# Patient Record
Sex: MALE | Race: OTHER | HISPANIC OR LATINO
[De-identification: names, ages, dates, MRNs, and addresses within clinical notes are randomized per-mention and may not be internally consistent; named-entity substitution may affect disease eponyms.]

---

## 2017-01-27 ENCOUNTER — CHART COPY (OUTPATIENT)
Age: 52
End: 2017-01-27

## 2017-01-30 ENCOUNTER — OUTPATIENT (OUTPATIENT)
Dept: OUTPATIENT SERVICES | Facility: HOSPITAL | Age: 52
LOS: 1 days | Discharge: HOME | End: 2017-01-30

## 2017-01-30 DIAGNOSIS — Z96.659 PRESENCE OF UNSPECIFIED ARTIFICIAL KNEE JOINT: Chronic | ICD-10-CM

## 2017-01-30 DIAGNOSIS — C83.10 MANTLE CELL LYMPHOMA, UNSPECIFIED SITE: ICD-10-CM

## 2017-02-01 ENCOUNTER — RESULT REVIEW (OUTPATIENT)
Age: 52
End: 2017-02-01

## 2017-02-02 ENCOUNTER — APPOINTMENT (OUTPATIENT)
Dept: HEMATOLOGY ONCOLOGY | Facility: CLINIC | Age: 52
End: 2017-02-02

## 2017-02-02 VITALS
TEMPERATURE: 98.6 F | WEIGHT: 218.24 LBS | SYSTOLIC BLOOD PRESSURE: 142 MMHG | BODY MASS INDEX: 30.7 KG/M2 | RESPIRATION RATE: 16 BRPM | HEART RATE: 73 BPM | DIASTOLIC BLOOD PRESSURE: 87 MMHG | OXYGEN SATURATION: 97 %

## 2017-02-02 LAB
ALBUMIN SERPL ELPH-MCNC: 4.4 G/DL
ALP BLD-CCNC: 77 U/L
ALT SERPL-CCNC: 12 U/L
ANION GAP SERPL CALC-SCNC: 15 MMOL/L
AST SERPL-CCNC: 14 U/L
BASOPHILS # BLD AUTO: 0 K/UL — SIGNIFICANT CHANGE UP (ref 0–0.2)
BASOPHILS NFR BLD AUTO: 0.4 % — SIGNIFICANT CHANGE UP (ref 0–2)
BILIRUB SERPL-MCNC: 0.2 MG/DL
BUN SERPL-MCNC: 21 MG/DL
CALCIUM SERPL-MCNC: 9.5 MG/DL
CHLORIDE SERPL-SCNC: 101 MMOL/L
CO2 SERPL-SCNC: 27 MMOL/L
CREAT SERPL-MCNC: 0.93 MG/DL
EOSINOPHIL # BLD AUTO: 0.1 K/UL — SIGNIFICANT CHANGE UP (ref 0–0.5)
EOSINOPHIL NFR BLD AUTO: 1.8 % — SIGNIFICANT CHANGE UP (ref 0–6)
GLUCOSE SERPL-MCNC: 85 MG/DL
HCT VFR BLD CALC: 38.3 % — LOW (ref 39–50)
HGB BLD-MCNC: 12.7 G/DL — LOW (ref 13–17)
LDH SERPL-CCNC: 174 U/L
LYMPHOCYTES # BLD AUTO: 1.2 K/UL — SIGNIFICANT CHANGE UP (ref 1–3.3)
LYMPHOCYTES # BLD AUTO: 21 % — SIGNIFICANT CHANGE UP (ref 13–44)
MAGNESIUM SERPL-MCNC: 1.9 MG/DL
MCHC RBC-ENTMCNC: 31.4 PG — SIGNIFICANT CHANGE UP (ref 27–34)
MCHC RBC-ENTMCNC: 33.2 GM/DL — SIGNIFICANT CHANGE UP (ref 32–36)
MCV RBC AUTO: 94.4 FL — SIGNIFICANT CHANGE UP (ref 80–100)
MONOCYTES # BLD AUTO: 0.8 K/UL — SIGNIFICANT CHANGE UP (ref 0–0.9)
MONOCYTES NFR BLD AUTO: 13.3 % — SIGNIFICANT CHANGE UP (ref 2–14)
NEUTROPHILS # BLD AUTO: 3.8 K/UL — SIGNIFICANT CHANGE UP (ref 1.8–7.4)
NEUTROPHILS NFR BLD AUTO: 63.5 % — SIGNIFICANT CHANGE UP (ref 43–77)
PLATELET # BLD AUTO: 295 K/UL — SIGNIFICANT CHANGE UP (ref 150–400)
POTASSIUM SERPL-SCNC: 5 MMOL/L
PROT SERPL-MCNC: 6.8 G/DL
RBC # BLD: 4.06 M/UL — LOW (ref 4.2–5.8)
RBC # FLD: 14.2 % — SIGNIFICANT CHANGE UP (ref 10.3–14.5)
SODIUM SERPL-SCNC: 143 MMOL/L
URATE SERPL-MCNC: 6.3 MG/DL
WBC # BLD: 6 K/UL — SIGNIFICANT CHANGE UP (ref 3.8–10.5)
WBC # FLD AUTO: 6 K/UL — SIGNIFICANT CHANGE UP (ref 3.8–10.5)

## 2017-02-07 ENCOUNTER — OUTPATIENT (OUTPATIENT)
Dept: OUTPATIENT SERVICES | Facility: HOSPITAL | Age: 52
LOS: 1 days | End: 2017-02-07
Payer: COMMERCIAL

## 2017-02-07 DIAGNOSIS — Z96.659 PRESENCE OF UNSPECIFIED ARTIFICIAL KNEE JOINT: Chronic | ICD-10-CM

## 2017-02-07 DIAGNOSIS — C83.10 MANTLE CELL LYMPHOMA, UNSPECIFIED SITE: ICD-10-CM

## 2017-02-08 ENCOUNTER — FORM ENCOUNTER (OUTPATIENT)
Age: 52
End: 2017-02-08

## 2017-02-09 ENCOUNTER — RESULT REVIEW (OUTPATIENT)
Age: 52
End: 2017-02-09

## 2017-02-09 ENCOUNTER — OUTPATIENT (OUTPATIENT)
Dept: OUTPATIENT SERVICES | Facility: HOSPITAL | Age: 52
LOS: 1 days | End: 2017-02-09
Payer: COMMERCIAL

## 2017-02-09 ENCOUNTER — APPOINTMENT (OUTPATIENT)
Dept: PULMONOLOGY | Facility: CLINIC | Age: 52
End: 2017-02-09

## 2017-02-09 ENCOUNTER — APPOINTMENT (OUTPATIENT)
Dept: CV DIAGNOSITCS | Facility: HOSPITAL | Age: 52
End: 2017-02-09

## 2017-02-09 ENCOUNTER — APPOINTMENT (OUTPATIENT)
Dept: NUCLEAR MEDICINE | Facility: HOSPITAL | Age: 52
End: 2017-02-09

## 2017-02-09 DIAGNOSIS — Z96.659 PRESENCE OF UNSPECIFIED ARTIFICIAL KNEE JOINT: Chronic | ICD-10-CM

## 2017-02-09 DIAGNOSIS — C85.80 OTHER SPECIFIED TYPES OF NON-HODGKIN LYMPHOMA, UNSPECIFIED SITE: ICD-10-CM

## 2017-02-09 PROCEDURE — 93306 TTE W/DOPPLER COMPLETE: CPT

## 2017-02-09 PROCEDURE — 78472 GATED HEART PLANAR SINGLE: CPT | Mod: 26

## 2017-02-09 PROCEDURE — 93306 TTE W/DOPPLER COMPLETE: CPT | Mod: 26

## 2017-02-09 PROCEDURE — 70220 X-RAY EXAM OF SINUSES: CPT | Mod: 26

## 2017-02-09 PROCEDURE — A9560: CPT

## 2017-02-09 PROCEDURE — 0399T: CPT

## 2017-02-09 PROCEDURE — 70220 X-RAY EXAM OF SINUSES: CPT

## 2017-02-09 PROCEDURE — 78472 GATED HEART PLANAR SINGLE: CPT

## 2017-02-10 ENCOUNTER — MESSAGE (OUTPATIENT)
Age: 52
End: 2017-02-10

## 2017-02-10 ENCOUNTER — LABORATORY RESULT (OUTPATIENT)
Age: 52
End: 2017-02-10

## 2017-02-10 ENCOUNTER — APPOINTMENT (OUTPATIENT)
Dept: HEMATOLOGY ONCOLOGY | Facility: CLINIC | Age: 52
End: 2017-02-10

## 2017-02-10 ENCOUNTER — FORM ENCOUNTER (OUTPATIENT)
Age: 52
End: 2017-02-10

## 2017-02-10 VITALS
OXYGEN SATURATION: 99 % | SYSTOLIC BLOOD PRESSURE: 138 MMHG | DIASTOLIC BLOOD PRESSURE: 89 MMHG | RESPIRATION RATE: 16 BRPM | TEMPERATURE: 98 F | WEIGHT: 219.36 LBS | BODY MASS INDEX: 30.85 KG/M2 | HEART RATE: 62 BPM

## 2017-02-10 LAB
BASOPHILS # BLD AUTO: 0.1 K/UL — SIGNIFICANT CHANGE UP (ref 0–0.2)
BASOPHILS NFR BLD AUTO: 1.1 % — SIGNIFICANT CHANGE UP (ref 0–2)
EOSINOPHIL # BLD AUTO: 0.4 K/UL — SIGNIFICANT CHANGE UP (ref 0–0.5)
EOSINOPHIL NFR BLD AUTO: 7.2 % — HIGH (ref 0–6)
HCT VFR BLD CALC: 42.1 % — SIGNIFICANT CHANGE UP (ref 39–50)
HGB BLD-MCNC: 13.7 G/DL — SIGNIFICANT CHANGE UP (ref 13–17)
LYMPHOCYTES # BLD AUTO: 1.3 K/UL — SIGNIFICANT CHANGE UP (ref 1–3.3)
LYMPHOCYTES # BLD AUTO: 24.3 % — SIGNIFICANT CHANGE UP (ref 13–44)
MCHC RBC-ENTMCNC: 30.6 PG — SIGNIFICANT CHANGE UP (ref 27–34)
MCHC RBC-ENTMCNC: 32.6 GM/DL — SIGNIFICANT CHANGE UP (ref 32–36)
MCV RBC AUTO: 93.8 FL — SIGNIFICANT CHANGE UP (ref 80–100)
MONOCYTES # BLD AUTO: 0.8 K/UL — SIGNIFICANT CHANGE UP (ref 0–0.9)
MONOCYTES NFR BLD AUTO: 14.1 % — HIGH (ref 2–14)
NEUTROPHILS # BLD AUTO: 2.8 K/UL — SIGNIFICANT CHANGE UP (ref 1.8–7.4)
NEUTROPHILS NFR BLD AUTO: 53.3 % — SIGNIFICANT CHANGE UP (ref 43–77)
PLATELET # BLD AUTO: 250 K/UL — SIGNIFICANT CHANGE UP (ref 150–400)
RBC # BLD: 4.49 M/UL — SIGNIFICANT CHANGE UP (ref 4.2–5.8)
RBC # FLD: 13.9 % — SIGNIFICANT CHANGE UP (ref 10.3–14.5)
WBC # BLD: 5.4 K/UL — SIGNIFICANT CHANGE UP (ref 3.8–10.5)
WBC # FLD AUTO: 5.4 K/UL — SIGNIFICANT CHANGE UP (ref 3.8–10.5)

## 2017-02-10 PROCEDURE — 85097 BONE MARROW INTERPRETATION: CPT

## 2017-02-10 PROCEDURE — 88342 IMHCHEM/IMCYTCHM 1ST ANTB: CPT | Mod: 26

## 2017-02-10 PROCEDURE — 88291 CYTO/MOLECULAR REPORT: CPT

## 2017-02-10 PROCEDURE — 88313 SPECIAL STAINS GROUP 2: CPT | Mod: 26

## 2017-02-10 PROCEDURE — 88341 IMHCHEM/IMCYTCHM EA ADD ANTB: CPT | Mod: 26

## 2017-02-10 PROCEDURE — 88305 TISSUE EXAM BY PATHOLOGIST: CPT | Mod: 26

## 2017-02-11 ENCOUNTER — APPOINTMENT (OUTPATIENT)
Dept: NUCLEAR MEDICINE | Facility: CLINIC | Age: 52
End: 2017-02-11

## 2017-02-11 ENCOUNTER — OUTPATIENT (OUTPATIENT)
Dept: OUTPATIENT SERVICES | Facility: HOSPITAL | Age: 52
LOS: 1 days | End: 2017-02-11
Payer: COMMERCIAL

## 2017-02-11 DIAGNOSIS — Z96.659 PRESENCE OF UNSPECIFIED ARTIFICIAL KNEE JOINT: Chronic | ICD-10-CM

## 2017-02-11 DIAGNOSIS — C83.10 MANTLE CELL LYMPHOMA, UNSPECIFIED SITE: ICD-10-CM

## 2017-02-11 PROCEDURE — A9552: CPT

## 2017-02-11 PROCEDURE — 78815 PET IMAGE W/CT SKULL-THIGH: CPT

## 2017-02-14 ENCOUNTER — APPOINTMENT (OUTPATIENT)
Dept: HEMATOLOGY ONCOLOGY | Facility: CLINIC | Age: 52
End: 2017-02-14

## 2017-02-14 ENCOUNTER — RESULT REVIEW (OUTPATIENT)
Age: 52
End: 2017-02-14

## 2017-02-14 VITALS
HEART RATE: 79 BPM | WEIGHT: 218.26 LBS | SYSTOLIC BLOOD PRESSURE: 136 MMHG | BODY MASS INDEX: 30.7 KG/M2 | DIASTOLIC BLOOD PRESSURE: 80 MMHG | OXYGEN SATURATION: 98 % | TEMPERATURE: 98.2 F | RESPIRATION RATE: 16 BRPM

## 2017-02-14 LAB — TM INTERPRETATION: SIGNIFICANT CHANGE UP

## 2017-02-15 ENCOUNTER — LABORATORY RESULT (OUTPATIENT)
Age: 52
End: 2017-02-15

## 2017-02-15 ENCOUNTER — APPOINTMENT (OUTPATIENT)
Dept: INFUSION THERAPY | Facility: HOSPITAL | Age: 52
End: 2017-02-15

## 2017-02-15 LAB
BASOPHILS # BLD AUTO: 0 K/UL — SIGNIFICANT CHANGE UP (ref 0–0.2)
BASOPHILS NFR BLD AUTO: 0.5 % — SIGNIFICANT CHANGE UP (ref 0–2)
EOSINOPHIL # BLD AUTO: 0.8 K/UL — HIGH (ref 0–0.5)
EOSINOPHIL NFR BLD AUTO: 12 % — HIGH (ref 0–6)
HCT VFR BLD CALC: 42 % — SIGNIFICANT CHANGE UP (ref 39–50)
HEMATOPATHOLOGY REPORT: SIGNIFICANT CHANGE UP
HGB BLD-MCNC: 14 G/DL — SIGNIFICANT CHANGE UP (ref 13–17)
LYMPHOCYTES # BLD AUTO: 2 K/UL — SIGNIFICANT CHANGE UP (ref 1–3.3)
LYMPHOCYTES # BLD AUTO: 30.7 % — SIGNIFICANT CHANGE UP (ref 13–44)
MCHC RBC-ENTMCNC: 31.4 PG — SIGNIFICANT CHANGE UP (ref 27–34)
MCHC RBC-ENTMCNC: 33.4 G/DL — SIGNIFICANT CHANGE UP (ref 32–36)
MCV RBC AUTO: 94 FL — SIGNIFICANT CHANGE UP (ref 80–100)
MONOCYTES # BLD AUTO: 0.9 K/UL — SIGNIFICANT CHANGE UP (ref 0–0.9)
MONOCYTES NFR BLD AUTO: 13.1 % — SIGNIFICANT CHANGE UP (ref 2–14)
NEUTROPHILS # BLD AUTO: 2.9 K/UL — SIGNIFICANT CHANGE UP (ref 1.8–7.4)
NEUTROPHILS NFR BLD AUTO: 43.7 % — SIGNIFICANT CHANGE UP (ref 43–77)
PLATELET # BLD AUTO: 225 K/UL — SIGNIFICANT CHANGE UP (ref 150–400)
RBC # BLD: 4.47 M/UL — SIGNIFICANT CHANGE UP (ref 4.2–5.8)
RBC # FLD: 13.8 % — SIGNIFICANT CHANGE UP (ref 10.3–14.5)
WBC # BLD: 6.6 K/UL — SIGNIFICANT CHANGE UP (ref 3.8–10.5)
WBC # FLD AUTO: 6.6 K/UL — SIGNIFICANT CHANGE UP (ref 3.8–10.5)

## 2017-02-15 PROCEDURE — 88305 TISSUE EXAM BY PATHOLOGIST: CPT

## 2017-02-15 PROCEDURE — 88342 IMHCHEM/IMCYTCHM 1ST ANTB: CPT

## 2017-02-15 PROCEDURE — 88237 TISSUE CULTURE BONE MARROW: CPT

## 2017-02-15 PROCEDURE — 88185 FLOWCYTOMETRY/TC ADD-ON: CPT

## 2017-02-15 PROCEDURE — 88280 CHROMOSOME KARYOTYPE STUDY: CPT

## 2017-02-15 PROCEDURE — 86900 BLOOD TYPING SEROLOGIC ABO: CPT

## 2017-02-15 PROCEDURE — 88341 IMHCHEM/IMCYTCHM EA ADD ANTB: CPT

## 2017-02-15 PROCEDURE — 88184 FLOWCYTOMETRY/ TC 1 MARKER: CPT

## 2017-02-15 PROCEDURE — 88313 SPECIAL STAINS GROUP 2: CPT

## 2017-02-15 PROCEDURE — 86901 BLOOD TYPING SEROLOGIC RH(D): CPT

## 2017-02-15 PROCEDURE — 88264 CHROMOSOME ANALYSIS 20-25: CPT

## 2017-02-15 PROCEDURE — 85097 BONE MARROW INTERPRETATION: CPT

## 2017-02-15 PROCEDURE — 88285 CHROMOSOME COUNT ADDITIONAL: CPT

## 2017-02-15 PROCEDURE — 86850 RBC ANTIBODY SCREEN: CPT

## 2017-02-16 ENCOUNTER — TRANSCRIPTION ENCOUNTER (OUTPATIENT)
Age: 52
End: 2017-02-16

## 2017-02-16 ENCOUNTER — INPATIENT (INPATIENT)
Facility: HOSPITAL | Age: 52
LOS: 0 days | Discharge: HOME | DRG: 847 | End: 2017-02-17
Attending: INTERNAL MEDICINE | Admitting: INTERNAL MEDICINE
Payer: COMMERCIAL

## 2017-02-16 VITALS
WEIGHT: 218.48 LBS | RESPIRATION RATE: 18 BRPM | HEART RATE: 76 BPM | DIASTOLIC BLOOD PRESSURE: 73 MMHG | TEMPERATURE: 98 F | OXYGEN SATURATION: 98 % | SYSTOLIC BLOOD PRESSURE: 130 MMHG

## 2017-02-16 DIAGNOSIS — Z51.11 ENCOUNTER FOR ANTINEOPLASTIC CHEMOTHERAPY: ICD-10-CM

## 2017-02-16 DIAGNOSIS — K21.9 GASTRO-ESOPHAGEAL REFLUX DISEASE WITHOUT ESOPHAGITIS: ICD-10-CM

## 2017-02-16 DIAGNOSIS — Z98.890 OTHER SPECIFIED POSTPROCEDURAL STATES: Chronic | ICD-10-CM

## 2017-02-16 DIAGNOSIS — C83.10 MANTLE CELL LYMPHOMA, UNSPECIFIED SITE: ICD-10-CM

## 2017-02-16 DIAGNOSIS — R11.2 NAUSEA WITH VOMITING, UNSPECIFIED: ICD-10-CM

## 2017-02-16 DIAGNOSIS — C85.90 NON-HODGKIN LYMPHOMA, UNSPECIFIED, UNSPECIFIED SITE: ICD-10-CM

## 2017-02-16 DIAGNOSIS — Z96.659 PRESENCE OF UNSPECIFIED ARTIFICIAL KNEE JOINT: Chronic | ICD-10-CM

## 2017-02-16 DIAGNOSIS — Z29.9 ENCOUNTER FOR PROPHYLACTIC MEASURES, UNSPECIFIED: ICD-10-CM

## 2017-02-16 LAB
ALBUMIN SERPL ELPH-MCNC: 4.4 G/DL — SIGNIFICANT CHANGE UP (ref 3.3–5)
ALP SERPL-CCNC: 70 U/L — SIGNIFICANT CHANGE UP (ref 40–120)
ALT FLD-CCNC: 36 U/L RC — SIGNIFICANT CHANGE UP (ref 10–45)
ANION GAP SERPL CALC-SCNC: 12 MMOL/L — SIGNIFICANT CHANGE UP (ref 5–17)
APTT BLD: 28.5 SEC — SIGNIFICANT CHANGE UP (ref 27.5–37.4)
AST SERPL-CCNC: 27 U/L — SIGNIFICANT CHANGE UP (ref 10–40)
BILIRUB SERPL-MCNC: 0.5 MG/DL — SIGNIFICANT CHANGE UP (ref 0.2–1.2)
BLD GP AB SCN SERPL QL: NEGATIVE — SIGNIFICANT CHANGE UP
BUN SERPL-MCNC: 20 MG/DL — SIGNIFICANT CHANGE UP (ref 7–23)
CALCIUM SERPL-MCNC: 9.3 MG/DL — SIGNIFICANT CHANGE UP (ref 8.4–10.5)
CHLORIDE SERPL-SCNC: 102 MMOL/L — SIGNIFICANT CHANGE UP (ref 96–108)
CO2 SERPL-SCNC: 28 MMOL/L — SIGNIFICANT CHANGE UP (ref 22–31)
CREAT SERPL-MCNC: 0.87 MG/DL — SIGNIFICANT CHANGE UP (ref 0.5–1.3)
GLUCOSE SERPL-MCNC: 109 MG/DL — HIGH (ref 70–99)
INR BLD: 1.07 RATIO — SIGNIFICANT CHANGE UP (ref 0.88–1.16)
LDH SERPL L TO P-CCNC: 207 U/L — SIGNIFICANT CHANGE UP (ref 50–242)
MAGNESIUM SERPL-MCNC: 2 MG/DL — SIGNIFICANT CHANGE UP (ref 1.6–2.6)
PHOSPHATE SERPL-MCNC: 3.1 MG/DL — SIGNIFICANT CHANGE UP (ref 2.5–4.5)
POTASSIUM SERPL-MCNC: 4.7 MMOL/L — SIGNIFICANT CHANGE UP (ref 3.5–5.3)
POTASSIUM SERPL-SCNC: 4.7 MMOL/L — SIGNIFICANT CHANGE UP (ref 3.5–5.3)
PROT SERPL-MCNC: 7.1 G/DL — SIGNIFICANT CHANGE UP (ref 6–8.3)
PROTHROM AB SERPL-ACNC: 11.7 SEC — SIGNIFICANT CHANGE UP (ref 10–13.1)
RH IG SCN BLD-IMP: POSITIVE — SIGNIFICANT CHANGE UP
SODIUM SERPL-SCNC: 142 MMOL/L — SIGNIFICANT CHANGE UP (ref 135–145)
URATE SERPL-MCNC: 7.3 MG/DL — SIGNIFICANT CHANGE UP (ref 3.4–8.8)

## 2017-02-16 PROCEDURE — 71010: CPT | Mod: 26

## 2017-02-16 RX ORDER — SALIVA SUBSTITUTE COMB NO.11 351 MG
30 POWDER IN PACKET (EA) MUCOUS MEMBRANE THREE TIMES A DAY
Qty: 0 | Refills: 0 | Status: DISCONTINUED | OUTPATIENT
Start: 2017-02-16 | End: 2017-02-17

## 2017-02-16 RX ORDER — CARBOPLATIN 50 MG
750 VIAL (EA) INTRAVENOUS ONCE
Qty: 0 | Refills: 0 | Status: DISCONTINUED | OUTPATIENT
Start: 2017-02-16 | End: 2017-02-17

## 2017-02-16 RX ORDER — FOSAPREPITANT DIMEGLUMINE 150 MG/5ML
150 INJECTION, POWDER, LYOPHILIZED, FOR SOLUTION INTRAVENOUS ONCE
Qty: 0 | Refills: 0 | Status: COMPLETED | OUTPATIENT
Start: 2017-02-16 | End: 2017-02-16

## 2017-02-16 RX ORDER — ENOXAPARIN SODIUM 100 MG/ML
40 INJECTION SUBCUTANEOUS DAILY
Qty: 0 | Refills: 0 | Status: DISCONTINUED | OUTPATIENT
Start: 2017-02-16 | End: 2017-02-16

## 2017-02-16 RX ORDER — PROCHLORPERAZINE MALEATE 5 MG
10 TABLET ORAL ONCE
Qty: 0 | Refills: 0 | Status: COMPLETED | OUTPATIENT
Start: 2017-02-16 | End: 2017-02-16

## 2017-02-16 RX ORDER — PANTOPRAZOLE SODIUM 20 MG/1
40 TABLET, DELAYED RELEASE ORAL
Qty: 0 | Refills: 0 | Status: DISCONTINUED | OUTPATIENT
Start: 2017-02-16 | End: 2017-02-17

## 2017-02-16 RX ORDER — SODIUM CHLORIDE 9 MG/ML
1000 INJECTION INTRAMUSCULAR; INTRAVENOUS; SUBCUTANEOUS
Qty: 0 | Refills: 0 | Status: DISCONTINUED | OUTPATIENT
Start: 2017-02-16 | End: 2017-02-17

## 2017-02-16 RX ORDER — IFOSFAMIDE 1 G/1
10950 INJECTION, POWDER, LYOPHILIZED, FOR SOLUTION INTRAVENOUS ONCE
Qty: 0 | Refills: 0 | Status: DISCONTINUED | OUTPATIENT
Start: 2017-02-16 | End: 2017-02-17

## 2017-02-16 RX ORDER — ENOXAPARIN SODIUM 100 MG/ML
40 INJECTION SUBCUTANEOUS DAILY
Qty: 0 | Refills: 0 | Status: DISCONTINUED | OUTPATIENT
Start: 2017-02-16 | End: 2017-02-17

## 2017-02-16 RX ORDER — ACETAMINOPHEN 500 MG
650 TABLET ORAL EVERY 6 HOURS
Qty: 0 | Refills: 0 | Status: DISCONTINUED | OUTPATIENT
Start: 2017-02-16 | End: 2017-02-17

## 2017-02-16 RX ORDER — ETOPOSIDE 20 MG/ML
219 VIAL (ML) INTRAVENOUS DAILY
Qty: 0 | Refills: 0 | Status: DISCONTINUED | OUTPATIENT
Start: 2017-02-16 | End: 2017-02-17

## 2017-02-16 RX ADMIN — SODIUM CHLORIDE 50 MILLILITER(S): 9 INJECTION INTRAMUSCULAR; INTRAVENOUS; SUBCUTANEOUS at 16:00

## 2017-02-16 RX ADMIN — Medication 650 MILLIGRAM(S): at 15:00

## 2017-02-16 RX ADMIN — Medication 650 MILLIGRAM(S): at 15:30

## 2017-02-16 RX ADMIN — Medication 30 MILLILITER(S): at 14:25

## 2017-02-16 RX ADMIN — Medication 10 MILLIGRAM(S): at 22:30

## 2017-02-16 RX ADMIN — FOSAPREPITANT DIMEGLUMINE 450 MILLIGRAM(S): 150 INJECTION, POWDER, LYOPHILIZED, FOR SOLUTION INTRAVENOUS at 15:40

## 2017-02-16 RX ADMIN — Medication 30 MILLILITER(S): at 21:47
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
## 2017-02-16 NOTE — ADVANCED PRACTICE NURSE CONSULT - ASSESSMENT
Patient in Cycle 1 day 2/? of RICE. Received rituxan and etoposide at [facility] outpatient on 2/15/17. Labs verified by NP Humera Hankins. VSS as per [facility]. Pt has mediport in R anterior chest wall with positive blood return and no abnormalities of site. Carboplatin 750 mg IV hung at 1620, over 1 hour. Will hang Etoposide and Ifosfamide when Carboplatin is complete d/t compatibility. Patient pre-medicated with emend 150 mg IV and will receive zofran and reglan ATC. Patient in Cycle 1 day 2 of RICE. Received rituxan and etoposide at [facility] outpatient on 2/15/17. Labs verified by NP Humera Hankins. VSS as per [facility]. Pt has mediport in R anterior chest wall with positive blood return and no abnormalities of site. AUC = 5. Carboplatin 750 mg IV hung at 1620, over 1 hour. Will hang Etoposide and Ifosfamide when Carboplatin is complete d/t compatibility. Patient pre-medicated with Emend 150 mg IV, today only, and will receive Zofran 8 mg IV q8h ATC and Reglan 10 mg IV q6h PRN for n/v. Patient in Cycle 1 day 2/3 of RICE. Received rituxan and etoposide at [facility] outpatient on 2/15/17. Labs verified by NP Humera Hankins. VSS as per [facility]. Pt has mediport in R anterior chest wall with positive blood return and no abnormalities of site. AUC = 5. Carboplatin 750 mg IV hung at 1620, over 1 hour. Will hang Etoposide and Ifosfamide when Carboplatin is complete d/t compatibility. Patient pre-medicated with Emend 150 mg IV, today only, and will receive Zofran 8 mg IV q8h ATC and Reglan 10 mg IV q6h PRN for n/v. Patient in Cycle 1 day 2/3 of RICE. Received rituxan and etoposide at [facility] outpatient on 2/15/17. Labs verified by NP Humera Hankins. VSS as per [facility]. Pt has mediport in R anterior chest wall with positive blood return and no abnormalities of site. AUC = 5. Carboplatin 750 mg IV hung at 1620, over 1 hour. Etoposide 100 mg/m2 = 219 mg and Ifosfamide 5000 mg/m2 = 10,950 mg hung after Carboplatin at 1745, for 24 hours. Patient pre-medicated with Emend 150 mg IV, today only, and will receive Zofran 8 mg IV q8h ATC and Reglan 10 mg IV q6h PRN for n/v.

## 2017-02-16 NOTE — H&P ADULT. - PROBLEM SELECTOR PLAN 1
Admit to [facility] [place].  Start IV hydration  Access mediport, check CXR before accessing mediport.  Monitor blood counts, transfuse as needed.  Monitor electrolytes, supplement as needed.  Monitor LFTS.  Mouth care to prevent mucositis.  Antiemetics PRN.  Start Ifosfamide_____  Monitor for Ifos/Mesna toxicity. Admit to [facility] [place].  Start IV hydration  Access mediport, after confirmed by CXR.  Monitor blood counts, transfuse as needed.  Monitor electrolytes, supplement as needed.  Monitor LFTS.  Mouth care to prevent mucositis.  Antiemetics PRN.  Start Ifosfamide_____  Monitor for Ifos/Mesna toxicity. Admit to [facility] [place].  Start IV hydration  Access mediport, after confirmed by CXR.  Monitor blood counts, transfuse as needed.  Monitor electrolytes, supplement as needed.  Monitor LFTS.  Mouth care to prevent mucositis.  Antiemetics PRN.  Start Etoposide 100mg/b7=789zj IVSS daily on days 2/16, 2/17.  Carboplatin (AUC)= 716mg IVSS on days 2/16.  Ifosfamide 5000mg/m2= [id number] IV continuous infusion over 24hrs with MESNA 5000mg/m2= 44287wz IV on day 2/16.  Emend 150mg IV x1 dose on 2/16/2017.  Monitor for Ifos/Mesna toxicity. Admit to [facility] [place].  Start IV hydration  Access mediport, after confirmed by CXR.  Monitor blood counts, transfuse as needed.  Monitor electrolytes, supplement as needed.  Monitor LFTS.  Mouth care to prevent mucositis.  Antiemetics PRN.  Start Etoposide 100mg/t8=243ol IVSS daily on days 2/16, 2/17.  Carboplatin (AUC)= 750mg IVSS on days 2/16.  Ifosfamide 5000mg/m2= 91370tm IV continuous infusion over 24hrs with MESNA 5000mg/m2= 38540bu IV on day 2/16.  Emend 150mg IV x1 dose on 2/16/2017.  Monitor for Ifos/Mesna toxicity.

## 2017-02-16 NOTE — DISCHARGE NOTE ADULT - MEDICATION SUMMARY - MEDICATIONS TO TAKE
I will START or STAY ON the medications listed below when I get home from the hospital:    omeprazole  -- 20 milligram(s) by mouth once a day  -- Indication: For GERD (gastroesophageal reflux disease) I will START or STAY ON the medications listed below when I get home from the hospital:    Compazine  -- 10 milligram(s) by mouth every 6 hours, As Needed for nausea  -- Indication: For For nausea    omeprazole  -- 20 milligram(s) by mouth once a day  -- Indication: For GERD (gastroesophageal reflux disease) I will START or STAY ON the medications listed below when I get home from the hospital:    Compazine  -- 10 milligram(s) by mouth every 6 hours, As Needed for nausea  -- Indication: For For nausea    omeprazole 20 mg oral delayed release capsule  -- 1 cap(s) by mouth once a day MDD:1  -- It is very important that you take or use this exactly as directed.  Do not skip doses or discontinue unless directed by your doctor.  Obtain medical advice before taking any non-prescription drugs as some may affect the action of this medication.  Swallow whole.  Do not crush.    -- Indication: For GERD (gastroesophageal reflux disease)

## 2017-02-16 NOTE — H&P ADULT. - HISTORY OF PRESENT ILLNESS
52 y/o male with h/o hypertension, diagnosed with mantle cell lymphoma on 10/2016, s/p 4 cycles of R-CHOP now in preparation for stem cell transplant admitted for cycle # 1 RICE.  Patients history dates back to October, 2016 when he presented to Dr. Swift with a left inguinal mass . CAT scan of abdomen pelvis was performed which revealed 0.7 cm lesion in the right Iliac bone and left retroperitoneal lymph adenopathy . An inguinal node and a bone marrow biopsy was done which was consistent with mantle cell lymphoma. A PET scan was done which revealed retroperitoneal lymphadenopathy and diffusely hypermetabolic spleen. He received 4 cycles of RCHOP and a PET scan was repeated which showed continued response. Bone marrow biopsy was done on 2/10 which revealed normocellular bone marrow with trilineage hematopoiesis and maturation. Patient is planned to receive 2 cycles of RICE therapy for consolidation and mobilization. He received his first dose of Rituxan and Etoposide on 2/15 and admitted for his first cycle of RICE.   Denies any chest pain, palpitation, SOB or dizziness.

## 2017-02-16 NOTE — DISCHARGE NOTE ADULT - HOSPITAL COURSE
52 y/o male with h/o hypertension, diagnosed with mantle cell lymphoma on 10/2016, s/p 4 cycles of R-CHOP now in preparation for stem cell transplant admitted for cycle # 1 RICE. His right chest wall mediport was accessed after radiographic confirmation. He received IV hydration monitored vital signs every 4 hours, maintained strict I/O, monitored labs daily, repleted as needed. He received his ICE treatment , monitored for Ifosamide toxicity, no side effects noted, tolerated well. Patient was instructed to follow up at [facility] for Neulasta injection and further MD appointments. 50 y/o male with h/o hypertension, diagnosed with mantle cell lymphoma on 10/2016, s/p 4 cycles of R-CHOP now in preparation for stem cell transplant admitted for cycle # 1 RICE. Patient received day 1 rituxan and etoposide as outpatient. His right chest wall mediport was accessed after radiographic confirmation. He received IV hydration monitored vital signs every 4 hours, maintained strict I/O, monitored labs daily, repleted as needed. He completed his RICE treatment , monitored for Ifosamide toxicity, no side effects noted, tolerated well. Patient was instructed to follow up at [facility] for Neulasta injection and further MD appointments. Patient is a 50 y/o M w/ a PMHx of hypertension, diagnosed with mantle cell lymphoma on 10/2016, s/p 4 cycles of R-CHOP now in preparation for stem cell transplant admitted for cycle # 1 RICE. Patient received day 1 Rituxan and Etoposide as an outpatient. His right chest wall mediport was accessed after radiographic confirmation. He received IV hydration. Vital signs were monitored every 4 hours, strict I/Os were maintained, labs were monitored daily, and electrolytes were repleted as needed. He completed his RICE treatment. He was monitored for Ifosamide toxicity w/ no side effects noted. He tolerated the regimen well. Patient remained afebrile and hemodynamically stable throughout hospitalization. Patient was instructed to follow up at [facility] for Neulasta injection and further MD appointments. Patient is a 52 y/o M w/ a PMHx of hypertension, diagnosed with mantle cell lymphoma on 10/2016, s/p 4 cycles of R-CHOP now in preparation for stem cell transplant admitted for cycle # 1 RICE. Patient received day 1 Rituxan and Etoposide as an outpatient. His right chest wall mediport was accessed after radiographic confirmation. He received IV hydration. Vital signs were monitored every 4 hours, strict I/Os were maintained, labs were monitored daily, and electrolytes were repleted as needed. He completed his RICE treatment. He was monitored for Ifosamide toxicity with no side effects noted. He tolerated the regimen well. Patient remained afebrile and hemodynamically stable throughout hospitalization. Patient was instructed to follow up at [facility] for Neulasta injection and further MD appointments.

## 2017-02-16 NOTE — DISCHARGE NOTE ADULT - CARE PLAN
Principal Discharge DX:	Non Hodgkin's lymphoma  Goal:	To remain safe  Instructions for follow-up, activity and diet:	Notify MD or report to ER for fever greater than or equal to 100.4F, persistent nausea, vomiting, diarrhea, abdominal pain, bleeding.  To [facility] for neulasta injection and  designated appointment.  Secondary Diagnosis:	GERD (gastroesophageal reflux disease)  Goal:	Continue on Prilosec Principal Discharge DX:	Non Hodgkin's lymphoma  Goal:	To remain safe  Instructions for follow-up, activity and diet:	Notify MD or report to ER for fever greater than or equal to 100.4F, persistent nausea, vomiting, diarrhea, abdominal pain, bleeding.  To [facility] for neulasta injection and  designated appointment.  Secondary Diagnosis:	GERD (gastroesophageal reflux disease)  Goal:	Continue on Prilosec Principal Discharge DX:	Non Hodgkin's lymphoma  Goal:	To remain safe  Instructions for follow-up, activity and diet:	Notify MD or report to ER for fever greater than or equal to 100.4F, persistent nausea, vomiting, diarrhea, abdominal pain, bleeding.  To [facility] for neulasta injection and  designated appointment.  Secondary Diagnosis:	GERD (gastroesophageal reflux disease)  Goal:	Continue on Prilosec Principal Discharge DX:	Non Hodgkin's lymphoma  Goal:	To remain safe  Instructions for follow-up, activity and diet:	Notify MD or report to ER for fever greater than or equal to 100.4F, persistent nausea, vomiting, diarrhea, abdominal pain, bleeding.  To [facility] for neulasta injection and  designated appointment.  Secondary Diagnosis:	GERD (gastroesophageal reflux disease)  Goal:	Continue on Prilosec Principal Discharge DX:	Non Hodgkin's lymphoma  Goal:	Maintain counts and remain free from infection  Instructions for follow-up, activity and diet:	Notify MD or report to ER for fever greater than or equal to 100.4F, persistent nausea, vomiting, diarrhea, abdominal pain, bleeding.  To [facility] for neulasta injection and  designated appointment.  Secondary Diagnosis:	GERD (gastroesophageal reflux disease)  Goal:	Stable  Instructions for follow-up, activity and diet:	Continue on Prilosec Principal Discharge DX:	Non Hodgkin's lymphoma  Goal:	Maintain counts and remain free from infection  Instructions for follow-up, activity and diet:	Notify MD or report to ER for fever greater than or equal to 100.4F, persistent nausea, vomiting, diarrhea, abdominal pain, bleeding.  To [facility] for neulasta injection and  designated appointment.  Secondary Diagnosis:	GERD (gastroesophageal reflux disease)  Goal:	Stable  Instructions for follow-up, activity and diet:	Continue on Prilosec Principal Discharge DX:	Non Hodgkin's lymphoma  Goal:	Maintain counts and remain free from infection  Instructions for follow-up, activity and diet:	Notify MD or report to ER for fever greater than or equal to 100.4F, persistent nausea, vomiting, diarrhea, abdominal pain, bleeding.  To [facility] for neulasta injection and  designated appointment.  Secondary Diagnosis:	GERD (gastroesophageal reflux disease)  Goal:	Stable  Instructions for follow-up, activity and diet:	Continue on Prilosec

## 2017-02-16 NOTE — PROVIDER CONTACT NOTE (OTHER) - SITUATION
pt states "I feel weird, it feels like inside of my veins are vibrating" pt states "I feel weird, it feels like the inside of my veins are vibrating"

## 2017-02-16 NOTE — DISCHARGE NOTE ADULT - PATIENT PORTAL LINK FT
“You can access the FollowHealth Patient Portal, offered by [facility], by registering with the following website: [email or web address]”

## 2017-02-16 NOTE — DISCHARGE NOTE ADULT - ADDITIONAL INSTRUCTIONS
You have an appointment for Neulasta on Saturday 2/18/2017 at 3:20PM.  Appointment with Zuly Hobbs NP On Tuesday 2/21/2017 at 2PM.  Appointment on Friday 2/24/2017 at 3PM  to see Dr. Banks and possible platelet transfusion.  Appointment on Tuesday 2/28/2017 at 3PM for Dr. Banks and possible platelet transfusion. You have an appointment for Neulasta on Saturday 2/18/2017 at 3:20PM.  Appointment with Zuly Hobbs NP On Tuesday 2/21/2017 at 2PM.  Appointment on Friday 2/24/2017 at 3PM for  possible platelet transfusion.  Appointment on Tuesday 2/28/2017 at 3PM for Dr. Banks and possible platelet transfusion.

## 2017-02-16 NOTE — H&P ADULT. - FAMILY HISTORY
Mother  Still living? Unknown  Family history of hypertension, Age at diagnosis: Age Unknown     Father  Still living? No  Family history of diabetes mellitus, Age at diagnosis: Age Unknown

## 2017-02-16 NOTE — DISCHARGE NOTE ADULT - PLAN OF CARE
Notify MD or report to ER for fever greater than or equal to 100.4F, persistent nausea, vomiting, diarrhea, abdominal pain, bleeding.  To [facility] for neulasta injection and  designated appointment. To remain safe Continue on Prilosec Maintain counts and remain free from infection Stable

## 2017-02-16 NOTE — DISCHARGE NOTE ADULT - CARE PROVIDERS DIRECT ADDRESSES
[email or web address]

## 2017-02-16 NOTE — H&P ADULT. - RS GEN PE MLT RESP DETAILS PC
good air movement/breath sounds equal/airway patent/normal/respirations non-labored/clear to auscultation bilaterally

## 2017-02-16 NOTE — H&P ADULT. - ASSESSMENT
52 y/o male with mantle cell lymphoma  admitted for cycle # 1 ICE. 50 y/o male with mantle cell lymphoma  admitted for cycle # 1 RICE.

## 2017-02-16 NOTE — DISCHARGE NOTE ADULT - CARE PROVIDER_API CALL
Gerardo Banks), Internal Medicine; Medical Oncology  [address] [place] [address]  Phone: [phone]  Fax: [phone]

## 2017-02-16 NOTE — DISCHARGE NOTE ADULT - NS AS ACTIVITY OBS
No Heavy lifting/straining Walking-Outdoors allowed/Showering allowed/Walking-Indoors allowed/No Heavy lifting/straining/Bathing allowed

## 2017-02-17 ENCOUNTER — CHART COPY (OUTPATIENT)
Age: 52
End: 2017-02-17

## 2017-02-17 VITALS
SYSTOLIC BLOOD PRESSURE: 130 MMHG | OXYGEN SATURATION: 98 % | RESPIRATION RATE: 18 BRPM | TEMPERATURE: 98 F | DIASTOLIC BLOOD PRESSURE: 72 MMHG | HEART RATE: 102 BPM

## 2017-02-17 LAB
ALBUMIN SERPL ELPH-MCNC: 3.8 G/DL — SIGNIFICANT CHANGE UP (ref 3.3–5)
ALP SERPL-CCNC: 61 U/L — SIGNIFICANT CHANGE UP (ref 40–120)
ALT FLD-CCNC: 27 U/L RC — SIGNIFICANT CHANGE UP (ref 10–45)
ANION GAP SERPL CALC-SCNC: 11 MMOL/L — SIGNIFICANT CHANGE UP (ref 5–17)
AST SERPL-CCNC: 19 U/L — SIGNIFICANT CHANGE UP (ref 10–40)
BASOPHILS # BLD AUTO: 0 K/UL — SIGNIFICANT CHANGE UP (ref 0–0.2)
BASOPHILS NFR BLD AUTO: 0.1 % — SIGNIFICANT CHANGE UP (ref 0–2)
BILIRUB SERPL-MCNC: 0.6 MG/DL — SIGNIFICANT CHANGE UP (ref 0.2–1.2)
BUN SERPL-MCNC: 16 MG/DL — SIGNIFICANT CHANGE UP (ref 7–23)
CALCIUM SERPL-MCNC: 8.9 MG/DL — SIGNIFICANT CHANGE UP (ref 8.4–10.5)
CHLORIDE SERPL-SCNC: 104 MMOL/L — SIGNIFICANT CHANGE UP (ref 96–108)
CO2 SERPL-SCNC: 28 MMOL/L — SIGNIFICANT CHANGE UP (ref 22–31)
CREAT SERPL-MCNC: 0.71 MG/DL — SIGNIFICANT CHANGE UP (ref 0.5–1.3)
EOSINOPHIL # BLD AUTO: 0.5 K/UL — SIGNIFICANT CHANGE UP (ref 0–0.5)
EOSINOPHIL NFR BLD AUTO: 13.1 % — HIGH (ref 0–6)
GLUCOSE SERPL-MCNC: 112 MG/DL — HIGH (ref 70–99)
HCT VFR BLD CALC: 38.7 % — LOW (ref 39–50)
HGB BLD-MCNC: 12.5 G/DL — LOW (ref 13–17)
LDH SERPL L TO P-CCNC: 150 U/L — SIGNIFICANT CHANGE UP (ref 50–242)
LYMPHOCYTES # BLD AUTO: 0.9 K/UL — LOW (ref 1–3.3)
LYMPHOCYTES # BLD AUTO: 21.1 % — SIGNIFICANT CHANGE UP (ref 13–44)
MAGNESIUM SERPL-MCNC: 1.9 MG/DL — SIGNIFICANT CHANGE UP (ref 1.6–2.6)
MCHC RBC-ENTMCNC: 30.5 PG — SIGNIFICANT CHANGE UP (ref 27–34)
MCHC RBC-ENTMCNC: 32.4 GM/DL — SIGNIFICANT CHANGE UP (ref 32–36)
MCV RBC AUTO: 94.2 FL — SIGNIFICANT CHANGE UP (ref 80–100)
MONOCYTES # BLD AUTO: 0.5 K/UL — SIGNIFICANT CHANGE UP (ref 0–0.9)
MONOCYTES NFR BLD AUTO: 11.7 % — SIGNIFICANT CHANGE UP (ref 2–14)
NEUTROPHILS # BLD AUTO: 2.2 K/UL — SIGNIFICANT CHANGE UP (ref 1.8–7.4)
NEUTROPHILS NFR BLD AUTO: 54 % — SIGNIFICANT CHANGE UP (ref 43–77)
PHOSPHATE SERPL-MCNC: 3.2 MG/DL — SIGNIFICANT CHANGE UP (ref 2.5–4.5)
PLATELET # BLD AUTO: 221 K/UL — SIGNIFICANT CHANGE UP (ref 150–400)
POTASSIUM SERPL-MCNC: 3.6 MMOL/L — SIGNIFICANT CHANGE UP (ref 3.5–5.3)
POTASSIUM SERPL-SCNC: 3.6 MMOL/L — SIGNIFICANT CHANGE UP (ref 3.5–5.3)
PROT SERPL-MCNC: 6.2 G/DL — SIGNIFICANT CHANGE UP (ref 6–8.3)
RBC # BLD: 4.1 M/UL — LOW (ref 4.2–5.8)
RBC # FLD: 13.6 % — SIGNIFICANT CHANGE UP (ref 10.3–14.5)
SODIUM SERPL-SCNC: 143 MMOL/L — SIGNIFICANT CHANGE UP (ref 135–145)
URATE SERPL-MCNC: 6.4 MG/DL — SIGNIFICANT CHANGE UP (ref 3.4–8.8)
WBC # BLD: 4.2 K/UL — SIGNIFICANT CHANGE UP (ref 3.8–10.5)
WBC # FLD AUTO: 4.2 K/UL — SIGNIFICANT CHANGE UP (ref 3.8–10.5)

## 2017-02-17 PROCEDURE — 84100 ASSAY OF PHOSPHORUS: CPT

## 2017-02-17 PROCEDURE — 86850 RBC ANTIBODY SCREEN: CPT

## 2017-02-17 PROCEDURE — 80053 COMPREHEN METABOLIC PANEL: CPT

## 2017-02-17 PROCEDURE — 83615 LACTATE (LD) (LDH) ENZYME: CPT

## 2017-02-17 PROCEDURE — 85610 PROTHROMBIN TIME: CPT

## 2017-02-17 PROCEDURE — 99232 SBSQ HOSP IP/OBS MODERATE 35: CPT

## 2017-02-17 PROCEDURE — 83735 ASSAY OF MAGNESIUM: CPT

## 2017-02-17 PROCEDURE — 86900 BLOOD TYPING SEROLOGIC ABO: CPT

## 2017-02-17 PROCEDURE — 86901 BLOOD TYPING SEROLOGIC RH(D): CPT

## 2017-02-17 PROCEDURE — 71045 X-RAY EXAM CHEST 1 VIEW: CPT

## 2017-02-17 PROCEDURE — 85027 COMPLETE CBC AUTOMATED: CPT

## 2017-02-17 PROCEDURE — 84550 ASSAY OF BLOOD/URIC ACID: CPT

## 2017-02-17 PROCEDURE — 85730 THROMBOPLASTIN TIME PARTIAL: CPT

## 2017-02-17 RX ORDER — OMEPRAZOLE 10 MG/1
1 CAPSULE, DELAYED RELEASE ORAL
Qty: 30 | Refills: 0 | OUTPATIENT
Start: 2017-02-17 | End: 2017-03-19

## 2017-02-17 RX ORDER — ONDANSETRON 8 MG/1
4 TABLET, FILM COATED ORAL ONCE
Qty: 0 | Refills: 0 | Status: COMPLETED | OUTPATIENT
Start: 2017-02-17 | End: 2017-02-17

## 2017-02-17 RX ORDER — ONDANSETRON 8 MG/1
8 TABLET, FILM COATED ORAL ONCE
Qty: 0 | Refills: 0 | Status: DISCONTINUED | OUTPATIENT
Start: 2017-02-17 | End: 2017-02-17

## 2017-02-17 RX ADMIN — Medication 30 MILLILITER(S): at 14:39

## 2017-02-17 RX ADMIN — ONDANSETRON 4 MILLIGRAM(S): 8 TABLET, FILM COATED ORAL at 00:24

## 2017-02-17 RX ADMIN — PANTOPRAZOLE SODIUM 40 MILLIGRAM(S): 20 TABLET, DELAYED RELEASE ORAL at 05:09

## 2017-02-17 RX ADMIN — SODIUM CHLORIDE 50 MILLILITER(S): 9 INJECTION INTRAMUSCULAR; INTRAVENOUS; SUBCUTANEOUS at 05:02

## 2017-02-17 RX ADMIN — Medication 30 MILLILITER(S): at 05:09

## 2017-02-17 NOTE — ADVANCED PRACTICE NURSE CONSULT - ASSESSMENT
lab results as follows: wbc 4.2 Hgb 12.5 hct 38.7 plt ct 221. Creatinine 0.71 Total bili 0.6. Up and about by self,will be discharge to home post chemo. Right chestwall single lumen mediport patent in used with ifosfamide in progress as civ via y-line tubing with NSS as hydration on th other line of y-line tubing. Reinforced teachings about his chemo regimen well understood. On zofran 8mg IV ATC as antiemetic. Denies nausea. Etoposide 219mg in 1000mlNSS started at 1515 x 2 hours infusion at 500ml/hr via NSS line through y-line tubing into right chestwall single lumen patent and intact. BP within normal level 15minutes into infusion. Safety maintained.

## 2017-02-18 ENCOUNTER — APPOINTMENT (OUTPATIENT)
Dept: INFUSION THERAPY | Facility: HOSPITAL | Age: 52
End: 2017-02-18

## 2017-02-20 ENCOUNTER — RESULT REVIEW (OUTPATIENT)
Age: 52
End: 2017-02-20

## 2017-02-21 ENCOUNTER — APPOINTMENT (OUTPATIENT)
Dept: HEMATOLOGY ONCOLOGY | Facility: CLINIC | Age: 52
End: 2017-02-21

## 2017-02-21 VITALS
HEART RATE: 100 BPM | OXYGEN SATURATION: 99 % | RESPIRATION RATE: 16 BRPM | DIASTOLIC BLOOD PRESSURE: 81 MMHG | TEMPERATURE: 99.2 F | BODY MASS INDEX: 30.51 KG/M2 | WEIGHT: 216.93 LBS | SYSTOLIC BLOOD PRESSURE: 120 MMHG

## 2017-02-21 LAB
BASOPHILS # BLD AUTO: 0 K/UL — SIGNIFICANT CHANGE UP (ref 0–0.2)
EOSINOPHIL # BLD AUTO: 0.3 K/UL — SIGNIFICANT CHANGE UP (ref 0–0.5)
EOSINOPHIL NFR BLD AUTO: 2 % — SIGNIFICANT CHANGE UP (ref 0–6)
HCT VFR BLD CALC: 35.4 % — LOW (ref 39–50)
HGB BLD-MCNC: 12.2 G/DL — LOW (ref 13–17)
LYMPHOCYTES # BLD AUTO: 1.2 K/UL — SIGNIFICANT CHANGE UP (ref 1–3.3)
LYMPHOCYTES # BLD AUTO: 9 % — LOW (ref 13–44)
MCHC RBC-ENTMCNC: 32.7 PG — SIGNIFICANT CHANGE UP (ref 27–34)
MCHC RBC-ENTMCNC: 34.4 G/DL — SIGNIFICANT CHANGE UP (ref 32–36)
MCV RBC AUTO: 95.1 FL — SIGNIFICANT CHANGE UP (ref 80–100)
METAMYELOCYTES # FLD: 3 % — HIGH (ref 0–0)
MONOCYTES # BLD AUTO: 0.5 K/UL — SIGNIFICANT CHANGE UP (ref 0–0.9)
MONOCYTES NFR BLD AUTO: 5 % — SIGNIFICANT CHANGE UP (ref 2–14)
NEUTROPHILS # BLD AUTO: 17 K/UL — HIGH (ref 1.8–7.4)
NEUTROPHILS NFR BLD AUTO: 76 % — SIGNIFICANT CHANGE UP (ref 43–77)
NEUTS BAND # BLD: 5 % — SIGNIFICANT CHANGE UP (ref 0–8)
PLAT MORPH BLD: NORMAL — SIGNIFICANT CHANGE UP
PLATELET # BLD AUTO: 145 K/UL — LOW (ref 150–400)
RBC # BLD: 3.72 M/UL — LOW (ref 4.2–5.8)
RBC # FLD: 12.9 % — SIGNIFICANT CHANGE UP (ref 10.3–14.5)
RBC BLD AUTO: SIGNIFICANT CHANGE UP
WBC # BLD: 19.1 K/UL — HIGH (ref 3.8–10.5)
WBC # FLD AUTO: 19.1 K/UL — HIGH (ref 3.8–10.5)

## 2017-02-22 ENCOUNTER — RESULT REVIEW (OUTPATIENT)
Age: 52
End: 2017-02-22

## 2017-02-22 ENCOUNTER — CHART COPY (OUTPATIENT)
Age: 52
End: 2017-02-22

## 2017-02-22 DIAGNOSIS — Z51.89 ENCOUNTER FOR OTHER SPECIFIED AFTERCARE: ICD-10-CM

## 2017-02-22 LAB
ALBUMIN SERPL ELPH-MCNC: 4.6 G/DL
ALP BLD-CCNC: 167 U/L
ALT SERPL-CCNC: 30 U/L
ANION GAP SERPL CALC-SCNC: 14 MMOL/L
AST SERPL-CCNC: 26 U/L
BILIRUB SERPL-MCNC: 0.6 MG/DL
BUN SERPL-MCNC: 25 MG/DL
CALCIUM SERPL-MCNC: 9.5 MG/DL
CHLORIDE SERPL-SCNC: 98 MMOL/L
CO2 SERPL-SCNC: 29 MMOL/L
CREAT SERPL-MCNC: 0.87 MG/DL
GLUCOSE SERPL-MCNC: 85 MG/DL
LDH SERPL-CCNC: 335 U/L
MAGNESIUM SERPL-MCNC: 2.1 MG/DL
POTASSIUM SERPL-SCNC: 4.7 MMOL/L
PROT SERPL-MCNC: 7.1 G/DL
SODIUM SERPL-SCNC: 141 MMOL/L
URATE SERPL-MCNC: 5.2 MG/DL

## 2017-02-23 ENCOUNTER — APPOINTMENT (OUTPATIENT)
Dept: PULMONOLOGY | Facility: CLINIC | Age: 52
End: 2017-02-23

## 2017-02-23 ENCOUNTER — APPOINTMENT (OUTPATIENT)
Dept: HEMATOLOGY ONCOLOGY | Facility: CLINIC | Age: 52
End: 2017-02-23

## 2017-02-23 LAB
EOSINOPHIL # BLD AUTO: 0.4 K/UL — SIGNIFICANT CHANGE UP (ref 0–0.5)
EOSINOPHIL NFR BLD AUTO: 2 % — SIGNIFICANT CHANGE UP (ref 0–6)
HCT VFR BLD CALC: 34.4 % — LOW (ref 39–50)
HGB BLD-MCNC: 11.5 G/DL — LOW (ref 13–17)
LYMPHOCYTES # BLD AUTO: 1 K/UL — SIGNIFICANT CHANGE UP (ref 1–3.3)
LYMPHOCYTES # BLD AUTO: 20 % — SIGNIFICANT CHANGE UP (ref 13–44)
MCHC RBC-ENTMCNC: 32 PG — SIGNIFICANT CHANGE UP (ref 27–34)
MCHC RBC-ENTMCNC: 33.4 G/DL — SIGNIFICANT CHANGE UP (ref 32–36)
MCV RBC AUTO: 95.7 FL — SIGNIFICANT CHANGE UP (ref 80–100)
MONOCYTES # BLD AUTO: 0.3 K/UL — SIGNIFICANT CHANGE UP (ref 0–0.9)
MONOCYTES NFR BLD AUTO: 12 % — SIGNIFICANT CHANGE UP (ref 2–14)
NEUTROPHILS # BLD AUTO: 2.6 K/UL — SIGNIFICANT CHANGE UP (ref 1.8–7.4)
NEUTROPHILS NFR BLD AUTO: 66 % — SIGNIFICANT CHANGE UP (ref 43–77)
PLAT MORPH BLD: NORMAL — SIGNIFICANT CHANGE UP
PLATELET # BLD AUTO: 129 K/UL — LOW (ref 150–400)
RBC # BLD: 3.59 M/UL — LOW (ref 4.2–5.8)
RBC # FLD: 12.4 % — SIGNIFICANT CHANGE UP (ref 10.3–14.5)
RBC BLD AUTO: SIGNIFICANT CHANGE UP
WBC # BLD: 4.5 K/UL — SIGNIFICANT CHANGE UP (ref 3.8–10.5)
WBC # FLD AUTO: 4.5 K/UL — SIGNIFICANT CHANGE UP (ref 3.8–10.5)

## 2017-02-24 ENCOUNTER — APPOINTMENT (OUTPATIENT)
Dept: INFUSION THERAPY | Facility: HOSPITAL | Age: 52
End: 2017-02-24

## 2017-02-27 ENCOUNTER — RESULT REVIEW (OUTPATIENT)
Age: 52
End: 2017-02-27

## 2017-02-28 ENCOUNTER — OUTPATIENT (OUTPATIENT)
Dept: OUTPATIENT SERVICES | Facility: HOSPITAL | Age: 52
LOS: 1 days | Discharge: HOME | End: 2017-02-28

## 2017-02-28 ENCOUNTER — APPOINTMENT (OUTPATIENT)
Dept: INFUSION THERAPY | Facility: HOSPITAL | Age: 52
End: 2017-02-28

## 2017-02-28 ENCOUNTER — APPOINTMENT (OUTPATIENT)
Dept: HEMATOLOGY ONCOLOGY | Facility: CLINIC | Age: 52
End: 2017-02-28

## 2017-02-28 VITALS
WEIGHT: 219.14 LBS | SYSTOLIC BLOOD PRESSURE: 131 MMHG | RESPIRATION RATE: 16 BRPM | TEMPERATURE: 98.2 F | BODY MASS INDEX: 30.82 KG/M2 | OXYGEN SATURATION: 99 % | HEART RATE: 67 BPM | DIASTOLIC BLOOD PRESSURE: 87 MMHG

## 2017-02-28 DIAGNOSIS — C83.10 MANTLE CELL LYMPHOMA, UNSPECIFIED SITE: ICD-10-CM

## 2017-02-28 DIAGNOSIS — Z98.890 OTHER SPECIFIED POSTPROCEDURAL STATES: Chronic | ICD-10-CM

## 2017-02-28 LAB
BASOPHILS # BLD AUTO: 0 K/UL — SIGNIFICANT CHANGE UP (ref 0–0.2)
BASOPHILS NFR BLD AUTO: 0.1 % — SIGNIFICANT CHANGE UP (ref 0–2)
CHROM ANALY OVERALL INTERP SPEC-IMP: SIGNIFICANT CHANGE UP
EOSINOPHIL # BLD AUTO: 0.1 K/UL — SIGNIFICANT CHANGE UP (ref 0–0.5)
EOSINOPHIL NFR BLD AUTO: 2.8 % — SIGNIFICANT CHANGE UP (ref 0–6)
HCT VFR BLD CALC: 31.7 % — LOW (ref 39–50)
HGB BLD-MCNC: 10.5 G/DL — LOW (ref 13–17)
LYMPHOCYTES # BLD AUTO: 1.1 K/UL — SIGNIFICANT CHANGE UP (ref 1–3.3)
LYMPHOCYTES # BLD AUTO: 23.9 % — SIGNIFICANT CHANGE UP (ref 13–44)
MCHC RBC-ENTMCNC: 31.6 PG — SIGNIFICANT CHANGE UP (ref 27–34)
MCHC RBC-ENTMCNC: 33.1 G/DL — SIGNIFICANT CHANGE UP (ref 32–36)
MCV RBC AUTO: 95.4 FL — SIGNIFICANT CHANGE UP (ref 80–100)
MONOCYTES # BLD AUTO: 0.6 K/UL — SIGNIFICANT CHANGE UP (ref 0–0.9)
MONOCYTES NFR BLD AUTO: 13.6 % — SIGNIFICANT CHANGE UP (ref 2–14)
NEUTROPHILS # BLD AUTO: 2.7 K/UL — SIGNIFICANT CHANGE UP (ref 1.8–7.4)
NEUTROPHILS NFR BLD AUTO: 59.7 % — SIGNIFICANT CHANGE UP (ref 43–77)
PLATELET # BLD AUTO: 80 K/UL — LOW (ref 150–400)
RBC # BLD: 3.33 M/UL — LOW (ref 4.2–5.8)
RBC # FLD: 13.5 % — SIGNIFICANT CHANGE UP (ref 10.3–14.5)
WBC # BLD: 4.6 K/UL — SIGNIFICANT CHANGE UP (ref 3.8–10.5)
WBC # FLD AUTO: 4.6 K/UL — SIGNIFICANT CHANGE UP (ref 3.8–10.5)

## 2017-03-01 ENCOUNTER — OUTPATIENT (OUTPATIENT)
Dept: OUTPATIENT SERVICES | Facility: HOSPITAL | Age: 52
LOS: 1 days | End: 2017-03-01

## 2017-03-01 DIAGNOSIS — K08.9 DISORDER OF TEETH AND SUPPORTING STRUCTURES, UNSPECIFIED: ICD-10-CM

## 2017-03-01 DIAGNOSIS — Z98.890 OTHER SPECIFIED POSTPROCEDURAL STATES: Chronic | ICD-10-CM

## 2017-03-01 LAB
ALBUMIN SERPL ELPH-MCNC: 4.4 G/DL
ALP BLD-CCNC: 106 U/L
ALT SERPL-CCNC: 21 U/L
ANION GAP SERPL CALC-SCNC: 13 MMOL/L
AST SERPL-CCNC: 24 U/L
BILIRUB SERPL-MCNC: <0.2 MG/DL
BUN SERPL-MCNC: 19 MG/DL
CALCIUM SERPL-MCNC: 9 MG/DL
CHLORIDE SERPL-SCNC: 101 MMOL/L
CO2 SERPL-SCNC: 26 MMOL/L
CREAT SERPL-MCNC: 0.8 MG/DL
GLUCOSE SERPL-MCNC: 92 MG/DL
INR PPP: 1.04 RATIO
LDH SERPL-CCNC: 305 U/L
MAGNESIUM SERPL-MCNC: 1.8 MG/DL
POTASSIUM SERPL-SCNC: 4.6 MMOL/L
PROT SERPL-MCNC: 7 G/DL
PT BLD: 11.8 SEC
SODIUM SERPL-SCNC: 140 MMOL/L
URATE SERPL-MCNC: 7.1 MG/DL

## 2017-03-02 ENCOUNTER — RESULT REVIEW (OUTPATIENT)
Age: 52
End: 2017-03-02

## 2017-03-02 ENCOUNTER — OUTPATIENT (OUTPATIENT)
Dept: OUTPATIENT SERVICES | Facility: HOSPITAL | Age: 52
LOS: 1 days | Discharge: HOME | End: 2017-03-02

## 2017-03-02 DIAGNOSIS — C83.10 MANTLE CELL LYMPHOMA, UNSPECIFIED SITE: ICD-10-CM

## 2017-03-02 DIAGNOSIS — Z98.890 OTHER SPECIFIED POSTPROCEDURAL STATES: Chronic | ICD-10-CM

## 2017-03-03 ENCOUNTER — APPOINTMENT (OUTPATIENT)
Dept: INFUSION THERAPY | Facility: HOSPITAL | Age: 52
End: 2017-03-03

## 2017-03-03 ENCOUNTER — OUTPATIENT (OUTPATIENT)
Dept: OUTPATIENT SERVICES | Facility: HOSPITAL | Age: 52
LOS: 1 days | End: 2017-03-03
Payer: COMMERCIAL

## 2017-03-03 DIAGNOSIS — Z98.890 OTHER SPECIFIED POSTPROCEDURAL STATES: Chronic | ICD-10-CM

## 2017-03-03 LAB
BASOPHILS # BLD AUTO: 0 K/UL — SIGNIFICANT CHANGE UP (ref 0–0.2)
BASOPHILS NFR BLD AUTO: 0.1 % — SIGNIFICANT CHANGE UP (ref 0–2)
EOSINOPHIL # BLD AUTO: 0.2 K/UL — SIGNIFICANT CHANGE UP (ref 0–0.5)
EOSINOPHIL NFR BLD AUTO: 2.7 % — SIGNIFICANT CHANGE UP (ref 0–6)
HCT VFR BLD CALC: 34.3 % — LOW (ref 39–50)
HGB BLD-MCNC: 11.8 G/DL — LOW (ref 13–17)
LYMPHOCYTES # BLD AUTO: 1.5 K/UL — SIGNIFICANT CHANGE UP (ref 1–3.3)
LYMPHOCYTES # BLD AUTO: 25.3 % — SIGNIFICANT CHANGE UP (ref 13–44)
MCHC RBC-ENTMCNC: 32.5 PG — SIGNIFICANT CHANGE UP (ref 27–34)
MCHC RBC-ENTMCNC: 34.4 G/DL — SIGNIFICANT CHANGE UP (ref 32–36)
MCV RBC AUTO: 94.3 FL — SIGNIFICANT CHANGE UP (ref 80–100)
MONOCYTES # BLD AUTO: 0.9 K/UL — SIGNIFICANT CHANGE UP (ref 0–0.9)
MONOCYTES NFR BLD AUTO: 15.6 % — HIGH (ref 2–14)
NEUTROPHILS # BLD AUTO: 3.4 K/UL — SIGNIFICANT CHANGE UP (ref 1.8–7.4)
NEUTROPHILS NFR BLD AUTO: 56.2 % — SIGNIFICANT CHANGE UP (ref 43–77)
PLATELET # BLD AUTO: 109 K/UL — LOW (ref 150–400)
RBC # BLD: 3.64 M/UL — LOW (ref 4.2–5.8)
RBC # FLD: 14 % — SIGNIFICANT CHANGE UP (ref 10.3–14.5)
WBC # BLD: 6 K/UL — SIGNIFICANT CHANGE UP (ref 3.8–10.5)
WBC # FLD AUTO: 6 K/UL — SIGNIFICANT CHANGE UP (ref 3.8–10.5)

## 2017-03-03 PROCEDURE — D7140: CPT

## 2017-03-06 ENCOUNTER — RESULT REVIEW (OUTPATIENT)
Age: 52
End: 2017-03-06

## 2017-03-07 ENCOUNTER — LABORATORY RESULT (OUTPATIENT)
Age: 52
End: 2017-03-07

## 2017-03-07 ENCOUNTER — APPOINTMENT (OUTPATIENT)
Dept: INFUSION THERAPY | Facility: HOSPITAL | Age: 52
End: 2017-03-07

## 2017-03-07 LAB
BASOPHILS # BLD AUTO: 0.1 K/UL — SIGNIFICANT CHANGE UP (ref 0–0.2)
BASOPHILS NFR BLD AUTO: 1.5 % — SIGNIFICANT CHANGE UP (ref 0–2)
EOSINOPHIL # BLD AUTO: 0.1 K/UL — SIGNIFICANT CHANGE UP (ref 0–0.5)
EOSINOPHIL NFR BLD AUTO: 2.3 % — SIGNIFICANT CHANGE UP (ref 0–6)
HCT VFR BLD CALC: 36.1 % — LOW (ref 39–50)
HGB BLD-MCNC: 11.8 G/DL — LOW (ref 13–17)
LYMPHOCYTES # BLD AUTO: 1.4 K/UL — SIGNIFICANT CHANGE UP (ref 1–3.3)
LYMPHOCYTES # BLD AUTO: 35.6 % — SIGNIFICANT CHANGE UP (ref 13–44)
MCHC RBC-ENTMCNC: 30.7 PG — SIGNIFICANT CHANGE UP (ref 27–34)
MCHC RBC-ENTMCNC: 32.6 G/DL — SIGNIFICANT CHANGE UP (ref 32–36)
MCV RBC AUTO: 94.2 FL — SIGNIFICANT CHANGE UP (ref 80–100)
MONOCYTES # BLD AUTO: 0.6 K/UL — SIGNIFICANT CHANGE UP (ref 0–0.9)
MONOCYTES NFR BLD AUTO: 15.8 % — HIGH (ref 2–14)
NEUTROPHILS # BLD AUTO: 1.8 K/UL — SIGNIFICANT CHANGE UP (ref 1.8–7.4)
NEUTROPHILS NFR BLD AUTO: 44.8 % — SIGNIFICANT CHANGE UP (ref 43–77)
PLATELET # BLD AUTO: 257 K/UL — SIGNIFICANT CHANGE UP (ref 150–400)
RBC # BLD: 3.83 M/UL — LOW (ref 4.2–5.8)
RBC # FLD: 14.5 % — SIGNIFICANT CHANGE UP (ref 10.3–14.5)
WBC # BLD: 4 K/UL — SIGNIFICANT CHANGE UP (ref 3.8–10.5)
WBC # FLD AUTO: 4 K/UL — SIGNIFICANT CHANGE UP (ref 3.8–10.5)

## 2017-03-08 ENCOUNTER — MESSAGE (OUTPATIENT)
Age: 52
End: 2017-03-08

## 2017-03-08 ENCOUNTER — TRANSCRIPTION ENCOUNTER (OUTPATIENT)
Age: 52
End: 2017-03-08

## 2017-03-08 ENCOUNTER — INPATIENT (INPATIENT)
Facility: HOSPITAL | Age: 52
LOS: 0 days | Discharge: HOME | DRG: 847 | End: 2017-03-09
Attending: INTERNAL MEDICINE | Admitting: INTERNAL MEDICINE
Payer: MEDICAID

## 2017-03-08 VITALS
WEIGHT: 214.73 LBS | OXYGEN SATURATION: 98 % | SYSTOLIC BLOOD PRESSURE: 158 MMHG | HEART RATE: 72 BPM | DIASTOLIC BLOOD PRESSURE: 99 MMHG | RESPIRATION RATE: 18 BRPM | TEMPERATURE: 98 F | HEIGHT: 70.98 IN

## 2017-03-08 DIAGNOSIS — K02.9 DENTAL CARIES, UNSPECIFIED: ICD-10-CM

## 2017-03-08 DIAGNOSIS — C83.10 MANTLE CELL LYMPHOMA, UNSPECIFIED SITE: ICD-10-CM

## 2017-03-08 DIAGNOSIS — K08.9 DISORDER OF TEETH AND SUPPORTING STRUCTURES, UNSPECIFIED: ICD-10-CM

## 2017-03-08 DIAGNOSIS — R11.2 NAUSEA WITH VOMITING, UNSPECIFIED: ICD-10-CM

## 2017-03-08 DIAGNOSIS — E86.0 DEHYDRATION: ICD-10-CM

## 2017-03-08 DIAGNOSIS — I10 ESSENTIAL (PRIMARY) HYPERTENSION: ICD-10-CM

## 2017-03-08 DIAGNOSIS — Z98.890 OTHER SPECIFIED POSTPROCEDURAL STATES: Chronic | ICD-10-CM

## 2017-03-08 DIAGNOSIS — Z51.11 ENCOUNTER FOR ANTINEOPLASTIC CHEMOTHERAPY: ICD-10-CM

## 2017-03-08 DIAGNOSIS — Z29.9 ENCOUNTER FOR PROPHYLACTIC MEASURES, UNSPECIFIED: ICD-10-CM

## 2017-03-08 RX ORDER — ETOPOSIDE 20 MG/ML
217 VIAL (ML) INTRAVENOUS DAILY
Qty: 0 | Refills: 0 | Status: DISCONTINUED | OUTPATIENT
Start: 2017-03-08 | End: 2017-03-09

## 2017-03-08 RX ORDER — SODIUM CHLORIDE 9 MG/ML
1000 INJECTION INTRAMUSCULAR; INTRAVENOUS; SUBCUTANEOUS
Qty: 0 | Refills: 0 | Status: DISCONTINUED | OUTPATIENT
Start: 2017-03-08 | End: 2017-03-09

## 2017-03-08 RX ORDER — FOSAPREPITANT DIMEGLUMINE 150 MG/5ML
150 INJECTION, POWDER, LYOPHILIZED, FOR SOLUTION INTRAVENOUS ONCE
Qty: 0 | Refills: 0 | Status: COMPLETED | OUTPATIENT
Start: 2017-03-08 | End: 2017-03-08

## 2017-03-08 RX ORDER — ACETAMINOPHEN 500 MG
650 TABLET ORAL EVERY 6 HOURS
Qty: 0 | Refills: 0 | Status: DISCONTINUED | OUTPATIENT
Start: 2017-03-08 | End: 2017-03-09

## 2017-03-08 RX ORDER — CARBOPLATIN 50 MG
750 VIAL (EA) INTRAVENOUS ONCE
Qty: 0 | Refills: 0 | Status: DISCONTINUED | OUTPATIENT
Start: 2017-03-08 | End: 2017-03-09

## 2017-03-08 RX ORDER — OLANZAPINE 15 MG/1
10 TABLET, FILM COATED ORAL DAILY
Qty: 0 | Refills: 0 | Status: DISCONTINUED | OUTPATIENT
Start: 2017-03-08 | End: 2017-03-08

## 2017-03-08 RX ORDER — IFOSFAMIDE 1 G/1
10850 INJECTION, POWDER, LYOPHILIZED, FOR SOLUTION INTRAVENOUS ONCE
Qty: 0 | Refills: 0 | Status: DISCONTINUED | OUTPATIENT
Start: 2017-03-08 | End: 2017-03-09

## 2017-03-08 RX ORDER — OMEPRAZOLE 10 MG/1
20 CAPSULE, DELAYED RELEASE ORAL
Qty: 0 | Refills: 0 | COMMUNITY

## 2017-03-08 RX ORDER — ONDANSETRON 8 MG/1
16 TABLET, FILM COATED ORAL ONCE
Qty: 0 | Refills: 0 | Status: DISCONTINUED | OUTPATIENT
Start: 2017-03-08 | End: 2017-03-08

## 2017-03-08 RX ORDER — ONDANSETRON 8 MG/1
4 TABLET, FILM COATED ORAL ONCE
Qty: 0 | Refills: 0 | Status: DISCONTINUED | OUTPATIENT
Start: 2017-03-08 | End: 2017-03-08

## 2017-03-08 RX ORDER — DEXAMETHASONE 0.5 MG/5ML
12 ELIXIR ORAL ONCE
Qty: 0 | Refills: 0 | Status: DISCONTINUED | OUTPATIENT
Start: 2017-03-08 | End: 2017-03-08

## 2017-03-08 RX ORDER — PANTOPRAZOLE SODIUM 20 MG/1
40 TABLET, DELAYED RELEASE ORAL
Qty: 0 | Refills: 0 | Status: DISCONTINUED | OUTPATIENT
Start: 2017-03-08 | End: 2017-03-09

## 2017-03-08 RX ORDER — ONDANSETRON 8 MG/1
8 TABLET, FILM COATED ORAL EVERY 8 HOURS
Qty: 0 | Refills: 0 | Status: DISCONTINUED | OUTPATIENT
Start: 2017-03-08 | End: 2017-03-09

## 2017-03-08 RX ORDER — METOCLOPRAMIDE HCL 10 MG
10 TABLET ORAL ONCE
Qty: 0 | Refills: 0 | Status: COMPLETED | OUTPATIENT
Start: 2017-03-08 | End: 2017-03-08

## 2017-03-08 RX ADMIN — ONDANSETRON 8 MILLIGRAM(S): 8 TABLET, FILM COATED ORAL at 22:24

## 2017-03-08 RX ADMIN — Medication 10 MILLIGRAM(S): at 20:38

## 2017-03-08 RX ADMIN — FOSAPREPITANT DIMEGLUMINE 450 MILLIGRAM(S): 150 INJECTION, POWDER, LYOPHILIZED, FOR SOLUTION INTRAVENOUS at 16:00

## 2017-03-08 RX ADMIN — SODIUM CHLORIDE 75 MILLILITER(S): 9 INJECTION INTRAMUSCULAR; INTRAVENOUS; SUBCUTANEOUS at 22:31

## 2017-03-08 NOTE — DISCHARGE NOTE ADULT - ADDITIONAL INSTRUCTIONS
To [facility] on Saturday 3/11/17 at 12:40pm for neulasta injection. To [facility] on Saturday 3/11/17 at 12:40pm for neulasta injection.  To [facility] on Monday 3/13/17 at 10:30am to see Zuly Ledbetter Nurse Practitioner.  To [facility] on Thursday 3/23/17 at 12 noon to see Dr Banks.

## 2017-03-08 NOTE — DISCHARGE NOTE ADULT - HOSPITAL COURSE
52 y/o male with h/o hypertension, diagnosed with mantle cell lymphom  10/2016, s/p 4 cycles of R-CHOP  now admitted for cycle 2  RICE in preparation for stem cell transplant. Patient received day 1 rituxan and etoposide as outpatient on 3/7/17. Patient received IV hydration, antiemetics, strict I/O, monitoring of CBC and electrolytes. Patient tolerated chemotherapy without adverse effects. To be discharged and will receive neulasta as outpatient.

## 2017-03-08 NOTE — H&P ADULT. - HISTORY OF PRESENT ILLNESS
52 y/o male with h/o hypertension, diagnosed with mantle cell lymphom  10/2016, s/p 4 cycles of R-CHOP  now admitted for cycle 2  RICE in preparation for stem cell transplant    Patients history dates back to October, 2016 when he presented  to  with a left inguinal mass . CAT scan of abdomen pelvis was performed which revealed 0.7 cm lesion in the right Iliac bone and left retroperitoneal lymph adenopathy . An inguinal node and a bone marrow biopsy was done which was consistent with mantle cell lymphoma. A PET scan was done which revealed retroperitoneal lymphadenopathy and diffusely hypermetabolic spleen. He received 4 cycles of RCHOP and a PET scan was repeated which showed continued response. Bone marrow biopsy was done on 2/10 which revealed normocellular bone marrow with trilineage hematopoiesis and maturation. Patient is planned to receive 2 cycles of RICE therapy for consolidation and mobilization. Patient completed cycle 1 on 2/16 and is now admitted for cycle 2. 52 y/o male with h/o hypertension, diagnosed with mantle cell lymphom  10/2016, s/p 4 cycles of R-CHOP  now admitted for cycle 2  RICE in preparation for stem cell transplant    Patients history dates back to October, 2016 when he presented  to  with a left inguinal mass . CAT scan of abdomen pelvis was performed which revealed 0.7 cm lesion in the right Iliac bone and left retroperitoneal lymph adenopathy . An inguinal node and a bone marrow biopsy was done which was consistent with mantle cell lymphoma. A PET scan was done which revealed retroperitoneal lymphadenopathy and diffusely hypermetabolic spleen. He received 4 cycles of RCHOP and a PET scan was repeated which showed continued response. Bone marrow biopsy was done on 2/10 which revealed normocellular bone marrow with trilineage hematopoiesis and maturation. Patient is planned to receive 2 cycles of RICE therapy for consolidation and mobilization. Patient completed cycle 1 on 2/16 and is now admitted for cycle 2.     PAtient reports have 2 teeth extracted on 3/3/17, with c/o mild discomfort at the gum line.

## 2017-03-08 NOTE — H&P ADULT. - PROBLEM SELECTOR PLAN 1
Admit [facility] Admit [facility]  Chemotherapy with etoposide x2 days  carboplatin AUC 5  Ifosfamide/mesna 5000mg/m2 CIV over 24 hours  IV hydration  antiemetics  Follow CBC  Replete lytes prn  access mediport  strict I/O  Neulasta post discharge  antiemetics

## 2017-03-08 NOTE — DISCHARGE NOTE ADULT - PLAN OF CARE
Maintain counts, remain free of infection. Notify MD or report to ER with temp greater or equal to 100.4, persistent nausea/vomting, diarrhea, bleeding.  Report to [facility] at designated appointment for neulasta.

## 2017-03-08 NOTE — DISCHARGE NOTE ADULT - CARE PROVIDER_API CALL
Gerardo Banks), Internal Medicine; Medical Oncology  [address] [place] [address]  Phone: [phone]  Fax: [phone]

## 2017-03-08 NOTE — DISCHARGE NOTE ADULT - PATIENT PORTAL LINK FT
“You can access the FollowHealth Patient Portal, offered by [facility], by registering with the following website: [email or web address]”

## 2017-03-08 NOTE — ADVANCED PRACTICE NURSE CONSULT - REASON FOR CONSULT
Lab values as of 3/7/38042 as follows:  WBC=4.0, HGB=11.8, HCT=36.1, XNDL=447, BUN=20, Creat=0.77, Tbil=<0.2. Lab values as of 3/7/00855 as follows:  WBC=4.0, HGB=11.8, HCT=36.1, NDFQ=130, BUN=20, Creat=0.77, Tbil=<0.2.  Pt will be receiving R-ICE treatment.  Pt received day 1 Rituxan in the [facility].

## 2017-03-08 NOTE — DISCHARGE NOTE ADULT - MEDICATION SUMMARY - MEDICATIONS TO TAKE
I will START or STAY ON the medications listed below when I get home from the hospital:    Compazine  -- 10 milligram(s) by mouth every 6 hours, As Needed for nausea  -- Indication: For for nausea    omeprazole 20 mg oral delayed release capsule  -- 1 cap(s) by mouth once a day MDD:1  -- It is very important that you take or use this exactly as directed.  Do not skip doses or discontinue unless directed by your doctor.  Obtain medical advice before taking any non-prescription drugs as some may affect the action of this medication.  Swallow whole.  Do not crush.    -- Indication: For to protect stomach I will START or STAY ON the medications listed below when I get home from the hospital:    prochlorperazine 10 mg oral tablet  -- 1 tab(s) by mouth every 6 hours, As Needed MDD:4  -- Avoid prolonged or excessive exposure to direct and/or artificial sunlight while taking this medication.  It is very important that you take or use this exactly as directed.  Do not skip doses or discontinue unless directed by your doctor.  May cause drowsiness.  Alcohol may intensify this effect.  Use care when operating dangerous machinery.  Obtain medical advice before taking any non-prescription drugs as some may affect the action of this medication.    -- Indication: For Nausea    omeprazole 20 mg oral delayed release capsule  -- 1 cap(s) by mouth once a day MDD:1  -- It is very important that you take or use this exactly as directed.  Do not skip doses or discontinue unless directed by your doctor.  Obtain medical advice before taking any non-prescription drugs as some may affect the action of this medication.  Swallow whole.  Do not crush.    -- Indication: For GERD I will START or STAY ON the medications listed below when I get home from the hospital:    dexamethasone 4 mg oral tablet  -- 2 tab(s) by mouth once a day MDD:2  On Friday 3/10 and saturday 3/11  -- It is very important that you take or use this exactly as directed.  Do not skip doses or discontinue unless directed by your doctor.  Obtain medical advice before taking any non-prescription drugs as some may affect the action of this medication.  Take with food or milk.    -- Indication: For Nausea    prochlorperazine 10 mg oral tablet  -- 1 tab(s) by mouth every 6 hours, As Needed MDD:4  -- Avoid prolonged or excessive exposure to direct and/or artificial sunlight while taking this medication.  It is very important that you take or use this exactly as directed.  Do not skip doses or discontinue unless directed by your doctor.  May cause drowsiness.  Alcohol may intensify this effect.  Use care when operating dangerous machinery.  Obtain medical advice before taking any non-prescription drugs as some may affect the action of this medication.    -- Indication: For Nausea    omeprazole 20 mg oral delayed release capsule  -- 1 cap(s) by mouth once a day MDD:1  -- It is very important that you take or use this exactly as directed.  Do not skip doses or discontinue unless directed by your doctor.  Obtain medical advice before taking any non-prescription drugs as some may affect the action of this medication.  Swallow whole.  Do not crush.    -- Indication: For GERD

## 2017-03-08 NOTE — DISCHARGE NOTE ADULT - CARE PROVIDERS DIRECT ADDRESSES
,[email or web address]

## 2017-03-08 NOTE — H&P ADULT. - ASSESSMENT
52 y/o male with h/o hypertension, diagnosed with mantle cell lymphom  10/2016, s/p 4 cycles of R-CHOP  now admitted for cycle 2  RICE in preparation for stem cell transplant. Patient received day one rituxan and etoposide at [facility] on 3/7/17.

## 2017-03-08 NOTE — PROVIDER CONTACT NOTE (OTHER) - SITUATION
Pt had tooth removal last week prior to being admitted. Pt complained of pain and was concerned of infection.

## 2017-03-08 NOTE — ADVANCED PRACTICE NURSE CONSULT - ASSESSMENT
Chemo verified by 2 RN's prior to starting. Chemo verified by 2 RN's prior to starting.  Right CW mediport accessed under aseptic technique with # 20 gauge paez needle.  Positive blood return noted immediately.  Pt premedicated with emend 150 mg IV starting at 15:45.  Etoposide (100 mg/m2)=217 mg IVSS over 2 hours started via Alaris pump at 16:30.  Carboplatin (AUC 5)=750 mg IVSS started via Alaris pump at 19:00.  Ifosfamide (5000mg/m2)=10,850 mg mixed together in the same bag with Mesna (5000mg/m20=10,850 mg IVCI over 24 hrs started via Alaris pump at 20:00.

## 2017-03-08 NOTE — DISCHARGE NOTE ADULT - CARE PLAN
Principal Discharge DX:	Mantle cell lymphoma  Goal:	Maintain counts, remain free of infection. Principal Discharge DX:	Mantle cell lymphoma  Goal:	Maintain counts, remain free of infection.  Instructions for follow-up, activity and diet:	Notify MD or report to ER with temp greater or equal to 100.4, persistent nausea/vomting, diarrhea, bleeding.  Report to [facility] at designated appointment for neulasta. Principal Discharge DX:	Mantle cell lymphoma  Goal:	Maintain counts, remain free of infection.  Instructions for follow-up, activity and diet:	Notify MD or report to ER with temp greater or equal to 100.4, persistent nausea/vomting, diarrhea, bleeding.  Report to [facility] at designated appointment for neulasta. Principal Discharge DX:	Mantle cell lymphoma  Goal:	Maintain counts, remain free of infection.  Instructions for follow-up, activity and diet:	Notify MD or report to ER with temp greater or equal to 100.4, persistent nausea/vomting, diarrhea, bleeding.  Report to [facility] at designated appointment for neulasta. Principal Discharge DX:	Mantle cell lymphoma  Goal:	Maintain counts, remain free of infection.  Instructions for follow-up, activity and diet:	Notify MD or report to ER with temp greater or equal to 100.4, persistent nausea/vomting, diarrhea, bleeding.  Report to [facility] at designated appointment for neulasta.

## 2017-03-09 ENCOUNTER — FORM ENCOUNTER (OUTPATIENT)
Age: 52
End: 2017-03-09

## 2017-03-09 VITALS
HEART RATE: 80 BPM | OXYGEN SATURATION: 98 % | RESPIRATION RATE: 18 BRPM | DIASTOLIC BLOOD PRESSURE: 83 MMHG | TEMPERATURE: 98 F | SYSTOLIC BLOOD PRESSURE: 143 MMHG

## 2017-03-09 LAB
ALBUMIN SERPL ELPH-MCNC: 3.9 G/DL — SIGNIFICANT CHANGE UP (ref 3.3–5)
ALP SERPL-CCNC: 77 U/L — SIGNIFICANT CHANGE UP (ref 40–120)
ALT FLD-CCNC: 24 U/L RC — SIGNIFICANT CHANGE UP (ref 10–45)
ANION GAP SERPL CALC-SCNC: 12 MMOL/L — SIGNIFICANT CHANGE UP (ref 5–17)
AST SERPL-CCNC: 22 U/L — SIGNIFICANT CHANGE UP (ref 10–40)
BASOPHILS # BLD AUTO: 0 K/UL — SIGNIFICANT CHANGE UP (ref 0–0.2)
BASOPHILS NFR BLD AUTO: 0.2 % — SIGNIFICANT CHANGE UP (ref 0–2)
BILIRUB SERPL-MCNC: 0.4 MG/DL — SIGNIFICANT CHANGE UP (ref 0.2–1.2)
BUN SERPL-MCNC: 17 MG/DL — SIGNIFICANT CHANGE UP (ref 7–23)
CALCIUM SERPL-MCNC: 8.5 MG/DL — SIGNIFICANT CHANGE UP (ref 8.4–10.5)
CHLORIDE SERPL-SCNC: 105 MMOL/L — SIGNIFICANT CHANGE UP (ref 96–108)
CO2 SERPL-SCNC: 26 MMOL/L — SIGNIFICANT CHANGE UP (ref 22–31)
CREAT SERPL-MCNC: 0.76 MG/DL — SIGNIFICANT CHANGE UP (ref 0.5–1.3)
EOSINOPHIL # BLD AUTO: 0 K/UL — SIGNIFICANT CHANGE UP (ref 0–0.5)
EOSINOPHIL NFR BLD AUTO: 0.9 % — SIGNIFICANT CHANGE UP (ref 0–6)
GLUCOSE SERPL-MCNC: 104 MG/DL — HIGH (ref 70–99)
HCT VFR BLD CALC: 35.7 % — LOW (ref 39–50)
HGB BLD-MCNC: 11.4 G/DL — LOW (ref 13–17)
LYMPHOCYTES # BLD AUTO: 0.4 K/UL — LOW (ref 1–3.3)
LYMPHOCYTES # BLD AUTO: 10.6 % — LOW (ref 13–44)
MAGNESIUM SERPL-MCNC: 1.8 MG/DL — SIGNIFICANT CHANGE UP (ref 1.6–2.6)
MCHC RBC-ENTMCNC: 30.7 PG — SIGNIFICANT CHANGE UP (ref 27–34)
MCHC RBC-ENTMCNC: 31.9 GM/DL — LOW (ref 32–36)
MCV RBC AUTO: 96.1 FL — SIGNIFICANT CHANGE UP (ref 80–100)
MONOCYTES # BLD AUTO: 0.5 K/UL — SIGNIFICANT CHANGE UP (ref 0–0.9)
MONOCYTES NFR BLD AUTO: 12 % — SIGNIFICANT CHANGE UP (ref 2–14)
NEUTROPHILS # BLD AUTO: 3.2 K/UL — SIGNIFICANT CHANGE UP (ref 1.8–7.4)
NEUTROPHILS NFR BLD AUTO: 76.3 % — SIGNIFICANT CHANGE UP (ref 43–77)
PHOSPHATE SERPL-MCNC: 3.1 MG/DL — SIGNIFICANT CHANGE UP (ref 2.5–4.5)
PLATELET # BLD AUTO: 302 K/UL — SIGNIFICANT CHANGE UP (ref 150–400)
POTASSIUM SERPL-MCNC: 4.2 MMOL/L — SIGNIFICANT CHANGE UP (ref 3.5–5.3)
POTASSIUM SERPL-SCNC: 4.2 MMOL/L — SIGNIFICANT CHANGE UP (ref 3.5–5.3)
PROT SERPL-MCNC: 6.2 G/DL — SIGNIFICANT CHANGE UP (ref 6–8.3)
RBC # BLD: 3.71 M/UL — LOW (ref 4.2–5.8)
RBC # FLD: 14.1 % — SIGNIFICANT CHANGE UP (ref 10.3–14.5)
SODIUM SERPL-SCNC: 143 MMOL/L — SIGNIFICANT CHANGE UP (ref 135–145)
WBC # BLD: 4.2 K/UL — SIGNIFICANT CHANGE UP (ref 3.8–10.5)
WBC # FLD AUTO: 4.2 K/UL — SIGNIFICANT CHANGE UP (ref 3.8–10.5)

## 2017-03-09 PROCEDURE — 85027 COMPLETE CBC AUTOMATED: CPT

## 2017-03-09 PROCEDURE — 83735 ASSAY OF MAGNESIUM: CPT

## 2017-03-09 PROCEDURE — 84100 ASSAY OF PHOSPHORUS: CPT

## 2017-03-09 PROCEDURE — 80053 COMPREHEN METABOLIC PANEL: CPT

## 2017-03-09 RX ORDER — PROCHLORPERAZINE MALEATE 5 MG
10 TABLET ORAL
Qty: 0 | Refills: 0 | COMMUNITY

## 2017-03-09 RX ORDER — PETROLATUM,WHITE
1 JELLY (GRAM) TOPICAL
Qty: 0 | Refills: 0 | Status: DISCONTINUED | OUTPATIENT
Start: 2017-03-09 | End: 2017-03-09

## 2017-03-09 RX ORDER — DEXAMETHASONE 0.5 MG/5ML
10 ELIXIR ORAL ONCE
Qty: 0 | Refills: 0 | Status: COMPLETED | OUTPATIENT
Start: 2017-03-09 | End: 2017-03-09

## 2017-03-09 RX ORDER — PROCHLORPERAZINE MALEATE 5 MG
1 TABLET ORAL
Qty: 28 | Refills: 0 | OUTPATIENT
Start: 2017-03-09 | End: 2017-03-16

## 2017-03-09 RX ORDER — DEXAMETHASONE 0.5 MG/5ML
2 ELIXIR ORAL
Qty: 4 | Refills: 0 | OUTPATIENT
Start: 2017-03-09 | End: 2017-03-11

## 2017-03-09 RX ADMIN — Medication 650 MILLIGRAM(S): at 06:00

## 2017-03-09 RX ADMIN — ONDANSETRON 8 MILLIGRAM(S): 8 TABLET, FILM COATED ORAL at 14:33

## 2017-03-09 RX ADMIN — Medication 102 MILLIGRAM(S): at 16:00

## 2017-03-09 RX ADMIN — ONDANSETRON 8 MILLIGRAM(S): 8 TABLET, FILM COATED ORAL at 21:10

## 2017-03-09 RX ADMIN — Medication 1 APPLICATION(S): at 18:02

## 2017-03-09 RX ADMIN — ONDANSETRON 8 MILLIGRAM(S): 8 TABLET, FILM COATED ORAL at 05:28

## 2017-03-09 RX ADMIN — Medication 650 MILLIGRAM(S): at 05:29

## 2017-03-09 RX ADMIN — SODIUM CHLORIDE 75 MILLILITER(S): 9 INJECTION INTRAMUSCULAR; INTRAVENOUS; SUBCUTANEOUS at 11:45

## 2017-03-09 RX ADMIN — PANTOPRAZOLE SODIUM 40 MILLIGRAM(S): 20 TABLET, DELAYED RELEASE ORAL at 05:28

## 2017-03-09 NOTE — ADVANCED PRACTICE NURSE CONSULT - ASSESSMENT
Arrive to find pt up in bed alert and oriented times four. Pt ambulate to bathroom with steady gait no distress noted. Mediport to right chest wall previously access remain with +blood return and flush easily. Dressing to Mediport dry and intact no swelling or redness noted. Lab result reviewed by Dr Serrano during rounds. Pt was given Decadron 10 mg ivss. Drug verification done with primary nurse. At 1630 given Etoposide 100 mg/m2= 217 mg iv to run over one hour. Pt was monitor during the initial 15 minutes no reaction noted. Left pt up in bed. Report given to primary nurse.

## 2017-03-10 ENCOUNTER — APPOINTMENT (OUTPATIENT)
Dept: NUCLEAR MEDICINE | Facility: CLINIC | Age: 52
End: 2017-03-10

## 2017-03-10 ENCOUNTER — OUTPATIENT (OUTPATIENT)
Dept: OUTPATIENT SERVICES | Facility: HOSPITAL | Age: 52
LOS: 1 days | End: 2017-03-10
Payer: MEDICAID

## 2017-03-10 ENCOUNTER — OUTPATIENT (OUTPATIENT)
Dept: OUTPATIENT SERVICES | Facility: HOSPITAL | Age: 52
LOS: 1 days | End: 2017-03-10

## 2017-03-10 DIAGNOSIS — C83.10 MANTLE CELL LYMPHOMA, UNSPECIFIED SITE: ICD-10-CM

## 2017-03-10 DIAGNOSIS — Z98.890 OTHER SPECIFIED POSTPROCEDURAL STATES: Chronic | ICD-10-CM

## 2017-03-10 DIAGNOSIS — K08.9 DISORDER OF TEETH AND SUPPORTING STRUCTURES, UNSPECIFIED: ICD-10-CM

## 2017-03-10 PROCEDURE — A9552: CPT

## 2017-03-10 PROCEDURE — 78815 PET IMAGE W/CT SKULL-THIGH: CPT

## 2017-03-11 ENCOUNTER — APPOINTMENT (OUTPATIENT)
Dept: INFUSION THERAPY | Facility: HOSPITAL | Age: 52
End: 2017-03-11

## 2017-03-12 ENCOUNTER — RESULT REVIEW (OUTPATIENT)
Age: 52
End: 2017-03-12

## 2017-03-13 ENCOUNTER — OUTPATIENT (OUTPATIENT)
Dept: OUTPATIENT SERVICES | Facility: HOSPITAL | Age: 52
LOS: 1 days | End: 2017-03-13
Payer: MEDICAID

## 2017-03-13 ENCOUNTER — APPOINTMENT (OUTPATIENT)
Dept: HEMATOLOGY ONCOLOGY | Facility: CLINIC | Age: 52
End: 2017-03-13

## 2017-03-13 ENCOUNTER — OTHER (OUTPATIENT)
Age: 52
End: 2017-03-13

## 2017-03-13 VITALS
BODY MASS INDEX: 30.02 KG/M2 | TEMPERATURE: 98.8 F | RESPIRATION RATE: 16 BRPM | DIASTOLIC BLOOD PRESSURE: 81 MMHG | OXYGEN SATURATION: 98 % | WEIGHT: 213.4 LBS | SYSTOLIC BLOOD PRESSURE: 120 MMHG | HEART RATE: 95 BPM

## 2017-03-13 DIAGNOSIS — Z98.890 OTHER SPECIFIED POSTPROCEDURAL STATES: Chronic | ICD-10-CM

## 2017-03-13 DIAGNOSIS — C85.80 OTHER SPECIFIED TYPES OF NON-HODGKIN LYMPHOMA, UNSPECIFIED SITE: ICD-10-CM

## 2017-03-13 LAB
ALBUMIN SERPL ELPH-MCNC: 4.6 G/DL
ALP BLD-CCNC: 113 U/L
ALT SERPL-CCNC: 36 U/L
ANION GAP SERPL CALC-SCNC: 11 MMOL/L
APTT BLD: 25.3 SEC
AST SERPL-CCNC: 25 U/L
BASOPHILS # BLD AUTO: 0 K/UL — SIGNIFICANT CHANGE UP (ref 0–0.2)
BASOPHILS NFR BLD AUTO: 0.1 % — SIGNIFICANT CHANGE UP (ref 0–2)
BILIRUB SERPL-MCNC: 0.7 MG/DL
BLD GP AB SCN SERPL QL: NEGATIVE — SIGNIFICANT CHANGE UP
BUN SERPL-MCNC: 24 MG/DL
CALCIUM SERPL-MCNC: 9.6 MG/DL
CHLORIDE SERPL-SCNC: 99 MMOL/L
CO2 SERPL-SCNC: 27 MMOL/L
CREAT SERPL-MCNC: 0.9 MG/DL
EOSINOPHIL # BLD AUTO: 0.1 K/UL — SIGNIFICANT CHANGE UP (ref 0–0.5)
EOSINOPHIL NFR BLD AUTO: 0.7 % — SIGNIFICANT CHANGE UP (ref 0–6)
GLUCOSE SERPL-MCNC: 137 MG/DL
HCT VFR BLD CALC: 32.7 % — LOW (ref 39–50)
HGB BLD-MCNC: 11.1 G/DL — LOW (ref 13–17)
LDH SERPL-CCNC: 201 U/L
LYMPHOCYTES # BLD AUTO: 0.7 K/UL — LOW (ref 1–3.3)
LYMPHOCYTES # BLD AUTO: 3.6 % — LOW (ref 13–44)
MAGNESIUM SERPL-MCNC: 2.1 MG/DL
MCHC RBC-ENTMCNC: 32.7 PG — SIGNIFICANT CHANGE UP (ref 27–34)
MCHC RBC-ENTMCNC: 34.1 G/DL — SIGNIFICANT CHANGE UP (ref 32–36)
MCV RBC AUTO: 96 FL — SIGNIFICANT CHANGE UP (ref 80–100)
MONOCYTES # BLD AUTO: 0.2 K/UL — SIGNIFICANT CHANGE UP (ref 0–0.9)
MONOCYTES NFR BLD AUTO: 0.8 % — LOW (ref 2–14)
NEUTROPHILS # BLD AUTO: 19.3 K/UL — HIGH (ref 1.8–7.4)
NEUTROPHILS NFR BLD AUTO: 94.8 % — HIGH (ref 43–77)
PLATELET # BLD AUTO: 290 K/UL — SIGNIFICANT CHANGE UP (ref 150–400)
POTASSIUM SERPL-SCNC: 4.8 MMOL/L
PROT SERPL-MCNC: 7.3 G/DL
RBC # BLD: 3.4 M/UL — LOW (ref 4.2–5.8)
RBC # FLD: 14 % — SIGNIFICANT CHANGE UP (ref 10.3–14.5)
RH IG SCN BLD-IMP: POSITIVE — SIGNIFICANT CHANGE UP
SODIUM SERPL-SCNC: 137 MMOL/L
WBC # BLD: 20.3 K/UL — HIGH (ref 3.8–10.5)
WBC # FLD AUTO: 20.3 K/UL — HIGH (ref 3.8–10.5)

## 2017-03-14 ENCOUNTER — RESULT REVIEW (OUTPATIENT)
Age: 52
End: 2017-03-14

## 2017-03-15 ENCOUNTER — LABORATORY RESULT (OUTPATIENT)
Age: 52
End: 2017-03-15

## 2017-03-15 ENCOUNTER — APPOINTMENT (OUTPATIENT)
Dept: HEMATOLOGY ONCOLOGY | Facility: CLINIC | Age: 52
End: 2017-03-15

## 2017-03-15 DIAGNOSIS — Z51.89 ENCOUNTER FOR OTHER SPECIFIED AFTERCARE: ICD-10-CM

## 2017-03-15 LAB
BASOPHILS # BLD AUTO: 0 K/UL — SIGNIFICANT CHANGE UP (ref 0–0.2)
BASOPHILS NFR BLD AUTO: 0.4 % — SIGNIFICANT CHANGE UP (ref 0–2)
BLD GP AB SCN SERPL QL: NEGATIVE — SIGNIFICANT CHANGE UP
CD34 CELLS # FLD: SIGNIFICANT CHANGE UP /UL (ref 0.7–6.9)
EOSINOPHIL # BLD AUTO: 0 K/UL — SIGNIFICANT CHANGE UP (ref 0–0.5)
EOSINOPHIL NFR BLD AUTO: 0.5 % — SIGNIFICANT CHANGE UP (ref 0–6)
HCT VFR BLD CALC: 31.5 % — LOW (ref 39–50)
HGB BLD-MCNC: 10.7 G/DL — LOW (ref 13–17)
LYMPHOCYTES # BLD AUTO: 0.7 K/UL — LOW (ref 1–3.3)
LYMPHOCYTES # BLD AUTO: 9.4 % — LOW (ref 13–44)
MCHC RBC-ENTMCNC: 32.6 PG — SIGNIFICANT CHANGE UP (ref 27–34)
MCHC RBC-ENTMCNC: 33.9 G/DL — SIGNIFICANT CHANGE UP (ref 32–36)
MCV RBC AUTO: 96.2 FL — SIGNIFICANT CHANGE UP (ref 80–100)
MONOCYTES # BLD AUTO: 0.3 K/UL — SIGNIFICANT CHANGE UP (ref 0–0.9)
MONOCYTES NFR BLD AUTO: 4 % — SIGNIFICANT CHANGE UP (ref 2–14)
NEUTROPHILS # BLD AUTO: 6.8 K/UL — SIGNIFICANT CHANGE UP (ref 1.8–7.4)
NEUTROPHILS NFR BLD AUTO: 85.7 % — HIGH (ref 43–77)
PLATELET # BLD AUTO: 197 K/UL — SIGNIFICANT CHANGE UP (ref 150–400)
RBC # BLD: 3.27 M/UL — LOW (ref 4.2–5.8)
RBC # FLD: 14.2 % — SIGNIFICANT CHANGE UP (ref 10.3–14.5)
RH IG SCN BLD-IMP: POSITIVE — SIGNIFICANT CHANGE UP
WBC # BLD: 7.9 K/UL — SIGNIFICANT CHANGE UP (ref 3.8–10.5)
WBC # FLD AUTO: 7.9 K/UL — SIGNIFICANT CHANGE UP (ref 3.8–10.5)

## 2017-03-16 ENCOUNTER — RESULT REVIEW (OUTPATIENT)
Age: 52
End: 2017-03-16

## 2017-03-17 ENCOUNTER — CHART COPY (OUTPATIENT)
Age: 52
End: 2017-03-17

## 2017-03-17 ENCOUNTER — APPOINTMENT (OUTPATIENT)
Dept: HEMATOLOGY ONCOLOGY | Facility: CLINIC | Age: 52
End: 2017-03-17

## 2017-03-17 LAB
BASOPHILS # BLD AUTO: 0 K/UL — SIGNIFICANT CHANGE UP (ref 0–0.2)
BLD GP AB SCN SERPL QL: NEGATIVE — SIGNIFICANT CHANGE UP
CD34 CELLS # FLD: 3 /UL — SIGNIFICANT CHANGE UP (ref 0.7–6.9)
EOSINOPHIL # BLD AUTO: 0.1 K/UL — SIGNIFICANT CHANGE UP (ref 0–0.5)
EOSINOPHIL NFR BLD AUTO: 1 % — SIGNIFICANT CHANGE UP (ref 0–6)
HCT VFR BLD CALC: 29.5 % — LOW (ref 39–50)
HGB BLD-MCNC: 10.1 G/DL — LOW (ref 13–17)
LYMPHOCYTES # BLD AUTO: 1 K/UL — SIGNIFICANT CHANGE UP (ref 1–3.3)
LYMPHOCYTES # BLD AUTO: 23 % — SIGNIFICANT CHANGE UP (ref 13–44)
MCHC RBC-ENTMCNC: 33 PG — SIGNIFICANT CHANGE UP (ref 27–34)
MCHC RBC-ENTMCNC: 34.3 G/DL — SIGNIFICANT CHANGE UP (ref 32–36)
MCV RBC AUTO: 96.3 FL — SIGNIFICANT CHANGE UP (ref 80–100)
MONOCYTES # BLD AUTO: 1.1 K/UL — HIGH (ref 0–0.9)
MONOCYTES NFR BLD AUTO: 28 % — HIGH (ref 2–14)
NEUTROPHILS # BLD AUTO: 2.3 K/UL — SIGNIFICANT CHANGE UP (ref 1.8–7.4)
NEUTROPHILS NFR BLD AUTO: 42 % — LOW (ref 43–77)
NEUTS BAND # BLD: 6 % — SIGNIFICANT CHANGE UP (ref 0–8)
PLAT MORPH BLD: NORMAL — SIGNIFICANT CHANGE UP
PLATELET # BLD AUTO: 139 K/UL — LOW (ref 150–400)
RBC # BLD: 3.06 M/UL — LOW (ref 4.2–5.8)
RBC # FLD: 13.9 % — SIGNIFICANT CHANGE UP (ref 10.3–14.5)
RBC BLD AUTO: SIGNIFICANT CHANGE UP
RH IG SCN BLD-IMP: POSITIVE — SIGNIFICANT CHANGE UP
WBC # BLD: 4.6 K/UL — SIGNIFICANT CHANGE UP (ref 3.8–10.5)
WBC # FLD AUTO: 4.6 K/UL — SIGNIFICANT CHANGE UP (ref 3.8–10.5)

## 2017-03-19 ENCOUNTER — RESULT REVIEW (OUTPATIENT)
Age: 52
End: 2017-03-19

## 2017-03-20 ENCOUNTER — FORM ENCOUNTER (OUTPATIENT)
Age: 52
End: 2017-03-20

## 2017-03-20 ENCOUNTER — APPOINTMENT (OUTPATIENT)
Dept: HEMATOLOGY ONCOLOGY | Facility: CLINIC | Age: 52
End: 2017-03-20

## 2017-03-20 ENCOUNTER — RESULT REVIEW (OUTPATIENT)
Age: 52
End: 2017-03-20

## 2017-03-20 ENCOUNTER — OUTPATIENT (OUTPATIENT)
Dept: OUTPATIENT SERVICES | Facility: HOSPITAL | Age: 52
LOS: 1 days | End: 2017-03-20
Payer: COMMERCIAL

## 2017-03-20 DIAGNOSIS — K08.9 DISORDER OF TEETH AND SUPPORTING STRUCTURES, UNSPECIFIED: ICD-10-CM

## 2017-03-20 DIAGNOSIS — Z98.890 OTHER SPECIFIED POSTPROCEDURAL STATES: Chronic | ICD-10-CM

## 2017-03-20 LAB
BASOPHILS NFR BLD AUTO: 1 % — SIGNIFICANT CHANGE UP (ref 0–2)
BLD GP AB SCN SERPL QL: NEGATIVE — SIGNIFICANT CHANGE UP
CD34 CELLS # FLD: 30 /UL — HIGH (ref 0.7–6.9)
EOSINOPHIL # BLD AUTO: 0 K/UL — SIGNIFICANT CHANGE UP (ref 0–0.5)
HCT VFR BLD CALC: 31.7 % — LOW (ref 39–50)
HGB BLD-MCNC: 10.5 G/DL — LOW (ref 13–17)
LYMPHOCYTES # BLD AUTO: 1.6 K/UL — SIGNIFICANT CHANGE UP (ref 1–3.3)
LYMPHOCYTES # BLD AUTO: 19 % — SIGNIFICANT CHANGE UP (ref 13–44)
MCHC RBC-ENTMCNC: 32.1 PG — SIGNIFICANT CHANGE UP (ref 27–34)
MCHC RBC-ENTMCNC: 33 G/DL — SIGNIFICANT CHANGE UP (ref 32–36)
MCV RBC AUTO: 97.3 FL — SIGNIFICANT CHANGE UP (ref 80–100)
MONOCYTES # BLD AUTO: 1.1 K/UL — HIGH (ref 0–0.9)
MONOCYTES NFR BLD AUTO: 12 % — SIGNIFICANT CHANGE UP (ref 2–14)
NEUTROPHILS # BLD AUTO: 15.5 K/UL — HIGH (ref 1.8–7.4)
NEUTROPHILS NFR BLD AUTO: 64 % — SIGNIFICANT CHANGE UP (ref 43–77)
NEUTS BAND # BLD: 4 % — SIGNIFICANT CHANGE UP (ref 0–8)
PLAT MORPH BLD: NORMAL — SIGNIFICANT CHANGE UP
PLATELET # BLD AUTO: 67 K/UL — LOW (ref 150–400)
RBC # BLD: 3.25 M/UL — LOW (ref 4.2–5.8)
RBC # FLD: 14.5 % — SIGNIFICANT CHANGE UP (ref 10.3–14.5)
RBC BLD AUTO: SIGNIFICANT CHANGE UP
RH IG SCN BLD-IMP: POSITIVE — SIGNIFICANT CHANGE UP
WBC # BLD: 18.3 K/UL — HIGH (ref 3.8–10.5)
WBC # FLD AUTO: 18.3 K/UL — HIGH (ref 3.8–10.5)

## 2017-03-20 PROCEDURE — D2391: CPT

## 2017-03-20 PROCEDURE — D2392: CPT

## 2017-03-21 ENCOUNTER — RESULT REVIEW (OUTPATIENT)
Age: 52
End: 2017-03-21

## 2017-03-21 ENCOUNTER — OUTPATIENT (OUTPATIENT)
Dept: OUTPATIENT SERVICES | Facility: HOSPITAL | Age: 52
LOS: 1 days | End: 2017-03-21
Payer: MEDICAID

## 2017-03-21 DIAGNOSIS — C83.10 MANTLE CELL LYMPHOMA, UNSPECIFIED SITE: ICD-10-CM

## 2017-03-21 DIAGNOSIS — C85.90 NON-HODGKIN LYMPHOMA, UNSPECIFIED, UNSPECIFIED SITE: ICD-10-CM

## 2017-03-21 DIAGNOSIS — Z98.890 OTHER SPECIFIED POSTPROCEDURAL STATES: Chronic | ICD-10-CM

## 2017-03-21 LAB
BASOPHILS # BLD AUTO: 0 K/UL — SIGNIFICANT CHANGE UP (ref 0–0.2)
BASOPHILS NFR BLD AUTO: 0 % — SIGNIFICANT CHANGE UP (ref 0–2)
BLD GP AB SCN SERPL QL: NEGATIVE — SIGNIFICANT CHANGE UP
CD34 CELLS # FLD: 12 /UL — HIGH (ref 0.7–6.9)
DOHLE BOD BLD QL SMEAR: PRESENT — SIGNIFICANT CHANGE UP
EOSINOPHIL # BLD AUTO: 0.1 K/UL — SIGNIFICANT CHANGE UP (ref 0–0.5)
EOSINOPHIL NFR BLD AUTO: 0 % — SIGNIFICANT CHANGE UP (ref 0–6)
HCT VFR BLD CALC: 29.5 % — LOW (ref 39–50)
HGB BLD-MCNC: 9.9 G/DL — LOW (ref 13–17)
LYMPHOCYTES # BLD AUTO: 1.5 K/UL — SIGNIFICANT CHANGE UP (ref 1–3.3)
LYMPHOCYTES # BLD AUTO: 12 % — LOW (ref 13–44)
MCHC RBC-ENTMCNC: 32.5 PG — SIGNIFICANT CHANGE UP (ref 27–34)
MCHC RBC-ENTMCNC: 33.5 GM/DL — SIGNIFICANT CHANGE UP (ref 32–36)
MCV RBC AUTO: 96.9 FL — SIGNIFICANT CHANGE UP (ref 80–100)
METAMYELOCYTES # FLD: 3 % — HIGH (ref 0–0)
MONOCYTES # BLD AUTO: 1 K/UL — HIGH (ref 0–0.9)
MONOCYTES NFR BLD AUTO: 13 % — SIGNIFICANT CHANGE UP (ref 2–14)
MYELOCYTES NFR BLD: 1 % — HIGH (ref 0–0)
NEUTROPHILS # BLD AUTO: 8.3 K/UL — HIGH (ref 1.8–7.4)
NEUTROPHILS NFR BLD AUTO: 66 % — SIGNIFICANT CHANGE UP (ref 43–77)
NEUTS BAND # BLD: 2 % — SIGNIFICANT CHANGE UP (ref 0–8)
PLAT MORPH BLD: NORMAL — SIGNIFICANT CHANGE UP
PLATELET # BLD AUTO: 32 K/UL — LOW (ref 150–400)
PROMYELOCYTES # FLD: 1 % — HIGH (ref 0–0)
RBC # BLD: 3.04 M/UL — LOW (ref 4.2–5.8)
RBC # FLD: 14.6 % — HIGH (ref 10.3–14.5)
RBC BLD AUTO: SIGNIFICANT CHANGE UP
RH IG SCN BLD-IMP: POSITIVE — SIGNIFICANT CHANGE UP
TOXIC GRANULES BLD QL SMEAR: PRESENT — SIGNIFICANT CHANGE UP
VARIANT LYMPHS # BLD: 2 % — SIGNIFICANT CHANGE UP (ref 0–6)
WBC # BLD: 10.9 K/UL — HIGH (ref 3.8–10.5)
WBC # FLD AUTO: 10.9 K/UL — HIGH (ref 3.8–10.5)

## 2017-03-21 PROCEDURE — 36556 INSERT NON-TUNNEL CV CATH: CPT

## 2017-03-21 PROCEDURE — 86850 RBC ANTIBODY SCREEN: CPT

## 2017-03-21 PROCEDURE — C1752: CPT

## 2017-03-21 PROCEDURE — 38206 HARVEST AUTO STEM CELLS: CPT

## 2017-03-21 PROCEDURE — 86367 STEM CELLS TOTAL COUNT: CPT

## 2017-03-21 PROCEDURE — 76937 US GUIDE VASCULAR ACCESS: CPT | Mod: 26

## 2017-03-21 PROCEDURE — 86901 BLOOD TYPING SEROLOGIC RH(D): CPT

## 2017-03-21 PROCEDURE — 77001 FLUOROGUIDE FOR VEIN DEVICE: CPT | Mod: 26

## 2017-03-21 PROCEDURE — C1894: CPT

## 2017-03-21 PROCEDURE — 77001 FLUOROGUIDE FOR VEIN DEVICE: CPT

## 2017-03-21 PROCEDURE — 86900 BLOOD TYPING SEROLOGIC ABO: CPT

## 2017-03-21 PROCEDURE — C1769: CPT

## 2017-03-21 PROCEDURE — 99203 OFFICE O/P NEW LOW 30 MIN: CPT | Mod: 25

## 2017-03-21 PROCEDURE — 76937 US GUIDE VASCULAR ACCESS: CPT

## 2017-03-21 PROCEDURE — 85027 COMPLETE CBC AUTOMATED: CPT

## 2017-03-22 ENCOUNTER — APPOINTMENT (OUTPATIENT)
Dept: INFUSION THERAPY | Facility: HOSPITAL | Age: 52
End: 2017-03-22

## 2017-03-22 ENCOUNTER — OUTPATIENT (OUTPATIENT)
Dept: OUTPATIENT SERVICES | Facility: HOSPITAL | Age: 52
LOS: 1 days | End: 2017-03-22
Payer: MEDICAID

## 2017-03-22 DIAGNOSIS — Z98.890 OTHER SPECIFIED POSTPROCEDURAL STATES: Chronic | ICD-10-CM

## 2017-03-22 DIAGNOSIS — C83.10 MANTLE CELL LYMPHOMA, UNSPECIFIED SITE: ICD-10-CM

## 2017-03-22 LAB
BASOPHILS # BLD AUTO: 0 K/UL — SIGNIFICANT CHANGE UP (ref 0–0.2)
BASOPHILS NFR BLD AUTO: 0 % — SIGNIFICANT CHANGE UP (ref 0–2)
CD34 CELLS # FLD: 4 /UL — SIGNIFICANT CHANGE UP (ref 0.7–6.9)
EOSINOPHIL # BLD AUTO: 0.1 K/UL — SIGNIFICANT CHANGE UP (ref 0–0.5)
EOSINOPHIL NFR BLD AUTO: 0 % — SIGNIFICANT CHANGE UP (ref 0–6)
HCT VFR BLD CALC: 30.6 % — LOW (ref 39–50)
HGB BLD-MCNC: 10.5 G/DL — LOW (ref 13–17)
LYMPHOCYTES # BLD AUTO: 0.9 K/UL — LOW (ref 1–3.3)
LYMPHOCYTES # BLD AUTO: 14 % — SIGNIFICANT CHANGE UP (ref 13–44)
MCHC RBC-ENTMCNC: 33.1 PG — SIGNIFICANT CHANGE UP (ref 27–34)
MCHC RBC-ENTMCNC: 34.3 GM/DL — SIGNIFICANT CHANGE UP (ref 32–36)
MCV RBC AUTO: 96.5 FL — SIGNIFICANT CHANGE UP (ref 80–100)
MONOCYTES # BLD AUTO: 0.4 K/UL — SIGNIFICANT CHANGE UP (ref 0–0.9)
MONOCYTES NFR BLD AUTO: 6 % — SIGNIFICANT CHANGE UP (ref 2–14)
NEUTROPHILS # BLD AUTO: 4.8 K/UL — SIGNIFICANT CHANGE UP (ref 1.8–7.4)
NEUTROPHILS NFR BLD AUTO: 80 % — HIGH (ref 43–77)
PLAT MORPH BLD: NORMAL — SIGNIFICANT CHANGE UP
PLATELET # BLD AUTO: 26 K/UL — LOW (ref 150–400)
PLATELET # BLD MANUAL: 46 K/UL — LOW (ref 150–400)
RBC # BLD: 3.16 M/UL — LOW (ref 4.2–5.8)
RBC # FLD: 14.6 % — HIGH (ref 10.3–14.5)
RBC BLD AUTO: SIGNIFICANT CHANGE UP
WBC # BLD: 6 K/UL — SIGNIFICANT CHANGE UP (ref 3.8–10.5)
WBC # FLD AUTO: 6 K/UL — SIGNIFICANT CHANGE UP (ref 3.8–10.5)

## 2017-03-22 PROCEDURE — 38206 HARVEST AUTO STEM CELLS: CPT

## 2017-03-22 PROCEDURE — 86367 STEM CELLS TOTAL COUNT: CPT

## 2017-03-22 PROCEDURE — 99214 OFFICE O/P EST MOD 30 MIN: CPT | Mod: 25

## 2017-03-22 PROCEDURE — 85027 COMPLETE CBC AUTOMATED: CPT

## 2017-03-23 ENCOUNTER — APPOINTMENT (OUTPATIENT)
Dept: HEMATOLOGY ONCOLOGY | Facility: CLINIC | Age: 52
End: 2017-03-23

## 2017-03-23 ENCOUNTER — RESULT REVIEW (OUTPATIENT)
Age: 52
End: 2017-03-23

## 2017-03-23 ENCOUNTER — OUTPATIENT (OUTPATIENT)
Dept: OUTPATIENT SERVICES | Facility: HOSPITAL | Age: 52
LOS: 1 days | End: 2017-03-23
Payer: MEDICAID

## 2017-03-23 DIAGNOSIS — K02.9 DENTAL CARIES, UNSPECIFIED: ICD-10-CM

## 2017-03-23 DIAGNOSIS — Z98.890 OTHER SPECIFIED POSTPROCEDURAL STATES: Chronic | ICD-10-CM

## 2017-03-23 DIAGNOSIS — C85.80 OTHER SPECIFIED TYPES OF NON-HODGKIN LYMPHOMA, UNSPECIFIED SITE: ICD-10-CM

## 2017-03-23 LAB
BASOPHILS # BLD AUTO: 0.1 K/UL — SIGNIFICANT CHANGE UP (ref 0–0.2)
CD34 CELLS # FLD: 16 /UL — HIGH (ref 0.7–6.9)
EOSINOPHIL # BLD AUTO: 0.1 K/UL — SIGNIFICANT CHANGE UP (ref 0–0.5)
HCT VFR BLD CALC: 28.4 % — LOW (ref 39–50)
HGB BLD-MCNC: 9.6 G/DL — LOW (ref 13–17)
LYMPHOCYTES # BLD AUTO: 2 K/UL — SIGNIFICANT CHANGE UP (ref 1–3.3)
LYMPHOCYTES # BLD AUTO: 3 % — LOW (ref 13–44)
MCHC RBC-ENTMCNC: 32.8 PG — SIGNIFICANT CHANGE UP (ref 27–34)
MCHC RBC-ENTMCNC: 34 GM/DL — SIGNIFICANT CHANGE UP (ref 32–36)
MCV RBC AUTO: 96.6 FL — SIGNIFICANT CHANGE UP (ref 80–100)
MONOCYTES # BLD AUTO: 2 K/UL — HIGH (ref 0–0.9)
MONOCYTES NFR BLD AUTO: 2 % — SIGNIFICANT CHANGE UP (ref 2–14)
NEUTROPHILS # BLD AUTO: 54 K/UL — HIGH (ref 1.8–7.4)
NEUTROPHILS NFR BLD AUTO: 88 % — HIGH (ref 43–77)
NEUTS BAND # BLD: 7 % — SIGNIFICANT CHANGE UP (ref 0–8)
PLAT MORPH BLD: NORMAL — SIGNIFICANT CHANGE UP
PLATELET # BLD AUTO: 48 K/UL — LOW (ref 150–400)
RBC # BLD: 2.94 M/UL — LOW (ref 4.2–5.8)
RBC # FLD: 14.7 % — HIGH (ref 10.3–14.5)
RBC BLD AUTO: SIGNIFICANT CHANGE UP
WBC # BLD: 58.1 K/UL — CRITICAL HIGH (ref 3.8–10.5)
WBC # FLD AUTO: 58.1 K/UL — CRITICAL HIGH (ref 3.8–10.5)

## 2017-03-23 PROCEDURE — 99214 OFFICE O/P EST MOD 30 MIN: CPT | Mod: 25

## 2017-03-23 PROCEDURE — P9047: CPT

## 2017-03-23 PROCEDURE — 85027 COMPLETE CBC AUTOMATED: CPT

## 2017-03-23 PROCEDURE — 38206 HARVEST AUTO STEM CELLS: CPT

## 2017-03-23 PROCEDURE — 86367 STEM CELLS TOTAL COUNT: CPT

## 2017-03-24 ENCOUNTER — LABORATORY RESULT (OUTPATIENT)
Age: 52
End: 2017-03-24

## 2017-03-24 ENCOUNTER — APPOINTMENT (OUTPATIENT)
Dept: INFUSION THERAPY | Facility: HOSPITAL | Age: 52
End: 2017-03-24

## 2017-03-24 LAB — PLATELET # BLD MANUAL: 50 K/UL — LOW (ref 150–400)

## 2017-03-25 ENCOUNTER — APPOINTMENT (OUTPATIENT)
Dept: INFUSION THERAPY | Facility: HOSPITAL | Age: 52
End: 2017-03-25

## 2017-03-25 DIAGNOSIS — C83.10 MANTLE CELL LYMPHOMA, UNSPECIFIED SITE: ICD-10-CM

## 2017-03-27 ENCOUNTER — INPATIENT (INPATIENT)
Facility: HOSPITAL | Age: 52
LOS: 38 days | Discharge: HOME | DRG: 16 | End: 2017-05-05
Attending: HOSPITALIST | Admitting: INTERNAL MEDICINE
Payer: MEDICAID

## 2017-03-27 VITALS
WEIGHT: 215.83 LBS | RESPIRATION RATE: 18 BRPM | TEMPERATURE: 99 F | SYSTOLIC BLOOD PRESSURE: 144 MMHG | HEART RATE: 101 BPM | HEIGHT: 71.14 IN | DIASTOLIC BLOOD PRESSURE: 93 MMHG

## 2017-03-27 DIAGNOSIS — C83.1 MANTLE CELL LYMPHOMA: ICD-10-CM

## 2017-03-27 DIAGNOSIS — Z76.82 AWAITING ORGAN TRANSPLANT STATUS: ICD-10-CM

## 2017-03-27 DIAGNOSIS — Z98.890 OTHER SPECIFIED POSTPROCEDURAL STATES: Chronic | ICD-10-CM

## 2017-03-27 DIAGNOSIS — C83.10 MANTLE CELL LYMPHOMA, UNSPECIFIED SITE: ICD-10-CM

## 2017-03-27 LAB
ALBUMIN SERPL ELPH-MCNC: 4.6 G/DL — SIGNIFICANT CHANGE UP (ref 3.3–5)
ALP SERPL-CCNC: 134 U/L — HIGH (ref 40–120)
ALT FLD-CCNC: 43 U/L RC — SIGNIFICANT CHANGE UP (ref 10–45)
ANION GAP SERPL CALC-SCNC: 12 MMOL/L — SIGNIFICANT CHANGE UP (ref 5–17)
APPEARANCE UR: CLEAR — SIGNIFICANT CHANGE UP
APTT BLD: 26.4 SEC — LOW (ref 27.5–37.4)
AST SERPL-CCNC: 34 U/L — SIGNIFICANT CHANGE UP (ref 10–40)
BASOPHILS # BLD AUTO: 0 K/UL — SIGNIFICANT CHANGE UP (ref 0–0.2)
BASOPHILS NFR BLD AUTO: 0.6 % — SIGNIFICANT CHANGE UP (ref 0–2)
BILIRUB SERPL-MCNC: 0.1 MG/DL — LOW (ref 0.2–1.2)
BILIRUB UR-MCNC: NEGATIVE — SIGNIFICANT CHANGE UP
BLD GP AB SCN SERPL QL: NEGATIVE — SIGNIFICANT CHANGE UP
BUN SERPL-MCNC: 14 MG/DL — SIGNIFICANT CHANGE UP (ref 7–23)
CALCIUM SERPL-MCNC: 8.7 MG/DL — SIGNIFICANT CHANGE UP (ref 8.4–10.5)
CHLORIDE SERPL-SCNC: 105 MMOL/L — SIGNIFICANT CHANGE UP (ref 96–108)
CO2 SERPL-SCNC: 27 MMOL/L — SIGNIFICANT CHANGE UP (ref 22–31)
COLOR SPEC: YELLOW — SIGNIFICANT CHANGE UP
CREAT SERPL-MCNC: 0.84 MG/DL — SIGNIFICANT CHANGE UP (ref 0.5–1.3)
DIFF PNL FLD: NEGATIVE — SIGNIFICANT CHANGE UP
EOSINOPHIL # BLD AUTO: 0 K/UL — SIGNIFICANT CHANGE UP (ref 0–0.5)
EOSINOPHIL NFR BLD AUTO: 0.7 % — SIGNIFICANT CHANGE UP (ref 0–6)
FIBRINOGEN PPP-MCNC: 388 MG/DL — SIGNIFICANT CHANGE UP (ref 310–510)
GLUCOSE SERPL-MCNC: 114 MG/DL — HIGH (ref 70–99)
GLUCOSE UR QL: NEGATIVE MG/DL — SIGNIFICANT CHANGE UP
HCT VFR BLD CALC: 28.8 % — LOW (ref 39–50)
HGB BLD-MCNC: 9.8 G/DL — LOW (ref 13–17)
INR BLD: 1.08 RATIO — SIGNIFICANT CHANGE UP (ref 0.88–1.16)
KETONES UR-MCNC: NEGATIVE — SIGNIFICANT CHANGE UP
LDH SERPL L TO P-CCNC: 222 U/L — SIGNIFICANT CHANGE UP (ref 50–242)
LEUKOCYTE ESTERASE UR-ACNC: NEGATIVE — SIGNIFICANT CHANGE UP
LYMPHOCYTES # BLD AUTO: 1 K/UL — SIGNIFICANT CHANGE UP (ref 1–3.3)
LYMPHOCYTES # BLD AUTO: 16.5 % — SIGNIFICANT CHANGE UP (ref 13–44)
MAGNESIUM SERPL-MCNC: 2.4 MG/DL — SIGNIFICANT CHANGE UP (ref 1.6–2.6)
MCHC RBC-ENTMCNC: 33 PG — SIGNIFICANT CHANGE UP (ref 27–34)
MCHC RBC-ENTMCNC: 34.1 GM/DL — SIGNIFICANT CHANGE UP (ref 32–36)
MCV RBC AUTO: 96.9 FL — SIGNIFICANT CHANGE UP (ref 80–100)
MONOCYTES # BLD AUTO: 1 K/UL — HIGH (ref 0–0.9)
MONOCYTES NFR BLD AUTO: 16.6 % — HIGH (ref 2–14)
NEUTROPHILS # BLD AUTO: 3.9 K/UL — SIGNIFICANT CHANGE UP (ref 1.8–7.4)
NEUTROPHILS NFR BLD AUTO: 65.4 % — SIGNIFICANT CHANGE UP (ref 43–77)
NITRITE UR-MCNC: NEGATIVE — SIGNIFICANT CHANGE UP
PH UR: 8 — SIGNIFICANT CHANGE UP (ref 5–8)
PHOSPHATE SERPL-MCNC: 3.1 MG/DL — SIGNIFICANT CHANGE UP (ref 2.5–4.5)
PLATELET # BLD AUTO: 161 K/UL — SIGNIFICANT CHANGE UP (ref 150–400)
POTASSIUM SERPL-MCNC: 4 MMOL/L — SIGNIFICANT CHANGE UP (ref 3.5–5.3)
POTASSIUM SERPL-SCNC: 4 MMOL/L — SIGNIFICANT CHANGE UP (ref 3.5–5.3)
PROT SERPL-MCNC: 7 G/DL — SIGNIFICANT CHANGE UP (ref 6–8.3)
PROT UR-MCNC: NEGATIVE MG/DL — SIGNIFICANT CHANGE UP
PROTHROM AB SERPL-ACNC: 11.7 SEC — SIGNIFICANT CHANGE UP (ref 9.8–12.7)
RBC # BLD: 2.98 M/UL — LOW (ref 4.2–5.8)
RBC # BLD: 2.98 M/UL — LOW (ref 4.2–5.8)
RBC # FLD: 16 % — HIGH (ref 10.3–14.5)
RETICS #: 88.7 K/UL — SIGNIFICANT CHANGE UP (ref 25–125)
RETICS/RBC NFR: 3 % — HIGH (ref 0.5–2.5)
RH IG SCN BLD-IMP: POSITIVE — SIGNIFICANT CHANGE UP
SODIUM SERPL-SCNC: 144 MMOL/L — SIGNIFICANT CHANGE UP (ref 135–145)
SP GR SPEC: 1.01 — SIGNIFICANT CHANGE UP (ref 1.01–1.02)
UROBILINOGEN FLD QL: NEGATIVE MG/DL — SIGNIFICANT CHANGE UP
WBC # BLD: 6 K/UL — SIGNIFICANT CHANGE UP (ref 3.8–10.5)
WBC # FLD AUTO: 6 K/UL — SIGNIFICANT CHANGE UP (ref 3.8–10.5)

## 2017-03-27 PROCEDURE — 76937 US GUIDE VASCULAR ACCESS: CPT | Mod: 26

## 2017-03-27 PROCEDURE — 77001 FLUOROGUIDE FOR VEIN DEVICE: CPT | Mod: 26

## 2017-03-27 PROCEDURE — 99291 CRITICAL CARE FIRST HOUR: CPT

## 2017-03-27 PROCEDURE — 36556 INSERT NON-TUNNEL CV CATH: CPT

## 2017-03-27 RX ORDER — ACETAMINOPHEN 500 MG
650 TABLET ORAL EVERY 6 HOURS
Qty: 0 | Refills: 0 | Status: DISCONTINUED | OUTPATIENT
Start: 2017-03-27 | End: 2017-05-05

## 2017-03-27 RX ORDER — TAMSULOSIN HYDROCHLORIDE 0.4 MG/1
0.4 CAPSULE ORAL AT BEDTIME
Qty: 0 | Refills: 0 | Status: DISCONTINUED | OUTPATIENT
Start: 2017-03-27 | End: 2017-05-05

## 2017-03-27 RX ORDER — DOCUSATE SODIUM 100 MG
100 CAPSULE ORAL THREE TIMES A DAY
Qty: 0 | Refills: 0 | Status: DISCONTINUED | OUTPATIENT
Start: 2017-03-27 | End: 2017-05-03

## 2017-03-27 RX ORDER — SALIVA SUBSTITUTE COMB NO.11 351 MG
30 POWDER IN PACKET (EA) MUCOUS MEMBRANE
Qty: 0 | Refills: 0 | Status: DISCONTINUED | OUTPATIENT
Start: 2017-03-27 | End: 2017-05-03

## 2017-03-27 RX ORDER — DEXTROSE MONOHYDRATE, SODIUM CHLORIDE, AND POTASSIUM CHLORIDE 50; .745; 4.5 G/1000ML; G/1000ML; G/1000ML
1000 INJECTION, SOLUTION INTRAVENOUS
Qty: 0 | Refills: 0 | Status: DISCONTINUED | OUTPATIENT
Start: 2017-03-29 | End: 2017-04-03

## 2017-03-27 RX ORDER — SODIUM CHLORIDE 9 MG/ML
1000 INJECTION INTRAMUSCULAR; INTRAVENOUS; SUBCUTANEOUS
Qty: 0 | Refills: 0 | Status: DISCONTINUED | OUTPATIENT
Start: 2017-03-27 | End: 2017-04-06

## 2017-03-27 RX ORDER — HEPARIN SODIUM 5000 [USP'U]/ML
407 INJECTION INTRAVENOUS; SUBCUTANEOUS
Qty: 25000 | Refills: 0 | Status: DISCONTINUED | OUTPATIENT
Start: 2017-03-27 | End: 2017-04-24

## 2017-03-27 RX ORDER — FLUCONAZOLE 150 MG/1
400 TABLET ORAL DAILY
Qty: 0 | Refills: 0 | Status: DISCONTINUED | OUTPATIENT
Start: 2017-03-27 | End: 2017-05-05

## 2017-03-27 RX ORDER — ACYCLOVIR SODIUM 500 MG
400 VIAL (EA) INTRAVENOUS EVERY 8 HOURS
Qty: 0 | Refills: 0 | Status: DISCONTINUED | OUTPATIENT
Start: 2017-04-04 | End: 2017-05-05

## 2017-03-27 RX ORDER — CLOTRIMAZOLE 10 MG
1 TROCHE MUCOUS MEMBRANE
Qty: 0 | Refills: 0 | Status: DISCONTINUED | OUTPATIENT
Start: 2017-03-27 | End: 2017-05-05

## 2017-03-27 RX ORDER — PANTOPRAZOLE SODIUM 20 MG/1
40 TABLET, DELAYED RELEASE ORAL DAILY
Qty: 0 | Refills: 0 | Status: DISCONTINUED | OUTPATIENT
Start: 2017-03-27 | End: 2017-04-11

## 2017-03-27 RX ORDER — ONDANSETRON 8 MG/1
8 TABLET, FILM COATED ORAL EVERY 8 HOURS
Qty: 0 | Refills: 0 | Status: COMPLETED | OUTPATIENT
Start: 2017-03-27 | End: 2017-04-05

## 2017-03-27 RX ORDER — APREPITANT 80 MG/1
125 CAPSULE ORAL ONCE
Qty: 0 | Refills: 0 | Status: COMPLETED | OUTPATIENT
Start: 2017-03-27 | End: 2017-03-27

## 2017-03-27 RX ORDER — SENNA PLUS 8.6 MG/1
1 TABLET ORAL AT BEDTIME
Qty: 0 | Refills: 0 | Status: DISCONTINUED | OUTPATIENT
Start: 2017-03-27 | End: 2017-05-03

## 2017-03-27 RX ORDER — ZOLPIDEM TARTRATE 10 MG/1
5 TABLET ORAL AT BEDTIME
Qty: 0 | Refills: 0 | Status: DISCONTINUED | OUTPATIENT
Start: 2017-03-27 | End: 2017-03-27

## 2017-03-27 RX ORDER — ACETAMINOPHEN 500 MG
650 TABLET ORAL EVERY 6 HOURS
Qty: 0 | Refills: 0 | Status: DISCONTINUED | OUTPATIENT
Start: 2017-03-27 | End: 2017-05-03

## 2017-03-27 RX ORDER — ZOLPIDEM TARTRATE 10 MG/1
5 TABLET ORAL AT BEDTIME
Qty: 0 | Refills: 0 | Status: DISCONTINUED | OUTPATIENT
Start: 2017-03-27 | End: 2017-04-03

## 2017-03-27 RX ORDER — FOLIC ACID 0.8 MG
1 TABLET ORAL DAILY
Qty: 0 | Refills: 0 | Status: DISCONTINUED | OUTPATIENT
Start: 2017-03-27 | End: 2017-05-05

## 2017-03-27 RX ORDER — SODIUM BICARBONATE 1 MEQ/ML
10 SYRINGE (ML) INTRAVENOUS
Qty: 0 | Refills: 0 | Status: DISCONTINUED | OUTPATIENT
Start: 2017-03-27 | End: 2017-05-03

## 2017-03-27 RX ORDER — DIPHENHYDRAMINE HCL 50 MG
25 CAPSULE ORAL EVERY 4 HOURS
Qty: 0 | Refills: 0 | Status: DISCONTINUED | OUTPATIENT
Start: 2017-03-27 | End: 2017-05-03

## 2017-03-27 RX ORDER — URSODIOL 250 MG/1
300 TABLET, FILM COATED ORAL
Qty: 0 | Refills: 0 | Status: DISCONTINUED | OUTPATIENT
Start: 2017-03-27 | End: 2017-05-04

## 2017-03-27 RX ORDER — APREPITANT 80 MG/1
80 CAPSULE ORAL DAILY
Qty: 0 | Refills: 0 | Status: COMPLETED | OUTPATIENT
Start: 2017-03-28 | End: 2017-04-05

## 2017-03-27 RX ORDER — ETOPOSIDE 20 MG/ML
400 VIAL (ML) INTRAVENOUS
Qty: 0 | Refills: 0 | Status: DISCONTINUED | OUTPATIENT
Start: 2017-03-27 | End: 2017-05-05

## 2017-03-27 RX ORDER — METOCLOPRAMIDE HCL 10 MG
10 TABLET ORAL EVERY 6 HOURS
Qty: 0 | Refills: 0 | Status: DISCONTINUED | OUTPATIENT
Start: 2017-03-27 | End: 2017-05-04

## 2017-03-27 RX ORDER — DEXAMETHASONE 0.5 MG/5ML
10 ELIXIR ORAL DAILY
Qty: 0 | Refills: 0 | Status: COMPLETED | OUTPATIENT
Start: 2017-03-27 | End: 2017-04-04

## 2017-03-27 RX ADMIN — HEPARIN SODIUM 4.07 UNIT(S)/HR: 5000 INJECTION INTRAVENOUS; SUBCUTANEOUS at 22:55

## 2017-03-27 RX ADMIN — ONDANSETRON 8 MILLIGRAM(S): 8 TABLET, FILM COATED ORAL at 17:29

## 2017-03-27 RX ADMIN — Medication 10 MILLILITER(S): at 20:44

## 2017-03-27 RX ADMIN — Medication 30 MILLILITER(S): at 20:37

## 2017-03-27 RX ADMIN — PANTOPRAZOLE SODIUM 40 MILLIGRAM(S): 20 TABLET, DELAYED RELEASE ORAL at 17:03

## 2017-03-27 RX ADMIN — SODIUM CHLORIDE 20 MILLILITER(S): 9 INJECTION INTRAMUSCULAR; INTRAVENOUS; SUBCUTANEOUS at 17:06

## 2017-03-27 RX ADMIN — Medication 102 MILLIGRAM(S): at 17:02

## 2017-03-27 RX ADMIN — URSODIOL 300 MILLIGRAM(S): 250 TABLET, FILM COATED ORAL at 17:03

## 2017-03-27 RX ADMIN — SODIUM CHLORIDE 20 MILLILITER(S): 9 INJECTION INTRAMUSCULAR; INTRAVENOUS; SUBCUTANEOUS at 22:55

## 2017-03-27 RX ADMIN — Medication 1 MILLIGRAM(S): at 17:03

## 2017-03-27 RX ADMIN — Medication 1 LOZENGE: at 20:38

## 2017-03-27 RX ADMIN — TAMSULOSIN HYDROCHLORIDE 0.4 MILLIGRAM(S): 0.4 CAPSULE ORAL at 22:56

## 2017-03-27 RX ADMIN — Medication 1 TABLET(S): at 17:02

## 2017-03-27 RX ADMIN — APREPITANT 125 MILLIGRAM(S): 80 CAPSULE ORAL at 17:05

## 2017-03-27 RX ADMIN — Medication 10 MILLILITER(S): at 17:05

## 2017-03-27 RX ADMIN — FLUCONAZOLE 400 MILLIGRAM(S): 150 TABLET ORAL at 17:03

## 2017-03-27 RX ADMIN — Medication 1 TABLET(S): at 17:03

## 2017-03-27 RX ADMIN — Medication 30 MILLILITER(S): at 17:02

## 2017-03-27 RX ADMIN — Medication 1 LOZENGE: at 17:03

## 2017-03-27 NOTE — H&P ADULT. - GASTROINTESTINAL DETAILS
no rebound tenderness/normal/no rigidity/no distention/no bruit/nontender/bowel sounds normal/soft/no masses palpable/no guarding/no organomegaly

## 2017-03-27 NOTE — H&P ADULT. - RS GEN PE MLT RESP DETAILS PC
normal/no intercostal retractions/breath sounds equal/no rales/airway patent/no wheezes/no subcutaneous emphysema/clear to auscultation bilaterally/no rhonchi/good air movement/respirations non-labored

## 2017-03-27 NOTE — H&P ADULT. - PROBLEM SELECTOR PLAN 1
Admit to [facility] [place]/BMTU  Placement of TLCL catheter for IV access  IV hydration with Chemotherapy. Preparative regimen with  16/CTX/BCNU X 7 days   Rest on Day -2 (4/3) and -1 (4/4)  Autologous peripheral stem cell transplant on day 0 (4/5)  RTX on Day +1 ( 4/6) and Day +8 ( 4/13)  Begin Zarxio 5 mcg/kg sq daily on day 0 until engraftment with WBC recovery  Strict I&O's, daily weight, diurese with Lasix as needed. Antiemetics as per protocol transfuse PRN. Replete electrolytes PRN. Monitor Vital signs Q4hrs and report any changes.    Antifungal, antiviral, antibacterial prophylaxis as per BMTU protocol.    Begin Actigall, glutamine supplement, low dose heparin drip for SOS/VOD prophylaxis. GI prophylaxis with PPI Kepivance to prevent mucositis on days 0, +1, +2.  Mouth care,   skin care as per BMTU protocol. Admit to [address] [place]/BMTU  Placement of TLCL catheter for IV access  IV hydration as per protocol. Preparative regimen with -16/CTX/BCNU X 7 days   Rest on Day -2 (4/3) and -1 (4/4)  Autologous peripheral stem cell transplant on day 0 (4/5)  Rituxan on Day +1 ( 4/6) and Day +8 ( 4/13)  Begin Zarxio 5 mcg/kg sq daily on day 0 until engraftment with WBC recovery  Strict I&O's, daily weight, diurese with Lasix as needed.   Antiemetics as per protocol; transfuse PRN.   Replete electrolytes PRN. Monitor Vital signs Q4hrs and report any changes.    Mouth care, skin care as per BMTU protocol.

## 2017-03-27 NOTE — ADVANCED PRACTICE NURSE CONSULT - ASSESSMENT
Pt. a/ox4,denies any n/v at this time.Premedicated with emend 125 mg po ,decadron 10 mg IV and zofran 8 mg IVP. At 1730 Etoposide 400mg IV infusing at 352ml/hr to TLC via alaris pump.

## 2017-03-27 NOTE — H&P ADULT. - PROBLEM SELECTOR PLAN 2
Antifungal, antiviral, antibacterial prophylaxis as per BMTU protocol.    Begin Actigall, glutamine supplement, low dose heparin drip for SOS/VOD prophylaxis.   GI prophylaxis with PPI.   Kepivance to prevent mucositis on days 0, +1, +2.

## 2017-03-27 NOTE — H&P ADULT. - MUSCULOSKELETAL
negative detailed exam normal/ROM intact/no joint erythema/no joint swelling/no joint warmth/no calf tenderness/normal strength details…

## 2017-03-27 NOTE — H&P ADULT. - HISTORY OF PRESENT ILLNESS
52 y/o male with h/o hypertension, diagnosed with mantle cell lymphom  10/2016, s/p 4 cycles of R-CHOP &  cycle 2  RICE now admitted for autologous stem cell transplant    Patients history dates back to October, 2016 when he presented  to  with a left inguinal mass . CAT scan of abdomen pelvis was performed which revealed 0.7 cm lesion in the right Iliac bone and left retroperitoneal lymph adenopathy . An inguinal node and a bone marrow biopsy was done which was consistent with mantle cell lymphoma. A PET scan was done which revealed retroperitoneal lymphadenopathy and diffusely hypermetabolic spleen. He received 4 cycles of RCHOP and a PET scan was repeated which showed continued response. Bone marrow biopsy was done on 2/10 which revealed normocellular bone marrow with trilineage hematopoiesis and maturation. Patient received received  2 cycles of RICE therapy for consolidation and mobilization.    Pt has no complaints today. Pt denies SOB, chest pain, dizziness, headache, nausea, vomiting and diarrhea. 50 y/o male  diagnosed with mantle cell Lymphoma 10/2016, s/p 4 cycles of R-CHOP &  cycle 2  RICE now admitted for autologous stem cell transplant.    Patients history dates back to October, 2016 when he presented  to  with a left inguinal mass . CAT scan of abdomen pelvis was performed which revealed 0.7 cm lesion in the right Iliac bone and left retroperitoneal lymph adenopathy . An inguinal node and a bone marrow biopsy was done,  which was consistent with mantle cell lymphoma .  [id number] [id number] A PET scan was done which revealed retroperitoneal lymphadenopathy and diffusely hypermetabolic spleen. He received 4 cycles of RCHOP and a PET scan was repeated which showed continued response.  Bone marrow biopsy was done on 2/10 which revealed normocellular bone marrow with trilineage hematopoiesis and maturation. Patient also received   2 cycles of RICE therapy for consolidation and mobilization.  Pt c/o slight runny nose after Kepivance and skin thickness under arms. Pt denies SOB, chest pain, dizziness, headache, nausea, vomiting and diarrhea. 50 y/o male  diagnosed with mantle cell Lymphoma 10/2016, s/p 4 cycles of R-CHOP &  cycle 2  RICE now admitted for autologous stem cell transplant.    Patients history dates back to October, 2016 when he presented  to  with a left inguinal mass . CAT scan of abdomen pelvis was performed which revealed 0.7 cm lesion in the right Iliac bone and left retroperitoneal lymph adenopathy . An inguinal node and a bone marrow biopsy was done,  which was consistent with mantle cell lymphoma .  [id number] PsccpMaftuzy174Xpviv A PET scan was done which revealed retroperitoneal lymphadenopathy and diffusely hypermetabolic spleen. He received 4 cycles of RCHOP and a PET scan was repeated which showed continued response.  Bone marrow biopsy was done on 2/10 which revealed normocellular bone marrow with trilineage hematopoiesis and maturation. Patient also received   2 cycles of RICE therapy for consolidation and mobilization.  Pt c/o slight runny nose after Kepivance and under arm skin thickness/darkness . Pt denies SOB, chest pain, dizziness, headache, nausea, vomiting and diarrhea. 50 y/o male diagnosed with Mantle Cell Lymphoma in 10/2016, s/p 4 cycles of R-CHOP &  cycle 2  RICE now admitted for autologous stem cell transplant.    Patients history dates back to October, 2016 when he presented  to  with a left inguinal mass . CAT scan of abdomen pelvis was performed which revealed 0.7 cm lesion in the right Iliac bone and left retroperitoneal lymph adenopathy . An inguinal node and a bone marrow biopsy was done,  which was consistent with mantle cell lymphoma .  [id number] [id number] A PET scan was done which revealed retroperitoneal lymphadenopathy and diffusely hypermetabolic spleen. He received 4 cycles of RCHOP and a PET scan was repeated which showed continued response.  Bone marrow biopsy was done on 2/10 which revealed normocellular bone marrow with trilineage hematopoiesis and maturation. Patient also received 2 cycles of RICE therapy for consolidation and mobilization.  Pt c/o slight runny nose after Kepivance and under arm skin thickness/darkness . Pt denies SOB, chest pain, dizziness, headache, nausea, vomiting and diarrhea.

## 2017-03-27 NOTE — H&P ADULT. - ASSESSMENT
50 y/o male  diagnosed with mantle cell Lymphoma 10/2016, s/p 4 cycles of R-CHOP &  cycle 2  RICE now admitted for autologous stem cell transplant. 52 y/o male  diagnosed with Mantle Cell Lymphoma 10/2016, s/p 4 cycles of R-CHOP &  cycle 2  RICE now admitted for autologous stem cell transplant.

## 2017-03-28 DIAGNOSIS — C85.90 NON-HODGKIN LYMPHOMA, UNSPECIFIED, UNSPECIFIED SITE: ICD-10-CM

## 2017-03-28 DIAGNOSIS — Z49.01 ENCOUNTER FOR FITTING AND ADJUSTMENT OF EXTRACORPOREAL DIALYSIS CATHETER: ICD-10-CM

## 2017-03-28 DIAGNOSIS — Z94.84 STEM CELLS TRANSPLANT STATUS: ICD-10-CM

## 2017-03-28 LAB
ALBUMIN SERPL ELPH-MCNC: 4.3 G/DL — SIGNIFICANT CHANGE UP (ref 3.3–5)
ALP SERPL-CCNC: 124 U/L — HIGH (ref 40–120)
ALT FLD-CCNC: 36 U/L RC — SIGNIFICANT CHANGE UP (ref 10–45)
ANION GAP SERPL CALC-SCNC: 14 MMOL/L — SIGNIFICANT CHANGE UP (ref 5–17)
AST SERPL-CCNC: 28 U/L — SIGNIFICANT CHANGE UP (ref 10–40)
BASOPHILS # BLD AUTO: 0 K/UL — SIGNIFICANT CHANGE UP (ref 0–0.2)
BASOPHILS NFR BLD AUTO: 0.3 % — SIGNIFICANT CHANGE UP (ref 0–2)
BILIRUB SERPL-MCNC: 0.4 MG/DL — SIGNIFICANT CHANGE UP (ref 0.2–1.2)
BUN SERPL-MCNC: 13 MG/DL — SIGNIFICANT CHANGE UP (ref 7–23)
CALCIUM SERPL-MCNC: 8.5 MG/DL — SIGNIFICANT CHANGE UP (ref 8.4–10.5)
CHLORIDE SERPL-SCNC: 105 MMOL/L — SIGNIFICANT CHANGE UP (ref 96–108)
CO2 SERPL-SCNC: 22 MMOL/L — SIGNIFICANT CHANGE UP (ref 22–31)
CREAT SERPL-MCNC: 0.64 MG/DL — SIGNIFICANT CHANGE UP (ref 0.5–1.3)
EOSINOPHIL # BLD AUTO: 0 K/UL — SIGNIFICANT CHANGE UP (ref 0–0.5)
EOSINOPHIL NFR BLD AUTO: 0.5 % — SIGNIFICANT CHANGE UP (ref 0–6)
GLUCOSE SERPL-MCNC: 154 MG/DL — HIGH (ref 70–99)
HCT VFR BLD CALC: 28.9 % — LOW (ref 39–50)
HGB BLD-MCNC: 9.8 G/DL — LOW (ref 13–17)
IGA FLD-MCNC: 193 MG/DL — SIGNIFICANT CHANGE UP (ref 68–378)
IGE SERPL-ACNC: 79 IU/ML — SIGNIFICANT CHANGE UP (ref 0–100)
IGG FLD-MCNC: 1080 MG/DL — SIGNIFICANT CHANGE UP (ref 694–1618)
IGM SERPL-MCNC: 26 MG/DL — LOW (ref 40–230)
KAPPA LC SER QL IFE: 1.89 MG/DL — SIGNIFICANT CHANGE UP (ref 0.33–1.94)
KAPPA/LAMBDA FREE LIGHT CHAIN RATIO, SERUM: 1.48 RATIO — SIGNIFICANT CHANGE UP (ref 0.26–1.65)
LAMBDA LC SER QL IFE: 1.28 MG/DL — SIGNIFICANT CHANGE UP (ref 0.57–2.63)
LDH SERPL L TO P-CCNC: 196 U/L — SIGNIFICANT CHANGE UP (ref 50–242)
LYMPHOCYTES # BLD AUTO: 0.4 K/UL — LOW (ref 1–3.3)
LYMPHOCYTES # BLD AUTO: 5.2 % — LOW (ref 13–44)
MAGNESIUM SERPL-MCNC: 2.1 MG/DL — SIGNIFICANT CHANGE UP (ref 1.6–2.6)
MCHC RBC-ENTMCNC: 32.9 PG — SIGNIFICANT CHANGE UP (ref 27–34)
MCHC RBC-ENTMCNC: 33.8 GM/DL — SIGNIFICANT CHANGE UP (ref 32–36)
MCV RBC AUTO: 97.4 FL — SIGNIFICANT CHANGE UP (ref 80–100)
MONOCYTES # BLD AUTO: 0.1 K/UL — SIGNIFICANT CHANGE UP (ref 0–0.9)
MONOCYTES NFR BLD AUTO: 2 % — SIGNIFICANT CHANGE UP (ref 2–14)
NEUTROPHILS # BLD AUTO: 6.5 K/UL — SIGNIFICANT CHANGE UP (ref 1.8–7.4)
NEUTROPHILS NFR BLD AUTO: 92 % — HIGH (ref 43–77)
PHOSPHATE SERPL-MCNC: 3.2 MG/DL — SIGNIFICANT CHANGE UP (ref 2.5–4.5)
PLATELET # BLD AUTO: 186 K/UL — SIGNIFICANT CHANGE UP (ref 150–400)
POTASSIUM SERPL-MCNC: 4.3 MMOL/L — SIGNIFICANT CHANGE UP (ref 3.5–5.3)
POTASSIUM SERPL-SCNC: 4.3 MMOL/L — SIGNIFICANT CHANGE UP (ref 3.5–5.3)
PROT SERPL-MCNC: 6.7 G/DL — SIGNIFICANT CHANGE UP (ref 6–8.3)
RBC # BLD: 2.96 M/UL — LOW (ref 4.2–5.8)
RBC # FLD: 16 % — HIGH (ref 10.3–14.5)
SODIUM SERPL-SCNC: 141 MMOL/L — SIGNIFICANT CHANGE UP (ref 135–145)
WBC # BLD: 7.1 K/UL — SIGNIFICANT CHANGE UP (ref 3.8–10.5)
WBC # FLD AUTO: 7.1 K/UL — SIGNIFICANT CHANGE UP (ref 3.8–10.5)

## 2017-03-28 PROCEDURE — 99291 CRITICAL CARE FIRST HOUR: CPT

## 2017-03-28 RX ORDER — BACLOFEN 100 %
10 POWDER (GRAM) MISCELLANEOUS EVERY 8 HOURS
Qty: 0 | Refills: 0 | Status: DISCONTINUED | OUTPATIENT
Start: 2017-03-28 | End: 2017-05-03

## 2017-03-28 RX ORDER — ETOPOSIDE 20 MG/ML
424 VIAL (ML) INTRAVENOUS ONCE
Qty: 0 | Refills: 0 | Status: DISCONTINUED | OUTPATIENT
Start: 2017-03-28 | End: 2017-05-05

## 2017-03-28 RX ORDER — FUROSEMIDE 40 MG
40 TABLET ORAL ONCE
Qty: 0 | Refills: 0 | Status: COMPLETED | OUTPATIENT
Start: 2017-03-28 | End: 2017-03-28

## 2017-03-28 RX ORDER — ETOPOSIDE 20 MG/ML
400 VIAL (ML) INTRAVENOUS
Qty: 0 | Refills: 0 | Status: DISCONTINUED | OUTPATIENT
Start: 2017-03-28 | End: 2017-05-05

## 2017-03-28 RX ADMIN — Medication 30 MILLILITER(S): at 16:11

## 2017-03-28 RX ADMIN — Medication 10 MILLILITER(S): at 00:18

## 2017-03-28 RX ADMIN — Medication 1 TABLET(S): at 11:24

## 2017-03-28 RX ADMIN — Medication 101 MILLIGRAM(S): at 23:08

## 2017-03-28 RX ADMIN — Medication 1 LOZENGE: at 08:30

## 2017-03-28 RX ADMIN — Medication 1 LOZENGE: at 11:24

## 2017-03-28 RX ADMIN — Medication 40 MILLIGRAM(S): at 17:28

## 2017-03-28 RX ADMIN — Medication 10 MILLILITER(S): at 17:30

## 2017-03-28 RX ADMIN — Medication 10 MILLILITER(S): at 08:30

## 2017-03-28 RX ADMIN — Medication 1 MILLIGRAM(S): at 11:24

## 2017-03-28 RX ADMIN — Medication 1 LOZENGE: at 16:11

## 2017-03-28 RX ADMIN — Medication 30 MILLILITER(S): at 08:30

## 2017-03-28 RX ADMIN — PANTOPRAZOLE SODIUM 40 MILLIGRAM(S): 20 TABLET, DELAYED RELEASE ORAL at 11:24

## 2017-03-28 RX ADMIN — Medication 1 LOZENGE: at 00:19

## 2017-03-28 RX ADMIN — Medication 30 MILLILITER(S): at 00:19

## 2017-03-28 RX ADMIN — URSODIOL 300 MILLIGRAM(S): 250 TABLET, FILM COATED ORAL at 17:29

## 2017-03-28 RX ADMIN — TAMSULOSIN HYDROCHLORIDE 0.4 MILLIGRAM(S): 0.4 CAPSULE ORAL at 22:53

## 2017-03-28 RX ADMIN — APREPITANT 80 MILLIGRAM(S): 80 CAPSULE ORAL at 11:24

## 2017-03-28 RX ADMIN — URSODIOL 300 MILLIGRAM(S): 250 TABLET, FILM COATED ORAL at 08:30

## 2017-03-28 RX ADMIN — HEPARIN SODIUM 4.07 UNIT(S)/HR: 5000 INJECTION INTRAVENOUS; SUBCUTANEOUS at 17:27

## 2017-03-28 RX ADMIN — Medication 30 MILLILITER(S): at 11:24

## 2017-03-28 RX ADMIN — ONDANSETRON 8 MILLIGRAM(S): 8 TABLET, FILM COATED ORAL at 22:53

## 2017-03-28 RX ADMIN — SODIUM CHLORIDE 20 MILLILITER(S): 9 INJECTION INTRAMUSCULAR; INTRAVENOUS; SUBCUTANEOUS at 13:41

## 2017-03-28 RX ADMIN — Medication 40 MILLIGRAM(S): at 10:48

## 2017-03-28 RX ADMIN — Medication 10 MILLILITER(S): at 11:25

## 2017-03-28 RX ADMIN — Medication 1 TABLET(S): at 06:18

## 2017-03-28 RX ADMIN — Medication 1 TABLET(S): at 17:28

## 2017-03-28 RX ADMIN — Medication 102 MILLIGRAM(S): at 06:18

## 2017-03-28 RX ADMIN — Medication 10 MILLIGRAM(S): at 16:10

## 2017-03-28 RX ADMIN — ONDANSETRON 8 MILLIGRAM(S): 8 TABLET, FILM COATED ORAL at 13:39

## 2017-03-28 RX ADMIN — FLUCONAZOLE 400 MILLIGRAM(S): 150 TABLET ORAL at 11:24

## 2017-03-28 RX ADMIN — ONDANSETRON 8 MILLIGRAM(S): 8 TABLET, FILM COATED ORAL at 06:19

## 2017-03-28 NOTE — ADVANCED PRACTICE NURSE CONSULT - ASSESSMENT
Pt. received etoposide 400mg at 1000,1315 and 1610.Pt. c/o nausea,reglan 10 mg IV given with some relief.Pt. has tripple lumen cath to left neck .Dsg dry and intact.Site with no s/s of bleeding/swelling or redness.

## 2017-03-29 DIAGNOSIS — Z41.8 ENCOUNTER FOR OTHER PROCEDURES FOR PURPOSES OTHER THAN REMEDYING HEALTH STATE: ICD-10-CM

## 2017-03-29 LAB
ALBUMIN SERPL ELPH-MCNC: 4 G/DL — SIGNIFICANT CHANGE UP (ref 3.3–5)
ALP SERPL-CCNC: 105 U/L — SIGNIFICANT CHANGE UP (ref 40–120)
ALT FLD-CCNC: 45 U/L RC — SIGNIFICANT CHANGE UP (ref 10–45)
ANION GAP SERPL CALC-SCNC: 15 MMOL/L — SIGNIFICANT CHANGE UP (ref 5–17)
AST SERPL-CCNC: 32 U/L — SIGNIFICANT CHANGE UP (ref 10–40)
BASOPHILS # BLD AUTO: 0 K/UL — SIGNIFICANT CHANGE UP (ref 0–0.2)
BASOPHILS NFR BLD AUTO: 0.1 % — SIGNIFICANT CHANGE UP (ref 0–2)
BILIRUB SERPL-MCNC: 0.3 MG/DL — SIGNIFICANT CHANGE UP (ref 0.2–1.2)
BLD GP AB SCN SERPL QL: NEGATIVE — SIGNIFICANT CHANGE UP
BUN SERPL-MCNC: 16 MG/DL — SIGNIFICANT CHANGE UP (ref 7–23)
CALCIUM SERPL-MCNC: 8.1 MG/DL — LOW (ref 8.4–10.5)
CHLORIDE SERPL-SCNC: 106 MMOL/L — SIGNIFICANT CHANGE UP (ref 96–108)
CO2 SERPL-SCNC: 22 MMOL/L — SIGNIFICANT CHANGE UP (ref 22–31)
CREAT SERPL-MCNC: 0.76 MG/DL — SIGNIFICANT CHANGE UP (ref 0.5–1.3)
EOSINOPHIL # BLD AUTO: 0 K/UL — SIGNIFICANT CHANGE UP (ref 0–0.5)
EOSINOPHIL NFR BLD AUTO: 0.4 % — SIGNIFICANT CHANGE UP (ref 0–6)
G6PD RBC-CCNC: 11 U/G HB — SIGNIFICANT CHANGE UP (ref 4.6–13.5)
GLUCOSE SERPL-MCNC: 142 MG/DL — HIGH (ref 70–99)
HCT VFR BLD CALC: 26.7 % — LOW (ref 39–50)
HGB BLD-MCNC: 8.9 G/DL — LOW (ref 13–17)
LDH SERPL L TO P-CCNC: 186 U/L — SIGNIFICANT CHANGE UP (ref 50–242)
LYMPHOCYTES # BLD AUTO: 0.3 K/UL — LOW (ref 1–3.3)
LYMPHOCYTES # BLD AUTO: 4.3 % — LOW (ref 13–44)
MAGNESIUM SERPL-MCNC: 2.1 MG/DL — SIGNIFICANT CHANGE UP (ref 1.6–2.6)
MCHC RBC-ENTMCNC: 32.5 PG — SIGNIFICANT CHANGE UP (ref 27–34)
MCHC RBC-ENTMCNC: 33.4 GM/DL — SIGNIFICANT CHANGE UP (ref 32–36)
MCV RBC AUTO: 97.3 FL — SIGNIFICANT CHANGE UP (ref 80–100)
MONOCYTES # BLD AUTO: 0.6 K/UL — SIGNIFICANT CHANGE UP (ref 0–0.9)
MONOCYTES NFR BLD AUTO: 8.8 % — SIGNIFICANT CHANGE UP (ref 2–14)
NEUTROPHILS # BLD AUTO: 5.5 K/UL — SIGNIFICANT CHANGE UP (ref 1.8–7.4)
NEUTROPHILS NFR BLD AUTO: 86.4 % — HIGH (ref 43–77)
PHOSPHATE SERPL-MCNC: 3.5 MG/DL — SIGNIFICANT CHANGE UP (ref 2.5–4.5)
PLATELET # BLD AUTO: 219 K/UL — SIGNIFICANT CHANGE UP (ref 150–400)
POTASSIUM SERPL-MCNC: 4.1 MMOL/L — SIGNIFICANT CHANGE UP (ref 3.5–5.3)
POTASSIUM SERPL-SCNC: 4.1 MMOL/L — SIGNIFICANT CHANGE UP (ref 3.5–5.3)
PROT SERPL-MCNC: 6.2 G/DL — SIGNIFICANT CHANGE UP (ref 6–8.3)
RBC # BLD: 2.75 M/UL — LOW (ref 4.2–5.8)
RBC # FLD: 16.5 % — HIGH (ref 10.3–14.5)
RH IG SCN BLD-IMP: POSITIVE — SIGNIFICANT CHANGE UP
SODIUM SERPL-SCNC: 143 MMOL/L — SIGNIFICANT CHANGE UP (ref 135–145)
SP GR UR STRIP: 1.01 — LOW (ref 1.01–1.02)
WBC # BLD: 6.3 K/UL — SIGNIFICANT CHANGE UP (ref 3.8–10.5)
WBC # FLD AUTO: 6.3 K/UL — SIGNIFICANT CHANGE UP (ref 3.8–10.5)

## 2017-03-29 PROCEDURE — 99291 CRITICAL CARE FIRST HOUR: CPT

## 2017-03-29 PROCEDURE — 93010 ELECTROCARDIOGRAM REPORT: CPT

## 2017-03-29 RX ORDER — HYDROCORTISONE 20 MG
50 TABLET ORAL ONCE
Qty: 0 | Refills: 0 | Status: COMPLETED | OUTPATIENT
Start: 2017-04-05 | End: 2017-04-05

## 2017-03-29 RX ORDER — FUROSEMIDE 40 MG
20 TABLET ORAL DAILY
Qty: 0 | Refills: 0 | Status: COMPLETED | OUTPATIENT
Start: 2017-03-29 | End: 2017-04-01

## 2017-03-29 RX ORDER — CYCLOPHOSPHAMIDE 100 MG
3618 VIAL (EA) INTRAVENOUS DAILY
Qty: 0 | Refills: 0 | Status: DISCONTINUED | OUTPATIENT
Start: 2017-03-29 | End: 2017-05-05

## 2017-03-29 RX ORDER — SODIUM CHLORIDE 9 MG/ML
1000 INJECTION, SOLUTION INTRAVENOUS
Qty: 0 | Refills: 0 | Status: DISCONTINUED | OUTPATIENT
Start: 2017-04-04 | End: 2017-04-06

## 2017-03-29 RX ORDER — DIPHENHYDRAMINE HCL 50 MG
25 CAPSULE ORAL ONCE
Qty: 0 | Refills: 0 | Status: COMPLETED | OUTPATIENT
Start: 2017-04-05 | End: 2017-04-05

## 2017-03-29 RX ORDER — MESNA 100 MG/ML
1175 INJECTION, SOLUTION INTRAVENOUS
Qty: 0 | Refills: 0 | Status: COMPLETED | OUTPATIENT
Start: 2017-03-29 | End: 2017-03-30

## 2017-03-29 RX ORDER — POTASSIUM CHLORIDE 20 MEQ
20 PACKET (EA) ORAL ONCE
Qty: 0 | Refills: 0 | Status: COMPLETED | OUTPATIENT
Start: 2017-03-29 | End: 2017-03-29

## 2017-03-29 RX ORDER — ACETAMINOPHEN 500 MG
650 TABLET ORAL ONCE
Qty: 0 | Refills: 0 | Status: COMPLETED | OUTPATIENT
Start: 2017-04-05 | End: 2017-04-05

## 2017-03-29 RX ORDER — FUROSEMIDE 40 MG
40 TABLET ORAL ONCE
Qty: 0 | Refills: 0 | Status: COMPLETED | OUTPATIENT
Start: 2017-03-29 | End: 2017-03-29

## 2017-03-29 RX ORDER — LIDOCAINE AND PRILOCAINE CREAM 25; 25 MG/G; MG/G
1 CREAM TOPICAL DAILY
Qty: 0 | Refills: 0 | Status: DISCONTINUED | OUTPATIENT
Start: 2017-04-05 | End: 2017-05-03

## 2017-03-29 RX ORDER — FILGRASTIM 480MCG/1.6
480 VIAL (ML) INJECTION DAILY
Qty: 0 | Refills: 0 | Status: DISCONTINUED | OUTPATIENT
Start: 2017-04-05 | End: 2017-04-16

## 2017-03-29 RX ADMIN — DEXTROSE MONOHYDRATE, SODIUM CHLORIDE, AND POTASSIUM CHLORIDE 302 MILLILITER(S): 50; .745; 4.5 INJECTION, SOLUTION INTRAVENOUS at 22:03

## 2017-03-29 RX ADMIN — Medication 1 TABLET(S): at 11:43

## 2017-03-29 RX ADMIN — ONDANSETRON 8 MILLIGRAM(S): 8 TABLET, FILM COATED ORAL at 05:50

## 2017-03-29 RX ADMIN — URSODIOL 300 MILLIGRAM(S): 250 TABLET, FILM COATED ORAL at 08:25

## 2017-03-29 RX ADMIN — HEPARIN SODIUM 4.07 UNIT(S)/HR: 5000 INJECTION INTRAVENOUS; SUBCUTANEOUS at 05:50

## 2017-03-29 RX ADMIN — HEPARIN SODIUM 4.07 UNIT(S)/HR: 5000 INJECTION INTRAVENOUS; SUBCUTANEOUS at 08:25

## 2017-03-29 RX ADMIN — Medication 10 MILLILITER(S): at 08:45

## 2017-03-29 RX ADMIN — Medication 40 MILLIGRAM(S): at 22:02

## 2017-03-29 RX ADMIN — Medication 40 MILLIGRAM(S): at 13:27

## 2017-03-29 RX ADMIN — TAMSULOSIN HYDROCHLORIDE 0.4 MILLIGRAM(S): 0.4 CAPSULE ORAL at 22:03

## 2017-03-29 RX ADMIN — MESNA 247 MILLIGRAM(S): 100 INJECTION, SOLUTION INTRAVENOUS at 17:05

## 2017-03-29 RX ADMIN — Medication 20 MILLIEQUIVALENT(S): at 11:39

## 2017-03-29 RX ADMIN — MESNA 247 MILLIGRAM(S): 100 INJECTION, SOLUTION INTRAVENOUS at 20:00

## 2017-03-29 RX ADMIN — FLUCONAZOLE 400 MILLIGRAM(S): 150 TABLET ORAL at 11:42

## 2017-03-29 RX ADMIN — ONDANSETRON 8 MILLIGRAM(S): 8 TABLET, FILM COATED ORAL at 22:03

## 2017-03-29 RX ADMIN — Medication 10 MILLILITER(S): at 11:46

## 2017-03-29 RX ADMIN — Medication 20 MILLIGRAM(S): at 11:39

## 2017-03-29 RX ADMIN — MESNA 247 MILLIGRAM(S): 100 INJECTION, SOLUTION INTRAVENOUS at 23:05

## 2017-03-29 RX ADMIN — Medication 10 MILLIGRAM(S): at 13:27

## 2017-03-29 RX ADMIN — Medication 1 LOZENGE: at 20:00

## 2017-03-29 RX ADMIN — Medication 1 LOZENGE: at 08:26

## 2017-03-29 RX ADMIN — Medication 102 MILLIGRAM(S): at 05:50

## 2017-03-29 RX ADMIN — Medication 30 MILLILITER(S): at 23:06

## 2017-03-29 RX ADMIN — Medication 1 LOZENGE: at 17:38

## 2017-03-29 RX ADMIN — Medication 1 TABLET(S): at 05:50

## 2017-03-29 RX ADMIN — Medication 1 MILLIGRAM(S): at 11:43

## 2017-03-29 RX ADMIN — Medication 1 TABLET(S): at 17:04

## 2017-03-29 RX ADMIN — Medication 30 MILLILITER(S): at 17:04

## 2017-03-29 RX ADMIN — Medication 10 MILLILITER(S): at 20:08

## 2017-03-29 RX ADMIN — APREPITANT 80 MILLIGRAM(S): 80 CAPSULE ORAL at 11:42

## 2017-03-29 RX ADMIN — URSODIOL 300 MILLIGRAM(S): 250 TABLET, FILM COATED ORAL at 17:04

## 2017-03-29 RX ADMIN — PANTOPRAZOLE SODIUM 40 MILLIGRAM(S): 20 TABLET, DELAYED RELEASE ORAL at 11:44

## 2017-03-29 RX ADMIN — Medication 1 LOZENGE: at 23:06

## 2017-03-29 RX ADMIN — Medication 30 MILLILITER(S): at 11:41

## 2017-03-29 RX ADMIN — Medication 10 MILLILITER(S): at 17:04

## 2017-03-29 RX ADMIN — ONDANSETRON 8 MILLIGRAM(S): 8 TABLET, FILM COATED ORAL at 13:27

## 2017-03-29 RX ADMIN — Medication 1 LOZENGE: at 11:41

## 2017-03-29 RX ADMIN — Medication 30 MILLILITER(S): at 20:00

## 2017-03-29 RX ADMIN — Medication 30 MILLILITER(S): at 08:26

## 2017-03-29 RX ADMIN — SODIUM CHLORIDE 20 MILLILITER(S): 9 INJECTION INTRAMUSCULAR; INTRAVENOUS; SUBCUTANEOUS at 05:50

## 2017-03-29 NOTE — DIETITIAN INITIAL EVALUATION ADULT. - PROBLEM SELECTOR PLAN 1
Admit to [facility]/BMTU  Placement of TLCL catheter for IV access  IV hydration with Chemotherapy. Preparative regimen with  16/CTX/BCNU X 7 days   Rest on Day -2 (4/3) and -1 (4/4)  Autologous peripheral stem cell transplant on day 0 (4/5)  RTX on Day +1 ( 4/6) and Day +8 ( 4/13)  Begin Zarxio 5 mcg/kg sq daily on day 0 until engraftment with WBC recovery  Strict I&O's, daily weight, diurese with Lasix as needed. Antiemetics as per protocol transfuse PRN. Replete electrolytes PRN. Monitor Vital signs Q4hrs and report any changes.    Antifungal, antiviral, antibacterial prophylaxis as per BMTU protocol.    Begin Actigall, glutamine supplement, low dose heparin drip for SOS/VOD prophylaxis. GI prophylaxis with PPI Kepivance to prevent mucositis on days 0, +1, +2.  Mouth care,   skin care as per BMTU protocol.

## 2017-03-29 NOTE — DIETITIAN INITIAL EVALUATION ADULT. - OTHER INFO
Pt reports he was unaware of menu protocol- received house breakfast tray this morning. Discussed with pt how to fill out menu and helped pt [place] food selections. Menu selections entered into CBORD. Pt denies pain. Pt reports good appetite since admission with consumption of 3 meals and 3 vanilla Ensure Enlive daily. Pt reports one episode of hiccups and nausea on 3/28, no vomiting. Pt reports no chewing/swallowing issues. Denies diarrhea and constipation. Last BM 3/27. NKFA per pt.

## 2017-03-29 NOTE — ADVANCED PRACTICE NURSE CONSULT - ASSESSMENT
Chemo verified by 2 RN's prior to starting.  Pt with TLCL site with some blood noted.  No redness or swelling.  Positive blood return noted from all 3 lines.  Prehydration started at 12:00 (D5 1/2 + 10 meq/KCL at 150 ml/o6=459 cc/hr.  Urine for S.G. sent =1.007.  Mesna 12 mg/kg (ACTUAL WT USED)=1175 mg given 15 min prior to Cytoxan.  Cyclophosphamide (1800 mg/m2)=3618 mg IV over 2 hours.

## 2017-03-29 NOTE — DIETITIAN INITIAL EVALUATION ADULT. - ETIOLOGY
pt reported increased PO intake since diagnosis to prepare for treatment side effects of chemotherapy and increased nutrient needs during chemotherapy

## 2017-03-29 NOTE — DIETITIAN INITIAL EVALUATION ADULT. - NS AS NUTRI INTERV ED CONTENT
Purpose of the nutrition education/Nutrition relationship to health/disease/Discussed with pt the importance of nutrient dense meals for optimal energy intake during treatment, consumption of Ensure Enlive and Glutasolve, food safety precautions, mouthcare ; pt verbalized understanding of nutrition education; expect good compliance./Survival information

## 2017-03-29 NOTE — ADVANCED PRACTICE NURSE CONSULT - REASON FOR CONSULT
Labs as of 3/29/17 as follows:  WBC=6.3, HGB=8.9, HCT=26.7, RMOE=864, BUN=16, TBili=0.3.  Pt to receive Cytoxan today if urine S.G.<1.010.

## 2017-03-29 NOTE — DIETITIAN INITIAL EVALUATION ADULT. - ENERGY NEEDS
ht = 71 inches, wt = 99.5kg (3/29/17), IBW = 78.2kg, 127% IBW  Utilized IBW due to pt >120% IBW. Estimated needs secondary to undergoing chemotherapy.    Pt admitted for autologous PBSCT BMT (day -7). ht = 71 inches, wt = 99.5kg (3/29/17), IBW = 78.2kg, 127% IBW  Utilized IBW due to pt >120% IBW. Estimated needs secondary to undergoing chemotherapy. Fluids per MD secondary to pt is on chemotherapy. Wt gain since admission possibly related to fluid shifts, edema.    Pt admitted for autologous PBSCT BMT (day -7).

## 2017-03-29 NOTE — DIETITIAN INITIAL EVALUATION ADULT. - ORAL INTAKE PTA
good/Pt reports good appetite with consumption of 3 meals/day PTA. Diet Recall: B: cereal with 1% milk, empanadas with chicken and potatoes, L: fish, BBQ ribs, chicken with rice; D: fish, asparagus, peppers; consumes a mixed berry homemade smoothie throughout the day at home with meals + water. Pt stopped all supplement use in September 2016 since diagnosis secondary to MD recommendation. Pt was consuming protein powder with milk and omega 3 supplement.

## 2017-03-29 NOTE — DIETITIAN INITIAL EVALUATION ADULT. - PERTINENT LABORATORY DATA
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

## 2017-03-29 NOTE — ADVANCED PRACTICE NURSE CONSULT - RECOMMEDATIONS
Next area nurse made aware to hang subsequent mesna singles, give lasix 2 hrs after completion of Cyclophosphamide and draw labs.  cmr

## 2017-03-29 NOTE — DIETITIAN INITIAL EVALUATION ADULT. - PERTINENT MEDS FT
Lasix, Caphosol, Sodium Bicarbonate Mouthwash, Emend, Cytoxan, Decadron Lasix, Caphosol, Sodium Bicarbonate Mouthwash, Emend, Cytoxan, Decadron, MVI, Folic Acid, Zofran, Protonix

## 2017-03-29 NOTE — DIETITIAN INITIAL EVALUATION ADULT. - DIET TYPE
Ensure Enlive 3x/day (provides 1,050 kcal, 60 g pro) + Glutasolve/3/27/17/supplement (specify)/regular

## 2017-03-30 LAB
ALBUMIN SERPL ELPH-MCNC: 4.1 G/DL — SIGNIFICANT CHANGE UP (ref 3.3–5)
ALBUMIN SERPL ELPH-MCNC: 4.3 G/DL — SIGNIFICANT CHANGE UP (ref 3.3–5)
ALP SERPL-CCNC: 98 U/L — SIGNIFICANT CHANGE UP (ref 40–120)
ALP SERPL-CCNC: 98 U/L — SIGNIFICANT CHANGE UP (ref 40–120)
ALT FLD-CCNC: 40 U/L RC — SIGNIFICANT CHANGE UP (ref 10–45)
ALT FLD-CCNC: 43 U/L RC — SIGNIFICANT CHANGE UP (ref 10–45)
ANION GAP SERPL CALC-SCNC: 11 MMOL/L — SIGNIFICANT CHANGE UP (ref 5–17)
ANION GAP SERPL CALC-SCNC: 12 MMOL/L — SIGNIFICANT CHANGE UP (ref 5–17)
AST SERPL-CCNC: 21 U/L — SIGNIFICANT CHANGE UP (ref 10–40)
AST SERPL-CCNC: 22 U/L — SIGNIFICANT CHANGE UP (ref 10–40)
BASOPHILS # BLD AUTO: 0.1 K/UL — SIGNIFICANT CHANGE UP (ref 0–0.2)
BASOPHILS NFR BLD AUTO: 1.9 % — SIGNIFICANT CHANGE UP (ref 0–2)
BILIRUB SERPL-MCNC: 0.4 MG/DL — SIGNIFICANT CHANGE UP (ref 0.2–1.2)
BILIRUB SERPL-MCNC: 0.5 MG/DL — SIGNIFICANT CHANGE UP (ref 0.2–1.2)
BUN SERPL-MCNC: 18 MG/DL — SIGNIFICANT CHANGE UP (ref 7–23)
BUN SERPL-MCNC: 19 MG/DL — SIGNIFICANT CHANGE UP (ref 7–23)
CALCIUM SERPL-MCNC: 8.2 MG/DL — LOW (ref 8.4–10.5)
CALCIUM SERPL-MCNC: 8.5 MG/DL — SIGNIFICANT CHANGE UP (ref 8.4–10.5)
CHLORIDE SERPL-SCNC: 100 MMOL/L — SIGNIFICANT CHANGE UP (ref 96–108)
CHLORIDE SERPL-SCNC: 98 MMOL/L — SIGNIFICANT CHANGE UP (ref 96–108)
CO2 SERPL-SCNC: 25 MMOL/L — SIGNIFICANT CHANGE UP (ref 22–31)
CO2 SERPL-SCNC: 26 MMOL/L — SIGNIFICANT CHANGE UP (ref 22–31)
CREAT SERPL-MCNC: 0.71 MG/DL — SIGNIFICANT CHANGE UP (ref 0.5–1.3)
CREAT SERPL-MCNC: 0.87 MG/DL — SIGNIFICANT CHANGE UP (ref 0.5–1.3)
EOSINOPHIL # BLD AUTO: 0 K/UL — SIGNIFICANT CHANGE UP (ref 0–0.5)
EOSINOPHIL NFR BLD AUTO: 0.3 % — SIGNIFICANT CHANGE UP (ref 0–6)
FIBRINOGEN PPP-MCNC: 308 MG/DL — LOW (ref 310–510)
GLUCOSE SERPL-MCNC: 255 MG/DL — HIGH (ref 70–99)
GLUCOSE SERPL-MCNC: 268 MG/DL — HIGH (ref 70–99)
HCT VFR BLD CALC: 26.7 % — LOW (ref 39–50)
HGB BLD-MCNC: 8.9 G/DL — LOW (ref 13–17)
IGD SER-MCNC: 0.38 MG/DL — SIGNIFICANT CHANGE UP
INR BLD: 1.18 RATIO — HIGH (ref 0.88–1.16)
LDH SERPL L TO P-CCNC: 183 U/L — SIGNIFICANT CHANGE UP (ref 50–242)
LYMPHOCYTES # BLD AUTO: 0.2 K/UL — LOW (ref 1–3.3)
LYMPHOCYTES # BLD AUTO: 3.8 % — LOW (ref 13–44)
MAGNESIUM SERPL-MCNC: 2.2 MG/DL — SIGNIFICANT CHANGE UP (ref 1.6–2.6)
MAGNESIUM SERPL-MCNC: 2.3 MG/DL — SIGNIFICANT CHANGE UP (ref 1.6–2.6)
MCHC RBC-ENTMCNC: 32.4 PG — SIGNIFICANT CHANGE UP (ref 27–34)
MCHC RBC-ENTMCNC: 33.3 GM/DL — SIGNIFICANT CHANGE UP (ref 32–36)
MCV RBC AUTO: 97.5 FL — SIGNIFICANT CHANGE UP (ref 80–100)
MONOCYTES # BLD AUTO: 0.5 K/UL — SIGNIFICANT CHANGE UP (ref 0–0.9)
MONOCYTES NFR BLD AUTO: 11.2 % — SIGNIFICANT CHANGE UP (ref 2–14)
NEUTROPHILS # BLD AUTO: 3.8 K/UL — SIGNIFICANT CHANGE UP (ref 1.8–7.4)
NEUTROPHILS NFR BLD AUTO: 82.8 % — HIGH (ref 43–77)
PHOSPHATE SERPL-MCNC: 2.9 MG/DL — SIGNIFICANT CHANGE UP (ref 2.5–4.5)
PLATELET # BLD AUTO: 262 K/UL — SIGNIFICANT CHANGE UP (ref 150–400)
POTASSIUM SERPL-MCNC: 4.2 MMOL/L — SIGNIFICANT CHANGE UP (ref 3.5–5.3)
POTASSIUM SERPL-MCNC: 4.3 MMOL/L — SIGNIFICANT CHANGE UP (ref 3.5–5.3)
POTASSIUM SERPL-SCNC: 4.2 MMOL/L — SIGNIFICANT CHANGE UP (ref 3.5–5.3)
POTASSIUM SERPL-SCNC: 4.3 MMOL/L — SIGNIFICANT CHANGE UP (ref 3.5–5.3)
PROT SERPL-MCNC: 6.5 G/DL — SIGNIFICANT CHANGE UP (ref 6–8.3)
PROT SERPL-MCNC: 6.5 G/DL — SIGNIFICANT CHANGE UP (ref 6–8.3)
PROTHROM AB SERPL-ACNC: 12.8 SEC — HIGH (ref 9.8–12.7)
RBC # BLD: 2.74 M/UL — LOW (ref 4.2–5.8)
RBC # FLD: 16.6 % — HIGH (ref 10.3–14.5)
SODIUM SERPL-SCNC: 135 MMOL/L — SIGNIFICANT CHANGE UP (ref 135–145)
SODIUM SERPL-SCNC: 137 MMOL/L — SIGNIFICANT CHANGE UP (ref 135–145)
SP GR UR STRIP: 1.01 — LOW (ref 1.01–1.02)
SP GR UR STRIP: 1.01 — SIGNIFICANT CHANGE UP (ref 1.01–1.02)
SP GR UR STRIP: 1.02 — SIGNIFICANT CHANGE UP (ref 1.01–1.02)
WBC # BLD: 4.5 K/UL — SIGNIFICANT CHANGE UP (ref 3.8–10.5)
WBC # FLD AUTO: 4.5 K/UL — SIGNIFICANT CHANGE UP (ref 3.8–10.5)

## 2017-03-30 PROCEDURE — 93010 ELECTROCARDIOGRAM REPORT: CPT

## 2017-03-30 PROCEDURE — 99291 CRITICAL CARE FIRST HOUR: CPT

## 2017-03-30 RX ORDER — FUROSEMIDE 40 MG
40 TABLET ORAL ONCE
Qty: 0 | Refills: 0 | Status: COMPLETED | OUTPATIENT
Start: 2017-03-30 | End: 2017-03-30

## 2017-03-30 RX ORDER — FUROSEMIDE 40 MG
20 TABLET ORAL ONCE
Qty: 0 | Refills: 0 | Status: COMPLETED | OUTPATIENT
Start: 2017-03-30 | End: 2017-03-30

## 2017-03-30 RX ORDER — MESNA 100 MG/ML
1175 INJECTION, SOLUTION INTRAVENOUS
Qty: 0 | Refills: 0 | Status: COMPLETED | OUTPATIENT
Start: 2017-03-30 | End: 2017-03-31

## 2017-03-30 RX ADMIN — Medication 40 MILLIGRAM(S): at 13:03

## 2017-03-30 RX ADMIN — Medication 20 MILLIGRAM(S): at 23:33

## 2017-03-30 RX ADMIN — FLUCONAZOLE 400 MILLIGRAM(S): 150 TABLET ORAL at 11:17

## 2017-03-30 RX ADMIN — Medication 1 TABLET(S): at 05:07

## 2017-03-30 RX ADMIN — Medication 10 MILLILITER(S): at 08:10

## 2017-03-30 RX ADMIN — Medication 1 LOZENGE: at 08:09

## 2017-03-30 RX ADMIN — Medication 1 LOZENGE: at 17:45

## 2017-03-30 RX ADMIN — MESNA 247 MILLIGRAM(S): 100 INJECTION, SOLUTION INTRAVENOUS at 02:00

## 2017-03-30 RX ADMIN — HEPARIN SODIUM 4.07 UNIT(S)/HR: 5000 INJECTION INTRAVENOUS; SUBCUTANEOUS at 18:41

## 2017-03-30 RX ADMIN — Medication 10 MILLILITER(S): at 20:35

## 2017-03-30 RX ADMIN — Medication 1 LOZENGE: at 20:35

## 2017-03-30 RX ADMIN — MESNA 247 MILLIGRAM(S): 100 INJECTION, SOLUTION INTRAVENOUS at 18:57

## 2017-03-30 RX ADMIN — Medication 10 MILLILITER(S): at 00:01

## 2017-03-30 RX ADMIN — Medication 1 TABLET(S): at 11:17

## 2017-03-30 RX ADMIN — Medication 1 TABLET(S): at 17:44

## 2017-03-30 RX ADMIN — Medication 1 LOZENGE: at 11:18

## 2017-03-30 RX ADMIN — URSODIOL 300 MILLIGRAM(S): 250 TABLET, FILM COATED ORAL at 08:10

## 2017-03-30 RX ADMIN — ONDANSETRON 8 MILLIGRAM(S): 8 TABLET, FILM COATED ORAL at 05:07

## 2017-03-30 RX ADMIN — Medication 30 MILLILITER(S): at 08:09

## 2017-03-30 RX ADMIN — APREPITANT 80 MILLIGRAM(S): 80 CAPSULE ORAL at 11:17

## 2017-03-30 RX ADMIN — Medication 30 MILLILITER(S): at 23:33

## 2017-03-30 RX ADMIN — Medication 1 MILLIGRAM(S): at 11:18

## 2017-03-30 RX ADMIN — Medication 10 MILLILITER(S): at 11:19

## 2017-03-30 RX ADMIN — SODIUM CHLORIDE 20 MILLILITER(S): 9 INJECTION INTRAMUSCULAR; INTRAVENOUS; SUBCUTANEOUS at 18:41

## 2017-03-30 RX ADMIN — ONDANSETRON 8 MILLIGRAM(S): 8 TABLET, FILM COATED ORAL at 21:32

## 2017-03-30 RX ADMIN — DEXTROSE MONOHYDRATE, SODIUM CHLORIDE, AND POTASSIUM CHLORIDE 302 MILLILITER(S): 50; .745; 4.5 INJECTION, SOLUTION INTRAVENOUS at 17:46

## 2017-03-30 RX ADMIN — MESNA 247 MILLIGRAM(S): 100 INJECTION, SOLUTION INTRAVENOUS at 05:06

## 2017-03-30 RX ADMIN — Medication 30 MILLILITER(S): at 20:35

## 2017-03-30 RX ADMIN — Medication 1 LOZENGE: at 23:33

## 2017-03-30 RX ADMIN — DEXTROSE MONOHYDRATE, SODIUM CHLORIDE, AND POTASSIUM CHLORIDE 302 MILLILITER(S): 50; .745; 4.5 INJECTION, SOLUTION INTRAVENOUS at 11:18

## 2017-03-30 RX ADMIN — Medication 20 MILLIGRAM(S): at 17:25

## 2017-03-30 RX ADMIN — ONDANSETRON 8 MILLIGRAM(S): 8 TABLET, FILM COATED ORAL at 13:03

## 2017-03-30 RX ADMIN — Medication 30 MILLILITER(S): at 11:18

## 2017-03-30 RX ADMIN — PANTOPRAZOLE SODIUM 40 MILLIGRAM(S): 20 TABLET, DELAYED RELEASE ORAL at 11:17

## 2017-03-30 RX ADMIN — Medication 10 MILLILITER(S): at 23:34

## 2017-03-30 RX ADMIN — URSODIOL 300 MILLIGRAM(S): 250 TABLET, FILM COATED ORAL at 17:44

## 2017-03-30 RX ADMIN — Medication 30 MILLILITER(S): at 17:45

## 2017-03-30 RX ADMIN — MESNA 247 MILLIGRAM(S): 100 INJECTION, SOLUTION INTRAVENOUS at 22:01

## 2017-03-30 RX ADMIN — TAMSULOSIN HYDROCHLORIDE 0.4 MILLIGRAM(S): 0.4 CAPSULE ORAL at 21:31

## 2017-03-30 RX ADMIN — Medication 10 MILLILITER(S): at 17:51

## 2017-03-30 RX ADMIN — Medication 102 MILLIGRAM(S): at 05:06

## 2017-03-30 RX ADMIN — Medication 10 MILLIGRAM(S): at 20:34

## 2017-03-31 LAB
ALBUMIN SERPL ELPH-MCNC: 4.2 G/DL — SIGNIFICANT CHANGE UP (ref 3.3–5)
ALP SERPL-CCNC: 92 U/L — SIGNIFICANT CHANGE UP (ref 40–120)
ALT FLD-CCNC: 31 U/L RC — SIGNIFICANT CHANGE UP (ref 10–45)
ANION GAP SERPL CALC-SCNC: 12 MMOL/L — SIGNIFICANT CHANGE UP (ref 5–17)
ANISOCYTOSIS BLD QL: SLIGHT — SIGNIFICANT CHANGE UP
AST SERPL-CCNC: 16 U/L — SIGNIFICANT CHANGE UP (ref 10–40)
BASOPHILS # BLD AUTO: 0 K/UL — SIGNIFICANT CHANGE UP (ref 0–0.2)
BASOPHILS NFR BLD AUTO: 0.2 % — SIGNIFICANT CHANGE UP (ref 0–2)
BILIRUB SERPL-MCNC: 0.6 MG/DL — SIGNIFICANT CHANGE UP (ref 0.2–1.2)
BLD GP AB SCN SERPL QL: NEGATIVE — SIGNIFICANT CHANGE UP
BUN SERPL-MCNC: 21 MG/DL — SIGNIFICANT CHANGE UP (ref 7–23)
CALCIUM SERPL-MCNC: 8.6 MG/DL — SIGNIFICANT CHANGE UP (ref 8.4–10.5)
CHLORIDE SERPL-SCNC: 96 MMOL/L — SIGNIFICANT CHANGE UP (ref 96–108)
CO2 SERPL-SCNC: 26 MMOL/L — SIGNIFICANT CHANGE UP (ref 22–31)
CREAT SERPL-MCNC: 0.8 MG/DL — SIGNIFICANT CHANGE UP (ref 0.5–1.3)
DACRYOCYTES BLD QL SMEAR: SLIGHT — SIGNIFICANT CHANGE UP
EOSINOPHIL # BLD AUTO: 0 K/UL — SIGNIFICANT CHANGE UP (ref 0–0.5)
EOSINOPHIL NFR BLD AUTO: 0.2 % — SIGNIFICANT CHANGE UP (ref 0–6)
GLUCOSE SERPL-MCNC: 259 MG/DL — HIGH (ref 70–99)
HCT VFR BLD CALC: 27.4 % — LOW (ref 39–50)
HGB BLD-MCNC: 9.5 G/DL — LOW (ref 13–17)
LDH SERPL L TO P-CCNC: 157 U/L — SIGNIFICANT CHANGE UP (ref 50–242)
LYMPHOCYTES # BLD AUTO: 0.1 K/UL — LOW (ref 1–3.3)
LYMPHOCYTES # BLD AUTO: 3.9 % — LOW (ref 13–44)
MAGNESIUM SERPL-MCNC: 2.4 MG/DL — SIGNIFICANT CHANGE UP (ref 1.6–2.6)
MCHC RBC-ENTMCNC: 34 PG — SIGNIFICANT CHANGE UP (ref 27–34)
MCHC RBC-ENTMCNC: 34.8 GM/DL — SIGNIFICANT CHANGE UP (ref 32–36)
MCV RBC AUTO: 97.8 FL — SIGNIFICANT CHANGE UP (ref 80–100)
MONOCYTES # BLD AUTO: 0.1 K/UL — SIGNIFICANT CHANGE UP (ref 0–0.9)
MONOCYTES NFR BLD AUTO: 1.9 % — LOW (ref 2–14)
NEUTROPHILS # BLD AUTO: 3 K/UL — SIGNIFICANT CHANGE UP (ref 1.8–7.4)
NEUTROPHILS NFR BLD AUTO: 93.8 % — HIGH (ref 43–77)
PHOSPHATE SERPL-MCNC: 3 MG/DL — SIGNIFICANT CHANGE UP (ref 2.5–4.5)
PLAT MORPH BLD: NORMAL — SIGNIFICANT CHANGE UP
PLATELET # BLD AUTO: 289 K/UL — SIGNIFICANT CHANGE UP (ref 150–400)
POIKILOCYTOSIS BLD QL AUTO: SLIGHT — SIGNIFICANT CHANGE UP
POTASSIUM SERPL-MCNC: 4.3 MMOL/L — SIGNIFICANT CHANGE UP (ref 3.5–5.3)
POTASSIUM SERPL-SCNC: 4.3 MMOL/L — SIGNIFICANT CHANGE UP (ref 3.5–5.3)
PROT SERPL-MCNC: 6.4 G/DL — SIGNIFICANT CHANGE UP (ref 6–8.3)
RBC # BLD: 2.8 M/UL — LOW (ref 4.2–5.8)
RBC # FLD: 16.1 % — HIGH (ref 10.3–14.5)
RBC BLD AUTO: ABNORMAL
RH IG SCN BLD-IMP: POSITIVE — SIGNIFICANT CHANGE UP
SODIUM SERPL-SCNC: 134 MMOL/L — LOW (ref 135–145)
SP GR UR STRIP: 1.01 — SIGNIFICANT CHANGE UP (ref 1.01–1.02)
SP GR UR STRIP: 1.01 — SIGNIFICANT CHANGE UP (ref 1.01–1.02)
SP GR UR STRIP: 1.02 — SIGNIFICANT CHANGE UP (ref 1.01–1.02)
WBC # BLD: 3.2 K/UL — LOW (ref 3.8–10.5)
WBC # FLD AUTO: 3.2 K/UL — LOW (ref 3.8–10.5)

## 2017-03-31 PROCEDURE — 93010 ELECTROCARDIOGRAM REPORT: CPT

## 2017-03-31 PROCEDURE — 99291 CRITICAL CARE FIRST HOUR: CPT

## 2017-03-31 RX ORDER — FUROSEMIDE 40 MG
20 TABLET ORAL ONCE
Qty: 0 | Refills: 0 | Status: COMPLETED | OUTPATIENT
Start: 2017-03-31 | End: 2017-03-31

## 2017-03-31 RX ORDER — MESNA 100 MG/ML
1175 INJECTION, SOLUTION INTRAVENOUS
Qty: 0 | Refills: 0 | Status: COMPLETED | OUTPATIENT
Start: 2017-03-31 | End: 2017-04-01

## 2017-03-31 RX ADMIN — Medication 1 TABLET(S): at 05:13

## 2017-03-31 RX ADMIN — MESNA 247 MILLIGRAM(S): 100 INJECTION, SOLUTION INTRAVENOUS at 23:31

## 2017-03-31 RX ADMIN — Medication 10 MILLILITER(S): at 13:34

## 2017-03-31 RX ADMIN — Medication 1 MILLIGRAM(S): at 11:20

## 2017-03-31 RX ADMIN — PANTOPRAZOLE SODIUM 40 MILLIGRAM(S): 20 TABLET, DELAYED RELEASE ORAL at 11:21

## 2017-03-31 RX ADMIN — Medication 30 MILLILITER(S): at 23:32

## 2017-03-31 RX ADMIN — ONDANSETRON 8 MILLIGRAM(S): 8 TABLET, FILM COATED ORAL at 21:01

## 2017-03-31 RX ADMIN — Medication 1 LOZENGE: at 11:20

## 2017-03-31 RX ADMIN — Medication 1 LOZENGE: at 20:39

## 2017-03-31 RX ADMIN — DEXTROSE MONOHYDRATE, SODIUM CHLORIDE, AND POTASSIUM CHLORIDE 302 MILLILITER(S): 50; .745; 4.5 INJECTION, SOLUTION INTRAVENOUS at 05:51

## 2017-03-31 RX ADMIN — Medication 30 MILLILITER(S): at 11:20

## 2017-03-31 RX ADMIN — Medication 10 MILLIGRAM(S): at 18:03

## 2017-03-31 RX ADMIN — Medication 1 LOZENGE: at 07:41

## 2017-03-31 RX ADMIN — Medication 650 MILLIGRAM(S): at 06:50

## 2017-03-31 RX ADMIN — MESNA 247 MILLIGRAM(S): 100 INJECTION, SOLUTION INTRAVENOUS at 06:59

## 2017-03-31 RX ADMIN — TAMSULOSIN HYDROCHLORIDE 0.4 MILLIGRAM(S): 0.4 CAPSULE ORAL at 21:01

## 2017-03-31 RX ADMIN — ONDANSETRON 8 MILLIGRAM(S): 8 TABLET, FILM COATED ORAL at 13:44

## 2017-03-31 RX ADMIN — Medication 30 MILLILITER(S): at 20:39

## 2017-03-31 RX ADMIN — URSODIOL 300 MILLIGRAM(S): 250 TABLET, FILM COATED ORAL at 18:02

## 2017-03-31 RX ADMIN — Medication 10 MILLILITER(S): at 20:50

## 2017-03-31 RX ADMIN — Medication 10 MILLILITER(S): at 23:33

## 2017-03-31 RX ADMIN — Medication 1 LOZENGE: at 15:31

## 2017-03-31 RX ADMIN — Medication 30 MILLILITER(S): at 15:31

## 2017-03-31 RX ADMIN — DEXTROSE MONOHYDRATE, SODIUM CHLORIDE, AND POTASSIUM CHLORIDE 302 MILLILITER(S): 50; .745; 4.5 INJECTION, SOLUTION INTRAVENOUS at 23:33

## 2017-03-31 RX ADMIN — Medication 1 TABLET(S): at 11:20

## 2017-03-31 RX ADMIN — APREPITANT 80 MILLIGRAM(S): 80 CAPSULE ORAL at 11:20

## 2017-03-31 RX ADMIN — URSODIOL 300 MILLIGRAM(S): 250 TABLET, FILM COATED ORAL at 07:41

## 2017-03-31 RX ADMIN — Medication 20 MILLIGRAM(S): at 19:25

## 2017-03-31 RX ADMIN — DEXTROSE MONOHYDRATE, SODIUM CHLORIDE, AND POTASSIUM CHLORIDE 302 MILLILITER(S): 50; .745; 4.5 INJECTION, SOLUTION INTRAVENOUS at 16:48

## 2017-03-31 RX ADMIN — Medication 1 TABLET(S): at 18:02

## 2017-03-31 RX ADMIN — Medication 10 MILLILITER(S): at 08:00

## 2017-03-31 RX ADMIN — ONDANSETRON 8 MILLIGRAM(S): 8 TABLET, FILM COATED ORAL at 05:13

## 2017-03-31 RX ADMIN — FLUCONAZOLE 400 MILLIGRAM(S): 150 TABLET ORAL at 11:20

## 2017-03-31 RX ADMIN — Medication 102 MILLIGRAM(S): at 05:12

## 2017-03-31 RX ADMIN — Medication 1 LOZENGE: at 23:32

## 2017-03-31 RX ADMIN — MESNA 247 MILLIGRAM(S): 100 INJECTION, SOLUTION INTRAVENOUS at 20:37

## 2017-03-31 RX ADMIN — Medication 20 MILLIGRAM(S): at 16:47

## 2017-03-31 RX ADMIN — Medication 650 MILLIGRAM(S): at 05:51

## 2017-03-31 RX ADMIN — MESNA 247 MILLIGRAM(S): 100 INJECTION, SOLUTION INTRAVENOUS at 04:03

## 2017-03-31 RX ADMIN — Medication 30 MILLILITER(S): at 07:41

## 2017-03-31 RX ADMIN — Medication 10 MILLILITER(S): at 16:49

## 2017-03-31 RX ADMIN — MESNA 247 MILLIGRAM(S): 100 INJECTION, SOLUTION INTRAVENOUS at 01:01

## 2017-03-31 NOTE — ADVANCED PRACTICE NURSE CONSULT - ASSESSMENT
Patient is alert, oriented x4 ,found lying  in the bed. Educated patient about the chemotherapy. Patient verbalised understanding.Left IJ tlc in place.TLC site has no redness and swelling ,flushes without difficulty and has good blood return.Cytoxan 1800 mg/m2 = 3618 mg in 500 ml D5W started at 1915 on 3/30/17 via Left TLC and infused over 2 hours.Patient tolerated well.

## 2017-04-01 LAB
ALBUMIN SERPL ELPH-MCNC: 3.9 G/DL — SIGNIFICANT CHANGE UP (ref 3.3–5)
ALP SERPL-CCNC: 88 U/L — SIGNIFICANT CHANGE UP (ref 40–120)
ALT FLD-CCNC: 29 U/L RC — SIGNIFICANT CHANGE UP (ref 10–45)
ANION GAP SERPL CALC-SCNC: 13 MMOL/L — SIGNIFICANT CHANGE UP (ref 5–17)
AST SERPL-CCNC: 16 U/L — SIGNIFICANT CHANGE UP (ref 10–40)
BASOPHILS # BLD AUTO: 0 K/UL — SIGNIFICANT CHANGE UP (ref 0–0.2)
BASOPHILS NFR BLD AUTO: 0.6 % — SIGNIFICANT CHANGE UP (ref 0–2)
BILIRUB SERPL-MCNC: 0.5 MG/DL — SIGNIFICANT CHANGE UP (ref 0.2–1.2)
BUN SERPL-MCNC: 21 MG/DL — SIGNIFICANT CHANGE UP (ref 7–23)
CALCIUM SERPL-MCNC: 8.3 MG/DL — LOW (ref 8.4–10.5)
CHLORIDE SERPL-SCNC: 93 MMOL/L — LOW (ref 96–108)
CO2 SERPL-SCNC: 26 MMOL/L — SIGNIFICANT CHANGE UP (ref 22–31)
CREAT SERPL-MCNC: 0.83 MG/DL — SIGNIFICANT CHANGE UP (ref 0.5–1.3)
EOSINOPHIL # BLD AUTO: 0 K/UL — SIGNIFICANT CHANGE UP (ref 0–0.5)
EOSINOPHIL NFR BLD AUTO: 0.3 % — SIGNIFICANT CHANGE UP (ref 0–6)
GLUCOSE SERPL-MCNC: 249 MG/DL — HIGH (ref 70–99)
HCT VFR BLD CALC: 23.8 % — LOW (ref 39–50)
HGB BLD-MCNC: 8.5 G/DL — LOW (ref 13–17)
LDH SERPL L TO P-CCNC: 148 U/L — SIGNIFICANT CHANGE UP (ref 50–242)
LYMPHOCYTES # BLD AUTO: 0.2 K/UL — LOW (ref 1–3.3)
LYMPHOCYTES # BLD AUTO: 9.5 % — LOW (ref 13–44)
MAGNESIUM SERPL-MCNC: 2.3 MG/DL — SIGNIFICANT CHANGE UP (ref 1.6–2.6)
MCHC RBC-ENTMCNC: 34.8 PG — HIGH (ref 27–34)
MCHC RBC-ENTMCNC: 35.8 GM/DL — SIGNIFICANT CHANGE UP (ref 32–36)
MCV RBC AUTO: 97.2 FL — SIGNIFICANT CHANGE UP (ref 80–100)
MONOCYTES # BLD AUTO: 0.1 K/UL — SIGNIFICANT CHANGE UP (ref 0–0.9)
MONOCYTES NFR BLD AUTO: 2.9 % — SIGNIFICANT CHANGE UP (ref 2–14)
NEUTROPHILS # BLD AUTO: 2.2 K/UL — SIGNIFICANT CHANGE UP (ref 1.8–7.4)
NEUTROPHILS NFR BLD AUTO: 86.7 % — HIGH (ref 43–77)
PHOSPHATE SERPL-MCNC: 3.1 MG/DL — SIGNIFICANT CHANGE UP (ref 2.5–4.5)
PLATELET # BLD AUTO: 264 K/UL — SIGNIFICANT CHANGE UP (ref 150–400)
POTASSIUM SERPL-MCNC: 4.3 MMOL/L — SIGNIFICANT CHANGE UP (ref 3.5–5.3)
POTASSIUM SERPL-SCNC: 4.3 MMOL/L — SIGNIFICANT CHANGE UP (ref 3.5–5.3)
PROT SERPL-MCNC: 6.1 G/DL — SIGNIFICANT CHANGE UP (ref 6–8.3)
RBC # BLD: 2.45 M/UL — LOW (ref 4.2–5.8)
RBC # FLD: 16 % — HIGH (ref 10.3–14.5)
SODIUM SERPL-SCNC: 132 MMOL/L — LOW (ref 135–145)
SP GR UR STRIP: 1.01 — LOW (ref 1.01–1.02)
WBC # BLD: 2.5 K/UL — LOW (ref 3.8–10.5)
WBC # FLD AUTO: 2.5 K/UL — LOW (ref 3.8–10.5)

## 2017-04-01 PROCEDURE — 99291 CRITICAL CARE FIRST HOUR: CPT

## 2017-04-01 PROCEDURE — 93010 ELECTROCARDIOGRAM REPORT: CPT

## 2017-04-01 RX ORDER — FUROSEMIDE 40 MG
40 TABLET ORAL ONCE
Qty: 0 | Refills: 0 | Status: COMPLETED | OUTPATIENT
Start: 2017-04-01 | End: 2017-04-01

## 2017-04-01 RX ORDER — MESNA 100 MG/ML
1175 INJECTION, SOLUTION INTRAVENOUS
Qty: 0 | Refills: 0 | Status: COMPLETED | OUTPATIENT
Start: 2017-04-01 | End: 2017-04-02

## 2017-04-01 RX ADMIN — Medication 1 MILLIGRAM(S): at 11:09

## 2017-04-01 RX ADMIN — Medication 1 TABLET(S): at 17:38

## 2017-04-01 RX ADMIN — Medication 102 MILLIGRAM(S): at 05:01

## 2017-04-01 RX ADMIN — Medication 30 MILLILITER(S): at 19:20

## 2017-04-01 RX ADMIN — Medication 10 MILLILITER(S): at 11:35

## 2017-04-01 RX ADMIN — Medication 1 LOZENGE: at 16:53

## 2017-04-01 RX ADMIN — Medication 30 MILLILITER(S): at 07:57

## 2017-04-01 RX ADMIN — Medication 1 LOZENGE: at 23:24

## 2017-04-01 RX ADMIN — Medication 20 MILLIGRAM(S): at 23:22

## 2017-04-01 RX ADMIN — Medication 10 MILLILITER(S): at 07:57

## 2017-04-01 RX ADMIN — Medication 30 MILLILITER(S): at 16:53

## 2017-04-01 RX ADMIN — Medication 1 TABLET(S): at 05:06

## 2017-04-01 RX ADMIN — Medication 30 MILLILITER(S): at 23:24

## 2017-04-01 RX ADMIN — Medication 20 MILLIGRAM(S): at 01:00

## 2017-04-01 RX ADMIN — Medication 1 LOZENGE: at 07:57

## 2017-04-01 RX ADMIN — MESNA 247 MILLIGRAM(S): 100 INJECTION, SOLUTION INTRAVENOUS at 02:55

## 2017-04-01 RX ADMIN — ONDANSETRON 8 MILLIGRAM(S): 8 TABLET, FILM COATED ORAL at 21:30

## 2017-04-01 RX ADMIN — Medication 1 TABLET(S): at 11:09

## 2017-04-01 RX ADMIN — Medication 10 MILLILITER(S): at 16:56

## 2017-04-01 RX ADMIN — MESNA 247 MILLIGRAM(S): 100 INJECTION, SOLUTION INTRAVENOUS at 22:16

## 2017-04-01 RX ADMIN — ONDANSETRON 8 MILLIGRAM(S): 8 TABLET, FILM COATED ORAL at 05:00

## 2017-04-01 RX ADMIN — Medication 1 LOZENGE: at 19:20

## 2017-04-01 RX ADMIN — MESNA 247 MILLIGRAM(S): 100 INJECTION, SOLUTION INTRAVENOUS at 07:55

## 2017-04-01 RX ADMIN — Medication 1 LOZENGE: at 11:08

## 2017-04-01 RX ADMIN — APREPITANT 80 MILLIGRAM(S): 80 CAPSULE ORAL at 11:09

## 2017-04-01 RX ADMIN — URSODIOL 300 MILLIGRAM(S): 250 TABLET, FILM COATED ORAL at 16:53

## 2017-04-01 RX ADMIN — MESNA 247 MILLIGRAM(S): 100 INJECTION, SOLUTION INTRAVENOUS at 05:33

## 2017-04-01 RX ADMIN — TAMSULOSIN HYDROCHLORIDE 0.4 MILLIGRAM(S): 0.4 CAPSULE ORAL at 21:30

## 2017-04-01 RX ADMIN — ONDANSETRON 8 MILLIGRAM(S): 8 TABLET, FILM COATED ORAL at 13:36

## 2017-04-01 RX ADMIN — URSODIOL 300 MILLIGRAM(S): 250 TABLET, FILM COATED ORAL at 07:55

## 2017-04-01 RX ADMIN — Medication 30 MILLILITER(S): at 11:08

## 2017-04-01 RX ADMIN — Medication 40 MILLIGRAM(S): at 11:07

## 2017-04-01 RX ADMIN — MESNA 247 MILLIGRAM(S): 100 INJECTION, SOLUTION INTRAVENOUS at 19:15

## 2017-04-01 RX ADMIN — FLUCONAZOLE 400 MILLIGRAM(S): 150 TABLET ORAL at 11:08

## 2017-04-01 RX ADMIN — Medication 10 MILLIGRAM(S): at 20:01

## 2017-04-01 RX ADMIN — Medication 10 MILLILITER(S): at 19:20

## 2017-04-01 RX ADMIN — PANTOPRAZOLE SODIUM 40 MILLIGRAM(S): 20 TABLET, DELAYED RELEASE ORAL at 11:10

## 2017-04-01 NOTE — ADVANCED PRACTICE NURSE CONSULT - ASSESSMENT
Pt received Cytoxan 1800MG/M2 = 3618MG IV in 500ML D5W over 2 hours via LTLC on alaris pump.  Positive blood return noted. See chart for labs. Pt received Cytoxan 1800MG/M2 = 3618MG IV in 500ML D5W over 2 hours via LTLC on alaris pump.  Positive blood return noted. See chart for labs.  As per Dr. Darren NAIR to give Cytoxan with urine specific gravity @ 1.021

## 2017-04-02 LAB
ALBUMIN SERPL ELPH-MCNC: 3.9 G/DL — SIGNIFICANT CHANGE UP (ref 3.3–5)
ALBUMIN SERPL ELPH-MCNC: 4 G/DL — SIGNIFICANT CHANGE UP (ref 3.3–5)
ALP SERPL-CCNC: 88 U/L — SIGNIFICANT CHANGE UP (ref 40–120)
ALP SERPL-CCNC: 89 U/L — SIGNIFICANT CHANGE UP (ref 40–120)
ALT FLD-CCNC: 33 U/L RC — SIGNIFICANT CHANGE UP (ref 10–45)
ALT FLD-CCNC: 38 U/L RC — SIGNIFICANT CHANGE UP (ref 10–45)
ANION GAP SERPL CALC-SCNC: 11 MMOL/L — SIGNIFICANT CHANGE UP (ref 5–17)
ANION GAP SERPL CALC-SCNC: 13 MMOL/L — SIGNIFICANT CHANGE UP (ref 5–17)
APPEARANCE UR: CLEAR — SIGNIFICANT CHANGE UP
AST SERPL-CCNC: 20 U/L — SIGNIFICANT CHANGE UP (ref 10–40)
AST SERPL-CCNC: 21 U/L — SIGNIFICANT CHANGE UP (ref 10–40)
BASOPHILS # BLD AUTO: 0 K/UL — SIGNIFICANT CHANGE UP (ref 0–0.2)
BASOPHILS NFR BLD AUTO: 0 % — SIGNIFICANT CHANGE UP (ref 0–2)
BILIRUB SERPL-MCNC: 0.8 MG/DL — SIGNIFICANT CHANGE UP (ref 0.2–1.2)
BILIRUB SERPL-MCNC: 0.8 MG/DL — SIGNIFICANT CHANGE UP (ref 0.2–1.2)
BILIRUB UR-MCNC: NEGATIVE — SIGNIFICANT CHANGE UP
BUN SERPL-MCNC: 21 MG/DL — SIGNIFICANT CHANGE UP (ref 7–23)
BUN SERPL-MCNC: 21 MG/DL — SIGNIFICANT CHANGE UP (ref 7–23)
CALCIUM SERPL-MCNC: 8.6 MG/DL — SIGNIFICANT CHANGE UP (ref 8.4–10.5)
CALCIUM SERPL-MCNC: 9 MG/DL — SIGNIFICANT CHANGE UP (ref 8.4–10.5)
CHLORIDE SERPL-SCNC: 91 MMOL/L — LOW (ref 96–108)
CHLORIDE SERPL-SCNC: 91 MMOL/L — LOW (ref 96–108)
CO2 SERPL-SCNC: 26 MMOL/L — SIGNIFICANT CHANGE UP (ref 22–31)
CO2 SERPL-SCNC: 27 MMOL/L — SIGNIFICANT CHANGE UP (ref 22–31)
COLOR SPEC: YELLOW — SIGNIFICANT CHANGE UP
CREAT SERPL-MCNC: 0.76 MG/DL — SIGNIFICANT CHANGE UP (ref 0.5–1.3)
CREAT SERPL-MCNC: 0.81 MG/DL — SIGNIFICANT CHANGE UP (ref 0.5–1.3)
DIFF PNL FLD: NEGATIVE — SIGNIFICANT CHANGE UP
EOSINOPHIL # BLD AUTO: 0 K/UL — SIGNIFICANT CHANGE UP (ref 0–0.5)
EOSINOPHIL NFR BLD AUTO: 0.7 % — SIGNIFICANT CHANGE UP (ref 0–6)
GLUCOSE SERPL-MCNC: 235 MG/DL — HIGH (ref 70–99)
GLUCOSE SERPL-MCNC: 262 MG/DL — HIGH (ref 70–99)
GLUCOSE UR QL: 500 MG/DL
HCT VFR BLD CALC: 26.2 % — LOW (ref 39–50)
HGB BLD-MCNC: 8.6 G/DL — LOW (ref 13–17)
KETONES UR-MCNC: ABNORMAL
LDH SERPL L TO P-CCNC: 163 U/L — SIGNIFICANT CHANGE UP (ref 50–242)
LEUKOCYTE ESTERASE UR-ACNC: NEGATIVE — SIGNIFICANT CHANGE UP
LYMPHOCYTES # BLD AUTO: 0.2 K/UL — LOW (ref 1–3.3)
LYMPHOCYTES # BLD AUTO: 12.7 % — LOW (ref 13–44)
MAGNESIUM SERPL-MCNC: 2.1 MG/DL — SIGNIFICANT CHANGE UP (ref 1.6–2.6)
MAGNESIUM SERPL-MCNC: 2.1 MG/DL — SIGNIFICANT CHANGE UP (ref 1.6–2.6)
MCHC RBC-ENTMCNC: 32.2 PG — SIGNIFICANT CHANGE UP (ref 27–34)
MCHC RBC-ENTMCNC: 32.9 GM/DL — SIGNIFICANT CHANGE UP (ref 32–36)
MCV RBC AUTO: 97.8 FL — SIGNIFICANT CHANGE UP (ref 80–100)
MONOCYTES # BLD AUTO: 0 K/UL — SIGNIFICANT CHANGE UP (ref 0–0.9)
MONOCYTES NFR BLD AUTO: 2.1 % — SIGNIFICANT CHANGE UP (ref 2–14)
NEUTROPHILS # BLD AUTO: 1.2 K/UL — LOW (ref 1.8–7.4)
NEUTROPHILS NFR BLD AUTO: 84.5 % — HIGH (ref 43–77)
NITRITE UR-MCNC: NEGATIVE — SIGNIFICANT CHANGE UP
PH UR: 7 — SIGNIFICANT CHANGE UP (ref 5–8)
PHOSPHATE SERPL-MCNC: 3.1 MG/DL — SIGNIFICANT CHANGE UP (ref 2.5–4.5)
PLATELET # BLD AUTO: 280 K/UL — SIGNIFICANT CHANGE UP (ref 150–400)
POTASSIUM SERPL-MCNC: 4.3 MMOL/L — SIGNIFICANT CHANGE UP (ref 3.5–5.3)
POTASSIUM SERPL-MCNC: 4.3 MMOL/L — SIGNIFICANT CHANGE UP (ref 3.5–5.3)
POTASSIUM SERPL-SCNC: 4.3 MMOL/L — SIGNIFICANT CHANGE UP (ref 3.5–5.3)
POTASSIUM SERPL-SCNC: 4.3 MMOL/L — SIGNIFICANT CHANGE UP (ref 3.5–5.3)
PROT SERPL-MCNC: 6.1 G/DL — SIGNIFICANT CHANGE UP (ref 6–8.3)
PROT SERPL-MCNC: 6.2 G/DL — SIGNIFICANT CHANGE UP (ref 6–8.3)
PROT UR-MCNC: NEGATIVE MG/DL — SIGNIFICANT CHANGE UP
RBC # BLD: 2.68 M/UL — LOW (ref 4.2–5.8)
RBC # FLD: 15.9 % — HIGH (ref 10.3–14.5)
SODIUM SERPL-SCNC: 129 MMOL/L — LOW (ref 135–145)
SODIUM SERPL-SCNC: 130 MMOL/L — LOW (ref 135–145)
SP GR SPEC: 1.02 — SIGNIFICANT CHANGE UP (ref 1.01–1.02)
UROBILINOGEN FLD QL: NEGATIVE MG/DL — SIGNIFICANT CHANGE UP
WBC # BLD: 1.5 K/UL — LOW (ref 3.8–10.5)
WBC # FLD AUTO: 1.5 K/UL — LOW (ref 3.8–10.5)

## 2017-04-02 PROCEDURE — 99291 CRITICAL CARE FIRST HOUR: CPT

## 2017-04-02 RX ORDER — DIPHENHYDRAMINE HYDROCHLORIDE AND LIDOCAINE HYDROCHLORIDE AND ALUMINUM HYDROXIDE AND MAGNESIUM HYDRO
10 KIT EVERY 8 HOURS
Qty: 0 | Refills: 0 | Status: DISCONTINUED | OUTPATIENT
Start: 2017-04-02 | End: 2017-04-08

## 2017-04-02 RX ORDER — CARMUSTINE 100 MG
804 KIT INTRAVENOUS ONCE
Qty: 0 | Refills: 0 | Status: DISCONTINUED | OUTPATIENT
Start: 2017-04-02 | End: 2017-05-05

## 2017-04-02 RX ORDER — GLUCAGON INJECTION, SOLUTION 0.5 MG/.1ML
1 INJECTION, SOLUTION SUBCUTANEOUS ONCE
Qty: 0 | Refills: 0 | Status: DISCONTINUED | OUTPATIENT
Start: 2017-04-02 | End: 2017-04-24

## 2017-04-02 RX ORDER — DEXTROSE 50 % IN WATER 50 %
12.5 SYRINGE (ML) INTRAVENOUS ONCE
Qty: 0 | Refills: 0 | Status: DISCONTINUED | OUTPATIENT
Start: 2017-04-02 | End: 2017-04-24

## 2017-04-02 RX ORDER — DEXTROSE 50 % IN WATER 50 %
25 SYRINGE (ML) INTRAVENOUS ONCE
Qty: 0 | Refills: 0 | Status: DISCONTINUED | OUTPATIENT
Start: 2017-04-02 | End: 2017-04-24

## 2017-04-02 RX ORDER — SODIUM CHLORIDE 9 MG/ML
1000 INJECTION INTRAMUSCULAR; INTRAVENOUS; SUBCUTANEOUS
Qty: 0 | Refills: 0 | Status: DISCONTINUED | OUTPATIENT
Start: 2017-04-02 | End: 2017-04-03

## 2017-04-02 RX ORDER — INSULIN LISPRO 100/ML
VIAL (ML) SUBCUTANEOUS AT BEDTIME
Qty: 0 | Refills: 0 | Status: DISCONTINUED | OUTPATIENT
Start: 2017-04-02 | End: 2017-04-24

## 2017-04-02 RX ORDER — INSULIN LISPRO 100/ML
VIAL (ML) SUBCUTANEOUS
Qty: 0 | Refills: 0 | Status: DISCONTINUED | OUTPATIENT
Start: 2017-04-02 | End: 2017-04-24

## 2017-04-02 RX ORDER — FUROSEMIDE 40 MG
40 TABLET ORAL ONCE
Qty: 0 | Refills: 0 | Status: COMPLETED | OUTPATIENT
Start: 2017-04-02 | End: 2017-04-02

## 2017-04-02 RX ORDER — DEXTROSE 50 % IN WATER 50 %
1 SYRINGE (ML) INTRAVENOUS ONCE
Qty: 0 | Refills: 0 | Status: DISCONTINUED | OUTPATIENT
Start: 2017-04-02 | End: 2017-04-24

## 2017-04-02 RX ORDER — OXYCODONE HYDROCHLORIDE 5 MG/1
5 TABLET ORAL ONCE
Qty: 0 | Refills: 0 | Status: DISCONTINUED | OUTPATIENT
Start: 2017-04-02 | End: 2017-04-02

## 2017-04-02 RX ORDER — PHENAZOPYRIDINE HCL 100 MG
100 TABLET ORAL EVERY 8 HOURS
Qty: 0 | Refills: 0 | Status: COMPLETED | OUTPATIENT
Start: 2017-04-02 | End: 2017-04-05

## 2017-04-02 RX ORDER — SODIUM CHLORIDE 9 MG/ML
1000 INJECTION, SOLUTION INTRAVENOUS
Qty: 0 | Refills: 0 | Status: DISCONTINUED | OUTPATIENT
Start: 2017-04-02 | End: 2017-04-24

## 2017-04-02 RX ADMIN — Medication 1 LOZENGE: at 08:28

## 2017-04-02 RX ADMIN — HEPARIN SODIUM 4.07 UNIT(S)/HR: 5000 INJECTION INTRAVENOUS; SUBCUTANEOUS at 17:32

## 2017-04-02 RX ADMIN — URSODIOL 300 MILLIGRAM(S): 250 TABLET, FILM COATED ORAL at 08:29

## 2017-04-02 RX ADMIN — Medication 30 MILLILITER(S): at 08:29

## 2017-04-02 RX ADMIN — Medication 40 MILLIGRAM(S): at 17:28

## 2017-04-02 RX ADMIN — DIPHENHYDRAMINE HYDROCHLORIDE AND LIDOCAINE HYDROCHLORIDE AND ALUMINUM HYDROXIDE AND MAGNESIUM HYDRO 10 MILLILITER(S): KIT at 22:23

## 2017-04-02 RX ADMIN — Medication 1 MILLIGRAM(S): at 00:13

## 2017-04-02 RX ADMIN — Medication 10 MILLILITER(S): at 08:32

## 2017-04-02 RX ADMIN — ONDANSETRON 8 MILLIGRAM(S): 8 TABLET, FILM COATED ORAL at 13:59

## 2017-04-02 RX ADMIN — TAMSULOSIN HYDROCHLORIDE 0.4 MILLIGRAM(S): 0.4 CAPSULE ORAL at 22:17

## 2017-04-02 RX ADMIN — FLUCONAZOLE 400 MILLIGRAM(S): 150 TABLET ORAL at 11:14

## 2017-04-02 RX ADMIN — Medication 100 MILLIGRAM(S): at 12:11

## 2017-04-02 RX ADMIN — Medication 101 MILLIGRAM(S): at 23:43

## 2017-04-02 RX ADMIN — Medication 10 MILLILITER(S): at 00:23

## 2017-04-02 RX ADMIN — Medication 650 MILLIGRAM(S): at 18:10

## 2017-04-02 RX ADMIN — Medication 1 LOZENGE: at 16:01

## 2017-04-02 RX ADMIN — Medication 650 MILLIGRAM(S): at 17:32

## 2017-04-02 RX ADMIN — Medication 1 LOZENGE: at 11:14

## 2017-04-02 RX ADMIN — DIPHENHYDRAMINE HYDROCHLORIDE AND LIDOCAINE HYDROCHLORIDE AND ALUMINUM HYDROXIDE AND MAGNESIUM HYDRO 10 MILLILITER(S): KIT at 14:01

## 2017-04-02 RX ADMIN — ONDANSETRON 8 MILLIGRAM(S): 8 TABLET, FILM COATED ORAL at 05:34

## 2017-04-02 RX ADMIN — Medication 100 MILLIGRAM(S): at 22:20

## 2017-04-02 RX ADMIN — Medication 1 TABLET(S): at 17:28

## 2017-04-02 RX ADMIN — Medication 4: at 11:44

## 2017-04-02 RX ADMIN — OXYCODONE HYDROCHLORIDE 5 MILLIGRAM(S): 5 TABLET ORAL at 06:25

## 2017-04-02 RX ADMIN — MESNA 247 MILLIGRAM(S): 100 INJECTION, SOLUTION INTRAVENOUS at 01:15

## 2017-04-02 RX ADMIN — MESNA 247 MILLIGRAM(S): 100 INJECTION, SOLUTION INTRAVENOUS at 07:15

## 2017-04-02 RX ADMIN — Medication 10 MILLILITER(S): at 11:42

## 2017-04-02 RX ADMIN — Medication 1 MILLIGRAM(S): at 11:14

## 2017-04-02 RX ADMIN — OXYCODONE HYDROCHLORIDE 5 MILLIGRAM(S): 5 TABLET ORAL at 05:55

## 2017-04-02 RX ADMIN — Medication 10 MILLILITER(S): at 16:02

## 2017-04-02 RX ADMIN — SODIUM CHLORIDE 50 MILLILITER(S): 9 INJECTION INTRAMUSCULAR; INTRAVENOUS; SUBCUTANEOUS at 23:40

## 2017-04-02 RX ADMIN — Medication 1 LOZENGE: at 23:43

## 2017-04-02 RX ADMIN — MESNA 247 MILLIGRAM(S): 100 INJECTION, SOLUTION INTRAVENOUS at 04:16

## 2017-04-02 RX ADMIN — Medication 102 MILLIGRAM(S): at 05:34

## 2017-04-02 RX ADMIN — APREPITANT 80 MILLIGRAM(S): 80 CAPSULE ORAL at 11:46

## 2017-04-02 RX ADMIN — SODIUM CHLORIDE 20 MILLILITER(S): 9 INJECTION INTRAMUSCULAR; INTRAVENOUS; SUBCUTANEOUS at 23:40

## 2017-04-02 RX ADMIN — ONDANSETRON 8 MILLIGRAM(S): 8 TABLET, FILM COATED ORAL at 22:17

## 2017-04-02 RX ADMIN — Medication 30 MILLILITER(S): at 16:02

## 2017-04-02 RX ADMIN — Medication 2: at 16:39

## 2017-04-02 RX ADMIN — PANTOPRAZOLE SODIUM 40 MILLIGRAM(S): 20 TABLET, DELAYED RELEASE ORAL at 11:14

## 2017-04-02 RX ADMIN — Medication 30 MILLILITER(S): at 23:41

## 2017-04-02 RX ADMIN — Medication 1 TABLET(S): at 05:33

## 2017-04-02 RX ADMIN — Medication 1 TABLET(S): at 11:14

## 2017-04-02 RX ADMIN — URSODIOL 300 MILLIGRAM(S): 250 TABLET, FILM COATED ORAL at 16:40

## 2017-04-02 NOTE — ADVANCED PRACTICE NURSE CONSULT - ASSESSMENT
Chemo verified by 2 RN's prior to starting.  Right TLCL site with small amount of bleeding under dressing.  Positive blood return from all ports verified.  Carmustine (BCNU) 400mg/o9=638 mg IVSS over 2 hrs started via Alaris pump at 12:30.

## 2017-04-02 NOTE — ADVANCED PRACTICE NURSE CONSULT - ASSESSMENT
Pt received Cytoxan 1800MG/M2 = 3618MG IV over 2 hours via LTLC on alaris pump. Positive blood return noted.  See chart for labs.  No adverse reactions noted.

## 2017-04-02 NOTE — ADVANCED PRACTICE NURSE CONSULT - REASON FOR CONSULT
Lab values as of 4/2/17 as follows:  WBC=1.5, HGB=8.6, HCT=26.2, LCWE=356, BUN=21, Creat=0.76, TBili=0.8.

## 2017-04-03 LAB
ALBUMIN SERPL ELPH-MCNC: 4.5 G/DL — SIGNIFICANT CHANGE UP (ref 3.3–5)
ALP SERPL-CCNC: 94 U/L — SIGNIFICANT CHANGE UP (ref 40–120)
ALT FLD-CCNC: 32 U/L RC — SIGNIFICANT CHANGE UP (ref 10–45)
ANION GAP SERPL CALC-SCNC: 12 MMOL/L — SIGNIFICANT CHANGE UP (ref 5–17)
APTT BLD: 19.6 SEC — LOW (ref 27.5–37.4)
AST SERPL-CCNC: 17 U/L — SIGNIFICANT CHANGE UP (ref 10–40)
BILIRUB SERPL-MCNC: 0.7 MG/DL — SIGNIFICANT CHANGE UP (ref 0.2–1.2)
BLD GP AB SCN SERPL QL: NEGATIVE — SIGNIFICANT CHANGE UP
BUN SERPL-MCNC: 28 MG/DL — HIGH (ref 7–23)
CALCIUM SERPL-MCNC: 9.5 MG/DL — SIGNIFICANT CHANGE UP (ref 8.4–10.5)
CHLORIDE SERPL-SCNC: 94 MMOL/L — LOW (ref 96–108)
CO2 SERPL-SCNC: 29 MMOL/L — SIGNIFICANT CHANGE UP (ref 22–31)
CREAT SERPL-MCNC: 0.92 MG/DL — SIGNIFICANT CHANGE UP (ref 0.5–1.3)
FIBRINOGEN PPP-MCNC: 334 MG/DL — SIGNIFICANT CHANGE UP (ref 310–510)
GLUCOSE SERPL-MCNC: 122 MG/DL — HIGH (ref 70–99)
HCT VFR BLD CALC: 26.4 % — LOW (ref 39–50)
HGB BLD-MCNC: 8.8 G/DL — LOW (ref 13–17)
INR BLD: 0.94 RATIO — SIGNIFICANT CHANGE UP (ref 0.88–1.16)
LDH SERPL L TO P-CCNC: 175 U/L — SIGNIFICANT CHANGE UP (ref 50–242)
MAGNESIUM SERPL-MCNC: 2.5 MG/DL — SIGNIFICANT CHANGE UP (ref 1.6–2.6)
MCHC RBC-ENTMCNC: 32.3 PG — SIGNIFICANT CHANGE UP (ref 27–34)
MCHC RBC-ENTMCNC: 33.4 GM/DL — SIGNIFICANT CHANGE UP (ref 32–36)
MCV RBC AUTO: 96.8 FL — SIGNIFICANT CHANGE UP (ref 80–100)
PHOSPHATE SERPL-MCNC: 4.1 MG/DL — SIGNIFICANT CHANGE UP (ref 2.5–4.5)
PLATELET # BLD AUTO: 266 K/UL — SIGNIFICANT CHANGE UP (ref 150–400)
POTASSIUM SERPL-MCNC: 4.6 MMOL/L — SIGNIFICANT CHANGE UP (ref 3.5–5.3)
POTASSIUM SERPL-SCNC: 4.6 MMOL/L — SIGNIFICANT CHANGE UP (ref 3.5–5.3)
PROT SERPL-MCNC: 7 G/DL — SIGNIFICANT CHANGE UP (ref 6–8.3)
PROTHROM AB SERPL-ACNC: 10.2 SEC — SIGNIFICANT CHANGE UP (ref 9.8–12.7)
RBC # BLD: 2.72 M/UL — LOW (ref 4.2–5.8)
RBC # FLD: 16.1 % — HIGH (ref 10.3–14.5)
RH IG SCN BLD-IMP: POSITIVE — SIGNIFICANT CHANGE UP
SODIUM SERPL-SCNC: 135 MMOL/L — SIGNIFICANT CHANGE UP (ref 135–145)
WBC # BLD: 0.4 K/UL — CRITICAL LOW (ref 3.8–10.5)
WBC # FLD AUTO: 0.4 K/UL — CRITICAL LOW (ref 3.8–10.5)

## 2017-04-03 PROCEDURE — 99291 CRITICAL CARE FIRST HOUR: CPT

## 2017-04-03 RX ORDER — CIPROFLOXACIN LACTATE 400MG/40ML
500 VIAL (ML) INTRAVENOUS EVERY 12 HOURS
Qty: 0 | Refills: 0 | Status: DISCONTINUED | OUTPATIENT
Start: 2017-04-03 | End: 2017-04-06

## 2017-04-03 RX ADMIN — DIPHENHYDRAMINE HYDROCHLORIDE AND LIDOCAINE HYDROCHLORIDE AND ALUMINUM HYDROXIDE AND MAGNESIUM HYDRO 10 MILLILITER(S): KIT at 21:22

## 2017-04-03 RX ADMIN — FLUCONAZOLE 400 MILLIGRAM(S): 150 TABLET ORAL at 11:15

## 2017-04-03 RX ADMIN — Medication 10 MILLILITER(S): at 16:33

## 2017-04-03 RX ADMIN — DIPHENHYDRAMINE HYDROCHLORIDE AND LIDOCAINE HYDROCHLORIDE AND ALUMINUM HYDROXIDE AND MAGNESIUM HYDRO 10 MILLILITER(S): KIT at 06:41

## 2017-04-03 RX ADMIN — Medication 10 MILLILITER(S): at 07:58

## 2017-04-03 RX ADMIN — Medication 10 MILLILITER(S): at 01:19

## 2017-04-03 RX ADMIN — HEPARIN SODIUM 4.07 UNIT(S)/HR: 5000 INJECTION INTRAVENOUS; SUBCUTANEOUS at 17:12

## 2017-04-03 RX ADMIN — Medication 1 LOZENGE: at 16:33

## 2017-04-03 RX ADMIN — HEPARIN SODIUM 4.07 UNIT(S)/HR: 5000 INJECTION INTRAVENOUS; SUBCUTANEOUS at 06:40

## 2017-04-03 RX ADMIN — Medication 100 MILLIGRAM(S): at 06:40

## 2017-04-03 RX ADMIN — Medication 30 MILLILITER(S): at 21:10

## 2017-04-03 RX ADMIN — SODIUM CHLORIDE 20 MILLILITER(S): 9 INJECTION INTRAMUSCULAR; INTRAVENOUS; SUBCUTANEOUS at 21:18

## 2017-04-03 RX ADMIN — ONDANSETRON 8 MILLIGRAM(S): 8 TABLET, FILM COATED ORAL at 13:16

## 2017-04-03 RX ADMIN — Medication 500 MILLIGRAM(S): at 17:12

## 2017-04-03 RX ADMIN — Medication 10 MILLILITER(S): at 11:16

## 2017-04-03 RX ADMIN — SODIUM CHLORIDE 20 MILLILITER(S): 9 INJECTION INTRAMUSCULAR; INTRAVENOUS; SUBCUTANEOUS at 10:15

## 2017-04-03 RX ADMIN — Medication 102 MILLIGRAM(S): at 06:40

## 2017-04-03 RX ADMIN — HEPARIN SODIUM 4.07 UNIT(S)/HR: 5000 INJECTION INTRAVENOUS; SUBCUTANEOUS at 21:18

## 2017-04-03 RX ADMIN — Medication 30 MILLILITER(S): at 07:58

## 2017-04-03 RX ADMIN — URSODIOL 300 MILLIGRAM(S): 250 TABLET, FILM COATED ORAL at 17:12

## 2017-04-03 RX ADMIN — Medication 1 MILLIGRAM(S): at 11:15

## 2017-04-03 RX ADMIN — Medication 2: at 16:32

## 2017-04-03 RX ADMIN — Medication 1 LOZENGE: at 07:58

## 2017-04-03 RX ADMIN — Medication 1 LOZENGE: at 21:10

## 2017-04-03 RX ADMIN — Medication 1 LOZENGE: at 11:15

## 2017-04-03 RX ADMIN — DIPHENHYDRAMINE HYDROCHLORIDE AND LIDOCAINE HYDROCHLORIDE AND ALUMINUM HYDROXIDE AND MAGNESIUM HYDRO 10 MILLILITER(S): KIT at 13:16

## 2017-04-03 RX ADMIN — Medication 100 MILLIGRAM(S): at 13:16

## 2017-04-03 RX ADMIN — Medication 10 MILLILITER(S): at 21:21

## 2017-04-03 RX ADMIN — APREPITANT 80 MILLIGRAM(S): 80 CAPSULE ORAL at 11:15

## 2017-04-03 RX ADMIN — Medication 30 MILLILITER(S): at 11:15

## 2017-04-03 RX ADMIN — TAMSULOSIN HYDROCHLORIDE 0.4 MILLIGRAM(S): 0.4 CAPSULE ORAL at 21:16

## 2017-04-03 RX ADMIN — Medication 2: at 11:21

## 2017-04-03 RX ADMIN — Medication 100 MILLIGRAM(S): at 21:16

## 2017-04-03 RX ADMIN — URSODIOL 300 MILLIGRAM(S): 250 TABLET, FILM COATED ORAL at 07:58

## 2017-04-03 RX ADMIN — ONDANSETRON 8 MILLIGRAM(S): 8 TABLET, FILM COATED ORAL at 21:15

## 2017-04-03 RX ADMIN — ONDANSETRON 8 MILLIGRAM(S): 8 TABLET, FILM COATED ORAL at 06:40

## 2017-04-03 RX ADMIN — Medication 1 TABLET(S): at 17:12

## 2017-04-03 RX ADMIN — Medication 30 MILLILITER(S): at 16:33

## 2017-04-03 RX ADMIN — Medication 1 TABLET(S): at 06:40

## 2017-04-03 RX ADMIN — PANTOPRAZOLE SODIUM 40 MILLIGRAM(S): 20 TABLET, DELAYED RELEASE ORAL at 11:15

## 2017-04-03 RX ADMIN — Medication 1 TABLET(S): at 11:15

## 2017-04-04 LAB
ALBUMIN SERPL ELPH-MCNC: 4.3 G/DL — SIGNIFICANT CHANGE UP (ref 3.3–5)
ALP SERPL-CCNC: 88 U/L — SIGNIFICANT CHANGE UP (ref 40–120)
ALT FLD-CCNC: 27 U/L RC — SIGNIFICANT CHANGE UP (ref 10–45)
ANION GAP SERPL CALC-SCNC: 13 MMOL/L — SIGNIFICANT CHANGE UP (ref 5–17)
AST SERPL-CCNC: 16 U/L — SIGNIFICANT CHANGE UP (ref 10–40)
BILIRUB SERPL-MCNC: 0.6 MG/DL — SIGNIFICANT CHANGE UP (ref 0.2–1.2)
BUN SERPL-MCNC: 26 MG/DL — HIGH (ref 7–23)
CALCIUM SERPL-MCNC: 9.2 MG/DL — SIGNIFICANT CHANGE UP (ref 8.4–10.5)
CHLORIDE SERPL-SCNC: 94 MMOL/L — LOW (ref 96–108)
CO2 SERPL-SCNC: 27 MMOL/L — SIGNIFICANT CHANGE UP (ref 22–31)
CREAT SERPL-MCNC: 0.9 MG/DL — SIGNIFICANT CHANGE UP (ref 0.5–1.3)
GLUCOSE SERPL-MCNC: 118 MG/DL — HIGH (ref 70–99)
HCT VFR BLD CALC: 24.1 % — LOW (ref 39–50)
HGB BLD-MCNC: 8.3 G/DL — LOW (ref 13–17)
LDH SERPL L TO P-CCNC: 143 U/L — SIGNIFICANT CHANGE UP (ref 50–242)
MAGNESIUM SERPL-MCNC: 2.5 MG/DL — SIGNIFICANT CHANGE UP (ref 1.6–2.6)
MCHC RBC-ENTMCNC: 33.3 PG — SIGNIFICANT CHANGE UP (ref 27–34)
MCHC RBC-ENTMCNC: 34.5 GM/DL — SIGNIFICANT CHANGE UP (ref 32–36)
MCV RBC AUTO: 96.5 FL — SIGNIFICANT CHANGE UP (ref 80–100)
PHOSPHATE SERPL-MCNC: 4.9 MG/DL — HIGH (ref 2.5–4.5)
PLATELET # BLD AUTO: 202 K/UL — SIGNIFICANT CHANGE UP (ref 150–400)
POTASSIUM SERPL-MCNC: 4.8 MMOL/L — SIGNIFICANT CHANGE UP (ref 3.5–5.3)
POTASSIUM SERPL-SCNC: 4.8 MMOL/L — SIGNIFICANT CHANGE UP (ref 3.5–5.3)
PROT SERPL-MCNC: 6.8 G/DL — SIGNIFICANT CHANGE UP (ref 6–8.3)
RBC # BLD: 2.5 M/UL — LOW (ref 4.2–5.8)
RBC # FLD: 15.7 % — HIGH (ref 10.3–14.5)
SODIUM SERPL-SCNC: 134 MMOL/L — LOW (ref 135–145)
WBC # BLD: 0.1 K/UL — CRITICAL LOW (ref 3.8–10.5)
WBC # FLD AUTO: 0.1 K/UL — CRITICAL LOW (ref 3.8–10.5)

## 2017-04-04 PROCEDURE — 99291 CRITICAL CARE FIRST HOUR: CPT

## 2017-04-04 RX ORDER — OXYCODONE HYDROCHLORIDE 5 MG/1
5 TABLET ORAL EVERY 6 HOURS
Qty: 0 | Refills: 0 | Status: DISCONTINUED | OUTPATIENT
Start: 2017-04-04 | End: 2017-04-08

## 2017-04-04 RX ORDER — HYDROMORPHONE HYDROCHLORIDE 2 MG/ML
1 INJECTION INTRAMUSCULAR; INTRAVENOUS; SUBCUTANEOUS EVERY 4 HOURS
Qty: 0 | Refills: 0 | Status: DISCONTINUED | OUTPATIENT
Start: 2017-04-04 | End: 2017-04-06

## 2017-04-04 RX ADMIN — Medication 10 MILLILITER(S): at 23:45

## 2017-04-04 RX ADMIN — Medication 1: at 17:28

## 2017-04-04 RX ADMIN — DIPHENHYDRAMINE HYDROCHLORIDE AND LIDOCAINE HYDROCHLORIDE AND ALUMINUM HYDROXIDE AND MAGNESIUM HYDRO 10 MILLILITER(S): KIT at 06:25

## 2017-04-04 RX ADMIN — ONDANSETRON 8 MILLIGRAM(S): 8 TABLET, FILM COATED ORAL at 14:39

## 2017-04-04 RX ADMIN — Medication 400 MILLIGRAM(S): at 14:40

## 2017-04-04 RX ADMIN — Medication 30 MILLILITER(S): at 17:27

## 2017-04-04 RX ADMIN — Medication 400 MILLIGRAM(S): at 06:24

## 2017-04-04 RX ADMIN — Medication 1 LOZENGE: at 20:52

## 2017-04-04 RX ADMIN — URSODIOL 300 MILLIGRAM(S): 250 TABLET, FILM COATED ORAL at 17:25

## 2017-04-04 RX ADMIN — Medication 10 MILLILITER(S): at 20:51

## 2017-04-04 RX ADMIN — APREPITANT 80 MILLIGRAM(S): 80 CAPSULE ORAL at 11:42

## 2017-04-04 RX ADMIN — ONDANSETRON 8 MILLIGRAM(S): 8 TABLET, FILM COATED ORAL at 21:42

## 2017-04-04 RX ADMIN — Medication 1 LOZENGE: at 08:11

## 2017-04-04 RX ADMIN — Medication 10 MILLILITER(S): at 00:10

## 2017-04-04 RX ADMIN — Medication 100 MILLIGRAM(S): at 06:24

## 2017-04-04 RX ADMIN — OXYCODONE HYDROCHLORIDE 5 MILLIGRAM(S): 5 TABLET ORAL at 08:44

## 2017-04-04 RX ADMIN — Medication 100 MILLIGRAM(S): at 14:40

## 2017-04-04 RX ADMIN — Medication 1 LOZENGE: at 17:26

## 2017-04-04 RX ADMIN — Medication 650 MILLIGRAM(S): at 02:40

## 2017-04-04 RX ADMIN — URSODIOL 300 MILLIGRAM(S): 250 TABLET, FILM COATED ORAL at 08:11

## 2017-04-04 RX ADMIN — Medication 1 LOZENGE: at 11:44

## 2017-04-04 RX ADMIN — Medication 500 MILLIGRAM(S): at 17:25

## 2017-04-04 RX ADMIN — DIPHENHYDRAMINE HYDROCHLORIDE AND LIDOCAINE HYDROCHLORIDE AND ALUMINUM HYDROXIDE AND MAGNESIUM HYDRO 10 MILLILITER(S): KIT at 14:40

## 2017-04-04 RX ADMIN — Medication 650 MILLIGRAM(S): at 03:10

## 2017-04-04 RX ADMIN — ONDANSETRON 8 MILLIGRAM(S): 8 TABLET, FILM COATED ORAL at 06:25

## 2017-04-04 RX ADMIN — HEPARIN SODIUM 4.07 UNIT(S)/HR: 5000 INJECTION INTRAVENOUS; SUBCUTANEOUS at 17:31

## 2017-04-04 RX ADMIN — Medication 400 MILLIGRAM(S): at 21:42

## 2017-04-04 RX ADMIN — Medication 2: at 11:43

## 2017-04-04 RX ADMIN — TAMSULOSIN HYDROCHLORIDE 0.4 MILLIGRAM(S): 0.4 CAPSULE ORAL at 21:42

## 2017-04-04 RX ADMIN — Medication 1 LOZENGE: at 00:10

## 2017-04-04 RX ADMIN — SODIUM CHLORIDE 20 MILLILITER(S): 9 INJECTION INTRAMUSCULAR; INTRAVENOUS; SUBCUTANEOUS at 17:31

## 2017-04-04 RX ADMIN — FLUCONAZOLE 400 MILLIGRAM(S): 150 TABLET ORAL at 11:42

## 2017-04-04 RX ADMIN — Medication 30 MILLILITER(S): at 00:10

## 2017-04-04 RX ADMIN — Medication 1 MILLIGRAM(S): at 23:20

## 2017-04-04 RX ADMIN — Medication 10 MILLILITER(S): at 11:53

## 2017-04-04 RX ADMIN — DIPHENHYDRAMINE HYDROCHLORIDE AND LIDOCAINE HYDROCHLORIDE AND ALUMINUM HYDROXIDE AND MAGNESIUM HYDRO 10 MILLILITER(S): KIT at 21:42

## 2017-04-04 RX ADMIN — Medication 102 MILLIGRAM(S): at 06:24

## 2017-04-04 RX ADMIN — Medication 30 MILLILITER(S): at 20:52

## 2017-04-04 RX ADMIN — OXYCODONE HYDROCHLORIDE 5 MILLIGRAM(S): 5 TABLET ORAL at 09:10

## 2017-04-04 RX ADMIN — Medication 10 MILLILITER(S): at 17:42

## 2017-04-04 RX ADMIN — Medication 100 MILLIGRAM(S): at 21:42

## 2017-04-04 RX ADMIN — Medication 1 MILLIGRAM(S): at 11:42

## 2017-04-04 RX ADMIN — Medication 10 MILLILITER(S): at 08:13

## 2017-04-04 RX ADMIN — Medication 1 TABLET(S): at 11:44

## 2017-04-04 RX ADMIN — Medication 30 MILLILITER(S): at 11:44

## 2017-04-04 RX ADMIN — Medication 30 MILLILITER(S): at 08:11

## 2017-04-04 RX ADMIN — Medication 30 MILLILITER(S): at 23:45

## 2017-04-04 RX ADMIN — PANTOPRAZOLE SODIUM 40 MILLIGRAM(S): 20 TABLET, DELAYED RELEASE ORAL at 11:45

## 2017-04-04 RX ADMIN — Medication 500 MILLIGRAM(S): at 06:24

## 2017-04-04 RX ADMIN — Medication 1 LOZENGE: at 23:45

## 2017-04-05 LAB
ALBUMIN SERPL ELPH-MCNC: 4.2 G/DL — SIGNIFICANT CHANGE UP (ref 3.3–5)
ALP SERPL-CCNC: 86 U/L — SIGNIFICANT CHANGE UP (ref 40–120)
ALT FLD-CCNC: 24 U/L RC — SIGNIFICANT CHANGE UP (ref 10–45)
ANION GAP SERPL CALC-SCNC: 14 MMOL/L — SIGNIFICANT CHANGE UP (ref 5–17)
AST SERPL-CCNC: 15 U/L — SIGNIFICANT CHANGE UP (ref 10–40)
BILIRUB SERPL-MCNC: 0.6 MG/DL — SIGNIFICANT CHANGE UP (ref 0.2–1.2)
BLD GP AB SCN SERPL QL: NEGATIVE — SIGNIFICANT CHANGE UP
BUN SERPL-MCNC: 27 MG/DL — HIGH (ref 7–23)
CALCIUM SERPL-MCNC: 9.3 MG/DL — SIGNIFICANT CHANGE UP (ref 8.4–10.5)
CHLORIDE SERPL-SCNC: 93 MMOL/L — LOW (ref 96–108)
CO2 SERPL-SCNC: 26 MMOL/L — SIGNIFICANT CHANGE UP (ref 22–31)
CREAT SERPL-MCNC: 0.77 MG/DL — SIGNIFICANT CHANGE UP (ref 0.5–1.3)
GLUCOSE SERPL-MCNC: 123 MG/DL — HIGH (ref 70–99)
HCT VFR BLD CALC: 23.5 % — LOW (ref 39–50)
HGB BLD-MCNC: 8.2 G/DL — LOW (ref 13–17)
LDH SERPL L TO P-CCNC: 135 U/L — SIGNIFICANT CHANGE UP (ref 50–242)
MAGNESIUM SERPL-MCNC: 2.4 MG/DL — SIGNIFICANT CHANGE UP (ref 1.6–2.6)
MCHC RBC-ENTMCNC: 33.3 PG — SIGNIFICANT CHANGE UP (ref 27–34)
MCHC RBC-ENTMCNC: 34.7 GM/DL — SIGNIFICANT CHANGE UP (ref 32–36)
MCV RBC AUTO: 95.9 FL — SIGNIFICANT CHANGE UP (ref 80–100)
PHOSPHATE SERPL-MCNC: 5.7 MG/DL — HIGH (ref 2.5–4.5)
PLATELET # BLD AUTO: 160 K/UL — SIGNIFICANT CHANGE UP (ref 150–400)
POTASSIUM SERPL-MCNC: 4.8 MMOL/L — SIGNIFICANT CHANGE UP (ref 3.5–5.3)
POTASSIUM SERPL-SCNC: 4.8 MMOL/L — SIGNIFICANT CHANGE UP (ref 3.5–5.3)
PROT SERPL-MCNC: 6.7 G/DL — SIGNIFICANT CHANGE UP (ref 6–8.3)
RBC # BLD: 2.45 M/UL — LOW (ref 4.2–5.8)
RBC # FLD: 15.6 % — HIGH (ref 10.3–14.5)
RH IG SCN BLD-IMP: POSITIVE — SIGNIFICANT CHANGE UP
SODIUM SERPL-SCNC: 133 MMOL/L — LOW (ref 135–145)
WBC # BLD: 0.1 K/UL — CRITICAL LOW (ref 3.8–10.5)
WBC # FLD AUTO: 0.1 K/UL — CRITICAL LOW (ref 3.8–10.5)

## 2017-04-05 PROCEDURE — 99291 CRITICAL CARE FIRST HOUR: CPT

## 2017-04-05 PROCEDURE — 38241 TRANSPLT AUTOL HCT/DONOR: CPT

## 2017-04-05 RX ORDER — ZOLPIDEM TARTRATE 10 MG/1
5 TABLET ORAL AT BEDTIME
Qty: 0 | Refills: 0 | Status: DISCONTINUED | OUTPATIENT
Start: 2017-04-05 | End: 2017-04-05

## 2017-04-05 RX ORDER — CALCIUM ACETATE 667 MG
667 TABLET ORAL
Qty: 0 | Refills: 0 | Status: COMPLETED | OUTPATIENT
Start: 2017-04-05 | End: 2017-04-06

## 2017-04-05 RX ORDER — FUROSEMIDE 40 MG
40 TABLET ORAL ONCE
Qty: 0 | Refills: 0 | Status: COMPLETED | OUTPATIENT
Start: 2017-04-05 | End: 2017-04-05

## 2017-04-05 RX ORDER — PALIFERMIN 6.25 MG
5880 VIAL (EA) INTRAVENOUS DAILY
Qty: 0 | Refills: 0 | Status: COMPLETED | OUTPATIENT
Start: 2017-04-05 | End: 2017-04-07

## 2017-04-05 RX ADMIN — DIPHENHYDRAMINE HYDROCHLORIDE AND LIDOCAINE HYDROCHLORIDE AND ALUMINUM HYDROXIDE AND MAGNESIUM HYDRO 10 MILLILITER(S): KIT at 06:42

## 2017-04-05 RX ADMIN — Medication 10 MILLILITER(S): at 09:03

## 2017-04-05 RX ADMIN — Medication 1 LOZENGE: at 20:11

## 2017-04-05 RX ADMIN — ONDANSETRON 8 MILLIGRAM(S): 8 TABLET, FILM COATED ORAL at 06:41

## 2017-04-05 RX ADMIN — SODIUM CHLORIDE 150 MILLILITER(S): 9 INJECTION, SOLUTION INTRAVENOUS at 06:00

## 2017-04-05 RX ADMIN — Medication 1: at 16:24

## 2017-04-05 RX ADMIN — Medication 10 MILLILITER(S): at 23:06

## 2017-04-05 RX ADMIN — Medication 10 MILLILITER(S): at 11:16

## 2017-04-05 RX ADMIN — Medication 25 MILLIGRAM(S): at 12:20

## 2017-04-05 RX ADMIN — Medication 400 MILLIGRAM(S): at 14:14

## 2017-04-05 RX ADMIN — Medication 400 MILLIGRAM(S): at 06:42

## 2017-04-05 RX ADMIN — Medication 10 MILLILITER(S): at 16:30

## 2017-04-05 RX ADMIN — TAMSULOSIN HYDROCHLORIDE 0.4 MILLIGRAM(S): 0.4 CAPSULE ORAL at 23:04

## 2017-04-05 RX ADMIN — Medication 480 MICROGRAM(S): at 16:24

## 2017-04-05 RX ADMIN — Medication 5880 MICROGRAM(S): at 16:24

## 2017-04-05 RX ADMIN — Medication 1: at 11:50

## 2017-04-05 RX ADMIN — Medication 667 MILLIGRAM(S): at 11:16

## 2017-04-05 RX ADMIN — Medication 650 MILLIGRAM(S): at 12:26

## 2017-04-05 RX ADMIN — Medication 30 MILLILITER(S): at 20:11

## 2017-04-05 RX ADMIN — Medication 100 MILLIGRAM(S): at 06:43

## 2017-04-05 RX ADMIN — FLUCONAZOLE 400 MILLIGRAM(S): 150 TABLET ORAL at 09:00

## 2017-04-05 RX ADMIN — Medication 1 LOZENGE: at 09:02

## 2017-04-05 RX ADMIN — PANTOPRAZOLE SODIUM 40 MILLIGRAM(S): 20 TABLET, DELAYED RELEASE ORAL at 09:00

## 2017-04-05 RX ADMIN — Medication 30 MILLILITER(S): at 11:11

## 2017-04-05 RX ADMIN — DIPHENHYDRAMINE HYDROCHLORIDE AND LIDOCAINE HYDROCHLORIDE AND ALUMINUM HYDROXIDE AND MAGNESIUM HYDRO 10 MILLILITER(S): KIT at 23:05

## 2017-04-05 RX ADMIN — Medication 30 MILLILITER(S): at 16:27

## 2017-04-05 RX ADMIN — Medication 30 MILLILITER(S): at 09:02

## 2017-04-05 RX ADMIN — URSODIOL 300 MILLIGRAM(S): 250 TABLET, FILM COATED ORAL at 17:34

## 2017-04-05 RX ADMIN — APREPITANT 80 MILLIGRAM(S): 80 CAPSULE ORAL at 09:00

## 2017-04-05 RX ADMIN — DIPHENHYDRAMINE HYDROCHLORIDE AND LIDOCAINE HYDROCHLORIDE AND ALUMINUM HYDROXIDE AND MAGNESIUM HYDRO 10 MILLILITER(S): KIT at 14:16

## 2017-04-05 RX ADMIN — Medication 1 MILLIGRAM(S): at 09:00

## 2017-04-05 RX ADMIN — Medication 500 MILLIGRAM(S): at 17:34

## 2017-04-05 RX ADMIN — Medication 1 TABLET(S): at 09:00

## 2017-04-05 RX ADMIN — Medication 650 MILLIGRAM(S): at 23:12

## 2017-04-05 RX ADMIN — Medication 10 MILLILITER(S): at 20:11

## 2017-04-05 RX ADMIN — Medication 667 MILLIGRAM(S): at 23:11

## 2017-04-05 RX ADMIN — Medication 1 LOZENGE: at 11:11

## 2017-04-05 RX ADMIN — ONDANSETRON 8 MILLIGRAM(S): 8 TABLET, FILM COATED ORAL at 14:14

## 2017-04-05 RX ADMIN — Medication 400 MILLIGRAM(S): at 23:04

## 2017-04-05 RX ADMIN — Medication 667 MILLIGRAM(S): at 17:34

## 2017-04-05 RX ADMIN — URSODIOL 300 MILLIGRAM(S): 250 TABLET, FILM COATED ORAL at 09:00

## 2017-04-05 RX ADMIN — Medication 50 MILLIGRAM(S): at 12:20

## 2017-04-05 RX ADMIN — Medication 1 LOZENGE: at 16:27

## 2017-04-05 RX ADMIN — Medication 500 MILLIGRAM(S): at 06:42

## 2017-04-05 RX ADMIN — Medication 40 MILLIGRAM(S): at 11:11

## 2017-04-05 RX ADMIN — Medication 1 LOZENGE: at 23:06

## 2017-04-05 RX ADMIN — Medication 30 MILLILITER(S): at 23:05

## 2017-04-06 LAB
ALBUMIN SERPL ELPH-MCNC: 3.3 G/DL — SIGNIFICANT CHANGE UP (ref 3.3–5)
ALP SERPL-CCNC: 76 U/L — SIGNIFICANT CHANGE UP (ref 40–120)
ALT FLD-CCNC: 20 U/L RC — SIGNIFICANT CHANGE UP (ref 10–45)
ANION GAP SERPL CALC-SCNC: 13 MMOL/L — SIGNIFICANT CHANGE UP (ref 5–17)
APTT BLD: 21.2 SEC — LOW (ref 27.5–37.4)
AST SERPL-CCNC: 15 U/L — SIGNIFICANT CHANGE UP (ref 10–40)
BILIRUB SERPL-MCNC: 0.5 MG/DL — SIGNIFICANT CHANGE UP (ref 0.2–1.2)
BKV DNA UR QL NAA+PROBE: SIGNIFICANT CHANGE UP
BUN SERPL-MCNC: 22 MG/DL — SIGNIFICANT CHANGE UP (ref 7–23)
CALCIUM SERPL-MCNC: 8.3 MG/DL — LOW (ref 8.4–10.5)
CHLORIDE SERPL-SCNC: 95 MMOL/L — LOW (ref 96–108)
CO2 SERPL-SCNC: 28 MMOL/L — SIGNIFICANT CHANGE UP (ref 22–31)
CREAT SERPL-MCNC: 0.75 MG/DL — SIGNIFICANT CHANGE UP (ref 0.5–1.3)
CULTURE RESULTS: SIGNIFICANT CHANGE UP
FIBRINOGEN PPP-MCNC: 548 MG/DL — HIGH (ref 310–510)
GLUCOSE SERPL-MCNC: 121 MG/DL — HIGH (ref 70–99)
HCT VFR BLD CALC: 22.1 % — LOW (ref 39–50)
HGB BLD-MCNC: 7.5 G/DL — LOW (ref 13–17)
HLA-A,B SEROLOGICPLT RESULT: SIGNIFICANT CHANGE UP
INR BLD: 1.06 RATIO — SIGNIFICANT CHANGE UP (ref 0.88–1.16)
LDH SERPL L TO P-CCNC: 188 U/L — SIGNIFICANT CHANGE UP (ref 50–242)
MAGNESIUM SERPL-MCNC: 2.1 MG/DL — SIGNIFICANT CHANGE UP (ref 1.6–2.6)
MCHC RBC-ENTMCNC: 32.6 PG — SIGNIFICANT CHANGE UP (ref 27–34)
MCHC RBC-ENTMCNC: 33.8 GM/DL — SIGNIFICANT CHANGE UP (ref 32–36)
MCV RBC AUTO: 96.7 FL — SIGNIFICANT CHANGE UP (ref 80–100)
PHOSPHATE SERPL-MCNC: 4.5 MG/DL — SIGNIFICANT CHANGE UP (ref 2.5–4.5)
PLATELET # BLD AUTO: 106 K/UL — LOW (ref 150–400)
POTASSIUM SERPL-MCNC: 4.2 MMOL/L — SIGNIFICANT CHANGE UP (ref 3.5–5.3)
POTASSIUM SERPL-SCNC: 4.2 MMOL/L — SIGNIFICANT CHANGE UP (ref 3.5–5.3)
PROT SERPL-MCNC: 5.8 G/DL — LOW (ref 6–8.3)
PROTHROM AB SERPL-ACNC: 11.5 SEC — SIGNIFICANT CHANGE UP (ref 9.8–12.7)
RBC # BLD: 2.29 M/UL — LOW (ref 4.2–5.8)
RBC # FLD: 15.8 % — HIGH (ref 10.3–14.5)
SODIUM SERPL-SCNC: 136 MMOL/L — SIGNIFICANT CHANGE UP (ref 135–145)
SPECIMEN SOURCE: SIGNIFICANT CHANGE UP
WBC # BLD: 0.3 K/UL — CRITICAL LOW (ref 3.8–10.5)
WBC # FLD AUTO: 0.3 K/UL — CRITICAL LOW (ref 3.8–10.5)

## 2017-04-06 PROCEDURE — 99291 CRITICAL CARE FIRST HOUR: CPT

## 2017-04-06 RX ORDER — CEFEPIME 1 G/1
INJECTION, POWDER, FOR SOLUTION INTRAMUSCULAR; INTRAVENOUS
Qty: 0 | Refills: 0 | Status: DISCONTINUED | OUTPATIENT
Start: 2017-04-06 | End: 2017-04-10

## 2017-04-06 RX ORDER — DIPHENHYDRAMINE HCL 50 MG
25 CAPSULE ORAL ONCE
Qty: 0 | Refills: 0 | Status: COMPLETED | OUTPATIENT
Start: 2017-04-06 | End: 2017-04-06

## 2017-04-06 RX ORDER — RITUXIMAB 10 MG/ML
818 INJECTION, SOLUTION INTRAVENOUS ONCE
Qty: 0 | Refills: 0 | Status: DISCONTINUED | OUTPATIENT
Start: 2017-04-06 | End: 2017-05-05

## 2017-04-06 RX ORDER — CEFEPIME 1 G/1
2000 INJECTION, POWDER, FOR SOLUTION INTRAMUSCULAR; INTRAVENOUS EVERY 8 HOURS
Qty: 0 | Refills: 0 | Status: DISCONTINUED | OUTPATIENT
Start: 2017-04-07 | End: 2017-04-10

## 2017-04-06 RX ORDER — CEFEPIME 1 G/1
2000 INJECTION, POWDER, FOR SOLUTION INTRAMUSCULAR; INTRAVENOUS ONCE
Qty: 0 | Refills: 0 | Status: COMPLETED | OUTPATIENT
Start: 2017-04-06 | End: 2017-04-06

## 2017-04-06 RX ORDER — DIPHENHYDRAMINE HCL 50 MG
50 CAPSULE ORAL ONCE
Qty: 0 | Refills: 0 | Status: DISCONTINUED | OUTPATIENT
Start: 2017-04-06 | End: 2017-05-05

## 2017-04-06 RX ORDER — HYDROCORTISONE 20 MG
50 TABLET ORAL ONCE
Qty: 0 | Refills: 0 | Status: COMPLETED | OUTPATIENT
Start: 2017-04-06 | End: 2017-04-06

## 2017-04-06 RX ORDER — ACETAMINOPHEN 500 MG
650 TABLET ORAL ONCE
Qty: 0 | Refills: 0 | Status: COMPLETED | OUTPATIENT
Start: 2017-04-06 | End: 2017-04-06

## 2017-04-06 RX ORDER — HYDROMORPHONE HYDROCHLORIDE 2 MG/ML
0.5 INJECTION INTRAMUSCULAR; INTRAVENOUS; SUBCUTANEOUS EVERY 4 HOURS
Qty: 0 | Refills: 0 | Status: DISCONTINUED | OUTPATIENT
Start: 2017-04-06 | End: 2017-04-08

## 2017-04-06 RX ORDER — HYDROCORTISONE 20 MG
50 TABLET ORAL ONCE
Qty: 0 | Refills: 0 | Status: DISCONTINUED | OUTPATIENT
Start: 2017-04-06 | End: 2017-05-03

## 2017-04-06 RX ORDER — SODIUM CHLORIDE 9 MG/ML
1000 INJECTION INTRAMUSCULAR; INTRAVENOUS; SUBCUTANEOUS
Qty: 0 | Refills: 0 | Status: DISCONTINUED | OUTPATIENT
Start: 2017-04-06 | End: 2017-04-15

## 2017-04-06 RX ADMIN — Medication 650 MILLIGRAM(S): at 10:13

## 2017-04-06 RX ADMIN — CEFEPIME 100 MILLIGRAM(S): 1 INJECTION, POWDER, FOR SOLUTION INTRAMUSCULAR; INTRAVENOUS at 23:47

## 2017-04-06 RX ADMIN — Medication 400 MILLIGRAM(S): at 13:13

## 2017-04-06 RX ADMIN — Medication 500 MILLIGRAM(S): at 17:01

## 2017-04-06 RX ADMIN — Medication 10 MILLILITER(S): at 19:51

## 2017-04-06 RX ADMIN — Medication 30 MILLILITER(S): at 11:42

## 2017-04-06 RX ADMIN — HYDROMORPHONE HYDROCHLORIDE 0.5 MILLIGRAM(S): 2 INJECTION INTRAMUSCULAR; INTRAVENOUS; SUBCUTANEOUS at 23:32

## 2017-04-06 RX ADMIN — HYDROMORPHONE HYDROCHLORIDE 1 MILLIGRAM(S): 2 INJECTION INTRAMUSCULAR; INTRAVENOUS; SUBCUTANEOUS at 04:45

## 2017-04-06 RX ADMIN — Medication 30 MILLILITER(S): at 16:44

## 2017-04-06 RX ADMIN — Medication 30 MILLILITER(S): at 07:55

## 2017-04-06 RX ADMIN — Medication 1 LOZENGE: at 19:51

## 2017-04-06 RX ADMIN — DIPHENHYDRAMINE HYDROCHLORIDE AND LIDOCAINE HYDROCHLORIDE AND ALUMINUM HYDROXIDE AND MAGNESIUM HYDRO 10 MILLILITER(S): KIT at 13:13

## 2017-04-06 RX ADMIN — Medication 5880 MICROGRAM(S): at 16:37

## 2017-04-06 RX ADMIN — Medication 10 MILLILITER(S): at 16:24

## 2017-04-06 RX ADMIN — Medication 1 LOZENGE: at 07:55

## 2017-04-06 RX ADMIN — Medication 10 MILLILITER(S): at 08:02

## 2017-04-06 RX ADMIN — Medication 10 MILLILITER(S): at 11:40

## 2017-04-06 RX ADMIN — Medication 400 MILLIGRAM(S): at 05:30

## 2017-04-06 RX ADMIN — OXYCODONE HYDROCHLORIDE 5 MILLIGRAM(S): 5 TABLET ORAL at 02:40

## 2017-04-06 RX ADMIN — Medication 667 MILLIGRAM(S): at 07:56

## 2017-04-06 RX ADMIN — Medication 1 LOZENGE: at 16:43

## 2017-04-06 RX ADMIN — Medication 1 LOZENGE: at 11:42

## 2017-04-06 RX ADMIN — Medication 650 MILLIGRAM(S): at 23:41

## 2017-04-06 RX ADMIN — Medication 480 MICROGRAM(S): at 16:38

## 2017-04-06 RX ADMIN — TAMSULOSIN HYDROCHLORIDE 0.4 MILLIGRAM(S): 0.4 CAPSULE ORAL at 21:04

## 2017-04-06 RX ADMIN — Medication 1: at 16:59

## 2017-04-06 RX ADMIN — Medication 10 MILLILITER(S): at 23:19

## 2017-04-06 RX ADMIN — OXYCODONE HYDROCHLORIDE 5 MILLIGRAM(S): 5 TABLET ORAL at 01:43

## 2017-04-06 RX ADMIN — Medication 400 MILLIGRAM(S): at 21:03

## 2017-04-06 RX ADMIN — PANTOPRAZOLE SODIUM 40 MILLIGRAM(S): 20 TABLET, DELAYED RELEASE ORAL at 11:41

## 2017-04-06 RX ADMIN — Medication 650 MILLIGRAM(S): at 00:10

## 2017-04-06 RX ADMIN — Medication 500 MILLIGRAM(S): at 05:30

## 2017-04-06 RX ADMIN — Medication 25 MILLIGRAM(S): at 10:14

## 2017-04-06 RX ADMIN — Medication 1 LOZENGE: at 23:18

## 2017-04-06 RX ADMIN — HYDROMORPHONE HYDROCHLORIDE 0.5 MILLIGRAM(S): 2 INJECTION INTRAMUSCULAR; INTRAVENOUS; SUBCUTANEOUS at 23:17

## 2017-04-06 RX ADMIN — HYDROMORPHONE HYDROCHLORIDE 1 MILLIGRAM(S): 2 INJECTION INTRAMUSCULAR; INTRAVENOUS; SUBCUTANEOUS at 04:27

## 2017-04-06 RX ADMIN — Medication 30 MILLILITER(S): at 19:51

## 2017-04-06 RX ADMIN — Medication 30 MILLILITER(S): at 23:18

## 2017-04-06 RX ADMIN — SODIUM CHLORIDE 20 MILLILITER(S): 9 INJECTION INTRAMUSCULAR; INTRAVENOUS; SUBCUTANEOUS at 05:31

## 2017-04-06 RX ADMIN — Medication 1 TABLET(S): at 11:41

## 2017-04-06 RX ADMIN — URSODIOL 300 MILLIGRAM(S): 250 TABLET, FILM COATED ORAL at 16:59

## 2017-04-06 RX ADMIN — FLUCONAZOLE 400 MILLIGRAM(S): 150 TABLET ORAL at 11:42

## 2017-04-06 RX ADMIN — DIPHENHYDRAMINE HYDROCHLORIDE AND LIDOCAINE HYDROCHLORIDE AND ALUMINUM HYDROXIDE AND MAGNESIUM HYDRO 10 MILLILITER(S): KIT at 21:04

## 2017-04-06 RX ADMIN — URSODIOL 300 MILLIGRAM(S): 250 TABLET, FILM COATED ORAL at 07:56

## 2017-04-06 RX ADMIN — Medication 50 MILLIGRAM(S): at 10:13

## 2017-04-06 RX ADMIN — HYDROMORPHONE HYDROCHLORIDE 0.5 MILLIGRAM(S): 2 INJECTION INTRAMUSCULAR; INTRAVENOUS; SUBCUTANEOUS at 18:22

## 2017-04-06 RX ADMIN — DIPHENHYDRAMINE HYDROCHLORIDE AND LIDOCAINE HYDROCHLORIDE AND ALUMINUM HYDROXIDE AND MAGNESIUM HYDRO 10 MILLILITER(S): KIT at 05:31

## 2017-04-06 RX ADMIN — Medication 1 MILLIGRAM(S): at 11:41

## 2017-04-06 RX ADMIN — HYDROMORPHONE HYDROCHLORIDE 0.5 MILLIGRAM(S): 2 INJECTION INTRAMUSCULAR; INTRAVENOUS; SUBCUTANEOUS at 18:57

## 2017-04-07 LAB
ALBUMIN SERPL ELPH-MCNC: 3.4 G/DL — SIGNIFICANT CHANGE UP (ref 3.3–5)
ALP SERPL-CCNC: 69 U/L — SIGNIFICANT CHANGE UP (ref 40–120)
ALT FLD-CCNC: 17 U/L RC — SIGNIFICANT CHANGE UP (ref 10–45)
ANION GAP SERPL CALC-SCNC: 13 MMOL/L — SIGNIFICANT CHANGE UP (ref 5–17)
APPEARANCE UR: CLEAR — SIGNIFICANT CHANGE UP
AST SERPL-CCNC: 11 U/L — SIGNIFICANT CHANGE UP (ref 10–40)
BILIRUB SERPL-MCNC: 0.5 MG/DL — SIGNIFICANT CHANGE UP (ref 0.2–1.2)
BILIRUB UR-MCNC: NEGATIVE — SIGNIFICANT CHANGE UP
BLD GP AB SCN SERPL QL: NEGATIVE — SIGNIFICANT CHANGE UP
BUN SERPL-MCNC: 13 MG/DL — SIGNIFICANT CHANGE UP (ref 7–23)
CALCIUM SERPL-MCNC: 8.5 MG/DL — SIGNIFICANT CHANGE UP (ref 8.4–10.5)
CHLORIDE SERPL-SCNC: 96 MMOL/L — SIGNIFICANT CHANGE UP (ref 96–108)
CO2 SERPL-SCNC: 26 MMOL/L — SIGNIFICANT CHANGE UP (ref 22–31)
COLOR SPEC: YELLOW — SIGNIFICANT CHANGE UP
CREAT SERPL-MCNC: 0.71 MG/DL — SIGNIFICANT CHANGE UP (ref 0.5–1.3)
DIFF PNL FLD: NEGATIVE — SIGNIFICANT CHANGE UP
GLUCOSE SERPL-MCNC: 128 MG/DL — HIGH (ref 70–99)
GLUCOSE UR QL: NEGATIVE — SIGNIFICANT CHANGE UP
HCT VFR BLD CALC: 19.3 % — CRITICAL LOW (ref 39–50)
HGB BLD-MCNC: 6.8 G/DL — CRITICAL LOW (ref 13–17)
KETONES UR-MCNC: NEGATIVE — SIGNIFICANT CHANGE UP
LDH SERPL L TO P-CCNC: 122 U/L — SIGNIFICANT CHANGE UP (ref 50–242)
LEUKOCYTE ESTERASE UR-ACNC: NEGATIVE — SIGNIFICANT CHANGE UP
MAGNESIUM SERPL-MCNC: 1.9 MG/DL — SIGNIFICANT CHANGE UP (ref 1.6–2.6)
MCHC RBC-ENTMCNC: 34 PG — SIGNIFICANT CHANGE UP (ref 27–34)
MCHC RBC-ENTMCNC: 35 GM/DL — SIGNIFICANT CHANGE UP (ref 32–36)
MCV RBC AUTO: 97.1 FL — SIGNIFICANT CHANGE UP (ref 80–100)
NITRITE UR-MCNC: NEGATIVE — SIGNIFICANT CHANGE UP
PH UR: 6 — SIGNIFICANT CHANGE UP (ref 4.8–8)
PHOSPHATE SERPL-MCNC: 3.2 MG/DL — SIGNIFICANT CHANGE UP (ref 2.5–4.5)
PLATELET # BLD AUTO: 61 K/UL — LOW (ref 150–400)
POTASSIUM SERPL-MCNC: 4 MMOL/L — SIGNIFICANT CHANGE UP (ref 3.5–5.3)
POTASSIUM SERPL-SCNC: 4 MMOL/L — SIGNIFICANT CHANGE UP (ref 3.5–5.3)
PROT SERPL-MCNC: 5.7 G/DL — LOW (ref 6–8.3)
PROT UR-MCNC: SIGNIFICANT CHANGE UP
RBC # BLD: 1.99 M/UL — LOW (ref 4.2–5.8)
RBC # FLD: 15.9 % — HIGH (ref 10.3–14.5)
RH IG SCN BLD-IMP: POSITIVE — SIGNIFICANT CHANGE UP
SODIUM SERPL-SCNC: 135 MMOL/L — SIGNIFICANT CHANGE UP (ref 135–145)
SP GR SPEC: 1.02 — SIGNIFICANT CHANGE UP (ref 1.01–1.02)
UROBILINOGEN FLD QL: NEGATIVE — SIGNIFICANT CHANGE UP
WBC # BLD: 0.1 K/UL — CRITICAL LOW (ref 3.8–10.5)
WBC # FLD AUTO: 0.1 K/UL — CRITICAL LOW (ref 3.8–10.5)

## 2017-04-07 PROCEDURE — 99291 CRITICAL CARE FIRST HOUR: CPT

## 2017-04-07 PROCEDURE — 71010: CPT | Mod: 26

## 2017-04-07 RX ORDER — LIDOCAINE 4 G/100G
15 CREAM TOPICAL
Qty: 0 | Refills: 0 | Status: DISCONTINUED | OUTPATIENT
Start: 2017-04-07 | End: 2017-05-03

## 2017-04-07 RX ORDER — LIDOCAINE 4 G/100G
1 CREAM TOPICAL
Qty: 0 | Refills: 0 | Status: DISCONTINUED | OUTPATIENT
Start: 2017-04-07 | End: 2017-04-07

## 2017-04-07 RX ORDER — FENTANYL CITRATE 50 UG/ML
1 INJECTION INTRAVENOUS
Qty: 0 | Refills: 0 | Status: DISCONTINUED | OUTPATIENT
Start: 2017-04-07 | End: 2017-04-08

## 2017-04-07 RX ADMIN — Medication 30 MILLILITER(S): at 20:32

## 2017-04-07 RX ADMIN — Medication 1 LOZENGE: at 13:03

## 2017-04-07 RX ADMIN — Medication 5880 MICROGRAM(S): at 17:01

## 2017-04-07 RX ADMIN — DIPHENHYDRAMINE HYDROCHLORIDE AND LIDOCAINE HYDROCHLORIDE AND ALUMINUM HYDROXIDE AND MAGNESIUM HYDRO 10 MILLILITER(S): KIT at 21:53

## 2017-04-07 RX ADMIN — HYDROMORPHONE HYDROCHLORIDE 0.5 MILLIGRAM(S): 2 INJECTION INTRAMUSCULAR; INTRAVENOUS; SUBCUTANEOUS at 18:17

## 2017-04-07 RX ADMIN — Medication 400 MILLIGRAM(S): at 21:47

## 2017-04-07 RX ADMIN — Medication 1 TABLET(S): at 13:02

## 2017-04-07 RX ADMIN — Medication 480 MICROGRAM(S): at 17:02

## 2017-04-07 RX ADMIN — URSODIOL 300 MILLIGRAM(S): 250 TABLET, FILM COATED ORAL at 17:00

## 2017-04-07 RX ADMIN — DIPHENHYDRAMINE HYDROCHLORIDE AND LIDOCAINE HYDROCHLORIDE AND ALUMINUM HYDROXIDE AND MAGNESIUM HYDRO 10 MILLILITER(S): KIT at 05:46

## 2017-04-07 RX ADMIN — Medication 30 MILLILITER(S): at 07:35

## 2017-04-07 RX ADMIN — HYDROMORPHONE HYDROCHLORIDE 0.5 MILLIGRAM(S): 2 INJECTION INTRAMUSCULAR; INTRAVENOUS; SUBCUTANEOUS at 07:34

## 2017-04-07 RX ADMIN — Medication 30 MILLILITER(S): at 13:01

## 2017-04-07 RX ADMIN — Medication 650 MILLIGRAM(S): at 13:53

## 2017-04-07 RX ADMIN — Medication 650 MILLIGRAM(S): at 09:30

## 2017-04-07 RX ADMIN — Medication 25 MILLIGRAM(S): at 13:53

## 2017-04-07 RX ADMIN — Medication 650 MILLIGRAM(S): at 21:47

## 2017-04-07 RX ADMIN — URSODIOL 300 MILLIGRAM(S): 250 TABLET, FILM COATED ORAL at 07:35

## 2017-04-07 RX ADMIN — HYDROMORPHONE HYDROCHLORIDE 0.5 MILLIGRAM(S): 2 INJECTION INTRAMUSCULAR; INTRAVENOUS; SUBCUTANEOUS at 22:02

## 2017-04-07 RX ADMIN — Medication 10 MILLILITER(S): at 13:46

## 2017-04-07 RX ADMIN — Medication 400 MILLIGRAM(S): at 05:45

## 2017-04-07 RX ADMIN — Medication 1 LOZENGE: at 20:32

## 2017-04-07 RX ADMIN — Medication 400 MILLIGRAM(S): at 13:01

## 2017-04-07 RX ADMIN — HYDROMORPHONE HYDROCHLORIDE 0.5 MILLIGRAM(S): 2 INJECTION INTRAMUSCULAR; INTRAVENOUS; SUBCUTANEOUS at 17:57

## 2017-04-07 RX ADMIN — Medication 10 MILLILITER(S): at 07:52

## 2017-04-07 RX ADMIN — FENTANYL CITRATE 1 PATCH: 50 INJECTION INTRAVENOUS at 13:52

## 2017-04-07 RX ADMIN — Medication 30 MILLILITER(S): at 17:00

## 2017-04-07 RX ADMIN — DIPHENHYDRAMINE HYDROCHLORIDE AND LIDOCAINE HYDROCHLORIDE AND ALUMINUM HYDROXIDE AND MAGNESIUM HYDRO 10 MILLILITER(S): KIT at 13:03

## 2017-04-07 RX ADMIN — Medication 1 LOZENGE: at 07:35

## 2017-04-07 RX ADMIN — TAMSULOSIN HYDROCHLORIDE 0.4 MILLIGRAM(S): 0.4 CAPSULE ORAL at 21:47

## 2017-04-07 RX ADMIN — PANTOPRAZOLE SODIUM 40 MILLIGRAM(S): 20 TABLET, DELAYED RELEASE ORAL at 13:02

## 2017-04-07 RX ADMIN — HYDROMORPHONE HYDROCHLORIDE 0.5 MILLIGRAM(S): 2 INJECTION INTRAMUSCULAR; INTRAVENOUS; SUBCUTANEOUS at 21:48

## 2017-04-07 RX ADMIN — FLUCONAZOLE 400 MILLIGRAM(S): 150 TABLET ORAL at 13:01

## 2017-04-07 RX ADMIN — CEFEPIME 100 MILLIGRAM(S): 1 INJECTION, POWDER, FOR SOLUTION INTRAMUSCULAR; INTRAVENOUS at 16:59

## 2017-04-07 RX ADMIN — Medication 10 MILLILITER(S): at 17:03

## 2017-04-07 RX ADMIN — Medication 1 LOZENGE: at 17:00

## 2017-04-07 RX ADMIN — Medication 10 MILLILITER(S): at 20:32

## 2017-04-07 RX ADMIN — CEFEPIME 100 MILLIGRAM(S): 1 INJECTION, POWDER, FOR SOLUTION INTRAMUSCULAR; INTRAVENOUS at 07:44

## 2017-04-07 RX ADMIN — SODIUM CHLORIDE 50 MILLILITER(S): 9 INJECTION INTRAMUSCULAR; INTRAVENOUS; SUBCUTANEOUS at 05:45

## 2017-04-07 RX ADMIN — HYDROMORPHONE HYDROCHLORIDE 0.5 MILLIGRAM(S): 2 INJECTION INTRAMUSCULAR; INTRAVENOUS; SUBCUTANEOUS at 07:50

## 2017-04-07 RX ADMIN — Medication 1 MILLIGRAM(S): at 13:01

## 2017-04-07 NOTE — PHYSICAL THERAPY INITIAL EVALUATION ADULT - ADDITIONAL COMMENTS
pt lives in house w/ 1 flight of stairs/rail.  Pt is a community ambulator and is able to drive a car.

## 2017-04-07 NOTE — PHYSICAL THERAPY INITIAL EVALUATION ADULT - PERTINENT HX OF CURRENT PROBLEM, REHAB EVAL
52 y/o male  diagnosed with Mantle Cell Lymphoma 10/2016, s/p 4 cycles of R-CHOP &  cycle 2  RICE now admitted for autologous stem cell transplant. 50 y/o male  diagnosed with Mantle Cell Lymphoma 10/2016, s/p 4 cycles of R-CHOP &  cycle 2  RICE now admitted for autologous stem cell transplant.jie on 4/5/17.

## 2017-04-08 LAB
ALBUMIN SERPL ELPH-MCNC: 3.5 G/DL — SIGNIFICANT CHANGE UP (ref 3.3–5)
ALP SERPL-CCNC: 68 U/L — SIGNIFICANT CHANGE UP (ref 40–120)
ALT FLD-CCNC: 14 U/L RC — SIGNIFICANT CHANGE UP (ref 10–45)
ANION GAP SERPL CALC-SCNC: 9 MMOL/L — SIGNIFICANT CHANGE UP (ref 5–17)
AST SERPL-CCNC: 12 U/L — SIGNIFICANT CHANGE UP (ref 10–40)
BILIRUB SERPL-MCNC: 0.4 MG/DL — SIGNIFICANT CHANGE UP (ref 0.2–1.2)
BUN SERPL-MCNC: 14 MG/DL — SIGNIFICANT CHANGE UP (ref 7–23)
CALCIUM SERPL-MCNC: 8.6 MG/DL — SIGNIFICANT CHANGE UP (ref 8.4–10.5)
CHLORIDE SERPL-SCNC: 99 MMOL/L — SIGNIFICANT CHANGE UP (ref 96–108)
CO2 SERPL-SCNC: 29 MMOL/L — SIGNIFICANT CHANGE UP (ref 22–31)
CREAT SERPL-MCNC: 0.53 MG/DL — SIGNIFICANT CHANGE UP (ref 0.5–1.3)
CULTURE RESULTS: NO GROWTH — SIGNIFICANT CHANGE UP
GLUCOSE SERPL-MCNC: 119 MG/DL — HIGH (ref 70–99)
HCT VFR BLD CALC: 21.4 % — LOW (ref 39–50)
HGB BLD-MCNC: 7.4 G/DL — LOW (ref 13–17)
LDH SERPL L TO P-CCNC: 107 U/L — SIGNIFICANT CHANGE UP (ref 50–242)
MAGNESIUM SERPL-MCNC: 2.2 MG/DL — SIGNIFICANT CHANGE UP (ref 1.6–2.6)
MCHC RBC-ENTMCNC: 33.4 PG — SIGNIFICANT CHANGE UP (ref 27–34)
MCHC RBC-ENTMCNC: 34.5 GM/DL — SIGNIFICANT CHANGE UP (ref 32–36)
MCV RBC AUTO: 96.6 FL — SIGNIFICANT CHANGE UP (ref 80–100)
PHOSPHATE SERPL-MCNC: 2.8 MG/DL — SIGNIFICANT CHANGE UP (ref 2.5–4.5)
PLATELET # BLD AUTO: 26 K/UL — LOW (ref 150–400)
POTASSIUM SERPL-MCNC: 4.2 MMOL/L — SIGNIFICANT CHANGE UP (ref 3.5–5.3)
POTASSIUM SERPL-SCNC: 4.2 MMOL/L — SIGNIFICANT CHANGE UP (ref 3.5–5.3)
PROT SERPL-MCNC: 5.8 G/DL — LOW (ref 6–8.3)
RBC # BLD: 2.22 M/UL — LOW (ref 4.2–5.8)
RBC # FLD: 15.4 % — HIGH (ref 10.3–14.5)
SODIUM SERPL-SCNC: 137 MMOL/L — SIGNIFICANT CHANGE UP (ref 135–145)
SPECIMEN SOURCE: SIGNIFICANT CHANGE UP
WBC # BLD: 0.1 K/UL — CRITICAL LOW (ref 3.8–10.5)
WBC # FLD AUTO: 0.1 K/UL — CRITICAL LOW (ref 3.8–10.5)

## 2017-04-08 PROCEDURE — 99291 CRITICAL CARE FIRST HOUR: CPT

## 2017-04-08 RX ORDER — HYDROMORPHONE HYDROCHLORIDE 2 MG/ML
0.5 INJECTION INTRAMUSCULAR; INTRAVENOUS; SUBCUTANEOUS
Qty: 0 | Refills: 0 | Status: DISCONTINUED | OUTPATIENT
Start: 2017-04-08 | End: 2017-04-10

## 2017-04-08 RX ORDER — DIPHENHYDRAMINE HYDROCHLORIDE AND LIDOCAINE HYDROCHLORIDE AND ALUMINUM HYDROXIDE AND MAGNESIUM HYDRO
5 KIT
Qty: 0 | Refills: 0 | Status: DISCONTINUED | OUTPATIENT
Start: 2017-04-08 | End: 2017-05-03

## 2017-04-08 RX ORDER — OXYCODONE HYDROCHLORIDE 5 MG/1
5 TABLET ORAL EVERY 6 HOURS
Qty: 0 | Refills: 0 | Status: DISCONTINUED | OUTPATIENT
Start: 2017-04-08 | End: 2017-04-14

## 2017-04-08 RX ORDER — NYSTATIN CREAM 100000 [USP'U]/G
1 CREAM TOPICAL EVERY 12 HOURS
Qty: 0 | Refills: 0 | Status: DISCONTINUED | OUTPATIENT
Start: 2017-04-08 | End: 2017-05-05

## 2017-04-08 RX ORDER — FENTANYL CITRATE 50 UG/ML
1 INJECTION INTRAVENOUS
Qty: 0 | Refills: 0 | Status: DISCONTINUED | OUTPATIENT
Start: 2017-04-08 | End: 2017-04-14

## 2017-04-08 RX ORDER — HYDROCORTISONE 1 %
1 OINTMENT (GRAM) TOPICAL EVERY 12 HOURS
Qty: 0 | Refills: 0 | Status: DISCONTINUED | OUTPATIENT
Start: 2017-04-08 | End: 2017-05-05

## 2017-04-08 RX ADMIN — Medication 1 LOZENGE: at 20:04

## 2017-04-08 RX ADMIN — Medication 10 MILLILITER(S): at 20:05

## 2017-04-08 RX ADMIN — OXYCODONE HYDROCHLORIDE 5 MILLIGRAM(S): 5 TABLET ORAL at 13:57

## 2017-04-08 RX ADMIN — HYDROMORPHONE HYDROCHLORIDE 0.5 MILLIGRAM(S): 2 INJECTION INTRAMUSCULAR; INTRAVENOUS; SUBCUTANEOUS at 02:20

## 2017-04-08 RX ADMIN — Medication 30 MILLILITER(S): at 20:04

## 2017-04-08 RX ADMIN — PANTOPRAZOLE SODIUM 40 MILLIGRAM(S): 20 TABLET, DELAYED RELEASE ORAL at 13:23

## 2017-04-08 RX ADMIN — Medication 400 MILLIGRAM(S): at 21:18

## 2017-04-08 RX ADMIN — Medication 1 LOZENGE: at 00:32

## 2017-04-08 RX ADMIN — OXYCODONE HYDROCHLORIDE 5 MILLIGRAM(S): 5 TABLET ORAL at 21:23

## 2017-04-08 RX ADMIN — CEFEPIME 100 MILLIGRAM(S): 1 INJECTION, POWDER, FOR SOLUTION INTRAMUSCULAR; INTRAVENOUS at 00:34

## 2017-04-08 RX ADMIN — Medication 1 MILLIGRAM(S): at 13:23

## 2017-04-08 RX ADMIN — HYDROMORPHONE HYDROCHLORIDE 0.5 MILLIGRAM(S): 2 INJECTION INTRAMUSCULAR; INTRAVENOUS; SUBCUTANEOUS at 22:15

## 2017-04-08 RX ADMIN — HEPARIN SODIUM 4.07 UNIT(S)/HR: 5000 INJECTION INTRAVENOUS; SUBCUTANEOUS at 17:06

## 2017-04-08 RX ADMIN — TAMSULOSIN HYDROCHLORIDE 0.4 MILLIGRAM(S): 0.4 CAPSULE ORAL at 21:18

## 2017-04-08 RX ADMIN — HYDROMORPHONE HYDROCHLORIDE 0.5 MILLIGRAM(S): 2 INJECTION INTRAMUSCULAR; INTRAVENOUS; SUBCUTANEOUS at 22:45

## 2017-04-08 RX ADMIN — HYDROMORPHONE HYDROCHLORIDE 0.5 MILLIGRAM(S): 2 INJECTION INTRAMUSCULAR; INTRAVENOUS; SUBCUTANEOUS at 02:07

## 2017-04-08 RX ADMIN — Medication 1: at 17:03

## 2017-04-08 RX ADMIN — FLUCONAZOLE 400 MILLIGRAM(S): 150 TABLET ORAL at 13:23

## 2017-04-08 RX ADMIN — HYDROMORPHONE HYDROCHLORIDE 0.5 MILLIGRAM(S): 2 INJECTION INTRAMUSCULAR; INTRAVENOUS; SUBCUTANEOUS at 08:49

## 2017-04-08 RX ADMIN — CEFEPIME 100 MILLIGRAM(S): 1 INJECTION, POWDER, FOR SOLUTION INTRAMUSCULAR; INTRAVENOUS at 08:37

## 2017-04-08 RX ADMIN — FENTANYL CITRATE 1 PATCH: 50 INJECTION INTRAVENOUS at 10:36

## 2017-04-08 RX ADMIN — URSODIOL 300 MILLIGRAM(S): 250 TABLET, FILM COATED ORAL at 08:37

## 2017-04-08 RX ADMIN — Medication 1 MILLIGRAM(S): at 22:06

## 2017-04-08 RX ADMIN — Medication 400 MILLIGRAM(S): at 13:23

## 2017-04-08 RX ADMIN — Medication 30 MILLILITER(S): at 13:22

## 2017-04-08 RX ADMIN — OXYCODONE HYDROCHLORIDE 5 MILLIGRAM(S): 5 TABLET ORAL at 21:53

## 2017-04-08 RX ADMIN — NYSTATIN CREAM 1 APPLICATION(S): 100000 CREAM TOPICAL at 17:12

## 2017-04-08 RX ADMIN — OXYCODONE HYDROCHLORIDE 5 MILLIGRAM(S): 5 TABLET ORAL at 12:57

## 2017-04-08 RX ADMIN — Medication 650 MILLIGRAM(S): at 14:58

## 2017-04-08 RX ADMIN — Medication 10 MILLILITER(S): at 17:12

## 2017-04-08 RX ADMIN — Medication 400 MILLIGRAM(S): at 05:55

## 2017-04-08 RX ADMIN — Medication 650 MILLIGRAM(S): at 06:03

## 2017-04-08 RX ADMIN — Medication 30 MILLILITER(S): at 17:10

## 2017-04-08 RX ADMIN — Medication 1 TABLET(S): at 13:23

## 2017-04-08 RX ADMIN — DIPHENHYDRAMINE HYDROCHLORIDE AND LIDOCAINE HYDROCHLORIDE AND ALUMINUM HYDROXIDE AND MAGNESIUM HYDRO 5 MILLILITER(S): KIT at 17:12

## 2017-04-08 RX ADMIN — Medication 1 LOZENGE: at 13:22

## 2017-04-08 RX ADMIN — CEFEPIME 100 MILLIGRAM(S): 1 INJECTION, POWDER, FOR SOLUTION INTRAMUSCULAR; INTRAVENOUS at 17:07

## 2017-04-08 RX ADMIN — Medication 1 APPLICATION(S): at 17:12

## 2017-04-08 RX ADMIN — Medication 10 MILLILITER(S): at 13:24

## 2017-04-08 RX ADMIN — Medication 30 MILLILITER(S): at 00:32

## 2017-04-08 RX ADMIN — URSODIOL 300 MILLIGRAM(S): 250 TABLET, FILM COATED ORAL at 17:10

## 2017-04-08 RX ADMIN — Medication 10 MILLILITER(S): at 08:41

## 2017-04-08 RX ADMIN — LIDOCAINE 15 MILLILITER(S): 4 CREAM TOPICAL at 17:12

## 2017-04-08 RX ADMIN — Medication 650 MILLIGRAM(S): at 18:48

## 2017-04-08 RX ADMIN — Medication 30 MILLILITER(S): at 08:41

## 2017-04-08 RX ADMIN — DIPHENHYDRAMINE HYDROCHLORIDE AND LIDOCAINE HYDROCHLORIDE AND ALUMINUM HYDROXIDE AND MAGNESIUM HYDRO 10 MILLILITER(S): KIT at 06:05

## 2017-04-08 RX ADMIN — Medication 1 LOZENGE: at 08:41

## 2017-04-08 RX ADMIN — Medication 650 MILLIGRAM(S): at 06:30

## 2017-04-08 RX ADMIN — Medication 10 MILLILITER(S): at 00:33

## 2017-04-08 RX ADMIN — Medication 1 LOZENGE: at 17:10

## 2017-04-08 RX ADMIN — Medication 480 MICROGRAM(S): at 17:05

## 2017-04-08 RX ADMIN — SODIUM CHLORIDE 50 MILLILITER(S): 9 INJECTION INTRAMUSCULAR; INTRAVENOUS; SUBCUTANEOUS at 17:11

## 2017-04-08 RX ADMIN — HYDROMORPHONE HYDROCHLORIDE 0.5 MILLIGRAM(S): 2 INJECTION INTRAMUSCULAR; INTRAVENOUS; SUBCUTANEOUS at 08:34

## 2017-04-09 LAB
ALBUMIN SERPL ELPH-MCNC: 3.3 G/DL — SIGNIFICANT CHANGE UP (ref 3.3–5)
ALP SERPL-CCNC: 68 U/L — SIGNIFICANT CHANGE UP (ref 40–120)
ALT FLD-CCNC: 12 U/L RC — SIGNIFICANT CHANGE UP (ref 10–45)
ANION GAP SERPL CALC-SCNC: 11 MMOL/L — SIGNIFICANT CHANGE UP (ref 5–17)
AST SERPL-CCNC: 11 U/L — SIGNIFICANT CHANGE UP (ref 10–40)
BILIRUB SERPL-MCNC: 0.4 MG/DL — SIGNIFICANT CHANGE UP (ref 0.2–1.2)
BUN SERPL-MCNC: 13 MG/DL — SIGNIFICANT CHANGE UP (ref 7–23)
CALCIUM SERPL-MCNC: 8.8 MG/DL — SIGNIFICANT CHANGE UP (ref 8.4–10.5)
CHLORIDE SERPL-SCNC: 99 MMOL/L — SIGNIFICANT CHANGE UP (ref 96–108)
CO2 SERPL-SCNC: 28 MMOL/L — SIGNIFICANT CHANGE UP (ref 22–31)
CREAT SERPL-MCNC: 0.57 MG/DL — SIGNIFICANT CHANGE UP (ref 0.5–1.3)
GLUCOSE SERPL-MCNC: 114 MG/DL — HIGH (ref 70–99)
HCT VFR BLD CALC: 20.2 % — CRITICAL LOW (ref 39–50)
HGB BLD-MCNC: 6.8 G/DL — CRITICAL LOW (ref 13–17)
LDH SERPL L TO P-CCNC: 97 U/L — SIGNIFICANT CHANGE UP (ref 50–242)
MAGNESIUM SERPL-MCNC: 2 MG/DL — SIGNIFICANT CHANGE UP (ref 1.6–2.6)
MCHC RBC-ENTMCNC: 32.7 PG — SIGNIFICANT CHANGE UP (ref 27–34)
MCHC RBC-ENTMCNC: 33.7 GM/DL — SIGNIFICANT CHANGE UP (ref 32–36)
MCV RBC AUTO: 96.9 FL — SIGNIFICANT CHANGE UP (ref 80–100)
PHOSPHATE SERPL-MCNC: 2.5 MG/DL — SIGNIFICANT CHANGE UP (ref 2.5–4.5)
PLATELET # BLD AUTO: 11 K/UL — CRITICAL LOW (ref 150–400)
POTASSIUM SERPL-MCNC: 4.2 MMOL/L — SIGNIFICANT CHANGE UP (ref 3.5–5.3)
POTASSIUM SERPL-SCNC: 4.2 MMOL/L — SIGNIFICANT CHANGE UP (ref 3.5–5.3)
PROT SERPL-MCNC: 5.8 G/DL — LOW (ref 6–8.3)
RBC # BLD: 2.08 M/UL — LOW (ref 4.2–5.8)
RBC # FLD: 15 % — HIGH (ref 10.3–14.5)
SODIUM SERPL-SCNC: 138 MMOL/L — SIGNIFICANT CHANGE UP (ref 135–145)
WBC # BLD: 0.1 K/UL — CRITICAL LOW (ref 3.8–10.5)
WBC # FLD AUTO: 0.1 K/UL — CRITICAL LOW (ref 3.8–10.5)

## 2017-04-09 PROCEDURE — 99291 CRITICAL CARE FIRST HOUR: CPT

## 2017-04-09 RX ORDER — MORPHINE SULFATE 50 MG/1
2 CAPSULE, EXTENDED RELEASE ORAL ONCE
Qty: 0 | Refills: 0 | Status: DISCONTINUED | OUTPATIENT
Start: 2017-04-09 | End: 2017-04-10

## 2017-04-09 RX ADMIN — Medication 10 MILLILITER(S): at 11:59

## 2017-04-09 RX ADMIN — Medication 1 LOZENGE: at 00:05

## 2017-04-09 RX ADMIN — HYDROMORPHONE HYDROCHLORIDE 0.5 MILLIGRAM(S): 2 INJECTION INTRAMUSCULAR; INTRAVENOUS; SUBCUTANEOUS at 06:18

## 2017-04-09 RX ADMIN — Medication 1 LOZENGE: at 11:54

## 2017-04-09 RX ADMIN — Medication 30 MILLILITER(S): at 00:05

## 2017-04-09 RX ADMIN — HYDROMORPHONE HYDROCHLORIDE 0.5 MILLIGRAM(S): 2 INJECTION INTRAMUSCULAR; INTRAVENOUS; SUBCUTANEOUS at 05:48

## 2017-04-09 RX ADMIN — Medication 30 MILLILITER(S): at 20:08

## 2017-04-09 RX ADMIN — SODIUM CHLORIDE 50 MILLILITER(S): 9 INJECTION INTRAMUSCULAR; INTRAVENOUS; SUBCUTANEOUS at 08:02

## 2017-04-09 RX ADMIN — HYDROMORPHONE HYDROCHLORIDE 0.5 MILLIGRAM(S): 2 INJECTION INTRAMUSCULAR; INTRAVENOUS; SUBCUTANEOUS at 20:38

## 2017-04-09 RX ADMIN — Medication 1 APPLICATION(S): at 05:43

## 2017-04-09 RX ADMIN — Medication 400 MILLIGRAM(S): at 14:21

## 2017-04-09 RX ADMIN — Medication 25 MILLIGRAM(S): at 09:56

## 2017-04-09 RX ADMIN — PANTOPRAZOLE SODIUM 40 MILLIGRAM(S): 20 TABLET, DELAYED RELEASE ORAL at 11:55

## 2017-04-09 RX ADMIN — Medication 10 MILLILITER(S): at 16:02

## 2017-04-09 RX ADMIN — Medication 650 MILLIGRAM(S): at 20:50

## 2017-04-09 RX ADMIN — NYSTATIN CREAM 1 APPLICATION(S): 100000 CREAM TOPICAL at 05:43

## 2017-04-09 RX ADMIN — Medication 650 MILLIGRAM(S): at 08:45

## 2017-04-09 RX ADMIN — Medication 1 APPLICATION(S): at 17:48

## 2017-04-09 RX ADMIN — Medication 30 MILLILITER(S): at 08:02

## 2017-04-09 RX ADMIN — Medication 10 MILLILITER(S): at 08:03

## 2017-04-09 RX ADMIN — Medication 10 MILLILITER(S): at 20:08

## 2017-04-09 RX ADMIN — HYDROMORPHONE HYDROCHLORIDE 0.5 MILLIGRAM(S): 2 INJECTION INTRAMUSCULAR; INTRAVENOUS; SUBCUTANEOUS at 20:08

## 2017-04-09 RX ADMIN — Medication 1: at 11:54

## 2017-04-09 RX ADMIN — FLUCONAZOLE 400 MILLIGRAM(S): 150 TABLET ORAL at 11:54

## 2017-04-09 RX ADMIN — CEFEPIME 100 MILLIGRAM(S): 1 INJECTION, POWDER, FOR SOLUTION INTRAMUSCULAR; INTRAVENOUS at 16:01

## 2017-04-09 RX ADMIN — Medication 10 MILLILITER(S): at 00:00

## 2017-04-09 RX ADMIN — CEFEPIME 100 MILLIGRAM(S): 1 INJECTION, POWDER, FOR SOLUTION INTRAMUSCULAR; INTRAVENOUS at 08:02

## 2017-04-09 RX ADMIN — Medication 480 MICROGRAM(S): at 14:21

## 2017-04-09 RX ADMIN — TAMSULOSIN HYDROCHLORIDE 0.4 MILLIGRAM(S): 0.4 CAPSULE ORAL at 23:03

## 2017-04-09 RX ADMIN — Medication 1 TABLET(S): at 11:54

## 2017-04-09 RX ADMIN — URSODIOL 300 MILLIGRAM(S): 250 TABLET, FILM COATED ORAL at 08:02

## 2017-04-09 RX ADMIN — CEFEPIME 100 MILLIGRAM(S): 1 INJECTION, POWDER, FOR SOLUTION INTRAMUSCULAR; INTRAVENOUS at 00:02

## 2017-04-09 RX ADMIN — Medication 30 MILLILITER(S): at 16:01

## 2017-04-09 RX ADMIN — Medication 400 MILLIGRAM(S): at 05:43

## 2017-04-09 RX ADMIN — URSODIOL 300 MILLIGRAM(S): 250 TABLET, FILM COATED ORAL at 17:08

## 2017-04-09 RX ADMIN — Medication 30 MILLILITER(S): at 11:54

## 2017-04-09 RX ADMIN — Medication 1 MILLIGRAM(S): at 11:54

## 2017-04-09 RX ADMIN — SODIUM CHLORIDE 50 MILLILITER(S): 9 INJECTION INTRAMUSCULAR; INTRAVENOUS; SUBCUTANEOUS at 23:03

## 2017-04-09 RX ADMIN — Medication 650 MILLIGRAM(S): at 09:15

## 2017-04-09 RX ADMIN — Medication 1 LOZENGE: at 16:01

## 2017-04-09 RX ADMIN — NYSTATIN CREAM 1 APPLICATION(S): 100000 CREAM TOPICAL at 17:48

## 2017-04-09 RX ADMIN — Medication 1 LOZENGE: at 08:02

## 2017-04-09 RX ADMIN — HEPARIN SODIUM 4.07 UNIT(S)/HR: 5000 INJECTION INTRAVENOUS; SUBCUTANEOUS at 17:08

## 2017-04-09 RX ADMIN — Medication 400 MILLIGRAM(S): at 23:03

## 2017-04-09 RX ADMIN — Medication 1 LOZENGE: at 20:08

## 2017-04-10 LAB
ALBUMIN SERPL ELPH-MCNC: 3.5 G/DL — SIGNIFICANT CHANGE UP (ref 3.3–5)
ALBUMIN SERPL ELPH-MCNC: 3.6 G/DL — SIGNIFICANT CHANGE UP (ref 3.3–5)
ALP SERPL-CCNC: 70 U/L — SIGNIFICANT CHANGE UP (ref 40–120)
ALP SERPL-CCNC: 72 U/L — SIGNIFICANT CHANGE UP (ref 40–120)
ALT FLD-CCNC: 15 U/L RC — SIGNIFICANT CHANGE UP (ref 10–45)
ALT FLD-CCNC: 17 U/L RC — SIGNIFICANT CHANGE UP (ref 10–45)
ANION GAP SERPL CALC-SCNC: 11 MMOL/L — SIGNIFICANT CHANGE UP (ref 5–17)
ANION GAP SERPL CALC-SCNC: 13 MMOL/L — SIGNIFICANT CHANGE UP (ref 5–17)
APPEARANCE UR: CLEAR — SIGNIFICANT CHANGE UP
APTT BLD: 25 SEC — LOW (ref 27.5–37.4)
APTT BLD: 26 SEC — LOW (ref 27.5–37.4)
AST SERPL-CCNC: 13 U/L — SIGNIFICANT CHANGE UP (ref 10–40)
AST SERPL-CCNC: 13 U/L — SIGNIFICANT CHANGE UP (ref 10–40)
BILIRUB SERPL-MCNC: 0.4 MG/DL — SIGNIFICANT CHANGE UP (ref 0.2–1.2)
BILIRUB SERPL-MCNC: 0.9 MG/DL — SIGNIFICANT CHANGE UP (ref 0.2–1.2)
BILIRUB UR-MCNC: NEGATIVE — SIGNIFICANT CHANGE UP
BLD GP AB SCN SERPL QL: NEGATIVE — SIGNIFICANT CHANGE UP
BUN SERPL-MCNC: 14 MG/DL — SIGNIFICANT CHANGE UP (ref 7–23)
BUN SERPL-MCNC: 16 MG/DL — SIGNIFICANT CHANGE UP (ref 7–23)
CALCIUM SERPL-MCNC: 8.7 MG/DL — SIGNIFICANT CHANGE UP (ref 8.4–10.5)
CALCIUM SERPL-MCNC: 8.8 MG/DL — SIGNIFICANT CHANGE UP (ref 8.4–10.5)
CHLORIDE SERPL-SCNC: 100 MMOL/L — SIGNIFICANT CHANGE UP (ref 96–108)
CHLORIDE SERPL-SCNC: 100 MMOL/L — SIGNIFICANT CHANGE UP (ref 96–108)
CK MB BLD-MCNC: 4.3 % — HIGH (ref 0–3.5)
CK MB CFR SERPL CALC: 1 NG/ML — SIGNIFICANT CHANGE UP (ref 0–6.7)
CK SERPL-CCNC: 23 U/L — LOW (ref 30–200)
CO2 SERPL-SCNC: 26 MMOL/L — SIGNIFICANT CHANGE UP (ref 22–31)
CO2 SERPL-SCNC: 27 MMOL/L — SIGNIFICANT CHANGE UP (ref 22–31)
COLOR SPEC: YELLOW — SIGNIFICANT CHANGE UP
CREAT SERPL-MCNC: 0.56 MG/DL — SIGNIFICANT CHANGE UP (ref 0.5–1.3)
CREAT SERPL-MCNC: 0.65 MG/DL — SIGNIFICANT CHANGE UP (ref 0.5–1.3)
D DIMER BLD IA.RAPID-MCNC: 396 NG/ML DDU — HIGH
DIFF PNL FLD: NEGATIVE — SIGNIFICANT CHANGE UP
FIBRINOGEN PPP-MCNC: 976 MG/DL — HIGH (ref 310–510)
GLUCOSE SERPL-MCNC: 145 MG/DL — HIGH (ref 70–99)
GLUCOSE SERPL-MCNC: 159 MG/DL — HIGH (ref 70–99)
GLUCOSE UR QL: NEGATIVE MG/DL — SIGNIFICANT CHANGE UP
HCT VFR BLD CALC: 21 % — CRITICAL LOW (ref 39–50)
HCT VFR BLD CALC: 22.8 % — LOW (ref 39–50)
HGB BLD-MCNC: 7.2 G/DL — LOW (ref 13–17)
HGB BLD-MCNC: 8.2 G/DL — LOW (ref 13–17)
INR BLD: 1.06 RATIO — SIGNIFICANT CHANGE UP (ref 0.88–1.16)
INR BLD: 1.08 RATIO — SIGNIFICANT CHANGE UP (ref 0.88–1.16)
KETONES UR-MCNC: NEGATIVE — SIGNIFICANT CHANGE UP
LDH SERPL L TO P-CCNC: 110 U/L — SIGNIFICANT CHANGE UP (ref 50–242)
LEUKOCYTE ESTERASE UR-ACNC: NEGATIVE — SIGNIFICANT CHANGE UP
MAGNESIUM SERPL-MCNC: 2 MG/DL — SIGNIFICANT CHANGE UP (ref 1.6–2.6)
MAGNESIUM SERPL-MCNC: 2.1 MG/DL — SIGNIFICANT CHANGE UP (ref 1.6–2.6)
MCHC RBC-ENTMCNC: 32.5 PG — SIGNIFICANT CHANGE UP (ref 27–34)
MCHC RBC-ENTMCNC: 33.4 PG — SIGNIFICANT CHANGE UP (ref 27–34)
MCHC RBC-ENTMCNC: 34.4 GM/DL — SIGNIFICANT CHANGE UP (ref 32–36)
MCHC RBC-ENTMCNC: 36.1 GM/DL — HIGH (ref 32–36)
MCV RBC AUTO: 92.5 FL — SIGNIFICANT CHANGE UP (ref 80–100)
MCV RBC AUTO: 94.6 FL — SIGNIFICANT CHANGE UP (ref 80–100)
NITRITE UR-MCNC: NEGATIVE — SIGNIFICANT CHANGE UP
PH UR: 8 — SIGNIFICANT CHANGE UP (ref 5–8)
PHOSPHATE SERPL-MCNC: 2.5 MG/DL — SIGNIFICANT CHANGE UP (ref 2.5–4.5)
PHOSPHATE SERPL-MCNC: 2.5 MG/DL — SIGNIFICANT CHANGE UP (ref 2.5–4.5)
PLATELET # BLD AUTO: 15 K/UL — CRITICAL LOW (ref 150–400)
PLATELET # BLD AUTO: 15 K/UL — CRITICAL LOW (ref 150–400)
POTASSIUM SERPL-MCNC: 4.6 MMOL/L — SIGNIFICANT CHANGE UP (ref 3.5–5.3)
POTASSIUM SERPL-MCNC: 4.6 MMOL/L — SIGNIFICANT CHANGE UP (ref 3.5–5.3)
POTASSIUM SERPL-SCNC: 4.6 MMOL/L — SIGNIFICANT CHANGE UP (ref 3.5–5.3)
POTASSIUM SERPL-SCNC: 4.6 MMOL/L — SIGNIFICANT CHANGE UP (ref 3.5–5.3)
PROT SERPL-MCNC: 6.2 G/DL — SIGNIFICANT CHANGE UP (ref 6–8.3)
PROT SERPL-MCNC: 6.5 G/DL — SIGNIFICANT CHANGE UP (ref 6–8.3)
PROT UR-MCNC: NEGATIVE MG/DL — SIGNIFICANT CHANGE UP
PROTHROM AB SERPL-ACNC: 11.5 SEC — SIGNIFICANT CHANGE UP (ref 9.8–12.7)
PROTHROM AB SERPL-ACNC: 11.7 SEC — SIGNIFICANT CHANGE UP (ref 9.8–12.7)
RBC # BLD: 2.22 M/UL — LOW (ref 4.2–5.8)
RBC # BLD: 2.46 M/UL — LOW (ref 4.2–5.8)
RBC # FLD: 15.5 % — HIGH (ref 10.3–14.5)
RBC # FLD: 15.5 % — HIGH (ref 10.3–14.5)
RH IG SCN BLD-IMP: POSITIVE — SIGNIFICANT CHANGE UP
SODIUM SERPL-SCNC: 138 MMOL/L — SIGNIFICANT CHANGE UP (ref 135–145)
SODIUM SERPL-SCNC: 139 MMOL/L — SIGNIFICANT CHANGE UP (ref 135–145)
SP GR SPEC: 1.01 — SIGNIFICANT CHANGE UP (ref 1.01–1.02)
TROPONIN T SERPL-MCNC: <0.01 NG/ML — SIGNIFICANT CHANGE UP (ref 0–0.06)
UROBILINOGEN FLD QL: NEGATIVE MG/DL — SIGNIFICANT CHANGE UP
WBC # BLD: 0.1 K/UL — CRITICAL LOW (ref 3.8–10.5)
WBC # BLD: 0.1 K/UL — CRITICAL LOW (ref 3.8–10.5)
WBC # FLD AUTO: 0.1 K/UL — CRITICAL LOW (ref 3.8–10.5)
WBC # FLD AUTO: 0.1 K/UL — CRITICAL LOW (ref 3.8–10.5)

## 2017-04-10 PROCEDURE — 71010: CPT | Mod: 26

## 2017-04-10 PROCEDURE — 99291 CRITICAL CARE FIRST HOUR: CPT

## 2017-04-10 PROCEDURE — 93010 ELECTROCARDIOGRAM REPORT: CPT

## 2017-04-10 RX ORDER — MEROPENEM 1 G/30ML
INJECTION INTRAVENOUS
Qty: 0 | Refills: 0 | Status: DISCONTINUED | OUTPATIENT
Start: 2017-04-10 | End: 2017-04-16

## 2017-04-10 RX ORDER — MEROPENEM 1 G/30ML
2000 INJECTION INTRAVENOUS ONCE
Qty: 0 | Refills: 0 | Status: COMPLETED | OUTPATIENT
Start: 2017-04-10 | End: 2017-04-10

## 2017-04-10 RX ORDER — HYDROMORPHONE HYDROCHLORIDE 2 MG/ML
1 INJECTION INTRAMUSCULAR; INTRAVENOUS; SUBCUTANEOUS EVERY 4 HOURS
Qty: 0 | Refills: 0 | Status: DISCONTINUED | OUTPATIENT
Start: 2017-04-10 | End: 2017-04-15

## 2017-04-10 RX ORDER — MEROPENEM 1 G/30ML
2000 INJECTION INTRAVENOUS EVERY 8 HOURS
Qty: 0 | Refills: 0 | Status: DISCONTINUED | OUTPATIENT
Start: 2017-04-10 | End: 2017-04-16

## 2017-04-10 RX ORDER — SUCRALFATE 1 G
1 TABLET ORAL ONCE
Qty: 0 | Refills: 0 | Status: COMPLETED | OUTPATIENT
Start: 2017-04-10 | End: 2017-04-10

## 2017-04-10 RX ORDER — VANCOMYCIN HCL 1 G
1000 VIAL (EA) INTRAVENOUS ONCE
Qty: 0 | Refills: 0 | Status: COMPLETED | OUTPATIENT
Start: 2017-04-10 | End: 2017-04-10

## 2017-04-10 RX ADMIN — MEROPENEM 200 MILLIGRAM(S): 1 INJECTION INTRAVENOUS at 22:01

## 2017-04-10 RX ADMIN — Medication 10 MILLILITER(S): at 08:13

## 2017-04-10 RX ADMIN — Medication 30 MILLILITER(S): at 20:08

## 2017-04-10 RX ADMIN — HYDROMORPHONE HYDROCHLORIDE 0.5 MILLIGRAM(S): 2 INJECTION INTRAMUSCULAR; INTRAVENOUS; SUBCUTANEOUS at 09:08

## 2017-04-10 RX ADMIN — TAMSULOSIN HYDROCHLORIDE 0.4 MILLIGRAM(S): 0.4 CAPSULE ORAL at 21:55

## 2017-04-10 RX ADMIN — SODIUM CHLORIDE 50 MILLILITER(S): 9 INJECTION INTRAMUSCULAR; INTRAVENOUS; SUBCUTANEOUS at 21:55

## 2017-04-10 RX ADMIN — NYSTATIN CREAM 1 APPLICATION(S): 100000 CREAM TOPICAL at 06:06

## 2017-04-10 RX ADMIN — Medication 1 LOZENGE: at 20:08

## 2017-04-10 RX ADMIN — HYDROMORPHONE HYDROCHLORIDE 0.5 MILLIGRAM(S): 2 INJECTION INTRAMUSCULAR; INTRAVENOUS; SUBCUTANEOUS at 09:47

## 2017-04-10 RX ADMIN — Medication 250 MILLIGRAM(S): at 18:07

## 2017-04-10 RX ADMIN — PANTOPRAZOLE SODIUM 40 MILLIGRAM(S): 20 TABLET, DELAYED RELEASE ORAL at 11:57

## 2017-04-10 RX ADMIN — URSODIOL 300 MILLIGRAM(S): 250 TABLET, FILM COATED ORAL at 18:04

## 2017-04-10 RX ADMIN — HYDROMORPHONE HYDROCHLORIDE 0.5 MILLIGRAM(S): 2 INJECTION INTRAMUSCULAR; INTRAVENOUS; SUBCUTANEOUS at 01:35

## 2017-04-10 RX ADMIN — Medication 400 MILLIGRAM(S): at 21:54

## 2017-04-10 RX ADMIN — Medication 1 MILLIGRAM(S): at 11:58

## 2017-04-10 RX ADMIN — MORPHINE SULFATE 2 MILLIGRAM(S): 50 CAPSULE, EXTENDED RELEASE ORAL at 00:10

## 2017-04-10 RX ADMIN — Medication 1 APPLICATION(S): at 06:06

## 2017-04-10 RX ADMIN — FLUCONAZOLE 400 MILLIGRAM(S): 150 TABLET ORAL at 11:57

## 2017-04-10 RX ADMIN — Medication 650 MILLIGRAM(S): at 10:26

## 2017-04-10 RX ADMIN — HYDROMORPHONE HYDROCHLORIDE 1 MILLIGRAM(S): 2 INJECTION INTRAMUSCULAR; INTRAVENOUS; SUBCUTANEOUS at 22:00

## 2017-04-10 RX ADMIN — Medication 30 MILLILITER(S): at 21:45

## 2017-04-10 RX ADMIN — Medication 650 MILLIGRAM(S): at 21:55

## 2017-04-10 RX ADMIN — Medication 1 TABLET(S): at 11:58

## 2017-04-10 RX ADMIN — Medication 1: at 18:05

## 2017-04-10 RX ADMIN — MORPHINE SULFATE 2 MILLIGRAM(S): 50 CAPSULE, EXTENDED RELEASE ORAL at 00:30

## 2017-04-10 RX ADMIN — Medication 10 MILLILITER(S): at 12:00

## 2017-04-10 RX ADMIN — CEFEPIME 100 MILLIGRAM(S): 1 INJECTION, POWDER, FOR SOLUTION INTRAMUSCULAR; INTRAVENOUS at 01:00

## 2017-04-10 RX ADMIN — Medication 1 LOZENGE: at 08:02

## 2017-04-10 RX ADMIN — Medication 650 MILLIGRAM(S): at 15:54

## 2017-04-10 RX ADMIN — SODIUM CHLORIDE 50 MILLILITER(S): 9 INJECTION INTRAMUSCULAR; INTRAVENOUS; SUBCUTANEOUS at 18:08

## 2017-04-10 RX ADMIN — Medication 1 LOZENGE: at 15:52

## 2017-04-10 RX ADMIN — Medication 30 MILLILITER(S): at 08:02

## 2017-04-10 RX ADMIN — Medication 30 MILLILITER(S): at 11:55

## 2017-04-10 RX ADMIN — NYSTATIN CREAM 1 APPLICATION(S): 100000 CREAM TOPICAL at 18:04

## 2017-04-10 RX ADMIN — Medication 1: at 11:55

## 2017-04-10 RX ADMIN — MEROPENEM 200 MILLIGRAM(S): 1 INJECTION INTRAVENOUS at 13:20

## 2017-04-10 RX ADMIN — Medication 30 MILLILITER(S): at 15:51

## 2017-04-10 RX ADMIN — Medication 10 MILLILITER(S): at 20:08

## 2017-04-10 RX ADMIN — Medication 1 GRAM(S): at 02:15

## 2017-04-10 RX ADMIN — HEPARIN SODIUM 4.07 UNIT(S)/HR: 5000 INJECTION INTRAVENOUS; SUBCUTANEOUS at 18:01

## 2017-04-10 RX ADMIN — HYDROMORPHONE HYDROCHLORIDE 0.5 MILLIGRAM(S): 2 INJECTION INTRAMUSCULAR; INTRAVENOUS; SUBCUTANEOUS at 02:05

## 2017-04-10 RX ADMIN — Medication 1 LOZENGE: at 11:55

## 2017-04-10 RX ADMIN — HYDROMORPHONE HYDROCHLORIDE 1 MILLIGRAM(S): 2 INJECTION INTRAMUSCULAR; INTRAVENOUS; SUBCUTANEOUS at 15:37

## 2017-04-10 RX ADMIN — Medication 480 MICROGRAM(S): at 19:31

## 2017-04-10 RX ADMIN — CEFEPIME 100 MILLIGRAM(S): 1 INJECTION, POWDER, FOR SOLUTION INTRAMUSCULAR; INTRAVENOUS at 08:06

## 2017-04-10 RX ADMIN — Medication 1 APPLICATION(S): at 18:04

## 2017-04-10 RX ADMIN — HYDROMORPHONE HYDROCHLORIDE 1 MILLIGRAM(S): 2 INJECTION INTRAMUSCULAR; INTRAVENOUS; SUBCUTANEOUS at 21:45

## 2017-04-10 RX ADMIN — HYDROMORPHONE HYDROCHLORIDE 1 MILLIGRAM(S): 2 INJECTION INTRAMUSCULAR; INTRAVENOUS; SUBCUTANEOUS at 15:55

## 2017-04-10 RX ADMIN — Medication 10 MILLILITER(S): at 15:55

## 2017-04-10 RX ADMIN — URSODIOL 300 MILLIGRAM(S): 250 TABLET, FILM COATED ORAL at 08:13

## 2017-04-10 RX ADMIN — Medication 1 MILLIGRAM(S): at 03:18

## 2017-04-10 RX ADMIN — Medication 30 MILLILITER(S): at 15:52

## 2017-04-10 RX ADMIN — Medication 400 MILLIGRAM(S): at 06:06

## 2017-04-10 RX ADMIN — Medication 400 MILLIGRAM(S): at 13:22

## 2017-04-10 NOTE — ADVANCED PRACTICE NURSE CONSULT - ASSESSMENT
Labs today WBC .1 HGB 8.2 HCT 22.8 PLTS 15CR. .9 TBILI. .56Pt found in chair, alert and oriented x4, v/s stable afebrile, n/c offered.  Right chest wall/ IJ triple lumen in place. Site without redness or swelling. Lumen previously accessed with + blood return flushes well with NS. Chemotherapy verified by 2 RNs prior to administration. [id number] treated with methotrexate 10mg/m2= 10.4 mg ivp.Pt remains in chair with n/c offered. Primary RN aware of treatment plan.

## 2017-04-11 LAB
ALBUMIN SERPL ELPH-MCNC: 3.4 G/DL — SIGNIFICANT CHANGE UP (ref 3.3–5)
ALP SERPL-CCNC: 72 U/L — SIGNIFICANT CHANGE UP (ref 40–120)
ALT FLD-CCNC: 17 U/L RC — SIGNIFICANT CHANGE UP (ref 10–45)
ANION GAP SERPL CALC-SCNC: 10 MMOL/L — SIGNIFICANT CHANGE UP (ref 5–17)
AST SERPL-CCNC: 13 U/L — SIGNIFICANT CHANGE UP (ref 10–40)
BILIRUB SERPL-MCNC: 0.4 MG/DL — SIGNIFICANT CHANGE UP (ref 0.2–1.2)
BUN SERPL-MCNC: 17 MG/DL — SIGNIFICANT CHANGE UP (ref 7–23)
CALCIUM SERPL-MCNC: 8.8 MG/DL — SIGNIFICANT CHANGE UP (ref 8.4–10.5)
CHLORIDE SERPL-SCNC: 99 MMOL/L — SIGNIFICANT CHANGE UP (ref 96–108)
CO2 SERPL-SCNC: 28 MMOL/L — SIGNIFICANT CHANGE UP (ref 22–31)
CREAT SERPL-MCNC: 0.62 MG/DL — SIGNIFICANT CHANGE UP (ref 0.5–1.3)
CULTURE RESULTS: NO GROWTH — SIGNIFICANT CHANGE UP
GLUCOSE SERPL-MCNC: 125 MG/DL — HIGH (ref 70–99)
HBA1C BLD-MCNC: 6.3 % — HIGH (ref 4–5.6)
HCT VFR BLD CALC: 24.1 % — LOW (ref 39–50)
HGB BLD-MCNC: 8.1 G/DL — LOW (ref 13–17)
LDH SERPL L TO P-CCNC: 96 U/L — SIGNIFICANT CHANGE UP (ref 50–242)
MAGNESIUM SERPL-MCNC: 2 MG/DL — SIGNIFICANT CHANGE UP (ref 1.6–2.6)
MCHC RBC-ENTMCNC: 31.1 PG — SIGNIFICANT CHANGE UP (ref 27–34)
MCHC RBC-ENTMCNC: 33.5 GM/DL — SIGNIFICANT CHANGE UP (ref 32–36)
MCV RBC AUTO: 92.9 FL — SIGNIFICANT CHANGE UP (ref 80–100)
PHOSPHATE SERPL-MCNC: 2.4 MG/DL — LOW (ref 2.5–4.5)
PLATELET # BLD AUTO: 11 K/UL — CRITICAL LOW (ref 150–400)
POTASSIUM SERPL-MCNC: 4.1 MMOL/L — SIGNIFICANT CHANGE UP (ref 3.5–5.3)
POTASSIUM SERPL-SCNC: 4.1 MMOL/L — SIGNIFICANT CHANGE UP (ref 3.5–5.3)
PROT SERPL-MCNC: 6.2 G/DL — SIGNIFICANT CHANGE UP (ref 6–8.3)
RBC # BLD: 2.6 M/UL — LOW (ref 4.2–5.8)
RBC # FLD: 15.3 % — HIGH (ref 10.3–14.5)
SODIUM SERPL-SCNC: 137 MMOL/L — SIGNIFICANT CHANGE UP (ref 135–145)
SPECIMEN SOURCE: SIGNIFICANT CHANGE UP
WBC # BLD: 0.1 K/UL — CRITICAL LOW (ref 3.8–10.5)
WBC # FLD AUTO: 0.1 K/UL — CRITICAL LOW (ref 3.8–10.5)

## 2017-04-11 PROCEDURE — 99291 CRITICAL CARE FIRST HOUR: CPT

## 2017-04-11 PROCEDURE — 71250 CT THORAX DX C-: CPT | Mod: 26

## 2017-04-11 RX ORDER — SUCRALFATE 1 G
1 TABLET ORAL
Qty: 0 | Refills: 0 | Status: DISCONTINUED | OUTPATIENT
Start: 2017-04-11 | End: 2017-05-03

## 2017-04-11 RX ORDER — POTASSIUM PHOSPHATE, MONOBASIC POTASSIUM PHOSPHATE, DIBASIC 236; 224 MG/ML; MG/ML
15 INJECTION, SOLUTION INTRAVENOUS ONCE
Qty: 0 | Refills: 0 | Status: COMPLETED | OUTPATIENT
Start: 2017-04-11 | End: 2017-04-11

## 2017-04-11 RX ORDER — PANTOPRAZOLE SODIUM 20 MG/1
40 TABLET, DELAYED RELEASE ORAL DAILY
Qty: 0 | Refills: 0 | Status: DISCONTINUED | OUTPATIENT
Start: 2017-04-11 | End: 2017-05-04

## 2017-04-11 RX ADMIN — FENTANYL CITRATE 1 PATCH: 50 INJECTION INTRAVENOUS at 10:25

## 2017-04-11 RX ADMIN — Medication 1 LOZENGE: at 12:36

## 2017-04-11 RX ADMIN — Medication 480 MICROGRAM(S): at 17:04

## 2017-04-11 RX ADMIN — FENTANYL CITRATE 1 PATCH: 50 INJECTION INTRAVENOUS at 10:24

## 2017-04-11 RX ADMIN — URSODIOL 300 MILLIGRAM(S): 250 TABLET, FILM COATED ORAL at 17:03

## 2017-04-11 RX ADMIN — PANTOPRAZOLE SODIUM 40 MILLIGRAM(S): 20 TABLET, DELAYED RELEASE ORAL at 12:36

## 2017-04-11 RX ADMIN — MEROPENEM 200 MILLIGRAM(S): 1 INJECTION INTRAVENOUS at 21:44

## 2017-04-11 RX ADMIN — MEROPENEM 200 MILLIGRAM(S): 1 INJECTION INTRAVENOUS at 05:29

## 2017-04-11 RX ADMIN — Medication 1 MILLIGRAM(S): at 12:36

## 2017-04-11 RX ADMIN — URSODIOL 300 MILLIGRAM(S): 250 TABLET, FILM COATED ORAL at 07:55

## 2017-04-11 RX ADMIN — SODIUM CHLORIDE 50 MILLILITER(S): 9 INJECTION INTRAMUSCULAR; INTRAVENOUS; SUBCUTANEOUS at 05:17

## 2017-04-11 RX ADMIN — Medication 1 LOZENGE: at 23:20

## 2017-04-11 RX ADMIN — Medication 1 GRAM(S): at 21:02

## 2017-04-11 RX ADMIN — Medication 1 GRAM(S): at 17:02

## 2017-04-11 RX ADMIN — Medication 1 LOZENGE: at 07:54

## 2017-04-11 RX ADMIN — MEROPENEM 200 MILLIGRAM(S): 1 INJECTION INTRAVENOUS at 14:13

## 2017-04-11 RX ADMIN — HYDROMORPHONE HYDROCHLORIDE 1 MILLIGRAM(S): 2 INJECTION INTRAMUSCULAR; INTRAVENOUS; SUBCUTANEOUS at 05:15

## 2017-04-11 RX ADMIN — Medication 25 MILLIGRAM(S): at 12:38

## 2017-04-11 RX ADMIN — HEPARIN SODIUM 4.07 UNIT(S)/HR: 5000 INJECTION INTRAVENOUS; SUBCUTANEOUS at 17:01

## 2017-04-11 RX ADMIN — NYSTATIN CREAM 1 APPLICATION(S): 100000 CREAM TOPICAL at 05:25

## 2017-04-11 RX ADMIN — Medication 30 MILLILITER(S): at 07:56

## 2017-04-11 RX ADMIN — Medication 400 MILLIGRAM(S): at 05:17

## 2017-04-11 RX ADMIN — Medication 1 TABLET(S): at 12:37

## 2017-04-11 RX ADMIN — FLUCONAZOLE 400 MILLIGRAM(S): 150 TABLET ORAL at 12:36

## 2017-04-11 RX ADMIN — Medication 30 MILLILITER(S): at 20:59

## 2017-04-11 RX ADMIN — Medication 10 MILLILITER(S): at 20:59

## 2017-04-11 RX ADMIN — HEPARIN SODIUM 4.07 UNIT(S)/HR: 5000 INJECTION INTRAVENOUS; SUBCUTANEOUS at 20:59

## 2017-04-11 RX ADMIN — POTASSIUM PHOSPHATE, MONOBASIC POTASSIUM PHOSPHATE, DIBASIC 62.5 MILLIMOLE(S): 236; 224 INJECTION, SOLUTION INTRAVENOUS at 14:07

## 2017-04-11 RX ADMIN — SODIUM CHLORIDE 50 MILLILITER(S): 9 INJECTION INTRAMUSCULAR; INTRAVENOUS; SUBCUTANEOUS at 20:58

## 2017-04-11 RX ADMIN — Medication 30 MILLILITER(S): at 23:20

## 2017-04-11 RX ADMIN — Medication 650 MILLIGRAM(S): at 12:37

## 2017-04-11 RX ADMIN — Medication 1 APPLICATION(S): at 17:04

## 2017-04-11 RX ADMIN — Medication 400 MILLIGRAM(S): at 14:09

## 2017-04-11 RX ADMIN — HYDROMORPHONE HYDROCHLORIDE 1 MILLIGRAM(S): 2 INJECTION INTRAMUSCULAR; INTRAVENOUS; SUBCUTANEOUS at 05:30

## 2017-04-11 RX ADMIN — Medication 10 MILLILITER(S): at 23:20

## 2017-04-11 RX ADMIN — Medication 30 MILLILITER(S): at 07:54

## 2017-04-11 RX ADMIN — NYSTATIN CREAM 1 APPLICATION(S): 100000 CREAM TOPICAL at 17:04

## 2017-04-11 RX ADMIN — Medication 30 MILLILITER(S): at 12:57

## 2017-04-11 RX ADMIN — Medication 1 LOZENGE: at 20:59

## 2017-04-11 RX ADMIN — Medication 1 APPLICATION(S): at 05:25

## 2017-04-11 RX ADMIN — TAMSULOSIN HYDROCHLORIDE 0.4 MILLIGRAM(S): 0.4 CAPSULE ORAL at 21:03

## 2017-04-11 RX ADMIN — Medication 30 MILLILITER(S): at 17:02

## 2017-04-11 RX ADMIN — Medication 10 MILLILITER(S): at 08:06

## 2017-04-11 RX ADMIN — Medication 10 MILLILITER(S): at 12:57

## 2017-04-11 RX ADMIN — Medication 1 LOZENGE: at 17:02

## 2017-04-11 RX ADMIN — Medication 10 MILLILITER(S): at 16:57

## 2017-04-11 RX ADMIN — Medication 400 MILLIGRAM(S): at 21:01

## 2017-04-11 RX ADMIN — Medication 10 MILLIGRAM(S): at 07:56

## 2017-04-12 LAB
ALBUMIN SERPL ELPH-MCNC: 3.7 G/DL — SIGNIFICANT CHANGE UP (ref 3.3–5)
ALP SERPL-CCNC: 71 U/L — SIGNIFICANT CHANGE UP (ref 40–120)
ALT FLD-CCNC: 15 U/L RC — SIGNIFICANT CHANGE UP (ref 10–45)
ANION GAP SERPL CALC-SCNC: 11 MMOL/L — SIGNIFICANT CHANGE UP (ref 5–17)
AST SERPL-CCNC: 15 U/L — SIGNIFICANT CHANGE UP (ref 10–40)
BILIRUB SERPL-MCNC: 0.3 MG/DL — SIGNIFICANT CHANGE UP (ref 0.2–1.2)
BLD GP AB SCN SERPL QL: NEGATIVE — SIGNIFICANT CHANGE UP
BUN SERPL-MCNC: 14 MG/DL — SIGNIFICANT CHANGE UP (ref 7–23)
CALCIUM SERPL-MCNC: 9.4 MG/DL — SIGNIFICANT CHANGE UP (ref 8.4–10.5)
CHLORIDE SERPL-SCNC: 102 MMOL/L — SIGNIFICANT CHANGE UP (ref 96–108)
CO2 SERPL-SCNC: 27 MMOL/L — SIGNIFICANT CHANGE UP (ref 22–31)
CREAT SERPL-MCNC: 0.57 MG/DL — SIGNIFICANT CHANGE UP (ref 0.5–1.3)
CULTURE RESULTS: SIGNIFICANT CHANGE UP
CULTURE RESULTS: SIGNIFICANT CHANGE UP
GLUCOSE SERPL-MCNC: 124 MG/DL — HIGH (ref 70–99)
HCT VFR BLD CALC: 23.7 % — LOW (ref 39–50)
HGB BLD-MCNC: 8.1 G/DL — LOW (ref 13–17)
LDH SERPL L TO P-CCNC: 100 U/L — SIGNIFICANT CHANGE UP (ref 50–242)
MAGNESIUM SERPL-MCNC: 2.2 MG/DL — SIGNIFICANT CHANGE UP (ref 1.6–2.6)
MCHC RBC-ENTMCNC: 31.8 PG — SIGNIFICANT CHANGE UP (ref 27–34)
MCHC RBC-ENTMCNC: 34 GM/DL — SIGNIFICANT CHANGE UP (ref 32–36)
MCV RBC AUTO: 93.3 FL — SIGNIFICANT CHANGE UP (ref 80–100)
PHOSPHATE SERPL-MCNC: 2.3 MG/DL — LOW (ref 2.5–4.5)
PLATELET # BLD AUTO: 38 K/UL — LOW (ref 150–400)
POTASSIUM SERPL-MCNC: 4.3 MMOL/L — SIGNIFICANT CHANGE UP (ref 3.5–5.3)
POTASSIUM SERPL-SCNC: 4.3 MMOL/L — SIGNIFICANT CHANGE UP (ref 3.5–5.3)
PROT SERPL-MCNC: 6.6 G/DL — SIGNIFICANT CHANGE UP (ref 6–8.3)
RBC # BLD: 2.54 M/UL — LOW (ref 4.2–5.8)
RBC # FLD: 14.9 % — HIGH (ref 10.3–14.5)
RH IG SCN BLD-IMP: POSITIVE — SIGNIFICANT CHANGE UP
SODIUM SERPL-SCNC: 140 MMOL/L — SIGNIFICANT CHANGE UP (ref 135–145)
SPECIMEN SOURCE: SIGNIFICANT CHANGE UP
SPECIMEN SOURCE: SIGNIFICANT CHANGE UP
WBC # BLD: 0.2 K/UL — CRITICAL LOW (ref 3.8–10.5)
WBC # FLD AUTO: 0.2 K/UL — CRITICAL LOW (ref 3.8–10.5)

## 2017-04-12 PROCEDURE — 70450 CT HEAD/BRAIN W/O DYE: CPT | Mod: 26

## 2017-04-12 PROCEDURE — 99291 CRITICAL CARE FIRST HOUR: CPT

## 2017-04-12 RX ORDER — APREPITANT 80 MG/1
80 CAPSULE ORAL DAILY
Qty: 0 | Refills: 0 | Status: COMPLETED | OUTPATIENT
Start: 2017-04-12 | End: 2017-04-15

## 2017-04-12 RX ORDER — ONDANSETRON 8 MG/1
8 TABLET, FILM COATED ORAL EVERY 8 HOURS
Qty: 0 | Refills: 0 | Status: DISCONTINUED | OUTPATIENT
Start: 2017-04-12 | End: 2017-04-13

## 2017-04-12 RX ORDER — POTASSIUM PHOSPHATE, MONOBASIC POTASSIUM PHOSPHATE, DIBASIC 236; 224 MG/ML; MG/ML
15 INJECTION, SOLUTION INTRAVENOUS ONCE
Qty: 0 | Refills: 0 | Status: COMPLETED | OUTPATIENT
Start: 2017-04-12 | End: 2017-04-12

## 2017-04-12 RX ADMIN — Medication 1 LOZENGE: at 11:14

## 2017-04-12 RX ADMIN — Medication 30 MILLILITER(S): at 11:15

## 2017-04-12 RX ADMIN — NYSTATIN CREAM 1 APPLICATION(S): 100000 CREAM TOPICAL at 17:23

## 2017-04-12 RX ADMIN — FLUCONAZOLE 400 MILLIGRAM(S): 150 TABLET ORAL at 11:14

## 2017-04-12 RX ADMIN — Medication 650 MILLIGRAM(S): at 17:28

## 2017-04-12 RX ADMIN — Medication 1 MILLIGRAM(S): at 06:10

## 2017-04-12 RX ADMIN — POTASSIUM PHOSPHATE, MONOBASIC POTASSIUM PHOSPHATE, DIBASIC 62.5 MILLIMOLE(S): 236; 224 INJECTION, SOLUTION INTRAVENOUS at 12:19

## 2017-04-12 RX ADMIN — NYSTATIN CREAM 1 APPLICATION(S): 100000 CREAM TOPICAL at 06:01

## 2017-04-12 RX ADMIN — Medication 1 TABLET(S): at 11:14

## 2017-04-12 RX ADMIN — Medication 10 MILLILITER(S): at 19:25

## 2017-04-12 RX ADMIN — Medication 1 LOZENGE: at 17:22

## 2017-04-12 RX ADMIN — PANTOPRAZOLE SODIUM 40 MILLIGRAM(S): 20 TABLET, DELAYED RELEASE ORAL at 12:18

## 2017-04-12 RX ADMIN — Medication 1 APPLICATION(S): at 17:23

## 2017-04-12 RX ADMIN — ONDANSETRON 8 MILLIGRAM(S): 8 TABLET, FILM COATED ORAL at 21:50

## 2017-04-12 RX ADMIN — Medication 30 MILLILITER(S): at 07:29

## 2017-04-12 RX ADMIN — Medication 1 APPLICATION(S): at 06:01

## 2017-04-12 RX ADMIN — Medication 1 GRAM(S): at 17:23

## 2017-04-12 RX ADMIN — TAMSULOSIN HYDROCHLORIDE 0.4 MILLIGRAM(S): 0.4 CAPSULE ORAL at 21:51

## 2017-04-12 RX ADMIN — HYDROMORPHONE HYDROCHLORIDE 1 MILLIGRAM(S): 2 INJECTION INTRAMUSCULAR; INTRAVENOUS; SUBCUTANEOUS at 01:30

## 2017-04-12 RX ADMIN — Medication 10 MILLILITER(S): at 11:16

## 2017-04-12 RX ADMIN — Medication 1 GRAM(S): at 11:15

## 2017-04-12 RX ADMIN — Medication 650 MILLIGRAM(S): at 18:00

## 2017-04-12 RX ADMIN — Medication 1 GRAM(S): at 21:49

## 2017-04-12 RX ADMIN — Medication 400 MILLIGRAM(S): at 06:00

## 2017-04-12 RX ADMIN — Medication 30 MILLILITER(S): at 23:16

## 2017-04-12 RX ADMIN — Medication 10 MILLILITER(S): at 17:00

## 2017-04-12 RX ADMIN — Medication 400 MILLIGRAM(S): at 13:29

## 2017-04-12 RX ADMIN — Medication 10 MILLILITER(S): at 23:16

## 2017-04-12 RX ADMIN — Medication 30 MILLILITER(S): at 17:22

## 2017-04-12 RX ADMIN — Medication 480 MICROGRAM(S): at 17:20

## 2017-04-12 RX ADMIN — ONDANSETRON 8 MILLIGRAM(S): 8 TABLET, FILM COATED ORAL at 13:29

## 2017-04-12 RX ADMIN — Medication 1 LOZENGE: at 07:30

## 2017-04-12 RX ADMIN — Medication 1 LOZENGE: at 19:25

## 2017-04-12 RX ADMIN — Medication 30 MILLILITER(S): at 19:25

## 2017-04-12 RX ADMIN — APREPITANT 80 MILLIGRAM(S): 80 CAPSULE ORAL at 13:29

## 2017-04-12 RX ADMIN — Medication 1 GRAM(S): at 07:30

## 2017-04-12 RX ADMIN — MEROPENEM 200 MILLIGRAM(S): 1 INJECTION INTRAVENOUS at 05:58

## 2017-04-12 RX ADMIN — URSODIOL 300 MILLIGRAM(S): 250 TABLET, FILM COATED ORAL at 07:30

## 2017-04-12 RX ADMIN — Medication 1 MILLIGRAM(S): at 11:14

## 2017-04-12 RX ADMIN — Medication 1: at 17:21

## 2017-04-12 RX ADMIN — Medication 1 LOZENGE: at 23:16

## 2017-04-12 RX ADMIN — Medication 30 MILLILITER(S): at 07:30

## 2017-04-12 RX ADMIN — MEROPENEM 200 MILLIGRAM(S): 1 INJECTION INTRAVENOUS at 21:48

## 2017-04-12 RX ADMIN — Medication 400 MILLIGRAM(S): at 21:49

## 2017-04-12 RX ADMIN — HYDROMORPHONE HYDROCHLORIDE 1 MILLIGRAM(S): 2 INJECTION INTRAMUSCULAR; INTRAVENOUS; SUBCUTANEOUS at 06:27

## 2017-04-12 RX ADMIN — HYDROMORPHONE HYDROCHLORIDE 1 MILLIGRAM(S): 2 INJECTION INTRAMUSCULAR; INTRAVENOUS; SUBCUTANEOUS at 01:00

## 2017-04-12 RX ADMIN — MEROPENEM 200 MILLIGRAM(S): 1 INJECTION INTRAVENOUS at 14:55

## 2017-04-12 RX ADMIN — HYDROMORPHONE HYDROCHLORIDE 1 MILLIGRAM(S): 2 INJECTION INTRAMUSCULAR; INTRAVENOUS; SUBCUTANEOUS at 05:57

## 2017-04-12 RX ADMIN — URSODIOL 300 MILLIGRAM(S): 250 TABLET, FILM COATED ORAL at 17:20

## 2017-04-12 RX ADMIN — Medication 10 MILLIGRAM(S): at 09:05

## 2017-04-12 RX ADMIN — HEPARIN SODIUM 4.07 UNIT(S)/HR: 5000 INJECTION INTRAVENOUS; SUBCUTANEOUS at 17:21

## 2017-04-12 RX ADMIN — Medication 10 MILLILITER(S): at 09:58

## 2017-04-13 LAB
ALBUMIN SERPL ELPH-MCNC: 3.3 G/DL — SIGNIFICANT CHANGE UP (ref 3.3–5)
ALP SERPL-CCNC: 63 U/L — SIGNIFICANT CHANGE UP (ref 40–120)
ALT FLD-CCNC: 20 U/L RC — SIGNIFICANT CHANGE UP (ref 10–45)
ANION GAP SERPL CALC-SCNC: 11 MMOL/L — SIGNIFICANT CHANGE UP (ref 5–17)
APTT BLD: 31.8 SEC — SIGNIFICANT CHANGE UP (ref 27.5–37.4)
AST SERPL-CCNC: 17 U/L — SIGNIFICANT CHANGE UP (ref 10–40)
BILIRUB SERPL-MCNC: 0.2 MG/DL — SIGNIFICANT CHANGE UP (ref 0.2–1.2)
BUN SERPL-MCNC: 18 MG/DL — SIGNIFICANT CHANGE UP (ref 7–23)
CALCIUM SERPL-MCNC: 8.8 MG/DL — SIGNIFICANT CHANGE UP (ref 8.4–10.5)
CHLORIDE SERPL-SCNC: 103 MMOL/L — SIGNIFICANT CHANGE UP (ref 96–108)
CO2 SERPL-SCNC: 27 MMOL/L — SIGNIFICANT CHANGE UP (ref 22–31)
CREAT SERPL-MCNC: 0.61 MG/DL — SIGNIFICANT CHANGE UP (ref 0.5–1.3)
CULTURE RESULTS: SIGNIFICANT CHANGE UP
CULTURE RESULTS: SIGNIFICANT CHANGE UP
FIBRINOGEN PPP-MCNC: 960 MG/DL — HIGH (ref 310–510)
GLUCOSE SERPL-MCNC: 108 MG/DL — HIGH (ref 70–99)
HCT VFR BLD CALC: 21.4 % — LOW (ref 39–50)
HGB BLD-MCNC: 7.2 G/DL — LOW (ref 13–17)
INR BLD: 1.04 RATIO — SIGNIFICANT CHANGE UP (ref 0.88–1.16)
LDH SERPL L TO P-CCNC: 91 U/L — SIGNIFICANT CHANGE UP (ref 50–242)
MAGNESIUM SERPL-MCNC: 2.1 MG/DL — SIGNIFICANT CHANGE UP (ref 1.6–2.6)
MCHC RBC-ENTMCNC: 31.6 PG — SIGNIFICANT CHANGE UP (ref 27–34)
MCHC RBC-ENTMCNC: 33.6 GM/DL — SIGNIFICANT CHANGE UP (ref 32–36)
MCV RBC AUTO: 93.9 FL — SIGNIFICANT CHANGE UP (ref 80–100)
PHOSPHATE SERPL-MCNC: 2.3 MG/DL — LOW (ref 2.5–4.5)
PLATELET # BLD AUTO: 26 K/UL — LOW (ref 150–400)
POTASSIUM SERPL-MCNC: 4.4 MMOL/L — SIGNIFICANT CHANGE UP (ref 3.5–5.3)
POTASSIUM SERPL-SCNC: 4.4 MMOL/L — SIGNIFICANT CHANGE UP (ref 3.5–5.3)
PROT SERPL-MCNC: 6 G/DL — SIGNIFICANT CHANGE UP (ref 6–8.3)
PROTHROM AB SERPL-ACNC: 11.4 SEC — SIGNIFICANT CHANGE UP (ref 9.8–12.7)
RBC # BLD: 2.28 M/UL — LOW (ref 4.2–5.8)
RBC # FLD: 14.7 % — HIGH (ref 10.3–14.5)
SODIUM SERPL-SCNC: 141 MMOL/L — SIGNIFICANT CHANGE UP (ref 135–145)
SPECIMEN SOURCE: SIGNIFICANT CHANGE UP
SPECIMEN SOURCE: SIGNIFICANT CHANGE UP
WBC # BLD: 0.3 K/UL — CRITICAL LOW (ref 3.8–10.5)
WBC # FLD AUTO: 0.3 K/UL — CRITICAL LOW (ref 3.8–10.5)

## 2017-04-13 PROCEDURE — 99291 CRITICAL CARE FIRST HOUR: CPT

## 2017-04-13 RX ORDER — DIPHENHYDRAMINE HCL 50 MG
25 CAPSULE ORAL ONCE
Qty: 0 | Refills: 0 | Status: COMPLETED | OUTPATIENT
Start: 2017-04-13 | End: 2017-04-13

## 2017-04-13 RX ORDER — RITUXIMAB 10 MG/ML
818 INJECTION, SOLUTION INTRAVENOUS ONCE
Qty: 0 | Refills: 0 | Status: DISCONTINUED | OUTPATIENT
Start: 2017-04-13 | End: 2017-05-05

## 2017-04-13 RX ORDER — POTASSIUM PHOSPHATE, MONOBASIC POTASSIUM PHOSPHATE, DIBASIC 236; 224 MG/ML; MG/ML
15 INJECTION, SOLUTION INTRAVENOUS ONCE
Qty: 0 | Refills: 0 | Status: COMPLETED | OUTPATIENT
Start: 2017-04-13 | End: 2017-04-13

## 2017-04-13 RX ORDER — HYDROCORTISONE 20 MG
50 TABLET ORAL ONCE
Qty: 0 | Refills: 0 | Status: DISCONTINUED | OUTPATIENT
Start: 2017-04-13 | End: 2017-05-03

## 2017-04-13 RX ORDER — ZOLPIDEM TARTRATE 10 MG/1
5 TABLET ORAL AT BEDTIME
Qty: 0 | Refills: 0 | Status: DISCONTINUED | OUTPATIENT
Start: 2017-04-13 | End: 2017-04-19

## 2017-04-13 RX ORDER — DIPHENHYDRAMINE HCL 50 MG
50 CAPSULE ORAL ONCE
Qty: 0 | Refills: 0 | Status: DISCONTINUED | OUTPATIENT
Start: 2017-04-13 | End: 2017-05-03

## 2017-04-13 RX ORDER — DIPHENHYDRAMINE HCL 50 MG
50 CAPSULE ORAL ONCE
Qty: 0 | Refills: 0 | Status: DISCONTINUED | OUTPATIENT
Start: 2017-04-13 | End: 2017-04-13

## 2017-04-13 RX ORDER — ACETAMINOPHEN 500 MG
650 TABLET ORAL ONCE
Qty: 0 | Refills: 0 | Status: COMPLETED | OUTPATIENT
Start: 2017-04-13 | End: 2017-04-13

## 2017-04-13 RX ORDER — HYDROCORTISONE 20 MG
50 TABLET ORAL ONCE
Qty: 0 | Refills: 0 | Status: COMPLETED | OUTPATIENT
Start: 2017-04-13 | End: 2017-04-13

## 2017-04-13 RX ADMIN — TAMSULOSIN HYDROCHLORIDE 0.4 MILLIGRAM(S): 0.4 CAPSULE ORAL at 21:58

## 2017-04-13 RX ADMIN — OXYCODONE HYDROCHLORIDE 5 MILLIGRAM(S): 5 TABLET ORAL at 06:53

## 2017-04-13 RX ADMIN — Medication 650 MILLIGRAM(S): at 06:25

## 2017-04-13 RX ADMIN — Medication 1 LOZENGE: at 08:07

## 2017-04-13 RX ADMIN — Medication 1 GRAM(S): at 21:58

## 2017-04-13 RX ADMIN — Medication 50 MILLIGRAM(S): at 11:53

## 2017-04-13 RX ADMIN — NYSTATIN CREAM 1 APPLICATION(S): 100000 CREAM TOPICAL at 06:03

## 2017-04-13 RX ADMIN — Medication 1 APPLICATION(S): at 06:03

## 2017-04-13 RX ADMIN — Medication 1 GRAM(S): at 11:50

## 2017-04-13 RX ADMIN — ONDANSETRON 8 MILLIGRAM(S): 8 TABLET, FILM COATED ORAL at 06:02

## 2017-04-13 RX ADMIN — MEROPENEM 200 MILLIGRAM(S): 1 INJECTION INTRAVENOUS at 06:02

## 2017-04-13 RX ADMIN — Medication 10 MILLILITER(S): at 15:54

## 2017-04-13 RX ADMIN — NYSTATIN CREAM 1 APPLICATION(S): 100000 CREAM TOPICAL at 17:02

## 2017-04-13 RX ADMIN — POTASSIUM PHOSPHATE, MONOBASIC POTASSIUM PHOSPHATE, DIBASIC 62.5 MILLIMOLE(S): 236; 224 INJECTION, SOLUTION INTRAVENOUS at 11:54

## 2017-04-13 RX ADMIN — Medication 400 MILLIGRAM(S): at 21:58

## 2017-04-13 RX ADMIN — Medication 1 LOZENGE: at 11:50

## 2017-04-13 RX ADMIN — Medication 30 MILLILITER(S): at 08:07

## 2017-04-13 RX ADMIN — APREPITANT 80 MILLIGRAM(S): 80 CAPSULE ORAL at 13:10

## 2017-04-13 RX ADMIN — HYDROMORPHONE HYDROCHLORIDE 1 MILLIGRAM(S): 2 INJECTION INTRAMUSCULAR; INTRAVENOUS; SUBCUTANEOUS at 03:57

## 2017-04-13 RX ADMIN — FLUCONAZOLE 400 MILLIGRAM(S): 150 TABLET ORAL at 11:50

## 2017-04-13 RX ADMIN — Medication 30 MILLILITER(S): at 20:48

## 2017-04-13 RX ADMIN — Medication 1 GRAM(S): at 06:02

## 2017-04-13 RX ADMIN — Medication 650 MILLIGRAM(S): at 07:25

## 2017-04-13 RX ADMIN — Medication 10 MILLIGRAM(S): at 08:18

## 2017-04-13 RX ADMIN — Medication 30 MILLILITER(S): at 15:53

## 2017-04-13 RX ADMIN — OXYCODONE HYDROCHLORIDE 5 MILLIGRAM(S): 5 TABLET ORAL at 07:53

## 2017-04-13 RX ADMIN — SODIUM CHLORIDE 50 MILLILITER(S): 9 INJECTION INTRAMUSCULAR; INTRAVENOUS; SUBCUTANEOUS at 21:59

## 2017-04-13 RX ADMIN — URSODIOL 300 MILLIGRAM(S): 250 TABLET, FILM COATED ORAL at 08:08

## 2017-04-13 RX ADMIN — PANTOPRAZOLE SODIUM 40 MILLIGRAM(S): 20 TABLET, DELAYED RELEASE ORAL at 11:51

## 2017-04-13 RX ADMIN — Medication 25 MILLIGRAM(S): at 11:53

## 2017-04-13 RX ADMIN — Medication 30 MILLILITER(S): at 11:52

## 2017-04-13 RX ADMIN — HYDROMORPHONE HYDROCHLORIDE 1 MILLIGRAM(S): 2 INJECTION INTRAMUSCULAR; INTRAVENOUS; SUBCUTANEOUS at 04:08

## 2017-04-13 RX ADMIN — Medication 10 MILLILITER(S): at 11:55

## 2017-04-13 RX ADMIN — ZOLPIDEM TARTRATE 5 MILLIGRAM(S): 10 TABLET ORAL at 23:05

## 2017-04-13 RX ADMIN — Medication 650 MILLIGRAM(S): at 11:52

## 2017-04-13 RX ADMIN — HYDROMORPHONE HYDROCHLORIDE 1 MILLIGRAM(S): 2 INJECTION INTRAMUSCULAR; INTRAVENOUS; SUBCUTANEOUS at 09:50

## 2017-04-13 RX ADMIN — HEPARIN SODIUM 4.07 UNIT(S)/HR: 5000 INJECTION INTRAVENOUS; SUBCUTANEOUS at 15:52

## 2017-04-13 RX ADMIN — Medication 480 MICROGRAM(S): at 16:56

## 2017-04-13 RX ADMIN — Medication 1 GRAM(S): at 15:53

## 2017-04-13 RX ADMIN — Medication 10 MILLILITER(S): at 20:48

## 2017-04-13 RX ADMIN — Medication 1 TABLET(S): at 11:50

## 2017-04-13 RX ADMIN — Medication 1 LOZENGE: at 15:53

## 2017-04-13 RX ADMIN — URSODIOL 300 MILLIGRAM(S): 250 TABLET, FILM COATED ORAL at 16:57

## 2017-04-13 RX ADMIN — Medication 10 MILLILITER(S): at 08:08

## 2017-04-13 RX ADMIN — Medication 1 MILLIGRAM(S): at 11:50

## 2017-04-13 RX ADMIN — Medication 400 MILLIGRAM(S): at 06:02

## 2017-04-13 RX ADMIN — Medication 400 MILLIGRAM(S): at 13:11

## 2017-04-13 RX ADMIN — MEROPENEM 200 MILLIGRAM(S): 1 INJECTION INTRAVENOUS at 21:58

## 2017-04-13 RX ADMIN — MEROPENEM 200 MILLIGRAM(S): 1 INJECTION INTRAVENOUS at 13:56

## 2017-04-13 RX ADMIN — HYDROMORPHONE HYDROCHLORIDE 1 MILLIGRAM(S): 2 INJECTION INTRAMUSCULAR; INTRAVENOUS; SUBCUTANEOUS at 09:32

## 2017-04-13 RX ADMIN — Medication 1 APPLICATION(S): at 17:01

## 2017-04-13 RX ADMIN — Medication 1 LOZENGE: at 20:48

## 2017-04-14 LAB
ALBUMIN SERPL ELPH-MCNC: 3.5 G/DL — SIGNIFICANT CHANGE UP (ref 3.3–5)
ALP SERPL-CCNC: 67 U/L — SIGNIFICANT CHANGE UP (ref 40–120)
ALT FLD-CCNC: 22 U/L RC — SIGNIFICANT CHANGE UP (ref 10–45)
ANION GAP SERPL CALC-SCNC: 12 MMOL/L — SIGNIFICANT CHANGE UP (ref 5–17)
AST SERPL-CCNC: 17 U/L — SIGNIFICANT CHANGE UP (ref 10–40)
BASOPHILS # BLD AUTO: 0 K/UL — SIGNIFICANT CHANGE UP (ref 0–0.2)
BASOPHILS NFR BLD AUTO: 0 % — SIGNIFICANT CHANGE UP (ref 0–2)
BILIRUB SERPL-MCNC: 0.2 MG/DL — SIGNIFICANT CHANGE UP (ref 0.2–1.2)
BLD GP AB SCN SERPL QL: NEGATIVE — SIGNIFICANT CHANGE UP
BUN SERPL-MCNC: 17 MG/DL — SIGNIFICANT CHANGE UP (ref 7–23)
CALCIUM SERPL-MCNC: 9.1 MG/DL — SIGNIFICANT CHANGE UP (ref 8.4–10.5)
CHLORIDE SERPL-SCNC: 106 MMOL/L — SIGNIFICANT CHANGE UP (ref 96–108)
CO2 SERPL-SCNC: 26 MMOL/L — SIGNIFICANT CHANGE UP (ref 22–31)
CREAT SERPL-MCNC: 0.61 MG/DL — SIGNIFICANT CHANGE UP (ref 0.5–1.3)
CULTURE RESULTS: SIGNIFICANT CHANGE UP
CULTURE RESULTS: SIGNIFICANT CHANGE UP
EOSINOPHIL # BLD AUTO: 0 K/UL — SIGNIFICANT CHANGE UP (ref 0–0.5)
EOSINOPHIL NFR BLD AUTO: 0.9 % — SIGNIFICANT CHANGE UP (ref 0–6)
GLUCOSE SERPL-MCNC: 98 MG/DL — SIGNIFICANT CHANGE UP (ref 70–99)
HCT VFR BLD CALC: 21.1 % — LOW (ref 39–50)
HGB BLD-MCNC: 7.4 G/DL — LOW (ref 13–17)
LDH SERPL L TO P-CCNC: 88 U/L — SIGNIFICANT CHANGE UP (ref 50–242)
LYMPHOCYTES # BLD AUTO: 0.3 K/UL — LOW (ref 1–3.3)
LYMPHOCYTES # BLD AUTO: 36.6 % — SIGNIFICANT CHANGE UP (ref 13–44)
MAGNESIUM SERPL-MCNC: 2.1 MG/DL — SIGNIFICANT CHANGE UP (ref 1.6–2.6)
MCHC RBC-ENTMCNC: 33.3 PG — SIGNIFICANT CHANGE UP (ref 27–34)
MCHC RBC-ENTMCNC: 35.3 GM/DL — SIGNIFICANT CHANGE UP (ref 32–36)
MCV RBC AUTO: 94.3 FL — SIGNIFICANT CHANGE UP (ref 80–100)
MONOCYTES # BLD AUTO: 0 K/UL — SIGNIFICANT CHANGE UP (ref 0–0.9)
MONOCYTES NFR BLD AUTO: 5.7 % — SIGNIFICANT CHANGE UP (ref 2–14)
NEUTROPHILS # BLD AUTO: 0.4 K/UL — LOW (ref 1.8–7.4)
NEUTROPHILS NFR BLD AUTO: 56.8 % — SIGNIFICANT CHANGE UP (ref 43–77)
PHOSPHATE SERPL-MCNC: 2 MG/DL — LOW (ref 2.5–4.5)
PLATELET # BLD AUTO: 22 K/UL — LOW (ref 150–400)
POTASSIUM SERPL-MCNC: 4 MMOL/L — SIGNIFICANT CHANGE UP (ref 3.5–5.3)
POTASSIUM SERPL-SCNC: 4 MMOL/L — SIGNIFICANT CHANGE UP (ref 3.5–5.3)
PROT SERPL-MCNC: 6.3 G/DL — SIGNIFICANT CHANGE UP (ref 6–8.3)
RBC # BLD: 2.23 M/UL — LOW (ref 4.2–5.8)
RBC # FLD: 14.7 % — HIGH (ref 10.3–14.5)
RH IG SCN BLD-IMP: POSITIVE — SIGNIFICANT CHANGE UP
SODIUM SERPL-SCNC: 144 MMOL/L — SIGNIFICANT CHANGE UP (ref 135–145)
SPECIMEN SOURCE: SIGNIFICANT CHANGE UP
SPECIMEN SOURCE: SIGNIFICANT CHANGE UP
WBC # BLD: 0.7 K/UL — CRITICAL LOW (ref 3.8–10.5)
WBC # FLD AUTO: 0.7 K/UL — CRITICAL LOW (ref 3.8–10.5)

## 2017-04-14 PROCEDURE — 99291 CRITICAL CARE FIRST HOUR: CPT

## 2017-04-14 RX ORDER — POTASSIUM PHOSPHATE, MONOBASIC POTASSIUM PHOSPHATE, DIBASIC 236; 224 MG/ML; MG/ML
15 INJECTION, SOLUTION INTRAVENOUS ONCE
Qty: 0 | Refills: 0 | Status: COMPLETED | OUTPATIENT
Start: 2017-04-14 | End: 2017-04-14

## 2017-04-14 RX ADMIN — PANTOPRAZOLE SODIUM 40 MILLIGRAM(S): 20 TABLET, DELAYED RELEASE ORAL at 11:27

## 2017-04-14 RX ADMIN — Medication 1 GRAM(S): at 17:00

## 2017-04-14 RX ADMIN — Medication 400 MILLIGRAM(S): at 06:07

## 2017-04-14 RX ADMIN — HYDROMORPHONE HYDROCHLORIDE 1 MILLIGRAM(S): 2 INJECTION INTRAMUSCULAR; INTRAVENOUS; SUBCUTANEOUS at 12:30

## 2017-04-14 RX ADMIN — OXYCODONE HYDROCHLORIDE 5 MILLIGRAM(S): 5 TABLET ORAL at 14:35

## 2017-04-14 RX ADMIN — Medication 10 MILLILITER(S): at 20:46

## 2017-04-14 RX ADMIN — Medication 1 LOZENGE: at 08:58

## 2017-04-14 RX ADMIN — Medication 30 MILLILITER(S): at 16:57

## 2017-04-14 RX ADMIN — Medication 10 MILLILITER(S): at 00:48

## 2017-04-14 RX ADMIN — Medication 1 LOZENGE: at 11:27

## 2017-04-14 RX ADMIN — MEROPENEM 200 MILLIGRAM(S): 1 INJECTION INTRAVENOUS at 14:20

## 2017-04-14 RX ADMIN — FLUCONAZOLE 400 MILLIGRAM(S): 150 TABLET ORAL at 11:27

## 2017-04-14 RX ADMIN — Medication 1 GRAM(S): at 10:59

## 2017-04-14 RX ADMIN — MEROPENEM 200 MILLIGRAM(S): 1 INJECTION INTRAVENOUS at 22:29

## 2017-04-14 RX ADMIN — HYDROMORPHONE HYDROCHLORIDE 1 MILLIGRAM(S): 2 INJECTION INTRAMUSCULAR; INTRAVENOUS; SUBCUTANEOUS at 12:10

## 2017-04-14 RX ADMIN — Medication 1 GRAM(S): at 06:07

## 2017-04-14 RX ADMIN — Medication 30 MILLILITER(S): at 20:46

## 2017-04-14 RX ADMIN — NYSTATIN CREAM 1 APPLICATION(S): 100000 CREAM TOPICAL at 06:13

## 2017-04-14 RX ADMIN — URSODIOL 300 MILLIGRAM(S): 250 TABLET, FILM COATED ORAL at 08:58

## 2017-04-14 RX ADMIN — TAMSULOSIN HYDROCHLORIDE 0.4 MILLIGRAM(S): 0.4 CAPSULE ORAL at 21:45

## 2017-04-14 RX ADMIN — MEROPENEM 200 MILLIGRAM(S): 1 INJECTION INTRAVENOUS at 06:08

## 2017-04-14 RX ADMIN — OXYCODONE HYDROCHLORIDE 5 MILLIGRAM(S): 5 TABLET ORAL at 14:19

## 2017-04-14 RX ADMIN — FENTANYL CITRATE 1 PATCH: 50 INJECTION INTRAVENOUS at 11:26

## 2017-04-14 RX ADMIN — DIPHENHYDRAMINE HYDROCHLORIDE AND LIDOCAINE HYDROCHLORIDE AND ALUMINUM HYDROXIDE AND MAGNESIUM HYDRO 5 MILLILITER(S): KIT at 16:57

## 2017-04-14 RX ADMIN — Medication 1 LOZENGE: at 20:46

## 2017-04-14 RX ADMIN — Medication 1 APPLICATION(S): at 06:13

## 2017-04-14 RX ADMIN — FENTANYL CITRATE 1 PATCH: 50 INJECTION INTRAVENOUS at 11:48

## 2017-04-14 RX ADMIN — NYSTATIN CREAM 1 APPLICATION(S): 100000 CREAM TOPICAL at 17:00

## 2017-04-14 RX ADMIN — Medication 650 MILLIGRAM(S): at 14:22

## 2017-04-14 RX ADMIN — Medication 1 GRAM(S): at 21:45

## 2017-04-14 RX ADMIN — Medication 10 MILLILITER(S): at 17:00

## 2017-04-14 RX ADMIN — Medication 1 LOZENGE: at 00:48

## 2017-04-14 RX ADMIN — Medication 400 MILLIGRAM(S): at 14:21

## 2017-04-14 RX ADMIN — Medication 1 APPLICATION(S): at 16:59

## 2017-04-14 RX ADMIN — Medication 480 MICROGRAM(S): at 16:57

## 2017-04-14 RX ADMIN — APREPITANT 80 MILLIGRAM(S): 80 CAPSULE ORAL at 11:27

## 2017-04-14 RX ADMIN — ZOLPIDEM TARTRATE 5 MILLIGRAM(S): 10 TABLET ORAL at 22:29

## 2017-04-14 RX ADMIN — Medication 400 MILLIGRAM(S): at 21:45

## 2017-04-14 RX ADMIN — Medication 30 MILLILITER(S): at 00:48

## 2017-04-14 RX ADMIN — URSODIOL 300 MILLIGRAM(S): 250 TABLET, FILM COATED ORAL at 16:58

## 2017-04-14 RX ADMIN — Medication 1 TABLET(S): at 11:27

## 2017-04-14 RX ADMIN — Medication 30 MILLILITER(S): at 08:58

## 2017-04-14 RX ADMIN — SODIUM CHLORIDE 50 MILLILITER(S): 9 INJECTION INTRAMUSCULAR; INTRAVENOUS; SUBCUTANEOUS at 06:13

## 2017-04-14 RX ADMIN — Medication 1 MILLIGRAM(S): at 11:27

## 2017-04-14 RX ADMIN — Medication 30 MILLILITER(S): at 11:28

## 2017-04-14 RX ADMIN — POTASSIUM PHOSPHATE, MONOBASIC POTASSIUM PHOSPHATE, DIBASIC 62.5 MILLIMOLE(S): 236; 224 INJECTION, SOLUTION INTRAVENOUS at 10:58

## 2017-04-14 RX ADMIN — HEPARIN SODIUM 4.07 UNIT(S)/HR: 5000 INJECTION INTRAVENOUS; SUBCUTANEOUS at 16:57

## 2017-04-14 RX ADMIN — Medication 1 MILLIGRAM(S): at 15:09

## 2017-04-14 RX ADMIN — SODIUM CHLORIDE 50 MILLILITER(S): 9 INJECTION INTRAMUSCULAR; INTRAVENOUS; SUBCUTANEOUS at 21:45

## 2017-04-15 LAB
ALBUMIN SERPL ELPH-MCNC: 3.5 G/DL — SIGNIFICANT CHANGE UP (ref 3.3–5)
ALP SERPL-CCNC: 79 U/L — SIGNIFICANT CHANGE UP (ref 40–120)
ALT FLD-CCNC: 24 U/L RC — SIGNIFICANT CHANGE UP (ref 10–45)
ANION GAP SERPL CALC-SCNC: 11 MMOL/L — SIGNIFICANT CHANGE UP (ref 5–17)
ANION GAP SERPL CALC-SCNC: 12 MMOL/L — SIGNIFICANT CHANGE UP (ref 5–17)
APPEARANCE UR: CLEAR — SIGNIFICANT CHANGE UP
AST SERPL-CCNC: 18 U/L — SIGNIFICANT CHANGE UP (ref 10–40)
BASOPHILS # BLD AUTO: 0 K/UL — SIGNIFICANT CHANGE UP (ref 0–0.2)
BILIRUB SERPL-MCNC: 0.2 MG/DL — SIGNIFICANT CHANGE UP (ref 0.2–1.2)
BILIRUB UR-MCNC: NEGATIVE — SIGNIFICANT CHANGE UP
BUN SERPL-MCNC: 15 MG/DL — SIGNIFICANT CHANGE UP (ref 7–23)
BUN SERPL-MCNC: 17 MG/DL — SIGNIFICANT CHANGE UP (ref 7–23)
CALCIUM SERPL-MCNC: 9.1 MG/DL — SIGNIFICANT CHANGE UP (ref 8.4–10.5)
CALCIUM SERPL-MCNC: 9.3 MG/DL — SIGNIFICANT CHANGE UP (ref 8.4–10.5)
CHLORIDE SERPL-SCNC: 102 MMOL/L — SIGNIFICANT CHANGE UP (ref 96–108)
CHLORIDE SERPL-SCNC: 103 MMOL/L — SIGNIFICANT CHANGE UP (ref 96–108)
CK MB CFR SERPL CALC: 1 NG/ML — SIGNIFICANT CHANGE UP (ref 0–6.7)
CK SERPL-CCNC: 19 U/L — LOW (ref 30–200)
CO2 SERPL-SCNC: 28 MMOL/L — SIGNIFICANT CHANGE UP (ref 22–31)
CO2 SERPL-SCNC: 28 MMOL/L — SIGNIFICANT CHANGE UP (ref 22–31)
COLOR SPEC: YELLOW — SIGNIFICANT CHANGE UP
CREAT SERPL-MCNC: 0.62 MG/DL — SIGNIFICANT CHANGE UP (ref 0.5–1.3)
CREAT SERPL-MCNC: 0.65 MG/DL — SIGNIFICANT CHANGE UP (ref 0.5–1.3)
DIFF PNL FLD: NEGATIVE — SIGNIFICANT CHANGE UP
EOSINOPHIL # BLD AUTO: 0 K/UL — SIGNIFICANT CHANGE UP (ref 0–0.5)
GAS PNL BLDV: SIGNIFICANT CHANGE UP
GLUCOSE SERPL-MCNC: 183 MG/DL — HIGH (ref 70–99)
GLUCOSE SERPL-MCNC: 99 MG/DL — SIGNIFICANT CHANGE UP (ref 70–99)
GLUCOSE UR QL: NEGATIVE MG/DL — SIGNIFICANT CHANGE UP
HCT VFR BLD CALC: 23 % — LOW (ref 39–50)
HCT VFR BLD CALC: 24.6 % — LOW (ref 39–50)
HGB BLD-MCNC: 8.2 G/DL — LOW (ref 13–17)
HGB BLD-MCNC: 8.2 G/DL — LOW (ref 13–17)
KETONES UR-MCNC: NEGATIVE — SIGNIFICANT CHANGE UP
LDH SERPL L TO P-CCNC: 121 U/L — SIGNIFICANT CHANGE UP (ref 50–242)
LEUKOCYTE ESTERASE UR-ACNC: NEGATIVE — SIGNIFICANT CHANGE UP
LYMPHOCYTES # BLD AUTO: 0.4 K/UL — LOW (ref 1–3.3)
LYMPHOCYTES # BLD AUTO: 12 % — LOW (ref 13–44)
MAGNESIUM SERPL-MCNC: 1.8 MG/DL — SIGNIFICANT CHANGE UP (ref 1.6–2.6)
MAGNESIUM SERPL-MCNC: 2 MG/DL — SIGNIFICANT CHANGE UP (ref 1.6–2.6)
MCHC RBC-ENTMCNC: 31.5 PG — SIGNIFICANT CHANGE UP (ref 27–34)
MCHC RBC-ENTMCNC: 33.3 GM/DL — SIGNIFICANT CHANGE UP (ref 32–36)
MCHC RBC-ENTMCNC: 33.8 PG — SIGNIFICANT CHANGE UP (ref 27–34)
MCHC RBC-ENTMCNC: 35.7 GM/DL — SIGNIFICANT CHANGE UP (ref 32–36)
MCV RBC AUTO: 94.5 FL — SIGNIFICANT CHANGE UP (ref 80–100)
MCV RBC AUTO: 94.7 FL — SIGNIFICANT CHANGE UP (ref 80–100)
MONOCYTES # BLD AUTO: 0.3 K/UL — SIGNIFICANT CHANGE UP (ref 0–0.9)
MONOCYTES NFR BLD AUTO: 7 % — SIGNIFICANT CHANGE UP (ref 2–14)
NEUTROPHILS # BLD AUTO: 1.7 K/UL — LOW (ref 1.8–7.4)
NEUTROPHILS NFR BLD AUTO: 68 % — SIGNIFICANT CHANGE UP (ref 43–77)
NITRITE UR-MCNC: NEGATIVE — SIGNIFICANT CHANGE UP
PH UR: 7.5 — SIGNIFICANT CHANGE UP (ref 5–8)
PHOSPHATE SERPL-MCNC: 1.9 MG/DL — LOW (ref 2.5–4.5)
PHOSPHATE SERPL-MCNC: 2.3 MG/DL — LOW (ref 2.5–4.5)
PLATELET # BLD AUTO: 24 K/UL — LOW (ref 150–400)
PLATELET # BLD AUTO: 26 K/UL — LOW (ref 150–400)
POTASSIUM SERPL-MCNC: 3.9 MMOL/L — SIGNIFICANT CHANGE UP (ref 3.5–5.3)
POTASSIUM SERPL-MCNC: 4.1 MMOL/L — SIGNIFICANT CHANGE UP (ref 3.5–5.3)
POTASSIUM SERPL-SCNC: 3.9 MMOL/L — SIGNIFICANT CHANGE UP (ref 3.5–5.3)
POTASSIUM SERPL-SCNC: 4.1 MMOL/L — SIGNIFICANT CHANGE UP (ref 3.5–5.3)
PROT SERPL-MCNC: 6.4 G/DL — SIGNIFICANT CHANGE UP (ref 6–8.3)
PROT UR-MCNC: NEGATIVE MG/DL — SIGNIFICANT CHANGE UP
RBC # BLD: 2.43 M/UL — LOW (ref 4.2–5.8)
RBC # BLD: 2.6 M/UL — LOW (ref 4.2–5.8)
RBC # FLD: 14.8 % — HIGH (ref 10.3–14.5)
RBC # FLD: 15 % — HIGH (ref 10.3–14.5)
SODIUM SERPL-SCNC: 142 MMOL/L — SIGNIFICANT CHANGE UP (ref 135–145)
SODIUM SERPL-SCNC: 142 MMOL/L — SIGNIFICANT CHANGE UP (ref 135–145)
SP GR SPEC: 1.01 — SIGNIFICANT CHANGE UP (ref 1.01–1.02)
TROPONIN T SERPL-MCNC: <0.01 NG/ML — SIGNIFICANT CHANGE UP (ref 0–0.06)
TSH SERPL-MCNC: 1.3 UIU/ML — SIGNIFICANT CHANGE UP (ref 0.27–4.2)
UROBILINOGEN FLD QL: NEGATIVE MG/DL — SIGNIFICANT CHANGE UP
WBC # BLD: 2.4 K/UL — LOW (ref 3.8–10.5)
WBC # BLD: 3.2 K/UL — LOW (ref 3.8–10.5)
WBC # FLD AUTO: 2.4 K/UL — LOW (ref 3.8–10.5)
WBC # FLD AUTO: 3.2 K/UL — LOW (ref 3.8–10.5)

## 2017-04-15 PROCEDURE — 93010 ELECTROCARDIOGRAM REPORT: CPT

## 2017-04-15 PROCEDURE — 99233 SBSQ HOSP IP/OBS HIGH 50: CPT | Mod: GC

## 2017-04-15 PROCEDURE — 93010 ELECTROCARDIOGRAM REPORT: CPT | Mod: 77

## 2017-04-15 PROCEDURE — 99223 1ST HOSP IP/OBS HIGH 75: CPT

## 2017-04-15 RX ORDER — SODIUM CHLORIDE 9 MG/ML
1000 INJECTION INTRAMUSCULAR; INTRAVENOUS; SUBCUTANEOUS
Qty: 0 | Refills: 0 | Status: DISCONTINUED | OUTPATIENT
Start: 2017-04-15 | End: 2017-04-15

## 2017-04-15 RX ORDER — SODIUM CHLORIDE 9 MG/ML
1000 INJECTION INTRAMUSCULAR; INTRAVENOUS; SUBCUTANEOUS
Qty: 0 | Refills: 0 | Status: DISCONTINUED | OUTPATIENT
Start: 2017-04-15 | End: 2017-04-18

## 2017-04-15 RX ORDER — METOPROLOL TARTRATE 50 MG
5 TABLET ORAL ONCE
Qty: 0 | Refills: 0 | Status: COMPLETED | OUTPATIENT
Start: 2017-04-15 | End: 2017-04-15

## 2017-04-15 RX ORDER — SODIUM CHLORIDE 9 MG/ML
1000 INJECTION INTRAMUSCULAR; INTRAVENOUS; SUBCUTANEOUS ONCE
Qty: 0 | Refills: 0 | Status: COMPLETED | OUTPATIENT
Start: 2017-04-15 | End: 2017-04-15

## 2017-04-15 RX ORDER — SODIUM CHLORIDE 9 MG/ML
2000 INJECTION INTRAMUSCULAR; INTRAVENOUS; SUBCUTANEOUS ONCE
Qty: 0 | Refills: 0 | Status: COMPLETED | OUTPATIENT
Start: 2017-04-15 | End: 2017-04-15

## 2017-04-15 RX ORDER — DILTIAZEM HCL 120 MG
10 CAPSULE, EXT RELEASE 24 HR ORAL
Qty: 125 | Refills: 0 | Status: DISCONTINUED | OUTPATIENT
Start: 2017-04-15 | End: 2017-04-17

## 2017-04-15 RX ORDER — SODIUM,POTASSIUM PHOSPHATES 278-250MG
1 POWDER IN PACKET (EA) ORAL
Qty: 0 | Refills: 0 | Status: COMPLETED | OUTPATIENT
Start: 2017-04-15 | End: 2017-04-15

## 2017-04-15 RX ADMIN — Medication 650 MILLIGRAM(S): at 13:47

## 2017-04-15 RX ADMIN — HYDROMORPHONE HYDROCHLORIDE 1 MILLIGRAM(S): 2 INJECTION INTRAMUSCULAR; INTRAVENOUS; SUBCUTANEOUS at 07:52

## 2017-04-15 RX ADMIN — NYSTATIN CREAM 1 APPLICATION(S): 100000 CREAM TOPICAL at 06:21

## 2017-04-15 RX ADMIN — Medication 1 APPLICATION(S): at 18:31

## 2017-04-15 RX ADMIN — URSODIOL 300 MILLIGRAM(S): 250 TABLET, FILM COATED ORAL at 07:29

## 2017-04-15 RX ADMIN — Medication 30 MILLILITER(S): at 19:06

## 2017-04-15 RX ADMIN — Medication 1 MILLIGRAM(S): at 11:03

## 2017-04-15 RX ADMIN — SODIUM CHLORIDE 50 MILLILITER(S): 9 INJECTION INTRAMUSCULAR; INTRAVENOUS; SUBCUTANEOUS at 06:11

## 2017-04-15 RX ADMIN — NYSTATIN CREAM 1 APPLICATION(S): 100000 CREAM TOPICAL at 18:31

## 2017-04-15 RX ADMIN — Medication 30 MILLILITER(S): at 01:04

## 2017-04-15 RX ADMIN — Medication 10 MILLILITER(S): at 22:24

## 2017-04-15 RX ADMIN — Medication 1 GRAM(S): at 11:02

## 2017-04-15 RX ADMIN — HEPARIN SODIUM 4.07 UNIT(S)/HR: 5000 INJECTION INTRAVENOUS; SUBCUTANEOUS at 10:48

## 2017-04-15 RX ADMIN — Medication 1: at 18:29

## 2017-04-15 RX ADMIN — Medication 1 LOZENGE: at 22:32

## 2017-04-15 RX ADMIN — Medication 10 MILLILITER(S): at 11:51

## 2017-04-15 RX ADMIN — MEROPENEM 200 MILLIGRAM(S): 1 INJECTION INTRAVENOUS at 22:23

## 2017-04-15 RX ADMIN — Medication 1 APPLICATION(S): at 06:21

## 2017-04-15 RX ADMIN — Medication 400 MILLIGRAM(S): at 22:23

## 2017-04-15 RX ADMIN — Medication 650 MILLIGRAM(S): at 20:08

## 2017-04-15 RX ADMIN — Medication 1 GRAM(S): at 22:22

## 2017-04-15 RX ADMIN — Medication 400 MILLIGRAM(S): at 15:11

## 2017-04-15 RX ADMIN — PANTOPRAZOLE SODIUM 40 MILLIGRAM(S): 20 TABLET, DELAYED RELEASE ORAL at 11:02

## 2017-04-15 RX ADMIN — Medication 30 MILLILITER(S): at 22:26

## 2017-04-15 RX ADMIN — Medication 10 MILLILITER(S): at 07:30

## 2017-04-15 RX ADMIN — FLUCONAZOLE 400 MILLIGRAM(S): 150 TABLET ORAL at 11:03

## 2017-04-15 RX ADMIN — Medication 1 GRAM(S): at 06:12

## 2017-04-15 RX ADMIN — Medication 30 MILLILITER(S): at 11:48

## 2017-04-15 RX ADMIN — SODIUM CHLORIDE 2000 MILLILITER(S): 9 INJECTION INTRAMUSCULAR; INTRAVENOUS; SUBCUTANEOUS at 09:10

## 2017-04-15 RX ADMIN — Medication 650 MILLIGRAM(S): at 21:00

## 2017-04-15 RX ADMIN — APREPITANT 80 MILLIGRAM(S): 80 CAPSULE ORAL at 11:02

## 2017-04-15 RX ADMIN — HYDROMORPHONE HYDROCHLORIDE 1 MILLIGRAM(S): 2 INJECTION INTRAMUSCULAR; INTRAVENOUS; SUBCUTANEOUS at 07:28

## 2017-04-15 RX ADMIN — MEROPENEM 200 MILLIGRAM(S): 1 INJECTION INTRAVENOUS at 06:12

## 2017-04-15 RX ADMIN — Medication 10 MILLILITER(S): at 01:04

## 2017-04-15 RX ADMIN — Medication 30 MILLILITER(S): at 07:29

## 2017-04-15 RX ADMIN — Medication 1 LOZENGE: at 01:04

## 2017-04-15 RX ADMIN — Medication 480 MICROGRAM(S): at 15:13

## 2017-04-15 RX ADMIN — Medication 1 LOZENGE: at 07:29

## 2017-04-15 RX ADMIN — Medication 1 TABLET(S): at 11:03

## 2017-04-15 RX ADMIN — Medication 1 TABLET(S): at 22:23

## 2017-04-15 RX ADMIN — Medication 400 MILLIGRAM(S): at 06:12

## 2017-04-15 RX ADMIN — SODIUM CHLORIDE 2000 MILLILITER(S): 9 INJECTION INTRAMUSCULAR; INTRAVENOUS; SUBCUTANEOUS at 12:04

## 2017-04-15 RX ADMIN — Medication 10 MG/HR: at 13:00

## 2017-04-15 RX ADMIN — TAMSULOSIN HYDROCHLORIDE 0.4 MILLIGRAM(S): 0.4 CAPSULE ORAL at 22:23

## 2017-04-15 RX ADMIN — MEROPENEM 200 MILLIGRAM(S): 1 INJECTION INTRAVENOUS at 14:00

## 2017-04-15 RX ADMIN — Medication 1 LOZENGE: at 15:12

## 2017-04-15 RX ADMIN — Medication 10 MILLILITER(S): at 18:27

## 2017-04-15 RX ADMIN — URSODIOL 300 MILLIGRAM(S): 250 TABLET, FILM COATED ORAL at 22:00

## 2017-04-15 RX ADMIN — Medication 1: at 14:00

## 2017-04-15 RX ADMIN — Medication 650 MILLIGRAM(S): at 13:16

## 2017-04-16 LAB
ALBUMIN SERPL ELPH-MCNC: 3.2 G/DL — LOW (ref 3.3–5)
ALP SERPL-CCNC: 82 U/L — SIGNIFICANT CHANGE UP (ref 40–120)
ALT FLD-CCNC: 23 U/L RC — SIGNIFICANT CHANGE UP (ref 10–45)
ANION GAP SERPL CALC-SCNC: 10 MMOL/L — SIGNIFICANT CHANGE UP (ref 5–17)
AST SERPL-CCNC: 17 U/L — SIGNIFICANT CHANGE UP (ref 10–40)
BACTERIA # UR AUTO: NEGATIVE — SIGNIFICANT CHANGE UP
BILIRUB SERPL-MCNC: 0.1 MG/DL — LOW (ref 0.2–1.2)
BUN SERPL-MCNC: 11 MG/DL — SIGNIFICANT CHANGE UP (ref 7–23)
CALCIUM SERPL-MCNC: 8.4 MG/DL — SIGNIFICANT CHANGE UP (ref 8.4–10.5)
CHLORIDE SERPL-SCNC: 103 MMOL/L — SIGNIFICANT CHANGE UP (ref 96–108)
CO2 SERPL-SCNC: 29 MMOL/L — SIGNIFICANT CHANGE UP (ref 22–31)
CREAT SERPL-MCNC: 0.63 MG/DL — SIGNIFICANT CHANGE UP (ref 0.5–1.3)
CULTURE RESULTS: NO GROWTH — SIGNIFICANT CHANGE UP
CULTURE RESULTS: SIGNIFICANT CHANGE UP
CULTURE RESULTS: SIGNIFICANT CHANGE UP
EPI CELLS # UR: 0 /HPF — SIGNIFICANT CHANGE UP (ref 0–5)
GLUCOSE SERPL-MCNC: 103 MG/DL — HIGH (ref 70–99)
HCT VFR BLD CALC: 22.3 % — LOW (ref 39–50)
HGB BLD-MCNC: 7.8 G/DL — LOW (ref 13–17)
HYALINE CASTS # UR AUTO: 0 /LPF — SIGNIFICANT CHANGE UP (ref 0–7)
LDH SERPL L TO P-CCNC: 146 U/L — SIGNIFICANT CHANGE UP (ref 50–242)
MAGNESIUM SERPL-MCNC: 1.5 MG/DL — LOW (ref 1.6–2.6)
MCHC RBC-ENTMCNC: 33.3 PG — SIGNIFICANT CHANGE UP (ref 27–34)
MCHC RBC-ENTMCNC: 34.9 GM/DL — SIGNIFICANT CHANGE UP (ref 32–36)
MCV RBC AUTO: 95.5 FL — SIGNIFICANT CHANGE UP (ref 80–100)
PHOSPHATE SERPL-MCNC: 2 MG/DL — LOW (ref 2.5–4.5)
PLATELET # BLD AUTO: 32 K/UL — LOW (ref 150–400)
POTASSIUM SERPL-MCNC: 4.7 MMOL/L — SIGNIFICANT CHANGE UP (ref 3.5–5.3)
POTASSIUM SERPL-SCNC: 4.7 MMOL/L — SIGNIFICANT CHANGE UP (ref 3.5–5.3)
PROT SERPL-MCNC: 5.7 G/DL — LOW (ref 6–8.3)
RBC # BLD: 2.34 M/UL — LOW (ref 4.2–5.8)
RBC # FLD: 15 % — HIGH (ref 10.3–14.5)
RBC CASTS # UR COMP ASSIST: 1 /HPF — SIGNIFICANT CHANGE UP (ref 0–4)
SODIUM SERPL-SCNC: 142 MMOL/L — SIGNIFICANT CHANGE UP (ref 135–145)
SPECIMEN SOURCE: SIGNIFICANT CHANGE UP
WBC # BLD: 9.5 K/UL — SIGNIFICANT CHANGE UP (ref 3.8–10.5)
WBC # FLD AUTO: 9.5 K/UL — SIGNIFICANT CHANGE UP (ref 3.8–10.5)
WBC UR QL: 0 /HPF — SIGNIFICANT CHANGE UP (ref 0–5)

## 2017-04-16 PROCEDURE — 93010 ELECTROCARDIOGRAM REPORT: CPT

## 2017-04-16 PROCEDURE — 99233 SBSQ HOSP IP/OBS HIGH 50: CPT | Mod: GC

## 2017-04-16 RX ORDER — OXYCODONE HYDROCHLORIDE 5 MG/1
5 TABLET ORAL EVERY 6 HOURS
Qty: 0 | Refills: 0 | Status: DISCONTINUED | OUTPATIENT
Start: 2017-04-16 | End: 2017-04-21

## 2017-04-16 RX ORDER — POTASSIUM PHOSPHATE, MONOBASIC POTASSIUM PHOSPHATE, DIBASIC 236; 224 MG/ML; MG/ML
15 INJECTION, SOLUTION INTRAVENOUS ONCE
Qty: 0 | Refills: 0 | Status: DISCONTINUED | OUTPATIENT
Start: 2017-04-16 | End: 2017-04-16

## 2017-04-16 RX ORDER — MAGNESIUM SULFATE 500 MG/ML
2 VIAL (ML) INJECTION ONCE
Qty: 0 | Refills: 0 | Status: DISCONTINUED | OUTPATIENT
Start: 2017-04-16 | End: 2017-04-16

## 2017-04-16 RX ORDER — POTASSIUM PHOSPHATE, MONOBASIC POTASSIUM PHOSPHATE, DIBASIC 236; 224 MG/ML; MG/ML
15 INJECTION, SOLUTION INTRAVENOUS ONCE
Qty: 0 | Refills: 0 | Status: COMPLETED | OUTPATIENT
Start: 2017-04-16 | End: 2017-04-16

## 2017-04-16 RX ORDER — METOPROLOL TARTRATE 50 MG
5 TABLET ORAL ONCE
Qty: 0 | Refills: 0 | Status: COMPLETED | OUTPATIENT
Start: 2017-04-16 | End: 2017-04-16

## 2017-04-16 RX ORDER — MAGNESIUM SULFATE 500 MG/ML
2 VIAL (ML) INJECTION ONCE
Qty: 0 | Refills: 0 | Status: COMPLETED | OUTPATIENT
Start: 2017-04-16 | End: 2017-04-16

## 2017-04-16 RX ADMIN — Medication 30 MILLILITER(S): at 23:16

## 2017-04-16 RX ADMIN — Medication 1 LOZENGE: at 08:24

## 2017-04-16 RX ADMIN — HEPARIN SODIUM 4.07 UNIT(S)/HR: 5000 INJECTION INTRAVENOUS; SUBCUTANEOUS at 00:19

## 2017-04-16 RX ADMIN — Medication 1 GRAM(S): at 21:38

## 2017-04-16 RX ADMIN — Medication 10 MILLILITER(S): at 23:16

## 2017-04-16 RX ADMIN — Medication 400 MILLIGRAM(S): at 14:03

## 2017-04-16 RX ADMIN — SODIUM CHLORIDE 100 MILLILITER(S): 9 INJECTION INTRAMUSCULAR; INTRAVENOUS; SUBCUTANEOUS at 20:09

## 2017-04-16 RX ADMIN — Medication 1: at 12:07

## 2017-04-16 RX ADMIN — Medication 1 GRAM(S): at 12:09

## 2017-04-16 RX ADMIN — DIPHENHYDRAMINE HYDROCHLORIDE AND LIDOCAINE HYDROCHLORIDE AND ALUMINUM HYDROXIDE AND MAGNESIUM HYDRO 5 MILLILITER(S): KIT at 17:41

## 2017-04-16 RX ADMIN — Medication 10 MG/HR: at 00:19

## 2017-04-16 RX ADMIN — HEPARIN SODIUM 4.07 UNIT(S)/HR: 5000 INJECTION INTRAVENOUS; SUBCUTANEOUS at 20:09

## 2017-04-16 RX ADMIN — Medication 1 TABLET(S): at 12:10

## 2017-04-16 RX ADMIN — FLUCONAZOLE 400 MILLIGRAM(S): 150 TABLET ORAL at 12:11

## 2017-04-16 RX ADMIN — Medication 30 MILLILITER(S): at 20:10

## 2017-04-16 RX ADMIN — Medication 1 LOZENGE: at 23:16

## 2017-04-16 RX ADMIN — Medication 1 GRAM(S): at 06:41

## 2017-04-16 RX ADMIN — Medication 1 APPLICATION(S): at 05:26

## 2017-04-16 RX ADMIN — Medication 10 MG/HR: at 20:08

## 2017-04-16 RX ADMIN — Medication 1 LOZENGE: at 12:09

## 2017-04-16 RX ADMIN — Medication 1 LOZENGE: at 20:07

## 2017-04-16 RX ADMIN — Medication 1 MILLIGRAM(S): at 12:10

## 2017-04-16 RX ADMIN — Medication 10 MILLILITER(S): at 20:10

## 2017-04-16 RX ADMIN — Medication 1 GRAM(S): at 17:41

## 2017-04-16 RX ADMIN — URSODIOL 300 MILLIGRAM(S): 250 TABLET, FILM COATED ORAL at 08:27

## 2017-04-16 RX ADMIN — Medication 400 MILLIGRAM(S): at 21:38

## 2017-04-16 RX ADMIN — Medication 50 GRAM(S): at 08:24

## 2017-04-16 RX ADMIN — Medication 10 MILLILITER(S): at 08:29

## 2017-04-16 RX ADMIN — Medication 10 MILLILITER(S): at 17:42

## 2017-04-16 RX ADMIN — Medication 650 MILLIGRAM(S): at 04:14

## 2017-04-16 RX ADMIN — LIDOCAINE AND PRILOCAINE CREAM 1 APPLICATION(S): 25; 25 CREAM TOPICAL at 20:10

## 2017-04-16 RX ADMIN — Medication 10 MILLILITER(S): at 12:12

## 2017-04-16 RX ADMIN — MEROPENEM 200 MILLIGRAM(S): 1 INJECTION INTRAVENOUS at 06:39

## 2017-04-16 RX ADMIN — SODIUM CHLORIDE 100 MILLILITER(S): 9 INJECTION INTRAMUSCULAR; INTRAVENOUS; SUBCUTANEOUS at 00:19

## 2017-04-16 RX ADMIN — PANTOPRAZOLE SODIUM 40 MILLIGRAM(S): 20 TABLET, DELAYED RELEASE ORAL at 12:12

## 2017-04-16 RX ADMIN — NYSTATIN CREAM 1 APPLICATION(S): 100000 CREAM TOPICAL at 05:26

## 2017-04-16 RX ADMIN — Medication 1 LOZENGE: at 17:41

## 2017-04-16 RX ADMIN — Medication 30 MILLILITER(S): at 17:41

## 2017-04-16 RX ADMIN — DIPHENHYDRAMINE HYDROCHLORIDE AND LIDOCAINE HYDROCHLORIDE AND ALUMINUM HYDROXIDE AND MAGNESIUM HYDRO 5 MILLILITER(S): KIT at 08:25

## 2017-04-16 RX ADMIN — TAMSULOSIN HYDROCHLORIDE 0.4 MILLIGRAM(S): 0.4 CAPSULE ORAL at 21:38

## 2017-04-16 RX ADMIN — Medication 650 MILLIGRAM(S): at 05:24

## 2017-04-16 RX ADMIN — Medication 400 MILLIGRAM(S): at 05:25

## 2017-04-16 RX ADMIN — Medication 30 MILLILITER(S): at 12:09

## 2017-04-16 RX ADMIN — URSODIOL 300 MILLIGRAM(S): 250 TABLET, FILM COATED ORAL at 17:41

## 2017-04-16 RX ADMIN — Medication 5 MILLIGRAM(S): at 00:33

## 2017-04-16 RX ADMIN — Medication 5 MILLIGRAM(S): at 07:00

## 2017-04-16 RX ADMIN — POTASSIUM PHOSPHATE, MONOBASIC POTASSIUM PHOSPHATE, DIBASIC 62.5 MILLIMOLE(S): 236; 224 INJECTION, SOLUTION INTRAVENOUS at 09:42

## 2017-04-16 RX ADMIN — DIPHENHYDRAMINE HYDROCHLORIDE AND LIDOCAINE HYDROCHLORIDE AND ALUMINUM HYDROXIDE AND MAGNESIUM HYDRO 5 MILLILITER(S): KIT at 12:08

## 2017-04-16 RX ADMIN — Medication 30 MILLILITER(S): at 08:25

## 2017-04-17 LAB
ALBUMIN SERPL ELPH-MCNC: 3.2 G/DL — LOW (ref 3.3–5)
ALBUMIN SERPL ELPH-MCNC: 3.4 G/DL — SIGNIFICANT CHANGE UP (ref 3.3–5)
ALP SERPL-CCNC: 92 U/L — SIGNIFICANT CHANGE UP (ref 40–120)
ALP SERPL-CCNC: 93 U/L — SIGNIFICANT CHANGE UP (ref 40–120)
ALT FLD-CCNC: 20 U/L RC — SIGNIFICANT CHANGE UP (ref 10–45)
ALT FLD-CCNC: 20 U/L RC — SIGNIFICANT CHANGE UP (ref 10–45)
ANION GAP SERPL CALC-SCNC: 5 MMOL/L — SIGNIFICANT CHANGE UP (ref 5–17)
APTT BLD: 26.1 SEC — LOW (ref 27.5–37.4)
AST SERPL-CCNC: 19 U/L — SIGNIFICANT CHANGE UP (ref 10–40)
AST SERPL-CCNC: 26 U/L — SIGNIFICANT CHANGE UP (ref 10–40)
BASOPHILS # BLD AUTO: 0 K/UL — SIGNIFICANT CHANGE UP (ref 0–0.2)
BASOPHILS NFR BLD AUTO: 0 % — SIGNIFICANT CHANGE UP (ref 0–2)
BILIRUB DIRECT SERPL-MCNC: <0.1 MG/DL — SIGNIFICANT CHANGE UP (ref 0–0.2)
BILIRUB INDIRECT FLD-MCNC: >0 MG/DL — LOW (ref 0.2–1)
BILIRUB SERPL-MCNC: 0.1 MG/DL — LOW (ref 0.2–1.2)
BILIRUB SERPL-MCNC: 0.1 MG/DL — LOW (ref 0.2–1.2)
BLD GP AB SCN SERPL QL: NEGATIVE — SIGNIFICANT CHANGE UP
BUN SERPL-MCNC: 10 MG/DL — SIGNIFICANT CHANGE UP (ref 7–23)
CALCIUM SERPL-MCNC: 8.7 MG/DL — SIGNIFICANT CHANGE UP (ref 8.4–10.5)
CHLORIDE SERPL-SCNC: 103 MMOL/L — SIGNIFICANT CHANGE UP (ref 96–108)
CO2 SERPL-SCNC: 34 MMOL/L — HIGH (ref 22–31)
CREAT SERPL-MCNC: 0.61 MG/DL — SIGNIFICANT CHANGE UP (ref 0.5–1.3)
EOSINOPHIL # BLD AUTO: 0 K/UL — SIGNIFICANT CHANGE UP (ref 0–0.5)
EOSINOPHIL NFR BLD AUTO: 0 % — SIGNIFICANT CHANGE UP (ref 0–6)
FIBRINOGEN PPP-MCNC: 581 MG/DL — HIGH (ref 310–510)
GLUCOSE SERPL-MCNC: 107 MG/DL — HIGH (ref 70–99)
HCT VFR BLD CALC: 22.8 % — LOW (ref 39–50)
HGB BLD-MCNC: 7.8 G/DL — LOW (ref 13–17)
INR BLD: 1.08 RATIO — SIGNIFICANT CHANGE UP (ref 0.88–1.16)
LDH SERPL L TO P-CCNC: 211 U/L — SIGNIFICANT CHANGE UP (ref 50–242)
LYMPHOCYTES # BLD AUTO: 1.2 K/UL — SIGNIFICANT CHANGE UP (ref 1–3.3)
LYMPHOCYTES # BLD AUTO: 10 % — LOW (ref 13–44)
MAGNESIUM SERPL-MCNC: 2 MG/DL — SIGNIFICANT CHANGE UP (ref 1.6–2.6)
MCHC RBC-ENTMCNC: 32.3 PG — SIGNIFICANT CHANGE UP (ref 27–34)
MCHC RBC-ENTMCNC: 34.1 GM/DL — SIGNIFICANT CHANGE UP (ref 32–36)
MCV RBC AUTO: 94.6 FL — SIGNIFICANT CHANGE UP (ref 80–100)
MONOCYTES # BLD AUTO: 1.3 K/UL — HIGH (ref 0–0.9)
MONOCYTES NFR BLD AUTO: 11 % — SIGNIFICANT CHANGE UP (ref 2–14)
NEUTROPHILS # BLD AUTO: SIGNIFICANT CHANGE UP (ref 1.8–7.4)
NEUTROPHILS NFR BLD AUTO: 61 % — SIGNIFICANT CHANGE UP (ref 43–77)
PHOSPHATE SERPL-MCNC: 2.3 MG/DL — LOW (ref 2.5–4.5)
PLATELET # BLD AUTO: 45 K/UL — LOW (ref 150–400)
POTASSIUM SERPL-MCNC: 4.2 MMOL/L — SIGNIFICANT CHANGE UP (ref 3.5–5.3)
POTASSIUM SERPL-SCNC: 4.2 MMOL/L — SIGNIFICANT CHANGE UP (ref 3.5–5.3)
PROT SERPL-MCNC: 5.9 G/DL — LOW (ref 6–8.3)
PROT SERPL-MCNC: 6.1 G/DL — SIGNIFICANT CHANGE UP (ref 6–8.3)
PROTHROM AB SERPL-ACNC: 11.8 SEC — SIGNIFICANT CHANGE UP (ref 9.8–12.7)
RBC # BLD: 2.41 M/UL — LOW (ref 4.2–5.8)
RBC # FLD: 15.5 % — HIGH (ref 10.3–14.5)
RH IG SCN BLD-IMP: POSITIVE — SIGNIFICANT CHANGE UP
SODIUM SERPL-SCNC: 142 MMOL/L — SIGNIFICANT CHANGE UP (ref 135–145)
WBC # BLD: 12 K/UL — HIGH (ref 3.8–10.5)
WBC # FLD AUTO: 12 K/UL — HIGH (ref 3.8–10.5)

## 2017-04-17 PROCEDURE — 99233 SBSQ HOSP IP/OBS HIGH 50: CPT | Mod: GC

## 2017-04-17 PROCEDURE — 93010 ELECTROCARDIOGRAM REPORT: CPT

## 2017-04-17 PROCEDURE — 99291 CRITICAL CARE FIRST HOUR: CPT

## 2017-04-17 RX ORDER — DILTIAZEM HCL 120 MG
5 CAPSULE, EXT RELEASE 24 HR ORAL
Qty: 125 | Refills: 0 | Status: DISCONTINUED | OUTPATIENT
Start: 2017-04-17 | End: 2017-04-18

## 2017-04-17 RX ORDER — METOPROLOL TARTRATE 50 MG
25 TABLET ORAL EVERY 8 HOURS
Qty: 0 | Refills: 0 | Status: DISCONTINUED | OUTPATIENT
Start: 2017-04-17 | End: 2017-04-18

## 2017-04-17 RX ADMIN — Medication 1 GRAM(S): at 21:32

## 2017-04-17 RX ADMIN — Medication 10 MILLILITER(S): at 07:56

## 2017-04-17 RX ADMIN — Medication 1 GRAM(S): at 11:53

## 2017-04-17 RX ADMIN — Medication 30 MILLILITER(S): at 16:28

## 2017-04-17 RX ADMIN — FLUCONAZOLE 400 MILLIGRAM(S): 150 TABLET ORAL at 11:55

## 2017-04-17 RX ADMIN — Medication 1 APPLICATION(S): at 06:03

## 2017-04-17 RX ADMIN — Medication 1 TABLET(S): at 11:54

## 2017-04-17 RX ADMIN — Medication 10 MILLILITER(S): at 21:33

## 2017-04-17 RX ADMIN — OXYCODONE HYDROCHLORIDE 5 MILLIGRAM(S): 5 TABLET ORAL at 16:22

## 2017-04-17 RX ADMIN — Medication 25 MILLIGRAM(S): at 19:22

## 2017-04-17 RX ADMIN — Medication 400 MILLIGRAM(S): at 21:32

## 2017-04-17 RX ADMIN — Medication 400 MILLIGRAM(S): at 16:29

## 2017-04-17 RX ADMIN — URSODIOL 300 MILLIGRAM(S): 250 TABLET, FILM COATED ORAL at 11:55

## 2017-04-17 RX ADMIN — Medication 30 MILLILITER(S): at 07:55

## 2017-04-17 RX ADMIN — Medication 1 LOZENGE: at 11:54

## 2017-04-17 RX ADMIN — Medication 400 MILLIGRAM(S): at 06:03

## 2017-04-17 RX ADMIN — Medication 1 LOZENGE: at 07:54

## 2017-04-17 RX ADMIN — TAMSULOSIN HYDROCHLORIDE 0.4 MILLIGRAM(S): 0.4 CAPSULE ORAL at 21:32

## 2017-04-17 RX ADMIN — Medication 5 MG/HR: at 21:31

## 2017-04-17 RX ADMIN — URSODIOL 300 MILLIGRAM(S): 250 TABLET, FILM COATED ORAL at 21:36

## 2017-04-17 RX ADMIN — Medication 30 MILLILITER(S): at 21:32

## 2017-04-17 RX ADMIN — Medication 10 MILLILITER(S): at 12:00

## 2017-04-17 RX ADMIN — Medication 30 MILLILITER(S): at 16:27

## 2017-04-17 RX ADMIN — Medication 1 GRAM(S): at 18:38

## 2017-04-17 RX ADMIN — PANTOPRAZOLE SODIUM 40 MILLIGRAM(S): 20 TABLET, DELAYED RELEASE ORAL at 18:38

## 2017-04-17 RX ADMIN — DIPHENHYDRAMINE HYDROCHLORIDE AND LIDOCAINE HYDROCHLORIDE AND ALUMINUM HYDROXIDE AND MAGNESIUM HYDRO 5 MILLILITER(S): KIT at 19:26

## 2017-04-17 RX ADMIN — Medication 1 GRAM(S): at 06:03

## 2017-04-17 RX ADMIN — Medication 25 MILLIGRAM(S): at 11:56

## 2017-04-17 RX ADMIN — Medication 10 MILLILITER(S): at 16:28

## 2017-04-17 RX ADMIN — OXYCODONE HYDROCHLORIDE 5 MILLIGRAM(S): 5 TABLET ORAL at 16:50

## 2017-04-17 RX ADMIN — Medication 1 MILLIGRAM(S): at 11:56

## 2017-04-17 RX ADMIN — Medication 1 LOZENGE: at 19:26

## 2017-04-17 RX ADMIN — Medication 1 LOZENGE: at 18:38

## 2017-04-17 RX ADMIN — Medication 10 MG/HR: at 07:59

## 2017-04-18 LAB
ALBUMIN SERPL ELPH-MCNC: 3.5 G/DL — SIGNIFICANT CHANGE UP (ref 3.3–5)
ALP SERPL-CCNC: 94 U/L — SIGNIFICANT CHANGE UP (ref 40–120)
ALT FLD-CCNC: 24 U/L RC — SIGNIFICANT CHANGE UP (ref 10–45)
ANION GAP SERPL CALC-SCNC: 13 MMOL/L — SIGNIFICANT CHANGE UP (ref 5–17)
AST SERPL-CCNC: 23 U/L — SIGNIFICANT CHANGE UP (ref 10–40)
BASOPHILS # BLD AUTO: 0 K/UL — SIGNIFICANT CHANGE UP (ref 0–0.2)
BILIRUB SERPL-MCNC: 0.1 MG/DL — LOW (ref 0.2–1.2)
BUN SERPL-MCNC: 13 MG/DL — SIGNIFICANT CHANGE UP (ref 7–23)
CALCIUM SERPL-MCNC: 9 MG/DL — SIGNIFICANT CHANGE UP (ref 8.4–10.5)
CHLORIDE SERPL-SCNC: 104 MMOL/L — SIGNIFICANT CHANGE UP (ref 96–108)
CO2 SERPL-SCNC: 27 MMOL/L — SIGNIFICANT CHANGE UP (ref 22–31)
CREAT SERPL-MCNC: 0.61 MG/DL — SIGNIFICANT CHANGE UP (ref 0.5–1.3)
EOSINOPHIL # BLD AUTO: 0 K/UL — SIGNIFICANT CHANGE UP (ref 0–0.5)
GLUCOSE SERPL-MCNC: 102 MG/DL — HIGH (ref 70–99)
HCT VFR BLD CALC: 24.8 % — LOW (ref 39–50)
HGB BLD-MCNC: 8.2 G/DL — LOW (ref 13–17)
LDH SERPL L TO P-CCNC: 212 U/L — SIGNIFICANT CHANGE UP (ref 50–242)
LYMPHOCYTES # BLD AUTO: 1.9 K/UL — SIGNIFICANT CHANGE UP (ref 1–3.3)
LYMPHOCYTES # BLD AUTO: 21 % — SIGNIFICANT CHANGE UP (ref 13–44)
MAGNESIUM SERPL-MCNC: 1.9 MG/DL — SIGNIFICANT CHANGE UP (ref 1.6–2.6)
MCHC RBC-ENTMCNC: 31.5 PG — SIGNIFICANT CHANGE UP (ref 27–34)
MCHC RBC-ENTMCNC: 33.3 GM/DL — SIGNIFICANT CHANGE UP (ref 32–36)
MCV RBC AUTO: 94.8 FL — SIGNIFICANT CHANGE UP (ref 80–100)
MONOCYTES # BLD AUTO: 1.3 K/UL — HIGH (ref 0–0.9)
MONOCYTES NFR BLD AUTO: 14 % — SIGNIFICANT CHANGE UP (ref 2–14)
NEUTROPHILS # BLD AUTO: 5.8 K/UL — SIGNIFICANT CHANGE UP (ref 1.8–7.4)
NEUTROPHILS NFR BLD AUTO: 54 % — SIGNIFICANT CHANGE UP (ref 43–77)
PHOSPHATE SERPL-MCNC: 2.7 MG/DL — SIGNIFICANT CHANGE UP (ref 2.5–4.5)
PLATELET # BLD AUTO: 60 K/UL — LOW (ref 150–400)
POTASSIUM SERPL-MCNC: 4.2 MMOL/L — SIGNIFICANT CHANGE UP (ref 3.5–5.3)
POTASSIUM SERPL-SCNC: 4.2 MMOL/L — SIGNIFICANT CHANGE UP (ref 3.5–5.3)
PROT SERPL-MCNC: 6 G/DL — SIGNIFICANT CHANGE UP (ref 6–8.3)
RBC # BLD: 2.62 M/UL — LOW (ref 4.2–5.8)
RBC # FLD: 15.5 % — HIGH (ref 10.3–14.5)
SODIUM SERPL-SCNC: 144 MMOL/L — SIGNIFICANT CHANGE UP (ref 135–145)
WBC # BLD: 9 K/UL — SIGNIFICANT CHANGE UP (ref 3.8–10.5)
WBC # FLD AUTO: 9 K/UL — SIGNIFICANT CHANGE UP (ref 3.8–10.5)

## 2017-04-18 PROCEDURE — 99233 SBSQ HOSP IP/OBS HIGH 50: CPT | Mod: GC

## 2017-04-18 PROCEDURE — 99291 CRITICAL CARE FIRST HOUR: CPT

## 2017-04-18 RX ORDER — METOPROLOL TARTRATE 50 MG
25 TABLET ORAL ONCE
Qty: 0 | Refills: 0 | Status: DISCONTINUED | OUTPATIENT
Start: 2017-04-18 | End: 2017-04-18

## 2017-04-18 RX ORDER — METOPROLOL TARTRATE 50 MG
50 TABLET ORAL EVERY 8 HOURS
Qty: 0 | Refills: 0 | Status: DISCONTINUED | OUTPATIENT
Start: 2017-04-18 | End: 2017-04-20

## 2017-04-18 RX ADMIN — Medication 10 MILLILITER(S): at 11:59

## 2017-04-18 RX ADMIN — NYSTATIN CREAM 1 APPLICATION(S): 100000 CREAM TOPICAL at 05:17

## 2017-04-18 RX ADMIN — OXYCODONE HYDROCHLORIDE 5 MILLIGRAM(S): 5 TABLET ORAL at 12:30

## 2017-04-18 RX ADMIN — Medication 1 LOZENGE: at 18:20

## 2017-04-18 RX ADMIN — Medication 30 MILLILITER(S): at 07:48

## 2017-04-18 RX ADMIN — URSODIOL 300 MILLIGRAM(S): 250 TABLET, FILM COATED ORAL at 07:45

## 2017-04-18 RX ADMIN — Medication 1 MILLIGRAM(S): at 11:57

## 2017-04-18 RX ADMIN — Medication 30 MILLILITER(S): at 18:21

## 2017-04-18 RX ADMIN — Medication 10 MILLILITER(S): at 07:49

## 2017-04-18 RX ADMIN — Medication 50 MILLIGRAM(S): at 18:20

## 2017-04-18 RX ADMIN — Medication 1 TABLET(S): at 11:57

## 2017-04-18 RX ADMIN — Medication 400 MILLIGRAM(S): at 11:57

## 2017-04-18 RX ADMIN — OXYCODONE HYDROCHLORIDE 5 MILLIGRAM(S): 5 TABLET ORAL at 11:56

## 2017-04-18 RX ADMIN — Medication 10 MILLILITER(S): at 18:18

## 2017-04-18 RX ADMIN — Medication 30 MILLILITER(S): at 19:49

## 2017-04-18 RX ADMIN — Medication 1 GRAM(S): at 11:58

## 2017-04-18 RX ADMIN — Medication 1 LOZENGE: at 07:48

## 2017-04-18 RX ADMIN — TAMSULOSIN HYDROCHLORIDE 0.4 MILLIGRAM(S): 0.4 CAPSULE ORAL at 21:50

## 2017-04-18 RX ADMIN — Medication 1 GRAM(S): at 21:50

## 2017-04-18 RX ADMIN — Medication 1 GRAM(S): at 05:18

## 2017-04-18 RX ADMIN — Medication 400 MILLIGRAM(S): at 05:17

## 2017-04-18 RX ADMIN — Medication 400 MILLIGRAM(S): at 21:50

## 2017-04-18 RX ADMIN — Medication 1 GRAM(S): at 18:21

## 2017-04-18 RX ADMIN — FLUCONAZOLE 400 MILLIGRAM(S): 150 TABLET ORAL at 11:58

## 2017-04-18 RX ADMIN — OXYCODONE HYDROCHLORIDE 5 MILLIGRAM(S): 5 TABLET ORAL at 07:38

## 2017-04-18 RX ADMIN — Medication 25 MILLIGRAM(S): at 05:17

## 2017-04-18 RX ADMIN — NYSTATIN CREAM 1 APPLICATION(S): 100000 CREAM TOPICAL at 18:19

## 2017-04-18 RX ADMIN — Medication 5 MG/HR: at 07:46

## 2017-04-18 RX ADMIN — Medication 10 MILLILITER(S): at 19:51

## 2017-04-18 RX ADMIN — Medication 50 MILLIGRAM(S): at 21:50

## 2017-04-18 RX ADMIN — PANTOPRAZOLE SODIUM 40 MILLIGRAM(S): 20 TABLET, DELAYED RELEASE ORAL at 11:58

## 2017-04-18 RX ADMIN — Medication 30 MILLILITER(S): at 11:59

## 2017-04-18 RX ADMIN — Medication 1 LOZENGE: at 19:49

## 2017-04-18 RX ADMIN — Medication 1 LOZENGE: at 11:58

## 2017-04-18 RX ADMIN — OXYCODONE HYDROCHLORIDE 5 MILLIGRAM(S): 5 TABLET ORAL at 08:00

## 2017-04-18 RX ADMIN — URSODIOL 300 MILLIGRAM(S): 250 TABLET, FILM COATED ORAL at 18:22

## 2017-04-19 LAB
ALBUMIN SERPL ELPH-MCNC: 3.5 G/DL — SIGNIFICANT CHANGE UP (ref 3.3–5)
ALP SERPL-CCNC: 96 U/L — SIGNIFICANT CHANGE UP (ref 40–120)
ALT FLD-CCNC: 26 U/L RC — SIGNIFICANT CHANGE UP (ref 10–45)
AST SERPL-CCNC: 21 U/L — SIGNIFICANT CHANGE UP (ref 10–40)
BILIRUB DIRECT SERPL-MCNC: <0.1 MG/DL — SIGNIFICANT CHANGE UP (ref 0–0.2)
BILIRUB INDIRECT FLD-MCNC: >0 MG/DL — LOW (ref 0.2–1)
BILIRUB SERPL-MCNC: 0.1 MG/DL — LOW (ref 0.2–1.2)
BLD GP AB SCN SERPL QL: NEGATIVE — SIGNIFICANT CHANGE UP
CULTURE RESULTS: SIGNIFICANT CHANGE UP
HCT VFR BLD CALC: 22.7 % — LOW (ref 39–50)
HGB BLD-MCNC: 7.8 G/DL — LOW (ref 13–17)
LDH SERPL L TO P-CCNC: 238 U/L — SIGNIFICANT CHANGE UP (ref 50–242)
LYMPHOCYTES # BLD AUTO: 2.2 K/UL — SIGNIFICANT CHANGE UP (ref 1–3.3)
LYMPHOCYTES # BLD AUTO: 22 % — SIGNIFICANT CHANGE UP (ref 13–44)
MAGNESIUM SERPL-MCNC: 1.9 MG/DL — SIGNIFICANT CHANGE UP (ref 1.6–2.6)
MCHC RBC-ENTMCNC: 32.9 PG — SIGNIFICANT CHANGE UP (ref 27–34)
MCHC RBC-ENTMCNC: 34.6 GM/DL — SIGNIFICANT CHANGE UP (ref 32–36)
MCV RBC AUTO: 95 FL — SIGNIFICANT CHANGE UP (ref 80–100)
MONOCYTES # BLD AUTO: 1 K/UL — HIGH (ref 0–0.9)
MONOCYTES NFR BLD AUTO: 10 % — SIGNIFICANT CHANGE UP (ref 2–14)
NEUTROPHILS # BLD AUTO: 5.3 K/UL — SIGNIFICANT CHANGE UP (ref 1.8–7.4)
NEUTROPHILS NFR BLD AUTO: 44 % — SIGNIFICANT CHANGE UP (ref 43–77)
PHOSPHATE SERPL-MCNC: 2.7 MG/DL — SIGNIFICANT CHANGE UP (ref 2.5–4.5)
PLATELET # BLD AUTO: 77 K/UL — LOW (ref 150–400)
PROT SERPL-MCNC: 5.9 G/DL — LOW (ref 6–8.3)
RBC # BLD: 2.38 M/UL — LOW (ref 4.2–5.8)
RBC # FLD: 16.1 % — HIGH (ref 10.3–14.5)
RH IG SCN BLD-IMP: POSITIVE — SIGNIFICANT CHANGE UP
SPECIMEN SOURCE: SIGNIFICANT CHANGE UP
WBC # BLD: 10.2 K/UL — SIGNIFICANT CHANGE UP (ref 3.8–10.5)
WBC # FLD AUTO: 10.2 K/UL — SIGNIFICANT CHANGE UP (ref 3.8–10.5)

## 2017-04-19 PROCEDURE — 99232 SBSQ HOSP IP/OBS MODERATE 35: CPT | Mod: GC

## 2017-04-19 PROCEDURE — 99291 CRITICAL CARE FIRST HOUR: CPT

## 2017-04-19 RX ORDER — SODIUM CHLORIDE 9 MG/ML
1000 INJECTION INTRAMUSCULAR; INTRAVENOUS; SUBCUTANEOUS
Qty: 0 | Refills: 0 | Status: DISCONTINUED | OUTPATIENT
Start: 2017-04-19 | End: 2017-04-29

## 2017-04-19 RX ORDER — ZOLPIDEM TARTRATE 10 MG/1
5 TABLET ORAL AT BEDTIME
Qty: 0 | Refills: 0 | Status: DISCONTINUED | OUTPATIENT
Start: 2017-04-19 | End: 2017-04-23

## 2017-04-19 RX ADMIN — URSODIOL 300 MILLIGRAM(S): 250 TABLET, FILM COATED ORAL at 17:32

## 2017-04-19 RX ADMIN — Medication 400 MILLIGRAM(S): at 21:15

## 2017-04-19 RX ADMIN — DIPHENHYDRAMINE HYDROCHLORIDE AND LIDOCAINE HYDROCHLORIDE AND ALUMINUM HYDROXIDE AND MAGNESIUM HYDRO 5 MILLILITER(S): KIT at 11:41

## 2017-04-19 RX ADMIN — Medication 1 GRAM(S): at 05:16

## 2017-04-19 RX ADMIN — Medication 1 LOZENGE: at 17:31

## 2017-04-19 RX ADMIN — URSODIOL 300 MILLIGRAM(S): 250 TABLET, FILM COATED ORAL at 08:25

## 2017-04-19 RX ADMIN — Medication 10 MILLILITER(S): at 08:25

## 2017-04-19 RX ADMIN — Medication 1 APPLICATION(S): at 17:31

## 2017-04-19 RX ADMIN — Medication 10 MILLILITER(S): at 17:35

## 2017-04-19 RX ADMIN — Medication 1 MILLIGRAM(S): at 11:41

## 2017-04-19 RX ADMIN — Medication 1 GRAM(S): at 17:30

## 2017-04-19 RX ADMIN — Medication 1 LOZENGE: at 19:20

## 2017-04-19 RX ADMIN — Medication 50 MILLIGRAM(S): at 12:53

## 2017-04-19 RX ADMIN — Medication 400 MILLIGRAM(S): at 12:53

## 2017-04-19 RX ADMIN — Medication 10 MILLILITER(S): at 19:20

## 2017-04-19 RX ADMIN — Medication 30 MILLILITER(S): at 19:20

## 2017-04-19 RX ADMIN — Medication 30 MILLILITER(S): at 17:31

## 2017-04-19 RX ADMIN — Medication 1 LOZENGE: at 08:24

## 2017-04-19 RX ADMIN — TAMSULOSIN HYDROCHLORIDE 0.4 MILLIGRAM(S): 0.4 CAPSULE ORAL at 21:15

## 2017-04-19 RX ADMIN — Medication 10 MILLILITER(S): at 11:42

## 2017-04-19 RX ADMIN — Medication 30 MILLILITER(S): at 08:24

## 2017-04-19 RX ADMIN — Medication 50 MILLIGRAM(S): at 05:15

## 2017-04-19 RX ADMIN — Medication 1 GRAM(S): at 11:41

## 2017-04-19 RX ADMIN — FLUCONAZOLE 400 MILLIGRAM(S): 150 TABLET ORAL at 11:41

## 2017-04-19 RX ADMIN — DIPHENHYDRAMINE HYDROCHLORIDE AND LIDOCAINE HYDROCHLORIDE AND ALUMINUM HYDROXIDE AND MAGNESIUM HYDRO 5 MILLILITER(S): KIT at 08:24

## 2017-04-19 RX ADMIN — Medication 400 MILLIGRAM(S): at 05:15

## 2017-04-19 RX ADMIN — Medication 1 LOZENGE: at 11:41

## 2017-04-19 RX ADMIN — Medication 1 GRAM(S): at 21:15

## 2017-04-19 RX ADMIN — Medication 1 TABLET(S): at 11:41

## 2017-04-19 RX ADMIN — Medication 50 MILLIGRAM(S): at 21:15

## 2017-04-19 RX ADMIN — SODIUM CHLORIDE 20 MILLILITER(S): 9 INJECTION INTRAMUSCULAR; INTRAVENOUS; SUBCUTANEOUS at 18:08

## 2017-04-19 RX ADMIN — PANTOPRAZOLE SODIUM 40 MILLIGRAM(S): 20 TABLET, DELAYED RELEASE ORAL at 11:42

## 2017-04-19 RX ADMIN — Medication 30 MILLILITER(S): at 11:41

## 2017-04-20 ENCOUNTER — TRANSCRIPTION ENCOUNTER (OUTPATIENT)
Age: 52
End: 2017-04-20

## 2017-04-20 LAB
ALBUMIN SERPL ELPH-MCNC: 3.7 G/DL — SIGNIFICANT CHANGE UP (ref 3.3–5)
ALP SERPL-CCNC: 98 U/L — SIGNIFICANT CHANGE UP (ref 40–120)
ALT FLD-CCNC: 29 U/L RC — SIGNIFICANT CHANGE UP (ref 10–45)
ANION GAP SERPL CALC-SCNC: 10 MMOL/L — SIGNIFICANT CHANGE UP (ref 5–17)
APTT BLD: 21.9 SEC — LOW (ref 27.5–37.4)
AST SERPL-CCNC: 25 U/L — SIGNIFICANT CHANGE UP (ref 10–40)
BASOPHILS # BLD AUTO: 0 K/UL — SIGNIFICANT CHANGE UP (ref 0–0.2)
BILIRUB SERPL-MCNC: 0.1 MG/DL — LOW (ref 0.2–1.2)
BUN SERPL-MCNC: 16 MG/DL — SIGNIFICANT CHANGE UP (ref 7–23)
CALCIUM SERPL-MCNC: 9.1 MG/DL — SIGNIFICANT CHANGE UP (ref 8.4–10.5)
CHLORIDE SERPL-SCNC: 100 MMOL/L — SIGNIFICANT CHANGE UP (ref 96–108)
CK MB CFR SERPL CALC: 1 NG/ML — SIGNIFICANT CHANGE UP (ref 0–6.7)
CK SERPL-CCNC: 32 U/L — SIGNIFICANT CHANGE UP (ref 30–200)
CO2 SERPL-SCNC: 31 MMOL/L — SIGNIFICANT CHANGE UP (ref 22–31)
CREAT SERPL-MCNC: 0.67 MG/DL — SIGNIFICANT CHANGE UP (ref 0.5–1.3)
CULTURE RESULTS: SIGNIFICANT CHANGE UP
EOSINOPHIL # BLD AUTO: 0 K/UL — SIGNIFICANT CHANGE UP (ref 0–0.5)
FIBRINOGEN PPP-MCNC: 520 MG/DL — HIGH (ref 310–510)
GLUCOSE SERPL-MCNC: 99 MG/DL — SIGNIFICANT CHANGE UP (ref 70–99)
HCT VFR BLD CALC: 24.3 % — LOW (ref 39–50)
HGB BLD-MCNC: 8.4 G/DL — LOW (ref 13–17)
INR BLD: 1.1 RATIO — SIGNIFICANT CHANGE UP (ref 0.88–1.16)
LDH SERPL L TO P-CCNC: 251 U/L — HIGH (ref 50–242)
LYMPHOCYTES # BLD AUTO: 17 % — SIGNIFICANT CHANGE UP (ref 13–44)
LYMPHOCYTES # BLD AUTO: SIGNIFICANT CHANGE UP (ref 1–3.3)
MAGNESIUM SERPL-MCNC: 2 MG/DL — SIGNIFICANT CHANGE UP (ref 1.6–2.6)
MCHC RBC-ENTMCNC: 33.1 PG — SIGNIFICANT CHANGE UP (ref 27–34)
MCHC RBC-ENTMCNC: 34.5 GM/DL — SIGNIFICANT CHANGE UP (ref 32–36)
MCV RBC AUTO: 95.9 FL — SIGNIFICANT CHANGE UP (ref 80–100)
MONOCYTES # BLD AUTO: 1.8 K/UL — HIGH (ref 0–0.9)
MONOCYTES NFR BLD AUTO: 22 % — HIGH (ref 2–14)
NEUTROPHILS # BLD AUTO: 3.9 K/UL — SIGNIFICANT CHANGE UP (ref 1.8–7.4)
NEUTROPHILS NFR BLD AUTO: 46 % — SIGNIFICANT CHANGE UP (ref 43–77)
PHOSPHATE SERPL-MCNC: 4 MG/DL — SIGNIFICANT CHANGE UP (ref 2.5–4.5)
PLATELET # BLD AUTO: 99 K/UL — LOW (ref 150–400)
POTASSIUM SERPL-MCNC: 4.1 MMOL/L — SIGNIFICANT CHANGE UP (ref 3.5–5.3)
POTASSIUM SERPL-SCNC: 4.1 MMOL/L — SIGNIFICANT CHANGE UP (ref 3.5–5.3)
PROT SERPL-MCNC: 6.4 G/DL — SIGNIFICANT CHANGE UP (ref 6–8.3)
PROTHROM AB SERPL-ACNC: 11.9 SEC — SIGNIFICANT CHANGE UP (ref 9.8–12.7)
RBC # BLD: 2.53 M/UL — LOW (ref 4.2–5.8)
RBC # FLD: 16.4 % — HIGH (ref 10.3–14.5)
SODIUM SERPL-SCNC: 141 MMOL/L — SIGNIFICANT CHANGE UP (ref 135–145)
SPECIMEN SOURCE: SIGNIFICANT CHANGE UP
TROPONIN T SERPL-MCNC: <0.01 NG/ML — SIGNIFICANT CHANGE UP (ref 0–0.06)
WBC # BLD: 8.1 K/UL — SIGNIFICANT CHANGE UP (ref 3.8–10.5)
WBC # FLD AUTO: 8.1 K/UL — SIGNIFICANT CHANGE UP (ref 3.8–10.5)

## 2017-04-20 PROCEDURE — 93010 ELECTROCARDIOGRAM REPORT: CPT

## 2017-04-20 PROCEDURE — 99291 CRITICAL CARE FIRST HOUR: CPT

## 2017-04-20 RX ORDER — FOLIC ACID 0.8 MG
1 TABLET ORAL
Qty: 30 | Refills: 3 | OUTPATIENT
Start: 2017-04-20 | End: 2017-08-17

## 2017-04-20 RX ORDER — METOCLOPRAMIDE HCL 10 MG
1 TABLET ORAL
Qty: 120 | Refills: 0 | OUTPATIENT
Start: 2017-04-20 | End: 2017-05-20

## 2017-04-20 RX ORDER — ACYCLOVIR SODIUM 500 MG
1 VIAL (EA) INTRAVENOUS
Qty: 90 | Refills: 3 | OUTPATIENT
Start: 2017-04-20 | End: 2017-08-17

## 2017-04-20 RX ORDER — FLUCONAZOLE 150 MG/1
2 TABLET ORAL
Qty: 0 | Refills: 0 | COMMUNITY
Start: 2017-04-20

## 2017-04-20 RX ORDER — METOPROLOL TARTRATE 50 MG
50 TABLET ORAL ONCE
Qty: 0 | Refills: 0 | Status: COMPLETED | OUTPATIENT
Start: 2017-04-20 | End: 2017-04-20

## 2017-04-20 RX ORDER — ATOVAQUONE 750 MG/5ML
5 SUSPENSION ORAL
Qty: 300 | Refills: 3 | OUTPATIENT
Start: 2017-04-20 | End: 2017-08-17

## 2017-04-20 RX ORDER — METOPROLOL TARTRATE 50 MG
3 TABLET ORAL
Qty: 90 | Refills: 3 | OUTPATIENT
Start: 2017-04-20 | End: 2017-08-17

## 2017-04-20 RX ORDER — ACYCLOVIR SODIUM 500 MG
1 VIAL (EA) INTRAVENOUS
Qty: 0 | Refills: 0 | COMMUNITY
Start: 2017-04-20

## 2017-04-20 RX ORDER — TAMSULOSIN HYDROCHLORIDE 0.4 MG/1
1 CAPSULE ORAL
Qty: 0 | Refills: 0 | COMMUNITY
Start: 2017-04-20

## 2017-04-20 RX ORDER — METOPROLOL TARTRATE 50 MG
150 TABLET ORAL DAILY
Qty: 0 | Refills: 0 | Status: DISCONTINUED | OUTPATIENT
Start: 2017-04-20 | End: 2017-04-20

## 2017-04-20 RX ORDER — FLUCONAZOLE 150 MG/1
2 TABLET ORAL
Qty: 60 | Refills: 3 | OUTPATIENT
Start: 2017-04-20 | End: 2017-08-17

## 2017-04-20 RX ORDER — METOPROLOL TARTRATE 50 MG
150 TABLET ORAL DAILY
Qty: 0 | Refills: 0 | Status: COMPLETED | OUTPATIENT
Start: 2017-04-21 | End: 2017-04-21

## 2017-04-20 RX ADMIN — Medication 30 MILLILITER(S): at 15:55

## 2017-04-20 RX ADMIN — Medication 1 GRAM(S): at 21:13

## 2017-04-20 RX ADMIN — Medication 1 LOZENGE: at 11:47

## 2017-04-20 RX ADMIN — Medication 1 GRAM(S): at 11:46

## 2017-04-20 RX ADMIN — SODIUM CHLORIDE 20 MILLILITER(S): 9 INJECTION INTRAMUSCULAR; INTRAVENOUS; SUBCUTANEOUS at 11:05

## 2017-04-20 RX ADMIN — Medication 50 MILLIGRAM(S): at 05:57

## 2017-04-20 RX ADMIN — Medication 1 LOZENGE: at 15:55

## 2017-04-20 RX ADMIN — HEPARIN SODIUM 4.07 UNIT(S)/HR: 5000 INJECTION INTRAVENOUS; SUBCUTANEOUS at 17:00

## 2017-04-20 RX ADMIN — Medication 30 MILLILITER(S): at 00:20

## 2017-04-20 RX ADMIN — OXYCODONE HYDROCHLORIDE 5 MILLIGRAM(S): 5 TABLET ORAL at 12:25

## 2017-04-20 RX ADMIN — Medication 1 TABLET(S): at 11:47

## 2017-04-20 RX ADMIN — TAMSULOSIN HYDROCHLORIDE 0.4 MILLIGRAM(S): 0.4 CAPSULE ORAL at 21:13

## 2017-04-20 RX ADMIN — URSODIOL 300 MILLIGRAM(S): 250 TABLET, FILM COATED ORAL at 16:46

## 2017-04-20 RX ADMIN — Medication 30 MILLILITER(S): at 07:55

## 2017-04-20 RX ADMIN — Medication 1 GRAM(S): at 06:56

## 2017-04-20 RX ADMIN — Medication 1 LOZENGE: at 19:09

## 2017-04-20 RX ADMIN — Medication 1 MILLIGRAM(S): at 11:46

## 2017-04-20 RX ADMIN — Medication 10 MILLILITER(S): at 15:59

## 2017-04-20 RX ADMIN — FLUCONAZOLE 400 MILLIGRAM(S): 150 TABLET ORAL at 11:46

## 2017-04-20 RX ADMIN — Medication 1 GRAM(S): at 16:46

## 2017-04-20 RX ADMIN — Medication 400 MILLIGRAM(S): at 21:13

## 2017-04-20 RX ADMIN — Medication 30 MILLILITER(S): at 19:09

## 2017-04-20 RX ADMIN — Medication 1 LOZENGE: at 07:55

## 2017-04-20 RX ADMIN — URSODIOL 300 MILLIGRAM(S): 250 TABLET, FILM COATED ORAL at 07:55

## 2017-04-20 RX ADMIN — Medication 10 MILLILITER(S): at 07:55

## 2017-04-20 RX ADMIN — Medication 50 MILLIGRAM(S): at 15:55

## 2017-04-20 RX ADMIN — OXYCODONE HYDROCHLORIDE 5 MILLIGRAM(S): 5 TABLET ORAL at 11:55

## 2017-04-20 RX ADMIN — Medication 30 MILLILITER(S): at 11:47

## 2017-04-20 RX ADMIN — Medication 10 MILLILITER(S): at 11:48

## 2017-04-20 RX ADMIN — Medication 400 MILLIGRAM(S): at 13:48

## 2017-04-20 RX ADMIN — Medication 400 MILLIGRAM(S): at 05:57

## 2017-04-20 RX ADMIN — PANTOPRAZOLE SODIUM 40 MILLIGRAM(S): 20 TABLET, DELAYED RELEASE ORAL at 11:47

## 2017-04-20 RX ADMIN — Medication 10 MILLILITER(S): at 19:10

## 2017-04-20 RX ADMIN — Medication 1 LOZENGE: at 00:20

## 2017-04-20 RX ADMIN — Medication 50 MILLIGRAM(S): at 21:33

## 2017-04-20 RX ADMIN — Medication 10 MILLILITER(S): at 00:20

## 2017-04-20 NOTE — DISCHARGE NOTE ADULT - CARE PROVIDERS DIRECT ADDRESSES
[email or web address],DirectAddress_Unknown [email or web address]_[email or web address]

## 2017-04-20 NOTE — DISCHARGE NOTE ADULT - MEDICATION SUMMARY - MEDICATIONS TO STOP TAKING
I will STOP taking the medications listed below when I get home from the hospital:    omeprazole 20 mg oral delayed release capsule  -- 1 cap(s) by mouth once a day MDD:1  -- It is very important that you take or use this exactly as directed.  Do not skip doses or discontinue unless directed by your doctor.  Obtain medical advice before taking any non-prescription drugs as some may affect the action of this medication.  Swallow whole.  Do not crush.    prochlorperazine 10 mg oral tablet  -- 1 tab(s) by mouth every 6 hours, As Needed MDD:4  -- Avoid prolonged or excessive exposure to direct and/or artificial sunlight while taking this medication.  It is very important that you take or use this exactly as directed.  Do not skip doses or discontinue unless directed by your doctor.  May cause drowsiness.  Alcohol may intensify this effect.  Use care when operating dangerous machinery.  Obtain medical advice before taking any non-prescription drugs as some may affect the action of this medication.    dexamethasone 4 mg oral tablet  -- 2 tab(s) by mouth once a day MDD:2  On Friday 3/10 and saturday 3/11  -- It is very important that you take or use this exactly as directed.  Do not skip doses or discontinue unless directed by your doctor.  Obtain medical advice before taking any non-prescription drugs as some may affect the action of this medication.  Take with food or milk.

## 2017-04-20 NOTE — DISCHARGE NOTE ADULT - INSTRUCTIONS
Diet and activities as per [facility] discharge guidelines and safe food handling guidelines. NO RESTAURANT OR TAKE OUT FOOD AT THIS TIME, ONLY HOME COOKED PREPARED/FROZEN FOODS. You are allowed to have fresh baked pizza right out of the oven. This is the ONLY takeout food at this time.

## 2017-04-20 NOTE — DISCHARGE NOTE ADULT - HOME CARE AGENCY
[facility] ([phone]) for medication teaching reinforcement with start of care for tomorrow, 5/4/17. [facility] ([phone]) for medication teaching reinforcement with start of care for tomorrow, 5/5/17.

## 2017-04-20 NOTE — DISCHARGE NOTE ADULT - ADDITIONAL INSTRUCTIONS
You have an appointment at [facility] on 5/11/2017 at 11 AM. You have an appointment with Zuly Ledbetter NP at [facility] on 5/8/2017 at 11 AM.

## 2017-04-20 NOTE — DISCHARGE NOTE ADULT - CARE PROVIDER_API CALL
Gerardo Banks), Internal Medicine; Medical Oncology  [address] [place] [address]  Phone: [phone]  Fax: [phone]    Zuly Ledbetter (NP; RN), NP in Adult Health  [address] [place] [address]  Phone: [phone]  Fax: [phone] Gerardo Banks), Internal Medicine; Medical Oncology  [address] [place] [address]  Phone: [phone]  Fax: [phone]    Zuly Ledbetter (NP; RN), NP in Adult Health  [address] [place] [address]  Phone: [phone]  Fax: [phone]    Gary Prescott), Cardiac Electrophysiology; Cardiology; Internal Medicine  [address] [place] [address]  Phone: [phone]  Fax: [phone]

## 2017-04-20 NOTE — DISCHARGE NOTE ADULT - HOSPITAL COURSE
Mr. Chavez is a 51 year old male with a history of MCL, initially diagnosed 10/2016. He is status post 4 cycles of R-CHOP, and two cycles of RICE consolidation. A bone marrow biopsy on 2/10/17 showed normocellular bone marrow with trilineage hematopoiesis and maturation. He was admitted 3/27/17 for an autologous peripheral blood stem cell transplant with CBV  regimen.     Upon admission, a TLC was placed in IR. Mr. Chavez received IV hydration, nutritional support, pain management, antinausea medication, antidiarrheals, antibiotic, antiviral, PCP, antifungal and GI prophylaxis. Labs were monitored daily. Mr. Chavez received transfusional support and electrolyte repletion as needed. When his ANC dropped below 1000, he was started on prophylactic ciprofloxacin. When he became febrile, his antibiotic coverage was broadened as appropriate.     On 4/5/17, Mr. Chavez received 314ml of thawed, pooled, washed, mobilized, plasma reduced, HPC apheresis over approximately 1 hour. He tolerated the infusion well with no adverse side effects noted. Engraftment was noted on 4/16/17, and the zarxio and antibiotics were discontinued.     Mr. Chavez's transplant course was complicated by recurrent headaches, oral and GI mucositis, dizziness, and chest pain resulting in a RRT on 4/10/17. The chest pain was self limiting. A second RRT was called on 4/16/17, for palpitations and dizziness. Mr. Chavez was found to be in atrial flutter with RVR. He was transferred to telemetry and followed by cardiology. He was given metoprolol and eventually converted to NSR.     Currently, Mr. Chavez is hemodynamically stable and stable from a respiratory standpoint for discharge home with cardiology follow up and outpatient follow up at the [facility]. Mr. Chavez is a 51 year old male with a history of MCL, initially diagnosed 10/2016. He is status post 4 cycles of R-CHOP, and two cycles of RICE consolidation. A bone marrow biopsy on 2/10/17 showed normocellular bone marrow with trilineage hematopoiesis and maturation. He was admitted 3/27/17 for an autologous peripheral blood stem cell transplant with CBV  regimen.     Upon admission, a TLC was placed in IR. Mr. Chavez received IV hydration, nutritional support, pain management, antinausea medication, antidiarrheals, antibiotic, antiviral, PCP, antifungal and GI prophylaxis. Labs were monitored daily. Mr. Chavez received transfusional support and electrolyte repletion as needed. When his ANC dropped below 1000, he was started on prophylactic ciprofloxacin. When he became febrile, his antibiotic coverage was broadened as appropriate.     On 4/5/17, Mr. Chavez received 314ml of thawed, pooled, washed, mobilized, plasma reduced, HPC apheresis over approximately 1 hour. He tolerated the infusion well with no adverse side effects noted. Engraftment was noted on 4/16/17, and the zarxio and antibiotics were discontinued.     Mr. Chavez's transplant course was complicated by recurrent headaches, oral and GI mucositis, dizziness, and chest pain resulting in a RRT on 4/10/17. The chest pain was self limiting. A second RRT was called on 4/16/17, for palpitations and dizziness. Mr. Chavez was found to be in atrial flutter with RVR. He was transferred to telemetry and followed by cardiology. He was given metoprolol and eventually converted to NSR. After returning to the oncology floor, Mr. Chavez again converted into atrial flutter / fibrillation on 4/23/17. He was readmitted to telemetry. On 4/25/17, he had an atrial fibrillation ablation. He remained in atrial fibrillation post ablation. Mr. Chavez is a 51 year old male with a history of MCL, initially diagnosed 10/2016. He is status post 4 cycles of R-CHOP, and two cycles of RICE consolidation. A bone marrow biopsy on 2/10/17 showed normocellular bone marrow with trilineage hematopoiesis and maturation. He was admitted 3/27/17 for an autologous peripheral blood stem cell transplant with CBV  regimen.     Upon admission, a TLC was placed in IR. Mr. Chavez received IV hydration, nutritional support, pain management, antinausea medication, antidiarrheals, antibiotic, antiviral, PCP, antifungal and GI prophylaxis. Labs were monitored daily. Mr. Chavez received transfusional support and electrolyte repletion as needed. When his ANC dropped below 1000, he was started on prophylactic ciprofloxacin. When he became febrile, his antibiotic coverage was broadened as appropriate.     On 4/5/17, Mr. Chavez received 314ml of thawed, pooled, washed, mobilized, plasma reduced, HPC apheresis over approximately 1 hour. He tolerated the infusion well with no adverse side effects noted. Engraftment was noted on 4/16/17, and the zarxio and antibiotics were discontinued.     Mr. Chavez's transplant course was complicated by recurrent headaches, oral and GI mucositis, dizziness, and chest pain resulting in a RRT on 4/10/17. The chest pain was self limiting. A second RRT was called on 4/16/17, for palpitations and dizziness. Mr. Chavez was found to be in atrial flutter with RVR. He was transferred to telemetry and followed by cardiology. A-fib/a-flutter was resistant to single rate agent, and while in arrhythmia, pt would become symptomatic, hypotensive. Ablation was was preformed, however was unsuccessful. Pt continued to have frequent changes in rhythm. As a-fib was usually preceded by PAC, and mediport tip was in r atrium, concern was this may have been involved. Mediport was removed and pt __________________________    Pt was d/c to home in stable condition with instructions to follow up. Mr. Chavez is a 51 year old male with a history of MCL, initially diagnosed 10/2016. He is status post 4 cycles of R-CHOP, and two cycles of RICE consolidation. A bone marrow biopsy on 2/10/17 showed normocellular bone marrow with trilineage hematopoiesis and maturation. He was admitted 3/27/17 for an autologous peripheral blood stem cell transplant with CBV  regimen.     Upon admission, a TLC was placed in IR. Mr. Chavez received IV hydration, nutritional support, pain management, antinausea medication, antidiarrheals, antibiotic, antiviral, PCP, antifungal and GI prophylaxis. Labs were monitored daily. Mr. Chavez received transfusional support and electrolyte repletion as needed. When his ANC dropped below 1000, he was started on prophylactic ciprofloxacin. When he became febrile, his antibiotic coverage was broadened as appropriate.     On 4/5/17, Mr. Chavez received 314ml of thawed, pooled, washed, mobilized, plasma reduced, HPC apheresis over approximately 1 hour. He tolerated the infusion well with no adverse side effects noted. Engraftment was noted on 4/16/17, and the zarxio and antibiotics were discontinued.     Mr. Chavez's transplant course was complicated by recurrent headaches, oral and GI mucositis, dizziness, and chest pain resulting in a RRT on 4/10/17. The chest pain was self limiting. A second RRT was called on 4/16/17, for palpitations and dizziness. Mr. Chavez was found to be in atrial flutter with RVR. He was transferred to telemetry and followed by cardiology. A-fib/a-flutter was resistant to single rate agent, and while in arrhythmia, pt would become symptomatic, hypotensive. Ablation was was preformed, however was unsuccessful. Pt continued to have frequent changes in rhythm. As a-fib was usually preceded by PAC, and mediport tip was in r atrium, concern was this may have been involved. Mediport was removed but patient continued to have runs of non conducted APCs. Patient was followed by EP during this admission who ruled out any need for PPM insertion. Patient was started on Cardizem and Toprol which he tolerated well. He did have episodes of bradycardia during his sleep with episodes of Atrial flutter with RVR as well. However, he remained asymptomatic.   Pt was d/c to home in stable condition with instructions to follow up with hematology and EP. Mr. Chavez is a 51 year old male with a history of MCL, initially diagnosed 10/2016. He is status post 4 cycles of R-CHOP, and two cycles of RICE consolidation. A bone marrow biopsy on 2/10/17 showed normocellular bone marrow with trilineage hematopoiesis and maturation. He was admitted 3/27/17 for an autologous peripheral blood stem cell transplant with CBV  regimen.     Upon admission, a TLC was placed in IR. Mr. Chavez received IV hydration, nutritional support, pain management, antinausea medication, antidiarrheals, antibiotic, antiviral, PCP, antifungal and GI prophylaxis. Labs were monitored daily. Mr. Chavez received transfusional support and electrolyte repletion as needed. When his ANC dropped below 1000, he was started on prophylactic ciprofloxacin. When he became febrile, his antibiotic coverage was broadened as appropriate.     On 4/5/17, Mr. Chavez received 314ml of thawed, pooled, washed, mobilized, plasma reduced, HPC apheresis over approximately 1 hour. He tolerated the infusion well with no adverse side effects noted. Engraftment was noted on 4/16/17, and the zarxio and antibiotics were discontinued.     Mr. Chavez's transplant course was complicated by recurrent headaches, oral and GI mucositis, dizziness, and chest pain resulting in a RRT on 4/10/17. The chest pain was self limiting. A second RRT was called on 4/16/17, for palpitations and dizziness. Mr. Chavez was found to be in atrial flutter with RVR. He was transferred to telemetry and followed by cardiology. A-fib/a-flutter was resistant to single rate agent, and while in arrhythmia, pt would become symptomatic, hypotensive. Ablation was was preformed, however was unsuccessful. Pt continued to have frequent changes in rhythm. As a-fib was usually preceded by PAC, and mediport tip was in r atrium, concern was this may have been involved. Mediport was removed but patient continued to have runs of non conducted APCs. Patient was followed by EP during this admission who ruled out any need for PPM insertion. Patient was started on Cardizem and Toprol which he tolerated well. He did have episodes of bradycardia during his sleep with episodes of Atrial flutter with RVR as well. However, he remained asymptomatic.   Pt was d/c to home in stable condition with instructions to follow up with hematology and EP.   Patient was told about his EP appointment with Gary Donahue on 5/17 at 8 am. Hematology and Oncology appointment on 5/8 at 11 am.

## 2017-04-20 NOTE — DISCHARGE NOTE ADULT - SECONDARY DIAGNOSIS.
Need for prophylactic measure Atrial flutter with rapid ventricular response Pancytopenia Hyponatremia

## 2017-04-20 NOTE — DISCHARGE NOTE ADULT - PATIENT PORTAL LINK FT
“You can access the [facility] Patient Portal, offered by [facility], by registering with the following website: [email or web address]”

## 2017-04-20 NOTE — DISCHARGE NOTE ADULT - REASON FOR ADMISSION
Autologous peripheral blood stem cell transplant with high dose chemotherapy preparative regimen for the treatment of MCL

## 2017-04-20 NOTE — DISCHARGE NOTE ADULT - CARE PLAN
Principal Discharge DX:	Stem cell transplant candidate  Goal:	Maintain patency of graft  Instructions for follow-up, activity and diet:	1. Notify your physician if bleeding; swelling; persistent nausea and vomiting; unable to urinate; pain not relieved by medications; fever; numbness, tingling; excessive diarrhea, inability to tolerate liquids or foods; increased irritability or sluggishness, rash  2. If you develop a fever of 100.4F or greater, or a fever accompanied by other symptoms (chills, vomiting, diarrhea, cough) please call your doctor or go to the ER.   3. Follow the instructions outlined in the discharge package regarding crowd restrictions and dietary restrictions  Secondary Diagnosis:	Need for prophylactic measure  Goal:	Remain free from infection  Instructions for follow-up, activity and diet:	1. Continue prophylactic medications as directed by Dr. Banks  Secondary Diagnosis:	Atrial flutter with rapid ventricular response  Goal:	Maintain normal heart rate and rhythm  Instructions for follow-up, activity and diet:	1. Follow up as directed with cardiology   2. Continue toprol XL as directed by cardiology Principal Discharge DX:	Stem cell transplant candidate  Goal:	Maintain patency of graft  Instructions for follow-up, activity and diet:	1. Notify your physician if bleeding; swelling; persistent nausea and vomiting; unable to urinate; pain not relieved by medications; fever; numbness, tingling; excessive diarrhea, inability to tolerate liquids or foods; increased irritability or sluggishness, rash  2. If you develop a fever of 100.4F or greater, or a fever accompanied by other symptoms (chills, vomiting, diarrhea, cough) please call your doctor or go to the ER.   3. Follow the instructions outlined in the discharge package regarding crowd restrictions and dietary restrictions  Secondary Diagnosis:	Need for prophylactic measure  Goal:	Remain free from infection  Instructions for follow-up, activity and diet:	1. Continue prophylactic medications as directed by Dr. Banks  Secondary Diagnosis:	Atrial flutter with rapid ventricular response  Goal:	Maintain normal heart rate and rhythm  Instructions for follow-up, activity and diet:	1. Follow up as directed with cardiology   2. Continue metoprolol and diltiazem as directed by cardiology Principal Discharge DX:	Stem cell transplant candidate  Goal:	Maintain patency of graft  Instructions for follow-up, activity and diet:	1. Notify your physician if bleeding; swelling; persistent nausea and vomiting; unable to urinate; pain not relieved by medications; fever; numbness, tingling; excessive diarrhea, inability to tolerate liquids or foods; increased irritability or sluggishness, rash  2. If you develop a fever of 100.4F or greater, or a fever accompanied by other symptoms (chills, vomiting, diarrhea, cough) please call your doctor or go to the ER.   3. Follow the instructions outlined in the discharge package regarding crowd restrictions and dietary restrictions  You have an appointment at [facility] on 05/08 at 11 am with Nurse Practitioner Ms. Ledbetter  Secondary Diagnosis:	Need for prophylactic measure  Goal:	Remain free from infection  Instructions for follow-up, activity and diet:	1. Continue prophylactic medications as directed by Dr. Banks  Secondary Diagnosis:	Atrial flutter with rapid ventricular response  Goal:	Maintain normal heart rate and rhythm  Instructions for follow-up, activity and diet:	1. Follow up as directed with cardiology/EP in clinic on May 17th at 8:00 A.m.  2. Continue metoprolol and diltiazem as directed by cardiology Principal Discharge DX:	Stem cell transplant candidate  Goal:	Maintain patency of graft  Instructions for follow-up, activity and diet:	1. Notify your physician if bleeding; swelling; persistent nausea and vomiting; unable to urinate; pain not relieved by medications; fever; numbness, tingling; excessive diarrhea, inability to tolerate liquids or foods; increased irritability or sluggishness, rash  2. If you develop a fever of 100.4F or greater, or a fever accompanied by other symptoms (chills, vomiting, diarrhea, cough) please call your doctor or go to the ER.   3. Follow the instructions outlined in the discharge package regarding crowd restrictions and dietary restrictions  You have an appointment at [facility] on 05/08 at 11 am with Nurse Practitioner Ms. Ledbetter  Secondary Diagnosis:	Need for prophylactic measure  Goal:	Remain free from infection  Instructions for follow-up, activity and diet:	1. Continue prophylactic medications as directed by Dr. Banks  Secondary Diagnosis:	Atrial flutter with rapid ventricular response  Goal:	Maintain normal heart rate and rhythm  Instructions for follow-up, activity and diet:	1. Follow up as directed with cardiology/EP in clinic on May 17th at 8:00 A.m.  2. Continue metoprolol and diltiazem as directed by cardiology Principal Discharge DX:	Stem cell transplant candidate  Goal:	Maintain patency of graft  Instructions for follow-up, activity and diet:	1. Notify your physician if bleeding; swelling; persistent nausea and vomiting; unable to urinate; pain not relieved by medications; fever; numbness, tingling; excessive diarrhea, inability to tolerate liquids or foods; increased irritability or sluggishness, rash  2. If you develop a fever of 100.4F or greater, or a fever accompanied by other symptoms (chills, vomiting, diarrhea, cough) please call your doctor or go to the ER.   3. Follow the instructions outlined in the discharge package regarding crowd restrictions and dietary restrictions  You have an appointment at [facility] on 05/08 at 11 am with Nurse Practitioner Ms. Ledbetter  Secondary Diagnosis:	Need for prophylactic measure  Goal:	Remain free from infection  Instructions for follow-up, activity and diet:	1. Continue prophylactic medications as directed by Dr. Banks  Secondary Diagnosis:	Atrial flutter with rapid ventricular response  Goal:	Maintain normal heart rate and rhythm  Instructions for follow-up, activity and diet:	1. Follow up as directed with cardiology/EP in clinic on May 17th at 8:00 A.m.  2. Continue metoprolol and diltiazem as directed by cardiology Principal Discharge DX:	Stem cell transplant candidate  Goal:	Maintain patency of graft  Instructions for follow-up, activity and diet:	1. Notify your physician if bleeding; swelling; persistent nausea and vomiting; unable to urinate; pain not relieved by medications; fever; numbness, tingling; excessive diarrhea, inability to tolerate liquids or foods; increased irritability or sluggishness, rash  2. If you develop a fever of 100.4F or greater, or a fever accompanied by other symptoms (chills, vomiting, diarrhea, cough) please call your doctor or go to the ER.   3. Follow the instructions outlined in the discharge package regarding crowd restrictions and dietary restrictions  You have an appointment at [facility] on 05/08 at 11 am with Nurse Practitioner Ms. Ledbetter  Secondary Diagnosis:	Need for prophylactic measure  Goal:	Remain free from infection  Instructions for follow-up, activity and diet:	1. Continue prophylactic medications as directed by Dr. Banks  Secondary Diagnosis:	Atrial flutter with rapid ventricular response  Goal:	Maintain normal heart rate and rhythm  Instructions for follow-up, activity and diet:	1. Follow up as directed with cardiology/EP in clinic on May 17th at 8:00 A.m.  2. Continue metoprolol and diltiazem as directed by cardiology Principal Discharge DX:	Stem cell transplant candidate  Goal:	Maintain patency of graft  Instructions for follow-up, activity and diet:	1. Notify your physician if bleeding; swelling; persistent nausea and vomiting; unable to urinate; pain not relieved by medications; fever; numbness, tingling; excessive diarrhea, inability to tolerate liquids or foods; increased irritability or sluggishness, rash  2. If you develop a fever of 100.4F or greater, or a fever accompanied by other symptoms (chills, vomiting, diarrhea, cough) please call your doctor or go to the ER.   3. Follow the instructions outlined in the discharge package regarding crowd restrictions and dietary restrictions  You have an appointment at [facility] on 05/08 at 11 am with Nurse Practitioner Ms. Ledbetter  Secondary Diagnosis:	Need for prophylactic measure  Goal:	Remain free from infection  Instructions for follow-up, activity and diet:	1. Continue prophylactic medications as directed by Dr. Banks  Secondary Diagnosis:	Atrial flutter with rapid ventricular response  Goal:	Maintain normal heart rate and rhythm  Instructions for follow-up, activity and diet:	1. Follow up as directed with cardiology/EP in clinic on May 17th at 8:00 A.m.  2. Continue metoprolol and diltiazem as directed by cardiology Principal Discharge DX:	Stem cell transplant candidate  Goal:	Maintain patency of graft  Instructions for follow-up, activity and diet:	1. Notify your physician if bleeding; swelling; persistent nausea and vomiting; unable to urinate; pain not relieved by medications; fever; numbness, tingling; excessive diarrhea, inability to tolerate liquids or foods; increased irritability or sluggishness, rash  2. If you develop a fever of 100.4F or greater, or a fever accompanied by other symptoms (chills, vomiting, diarrhea, cough) please call your doctor or go to the ER.   3. Follow the instructions outlined in the discharge package regarding crowd restrictions and dietary restrictions  You have an appointment at [facility] on 05/08 at 11 am with Nurse Practitioner Ms. Ledbetter  Secondary Diagnosis:	Need for prophylactic measure  Goal:	Remain free from infection  Instructions for follow-up, activity and diet:	1. Continue prophylactic medications as directed by Dr. Banks  Secondary Diagnosis:	Atrial flutter with rapid ventricular response  Goal:	Maintain normal heart rate and rhythm  Instructions for follow-up, activity and diet:	1. Follow up as directed with cardiology/EP in clinic on May 17th at 8:00 A.m.  2. Continue metoprolol and diltiazem as directed by cardiology Principal Discharge DX:	Stem cell transplant candidate  Goal:	Maintain patency of graft  Instructions for follow-up, activity and diet:	1. Notify your physician if bleeding; swelling; persistent nausea and vomiting; unable to urinate; pain not relieved by medications; fever; numbness, tingling; excessive diarrhea, inability to tolerate liquids or foods; increased irritability or sluggishness, rash  2. If you develop a fever of 100.4F or greater, or a fever accompanied by other symptoms (chills, vomiting, diarrhea, cough) please call your doctor or go to the ER.   3. Follow the instructions outlined in the discharge package regarding crowd restrictions and dietary restrictions  You have an appointment at [facility] on 05/08 at 11 am with Nurse Practitioner Ms. Ledbetter  Secondary Diagnosis:	Need for prophylactic measure  Goal:	Remain free from infection  Instructions for follow-up, activity and diet:	1. Continue prophylactic medications as directed by Dr. Banks  Secondary Diagnosis:	Atrial flutter with rapid ventricular response  Goal:	Maintain normal heart rate and rhythm  Instructions for follow-up, activity and diet:	1. Follow up as directed with cardiology/EP in clinic on May 17th at 8:00 A.m.  2. Continue metoprolol and diltiazem as directed by cardiology Principal Discharge DX:	Stem cell transplant candidate  Goal:	Maintain patency of graft  Instructions for follow-up, activity and diet:	1. Notify your physician if bleeding; swelling; persistent nausea and vomiting; unable to urinate; pain not relieved by medications; fever; numbness, tingling; excessive diarrhea, inability to tolerate liquids or foods; increased irritability or sluggishness, rash  2. If you develop a fever of 100.4F or greater, or a fever accompanied by other symptoms (chills, vomiting, diarrhea, cough) please call your doctor or go to the ER.   3. Follow the instructions outlined in the discharge package regarding crowd restrictions and dietary restrictions  You have an appointment at [facility] on 05/08 at 11 am with Nurse Practitioner Ms. Ledbetter  Secondary Diagnosis:	Need for prophylactic measure  Goal:	Remain free from infection  Instructions for follow-up, activity and diet:	1. Continue prophylactic medications as directed by Dr. Banks  Secondary Diagnosis:	Atrial flutter with rapid ventricular response  Goal:	Maintain normal heart rate and rhythm  Instructions for follow-up, activity and diet:	1. Follow up as directed with cardiology/EP in clinic on May 17th at 8:00 A.m.  2. Continue metoprolol and diltiazem as directed by cardiology Principal Discharge DX:	Stem cell transplant candidate  Goal:	Maintain patency of graft  Instructions for follow-up, activity and diet:	1. Notify your physician if bleeding; swelling; persistent nausea and vomiting; unable to urinate; pain not relieved by medications; fever; numbness, tingling; excessive diarrhea, inability to tolerate liquids or foods; increased irritability or sluggishness, rash  2. If you develop a fever of 100.4F or greater, or a fever accompanied by other symptoms (chills, vomiting, diarrhea, cough) please call your doctor or go to the ER.   3. Follow the instructions outlined in the discharge package regarding crowd restrictions and dietary restrictions  You have an appointment at [facility] on 05/08 at 11 am with Nurse Practitioner Ms. Ledbetter  Secondary Diagnosis:	Need for prophylactic measure  Goal:	Remain free from infection  Instructions for follow-up, activity and diet:	1. Continue prophylactic medications as directed by Dr. Banks  Secondary Diagnosis:	Atrial flutter with rapid ventricular response  Goal:	Maintain normal heart rate and rhythm  Instructions for follow-up, activity and diet:	1. Follow up as directed with cardiology/EP in clinic on May 17th at 8:00 A.m.  2. Continue metoprolol and diltiazem as directed by cardiology Principal Discharge DX:	Stem cell transplant candidate  Goal:	Maintain patency of graft  Instructions for follow-up, activity and diet:	1. Notify your physician if bleeding; swelling; persistent nausea and vomiting; unable to urinate; pain not relieved by medications; fever; numbness, tingling; excessive diarrhea, inability to tolerate liquids or foods; increased irritability or sluggishness, rash  2. If you develop a fever of 100.4F or greater, or a fever accompanied by other symptoms (chills, vomiting, diarrhea, cough) please call your doctor or go to the ER.   3. Follow the instructions outlined in the discharge package regarding crowd restrictions and dietary restrictions  You have an appointment at [facility] on 05/08 at 11 am with Nurse Practitioner Ms. Ledbetter  Secondary Diagnosis:	Need for prophylactic measure  Goal:	Remain free from infection  Instructions for follow-up, activity and diet:	1. Continue prophylactic medications as directed by Dr. Banks  Secondary Diagnosis:	Atrial flutter with rapid ventricular response  Goal:	Maintain normal heart rate and rhythm  Instructions for follow-up, activity and diet:	1. Follow up as directed with cardiology/EP in clinic on May 17th at 8:00 A.m.  2. Continue metoprolol and diltiazem as directed by cardiology  Secondary Diagnosis:	Pancytopenia  Goal:	Transfuse as needed  Instructions for follow-up, activity and diet:	Your pancytopenia was secondary to chemo.  Secondary Diagnosis:	Hyponatremia  Goal:	Prevent worsening of hyponatremia  Instructions for follow-up, activity and diet:	Please take your medications as prescribed and report any changes in your symptoms to your PCP

## 2017-04-20 NOTE — DISCHARGE NOTE ADULT - PROVIDER TOKENS
TOKEN:'[id number]:'[id number] TOKEN:'[id number]:'[id number]:'[id number]'

## 2017-04-20 NOTE — DISCHARGE NOTE ADULT - PLAN OF CARE
Maintain patency of graft 1. Notify your physician if bleeding; swelling; persistent nausea and vomiting; unable to urinate; pain not relieved by medications; fever; numbness, tingling; excessive diarrhea, inability to tolerate liquids or foods; increased irritability or sluggishness, rash  2. If you develop a fever of 100.4F or greater, or a fever accompanied by other symptoms (chills, vomiting, diarrhea, cough) please call your doctor or go to the ER.   3. Follow the instructions outlined in the discharge package regarding crowd restrictions and dietary restrictions Remain free from infection 1. Continue prophylactic medications as directed by Dr. Banks Maintain normal heart rate and rhythm 1. Follow up as directed with cardiology   2. Continue toprol XL as directed by cardiology 1. Follow up as directed with cardiology   2. Continue metoprolol and diltiazem as directed by cardiology 1. Notify your physician if bleeding; swelling; persistent nausea and vomiting; unable to urinate; pain not relieved by medications; fever; numbness, tingling; excessive diarrhea, inability to tolerate liquids or foods; increased irritability or sluggishness, rash  2. If you develop a fever of 100.4F or greater, or a fever accompanied by other symptoms (chills, vomiting, diarrhea, cough) please call your doctor or go to the ER.   3. Follow the instructions outlined in the discharge package regarding crowd restrictions and dietary restrictions  You have an appointment at [facility] on 05/08 at 11 am with Nurse Practitioner Ms. Ledbetter 1. Follow up as directed with cardiology/EP in clinic on May 17th at 8:00 A.m.  2. Continue metoprolol and diltiazem as directed by cardiology Transfuse as needed Prevent worsening of hyponatremia Your pancytopenia was secondary to chemo. Please take your medications as prescribed and report any changes in your symptoms to your PCP

## 2017-04-21 LAB
ALBUMIN SERPL ELPH-MCNC: 3.9 G/DL — SIGNIFICANT CHANGE UP (ref 3.3–5)
ALP SERPL-CCNC: 103 U/L — SIGNIFICANT CHANGE UP (ref 40–120)
ALT FLD-CCNC: 29 U/L RC — SIGNIFICANT CHANGE UP (ref 10–45)
ANION GAP SERPL CALC-SCNC: 14 MMOL/L — SIGNIFICANT CHANGE UP (ref 5–17)
AST SERPL-CCNC: 22 U/L — SIGNIFICANT CHANGE UP (ref 10–40)
BASOPHILS # BLD AUTO: 0.1 K/UL — SIGNIFICANT CHANGE UP (ref 0–0.2)
BILIRUB SERPL-MCNC: 0.1 MG/DL — LOW (ref 0.2–1.2)
BLD GP AB SCN SERPL QL: NEGATIVE — SIGNIFICANT CHANGE UP
BUN SERPL-MCNC: 18 MG/DL — SIGNIFICANT CHANGE UP (ref 7–23)
CALCIUM SERPL-MCNC: 9.5 MG/DL — SIGNIFICANT CHANGE UP (ref 8.4–10.5)
CHLORIDE SERPL-SCNC: 101 MMOL/L — SIGNIFICANT CHANGE UP (ref 96–108)
CO2 SERPL-SCNC: 26 MMOL/L — SIGNIFICANT CHANGE UP (ref 22–31)
CREAT SERPL-MCNC: 0.74 MG/DL — SIGNIFICANT CHANGE UP (ref 0.5–1.3)
EOSINOPHIL # BLD AUTO: 0 K/UL — SIGNIFICANT CHANGE UP (ref 0–0.5)
GLUCOSE SERPL-MCNC: 112 MG/DL — HIGH (ref 70–99)
HCT VFR BLD CALC: 28.1 % — LOW (ref 39–50)
HGB BLD-MCNC: 9.6 G/DL — LOW (ref 13–17)
LDH SERPL L TO P-CCNC: 242 U/L — SIGNIFICANT CHANGE UP (ref 50–242)
LYMPHOCYTES # BLD AUTO: 2.4 K/UL — SIGNIFICANT CHANGE UP (ref 1–3.3)
LYMPHOCYTES # BLD AUTO: 28 % — SIGNIFICANT CHANGE UP (ref 13–44)
MAGNESIUM SERPL-MCNC: 1.9 MG/DL — SIGNIFICANT CHANGE UP (ref 1.6–2.6)
MCHC RBC-ENTMCNC: 32.9 PG — SIGNIFICANT CHANGE UP (ref 27–34)
MCHC RBC-ENTMCNC: 33.9 GM/DL — SIGNIFICANT CHANGE UP (ref 32–36)
MCV RBC AUTO: 96.9 FL — SIGNIFICANT CHANGE UP (ref 80–100)
MONOCYTES # BLD AUTO: 1.8 K/UL — HIGH (ref 0–0.9)
MONOCYTES NFR BLD AUTO: 23 % — HIGH (ref 2–14)
NEUTROPHILS # BLD AUTO: 3.6 K/UL — SIGNIFICANT CHANGE UP (ref 1.8–7.4)
NEUTROPHILS NFR BLD AUTO: 38 % — LOW (ref 43–77)
PHOSPHATE SERPL-MCNC: 4 MG/DL — SIGNIFICANT CHANGE UP (ref 2.5–4.5)
PLATELET # BLD AUTO: 135 K/UL — LOW (ref 150–400)
POTASSIUM SERPL-MCNC: 4.5 MMOL/L — SIGNIFICANT CHANGE UP (ref 3.5–5.3)
POTASSIUM SERPL-SCNC: 4.5 MMOL/L — SIGNIFICANT CHANGE UP (ref 3.5–5.3)
PROT SERPL-MCNC: 6.7 G/DL — SIGNIFICANT CHANGE UP (ref 6–8.3)
RBC # BLD: 2.9 M/UL — LOW (ref 4.2–5.8)
RBC # FLD: 17.2 % — HIGH (ref 10.3–14.5)
RH IG SCN BLD-IMP: POSITIVE — SIGNIFICANT CHANGE UP
SODIUM SERPL-SCNC: 141 MMOL/L — SIGNIFICANT CHANGE UP (ref 135–145)
WBC # BLD: 7.9 K/UL — SIGNIFICANT CHANGE UP (ref 3.8–10.5)
WBC # FLD AUTO: 7.9 K/UL — SIGNIFICANT CHANGE UP (ref 3.8–10.5)

## 2017-04-21 PROCEDURE — 93010 ELECTROCARDIOGRAM REPORT: CPT

## 2017-04-21 PROCEDURE — 99291 CRITICAL CARE FIRST HOUR: CPT

## 2017-04-21 PROCEDURE — 99233 SBSQ HOSP IP/OBS HIGH 50: CPT | Mod: GC

## 2017-04-21 RX ORDER — OXYCODONE HYDROCHLORIDE 5 MG/1
5 TABLET ORAL EVERY 6 HOURS
Qty: 0 | Refills: 0 | Status: DISCONTINUED | OUTPATIENT
Start: 2017-04-21 | End: 2017-04-28

## 2017-04-21 RX ORDER — SODIUM CHLORIDE 9 MG/ML
500 INJECTION INTRAMUSCULAR; INTRAVENOUS; SUBCUTANEOUS ONCE
Qty: 0 | Refills: 0 | Status: COMPLETED | OUTPATIENT
Start: 2017-04-21 | End: 2017-04-21

## 2017-04-21 RX ORDER — METOPROLOL TARTRATE 50 MG
75 TABLET ORAL EVERY 8 HOURS
Qty: 0 | Refills: 0 | Status: DISCONTINUED | OUTPATIENT
Start: 2017-04-22 | End: 2017-04-23

## 2017-04-21 RX ADMIN — Medication 1 LOZENGE: at 11:24

## 2017-04-21 RX ADMIN — Medication 30 MILLILITER(S): at 08:16

## 2017-04-21 RX ADMIN — Medication 1 LOZENGE: at 08:16

## 2017-04-21 RX ADMIN — Medication 30 MILLILITER(S): at 11:24

## 2017-04-21 RX ADMIN — Medication 400 MILLIGRAM(S): at 13:30

## 2017-04-21 RX ADMIN — Medication 1: at 11:23

## 2017-04-21 RX ADMIN — Medication 30 MILLILITER(S): at 16:41

## 2017-04-21 RX ADMIN — Medication 400 MILLIGRAM(S): at 21:44

## 2017-04-21 RX ADMIN — FLUCONAZOLE 400 MILLIGRAM(S): 150 TABLET ORAL at 11:24

## 2017-04-21 RX ADMIN — HEPARIN SODIUM 4.07 UNIT(S)/HR: 5000 INJECTION INTRAVENOUS; SUBCUTANEOUS at 17:00

## 2017-04-21 RX ADMIN — Medication 10 MILLILITER(S): at 00:09

## 2017-04-21 RX ADMIN — Medication 1 LOZENGE: at 21:44

## 2017-04-21 RX ADMIN — Medication 1 LOZENGE: at 00:09

## 2017-04-21 RX ADMIN — URSODIOL 300 MILLIGRAM(S): 250 TABLET, FILM COATED ORAL at 16:41

## 2017-04-21 RX ADMIN — Medication 1 LOZENGE: at 16:41

## 2017-04-21 RX ADMIN — TAMSULOSIN HYDROCHLORIDE 0.4 MILLIGRAM(S): 0.4 CAPSULE ORAL at 21:45

## 2017-04-21 RX ADMIN — Medication 10 MILLILITER(S): at 08:17

## 2017-04-21 RX ADMIN — Medication 1 MILLIGRAM(S): at 11:24

## 2017-04-21 RX ADMIN — Medication 10 MILLILITER(S): at 16:44

## 2017-04-21 RX ADMIN — Medication 1 GRAM(S): at 05:23

## 2017-04-21 RX ADMIN — URSODIOL 300 MILLIGRAM(S): 250 TABLET, FILM COATED ORAL at 08:16

## 2017-04-21 RX ADMIN — SODIUM CHLORIDE 1000 MILLILITER(S): 9 INJECTION INTRAMUSCULAR; INTRAVENOUS; SUBCUTANEOUS at 09:30

## 2017-04-21 RX ADMIN — Medication 30 MILLILITER(S): at 00:09

## 2017-04-21 RX ADMIN — Medication 1 TABLET(S): at 11:24

## 2017-04-21 RX ADMIN — Medication 150 MILLIGRAM(S): at 08:16

## 2017-04-21 RX ADMIN — Medication 10 MILLILITER(S): at 11:25

## 2017-04-21 RX ADMIN — SODIUM CHLORIDE 20 MILLILITER(S): 9 INJECTION INTRAMUSCULAR; INTRAVENOUS; SUBCUTANEOUS at 10:00

## 2017-04-21 RX ADMIN — Medication 30 MILLILITER(S): at 21:44

## 2017-04-21 RX ADMIN — Medication 1 GRAM(S): at 21:45

## 2017-04-21 RX ADMIN — HEPARIN SODIUM 4.07 UNIT(S)/HR: 5000 INJECTION INTRAVENOUS; SUBCUTANEOUS at 17:01

## 2017-04-21 RX ADMIN — Medication 1 GRAM(S): at 16:41

## 2017-04-21 RX ADMIN — Medication 10 MILLILITER(S): at 21:45

## 2017-04-21 RX ADMIN — PANTOPRAZOLE SODIUM 40 MILLIGRAM(S): 20 TABLET, DELAYED RELEASE ORAL at 11:25

## 2017-04-21 RX ADMIN — Medication 400 MILLIGRAM(S): at 05:23

## 2017-04-21 RX ADMIN — Medication 1 GRAM(S): at 11:23

## 2017-04-22 LAB
ALBUMIN SERPL ELPH-MCNC: 3.9 G/DL — SIGNIFICANT CHANGE UP (ref 3.3–5)
ALP SERPL-CCNC: 96 U/L — SIGNIFICANT CHANGE UP (ref 40–120)
ALT FLD-CCNC: 25 U/L RC — SIGNIFICANT CHANGE UP (ref 10–45)
ANION GAP SERPL CALC-SCNC: 8 MMOL/L — SIGNIFICANT CHANGE UP (ref 5–17)
APPEARANCE UR: CLEAR — SIGNIFICANT CHANGE UP
AST SERPL-CCNC: 19 U/L — SIGNIFICANT CHANGE UP (ref 10–40)
BASOPHILS # BLD AUTO: 0 K/UL — SIGNIFICANT CHANGE UP (ref 0–0.2)
BASOPHILS NFR BLD AUTO: 1 % — SIGNIFICANT CHANGE UP (ref 0–2)
BILIRUB SERPL-MCNC: 0.1 MG/DL — LOW (ref 0.2–1.2)
BILIRUB UR-MCNC: NEGATIVE — SIGNIFICANT CHANGE UP
BUN SERPL-MCNC: 17 MG/DL — SIGNIFICANT CHANGE UP (ref 7–23)
CALCIUM SERPL-MCNC: 9.2 MG/DL — SIGNIFICANT CHANGE UP (ref 8.4–10.5)
CHLORIDE SERPL-SCNC: 102 MMOL/L — SIGNIFICANT CHANGE UP (ref 96–108)
CO2 SERPL-SCNC: 31 MMOL/L — SIGNIFICANT CHANGE UP (ref 22–31)
COLOR SPEC: SIGNIFICANT CHANGE UP
CREAT SERPL-MCNC: 0.71 MG/DL — SIGNIFICANT CHANGE UP (ref 0.5–1.3)
CULTURE RESULTS: SIGNIFICANT CHANGE UP
DIFF PNL FLD: NEGATIVE — SIGNIFICANT CHANGE UP
EOSINOPHIL # BLD AUTO: 0 K/UL — SIGNIFICANT CHANGE UP (ref 0–0.5)
EOSINOPHIL NFR BLD AUTO: 1 % — SIGNIFICANT CHANGE UP (ref 0–6)
GLUCOSE SERPL-MCNC: 104 MG/DL — HIGH (ref 70–99)
GLUCOSE UR QL: NEGATIVE — SIGNIFICANT CHANGE UP
HCT VFR BLD CALC: 26.2 % — LOW (ref 39–50)
HGB BLD-MCNC: 9 G/DL — LOW (ref 13–17)
KETONES UR-MCNC: NEGATIVE — SIGNIFICANT CHANGE UP
LDH SERPL L TO P-CCNC: 196 U/L — SIGNIFICANT CHANGE UP (ref 50–242)
LEUKOCYTE ESTERASE UR-ACNC: NEGATIVE — SIGNIFICANT CHANGE UP
LYMPHOCYTES # BLD AUTO: 2.2 K/UL — SIGNIFICANT CHANGE UP (ref 1–3.3)
LYMPHOCYTES # BLD AUTO: 30 % — SIGNIFICANT CHANGE UP (ref 13–44)
MAGNESIUM SERPL-MCNC: 2 MG/DL — SIGNIFICANT CHANGE UP (ref 1.6–2.6)
MCHC RBC-ENTMCNC: 33.3 PG — SIGNIFICANT CHANGE UP (ref 27–34)
MCHC RBC-ENTMCNC: 34.6 GM/DL — SIGNIFICANT CHANGE UP (ref 32–36)
MCV RBC AUTO: 96.4 FL — SIGNIFICANT CHANGE UP (ref 80–100)
MONOCYTES # BLD AUTO: 1.7 K/UL — HIGH (ref 0–0.9)
MONOCYTES NFR BLD AUTO: 21 % — HIGH (ref 2–14)
NEUTROPHILS # BLD AUTO: 2.3 K/UL — SIGNIFICANT CHANGE UP (ref 1.8–7.4)
NEUTROPHILS NFR BLD AUTO: 37 % — LOW (ref 43–77)
NITRITE UR-MCNC: NEGATIVE — SIGNIFICANT CHANGE UP
PH UR: 7 — SIGNIFICANT CHANGE UP (ref 5–8)
PHOSPHATE SERPL-MCNC: 3.6 MG/DL — SIGNIFICANT CHANGE UP (ref 2.5–4.5)
PLATELET # BLD AUTO: 150 K/UL — SIGNIFICANT CHANGE UP (ref 150–400)
POTASSIUM SERPL-MCNC: 4.1 MMOL/L — SIGNIFICANT CHANGE UP (ref 3.5–5.3)
POTASSIUM SERPL-SCNC: 4.1 MMOL/L — SIGNIFICANT CHANGE UP (ref 3.5–5.3)
PROT SERPL-MCNC: 6.4 G/DL — SIGNIFICANT CHANGE UP (ref 6–8.3)
PROT UR-MCNC: NEGATIVE — SIGNIFICANT CHANGE UP
RBC # BLD: 2.71 M/UL — LOW (ref 4.2–5.8)
RBC # FLD: 17.2 % — HIGH (ref 10.3–14.5)
SODIUM SERPL-SCNC: 141 MMOL/L — SIGNIFICANT CHANGE UP (ref 135–145)
SP GR SPEC: 1.01 — LOW (ref 1.01–1.02)
SPECIMEN SOURCE: SIGNIFICANT CHANGE UP
UROBILINOGEN FLD QL: NEGATIVE — SIGNIFICANT CHANGE UP
WBC # BLD: 6.2 K/UL — SIGNIFICANT CHANGE UP (ref 3.8–10.5)
WBC # FLD AUTO: 6.2 K/UL — SIGNIFICANT CHANGE UP (ref 3.8–10.5)

## 2017-04-22 PROCEDURE — 99291 CRITICAL CARE FIRST HOUR: CPT

## 2017-04-22 RX ORDER — METOPROLOL TARTRATE 50 MG
1 TABLET ORAL
Qty: 60 | Refills: 0 | OUTPATIENT
Start: 2017-04-22 | End: 2017-05-12

## 2017-04-22 RX ORDER — METOPROLOL TARTRATE 50 MG
1 TABLET ORAL
Qty: 90 | Refills: 0 | OUTPATIENT
Start: 2017-04-22 | End: 2017-05-22

## 2017-04-22 RX ORDER — PHENAZOPYRIDINE HCL 100 MG
100 TABLET ORAL EVERY 8 HOURS
Qty: 0 | Refills: 0 | Status: COMPLETED | OUTPATIENT
Start: 2017-04-22 | End: 2017-04-25

## 2017-04-22 RX ADMIN — Medication 400 MILLIGRAM(S): at 14:10

## 2017-04-22 RX ADMIN — Medication 75 MILLIGRAM(S): at 21:24

## 2017-04-22 RX ADMIN — Medication 1 TABLET(S): at 11:41

## 2017-04-22 RX ADMIN — Medication 1 LOZENGE: at 21:23

## 2017-04-22 RX ADMIN — Medication 100 MILLIGRAM(S): at 22:50

## 2017-04-22 RX ADMIN — Medication 10 MILLILITER(S): at 21:25

## 2017-04-22 RX ADMIN — Medication 10 MILLILITER(S): at 17:23

## 2017-04-22 RX ADMIN — Medication 1 LOZENGE: at 11:40

## 2017-04-22 RX ADMIN — Medication 30 MILLILITER(S): at 07:19

## 2017-04-22 RX ADMIN — Medication 1 GRAM(S): at 05:59

## 2017-04-22 RX ADMIN — Medication 75 MILLIGRAM(S): at 14:10

## 2017-04-22 RX ADMIN — Medication 30 MILLILITER(S): at 17:17

## 2017-04-22 RX ADMIN — NYSTATIN CREAM 1 APPLICATION(S): 100000 CREAM TOPICAL at 17:20

## 2017-04-22 RX ADMIN — Medication 1 LOZENGE: at 17:17

## 2017-04-22 RX ADMIN — URSODIOL 300 MILLIGRAM(S): 250 TABLET, FILM COATED ORAL at 17:19

## 2017-04-22 RX ADMIN — PANTOPRAZOLE SODIUM 40 MILLIGRAM(S): 20 TABLET, DELAYED RELEASE ORAL at 11:42

## 2017-04-22 RX ADMIN — Medication 10 MILLILITER(S): at 07:57

## 2017-04-22 RX ADMIN — Medication 1: at 12:17

## 2017-04-22 RX ADMIN — TAMSULOSIN HYDROCHLORIDE 0.4 MILLIGRAM(S): 0.4 CAPSULE ORAL at 21:24

## 2017-04-22 RX ADMIN — Medication 1 GRAM(S): at 11:41

## 2017-04-22 RX ADMIN — Medication 30 MILLILITER(S): at 21:23

## 2017-04-22 RX ADMIN — Medication 1 GRAM(S): at 21:24

## 2017-04-22 RX ADMIN — SODIUM CHLORIDE 20 MILLILITER(S): 9 INJECTION INTRAMUSCULAR; INTRAVENOUS; SUBCUTANEOUS at 17:21

## 2017-04-22 RX ADMIN — Medication 10 MILLILITER(S): at 11:46

## 2017-04-22 RX ADMIN — Medication 400 MILLIGRAM(S): at 21:23

## 2017-04-22 RX ADMIN — SODIUM CHLORIDE 20 MILLILITER(S): 9 INJECTION INTRAMUSCULAR; INTRAVENOUS; SUBCUTANEOUS at 06:00

## 2017-04-22 RX ADMIN — Medication 1 APPLICATION(S): at 17:20

## 2017-04-22 RX ADMIN — Medication 400 MILLIGRAM(S): at 05:58

## 2017-04-22 RX ADMIN — Medication 75 MILLIGRAM(S): at 08:18

## 2017-04-22 RX ADMIN — HEPARIN SODIUM 4.07 UNIT(S)/HR: 5000 INJECTION INTRAVENOUS; SUBCUTANEOUS at 17:16

## 2017-04-22 RX ADMIN — Medication 1 GRAM(S): at 17:17

## 2017-04-22 RX ADMIN — Medication 30 MILLILITER(S): at 11:40

## 2017-04-22 RX ADMIN — FLUCONAZOLE 400 MILLIGRAM(S): 150 TABLET ORAL at 11:42

## 2017-04-22 RX ADMIN — Medication 1 MILLIGRAM(S): at 11:41

## 2017-04-22 RX ADMIN — URSODIOL 300 MILLIGRAM(S): 250 TABLET, FILM COATED ORAL at 07:16

## 2017-04-22 RX ADMIN — Medication 1 LOZENGE: at 07:19

## 2017-04-23 LAB
ALBUMIN SERPL ELPH-MCNC: 4.1 G/DL — SIGNIFICANT CHANGE UP (ref 3.3–5)
ALP SERPL-CCNC: 99 U/L — SIGNIFICANT CHANGE UP (ref 40–120)
ALT FLD-CCNC: 26 U/L RC — SIGNIFICANT CHANGE UP (ref 10–45)
ANION GAP SERPL CALC-SCNC: 12 MMOL/L — SIGNIFICANT CHANGE UP (ref 5–17)
AST SERPL-CCNC: 22 U/L — SIGNIFICANT CHANGE UP (ref 10–40)
BASOPHILS # BLD AUTO: 0 K/UL — SIGNIFICANT CHANGE UP (ref 0–0.2)
BASOPHILS NFR BLD AUTO: 0.4 % — SIGNIFICANT CHANGE UP (ref 0–2)
BILIRUB SERPL-MCNC: <0.1 MG/DL — LOW (ref 0.2–1.2)
BUN SERPL-MCNC: 14 MG/DL — SIGNIFICANT CHANGE UP (ref 7–23)
CALCIUM SERPL-MCNC: 9.3 MG/DL — SIGNIFICANT CHANGE UP (ref 8.4–10.5)
CHLORIDE SERPL-SCNC: 101 MMOL/L — SIGNIFICANT CHANGE UP (ref 96–108)
CO2 SERPL-SCNC: 28 MMOL/L — SIGNIFICANT CHANGE UP (ref 22–31)
CREAT SERPL-MCNC: 0.72 MG/DL — SIGNIFICANT CHANGE UP (ref 0.5–1.3)
CULTURE RESULTS: SIGNIFICANT CHANGE UP
EOSINOPHIL # BLD AUTO: 0.1 K/UL — SIGNIFICANT CHANGE UP (ref 0–0.5)
EOSINOPHIL NFR BLD AUTO: 1.2 % — SIGNIFICANT CHANGE UP (ref 0–6)
GLUCOSE SERPL-MCNC: 108 MG/DL — HIGH (ref 70–99)
HCT VFR BLD CALC: 26.7 % — LOW (ref 39–50)
HGB BLD-MCNC: 9.3 G/DL — LOW (ref 13–17)
LDH SERPL L TO P-CCNC: 199 U/L — SIGNIFICANT CHANGE UP (ref 50–242)
LYMPHOCYTES # BLD AUTO: 2 K/UL — SIGNIFICANT CHANGE UP (ref 1–3.3)
LYMPHOCYTES # BLD AUTO: 33.2 % — SIGNIFICANT CHANGE UP (ref 13–44)
MAGNESIUM SERPL-MCNC: 2 MG/DL — SIGNIFICANT CHANGE UP (ref 1.6–2.6)
MCHC RBC-ENTMCNC: 33.6 PG — SIGNIFICANT CHANGE UP (ref 27–34)
MCHC RBC-ENTMCNC: 34.9 GM/DL — SIGNIFICANT CHANGE UP (ref 32–36)
MCV RBC AUTO: 96.1 FL — SIGNIFICANT CHANGE UP (ref 80–100)
MONOCYTES # BLD AUTO: 1.6 K/UL — HIGH (ref 0–0.9)
MONOCYTES NFR BLD AUTO: 26.1 % — HIGH (ref 2–14)
NEUTROPHILS # BLD AUTO: 2.4 K/UL — SIGNIFICANT CHANGE UP (ref 1.8–7.4)
NEUTROPHILS NFR BLD AUTO: 39.1 % — LOW (ref 43–77)
PHOSPHATE SERPL-MCNC: 3.6 MG/DL — SIGNIFICANT CHANGE UP (ref 2.5–4.5)
PLATELET # BLD AUTO: 196 K/UL — SIGNIFICANT CHANGE UP (ref 150–400)
POTASSIUM SERPL-MCNC: 4.3 MMOL/L — SIGNIFICANT CHANGE UP (ref 3.5–5.3)
POTASSIUM SERPL-SCNC: 4.3 MMOL/L — SIGNIFICANT CHANGE UP (ref 3.5–5.3)
PROT SERPL-MCNC: 6.5 G/DL — SIGNIFICANT CHANGE UP (ref 6–8.3)
RBC # BLD: 2.78 M/UL — LOW (ref 4.2–5.8)
RBC # FLD: 17.2 % — HIGH (ref 10.3–14.5)
SODIUM SERPL-SCNC: 141 MMOL/L — SIGNIFICANT CHANGE UP (ref 135–145)
SPECIMEN SOURCE: SIGNIFICANT CHANGE UP
WBC # BLD: 6.2 K/UL — SIGNIFICANT CHANGE UP (ref 3.8–10.5)
WBC # FLD AUTO: 6.2 K/UL — SIGNIFICANT CHANGE UP (ref 3.8–10.5)

## 2017-04-23 PROCEDURE — 99291 CRITICAL CARE FIRST HOUR: CPT

## 2017-04-23 PROCEDURE — 99233 SBSQ HOSP IP/OBS HIGH 50: CPT | Mod: GC

## 2017-04-23 PROCEDURE — 93010 ELECTROCARDIOGRAM REPORT: CPT

## 2017-04-23 RX ORDER — ZOLPIDEM TARTRATE 10 MG/1
5 TABLET ORAL AT BEDTIME
Qty: 0 | Refills: 0 | Status: ACTIVE | OUTPATIENT
Start: 2017-04-23 | End: 2017-04-30

## 2017-04-23 RX ORDER — SODIUM CHLORIDE 9 MG/ML
250 INJECTION INTRAMUSCULAR; INTRAVENOUS; SUBCUTANEOUS ONCE
Qty: 0 | Refills: 0 | Status: COMPLETED | OUTPATIENT
Start: 2017-04-23 | End: 2017-04-23

## 2017-04-23 RX ORDER — METOPROLOL TARTRATE 50 MG
75 TABLET ORAL EVERY 8 HOURS
Qty: 0 | Refills: 0 | Status: DISCONTINUED | OUTPATIENT
Start: 2017-04-23 | End: 2017-05-01

## 2017-04-23 RX ORDER — METOPROLOL TARTRATE 50 MG
5 TABLET ORAL ONCE
Qty: 0 | Refills: 0 | Status: COMPLETED | OUTPATIENT
Start: 2017-04-23 | End: 2017-04-23

## 2017-04-23 RX ADMIN — Medication 400 MILLIGRAM(S): at 13:55

## 2017-04-23 RX ADMIN — Medication 1 TABLET(S): at 11:47

## 2017-04-23 RX ADMIN — HEPARIN SODIUM 4.07 UNIT(S)/HR: 5000 INJECTION INTRAVENOUS; SUBCUTANEOUS at 21:58

## 2017-04-23 RX ADMIN — Medication 10 MILLILITER(S): at 08:08

## 2017-04-23 RX ADMIN — HEPARIN SODIUM 4.07 UNIT(S)/HR: 5000 INJECTION INTRAVENOUS; SUBCUTANEOUS at 17:09

## 2017-04-23 RX ADMIN — Medication 1 LOZENGE: at 08:04

## 2017-04-23 RX ADMIN — Medication 75 MILLIGRAM(S): at 22:06

## 2017-04-23 RX ADMIN — Medication 5 MILLIGRAM(S): at 17:45

## 2017-04-23 RX ADMIN — Medication 1 LOZENGE: at 17:09

## 2017-04-23 RX ADMIN — Medication 1 GRAM(S): at 11:44

## 2017-04-23 RX ADMIN — Medication 1 LOZENGE: at 20:05

## 2017-04-23 RX ADMIN — Medication 30 MILLILITER(S): at 08:04

## 2017-04-23 RX ADMIN — Medication 400 MILLIGRAM(S): at 21:59

## 2017-04-23 RX ADMIN — URSODIOL 300 MILLIGRAM(S): 250 TABLET, FILM COATED ORAL at 08:07

## 2017-04-23 RX ADMIN — SODIUM CHLORIDE 20 MILLILITER(S): 9 INJECTION INTRAMUSCULAR; INTRAVENOUS; SUBCUTANEOUS at 06:04

## 2017-04-23 RX ADMIN — TAMSULOSIN HYDROCHLORIDE 0.4 MILLIGRAM(S): 0.4 CAPSULE ORAL at 21:59

## 2017-04-23 RX ADMIN — NYSTATIN CREAM 1 APPLICATION(S): 100000 CREAM TOPICAL at 17:11

## 2017-04-23 RX ADMIN — Medication 100 MILLIGRAM(S): at 06:10

## 2017-04-23 RX ADMIN — FLUCONAZOLE 400 MILLIGRAM(S): 150 TABLET ORAL at 11:45

## 2017-04-23 RX ADMIN — URSODIOL 300 MILLIGRAM(S): 250 TABLET, FILM COATED ORAL at 17:11

## 2017-04-23 RX ADMIN — Medication 10 MILLILITER(S): at 19:57

## 2017-04-23 RX ADMIN — Medication 100 MILLIGRAM(S): at 21:59

## 2017-04-23 RX ADMIN — Medication 1 GRAM(S): at 17:10

## 2017-04-23 RX ADMIN — SODIUM CHLORIDE 20 MILLILITER(S): 9 INJECTION INTRAMUSCULAR; INTRAVENOUS; SUBCUTANEOUS at 17:09

## 2017-04-23 RX ADMIN — Medication 400 MILLIGRAM(S): at 06:09

## 2017-04-23 RX ADMIN — Medication 100 MILLIGRAM(S): at 13:55

## 2017-04-23 RX ADMIN — Medication 30 MILLILITER(S): at 17:09

## 2017-04-23 RX ADMIN — Medication 75 MILLIGRAM(S): at 15:11

## 2017-04-23 RX ADMIN — Medication 75 MILLIGRAM(S): at 06:10

## 2017-04-23 RX ADMIN — Medication 30 MILLILITER(S): at 20:06

## 2017-04-23 RX ADMIN — Medication 30 MILLILITER(S): at 11:45

## 2017-04-23 RX ADMIN — Medication 1 GRAM(S): at 21:59

## 2017-04-23 RX ADMIN — Medication 1 LOZENGE: at 11:45

## 2017-04-23 RX ADMIN — Medication 1 APPLICATION(S): at 17:12

## 2017-04-23 RX ADMIN — Medication 1 MILLIGRAM(S): at 11:46

## 2017-04-23 RX ADMIN — SODIUM CHLORIDE 20 MILLILITER(S): 9 INJECTION INTRAMUSCULAR; INTRAVENOUS; SUBCUTANEOUS at 21:59

## 2017-04-23 RX ADMIN — HEPARIN SODIUM 4.07 UNIT(S)/HR: 5000 INJECTION INTRAVENOUS; SUBCUTANEOUS at 20:07

## 2017-04-23 RX ADMIN — PANTOPRAZOLE SODIUM 40 MILLIGRAM(S): 20 TABLET, DELAYED RELEASE ORAL at 11:52

## 2017-04-23 RX ADMIN — Medication 1 GRAM(S): at 06:10

## 2017-04-23 RX ADMIN — Medication 10 MILLILITER(S): at 17:12

## 2017-04-23 RX ADMIN — SODIUM CHLORIDE 20 MILLILITER(S): 9 INJECTION INTRAMUSCULAR; INTRAVENOUS; SUBCUTANEOUS at 20:07

## 2017-04-23 RX ADMIN — SODIUM CHLORIDE 500 MILLILITER(S): 9 INJECTION INTRAMUSCULAR; INTRAVENOUS; SUBCUTANEOUS at 20:07

## 2017-04-23 RX ADMIN — Medication 10 MILLILITER(S): at 11:53

## 2017-04-24 LAB
ALBUMIN SERPL ELPH-MCNC: 3.9 G/DL — SIGNIFICANT CHANGE UP (ref 3.3–5)
ALBUMIN SERPL ELPH-MCNC: 3.9 G/DL — SIGNIFICANT CHANGE UP (ref 3.3–5)
ALP SERPL-CCNC: 97 U/L — SIGNIFICANT CHANGE UP (ref 40–120)
ALP SERPL-CCNC: 97 U/L — SIGNIFICANT CHANGE UP (ref 40–120)
ALT FLD-CCNC: 24 U/L RC — SIGNIFICANT CHANGE UP (ref 10–45)
ALT FLD-CCNC: 24 U/L RC — SIGNIFICANT CHANGE UP (ref 10–45)
ANION GAP SERPL CALC-SCNC: 15 MMOL/L — SIGNIFICANT CHANGE UP (ref 5–17)
APTT BLD: 27.4 SEC — LOW (ref 27.5–37.4)
AST SERPL-CCNC: 22 U/L — SIGNIFICANT CHANGE UP (ref 10–40)
AST SERPL-CCNC: 22 U/L — SIGNIFICANT CHANGE UP (ref 10–40)
BASOPHILS # BLD AUTO: 0 K/UL — SIGNIFICANT CHANGE UP (ref 0–0.2)
BASOPHILS NFR BLD AUTO: 0.7 % — SIGNIFICANT CHANGE UP (ref 0–2)
BILIRUB DIRECT SERPL-MCNC: <0.1 MG/DL — SIGNIFICANT CHANGE UP (ref 0–0.2)
BILIRUB INDIRECT FLD-MCNC: 0.1 MG/DL — LOW (ref 0.2–1)
BILIRUB SERPL-MCNC: <0.1 MG/DL — LOW (ref 0.2–1.2)
BILIRUB SERPL-MCNC: <0.1 MG/DL — LOW (ref 0.2–1.2)
BLD GP AB SCN SERPL QL: NEGATIVE — SIGNIFICANT CHANGE UP
BUN SERPL-MCNC: 14 MG/DL — SIGNIFICANT CHANGE UP (ref 7–23)
CALCIUM SERPL-MCNC: 9.1 MG/DL — SIGNIFICANT CHANGE UP (ref 8.4–10.5)
CHLORIDE SERPL-SCNC: 102 MMOL/L — SIGNIFICANT CHANGE UP (ref 96–108)
CO2 SERPL-SCNC: 27 MMOL/L — SIGNIFICANT CHANGE UP (ref 22–31)
CREAT SERPL-MCNC: 0.64 MG/DL — SIGNIFICANT CHANGE UP (ref 0.5–1.3)
EOSINOPHIL # BLD AUTO: 0 K/UL — SIGNIFICANT CHANGE UP (ref 0–0.5)
EOSINOPHIL NFR BLD AUTO: 0.3 % — SIGNIFICANT CHANGE UP (ref 0–6)
FIBRINOGEN PPP-MCNC: 583 MG/DL — HIGH (ref 310–510)
GLUCOSE SERPL-MCNC: 99 MG/DL — SIGNIFICANT CHANGE UP (ref 70–99)
HCT VFR BLD CALC: 29 % — LOW (ref 39–50)
HGB BLD-MCNC: 9.7 G/DL — LOW (ref 13–17)
INR BLD: 1.13 RATIO — SIGNIFICANT CHANGE UP (ref 0.88–1.16)
LDH SERPL L TO P-CCNC: 203 U/L — SIGNIFICANT CHANGE UP (ref 50–242)
LYMPHOCYTES # BLD AUTO: 2.3 K/UL — SIGNIFICANT CHANGE UP (ref 1–3.3)
LYMPHOCYTES # BLD AUTO: 39.3 % — SIGNIFICANT CHANGE UP (ref 13–44)
MAGNESIUM SERPL-MCNC: 2.1 MG/DL — SIGNIFICANT CHANGE UP (ref 1.6–2.6)
MCHC RBC-ENTMCNC: 32 PG — SIGNIFICANT CHANGE UP (ref 27–34)
MCHC RBC-ENTMCNC: 33.4 GM/DL — SIGNIFICANT CHANGE UP (ref 32–36)
MCV RBC AUTO: 95.6 FL — SIGNIFICANT CHANGE UP (ref 80–100)
MONOCYTES # BLD AUTO: 1.4 K/UL — HIGH (ref 0–0.9)
MONOCYTES NFR BLD AUTO: 24.5 % — HIGH (ref 2–14)
NEUTROPHILS # BLD AUTO: 2.1 K/UL — SIGNIFICANT CHANGE UP (ref 1.8–7.4)
NEUTROPHILS NFR BLD AUTO: 35.3 % — LOW (ref 43–77)
PHOSPHATE SERPL-MCNC: 3.7 MG/DL — SIGNIFICANT CHANGE UP (ref 2.5–4.5)
PLATELET # BLD AUTO: 241 K/UL — SIGNIFICANT CHANGE UP (ref 150–400)
POTASSIUM SERPL-MCNC: 4.3 MMOL/L — SIGNIFICANT CHANGE UP (ref 3.5–5.3)
POTASSIUM SERPL-SCNC: 4.3 MMOL/L — SIGNIFICANT CHANGE UP (ref 3.5–5.3)
PROT SERPL-MCNC: 6.5 G/DL — SIGNIFICANT CHANGE UP (ref 6–8.3)
PROT SERPL-MCNC: 6.5 G/DL — SIGNIFICANT CHANGE UP (ref 6–8.3)
PROTHROM AB SERPL-ACNC: 12.2 SEC — SIGNIFICANT CHANGE UP (ref 9.8–12.7)
RBC # BLD: 3.04 M/UL — LOW (ref 4.2–5.8)
RBC # FLD: 17.1 % — HIGH (ref 10.3–14.5)
RH IG SCN BLD-IMP: POSITIVE — SIGNIFICANT CHANGE UP
SODIUM SERPL-SCNC: 144 MMOL/L — SIGNIFICANT CHANGE UP (ref 135–145)
WBC # BLD: 5.8 K/UL — SIGNIFICANT CHANGE UP (ref 3.8–10.5)
WBC # FLD AUTO: 5.8 K/UL — SIGNIFICANT CHANGE UP (ref 3.8–10.5)

## 2017-04-24 PROCEDURE — 99291 CRITICAL CARE FIRST HOUR: CPT

## 2017-04-24 PROCEDURE — 99233 SBSQ HOSP IP/OBS HIGH 50: CPT | Mod: GC

## 2017-04-24 RX ORDER — SODIUM CHLORIDE 9 MG/ML
1000 INJECTION INTRAMUSCULAR; INTRAVENOUS; SUBCUTANEOUS ONCE
Qty: 0 | Refills: 0 | Status: COMPLETED | OUTPATIENT
Start: 2017-04-24 | End: 2017-04-24

## 2017-04-24 RX ORDER — DABIGATRAN ETEXILATE MESYLATE 150 MG/1
150 CAPSULE ORAL EVERY 12 HOURS
Qty: 0 | Refills: 0 | Status: DISCONTINUED | OUTPATIENT
Start: 2017-04-24 | End: 2017-04-28

## 2017-04-24 RX ADMIN — Medication 1 GRAM(S): at 11:26

## 2017-04-24 RX ADMIN — Medication 1 GRAM(S): at 17:57

## 2017-04-24 RX ADMIN — FLUCONAZOLE 400 MILLIGRAM(S): 150 TABLET ORAL at 11:27

## 2017-04-24 RX ADMIN — Medication 100 MILLIGRAM(S): at 05:16

## 2017-04-24 RX ADMIN — DIPHENHYDRAMINE HYDROCHLORIDE AND LIDOCAINE HYDROCHLORIDE AND ALUMINUM HYDROXIDE AND MAGNESIUM HYDRO 5 MILLILITER(S): KIT at 08:09

## 2017-04-24 RX ADMIN — URSODIOL 300 MILLIGRAM(S): 250 TABLET, FILM COATED ORAL at 17:57

## 2017-04-24 RX ADMIN — Medication 30 MILLILITER(S): at 19:38

## 2017-04-24 RX ADMIN — Medication 30 MILLILITER(S): at 00:10

## 2017-04-24 RX ADMIN — Medication 1 GRAM(S): at 22:17

## 2017-04-24 RX ADMIN — Medication 30 MILLILITER(S): at 17:57

## 2017-04-24 RX ADMIN — Medication 400 MILLIGRAM(S): at 22:16

## 2017-04-24 RX ADMIN — Medication 1 LOZENGE: at 17:57

## 2017-04-24 RX ADMIN — SODIUM CHLORIDE 1000 MILLILITER(S): 9 INJECTION INTRAMUSCULAR; INTRAVENOUS; SUBCUTANEOUS at 12:31

## 2017-04-24 RX ADMIN — Medication 10 MILLILITER(S): at 12:24

## 2017-04-24 RX ADMIN — TAMSULOSIN HYDROCHLORIDE 0.4 MILLIGRAM(S): 0.4 CAPSULE ORAL at 22:16

## 2017-04-24 RX ADMIN — DIPHENHYDRAMINE HYDROCHLORIDE AND LIDOCAINE HYDROCHLORIDE AND ALUMINUM HYDROXIDE AND MAGNESIUM HYDRO 5 MILLILITER(S): KIT at 00:08

## 2017-04-24 RX ADMIN — Medication 100 MILLIGRAM(S): at 22:16

## 2017-04-24 RX ADMIN — DIPHENHYDRAMINE HYDROCHLORIDE AND LIDOCAINE HYDROCHLORIDE AND ALUMINUM HYDROXIDE AND MAGNESIUM HYDRO 5 MILLILITER(S): KIT at 11:26

## 2017-04-24 RX ADMIN — Medication 75 MILLIGRAM(S): at 06:30

## 2017-04-24 RX ADMIN — SODIUM CHLORIDE 20 MILLILITER(S): 9 INJECTION INTRAMUSCULAR; INTRAVENOUS; SUBCUTANEOUS at 05:14

## 2017-04-24 RX ADMIN — Medication 100 MILLIGRAM(S): at 13:22

## 2017-04-24 RX ADMIN — Medication 75 MILLIGRAM(S): at 13:22

## 2017-04-24 RX ADMIN — Medication 10 MILLILITER(S): at 22:18

## 2017-04-24 RX ADMIN — URSODIOL 300 MILLIGRAM(S): 250 TABLET, FILM COATED ORAL at 08:09

## 2017-04-24 RX ADMIN — Medication 10 MILLILITER(S): at 00:19

## 2017-04-24 RX ADMIN — Medication 1 LOZENGE: at 08:09

## 2017-04-24 RX ADMIN — DIPHENHYDRAMINE HYDROCHLORIDE AND LIDOCAINE HYDROCHLORIDE AND ALUMINUM HYDROXIDE AND MAGNESIUM HYDRO 5 MILLILITER(S): KIT at 17:57

## 2017-04-24 RX ADMIN — Medication 10 MILLILITER(S): at 17:58

## 2017-04-24 RX ADMIN — HEPARIN SODIUM 4.07 UNIT(S)/HR: 5000 INJECTION INTRAVENOUS; SUBCUTANEOUS at 11:28

## 2017-04-24 RX ADMIN — Medication 30 MILLILITER(S): at 11:26

## 2017-04-24 RX ADMIN — Medication 1 LOZENGE: at 00:09

## 2017-04-24 RX ADMIN — Medication 1 LOZENGE: at 11:26

## 2017-04-24 RX ADMIN — Medication 10 MILLILITER(S): at 08:09

## 2017-04-24 RX ADMIN — Medication 400 MILLIGRAM(S): at 05:16

## 2017-04-24 RX ADMIN — Medication 1 GRAM(S): at 05:18

## 2017-04-24 RX ADMIN — Medication 30 MILLILITER(S): at 08:09

## 2017-04-24 RX ADMIN — Medication 400 MILLIGRAM(S): at 13:22

## 2017-04-24 RX ADMIN — Medication 1 LOZENGE: at 19:38

## 2017-04-24 RX ADMIN — Medication 1 MILLIGRAM(S): at 11:30

## 2017-04-24 RX ADMIN — PANTOPRAZOLE SODIUM 40 MILLIGRAM(S): 20 TABLET, DELAYED RELEASE ORAL at 11:28

## 2017-04-24 RX ADMIN — Medication 75 MILLIGRAM(S): at 22:16

## 2017-04-24 RX ADMIN — Medication 1 TABLET(S): at 11:26

## 2017-04-24 RX ADMIN — DABIGATRAN ETEXILATE MESYLATE 150 MILLIGRAM(S): 150 CAPSULE ORAL at 13:21

## 2017-04-25 LAB
ALBUMIN SERPL ELPH-MCNC: 3.8 G/DL — SIGNIFICANT CHANGE UP (ref 3.3–5)
ALP SERPL-CCNC: 93 U/L — SIGNIFICANT CHANGE UP (ref 40–120)
ALT FLD-CCNC: 22 U/L RC — SIGNIFICANT CHANGE UP (ref 10–45)
ANION GAP SERPL CALC-SCNC: 12 MMOL/L — SIGNIFICANT CHANGE UP (ref 5–17)
ANION GAP SERPL CALC-SCNC: 15 MMOL/L — SIGNIFICANT CHANGE UP (ref 5–17)
AST SERPL-CCNC: 18 U/L — SIGNIFICANT CHANGE UP (ref 10–40)
BASOPHILS # BLD AUTO: 0.1 K/UL — SIGNIFICANT CHANGE UP (ref 0–0.2)
BASOPHILS NFR BLD AUTO: 1 % — SIGNIFICANT CHANGE UP (ref 0–2)
BILIRUB SERPL-MCNC: 0.1 MG/DL — LOW (ref 0.2–1.2)
BUN SERPL-MCNC: 13 MG/DL — SIGNIFICANT CHANGE UP (ref 7–23)
BUN SERPL-MCNC: 14 MG/DL — SIGNIFICANT CHANGE UP (ref 7–23)
CALCIUM SERPL-MCNC: 9.1 MG/DL — SIGNIFICANT CHANGE UP (ref 8.4–10.5)
CALCIUM SERPL-MCNC: 9.1 MG/DL — SIGNIFICANT CHANGE UP (ref 8.4–10.5)
CHLORIDE SERPL-SCNC: 103 MMOL/L — SIGNIFICANT CHANGE UP (ref 96–108)
CHLORIDE SERPL-SCNC: 103 MMOL/L — SIGNIFICANT CHANGE UP (ref 96–108)
CO2 SERPL-SCNC: 24 MMOL/L — SIGNIFICANT CHANGE UP (ref 22–31)
CO2 SERPL-SCNC: 27 MMOL/L — SIGNIFICANT CHANGE UP (ref 22–31)
CREAT SERPL-MCNC: 0.74 MG/DL — SIGNIFICANT CHANGE UP (ref 0.5–1.3)
CREAT SERPL-MCNC: 0.86 MG/DL — SIGNIFICANT CHANGE UP (ref 0.5–1.3)
EOSINOPHIL # BLD AUTO: 0 K/UL — SIGNIFICANT CHANGE UP (ref 0–0.5)
EOSINOPHIL NFR BLD AUTO: 0.2 % — SIGNIFICANT CHANGE UP (ref 0–6)
GLUCOSE SERPL-MCNC: 106 MG/DL — HIGH (ref 70–99)
GLUCOSE SERPL-MCNC: 155 MG/DL — HIGH (ref 70–99)
HCT VFR BLD CALC: 26.5 % — LOW (ref 39–50)
HCT VFR BLD CALC: 28.4 % — LOW (ref 39–50)
HGB BLD-MCNC: 9.3 G/DL — LOW (ref 13–17)
HGB BLD-MCNC: 9.5 G/DL — LOW (ref 13–17)
LDH SERPL L TO P-CCNC: 172 U/L — SIGNIFICANT CHANGE UP (ref 50–242)
LYMPHOCYTES # BLD AUTO: 2.7 K/UL — SIGNIFICANT CHANGE UP (ref 1–3.3)
LYMPHOCYTES # BLD AUTO: 40.5 % — SIGNIFICANT CHANGE UP (ref 13–44)
MAGNESIUM SERPL-MCNC: 1.8 MG/DL — SIGNIFICANT CHANGE UP (ref 1.6–2.6)
MAGNESIUM SERPL-MCNC: 2.2 MG/DL — SIGNIFICANT CHANGE UP (ref 1.6–2.6)
MCHC RBC-ENTMCNC: 32.3 PG — SIGNIFICANT CHANGE UP (ref 27–34)
MCHC RBC-ENTMCNC: 33.5 PG — SIGNIFICANT CHANGE UP (ref 27–34)
MCHC RBC-ENTMCNC: 33.6 GM/DL — SIGNIFICANT CHANGE UP (ref 32–36)
MCHC RBC-ENTMCNC: 35.2 GM/DL — SIGNIFICANT CHANGE UP (ref 32–36)
MCV RBC AUTO: 95.1 FL — SIGNIFICANT CHANGE UP (ref 80–100)
MCV RBC AUTO: 96.2 FL — SIGNIFICANT CHANGE UP (ref 80–100)
MONOCYTES # BLD AUTO: 1 K/UL — HIGH (ref 0–0.9)
MONOCYTES NFR BLD AUTO: 15.3 % — HIGH (ref 2–14)
NEUTROPHILS # BLD AUTO: 2.8 K/UL — SIGNIFICANT CHANGE UP (ref 1.8–7.4)
NEUTROPHILS NFR BLD AUTO: 42.9 % — LOW (ref 43–77)
PHOSPHATE SERPL-MCNC: 3.8 MG/DL — SIGNIFICANT CHANGE UP (ref 2.5–4.5)
PHOSPHATE SERPL-MCNC: 3.9 MG/DL — SIGNIFICANT CHANGE UP (ref 2.5–4.5)
PLATELET # BLD AUTO: 284 K/UL — SIGNIFICANT CHANGE UP (ref 150–400)
PLATELET # BLD AUTO: 298 K/UL — SIGNIFICANT CHANGE UP (ref 150–400)
POTASSIUM SERPL-MCNC: 4.2 MMOL/L — SIGNIFICANT CHANGE UP (ref 3.5–5.3)
POTASSIUM SERPL-MCNC: 4.4 MMOL/L — SIGNIFICANT CHANGE UP (ref 3.5–5.3)
POTASSIUM SERPL-SCNC: 4.2 MMOL/L — SIGNIFICANT CHANGE UP (ref 3.5–5.3)
POTASSIUM SERPL-SCNC: 4.4 MMOL/L — SIGNIFICANT CHANGE UP (ref 3.5–5.3)
PROT SERPL-MCNC: 6.3 G/DL — SIGNIFICANT CHANGE UP (ref 6–8.3)
RBC # BLD: 2.78 M/UL — LOW (ref 4.2–5.8)
RBC # BLD: 2.95 M/UL — LOW (ref 4.2–5.8)
RBC # FLD: 17.1 % — HIGH (ref 10.3–14.5)
RBC # FLD: 17.3 % — HIGH (ref 10.3–14.5)
SODIUM SERPL-SCNC: 142 MMOL/L — SIGNIFICANT CHANGE UP (ref 135–145)
SODIUM SERPL-SCNC: 142 MMOL/L — SIGNIFICANT CHANGE UP (ref 135–145)
WBC # BLD: 6.6 K/UL — SIGNIFICANT CHANGE UP (ref 3.8–10.5)
WBC # BLD: 7 K/UL — SIGNIFICANT CHANGE UP (ref 3.8–10.5)
WBC # FLD AUTO: 6.6 K/UL — SIGNIFICANT CHANGE UP (ref 3.8–10.5)
WBC # FLD AUTO: 7 K/UL — SIGNIFICANT CHANGE UP (ref 3.8–10.5)

## 2017-04-25 PROCEDURE — 93653 COMPRE EP EVAL TX SVT: CPT

## 2017-04-25 PROCEDURE — 71010: CPT | Mod: 26

## 2017-04-25 PROCEDURE — 93312 ECHO TRANSESOPHAGEAL: CPT | Mod: 26

## 2017-04-25 PROCEDURE — 93325 DOPPLER ECHO COLOR FLOW MAPG: CPT | Mod: 26

## 2017-04-25 PROCEDURE — 93320 DOPPLER ECHO COMPLETE: CPT | Mod: 26

## 2017-04-25 PROCEDURE — 93010 ELECTROCARDIOGRAM REPORT: CPT

## 2017-04-25 PROCEDURE — 99233 SBSQ HOSP IP/OBS HIGH 50: CPT | Mod: GC

## 2017-04-25 PROCEDURE — 99291 CRITICAL CARE FIRST HOUR: CPT

## 2017-04-25 PROCEDURE — 93613 INTRACARDIAC EPHYS 3D MAPG: CPT

## 2017-04-25 RX ORDER — DILTIAZEM HCL 120 MG
5 CAPSULE, EXT RELEASE 24 HR ORAL
Qty: 125 | Refills: 0 | Status: DISCONTINUED | OUTPATIENT
Start: 2017-04-25 | End: 2017-04-27

## 2017-04-25 RX ORDER — METOPROLOL TARTRATE 50 MG
5 TABLET ORAL ONCE
Qty: 0 | Refills: 0 | Status: COMPLETED | OUTPATIENT
Start: 2017-04-25 | End: 2017-04-25

## 2017-04-25 RX ORDER — MAGNESIUM SULFATE 500 MG/ML
1 VIAL (ML) INJECTION ONCE
Qty: 0 | Refills: 0 | Status: COMPLETED | OUTPATIENT
Start: 2017-04-25 | End: 2017-04-25

## 2017-04-25 RX ADMIN — Medication 75 MILLIGRAM(S): at 23:12

## 2017-04-25 RX ADMIN — Medication 1 GRAM(S): at 21:47

## 2017-04-25 RX ADMIN — Medication 1 TABLET(S): at 21:42

## 2017-04-25 RX ADMIN — Medication 100 MILLIGRAM(S): at 21:41

## 2017-04-25 RX ADMIN — SODIUM CHLORIDE 20 MILLILITER(S): 9 INJECTION INTRAMUSCULAR; INTRAVENOUS; SUBCUTANEOUS at 05:27

## 2017-04-25 RX ADMIN — Medication 100 MILLIGRAM(S): at 05:23

## 2017-04-25 RX ADMIN — Medication 650 MILLIGRAM(S): at 22:13

## 2017-04-25 RX ADMIN — Medication 400 MILLIGRAM(S): at 13:42

## 2017-04-25 RX ADMIN — TAMSULOSIN HYDROCHLORIDE 0.4 MILLIGRAM(S): 0.4 CAPSULE ORAL at 21:42

## 2017-04-25 RX ADMIN — FLUCONAZOLE 400 MILLIGRAM(S): 150 TABLET ORAL at 21:40

## 2017-04-25 RX ADMIN — Medication 5 MILLIGRAM(S): at 20:37

## 2017-04-25 RX ADMIN — DABIGATRAN ETEXILATE MESYLATE 150 MILLIGRAM(S): 150 CAPSULE ORAL at 01:14

## 2017-04-25 RX ADMIN — Medication 30 MILLILITER(S): at 01:15

## 2017-04-25 RX ADMIN — Medication 400 MILLIGRAM(S): at 05:23

## 2017-04-25 RX ADMIN — URSODIOL 300 MILLIGRAM(S): 250 TABLET, FILM COATED ORAL at 21:40

## 2017-04-25 RX ADMIN — Medication 30 MILLILITER(S): at 21:44

## 2017-04-25 RX ADMIN — Medication 1 GRAM(S): at 05:24

## 2017-04-25 RX ADMIN — Medication 75 MILLIGRAM(S): at 05:23

## 2017-04-25 RX ADMIN — Medication 650 MILLIGRAM(S): at 21:43

## 2017-04-25 RX ADMIN — Medication 1 LOZENGE: at 01:16

## 2017-04-25 RX ADMIN — Medication 75 MILLIGRAM(S): at 13:42

## 2017-04-25 RX ADMIN — PANTOPRAZOLE SODIUM 40 MILLIGRAM(S): 20 TABLET, DELAYED RELEASE ORAL at 11:15

## 2017-04-25 RX ADMIN — Medication 100 GRAM(S): at 22:39

## 2017-04-25 RX ADMIN — Medication 1 MILLIGRAM(S): at 21:42

## 2017-04-25 RX ADMIN — Medication 100 MILLIGRAM(S): at 13:43

## 2017-04-25 RX ADMIN — Medication 1 LOZENGE: at 21:40

## 2017-04-25 RX ADMIN — Medication 5 MG/HR: at 21:20

## 2017-04-25 RX ADMIN — Medication 400 MILLIGRAM(S): at 21:42

## 2017-04-25 RX ADMIN — Medication 10 MILLILITER(S): at 01:15

## 2017-04-26 LAB
ALBUMIN SERPL ELPH-MCNC: 3.7 G/DL — SIGNIFICANT CHANGE UP (ref 3.3–5)
ALBUMIN SERPL ELPH-MCNC: 3.7 G/DL — SIGNIFICANT CHANGE UP (ref 3.3–5)
ALP SERPL-CCNC: 97 U/L — SIGNIFICANT CHANGE UP (ref 40–120)
ALP SERPL-CCNC: 97 U/L — SIGNIFICANT CHANGE UP (ref 40–120)
ALT FLD-CCNC: 21 U/L RC — SIGNIFICANT CHANGE UP (ref 10–45)
ALT FLD-CCNC: 21 U/L RC — SIGNIFICANT CHANGE UP (ref 10–45)
ANION GAP SERPL CALC-SCNC: 14 MMOL/L — SIGNIFICANT CHANGE UP (ref 5–17)
AST SERPL-CCNC: 22 U/L — SIGNIFICANT CHANGE UP (ref 10–40)
AST SERPL-CCNC: 22 U/L — SIGNIFICANT CHANGE UP (ref 10–40)
BASOPHILS # BLD AUTO: 0.1 K/UL — SIGNIFICANT CHANGE UP (ref 0–0.2)
BASOPHILS NFR BLD AUTO: 0.9 % — SIGNIFICANT CHANGE UP (ref 0–2)
BILIRUB DIRECT SERPL-MCNC: 0.1 MG/DL — SIGNIFICANT CHANGE UP (ref 0–0.2)
BILIRUB INDIRECT FLD-MCNC: 0 MG/DL — LOW (ref 0.2–1)
BILIRUB SERPL-MCNC: 0.1 MG/DL — LOW (ref 0.2–1.2)
BILIRUB SERPL-MCNC: 0.1 MG/DL — LOW (ref 0.2–1.2)
BLD GP AB SCN SERPL QL: NEGATIVE — SIGNIFICANT CHANGE UP
BUN SERPL-MCNC: 17 MG/DL — SIGNIFICANT CHANGE UP (ref 7–23)
CALCIUM SERPL-MCNC: 9.1 MG/DL — SIGNIFICANT CHANGE UP (ref 8.4–10.5)
CHLORIDE SERPL-SCNC: 102 MMOL/L — SIGNIFICANT CHANGE UP (ref 96–108)
CO2 SERPL-SCNC: 25 MMOL/L — SIGNIFICANT CHANGE UP (ref 22–31)
CREAT SERPL-MCNC: 0.85 MG/DL — SIGNIFICANT CHANGE UP (ref 0.5–1.3)
EOSINOPHIL # BLD AUTO: 0 K/UL — SIGNIFICANT CHANGE UP (ref 0–0.5)
EOSINOPHIL NFR BLD AUTO: 0.1 % — SIGNIFICANT CHANGE UP (ref 0–6)
GLUCOSE SERPL-MCNC: 115 MG/DL — HIGH (ref 70–99)
HCT VFR BLD CALC: 28.2 % — LOW (ref 39–50)
HGB BLD-MCNC: 9.5 G/DL — LOW (ref 13–17)
LDH SERPL L TO P-CCNC: 206 U/L — SIGNIFICANT CHANGE UP (ref 50–242)
LYMPHOCYTES # BLD AUTO: 2.7 K/UL — SIGNIFICANT CHANGE UP (ref 1–3.3)
LYMPHOCYTES # BLD AUTO: 38.7 % — SIGNIFICANT CHANGE UP (ref 13–44)
MAGNESIUM SERPL-MCNC: 1.9 MG/DL — SIGNIFICANT CHANGE UP (ref 1.6–2.6)
MCHC RBC-ENTMCNC: 32 PG — SIGNIFICANT CHANGE UP (ref 27–34)
MCHC RBC-ENTMCNC: 33.6 GM/DL — SIGNIFICANT CHANGE UP (ref 32–36)
MCV RBC AUTO: 95.2 FL — SIGNIFICANT CHANGE UP (ref 80–100)
MONOCYTES # BLD AUTO: 1.1 K/UL — HIGH (ref 0–0.9)
MONOCYTES NFR BLD AUTO: 15.6 % — HIGH (ref 2–14)
NEUTROPHILS # BLD AUTO: 3.1 K/UL — SIGNIFICANT CHANGE UP (ref 1.8–7.4)
NEUTROPHILS NFR BLD AUTO: 44.8 % — SIGNIFICANT CHANGE UP (ref 43–77)
PHOSPHATE SERPL-MCNC: 4.4 MG/DL — SIGNIFICANT CHANGE UP (ref 2.5–4.5)
PLATELET # BLD AUTO: 313 K/UL — SIGNIFICANT CHANGE UP (ref 150–400)
POTASSIUM SERPL-MCNC: 4.2 MMOL/L — SIGNIFICANT CHANGE UP (ref 3.5–5.3)
POTASSIUM SERPL-SCNC: 4.2 MMOL/L — SIGNIFICANT CHANGE UP (ref 3.5–5.3)
PROT SERPL-MCNC: 6.2 G/DL — SIGNIFICANT CHANGE UP (ref 6–8.3)
PROT SERPL-MCNC: 6.2 G/DL — SIGNIFICANT CHANGE UP (ref 6–8.3)
RBC # BLD: 2.96 M/UL — LOW (ref 4.2–5.8)
RBC # FLD: 16.8 % — HIGH (ref 10.3–14.5)
RH IG SCN BLD-IMP: POSITIVE — SIGNIFICANT CHANGE UP
SODIUM SERPL-SCNC: 141 MMOL/L — SIGNIFICANT CHANGE UP (ref 135–145)
WBC # BLD: 7 K/UL — SIGNIFICANT CHANGE UP (ref 3.8–10.5)
WBC # FLD AUTO: 7 K/UL — SIGNIFICANT CHANGE UP (ref 3.8–10.5)

## 2017-04-26 PROCEDURE — 99291 CRITICAL CARE FIRST HOUR: CPT

## 2017-04-26 PROCEDURE — 99233 SBSQ HOSP IP/OBS HIGH 50: CPT | Mod: GC

## 2017-04-26 RX ADMIN — Medication 30 MILLILITER(S): at 09:23

## 2017-04-26 RX ADMIN — Medication 75 MILLIGRAM(S): at 21:37

## 2017-04-26 RX ADMIN — SODIUM CHLORIDE 20 MILLILITER(S): 9 INJECTION INTRAMUSCULAR; INTRAVENOUS; SUBCUTANEOUS at 05:51

## 2017-04-26 RX ADMIN — URSODIOL 300 MILLIGRAM(S): 250 TABLET, FILM COATED ORAL at 17:50

## 2017-04-26 RX ADMIN — TAMSULOSIN HYDROCHLORIDE 0.4 MILLIGRAM(S): 0.4 CAPSULE ORAL at 21:37

## 2017-04-26 RX ADMIN — Medication 30 MILLILITER(S): at 21:28

## 2017-04-26 RX ADMIN — Medication 75 MILLIGRAM(S): at 16:07

## 2017-04-26 RX ADMIN — Medication 5 MG/HR: at 09:24

## 2017-04-26 RX ADMIN — DABIGATRAN ETEXILATE MESYLATE 150 MILLIGRAM(S): 150 CAPSULE ORAL at 16:01

## 2017-04-26 RX ADMIN — FLUCONAZOLE 400 MILLIGRAM(S): 150 TABLET ORAL at 16:00

## 2017-04-26 RX ADMIN — Medication 1 GRAM(S): at 12:24

## 2017-04-26 RX ADMIN — Medication 75 MILLIGRAM(S): at 05:51

## 2017-04-26 RX ADMIN — OXYCODONE HYDROCHLORIDE 5 MILLIGRAM(S): 5 TABLET ORAL at 19:00

## 2017-04-26 RX ADMIN — Medication 100 MILLIGRAM(S): at 15:21

## 2017-04-26 RX ADMIN — URSODIOL 300 MILLIGRAM(S): 250 TABLET, FILM COATED ORAL at 12:21

## 2017-04-26 RX ADMIN — Medication 10 MILLILITER(S): at 21:29

## 2017-04-26 RX ADMIN — Medication 10 MILLILITER(S): at 09:29

## 2017-04-26 RX ADMIN — Medication 400 MILLIGRAM(S): at 05:50

## 2017-04-26 RX ADMIN — Medication 1 LOZENGE: at 16:02

## 2017-04-26 RX ADMIN — Medication 1 GRAM(S): at 21:28

## 2017-04-26 RX ADMIN — Medication 1 LOZENGE: at 21:23

## 2017-04-26 RX ADMIN — SODIUM CHLORIDE 20 MILLILITER(S): 9 INJECTION INTRAMUSCULAR; INTRAVENOUS; SUBCUTANEOUS at 09:24

## 2017-04-26 RX ADMIN — Medication 400 MILLIGRAM(S): at 21:28

## 2017-04-26 RX ADMIN — Medication 10 MILLIGRAM(S): at 12:25

## 2017-04-26 RX ADMIN — Medication 1 GRAM(S): at 16:07

## 2017-04-26 RX ADMIN — Medication 10 MILLILITER(S): at 15:25

## 2017-04-26 RX ADMIN — PANTOPRAZOLE SODIUM 40 MILLIGRAM(S): 20 TABLET, DELAYED RELEASE ORAL at 12:27

## 2017-04-26 RX ADMIN — Medication 30 MILLILITER(S): at 16:02

## 2017-04-26 RX ADMIN — Medication 1 TABLET(S): at 15:22

## 2017-04-26 RX ADMIN — Medication 1 LOZENGE: at 09:24

## 2017-04-26 RX ADMIN — Medication 1 MILLIGRAM(S): at 15:21

## 2017-04-26 RX ADMIN — Medication 10 MILLILITER(S): at 12:30

## 2017-04-26 RX ADMIN — Medication 400 MILLIGRAM(S): at 16:01

## 2017-04-26 RX ADMIN — OXYCODONE HYDROCHLORIDE 5 MILLIGRAM(S): 5 TABLET ORAL at 18:28

## 2017-04-27 LAB
ALBUMIN SERPL ELPH-MCNC: 3 G/DL — LOW (ref 3.3–5)
ALP SERPL-CCNC: 70 U/L — SIGNIFICANT CHANGE UP (ref 40–120)
ALT FLD-CCNC: 16 U/L RC — SIGNIFICANT CHANGE UP (ref 10–45)
ANION GAP SERPL CALC-SCNC: 11 MMOL/L — SIGNIFICANT CHANGE UP (ref 5–17)
APPEARANCE UR: CLEAR — SIGNIFICANT CHANGE UP
APTT BLD: 25.6 SEC — LOW (ref 27.5–37.4)
AST SERPL-CCNC: 17 U/L — SIGNIFICANT CHANGE UP (ref 10–40)
BASOPHILS # BLD AUTO: 0 K/UL — SIGNIFICANT CHANGE UP (ref 0–0.2)
BASOPHILS NFR BLD AUTO: 0.8 % — SIGNIFICANT CHANGE UP (ref 0–2)
BILIRUB SERPL-MCNC: 0.1 MG/DL — LOW (ref 0.2–1.2)
BILIRUB UR-MCNC: NEGATIVE — SIGNIFICANT CHANGE UP
BUN SERPL-MCNC: 13 MG/DL — SIGNIFICANT CHANGE UP (ref 7–23)
CALCIUM SERPL-MCNC: 7.2 MG/DL — LOW (ref 8.4–10.5)
CHLORIDE SERPL-SCNC: 107 MMOL/L — SIGNIFICANT CHANGE UP (ref 96–108)
CO2 SERPL-SCNC: 23 MMOL/L — SIGNIFICANT CHANGE UP (ref 22–31)
COLOR SPEC: SIGNIFICANT CHANGE UP
CREAT SERPL-MCNC: 0.55 MG/DL — SIGNIFICANT CHANGE UP (ref 0.5–1.3)
DIFF PNL FLD: NEGATIVE — SIGNIFICANT CHANGE UP
EOSINOPHIL # BLD AUTO: 0 K/UL — SIGNIFICANT CHANGE UP (ref 0–0.5)
EOSINOPHIL NFR BLD AUTO: 0.3 % — SIGNIFICANT CHANGE UP (ref 0–6)
FIBRINOGEN PPP-MCNC: 649 MG/DL — HIGH (ref 310–510)
GLUCOSE SERPL-MCNC: 104 MG/DL — HIGH (ref 70–99)
GLUCOSE UR QL: NEGATIVE — SIGNIFICANT CHANGE UP
HCT VFR BLD CALC: 26.6 % — LOW (ref 39–50)
HGB BLD-MCNC: 9 G/DL — LOW (ref 13–17)
INR BLD: 1.15 RATIO — SIGNIFICANT CHANGE UP (ref 0.88–1.16)
KETONES UR-MCNC: NEGATIVE — SIGNIFICANT CHANGE UP
LDH SERPL L TO P-CCNC: 172 U/L — SIGNIFICANT CHANGE UP (ref 50–242)
LEUKOCYTE ESTERASE UR-ACNC: NEGATIVE — SIGNIFICANT CHANGE UP
LYMPHOCYTES # BLD AUTO: 2.5 K/UL — SIGNIFICANT CHANGE UP (ref 1–3.3)
LYMPHOCYTES # BLD AUTO: 43.1 % — SIGNIFICANT CHANGE UP (ref 13–44)
MAGNESIUM SERPL-MCNC: 1.4 MG/DL — LOW (ref 1.6–2.6)
MCHC RBC-ENTMCNC: 32.6 PG — SIGNIFICANT CHANGE UP (ref 27–34)
MCHC RBC-ENTMCNC: 34 GM/DL — SIGNIFICANT CHANGE UP (ref 32–36)
MCV RBC AUTO: 96 FL — SIGNIFICANT CHANGE UP (ref 80–100)
MONOCYTES # BLD AUTO: 1 K/UL — HIGH (ref 0–0.9)
MONOCYTES NFR BLD AUTO: 17 % — HIGH (ref 2–14)
NEUTROPHILS # BLD AUTO: 2.3 K/UL — SIGNIFICANT CHANGE UP (ref 1.8–7.4)
NEUTROPHILS NFR BLD AUTO: 38.8 % — LOW (ref 43–77)
NITRITE UR-MCNC: NEGATIVE — SIGNIFICANT CHANGE UP
PH UR: 6 — SIGNIFICANT CHANGE UP (ref 5–8)
PHOSPHATE SERPL-MCNC: 2.9 MG/DL — SIGNIFICANT CHANGE UP (ref 2.5–4.5)
PLATELET # BLD AUTO: 309 K/UL — SIGNIFICANT CHANGE UP (ref 150–400)
POTASSIUM SERPL-MCNC: 3.5 MMOL/L — SIGNIFICANT CHANGE UP (ref 3.5–5.3)
POTASSIUM SERPL-SCNC: 3.5 MMOL/L — SIGNIFICANT CHANGE UP (ref 3.5–5.3)
PROT SERPL-MCNC: 5 G/DL — LOW (ref 6–8.3)
PROT UR-MCNC: NEGATIVE — SIGNIFICANT CHANGE UP
PROTHROM AB SERPL-ACNC: 12.6 SEC — SIGNIFICANT CHANGE UP (ref 9.8–12.7)
RBC # BLD: 2.77 M/UL — LOW (ref 4.2–5.8)
RBC # FLD: 17 % — HIGH (ref 10.3–14.5)
SODIUM SERPL-SCNC: 141 MMOL/L — SIGNIFICANT CHANGE UP (ref 135–145)
SP GR SPEC: 1.01 — SIGNIFICANT CHANGE UP (ref 1.01–1.02)
UROBILINOGEN FLD QL: NEGATIVE — SIGNIFICANT CHANGE UP
WBC # BLD: 5.9 K/UL — SIGNIFICANT CHANGE UP (ref 3.8–10.5)
WBC # FLD AUTO: 5.9 K/UL — SIGNIFICANT CHANGE UP (ref 3.8–10.5)

## 2017-04-27 PROCEDURE — 99233 SBSQ HOSP IP/OBS HIGH 50: CPT | Mod: GC

## 2017-04-27 PROCEDURE — 71010: CPT | Mod: 26

## 2017-04-27 PROCEDURE — 99291 CRITICAL CARE FIRST HOUR: CPT

## 2017-04-27 PROCEDURE — 93010 ELECTROCARDIOGRAM REPORT: CPT

## 2017-04-27 RX ORDER — ATOVAQUONE 750 MG/5ML
750 SUSPENSION ORAL EVERY 12 HOURS
Qty: 0 | Refills: 0 | Status: DISCONTINUED | OUTPATIENT
Start: 2017-04-27 | End: 2017-05-05

## 2017-04-27 RX ADMIN — URSODIOL 300 MILLIGRAM(S): 250 TABLET, FILM COATED ORAL at 17:44

## 2017-04-27 RX ADMIN — Medication 30 MILLILITER(S): at 13:50

## 2017-04-27 RX ADMIN — Medication 400 MILLIGRAM(S): at 13:48

## 2017-04-27 RX ADMIN — Medication 1 LOZENGE: at 13:48

## 2017-04-27 RX ADMIN — Medication 400 MILLIGRAM(S): at 05:58

## 2017-04-27 RX ADMIN — Medication 1 GRAM(S): at 21:24

## 2017-04-27 RX ADMIN — Medication 10 MILLILITER(S): at 09:33

## 2017-04-27 RX ADMIN — FLUCONAZOLE 400 MILLIGRAM(S): 150 TABLET ORAL at 13:48

## 2017-04-27 RX ADMIN — Medication 10 MILLILITER(S): at 13:51

## 2017-04-27 RX ADMIN — Medication 5 MG/HR: at 04:47

## 2017-04-27 RX ADMIN — OXYCODONE HYDROCHLORIDE 5 MILLIGRAM(S): 5 TABLET ORAL at 09:32

## 2017-04-27 RX ADMIN — Medication 1 GRAM(S): at 17:45

## 2017-04-27 RX ADMIN — Medication 10 MILLILITER(S): at 21:27

## 2017-04-27 RX ADMIN — URSODIOL 300 MILLIGRAM(S): 250 TABLET, FILM COATED ORAL at 09:32

## 2017-04-27 RX ADMIN — DABIGATRAN ETEXILATE MESYLATE 150 MILLIGRAM(S): 150 CAPSULE ORAL at 13:48

## 2017-04-27 RX ADMIN — Medication 30 MILLILITER(S): at 09:32

## 2017-04-27 RX ADMIN — Medication 1 TABLET(S): at 13:48

## 2017-04-27 RX ADMIN — Medication 75 MILLIGRAM(S): at 06:12

## 2017-04-27 RX ADMIN — Medication 75 MILLIGRAM(S): at 21:24

## 2017-04-27 RX ADMIN — PANTOPRAZOLE SODIUM 40 MILLIGRAM(S): 20 TABLET, DELAYED RELEASE ORAL at 13:51

## 2017-04-27 RX ADMIN — OXYCODONE HYDROCHLORIDE 5 MILLIGRAM(S): 5 TABLET ORAL at 10:02

## 2017-04-27 RX ADMIN — Medication 1 GRAM(S): at 05:59

## 2017-04-27 RX ADMIN — Medication 30 MILLILITER(S): at 21:25

## 2017-04-27 RX ADMIN — Medication 400 MILLIGRAM(S): at 21:24

## 2017-04-27 RX ADMIN — TAMSULOSIN HYDROCHLORIDE 0.4 MILLIGRAM(S): 0.4 CAPSULE ORAL at 21:24

## 2017-04-27 RX ADMIN — Medication 1 LOZENGE: at 17:44

## 2017-04-27 RX ADMIN — Medication 30 MILLILITER(S): at 17:45

## 2017-04-27 RX ADMIN — ATOVAQUONE 750 MILLIGRAM(S): 750 SUSPENSION ORAL at 17:45

## 2017-04-27 RX ADMIN — DABIGATRAN ETEXILATE MESYLATE 150 MILLIGRAM(S): 150 CAPSULE ORAL at 04:38

## 2017-04-27 RX ADMIN — Medication 1 LOZENGE: at 21:24

## 2017-04-27 RX ADMIN — Medication 1 MILLIGRAM(S): at 13:48

## 2017-04-27 RX ADMIN — Medication 75 MILLIGRAM(S): at 13:48

## 2017-04-27 RX ADMIN — Medication 10 MILLILITER(S): at 17:45

## 2017-04-27 RX ADMIN — Medication 1 LOZENGE: at 09:32

## 2017-04-27 RX ADMIN — Medication 1 GRAM(S): at 13:51

## 2017-04-27 NOTE — PROVIDER CONTACT NOTE (OTHER) - SITUATION
notifying MD that pt is fluctuating between A-flutter and SR, rate control between 60-80. MD notified and aware, confirming no interventions ordered at this time?

## 2017-04-28 LAB
ALBUMIN SERPL ELPH-MCNC: 3.8 G/DL — SIGNIFICANT CHANGE UP (ref 3.3–5)
ALP SERPL-CCNC: 88 U/L — SIGNIFICANT CHANGE UP (ref 40–120)
ALT FLD-CCNC: 21 U/L RC — SIGNIFICANT CHANGE UP (ref 10–45)
ANION GAP SERPL CALC-SCNC: 12 MMOL/L — SIGNIFICANT CHANGE UP (ref 5–17)
AST SERPL-CCNC: 22 U/L — SIGNIFICANT CHANGE UP (ref 10–40)
BASOPHILS # BLD AUTO: 0.1 K/UL — SIGNIFICANT CHANGE UP (ref 0–0.2)
BASOPHILS NFR BLD AUTO: 1.1 % — SIGNIFICANT CHANGE UP (ref 0–2)
BILIRUB SERPL-MCNC: 0.1 MG/DL — LOW (ref 0.2–1.2)
BLD GP AB SCN SERPL QL: NEGATIVE — SIGNIFICANT CHANGE UP
BUN SERPL-MCNC: 16 MG/DL — SIGNIFICANT CHANGE UP (ref 7–23)
CALCIUM SERPL-MCNC: 9.4 MG/DL — SIGNIFICANT CHANGE UP (ref 8.4–10.5)
CHLORIDE SERPL-SCNC: 103 MMOL/L — SIGNIFICANT CHANGE UP (ref 96–108)
CO2 SERPL-SCNC: 29 MMOL/L — SIGNIFICANT CHANGE UP (ref 22–31)
CREAT SERPL-MCNC: 0.88 MG/DL — SIGNIFICANT CHANGE UP (ref 0.5–1.3)
EOSINOPHIL # BLD AUTO: 0 K/UL — SIGNIFICANT CHANGE UP (ref 0–0.5)
EOSINOPHIL NFR BLD AUTO: 0.5 % — SIGNIFICANT CHANGE UP (ref 0–6)
GLUCOSE SERPL-MCNC: 114 MG/DL — HIGH (ref 70–99)
HCT VFR BLD CALC: 31.2 % — LOW (ref 39–50)
HGB BLD-MCNC: 10.4 G/DL — LOW (ref 13–17)
LDH SERPL L TO P-CCNC: 200 U/L — SIGNIFICANT CHANGE UP (ref 50–242)
LYMPHOCYTES # BLD AUTO: 2.7 K/UL — SIGNIFICANT CHANGE UP (ref 1–3.3)
LYMPHOCYTES # BLD AUTO: 44.3 % — HIGH (ref 13–44)
MAGNESIUM SERPL-MCNC: 1.9 MG/DL — SIGNIFICANT CHANGE UP (ref 1.6–2.6)
MCHC RBC-ENTMCNC: 32.3 PG — SIGNIFICANT CHANGE UP (ref 27–34)
MCHC RBC-ENTMCNC: 33.5 GM/DL — SIGNIFICANT CHANGE UP (ref 32–36)
MCV RBC AUTO: 96.5 FL — SIGNIFICANT CHANGE UP (ref 80–100)
MONOCYTES # BLD AUTO: 0.9 K/UL — SIGNIFICANT CHANGE UP (ref 0–0.9)
MONOCYTES NFR BLD AUTO: 15.3 % — HIGH (ref 2–14)
NEUTROPHILS # BLD AUTO: 2.4 K/UL — SIGNIFICANT CHANGE UP (ref 1.8–7.4)
NEUTROPHILS NFR BLD AUTO: 38.8 % — LOW (ref 43–77)
PHOSPHATE SERPL-MCNC: 3.6 MG/DL — SIGNIFICANT CHANGE UP (ref 2.5–4.5)
PLATELET # BLD AUTO: 372 K/UL — SIGNIFICANT CHANGE UP (ref 150–400)
POTASSIUM SERPL-MCNC: 4.7 MMOL/L — SIGNIFICANT CHANGE UP (ref 3.5–5.3)
POTASSIUM SERPL-SCNC: 4.7 MMOL/L — SIGNIFICANT CHANGE UP (ref 3.5–5.3)
PROT SERPL-MCNC: 6.8 G/DL — SIGNIFICANT CHANGE UP (ref 6–8.3)
RBC # BLD: 3.23 M/UL — LOW (ref 4.2–5.8)
RBC # FLD: 16.7 % — HIGH (ref 10.3–14.5)
RH IG SCN BLD-IMP: POSITIVE — SIGNIFICANT CHANGE UP
SODIUM SERPL-SCNC: 144 MMOL/L — SIGNIFICANT CHANGE UP (ref 135–145)
WBC # BLD: 6.1 K/UL — SIGNIFICANT CHANGE UP (ref 3.8–10.5)
WBC # FLD AUTO: 6.1 K/UL — SIGNIFICANT CHANGE UP (ref 3.8–10.5)

## 2017-04-28 PROCEDURE — 99233 SBSQ HOSP IP/OBS HIGH 50: CPT | Mod: GC

## 2017-04-28 PROCEDURE — 99291 CRITICAL CARE FIRST HOUR: CPT

## 2017-04-28 PROCEDURE — 36590 REMOVAL TUNNELED CV CATH: CPT

## 2017-04-28 RX ORDER — IDARUCIZUMAB 50 MG/ML
2.5 INJECTION INTRAVENOUS
Qty: 0 | Refills: 0 | Status: COMPLETED | OUTPATIENT
Start: 2017-04-28 | End: 2017-04-28

## 2017-04-28 RX ORDER — DILTIAZEM HCL 120 MG
5 CAPSULE, EXT RELEASE 24 HR ORAL
Qty: 125 | Refills: 0 | Status: DISCONTINUED | OUTPATIENT
Start: 2017-04-28 | End: 2017-04-29

## 2017-04-28 RX ADMIN — Medication 1 LOZENGE: at 08:57

## 2017-04-28 RX ADMIN — Medication 1 MILLIGRAM(S): at 17:11

## 2017-04-28 RX ADMIN — Medication 10 MILLILITER(S): at 09:02

## 2017-04-28 RX ADMIN — Medication 400 MILLIGRAM(S): at 22:21

## 2017-04-28 RX ADMIN — ATOVAQUONE 750 MILLIGRAM(S): 750 SUSPENSION ORAL at 05:33

## 2017-04-28 RX ADMIN — Medication 1 GRAM(S): at 16:16

## 2017-04-28 RX ADMIN — Medication 30 MILLILITER(S): at 22:22

## 2017-04-28 RX ADMIN — Medication 1 MILLIGRAM(S): at 11:27

## 2017-04-28 RX ADMIN — Medication 1 GRAM(S): at 22:21

## 2017-04-28 RX ADMIN — TAMSULOSIN HYDROCHLORIDE 0.4 MILLIGRAM(S): 0.4 CAPSULE ORAL at 22:21

## 2017-04-28 RX ADMIN — Medication 10 MILLILITER(S): at 22:14

## 2017-04-28 RX ADMIN — Medication 650 MILLIGRAM(S): at 22:47

## 2017-04-28 RX ADMIN — Medication 1 GRAM(S): at 05:34

## 2017-04-28 RX ADMIN — Medication 1 APPLICATION(S): at 05:38

## 2017-04-28 RX ADMIN — OXYCODONE HYDROCHLORIDE 5 MILLIGRAM(S): 5 TABLET ORAL at 17:12

## 2017-04-28 RX ADMIN — Medication 1 GRAM(S): at 11:13

## 2017-04-28 RX ADMIN — Medication 75 MILLIGRAM(S): at 22:22

## 2017-04-28 RX ADMIN — Medication 30 MILLILITER(S): at 11:30

## 2017-04-28 RX ADMIN — Medication 75 MILLIGRAM(S): at 16:15

## 2017-04-28 RX ADMIN — Medication 400 MILLIGRAM(S): at 05:33

## 2017-04-28 RX ADMIN — Medication 650 MILLIGRAM(S): at 22:21

## 2017-04-28 RX ADMIN — PANTOPRAZOLE SODIUM 40 MILLIGRAM(S): 20 TABLET, DELAYED RELEASE ORAL at 11:30

## 2017-04-28 RX ADMIN — Medication 400 MILLIGRAM(S): at 16:15

## 2017-04-28 RX ADMIN — Medication 10 MILLILITER(S): at 11:32

## 2017-04-28 RX ADMIN — Medication 5 MG/HR: at 10:50

## 2017-04-28 RX ADMIN — ATOVAQUONE 750 MILLIGRAM(S): 750 SUSPENSION ORAL at 22:20

## 2017-04-28 RX ADMIN — Medication 30 MILLILITER(S): at 16:17

## 2017-04-28 RX ADMIN — Medication 30 MILLILITER(S): at 08:57

## 2017-04-28 RX ADMIN — SODIUM CHLORIDE 20 MILLILITER(S): 9 INJECTION INTRAMUSCULAR; INTRAVENOUS; SUBCUTANEOUS at 07:25

## 2017-04-28 RX ADMIN — IDARUCIZUMAB 600 GRAM(S): 50 INJECTION INTRAVENOUS at 13:45

## 2017-04-28 RX ADMIN — DABIGATRAN ETEXILATE MESYLATE 150 MILLIGRAM(S): 150 CAPSULE ORAL at 01:22

## 2017-04-28 RX ADMIN — Medication 10 MILLILITER(S): at 16:18

## 2017-04-28 RX ADMIN — URSODIOL 300 MILLIGRAM(S): 250 TABLET, FILM COATED ORAL at 08:57

## 2017-04-28 RX ADMIN — Medication 1 TABLET(S): at 11:29

## 2017-04-28 RX ADMIN — Medication 75 MILLIGRAM(S): at 05:34

## 2017-04-28 RX ADMIN — Medication 1 LOZENGE: at 11:13

## 2017-04-28 RX ADMIN — Medication 1 LOZENGE: at 16:15

## 2017-04-28 RX ADMIN — Medication 1 LOZENGE: at 22:20

## 2017-04-28 RX ADMIN — FLUCONAZOLE 400 MILLIGRAM(S): 150 TABLET ORAL at 11:29

## 2017-04-28 NOTE — PROVIDER CONTACT NOTE (OTHER) - SITUATION
as per prior conversation with MD, notifying that pt is frequently converting back and forth between Sinus rhythm. and a-fib. while in a-fib pt can fluctuate up to 120-130's

## 2017-04-29 LAB
-  AMIKACIN: SIGNIFICANT CHANGE UP
-  AMPICILLIN/SULBACTAM: SIGNIFICANT CHANGE UP
-  AMPICILLIN: SIGNIFICANT CHANGE UP
-  AMPICILLIN: SIGNIFICANT CHANGE UP
-  AZTREONAM: SIGNIFICANT CHANGE UP
-  CEFAZOLIN: SIGNIFICANT CHANGE UP
-  CEFEPIME: SIGNIFICANT CHANGE UP
-  CEFOXITIN: SIGNIFICANT CHANGE UP
-  CEFTAZIDIME: SIGNIFICANT CHANGE UP
-  CEFTRIAXONE: SIGNIFICANT CHANGE UP
-  CIPROFLOXACIN: SIGNIFICANT CHANGE UP
-  CIPROFLOXACIN: SIGNIFICANT CHANGE UP
-  ERTAPENEM: SIGNIFICANT CHANGE UP
-  GENTAMICIN: SIGNIFICANT CHANGE UP
-  IMIPENEM: SIGNIFICANT CHANGE UP
-  LEVOFLOXACIN: SIGNIFICANT CHANGE UP
-  MEROPENEM: SIGNIFICANT CHANGE UP
-  NITROFURANTOIN: SIGNIFICANT CHANGE UP
-  NITROFURANTOIN: SIGNIFICANT CHANGE UP
-  PIPERACILLIN/TAZOBACTAM: SIGNIFICANT CHANGE UP
-  TETRACYCLINE: SIGNIFICANT CHANGE UP
-  TOBRAMYCIN: SIGNIFICANT CHANGE UP
-  TRIMETHOPRIM/SULFAMETHOXAZOLE: SIGNIFICANT CHANGE UP
-  VANCOMYCIN: SIGNIFICANT CHANGE UP
ALBUMIN SERPL ELPH-MCNC: 3.7 G/DL — SIGNIFICANT CHANGE UP (ref 3.3–5)
ALP SERPL-CCNC: 77 U/L — SIGNIFICANT CHANGE UP (ref 40–120)
ALT FLD-CCNC: 24 U/L RC — SIGNIFICANT CHANGE UP (ref 10–45)
ANION GAP SERPL CALC-SCNC: 11 MMOL/L — SIGNIFICANT CHANGE UP (ref 5–17)
AST SERPL-CCNC: 23 U/L — SIGNIFICANT CHANGE UP (ref 10–40)
BASOPHILS # BLD AUTO: 0.1 K/UL — SIGNIFICANT CHANGE UP (ref 0–0.2)
BASOPHILS NFR BLD AUTO: 1.2 % — SIGNIFICANT CHANGE UP (ref 0–2)
BILIRUB SERPL-MCNC: 0.1 MG/DL — LOW (ref 0.2–1.2)
BUN SERPL-MCNC: 15 MG/DL — SIGNIFICANT CHANGE UP (ref 7–23)
CALCIUM SERPL-MCNC: 8.9 MG/DL — SIGNIFICANT CHANGE UP (ref 8.4–10.5)
CHLORIDE SERPL-SCNC: 100 MMOL/L — SIGNIFICANT CHANGE UP (ref 96–108)
CO2 SERPL-SCNC: 29 MMOL/L — SIGNIFICANT CHANGE UP (ref 22–31)
CREAT SERPL-MCNC: 0.88 MG/DL — SIGNIFICANT CHANGE UP (ref 0.5–1.3)
CULTURE RESULTS: SIGNIFICANT CHANGE UP
EOSINOPHIL # BLD AUTO: 0.1 K/UL — SIGNIFICANT CHANGE UP (ref 0–0.5)
EOSINOPHIL NFR BLD AUTO: 0.9 % — SIGNIFICANT CHANGE UP (ref 0–6)
GLUCOSE SERPL-MCNC: 108 MG/DL — HIGH (ref 70–99)
HCT VFR BLD CALC: 29.4 % — LOW (ref 39–50)
HGB BLD-MCNC: 10.1 G/DL — LOW (ref 13–17)
LDH SERPL L TO P-CCNC: 179 U/L — SIGNIFICANT CHANGE UP (ref 50–242)
LYMPHOCYTES # BLD AUTO: 2.7 K/UL — SIGNIFICANT CHANGE UP (ref 1–3.3)
LYMPHOCYTES # BLD AUTO: 46.1 % — HIGH (ref 13–44)
MAGNESIUM SERPL-MCNC: 2 MG/DL — SIGNIFICANT CHANGE UP (ref 1.6–2.6)
MCHC RBC-ENTMCNC: 33.1 PG — SIGNIFICANT CHANGE UP (ref 27–34)
MCHC RBC-ENTMCNC: 34.3 GM/DL — SIGNIFICANT CHANGE UP (ref 32–36)
MCV RBC AUTO: 96.5 FL — SIGNIFICANT CHANGE UP (ref 80–100)
METHOD TYPE: SIGNIFICANT CHANGE UP
METHOD TYPE: SIGNIFICANT CHANGE UP
MONOCYTES # BLD AUTO: 0.9 K/UL — SIGNIFICANT CHANGE UP (ref 0–0.9)
MONOCYTES NFR BLD AUTO: 15.8 % — HIGH (ref 2–14)
NEUTROPHILS # BLD AUTO: 2.1 K/UL — SIGNIFICANT CHANGE UP (ref 1.8–7.4)
NEUTROPHILS NFR BLD AUTO: 36 % — LOW (ref 43–77)
ORGANISM # SPEC MICROSCOPIC CNT: SIGNIFICANT CHANGE UP
PHOSPHATE SERPL-MCNC: 4.4 MG/DL — SIGNIFICANT CHANGE UP (ref 2.5–4.5)
PLATELET # BLD AUTO: 369 K/UL — SIGNIFICANT CHANGE UP (ref 150–400)
POTASSIUM SERPL-MCNC: 4.8 MMOL/L — SIGNIFICANT CHANGE UP (ref 3.5–5.3)
POTASSIUM SERPL-SCNC: 4.8 MMOL/L — SIGNIFICANT CHANGE UP (ref 3.5–5.3)
PROT SERPL-MCNC: 6.5 G/DL — SIGNIFICANT CHANGE UP (ref 6–8.3)
RBC # BLD: 3.04 M/UL — LOW (ref 4.2–5.8)
RBC # FLD: 16.9 % — HIGH (ref 10.3–14.5)
SODIUM SERPL-SCNC: 140 MMOL/L — SIGNIFICANT CHANGE UP (ref 135–145)
SPECIMEN SOURCE: SIGNIFICANT CHANGE UP
WBC # BLD: 5.8 K/UL — SIGNIFICANT CHANGE UP (ref 3.8–10.5)
WBC # FLD AUTO: 5.8 K/UL — SIGNIFICANT CHANGE UP (ref 3.8–10.5)

## 2017-04-29 PROCEDURE — 99291 CRITICAL CARE FIRST HOUR: CPT

## 2017-04-29 PROCEDURE — 99232 SBSQ HOSP IP/OBS MODERATE 35: CPT | Mod: GC

## 2017-04-29 RX ORDER — DABIGATRAN ETEXILATE MESYLATE 150 MG/1
150 CAPSULE ORAL EVERY 12 HOURS
Qty: 0 | Refills: 0 | Status: DISCONTINUED | OUTPATIENT
Start: 2017-04-30 | End: 2017-05-04

## 2017-04-29 RX ADMIN — Medication 75 MILLIGRAM(S): at 06:33

## 2017-04-29 RX ADMIN — Medication 10 MILLILITER(S): at 11:34

## 2017-04-29 RX ADMIN — Medication 75 MILLIGRAM(S): at 15:35

## 2017-04-29 RX ADMIN — Medication 1 LOZENGE: at 11:31

## 2017-04-29 RX ADMIN — Medication 1 GRAM(S): at 11:32

## 2017-04-29 RX ADMIN — FLUCONAZOLE 400 MILLIGRAM(S): 150 TABLET ORAL at 11:31

## 2017-04-29 RX ADMIN — DIPHENHYDRAMINE HYDROCHLORIDE AND LIDOCAINE HYDROCHLORIDE AND ALUMINUM HYDROXIDE AND MAGNESIUM HYDRO 5 MILLILITER(S): KIT at 10:23

## 2017-04-29 RX ADMIN — Medication 30 MILLILITER(S): at 21:40

## 2017-04-29 RX ADMIN — URSODIOL 300 MILLIGRAM(S): 250 TABLET, FILM COATED ORAL at 10:24

## 2017-04-29 RX ADMIN — Medication 1 LOZENGE: at 10:24

## 2017-04-29 RX ADMIN — Medication 1 GRAM(S): at 06:33

## 2017-04-29 RX ADMIN — Medication 10 MILLILITER(S): at 21:40

## 2017-04-29 RX ADMIN — Medication 30 MILLILITER(S): at 10:22

## 2017-04-29 RX ADMIN — Medication 75 MILLIGRAM(S): at 21:41

## 2017-04-29 RX ADMIN — Medication 400 MILLIGRAM(S): at 15:37

## 2017-04-29 RX ADMIN — Medication 30 MILLILITER(S): at 15:35

## 2017-04-29 RX ADMIN — Medication 400 MILLIGRAM(S): at 06:33

## 2017-04-29 RX ADMIN — TAMSULOSIN HYDROCHLORIDE 0.4 MILLIGRAM(S): 0.4 CAPSULE ORAL at 21:41

## 2017-04-29 RX ADMIN — Medication 1 TABLET(S): at 11:31

## 2017-04-29 RX ADMIN — Medication 10 MILLILITER(S): at 10:24

## 2017-04-29 RX ADMIN — Medication 1 MILLIGRAM(S): at 11:31

## 2017-04-29 RX ADMIN — ATOVAQUONE 750 MILLIGRAM(S): 750 SUSPENSION ORAL at 06:33

## 2017-04-29 RX ADMIN — URSODIOL 300 MILLIGRAM(S): 250 TABLET, FILM COATED ORAL at 17:03

## 2017-04-29 RX ADMIN — PANTOPRAZOLE SODIUM 40 MILLIGRAM(S): 20 TABLET, DELAYED RELEASE ORAL at 11:32

## 2017-04-29 RX ADMIN — Medication 1 GRAM(S): at 21:41

## 2017-04-29 RX ADMIN — ATOVAQUONE 750 MILLIGRAM(S): 750 SUSPENSION ORAL at 17:03

## 2017-04-29 RX ADMIN — Medication 1 GRAM(S): at 15:36

## 2017-04-29 RX ADMIN — Medication 400 MILLIGRAM(S): at 21:41

## 2017-04-29 RX ADMIN — Medication 1 LOZENGE: at 15:36

## 2017-04-29 RX ADMIN — Medication 10 MILLILITER(S): at 15:37

## 2017-04-29 RX ADMIN — Medication 1 LOZENGE: at 21:41

## 2017-04-30 LAB
ALBUMIN SERPL ELPH-MCNC: 3.9 G/DL — SIGNIFICANT CHANGE UP (ref 3.3–5)
ALP SERPL-CCNC: 81 U/L — SIGNIFICANT CHANGE UP (ref 40–120)
ALT FLD-CCNC: 22 U/L RC — SIGNIFICANT CHANGE UP (ref 10–45)
ANION GAP SERPL CALC-SCNC: 12 MMOL/L — SIGNIFICANT CHANGE UP (ref 5–17)
AST SERPL-CCNC: 21 U/L — SIGNIFICANT CHANGE UP (ref 10–40)
BASOPHILS # BLD AUTO: 0.1 K/UL — SIGNIFICANT CHANGE UP (ref 0–0.2)
BASOPHILS NFR BLD AUTO: 1.2 % — SIGNIFICANT CHANGE UP (ref 0–2)
BILIRUB SERPL-MCNC: 0.2 MG/DL — SIGNIFICANT CHANGE UP (ref 0.2–1.2)
BUN SERPL-MCNC: 16 MG/DL — SIGNIFICANT CHANGE UP (ref 7–23)
CALCIUM SERPL-MCNC: 9.3 MG/DL — SIGNIFICANT CHANGE UP (ref 8.4–10.5)
CHLORIDE SERPL-SCNC: 102 MMOL/L — SIGNIFICANT CHANGE UP (ref 96–108)
CO2 SERPL-SCNC: 29 MMOL/L — SIGNIFICANT CHANGE UP (ref 22–31)
CREAT SERPL-MCNC: 0.95 MG/DL — SIGNIFICANT CHANGE UP (ref 0.5–1.3)
EOSINOPHIL # BLD AUTO: 0.1 K/UL — SIGNIFICANT CHANGE UP (ref 0–0.5)
EOSINOPHIL NFR BLD AUTO: 1.4 % — SIGNIFICANT CHANGE UP (ref 0–6)
GLUCOSE SERPL-MCNC: 116 MG/DL — HIGH (ref 70–99)
HCT VFR BLD CALC: 33.5 % — LOW (ref 39–50)
HGB BLD-MCNC: 10.9 G/DL — LOW (ref 13–17)
LDH SERPL L TO P-CCNC: 188 U/L — SIGNIFICANT CHANGE UP (ref 50–242)
LYMPHOCYTES # BLD AUTO: 3.3 K/UL — SIGNIFICANT CHANGE UP (ref 1–3.3)
LYMPHOCYTES # BLD AUTO: 47.1 % — HIGH (ref 13–44)
MAGNESIUM SERPL-MCNC: 2.2 MG/DL — SIGNIFICANT CHANGE UP (ref 1.6–2.6)
MCHC RBC-ENTMCNC: 31.6 PG — SIGNIFICANT CHANGE UP (ref 27–34)
MCHC RBC-ENTMCNC: 32.4 GM/DL — SIGNIFICANT CHANGE UP (ref 32–36)
MCV RBC AUTO: 97.6 FL — SIGNIFICANT CHANGE UP (ref 80–100)
MONOCYTES # BLD AUTO: 1.2 K/UL — HIGH (ref 0–0.9)
MONOCYTES NFR BLD AUTO: 16.5 % — HIGH (ref 2–14)
NEUTROPHILS # BLD AUTO: 2.4 K/UL — SIGNIFICANT CHANGE UP (ref 1.8–7.4)
NEUTROPHILS NFR BLD AUTO: 33.8 % — LOW (ref 43–77)
PHOSPHATE SERPL-MCNC: 4.2 MG/DL — SIGNIFICANT CHANGE UP (ref 2.5–4.5)
PLATELET # BLD AUTO: 403 K/UL — HIGH (ref 150–400)
POTASSIUM SERPL-MCNC: 5 MMOL/L — SIGNIFICANT CHANGE UP (ref 3.5–5.3)
POTASSIUM SERPL-SCNC: 5 MMOL/L — SIGNIFICANT CHANGE UP (ref 3.5–5.3)
PROT SERPL-MCNC: 6.8 G/DL — SIGNIFICANT CHANGE UP (ref 6–8.3)
RBC # BLD: 3.43 M/UL — LOW (ref 4.2–5.8)
RBC # FLD: 17.2 % — HIGH (ref 10.3–14.5)
SODIUM SERPL-SCNC: 143 MMOL/L — SIGNIFICANT CHANGE UP (ref 135–145)
WBC # BLD: 7 K/UL — SIGNIFICANT CHANGE UP (ref 3.8–10.5)
WBC # FLD AUTO: 7 K/UL — SIGNIFICANT CHANGE UP (ref 3.8–10.5)

## 2017-04-30 PROCEDURE — 99291 CRITICAL CARE FIRST HOUR: CPT

## 2017-04-30 PROCEDURE — 99233 SBSQ HOSP IP/OBS HIGH 50: CPT | Mod: GC

## 2017-04-30 RX ORDER — OXYCODONE HYDROCHLORIDE 5 MG/1
5 TABLET ORAL EVERY 6 HOURS
Qty: 0 | Refills: 0 | Status: DISCONTINUED | OUTPATIENT
Start: 2017-04-30 | End: 2017-05-01

## 2017-04-30 RX ADMIN — URSODIOL 300 MILLIGRAM(S): 250 TABLET, FILM COATED ORAL at 17:32

## 2017-04-30 RX ADMIN — TAMSULOSIN HYDROCHLORIDE 0.4 MILLIGRAM(S): 0.4 CAPSULE ORAL at 21:33

## 2017-04-30 RX ADMIN — OXYCODONE HYDROCHLORIDE 5 MILLIGRAM(S): 5 TABLET ORAL at 23:00

## 2017-04-30 RX ADMIN — Medication 1 LOZENGE: at 11:04

## 2017-04-30 RX ADMIN — Medication 75 MILLIGRAM(S): at 14:29

## 2017-04-30 RX ADMIN — Medication 10 MILLILITER(S): at 17:33

## 2017-04-30 RX ADMIN — ATOVAQUONE 750 MILLIGRAM(S): 750 SUSPENSION ORAL at 06:02

## 2017-04-30 RX ADMIN — Medication 400 MILLIGRAM(S): at 21:18

## 2017-04-30 RX ADMIN — Medication 1 LOZENGE: at 08:18

## 2017-04-30 RX ADMIN — FLUCONAZOLE 400 MILLIGRAM(S): 150 TABLET ORAL at 11:04

## 2017-04-30 RX ADMIN — DABIGATRAN ETEXILATE MESYLATE 150 MILLIGRAM(S): 150 CAPSULE ORAL at 06:03

## 2017-04-30 RX ADMIN — Medication 30 MILLILITER(S): at 17:31

## 2017-04-30 RX ADMIN — Medication 30 MILLILITER(S): at 11:05

## 2017-04-30 RX ADMIN — DIPHENHYDRAMINE HYDROCHLORIDE AND LIDOCAINE HYDROCHLORIDE AND ALUMINUM HYDROXIDE AND MAGNESIUM HYDRO 5 MILLILITER(S): KIT at 17:31

## 2017-04-30 RX ADMIN — Medication 1 GRAM(S): at 11:04

## 2017-04-30 RX ADMIN — Medication 10 MILLILITER(S): at 11:05

## 2017-04-30 RX ADMIN — DIPHENHYDRAMINE HYDROCHLORIDE AND LIDOCAINE HYDROCHLORIDE AND ALUMINUM HYDROXIDE AND MAGNESIUM HYDRO 5 MILLILITER(S): KIT at 08:18

## 2017-04-30 RX ADMIN — Medication 400 MILLIGRAM(S): at 14:29

## 2017-04-30 RX ADMIN — Medication 400 MILLIGRAM(S): at 06:03

## 2017-04-30 RX ADMIN — Medication 75 MILLIGRAM(S): at 06:03

## 2017-04-30 RX ADMIN — PANTOPRAZOLE SODIUM 40 MILLIGRAM(S): 20 TABLET, DELAYED RELEASE ORAL at 11:04

## 2017-04-30 RX ADMIN — Medication 30 MILLILITER(S): at 21:18

## 2017-04-30 RX ADMIN — Medication 1 GRAM(S): at 17:32

## 2017-04-30 RX ADMIN — Medication 1 LOZENGE: at 17:31

## 2017-04-30 RX ADMIN — DABIGATRAN ETEXILATE MESYLATE 150 MILLIGRAM(S): 150 CAPSULE ORAL at 17:32

## 2017-04-30 RX ADMIN — URSODIOL 300 MILLIGRAM(S): 250 TABLET, FILM COATED ORAL at 08:56

## 2017-04-30 RX ADMIN — Medication 75 MILLIGRAM(S): at 21:18

## 2017-04-30 RX ADMIN — Medication 1 TABLET(S): at 11:05

## 2017-04-30 RX ADMIN — Medication 1 GRAM(S): at 06:03

## 2017-04-30 RX ADMIN — ATOVAQUONE 750 MILLIGRAM(S): 750 SUSPENSION ORAL at 17:32

## 2017-04-30 RX ADMIN — OXYCODONE HYDROCHLORIDE 5 MILLIGRAM(S): 5 TABLET ORAL at 22:31

## 2017-04-30 RX ADMIN — Medication 1 LOZENGE: at 21:18

## 2017-04-30 RX ADMIN — DIPHENHYDRAMINE HYDROCHLORIDE AND LIDOCAINE HYDROCHLORIDE AND ALUMINUM HYDROXIDE AND MAGNESIUM HYDRO 5 MILLILITER(S): KIT at 11:03

## 2017-04-30 RX ADMIN — Medication 1 GRAM(S): at 21:18

## 2017-04-30 RX ADMIN — Medication 30 MILLILITER(S): at 08:18

## 2017-04-30 RX ADMIN — Medication 1 MILLIGRAM(S): at 11:04

## 2017-04-30 RX ADMIN — Medication 10 MILLILITER(S): at 08:19

## 2017-05-01 PROCEDURE — 99291 CRITICAL CARE FIRST HOUR: CPT

## 2017-05-01 PROCEDURE — 99233 SBSQ HOSP IP/OBS HIGH 50: CPT | Mod: GC

## 2017-05-01 PROCEDURE — 93010 ELECTROCARDIOGRAM REPORT: CPT

## 2017-05-01 RX ORDER — METOPROLOL TARTRATE 50 MG
200 TABLET ORAL DAILY
Qty: 0 | Refills: 0 | Status: DISCONTINUED | OUTPATIENT
Start: 2017-05-01 | End: 2017-05-02

## 2017-05-01 RX ADMIN — Medication 1 TABLET(S): at 11:49

## 2017-05-01 RX ADMIN — Medication 1 LOZENGE: at 21:14

## 2017-05-01 RX ADMIN — DIPHENHYDRAMINE HYDROCHLORIDE AND LIDOCAINE HYDROCHLORIDE AND ALUMINUM HYDROXIDE AND MAGNESIUM HYDRO 5 MILLILITER(S): KIT at 11:49

## 2017-05-01 RX ADMIN — Medication 1 GRAM(S): at 11:49

## 2017-05-01 RX ADMIN — Medication 1 GRAM(S): at 05:41

## 2017-05-01 RX ADMIN — FLUCONAZOLE 400 MILLIGRAM(S): 150 TABLET ORAL at 11:49

## 2017-05-01 RX ADMIN — TAMSULOSIN HYDROCHLORIDE 0.4 MILLIGRAM(S): 0.4 CAPSULE ORAL at 21:16

## 2017-05-01 RX ADMIN — Medication 10 MILLILITER(S): at 10:25

## 2017-05-01 RX ADMIN — Medication 200 MILLIGRAM(S): at 18:00

## 2017-05-01 RX ADMIN — URSODIOL 300 MILLIGRAM(S): 250 TABLET, FILM COATED ORAL at 18:00

## 2017-05-01 RX ADMIN — Medication 1 LOZENGE: at 15:10

## 2017-05-01 RX ADMIN — Medication 400 MILLIGRAM(S): at 21:15

## 2017-05-01 RX ADMIN — DABIGATRAN ETEXILATE MESYLATE 150 MILLIGRAM(S): 150 CAPSULE ORAL at 05:41

## 2017-05-01 RX ADMIN — Medication 30 MILLILITER(S): at 21:14

## 2017-05-01 RX ADMIN — NYSTATIN CREAM 1 APPLICATION(S): 100000 CREAM TOPICAL at 17:59

## 2017-05-01 RX ADMIN — ATOVAQUONE 750 MILLIGRAM(S): 750 SUSPENSION ORAL at 05:29

## 2017-05-01 RX ADMIN — Medication 1 GRAM(S): at 15:11

## 2017-05-01 RX ADMIN — Medication 1 GRAM(S): at 21:16

## 2017-05-01 RX ADMIN — Medication 1 APPLICATION(S): at 18:00

## 2017-05-01 RX ADMIN — Medication 30 MILLILITER(S): at 15:11

## 2017-05-01 RX ADMIN — Medication 30 MILLILITER(S): at 11:49

## 2017-05-01 RX ADMIN — Medication 10 MILLILITER(S): at 11:50

## 2017-05-01 RX ADMIN — URSODIOL 300 MILLIGRAM(S): 250 TABLET, FILM COATED ORAL at 11:49

## 2017-05-01 RX ADMIN — Medication 10 MILLILITER(S): at 15:11

## 2017-05-01 RX ADMIN — Medication 30 MILLILITER(S): at 08:08

## 2017-05-01 RX ADMIN — ATOVAQUONE 750 MILLIGRAM(S): 750 SUSPENSION ORAL at 17:59

## 2017-05-01 RX ADMIN — Medication 10 MILLILITER(S): at 00:45

## 2017-05-01 RX ADMIN — Medication 400 MILLIGRAM(S): at 05:29

## 2017-05-01 RX ADMIN — PANTOPRAZOLE SODIUM 40 MILLIGRAM(S): 20 TABLET, DELAYED RELEASE ORAL at 11:49

## 2017-05-01 RX ADMIN — Medication 1 MILLIGRAM(S): at 11:49

## 2017-05-01 RX ADMIN — Medication 400 MILLIGRAM(S): at 15:10

## 2017-05-01 RX ADMIN — Medication 1 LOZENGE: at 11:51

## 2017-05-01 RX ADMIN — Medication 1 LOZENGE: at 08:08

## 2017-05-01 RX ADMIN — Medication 75 MILLIGRAM(S): at 10:25

## 2017-05-01 NOTE — PROVIDER CONTACT NOTE (OTHER) - RECOMMENDATIONS
SONU IVF
WILL CONTINUE TO MONITOR
Assess patient at bedside
Continue heparin gtt at current rate
Continue to monitor pt.
Give Lopressor IV push
NS bolus 500 cc IV
PRBc ordered & administered, no action on platelets
Tylenol
Tylenol
Tylenol previously given
continue to monitor
iv lopressor 5mg iv push given still rapid afif iv Cardizem 5mg ivp given and started on iv Cardizem @5cc/hr infusion will continue to monitor
monitor pt
EKG
UA & pain med

## 2017-05-02 LAB
ANION GAP SERPL CALC-SCNC: 10 MMOL/L — SIGNIFICANT CHANGE UP (ref 5–17)
BUN SERPL-MCNC: 22 MG/DL — SIGNIFICANT CHANGE UP (ref 7–23)
CALCIUM SERPL-MCNC: 9.3 MG/DL — SIGNIFICANT CHANGE UP (ref 8.4–10.5)
CHLORIDE SERPL-SCNC: 103 MMOL/L — SIGNIFICANT CHANGE UP (ref 96–108)
CO2 SERPL-SCNC: 30 MMOL/L — SIGNIFICANT CHANGE UP (ref 22–31)
CREAT SERPL-MCNC: 0.9 MG/DL — SIGNIFICANT CHANGE UP (ref 0.5–1.3)
GLUCOSE SERPL-MCNC: 161 MG/DL — HIGH (ref 70–99)
MAGNESIUM SERPL-MCNC: 2.1 MG/DL — SIGNIFICANT CHANGE UP (ref 1.6–2.6)
PHOSPHATE SERPL-MCNC: 3.3 MG/DL — SIGNIFICANT CHANGE UP (ref 2.5–4.5)
POTASSIUM SERPL-MCNC: 4.9 MMOL/L — SIGNIFICANT CHANGE UP (ref 3.5–5.3)
POTASSIUM SERPL-SCNC: 4.9 MMOL/L — SIGNIFICANT CHANGE UP (ref 3.5–5.3)
SODIUM SERPL-SCNC: 143 MMOL/L — SIGNIFICANT CHANGE UP (ref 135–145)

## 2017-05-02 PROCEDURE — 99233 SBSQ HOSP IP/OBS HIGH 50: CPT | Mod: GC

## 2017-05-02 PROCEDURE — 93010 ELECTROCARDIOGRAM REPORT: CPT

## 2017-05-02 PROCEDURE — 99291 CRITICAL CARE FIRST HOUR: CPT

## 2017-05-02 RX ORDER — METOPROLOL TARTRATE 50 MG
50 TABLET ORAL
Qty: 0 | Refills: 0 | Status: DISCONTINUED | OUTPATIENT
Start: 2017-05-02 | End: 2017-05-02

## 2017-05-02 RX ORDER — DABIGATRAN ETEXILATE MESYLATE 150 MG/1
1 CAPSULE ORAL
Qty: 60 | Refills: 0 | OUTPATIENT
Start: 2017-05-02 | End: 2017-06-01

## 2017-05-02 RX ORDER — METOPROLOL TARTRATE 50 MG
1 TABLET ORAL
Qty: 0 | Refills: 0 | COMMUNITY
Start: 2017-05-02

## 2017-05-02 RX ORDER — METOPROLOL TARTRATE 50 MG
100 TABLET ORAL DAILY
Qty: 0 | Refills: 0 | Status: DISCONTINUED | OUTPATIENT
Start: 2017-05-02 | End: 2017-05-05

## 2017-05-02 RX ORDER — DILTIAZEM HCL 120 MG
1 CAPSULE, EXT RELEASE 24 HR ORAL
Qty: 0 | Refills: 0 | COMMUNITY
Start: 2017-05-02

## 2017-05-02 RX ORDER — DILTIAZEM HCL 120 MG
120 CAPSULE, EXT RELEASE 24 HR ORAL DAILY
Qty: 0 | Refills: 0 | Status: DISCONTINUED | OUTPATIENT
Start: 2017-05-02 | End: 2017-05-05

## 2017-05-02 RX ADMIN — Medication 10 MILLILITER(S): at 18:16

## 2017-05-02 RX ADMIN — Medication 10 MILLILITER(S): at 20:46

## 2017-05-02 RX ADMIN — Medication 1 GRAM(S): at 06:02

## 2017-05-02 RX ADMIN — Medication 1 GRAM(S): at 13:05

## 2017-05-02 RX ADMIN — Medication 400 MILLIGRAM(S): at 13:04

## 2017-05-02 RX ADMIN — Medication 1 GRAM(S): at 18:15

## 2017-05-02 RX ADMIN — Medication 400 MILLIGRAM(S): at 06:03

## 2017-05-02 RX ADMIN — Medication 10 MILLILITER(S): at 13:07

## 2017-05-02 RX ADMIN — Medication 10 MILLILITER(S): at 00:00

## 2017-05-02 RX ADMIN — Medication 30 MILLILITER(S): at 18:15

## 2017-05-02 RX ADMIN — Medication 30 MILLILITER(S): at 08:18

## 2017-05-02 RX ADMIN — DABIGATRAN ETEXILATE MESYLATE 150 MILLIGRAM(S): 150 CAPSULE ORAL at 18:15

## 2017-05-02 RX ADMIN — URSODIOL 300 MILLIGRAM(S): 250 TABLET, FILM COATED ORAL at 08:18

## 2017-05-02 RX ADMIN — ATOVAQUONE 750 MILLIGRAM(S): 750 SUSPENSION ORAL at 18:15

## 2017-05-02 RX ADMIN — Medication 30 MILLILITER(S): at 07:27

## 2017-05-02 RX ADMIN — Medication 400 MILLIGRAM(S): at 21:55

## 2017-05-02 RX ADMIN — Medication 1 LOZENGE: at 20:45

## 2017-05-02 RX ADMIN — URSODIOL 300 MILLIGRAM(S): 250 TABLET, FILM COATED ORAL at 18:15

## 2017-05-02 RX ADMIN — Medication 1 GRAM(S): at 21:55

## 2017-05-02 RX ADMIN — Medication 1 TABLET(S): at 13:04

## 2017-05-02 RX ADMIN — Medication 1 MILLIGRAM(S): at 13:04

## 2017-05-02 RX ADMIN — FLUCONAZOLE 400 MILLIGRAM(S): 150 TABLET ORAL at 13:04

## 2017-05-02 RX ADMIN — Medication 10 MILLILITER(S): at 08:18

## 2017-05-02 RX ADMIN — Medication 1 LOZENGE: at 08:18

## 2017-05-02 RX ADMIN — TAMSULOSIN HYDROCHLORIDE 0.4 MILLIGRAM(S): 0.4 CAPSULE ORAL at 21:55

## 2017-05-02 RX ADMIN — PANTOPRAZOLE SODIUM 40 MILLIGRAM(S): 20 TABLET, DELAYED RELEASE ORAL at 13:05

## 2017-05-02 RX ADMIN — Medication 30 MILLILITER(S): at 13:05

## 2017-05-02 RX ADMIN — Medication 1 LOZENGE: at 18:15

## 2017-05-02 RX ADMIN — Medication 30 MILLILITER(S): at 20:45

## 2017-05-02 RX ADMIN — DABIGATRAN ETEXILATE MESYLATE 150 MILLIGRAM(S): 150 CAPSULE ORAL at 06:22

## 2017-05-02 RX ADMIN — Medication 1 LOZENGE: at 13:04

## 2017-05-02 RX ADMIN — ATOVAQUONE 750 MILLIGRAM(S): 750 SUSPENSION ORAL at 06:10

## 2017-05-02 NOTE — PROVIDER CONTACT NOTE (OTHER) - DATE AND TIME:
27-Apr-2017 19:58
28-Apr-2017 01:30
10-Apr-2017 00:10
16-Apr-2017 06:19
01-May-2017 11:05
01-May-2017 22:30
02-Apr-2017 05:50
02-May-2017 19:06
06-Apr-2017 23:28
07-Apr-2017 09:05
07-Apr-2017 21:34
08-Apr-2017 22:45
10-Apr-2017
10-Apr-2017 00:16
16-Apr-2017 00:29
17-Apr-2017 06:41
17-Apr-2017 20:15
20-Apr-2017 21:15
21-Apr-2017 09:35
23-Apr-2017 17:00
24-Apr-2017 00:00
25-Apr-2017 21:30
29-Apr-2017 15:23

## 2017-05-02 NOTE — PROVIDER CONTACT NOTE (OTHER) - ASSESSMENT
B/P 116/71, , R18, O2SAT. 98% RA
Alert & oriented.
Alert & oriented.  Pt c/o pain & burning on urination
B/P-92/61 HR-58,via electronic B/P machine
C/o chest pain that increased w/ deep breathing, mucositis
Patient alert and oriented x 4
Patient is alert, oriented x4 ,non toxic appearance. Patient has no complaints of chills, rigors ,abdominal pain, shortness of breath .patient has mucositis.
Stable,medicated for pain
Stable. Antibiotics given
VSS
VSS, BP:129/82 Hr 120, RR 18, spo2 97% RA. Asymptomatic, no c/o from pt, denies any chest pain, palpitations or sob. Resting in bed.
VSS, asymptomatic, denies CP, sob, dizziness, lethargy. /71, temp 97.7, HR 82, RR 18 02 sat 96%.
VSS.  No signs or symptoms of bleeding
VSS.  Patient denies chest pain
a/o x4
alert & oriented.  BC X 2 previously done today.  Urine culture & cxr done 4/7.
pt c/o chest pain /66 spo2 98% in room air
pt denies chest pain  /90 HR 54 spo2 98 in room air
pt feels tired (new onset) VS Stable denies cp / sob  Temp 97.9 HR 68 /68 RR 18 97% on room air
pt with no complaints at this time, assessed pt, no complaints from pt- denies dizziness/ chest pain or shortness of breath. MD also notified of pt's BP of 92/54- MD aware
PT DENIES CHEST PAIN bp 100/66 hr 86 SPO2   IN ROOM AIR 98% TEMP 98F (ORAL)
no complaints from pt, resting comfortably in bed, respirations 17

## 2017-05-02 NOTE — PROVIDER CONTACT NOTE (OTHER) - BACKGROUND
51 year old male with Non -Hodgkin's Lymphoma ,s/p autologous stem cell transplant.day + 1.
Anemia r/t chemo
Day + 15 autologous stem cell transplant, AFIB, Atrial flutter, DM, Mantle cell Lymphoma
Fever.Blood
Fevers
Lymphoma
Lymphoma day +3 autologous stem cell transplant
Lymphoma/post Auto TX
Patient admitted with mantel cell lymphoma.  Afib on tele
Patient admitted with mantle cell lymphoma
Patient admitted with mantle cell lymphoma.  Patient currently on heparin gtt, Cardizem gtt .
Patient received IVF @ 20ml/hr, was using it for central line/KVO and cardizem but now on peripheral line
Pt dx with lymphoma s/p transplant
Pt. admitted w/ Mantle cell lymphoma s/p stem cell transplant this admission. Pt. was on Cardizem gtt but it was d/c'd due to bradycardia.
S/p HPC transplant
TRANSFER FROM [facility] WITH AFIB
admitted with chest pain  s/p stem cell transplant
lymphoma, day -3autologus stem cell transplant
pt adm for lymphoma s/p transplant and a flutter
pt admitted for lymphoma tx from [facility] for rapid a-flutter. H/O ablation  w/ SVT
pt admitted for mantle cell lymphoma- tx from [address] [place] [address]
pt came back from ablation
BCX2, UA,CS DONE. CEFEPIME ivss Q8H

## 2017-05-02 NOTE — PROVIDER CONTACT NOTE (OTHER) - ACTION/TREATMENT ORDERED:
TYLENOL 650MH PO AS PER PRN.
no interventions ordered
"I am not familiar with the patient and I don't know what the IVF is for so I would rather leave that for the primary team"
@ 250 MD was on floor, saw pt's rate/ rhythm on tele, no interventions ordered at this time - team aware of pt's status
Blood culture x2,UA,URINE CULTURE ,CHEST X-RAY.cefepime 2 gram ivss stat.
Continue to monitor pt, I will order 12 lead ECG
EKG, Cardiac Enzymes, Give PM lopressor 50MG oral early.
Tylenol 650mg was given an monitoring is on going.
UA, oxycodone 5MG oral X 1 dose
place tele strip in chart, cont to monitor
Continue heparin gtt at current rate
Continue to monitor for now, if pt's HR continues to increase or sustains then will place back on Cardizem gtt.
NS bolus 500 cc IV given,rechecked B/P manually-100/61
No new orders.
PRBc tranfused.
Tylenol given
Tylenol given.
Will assess patient at bedside.
Will come to bedside to assess patient.
ekg ,check BP  lopressor 5 mg IVP given EKG in progress  Pt. monitored closely  V/S monitored

## 2017-05-02 NOTE — PROVIDER CONTACT NOTE (OTHER) - REASON
2:1 heart block. HR down to 34.
Abnormal lab values; PLT 15, HCT 21
Afib on tele with nany of 160-170s
Aflutter HR 120s
Elevated HR & c/o bubble in chest
Elevated temp
FEVER
IV Fluids @ 20mL/hr bothering patient
PTT result 26.1
Patient complaining of right sided chest burning
Patient sustaining 's afib.
bradycardia / type 2  on tele monitor
c/o pain & burning on urination
chest pain
fever
fever
low B/P,c/o dizziness,c/o felt like bubbles on the mid sternum
pt had PAF on tele monitor
rapid afib
pt rhythm
rhythm

## 2017-05-03 LAB
ALBUMIN SERPL ELPH-MCNC: 4.2 G/DL — SIGNIFICANT CHANGE UP (ref 3.3–5)
ALP SERPL-CCNC: 82 U/L — SIGNIFICANT CHANGE UP (ref 40–120)
ALT FLD-CCNC: 21 U/L RC — SIGNIFICANT CHANGE UP (ref 10–45)
ANION GAP SERPL CALC-SCNC: 12 MMOL/L — SIGNIFICANT CHANGE UP (ref 5–17)
APTT BLD: 33.8 SEC — SIGNIFICANT CHANGE UP (ref 27.5–37.4)
AST SERPL-CCNC: 19 U/L — SIGNIFICANT CHANGE UP (ref 10–40)
BASOPHILS # BLD AUTO: 0 K/UL — SIGNIFICANT CHANGE UP (ref 0–0.2)
BASOPHILS NFR BLD AUTO: 0.7 % — SIGNIFICANT CHANGE UP (ref 0–2)
BILIRUB SERPL-MCNC: 0.1 MG/DL — LOW (ref 0.2–1.2)
BUN SERPL-MCNC: 21 MG/DL — SIGNIFICANT CHANGE UP (ref 7–23)
CALCIUM SERPL-MCNC: 9.5 MG/DL — SIGNIFICANT CHANGE UP (ref 8.4–10.5)
CHLORIDE SERPL-SCNC: 104 MMOL/L — SIGNIFICANT CHANGE UP (ref 96–108)
CO2 SERPL-SCNC: 27 MMOL/L — SIGNIFICANT CHANGE UP (ref 22–31)
CREAT SERPL-MCNC: 0.86 MG/DL — SIGNIFICANT CHANGE UP (ref 0.5–1.3)
EOSINOPHIL # BLD AUTO: 0.3 K/UL — SIGNIFICANT CHANGE UP (ref 0–0.5)
EOSINOPHIL NFR BLD AUTO: 4.6 % — SIGNIFICANT CHANGE UP (ref 0–6)
FIBRINOGEN PPP-MCNC: 433 MG/DL — SIGNIFICANT CHANGE UP (ref 310–510)
GLUCOSE SERPL-MCNC: 152 MG/DL — HIGH (ref 70–99)
HCT VFR BLD CALC: 32.9 % — LOW (ref 39–50)
HGB BLD-MCNC: 11 G/DL — LOW (ref 13–17)
INR BLD: 1.14 RATIO — SIGNIFICANT CHANGE UP (ref 0.88–1.16)
LYMPHOCYTES # BLD AUTO: 2.6 K/UL — SIGNIFICANT CHANGE UP (ref 1–3.3)
LYMPHOCYTES # BLD AUTO: 41.3 % — SIGNIFICANT CHANGE UP (ref 13–44)
MAGNESIUM SERPL-MCNC: 2 MG/DL — SIGNIFICANT CHANGE UP (ref 1.6–2.6)
MCHC RBC-ENTMCNC: 32.3 PG — SIGNIFICANT CHANGE UP (ref 27–34)
MCHC RBC-ENTMCNC: 33.4 GM/DL — SIGNIFICANT CHANGE UP (ref 32–36)
MCV RBC AUTO: 96.5 FL — SIGNIFICANT CHANGE UP (ref 80–100)
MONOCYTES # BLD AUTO: 0.9 K/UL — SIGNIFICANT CHANGE UP (ref 0–0.9)
MONOCYTES NFR BLD AUTO: 14.7 % — HIGH (ref 2–14)
NEUTROPHILS # BLD AUTO: 2.4 K/UL — SIGNIFICANT CHANGE UP (ref 1.8–7.4)
NEUTROPHILS NFR BLD AUTO: 38.7 % — LOW (ref 43–77)
PHOSPHATE SERPL-MCNC: 2.8 MG/DL — SIGNIFICANT CHANGE UP (ref 2.5–4.5)
PLATELET # BLD AUTO: 335 K/UL — SIGNIFICANT CHANGE UP (ref 150–400)
POTASSIUM SERPL-MCNC: 4.2 MMOL/L — SIGNIFICANT CHANGE UP (ref 3.5–5.3)
POTASSIUM SERPL-SCNC: 4.2 MMOL/L — SIGNIFICANT CHANGE UP (ref 3.5–5.3)
PROT SERPL-MCNC: 6.7 G/DL — SIGNIFICANT CHANGE UP (ref 6–8.3)
PROTHROM AB SERPL-ACNC: 12.4 SEC — SIGNIFICANT CHANGE UP (ref 9.8–12.7)
RBC # BLD: 3.41 M/UL — LOW (ref 4.2–5.8)
RBC # FLD: 17.1 % — HIGH (ref 10.3–14.5)
SODIUM SERPL-SCNC: 143 MMOL/L — SIGNIFICANT CHANGE UP (ref 135–145)
WBC # BLD: 6.3 K/UL — SIGNIFICANT CHANGE UP (ref 3.8–10.5)
WBC # FLD AUTO: 6.3 K/UL — SIGNIFICANT CHANGE UP (ref 3.8–10.5)

## 2017-05-03 PROCEDURE — 99291 CRITICAL CARE FIRST HOUR: CPT

## 2017-05-03 PROCEDURE — 99232 SBSQ HOSP IP/OBS MODERATE 35: CPT | Mod: GC

## 2017-05-03 RX ADMIN — Medication 1 LOZENGE: at 11:54

## 2017-05-03 RX ADMIN — DABIGATRAN ETEXILATE MESYLATE 150 MILLIGRAM(S): 150 CAPSULE ORAL at 17:27

## 2017-05-03 RX ADMIN — Medication 30 MILLILITER(S): at 11:47

## 2017-05-03 RX ADMIN — FLUCONAZOLE 400 MILLIGRAM(S): 150 TABLET ORAL at 11:48

## 2017-05-03 RX ADMIN — ATOVAQUONE 750 MILLIGRAM(S): 750 SUSPENSION ORAL at 05:56

## 2017-05-03 RX ADMIN — Medication 400 MILLIGRAM(S): at 05:56

## 2017-05-03 RX ADMIN — PANTOPRAZOLE SODIUM 40 MILLIGRAM(S): 20 TABLET, DELAYED RELEASE ORAL at 11:48

## 2017-05-03 RX ADMIN — URSODIOL 300 MILLIGRAM(S): 250 TABLET, FILM COATED ORAL at 17:27

## 2017-05-03 RX ADMIN — Medication 1 GRAM(S): at 11:49

## 2017-05-03 RX ADMIN — Medication 1 LOZENGE: at 17:30

## 2017-05-03 RX ADMIN — DIPHENHYDRAMINE HYDROCHLORIDE AND LIDOCAINE HYDROCHLORIDE AND ALUMINUM HYDROXIDE AND MAGNESIUM HYDRO 5 MILLILITER(S): KIT at 08:02

## 2017-05-03 RX ADMIN — TAMSULOSIN HYDROCHLORIDE 0.4 MILLIGRAM(S): 0.4 CAPSULE ORAL at 21:41

## 2017-05-03 RX ADMIN — URSODIOL 300 MILLIGRAM(S): 250 TABLET, FILM COATED ORAL at 08:02

## 2017-05-03 RX ADMIN — Medication 30 MILLILITER(S): at 08:02

## 2017-05-03 RX ADMIN — Medication 400 MILLIGRAM(S): at 14:54

## 2017-05-03 RX ADMIN — Medication 400 MILLIGRAM(S): at 21:41

## 2017-05-03 RX ADMIN — Medication 1 TABLET(S): at 11:48

## 2017-05-03 RX ADMIN — Medication 1 GRAM(S): at 05:56

## 2017-05-03 RX ADMIN — DIPHENHYDRAMINE HYDROCHLORIDE AND LIDOCAINE HYDROCHLORIDE AND ALUMINUM HYDROXIDE AND MAGNESIUM HYDRO 5 MILLILITER(S): KIT at 11:48

## 2017-05-03 RX ADMIN — Medication 1 MILLIGRAM(S): at 11:48

## 2017-05-03 RX ADMIN — Medication 100 MILLIGRAM(S): at 05:56

## 2017-05-03 RX ADMIN — Medication 1 LOZENGE: at 14:54

## 2017-05-03 RX ADMIN — Medication 10 MILLILITER(S): at 08:02

## 2017-05-03 RX ADMIN — Medication 120 MILLIGRAM(S): at 05:56

## 2017-05-03 RX ADMIN — DABIGATRAN ETEXILATE MESYLATE 150 MILLIGRAM(S): 150 CAPSULE ORAL at 05:56

## 2017-05-03 RX ADMIN — Medication 1 APPLICATION(S): at 17:30

## 2017-05-03 RX ADMIN — NYSTATIN CREAM 1 APPLICATION(S): 100000 CREAM TOPICAL at 17:30

## 2017-05-03 RX ADMIN — ATOVAQUONE 750 MILLIGRAM(S): 750 SUSPENSION ORAL at 17:28

## 2017-05-03 RX ADMIN — Medication 10 MILLILITER(S): at 11:48

## 2017-05-03 RX ADMIN — Medication 1 LOZENGE: at 08:04

## 2017-05-04 ENCOUNTER — OUTPATIENT (OUTPATIENT)
Dept: OUTPATIENT SERVICES | Facility: HOSPITAL | Age: 52
LOS: 1 days | Discharge: HOME | End: 2017-05-04

## 2017-05-04 DIAGNOSIS — C83.10 MANTLE CELL LYMPHOMA, UNSPECIFIED SITE: ICD-10-CM

## 2017-05-04 DIAGNOSIS — Z98.890 OTHER SPECIFIED POSTPROCEDURAL STATES: Chronic | ICD-10-CM

## 2017-05-04 PROCEDURE — 99232 SBSQ HOSP IP/OBS MODERATE 35: CPT | Mod: GC

## 2017-05-04 RX ORDER — METOCLOPRAMIDE HCL 10 MG
1 TABLET ORAL
Qty: 120 | Refills: 0 | OUTPATIENT
Start: 2017-05-04 | End: 2017-06-03

## 2017-05-04 RX ORDER — METOPROLOL TARTRATE 50 MG
1 TABLET ORAL
Qty: 30 | Refills: 0 | OUTPATIENT
Start: 2017-05-04 | End: 2017-06-03

## 2017-05-04 RX ORDER — FLUCONAZOLE 150 MG/1
2 TABLET ORAL
Qty: 60 | Refills: 3 | OUTPATIENT
Start: 2017-05-04 | End: 2017-08-31

## 2017-05-04 RX ORDER — OXYCODONE HYDROCHLORIDE 5 MG/1
1 TABLET ORAL
Qty: 16 | Refills: 0
Start: 2017-05-04 | End: 2017-05-08

## 2017-05-04 RX ORDER — FOLIC ACID 0.8 MG
1 TABLET ORAL
Qty: 30 | Refills: 3 | OUTPATIENT
Start: 2017-05-04 | End: 2017-08-31

## 2017-05-04 RX ORDER — RIVAROXABAN 15 MG-20MG
1 KIT ORAL
Qty: 30 | Refills: 0 | OUTPATIENT
Start: 2017-05-04 | End: 2017-06-03

## 2017-05-04 RX ORDER — RIVAROXABAN 15 MG-20MG
20 KIT ORAL DAILY
Qty: 0 | Refills: 0 | Status: DISCONTINUED | OUTPATIENT
Start: 2017-05-05 | End: 2017-05-05

## 2017-05-04 RX ORDER — ACYCLOVIR SODIUM 500 MG
1 VIAL (EA) INTRAVENOUS
Qty: 90 | Refills: 3
Start: 2017-05-04 | End: 2017-08-31

## 2017-05-04 RX ORDER — RIVAROXABAN 15 MG-20MG
2 KIT ORAL
Qty: 60 | Refills: 0 | OUTPATIENT
Start: 2017-05-04 | End: 2017-06-03

## 2017-05-04 RX ORDER — OMEPRAZOLE 10 MG/1
1 CAPSULE, DELAYED RELEASE ORAL
Qty: 30 | Refills: 0
Start: 2017-05-04 | End: 2017-06-03

## 2017-05-04 RX ORDER — DILTIAZEM HCL 120 MG
1 CAPSULE, EXT RELEASE 24 HR ORAL
Qty: 30 | Refills: 0
Start: 2017-05-04 | End: 2017-06-03

## 2017-05-04 RX ORDER — TAMSULOSIN HYDROCHLORIDE 0.4 MG/1
1 CAPSULE ORAL
Qty: 30 | Refills: 0
Start: 2017-05-04 | End: 2017-06-03

## 2017-05-04 RX ORDER — ATOVAQUONE 750 MG/5ML
5 SUSPENSION ORAL
Qty: 300 | Refills: 3 | OUTPATIENT
Start: 2017-05-04 | End: 2017-08-31

## 2017-05-04 RX ADMIN — Medication 1 MILLIGRAM(S): at 14:56

## 2017-05-04 RX ADMIN — TAMSULOSIN HYDROCHLORIDE 0.4 MILLIGRAM(S): 0.4 CAPSULE ORAL at 21:16

## 2017-05-04 RX ADMIN — Medication 400 MILLIGRAM(S): at 21:16

## 2017-05-04 RX ADMIN — ATOVAQUONE 750 MILLIGRAM(S): 750 SUSPENSION ORAL at 05:40

## 2017-05-04 RX ADMIN — FLUCONAZOLE 400 MILLIGRAM(S): 150 TABLET ORAL at 14:57

## 2017-05-04 RX ADMIN — Medication 400 MILLIGRAM(S): at 14:58

## 2017-05-04 RX ADMIN — Medication 1 LOZENGE: at 14:59

## 2017-05-04 RX ADMIN — DABIGATRAN ETEXILATE MESYLATE 150 MILLIGRAM(S): 150 CAPSULE ORAL at 05:40

## 2017-05-04 RX ADMIN — Medication 400 MILLIGRAM(S): at 05:40

## 2017-05-04 RX ADMIN — Medication 1 LOZENGE: at 19:54

## 2017-05-04 RX ADMIN — Medication 1 TABLET(S): at 14:57

## 2017-05-04 RX ADMIN — Medication 120 MILLIGRAM(S): at 05:40

## 2017-05-04 RX ADMIN — DABIGATRAN ETEXILATE MESYLATE 150 MILLIGRAM(S): 150 CAPSULE ORAL at 14:56

## 2017-05-04 RX ADMIN — ATOVAQUONE 750 MILLIGRAM(S): 750 SUSPENSION ORAL at 19:54

## 2017-05-05 VITALS — HEART RATE: 52 BPM | SYSTOLIC BLOOD PRESSURE: 94 MMHG | DIASTOLIC BLOOD PRESSURE: 53 MMHG

## 2017-05-05 PROCEDURE — 36556 INSERT NON-TUNNEL CV CATH: CPT

## 2017-05-05 PROCEDURE — 87086 URINE CULTURE/COLONY COUNT: CPT

## 2017-05-05 PROCEDURE — 82955 ASSAY OF G6PD ENZYME: CPT

## 2017-05-05 PROCEDURE — C1894: CPT

## 2017-05-05 PROCEDURE — 97110 THERAPEUTIC EXERCISES: CPT

## 2017-05-05 PROCEDURE — 82803 BLOOD GASES ANY COMBINATION: CPT

## 2017-05-05 PROCEDURE — 82785 ASSAY OF IGE: CPT

## 2017-05-05 PROCEDURE — 82784 ASSAY IGA/IGD/IGG/IGM EACH: CPT

## 2017-05-05 PROCEDURE — 93613 INTRACARDIAC EPHYS 3D MAPG: CPT

## 2017-05-05 PROCEDURE — 86900 BLOOD TYPING SEROLOGIC ABO: CPT

## 2017-05-05 PROCEDURE — C1731: CPT

## 2017-05-05 PROCEDURE — P9040: CPT

## 2017-05-05 PROCEDURE — 83615 LACTATE (LD) (LDH) ENZYME: CPT

## 2017-05-05 PROCEDURE — C9399: CPT

## 2017-05-05 PROCEDURE — 85730 THROMBOPLASTIN TIME PARTIAL: CPT

## 2017-05-05 PROCEDURE — 38207 CRYOPRESERVE STEM CELLS: CPT

## 2017-05-05 PROCEDURE — 80053 COMPREHEN METABOLIC PANEL: CPT

## 2017-05-05 PROCEDURE — 93325 DOPPLER ECHO COLOR FLOW MAPG: CPT

## 2017-05-05 PROCEDURE — C1769: CPT

## 2017-05-05 PROCEDURE — 84132 ASSAY OF SERUM POTASSIUM: CPT

## 2017-05-05 PROCEDURE — 85045 AUTOMATED RETICULOCYTE COUNT: CPT

## 2017-05-05 PROCEDURE — 82330 ASSAY OF CALCIUM: CPT

## 2017-05-05 PROCEDURE — 85610 PROTHROMBIN TIME: CPT

## 2017-05-05 PROCEDURE — 81005 URINALYSIS: CPT

## 2017-05-05 PROCEDURE — 38209 WASH HARVEST STEM CELLS: CPT

## 2017-05-05 PROCEDURE — 97116 GAIT TRAINING THERAPY: CPT

## 2017-05-05 PROCEDURE — 93653 COMPRE EP EVAL TX SVT: CPT

## 2017-05-05 PROCEDURE — 80048 BASIC METABOLIC PNL TOTAL CA: CPT

## 2017-05-05 PROCEDURE — 87186 SC STD MICRODIL/AGAR DIL: CPT

## 2017-05-05 PROCEDURE — 97530 THERAPEUTIC ACTIVITIES: CPT

## 2017-05-05 PROCEDURE — 83735 ASSAY OF MAGNESIUM: CPT

## 2017-05-05 PROCEDURE — 86850 RBC ANTIBODY SCREEN: CPT

## 2017-05-05 PROCEDURE — 36430 TRANSFUSION BLD/BLD COMPNT: CPT

## 2017-05-05 PROCEDURE — 84484 ASSAY OF TROPONIN QUANT: CPT

## 2017-05-05 PROCEDURE — 85384 FIBRINOGEN ACTIVITY: CPT

## 2017-05-05 PROCEDURE — 87040 BLOOD CULTURE FOR BACTERIA: CPT

## 2017-05-05 PROCEDURE — 84295 ASSAY OF SERUM SODIUM: CPT

## 2017-05-05 PROCEDURE — 85027 COMPLETE CBC AUTOMATED: CPT

## 2017-05-05 PROCEDURE — 99239 HOSP IP/OBS DSCHRG MGMT >30: CPT

## 2017-05-05 PROCEDURE — 93005 ELECTROCARDIOGRAM TRACING: CPT

## 2017-05-05 PROCEDURE — P9037: CPT

## 2017-05-05 PROCEDURE — 82947 ASSAY GLUCOSE BLOOD QUANT: CPT

## 2017-05-05 PROCEDURE — 36590 REMOVAL TUNNELED CV CATH: CPT

## 2017-05-05 PROCEDURE — 81001 URINALYSIS AUTO W/SCOPE: CPT

## 2017-05-05 PROCEDURE — 71250 CT THORAX DX C-: CPT

## 2017-05-05 PROCEDURE — 84100 ASSAY OF PHOSPHORUS: CPT

## 2017-05-05 PROCEDURE — C1732: CPT

## 2017-05-05 PROCEDURE — 82247 BILIRUBIN TOTAL: CPT

## 2017-05-05 PROCEDURE — 70450 CT HEAD/BRAIN W/O DYE: CPT

## 2017-05-05 PROCEDURE — 71045 X-RAY EXAM CHEST 1 VIEW: CPT

## 2017-05-05 PROCEDURE — 77001 FLUOROGUIDE FOR VEIN DEVICE: CPT

## 2017-05-05 PROCEDURE — 81379 HLA I TYPING COMPLETE HR: CPT

## 2017-05-05 PROCEDURE — C1893: CPT

## 2017-05-05 PROCEDURE — 86901 BLOOD TYPING SEROLOGIC RH(D): CPT

## 2017-05-05 PROCEDURE — 93320 DOPPLER ECHO COMPLETE: CPT

## 2017-05-05 PROCEDURE — 81003 URINALYSIS AUTO W/O SCOPE: CPT

## 2017-05-05 PROCEDURE — 86923 COMPATIBILITY TEST ELECTRIC: CPT

## 2017-05-05 PROCEDURE — 81382 HLA II TYPING 1 LOC HR: CPT

## 2017-05-05 PROCEDURE — 87799 DETECT AGENT NOS DNA QUANT: CPT

## 2017-05-05 PROCEDURE — 83036 HEMOGLOBIN GLYCOSYLATED A1C: CPT

## 2017-05-05 PROCEDURE — 85014 HEMATOCRIT: CPT

## 2017-05-05 PROCEDURE — 82248 BILIRUBIN DIRECT: CPT

## 2017-05-05 PROCEDURE — 82550 ASSAY OF CK (CPK): CPT

## 2017-05-05 PROCEDURE — 84443 ASSAY THYROID STIM HORMONE: CPT

## 2017-05-05 PROCEDURE — 76937 US GUIDE VASCULAR ACCESS: CPT

## 2017-05-05 PROCEDURE — 82435 ASSAY OF BLOOD CHLORIDE: CPT

## 2017-05-05 PROCEDURE — 97162 PT EVAL MOD COMPLEX 30 MIN: CPT

## 2017-05-05 PROCEDURE — 80076 HEPATIC FUNCTION PANEL: CPT

## 2017-05-05 PROCEDURE — 85379 FIBRIN DEGRADATION QUANT: CPT

## 2017-05-05 PROCEDURE — 83605 ASSAY OF LACTIC ACID: CPT

## 2017-05-05 PROCEDURE — 82553 CREATINE MB FRACTION: CPT

## 2017-05-05 PROCEDURE — 93312 ECHO TRANSESOPHAGEAL: CPT

## 2017-05-05 RX ORDER — ATOVAQUONE 750 MG/5ML
5 SUSPENSION ORAL
Qty: 300 | Refills: 0 | OUTPATIENT
Start: 2017-05-05 | End: 2017-06-04

## 2017-05-05 RX ORDER — ACYCLOVIR SODIUM 500 MG
1 VIAL (EA) INTRAVENOUS
Qty: 90 | Refills: 0 | OUTPATIENT
Start: 2017-05-05 | End: 2017-06-04

## 2017-05-05 RX ORDER — METOCLOPRAMIDE HCL 10 MG
1 TABLET ORAL
Qty: 120 | Refills: 0 | OUTPATIENT
Start: 2017-05-05 | End: 2017-06-04

## 2017-05-05 RX ORDER — FLUCONAZOLE 150 MG/1
2 TABLET ORAL
Qty: 60 | Refills: 0 | OUTPATIENT
Start: 2017-05-05 | End: 2017-06-04

## 2017-05-05 RX ADMIN — Medication 1 LOZENGE: at 09:12

## 2017-05-05 RX ADMIN — FLUCONAZOLE 400 MILLIGRAM(S): 150 TABLET ORAL at 14:14

## 2017-05-05 RX ADMIN — Medication 400 MILLIGRAM(S): at 05:58

## 2017-05-05 RX ADMIN — Medication 1 TABLET(S): at 14:16

## 2017-05-05 RX ADMIN — ATOVAQUONE 750 MILLIGRAM(S): 750 SUSPENSION ORAL at 05:58

## 2017-05-05 RX ADMIN — Medication 1 MILLIGRAM(S): at 14:14

## 2017-05-05 RX ADMIN — RIVAROXABAN 20 MILLIGRAM(S): KIT at 14:15

## 2017-05-05 RX ADMIN — Medication 400 MILLIGRAM(S): at 14:13

## 2017-05-05 RX ADMIN — Medication 1 LOZENGE: at 14:12

## 2017-05-08 ENCOUNTER — RESULT REVIEW (OUTPATIENT)
Age: 52
End: 2017-05-08

## 2017-05-08 ENCOUNTER — APPOINTMENT (OUTPATIENT)
Dept: HEMATOLOGY ONCOLOGY | Facility: CLINIC | Age: 52
End: 2017-05-08

## 2017-05-08 VITALS
OXYGEN SATURATION: 99 % | RESPIRATION RATE: 16 BRPM | HEART RATE: 61 BPM | TEMPERATURE: 97.9 F | DIASTOLIC BLOOD PRESSURE: 69 MMHG | BODY MASS INDEX: 29.4 KG/M2 | WEIGHT: 208.99 LBS | SYSTOLIC BLOOD PRESSURE: 104 MMHG

## 2017-05-08 LAB
BASOPHILS # BLD AUTO: 0.1 K/UL — SIGNIFICANT CHANGE UP (ref 0–0.2)
BASOPHILS NFR BLD AUTO: 1 % — SIGNIFICANT CHANGE UP (ref 0–2)
EOSINOPHIL # BLD AUTO: 0.9 K/UL — HIGH (ref 0–0.5)
EOSINOPHIL NFR BLD AUTO: 13.3 % — HIGH (ref 0–6)
HCT VFR BLD CALC: 34.8 % — LOW (ref 39–50)
HGB BLD-MCNC: 11.4 G/DL — LOW (ref 13–17)
LYMPHOCYTES # BLD AUTO: 2.5 K/UL — SIGNIFICANT CHANGE UP (ref 1–3.3)
LYMPHOCYTES # BLD AUTO: 36.9 % — SIGNIFICANT CHANGE UP (ref 13–44)
MCHC RBC-ENTMCNC: 32 PG — SIGNIFICANT CHANGE UP (ref 27–34)
MCHC RBC-ENTMCNC: 32.9 G/DL — SIGNIFICANT CHANGE UP (ref 32–36)
MCV RBC AUTO: 97.4 FL — SIGNIFICANT CHANGE UP (ref 80–100)
MONOCYTES # BLD AUTO: 0.9 K/UL — SIGNIFICANT CHANGE UP (ref 0–0.9)
MONOCYTES NFR BLD AUTO: 13.2 % — SIGNIFICANT CHANGE UP (ref 2–14)
NEUTROPHILS # BLD AUTO: 2.4 K/UL — SIGNIFICANT CHANGE UP (ref 1.8–7.4)
NEUTROPHILS NFR BLD AUTO: 35.7 % — LOW (ref 43–77)
PLATELET # BLD AUTO: 282 K/UL — SIGNIFICANT CHANGE UP (ref 150–400)
RBC # BLD: 3.57 M/UL — LOW (ref 4.2–5.8)
RBC # FLD: 16.6 % — HIGH (ref 10.3–14.5)
WBC # BLD: 6.8 K/UL — SIGNIFICANT CHANGE UP (ref 3.8–10.5)
WBC # FLD AUTO: 6.8 K/UL — SIGNIFICANT CHANGE UP (ref 3.8–10.5)

## 2017-05-09 LAB
ALBUMIN SERPL ELPH-MCNC: 4.4 G/DL
ALP BLD-CCNC: 87 U/L
ALT SERPL-CCNC: 21 U/L
ANION GAP SERPL CALC-SCNC: 16 MMOL/L
AST SERPL-CCNC: 25 U/L
BILIRUB SERPL-MCNC: 0.2 MG/DL
BUN SERPL-MCNC: 19 MG/DL
CALCIUM SERPL-MCNC: 9.2 MG/DL
CHLORIDE SERPL-SCNC: 101 MMOL/L
CO2 SERPL-SCNC: 25 MMOL/L
CREAT SERPL-MCNC: 0.84 MG/DL
GLUCOSE SERPL-MCNC: 87 MG/DL
LDH SERPL-CCNC: 305 U/L
MAGNESIUM SERPL-MCNC: 2.2 MG/DL
POTASSIUM SERPL-SCNC: 4.4 MMOL/L
PROT SERPL-MCNC: 6.9 G/DL
SODIUM SERPL-SCNC: 142 MMOL/L

## 2017-05-11 ENCOUNTER — CHART COPY (OUTPATIENT)
Age: 52
End: 2017-05-11

## 2017-05-16 ENCOUNTER — RESULT REVIEW (OUTPATIENT)
Age: 52
End: 2017-05-16

## 2017-05-16 ENCOUNTER — APPOINTMENT (OUTPATIENT)
Dept: HEMATOLOGY ONCOLOGY | Facility: CLINIC | Age: 52
End: 2017-05-16

## 2017-05-16 VITALS
DIASTOLIC BLOOD PRESSURE: 66 MMHG | WEIGHT: 218.26 LBS | HEART RATE: 64 BPM | BODY MASS INDEX: 30.7 KG/M2 | OXYGEN SATURATION: 96 % | TEMPERATURE: 98.7 F | RESPIRATION RATE: 16 BRPM | SYSTOLIC BLOOD PRESSURE: 106 MMHG

## 2017-05-16 LAB
BASOPHILS # BLD AUTO: 0.1 K/UL — SIGNIFICANT CHANGE UP (ref 0–0.2)
BASOPHILS NFR BLD AUTO: 0.8 % — SIGNIFICANT CHANGE UP (ref 0–2)
EOSINOPHIL # BLD AUTO: 0.6 K/UL — HIGH (ref 0–0.5)
EOSINOPHIL NFR BLD AUTO: 8.8 % — HIGH (ref 0–6)
HCT VFR BLD CALC: 32.3 % — LOW (ref 39–50)
HGB BLD-MCNC: 10.8 G/DL — LOW (ref 13–17)
LYMPHOCYTES # BLD AUTO: 2.7 K/UL — SIGNIFICANT CHANGE UP (ref 1–3.3)
LYMPHOCYTES # BLD AUTO: 36.7 % — SIGNIFICANT CHANGE UP (ref 13–44)
MCHC RBC-ENTMCNC: 32.4 PG — SIGNIFICANT CHANGE UP (ref 27–34)
MCHC RBC-ENTMCNC: 33.5 G/DL — SIGNIFICANT CHANGE UP (ref 32–36)
MCV RBC AUTO: 97 FL — SIGNIFICANT CHANGE UP (ref 80–100)
MONOCYTES # BLD AUTO: 0.9 K/UL — SIGNIFICANT CHANGE UP (ref 0–0.9)
MONOCYTES NFR BLD AUTO: 11.8 % — SIGNIFICANT CHANGE UP (ref 2–14)
NEUTROPHILS # BLD AUTO: 3 K/UL — SIGNIFICANT CHANGE UP (ref 1.8–7.4)
NEUTROPHILS NFR BLD AUTO: 42 % — LOW (ref 43–77)
PLATELET # BLD AUTO: 234 K/UL — SIGNIFICANT CHANGE UP (ref 150–400)
RBC # BLD: 3.33 M/UL — LOW (ref 4.2–5.8)
RBC # FLD: 16.1 % — HIGH (ref 10.3–14.5)
WBC # BLD: 7.3 K/UL — SIGNIFICANT CHANGE UP (ref 3.8–10.5)
WBC # FLD AUTO: 7.3 K/UL — SIGNIFICANT CHANGE UP (ref 3.8–10.5)

## 2017-05-17 ENCOUNTER — NON-APPOINTMENT (OUTPATIENT)
Age: 52
End: 2017-05-17

## 2017-05-17 ENCOUNTER — APPOINTMENT (OUTPATIENT)
Dept: ELECTROPHYSIOLOGY | Facility: CLINIC | Age: 52
End: 2017-05-17

## 2017-05-17 VITALS — SYSTOLIC BLOOD PRESSURE: 114 MMHG | HEART RATE: 70 BPM | DIASTOLIC BLOOD PRESSURE: 75 MMHG | OXYGEN SATURATION: 94 %

## 2017-05-17 LAB
ALBUMIN SERPL ELPH-MCNC: 4.2 G/DL
ALP BLD-CCNC: 78 U/L
ALT SERPL-CCNC: 31 U/L
ANION GAP SERPL CALC-SCNC: 13 MMOL/L
AST SERPL-CCNC: 30 U/L
BILIRUB SERPL-MCNC: 0.2 MG/DL
BUN SERPL-MCNC: 17 MG/DL
CALCIUM SERPL-MCNC: 8.9 MG/DL
CHLORIDE SERPL-SCNC: 105 MMOL/L
CO2 SERPL-SCNC: 26 MMOL/L
CREAT SERPL-MCNC: 1.08 MG/DL
GLUCOSE SERPL-MCNC: 105 MG/DL
LDH SERPL-CCNC: 225 U/L
MAGNESIUM SERPL-MCNC: 1.8 MG/DL
POTASSIUM SERPL-SCNC: 4.8 MMOL/L
PROT SERPL-MCNC: 6.3 G/DL
SODIUM SERPL-SCNC: 144 MMOL/L

## 2017-05-25 ENCOUNTER — RESULT REVIEW (OUTPATIENT)
Age: 52
End: 2017-05-25

## 2017-05-25 ENCOUNTER — APPOINTMENT (OUTPATIENT)
Dept: HEMATOLOGY ONCOLOGY | Facility: CLINIC | Age: 52
End: 2017-05-25

## 2017-05-25 VITALS
DIASTOLIC BLOOD PRESSURE: 71 MMHG | HEART RATE: 67 BPM | RESPIRATION RATE: 16 BRPM | BODY MASS INDEX: 30.54 KG/M2 | SYSTOLIC BLOOD PRESSURE: 116 MMHG | TEMPERATURE: 98.5 F | WEIGHT: 217.15 LBS | OXYGEN SATURATION: 97 %

## 2017-05-25 LAB
ALBUMIN SERPL ELPH-MCNC: 4.4 G/DL
ALP BLD-CCNC: 77 U/L
ALT SERPL-CCNC: 34 U/L
ANION GAP SERPL CALC-SCNC: 15 MMOL/L
AST SERPL-CCNC: 27 U/L
BASOPHILS # BLD AUTO: 0.1 K/UL — SIGNIFICANT CHANGE UP (ref 0–0.2)
BASOPHILS NFR BLD AUTO: 0.9 % — SIGNIFICANT CHANGE UP (ref 0–2)
BILIRUB SERPL-MCNC: 0.3 MG/DL
BUN SERPL-MCNC: 19 MG/DL
CALCIUM SERPL-MCNC: 9.3 MG/DL
CHLORIDE SERPL-SCNC: 103 MMOL/L
CO2 SERPL-SCNC: 26 MMOL/L
CREAT SERPL-MCNC: 1.1 MG/DL
EOSINOPHIL # BLD AUTO: 0.5 K/UL — SIGNIFICANT CHANGE UP (ref 0–0.5)
EOSINOPHIL NFR BLD AUTO: 7.8 % — HIGH (ref 0–6)
GLUCOSE SERPL-MCNC: 110 MG/DL
HCT VFR BLD CALC: 35.6 % — LOW (ref 39–50)
HGB BLD-MCNC: 12.1 G/DL — LOW (ref 13–17)
LDH SERPL-CCNC: 246 U/L
LYMPHOCYTES # BLD AUTO: 2.8 K/UL — SIGNIFICANT CHANGE UP (ref 1–3.3)
LYMPHOCYTES # BLD AUTO: 39.4 % — SIGNIFICANT CHANGE UP (ref 13–44)
MAGNESIUM SERPL-MCNC: 1.9 MG/DL
MCHC RBC-ENTMCNC: 33.1 PG — SIGNIFICANT CHANGE UP (ref 27–34)
MCHC RBC-ENTMCNC: 34.1 G/DL — SIGNIFICANT CHANGE UP (ref 32–36)
MCV RBC AUTO: 97.2 FL — SIGNIFICANT CHANGE UP (ref 80–100)
MONOCYTES # BLD AUTO: 0.8 K/UL — SIGNIFICANT CHANGE UP (ref 0–0.9)
MONOCYTES NFR BLD AUTO: 11 % — SIGNIFICANT CHANGE UP (ref 2–14)
NEUTROPHILS # BLD AUTO: 2.9 K/UL — SIGNIFICANT CHANGE UP (ref 1.8–7.4)
NEUTROPHILS NFR BLD AUTO: 40.9 % — LOW (ref 43–77)
PLATELET # BLD AUTO: 235 K/UL — SIGNIFICANT CHANGE UP (ref 150–400)
POTASSIUM SERPL-SCNC: 5 MMOL/L
PROT SERPL-MCNC: 6.7 G/DL
RBC # BLD: 3.66 M/UL — LOW (ref 4.2–5.8)
RBC # FLD: 15.8 % — HIGH (ref 10.3–14.5)
SODIUM SERPL-SCNC: 144 MMOL/L
WBC # BLD: 7 K/UL — SIGNIFICANT CHANGE UP (ref 3.8–10.5)
WBC # FLD AUTO: 7 K/UL — SIGNIFICANT CHANGE UP (ref 3.8–10.5)

## 2017-05-26 ENCOUNTER — CHART COPY (OUTPATIENT)
Age: 52
End: 2017-05-26

## 2017-06-01 ENCOUNTER — OUTPATIENT (OUTPATIENT)
Dept: OUTPATIENT SERVICES | Facility: HOSPITAL | Age: 52
LOS: 1 days | Discharge: HOME | End: 2017-06-01

## 2017-06-01 DIAGNOSIS — C83.10 MANTLE CELL LYMPHOMA, UNSPECIFIED SITE: ICD-10-CM

## 2017-06-01 DIAGNOSIS — Z98.890 OTHER SPECIFIED POSTPROCEDURAL STATES: Chronic | ICD-10-CM

## 2017-06-02 ENCOUNTER — MEDICATION RENEWAL (OUTPATIENT)
Age: 52
End: 2017-06-02

## 2017-06-07 ENCOUNTER — RESULT REVIEW (OUTPATIENT)
Age: 52
End: 2017-06-07

## 2017-06-07 ENCOUNTER — APPOINTMENT (OUTPATIENT)
Dept: HEMATOLOGY ONCOLOGY | Facility: CLINIC | Age: 52
End: 2017-06-07

## 2017-06-07 VITALS
BODY MASS INDEX: 30.7 KG/M2 | RESPIRATION RATE: 18 BRPM | HEART RATE: 84 BPM | WEIGHT: 218.25 LBS | DIASTOLIC BLOOD PRESSURE: 83 MMHG | SYSTOLIC BLOOD PRESSURE: 122 MMHG | TEMPERATURE: 98.6 F | OXYGEN SATURATION: 99 %

## 2017-06-07 LAB
BASOPHILS # BLD AUTO: 0 K/UL — SIGNIFICANT CHANGE UP (ref 0–0.2)
BASOPHILS NFR BLD AUTO: 0.3 % — SIGNIFICANT CHANGE UP (ref 0–2)
EOSINOPHIL # BLD AUTO: 0.7 K/UL — HIGH (ref 0–0.5)
EOSINOPHIL NFR BLD AUTO: 10.6 % — HIGH (ref 0–6)
HCT VFR BLD CALC: 37.8 % — LOW (ref 39–50)
HGB BLD-MCNC: 12.4 G/DL — LOW (ref 13–17)
LYMPHOCYTES # BLD AUTO: 2.8 K/UL — SIGNIFICANT CHANGE UP (ref 1–3.3)
LYMPHOCYTES # BLD AUTO: 44.6 % — HIGH (ref 13–44)
MCHC RBC-ENTMCNC: 31.8 PG — SIGNIFICANT CHANGE UP (ref 27–34)
MCHC RBC-ENTMCNC: 32.8 G/DL — SIGNIFICANT CHANGE UP (ref 32–36)
MCV RBC AUTO: 97 FL — SIGNIFICANT CHANGE UP (ref 80–100)
MONOCYTES # BLD AUTO: 0.7 K/UL — SIGNIFICANT CHANGE UP (ref 0–0.9)
MONOCYTES NFR BLD AUTO: 11.3 % — SIGNIFICANT CHANGE UP (ref 2–14)
NEUTROPHILS # BLD AUTO: 2.1 K/UL — SIGNIFICANT CHANGE UP (ref 1.8–7.4)
NEUTROPHILS NFR BLD AUTO: 33.3 % — LOW (ref 43–77)
PLATELET # BLD AUTO: 232 K/UL — SIGNIFICANT CHANGE UP (ref 150–400)
RBC # BLD: 3.9 M/UL — LOW (ref 4.2–5.8)
RBC # FLD: 14.4 % — SIGNIFICANT CHANGE UP (ref 10.3–14.5)
WBC # BLD: 6.2 K/UL — SIGNIFICANT CHANGE UP (ref 3.8–10.5)
WBC # FLD AUTO: 6.2 K/UL — SIGNIFICANT CHANGE UP (ref 3.8–10.5)

## 2017-06-08 LAB
ALBUMIN SERPL ELPH-MCNC: 4.4 G/DL
ALP BLD-CCNC: 86 U/L
ALT SERPL-CCNC: 29 U/L
ANION GAP SERPL CALC-SCNC: 14 MMOL/L
AST SERPL-CCNC: 28 U/L
BILIRUB SERPL-MCNC: 0.2 MG/DL
BUN SERPL-MCNC: 28 MG/DL
CALCIUM SERPL-MCNC: 9.5 MG/DL
CHLORIDE SERPL-SCNC: 105 MMOL/L
CO2 SERPL-SCNC: 28 MMOL/L
CREAT SERPL-MCNC: 1.26 MG/DL
GLUCOSE SERPL-MCNC: 86 MG/DL
LDH SERPL-CCNC: 284 U/L
MAGNESIUM SERPL-MCNC: 2.1 MG/DL
POTASSIUM SERPL-SCNC: 5.4 MMOL/L
PROT SERPL-MCNC: 6.6 G/DL
SODIUM SERPL-SCNC: 147 MMOL/L

## 2017-06-23 ENCOUNTER — RX RENEWAL (OUTPATIENT)
Age: 52
End: 2017-06-23

## 2017-06-26 ENCOUNTER — RESULT REVIEW (OUTPATIENT)
Age: 52
End: 2017-06-26

## 2017-06-26 ENCOUNTER — APPOINTMENT (OUTPATIENT)
Dept: HEMATOLOGY ONCOLOGY | Facility: CLINIC | Age: 52
End: 2017-06-26

## 2017-06-26 VITALS
DIASTOLIC BLOOD PRESSURE: 82 MMHG | BODY MASS INDEX: 30.45 KG/M2 | RESPIRATION RATE: 16 BRPM | OXYGEN SATURATION: 97 % | SYSTOLIC BLOOD PRESSURE: 125 MMHG | TEMPERATURE: 98.2 F | WEIGHT: 216.49 LBS | HEART RATE: 50 BPM

## 2017-06-26 LAB
ALBUMIN SERPL ELPH-MCNC: 4.5 G/DL
ALP BLD-CCNC: 89 U/L
ALT SERPL-CCNC: 42 U/L
ANION GAP SERPL CALC-SCNC: 17 MMOL/L
AST SERPL-CCNC: 32 U/L
BASOPHILS # BLD AUTO: 0.1 K/UL — SIGNIFICANT CHANGE UP (ref 0–0.2)
BASOPHILS NFR BLD AUTO: 1.3 % — SIGNIFICANT CHANGE UP (ref 0–2)
BILIRUB SERPL-MCNC: 0.2 MG/DL
BUN SERPL-MCNC: 23 MG/DL
CALCIUM SERPL-MCNC: 9.5 MG/DL
CHLORIDE SERPL-SCNC: 105 MMOL/L
CO2 SERPL-SCNC: 22 MMOL/L
CREAT SERPL-MCNC: 1.13 MG/DL
EOSINOPHIL # BLD AUTO: 0.3 K/UL — SIGNIFICANT CHANGE UP (ref 0–0.5)
EOSINOPHIL NFR BLD AUTO: 6.3 % — HIGH (ref 0–6)
GLUCOSE SERPL-MCNC: 105 MG/DL
HCT VFR BLD CALC: 40.7 % — SIGNIFICANT CHANGE UP (ref 39–50)
HGB BLD-MCNC: 13.3 G/DL — SIGNIFICANT CHANGE UP (ref 13–17)
LDH SERPL-CCNC: 234 U/L
LYMPHOCYTES # BLD AUTO: 2.9 K/UL — SIGNIFICANT CHANGE UP (ref 1–3.3)
LYMPHOCYTES # BLD AUTO: 53.6 % — HIGH (ref 13–44)
MAGNESIUM SERPL-MCNC: 2 MG/DL
MCHC RBC-ENTMCNC: 30.9 PG — SIGNIFICANT CHANGE UP (ref 27–34)
MCHC RBC-ENTMCNC: 32.7 G/DL — SIGNIFICANT CHANGE UP (ref 32–36)
MCV RBC AUTO: 94.3 FL — SIGNIFICANT CHANGE UP (ref 80–100)
MONOCYTES # BLD AUTO: 0.5 K/UL — SIGNIFICANT CHANGE UP (ref 0–0.9)
MONOCYTES NFR BLD AUTO: 9.7 % — SIGNIFICANT CHANGE UP (ref 2–14)
NEUTROPHILS # BLD AUTO: 1.6 K/UL — LOW (ref 1.8–7.4)
NEUTROPHILS NFR BLD AUTO: 29.1 % — LOW (ref 43–77)
PLATELET # BLD AUTO: 204 K/UL — SIGNIFICANT CHANGE UP (ref 150–400)
POTASSIUM SERPL-SCNC: 4.9 MMOL/L
PROT SERPL-MCNC: 7 G/DL
RBC # BLD: 4.31 M/UL — SIGNIFICANT CHANGE UP (ref 4.2–5.8)
RBC # FLD: 14.2 % — SIGNIFICANT CHANGE UP (ref 10.3–14.5)
SODIUM SERPL-SCNC: 144 MMOL/L
WBC # BLD: 5.4 K/UL — SIGNIFICANT CHANGE UP (ref 3.8–10.5)
WBC # FLD AUTO: 5.4 K/UL — SIGNIFICANT CHANGE UP (ref 3.8–10.5)

## 2017-06-26 RX ORDER — ATOVAQUONE 750 MG/5ML
750 SUSPENSION ORAL TWICE DAILY
Qty: 300 | Refills: 3 | Status: DISCONTINUED | COMMUNITY
End: 2017-06-26

## 2017-06-26 RX ORDER — FLUCONAZOLE 200 MG/1
200 TABLET ORAL
Qty: 60 | Refills: 3 | Status: DISCONTINUED | COMMUNITY
End: 2017-06-26

## 2017-06-30 ENCOUNTER — MEDICATION RENEWAL (OUTPATIENT)
Age: 52
End: 2017-06-30

## 2017-07-07 ENCOUNTER — RX RENEWAL (OUTPATIENT)
Age: 52
End: 2017-07-07

## 2017-07-31 ENCOUNTER — OUTPATIENT (OUTPATIENT)
Dept: OUTPATIENT SERVICES | Facility: HOSPITAL | Age: 52
LOS: 1 days | Discharge: HOME | End: 2017-07-31

## 2017-07-31 DIAGNOSIS — Z98.890 OTHER SPECIFIED POSTPROCEDURAL STATES: Chronic | ICD-10-CM

## 2017-07-31 DIAGNOSIS — C83.10 MANTLE CELL LYMPHOMA, UNSPECIFIED SITE: ICD-10-CM

## 2017-08-03 ENCOUNTER — APPOINTMENT (OUTPATIENT)
Dept: HEMATOLOGY ONCOLOGY | Facility: CLINIC | Age: 52
End: 2017-08-03
Payer: MEDICAID

## 2017-08-03 ENCOUNTER — RESULT REVIEW (OUTPATIENT)
Age: 52
End: 2017-08-03

## 2017-08-03 VITALS
HEART RATE: 54 BPM | TEMPERATURE: 98 F | RESPIRATION RATE: 16 BRPM | BODY MASS INDEX: 30.56 KG/M2 | WEIGHT: 218.26 LBS | SYSTOLIC BLOOD PRESSURE: 130 MMHG | DIASTOLIC BLOOD PRESSURE: 80 MMHG | HEIGHT: 70.71 IN | OXYGEN SATURATION: 99 %

## 2017-08-03 LAB
BASOPHILS # BLD AUTO: 0 K/UL — SIGNIFICANT CHANGE UP (ref 0–0.2)
BASOPHILS NFR BLD AUTO: 0.7 % — SIGNIFICANT CHANGE UP (ref 0–2)
EOSINOPHIL # BLD AUTO: 0.2 K/UL — SIGNIFICANT CHANGE UP (ref 0–0.5)
EOSINOPHIL NFR BLD AUTO: 4.1 % — SIGNIFICANT CHANGE UP (ref 0–6)
HCT VFR BLD CALC: 40.1 % — SIGNIFICANT CHANGE UP (ref 39–50)
HGB BLD-MCNC: 13.3 G/DL — SIGNIFICANT CHANGE UP (ref 13–17)
LYMPHOCYTES # BLD AUTO: 2.9 K/UL — SIGNIFICANT CHANGE UP (ref 1–3.3)
LYMPHOCYTES # BLD AUTO: 52.5 % — HIGH (ref 13–44)
MCHC RBC-ENTMCNC: 30.1 PG — SIGNIFICANT CHANGE UP (ref 27–34)
MCHC RBC-ENTMCNC: 33.2 G/DL — SIGNIFICANT CHANGE UP (ref 32–36)
MCV RBC AUTO: 90.8 FL — SIGNIFICANT CHANGE UP (ref 80–100)
MONOCYTES # BLD AUTO: 0.7 K/UL — SIGNIFICANT CHANGE UP (ref 0–0.9)
MONOCYTES NFR BLD AUTO: 12.3 % — SIGNIFICANT CHANGE UP (ref 2–14)
NEUTROPHILS # BLD AUTO: 1.7 K/UL — LOW (ref 1.8–7.4)
NEUTROPHILS NFR BLD AUTO: 30.5 % — LOW (ref 43–77)
PLATELET # BLD AUTO: 193 K/UL — SIGNIFICANT CHANGE UP (ref 150–400)
RBC # BLD: 4.42 M/UL — SIGNIFICANT CHANGE UP (ref 4.2–5.8)
RBC # FLD: 16.1 % — HIGH (ref 10.3–14.5)
WBC # BLD: 5.6 K/UL — SIGNIFICANT CHANGE UP (ref 3.8–10.5)
WBC # FLD AUTO: 5.6 K/UL — SIGNIFICANT CHANGE UP (ref 3.8–10.5)

## 2017-08-03 PROCEDURE — 99214 OFFICE O/P EST MOD 30 MIN: CPT

## 2017-08-04 LAB
ALBUMIN SERPL ELPH-MCNC: 4.6 G/DL
ALP BLD-CCNC: 82 U/L
ALT SERPL-CCNC: 26 U/L
ANION GAP SERPL CALC-SCNC: 13 MMOL/L
AST SERPL-CCNC: 29 U/L
BILIRUB SERPL-MCNC: 0.2 MG/DL
BUN SERPL-MCNC: 22 MG/DL
CALCIUM SERPL-MCNC: 8.8 MG/DL
CHLORIDE SERPL-SCNC: 104 MMOL/L
CO2 SERPL-SCNC: 25 MMOL/L
CREAT SERPL-MCNC: 1.44 MG/DL
GLUCOSE SERPL-MCNC: 87 MG/DL
LDH SERPL-CCNC: 316 U/L
MAGNESIUM SERPL-MCNC: 2.1 MG/DL
POTASSIUM SERPL-SCNC: 5 MMOL/L
PROT SERPL-MCNC: 6.9 G/DL
SODIUM SERPL-SCNC: 142 MMOL/L

## 2017-08-23 ENCOUNTER — NON-APPOINTMENT (OUTPATIENT)
Age: 52
End: 2017-08-23

## 2017-08-23 ENCOUNTER — APPOINTMENT (OUTPATIENT)
Dept: ELECTROPHYSIOLOGY | Facility: CLINIC | Age: 52
End: 2017-08-23
Payer: MEDICAID

## 2017-08-23 VITALS — DIASTOLIC BLOOD PRESSURE: 78 MMHG | HEART RATE: 55 BPM | SYSTOLIC BLOOD PRESSURE: 119 MMHG | OXYGEN SATURATION: 98 %

## 2017-08-23 PROCEDURE — 99214 OFFICE O/P EST MOD 30 MIN: CPT

## 2017-08-23 PROCEDURE — 93000 ELECTROCARDIOGRAM COMPLETE: CPT

## 2017-08-25 ENCOUNTER — OUTPATIENT (OUTPATIENT)
Dept: OUTPATIENT SERVICES | Facility: HOSPITAL | Age: 52
LOS: 1 days | Discharge: HOME | End: 2017-08-25

## 2017-08-25 DIAGNOSIS — Z98.890 OTHER SPECIFIED POSTPROCEDURAL STATES: Chronic | ICD-10-CM

## 2017-08-25 DIAGNOSIS — C83.10 MANTLE CELL LYMPHOMA, UNSPECIFIED SITE: ICD-10-CM

## 2017-08-31 ENCOUNTER — RESULT REVIEW (OUTPATIENT)
Age: 52
End: 2017-08-31

## 2017-08-31 ENCOUNTER — APPOINTMENT (OUTPATIENT)
Dept: HEMATOLOGY ONCOLOGY | Facility: CLINIC | Age: 52
End: 2017-08-31
Payer: MEDICAID

## 2017-08-31 VITALS
SYSTOLIC BLOOD PRESSURE: 117 MMHG | RESPIRATION RATE: 16 BRPM | DIASTOLIC BLOOD PRESSURE: 77 MMHG | BODY MASS INDEX: 31.06 KG/M2 | WEIGHT: 220.9 LBS | OXYGEN SATURATION: 96 % | HEART RATE: 89 BPM | TEMPERATURE: 98.6 F

## 2017-08-31 LAB
ALBUMIN SERPL ELPH-MCNC: 4.4 G/DL
ALP BLD-CCNC: 98 U/L
ALT SERPL-CCNC: 24 U/L
ANION GAP SERPL CALC-SCNC: 11 MMOL/L
AST SERPL-CCNC: 24 U/L
BILIRUB SERPL-MCNC: 0.2 MG/DL
BUN SERPL-MCNC: 21 MG/DL
CALCIUM SERPL-MCNC: 9.5 MG/DL
CHLORIDE SERPL-SCNC: 106 MMOL/L
CO2 SERPL-SCNC: 28 MMOL/L
CREAT SERPL-MCNC: 1.27 MG/DL
EOSINOPHIL # BLD AUTO: 0.4 K/UL — SIGNIFICANT CHANGE UP (ref 0–0.5)
EOSINOPHIL NFR BLD AUTO: 8 % — HIGH (ref 0–6)
GLUCOSE SERPL-MCNC: 102 MG/DL
HCT VFR BLD CALC: 44.3 % — SIGNIFICANT CHANGE UP (ref 39–50)
HGB BLD-MCNC: 13.9 G/DL — SIGNIFICANT CHANGE UP (ref 13–17)
LDH SERPL-CCNC: 198 U/L
LYMPHOCYTES # BLD AUTO: 2.6 K/UL — SIGNIFICANT CHANGE UP (ref 1–3.3)
LYMPHOCYTES # BLD AUTO: 60 % — HIGH (ref 13–44)
MCHC RBC-ENTMCNC: 28.5 PG — SIGNIFICANT CHANGE UP (ref 27–34)
MCHC RBC-ENTMCNC: 31.4 G/DL — LOW (ref 32–36)
MCV RBC AUTO: 90.7 FL — SIGNIFICANT CHANGE UP (ref 80–100)
MONOCYTES # BLD AUTO: 0.5 K/UL — SIGNIFICANT CHANGE UP (ref 0–0.9)
MONOCYTES NFR BLD AUTO: 20 % — HIGH (ref 2–14)
NEUTROPHILS # BLD AUTO: 0.7 K/UL — LOW (ref 1.8–7.4)
NEUTROPHILS NFR BLD AUTO: 12 % — LOW (ref 43–77)
PLAT MORPH BLD: NORMAL — SIGNIFICANT CHANGE UP
PLATELET # BLD AUTO: 203 K/UL — SIGNIFICANT CHANGE UP (ref 150–400)
POTASSIUM SERPL-SCNC: 5.5 MMOL/L
PROT SERPL-MCNC: 6.9 G/DL
RBC # BLD: 4.88 M/UL — SIGNIFICANT CHANGE UP (ref 4.2–5.8)
RBC # FLD: 17.1 % — HIGH (ref 10.3–14.5)
RBC BLD AUTO: SIGNIFICANT CHANGE UP
SODIUM SERPL-SCNC: 145 MMOL/L
WBC # BLD: 4.3 K/UL — SIGNIFICANT CHANGE UP (ref 3.8–10.5)
WBC # FLD AUTO: 4.3 K/UL — SIGNIFICANT CHANGE UP (ref 3.8–10.5)

## 2017-08-31 PROCEDURE — 99214 OFFICE O/P EST MOD 30 MIN: CPT

## 2017-08-31 RX ORDER — RIVAROXABAN 20 MG/1
20 TABLET, FILM COATED ORAL
Qty: 90 | Refills: 1 | Status: DISCONTINUED | COMMUNITY
End: 2017-08-31

## 2017-09-04 ENCOUNTER — FORM ENCOUNTER (OUTPATIENT)
Age: 52
End: 2017-09-04

## 2017-09-05 ENCOUNTER — APPOINTMENT (OUTPATIENT)
Dept: NUCLEAR MEDICINE | Facility: CLINIC | Age: 52
End: 2017-09-05

## 2017-09-05 ENCOUNTER — OUTPATIENT (OUTPATIENT)
Dept: OUTPATIENT SERVICES | Facility: HOSPITAL | Age: 52
LOS: 1 days | End: 2017-09-05
Payer: MEDICAID

## 2017-09-05 DIAGNOSIS — Z98.890 OTHER SPECIFIED POSTPROCEDURAL STATES: Chronic | ICD-10-CM

## 2017-09-05 DIAGNOSIS — Z00.8 ENCOUNTER FOR OTHER GENERAL EXAMINATION: ICD-10-CM

## 2017-09-05 PROCEDURE — A9552: CPT

## 2017-09-05 PROCEDURE — 78815 PET IMAGE W/CT SKULL-THIGH: CPT

## 2017-09-05 PROCEDURE — 78815 PET IMAGE W/CT SKULL-THIGH: CPT | Mod: 26,PS

## 2017-09-25 ENCOUNTER — OUTPATIENT (OUTPATIENT)
Dept: OUTPATIENT SERVICES | Facility: HOSPITAL | Age: 52
LOS: 1 days | Discharge: HOME | End: 2017-09-25

## 2017-09-25 DIAGNOSIS — Z98.890 OTHER SPECIFIED POSTPROCEDURAL STATES: Chronic | ICD-10-CM

## 2017-09-25 DIAGNOSIS — C83.10 MANTLE CELL LYMPHOMA, UNSPECIFIED SITE: ICD-10-CM

## 2017-09-27 ENCOUNTER — RESULT REVIEW (OUTPATIENT)
Age: 52
End: 2017-09-27

## 2017-09-27 ENCOUNTER — APPOINTMENT (OUTPATIENT)
Dept: HEMATOLOGY ONCOLOGY | Facility: CLINIC | Age: 52
End: 2017-09-27
Payer: MEDICAID

## 2017-09-27 VITALS
RESPIRATION RATE: 16 BRPM | HEART RATE: 78 BPM | SYSTOLIC BLOOD PRESSURE: 138 MMHG | OXYGEN SATURATION: 98 % | BODY MASS INDEX: 30.69 KG/M2 | DIASTOLIC BLOOD PRESSURE: 76 MMHG | TEMPERATURE: 98 F | WEIGHT: 218.26 LBS

## 2017-09-27 LAB
BASOPHILS # BLD AUTO: 0 K/UL — SIGNIFICANT CHANGE UP (ref 0–0.2)
BASOPHILS NFR BLD AUTO: 0.8 % — SIGNIFICANT CHANGE UP (ref 0–2)
EOSINOPHIL # BLD AUTO: 0.2 K/UL — SIGNIFICANT CHANGE UP (ref 0–0.5)
EOSINOPHIL NFR BLD AUTO: 3.7 % — SIGNIFICANT CHANGE UP (ref 0–6)
HCT VFR BLD CALC: 36.7 % — LOW (ref 39–50)
HGB BLD-MCNC: 12.3 G/DL — LOW (ref 13–17)
LYMPHOCYTES # BLD AUTO: 1.8 K/UL — SIGNIFICANT CHANGE UP (ref 1–3.3)
LYMPHOCYTES # BLD AUTO: 35.2 % — SIGNIFICANT CHANGE UP (ref 13–44)
MCHC RBC-ENTMCNC: 31.7 PG — SIGNIFICANT CHANGE UP (ref 27–34)
MCHC RBC-ENTMCNC: 33.5 G/DL — SIGNIFICANT CHANGE UP (ref 32–36)
MCV RBC AUTO: 94.6 FL — SIGNIFICANT CHANGE UP (ref 80–100)
MONOCYTES # BLD AUTO: 0.5 K/UL — SIGNIFICANT CHANGE UP (ref 0–0.9)
MONOCYTES NFR BLD AUTO: 9.8 % — SIGNIFICANT CHANGE UP (ref 2–14)
NEUTROPHILS # BLD AUTO: 2.6 K/UL — SIGNIFICANT CHANGE UP (ref 1.8–7.4)
NEUTROPHILS NFR BLD AUTO: 50.6 % — SIGNIFICANT CHANGE UP (ref 43–77)
PLATELET # BLD AUTO: 205 K/UL — SIGNIFICANT CHANGE UP (ref 150–400)
RBC # BLD: 3.88 M/UL — LOW (ref 4.2–5.8)
RBC # FLD: 16.4 % — HIGH (ref 10.3–14.5)
WBC # BLD: 5.1 K/UL — SIGNIFICANT CHANGE UP (ref 3.8–10.5)
WBC # FLD AUTO: 5.1 K/UL — SIGNIFICANT CHANGE UP (ref 3.8–10.5)

## 2017-09-27 PROCEDURE — 99215 OFFICE O/P EST HI 40 MIN: CPT

## 2017-10-02 LAB
ALBUMIN SERPL ELPH-MCNC: 4.9 G/DL
ALP BLD-CCNC: 88 U/L
ALT SERPL-CCNC: 22 U/L
ANION GAP SERPL CALC-SCNC: 15 MMOL/L
AST SERPL-CCNC: 31 U/L
BILIRUB SERPL-MCNC: 0.5 MG/DL
BUN SERPL-MCNC: 25 MG/DL
CALCIUM SERPL-MCNC: 9.2 MG/DL
CHLORIDE SERPL-SCNC: 105 MMOL/L
CO2 SERPL-SCNC: 25 MMOL/L
CREAT SERPL-MCNC: 1.29 MG/DL
GLUCOSE SERPL-MCNC: 93 MG/DL
LDH SERPL-CCNC: 232 U/L
MAGNESIUM SERPL-MCNC: 2.2 MG/DL
POTASSIUM SERPL-SCNC: 5.3 MMOL/L
PROT SERPL-MCNC: 7.2 G/DL
SODIUM SERPL-SCNC: 145 MMOL/L

## 2017-10-24 ENCOUNTER — RX RENEWAL (OUTPATIENT)
Age: 52
End: 2017-10-24

## 2017-11-14 ENCOUNTER — OUTPATIENT (OUTPATIENT)
Dept: OUTPATIENT SERVICES | Facility: HOSPITAL | Age: 52
LOS: 1 days | Discharge: HOME | End: 2017-11-14

## 2017-11-14 DIAGNOSIS — C83.10 MANTLE CELL LYMPHOMA, UNSPECIFIED SITE: ICD-10-CM

## 2017-11-14 DIAGNOSIS — Z98.890 OTHER SPECIFIED POSTPROCEDURAL STATES: Chronic | ICD-10-CM

## 2017-11-20 ENCOUNTER — APPOINTMENT (OUTPATIENT)
Dept: HEMATOLOGY ONCOLOGY | Facility: CLINIC | Age: 52
End: 2017-11-20

## 2017-11-29 ENCOUNTER — APPOINTMENT (OUTPATIENT)
Dept: ELECTROPHYSIOLOGY | Facility: CLINIC | Age: 52
End: 2017-11-29
Payer: MEDICAID

## 2017-11-29 ENCOUNTER — NON-APPOINTMENT (OUTPATIENT)
Age: 52
End: 2017-11-29

## 2017-11-29 VITALS
OXYGEN SATURATION: 100 % | WEIGHT: 220 LBS | HEART RATE: 59 BPM | SYSTOLIC BLOOD PRESSURE: 135 MMHG | DIASTOLIC BLOOD PRESSURE: 88 MMHG | BODY MASS INDEX: 30.94 KG/M2

## 2017-11-29 PROCEDURE — 93000 ELECTROCARDIOGRAM COMPLETE: CPT

## 2017-11-29 PROCEDURE — 99214 OFFICE O/P EST MOD 30 MIN: CPT

## 2017-11-29 RX ORDER — METOPROLOL SUCCINATE 50 MG/1
50 TABLET, EXTENDED RELEASE ORAL
Qty: 90 | Refills: 1 | Status: DISCONTINUED | COMMUNITY
End: 2017-11-29

## 2017-11-29 RX ORDER — SULFAMETHOXAZOLE AND TRIMETHOPRIM 800; 160 MG/1; MG/1
800-160 TABLET ORAL DAILY
Qty: 30 | Refills: 3 | Status: DISCONTINUED | COMMUNITY
Start: 2017-06-23 | End: 2017-11-29

## 2017-11-29 RX ORDER — MULTIVITAMIN
TABLET ORAL DAILY
Refills: 0 | Status: DISCONTINUED | COMMUNITY
End: 2017-11-29

## 2017-11-29 RX ORDER — FOLIC ACID 1 MG/1
1 TABLET ORAL DAILY
Qty: 30 | Refills: 5 | Status: DISCONTINUED | COMMUNITY
End: 2017-11-29

## 2017-11-29 RX ORDER — FOLIC ACID 1 MG/1
1 TABLET ORAL DAILY
Qty: 90 | Refills: 3 | Status: DISCONTINUED | COMMUNITY
Start: 2017-10-23 | End: 2017-11-29

## 2017-11-29 RX ORDER — SULFAMETHOXAZOLE AND TRIMETHOPRIM 800; 160 MG/1; MG/1
800-160 TABLET ORAL
Qty: 30 | Refills: 6 | Status: DISCONTINUED | COMMUNITY
Start: 2017-10-23 | End: 2017-11-29

## 2017-11-29 RX ORDER — ACYCLOVIR 400 MG/1
400 TABLET ORAL 3 TIMES DAILY
Qty: 90 | Refills: 2 | Status: DISCONTINUED | COMMUNITY
Start: 2017-10-23 | End: 2017-11-29

## 2017-11-29 RX ORDER — CHLORHEXIDINE GLUCONATE 4 %
LIQUID (ML) TOPICAL DAILY
Qty: 30 | Refills: 6 | Status: DISCONTINUED | COMMUNITY
Start: 2017-10-23 | End: 2017-11-29

## 2017-12-07 ENCOUNTER — OUTPATIENT (OUTPATIENT)
Dept: OUTPATIENT SERVICES | Facility: HOSPITAL | Age: 52
LOS: 1 days | Discharge: HOME | End: 2017-12-07
Payer: MEDICAID

## 2017-12-07 ENCOUNTER — TRANSCRIPTION ENCOUNTER (OUTPATIENT)
Age: 52
End: 2017-12-07

## 2017-12-07 VITALS
DIASTOLIC BLOOD PRESSURE: 89 MMHG | WEIGHT: 217.38 LBS | HEART RATE: 59 BPM | SYSTOLIC BLOOD PRESSURE: 143 MMHG | RESPIRATION RATE: 14 BRPM

## 2017-12-07 VITALS — WEIGHT: 216.93 LBS

## 2017-12-07 DIAGNOSIS — Z98.890 OTHER SPECIFIED POSTPROCEDURAL STATES: Chronic | ICD-10-CM

## 2017-12-07 DIAGNOSIS — I48.91 UNSPECIFIED ATRIAL FIBRILLATION: ICD-10-CM

## 2017-12-07 PROCEDURE — 33282: CPT

## 2017-12-07 RX ORDER — ASPIRIN/CALCIUM CARB/MAGNESIUM 324 MG
1 TABLET ORAL
Qty: 0 | Refills: 0 | DISCHARGE
Start: 2017-12-07

## 2017-12-07 RX ORDER — CEPHALEXIN 500 MG
500 CAPSULE ORAL ONCE
Qty: 0 | Refills: 0 | Status: COMPLETED | OUTPATIENT
Start: 2017-12-07 | End: 2017-12-07

## 2017-12-07 RX ORDER — ASPIRIN/CALCIUM CARB/MAGNESIUM 324 MG
1 TABLET ORAL
Qty: 0 | Refills: 0 | COMMUNITY

## 2017-12-07 RX ADMIN — Medication 500 MILLIGRAM(S): at 09:16

## 2017-12-07 NOTE — DISCHARGE NOTE ADULT - CARE PLAN
Principal Discharge DX:	Atrial flutter, unspecified type  Goal:	optimize cardiac health  Instructions for follow-up, activity and diet:	Loop Recorder Incision Care:     - Remove the plastic and gauze dressing after 24 hours.   - Do not touch the incision until it is completely healed.   - There is Dermabond (skin glue) on your incision, which will start to flake off on its own over the next 2-3 weeks. Do not pick at or peal off the Dermabond.   - Do not apply soaps, creams, lotions, ointments or powders to the incision until it is completely healed.  - You should call the doctor if you notice redness, drainage, swelling, increased tenderness, hot sensation around the  incision, bleeding or incision edges pulling apart.

## 2017-12-07 NOTE — H&P PST ADULT - ASSESSMENT
This is a 53 yo  male sp Atrial flutter ablation 5/2017.  In addition he had paroxysmal AF related to the PICC line in RA.  PMH also significant for HTN, non hodgkins lymphoma 9/2016, stem cell transplant,  now in remission, R knee surgery and L shoulder surgery. Now with c/o dizziness, chest pain dyspnea, weakness and  vision poss for brief seconds related to standing up.  Patient stopped taking Xarelto approx 4 months ago. Patient stopped taking metoprolol approx 3 weeks ago.    Now for LOOP placement

## 2017-12-07 NOTE — DISCHARGE NOTE ADULT - PLAN OF CARE
optimize cardiac health Loop Recorder Incision Care:     - Remove the plastic and gauze dressing after 24 hours.   - Do not touch the incision until it is completely healed.   - There is Dermabond (skin glue) on your incision, which will start to flake off on its own over the next 2-3 weeks. Do not pick at or peal off the Dermabond.   - Do not apply soaps, creams, lotions, ointments or powders to the incision until it is completely healed.  - You should call the doctor if you notice redness, drainage, swelling, increased tenderness, hot sensation around the  incision, bleeding or incision edges pulling apart.

## 2017-12-07 NOTE — DISCHARGE NOTE ADULT - MEDICATION SUMMARY - MEDICATIONS TO TAKE
I will START or STAY ON the medications listed below when I get home from the hospital:    oxyCODONE 5 mg oral tablet  -- 1 tab(s) by mouth every 6 hours, As Needed for pain MDD:4 tabs  -- Caution federal law prohibits the transfer of this drug to any person other  than the person for whom it was prescribed.  It is very important that you take or use this exactly as directed.  Do not skip doses or discontinue unless directed by your doctor.  May cause drowsiness.  Alcohol may intensify this effect.  Use care when operating dangerous machinery.  This prescription cannot be refilled.  Using more of this medication than prescribed may cause serious breathing problems.    -- Indication: For pain, as needed    tamsulosin 0.4 mg oral capsule  -- 1 cap(s) by mouth once a day (at bedtime)  -- Indication: For BPH    dilTIAZem 120 mg/24 hours oral capsule, extended release  -- 1 cap(s) by mouth once a day  -- Indication: For Atrial fibrillation    rivaroxaban 20 mg oral tablet  -- 1 tab(s) by mouth once a day  -- Check with your doctor before becoming pregnant.  It is very important that you take or use this exactly as directed.  Do not skip doses or discontinue unless directed by your doctor.  Obtain medical advice before taking any non-prescription drugs as some may affect the action of this medication.  Take with food.    -- Indication: For Atrial fibrillation    Reglan 10 mg oral tablet  -- 1 tab(s) by mouth 4 times a day (before meals and at bedtime), As Needed -for muscle spasm -for nausea  -- Indication: For nausea - as needed    fluconazole 200 mg oral tablet  -- 2 tab(s) by mouth once a day  -- Indication: For Anti fungal    acyclovir 400 mg oral tablet  -- 1 tab(s) by mouth every 8 hours  -- Indication: For Anti viral    metoprolol succinate 100 mg oral tablet, extended release  -- 1 tab(s) by mouth once a day  -- Indication: For Atrial fibrillation    Mepron 750 mg/5 mL oral suspension  -- 5 milliliter(s) by mouth 2 times a day  -- Indication: For Anti-infective    omeprazole 20 mg oral delayed release capsule  -- 1 cap(s) by mouth once a day MDD:1  -- It is very important that you take or use this exactly as directed.  Do not skip doses or discontinue unless directed by your doctor.  Obtain medical advice before taking any non-prescription drugs as some may affect the action of this medication.  Swallow whole.  Do not crush.    -- Indication: For GERD    Multiple Vitamins oral tablet  -- 1 tab(s) by mouth once a day  -- Indication: For supplement    folic acid 1 mg oral tablet  -- 1 tab(s) by mouth once a day  -- Indication: For supplement I will START or STAY ON the medications listed below when I get home from the hospital:    oxyCODONE 5 mg oral tablet  -- 1 tab(s) by mouth every 6 hours, As Needed for pain MDD:4 tabs  -- Caution federal law prohibits the transfer of this drug to any person other  than the person for whom it was prescribed.  It is very important that you take or use this exactly as directed.  Do not skip doses or discontinue unless directed by your doctor.  May cause drowsiness.  Alcohol may intensify this effect.  Use care when operating dangerous machinery.  This prescription cannot be refilled.  Using more of this medication than prescribed may cause serious breathing problems.    -- Indication: For pain, as needed    aspirin 81 mg oral tablet, chewable  -- 1 tab(s) by mouth once a day  -- Indication: For H/O prior ablation treatment    tamsulosin 0.4 mg oral capsule  -- 1 cap(s) by mouth once a day (at bedtime)  -- Indication: For BPH    dilTIAZem 120 mg/24 hours oral capsule, extended release  -- 1 cap(s) by mouth once a day  -- Indication: For Atrial fibrillation    acyclovir 400 mg oral tablet  -- 1 tab(s) by mouth every 8 hours  -- Indication: For Anti viral    omeprazole 20 mg oral delayed release capsule  -- 1 cap(s) by mouth once a day MDD:1  -- It is very important that you take or use this exactly as directed.  Do not skip doses or discontinue unless directed by your doctor.  Obtain medical advice before taking any non-prescription drugs as some may affect the action of this medication.  Swallow whole.  Do not crush.    -- Indication: For GERD    Multiple Vitamins oral tablet  -- 1 tab(s) by mouth once a day  -- Indication: For supplement
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
## 2017-12-07 NOTE — DISCHARGE NOTE ADULT - HOSPITAL COURSE
This is a 53 yo  male sp Atrial flutter ablation 5/2017.  In addition he had paroxysmal AF related to the PICC line in RA.  PMH also significant for HTN, non hodgkins lymphoma 9/2016, stem cell transplant,  now in remission, R knee surgery and L shoulder surgery. Now with c/o dizziness, chest pain dyspnea, weakness and  vision poss for brief seconds related to standing up.  Patient stopped taking Xarelto approx 4 months ago. Patient stopped taking metoprolol approx 3 weeks ago.    Patient now sp Loop insertion.

## 2017-12-07 NOTE — H&P PST ADULT - HISTORY OF PRESENT ILLNESS
This is a 51 yo  male sp Atrial flutter ablation 5/2017.  In addition he had paroxysmal AF related to the PICC line in RA.  PMH also significant for HTN, non hodgkins lymphoma 9/2016, stem cell transplant,  now in remission, R knee surgery and L shoulder surgery. Now with c/o dizziness, chest pain dyspnea, weakness and  vision poss for brief seconds related to standing up.  Patient stopped taking Xarelto approx 4 months ago. Patient stopped taking metoprolol approx 3 weeks ago.    Echocardiogram:  2/9/2017  Upper limits of normal aortic root size for BSA, Low normal LVS function.  No segmental wall motion abnormalities.  Global longitudinal strain -18%. Normal diastolic function.  Normal right ventricular size and function.

## 2017-12-07 NOTE — DISCHARGE NOTE ADULT - REASON FOR ADMISSION
Elective ILR implant Home care referral made to [facility] [phone]. Nurse will meet in your home today 9/26 for start of care

## 2017-12-07 NOTE — DISCHARGE NOTE ADULT - MEDICATION SUMMARY - MEDICATIONS TO STOP TAKING
I will STOP taking the medications listed below when I get home from the hospital:  None I will STOP taking the medications listed below when I get home from the hospital:    Mepron 750 mg/5 mL oral suspension  -- 5 milliliter(s) by mouth 2 times a day    metoprolol succinate 100 mg oral tablet, extended release  -- 1 tab(s) by mouth once a day    acyclovir 400 mg oral tablet  -- 1 tab(s) by mouth every 8 hours    fluconazole 200 mg oral tablet  -- 2 tab(s) by mouth once a day    rivaroxaban 20 mg oral tablet  -- 1 tab(s) by mouth once a day  -- Check with your doctor before becoming pregnant.  It is very important that you take or use this exactly as directed.  Do not skip doses or discontinue unless directed by your doctor.  Obtain medical advice before taking any non-prescription drugs as some may affect the action of this medication.  Take with food.    omeprazole 20 mg oral delayed release capsule  -- 1 cap(s) by mouth once a day MDD:1  -- It is very important that you take or use this exactly as directed.  Do not skip doses or discontinue unless directed by your doctor.  Obtain medical advice before taking any non-prescription drugs as some may affect the action of this medication.  Swallow whole.  Do not crush.

## 2017-12-07 NOTE — DISCHARGE NOTE ADULT - PROVIDER TOKENS
FREE:[LAST:[Genie],FIRST:[Gary],PHONE:[phone]],FAX:[phone]   [phone]    -],ADDRESS:[[address] [place] [address]]] ---

## 2017-12-11 ENCOUNTER — NON-APPOINTMENT (OUTPATIENT)
Age: 52
End: 2017-12-11

## 2017-12-11 ENCOUNTER — APPOINTMENT (OUTPATIENT)
Dept: ELECTROPHYSIOLOGY | Facility: CLINIC | Age: 52
End: 2017-12-11
Payer: MEDICAID

## 2017-12-11 VITALS — DIASTOLIC BLOOD PRESSURE: 88 MMHG | SYSTOLIC BLOOD PRESSURE: 145 MMHG | HEART RATE: 81 BPM | OXYGEN SATURATION: 100 %

## 2017-12-11 VITALS — DIASTOLIC BLOOD PRESSURE: 70 MMHG | SYSTOLIC BLOOD PRESSURE: 122 MMHG

## 2017-12-11 PROCEDURE — 99214 OFFICE O/P EST MOD 30 MIN: CPT | Mod: 25

## 2017-12-11 PROCEDURE — 93000 ELECTROCARDIOGRAM COMPLETE: CPT

## 2017-12-11 RX ORDER — METOPROLOL TARTRATE 25 MG/1
25 TABLET, FILM COATED ORAL DAILY
Qty: 30 | Refills: 1 | Status: DISCONTINUED | COMMUNITY
Start: 2017-08-31 | End: 2017-12-11

## 2017-12-12 ENCOUNTER — MEDICATION RENEWAL (OUTPATIENT)
Age: 52
End: 2017-12-12

## 2018-01-08 ENCOUNTER — APPOINTMENT (OUTPATIENT)
Dept: ELECTROPHYSIOLOGY | Facility: CLINIC | Age: 53
End: 2018-01-08
Payer: MEDICAID

## 2018-01-08 PROCEDURE — 93298 REM INTERROG DEV EVAL SCRMS: CPT

## 2018-01-08 PROCEDURE — 93299: CPT

## 2018-01-12 ENCOUNTER — OUTPATIENT (OUTPATIENT)
Dept: OUTPATIENT SERVICES | Facility: HOSPITAL | Age: 53
LOS: 1 days | Discharge: HOME | End: 2018-01-12

## 2018-01-12 DIAGNOSIS — Z98.890 OTHER SPECIFIED POSTPROCEDURAL STATES: Chronic | ICD-10-CM

## 2018-01-12 DIAGNOSIS — C83.10 MANTLE CELL LYMPHOMA, UNSPECIFIED SITE: ICD-10-CM

## 2018-01-17 ENCOUNTER — APPOINTMENT (OUTPATIENT)
Dept: HEMATOLOGY ONCOLOGY | Facility: CLINIC | Age: 53
End: 2018-01-17
Payer: MEDICAID

## 2018-01-17 ENCOUNTER — RESULT REVIEW (OUTPATIENT)
Age: 53
End: 2018-01-17

## 2018-01-17 ENCOUNTER — APPOINTMENT (OUTPATIENT)
Dept: ELECTROPHYSIOLOGY | Facility: CLINIC | Age: 53
End: 2018-01-17

## 2018-01-17 VITALS
HEART RATE: 71 BPM | WEIGHT: 224.43 LBS | DIASTOLIC BLOOD PRESSURE: 83 MMHG | TEMPERATURE: 98.4 F | OXYGEN SATURATION: 98 % | BODY MASS INDEX: 31.56 KG/M2 | RESPIRATION RATE: 16 BRPM | SYSTOLIC BLOOD PRESSURE: 136 MMHG

## 2018-01-17 LAB
BASOPHILS # BLD AUTO: 0 K/UL — SIGNIFICANT CHANGE UP (ref 0–0.2)
BASOPHILS NFR BLD AUTO: 0.5 % — SIGNIFICANT CHANGE UP (ref 0–2)
EOSINOPHIL # BLD AUTO: 0.3 K/UL — SIGNIFICANT CHANGE UP (ref 0–0.5)
EOSINOPHIL NFR BLD AUTO: 4.4 % — SIGNIFICANT CHANGE UP (ref 0–6)
HCT VFR BLD CALC: 40.7 % — SIGNIFICANT CHANGE UP (ref 39–50)
HGB BLD-MCNC: 13.7 G/DL — SIGNIFICANT CHANGE UP (ref 13–17)
LYMPHOCYTES # BLD AUTO: 2 K/UL — SIGNIFICANT CHANGE UP (ref 1–3.3)
LYMPHOCYTES # BLD AUTO: 25.5 % — SIGNIFICANT CHANGE UP (ref 13–44)
MCHC RBC-ENTMCNC: 31.9 PG — SIGNIFICANT CHANGE UP (ref 27–34)
MCHC RBC-ENTMCNC: 33.7 G/DL — SIGNIFICANT CHANGE UP (ref 32–36)
MCV RBC AUTO: 94.6 FL — SIGNIFICANT CHANGE UP (ref 80–100)
MONOCYTES # BLD AUTO: 0.8 K/UL — SIGNIFICANT CHANGE UP (ref 0–0.9)
MONOCYTES NFR BLD AUTO: 10.5 % — SIGNIFICANT CHANGE UP (ref 2–14)
NEUTROPHILS # BLD AUTO: 4.5 K/UL — SIGNIFICANT CHANGE UP (ref 1.8–7.4)
NEUTROPHILS NFR BLD AUTO: 59.1 % — SIGNIFICANT CHANGE UP (ref 43–77)
PLATELET # BLD AUTO: 268 K/UL — SIGNIFICANT CHANGE UP (ref 150–400)
RBC # BLD: 4.31 M/UL — SIGNIFICANT CHANGE UP (ref 4.2–5.8)
RBC # FLD: 11.5 % — SIGNIFICANT CHANGE UP (ref 10.3–14.5)
WBC # BLD: 7.7 K/UL — SIGNIFICANT CHANGE UP (ref 3.8–10.5)
WBC # FLD AUTO: 7.7 K/UL — SIGNIFICANT CHANGE UP (ref 3.8–10.5)

## 2018-01-17 PROCEDURE — 99214 OFFICE O/P EST MOD 30 MIN: CPT

## 2018-01-18 LAB
ALBUMIN SERPL ELPH-MCNC: 4.4 G/DL
ALP BLD-CCNC: 116 U/L
ALT SERPL-CCNC: 24 U/L
ANION GAP SERPL CALC-SCNC: 12 MMOL/L
AST SERPL-CCNC: 21 U/L
BILIRUB SERPL-MCNC: <0.2 MG/DL
BUN SERPL-MCNC: 20 MG/DL
CALCIUM SERPL-MCNC: 9.1 MG/DL
CHLORIDE SERPL-SCNC: 101 MMOL/L
CO2 SERPL-SCNC: 29 MMOL/L
CREAT SERPL-MCNC: 0.98 MG/DL
GLUCOSE SERPL-MCNC: 127 MG/DL
LDH SERPL-CCNC: 168 U/L
MAGNESIUM SERPL-MCNC: 1.9 MG/DL
POTASSIUM SERPL-SCNC: 4.8 MMOL/L
PROT SERPL-MCNC: 6.7 G/DL
SODIUM SERPL-SCNC: 142 MMOL/L

## 2018-01-22 ENCOUNTER — APPOINTMENT (OUTPATIENT)
Dept: CARDIOLOGY | Facility: CLINIC | Age: 53
End: 2018-01-22
Payer: MEDICAID

## 2018-01-22 PROCEDURE — 0399T: CPT

## 2018-01-22 PROCEDURE — 76376 3D RENDER W/INTRP POSTPROCES: CPT

## 2018-01-22 PROCEDURE — 93306 TTE W/DOPPLER COMPLETE: CPT

## 2018-01-29 ENCOUNTER — APPOINTMENT (OUTPATIENT)
Dept: ELECTROPHYSIOLOGY | Facility: CLINIC | Age: 53
End: 2018-01-29

## 2018-02-05 ENCOUNTER — APPOINTMENT (OUTPATIENT)
Dept: ELECTROPHYSIOLOGY | Facility: CLINIC | Age: 53
End: 2018-02-05
Payer: MEDICAID

## 2018-02-05 ENCOUNTER — NON-APPOINTMENT (OUTPATIENT)
Age: 53
End: 2018-02-05

## 2018-02-05 VITALS
BODY MASS INDEX: 31.92 KG/M2 | HEART RATE: 51 BPM | HEIGHT: 70 IN | WEIGHT: 223 LBS | SYSTOLIC BLOOD PRESSURE: 130 MMHG | DIASTOLIC BLOOD PRESSURE: 77 MMHG | OXYGEN SATURATION: 100 %

## 2018-02-05 PROCEDURE — 93000 ELECTROCARDIOGRAM COMPLETE: CPT

## 2018-02-05 PROCEDURE — 99215 OFFICE O/P EST HI 40 MIN: CPT | Mod: 25

## 2018-02-09 ENCOUNTER — APPOINTMENT (OUTPATIENT)
Dept: ELECTROPHYSIOLOGY | Facility: CLINIC | Age: 53
End: 2018-02-09
Payer: MEDICAID

## 2018-02-09 PROCEDURE — 93299: CPT

## 2018-02-09 PROCEDURE — 93298 REM INTERROG DEV EVAL SCRMS: CPT

## 2018-02-19 ENCOUNTER — FORM ENCOUNTER (OUTPATIENT)
Age: 53
End: 2018-02-19

## 2018-02-20 ENCOUNTER — OUTPATIENT (OUTPATIENT)
Dept: OUTPATIENT SERVICES | Facility: HOSPITAL | Age: 53
LOS: 1 days | End: 2018-02-20
Payer: MEDICAID

## 2018-02-20 ENCOUNTER — APPOINTMENT (OUTPATIENT)
Dept: NUCLEAR MEDICINE | Facility: CLINIC | Age: 53
End: 2018-02-20

## 2018-02-20 DIAGNOSIS — Z00.8 ENCOUNTER FOR OTHER GENERAL EXAMINATION: ICD-10-CM

## 2018-02-20 DIAGNOSIS — Z98.890 OTHER SPECIFIED POSTPROCEDURAL STATES: Chronic | ICD-10-CM

## 2018-02-20 PROCEDURE — 78815 PET IMAGE W/CT SKULL-THIGH: CPT | Mod: 26,PS

## 2018-02-20 PROCEDURE — 78815 PET IMAGE W/CT SKULL-THIGH: CPT

## 2018-02-20 PROCEDURE — A9552: CPT

## 2018-03-12 ENCOUNTER — APPOINTMENT (OUTPATIENT)
Dept: ELECTROPHYSIOLOGY | Facility: CLINIC | Age: 53
End: 2018-03-12
Payer: MEDICAID

## 2018-03-12 PROCEDURE — 93298 REM INTERROG DEV EVAL SCRMS: CPT

## 2018-03-12 PROCEDURE — 93299: CPT

## 2018-03-12 NOTE — PROVIDER CONTACT NOTE (OTHER) - NAME OF MD/NP/PA/DO NOTIFIED:
The patient is a 79y Female complaining of trouble breathing.
Covering MD Medicine Team 8
Jaycee Steiner NP
Jaycee,NP
Laina,NP
MD Crespo Team 8
MD Stinson
MD Trejo
MD. Milad Sharif teams
MILEY ELAINE
MILEY Green
MILEY Scherer
MILEY Travis
NIELS Bolivar NP
OLIVE Edwards NP
TONY HARRIS
Team 8 Justus LUNDY
Team 8 Lc Licea MD
charan schneider
dr.kothary escobedo
lilly Alamo NP
Justyna Dominique
Team 8 Vinayak Platt MD

## 2018-03-19 ENCOUNTER — NON-APPOINTMENT (OUTPATIENT)
Age: 53
End: 2018-03-19

## 2018-03-19 ENCOUNTER — APPOINTMENT (OUTPATIENT)
Dept: ELECTROPHYSIOLOGY | Facility: CLINIC | Age: 53
End: 2018-03-19
Payer: MEDICAID

## 2018-03-19 VITALS
SYSTOLIC BLOOD PRESSURE: 115 MMHG | DIASTOLIC BLOOD PRESSURE: 73 MMHG | HEART RATE: 58 BPM | HEIGHT: 70 IN | WEIGHT: 220 LBS | OXYGEN SATURATION: 96 % | BODY MASS INDEX: 31.5 KG/M2

## 2018-03-19 PROCEDURE — 93000 ELECTROCARDIOGRAM COMPLETE: CPT

## 2018-03-19 PROCEDURE — 99214 OFFICE O/P EST MOD 30 MIN: CPT | Mod: 25

## 2018-03-27 ENCOUNTER — APPOINTMENT (OUTPATIENT)
Dept: CARDIOLOGY | Facility: CLINIC | Age: 53
End: 2018-03-27
Payer: MEDICAID

## 2018-03-27 PROCEDURE — 0399T: CPT

## 2018-03-27 PROCEDURE — 93306 TTE W/DOPPLER COMPLETE: CPT

## 2018-04-03 ENCOUNTER — APPOINTMENT (OUTPATIENT)
Dept: CARDIOLOGY | Facility: CLINIC | Age: 53
End: 2018-04-03
Payer: MEDICAID

## 2018-04-03 PROCEDURE — A9500: CPT

## 2018-04-03 PROCEDURE — 93015 CV STRESS TEST SUPVJ I&R: CPT

## 2018-04-03 PROCEDURE — 78452 HT MUSCLE IMAGE SPECT MULT: CPT

## 2018-04-05 ENCOUNTER — OUTPATIENT (OUTPATIENT)
Dept: OUTPATIENT SERVICES | Facility: HOSPITAL | Age: 53
LOS: 1 days | Discharge: HOME | End: 2018-04-05

## 2018-04-05 DIAGNOSIS — Z98.890 OTHER SPECIFIED POSTPROCEDURAL STATES: Chronic | ICD-10-CM

## 2018-04-05 DIAGNOSIS — C83.10 MANTLE CELL LYMPHOMA, UNSPECIFIED SITE: ICD-10-CM

## 2018-04-10 ENCOUNTER — RESULT REVIEW (OUTPATIENT)
Age: 53
End: 2018-04-10

## 2018-04-10 ENCOUNTER — APPOINTMENT (OUTPATIENT)
Dept: INFUSION THERAPY | Facility: HOSPITAL | Age: 53
End: 2018-04-10
Payer: MEDICAID

## 2018-04-10 ENCOUNTER — APPOINTMENT (OUTPATIENT)
Dept: HEMATOLOGY ONCOLOGY | Facility: CLINIC | Age: 53
End: 2018-04-10
Payer: MEDICAID

## 2018-04-10 VITALS
OXYGEN SATURATION: 98 % | HEART RATE: 65 BPM | RESPIRATION RATE: 16 BRPM | WEIGHT: 228.18 LBS | SYSTOLIC BLOOD PRESSURE: 124 MMHG | DIASTOLIC BLOOD PRESSURE: 82 MMHG | TEMPERATURE: 97.9 F | BODY MASS INDEX: 32.74 KG/M2

## 2018-04-10 LAB
ALBUMIN SERPL ELPH-MCNC: 4.7 G/DL
ALP BLD-CCNC: 109 U/L
ALT SERPL-CCNC: 26 U/L
ANION GAP SERPL CALC-SCNC: 11 MMOL/L
AST SERPL-CCNC: 21 U/L
BASOPHILS # BLD AUTO: 0 K/UL — SIGNIFICANT CHANGE UP (ref 0–0.2)
BASOPHILS NFR BLD AUTO: 0.8 % — SIGNIFICANT CHANGE UP (ref 0–2)
BILIRUB SERPL-MCNC: 0.6 MG/DL
BUN SERPL-MCNC: 20 MG/DL
CALCIUM SERPL-MCNC: 9.2 MG/DL
CHLORIDE SERPL-SCNC: 102 MMOL/L
CO2 SERPL-SCNC: 29 MMOL/L
CREAT SERPL-MCNC: 0.9 MG/DL
EOSINOPHIL # BLD AUTO: 0.1 K/UL — SIGNIFICANT CHANGE UP (ref 0–0.5)
EOSINOPHIL NFR BLD AUTO: 2.5 % — SIGNIFICANT CHANGE UP (ref 0–6)
GLUCOSE SERPL-MCNC: 112 MG/DL
HCT VFR BLD CALC: 42.8 % — SIGNIFICANT CHANGE UP (ref 39–50)
HGB BLD-MCNC: 14.8 G/DL — SIGNIFICANT CHANGE UP (ref 13–17)
LDH SERPL-CCNC: 180 U/L
LYMPHOCYTES # BLD AUTO: 2.7 K/UL — SIGNIFICANT CHANGE UP (ref 1–3.3)
LYMPHOCYTES # BLD AUTO: 46.3 % — HIGH (ref 13–44)
MAGNESIUM SERPL-MCNC: 2.1 MG/DL
MCHC RBC-ENTMCNC: 32.8 PG — SIGNIFICANT CHANGE UP (ref 27–34)
MCHC RBC-ENTMCNC: 34.5 G/DL — SIGNIFICANT CHANGE UP (ref 32–36)
MCV RBC AUTO: 95 FL — SIGNIFICANT CHANGE UP (ref 80–100)
MONOCYTES # BLD AUTO: 0.5 K/UL — SIGNIFICANT CHANGE UP (ref 0–0.9)
MONOCYTES NFR BLD AUTO: 8 % — SIGNIFICANT CHANGE UP (ref 2–14)
NEUTROPHILS # BLD AUTO: 2.4 K/UL — SIGNIFICANT CHANGE UP (ref 1.8–7.4)
NEUTROPHILS NFR BLD AUTO: 42.5 % — LOW (ref 43–77)
PLATELET # BLD AUTO: 201 K/UL — SIGNIFICANT CHANGE UP (ref 150–400)
POTASSIUM SERPL-SCNC: 4.6 MMOL/L
PROT SERPL-MCNC: 7.1 G/DL
RBC # BLD: 4.5 M/UL — SIGNIFICANT CHANGE UP (ref 4.2–5.8)
RBC # FLD: 13.2 % — SIGNIFICANT CHANGE UP (ref 10.3–14.5)
SODIUM SERPL-SCNC: 142 MMOL/L
WBC # BLD: 5.8 K/UL — SIGNIFICANT CHANGE UP (ref 3.8–10.5)
WBC # FLD AUTO: 5.8 K/UL — SIGNIFICANT CHANGE UP (ref 3.8–10.5)

## 2018-04-10 PROCEDURE — 99215 OFFICE O/P EST HI 40 MIN: CPT | Mod: 25

## 2018-04-10 PROCEDURE — 90472 IMMUNIZATION ADMIN EACH ADD: CPT

## 2018-04-10 PROCEDURE — 90670 PCV13 VACCINE IM: CPT

## 2018-04-10 PROCEDURE — G0009: CPT

## 2018-04-10 PROCEDURE — 90647 HIB PRP-OMP VACC 3 DOSE IM: CPT

## 2018-04-11 ENCOUNTER — MESSAGE (OUTPATIENT)
Age: 53
End: 2018-04-11

## 2018-04-13 ENCOUNTER — APPOINTMENT (OUTPATIENT)
Dept: ELECTROPHYSIOLOGY | Facility: CLINIC | Age: 53
End: 2018-04-13
Payer: MEDICAID

## 2018-04-13 PROCEDURE — 93299: CPT

## 2018-04-13 PROCEDURE — 93298 REM INTERROG DEV EVAL SCRMS: CPT

## 2018-04-16 ENCOUNTER — NON-APPOINTMENT (OUTPATIENT)
Age: 53
End: 2018-04-16

## 2018-04-16 ENCOUNTER — APPOINTMENT (OUTPATIENT)
Dept: ELECTROPHYSIOLOGY | Facility: CLINIC | Age: 53
End: 2018-04-16
Payer: MEDICAID

## 2018-04-16 VITALS
HEIGHT: 70 IN | SYSTOLIC BLOOD PRESSURE: 119 MMHG | WEIGHT: 228 LBS | BODY MASS INDEX: 32.64 KG/M2 | DIASTOLIC BLOOD PRESSURE: 76 MMHG | HEART RATE: 55 BPM | OXYGEN SATURATION: 97 %

## 2018-04-16 PROCEDURE — 99215 OFFICE O/P EST HI 40 MIN: CPT | Mod: 25

## 2018-04-16 PROCEDURE — 93000 ELECTROCARDIOGRAM COMPLETE: CPT

## 2018-04-17 ENCOUNTER — APPOINTMENT (OUTPATIENT)
Dept: INFUSION THERAPY | Facility: HOSPITAL | Age: 53
End: 2018-04-17
Payer: MEDICAID

## 2018-04-17 PROCEDURE — 90472 IMMUNIZATION ADMIN EACH ADD: CPT

## 2018-04-17 PROCEDURE — 90713 POLIOVIRUS IPV SC/IM: CPT

## 2018-04-17 PROCEDURE — 90471 IMMUNIZATION ADMIN: CPT

## 2018-04-17 PROCEDURE — 90714 TD VACC NO PRESV 7 YRS+ IM: CPT

## 2018-04-23 ENCOUNTER — NON-APPOINTMENT (OUTPATIENT)
Age: 53
End: 2018-04-23

## 2018-04-23 ENCOUNTER — APPOINTMENT (OUTPATIENT)
Dept: CARDIOLOGY | Facility: CLINIC | Age: 53
End: 2018-04-23
Payer: MEDICAID

## 2018-04-23 VITALS
BODY MASS INDEX: 32.71 KG/M2 | DIASTOLIC BLOOD PRESSURE: 78 MMHG | WEIGHT: 228 LBS | HEART RATE: 53 BPM | OXYGEN SATURATION: 97 % | SYSTOLIC BLOOD PRESSURE: 124 MMHG

## 2018-04-23 PROCEDURE — 99214 OFFICE O/P EST MOD 30 MIN: CPT | Mod: 25

## 2018-04-23 PROCEDURE — 93000 ELECTROCARDIOGRAM COMPLETE: CPT

## 2018-04-26 ENCOUNTER — TRANSCRIPTION ENCOUNTER (OUTPATIENT)
Age: 53
End: 2018-04-26

## 2018-04-26 ENCOUNTER — OUTPATIENT (OUTPATIENT)
Dept: OUTPATIENT SERVICES | Facility: HOSPITAL | Age: 53
LOS: 1 days | Discharge: HOME | End: 2018-04-26
Payer: MEDICAID

## 2018-04-26 VITALS
DIASTOLIC BLOOD PRESSURE: 65 MMHG | RESPIRATION RATE: 18 BRPM | SYSTOLIC BLOOD PRESSURE: 109 MMHG | OXYGEN SATURATION: 95 % | HEART RATE: 67 BPM

## 2018-04-26 VITALS
RESPIRATION RATE: 18 BRPM | TEMPERATURE: 99 F | DIASTOLIC BLOOD PRESSURE: 74 MMHG | OXYGEN SATURATION: 96 % | HEART RATE: 61 BPM | SYSTOLIC BLOOD PRESSURE: 118 MMHG

## 2018-04-26 DIAGNOSIS — Z98.890 OTHER SPECIFIED POSTPROCEDURAL STATES: Chronic | ICD-10-CM

## 2018-04-26 DIAGNOSIS — Z95.818 PRESENCE OF OTHER CARDIAC IMPLANTS AND GRAFTS: Chronic | ICD-10-CM

## 2018-04-26 DIAGNOSIS — R07.89 OTHER CHEST PAIN: ICD-10-CM

## 2018-04-26 LAB
ANION GAP SERPL CALC-SCNC: 10 MMOL/L — SIGNIFICANT CHANGE UP (ref 5–17)
ANION GAP SERPL CALC-SCNC: 11 MMOL/L — SIGNIFICANT CHANGE UP (ref 5–17)
BUN SERPL-MCNC: 23 MG/DL — HIGH (ref 8–20)
BUN SERPL-MCNC: 25 MG/DL — HIGH (ref 8–20)
CALCIUM SERPL-MCNC: 9.3 MG/DL — SIGNIFICANT CHANGE UP (ref 8.6–10.2)
CALCIUM SERPL-MCNC: 9.4 MG/DL — SIGNIFICANT CHANGE UP (ref 8.6–10.2)
CHLORIDE SERPL-SCNC: 100 MMOL/L — SIGNIFICANT CHANGE UP (ref 98–107)
CHLORIDE SERPL-SCNC: 101 MMOL/L — SIGNIFICANT CHANGE UP (ref 98–107)
CO2 SERPL-SCNC: 27 MMOL/L — SIGNIFICANT CHANGE UP (ref 22–29)
CO2 SERPL-SCNC: 28 MMOL/L — SIGNIFICANT CHANGE UP (ref 22–29)
CREAT SERPL-MCNC: 0.86 MG/DL — SIGNIFICANT CHANGE UP (ref 0.5–1.3)
CREAT SERPL-MCNC: 0.93 MG/DL — SIGNIFICANT CHANGE UP (ref 0.5–1.3)
GLUCOSE SERPL-MCNC: 102 MG/DL — SIGNIFICANT CHANGE UP (ref 70–115)
GLUCOSE SERPL-MCNC: 106 MG/DL — SIGNIFICANT CHANGE UP (ref 70–115)
HCT VFR BLD CALC: 42.8 % — SIGNIFICANT CHANGE UP (ref 42–52)
HGB BLD-MCNC: 14.5 G/DL — SIGNIFICANT CHANGE UP (ref 14–18)
INR BLD: 1.07 RATIO — SIGNIFICANT CHANGE UP (ref 0.88–1.16)
MCHC RBC-ENTMCNC: 31.4 PG — HIGH (ref 27–31)
MCHC RBC-ENTMCNC: 33.9 G/DL — SIGNIFICANT CHANGE UP (ref 32–36)
MCV RBC AUTO: 92.6 FL — SIGNIFICANT CHANGE UP (ref 80–94)
PLATELET # BLD AUTO: 200 K/UL — SIGNIFICANT CHANGE UP (ref 150–400)
POTASSIUM SERPL-MCNC: 4.8 MMOL/L — SIGNIFICANT CHANGE UP (ref 3.5–5.3)
POTASSIUM SERPL-MCNC: 5.5 MMOL/L — HIGH (ref 3.5–5.3)
POTASSIUM SERPL-SCNC: 4.8 MMOL/L — SIGNIFICANT CHANGE UP (ref 3.5–5.3)
POTASSIUM SERPL-SCNC: 5.5 MMOL/L — HIGH (ref 3.5–5.3)
PROTHROM AB SERPL-ACNC: 11.8 SEC — SIGNIFICANT CHANGE UP (ref 9.8–12.7)
RBC # BLD: 4.62 M/UL — SIGNIFICANT CHANGE UP (ref 4.6–6.2)
RBC # FLD: 14.3 % — SIGNIFICANT CHANGE UP (ref 11–15.6)
SODIUM SERPL-SCNC: 138 MMOL/L — SIGNIFICANT CHANGE UP (ref 135–145)
SODIUM SERPL-SCNC: 139 MMOL/L — SIGNIFICANT CHANGE UP (ref 135–145)
WBC # BLD: 4.6 K/UL — LOW (ref 4.8–10.8)
WBC # FLD AUTO: 4.6 K/UL — LOW (ref 4.8–10.8)

## 2018-04-26 PROCEDURE — C1894: CPT

## 2018-04-26 PROCEDURE — 85610 PROTHROMBIN TIME: CPT

## 2018-04-26 PROCEDURE — 93005 ELECTROCARDIOGRAM TRACING: CPT

## 2018-04-26 PROCEDURE — 80048 BASIC METABOLIC PNL TOTAL CA: CPT

## 2018-04-26 PROCEDURE — 93458 L HRT ARTERY/VENTRICLE ANGIO: CPT | Mod: 26

## 2018-04-26 PROCEDURE — 85027 COMPLETE CBC AUTOMATED: CPT

## 2018-04-26 PROCEDURE — C1769: CPT

## 2018-04-26 PROCEDURE — C1887: CPT

## 2018-04-26 PROCEDURE — 36415 COLL VENOUS BLD VENIPUNCTURE: CPT

## 2018-04-26 PROCEDURE — 93458 L HRT ARTERY/VENTRICLE ANGIO: CPT

## 2018-04-26 PROCEDURE — 93010 ELECTROCARDIOGRAM REPORT: CPT

## 2018-04-26 RX ORDER — CHLORHEXIDINE GLUCONATE 213 G/1000ML
1 SOLUTION TOPICAL ONCE
Qty: 0 | Refills: 0 | Status: DISCONTINUED | OUTPATIENT
Start: 2018-04-26 | End: 2018-05-11

## 2018-04-26 RX ORDER — ASPIRIN/CALCIUM CARB/MAGNESIUM 324 MG
81 TABLET ORAL DAILY
Qty: 0 | Refills: 0 | Status: DISCONTINUED | OUTPATIENT
Start: 2018-04-26 | End: 2018-05-11

## 2018-04-26 RX ADMIN — Medication 81 MILLIGRAM(S): at 11:27

## 2018-04-26 NOTE — DISCHARGE NOTE ADULT - PLAN OF CARE
Evaluate for causes of chest pain with PMD Restricted use with no heavy lifting of affected arm for 48 hours.  No submerging the arm in water for 48 hours.  You may start showering today.  Call your doctor for any bleeding, swelling, loss of sensation in the hand or fingers, or fingers turning blue.  If heavy bleeding or large lumps form, hold pressure at the spot and come to the Emergency Room.  Follow up with Dr. Shaffer in one to two weeks.

## 2018-04-26 NOTE — ASU PATIENT PROFILE, ADULT - VISION (WITH CORRECTIVE LENSES IF THE PATIENT USUALLY WEARS THEM):
Partially impaired: cannot see medication labels or newsprint, but can see obstacles in path, and the surrounding layout; can count fingers at arm's length/full time glasses

## 2018-04-26 NOTE — H&P PST ADULT - ASSESSMENT
53 y/o male with small area of ischemia in the distal anterior wall and CCS 3 angina for elective LHC +/- PCI.

## 2018-04-26 NOTE — DISCHARGE NOTE ADULT - PATIENT PORTAL LINK FT
You can access the [facility] Patient Portal, offered by [facility], by registering with the following website: [email or web address]

## 2018-04-26 NOTE — DISCHARGE NOTE ADULT - MEDICATION SUMMARY - MEDICATIONS TO TAKE
I will START or STAY ON the medications listed below when I get home from the hospital:    oxyCODONE 5 mg oral tablet  -- 1 tab(s) by mouth every 6 hours, As Needed for pain MDD:4 tabs  -- Caution federal law prohibits the transfer of this drug to any person other  than the person for whom it was prescribed.  It is very important that you take or use this exactly as directed.  Do not skip doses or discontinue unless directed by your doctor.  May cause drowsiness.  Alcohol may intensify this effect.  Use care when operating dangerous machinery.  This prescription cannot be refilled.  Using more of this medication than prescribed may cause serious breathing problems.    -- Indication: For pain    aspirin 81 mg oral tablet, chewable  -- 1 tab(s) by mouth once a day  -- Indication: For antiplatelet    tamsulosin 0.4 mg oral capsule  -- 1 cap(s) by mouth once a day (at bedtime)  -- Indication: For BPH    dilTIAZem 120 mg/24 hours oral capsule, extended release  -- 1 cap(s) by mouth once a day  -- Indication: For heart    acyclovir 400 mg oral tablet  -- 1 tab(s) by mouth every 8 hours  -- Indication: For antiviral    metoprolol tartrate 25 mg oral tablet  -- orally once a day  -- Indication: For heart    omeprazole 20 mg oral delayed release capsule  -- 1 cap(s) by mouth once a day MDD:1  -- It is very important that you take or use this exactly as directed.  Do not skip doses or discontinue unless directed by your doctor.  Obtain medical advice before taking any non-prescription drugs as some may affect the action of this medication.  Swallow whole.  Do not crush.    -- Indication: For GERD    Multiple Vitamins oral tablet  -- 1 tab(s) by mouth once a day  -- Indication: For Supplement

## 2018-04-26 NOTE — DISCHARGE NOTE ADULT - CARE PLAN
Principal Discharge DX:	Chest pain  Goal:	Evaluate for causes of chest pain with PMD  Assessment and plan of treatment:	Restricted use with no heavy lifting of affected arm for 48 hours.  No submerging the arm in water for 48 hours.  You may start showering today.  Call your doctor for any bleeding, swelling, loss of sensation in the hand or fingers, or fingers turning blue.  If heavy bleeding or large lumps form, hold pressure at the spot and come to the Emergency Room.  Follow up with Dr. Shaffer in one to two weeks.

## 2018-04-26 NOTE — DISCHARGE NOTE ADULT - CARE PROVIDER_API CALL
Eleazar Shaffer), Cardiology; Cardiovascular Disease; Internal Medicine  [address] [place] [address]  Phone: [phone]  Fax: [phone]

## 2018-04-30 ENCOUNTER — MEDICATION RENEWAL (OUTPATIENT)
Age: 53
End: 2018-04-30

## 2018-05-01 DIAGNOSIS — R07.2 PRECORDIAL PAIN: ICD-10-CM

## 2018-05-02 ENCOUNTER — APPOINTMENT (OUTPATIENT)
Dept: CARDIOLOGY | Facility: CLINIC | Age: 53
End: 2018-05-02

## 2018-05-14 ENCOUNTER — APPOINTMENT (OUTPATIENT)
Dept: ELECTROPHYSIOLOGY | Facility: CLINIC | Age: 53
End: 2018-05-14
Payer: MEDICAID

## 2018-05-14 PROCEDURE — 93299: CPT

## 2018-05-14 PROCEDURE — 93298 REM INTERROG DEV EVAL SCRMS: CPT

## 2018-06-06 ENCOUNTER — NON-APPOINTMENT (OUTPATIENT)
Age: 53
End: 2018-06-06

## 2018-06-06 ENCOUNTER — APPOINTMENT (OUTPATIENT)
Dept: CARDIOLOGY | Facility: CLINIC | Age: 53
End: 2018-06-06
Payer: MEDICAID

## 2018-06-06 VITALS
DIASTOLIC BLOOD PRESSURE: 82 MMHG | HEART RATE: 52 BPM | WEIGHT: 233 LBS | BODY MASS INDEX: 33.43 KG/M2 | SYSTOLIC BLOOD PRESSURE: 140 MMHG | OXYGEN SATURATION: 98 %

## 2018-06-06 PROCEDURE — 93000 ELECTROCARDIOGRAM COMPLETE: CPT

## 2018-06-06 PROCEDURE — 99215 OFFICE O/P EST HI 40 MIN: CPT | Mod: 25

## 2018-06-07 ENCOUNTER — APPOINTMENT (OUTPATIENT)
Dept: PULMONOLOGY | Facility: CLINIC | Age: 53
End: 2018-06-07
Payer: MEDICAID

## 2018-06-07 PROCEDURE — 94010 BREATHING CAPACITY TEST: CPT

## 2018-06-07 PROCEDURE — 94729 DIFFUSING CAPACITY: CPT

## 2018-06-07 PROCEDURE — 94726 PLETHYSMOGRAPHY LUNG VOLUMES: CPT

## 2018-06-15 ENCOUNTER — APPOINTMENT (OUTPATIENT)
Dept: ELECTROPHYSIOLOGY | Facility: CLINIC | Age: 53
End: 2018-06-15
Payer: MEDICAID

## 2018-06-15 PROCEDURE — 93298 REM INTERROG DEV EVAL SCRMS: CPT

## 2018-06-15 PROCEDURE — 93299: CPT

## 2018-06-28 ENCOUNTER — OUTPATIENT (OUTPATIENT)
Dept: OUTPATIENT SERVICES | Facility: HOSPITAL | Age: 53
LOS: 1 days | Discharge: HOME | End: 2018-06-28
Payer: MEDICARE

## 2018-06-28 DIAGNOSIS — C83.10 MANTLE CELL LYMPHOMA, UNSPECIFIED SITE: ICD-10-CM

## 2018-06-28 DIAGNOSIS — Z98.890 OTHER SPECIFIED POSTPROCEDURAL STATES: Chronic | ICD-10-CM

## 2018-06-28 DIAGNOSIS — Z95.818 PRESENCE OF OTHER CARDIAC IMPLANTS AND GRAFTS: Chronic | ICD-10-CM

## 2018-07-03 ENCOUNTER — RESULT REVIEW (OUTPATIENT)
Age: 53
End: 2018-07-03

## 2018-07-03 ENCOUNTER — APPOINTMENT (OUTPATIENT)
Dept: HEMATOLOGY ONCOLOGY | Facility: CLINIC | Age: 53
End: 2018-07-03
Payer: MEDICAID

## 2018-07-03 ENCOUNTER — APPOINTMENT (OUTPATIENT)
Dept: INFUSION THERAPY | Facility: HOSPITAL | Age: 53
End: 2018-07-03
Payer: MEDICAID

## 2018-07-03 VITALS
DIASTOLIC BLOOD PRESSURE: 64 MMHG | TEMPERATURE: 98.4 F | SYSTOLIC BLOOD PRESSURE: 91 MMHG | BODY MASS INDEX: 31.79 KG/M2 | WEIGHT: 221.54 LBS | OXYGEN SATURATION: 97 % | RESPIRATION RATE: 16 BRPM | HEART RATE: 54 BPM

## 2018-07-03 LAB
ALBUMIN SERPL ELPH-MCNC: 4.5 G/DL
ALP BLD-CCNC: 106 U/L
ALT SERPL-CCNC: 26 U/L
ANION GAP SERPL CALC-SCNC: 12 MMOL/L
AST SERPL-CCNC: 24 U/L
BASOPHILS # BLD AUTO: 0 K/UL — SIGNIFICANT CHANGE UP (ref 0–0.2)
BASOPHILS NFR BLD AUTO: 0.5 % — SIGNIFICANT CHANGE UP (ref 0–2)
BILIRUB SERPL-MCNC: 0.5 MG/DL
BUN SERPL-MCNC: 23 MG/DL
CALCIUM SERPL-MCNC: 9.4 MG/DL
CHLORIDE SERPL-SCNC: 101 MMOL/L
CO2 SERPL-SCNC: 30 MMOL/L
CREAT SERPL-MCNC: 1.14 MG/DL
EOSINOPHIL # BLD AUTO: 0.2 K/UL — SIGNIFICANT CHANGE UP (ref 0–0.5)
EOSINOPHIL NFR BLD AUTO: 4.1 % — SIGNIFICANT CHANGE UP (ref 0–6)
GLUCOSE SERPL-MCNC: 118 MG/DL
HCT VFR BLD CALC: 47.2 % — SIGNIFICANT CHANGE UP (ref 39–50)
HGB BLD-MCNC: 15.4 G/DL — SIGNIFICANT CHANGE UP (ref 13–17)
LDH SERPL-CCNC: 158 U/L
LYMPHOCYTES # BLD AUTO: 2.6 K/UL — SIGNIFICANT CHANGE UP (ref 1–3.3)
LYMPHOCYTES # BLD AUTO: 51.1 % — HIGH (ref 13–44)
MAGNESIUM SERPL-MCNC: 1.9 MG/DL
MCHC RBC-ENTMCNC: 31.6 PG — SIGNIFICANT CHANGE UP (ref 27–34)
MCHC RBC-ENTMCNC: 32.6 G/DL — SIGNIFICANT CHANGE UP (ref 32–36)
MCV RBC AUTO: 96.9 FL — SIGNIFICANT CHANGE UP (ref 80–100)
MONOCYTES # BLD AUTO: 0.4 K/UL — SIGNIFICANT CHANGE UP (ref 0–0.9)
MONOCYTES NFR BLD AUTO: 7.8 % — SIGNIFICANT CHANGE UP (ref 2–14)
NEUTROPHILS # BLD AUTO: 1.9 K/UL — SIGNIFICANT CHANGE UP (ref 1.8–7.4)
NEUTROPHILS NFR BLD AUTO: 36.4 % — LOW (ref 43–77)
PLATELET # BLD AUTO: 214 K/UL — SIGNIFICANT CHANGE UP (ref 150–400)
POTASSIUM SERPL-SCNC: 5.4 MMOL/L
PROT SERPL-MCNC: 6.7 G/DL
RBC # BLD: 4.87 M/UL — SIGNIFICANT CHANGE UP (ref 4.2–5.8)
RBC # FLD: 12.7 % — SIGNIFICANT CHANGE UP (ref 10.3–14.5)
SODIUM SERPL-SCNC: 143 MMOL/L
WBC # BLD: 5.1 K/UL — SIGNIFICANT CHANGE UP (ref 3.8–10.5)
WBC # FLD AUTO: 5.1 K/UL — SIGNIFICANT CHANGE UP (ref 3.8–10.5)

## 2018-07-03 PROCEDURE — 90471 IMMUNIZATION ADMIN: CPT

## 2018-07-03 PROCEDURE — 90647 HIB PRP-OMP VACC 3 DOSE IM: CPT

## 2018-07-03 PROCEDURE — 99214 OFFICE O/P EST MOD 30 MIN: CPT | Mod: 25

## 2018-07-09 ENCOUNTER — MEDICATION RENEWAL (OUTPATIENT)
Age: 53
End: 2018-07-09

## 2018-07-10 ENCOUNTER — APPOINTMENT (OUTPATIENT)
Dept: INFUSION THERAPY | Facility: HOSPITAL | Age: 53
End: 2018-07-10
Payer: MEDICAID

## 2018-07-10 PROCEDURE — 90471 IMMUNIZATION ADMIN: CPT

## 2018-07-10 PROCEDURE — 90472 IMMUNIZATION ADMIN EACH ADD: CPT

## 2018-07-10 PROCEDURE — 90714 TD VACC NO PRESV 7 YRS+ IM: CPT

## 2018-07-10 PROCEDURE — 90713 POLIOVIRUS IPV SC/IM: CPT

## 2018-07-11 ENCOUNTER — FORM ENCOUNTER (OUTPATIENT)
Age: 53
End: 2018-07-11

## 2018-07-12 ENCOUNTER — APPOINTMENT (OUTPATIENT)
Dept: NUCLEAR MEDICINE | Facility: CLINIC | Age: 53
End: 2018-07-12

## 2018-07-12 ENCOUNTER — OUTPATIENT (OUTPATIENT)
Dept: OUTPATIENT SERVICES | Facility: HOSPITAL | Age: 53
LOS: 1 days | End: 2018-07-12
Payer: MEDICAID

## 2018-07-12 DIAGNOSIS — Z98.890 OTHER SPECIFIED POSTPROCEDURAL STATES: Chronic | ICD-10-CM

## 2018-07-12 DIAGNOSIS — C83.10 MANTLE CELL LYMPHOMA, UNSPECIFIED SITE: ICD-10-CM

## 2018-07-12 DIAGNOSIS — Z00.8 ENCOUNTER FOR OTHER GENERAL EXAMINATION: ICD-10-CM

## 2018-07-12 DIAGNOSIS — Z95.818 PRESENCE OF OTHER CARDIAC IMPLANTS AND GRAFTS: Chronic | ICD-10-CM

## 2018-07-12 PROCEDURE — 78815 PET IMAGE W/CT SKULL-THIGH: CPT

## 2018-07-12 PROCEDURE — A9552: CPT

## 2018-07-12 PROCEDURE — 78815 PET IMAGE W/CT SKULL-THIGH: CPT | Mod: 26,PS

## 2018-07-16 ENCOUNTER — APPOINTMENT (OUTPATIENT)
Dept: ELECTROPHYSIOLOGY | Facility: CLINIC | Age: 53
End: 2018-07-16
Payer: MEDICAID

## 2018-07-16 PROCEDURE — 93298 REM INTERROG DEV EVAL SCRMS: CPT

## 2018-07-16 PROCEDURE — 93299: CPT

## 2018-07-17 ENCOUNTER — RESULT REVIEW (OUTPATIENT)
Age: 53
End: 2018-07-17

## 2018-07-17 ENCOUNTER — APPOINTMENT (OUTPATIENT)
Dept: INFUSION THERAPY | Facility: HOSPITAL | Age: 53
End: 2018-07-17

## 2018-07-17 PROBLEM — G43.909 MIGRAINE, UNSPECIFIED, NOT INTRACTABLE, WITHOUT STATUS MIGRAINOSUS: Chronic | Status: ACTIVE | Noted: 2018-04-26

## 2018-07-17 PROBLEM — R00.1 BRADYCARDIA, UNSPECIFIED: Chronic | Status: ACTIVE | Noted: 2018-04-26

## 2018-07-17 PROBLEM — I48.92 UNSPECIFIED ATRIAL FLUTTER: Chronic | Status: ACTIVE | Noted: 2017-12-07

## 2018-07-17 LAB
BASOPHILS # BLD AUTO: 0 K/UL — SIGNIFICANT CHANGE UP (ref 0–0.2)
BASOPHILS NFR BLD AUTO: 0.4 % — SIGNIFICANT CHANGE UP (ref 0–2)
EOSINOPHIL # BLD AUTO: 0.3 K/UL — SIGNIFICANT CHANGE UP (ref 0–0.5)
EOSINOPHIL NFR BLD AUTO: 4.4 % — SIGNIFICANT CHANGE UP (ref 0–6)
HCT VFR BLD CALC: 41.8 % — SIGNIFICANT CHANGE UP (ref 39–50)
HGB BLD-MCNC: 14.3 G/DL — SIGNIFICANT CHANGE UP (ref 13–17)
LYMPHOCYTES # BLD AUTO: 2.5 K/UL — SIGNIFICANT CHANGE UP (ref 1–3.3)
LYMPHOCYTES # BLD AUTO: 40.2 % — SIGNIFICANT CHANGE UP (ref 13–44)
MCHC RBC-ENTMCNC: 33 PG — SIGNIFICANT CHANGE UP (ref 27–34)
MCHC RBC-ENTMCNC: 34.2 G/DL — SIGNIFICANT CHANGE UP (ref 32–36)
MCV RBC AUTO: 96.5 FL — SIGNIFICANT CHANGE UP (ref 80–100)
MONOCYTES # BLD AUTO: 0.5 K/UL — SIGNIFICANT CHANGE UP (ref 0–0.9)
MONOCYTES NFR BLD AUTO: 7.7 % — SIGNIFICANT CHANGE UP (ref 2–14)
NEUTROPHILS # BLD AUTO: 3 K/UL — SIGNIFICANT CHANGE UP (ref 1.8–7.4)
NEUTROPHILS NFR BLD AUTO: 47.3 % — SIGNIFICANT CHANGE UP (ref 43–77)
PLATELET # BLD AUTO: 201 K/UL — SIGNIFICANT CHANGE UP (ref 150–400)
RBC # BLD: 4.33 M/UL — SIGNIFICANT CHANGE UP (ref 4.2–5.8)
RBC # FLD: 12.4 % — SIGNIFICANT CHANGE UP (ref 10.3–14.5)
WBC # BLD: 6.3 K/UL — SIGNIFICANT CHANGE UP (ref 3.8–10.5)
WBC # FLD AUTO: 6.3 K/UL — SIGNIFICANT CHANGE UP (ref 3.8–10.5)

## 2018-07-18 DIAGNOSIS — R11.2 NAUSEA WITH VOMITING, UNSPECIFIED: ICD-10-CM

## 2018-07-18 DIAGNOSIS — Z51.11 ENCOUNTER FOR ANTINEOPLASTIC CHEMOTHERAPY: ICD-10-CM

## 2018-07-24 ENCOUNTER — RESULT REVIEW (OUTPATIENT)
Age: 53
End: 2018-07-24

## 2018-07-24 ENCOUNTER — APPOINTMENT (OUTPATIENT)
Dept: UROLOGY | Facility: CLINIC | Age: 53
End: 2018-07-24
Payer: MEDICAID

## 2018-07-24 ENCOUNTER — APPOINTMENT (OUTPATIENT)
Dept: INFUSION THERAPY | Facility: HOSPITAL | Age: 53
End: 2018-07-24

## 2018-07-24 LAB
BASOPHILS # BLD AUTO: 0.1 K/UL — SIGNIFICANT CHANGE UP (ref 0–0.2)
BASOPHILS NFR BLD AUTO: 1.2 % — SIGNIFICANT CHANGE UP (ref 0–2)
EOSINOPHIL # BLD AUTO: 0.3 K/UL — SIGNIFICANT CHANGE UP (ref 0–0.5)
EOSINOPHIL NFR BLD AUTO: 5 % — SIGNIFICANT CHANGE UP (ref 0–6)
HCT VFR BLD CALC: 47.1 % — SIGNIFICANT CHANGE UP (ref 39–50)
HGB BLD-MCNC: 16.1 G/DL — SIGNIFICANT CHANGE UP (ref 13–17)
LYMPHOCYTES # BLD AUTO: 2.6 K/UL — SIGNIFICANT CHANGE UP (ref 1–3.3)
LYMPHOCYTES # BLD AUTO: 46 % — HIGH (ref 13–44)
MCHC RBC-ENTMCNC: 32.9 PG — SIGNIFICANT CHANGE UP (ref 27–34)
MCHC RBC-ENTMCNC: 34.2 G/DL — SIGNIFICANT CHANGE UP (ref 32–36)
MCV RBC AUTO: 96.2 FL — SIGNIFICANT CHANGE UP (ref 80–100)
MONOCYTES # BLD AUTO: 0.6 K/UL — SIGNIFICANT CHANGE UP (ref 0–0.9)
MONOCYTES NFR BLD AUTO: 10.1 % — SIGNIFICANT CHANGE UP (ref 2–14)
NEUTROPHILS # BLD AUTO: 2.1 K/UL — SIGNIFICANT CHANGE UP (ref 1.8–7.4)
NEUTROPHILS NFR BLD AUTO: 37.8 % — LOW (ref 43–77)
PLATELET # BLD AUTO: 209 K/UL — SIGNIFICANT CHANGE UP (ref 150–400)
RBC # BLD: 4.89 M/UL — SIGNIFICANT CHANGE UP (ref 4.2–5.8)
RBC # FLD: 12.5 % — SIGNIFICANT CHANGE UP (ref 10.3–14.5)
WBC # BLD: 5.6 K/UL — SIGNIFICANT CHANGE UP (ref 3.8–10.5)
WBC # FLD AUTO: 5.6 K/UL — SIGNIFICANT CHANGE UP (ref 3.8–10.5)

## 2018-07-24 PROCEDURE — 99244 OFF/OP CNSLTJ NEW/EST MOD 40: CPT

## 2018-07-30 ENCOUNTER — NON-APPOINTMENT (OUTPATIENT)
Age: 53
End: 2018-07-30

## 2018-07-30 ENCOUNTER — APPOINTMENT (OUTPATIENT)
Dept: ELECTROPHYSIOLOGY | Facility: CLINIC | Age: 53
End: 2018-07-30
Payer: MEDICAID

## 2018-07-30 VITALS
SYSTOLIC BLOOD PRESSURE: 110 MMHG | DIASTOLIC BLOOD PRESSURE: 72 MMHG | BODY MASS INDEX: 32.64 KG/M2 | WEIGHT: 228 LBS | HEIGHT: 70 IN | OXYGEN SATURATION: 98 % | HEART RATE: 54 BPM

## 2018-07-30 PROCEDURE — 93000 ELECTROCARDIOGRAM COMPLETE: CPT

## 2018-07-30 PROCEDURE — 99214 OFFICE O/P EST MOD 30 MIN: CPT

## 2018-07-31 LAB
25(OH)D3 SERPL-MCNC: 27.4 NG/ML
ANION GAP SERPL CALC-SCNC: 11 MMOL/L
BASOPHILS # BLD AUTO: 0.01 K/UL
BASOPHILS NFR BLD AUTO: 0.2 %
BUN SERPL-MCNC: 19 MG/DL
CALCIUM SERPL-MCNC: 9.3 MG/DL
CHLORIDE SERPL-SCNC: 102 MMOL/L
CHOLEST SERPL-MCNC: 178 MG/DL
CHOLEST/HDLC SERPL: 4.6 RATIO
CO2 SERPL-SCNC: 28 MMOL/L
CREAT SERPL-MCNC: 1.01 MG/DL
EOSINOPHIL # BLD AUTO: 0.07 K/UL
EOSINOPHIL NFR BLD AUTO: 1.3 %
ESTRADIOL SERPL-MCNC: 23 PG/ML
FSH SERPL-MCNC: 22.4 IU/L
GLUCOSE SERPL-MCNC: 114 MG/DL
HCT VFR BLD CALC: 48.9 %
HDLC SERPL-MCNC: 39 MG/DL
HGB BLD-MCNC: 16 G/DL
IMM GRANULOCYTES NFR BLD AUTO: 0.4 %
LDLC SERPL CALC-MCNC: 108 MG/DL
LH SERPL-ACNC: 14 IU/L
LYMPHOCYTES # BLD AUTO: 1.64 K/UL
LYMPHOCYTES NFR BLD AUTO: 30.8 %
MAN DIFF?: NORMAL
MCHC RBC-ENTMCNC: 31.9 PG
MCHC RBC-ENTMCNC: 32.7 GM/DL
MCV RBC AUTO: 97.4 FL
MONOCYTES # BLD AUTO: 0.26 K/UL
MONOCYTES NFR BLD AUTO: 4.9 %
NEUTROPHILS # BLD AUTO: 3.33 K/UL
NEUTROPHILS NFR BLD AUTO: 62.4 %
PLATELET # BLD AUTO: 246 K/UL
POTASSIUM SERPL-SCNC: 5.6 MMOL/L
PROLACTIN SERPL-MCNC: 7.1 NG/ML
RBC # BLD: 5.02 M/UL
RBC # FLD: 14.3 %
SODIUM SERPL-SCNC: 141 MMOL/L
TESTOST BND SERPL-MCNC: 4.6 PG/ML
TESTOST SERPL-MCNC: 534.9 NG/DL
TRIGL SERPL-MCNC: 157 MG/DL
TSH SERPL-ACNC: 1.17 UIU/ML
WBC # FLD AUTO: 5.33 K/UL

## 2018-08-07 ENCOUNTER — OUTPATIENT (OUTPATIENT)
Dept: OUTPATIENT SERVICES | Facility: HOSPITAL | Age: 53
LOS: 1 days | End: 2018-08-07
Payer: MEDICAID

## 2018-08-07 ENCOUNTER — APPOINTMENT (OUTPATIENT)
Dept: UROLOGY | Facility: CLINIC | Age: 53
End: 2018-08-07
Payer: MEDICAID

## 2018-08-07 DIAGNOSIS — Z95.818 PRESENCE OF OTHER CARDIAC IMPLANTS AND GRAFTS: Chronic | ICD-10-CM

## 2018-08-07 DIAGNOSIS — Z98.890 OTHER SPECIFIED POSTPROCEDURAL STATES: Chronic | ICD-10-CM

## 2018-08-07 DIAGNOSIS — R35.0 FREQUENCY OF MICTURITION: ICD-10-CM

## 2018-08-07 PROCEDURE — 54235 NJX CORPORA CAVERNOSA RX AGT: CPT

## 2018-08-07 PROCEDURE — 93980 PENILE VASCULAR STUDY: CPT | Mod: 26

## 2018-08-07 PROCEDURE — 93980 PENILE VASCULAR STUDY: CPT

## 2018-08-07 PROCEDURE — 99214 OFFICE O/P EST MOD 30 MIN: CPT | Mod: 25

## 2018-08-08 DIAGNOSIS — N52.9 MALE ERECTILE DYSFUNCTION, UNSPECIFIED: ICD-10-CM

## 2018-08-17 ENCOUNTER — APPOINTMENT (OUTPATIENT)
Dept: ELECTROPHYSIOLOGY | Facility: CLINIC | Age: 53
End: 2018-08-17
Payer: MEDICAID

## 2018-08-17 PROCEDURE — 93299: CPT

## 2018-08-17 PROCEDURE — 93298 REM INTERROG DEV EVAL SCRMS: CPT

## 2018-09-06 ENCOUNTER — MEDICATION RENEWAL (OUTPATIENT)
Age: 53
End: 2018-09-06

## 2018-09-12 ENCOUNTER — APPOINTMENT (OUTPATIENT)
Dept: CARDIOLOGY | Facility: CLINIC | Age: 53
End: 2018-09-12
Payer: MEDICAID

## 2018-09-12 ENCOUNTER — RX RENEWAL (OUTPATIENT)
Age: 53
End: 2018-09-12

## 2018-09-12 VITALS
BODY MASS INDEX: 32.35 KG/M2 | OXYGEN SATURATION: 99 % | WEIGHT: 226 LBS | SYSTOLIC BLOOD PRESSURE: 104 MMHG | DIASTOLIC BLOOD PRESSURE: 67 MMHG | HEART RATE: 64 BPM | HEIGHT: 70 IN

## 2018-09-12 PROCEDURE — 99214 OFFICE O/P EST MOD 30 MIN: CPT

## 2018-09-12 RX ORDER — PAPAVERINE HYDROCHLORIDE 30 MG/ML
30 INJECTION, SOLUTION INTRAVENOUS
Qty: 2 | Refills: 0 | Status: DISCONTINUED | COMMUNITY
Start: 2018-07-24 | End: 2018-09-12

## 2018-09-12 RX ORDER — ACYCLOVIR 400 MG/1
400 TABLET ORAL
Qty: 90 | Refills: 1 | Status: DISCONTINUED | COMMUNITY
End: 2018-09-12

## 2018-09-12 RX ORDER — TAMSULOSIN HYDROCHLORIDE 0.4 MG/1
0.4 CAPSULE ORAL
Qty: 30 | Refills: 3 | Status: DISCONTINUED | COMMUNITY
End: 2018-09-12

## 2018-09-17 ENCOUNTER — APPOINTMENT (OUTPATIENT)
Dept: ELECTROPHYSIOLOGY | Facility: CLINIC | Age: 53
End: 2018-09-17
Payer: MEDICAID

## 2018-09-17 PROCEDURE — 93298 REM INTERROG DEV EVAL SCRMS: CPT

## 2018-09-17 PROCEDURE — 93299: CPT

## 2018-10-04 ENCOUNTER — RX RENEWAL (OUTPATIENT)
Age: 53
End: 2018-10-04

## 2018-10-09 ENCOUNTER — APPOINTMENT (OUTPATIENT)
Dept: UROLOGY | Facility: CLINIC | Age: 53
End: 2018-10-09

## 2018-10-19 ENCOUNTER — OUTPATIENT (OUTPATIENT)
Dept: OUTPATIENT SERVICES | Facility: HOSPITAL | Age: 53
LOS: 1 days | Discharge: HOME | End: 2018-10-19

## 2018-10-19 ENCOUNTER — APPOINTMENT (OUTPATIENT)
Dept: ELECTROPHYSIOLOGY | Facility: CLINIC | Age: 53
End: 2018-10-19
Payer: MEDICAID

## 2018-10-19 DIAGNOSIS — Z98.890 OTHER SPECIFIED POSTPROCEDURAL STATES: Chronic | ICD-10-CM

## 2018-10-19 DIAGNOSIS — Z95.818 PRESENCE OF OTHER CARDIAC IMPLANTS AND GRAFTS: Chronic | ICD-10-CM

## 2018-10-19 DIAGNOSIS — C83.10 MANTLE CELL LYMPHOMA, UNSPECIFIED SITE: ICD-10-CM

## 2018-10-19 PROCEDURE — 93298 REM INTERROG DEV EVAL SCRMS: CPT

## 2018-10-19 PROCEDURE — 93299: CPT

## 2018-10-25 ENCOUNTER — APPOINTMENT (OUTPATIENT)
Dept: HEMATOLOGY ONCOLOGY | Facility: CLINIC | Age: 53
End: 2018-10-25
Payer: MEDICAID

## 2018-10-25 ENCOUNTER — RESULT REVIEW (OUTPATIENT)
Age: 53
End: 2018-10-25

## 2018-10-25 ENCOUNTER — APPOINTMENT (OUTPATIENT)
Dept: HEMATOLOGY ONCOLOGY | Facility: CLINIC | Age: 53
End: 2018-10-25

## 2018-10-25 ENCOUNTER — OUTPATIENT (OUTPATIENT)
Dept: OUTPATIENT SERVICES | Facility: HOSPITAL | Age: 53
LOS: 1 days | End: 2018-10-25
Payer: MEDICAID

## 2018-10-25 VITALS
SYSTOLIC BLOOD PRESSURE: 96 MMHG | DIASTOLIC BLOOD PRESSURE: 66 MMHG | BODY MASS INDEX: 31.92 KG/M2 | OXYGEN SATURATION: 99 % | TEMPERATURE: 98.4 F | WEIGHT: 222.44 LBS | RESPIRATION RATE: 16 BRPM | HEART RATE: 98 BPM

## 2018-10-25 DIAGNOSIS — Z98.890 OTHER SPECIFIED POSTPROCEDURAL STATES: Chronic | ICD-10-CM

## 2018-10-25 DIAGNOSIS — Z94.84 STEM CELLS TRANSPLANT STATUS: ICD-10-CM

## 2018-10-25 DIAGNOSIS — Z95.818 PRESENCE OF OTHER CARDIAC IMPLANTS AND GRAFTS: Chronic | ICD-10-CM

## 2018-10-25 LAB
4/8 RATIO: 0.69 RATIO — LOW (ref 0.9–3.6)
ABS CD8: 1152 /UL — HIGH (ref 142–740)
BASOPHILS # BLD AUTO: 0.1 K/UL — SIGNIFICANT CHANGE UP (ref 0–0.2)
BASOPHILS NFR BLD AUTO: 1.2 % — SIGNIFICANT CHANGE UP (ref 0–2)
CD3 BLASTS SPEC-ACNC: 2113 /UL — HIGH (ref 672–1870)
CD3 BLASTS SPEC-ACNC: 86 % — HIGH (ref 59–83)
CD4 %: 32 % — SIGNIFICANT CHANGE UP (ref 30–62)
CD8 %: 47 % — HIGH (ref 12–36)
EOSINOPHIL # BLD AUTO: 0.1 K/UL — SIGNIFICANT CHANGE UP (ref 0–0.5)
EOSINOPHIL NFR BLD AUTO: 2.2 % — SIGNIFICANT CHANGE UP (ref 0–6)
HCT VFR BLD CALC: 50.4 % — HIGH (ref 39–50)
HGB BLD-MCNC: 16.5 G/DL — SIGNIFICANT CHANGE UP (ref 13–17)
LYMPHOCYTES # BLD AUTO: 2.7 K/UL — SIGNIFICANT CHANGE UP (ref 1–3.3)
LYMPHOCYTES # BLD AUTO: 39.8 % — SIGNIFICANT CHANGE UP (ref 13–44)
MCHC RBC-ENTMCNC: 31 PG — SIGNIFICANT CHANGE UP (ref 27–34)
MCHC RBC-ENTMCNC: 32.7 G/DL — SIGNIFICANT CHANGE UP (ref 32–36)
MCV RBC AUTO: 94.6 FL — SIGNIFICANT CHANGE UP (ref 80–100)
MONOCYTES # BLD AUTO: 0.6 K/UL — SIGNIFICANT CHANGE UP (ref 0–0.9)
MONOCYTES NFR BLD AUTO: 8.9 % — SIGNIFICANT CHANGE UP (ref 2–14)
NEUTROPHILS # BLD AUTO: 3.2 K/UL — SIGNIFICANT CHANGE UP (ref 1.8–7.4)
NEUTROPHILS NFR BLD AUTO: 47.9 % — SIGNIFICANT CHANGE UP (ref 43–77)
PLATELET # BLD AUTO: 236 K/UL — SIGNIFICANT CHANGE UP (ref 150–400)
RBC # BLD: 5.33 M/UL — SIGNIFICANT CHANGE UP (ref 4.2–5.8)
RBC # FLD: 12.2 % — SIGNIFICANT CHANGE UP (ref 10.3–14.5)
T-CELL CD4 SUBSET PNL BLD: 796 /UL — SIGNIFICANT CHANGE UP (ref 489–1457)
WBC # BLD: 6.7 K/UL — SIGNIFICANT CHANGE UP (ref 3.8–10.5)
WBC # FLD AUTO: 6.7 K/UL — SIGNIFICANT CHANGE UP (ref 3.8–10.5)

## 2018-10-25 PROCEDURE — 86360 T CELL ABSOLUTE COUNT/RATIO: CPT

## 2018-10-25 PROCEDURE — 99214 OFFICE O/P EST MOD 30 MIN: CPT

## 2018-10-27 LAB
ALBUMIN SERPL ELPH-MCNC: 5 G/DL
ALP BLD-CCNC: 112 U/L
ALT SERPL-CCNC: 26 U/L
ANION GAP SERPL CALC-SCNC: 14 MMOL/L
AST SERPL-CCNC: 21 U/L
BILIRUB SERPL-MCNC: 0.5 MG/DL
BUN SERPL-MCNC: 25 MG/DL
CALCIUM SERPL-MCNC: 9.6 MG/DL
CHLORIDE SERPL-SCNC: 104 MMOL/L
CO2 SERPL-SCNC: 27 MMOL/L
CREAT SERPL-MCNC: 0.98 MG/DL
DEPRECATED KAPPA LC FREE/LAMBDA SER: 1.51 RATIO
GLUCOSE SERPL-MCNC: 101 MG/DL
IGA SER QL IEP: 129 MG/DL
IGG SER QL IEP: 1039 MG/DL
IGM SER QL IEP: 18 MG/DL
KAPPA LC CSF-MCNC: 0.77 MG/DL
KAPPA LC SERPL-MCNC: 1.16 MG/DL
LDH SERPL-CCNC: 167 U/L
MAGNESIUM SERPL-MCNC: 2.1 MG/DL
POTASSIUM SERPL-SCNC: 5 MMOL/L
PROT SERPL-MCNC: 7.1 G/DL
SODIUM SERPL-SCNC: 145 MMOL/L
T3 SERPL-MCNC: 126 NG/DL
T4 SERPL-MCNC: 7 UG/DL
TESTOST SERPL-MCNC: 548.3 NG/DL
TSH SERPL-ACNC: 1.94 UIU/ML

## 2018-11-02 ENCOUNTER — RX RENEWAL (OUTPATIENT)
Age: 53
End: 2018-11-02

## 2018-11-05 ENCOUNTER — RX RENEWAL (OUTPATIENT)
Age: 53
End: 2018-11-05

## 2018-11-12 ENCOUNTER — APPOINTMENT (OUTPATIENT)
Dept: ELECTROPHYSIOLOGY | Facility: CLINIC | Age: 53
End: 2018-11-12
Payer: MEDICAID

## 2018-11-12 ENCOUNTER — NON-APPOINTMENT (OUTPATIENT)
Age: 53
End: 2018-11-12

## 2018-11-12 VITALS
WEIGHT: 225 LBS | HEART RATE: 75 BPM | BODY MASS INDEX: 32.21 KG/M2 | DIASTOLIC BLOOD PRESSURE: 75 MMHG | SYSTOLIC BLOOD PRESSURE: 110 MMHG | OXYGEN SATURATION: 97 % | HEIGHT: 70 IN

## 2018-11-12 PROCEDURE — 93000 ELECTROCARDIOGRAM COMPLETE: CPT

## 2018-11-12 PROCEDURE — 99214 OFFICE O/P EST MOD 30 MIN: CPT | Mod: 25

## 2018-11-12 NOTE — PHYSICAL EXAM
[General Appearance - Well Developed] : well developed [General Appearance - In No Acute Distress] : no acute distress [Normal Conjunctiva] : the conjunctiva exhibited no abnormalities [Normal Jugular Venous V Waves Present] : normal jugular venous V waves present [Respiration, Rhythm And Depth] : normal respiratory rhythm and effort [Exaggerated Use Of Accessory Muscles For Inspiration] : no accessory muscle use [Auscultation Breath Sounds / Voice Sounds] : lungs were clear to auscultation bilaterally [Heart Sounds] : normal S1 and S2 [Murmurs] : no murmurs present [Arterial Pulses Normal] : the arterial pulses were normal [Edema] : no peripheral edema present [Abdomen Soft] : soft [Abdomen Tenderness] : non-tender [Abnormal Walk] : normal gait [Nail Clubbing] : no clubbing of the fingernails [Cyanosis, Localized] : no localized cyanosis [] : no rash [No Venous Stasis] : no venous stasis [Impaired Insight] : insight and judgment were intact

## 2018-11-12 NOTE — REVIEW OF SYSTEMS
[see HPI] : see HPI [Palpitations] : palpitations [Negative] : Heme/Lymph [Fever] : no fever [Recent Weight Gain (___ Lbs)] : no recent weight gain [Feeling Fatigued] : not feeling fatigued [Recent Weight Loss (___ Lbs)] : no recent weight loss [Blurry Vision] : no blurred vision [Dyspnea on exertion] : not dyspnea during exertion [Chest Pain] : no chest pain [Dizziness] : no dizziness

## 2018-11-12 NOTE — REASON FOR VISIT
[Atrial Fibrillation] : atrial fibrillation [Palpitations] : palpitations [Other: _____] : [unfilled] [FreeTextEntry2] : follow up [Spouse] : spouse

## 2018-11-12 NOTE — HISTORY OF PRESENT ILLNESS
[FreeTextEntry1] : He is feeling well. States that his HR was 34 yesterday am but was not symptomatic. \par He is still undergoing chemo for NHL. \par 54 yo man seen in followup regarding episodes of pAF. He has an ILR to monitor for bradycardia/AF\par He had a recent cardiac cath - non obstructive.\par He has had prior atrial flutter ablation but not AF ablation.  \par He is not aware of the AF. He is less fatigued and denies dizziness, lightheadedness, syncope, presyncope or SOB\par  \par He has prior history of lymphoma treated with chemo,  questionable h/o HTN - on diltiazem for AF rate control as well\par

## 2018-11-12 NOTE — DISCUSSION/SUMMARY
[FreeTextEntry1] : The ILR interrogation shows frequent episodes of AF - rate controlled. No pauses. \par Higher AF burden - does not seem to be symptomatic from the AF and not aware of it - he was in AF today and stated that he feels well. \par He has a CHADs score of 0 ( ?? Hypertension).  \par Options for AF management discussed: Because of ongoing treatment for NHL, ablation not a good option.  Also AAD such as sotalol may result in excessive bradycardia and risk for proarrhythmia.\par Could consider Flecainide especially since cath showed normal coronaries - would consider this option. Will hold beta blocker for now and continue on Diltiazem for BP. Can take prn beta blocker if HR increases \par Follow remote monitoring of ILR and office evaluation in 4 months. \par \par

## 2018-11-18 ENCOUNTER — FORM ENCOUNTER (OUTPATIENT)
Age: 53
End: 2018-11-18

## 2018-11-19 ENCOUNTER — OUTPATIENT (OUTPATIENT)
Dept: OUTPATIENT SERVICES | Facility: HOSPITAL | Age: 53
LOS: 1 days | End: 2018-11-19
Payer: MEDICAID

## 2018-11-19 ENCOUNTER — APPOINTMENT (OUTPATIENT)
Dept: CT IMAGING | Facility: CLINIC | Age: 53
End: 2018-11-19

## 2018-11-19 ENCOUNTER — APPOINTMENT (OUTPATIENT)
Dept: ELECTROPHYSIOLOGY | Facility: CLINIC | Age: 53
End: 2018-11-19
Payer: MEDICAID

## 2018-11-19 DIAGNOSIS — Z98.890 OTHER SPECIFIED POSTPROCEDURAL STATES: Chronic | ICD-10-CM

## 2018-11-19 DIAGNOSIS — Z95.818 PRESENCE OF OTHER CARDIAC IMPLANTS AND GRAFTS: Chronic | ICD-10-CM

## 2018-11-19 DIAGNOSIS — Z00.8 ENCOUNTER FOR OTHER GENERAL EXAMINATION: ICD-10-CM

## 2018-11-19 PROCEDURE — 71260 CT THORAX DX C+: CPT | Mod: 26

## 2018-11-19 PROCEDURE — 93299: CPT

## 2018-11-19 PROCEDURE — 82565 ASSAY OF CREATININE: CPT

## 2018-11-19 PROCEDURE — 74177 CT ABD & PELVIS W/CONTRAST: CPT

## 2018-11-19 PROCEDURE — 93298 REM INTERROG DEV EVAL SCRMS: CPT

## 2018-11-19 PROCEDURE — 74177 CT ABD & PELVIS W/CONTRAST: CPT | Mod: 26

## 2018-11-19 PROCEDURE — 71260 CT THORAX DX C+: CPT

## 2018-11-27 ENCOUNTER — RX RENEWAL (OUTPATIENT)
Age: 53
End: 2018-11-27

## 2018-12-03 ENCOUNTER — RX RENEWAL (OUTPATIENT)
Age: 53
End: 2018-12-03

## 2018-12-04 ENCOUNTER — RX RENEWAL (OUTPATIENT)
Age: 53
End: 2018-12-04

## 2018-12-21 ENCOUNTER — APPOINTMENT (OUTPATIENT)
Dept: ELECTROPHYSIOLOGY | Facility: CLINIC | Age: 53
End: 2018-12-21
Payer: MEDICAID

## 2018-12-21 PROCEDURE — 93298 REM INTERROG DEV EVAL SCRMS: CPT

## 2018-12-21 PROCEDURE — 93299: CPT

## 2019-01-10 ENCOUNTER — OUTPATIENT (OUTPATIENT)
Dept: OUTPATIENT SERVICES | Facility: HOSPITAL | Age: 54
LOS: 1 days | Discharge: HOME | End: 2019-01-10

## 2019-01-10 DIAGNOSIS — Z98.890 OTHER SPECIFIED POSTPROCEDURAL STATES: Chronic | ICD-10-CM

## 2019-01-10 DIAGNOSIS — Z95.818 PRESENCE OF OTHER CARDIAC IMPLANTS AND GRAFTS: Chronic | ICD-10-CM

## 2019-01-10 DIAGNOSIS — C83.10 MANTLE CELL LYMPHOMA, UNSPECIFIED SITE: ICD-10-CM

## 2019-01-15 ENCOUNTER — RESULT REVIEW (OUTPATIENT)
Age: 54
End: 2019-01-15

## 2019-01-15 ENCOUNTER — APPOINTMENT (OUTPATIENT)
Dept: INFUSION THERAPY | Facility: HOSPITAL | Age: 54
End: 2019-01-15

## 2019-01-15 LAB
BASOPHILS # BLD AUTO: 0 K/UL — SIGNIFICANT CHANGE UP (ref 0–0.2)
BASOPHILS NFR BLD AUTO: 0.9 % — SIGNIFICANT CHANGE UP (ref 0–2)
EOSINOPHIL # BLD AUTO: 0.2 K/UL — SIGNIFICANT CHANGE UP (ref 0–0.5)
EOSINOPHIL NFR BLD AUTO: 4 % — SIGNIFICANT CHANGE UP (ref 0–6)
HCT VFR BLD CALC: 45.4 % — SIGNIFICANT CHANGE UP (ref 39–50)
HGB BLD-MCNC: 15.4 G/DL — SIGNIFICANT CHANGE UP (ref 13–17)
LYMPHOCYTES # BLD AUTO: 2 K/UL — SIGNIFICANT CHANGE UP (ref 1–3.3)
LYMPHOCYTES # BLD AUTO: 40.9 % — SIGNIFICANT CHANGE UP (ref 13–44)
MCHC RBC-ENTMCNC: 31.5 PG — SIGNIFICANT CHANGE UP (ref 27–34)
MCHC RBC-ENTMCNC: 33.8 G/DL — SIGNIFICANT CHANGE UP (ref 32–36)
MCV RBC AUTO: 93.3 FL — SIGNIFICANT CHANGE UP (ref 80–100)
MONOCYTES # BLD AUTO: 0.4 K/UL — SIGNIFICANT CHANGE UP (ref 0–0.9)
MONOCYTES NFR BLD AUTO: 7.8 % — SIGNIFICANT CHANGE UP (ref 2–14)
NEUTROPHILS # BLD AUTO: 2.3 K/UL — SIGNIFICANT CHANGE UP (ref 1.8–7.4)
NEUTROPHILS NFR BLD AUTO: 46.3 % — SIGNIFICANT CHANGE UP (ref 43–77)
PLATELET # BLD AUTO: 229 K/UL — SIGNIFICANT CHANGE UP (ref 150–400)
RBC # BLD: 4.87 M/UL — SIGNIFICANT CHANGE UP (ref 4.2–5.8)
RBC # FLD: 11.9 % — SIGNIFICANT CHANGE UP (ref 10.3–14.5)
WBC # BLD: 5 K/UL — SIGNIFICANT CHANGE UP (ref 3.8–10.5)
WBC # FLD AUTO: 5 K/UL — SIGNIFICANT CHANGE UP (ref 3.8–10.5)

## 2019-01-16 DIAGNOSIS — Z51.11 ENCOUNTER FOR ANTINEOPLASTIC CHEMOTHERAPY: ICD-10-CM

## 2019-01-16 DIAGNOSIS — R11.2 NAUSEA WITH VOMITING, UNSPECIFIED: ICD-10-CM

## 2019-01-22 ENCOUNTER — RESULT REVIEW (OUTPATIENT)
Age: 54
End: 2019-01-22

## 2019-01-22 ENCOUNTER — APPOINTMENT (OUTPATIENT)
Dept: HEMATOLOGY ONCOLOGY | Facility: CLINIC | Age: 54
End: 2019-01-22
Payer: MEDICAID

## 2019-01-22 ENCOUNTER — APPOINTMENT (OUTPATIENT)
Dept: INFUSION THERAPY | Facility: HOSPITAL | Age: 54
End: 2019-01-22

## 2019-01-22 VITALS
BODY MASS INDEX: 32.11 KG/M2 | WEIGHT: 223.77 LBS | DIASTOLIC BLOOD PRESSURE: 86 MMHG | TEMPERATURE: 98.3 F | OXYGEN SATURATION: 100 % | HEART RATE: 73 BPM | RESPIRATION RATE: 16 BRPM | SYSTOLIC BLOOD PRESSURE: 129 MMHG

## 2019-01-22 LAB
ALBUMIN SERPL ELPH-MCNC: 4.8 G/DL
ALP BLD-CCNC: 100 U/L
ALT SERPL-CCNC: 34 U/L
ANION GAP SERPL CALC-SCNC: 9 MMOL/L
AST SERPL-CCNC: 22 U/L
BASOPHILS # BLD AUTO: 0 K/UL — SIGNIFICANT CHANGE UP (ref 0–0.2)
BASOPHILS NFR BLD AUTO: 0.9 % — SIGNIFICANT CHANGE UP (ref 0–2)
BILIRUB SERPL-MCNC: 0.5 MG/DL
BUN SERPL-MCNC: 18 MG/DL
CALCIUM SERPL-MCNC: 9.6 MG/DL
CHLORIDE SERPL-SCNC: 102 MMOL/L
CO2 SERPL-SCNC: 31 MMOL/L
CREAT SERPL-MCNC: 0.94 MG/DL
EOSINOPHIL # BLD AUTO: 0.2 K/UL — SIGNIFICANT CHANGE UP (ref 0–0.5)
EOSINOPHIL NFR BLD AUTO: 3.2 % — SIGNIFICANT CHANGE UP (ref 0–6)
GLUCOSE SERPL-MCNC: 109 MG/DL
HCT VFR BLD CALC: 47.1 % — SIGNIFICANT CHANGE UP (ref 39–50)
HGB BLD-MCNC: 15.8 G/DL — SIGNIFICANT CHANGE UP (ref 13–17)
LDH SERPL-CCNC: 151 U/L
LYMPHOCYTES # BLD AUTO: 1.9 K/UL — SIGNIFICANT CHANGE UP (ref 1–3.3)
LYMPHOCYTES # BLD AUTO: 36.1 % — SIGNIFICANT CHANGE UP (ref 13–44)
MAGNESIUM SERPL-MCNC: 1.9 MG/DL
MCHC RBC-ENTMCNC: 31.6 PG — SIGNIFICANT CHANGE UP (ref 27–34)
MCHC RBC-ENTMCNC: 33.6 G/DL — SIGNIFICANT CHANGE UP (ref 32–36)
MCV RBC AUTO: 94.1 FL — SIGNIFICANT CHANGE UP (ref 80–100)
MONOCYTES # BLD AUTO: 0.4 K/UL — SIGNIFICANT CHANGE UP (ref 0–0.9)
MONOCYTES NFR BLD AUTO: 8.1 % — SIGNIFICANT CHANGE UP (ref 2–14)
NEUTROPHILS # BLD AUTO: 2.7 K/UL — SIGNIFICANT CHANGE UP (ref 1.8–7.4)
NEUTROPHILS NFR BLD AUTO: 51.6 % — SIGNIFICANT CHANGE UP (ref 43–77)
PLATELET # BLD AUTO: 206 K/UL — SIGNIFICANT CHANGE UP (ref 150–400)
POTASSIUM SERPL-SCNC: 4.3 MMOL/L
PROT SERPL-MCNC: 6.9 G/DL
RBC # BLD: 5.01 M/UL — SIGNIFICANT CHANGE UP (ref 4.2–5.8)
RBC # FLD: 11.8 % — SIGNIFICANT CHANGE UP (ref 10.3–14.5)
SODIUM SERPL-SCNC: 142 MMOL/L
WBC # BLD: 5.2 K/UL — SIGNIFICANT CHANGE UP (ref 3.8–10.5)
WBC # FLD AUTO: 5.2 K/UL — SIGNIFICANT CHANGE UP (ref 3.8–10.5)

## 2019-01-22 PROCEDURE — 99214 OFFICE O/P EST MOD 30 MIN: CPT

## 2019-01-22 NOTE — PHYSICAL EXAM
[Normal Male] : prostate smooth, symmetric with no modularity or induration [Normal] : affect appropriate [Ambulatory and capable of all self care but unable to carry out any work activities] : Status 2- Ambulatory and capable of all self care but unable to carry out any work activities. Up and about more than 50% of waking hours [de-identified] : healed scar from port removal

## 2019-01-22 NOTE — ASSESSMENT
[FreeTextEntry1] : 53 y/o gentleman with hx of mantle cell lymphoma initially dx'ed Oct 2016 s/p RCHOP chemotherapy X 4 cycles and RICE X 2 cycles with stem cell collected off of the 2nd RICE..he is now s/p CBV-conditioned autologous stem cell transplant on 4/5/17. Hospital course complicated by a-fib/a-flutter s/p ablation and now stable on Cardizem and Toprol. Also now on Xarelto. Port also removed. He demonstrated engraftment on 4/16 and d/c'ed in stable condition on 5/5.\par  \par Peripheral blood work remains stable and discussed with patient.\par Labs sent for CMP, LDH, Mg, T-cell subset, Quant. TFTs. \par 12 month vaccines completed in April 2018. 14 month vaccines completed July 2018.  24 month re-vaccinations due in April 2019. \par Last CT chest, abdomen, pelvis was in November 2018. Will repeat in May 2019.\par Rituxan maintenance therapy received this month. Next due in July 2019. (Short Rituxan, once a week for 2 weeks) I reviewed the rationale, risk, benefits and side effects. \par Maintain f/u with cardiology. \par Well care and cancer screening stressed\par RTC: July 2019.\par

## 2019-01-22 NOTE — HISTORY OF PRESENT ILLNESS
[de-identified] : Mr. Chavez is a 53 year old male with a history of MCL, initially diagnosed 10/2016. On 11/15/2016 he was seen by Dr. Bravo at [facility], after noticing a left inguinal mass for which he sought medical attention. CT scans (3/21/2016) revealed left-sided pelvic and left inguinal adenopathy. A 0.7 cm indeterminate lesion in the right iliac bone was also noted. A left inguinal lymph node biopsy was performed 9/24/2016 which revealed morphologic and immunophenotypic findings consistent with mantle cell lymphoma. He is status post 4 cycles of R-CHOP, and two cycles of RICE consolidation. A bone marrow biopsy on 2/10/17 showed normocellular bone marrow with trilineage hematopoiesis and maturation. He was admitted 3/27/17 for an autologous peripheral blood stem cell transplant with CBV  regimen. \par  \par Upon admission, a TLC was placed in IR. Mr. Chavez received IV hydration, nutritional support, pain management, antinausea medication, antidiarrheals, antibiotic, antiviral, PCP, antifungal and GI prophylaxis. Labs were monitored daily. Mr. Chavez received transfusional support and electrolyte repletion as needed. When his ANC dropped below 1000, he was started on prophylactic ciprofloxacin. When he became febrile, his antibiotic coverage was broadened as appropriate. \par  \par On 4/5/17, Mr. Chavez received 314ml of thawed, pooled, washed, mobilized, plasma reduced, HPC apheresis over approximately 1 hour. He tolerated the infusion well with no adverse side effects noted. Engraftment was noted on 4/16/17, and the Zarxio and antibiotics were discontinued. \par  \par Mr. Chavez's transplant course was complicated by recurrent headaches, oral and GI mucositis, dizziness, and chest pain resulting in a RRT on 4/10/17. The chest pain was self limiting. A second RRT was called on 4/16/17, for palpitations and dizziness. Mr. Chavez was found to be in atrial flutter with RVR. He was transferred to telemetry and followed by cardiology. \par A-fib/a-flutter was resistant to single rate agent, and while in arrhythmia, pt would become symptomatic, hypotensive. Ablation was was preformed, however was unsuccessful. Pt continued to have frequent changes in rhythm. As a-fib was usually preceded by PAC, and mediport tip was in r atrium, concern was this may have been involved. Mediport was removed but patient continued to have runs of non conducted APCs. Patient was followed by EP during this admission who ruled out any need for PPM insertion. Patient was started on Cardizem and Toprol which he tolerated well. He did have episodes of bradycardia during his sleep with episodes of Atrial flutter with RVR as well. However, he remained asymptomatic. \par Pt was d/c 5/5/2017 to home in stable condition with instructions to follow up with hematology and EP. [de-identified] : Presenting today for a follow up appointment. He is currently on maintenance rituxan. PENELOPE...Patient reports continued fatigue, stating that he tires very easily. He continues to be followed by cardiology, who has reduced Metoprolol to PRN, and patient states he does not take it. He is taking Diltiazem. He again notes feeling somewhat depressed at times, he has not seen a psychologist. He denies LE edema, but reports some hyperpigmentation. Denies mouth sores, eye dryness, nausea, vomiting, diarrhea. No CP or SOB. Remains compliant with medications. Patient reports having a loop recorder, and does not have a Mediport.\par \par

## 2019-01-22 NOTE — ADDENDUM
[FreeTextEntry1] : Documented by Joan Hutchinson acting as a scribe for Dr. Gerardo Banks on 1/22/2019.\par \par All medical record entries made by the Scribe were at my, Dr. Gerardo Banks's, direction and personally dictated by me on 1/22/2019. I have reviewed the chart and agree that  the record accurately reflects my personal performance of the history, physical exam, assessment and plan. I have also personally directed, reviewed, and agree with the discharge instructions.\par

## 2019-01-25 ENCOUNTER — APPOINTMENT (OUTPATIENT)
Dept: ELECTROPHYSIOLOGY | Facility: CLINIC | Age: 54
End: 2019-01-25
Payer: MEDICAID

## 2019-01-25 PROCEDURE — 93298 REM INTERROG DEV EVAL SCRMS: CPT

## 2019-01-25 PROCEDURE — 93299: CPT

## 2019-02-01 ENCOUNTER — RX RENEWAL (OUTPATIENT)
Age: 54
End: 2019-02-01

## 2019-02-06 NOTE — PATIENT PROFILE ADULT. - BRADEN SCORE (IF 18 OR LESS ACTIVATE SKIN INJURY RISK INCREASED GUIDELINE), MLM
WELL CHILD VISIT   Age 12 to 15 Months    Chief Complaint   Patient presents with   • Well Infant Birth to 23 Months        SUBJECTIVE:  Mary Buckley is a 16 month old female who presents for a well child exam.   Patient presents with Mother and mother's girlfriend .    CONCERNS RAISED TODAY: Behind on vaccines and need refills for eczema ointment. Mother feels that when Mary gets a cold, she tends to be short of breath and wheezes a lot. Mother has a strong history of asthma as does dad.     Significant changes since last visit: no    The following portions of the patient's history were reviewed and updated as appropriate: allergies, current medications, past family history, past medical history, past social history, past surgical history and problem list.  Past Medical History:   Diagnosis Date   • Flexural eczema    • Reactive airway disease      Family History   Problem Relation Age of Onset   • Asthma Mother    • Asthma Father        Nutrition / GI:  Current diet: eats everything. Drinks soy milk 2-3 cups a day.   Stooling: stools at least daily, no concerns sometimes has constipation  Provided AG re:  Tap water safe for formula prep  Discussed using prune juice or pear juice with constipation, and increasing water intake.     Dental:  Brushing: Attempting brushing 1-2x daily  Water supply in home: uses filtered water (tap / frig / pitcher)  Provided additional dental AG:  Discussed the [facility] recommendations for using a filter at the tap for homes built before 1951, also running cold water for a few minutes before using      Social  / Support:  Living at home: mom, mother's girlfriend, mother's girlfriend's mom   currently:   in home-- primary caregiver(s):  Family members  Parents working outside the home: mother    Sleep Concerns: has regular bedtime routine most nights, no concerns, good sleeper  Snoring?:Yes-mild, does not disrupt sleep   Anticipatory Guidance provided:    Toddlers need an average of 11-14 hours of sleep    Development:  Any parent concerns re: development? No concerns  (by 15 months)  [x]  YES     []  NO     []  UNKNOWN  Has 2 to 5 words plus \"mama, abisai\"  [x]  YES     []  NO     []  UNKNOWN  Points (to objects of interest and to indicate wants)   [x]  YES     []  NO    []  UNKNOWN   Scribbles with crayon or pencil  [x]  YES    []  NO     []  UNKNOWN    Feeds self independently (with either hand)  [x]  YES     []  NO    []  UNKNOWN   Turns pages in book  [x]  YES     []  NO    []  UNKNOWN   Crawls up and down steps  [x]  YES     []  NO    []  UNKNOWN   Walks well, squats  [x]  YES     []  NO    []  UNKNOWN   Drinks from sippy cup at meals  [x]  YES     []  NO    []  UNKNOWN   Stacks two blocks  [x]  YES    []  NO     []  UNKNOWN    Is affectionate, hugs, shows empathy      Safety:    Smokers in home: No   Appropriate car seat for age / weight? Yes  Obvious lead risk present in home: No   Ever screened for lead yet? No  Hunters / firearm owners in home? No firearm exposure in family / caretaker home(s)  Any home safety issues not addressed (stairs, storage of toxic products, guns in home)?No  Anticipatory Guidance provided re:   Car seat safety-- keep rear facing until 12 months or 20 lbs, keep straps snug, avoid thick layers underneath straps      REVIEW OF SYSTEMS:  No additional remarkable ROS      PHYSICAL EXAM:  VITAL SIGNS:   Visit Vitals  Ht 30.25\" (76.8 cm)   Wt 8.151 kg   HC 45 cm (17.72\")   BMI 13.81 kg/m²    6 %ile (Z= -1.57) based on WHO (Girls, 0-2 years) weight-for-age data using vitals from 2/6/2019.  GENERAL: alert and no distress  SKIN:  Warm and well perfused, no rashes or abnormal dyspigmentation  HEAD: Normocephalic, atraumatic, no obvious abnormalities  EYES:  sclera clear, conjugate gaze  NOSE: nasal mucosa appears normal, turbinates not swollen, no rhinnorhea  EARS: external ears normal, TMs easily visualized, normal bilaterally  MOUTH:  oral  mucosa normal without lesions  NECK: normal, supple, no masses, no adenopathy  HEART:  regular rate & rhythm, normal S1 and S2, no murmurs  LUNGS:  good air movement, clear, equal breath sounds bilaterally to auscultation  ABDOMEN:  soft, nontender, without masses  EXTREMITIES: no obvious abnormalities per inspection of all 4 extremities  BACK: appears normal, no obvious abnormalities, no defects (kartik / dimples/ pits) in lumbosacral region  NEUROLOGIC: alert and moves all extremities spontaneously    Immunizations:  Immunization Status:  Behind on immunizations  Due for recommended vaccines- see orders. Updated immunizations today - The parent was counseled about each component of the vaccine(s), including possible side effects, risks, and benefits. VIS for Immunizations given.  Eligible for flu vaccine- accepted  History of immunization reactions: None    Other screening recommended:   CBC / Pb recommended and ordered, parent given instructions to go to lab    Assessment:  Need for vaccination  - MMR VACC, SQ  - VARICELLA CHICKEN POX LIVES VACC, SQ (VARIVAX)  - PNEUMOCOCCAL CONJUGATE 13 VALENT VACC(PREVNAR-13)  - HEPATITIS A VACCINE PEDIATRIC / ADOLESCENT 2 DOSE IM  - HIB VACC, PRP-T, IM  - DTAP VACC, IM <7YR OF AGE(INFANRIX)  - INFLUENZA QUADRIVALENT SPLIT PRES FREE 0.25 ML VACC, IM (FLUZONE 6-35 MONTHS)    Mild intermittent reactive airway disease without complication  - albuterol 108 (90 Base) MCG/ACT inhaler; Inhale 2 puffs into the lungs every 4 hours as needed for Shortness of Breath or Wheezing.  Dispense: 1 Inhaler; Refill: 0  - albuterol (VENTOLIN) (2.5 MG/3ML) 0.083% nebulizer solution; Take 3 mLs by nebulization every 4 hours as needed for Wheezing.  Dispense: 375 mL; Refill: 3  - Spacer/Aero-Hold Chamber Mask Misc; Always use with an inhaler  Dispense: 1 Units; Refill: 0    Flexural eczema  - hydroCORTisone (CORTIZONE) 2.5 % ointment; Apply topically 2 times daily.  Dispense: 90 g; Refill:  2    Encounter for routine child health examination without abnormal findings  - Anticipatory guidance provided re: nutrition, sleep, safety as documented above in note    Screening for lead exposure  - LEAD BLOOD/VENOUS; Future    Screening, anemia, deficiency, iron  - CBC NO DIFFERENTIAL; Future      Follow up: Return in 1 month (on 3/6/2019).    Ml Martinez MD         21

## 2019-02-25 ENCOUNTER — APPOINTMENT (OUTPATIENT)
Dept: ELECTROPHYSIOLOGY | Facility: CLINIC | Age: 54
End: 2019-02-25
Payer: MEDICAID

## 2019-02-25 PROCEDURE — 93298 REM INTERROG DEV EVAL SCRMS: CPT

## 2019-02-25 PROCEDURE — 93299: CPT

## 2019-03-13 ENCOUNTER — APPOINTMENT (OUTPATIENT)
Dept: CARDIOLOGY | Facility: CLINIC | Age: 54
End: 2019-03-13
Payer: MEDICAID

## 2019-03-13 ENCOUNTER — NON-APPOINTMENT (OUTPATIENT)
Age: 54
End: 2019-03-13

## 2019-03-13 VITALS
SYSTOLIC BLOOD PRESSURE: 127 MMHG | WEIGHT: 225 LBS | BODY MASS INDEX: 32.28 KG/M2 | OXYGEN SATURATION: 98 % | HEART RATE: 75 BPM | DIASTOLIC BLOOD PRESSURE: 78 MMHG

## 2019-03-13 PROCEDURE — 99215 OFFICE O/P EST HI 40 MIN: CPT | Mod: 25

## 2019-03-13 PROCEDURE — 93000 ELECTROCARDIOGRAM COMPLETE: CPT

## 2019-03-13 RX ORDER — OXYCODONE 5 MG/1
5 TABLET ORAL
Qty: 60 | Refills: 0 | Status: DISCONTINUED | COMMUNITY
Start: 2017-06-07 | End: 2019-03-13

## 2019-03-13 RX ORDER — MULTIVITAMIN WITH FOLIC ACID 400 MCG
TABLET ORAL
Qty: 30 | Refills: 1 | Status: DISCONTINUED | COMMUNITY
Start: 2018-12-04 | End: 2019-03-13

## 2019-03-13 RX ORDER — METOPROLOL SUCCINATE 25 MG/1
25 TABLET, EXTENDED RELEASE ORAL
Qty: 90 | Refills: 1 | Status: DISCONTINUED | COMMUNITY
Start: 2018-02-05 | End: 2019-03-13

## 2019-03-13 NOTE — HISTORY OF PRESENT ILLNESS
[FreeTextEntry1] : 54 yo man, , father of two children, Surinamese speaking. Has a prolonged history of Non Hodgkin Lymphoma, treated with chemo and autologous bone marrow transplant (Aprill 2017). No radiotherapy. Still on medical treatment with Rituxan, next treatment in 5/2019. Hospital course complicated by A. Fibrillation/Flutter that was treated with ablation (Flutter ablation) and now stable on Cardizem and Toprol. Had a ILR implanted in 3/2018, interrogation has shown that he still has AF episodes. His CHADS-VASC is 0 and therefore he is not treated with Xarelto any longer. Treated only with aspirin low dose. \par However several times the medication had to be stopped because of bradycardia. In April 2018 underwent a stress test that revealed small anterior wall reversible ischemia. Underwent cardiac catheterization in May 2018 that revealed normal coronaries? Last echo was done 3/27/2018 that revealed normal EF (59%). \par Today at the clinic for routine follow up. C/O general fatigue, generalized muscle pains and chest burning. Not effort-related. Denies SOB, orthopnea or PND. \par C/O erectile dysfunction. \par Doesn't smoke and does not drink alcohol.\par Denies the use of illicit drugs.

## 2019-03-13 NOTE — PHYSICAL EXAM
[General Appearance - Well Developed] : well developed [Normal Appearance] : normal appearance [General Appearance - Well Nourished] : well nourished [No Deformities] : no deformities [Normal Conjunctiva] : the conjunctiva exhibited no abnormalities [Normal Oral Mucosa] : normal oral mucosa [Normal Oropharynx] : normal oropharynx [Normal Jugular Venous V Waves Present] : normal jugular venous V waves present [Respiration, Rhythm And Depth] : normal respiratory rhythm and effort [Exaggerated Use Of Accessory Muscles For Inspiration] : no accessory muscle use [Auscultation Breath Sounds / Voice Sounds] : lungs were clear to auscultation bilaterally [Chest Palpation] : palpation of the chest revealed no abnormalities [Lungs Percussion] : the lungs were normal to percussion [Heart Rate And Rhythm] : heart rate and rhythm were normal [Heart Sounds] : normal S1 and S2 [Murmurs] : no murmurs present [Arterial Pulses Normal] : the arterial pulses were normal [Edema] : no peripheral edema present [Veins - Varicosity Changes] : no varicosital changes were noted in the lower extremities [Bowel Sounds] : normal bowel sounds [Abdomen Soft] : soft [Abnormal Walk] : normal gait [Nail Clubbing] : no clubbing of the fingernails [Cyanosis, Localized] : no localized cyanosis [Skin Color & Pigmentation] : normal skin color and pigmentation [Skin Turgor] : normal skin turgor [] : no rash [Oriented To Time, Place, And Person] : oriented to person, place, and time [Impaired Insight] : insight and judgment were intact [No Anxiety] : not feeling anxious

## 2019-03-13 NOTE — REVIEW OF SYSTEMS
[Headache] : headache [Eyeglasses] : currently wearing eyeglasses [Impotence] : impotence [Nocturia] : nocturia [Muscle Cramps] : muscle cramps [Negative] : Heme/Lymph

## 2019-03-13 NOTE — ASSESSMENT
[FreeTextEntry1] : ECG performed today at the office revealed a NSR 79 BPM, with normal AQRS, NH, QRS and QTc. Possible LA enlargement.\par

## 2019-03-13 NOTE — REASON FOR VISIT
[Follow-Up - Clinic] : a clinic follow-up of [Spouse] : spouse [FreeTextEntry1] : Follow up for CLAUDINE Vasquez

## 2019-03-13 NOTE — DISCUSSION/SUMMARY
[FreeTextEntry1] : 53 yo man with A. Flutter ablation \par C/O of generalized weakness and occasional atypical chest pain.\par Will continue all meds\par Will request echo (last done 3/2018) and nuclear for the chest pain.\par Routine follow up in 6 months\par Discussed diet and weight reduction\par  133.8

## 2019-03-29 ENCOUNTER — APPOINTMENT (OUTPATIENT)
Dept: ELECTROPHYSIOLOGY | Facility: CLINIC | Age: 54
End: 2019-03-29
Payer: MEDICAID

## 2019-03-29 PROCEDURE — 93299: CPT

## 2019-03-29 PROCEDURE — 93298 REM INTERROG DEV EVAL SCRMS: CPT

## 2019-04-01 ENCOUNTER — APPOINTMENT (OUTPATIENT)
Dept: CARDIOLOGY | Facility: CLINIC | Age: 54
End: 2019-04-01
Payer: MEDICAID

## 2019-04-01 PROCEDURE — 93306 TTE W/DOPPLER COMPLETE: CPT

## 2019-04-01 PROCEDURE — 76376 3D RENDER W/INTRP POSTPROCES: CPT

## 2019-04-01 PROCEDURE — 0399T: CPT

## 2019-04-04 ENCOUNTER — OUTPATIENT (OUTPATIENT)
Dept: OUTPATIENT SERVICES | Facility: HOSPITAL | Age: 54
LOS: 1 days | Discharge: HOME | End: 2019-04-04

## 2019-04-04 DIAGNOSIS — Z98.890 OTHER SPECIFIED POSTPROCEDURAL STATES: Chronic | ICD-10-CM

## 2019-04-04 DIAGNOSIS — C83.10 MANTLE CELL LYMPHOMA, UNSPECIFIED SITE: ICD-10-CM

## 2019-04-04 DIAGNOSIS — Z95.818 PRESENCE OF OTHER CARDIAC IMPLANTS AND GRAFTS: Chronic | ICD-10-CM

## 2019-04-09 ENCOUNTER — APPOINTMENT (OUTPATIENT)
Dept: INFUSION THERAPY | Facility: HOSPITAL | Age: 54
End: 2019-04-09
Payer: MEDICAID

## 2019-04-09 PROCEDURE — 90472 IMMUNIZATION ADMIN EACH ADD: CPT

## 2019-04-09 PROCEDURE — G0009: CPT

## 2019-04-09 PROCEDURE — 90732 PPSV23 VACC 2 YRS+ SUBQ/IM: CPT

## 2019-04-09 PROCEDURE — 90647 HIB PRP-OMP VACC 3 DOSE IM: CPT

## 2019-04-16 ENCOUNTER — APPOINTMENT (OUTPATIENT)
Dept: INFUSION THERAPY | Facility: HOSPITAL | Age: 54
End: 2019-04-16
Payer: MEDICAID

## 2019-04-16 PROCEDURE — 90714 TD VACC NO PRESV 7 YRS+ IM: CPT

## 2019-04-16 PROCEDURE — 90472 IMMUNIZATION ADMIN EACH ADD: CPT

## 2019-04-16 PROCEDURE — 90713 POLIOVIRUS IPV SC/IM: CPT

## 2019-04-16 PROCEDURE — 90471 IMMUNIZATION ADMIN: CPT

## 2019-04-23 ENCOUNTER — APPOINTMENT (OUTPATIENT)
Dept: INFUSION THERAPY | Facility: HOSPITAL | Age: 54
End: 2019-04-23
Payer: MEDICAID

## 2019-04-23 PROCEDURE — 90707 MMR VACCINE SC: CPT

## 2019-04-23 PROCEDURE — 90471 IMMUNIZATION ADMIN: CPT

## 2019-04-29 ENCOUNTER — APPOINTMENT (OUTPATIENT)
Dept: ELECTROPHYSIOLOGY | Facility: CLINIC | Age: 54
End: 2019-04-29
Payer: MEDICAID

## 2019-04-29 PROCEDURE — 93298 REM INTERROG DEV EVAL SCRMS: CPT

## 2019-04-29 PROCEDURE — 93299: CPT

## 2019-05-02 ENCOUNTER — FORM ENCOUNTER (OUTPATIENT)
Age: 54
End: 2019-05-02

## 2019-05-03 ENCOUNTER — APPOINTMENT (OUTPATIENT)
Dept: CT IMAGING | Facility: CLINIC | Age: 54
End: 2019-05-03
Payer: MEDICARE

## 2019-05-03 ENCOUNTER — OUTPATIENT (OUTPATIENT)
Dept: OUTPATIENT SERVICES | Facility: HOSPITAL | Age: 54
LOS: 1 days | End: 2019-05-03
Payer: COMMERCIAL

## 2019-05-03 DIAGNOSIS — Z98.890 OTHER SPECIFIED POSTPROCEDURAL STATES: Chronic | ICD-10-CM

## 2019-05-03 DIAGNOSIS — Z00.8 ENCOUNTER FOR OTHER GENERAL EXAMINATION: ICD-10-CM

## 2019-05-03 DIAGNOSIS — Z95.818 PRESENCE OF OTHER CARDIAC IMPLANTS AND GRAFTS: Chronic | ICD-10-CM

## 2019-05-03 PROCEDURE — 71260 CT THORAX DX C+: CPT | Mod: 26

## 2019-05-03 PROCEDURE — 74177 CT ABD & PELVIS W/CONTRAST: CPT

## 2019-05-03 PROCEDURE — 74177 CT ABD & PELVIS W/CONTRAST: CPT | Mod: 26

## 2019-05-03 PROCEDURE — 71260 CT THORAX DX C+: CPT

## 2019-05-06 ENCOUNTER — APPOINTMENT (OUTPATIENT)
Dept: CARDIOLOGY | Facility: CLINIC | Age: 54
End: 2019-05-06

## 2019-05-14 ENCOUNTER — APPOINTMENT (OUTPATIENT)
Dept: ELECTROPHYSIOLOGY | Facility: CLINIC | Age: 54
End: 2019-05-14
Payer: MEDICARE

## 2019-05-14 ENCOUNTER — NON-APPOINTMENT (OUTPATIENT)
Age: 54
End: 2019-05-14

## 2019-05-14 VITALS
DIASTOLIC BLOOD PRESSURE: 77 MMHG | HEIGHT: 70 IN | WEIGHT: 217 LBS | BODY MASS INDEX: 31.07 KG/M2 | HEART RATE: 57 BPM | SYSTOLIC BLOOD PRESSURE: 115 MMHG | OXYGEN SATURATION: 97 %

## 2019-05-14 PROCEDURE — 99214 OFFICE O/P EST MOD 30 MIN: CPT | Mod: 25

## 2019-05-14 PROCEDURE — 93000 ELECTROCARDIOGRAM COMPLETE: CPT

## 2019-05-14 NOTE — REVIEW OF SYSTEMS
[Fever] : no fever [Headache] : no headache [Feeling Fatigued] : feeling fatigued [Chills] : no chills [see HPI] : see HPI [Dyspnea on exertion] : not dyspnea during exertion [Shortness Of Breath] : no shortness of breath [Chest  Pressure] : no chest pressure [Chest Pain] : no chest pain [Lower Ext Edema] : no extremity edema [Leg Claudication] : no intermittent leg claudication [Palpitations] : palpitations [Nausea] : no nausea [Vomiting] : no vomiting [Change In The Stool] : no change in stool [Change in Appetite] : no change in appetite [Hematuria] : no hematuria [Nocturia] : no nocturia [Negative] : Heme/Lymph

## 2019-05-14 NOTE — PHYSICAL EXAM
[General Appearance - Well Developed] : well developed [Normal Appearance] : normal appearance [General Appearance - Well Nourished] : well nourished [Well Groomed] : well groomed [No Deformities] : no deformities [General Appearance - In No Acute Distress] : no acute distress [Respiration, Rhythm And Depth] : normal respiratory rhythm and effort [Auscultation Breath Sounds / Voice Sounds] : lungs were clear to auscultation bilaterally [Heart Rate And Rhythm] : heart rate and rhythm were normal [Murmurs] : no murmurs present [Heart Sounds] : normal S1 and S2 [Abdomen Soft] : soft [Abdomen Tenderness] : non-tender [Skin Color & Pigmentation] : normal skin color and pigmentation [Abnormal Walk] : normal gait [No Venous Stasis] : no venous stasis [] : no rash [No Skin Ulcers] : no skin ulcer [Skin Lesions] : no skin lesions [No Xanthoma] : no  xanthoma was observed [Oriented To Time, Place, And Person] : oriented to person, place, and time [Mood] : the mood was normal [Affect] : the affect was normal [No Anxiety] : not feeling anxious

## 2019-05-14 NOTE — HISTORY OF PRESENT ILLNESS
[FreeTextEntry1] : 52 yo man, he has a prolonged history of Non Hodgkin Lymphoma, treated with chemo and autologous bone marrow transplant (Aprill 2017). No radiotherapy. Still on medical treatment with Rituxan. Previous hx af/flutter s/p atrial flutter ablation 2017. ILR implanted 3/2018 for long term arrhythmia monitoring. CHADsVASc 0. He has been maintained on Cardizem, additional rate control has been d/c'd due to bradycardia.  In April 2018 underwent a stress test that revealed small anterior wall reversible ischemia. Underwent cardiac catheterization in May 2018 that revealed normal coronaries. Last echo was done 4/1/2019  that revealed LVEF 40-45%.\par \par ILR has been notable for episodes of PAF - [place] 14.8%- some periods of RVR to 210bpm. Last episode 5/10/19 duration 17 hours average ventricular rate 85bpm. \par He has ongoing symptoms of fatigue in the morning and intermittent palpitations most of which he notices at night.  No direct symptom correlation with his atrial fibrillation. \par

## 2019-05-14 NOTE — DISCUSSION/SUMMARY
[FreeTextEntry1] : 54 yo man, he has a prolonged history of Non Hodgkin Lymphoma, treated with chemo and autologous bone marrow transplant (Aprill 2017). No radiotherapy. Still on medical treatment with Rituxan. Previous hx af/flutter s/p atrial flutter ablation 2017. ILR implanted 3/2018 for long term arrhythmia monitoring. CHADsVASc 0. He has been maintained on Cardizem, additional rate control has been d/c'd due to bradycardia.  In April 2018 underwent a stress test that revealed small anterior wall reversible ischemia. Underwent cardiac catheterization in May 2018 that revealed normal coronaries. Last echo was done 4/1/2019  that revealed LVEF 40-45%.\par -ILR interrogation with episode of pAF with periods of RVR - AF burden 14.8% - average rates 60-90bpm. No recent bradyarrhythmias. Symptom episode have not correlated with AF or tachy episodes thus far.\par -continue Cardizem.\par -TTE with decreased LVEF 40-45% - MUGA ordered. \par -Cardiology follow up moved to sooner appointment.\par -continue remote ILR monitoring, EP follow up in 6 months.\par \par Lizette Propper ANP-C

## 2019-05-17 ENCOUNTER — RX RENEWAL (OUTPATIENT)
Age: 54
End: 2019-05-17

## 2019-05-20 ENCOUNTER — APPOINTMENT (OUTPATIENT)
Dept: CARDIOLOGY | Facility: CLINIC | Age: 54
End: 2019-05-20
Payer: MEDICARE

## 2019-05-20 PROCEDURE — 78452 HT MUSCLE IMAGE SPECT MULT: CPT

## 2019-05-20 PROCEDURE — A9500: CPT

## 2019-05-20 PROCEDURE — 93015 CV STRESS TEST SUPVJ I&R: CPT

## 2019-05-20 NOTE — PATIENT PROFILE ADULT. - FUNCTIONAL SCREEN CURRENT LEVEL: AMBULATION, MLM
[FreeTextEntry1] : LILIANE is a 14 year boy referred for cardiac consultation due to hyperlipidemia. \par His LDL cholesterol and triglycerides were first found to be elevated on a screening lipid profile 3 years ago. His BP has also been elevated\par He started to make healthy changes about 2 weeks ago, smaller portions and more salad. He saw a dietician in [place] on October.\par He is active and reports good exercise tolerance. He participates in regular gym class but does not do much other exercise. \par There is no history of chest pain, palpitations, syncope, dyspnea or urinary symptoms. \par \par Breakfast: [facility] [place] makes 2 bean and rice tacos in flour tortilla, or in school has bagel or muffin, egg, cheese, sausage, OJ. \par Lunch: in school- turkey or tuna sandwich, white bread. \par Dinner 4:00 pm: mother cooks grilled steak or chicken, salad with lemon or light Italian dressing\par Dessert: none. \par Beverages: [4-6] cups water,  [1] cups juice, [0-1] cups coffee\par [0] servings fruit, [1-2] servings vegetables, [3] servings meat/protein, [1] servings dairy \par Restaurant or deli- once every 3 weeks. \par Thinks he is overweight due to large portion sizes, but lost 2 lbs in last 2 weeks.  \par There is a family history of hyperlipidemia in his 10 yo sister.  (0) independent

## 2019-05-28 ENCOUNTER — APPOINTMENT (OUTPATIENT)
Dept: CARDIOLOGY | Facility: CLINIC | Age: 54
End: 2019-05-28
Payer: MEDICARE

## 2019-05-28 PROCEDURE — 78472 GATED HEART PLANAR SINGLE: CPT

## 2019-05-31 ENCOUNTER — APPOINTMENT (OUTPATIENT)
Dept: ELECTROPHYSIOLOGY | Facility: CLINIC | Age: 54
End: 2019-05-31
Payer: MEDICAID

## 2019-05-31 PROCEDURE — 93298 REM INTERROG DEV EVAL SCRMS: CPT

## 2019-05-31 PROCEDURE — 93299: CPT

## 2019-06-05 ENCOUNTER — APPOINTMENT (OUTPATIENT)
Dept: CARDIOLOGY | Facility: CLINIC | Age: 54
End: 2019-06-05
Payer: MEDICARE

## 2019-06-05 ENCOUNTER — NON-APPOINTMENT (OUTPATIENT)
Age: 54
End: 2019-06-05

## 2019-06-05 VITALS
HEART RATE: 60 BPM | WEIGHT: 210 LBS | BODY MASS INDEX: 30.13 KG/M2 | SYSTOLIC BLOOD PRESSURE: 102 MMHG | DIASTOLIC BLOOD PRESSURE: 78 MMHG | OXYGEN SATURATION: 98 %

## 2019-06-05 PROCEDURE — 93000 ELECTROCARDIOGRAM COMPLETE: CPT

## 2019-06-05 PROCEDURE — 99214 OFFICE O/P EST MOD 30 MIN: CPT | Mod: 25

## 2019-06-05 RX ORDER — DILTIAZEM HYDROCHLORIDE 120 MG/1
120 CAPSULE, EXTENDED RELEASE ORAL
Qty: 90 | Refills: 3 | Status: DISCONTINUED | COMMUNITY
Start: 2019-02-01 | End: 2019-06-05

## 2019-06-05 NOTE — DISCUSSION/SUMMARY
[FreeTextEntry1] : 51 yo man post A. Flutter ablation and now on atrial Fib. Has some vague complains of morning tiredness, but otherwise he is very active. Nuclear and echo revealed mild LV dysfunction. \par Will continue all meds\par Will discuss with Dr. Prescott the possibility of restarting NOAC although he is a CHADS-VASC=0\par Will do routine labs\par Routine follow up in 6 months\par Discussed diet and weight reduction\par

## 2019-06-05 NOTE — ASSESSMENT
[FreeTextEntry1] : ECG performed today at the office revealed atrial fibrillation 100 BPM, occasional VPB. Normal AQRS, QRS and QTc. \par

## 2019-06-05 NOTE — REVIEW OF SYSTEMS
[Headache] : headache [Eyeglasses] : currently wearing eyeglasses [Nocturia] : nocturia [Impotence] : impotence [Muscle Cramps] : muscle cramps [Negative] : Psychiatric

## 2019-06-05 NOTE — PHYSICAL EXAM
[Normal Appearance] : normal appearance [General Appearance - Well Developed] : well developed [General Appearance - Well Nourished] : well nourished [Normal Conjunctiva] : the conjunctiva exhibited no abnormalities [No Deformities] : no deformities [Normal Oral Mucosa] : normal oral mucosa [Normal Oropharynx] : normal oropharynx [Normal Jugular Venous V Waves Present] : normal jugular venous V waves present [Respiration, Rhythm And Depth] : normal respiratory rhythm and effort [Exaggerated Use Of Accessory Muscles For Inspiration] : no accessory muscle use [Auscultation Breath Sounds / Voice Sounds] : lungs were clear to auscultation bilaterally [Heart Rate And Rhythm] : heart rate and rhythm were normal [Lungs Percussion] : the lungs were normal to percussion [Chest Palpation] : palpation of the chest revealed no abnormalities [Heart Sounds] : normal S1 and S2 [Arterial Pulses Normal] : the arterial pulses were normal [Murmurs] : no murmurs present [Bowel Sounds] : normal bowel sounds [Veins - Varicosity Changes] : no varicosital changes were noted in the lower extremities [Edema] : no peripheral edema present [Abdomen Soft] : soft [Abnormal Walk] : normal gait [Nail Clubbing] : no clubbing of the fingernails [Skin Color & Pigmentation] : normal skin color and pigmentation [Cyanosis, Localized] : no localized cyanosis [] : no rash [Skin Turgor] : normal skin turgor [Oriented To Time, Place, And Person] : oriented to person, place, and time [No Anxiety] : not feeling anxious [Impaired Insight] : insight and judgment were intact

## 2019-06-28 ENCOUNTER — OUTPATIENT (OUTPATIENT)
Dept: OUTPATIENT SERVICES | Facility: HOSPITAL | Age: 54
LOS: 1 days | Discharge: HOME | End: 2019-06-28

## 2019-06-28 DIAGNOSIS — Z98.890 OTHER SPECIFIED POSTPROCEDURAL STATES: Chronic | ICD-10-CM

## 2019-06-28 DIAGNOSIS — C83.10 MANTLE CELL LYMPHOMA, UNSPECIFIED SITE: ICD-10-CM

## 2019-06-28 DIAGNOSIS — Z95.818 PRESENCE OF OTHER CARDIAC IMPLANTS AND GRAFTS: Chronic | ICD-10-CM

## 2019-07-01 ENCOUNTER — APPOINTMENT (OUTPATIENT)
Dept: ELECTROPHYSIOLOGY | Facility: CLINIC | Age: 54
End: 2019-07-01

## 2019-07-02 ENCOUNTER — LABORATORY RESULT (OUTPATIENT)
Age: 54
End: 2019-07-02

## 2019-07-02 ENCOUNTER — RESULT REVIEW (OUTPATIENT)
Age: 54
End: 2019-07-02

## 2019-07-02 ENCOUNTER — APPOINTMENT (OUTPATIENT)
Dept: HEMATOLOGY ONCOLOGY | Facility: CLINIC | Age: 54
End: 2019-07-02
Payer: MEDICARE

## 2019-07-02 ENCOUNTER — APPOINTMENT (OUTPATIENT)
Dept: INFUSION THERAPY | Facility: HOSPITAL | Age: 54
End: 2019-07-02

## 2019-07-02 VITALS
RESPIRATION RATE: 16 BRPM | OXYGEN SATURATION: 100 % | TEMPERATURE: 98.3 F | SYSTOLIC BLOOD PRESSURE: 123 MMHG | WEIGHT: 212.94 LBS | HEART RATE: 54 BPM | BODY MASS INDEX: 30.56 KG/M2 | DIASTOLIC BLOOD PRESSURE: 79 MMHG

## 2019-07-02 LAB
BASOPHILS # BLD AUTO: 0.1 K/UL — SIGNIFICANT CHANGE UP (ref 0–0.2)
BASOPHILS NFR BLD AUTO: 1.7 % — SIGNIFICANT CHANGE UP (ref 0–2)
EOSINOPHIL # BLD AUTO: 0.2 K/UL — SIGNIFICANT CHANGE UP (ref 0–0.5)
EOSINOPHIL NFR BLD AUTO: 3.9 % — SIGNIFICANT CHANGE UP (ref 0–6)
HCT VFR BLD CALC: 42.6 % — SIGNIFICANT CHANGE UP (ref 39–50)
HGB BLD-MCNC: 14.3 G/DL — SIGNIFICANT CHANGE UP (ref 13–17)
LYMPHOCYTES # BLD AUTO: 1.7 K/UL — SIGNIFICANT CHANGE UP (ref 1–3.3)
LYMPHOCYTES # BLD AUTO: 36.8 % — SIGNIFICANT CHANGE UP (ref 13–44)
MCHC RBC-ENTMCNC: 32.6 PG — SIGNIFICANT CHANGE UP (ref 27–34)
MCHC RBC-ENTMCNC: 33.5 G/DL — SIGNIFICANT CHANGE UP (ref 32–36)
MCV RBC AUTO: 97.3 FL — SIGNIFICANT CHANGE UP (ref 80–100)
MONOCYTES # BLD AUTO: 0.4 K/UL — SIGNIFICANT CHANGE UP (ref 0–0.9)
MONOCYTES NFR BLD AUTO: 8.7 % — SIGNIFICANT CHANGE UP (ref 2–14)
NEUTROPHILS # BLD AUTO: 2.3 K/UL — SIGNIFICANT CHANGE UP (ref 1.8–7.4)
NEUTROPHILS NFR BLD AUTO: 48.9 % — SIGNIFICANT CHANGE UP (ref 43–77)
PLATELET # BLD AUTO: 200 K/UL — SIGNIFICANT CHANGE UP (ref 150–400)
RBC # BLD: 4.37 M/UL — SIGNIFICANT CHANGE UP (ref 4.2–5.8)
RBC # FLD: 12.1 % — SIGNIFICANT CHANGE UP (ref 10.3–14.5)
WBC # BLD: 4.7 K/UL — SIGNIFICANT CHANGE UP (ref 3.8–10.5)
WBC # FLD AUTO: 4.7 K/UL — SIGNIFICANT CHANGE UP (ref 3.8–10.5)

## 2019-07-02 PROCEDURE — 99214 OFFICE O/P EST MOD 30 MIN: CPT

## 2019-07-02 NOTE — ASSESSMENT
[FreeTextEntry1] : 53 y/o gentleman with hx of mantle cell lymphoma initially dx'ed Oct 2016 s/p RCHOP chemotherapy X 4 cycles and RICE X 2 cycles with stem cell collected off of the 2nd RICE..he is now s/p CBV-conditioned autologous stem cell transplant on 4/5/17. Hospital course complicated by a-fib/a-flutter s/p ablation and now stable on Cardizem and Toprol. Also now on Xarelto. Port also removed. He demonstrated engraftment on 4/16 and d/c'ed in stable condition on 5/5.\par  \par Peripheral blood work remains stable and discussed with patient.\par Labs sent for CMP, LDH, Mg, Quantitative Immunoglobulins.\par 12 month vaccines completed in April 2018. 14 month vaccines completed July 2018.  24 month re-vaccinations completed in April 2019. \par Last CT chest, abdomen, pelvis was in May 2019. Results reviewed with the patient. Plan to repeat CT scan in December.\par Rituxan maintenance therapy discussed with patient. 3rd Rituxan cycle  today 7/2/19. (Short Rituxan, once a week for 2 weeks). I reviewed the rationale, risk, benefits and side effects. Next due for last Rituxan treatment in January 2019 (Short Rituxan, once a week for 2 weeks).\par Maintain f/u with cardiology. \par Recommend using moisturizer and/or hydrocortisone cream for patch of itchy, dry skin.\par Well care and cancer screening stressed\par RTC for f/u with Dr. Banks in January. with last rituxan\par

## 2019-07-02 NOTE — PHYSICAL EXAM
[Ambulatory and capable of all self care but unable to carry out any work activities] : Status 2- Ambulatory and capable of all self care but unable to carry out any work activities. Up and about more than 50% of waking hours [Normal Male] : prostate smooth, symmetric with no modularity or induration [Normal] : affect appropriate [de-identified] : healed scar from port removal

## 2019-07-02 NOTE — REVIEW OF SYSTEMS
[Fatigue] : fatigue [Negative] : Allergic/Immunologic [FreeTextEntry2] : Dizziness in the morning, very fatigued [de-identified] : patch of itchy, dry skin on back

## 2019-07-02 NOTE — ADDENDUM
[FreeTextEntry1] : Documented by Gonzalez Piedra acting as a scribe for Dr. Gerardo Banks on 07/02/2019.\par \par All medical record entries made by the Scribe were at my, Dr. Gerardo Banks, direction and personally dictated by me on 07/02/2019. I have reviewed the chart and agree that  the record accurately reflects my personal performance of the history, physical exam, assessment and plan. I have also personally directed, reviewed, and agree with the discharge instructions.

## 2019-07-02 NOTE — RESULTS/DATA
[FreeTextEntry1] : CT Chest, Abdomen, Pelvis 5/3/19: \par Impression: Stable exam. No adenopathy in the chest, abdomen, or pelvis.

## 2019-07-02 NOTE — HISTORY OF PRESENT ILLNESS
[de-identified] : Mr. Chavez is a 53 year old male with a history of MCL, initially diagnosed 10/2016. On 11/15/2016 he was seen by Dr. Bravo at [facility], after noticing a left inguinal mass for which he sought medical attention. CT scans (3/21/2016) revealed left-sided pelvic and left inguinal adenopathy. A 0.7 cm indeterminate lesion in the right iliac bone was also noted. A left inguinal lymph node biopsy was performed 9/24/2016 which revealed morphologic and immunophenotypic findings consistent with mantle cell lymphoma. He is status post 4 cycles of R-CHOP, and two cycles of RICE consolidation. A bone marrow biopsy on 2/10/17 showed normocellular bone marrow with trilineage hematopoiesis and maturation. He was admitted 3/27/17 for an autologous peripheral blood stem cell transplant with CBV  regimen. \par  \par Upon admission, a TLC was placed in IR. Mr. Chavez received IV hydration, nutritional support, pain management, antinausea medication, antidiarrheals, antibiotic, antiviral, PCP, antifungal and GI prophylaxis. Labs were monitored daily. Mr. Chavez received transfusional support and electrolyte repletion as needed. When his ANC dropped below 1000, he was started on prophylactic ciprofloxacin. When he became febrile, his antibiotic coverage was broadened as appropriate. \par  \par On 4/5/17, Mr. Chavez received 314ml of thawed, pooled, washed, mobilized, plasma reduced, HPC apheresis over approximately 1 hour. He tolerated the infusion well with no adverse side effects noted. Engraftment was noted on 4/16/17, and the Zarxio and antibiotics were discontinued. \par  \par Mr. Chavez's transplant course was complicated by recurrent headaches, oral and GI mucositis, dizziness, and chest pain resulting in a RRT on 4/10/17. The chest pain was self limiting. A second RRT was called on 4/16/17, for palpitations and dizziness. Mr. Chavez was found to be in atrial flutter with RVR. He was transferred to telemetry and followed by cardiology. \par A-fib/a-flutter was resistant to single rate agent, and while in arrhythmia, pt would become symptomatic, hypotensive. Ablation was was preformed, however was unsuccessful. Pt continued to have frequent changes in rhythm. As a-fib was usually preceded by PAC, and mediport tip was in r atrium, concern was this may have been involved. Mediport was removed but patient continued to have runs of non conducted APCs. Patient was followed by EP during this admission who ruled out any need for PPM insertion. Patient was started on Cardizem and Toprol which he tolerated well. He did have episodes of bradycardia during his sleep with episodes of Atrial flutter with RVR as well. However, he remained asymptomatic. \par Pt was d/c 5/5/2017 to home in stable condition with instructions to follow up with hematology and EP. [de-identified] : Presenting today for a follow up appointment. He is accompanied by his sister, brother-in-law, and wife. He is currently on maintenance Rituxan. PENELOPE...Patient reports continued fatigue, stating that he tires very easily, and dizziness in the mornings. He continues to be followed by cardiology; his cardiologist is holding off on new plans until Rituxan is complete. He is taking Diltiazem, baby aspirin, Omeprazole, and a multi -vitamin. Denies mouth sores, eye dryness, nausea, vomiting, diarrhea. No LE edema, CP or SOB. Remains compliant with medications.\par \par

## 2019-07-03 DIAGNOSIS — Z51.11 ENCOUNTER FOR ANTINEOPLASTIC CHEMOTHERAPY: ICD-10-CM

## 2019-07-03 DIAGNOSIS — R11.2 NAUSEA WITH VOMITING, UNSPECIFIED: ICD-10-CM

## 2019-07-03 LAB
ALBUMIN SERPL ELPH-MCNC: 4.7 G/DL
ALP BLD-CCNC: 115 U/L
ALT SERPL-CCNC: 26 U/L
ANION GAP SERPL CALC-SCNC: 11 MMOL/L
AST SERPL-CCNC: 28 U/L
BILIRUB SERPL-MCNC: 0.2 MG/DL
BUN SERPL-MCNC: 23 MG/DL
CALCIUM SERPL-MCNC: 9.4 MG/DL
CHLORIDE SERPL-SCNC: 103 MMOL/L
CO2 SERPL-SCNC: 27 MMOL/L
CREAT SERPL-MCNC: 0.98 MG/DL
DEPRECATED KAPPA LC FREE/LAMBDA SER: 1.62 RATIO
GLUCOSE SERPL-MCNC: 106 MG/DL
IGA SER QL IEP: 111 MG/DL
IGG SER QL IEP: 916 MG/DL
IGM SER QL IEP: 11 MG/DL
KAPPA LC CSF-MCNC: 0.82 MG/DL
KAPPA LC SERPL-MCNC: 1.33 MG/DL
LDH SERPL-CCNC: 188 U/L
MAGNESIUM SERPL-MCNC: 2.1 MG/DL
POTASSIUM SERPL-SCNC: 5.3 MMOL/L
PROT SERPL-MCNC: 6.7 G/DL
SODIUM SERPL-SCNC: 141 MMOL/L

## 2019-07-09 ENCOUNTER — APPOINTMENT (OUTPATIENT)
Dept: INFUSION THERAPY | Facility: HOSPITAL | Age: 54
End: 2019-07-09

## 2019-07-09 ENCOUNTER — FORM ENCOUNTER (OUTPATIENT)
Age: 54
End: 2019-07-09

## 2019-07-09 ENCOUNTER — RESULT REVIEW (OUTPATIENT)
Age: 54
End: 2019-07-09

## 2019-07-09 DIAGNOSIS — M79.605 PAIN IN LEFT LEG: ICD-10-CM

## 2019-07-09 LAB
BASOPHILS # BLD AUTO: 0.1 K/UL — SIGNIFICANT CHANGE UP (ref 0–0.2)
BASOPHILS NFR BLD AUTO: 1.7 % — SIGNIFICANT CHANGE UP (ref 0–2)
EOSINOPHIL # BLD AUTO: 0.2 K/UL — SIGNIFICANT CHANGE UP (ref 0–0.5)
EOSINOPHIL NFR BLD AUTO: 3 % — SIGNIFICANT CHANGE UP (ref 0–6)
HCT VFR BLD CALC: 41.5 % — SIGNIFICANT CHANGE UP (ref 39–50)
HGB BLD-MCNC: 14 G/DL — SIGNIFICANT CHANGE UP (ref 13–17)
LYMPHOCYTES # BLD AUTO: 2.3 K/UL — SIGNIFICANT CHANGE UP (ref 1–3.3)
LYMPHOCYTES # BLD AUTO: 39.1 % — SIGNIFICANT CHANGE UP (ref 13–44)
MCHC RBC-ENTMCNC: 32.4 PG — SIGNIFICANT CHANGE UP (ref 27–34)
MCHC RBC-ENTMCNC: 33.7 G/DL — SIGNIFICANT CHANGE UP (ref 32–36)
MCV RBC AUTO: 96 FL — SIGNIFICANT CHANGE UP (ref 80–100)
MONOCYTES # BLD AUTO: 0.6 K/UL — SIGNIFICANT CHANGE UP (ref 0–0.9)
MONOCYTES NFR BLD AUTO: 10.4 % — SIGNIFICANT CHANGE UP (ref 2–14)
NEUTROPHILS # BLD AUTO: 2.7 K/UL — SIGNIFICANT CHANGE UP (ref 1.8–7.4)
NEUTROPHILS NFR BLD AUTO: 45.8 % — SIGNIFICANT CHANGE UP (ref 43–77)
PLATELET # BLD AUTO: 191 K/UL — SIGNIFICANT CHANGE UP (ref 150–400)
RBC # BLD: 4.32 M/UL — SIGNIFICANT CHANGE UP (ref 4.2–5.8)
RBC # FLD: 12.3 % — SIGNIFICANT CHANGE UP (ref 10.3–14.5)
WBC # BLD: 5.9 K/UL — SIGNIFICANT CHANGE UP (ref 3.8–10.5)
WBC # FLD AUTO: 5.9 K/UL — SIGNIFICANT CHANGE UP (ref 3.8–10.5)

## 2019-07-09 PROCEDURE — 99213 OFFICE O/P EST LOW 20 MIN: CPT

## 2019-07-09 RX ORDER — METOPROLOL TARTRATE 50 MG
0 TABLET ORAL
Qty: 0 | Refills: 0 | DISCHARGE

## 2019-07-10 ENCOUNTER — OUTPATIENT (OUTPATIENT)
Dept: OUTPATIENT SERVICES | Facility: HOSPITAL | Age: 54
LOS: 1 days | End: 2019-07-10
Payer: COMMERCIAL

## 2019-07-10 ENCOUNTER — APPOINTMENT (OUTPATIENT)
Dept: ULTRASOUND IMAGING | Facility: IMAGING CENTER | Age: 54
End: 2019-07-10
Payer: MEDICARE

## 2019-07-10 DIAGNOSIS — Z95.818 PRESENCE OF OTHER CARDIAC IMPLANTS AND GRAFTS: Chronic | ICD-10-CM

## 2019-07-10 DIAGNOSIS — Z98.890 OTHER SPECIFIED POSTPROCEDURAL STATES: Chronic | ICD-10-CM

## 2019-07-10 DIAGNOSIS — M79.605 PAIN IN LEFT LEG: ICD-10-CM

## 2019-07-10 PROCEDURE — 93971 EXTREMITY STUDY: CPT

## 2019-07-10 PROCEDURE — 93971 EXTREMITY STUDY: CPT | Mod: 26,LT

## 2019-08-02 ENCOUNTER — APPOINTMENT (OUTPATIENT)
Dept: ELECTROPHYSIOLOGY | Facility: CLINIC | Age: 54
End: 2019-08-02
Payer: MEDICARE

## 2019-08-02 PROCEDURE — 93298 REM INTERROG DEV EVAL SCRMS: CPT

## 2019-08-02 PROCEDURE — 93299: CPT

## 2019-08-22 ENCOUNTER — APPOINTMENT (OUTPATIENT)
Dept: UROLOGY | Facility: CLINIC | Age: 54
End: 2019-08-22

## 2019-09-06 ENCOUNTER — APPOINTMENT (OUTPATIENT)
Dept: ELECTROPHYSIOLOGY | Facility: CLINIC | Age: 54
End: 2019-09-06
Payer: MEDICARE

## 2019-09-06 PROCEDURE — 93298 REM INTERROG DEV EVAL SCRMS: CPT

## 2019-09-06 PROCEDURE — 93299: CPT

## 2019-09-18 ENCOUNTER — APPOINTMENT (OUTPATIENT)
Dept: CARDIOLOGY | Facility: CLINIC | Age: 54
End: 2019-09-18

## 2019-10-07 ENCOUNTER — APPOINTMENT (OUTPATIENT)
Dept: ELECTROPHYSIOLOGY | Facility: CLINIC | Age: 54
End: 2019-10-07
Payer: MEDICARE

## 2019-10-07 PROCEDURE — 93299: CPT

## 2019-10-07 PROCEDURE — 93298 REM INTERROG DEV EVAL SCRMS: CPT

## 2019-10-22 ENCOUNTER — APPOINTMENT (OUTPATIENT)
Dept: UROLOGY | Facility: CLINIC | Age: 54
End: 2019-10-22
Payer: COMMERCIAL

## 2019-10-22 VITALS
WEIGHT: 212 LBS | HEIGHT: 70 IN | HEART RATE: 60 BPM | BODY MASS INDEX: 30.35 KG/M2 | DIASTOLIC BLOOD PRESSURE: 87 MMHG | RESPIRATION RATE: 16 BRPM | SYSTOLIC BLOOD PRESSURE: 139 MMHG

## 2019-10-22 PROCEDURE — 99214 OFFICE O/P EST MOD 30 MIN: CPT

## 2019-10-22 NOTE — HISTORY OF PRESENT ILLNESS
[FreeTextEntry1] : The patient returns for followup to review his recent blood studies and to discuss options for therapy. He has tried sildenafil 20 to 40 mg with fair to good results. He had only recently tried it because of his concerns regarding his heart disease. However, he has approval from his cardiologist.\par \par PMH: 52 year old male with ED for the past 2 years. He was diagnosed with mantle cell lymphoma around this time. Before chemotherapy and stem cell transplant his erections were good. Now he has a 'soft' erection and it isn't enough for penetration or masturbation. HIs erectile dissipates too quckly. He hasn't tried pde5i's yet. He still has good libido and energy. He has a partner who is with him today. \par During treatment for his MCL, he had afib s/p cardiac ablation, current wears monitor for his arrhythmias. \par Hx of b/l varicocele repair 18 years ago but physician they cannot remember.

## 2019-10-22 NOTE — ASSESSMENT
[FreeTextEntry1] : The patient returns for followup to review his recent blood studies and to discuss options for therapy. He has tried sildenafil 20 to 40 mg with fair to good results. He had only recently tried it because of his concerns regarding his heart disease. However, he has approval from his cardiologist. \par \par We discussed the use of Sildenafil as well as other options, He will try 40 to 100 mg of the sildenafil. A new prescription was sent in.

## 2019-10-22 NOTE — PHYSICAL EXAM
[General Appearance - Well Developed] : well developed [General Appearance - Well Nourished] : well nourished [Normal Appearance] : normal appearance [Well Groomed] : well groomed [Bowel Sounds] : normal bowel sounds [General Appearance - In No Acute Distress] : no acute distress [Abdomen Soft] : soft [Abdomen Tenderness] : non-tender [Costovertebral Angle Tenderness] : no ~M costovertebral angle tenderness [Urethral Meatus] : meatus normal [Testes Mass (___cm)] : there were no testicular masses [Scrotum] : the scrotum was normal [Urinary Bladder Findings] : the bladder was normal on palpation [Skin Turgor] : supple [Skin Color & Pigmentation] : normal skin color and pigmentation [Edema] : no peripheral edema [] : no respiratory distress [Exaggerated Use Of Accessory Muscles For Inspiration] : no accessory muscle use [Respiration, Rhythm And Depth] : normal respiratory rhythm and effort [Oriented To Time, Place, And Person] : oriented to person, place, and time [Affect] : the affect was normal [Mood] : the mood was normal [Not Anxious] : not anxious [Normal Station and Gait] : the gait and station were normal for the patient's age [No Focal Deficits] : no focal deficits [No Palpable Adenopathy] : no palpable adenopathy

## 2019-11-08 ENCOUNTER — APPOINTMENT (OUTPATIENT)
Dept: ELECTROPHYSIOLOGY | Facility: CLINIC | Age: 54
End: 2019-11-08
Payer: MEDICARE

## 2019-11-08 PROCEDURE — 93299: CPT

## 2019-11-08 PROCEDURE — 93298 REM INTERROG DEV EVAL SCRMS: CPT

## 2019-11-12 ENCOUNTER — APPOINTMENT (OUTPATIENT)
Dept: ELECTROPHYSIOLOGY | Facility: CLINIC | Age: 54
End: 2019-11-12

## 2019-11-18 ENCOUNTER — FORM ENCOUNTER (OUTPATIENT)
Age: 54
End: 2019-11-18

## 2019-11-19 ENCOUNTER — APPOINTMENT (OUTPATIENT)
Dept: NUCLEAR MEDICINE | Facility: CLINIC | Age: 54
End: 2019-11-19
Payer: MEDICARE

## 2019-11-19 ENCOUNTER — OUTPATIENT (OUTPATIENT)
Dept: OUTPATIENT SERVICES | Facility: HOSPITAL | Age: 54
LOS: 1 days | End: 2019-11-19
Payer: COMMERCIAL

## 2019-11-19 DIAGNOSIS — Z98.890 OTHER SPECIFIED POSTPROCEDURAL STATES: Chronic | ICD-10-CM

## 2019-11-19 DIAGNOSIS — Z95.818 PRESENCE OF OTHER CARDIAC IMPLANTS AND GRAFTS: Chronic | ICD-10-CM

## 2019-11-19 DIAGNOSIS — Z00.8 ENCOUNTER FOR OTHER GENERAL EXAMINATION: ICD-10-CM

## 2019-11-19 PROCEDURE — 78815 PET IMAGE W/CT SKULL-THIGH: CPT | Mod: 26,PS

## 2019-11-19 PROCEDURE — 78815 PET IMAGE W/CT SKULL-THIGH: CPT

## 2019-11-19 PROCEDURE — A9552: CPT

## 2019-12-04 ENCOUNTER — APPOINTMENT (OUTPATIENT)
Dept: CARDIOLOGY | Facility: CLINIC | Age: 54
End: 2019-12-04
Payer: MEDICARE

## 2019-12-04 ENCOUNTER — NON-APPOINTMENT (OUTPATIENT)
Age: 54
End: 2019-12-04

## 2019-12-04 VITALS
HEIGHT: 70 IN | BODY MASS INDEX: 30.35 KG/M2 | SYSTOLIC BLOOD PRESSURE: 84 MMHG | HEART RATE: 72 BPM | DIASTOLIC BLOOD PRESSURE: 58 MMHG | OXYGEN SATURATION: 97 % | WEIGHT: 212 LBS

## 2019-12-04 PROCEDURE — 93000 ELECTROCARDIOGRAM COMPLETE: CPT

## 2019-12-04 PROCEDURE — 99214 OFFICE O/P EST MOD 30 MIN: CPT | Mod: 25

## 2019-12-04 NOTE — HISTORY OF PRESENT ILLNESS
[FreeTextEntry1] : 52 yo man, , father of two children, Chinese speaking. Has a prolonged history of Non Hodgkin Lymphoma, treated with chemo and autologous bone marrow transplant (Aprill 2017). No radiotherapy. Still on medical treatment with Rituxan, next treatment in 1/2020. Hospital course complicated by A. Fibrillation/Flutter that was treated with ablation (Flutter ablation) and now stable on Cardizem and Toprol. Had a ILR implanted in 3/2018, interrogation has shown that he still has AF episodes. His CHADS-VASC is 0 and therefore he is not treated with Xarelto any longer. Treated only with aspirin low dose and CCB. \par However several times the medication had to be stopped because of bradycardia. \par In April 2018 underwent a stress test that revealed small anterior wall reversible ischemia. \par Cardiac catheterization in May 2018 that revealed normal coronaries. Last echo was done 3/27/2018 that revealed normal EF (59%). \par On 4/2/2019 he had an echo that revealed a reduction in his global EF to 40-45%, mildly enlarged LA.\par On 5/20/2019 he did a nuclear stress test, 11 METS, with atrial Fib rhythm, EF 53% , small mild perfusion defects in the basal and mid anterior wall that are partially reversible. \par Today at the clinic for routine follow up. C/O general fatigue, generalized muscle pains and chest burning. Not effort-related. Denies SOB, orthopnea or PND. \par This morning states that he is tired and his PB is low. Did not eat or drink prior to coming to his appointment. As the day progresses he notices an improvement, ILR interrogation showed PAF with a burden of 20%. Was under the follow up of Dr. Prescott and will switch to a local EP . \par C/O erectile dysfunction. \par Doesn't smoke and does not drink alcohol.\par Denies the use of illicit drugs.

## 2019-12-04 NOTE — ASSESSMENT
[FreeTextEntry1] : ECG performed today at the office revealed atrial fibrillation 90 BPM, occasional VPB. Normal AQRS, QRS and QTc. \par

## 2019-12-04 NOTE — PHYSICAL EXAM
[General Appearance - Well Developed] : well developed [General Appearance - Well Nourished] : well nourished [Normal Appearance] : normal appearance [No Deformities] : no deformities [Normal Conjunctiva] : the conjunctiva exhibited no abnormalities [Normal Oral Mucosa] : normal oral mucosa [Normal Oropharynx] : normal oropharynx [Normal Jugular Venous V Waves Present] : normal jugular venous V waves present [Respiration, Rhythm And Depth] : normal respiratory rhythm and effort [Exaggerated Use Of Accessory Muscles For Inspiration] : no accessory muscle use [Auscultation Breath Sounds / Voice Sounds] : lungs were clear to auscultation bilaterally [Chest Palpation] : palpation of the chest revealed no abnormalities [Lungs Percussion] : the lungs were normal to percussion [Heart Rate And Rhythm] : heart rate and rhythm were normal [Heart Sounds] : normal S1 and S2 [Arterial Pulses Normal] : the arterial pulses were normal [Murmurs] : no murmurs present [Edema] : no peripheral edema present [Veins - Varicosity Changes] : no varicosital changes were noted in the lower extremities [Bowel Sounds] : normal bowel sounds [Abdomen Soft] : soft [Nail Clubbing] : no clubbing of the fingernails [Abnormal Walk] : normal gait [Skin Color & Pigmentation] : normal skin color and pigmentation [Cyanosis, Localized] : no localized cyanosis [Skin Turgor] : normal skin turgor [Oriented To Time, Place, And Person] : oriented to person, place, and time [] : no rash [No Anxiety] : not feeling anxious [Impaired Insight] : insight and judgment were intact

## 2019-12-04 NOTE — DISCUSSION/SUMMARY
[FreeTextEntry1] : 53 yo man post A. Flutter ablation and now on atrial Fib. Has some vague complains of morning tiredness, but otherwise he is very active. recommended to have a breakfast in the morning, patient usually polk not have anything until lunch time. That could have an imp[act on his lack of energy in the morning.\par Nuclear and echo revealed mild LV dysfunction (LVEF=40-45%) with impaired relaxation. I wonder if we should consider assessment for cardiac amyloidosis. Will start with a cardiac MR and may follow with a Tc-99 scan. \par Will continue all meds\par Routine follow up in 6 months and will repeat the echo at that time. \par Will follow up with either Dr Manning or Dr Hart\par

## 2019-12-09 ENCOUNTER — APPOINTMENT (OUTPATIENT)
Dept: ELECTROPHYSIOLOGY | Facility: CLINIC | Age: 54
End: 2019-12-09
Payer: MEDICARE

## 2019-12-09 PROCEDURE — 93299: CPT

## 2019-12-09 PROCEDURE — 93298 REM INTERROG DEV EVAL SCRMS: CPT

## 2019-12-17 ENCOUNTER — NON-APPOINTMENT (OUTPATIENT)
Age: 54
End: 2019-12-17

## 2019-12-17 ENCOUNTER — APPOINTMENT (OUTPATIENT)
Dept: ELECTROPHYSIOLOGY | Facility: CLINIC | Age: 54
End: 2019-12-17
Payer: MEDICARE

## 2019-12-17 VITALS
DIASTOLIC BLOOD PRESSURE: 73 MMHG | SYSTOLIC BLOOD PRESSURE: 122 MMHG | WEIGHT: 213 LBS | HEIGHT: 70 IN | BODY MASS INDEX: 30.49 KG/M2 | OXYGEN SATURATION: 98 %

## 2019-12-17 VITALS — HEART RATE: 160 BPM

## 2019-12-17 PROCEDURE — 99215 OFFICE O/P EST HI 40 MIN: CPT | Mod: 25

## 2019-12-17 PROCEDURE — 93000 ELECTROCARDIOGRAM COMPLETE: CPT | Mod: 59

## 2019-12-17 RX ORDER — DILTIAZEM HYDROCHLORIDE 120 MG/1
120 CAPSULE, EXTENDED RELEASE ORAL DAILY
Qty: 90 | Refills: 2 | Status: DISCONTINUED | COMMUNITY
Start: 2019-05-17 | End: 2019-12-17

## 2019-12-17 NOTE — HISTORY OF PRESENT ILLNESS
[FreeTextEntry1] : Kian Humphries is a 54 year old Chinese speaking gentleman with history of Non-Hodgkin's (Mantle Cell) Lymphoma (s/p chemo and autologous BMT 4/2017, no radiation therapy) on chronic Rituximab therapy, AF/AFL status post CTI line (4/2017) and MDT ILR implantation (12/7/17) who previously followed with Dr. Prescott.\par \par To summarize his history, he was treated for Non-Hodgkin's (Mantle Cell) Lymphoma in early 2017. After initial chemotherapy with RCHOP and RICE he underwent autologous stem cell transplant on 4/5/17. His hospital course was complicated by AF/AFL for which Dr. Prescott performed atrial flutter ablation (4/2017). He has seen been rate controlled for paroxysmal AF and has been without anticoagulation and ASA alone as he has a low CHADS2-VASc score of 0. He subsequently underwent ILR implantation on 12/2017 for long term monitoring of his atrial arrhythmias.\par \par He has been on diltiazem but it was discontinued several times secondary to bradycardia. He has had multiple evaluations of his EF with an echocardiogram demonstrated depressed LVEF of 40-45% in 4/2019 but with normal global longitudinal strain. However, he had a normal EF by cardiac catheterization (5/2018) and nuclear stress test 5/2019. He is awaiting cardiac MRI to assess for potential amyloidosis.\par \par He notes that he does get occasional dizziness and lightheadedness but usually it happens when he stands up. However, on other days the dizziness and lightheadedness will continue for a few hours. Today he states that he has symptoms of weakness and feeling dizzy. He does have palpitations but only at night when he is laying down. At times he also has discomfort over the left shoulder and chest discomfort but this is not always associated with palpitations. He states that he has had normal PO intake over past several days. He had some dyspnea on exertion in the AM but it has improved. He endorsed having symptoms of a URI about 2 weeks ago.\par \par He denies any fevers, chills, sweats, orthopnea, paroxysmal nocturnal dyspnea, peripheral edema, syncope, nausea, vomiting, diarrhea, diaphoresis, melena, hematochezia, or hematemesis.

## 2019-12-17 NOTE — PHYSICAL EXAM
[General Appearance - Well Developed] : well developed [Normal Appearance] : normal appearance [General Appearance - Well Nourished] : well nourished [Normal Conjunctiva] : the conjunctiva exhibited no abnormalities [Eyelids - No Xanthelasma] : the eyelids demonstrated no xanthelasmas [Normal Oral Mucosa] : normal oral mucosa [Normal Jugular Venous V Waves Present] : normal jugular venous V waves present [Normal Oropharynx] : normal oropharynx [Respiration, Rhythm And Depth] : normal respiratory rhythm and effort [Auscultation Breath Sounds / Voice Sounds] : lungs were clear to auscultation bilaterally [Murmurs] : no murmurs present [Heart Sounds] : normal S1 and S2 [Edema] : no peripheral edema present [Bowel Sounds] : normal bowel sounds [Abdomen Soft] : soft [Abdomen Tenderness] : non-tender [Abnormal Walk] : normal gait [Cyanosis, Localized] : no localized cyanosis [Nail Clubbing] : no clubbing of the fingernails [Skin Turgor] : normal skin turgor [] : no rash [Skin Color & Pigmentation] : normal skin color and pigmentation [Impaired Insight] : insight and judgment were intact [Oriented To Time, Place, And Person] : oriented to person, place, and time [No Anxiety] : not feeling anxious [FreeTextEntry1] : Tachycardic, irregular

## 2019-12-17 NOTE — DISCUSSION/SUMMARY
[FreeTextEntry1] : In summary, this is a 54 year old Czech speaking gentleman with history of Non-Hodgkin's (Mantle Cell) Lymphoma (s/p chemo and autologous BMT 4/2017, no radiation therapy) on chronic Rituximab therapy, AF/AFL status post CTI line (4/2017) and MDT ILR implantation (12/7/17) who presents to establish care with a new electrophysiologist.\par \par He has had one echocardiogram in April which demonstrated a newly depressed LVEF of 40-45%, but interestingly global longitudinal strain was normal, which would be unexpected for an infiltrative cardiomyopathy. That being said, most cardiomyopathies are associated with abnormal strain.\par \par His ILR interrogation today demonstrates an 18.7% AT/AF burden but by cardiac compass trends it appears to be more. There is significant difficulty in rate control for this gentleman because he develops bradycardia which has required reduction in diltiazem doses. He is significantly symptomatic when in AF.\par \par We had a lengthy discussion about the diagnosis of atrial fibrillation, its prognosis and options for management (rate vs rhythm control), and the advantages and disadvantages of each. We also discussed the options for rhythm control with either medications, catheter ablation, or both.\par \par The risks of catheter ablation were described in detail and include, but are not limited to: pain, bleeding, infection, vascular injury, cardiac perforation and tamponade with potential for requiring open heart surgery, complete heart block requiring permanent pacing, phrenic nerve injury and diaphragmatic paralysis, atrioesophageal fistula, pulmonary vein stenosis, radiation-induced injury, death, heart attack, or stroke, of which the overall rates of occurrence range from 0.1-4%. The patient expressed understanding and was provided the opportunity to ask questions, of which all were answered to the patient's satisfaction.\par \par Since he has a mild-moderately depressed EF with symptomatic rapid paroxysmal AF that is difficult to rate control given bradycardia, he would benefit with pursuing rhythm control strategy.\par \par Recommendations:\par - Increase Diltiazem to 180mg daily\par - Discuss with Heme/Onc to ensure patient can be on AC\par - Consider ablation after next chemotherapy regimen (possibly February/March)\par - RTC in 2 months\par \par Herbert Hart MD\par Clinical Cardiac Electrophysiology

## 2019-12-17 NOTE — ASSESSMENT
[FreeTextEntry1] : ILR Interrogation 12/17/19:\par AT/AF burden of 18.7%\par HR during AT/AF are quite rapid sometimes approaching 200 bpm.\par AT/AF burden by cardiac compass trends does not appear to have increasing burden recently.

## 2019-12-17 NOTE — REASON FOR VISIT
[Follow-Up - Clinic] : a clinic follow-up of [Atrial Fibrillation] : atrial fibrillation [FreeTextEntry1] : Referring: Dr. Madsen

## 2020-01-03 ENCOUNTER — RX RENEWAL (OUTPATIENT)
Age: 55
End: 2020-01-03

## 2020-01-07 ENCOUNTER — APPOINTMENT (OUTPATIENT)
Dept: HEMATOLOGY ONCOLOGY | Facility: CLINIC | Age: 55
End: 2020-01-07

## 2020-01-10 ENCOUNTER — OUTPATIENT (OUTPATIENT)
Dept: OUTPATIENT SERVICES | Facility: HOSPITAL | Age: 55
LOS: 1 days | Discharge: HOME | End: 2020-01-10

## 2020-01-10 ENCOUNTER — APPOINTMENT (OUTPATIENT)
Dept: ELECTROPHYSIOLOGY | Facility: CLINIC | Age: 55
End: 2020-01-10
Payer: MEDICARE

## 2020-01-10 DIAGNOSIS — Z98.890 OTHER SPECIFIED POSTPROCEDURAL STATES: Chronic | ICD-10-CM

## 2020-01-10 DIAGNOSIS — C83.10 MANTLE CELL LYMPHOMA, UNSPECIFIED SITE: ICD-10-CM

## 2020-01-10 DIAGNOSIS — Z95.818 PRESENCE OF OTHER CARDIAC IMPLANTS AND GRAFTS: Chronic | ICD-10-CM

## 2020-01-10 PROCEDURE — G2066: CPT

## 2020-01-10 PROCEDURE — 93298 REM INTERROG DEV EVAL SCRMS: CPT

## 2020-01-14 ENCOUNTER — APPOINTMENT (OUTPATIENT)
Dept: INFUSION THERAPY | Facility: HOSPITAL | Age: 55
End: 2020-01-14

## 2020-01-14 ENCOUNTER — RESULT REVIEW (OUTPATIENT)
Age: 55
End: 2020-01-14

## 2020-01-14 LAB
BASOPHILS # BLD AUTO: 0 K/UL — SIGNIFICANT CHANGE UP (ref 0–0.2)
BASOPHILS NFR BLD AUTO: 0.4 % — SIGNIFICANT CHANGE UP (ref 0–2)
EOSINOPHIL # BLD AUTO: 0.2 K/UL — SIGNIFICANT CHANGE UP (ref 0–0.5)
EOSINOPHIL NFR BLD AUTO: 3.5 % — SIGNIFICANT CHANGE UP (ref 0–6)
HCT VFR BLD CALC: 42.4 % — SIGNIFICANT CHANGE UP (ref 39–50)
HGB BLD-MCNC: 14.3 G/DL — SIGNIFICANT CHANGE UP (ref 13–17)
LYMPHOCYTES # BLD AUTO: 2.6 K/UL — SIGNIFICANT CHANGE UP (ref 1–3.3)
LYMPHOCYTES # BLD AUTO: 39.5 % — SIGNIFICANT CHANGE UP (ref 13–44)
MCHC RBC-ENTMCNC: 32.6 PG — SIGNIFICANT CHANGE UP (ref 27–34)
MCHC RBC-ENTMCNC: 33.7 G/DL — SIGNIFICANT CHANGE UP (ref 32–36)
MCV RBC AUTO: 96.8 FL — SIGNIFICANT CHANGE UP (ref 80–100)
MONOCYTES # BLD AUTO: 0.5 K/UL — SIGNIFICANT CHANGE UP (ref 0–0.9)
MONOCYTES NFR BLD AUTO: 8.3 % — SIGNIFICANT CHANGE UP (ref 2–14)
NEUTROPHILS # BLD AUTO: 3.1 K/UL — SIGNIFICANT CHANGE UP (ref 1.8–7.4)
NEUTROPHILS NFR BLD AUTO: 48.2 % — SIGNIFICANT CHANGE UP (ref 43–77)
PLATELET # BLD AUTO: 246 K/UL — SIGNIFICANT CHANGE UP (ref 150–400)
RBC # BLD: 4.38 M/UL — SIGNIFICANT CHANGE UP (ref 4.2–5.8)
RBC # FLD: 12.3 % — SIGNIFICANT CHANGE UP (ref 10.3–14.5)
WBC # BLD: 6.4 K/UL — SIGNIFICANT CHANGE UP (ref 3.8–10.5)
WBC # FLD AUTO: 6.4 K/UL — SIGNIFICANT CHANGE UP (ref 3.8–10.5)

## 2020-01-15 DIAGNOSIS — Z51.11 ENCOUNTER FOR ANTINEOPLASTIC CHEMOTHERAPY: ICD-10-CM

## 2020-01-15 DIAGNOSIS — R11.2 NAUSEA WITH VOMITING, UNSPECIFIED: ICD-10-CM

## 2020-01-22 ENCOUNTER — RESULT REVIEW (OUTPATIENT)
Age: 55
End: 2020-01-22

## 2020-01-22 ENCOUNTER — APPOINTMENT (OUTPATIENT)
Dept: HEMATOLOGY ONCOLOGY | Facility: CLINIC | Age: 55
End: 2020-01-22
Payer: MEDICARE

## 2020-01-22 ENCOUNTER — APPOINTMENT (OUTPATIENT)
Dept: INFUSION THERAPY | Facility: HOSPITAL | Age: 55
End: 2020-01-22

## 2020-01-22 VITALS
TEMPERATURE: 98 F | WEIGHT: 223.77 LBS | HEART RATE: 58 BPM | BODY MASS INDEX: 32.11 KG/M2 | RESPIRATION RATE: 16 BRPM | DIASTOLIC BLOOD PRESSURE: 82 MMHG | SYSTOLIC BLOOD PRESSURE: 131 MMHG | OXYGEN SATURATION: 98 %

## 2020-01-22 LAB
BASOPHILS # BLD AUTO: 0.1 K/UL — SIGNIFICANT CHANGE UP (ref 0–0.2)
BASOPHILS NFR BLD AUTO: 0.9 % — SIGNIFICANT CHANGE UP (ref 0–2)
EOSINOPHIL # BLD AUTO: 0.3 K/UL — SIGNIFICANT CHANGE UP (ref 0–0.5)
EOSINOPHIL NFR BLD AUTO: 5.4 % — SIGNIFICANT CHANGE UP (ref 0–6)
HCT VFR BLD CALC: 43.6 % — SIGNIFICANT CHANGE UP (ref 39–50)
HGB BLD-MCNC: 14.1 G/DL — SIGNIFICANT CHANGE UP (ref 13–17)
LYMPHOCYTES # BLD AUTO: 2.4 K/UL — SIGNIFICANT CHANGE UP (ref 1–3.3)
LYMPHOCYTES # BLD AUTO: 39.2 % — SIGNIFICANT CHANGE UP (ref 13–44)
MCHC RBC-ENTMCNC: 31.4 PG — SIGNIFICANT CHANGE UP (ref 27–34)
MCHC RBC-ENTMCNC: 32.4 G/DL — SIGNIFICANT CHANGE UP (ref 32–36)
MCV RBC AUTO: 96.9 FL — SIGNIFICANT CHANGE UP (ref 80–100)
MONOCYTES # BLD AUTO: 0.6 K/UL — SIGNIFICANT CHANGE UP (ref 0–0.9)
MONOCYTES NFR BLD AUTO: 9.3 % — SIGNIFICANT CHANGE UP (ref 2–14)
NEUTROPHILS # BLD AUTO: 2.8 K/UL — SIGNIFICANT CHANGE UP (ref 1.8–7.4)
NEUTROPHILS NFR BLD AUTO: 45.2 % — SIGNIFICANT CHANGE UP (ref 43–77)
PLATELET # BLD AUTO: 236 K/UL — SIGNIFICANT CHANGE UP (ref 150–400)
RBC # BLD: 4.5 M/UL — SIGNIFICANT CHANGE UP (ref 4.2–5.8)
RBC # FLD: 12.4 % — SIGNIFICANT CHANGE UP (ref 10.3–14.5)
WBC # BLD: 6.1 K/UL — SIGNIFICANT CHANGE UP (ref 3.8–10.5)
WBC # FLD AUTO: 6.1 K/UL — SIGNIFICANT CHANGE UP (ref 3.8–10.5)

## 2020-01-22 PROCEDURE — 99214 OFFICE O/P EST MOD 30 MIN: CPT

## 2020-01-24 NOTE — REVIEW OF SYSTEMS
[Fatigue] : fatigue [Negative] : Allergic/Immunologic [FreeTextEntry2] : Dizziness in the morning, very fatigued [de-identified] : patch of itchy, dry skin on back

## 2020-01-24 NOTE — PHYSICAL EXAM
[Ambulatory and capable of all self care but unable to carry out any work activities] : Status 2- Ambulatory and capable of all self care but unable to carry out any work activities. Up and about more than 50% of waking hours [Normal Male] : prostate smooth, symmetric with no modularity or induration [Normal] : affect appropriate [de-identified] : healed scar from port removal

## 2020-01-24 NOTE — HISTORY OF PRESENT ILLNESS
[de-identified] : Presenting today for a follow up appointment. He is currently on maintenance Rituxan. Last Rituxan was received today. PENELOPE....Patient reports continued fatigue. CT/PET scan from November 2019 reviewed with patient and showed inflammation in his teeth.  Patient also c/o of feeling swollen in his gums. He shares he has seen the dentist yesterday and was told to see a periodontist for the inflammation present in his gums.  He continues to be followed by cardiology; his cardiologist increased the Diltiazem dosage to 180mg q daily, however patient reported feeling increasingly fatigued and returned to 120mg q daily and now notes less fatigue. He is compliant with all medications. Denies mouth sores, eye dryness, nausea, vomiting, diarrhea. No LE edema, CP or SOB.  [de-identified] : Mr. Chavez is a 53 year old male with a history of MCL, initially diagnosed 10/2016. On 11/15/2016 he was seen by Dr. Bravo at [facility], after noticing a left inguinal mass for which he sought medical attention. CT scans (3/21/2016) revealed left-sided pelvic and left inguinal adenopathy. A 0.7 cm indeterminate lesion in the right iliac bone was also noted. A left inguinal lymph node biopsy was performed 9/24/2016 which revealed morphologic and immunophenotypic findings consistent with mantle cell lymphoma. He is status post 4 cycles of R-CHOP, and two cycles of RICE consolidation. A bone marrow biopsy on 2/10/17 showed normocellular bone marrow with trilineage hematopoiesis and maturation. He was admitted 3/27/17 for an autologous peripheral blood stem cell transplant with CBV  regimen. \par  \par Upon admission, a TLC was placed in IR. Mr. Chavez received IV hydration, nutritional support, pain management, antinausea medication, antidiarrheals, antibiotic, antiviral, PCP, antifungal and GI prophylaxis. Labs were monitored daily. Mr. Chavez received transfusional support and electrolyte repletion as needed. When his ANC dropped below 1000, he was started on prophylactic ciprofloxacin. When he became febrile, his antibiotic coverage was broadened as appropriate. \par  \par On 4/5/17, Mr. Chavez received 314ml of thawed, pooled, washed, mobilized, plasma reduced, HPC apheresis over approximately 1 hour. He tolerated the infusion well with no adverse side effects noted. Engraftment was noted on 4/16/17, and the Zarxio and antibiotics were discontinued. \par  \par Mr. Chavez's transplant course was complicated by recurrent headaches, oral and GI mucositis, dizziness, and chest pain resulting in a RRT on 4/10/17. The chest pain was self limiting. A second RRT was called on 4/16/17, for palpitations and dizziness. Mr. Chavez was found to be in atrial flutter with RVR. He was transferred to telemetry and followed by cardiology. \par A-fib/a-flutter was resistant to single rate agent, and while in arrhythmia, pt would become symptomatic, hypotensive. Ablation was was preformed, however was unsuccessful. Pt continued to have frequent changes in rhythm. As a-fib was usually preceded by PAC, and mediport tip was in r atrium, concern was this may have been involved. Mediport was removed but patient continued to have runs of non conducted APCs. Patient was followed by EP during this admission who ruled out any need for PPM insertion. Patient was started on Cardizem and Toprol which he tolerated well. He did have episodes of bradycardia during his sleep with episodes of Atrial flutter with RVR as well. However, he remained asymptomatic. \par Pt was d/c 5/5/2017 to home in stable condition with instructions to follow up with hematology and EP.

## 2020-01-24 NOTE — ASSESSMENT
[FreeTextEntry1] : 55 y/o gentleman with hx of mantle cell lymphoma initially dx'ed Oct 2016 s/p RCHOP chemotherapy X 4 cycles and RICE X 2 cycles with stem cell collected off of the 2nd RICE..he is now s/p CBV-conditioned autologous stem cell transplant on 4/5/17. Hospital course complicated by a-fib/a-flutter s/p ablation and now stable on Cardizem and Toprol. Also now on Xarelto. Port also removed. He demonstrated engraftment on 4/16 and d/c'ed in stable condition on 5/5.\par  \par Peripheral blood work remains stable and discussed with patient.\par Labs sent for CMP, LDH, Mg, Quantitative Immunoglobulins.\par 12 month vaccines completed in April 2018. 14 month vaccines completed July 2018.  24 month re-vaccinations completed in April 2019. \par Last CT chest, abdomen, pelvis was in May 2019. Results reviewed with the patient. \par CT/PET scan completed in November 2019 and was stable. Results were reviewed and discussed with patient. \par Plan to repeat CT scan in May 2020. \par Rituxan maintenance therapy discussed with patient. 3rd Rituxan cycle given 7/2/19. (Short Rituxan, once a week for 2 weeks). I reviewed the rationale, risk, benefits and side effects. Last Rituxan treatment was given today on 1/22/20  (Short Rituxan, once a week for 2 weeks).\par Maintain f/u with cardiology. \par Maintain f/u with his regular internist. \par Recommend using moisturizer and/or hydrocortisone cream for patch of itchy, dry skin.\par Well care and cancer screening stressed\par Patient has been advised to contact clinic if he has any concerns.\par \par RTC for f/u with Dr. Banks on 2/12/20.

## 2020-01-24 NOTE — ADDENDUM
[FreeTextEntry1] : Documented by Vaishnavi Sahu acting as a scribe for Dr. Gerardo Banks on 01/22/2020 \par All medical record entries made by the Scribe were at my, Dr. Gerardo Banks, direction and personally dictated by me on 01/22/2020. I have reviewed the chart and agree that the record accurately reflects my personal performance of the history, physical exam, assessment and plan. I have also personally directed, reviewed, and agree with the discharge instructions.\par \par \par

## 2020-02-03 ENCOUNTER — NON-APPOINTMENT (OUTPATIENT)
Age: 55
End: 2020-02-03

## 2020-02-03 ENCOUNTER — APPOINTMENT (OUTPATIENT)
Dept: ELECTROPHYSIOLOGY | Facility: CLINIC | Age: 55
End: 2020-02-03
Payer: MEDICARE

## 2020-02-03 VITALS
SYSTOLIC BLOOD PRESSURE: 120 MMHG | OXYGEN SATURATION: 98 % | HEART RATE: 64 BPM | DIASTOLIC BLOOD PRESSURE: 68 MMHG | WEIGHT: 215 LBS | BODY MASS INDEX: 30.1 KG/M2 | HEIGHT: 71 IN

## 2020-02-03 DIAGNOSIS — I49.8 OTHER SPECIFIED CARDIAC ARRHYTHMIAS: ICD-10-CM

## 2020-02-03 PROCEDURE — 93000 ELECTROCARDIOGRAM COMPLETE: CPT

## 2020-02-03 PROCEDURE — 99213 OFFICE O/P EST LOW 20 MIN: CPT

## 2020-02-10 ENCOUNTER — APPOINTMENT (OUTPATIENT)
Dept: ELECTROPHYSIOLOGY | Facility: CLINIC | Age: 55
End: 2020-02-10
Payer: MEDICARE

## 2020-02-10 PROCEDURE — 93298 REM INTERROG DEV EVAL SCRMS: CPT

## 2020-02-10 PROCEDURE — G2066: CPT

## 2020-02-12 ENCOUNTER — APPOINTMENT (OUTPATIENT)
Dept: HEMATOLOGY ONCOLOGY | Facility: CLINIC | Age: 55
End: 2020-02-12

## 2020-02-12 PROBLEM — I49.8 ATRIAL ARRHYTHMIA: Status: ACTIVE | Noted: 2017-12-10

## 2020-02-12 NOTE — HISTORY OF PRESENT ILLNESS
[FreeTextEntry1] : Patient is a 54-year-old man with a history of non-Hodgkin's lymphoma status post chemotherapy as well as bone marrow transplant April 2017.  During the course of that presentation he was noted to have atrial flutter and underwent an atrial flutter ablation.  He subsequently had implantation of an implantable loop monitor December 2017.  Follow-up monitoring since then has shown occasional episodes of A. fib and also bradycardia.  [id number] score is 0.  He has been on aspirin.  Per patient was previously on diltiazem for rate control but was discontinued because of bradycardia.  He has had prior chest discomfort and underwent cardiac catheterization which did not show any obstructive coronary disease.  His ejection fractions has been in the range of 40 to 45% on echocardiography.\par \par Patient has occasional episodes of dizziness and lightheadedness but denies any syncope or presyncope.  He is feeling better recently he has symptoms of fatigue.  Patient does experience occasional palpitations.\par \par He has been on Rituximab therapy. \par

## 2020-02-12 NOTE — DISCUSSION/SUMMARY
[FreeTextEntry1] : Regarding his atrial fibrillation:\par \par The implantable loop monitor was interrogated and showed that he had episodes of atrial fibrillation with a burden that recording approximately 20%.  This represents an increase over the last 6 months.  His last episode was December 29, 2019 duration was approximately 43 hours and the average ventricular rate was 133 bpm.  Patient is aware of some these episodes and they might be associated with symptoms of fatigue and.  Similar episodes he is not aware of.\par \par His AF burden seems to be increasing and he has had a prior borderline myopathy.  I am concerned about his tachybradycardia and the development of a worsening myopathy with increased ventricular rates.  He recently saw Dr. Hart  who discussed with him the possibility of catheter ablation.  I believe this option  should be considered this time.  We will discussed anticoagulation issues with his oncologist.  Patient is not keen on having the procedure but will consider it especially if there are no better options.  I also discussed with him the possibility of an antiarrhythmic medication but he may require pacing because of his bradycardia.  He understands these options and will decide the next 2 months and then follow-up.  In the meantime we will continue him on diltiazem for rate control.  We will monitor his device remotely to further assess his AF episodes and ventricular rates.

## 2020-02-12 NOTE — PHYSICAL EXAM
[General Appearance - Well Developed] : well developed [Normal Conjunctiva] : the conjunctiva exhibited no abnormalities [Eyelids - No Xanthelasma] : the eyelids demonstrated no xanthelasmas [No Oral Pallor] : no oral pallor [Normal Jugular Venous V Waves Present] : normal jugular venous V waves present [Respiration, Rhythm And Depth] : normal respiratory rhythm and effort [Auscultation Breath Sounds / Voice Sounds] : lungs were clear to auscultation bilaterally [Heart Rate And Rhythm] : heart rate and rhythm were normal [Heart Sounds] : normal S1 and S2 [Murmurs] : no murmurs present [Arterial Pulses Normal] : the arterial pulses were normal [Edema] : no peripheral edema present [Bowel Sounds] : normal bowel sounds [Abdomen Soft] : soft [Abdomen Tenderness] : non-tender [Abnormal Walk] : normal gait [Nail Clubbing] : no clubbing of the fingernails [Cyanosis, Localized] : no localized cyanosis [] : no rash [Impaired Insight] : insight and judgment were intact
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
## 2020-03-10 ENCOUNTER — APPOINTMENT (OUTPATIENT)
Dept: ELECTROPHYSIOLOGY | Facility: CLINIC | Age: 55
End: 2020-03-10

## 2020-03-13 ENCOUNTER — APPOINTMENT (OUTPATIENT)
Dept: ELECTROPHYSIOLOGY | Facility: CLINIC | Age: 55
End: 2020-03-13
Payer: MEDICARE

## 2020-03-13 PROCEDURE — G2066: CPT

## 2020-03-13 PROCEDURE — 93298 REM INTERROG DEV EVAL SCRMS: CPT

## 2020-04-07 ENCOUNTER — APPOINTMENT (OUTPATIENT)
Dept: UROLOGY | Facility: CLINIC | Age: 55
End: 2020-04-07

## 2020-04-13 ENCOUNTER — APPOINTMENT (OUTPATIENT)
Dept: ELECTROPHYSIOLOGY | Facility: CLINIC | Age: 55
End: 2020-04-13
Payer: MEDICARE

## 2020-04-13 PROCEDURE — G2066: CPT

## 2020-04-13 PROCEDURE — 93298 REM INTERROG DEV EVAL SCRMS: CPT

## 2020-05-18 ENCOUNTER — APPOINTMENT (OUTPATIENT)
Dept: ELECTROPHYSIOLOGY | Facility: CLINIC | Age: 55
End: 2020-05-18

## 2020-06-10 ENCOUNTER — APPOINTMENT (OUTPATIENT)
Dept: CARDIOLOGY | Facility: CLINIC | Age: 55
End: 2020-06-10

## 2020-06-18 ENCOUNTER — APPOINTMENT (OUTPATIENT)
Dept: CARDIOLOGY | Facility: CLINIC | Age: 55
End: 2020-06-18
Payer: MEDICARE

## 2020-06-18 PROCEDURE — G2066: CPT

## 2020-06-18 PROCEDURE — 93298 REM INTERROG DEV EVAL SCRMS: CPT

## 2020-06-18 NOTE — ASSESSMENT
[FreeTextEntry1] : Remote LNq Summary\par \par \par 6-18-20 \par Viewed lnq summary 5-12-20 to 6-17-20 (we took over remote monitoring on 5/12).\par  Symptom events correlated to AF, rate controlled, NSR with artifact and PVC's, and sinus tachy aprox 120's, pause was 3.4sec with AF and tachy/af events were AF with RVR (longest 27 hours 28min with avg v rate 150bpm) During this time 9beat WCT was noted and read by ep, RJ, as abberancy.\par  Patient reported feeling tired and as per CD (on 6/15) RJ plans to discuss ablation at upcoming OV in 3 weeks and will need permission from oncology to be on AC short term. Chads is 1, on ASA 81mg daily. \par On dilt 120mg daily. Unable to uptitrate d/t pauses/bradycardia. Needs updated echo as well (follows with Dr. Madsen for cardiology), as per CD note, pt wishes to wait to schedule echo in person. \par MUGA 5-28-19 EF 51%.\par \par [id number] score 0; follows EP\par  Next summary on pa schedule 32days-cv\par

## 2020-06-22 ENCOUNTER — APPOINTMENT (OUTPATIENT)
Dept: ELECTROPHYSIOLOGY | Facility: CLINIC | Age: 55
End: 2020-06-22

## 2020-07-06 ENCOUNTER — APPOINTMENT (OUTPATIENT)
Dept: ELECTROPHYSIOLOGY | Facility: CLINIC | Age: 55
End: 2020-07-06
Payer: MEDICARE

## 2020-07-06 VITALS
HEART RATE: 71 BPM | DIASTOLIC BLOOD PRESSURE: 68 MMHG | HEIGHT: 71 IN | BODY MASS INDEX: 29.4 KG/M2 | WEIGHT: 210 LBS | SYSTOLIC BLOOD PRESSURE: 120 MMHG | OXYGEN SATURATION: 98 % | TEMPERATURE: 98.6 F

## 2020-07-06 PROCEDURE — 99213 OFFICE O/P EST LOW 20 MIN: CPT

## 2020-07-07 ENCOUNTER — APPOINTMENT (OUTPATIENT)
Dept: CT IMAGING | Facility: CLINIC | Age: 55
End: 2020-07-07
Payer: MEDICARE

## 2020-07-07 ENCOUNTER — RESULT REVIEW (OUTPATIENT)
Age: 55
End: 2020-07-07

## 2020-07-07 ENCOUNTER — OUTPATIENT (OUTPATIENT)
Dept: OUTPATIENT SERVICES | Facility: HOSPITAL | Age: 55
LOS: 1 days | End: 2020-07-07
Payer: COMMERCIAL

## 2020-07-07 DIAGNOSIS — C83.10 MANTLE CELL LYMPHOMA, UNSPECIFIED SITE: ICD-10-CM

## 2020-07-07 DIAGNOSIS — Z98.890 OTHER SPECIFIED POSTPROCEDURAL STATES: Chronic | ICD-10-CM

## 2020-07-07 DIAGNOSIS — Z95.818 PRESENCE OF OTHER CARDIAC IMPLANTS AND GRAFTS: Chronic | ICD-10-CM

## 2020-07-07 PROCEDURE — 71260 CT THORAX DX C+: CPT

## 2020-07-07 PROCEDURE — 82565 ASSAY OF CREATININE: CPT

## 2020-07-07 PROCEDURE — 71260 CT THORAX DX C+: CPT | Mod: 26

## 2020-07-07 PROCEDURE — 74177 CT ABD & PELVIS W/CONTRAST: CPT

## 2020-07-07 PROCEDURE — 74177 CT ABD & PELVIS W/CONTRAST: CPT | Mod: 26

## 2020-07-13 NOTE — DISCUSSION/SUMMARY
[FreeTextEntry1] : He continues to have atrial fibrillation but much less frequent.  Patient does not want catheter ablation this time we will continue to observe on his current medication.  He has had tachybradycardia syndrome which limits further increase in medications.  We will consider antiarrhythmic agent if the episode should increase.  \par We will continue him on diltiazem for rate control.  We will monitor his device remotely to further assess his AF episodes and ventricular rates.

## 2020-07-13 NOTE — HISTORY OF PRESENT ILLNESS
[FreeTextEntry1] : He is feeling much better and has not had palpitations recently.\par Interrogation of the implantable loop monitor showed that he had decreased AF burden since last evaluation 3 months ago.  His longest episode of A. fib was 27 hours with an average ventricular sponsor 150 bpm.  He is also had pauses up to 3.4 seconds.  He is less tired and fatigued.  Patient is on diltiazem  mg/day.\par Prior history: 54-year-old man with a history of non-Hodgkin's lymphoma status post chemotherapy as well as bone marrow transplant April 2017.  During the course of that presentation he was noted to have atrial flutter and underwent an atrial flutter ablation.  He subsequently had implantation of an implantable loop monitor December 2017.  Follow-up monitoring since then has shown occasional episodes of A. fib and also bradycardia.  [id number] score is 0.  He has been on aspirin.   He has had prior chest discomfort and underwent cardiac catheterization which did not show any obstructive coronary disease.  His ejection fractions has been in the range of 40 to 45% on echocardiography.\par \par \par \par \par

## 2020-07-19 ENCOUNTER — OUTPATIENT (OUTPATIENT)
Dept: OUTPATIENT SERVICES | Facility: HOSPITAL | Age: 55
LOS: 1 days | Discharge: HOME | End: 2020-07-19

## 2020-07-19 DIAGNOSIS — Z98.890 OTHER SPECIFIED POSTPROCEDURAL STATES: Chronic | ICD-10-CM

## 2020-07-19 DIAGNOSIS — Z95.818 PRESENCE OF OTHER CARDIAC IMPLANTS AND GRAFTS: Chronic | ICD-10-CM

## 2020-07-19 DIAGNOSIS — C83.10 MANTLE CELL LYMPHOMA, UNSPECIFIED SITE: ICD-10-CM

## 2020-07-23 ENCOUNTER — RESULT REVIEW (OUTPATIENT)
Age: 55
End: 2020-07-23

## 2020-07-23 ENCOUNTER — APPOINTMENT (OUTPATIENT)
Dept: HEMATOLOGY ONCOLOGY | Facility: CLINIC | Age: 55
End: 2020-07-23
Payer: MEDICARE

## 2020-07-23 ENCOUNTER — APPOINTMENT (OUTPATIENT)
Dept: HEMATOLOGY ONCOLOGY | Facility: CLINIC | Age: 55
End: 2020-07-23

## 2020-07-23 VITALS
TEMPERATURE: 98.4 F | WEIGHT: 214.95 LBS | HEART RATE: 56 BPM | RESPIRATION RATE: 16 BRPM | BODY MASS INDEX: 29.98 KG/M2 | OXYGEN SATURATION: 100 % | DIASTOLIC BLOOD PRESSURE: 83 MMHG | SYSTOLIC BLOOD PRESSURE: 131 MMHG

## 2020-07-23 LAB
BASOPHILS # BLD AUTO: 0.02 K/UL — SIGNIFICANT CHANGE UP (ref 0–0.2)
BASOPHILS NFR BLD AUTO: 0.3 % — SIGNIFICANT CHANGE UP (ref 0–2)
EOSINOPHIL # BLD AUTO: 0.17 K/UL — SIGNIFICANT CHANGE UP (ref 0–0.5)
EOSINOPHIL NFR BLD AUTO: 2.9 % — SIGNIFICANT CHANGE UP (ref 0–6)
HCT VFR BLD CALC: 41.9 % — SIGNIFICANT CHANGE UP (ref 39–50)
HGB BLD-MCNC: 13.7 G/DL — SIGNIFICANT CHANGE UP (ref 13–17)
IMM GRANULOCYTES NFR BLD AUTO: 0.3 % — SIGNIFICANT CHANGE UP (ref 0–1.5)
LYMPHOCYTES # BLD AUTO: 2.33 K/UL — SIGNIFICANT CHANGE UP (ref 1–3.3)
LYMPHOCYTES # BLD AUTO: 39.2 % — SIGNIFICANT CHANGE UP (ref 13–44)
MCHC RBC-ENTMCNC: 31.9 PG — SIGNIFICANT CHANGE UP (ref 27–34)
MCHC RBC-ENTMCNC: 32.7 GM/DL — SIGNIFICANT CHANGE UP (ref 32–36)
MCV RBC AUTO: 97.7 FL — SIGNIFICANT CHANGE UP (ref 80–100)
MONOCYTES # BLD AUTO: 0.6 K/UL — SIGNIFICANT CHANGE UP (ref 0–0.9)
MONOCYTES NFR BLD AUTO: 10.1 % — SIGNIFICANT CHANGE UP (ref 2–14)
NEUTROPHILS # BLD AUTO: 2.81 K/UL — SIGNIFICANT CHANGE UP (ref 1.8–7.4)
NEUTROPHILS NFR BLD AUTO: 47.2 % — SIGNIFICANT CHANGE UP (ref 43–77)
NRBC # BLD: 0 /100 WBCS — SIGNIFICANT CHANGE UP (ref 0–0)
PLATELET # BLD AUTO: 213 K/UL — SIGNIFICANT CHANGE UP (ref 150–400)
RBC # BLD: 4.29 M/UL — SIGNIFICANT CHANGE UP (ref 4.2–5.8)
RBC # FLD: 13.5 % — SIGNIFICANT CHANGE UP (ref 10.3–14.5)
WBC # BLD: 5.95 K/UL — SIGNIFICANT CHANGE UP (ref 3.8–10.5)
WBC # FLD AUTO: 5.95 K/UL — SIGNIFICANT CHANGE UP (ref 3.8–10.5)

## 2020-07-23 PROCEDURE — 99214 OFFICE O/P EST MOD 30 MIN: CPT

## 2020-07-24 LAB
ALBUMIN SERPL ELPH-MCNC: 4.9 G/DL
ALP BLD-CCNC: 90 U/L
ALT SERPL-CCNC: 26 U/L
ANION GAP SERPL CALC-SCNC: 12 MMOL/L
AST SERPL-CCNC: 26 U/L
BILIRUB SERPL-MCNC: 0.3 MG/DL
BUN SERPL-MCNC: 26 MG/DL
CALCIUM SERPL-MCNC: 9.5 MG/DL
CHLORIDE SERPL-SCNC: 103 MMOL/L
CO2 SERPL-SCNC: 27 MMOL/L
CREAT SERPL-MCNC: 1.11 MG/DL
DEPRECATED KAPPA LC FREE/LAMBDA SER: 2.47 RATIO
GLUCOSE SERPL-MCNC: 100 MG/DL
IGA SER QL IEP: 132 MG/DL
IGG SER QL IEP: 895 MG/DL
IGM SER QL IEP: 15 MG/DL
KAPPA LC CSF-MCNC: 0.43 MG/DL
KAPPA LC SERPL-MCNC: 1.06 MG/DL
LDH SERPL-CCNC: 213 U/L
MAGNESIUM SERPL-MCNC: 2.3 MG/DL
POTASSIUM SERPL-SCNC: 5.7 MMOL/L
PROT SERPL-MCNC: 6.8 G/DL
SARS-COV-2 IGG SERPL IA-ACNC: <0.1 INDEX
SARS-COV-2 IGG SERPL QL IA: NEGATIVE
SODIUM SERPL-SCNC: 142 MMOL/L

## 2020-07-24 NOTE — PHYSICAL EXAM
[Ambulatory and capable of all self care but unable to carry out any work activities] : Status 2- Ambulatory and capable of all self care but unable to carry out any work activities. Up and about more than 50% of waking hours [Normal Male] : prostate smooth, symmetric with no modularity or induration [Normal] : affect appropriate [de-identified] : healed scar from port removal

## 2020-07-24 NOTE — ASSESSMENT
[FreeTextEntry1] : 53 y/o gentleman with hx of mantle cell lymphoma initially dx'ed Oct 2016 s/p RCHOP chemotherapy X 4 cycles and RICE X 2 cycles with stem cell collected off of the 2nd RICE..he is now s/p CBV-conditioned autologous stem cell transplant on 4/5/17. Hospital course complicated by a-fib/a-flutter s/p ablation and now stable on Cardizem and Toprol. Also now on Xarelto. Port also removed. He demonstrated engraftment on 4/16 and d/c'ed in stable condition on 5/5.\par  \par Peripheral blood work remains stable and discussed with patient.\par Labs sent for CMP, LDH, Mg, Quantitative Immunoglobulins.\par 12 month vaccines completed in April 2018. 14 month vaccines completed July 2018.  24 month re-vaccinations completed in April 2019. \par  CT chest, abdomen, pelvis  in May 2019. Results reviewed with the patient. \par CT/PET scan completed in November 2019 and was stable. Results were reviewed and discussed with patient. \par  repeat CT scan in May 2020 PEENLOPE.... will repeat in 1 year or sooner if an issue should arise\par Rituxan maintenance complete (Short Rituxan, once a week for 2 weeks). I reviewed the rationale, risk, benefits and side effects. Last Rituxan treatment was  on 1/22/20  (Short Rituxan, once a week for 2 weeks).\par Maintain f/u with cardiology. \par Maintain f/u with his regular internist. \par Well care and cancer screening stressed\par Patient has been advised to contact clinic if he has any concerns.\par \par RTC for f/u with Dr. Banks in 6 months

## 2020-07-24 NOTE — HISTORY OF PRESENT ILLNESS
[de-identified] : Mr. Chavez is a 53 year old male with a history of MCL, initially diagnosed 10/2016. On 11/15/2016 he was seen by Dr. Bravo at [facility], after noticing a left inguinal mass for which he sought medical attention. CT scans (3/21/2016) revealed left-sided pelvic and left inguinal adenopathy. A 0.7 cm indeterminate lesion in the right iliac bone was also noted. A left inguinal lymph node biopsy was performed 9/24/2016 which revealed morphologic and immunophenotypic findings consistent with mantle cell lymphoma. He is status post 4 cycles of R-CHOP, and two cycles of RICE consolidation. A bone marrow biopsy on 2/10/17 showed normocellular bone marrow with trilineage hematopoiesis and maturation. He was admitted 3/27/17 for an autologous peripheral blood stem cell transplant with CBV  regimen. \par  \par Upon admission, a TLC was placed in IR. Mr. Chavez received IV hydration, nutritional support, pain management, antinausea medication, antidiarrheals, antibiotic, antiviral, PCP, antifungal and GI prophylaxis. Labs were monitored daily. Mr. Chavez received transfusional support and electrolyte repletion as needed. When his ANC dropped below 1000, he was started on prophylactic ciprofloxacin. When he became febrile, his antibiotic coverage was broadened as appropriate. \par  \par On 4/5/17, Mr. Chavez received 314ml of thawed, pooled, washed, mobilized, plasma reduced, HPC apheresis over approximately 1 hour. He tolerated the infusion well with no adverse side effects noted. Engraftment was noted on 4/16/17, and the Zarxio and antibiotics were discontinued. \par  \par Mr. Chavez's transplant course was complicated by recurrent headaches, oral and GI mucositis, dizziness, and chest pain resulting in a RRT on 4/10/17. The chest pain was self limiting. A second RRT was called on 4/16/17, for palpitations and dizziness. Mr. Chavez was found to be in atrial flutter with RVR. He was transferred to telemetry and followed by cardiology. \par A-fib/a-flutter was resistant to single rate agent, and while in arrhythmia, pt would become symptomatic, hypotensive. Ablation was was preformed, however was unsuccessful. Pt continued to have frequent changes in rhythm. As a-fib was usually preceded by PAC, and mediport tip was in r atrium, concern was this may have been involved. Mediport was removed but patient continued to have runs of non conducted APCs. Patient was followed by EP during this admission who ruled out any need for PPM insertion. Patient was started on Cardizem and Toprol which he tolerated well. He did have episodes of bradycardia during his sleep with episodes of Atrial flutter with RVR as well. However, he remained asymptomatic. \par Pt was d/c 5/5/2017 to home in stable condition with instructions to follow up with hematology and EP. [de-identified] : Presenting today for a follow up appointment. He has completed  maintenance Rituxan. Last Rituxan was received in jan... PENELOPE....Patient reports continued fatigue. Recent CT/PET scan PENELOPE.  Patient also c/o of feeling swollen  gums in the past. He shares he has seen the dentist  and was told to see a periodontist for the inflammation present in his gums.  He continues to be followed by cardiology; his cardiologist increased the Diltiazem dosage to 180mg q daily, however patient reported feeling increasingly fatigued and returned to 120mg q daily and now notes less fatigue. He is in eval for a cardiac procedure.. His cardiologist was waiting for the rituxan to be completed..He is compliant with all medications. Denies mouth sores, eye dryness, nausea, vomiting, diarrhea. No LE edema, CP or SOB.

## 2020-08-07 ENCOUNTER — APPOINTMENT (OUTPATIENT)
Dept: CARDIOLOGY | Facility: CLINIC | Age: 55
End: 2020-08-07
Payer: MEDICARE

## 2020-08-07 PROCEDURE — G2066: CPT

## 2020-08-07 PROCEDURE — 93298 REM INTERROG DEV EVAL SCRMS: CPT

## 2020-08-07 NOTE — ASSESSMENT
[FreeTextEntry1] : Remote LNQ Summary\par \par 8-7-20\par Viewed lnq summary 6-17-20 to 7-2-20\par Event was viewed as AF with RVR (on 6-26-20 x 14hours 30min with avg v rate 105bpm)\par Pt saw RJ on 7-6-20 noting CHADSvasc score 0 and that pt declines ablation. Medications limited by tachybrady. \par If episodes were to increase possible consideration of antiarrythmic\par % time AT/AF 23.1% based on summary report above. \par Next summary on pa schedule 32d-cv

## 2020-09-08 ENCOUNTER — APPOINTMENT (OUTPATIENT)
Dept: CARDIOLOGY | Facility: CLINIC | Age: 55
End: 2020-09-08
Payer: MEDICARE

## 2020-09-08 PROCEDURE — G2066: CPT

## 2020-09-08 PROCEDURE — 93298 REM INTERROG DEV EVAL SCRMS: CPT

## 2020-09-08 NOTE — ASSESSMENT
[FreeTextEntry1] : Remote LNq Summary\par \par \par 9-8-20 \par Viewed lnq summary 7-2-20 to 7-22-20. \par Episode labeled AF on 7-8-20 x 32 hours 56min with avg v rate 128bpm. this was reviewed with RJ. Long run of ST 120s w/ APCs/PVCs noted and v-couplets.\par per last ep note CHADSvasc 0 on asa \par  Per RJ note 7/6 pt declined ablation. Consider antiarrhythmic if increasing burden or sx. \par AF burden by summary was 35.6% and currently 42.6% (fluctuating). \par Some recent events were viewed as AF with RVR x 40 hours. \par Pt was called with noted mild fatigue at times but gets better throughout the day. \par FD in process of making f/u apt with EP as had been advised for above.\par  NExt summary on pa schedule 32d-cv \par \par note past h/o borderline myopathy. \par echo 4-1-19 ef 40-45%\par Muga 5-28-19 ef 51%\par seems pt follows with Dr. Madsen\par tasked RJ update as above\par

## 2020-09-28 ENCOUNTER — NON-APPOINTMENT (OUTPATIENT)
Age: 55
End: 2020-09-28

## 2020-09-28 ENCOUNTER — APPOINTMENT (OUTPATIENT)
Dept: ELECTROPHYSIOLOGY | Facility: CLINIC | Age: 55
End: 2020-09-28
Payer: MEDICARE

## 2020-09-28 VITALS
SYSTOLIC BLOOD PRESSURE: 114 MMHG | OXYGEN SATURATION: 99 % | BODY MASS INDEX: 29.96 KG/M2 | DIASTOLIC BLOOD PRESSURE: 62 MMHG | WEIGHT: 214 LBS | HEIGHT: 71 IN | HEART RATE: 60 BPM | TEMPERATURE: 97.7 F

## 2020-09-28 PROCEDURE — 99214 OFFICE O/P EST MOD 30 MIN: CPT

## 2020-09-28 PROCEDURE — 93000 ELECTROCARDIOGRAM COMPLETE: CPT

## 2020-10-22 ENCOUNTER — APPOINTMENT (OUTPATIENT)
Dept: CARDIOLOGY | Facility: CLINIC | Age: 55
End: 2020-10-22
Payer: MEDICARE

## 2020-10-22 PROCEDURE — G2066: CPT

## 2020-10-22 PROCEDURE — 93298 REM INTERROG DEV EVAL SCRMS: CPT

## 2020-10-22 NOTE — ASSESSMENT
[FreeTextEntry1] : Remote Lnq Summary\par \par 10-22-20 \par Viewed lnq summary 7-22-20 to 9-28-20. \par Events viewed as AF with RVR (on 8/13/20 avg v rate 133bpm x 40+hours, on 9/21/20 avg v rate 140bpm x 30+ hours), 3 sec pauses, apc's, pvc's, symptoms of mild fatigue (pt noted to nursing on 9/22 that he has dizziness most of the time in the morning than subsides as day goes on).\par  Saw EP on 9/28,declines ablation (consider antiarrythmic if episodes increase)  Per  note CHADSvasc 0 \par note; h/o lars/pause.\par %at/af 34.3%. \par Next summary on pa schedule 32d-cv

## 2020-11-03 ENCOUNTER — NON-APPOINTMENT (OUTPATIENT)
Age: 55
End: 2020-11-03

## 2020-11-08 ENCOUNTER — NON-APPOINTMENT (OUTPATIENT)
Age: 55
End: 2020-11-08

## 2020-11-19 ENCOUNTER — APPOINTMENT (OUTPATIENT)
Dept: DERMATOLOGY | Facility: CLINIC | Age: 55
End: 2020-11-19
Payer: MEDICARE

## 2020-11-19 VITALS — HEIGHT: 71 IN | WEIGHT: 214 LBS | BODY MASS INDEX: 29.96 KG/M2

## 2020-11-19 DIAGNOSIS — L21.9 SEBORRHEIC DERMATITIS, UNSPECIFIED: ICD-10-CM

## 2020-11-19 DIAGNOSIS — L85.3 XEROSIS CUTIS: ICD-10-CM

## 2020-11-19 DIAGNOSIS — L57.8 OTHER SKIN CHANGES DUE TO CHRONIC EXPOSURE TO NONIONIZING RADIATION: ICD-10-CM

## 2020-11-19 DIAGNOSIS — R20.2 PARESTHESIA OF SKIN: ICD-10-CM

## 2020-11-19 PROCEDURE — 99203 OFFICE O/P NEW LOW 30 MIN: CPT

## 2020-11-23 ENCOUNTER — APPOINTMENT (OUTPATIENT)
Dept: CARDIOLOGY | Facility: CLINIC | Age: 55
End: 2020-11-23
Payer: MEDICARE

## 2020-11-23 ENCOUNTER — NON-APPOINTMENT (OUTPATIENT)
Age: 55
End: 2020-11-23

## 2020-11-23 PROCEDURE — 93298 REM INTERROG DEV EVAL SCRMS: CPT

## 2020-11-23 PROCEDURE — G2066: CPT

## 2020-11-23 NOTE — ASSESSMENT
[FreeTextEntry1] : Remote LNQ Summary\par \par \par \par 11-23-20 \par Viewd lnq summary 9-28-20 to 11-3-20. \par Symptom events were read as NSR. AF events were AF at times with RVR (on 9/29 lasting 21hours 18min with avg v rate 115bpm and on 10/25 longest ep 40ours 48min with avg v rate 107bpm)  pvc's and couplet. \par Per ep note, noted to be on ASa, CHADs 0.\par No significant symptoms documented. \par  Patient saw  on 9/28 and spoke to  via phone on 11/8. Plan was for  to review data and call patient back regarding ablation. \par Seems strips from today were also scanned  to  for review.\par  %AT/AF 33.8%. \par Next summary on pa schedule 32d-cv

## 2021-01-05 NOTE — H&P ADULT. - DOES THIS PATIENT HAVE A HISTORY OF OR HAS BEEN DX WITH HEART FAILURE?
Patient was not seen by RD today per Doug Justice PA-C.  Carmelo Danielle, SATNAM, LD  [facility], [place]  [phone]  
no

## 2021-01-13 ENCOUNTER — OUTPATIENT (OUTPATIENT)
Dept: OUTPATIENT SERVICES | Facility: HOSPITAL | Age: 56
LOS: 1 days | Discharge: HOME | End: 2021-01-13

## 2021-01-13 ENCOUNTER — APPOINTMENT (OUTPATIENT)
Dept: CARDIOLOGY | Facility: CLINIC | Age: 56
End: 2021-01-13
Payer: MEDICARE

## 2021-01-13 DIAGNOSIS — C83.10 MANTLE CELL LYMPHOMA, UNSPECIFIED SITE: ICD-10-CM

## 2021-01-13 DIAGNOSIS — Z98.890 OTHER SPECIFIED POSTPROCEDURAL STATES: Chronic | ICD-10-CM

## 2021-01-13 DIAGNOSIS — Z95.818 PRESENCE OF OTHER CARDIAC IMPLANTS AND GRAFTS: Chronic | ICD-10-CM

## 2021-01-13 PROCEDURE — G2066: CPT

## 2021-01-13 PROCEDURE — 93298 REM INTERROG DEV EVAL SCRMS: CPT

## 2021-01-13 NOTE — ASSESSMENT
[FreeTextEntry1] : Remote LNQ Summary\par \par \par \par 1/13/21 \par Viewd lnq summary 11/3/20-11/27/20 \par Symptom events were read as NSR. AF events were AF at times with RVR up to 200's.   Pvc's and couplets noted.  Symptoms with DANIKA.  As per tasks, RJ is aware of episodes and is planning to discuss ablation at next OV.  1/25.  Rate elevated in AF, but overall seems moderately controlled.  \par Per ep note, noted to be on ASa, CHADs 0. No AC. \par %AT/AF 50.8%. \par Next summary on pa schedule 32d-cv

## 2021-01-25 ENCOUNTER — APPOINTMENT (OUTPATIENT)
Dept: ELECTROPHYSIOLOGY | Facility: CLINIC | Age: 56
End: 2021-01-25
Payer: MEDICARE

## 2021-01-25 VITALS
OXYGEN SATURATION: 99 % | WEIGHT: 217 LBS | SYSTOLIC BLOOD PRESSURE: 122 MMHG | HEART RATE: 66 BPM | BODY MASS INDEX: 30.38 KG/M2 | DIASTOLIC BLOOD PRESSURE: 80 MMHG | HEIGHT: 71 IN | TEMPERATURE: 97.1 F

## 2021-01-25 DIAGNOSIS — R00.1 BRADYCARDIA, UNSPECIFIED: ICD-10-CM

## 2021-01-25 PROCEDURE — 99072 ADDL SUPL MATRL&STAF TM PHE: CPT

## 2021-01-25 PROCEDURE — 99213 OFFICE O/P EST LOW 20 MIN: CPT

## 2021-02-04 ENCOUNTER — APPOINTMENT (OUTPATIENT)
Dept: HEMATOLOGY ONCOLOGY | Facility: CLINIC | Age: 56
End: 2021-02-04
Payer: MEDICARE

## 2021-02-04 ENCOUNTER — RESULT REVIEW (OUTPATIENT)
Age: 56
End: 2021-02-04

## 2021-02-04 VITALS
RESPIRATION RATE: 16 BRPM | OXYGEN SATURATION: 96 % | DIASTOLIC BLOOD PRESSURE: 83 MMHG | TEMPERATURE: 97.3 F | HEART RATE: 64 BPM | SYSTOLIC BLOOD PRESSURE: 128 MMHG | WEIGHT: 218.46 LBS | BODY MASS INDEX: 30.47 KG/M2

## 2021-02-04 LAB
ALBUMIN SERPL ELPH-MCNC: 4.8 G/DL
ALP BLD-CCNC: 96 U/L
ALT SERPL-CCNC: 20 U/L
ANION GAP SERPL CALC-SCNC: 11 MMOL/L
AST SERPL-CCNC: 21 U/L
BASOPHILS # BLD AUTO: 0.02 K/UL — SIGNIFICANT CHANGE UP (ref 0–0.2)
BASOPHILS NFR BLD AUTO: 0.3 % — SIGNIFICANT CHANGE UP (ref 0–2)
BILIRUB SERPL-MCNC: 0.6 MG/DL
BUN SERPL-MCNC: 23 MG/DL
CALCIUM SERPL-MCNC: 9.3 MG/DL
CHLORIDE SERPL-SCNC: 104 MMOL/L
CO2 SERPL-SCNC: 27 MMOL/L
CREAT SERPL-MCNC: 1.12 MG/DL
EOSINOPHIL # BLD AUTO: 0.19 K/UL — SIGNIFICANT CHANGE UP (ref 0–0.5)
EOSINOPHIL NFR BLD AUTO: 3.1 % — SIGNIFICANT CHANGE UP (ref 0–6)
GLUCOSE SERPL-MCNC: 100 MG/DL
HCT VFR BLD CALC: 44.2 % — SIGNIFICANT CHANGE UP (ref 39–50)
HGB BLD-MCNC: 14.7 G/DL — SIGNIFICANT CHANGE UP (ref 13–17)
IMM GRANULOCYTES NFR BLD AUTO: 0.3 % — SIGNIFICANT CHANGE UP (ref 0–1.5)
LDH SERPL-CCNC: 175 U/L
LYMPHOCYTES # BLD AUTO: 2.68 K/UL — SIGNIFICANT CHANGE UP (ref 1–3.3)
LYMPHOCYTES # BLD AUTO: 43.1 % — SIGNIFICANT CHANGE UP (ref 13–44)
MAGNESIUM SERPL-MCNC: 2.2 MG/DL
MCHC RBC-ENTMCNC: 32.2 PG — SIGNIFICANT CHANGE UP (ref 27–34)
MCHC RBC-ENTMCNC: 33.3 G/DL — SIGNIFICANT CHANGE UP (ref 32–36)
MCV RBC AUTO: 96.7 FL — SIGNIFICANT CHANGE UP (ref 80–100)
MONOCYTES # BLD AUTO: 0.52 K/UL — SIGNIFICANT CHANGE UP (ref 0–0.9)
MONOCYTES NFR BLD AUTO: 8.4 % — SIGNIFICANT CHANGE UP (ref 2–14)
NEUTROPHILS # BLD AUTO: 2.79 K/UL — SIGNIFICANT CHANGE UP (ref 1.8–7.4)
NEUTROPHILS NFR BLD AUTO: 44.8 % — SIGNIFICANT CHANGE UP (ref 43–77)
NRBC # BLD: 0 /100 WBCS — SIGNIFICANT CHANGE UP (ref 0–0)
PLATELET # BLD AUTO: 243 K/UL — SIGNIFICANT CHANGE UP (ref 150–400)
POTASSIUM SERPL-SCNC: 4.9 MMOL/L
PROT SERPL-MCNC: 6.9 G/DL
RBC # BLD: 4.57 M/UL — SIGNIFICANT CHANGE UP (ref 4.2–5.8)
RBC # FLD: 13.5 % — SIGNIFICANT CHANGE UP (ref 10.3–14.5)
SODIUM SERPL-SCNC: 141 MMOL/L
WBC # BLD: 6.22 K/UL — SIGNIFICANT CHANGE UP (ref 3.8–10.5)
WBC # FLD AUTO: 6.22 K/UL — SIGNIFICANT CHANGE UP (ref 3.8–10.5)

## 2021-02-04 PROCEDURE — 99214 OFFICE O/P EST MOD 30 MIN: CPT

## 2021-02-04 PROCEDURE — 99072 ADDL SUPL MATRL&STAF TM PHE: CPT

## 2021-02-04 NOTE — PHYSICAL EXAM
[Ambulatory and capable of all self care but unable to carry out any work activities] : Status 2- Ambulatory and capable of all self care but unable to carry out any work activities. Up and about more than 50% of waking hours [Normal Male] : prostate smooth, symmetric with no modularity or induration [Normal] : affect appropriate [de-identified] : healed scar from port removal..healed shingles rash behind rt ear

## 2021-02-04 NOTE — HISTORY OF PRESENT ILLNESS
[de-identified] : Mr. Chavez is a 53 year old male with a history of MCL, initially diagnosed 10/2016. On 11/15/2016 he was seen by Dr. Bravo at [facility], after noticing a left inguinal mass for which he sought medical attention. CT scans (3/21/2016) revealed left-sided pelvic and left inguinal adenopathy. A 0.7 cm indeterminate lesion in the right iliac bone was also noted. A left inguinal lymph node biopsy was performed 9/24/2016 which revealed morphologic and immunophenotypic findings consistent with mantle cell lymphoma. He is status post 4 cycles of R-CHOP, and two cycles of RICE consolidation. A bone marrow biopsy on 2/10/17 showed normocellular bone marrow with trilineage hematopoiesis and maturation. He was admitted 3/27/17 for an autologous peripheral blood stem cell transplant with CBV  regimen. \par  \par Upon admission, a TLC was placed in IR. Mr. Chavez received IV hydration, nutritional support, pain management, antinausea medication, antidiarrheals, antibiotic, antiviral, PCP, antifungal and GI prophylaxis. Labs were monitored daily. Mr. Chavez received transfusional support and electrolyte repletion as needed. When his ANC dropped below 1000, he was started on prophylactic ciprofloxacin. When he became febrile, his antibiotic coverage was broadened as appropriate. \par  \par On 4/5/17, Mr. Chavez received 314ml of thawed, pooled, washed, mobilized, plasma reduced, HPC apheresis over approximately 1 hour. He tolerated the infusion well with no adverse side effects noted. Engraftment was noted on 4/16/17, and the Zarxio and antibiotics were discontinued. \par  \par Mr. Chavez's transplant course was complicated by recurrent headaches, oral and GI mucositis, dizziness, and chest pain resulting in a RRT on 4/10/17. The chest pain was self limiting. A second RRT was called on 4/16/17, for palpitations and dizziness. Mr. Chavez was found to be in atrial flutter with RVR. He was transferred to telemetry and followed by cardiology. \par A-fib/a-flutter was resistant to single rate agent, and while in arrhythmia, pt would become symptomatic, hypotensive. Ablation was was preformed, however was unsuccessful. Pt continued to have frequent changes in rhythm. As a-fib was usually preceded by PAC, and mediport tip was in r atrium, concern was this may have been involved. Mediport was removed but patient continued to have runs of non conducted APCs. Patient was followed by EP during this admission who ruled out any need for PPM insertion. Patient was started on Cardizem and Toprol which he tolerated well. He did have episodes of bradycardia during his sleep with episodes of Atrial flutter with RVR as well. However, he remained asymptomatic. \par Pt was d/c 5/5/2017 to home in stable condition with instructions to follow up with hematology and EP. [de-identified] : Presenting today for a follow up appointment. He has completed  maintenance Rituxan. Last Rituxan was received in jan... PENELOPE....Patient reports continued fatigue. Recent CT/PET scan PENELOPE.  Patient also offers that he had the shingles and was treated with valtrex..some residual discomfort...He continues to be followed by cardiology; his cardiologist increased the Diltiazem dosage to 180mg q daily, however patient reported feeling increasingly fatigued and returned to 120mg q daily and now notes less fatigue. He is in eval for a cardiac procedure.. His cardiologist was waiting for the rituxan to be completed and covid to pass..He is compliant with all medications. Denies mouth sores, eye dryness, nausea, vomiting, diarrhea. No LE edema, CP or SOB.

## 2021-02-04 NOTE — ASSESSMENT
[FreeTextEntry1] : 53 y/o gentleman with hx of mantle cell lymphoma initially dx'ed Oct 2016 s/p RCHOP chemotherapy X 4 cycles and RICE X 2 cycles with stem cell collected off of the 2nd RICE..he is now s/p CBV-conditioned autologous stem cell transplant on 4/5/17. Hospital course complicated by a-fib/a-flutter s/p ablation and now stable on Cardizem and Toprol. Also now on Xarelto. Port also removed. He demonstrated engraftment on 4/16 and d/c'ed in stable condition on 5/5.\par  \par Peripheral blood work remains stable and discussed with patient.\par Labs sent for CMP, LDH, Mg, Quantitative Immunoglobulins.\par 12 month vaccines completed in April 2018. 14 month vaccines completed July 2018.  24 month re-vaccinations completed in April 2019. \par  CT chest, abdomen, pelvis  in May 2019. Results reviewed with the patient. \par CT/PET scan completed in November 2019 and was stable. Results were reviewed and discussed with patient. \par  repeat CT scan in June 2020 PENELOPE.... will repeat in 1 year or sooner if an issue should arise..ordered for july 2021\par Rituxan maintenance complete (Short Rituxan, once a week for 2 weeks). I reviewed the rationale, risk, benefits and side effects. Last Rituxan treatment was  on 1/22/20  (Short Rituxan, once a week for 2 weeks).\par Maintain f/u with cardiology. \par Maintain f/u with his regular internist. \par Well care and cancer screening stressed\par Patient has been advised to contact clinic if he has any concerns.\par \par RTC for f/u with Dr. Banks in 12  months

## 2021-02-06 LAB
DEPRECATED KAPPA LC FREE/LAMBDA SER: 1.8 RATIO
IGA SER QL IEP: 139 MG/DL
IGG SER QL IEP: 962 MG/DL
IGM SER QL IEP: 22 MG/DL
KAPPA LC CSF-MCNC: 0.74 MG/DL
KAPPA LC SERPL-MCNC: 1.33 MG/DL

## 2021-02-07 PROBLEM — R00.1 SINUS BRADYCARDIA: Status: ACTIVE | Noted: 2018-04-16

## 2021-02-07 NOTE — HISTORY OF PRESENT ILLNESS
[FreeTextEntry1] : The patient is seen in follow-up evaluation for atrial fibrillation.  He has an implantable loop monitor and he has been several episodes of A. fib tachycardia noted.  He has also had prior bradycardia which limited his treatment of the tachycardia.  His current symptoms including occasional dizziness and sometimes weakness.  He has not had syncope or presyncope.  He denies chest pain.  He gets mildly short of breath during the tachycardia episodes.  He has occasional headaches.  Patient denies any visual symptoms.\par \par The patient has a prior history of mantle cell lymphoma.  He has got a previous cycles of chemotherapy.\par \par The patient has had prior chest discomfort and previously underwent cardiac catheterization which showed nonobstructive coronaries.\par \par He has had previous atrial flutter and underwent catheter ablation for atrial flutter.\par \par He subsequently had atrial fibrillation which is being treated with medical therapy.  We had discussed catheter ablation procedure for atrial fibrillation and had reviewed with his hematologist oncologist the possibility of using anticoagulants especially post procedure.  He was clear to be treated with a NOAC/anticoagulant.\par \par  [id number] score: 0 - No history of diabetes, hypertension, TIA, CVA, vascular disease\par \par Echocardiogram from 4/1/2019 showed left atrial diameter 3.7 cm left atrial volume index 36 cc/m², LVEF 40 to 45%.\par

## 2021-02-07 NOTE — DISCUSSION/SUMMARY
[FreeTextEntry1] : \par \par ILR implanted December 7, 2017: AT AF burden 41%.  The patient has recurrent atrial fibrillation with rapid rates and is symptomatic.  He is also had bradycardia that limits his therapy.  He would benefit from treatment to maintain sinus rhythm.  Options discussed with the patient again including usage of antiarrhythmic.  He prefers to have a catheter ablation procedure but wants to have the Covid vaccine first.\par \par Recommendations continue on diltiazem.\par \par Repeat echocardiogram for left ventricular ejection fraction as well as left atrial size.\par \par Start Xarelto prior to the ablation procedure.  Discontinue aspirin after starting Xarelto.\par \par Plan for catheter ablation procedure of atrial fibrillation after he has gotten his code vaccination.

## 2021-02-07 NOTE — PHYSICAL EXAM
[General Appearance - Well Developed] : well developed [General Appearance - In No Acute Distress] : no acute distress [Normal Conjunctiva] : the conjunctiva exhibited no abnormalities [Normal Jugular Venous V Waves Present] : normal jugular venous V waves present [Auscultation Breath Sounds / Voice Sounds] : lungs were clear to auscultation bilaterally [Heart Rate And Rhythm] : heart rate and rhythm were normal [Heart Sounds] : normal S1 and S2 [Murmurs] : no murmurs present [Arterial Pulses Normal] : the arterial pulses were normal [Edema] : no peripheral edema present [Abdomen Soft] : soft [Abdomen Tenderness] : non-tender [Nail Clubbing] : no clubbing of the fingernails [Cyanosis, Localized] : no localized cyanosis [No Venous Stasis] : no venous stasis [Impaired Insight] : insight and judgment were intact

## 2021-02-07 NOTE — REVIEW OF SYSTEMS
[Shortness Of Breath] : shortness of breath [Feeling Fatigued] : not feeling fatigued [Sore Throat] : no sore throat [Cough] : no cough [Abdominal Pain] : no abdominal pain [Dizziness] : no dizziness [Anxiety] : no anxiety [Easy Bleeding] : no tendency for easy bleeding [Easy Bruising] : no tendency for easy bruising

## 2021-02-16 ENCOUNTER — NON-APPOINTMENT (OUTPATIENT)
Age: 56
End: 2021-02-16

## 2021-02-17 ENCOUNTER — APPOINTMENT (OUTPATIENT)
Dept: CARDIOLOGY | Facility: CLINIC | Age: 56
End: 2021-02-17
Payer: MEDICARE

## 2021-02-17 ENCOUNTER — NON-APPOINTMENT (OUTPATIENT)
Age: 56
End: 2021-02-17

## 2021-02-17 VITALS
HEART RATE: 69 BPM | SYSTOLIC BLOOD PRESSURE: 126 MMHG | OXYGEN SATURATION: 99 % | HEIGHT: 71 IN | TEMPERATURE: 98.6 F | DIASTOLIC BLOOD PRESSURE: 83 MMHG | WEIGHT: 220 LBS | BODY MASS INDEX: 30.8 KG/M2 | RESPIRATION RATE: 17 BRPM

## 2021-02-17 PROCEDURE — 93000 ELECTROCARDIOGRAM COMPLETE: CPT

## 2021-02-17 PROCEDURE — 99072 ADDL SUPL MATRL&STAF TM PHE: CPT

## 2021-02-17 PROCEDURE — 99214 OFFICE O/P EST MOD 30 MIN: CPT | Mod: 24

## 2021-02-17 NOTE — DISCUSSION/SUMMARY
[FreeTextEntry1] : 54 yo man post A. Flutter ablation and now with PAF under the care of Dr Prescott. The A. F. burden has increased and will probably have an ablation. Still having vague complains of atypical chest pain and morning tiredness, but otherwise he is very active. \par Will request a MUGA test to R/O amyloid\par Will request a nuclear stress test to assess for ischemia\par Will request an Echo since the last one was done in 4/2019 to assess LV function\par recommended to continue same medications\par Recommended to get the vaccine\par Routine follow up in 3 months \par

## 2021-02-17 NOTE — PHYSICAL EXAM
[General Appearance - Well Developed] : well developed [Normal Appearance] : normal appearance [General Appearance - Well Nourished] : well nourished [No Deformities] : no deformities [Normal Conjunctiva] : the conjunctiva exhibited no abnormalities [Normal Jugular Venous V Waves Present] : normal jugular venous V waves present [Respiration, Rhythm And Depth] : normal respiratory rhythm and effort [Exaggerated Use Of Accessory Muscles For Inspiration] : no accessory muscle use [Chest Palpation] : palpation of the chest revealed no abnormalities [Auscultation Breath Sounds / Voice Sounds] : lungs were clear to auscultation bilaterally [Lungs Percussion] : the lungs were normal to percussion [Heart Rate And Rhythm] : heart rate and rhythm were normal [Heart Sounds] : normal S1 and S2 [Murmurs] : no murmurs present [Arterial Pulses Normal] : the arterial pulses were normal [Edema] : no peripheral edema present [Veins - Varicosity Changes] : no varicosital changes were noted in the lower extremities [Bowel Sounds] : normal bowel sounds [Abdomen Soft] : soft [Abnormal Walk] : normal gait [Cyanosis, Localized] : no localized cyanosis [Nail Clubbing] : no clubbing of the fingernails [Skin Color & Pigmentation] : normal skin color and pigmentation [Skin Turgor] : normal skin turgor [] : no rash [Oriented To Time, Place, And Person] : oriented to person, place, and time [Impaired Insight] : insight and judgment were intact [No Anxiety] : not feeling anxious [FreeTextEntry1] : Deferred for COVID

## 2021-02-17 NOTE — REVIEW OF SYSTEMS
[Headache] : headache [Eyeglasses] : currently wearing eyeglasses [Impotence] : impotence [Nocturia] : nocturia [Muscle Cramps] : muscle cramps [Negative] : Heme/Lymph [Dyspnea on exertion] : dyspnea during exertion [Chest Pain] : chest pain [Palpitations] : no palpitations

## 2021-02-17 NOTE — HISTORY OF PRESENT ILLNESS
[FreeTextEntry1] : 56 yo man, , father of two children, Gabonese speaking. 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 No radiotherapy. Still on medical treatment with Rituxan, next treatment in 1/2020. Hospital course complicated by A. Fibrillation/Flutter that was treated with ablation (Flutter ablation) and now stable on Cardizem and Toprol. Had a ILR implanted in 3/2018, interrogation has shown that he still has AF episodes. His CHADS-VASC is 0 and therefore he is not treated with Xarelto any longer. Treated only with aspirin low dose and CCB. Has been under the care of Dr Prescott and had an ILR implanted that revealed several episodes of A. Fib with a 41% burden. He is scheduled for an ablation. \par Has not been started on Xarelto. \par In April 2018 underwent a stress test that revealed small anterior wall reversible ischemia. \par Cardiac catheterization in May 2018 that revealed normal coronaries. Last echo was done 3/27/2018 that revealed normal EF (59%). \par On 4/2/2019 he had an echo that revealed a reduction in his global EF to 40-45%, mildly enlarged LA.\par On 5/20/2019 he did a nuclear stress test, 11 METS, with atrial Fib rhythm, EF 53% , small mild perfusion defects in the basal and mid anterior wall that are partially reversible. \par Today at the clinic for routine follow up. C/O general fatigue, generalized muscle pains and chest burning. Not effort-related. Occasional effort-related SOB, no orthopnea or PND. No change since his last visit in 2019. \par C/O erectile dysfunction. \par Doesn't smoke and does not drink alcohol.\par Denies the use of illicit drugs.

## 2021-02-23 ENCOUNTER — APPOINTMENT (OUTPATIENT)
Dept: CARDIOLOGY | Facility: CLINIC | Age: 56
End: 2021-02-23
Payer: MEDICARE

## 2021-02-23 PROCEDURE — G2066: CPT

## 2021-02-23 PROCEDURE — 93298 REM INTERROG DEV EVAL SCRMS: CPT

## 2021-02-24 ENCOUNTER — NON-APPOINTMENT (OUTPATIENT)
Age: 56
End: 2021-02-24

## 2021-03-18 ENCOUNTER — APPOINTMENT (OUTPATIENT)
Dept: CARDIOLOGY | Facility: CLINIC | Age: 56
End: 2021-03-18
Payer: MEDICARE

## 2021-03-18 PROCEDURE — A9500: CPT

## 2021-03-18 PROCEDURE — 99072 ADDL SUPL MATRL&STAF TM PHE: CPT

## 2021-03-18 PROCEDURE — 93306 TTE W/DOPPLER COMPLETE: CPT

## 2021-03-18 PROCEDURE — 78452 HT MUSCLE IMAGE SPECT MULT: CPT

## 2021-03-18 PROCEDURE — 93015 CV STRESS TEST SUPVJ I&R: CPT

## 2021-03-25 ENCOUNTER — NON-APPOINTMENT (OUTPATIENT)
Age: 56
End: 2021-03-25

## 2021-03-25 ENCOUNTER — APPOINTMENT (OUTPATIENT)
Dept: CARDIOLOGY | Facility: CLINIC | Age: 56
End: 2021-03-25
Payer: MEDICARE

## 2021-03-25 PROCEDURE — G2066: CPT

## 2021-03-25 PROCEDURE — 93298 REM INTERROG DEV EVAL SCRMS: CPT

## 2021-03-26 ENCOUNTER — NON-APPOINTMENT (OUTPATIENT)
Age: 56
End: 2021-03-26

## 2021-03-29 ENCOUNTER — APPOINTMENT (OUTPATIENT)
Dept: CARDIOLOGY | Facility: CLINIC | Age: 56
End: 2021-03-29

## 2021-04-21 ENCOUNTER — APPOINTMENT (OUTPATIENT)
Dept: ELECTROPHYSIOLOGY | Facility: CLINIC | Age: 56
End: 2021-04-21

## 2021-04-29 ENCOUNTER — APPOINTMENT (OUTPATIENT)
Dept: CARDIOLOGY | Facility: CLINIC | Age: 56
End: 2021-04-29

## 2021-05-18 ENCOUNTER — APPOINTMENT (OUTPATIENT)
Dept: CARDIOLOGY | Facility: CLINIC | Age: 56
End: 2021-05-18

## 2021-05-25 ENCOUNTER — APPOINTMENT (OUTPATIENT)
Dept: CARDIOLOGY | Facility: CLINIC | Age: 56
End: 2021-05-25
Payer: MEDICARE

## 2021-05-25 ENCOUNTER — NON-APPOINTMENT (OUTPATIENT)
Age: 56
End: 2021-05-25

## 2021-05-25 PROCEDURE — G2066: CPT

## 2021-05-25 PROCEDURE — 93298 REM INTERROG DEV EVAL SCRMS: CPT

## 2021-05-26 ENCOUNTER — APPOINTMENT (OUTPATIENT)
Dept: CARDIOLOGY | Facility: CLINIC | Age: 56
End: 2021-05-26
Payer: MEDICARE

## 2021-05-26 ENCOUNTER — NON-APPOINTMENT (OUTPATIENT)
Age: 56
End: 2021-05-26

## 2021-05-26 PROCEDURE — 93000 ELECTROCARDIOGRAM COMPLETE: CPT

## 2021-05-26 PROCEDURE — 99214 OFFICE O/P EST MOD 30 MIN: CPT

## 2021-05-26 RX ORDER — ASPIRIN 81 MG
81 TABLET, DELAYED RELEASE (ENTERIC COATED) ORAL
Refills: 3 | Status: DISCONTINUED | COMMUNITY
End: 2021-05-26

## 2021-05-26 NOTE — HISTORY OF PRESENT ILLNESS
[FreeTextEntry1] : 54 yo man, , father of two children, Filipino speaking. 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 No radiotherapy. Still on medical treatment with Rituxan, next treatment in 1/2020. Hospital course complicated by A. Fibrillation/Flutter that was treated with ablation (Flutter ablation) and now stable on Cardizem and Toprol. Had a ILR implanted in 3/2018, interrogation has shown that he still has AF episodes. His CHADS-VASC is 0 and therefore he is not treated with Xarelto any longer. Treated only with aspirin low dose and CCB. Has been under the care of Dr Prescott and had an ILR implanted that revealed several episodes of A. Fib with a 41% burden. He is scheduled for an ablation. \par Has not been started on Xarelto yet. \par In April 2018 underwent a stress test that revealed small anterior wall reversible ischemia. \par Cardiac catheterization in May 2018 that revealed normal coronaries. Last echo was done 3/27/2018 that revealed normal EF (59%). \par On 3//18/2021 he had an echo that revealed a reduction in his global EF to 40-45%, mildly enlarged LA. No change. \par On 3/18/2011 revealed normal LV function 57%. No ischemia. . \par Today at the clinic for routine follow up. C/O general fatigue, generalized muscle pains and chest burning. Not effort-related. Occasional effort-related SOB, no orthopnea or PND. No change since his last visit in 2019. \par C/O erectile dysfunction. \par Received the COVID 19 vaccine\par Doesn't smoke and does not drink alcohol.\par Denies the use of illicit drugs.

## 2021-05-26 NOTE — REVIEW OF SYSTEMS
[Headache] : headache [Feeling Fatigued] : feeling fatigued [SOB] : shortness of breath [Dyspnea on exertion] : dyspnea during exertion [Chest Discomfort] : no chest discomfort [Lower Ext Edema] : no extremity edema [Leg Claudication] : no intermittent leg claudication [Palpitations] : palpitations [Orthopnea] : no orthopnea [PND] : no PND [Syncope] : no syncope [Nocturia] : nocturia [Negative] : Heme/Lymph

## 2021-05-26 NOTE — DISCUSSION/SUMMARY
[FreeTextEntry1] : 54 yo man post A. Flutter ablation and now with PAF under the care of Dr Prescott. The A. F. burden has increased and will probably have an ablation. Still having vague complains of atypical chest pain and morning tiredness, but otherwise he is very active. Echo revealed a reduced LV function. \par In view of the reduced LV function and the high burden, will start Xarelto 20 mg/day (with dinner).\par Will stop aspirin (no indication).\par Will request a MUGA test to R/O amyloid\par Routine follow up in 3 months \par

## 2021-06-04 ENCOUNTER — APPOINTMENT (OUTPATIENT)
Dept: ELECTROPHYSIOLOGY | Facility: CLINIC | Age: 56
End: 2021-06-04
Payer: MEDICARE

## 2021-06-04 VITALS
WEIGHT: 218 LBS | BODY MASS INDEX: 30.52 KG/M2 | HEIGHT: 71 IN | TEMPERATURE: 97.3 F | DIASTOLIC BLOOD PRESSURE: 58 MMHG | HEART RATE: 60 BPM | SYSTOLIC BLOOD PRESSURE: 100 MMHG | OXYGEN SATURATION: 97 %

## 2021-06-04 DIAGNOSIS — R00.2 PALPITATIONS: ICD-10-CM

## 2021-06-04 PROCEDURE — 99213 OFFICE O/P EST LOW 20 MIN: CPT

## 2021-06-16 DIAGNOSIS — Z01.818 ENCOUNTER FOR OTHER PREPROCEDURAL EXAMINATION: ICD-10-CM

## 2021-06-18 ENCOUNTER — RESULT REVIEW (OUTPATIENT)
Age: 56
End: 2021-06-18

## 2021-06-21 ENCOUNTER — OUTPATIENT (OUTPATIENT)
Dept: OUTPATIENT SERVICES | Facility: HOSPITAL | Age: 56
LOS: 1 days | End: 2021-06-21

## 2021-06-21 ENCOUNTER — APPOINTMENT (OUTPATIENT)
Dept: NUCLEAR MEDICINE | Facility: CLINIC | Age: 56
End: 2021-06-21
Payer: MEDICARE

## 2021-06-21 ENCOUNTER — RESULT REVIEW (OUTPATIENT)
Age: 56
End: 2021-06-21

## 2021-06-21 DIAGNOSIS — Z95.818 PRESENCE OF OTHER CARDIAC IMPLANTS AND GRAFTS: Chronic | ICD-10-CM

## 2021-06-21 DIAGNOSIS — Z98.890 OTHER SPECIFIED POSTPROCEDURAL STATES: Chronic | ICD-10-CM

## 2021-06-21 DIAGNOSIS — I51.9 HEART DISEASE, UNSPECIFIED: ICD-10-CM

## 2021-06-21 PROCEDURE — 78803 RP LOCLZJ TUM SPECT 1 AREA: CPT | Mod: 26

## 2021-06-27 PROBLEM — R00.2 PALPITATIONS: Status: ACTIVE | Noted: 2017-12-19

## 2021-06-27 NOTE — DISCUSSION/SUMMARY
[FreeTextEntry1] : \par \par \par \par ILR interrogation: Frequent episodes of paroxysmal A. fib with increased ventricular response as well as pauses up to 3 seconds.\par \par He has symptoms of fatigue as well as occasional shortness of breath and dizziness.\par \par His therapy is limited because of the concomitant bradycardia.\par \par We rediscussed catheter ablation procedure.  He wants to have the procedure done but he will determine the timing.\par \par Interim we will continue him on diltiazem as well as Xarelto.

## 2021-06-27 NOTE — REVIEW OF SYSTEMS
[Weight Gain (___ Lbs)] : no recent weight gain [Feeling Fatigued] : feeling fatigued [Weight Loss (___ Lbs)] : no recent weight loss [Blurry Vision] : no blurred vision [Sore Throat] : no sore throat [SOB] : no shortness of breath [Chest Discomfort] : no chest discomfort [Cough] : no cough [Abdominal Pain] : no abdominal pain [Rash] : no rash [Dizziness] : no dizziness [Anxiety] : no anxiety [Under Stress] : under stress [Easy Bleeding] : no tendency for easy bleeding

## 2021-06-27 NOTE — HISTORY OF PRESENT ILLNESS
[FreeTextEntry1] : Follow-up evaluation for frequent episodes of atrial fibrillation seen on the implantable loop monitor.\par \par Symptoms: Occasional palpitations, fatigue mild shortness of breath.  No dizziness, lightheadedness, syncope, presyncope or chest pain.\par \par \par The patient has a prior history of mantle cell lymphoma.  He has got a previous cycles of chemotherapy.\par \par The patient has had prior chest discomfort and previously underwent cardiac catheterization which showed nonobstructive coronaries.\par \par He has had previous atrial flutter and underwent catheter ablation for atrial flutter.\par \par He subsequently had atrial fibrillation which is being treated with medical therapy.  We had discussed catheter ablation procedure for atrial fibrillation and had reviewed with his hematologist oncologist the possibility of using anticoagulants especially post procedure.  He was clear to be treated with a NOAC/anticoagulant.\par \par  [id number] score: 0 - No history of diabetes, hypertension, TIA, CVA, vascular disease\par \par Echocardiogram from 4/1/2019 showed left atrial diameter 3.7 cm left atrial volume index 36 cc/m², LVEF 40 to 45%.\par

## 2021-06-29 ENCOUNTER — APPOINTMENT (OUTPATIENT)
Dept: CARDIOLOGY | Facility: CLINIC | Age: 56
End: 2021-06-29

## 2021-07-01 ENCOUNTER — OUTPATIENT (OUTPATIENT)
Dept: OUTPATIENT SERVICES | Facility: HOSPITAL | Age: 56
LOS: 1 days | End: 2021-07-01
Payer: COMMERCIAL

## 2021-07-01 ENCOUNTER — APPOINTMENT (OUTPATIENT)
Dept: CT IMAGING | Facility: CLINIC | Age: 56
End: 2021-07-01
Payer: MEDICARE

## 2021-07-01 DIAGNOSIS — Z95.818 PRESENCE OF OTHER CARDIAC IMPLANTS AND GRAFTS: Chronic | ICD-10-CM

## 2021-07-01 DIAGNOSIS — Z98.890 OTHER SPECIFIED POSTPROCEDURAL STATES: Chronic | ICD-10-CM

## 2021-07-01 DIAGNOSIS — Z01.818 ENCOUNTER FOR OTHER PREPROCEDURAL EXAMINATION: ICD-10-CM

## 2021-07-01 DIAGNOSIS — Z00.8 ENCOUNTER FOR OTHER GENERAL EXAMINATION: ICD-10-CM

## 2021-07-01 PROCEDURE — 71260 CT THORAX DX C+: CPT | Mod: 26

## 2021-07-01 PROCEDURE — 71260 CT THORAX DX C+: CPT

## 2021-07-01 PROCEDURE — 74177 CT ABD & PELVIS W/CONTRAST: CPT

## 2021-07-01 PROCEDURE — 82565 ASSAY OF CREATININE: CPT

## 2021-07-01 PROCEDURE — 74177 CT ABD & PELVIS W/CONTRAST: CPT | Mod: 26

## 2021-07-07 NOTE — DISCUSSION/SUMMARY
[FreeTextEntry1] : The patient has had frequent short duration episodes of A. fib with rapid ventricular response and a burden of approximately 34%.  He is not aware of the rapid heartbeat..  Sometimes he feels a little bit dizzy and weak but does not feel palpitations.  He is on diltiazem  mg/day.  He has had previous bradycardia as well.\par \par The patient has had a moderately high burden of A. fib even though the durations are short.  He may not tolerate higher dose of AV blocker given the fact that he had previous bradycardia and pause.\par \par The options for treatment would either be overall the patient is doing well and does not have much in the way of symptoms from the A. fib.  The episodes are short but at times with ventricular response greater than 150 bpm.  He is currently on diltiazem for rate control.\par \par We would like to achieve better rate control.  The A. fib he is not symptomatic and barely aware of it and will attempt to see if we could achieve better rate control.  In the past higher dose of diltiazem had resulted in bradycardia.  If the A. fib continues we would also discussed the possibility of the catheter ablation procedure which the patient previously did not want based on his other medical problems.\par \par He continues to have atrial fibrillation but much less frequent.  Patient does not want catheter ablation this time we will continue to observe on his current medication.  He has had tachybradycardia syndrome which limits further increase in medications.  We will consider antiarrhythmic agent if the episode should increase.  \par We will continue him on diltiazem for rate control.  We will monitor his device remotely to further assess his AF episodes and ventricular rates.

## 2021-07-07 NOTE — HISTORY OF PRESENT ILLNESS
[FreeTextEntry1] : The patient is seen in follow-up evaluation of his atrial fibrillation.  He has an implantable loop monitor and recent interrogations have suggested an increase in the AF burden.  The patient is feeling well and denies any feeling of the rapid heartbeat or dizziness.  Overall he is feeling well.  \par \par \par Interrogation of the implantable loop monitor.  Patient has had episodes of tachycardia with durations between a few seconds up to 5 minutes.  There appears where the ventricular rate is fast over 150 bpm.\par \par \par \par He is feeling much better and has not had palpitations recently.\par Interrogation of the implantable loop monitor showed that he had decreased AF burden since last evaluation 3 months ago.  His longest episode of A. fib was 27 hours with an average ventricular sponsor 150 bpm.  He is also had pauses up to 3.4 seconds.  He is less tired and fatigued.  Patient is on diltiazem  mg/day.\par Prior history: 54-year-old man with a history of non-Hodgkin's lymphoma status post chemotherapy as well as bone marrow transplant April 2017.  During the course of that presentation he was noted to have atrial flutter and underwent an atrial flutter ablation.  He subsequently had implantation of an implantable loop monitor December 2017.  Follow-up monitoring since then has shown occasional episodes of A. fib and also bradycardia.  [id number] score is 0.  He has been on aspirin.   He has had prior chest discomfort and underwent cardiac catheterization which did not show any obstructive coronary disease.  His ejection fractions has been in the range of 40 to 45% on echocardiography.\par \par \par \par \par

## 2021-09-01 ENCOUNTER — APPOINTMENT (OUTPATIENT)
Dept: CARDIOLOGY | Facility: CLINIC | Age: 56
End: 2021-09-01

## 2021-09-02 ENCOUNTER — NON-APPOINTMENT (OUTPATIENT)
Age: 56
End: 2021-09-02

## 2021-09-02 ENCOUNTER — APPOINTMENT (OUTPATIENT)
Dept: CARDIOLOGY | Facility: CLINIC | Age: 56
End: 2021-09-02
Payer: MEDICARE

## 2021-09-02 PROCEDURE — G2066: CPT

## 2021-09-02 PROCEDURE — 93298 REM INTERROG DEV EVAL SCRMS: CPT

## 2021-09-13 ENCOUNTER — APPOINTMENT (OUTPATIENT)
Dept: DERMATOLOGY | Facility: CLINIC | Age: 56
End: 2021-09-13
Payer: MEDICARE

## 2021-09-13 VITALS — WEIGHT: 218 LBS | HEIGHT: 71 IN | BODY MASS INDEX: 30.52 KG/M2

## 2021-09-13 DIAGNOSIS — L30.8 OTHER SPECIFIED DERMATITIS: ICD-10-CM

## 2021-09-13 DIAGNOSIS — L29.9 PRURITUS, UNSPECIFIED: ICD-10-CM

## 2021-09-13 PROCEDURE — 99214 OFFICE O/P EST MOD 30 MIN: CPT

## 2021-09-13 NOTE — PHYSICAL EXAM
[FreeTextEntry3] : Erythematous scaling patches with inflammation \par lower abdomen, forearms, thighs; \par notalgia like changes on back

## 2021-09-13 NOTE — ASSESSMENT
[FreeTextEntry1] : Pruritus/asteatosis; \par \par Therapeutic options and their risks and benefits; along with multiple diagnostic possibilities were discussed at length; risks and benefits of further study were discussed;\par \par findings nonspecific, but pt. states pruritus was presenting symptom of mantle zone lymphoma in past; \par \par TAC 0.1 cream BID;  sarna lotion; \par \par Pt. will contact oncologist for blood workup and any further testing needed;\par \par Recheck before winter

## 2021-09-13 NOTE — HISTORY OF PRESENT ILLNESS
[de-identified] : Pt. c/o itching;  all over, but worse on legs, abdomen, back; \par concerned because pt. has Hx NHL, treated with bome marrow XPlant; \par itching was a presenting factor
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
## 2021-09-22 ENCOUNTER — FORM ENCOUNTER (OUTPATIENT)
Age: 56
End: 2021-09-22

## 2021-09-23 ENCOUNTER — TRANSCRIPTION ENCOUNTER (OUTPATIENT)
Age: 56
End: 2021-09-23

## 2021-09-23 ENCOUNTER — INPATIENT (INPATIENT)
Facility: HOSPITAL | Age: 56
LOS: 0 days | Discharge: HOME | DRG: 274 | End: 2021-09-24
Attending: INTERNAL MEDICINE | Admitting: INTERNAL MEDICINE
Payer: COMMERCIAL

## 2021-09-23 VITALS
SYSTOLIC BLOOD PRESSURE: 132 MMHG | WEIGHT: 216.93 LBS | HEIGHT: 71 IN | RESPIRATION RATE: 99 BRPM | OXYGEN SATURATION: 99 % | HEART RATE: 75 BPM | DIASTOLIC BLOOD PRESSURE: 84 MMHG

## 2021-09-23 DIAGNOSIS — Z98.890 OTHER SPECIFIED POSTPROCEDURAL STATES: Chronic | ICD-10-CM

## 2021-09-23 DIAGNOSIS — Z94.84 STEM CELLS TRANSPLANT STATUS: Chronic | ICD-10-CM

## 2021-09-23 DIAGNOSIS — Z95.818 PRESENCE OF OTHER CARDIAC IMPLANTS AND GRAFTS: Chronic | ICD-10-CM

## 2021-09-23 DIAGNOSIS — I48.91 UNSPECIFIED ATRIAL FIBRILLATION: ICD-10-CM

## 2021-09-23 LAB
ABO RH CONFIRMATION: SIGNIFICANT CHANGE UP
ANION GAP SERPL CALC-SCNC: 14 MMOL/L — SIGNIFICANT CHANGE UP (ref 5–17)
APTT BLD: 27.3 SEC — LOW (ref 27.5–35.5)
BLD GP AB SCN SERPL QL: SIGNIFICANT CHANGE UP
BUN SERPL-MCNC: 22.6 MG/DL — HIGH (ref 8–20)
CALCIUM SERPL-MCNC: 8.8 MG/DL — SIGNIFICANT CHANGE UP (ref 8.6–10.2)
CHLORIDE SERPL-SCNC: 102 MMOL/L — SIGNIFICANT CHANGE UP (ref 98–107)
CO2 SERPL-SCNC: 25 MMOL/L — SIGNIFICANT CHANGE UP (ref 22–29)
CREAT SERPL-MCNC: 1.01 MG/DL — SIGNIFICANT CHANGE UP (ref 0.5–1.3)
GLUCOSE SERPL-MCNC: 116 MG/DL — HIGH (ref 70–99)
HCT VFR BLD CALC: 42.6 % — SIGNIFICANT CHANGE UP (ref 39–50)
HGB BLD-MCNC: 14.3 G/DL — SIGNIFICANT CHANGE UP (ref 13–17)
INR BLD: 1.12 RATIO — SIGNIFICANT CHANGE UP (ref 0.88–1.16)
MAGNESIUM SERPL-MCNC: 2 MG/DL — SIGNIFICANT CHANGE UP (ref 1.6–2.6)
MCHC RBC-ENTMCNC: 31.9 PG — SIGNIFICANT CHANGE UP (ref 27–34)
MCHC RBC-ENTMCNC: 33.6 GM/DL — SIGNIFICANT CHANGE UP (ref 32–36)
MCV RBC AUTO: 95.1 FL — SIGNIFICANT CHANGE UP (ref 80–100)
PLATELET # BLD AUTO: 197 K/UL — SIGNIFICANT CHANGE UP (ref 150–400)
POTASSIUM SERPL-MCNC: 4.1 MMOL/L — SIGNIFICANT CHANGE UP (ref 3.5–5.3)
POTASSIUM SERPL-SCNC: 4.1 MMOL/L — SIGNIFICANT CHANGE UP (ref 3.5–5.3)
PROTHROM AB SERPL-ACNC: 12.9 SEC — SIGNIFICANT CHANGE UP (ref 10.6–13.6)
RBC # BLD: 4.48 M/UL — SIGNIFICANT CHANGE UP (ref 4.2–5.8)
RBC # FLD: 13.3 % — SIGNIFICANT CHANGE UP (ref 10.3–14.5)
SARS-COV-2 RNA SPEC QL NAA+PROBE: SIGNIFICANT CHANGE UP
SODIUM SERPL-SCNC: 141 MMOL/L — SIGNIFICANT CHANGE UP (ref 135–145)
WBC # BLD: 4.74 K/UL — SIGNIFICANT CHANGE UP (ref 3.8–10.5)
WBC # FLD AUTO: 4.74 K/UL — SIGNIFICANT CHANGE UP (ref 3.8–10.5)

## 2021-09-23 PROCEDURE — 33286 RMVL SUBQ CAR RHYTHM MNTR: CPT | Mod: 59

## 2021-09-23 PROCEDURE — 93657 TX L/R ATRIAL FIB ADDL: CPT

## 2021-09-23 PROCEDURE — 93010 ELECTROCARDIOGRAM REPORT: CPT

## 2021-09-23 PROCEDURE — 93656 COMPRE EP EVAL ABLTJ ATR FIB: CPT

## 2021-09-23 PROCEDURE — 33285 INSJ SUBQ CAR RHYTHM MNTR: CPT

## 2021-09-23 PROCEDURE — 93655 ICAR CATH ABLTJ DSCRT ARRHYT: CPT

## 2021-09-23 PROCEDURE — 93613 INTRACARDIAC EPHYS 3D MAPG: CPT

## 2021-09-23 RX ORDER — RIVAROXABAN 15 MG-20MG
20 KIT ORAL DAILY
Refills: 0 | Status: DISCONTINUED | OUTPATIENT
Start: 2021-09-23 | End: 2021-09-24

## 2021-09-23 RX ORDER — ACETAMINOPHEN 500 MG
650 TABLET ORAL EVERY 6 HOURS
Refills: 0 | Status: DISCONTINUED | OUTPATIENT
Start: 2021-09-23 | End: 2021-09-24

## 2021-09-23 RX ORDER — OXYCODONE AND ACETAMINOPHEN 5; 325 MG/1; MG/1
1 TABLET ORAL EVERY 6 HOURS
Refills: 0 | Status: DISCONTINUED | OUTPATIENT
Start: 2021-09-23 | End: 2021-09-24

## 2021-09-23 RX ORDER — CARVEDILOL PHOSPHATE 80 MG/1
3.12 CAPSULE, EXTENDED RELEASE ORAL EVERY 12 HOURS
Refills: 0 | Status: DISCONTINUED | OUTPATIENT
Start: 2021-09-23 | End: 2021-09-24

## 2021-09-23 RX ORDER — DILTIAZEM HCL 120 MG
120 CAPSULE, EXT RELEASE 24 HR ORAL DAILY
Refills: 0 | Status: DISCONTINUED | OUTPATIENT
Start: 2021-09-23 | End: 2021-09-23

## 2021-09-23 RX ORDER — ALPRAZOLAM 0.25 MG
0.25 TABLET ORAL EVERY 6 HOURS
Refills: 0 | Status: DISCONTINUED | OUTPATIENT
Start: 2021-09-23 | End: 2021-09-24

## 2021-09-23 RX ORDER — ONDANSETRON 8 MG/1
4 TABLET, FILM COATED ORAL EVERY 6 HOURS
Refills: 0 | Status: DISCONTINUED | OUTPATIENT
Start: 2021-09-23 | End: 2021-09-24

## 2021-09-23 RX ORDER — SUCRALFATE 1 G
1 TABLET ORAL
Refills: 0 | Status: DISCONTINUED | OUTPATIENT
Start: 2021-09-23 | End: 2021-09-24

## 2021-09-23 RX ORDER — PANTOPRAZOLE SODIUM 20 MG/1
40 TABLET, DELAYED RELEASE ORAL
Refills: 0 | Status: DISCONTINUED | OUTPATIENT
Start: 2021-09-23 | End: 2021-09-24

## 2021-09-23 RX ORDER — BENZOCAINE AND MENTHOL 5; 1 G/100ML; G/100ML
1 LIQUID ORAL
Refills: 0 | Status: DISCONTINUED | OUTPATIENT
Start: 2021-09-23 | End: 2021-09-24

## 2021-09-23 RX ADMIN — BENZOCAINE AND MENTHOL 1 LOZENGE: 5; 1 LIQUID ORAL at 15:43

## 2021-09-23 RX ADMIN — PANTOPRAZOLE SODIUM 40 MILLIGRAM(S): 20 TABLET, DELAYED RELEASE ORAL at 17:54

## 2021-09-23 RX ADMIN — Medication 20 MILLIGRAM(S): at 21:43

## 2021-09-23 RX ADMIN — RIVAROXABAN 20 MILLIGRAM(S): KIT at 18:01

## 2021-09-23 RX ADMIN — CARVEDILOL PHOSPHATE 3.12 MILLIGRAM(S): 80 CAPSULE, EXTENDED RELEASE ORAL at 17:54

## 2021-09-23 RX ADMIN — Medication 1 GRAM(S): at 17:54

## 2021-09-23 NOTE — H&P PST ADULT - HISTORY OF PRESENT ILLNESS
55 year old male with history of mantle cell lymphoma, pAF s/p ILR implant,     Cardiology summary:   TTE: 3/18/21: LVEF 40-45%, mild MR, trivial TR, no evidence of pericardial effusion  Nuclear scan: 3/18/21: occasional APDs occurred during rest and stress. Rare VPDs occurred during stress. Frequency of APDs and VPDs was unaffected by stress.   Myocardial perfusion SPECT results are normal. Normal myocardial perfusion scan, with no evidence of infarction or inducible ischemia.  55 year old male with history of mantle cell lymphoma s/p stem cell transplant now in remission, GERD, atrial flutter s/p ablation 5/2017, and pAF s/p ILR implant. Now with persistent symptomatic atrial fibrillation. He presents today for elective AF ablation with preceding LILLIE.      Cardiology summary:   TTE: 3/18/21: LVEF 40-45%, mild MR, trivial TR, no evidence of pericardial effusion  Nuclear scan: 3/18/21: occasional APDs occurred during rest and stress. Rare VPDs occurred during stress. Frequency of APDs and VPDs was unaffected by stress.   Myocardial perfusion SPECT results are normal. Normal myocardial perfusion scan, with no evidence of infarction or inducible ischemia.

## 2021-09-23 NOTE — DISCHARGE NOTE PROVIDER - CARE PROVIDER_API CALL
Gary Prescott (MD)  Cardiac Electrophysiology; Cardiology; Internal Medicine  [address] [place] [id number]  Phone: [phone]  Fax: [phone]  Follow Up Time:

## 2021-09-23 NOTE — PROGRESS NOTE ADULT - SUBJECTIVE AND OBJECTIVE BOX
PROCEDURE(S): Radiofrequency Ablation of Atrial Fibrillation    ELECTROPHYSIOLOGIST(S): Gary Prescott MD         COMPLICATIONS:  none        DISPOSITION: observation      CONDITION: stable    Pt doing well s/p elective radiofrequency atrial fibrillation ablation (PVI, LA posterior wall).  Pt denies complaint post procedure.     MEDICATIONS  (STANDING):  carvedilol 3.125 milliGRAM(s) Oral every 12 hours  pantoprazole    Tablet 40 milliGRAM(s) Oral two times a day  rivaroxaban 20 milliGRAM(s) Oral daily  sildenafil (REVATIO) 20 milliGRAM(s) Oral three times a day  sucralfate suspension 1 Gram(s) Oral two times a day    MEDICATIONS  (PRN):  acetaminophen   Tablet .. 650 milliGRAM(s) Oral every 6 hours PRN Mild Pain (1 - 3), Moderate Pain (4 - 6)  ALPRAZolam 0.25 milliGRAM(s) Oral every 6 hours PRN anxiety/insomnia  benzocaine 15 mG/menthol 3.6 mG (Sugar-Free) Lozenge 1 Lozenge Oral every 2 hours PRN Sore Throat  ondansetron Injectable 4 milliGRAM(s) IV Push every 6 hours PRN Nausea and/or Vomiting  oxycodone    5 mG/acetaminophen 325 mG 1 Tablet(s) Oral every 6 hours PRN Severe Pain (7 - 10)    Allergies:  No Known Allergies    VS:   T(C): 36.6 (09-23-21 @ 07:13), Max: 36.6 (09-23-21 @ 07:13)  HR: 79 (09-23-21 @ 07:13) (75 - 79)  BP: 132/84 (09-23-21 @ 07:13) (132/84 - 132/84)  RR: 13 (09-23-21 @ 07:13) (13 - 99)  SpO2: 99% (09-23-21 @ 07:13) (99% - 99%)  Post-procedure VS:     Physical exam:   NAD, A&O x 3  Card: S1/S2, RRR, no m/g/r  Resp: lungs CTA b/l  Abd: S/NT/ND  Groins: hemostatic sutures in place; sites C/D/I; no bleeding, hematoma, erythema, exudate or edema  Ext: no edema; distal pulses intact    LILLIE: Summary:   1. Irregular rhythm.   2. No cardiac mass, vegetations, thrombus or shunts visualized.   3. Moderately enlarged left atrium.   4.No left atrial or left atrial appendage thrombus visualized. Left atrial appendage enlargement and decreased left atrial appendage velocities. No PFO.   5. Left ventricular ejection fraction, by visual estimation, is 30 to 35%.   6. Mildly enlarged right atrium.   7. Normal right ventricular size and function.   8. No significant valvular abnormality.   9. There is no evidence of pericardial effusion.  Beryl Waite MD, RPVI Electronically signed on 9/23/2021 at 11:32:26 AM    ECG: pending     Assessment:   55 year old male with history of mantle cell lymphoma s/p stem cell transplant now in remission, GERD, atrial flutter s/p ablation 5/2017, and pAF s/p ILR implant. Now with persistent symptomatic atrial fibrillation. He presented electively and is now status post uncomplicated radiofrequency ablation of atrial fibrillation (PVI, LA posterior wall).    Plan:   Bedrest x 4 hours, then OOB with assistance and progress as tolerated.   Groin sutures to be removed by EP service in AM.   Radial art line to be removed once pt fully awake with stable vitals >1 hour.    Pending groin status: Xarelto 20mg PO w/dinner  Start Protonix 40mg BID x 2 weeks then daily X 6 weeks.     Start Carafate 1gm BID x 2 weeks.   Discontinue cardizem.   Start Coreg 3.125mg PO BID   Continue other home medications.   Strict I/Os.  Please encourage incentive spirometry and ambulation once able.  Observation and monitoring on telemetry overnight with anticipated discharge in the AM and outpt follow up in 1 month.   PROCEDURE(S): Radiofrequency Ablation of Atrial Fibrillation and Loop Recorder Exchange    ELECTROPHYSIOLOGIST(S): Gary Prescott MD         COMPLICATIONS:  none        DISPOSITION: observation      CONDITION: stable    Pt doing well s/p elective radiofrequency atrial fibrillation ablation (PVI, LA posterior wall) via right femoral vein access, and left parasternal loop recorder exchange. Pt denies complaint post procedure.     MEDICATIONS  (STANDING):  carvedilol 3.125 milliGRAM(s) Oral every 12 hours  pantoprazole    Tablet 40 milliGRAM(s) Oral two times a day  rivaroxaban 20 milliGRAM(s) Oral daily  sildenafil (REVATIO) 20 milliGRAM(s) Oral three times a day  sucralfate suspension 1 Gram(s) Oral two times a day    MEDICATIONS  (PRN):  acetaminophen   Tablet .. 650 milliGRAM(s) Oral every 6 hours PRN Mild Pain (1 - 3), Moderate Pain (4 - 6)  ALPRAZolam 0.25 milliGRAM(s) Oral every 6 hours PRN anxiety/insomnia  benzocaine 15 mG/menthol 3.6 mG (Sugar-Free) Lozenge 1 Lozenge Oral every 2 hours PRN Sore Throat  ondansetron Injectable 4 milliGRAM(s) IV Push every 6 hours PRN Nausea and/or Vomiting  oxycodone    5 mG/acetaminophen 325 mG 1 Tablet(s) Oral every 6 hours PRN Severe Pain (7 - 10)    Allergies:  No Known Allergies    VS:   T(C): 36.6 (09-23-21 @ 07:13), Max: 36.6 (09-23-21 @ 07:13)  HR: 79 (09-23-21 @ 07:13) (75 - 79)  BP: 132/84 (09-23-21 @ 07:13) (132/84 - 132/84)  RR: 13 (09-23-21 @ 07:13) (13 - 99)  SpO2: 99% (09-23-21 @ 07:13) (99% - 99%)  Post-procedure VS: /71 HR 70 O2 sat 100% RR 16     Physical exam:   NAD, A&O x 3  Card: S1/S2, RRR, no m/g/r  Chest: left parasternum loop site w/o bleeding or swelling; dermabond c/d/i   Resp: lungs CTA b/l  Abd: S/NT/ND  Groins: hemostatic sutures in place; sites C/D/I; no bleeding, hematoma, erythema, exudate or edema  Ext: no edema; distal pulses intact    LILLIE: Summary:   1. Irregular rhythm.   2. No cardiac mass, vegetations, thrombus or shunts visualized.   3. Moderately enlarged left atrium.   4.No left atrial or left atrial appendage thrombus visualized. Left atrial appendage enlargement and decreased left atrial appendage velocities. No PFO.   5. Left ventricular ejection fraction, by visual estimation, is 30 to 35%.   6. Mildly enlarged right atrium.   7. Normal right ventricular size and function.   8. No significant valvular abnormality.   9. There is no evidence of pericardial effusion.  Beryl Waite MD, RPVI Electronically signed on 9/23/2021 at 11:32:26 AM    ECG: NSR 71 bpm     Assessment:   55 year old male with history of mantle cell lymphoma s/p stem cell transplant now in remission, GERD, atrial flutter s/p ablation 5/2017, and pAF s/p ILR implant. Now with persistent symptomatic atrial fibrillation. He presented electively and is now status post uncomplicated radiofrequency ablation of atrial fibrillation (PVI, LA posterior wall) and loop recorder exchange.    Plan:   Bedrest x 4 hours, then OOB with assistance and progress as tolerated.   Groin sutures to be removed by EP service in AM.   Radial art line to be removed once pt fully awake with stable vitals >1 hour.    Pending groin status: Xarelto 20mg PO w/dinner  Start Protonix 40mg BID x 2 weeks then daily X 6 weeks.     Start Carafate 1gm BID x 2 weeks.   Discontinue cardizem.   Start Coreg 3.125mg PO BID   Continue other home medications.   Strict I/Os.  Please encourage incentive spirometry and ambulation once able.  Observation and monitoring on telemetry overnight with anticipated discharge in the AM and outpt follow up in 1 month.

## 2021-09-23 NOTE — ASU PATIENT PROFILE, ADULT - NSICDXPASTMEDICALHX_GEN_ALL_CORE_FT
PAST MEDICAL HISTORY:  Atrial flutter     Migraine     Non Hodgkin's lymphoma     Paroxysmal atrial fibrillation     Sinus bradycardia

## 2021-09-23 NOTE — H&P PST ADULT - NSICDXPASTMEDICALHX_GEN_ALL_CORE_FT
PAST MEDICAL HISTORY:  Atrial flutter     Migraine     Non Hodgkin's lymphoma     Paroxysmal atrial fibrillation     Sinus bradycardia      PAST MEDICAL HISTORY:  Atrial flutter     GERD (gastroesophageal reflux disease)     Migraine     Non Hodgkin's lymphoma     Paroxysmal atrial fibrillation     Sinus bradycardia
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
## 2021-09-23 NOTE — DISCHARGE NOTE PROVIDER - HOSPITAL COURSE
55 year old male with history of mantle cell lymphoma s/p stem cell transplant now in remission, GERD, atrial flutter s/p ablation 5/2017, and pAF s/p ILR implant. Now with persistent symptomatic atrial fibrillation. He presented electively 9/23/21 and underwent uncomplicated radiofrequency ablation of atrial fibrillation (PVI, LA posterior wall) and loop recorder exchange. 55 year old male with history of mantle cell lymphoma s/p stem cell transplant now in remission, GERD, atrial flutter s/p ablation 5/2017, and pAF s/p ILR implant. Now with persistent symptomatic atrial fibrillation. He presented electively 9/23/21 and underwent uncomplicated radiofrequency ablation of atrial fibrillation (PVI, LA posterior wall) and loop recorder exchange. The patient was observed overnight without event and was discharged home the following morning with a plan for outpatient follow up.

## 2021-09-23 NOTE — DISCHARGE NOTE PROVIDER - NSDCMRMEDTOKEN_GEN_ALL_CORE_FT
dilTIAZem 120 mg/24 hours oral capsule, extended release: 1 cap(s) orally once a day  omeprazole 20 mg oral delayed release capsule: 1 cap(s) orally once a day MDD:1  oxyCODONE 5 mg oral tablet: 1 tab(s) orally every 6 hours, As Needed for pain MDD:4 tabs  sildenafil 20 mg oral tablet: 1 tab(s) orally 3 times a day  Xarelto 20 mg oral tablet: 1 tab(s) orally once a day (in the evening)   carvedilol 3.125 mg oral tablet: 1 tab(s) orally every 12 hours  omeprazole 40 mg oral delayed release capsule: **Dose increase** 1 cap(s) orally 2 times a day x 30 days post ablation, then resume 20mg once daily.   sildenafil 20 mg oral tablet: 1 tab(s) orally 3 times a day  sucralfate 1 g/10 mL oral suspension: 10 milliliter(s) orally 2 times a day x 2 weeks post ablation, then STOP.   Xarelto 20 mg oral tablet: 1 tab(s) orally once a day (in the evening)

## 2021-09-23 NOTE — H&P PST ADULT - NSANTHOSAYNRD_GEN_A_CORE
No. TJ screening performed.  STOP BANG Legend: 0-2 = LOW Risk; 3-4 = INTERMEDIATE Risk; 5-8 = HIGH Risk

## 2021-09-23 NOTE — H&P PST ADULT - NSICDXFAMILYHX_GEN_ALL_CORE_FT
FAMILY HISTORY:  Father  Still living? No  Family history of diabetes mellitus, Age at diagnosis: Age Unknown    Mother  Still living? Unknown  Family history of hypertension, Age at diagnosis: Age Unknown
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
## 2021-09-23 NOTE — H&P PST ADULT - ASSESSMENT
55 year old male with history of mantle cell lymphoma s/p stem cell transplant now in remission, GERD, atrial flutter s/p ablation 5/2017, and pAF s/p ILR implant. Now with persistent symptomatic atrial fibrillation. He presents today for elective AF ablation with preceding LILLIE.    Last dose Xarelto 9/21/21 PM, confirms NPO > 8 hrs.     Plan:   - iv insert  - stat labs, ECG  - covid pcr   - consent selene/MD

## 2021-09-23 NOTE — DISCHARGE NOTE PROVIDER - NSDCCPTREATMENT_GEN_ALL_CORE_FT
PRINCIPAL PROCEDURE  Procedure: Cardiac ablation  Findings and Treatment: - Bruising at the groin, sometimes extending down the leg, and/or a small lump under the skin at the groin access site is normal and will resolve within 2 – 3 weeks.   - Occasional skipped beats or palpitations that last for a few beats are common and generally resolve within 1-2 months.   - You may walk and take stairs at a regular pace.   - Do not perform any exercise more strenuous than walking for 1 week.   - Do not strain or lift heavy objects for 1 week.  - You may shower the day after the procedure.  - Do not soak in water (such as tub baths, hot tubs, swimming, etc.) for 1 week.   - You may resume all other activities the day after the procedure.  Call your doctor if:   - you notice bleeding, redness, drainage, swelling, increased tenderness or a hot sensation around the catheter insertion site.   - your temperature is greater than 100 degrees F for more than 24 hours.  - your rapid heart rhythm returns.  - you have any questions or concerns regarding the procedure.  If significant bleeding and/or a large lump (the size of a golf ball or bigger) occurs:  - Lie flat and apply continuous direct pressure just above the puncture site for at least 10 minutes  - If the issue resolves, notify your physician immediately.    - If the bleeding cannot be controlled, please seek immediate medical attention.  If you experience increased difficulty breathing or chest pain, or if you faint or have dizzy spells, please seek immediate medical attention.        SECONDARY PROCEDURE  Procedure: Replacement, loop recorder  Findings and Treatment: Loop Recorder Incision Care:     - Do not touch the incision until it is completely healed.   - There is Dermabond (skin glue) on your incision, which will start to flake off on its own over the next 2-3 weeks. Do not pick at or peel off the Dermabond.   - Do not apply soaps, creams, lotions, ointments or powders to the incision until it is completely healed.  - You should call the doctor if you notice redness, drainage, swelling, increased tenderness, hot sensation around the  incision, bleeding or incision edges pulling apart.

## 2021-09-24 ENCOUNTER — TRANSCRIPTION ENCOUNTER (OUTPATIENT)
Age: 56
End: 2021-09-24

## 2021-09-24 VITALS
HEART RATE: 78 BPM | RESPIRATION RATE: 14 BRPM | TEMPERATURE: 98 F | SYSTOLIC BLOOD PRESSURE: 120 MMHG | OXYGEN SATURATION: 100 % | DIASTOLIC BLOOD PRESSURE: 71 MMHG

## 2021-09-24 LAB
ANION GAP SERPL CALC-SCNC: 12 MMOL/L — SIGNIFICANT CHANGE UP (ref 5–17)
BUN SERPL-MCNC: 19.5 MG/DL — SIGNIFICANT CHANGE UP (ref 8–20)
CALCIUM SERPL-MCNC: 8 MG/DL — LOW (ref 8.6–10.2)
CHLORIDE SERPL-SCNC: 105 MMOL/L — SIGNIFICANT CHANGE UP (ref 98–107)
CO2 SERPL-SCNC: 24 MMOL/L — SIGNIFICANT CHANGE UP (ref 22–29)
CREAT SERPL-MCNC: 0.84 MG/DL — SIGNIFICANT CHANGE UP (ref 0.5–1.3)
GLUCOSE SERPL-MCNC: 111 MG/DL — HIGH (ref 70–99)
HCT VFR BLD CALC: 37.4 % — LOW (ref 39–50)
HGB BLD-MCNC: 12.5 G/DL — LOW (ref 13–17)
MAGNESIUM SERPL-MCNC: 1.9 MG/DL — SIGNIFICANT CHANGE UP (ref 1.6–2.6)
MCHC RBC-ENTMCNC: 32.1 PG — SIGNIFICANT CHANGE UP (ref 27–34)
MCHC RBC-ENTMCNC: 33.4 GM/DL — SIGNIFICANT CHANGE UP (ref 32–36)
MCV RBC AUTO: 95.9 FL — SIGNIFICANT CHANGE UP (ref 80–100)
PLATELET # BLD AUTO: 171 K/UL — SIGNIFICANT CHANGE UP (ref 150–400)
POTASSIUM SERPL-MCNC: 4.1 MMOL/L — SIGNIFICANT CHANGE UP (ref 3.5–5.3)
POTASSIUM SERPL-SCNC: 4.1 MMOL/L — SIGNIFICANT CHANGE UP (ref 3.5–5.3)
RBC # BLD: 3.9 M/UL — LOW (ref 4.2–5.8)
RBC # FLD: 13.5 % — SIGNIFICANT CHANGE UP (ref 10.3–14.5)
SODIUM SERPL-SCNC: 141 MMOL/L — SIGNIFICANT CHANGE UP (ref 135–145)
WBC # BLD: 6.39 K/UL — SIGNIFICANT CHANGE UP (ref 3.8–10.5)
WBC # FLD AUTO: 6.39 K/UL — SIGNIFICANT CHANGE UP (ref 3.8–10.5)

## 2021-09-24 PROCEDURE — 80048 BASIC METABOLIC PNL TOTAL CA: CPT

## 2021-09-24 PROCEDURE — C1894: CPT

## 2021-09-24 PROCEDURE — C1769: CPT

## 2021-09-24 PROCEDURE — C1893: CPT

## 2021-09-24 PROCEDURE — 93320 DOPPLER ECHO COMPLETE: CPT

## 2021-09-24 PROCEDURE — C1764: CPT

## 2021-09-24 PROCEDURE — 86850 RBC ANTIBODY SCREEN: CPT

## 2021-09-24 PROCEDURE — 85027 COMPLETE CBC AUTOMATED: CPT

## 2021-09-24 PROCEDURE — C1732: CPT

## 2021-09-24 PROCEDURE — C1889: CPT

## 2021-09-24 PROCEDURE — 86900 BLOOD TYPING SEROLOGIC ABO: CPT

## 2021-09-24 PROCEDURE — 93312 ECHO TRANSESOPHAGEAL: CPT

## 2021-09-24 PROCEDURE — 93613 INTRACARDIAC EPHYS 3D MAPG: CPT

## 2021-09-24 PROCEDURE — 93656 COMPRE EP EVAL ABLTJ ATR FIB: CPT

## 2021-09-24 PROCEDURE — 86901 BLOOD TYPING SEROLOGIC RH(D): CPT

## 2021-09-24 PROCEDURE — 83735 ASSAY OF MAGNESIUM: CPT

## 2021-09-24 PROCEDURE — 93655 ICAR CATH ABLTJ DSCRT ARRHYT: CPT

## 2021-09-24 PROCEDURE — 33286 RMVL SUBQ CAR RHYTHM MNTR: CPT | Mod: 59

## 2021-09-24 PROCEDURE — 93657 TX L/R ATRIAL FIB ADDL: CPT

## 2021-09-24 PROCEDURE — 85610 PROTHROMBIN TIME: CPT

## 2021-09-24 PROCEDURE — 93005 ELECTROCARDIOGRAM TRACING: CPT

## 2021-09-24 PROCEDURE — 85730 THROMBOPLASTIN TIME PARTIAL: CPT

## 2021-09-24 PROCEDURE — C1766: CPT

## 2021-09-24 PROCEDURE — 33285 INSJ SUBQ CAR RHYTHM MNTR: CPT

## 2021-09-24 PROCEDURE — C1759: CPT

## 2021-09-24 PROCEDURE — C1731: CPT

## 2021-09-24 PROCEDURE — 93325 DOPPLER ECHO COLOR FLOW MAPG: CPT

## 2021-09-24 PROCEDURE — 82962 GLUCOSE BLOOD TEST: CPT

## 2021-09-24 PROCEDURE — U0005: CPT

## 2021-09-24 PROCEDURE — 93010 ELECTROCARDIOGRAM REPORT: CPT

## 2021-09-24 PROCEDURE — U0003: CPT

## 2021-09-24 PROCEDURE — 36415 COLL VENOUS BLD VENIPUNCTURE: CPT

## 2021-09-24 RX ORDER — OMEPRAZOLE 10 MG/1
1 CAPSULE, DELAYED RELEASE ORAL
Qty: 60 | Refills: 0
Start: 2021-09-24 | End: 2021-10-23

## 2021-09-24 RX ORDER — SUCRALFATE 1 G
10 TABLET ORAL
Qty: 300 | Refills: 0
Start: 2021-09-24

## 2021-09-24 RX ORDER — CARVEDILOL PHOSPHATE 80 MG/1
1 CAPSULE, EXTENDED RELEASE ORAL
Qty: 60 | Refills: 1
Start: 2021-09-24 | End: 2021-11-22

## 2021-09-24 RX ORDER — MAGNESIUM SULFATE 500 MG/ML
2 VIAL (ML) INJECTION ONCE
Refills: 0 | Status: COMPLETED | OUTPATIENT
Start: 2021-09-24 | End: 2021-09-24

## 2021-09-24 RX ADMIN — PANTOPRAZOLE SODIUM 40 MILLIGRAM(S): 20 TABLET, DELAYED RELEASE ORAL at 05:46

## 2021-09-24 RX ADMIN — Medication 650 MILLIGRAM(S): at 03:00

## 2021-09-24 RX ADMIN — Medication 50 GRAM(S): at 08:23

## 2021-09-24 RX ADMIN — Medication 650 MILLIGRAM(S): at 02:14

## 2021-09-24 RX ADMIN — Medication 20 MILLIGRAM(S): at 05:47

## 2021-09-24 RX ADMIN — Medication 1 GRAM(S): at 05:46

## 2021-09-24 RX ADMIN — Medication 0.25 MILLIGRAM(S): at 02:13

## 2021-09-24 RX ADMIN — CARVEDILOL PHOSPHATE 3.12 MILLIGRAM(S): 80 CAPSULE, EXTENDED RELEASE ORAL at 05:46

## 2021-09-24 NOTE — DISCHARGE NOTE NURSING/CASE MANAGEMENT/SOCIAL WORK - NSDCVIVACCINE_GEN_ALL_CORE_FT
DT (pediatric); 16-Apr-2019 12:09; Pradere, Jeff (RN); [employer]; VIS (VIS Presented: 16-Apr-2019);   Hib (PRP-T); 09-Apr-2019 11:25; Lucero Gustafson (RN); [employer]; VIS (VIS Presented: 09-Apr-2019);   IPV; 16-Apr-2019 12:09; Pradere, Jeff (RN); [employer]; VIS (VIS Presented: 16-Apr-2019);   MMR; 23-Apr-2019 10:09; Maria M Loera (RN); [employer] VIS (VIS Presented: 23-Apr-2019);   pneumococcal polysaccharide PPV23; 09-Apr-2019 11:25; Lucero Gustafson (SILVANO); [employer] VIS (VIS Presented: 09-Apr-2019);
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
## 2021-09-27 LAB — GLUCOSE BLDC GLUCOMTR-MCNC: 90 MG/DL — SIGNIFICANT CHANGE UP (ref 70–99)

## 2021-09-30 ENCOUNTER — NON-APPOINTMENT (OUTPATIENT)
Age: 56
End: 2021-09-30

## 2021-09-30 ENCOUNTER — APPOINTMENT (OUTPATIENT)
Dept: CARDIOLOGY | Facility: CLINIC | Age: 56
End: 2021-09-30
Payer: MEDICARE

## 2021-09-30 VITALS
WEIGHT: 213 LBS | OXYGEN SATURATION: 98 % | TEMPERATURE: 98.1 F | HEART RATE: 76 BPM | BODY MASS INDEX: 29.82 KG/M2 | HEIGHT: 71 IN | SYSTOLIC BLOOD PRESSURE: 100 MMHG | DIASTOLIC BLOOD PRESSURE: 68 MMHG

## 2021-09-30 PROBLEM — K21.9 GASTRO-ESOPHAGEAL REFLUX DISEASE WITHOUT ESOPHAGITIS: Chronic | Status: ACTIVE | Noted: 2021-09-23

## 2021-09-30 PROBLEM — I48.0 PAROXYSMAL ATRIAL FIBRILLATION: Chronic | Status: ACTIVE | Noted: 2021-09-23

## 2021-09-30 PROCEDURE — 93000 ELECTROCARDIOGRAM COMPLETE: CPT

## 2021-09-30 PROCEDURE — 99024 POSTOP FOLLOW-UP VISIT: CPT

## 2021-09-30 RX ORDER — OMEPRAZOLE 20 MG/1
20 CAPSULE, DELAYED RELEASE ORAL
Qty: 90 | Refills: 0 | Status: DISCONTINUED | COMMUNITY
Start: 2018-11-05 | End: 2021-09-30

## 2021-09-30 RX ORDER — TRIAMCINOLONE ACETONIDE 1 MG/G
0.1 OINTMENT TOPICAL
Qty: 1 | Refills: 0 | Status: DISCONTINUED | COMMUNITY
Start: 2020-11-19 | End: 2021-09-30

## 2021-09-30 RX ORDER — DILTIAZEM HYDROCHLORIDE 120 MG/1
120 CAPSULE, EXTENDED RELEASE ORAL
Qty: 90 | Refills: 3 | Status: DISCONTINUED | COMMUNITY
Start: 2019-12-17 | End: 2021-09-30

## 2021-09-30 NOTE — HISTORY OF PRESENT ILLNESS
[FreeTextEntry1] : 56 yo man, , father of two children, Ethiopian speaking. 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 No radiotherapy. Still on medical treatment with Rituxan, treatment has completed. Hospital course complicated by A. Fibrillation/Flutter that was treated with ablation (Flutter ablation) and now stable on Cardizem and Toprol. \par Had a ILR implanted in 3/2018, interrogation showed that he still has AF episodes. His CHADS-VASC is 0 and therefore he is not treated with Xarelto any longer. Treated only with aspirin low dose and CCB. Has been under the care of Dr Prescott and had an ILR implanted that revealed several episodes of A. Fib with a 41% burden. \par On Sep 23, 2021 he underwent ablation with Dr Prescott. No complications.  \par In April 2018 underwent a stress test that revealed small anterior wall reversible ischemia. \par Cardiac catheterization in May 2018 that revealed normal coronaries. Last echo was done 3/27/2018 that revealed normal EF (59%). \par On 3//18/2021 he had an echo that revealed a reduction in his global EF to 40-45%, mildly enlarged LA. No change. \par On 3/18/2011 revealed normal LV function 57%. No ischemia. . \par Today at the clinic for routine follow up. C/O general fatigue, generalized muscle pains and chest burning. Not effort-related. Occasional effort-related SOB, no orthopnea or PND. No change since his last visit in 2019. \par C/O erectile dysfunction. \par Received the COVID 19 vaccine\par Doesn't smoke and does not drink alcohol.\par Denies the use of illicit drugs.

## 2021-09-30 NOTE — DISCUSSION/SUMMARY
[FreeTextEntry1] : 54 yo man post A. Flutter ablation and now with PAF under the care of Dr Prescott. The A. F. burden has increased and on Sept 23, 2021 underwent an ablation.No complications.\par Will continue medications\par Routine follow up in 6 months \par

## 2021-09-30 NOTE — ASSESSMENT
[FreeTextEntry1] : ECG performed today at the office revealed NSR 70 BPM. Normal AQRS, QRS and QTc. \par

## 2021-10-04 ENCOUNTER — APPOINTMENT (OUTPATIENT)
Dept: ELECTROPHYSIOLOGY | Facility: CLINIC | Age: 56
End: 2021-10-04
Payer: MEDICARE

## 2021-10-04 ENCOUNTER — NON-APPOINTMENT (OUTPATIENT)
Age: 56
End: 2021-10-04

## 2021-10-04 VITALS
SYSTOLIC BLOOD PRESSURE: 124 MMHG | OXYGEN SATURATION: 97 % | HEIGHT: 71 IN | TEMPERATURE: 97.7 F | BODY MASS INDEX: 29.82 KG/M2 | HEART RATE: 71 BPM | WEIGHT: 213 LBS | DIASTOLIC BLOOD PRESSURE: 82 MMHG

## 2021-10-04 DIAGNOSIS — I51.9 HEART DISEASE, UNSPECIFIED: ICD-10-CM

## 2021-10-04 DIAGNOSIS — Z87.898 PERSONAL HISTORY OF OTHER SPECIFIED CONDITIONS: ICD-10-CM

## 2021-10-04 PROCEDURE — 93000 ELECTROCARDIOGRAM COMPLETE: CPT

## 2021-10-04 PROCEDURE — 99213 OFFICE O/P EST LOW 20 MIN: CPT

## 2021-10-04 RX ORDER — SUCRALFATE 1 G/10ML
1 SUSPENSION ORAL
Refills: 0 | Status: DISCONTINUED | COMMUNITY
End: 2021-10-04

## 2021-10-05 PROBLEM — Z87.898 HISTORY OF BRADYCARDIA: Status: ACTIVE | Noted: 2017-12-19

## 2021-10-05 PROBLEM — I51.9 DECREASED LEFT VENTRICULAR SYSTOLIC FUNCTION: Status: ACTIVE | Noted: 2019-05-14

## 2021-10-05 NOTE — PHYSICAL EXAM
[General Appearance - In No Acute Distress] : no acute distress [No Acute Distress] : no acute distress [Normal Conjunctiva] : normal conjunctiva [Normal Venous Pressure] : normal venous pressure [Normal S1, S2] : normal S1, S2 [No Murmur] : no murmur [No Rub] : no rub [No Gallop] : no gallop [Clear Lung Fields] : clear lung fields [Non Tender] : non-tender [Normal Gait] : normal gait [No Edema] : no edema [No Rash] : no rash [No Focal Deficits] : no focal deficits [Alert and Oriented] : alert and oriented

## 2021-10-06 ENCOUNTER — NON-APPOINTMENT (OUTPATIENT)
Age: 56
End: 2021-10-06

## 2021-10-12 NOTE — DISCUSSION/SUMMARY
[FreeTextEntry1] : Patient is doing well status post catheter ablation of atrial fibrillation and follow-up as no evidence of complication or symptoms.  His examination was unremarkable.  His EKG showed sinus rhythm.  Interrogation of the implantable loop monitor did not show any A. fib.\par His blood pressure is controlled.  We will continue him on GI prophylaxis and anticoagulation with Xarelto.  Follow-up remote monitoring and follow-up evaluation in 3 months.\par \par \par

## 2021-10-12 NOTE — REVIEW OF SYSTEMS
[Feeling Fatigued] : feeling fatigued [Under Stress] : under stress [Weight Gain (___ Lbs)] : no recent weight gain [Weight Loss (___ Lbs)] : no recent weight loss [Blurry Vision] : no blurred vision [Sore Throat] : no sore throat [SOB] : no shortness of breath [Chest Discomfort] : no chest discomfort [Palpitations] : no palpitations [Cough] : no cough [Abdominal Pain] : no abdominal pain [Rash] : no rash [Dizziness] : no dizziness [Anxiety] : no anxiety [Easy Bleeding] : no tendency for easy bleeding

## 2021-10-12 NOTE — HISTORY OF PRESENT ILLNESS
[FreeTextEntry1] : Patient is a 55-year-old man who is seen in follow-up evaluation status post recent catheter ablation for atrial fibrillation.  In follow-up he is feeling well has not had any recurrence of palpitations.  He also denied any symptoms of shortness of breath, dizziness, lightheadedness, cough, fever, headache, visual symptoms, abdominal discomfort, urinary symptoms or groin issues.  He has had no problems with swallowing.\par \par EKG done today showed sinus rhythm.\par \par \par Interrogation of implantable loop monitor did not show any A. fib since the procedure.\par \par The patient has a prior history of mantle cell lymphoma.  He has got a previous cycles of chemotherapy.\par \par The patient has had prior chest discomfort and previously underwent cardiac catheterization which showed nonobstructive coronaries.\par \par He has had previous atrial flutter and underwent catheter ablation for atrial flutter.\par \par He subsequently had atrial fibrillation which is being treated with medical therapy.  We had discussed catheter ablation procedure for atrial fibrillation and had reviewed with his hematologist oncologist the possibility of using anticoagulants especially post procedure.  He was clear to be treated with a NOAC/anticoagulant.\par \par  [id number] score: 0 - No history of diabetes, hypertension, TIA, CVA, vascular disease\par \par Echocardiogram from 4/1/2019 showed left atrial diameter 3.7 cm left atrial volume index 36 cc/m², LVEF 40 to 45%.\par

## 2021-10-14 ENCOUNTER — OUTPATIENT (OUTPATIENT)
Dept: OUTPATIENT SERVICES | Facility: HOSPITAL | Age: 56
LOS: 1 days | Discharge: HOME | End: 2021-10-14

## 2021-10-14 DIAGNOSIS — Z95.818 PRESENCE OF OTHER CARDIAC IMPLANTS AND GRAFTS: Chronic | ICD-10-CM

## 2021-10-14 DIAGNOSIS — Z98.890 OTHER SPECIFIED POSTPROCEDURAL STATES: Chronic | ICD-10-CM

## 2021-10-14 DIAGNOSIS — Z94.84 STEM CELLS TRANSPLANT STATUS: Chronic | ICD-10-CM

## 2021-10-14 DIAGNOSIS — C83.10 MANTLE CELL LYMPHOMA, UNSPECIFIED SITE: ICD-10-CM

## 2021-10-18 ENCOUNTER — RESULT REVIEW (OUTPATIENT)
Age: 56
End: 2021-10-18

## 2021-10-18 ENCOUNTER — APPOINTMENT (OUTPATIENT)
Dept: HEMATOLOGY ONCOLOGY | Facility: CLINIC | Age: 56
End: 2021-10-18
Payer: MEDICARE

## 2021-10-18 VITALS
DIASTOLIC BLOOD PRESSURE: 80 MMHG | HEIGHT: 71 IN | OXYGEN SATURATION: 99 % | BODY MASS INDEX: 29.87 KG/M2 | TEMPERATURE: 97.1 F | SYSTOLIC BLOOD PRESSURE: 117 MMHG | RESPIRATION RATE: 16 BRPM | HEART RATE: 73 BPM | WEIGHT: 213.39 LBS

## 2021-10-18 LAB
BASOPHILS # BLD AUTO: 0.02 K/UL — SIGNIFICANT CHANGE UP (ref 0–0.2)
BASOPHILS NFR BLD AUTO: 0.4 % — SIGNIFICANT CHANGE UP (ref 0–2)
EOSINOPHIL # BLD AUTO: 0.28 K/UL — SIGNIFICANT CHANGE UP (ref 0–0.5)
EOSINOPHIL NFR BLD AUTO: 5.1 % — SIGNIFICANT CHANGE UP (ref 0–6)
HCT VFR BLD CALC: 43.4 % — SIGNIFICANT CHANGE UP (ref 39–50)
HGB BLD-MCNC: 14.2 G/DL — SIGNIFICANT CHANGE UP (ref 13–17)
IMM GRANULOCYTES NFR BLD AUTO: 0.4 % — SIGNIFICANT CHANGE UP (ref 0–1.5)
LYMPHOCYTES # BLD AUTO: 2 K/UL — SIGNIFICANT CHANGE UP (ref 1–3.3)
LYMPHOCYTES # BLD AUTO: 36.7 % — SIGNIFICANT CHANGE UP (ref 13–44)
MCHC RBC-ENTMCNC: 31.5 PG — SIGNIFICANT CHANGE UP (ref 27–34)
MCHC RBC-ENTMCNC: 32.7 G/DL — SIGNIFICANT CHANGE UP (ref 32–36)
MCV RBC AUTO: 96.2 FL — SIGNIFICANT CHANGE UP (ref 80–100)
MONOCYTES # BLD AUTO: 0.59 K/UL — SIGNIFICANT CHANGE UP (ref 0–0.9)
MONOCYTES NFR BLD AUTO: 10.8 % — SIGNIFICANT CHANGE UP (ref 2–14)
NEUTROPHILS # BLD AUTO: 2.54 K/UL — SIGNIFICANT CHANGE UP (ref 1.8–7.4)
NEUTROPHILS NFR BLD AUTO: 46.6 % — SIGNIFICANT CHANGE UP (ref 43–77)
NRBC # BLD: 0 /100 WBCS — SIGNIFICANT CHANGE UP (ref 0–0)
PLATELET # BLD AUTO: 188 K/UL — SIGNIFICANT CHANGE UP (ref 150–400)
RBC # BLD: 4.51 M/UL — SIGNIFICANT CHANGE UP (ref 4.2–5.8)
RBC # FLD: 13.1 % — SIGNIFICANT CHANGE UP (ref 10.3–14.5)
WBC # BLD: 5.45 K/UL — SIGNIFICANT CHANGE UP (ref 3.8–10.5)
WBC # FLD AUTO: 5.45 K/UL — SIGNIFICANT CHANGE UP (ref 3.8–10.5)

## 2021-10-18 PROCEDURE — 99213 OFFICE O/P EST LOW 20 MIN: CPT

## 2021-10-19 LAB
ALBUMIN SERPL ELPH-MCNC: 5 G/DL
ALP BLD-CCNC: 110 U/L
ALT SERPL-CCNC: 21 U/L
ANION GAP SERPL CALC-SCNC: 11 MMOL/L
AST SERPL-CCNC: 26 U/L
BILIRUB SERPL-MCNC: 0.2 MG/DL
BUN SERPL-MCNC: 21 MG/DL
CALCIUM SERPL-MCNC: 9.7 MG/DL
CHLORIDE SERPL-SCNC: 104 MMOL/L
CO2 SERPL-SCNC: 27 MMOL/L
CREAT SERPL-MCNC: 0.94 MG/DL
DEPRECATED KAPPA LC FREE/LAMBDA SER: 1 RATIO
GLUCOSE SERPL-MCNC: 98 MG/DL
IGA SER QL IEP: 110 MG/DL
IGG SER QL IEP: 967 MG/DL
IGM SER QL IEP: 87 MG/DL
KAPPA LC CSF-MCNC: 0.98 MG/DL
KAPPA LC SERPL-MCNC: 0.98 MG/DL
LDH SERPL-CCNC: 230 U/L
MAGNESIUM SERPL-MCNC: 2.2 MG/DL
POTASSIUM SERPL-SCNC: 4.8 MMOL/L
PROT SERPL-MCNC: 6.9 G/DL
SODIUM SERPL-SCNC: 142 MMOL/L

## 2021-10-20 NOTE — ASSESSMENT
[FreeTextEntry1] : 54 y/o gentleman with hx of mantle cell lymphoma initially dx'ed Oct 2016 s/p RCHOP chemotherapy X 4 cycles and RICE X 2 cycles with stem cell collected off of the 2nd RICE..he is now s/p CBV-conditioned autologous stem cell transplant on 4/5/17. Hospital course complicated by a-fib/a-flutter s/p ablation and now stable on Cardizem and Toprol. Also now on Xarelto. Port also removed. He demonstrated engraftment on 4/16 and d/c'ed in stable condition on 5/5.\par \par 1) Mantle Cell lymphoma\par S/P AUTO PBSCT 4/5/17\par Rituxan maintenance complete (Short Rituxan, once a week for 2 weeks). Last Rituxan treatment was  on 1/22/20  (Short Rituxan, once a week for 2 weeks).\par CT scans completed 7/1/21: PENELOPE\par Right groin lymphadenopathy palpated on 10/18/21. PET CT skull to thigh ordered on 10/18/21 to r/u relapse of disease. \par \par 2) Heme\par Counts stable. No indication for transfusion today. Discussed with patient.\par WBC 5.45  H/H 14.2/43.4    ANC 2.54\par \par 3) ID\par Not on prophylaxis\par \par 4) GI\par Continue omeprazole\par \par 5) Afib\par Continue medications as prescribed by Cardiologist\par \par 6) Other\par Pruritus- continue triamcinolone acetonide cream as prescribed by dermatologist. \par Continue medications as prescribed\par Maintain f/u with cardiology. \par Maintain f/u with his regular internist. \par Well care and cancer screening stressed\par Patient has been advised to contact clinic if he has any concerns.\par Questions and concerns addressed. Reassurance provided. \par \par Completed Post transplant vaccines:\par 12 month vaccines completed in April 2018. \par 14 month vaccines completed July 2018. \par  24 month re-vaccinations completed in April 2019. \par \par 7) Plan\par PET CT ordered on 10/18/21. Follow up on results. \par Follow up with Dr Banks in one month

## 2021-10-20 NOTE — HISTORY OF PRESENT ILLNESS
[de-identified] : Mr. Chavez is a 53 year old male with a history of MCL, initially diagnosed 10/2016. On 11/15/2016 he was seen by Dr. Bravo at [facility], after noticing a left inguinal mass for which he sought medical attention. CT scans (3/21/2016) revealed left-sided pelvic and left inguinal adenopathy. A 0.7 cm indeterminate lesion in the right iliac bone was also noted. A left inguinal lymph node biopsy was performed 9/24/2016 which revealed morphologic and immunophenotypic findings consistent with mantle cell lymphoma. He is status post 4 cycles of R-CHOP, and two cycles of RICE consolidation. A bone marrow biopsy on 2/10/17 showed normocellular bone marrow with trilineage hematopoiesis and maturation. He was admitted 3/27/17 for an autologous peripheral blood stem cell transplant with CBV  regimen. \par  \par Upon admission, a TLC was placed in IR. Mr. Chavez received IV hydration, nutritional support, pain management, antinausea medication, antidiarrheals, antibiotic, antiviral, PCP, antifungal and GI prophylaxis. Labs were monitored daily. Mr. Chavez received transfusional support and electrolyte repletion as needed. When his ANC dropped below 1000, he was started on prophylactic ciprofloxacin. When he became febrile, his antibiotic coverage was broadened as appropriate. \par  \par On 4/5/17, Mr. Chavez received 314ml of thawed, pooled, washed, mobilized, plasma reduced, HPC apheresis over approximately 1 hour. He tolerated the infusion well with no adverse side effects noted. Engraftment was noted on 4/16/17, and the Zarxio and antibiotics were discontinued. \par  \par Mr. Chavez's transplant course was complicated by recurrent headaches, oral and GI mucositis, dizziness, and chest pain resulting in a RRT on 4/10/17. The chest pain was self limiting. A second RRT was called on 4/16/17, for palpitations and dizziness. Mr. Chavez was found to be in atrial flutter with RVR. He was transferred to telemetry and followed by cardiology. \par A-fib/a-flutter was resistant to single rate agent, and while in arrhythmia, pt would become symptomatic, hypotensive. Ablation was was preformed, however was unsuccessful. Pt continued to have frequent changes in rhythm. As a-fib was usually preceded by PAC, and mediport tip was in r atrium, concern was this may have been involved. Mediport was removed but patient continued to have runs of non conducted APCs. Patient was followed by EP during this admission who ruled out any need for PPM insertion. Patient was started on Cardizem and Toprol which he tolerated well. He did have episodes of bradycardia during his sleep with episodes of Atrial flutter with RVR as well. However, he remained asymptomatic. \par Pt was d/c 5/5/2017 to home in stable condition with instructions to follow up with hematology and EP. [de-identified] : Presenting today for a follow up appointment. He has completed  maintenance Rituxan. Last Rituxan was received in jan... PENELOPE....Patient reports continued fatigue. Recent CT/PET scan PENELOPE.  Patient also offers that he had the shingles and was treated with valtrex..some residual discomfort...He continues to be followed by cardiology; his cardiologist increased the Diltiazem dosage to 180mg q daily, however patient reported feeling increasingly fatigued and returned to 120mg q daily and now notes less fatigue. He is in eval for a cardiac procedure.. His cardiologist was waiting for the rituxan to be completed and covid to pass..He is compliant with all medications. Denies mouth sores, eye dryness, nausea, vomiting, diarrhea. No LE edema, CP or SOB. \par \par On 10/18/21, patient presents for a follow up visit. Overall patient is well. States has generalized pruritus and recently was seen by dermatologist. Has been applying triamcinolone cream with no relief. States recently felt a "lump" of the right groin. Given the patients past medical history patient is very concerned. Denies fever, chills, nausea, vomiting, diarrhea, rash, mouth sores, dysuria or any signs of active bleeding. Denies SOB, chest pain or B/L LE edema. Remains compliant with medications prescribed.

## 2021-10-20 NOTE — REVIEW OF SYSTEMS
[Fatigue] : no fatigue [Skin Rash] : no skin rash [Skin Wound] : no skin wound [Easy Bleeding] : no tendency for easy bleeding [Easy Bruising] : no tendency for easy bruising [Negative] : Constitutional [de-identified] : Generalized pruritus  [de-identified] : Right groin "lump"

## 2021-10-28 ENCOUNTER — APPOINTMENT (OUTPATIENT)
Dept: NUCLEAR MEDICINE | Facility: CLINIC | Age: 56
End: 2021-10-28
Payer: MEDICARE

## 2021-10-28 ENCOUNTER — OUTPATIENT (OUTPATIENT)
Dept: OUTPATIENT SERVICES | Facility: HOSPITAL | Age: 56
LOS: 1 days | End: 2021-10-28
Payer: COMMERCIAL

## 2021-10-28 DIAGNOSIS — Z98.890 OTHER SPECIFIED POSTPROCEDURAL STATES: Chronic | ICD-10-CM

## 2021-10-28 DIAGNOSIS — Z94.84 STEM CELLS TRANSPLANT STATUS: Chronic | ICD-10-CM

## 2021-10-28 DIAGNOSIS — Z95.818 PRESENCE OF OTHER CARDIAC IMPLANTS AND GRAFTS: Chronic | ICD-10-CM

## 2021-10-28 DIAGNOSIS — Z94.84 STEM CELLS TRANSPLANT STATUS: ICD-10-CM

## 2021-10-28 DIAGNOSIS — C83.10 MANTLE CELL LYMPHOMA, UNSPECIFIED SITE: ICD-10-CM

## 2021-10-28 PROCEDURE — 78815 PET IMAGE W/CT SKULL-THIGH: CPT | Mod: 26,PS

## 2021-10-28 PROCEDURE — A9552: CPT

## 2021-10-28 PROCEDURE — 78815 PET IMAGE W/CT SKULL-THIGH: CPT

## 2021-11-03 ENCOUNTER — RESULT REVIEW (OUTPATIENT)
Age: 56
End: 2021-11-03

## 2021-11-04 ENCOUNTER — NON-APPOINTMENT (OUTPATIENT)
Age: 56
End: 2021-11-04

## 2021-11-04 ENCOUNTER — APPOINTMENT (OUTPATIENT)
Dept: CARDIOLOGY | Facility: CLINIC | Age: 56
End: 2021-11-04
Payer: MEDICARE

## 2021-11-04 PROCEDURE — G2066: CPT | Mod: NC

## 2021-11-04 PROCEDURE — 93298 REM INTERROG DEV EVAL SCRMS: CPT | Mod: NC

## 2021-11-11 ENCOUNTER — OUTPATIENT (OUTPATIENT)
Dept: OUTPATIENT SERVICES | Facility: HOSPITAL | Age: 56
LOS: 1 days | Discharge: HOME | End: 2021-11-11

## 2021-11-11 DIAGNOSIS — Z94.84 STEM CELLS TRANSPLANT STATUS: Chronic | ICD-10-CM

## 2021-11-11 DIAGNOSIS — Z98.890 OTHER SPECIFIED POSTPROCEDURAL STATES: Chronic | ICD-10-CM

## 2021-11-11 DIAGNOSIS — Z95.818 PRESENCE OF OTHER CARDIAC IMPLANTS AND GRAFTS: Chronic | ICD-10-CM

## 2021-11-11 DIAGNOSIS — C83.10 MANTLE CELL LYMPHOMA, UNSPECIFIED SITE: ICD-10-CM

## 2021-11-12 ENCOUNTER — APPOINTMENT (OUTPATIENT)
Dept: ULTRASOUND IMAGING | Facility: IMAGING CENTER | Age: 56
End: 2021-11-12
Payer: MEDICARE

## 2021-11-12 ENCOUNTER — OUTPATIENT (OUTPATIENT)
Dept: OUTPATIENT SERVICES | Facility: HOSPITAL | Age: 56
LOS: 1 days | End: 2021-11-12
Payer: COMMERCIAL

## 2021-11-12 ENCOUNTER — RESULT REVIEW (OUTPATIENT)
Age: 56
End: 2021-11-12

## 2021-11-12 DIAGNOSIS — Z00.8 ENCOUNTER FOR OTHER GENERAL EXAMINATION: ICD-10-CM

## 2021-11-12 DIAGNOSIS — Z94.84 STEM CELLS TRANSPLANT STATUS: Chronic | ICD-10-CM

## 2021-11-12 DIAGNOSIS — Z98.890 OTHER SPECIFIED POSTPROCEDURAL STATES: Chronic | ICD-10-CM

## 2021-11-12 DIAGNOSIS — Z94.84 STEM CELLS TRANSPLANT STATUS: ICD-10-CM

## 2021-11-12 DIAGNOSIS — Z95.818 PRESENCE OF OTHER CARDIAC IMPLANTS AND GRAFTS: Chronic | ICD-10-CM

## 2021-11-12 DIAGNOSIS — C83.10 MANTLE CELL LYMPHOMA, UNSPECIFIED SITE: ICD-10-CM

## 2021-11-12 PROCEDURE — 88173 CYTOPATH EVAL FNA REPORT: CPT | Mod: 26

## 2021-11-12 PROCEDURE — 38505 NEEDLE BIOPSY LYMPH NODES: CPT | Mod: RT

## 2021-11-12 PROCEDURE — 76942 ECHO GUIDE FOR BIOPSY: CPT | Mod: 26

## 2021-11-12 PROCEDURE — 88360 TUMOR IMMUNOHISTOCHEM/MANUAL: CPT | Mod: 26

## 2021-11-12 PROCEDURE — 88341 IMHCHEM/IMCYTCHM EA ADD ANTB: CPT | Mod: 26,59

## 2021-11-12 PROCEDURE — 88342 IMHCHEM/IMCYTCHM 1ST ANTB: CPT | Mod: 26,59

## 2021-11-12 PROCEDURE — 88305 TISSUE EXAM BY PATHOLOGIST: CPT | Mod: 26

## 2021-11-12 PROCEDURE — 88189 FLOWCYTOMETRY/READ 16 & >: CPT

## 2021-11-13 PROCEDURE — 88173 CYTOPATH EVAL FNA REPORT: CPT

## 2021-11-13 PROCEDURE — 76942 ECHO GUIDE FOR BIOPSY: CPT

## 2021-11-13 PROCEDURE — 88184 FLOWCYTOMETRY/ TC 1 MARKER: CPT

## 2021-11-13 PROCEDURE — 38505 NEEDLE BIOPSY LYMPH NODES: CPT

## 2021-11-13 PROCEDURE — 88342 IMHCHEM/IMCYTCHM 1ST ANTB: CPT

## 2021-11-13 PROCEDURE — 88341 IMHCHEM/IMCYTCHM EA ADD ANTB: CPT

## 2021-11-13 PROCEDURE — 88172 CYTP DX EVAL FNA 1ST EA SITE: CPT

## 2021-11-13 PROCEDURE — 88185 FLOWCYTOMETRY/TC ADD-ON: CPT

## 2021-11-13 PROCEDURE — 87205 SMEAR GRAM STAIN: CPT

## 2021-11-13 PROCEDURE — 88360 TUMOR IMMUNOHISTOCHEM/MANUAL: CPT

## 2021-11-13 PROCEDURE — 88305 TISSUE EXAM BY PATHOLOGIST: CPT

## 2021-11-15 ENCOUNTER — RESULT REVIEW (OUTPATIENT)
Age: 56
End: 2021-11-15

## 2021-11-15 ENCOUNTER — APPOINTMENT (OUTPATIENT)
Dept: HEMATOLOGY ONCOLOGY | Facility: CLINIC | Age: 56
End: 2021-11-15
Payer: MEDICARE

## 2021-11-15 VITALS
RESPIRATION RATE: 16 BRPM | TEMPERATURE: 97.4 F | DIASTOLIC BLOOD PRESSURE: 91 MMHG | BODY MASS INDEX: 30.19 KG/M2 | HEART RATE: 63 BPM | SYSTOLIC BLOOD PRESSURE: 141 MMHG | HEIGHT: 71 IN | WEIGHT: 215.61 LBS | OXYGEN SATURATION: 100 %

## 2021-11-15 LAB
ALBUMIN SERPL ELPH-MCNC: 4.6 G/DL
ALP BLD-CCNC: 106 U/L
ALT SERPL-CCNC: 24 U/L
ANION GAP SERPL CALC-SCNC: 12 MMOL/L
AST SERPL-CCNC: 24 U/L
BASOPHILS # BLD AUTO: 0.02 K/UL — SIGNIFICANT CHANGE UP (ref 0–0.2)
BASOPHILS NFR BLD AUTO: 0.4 % — SIGNIFICANT CHANGE UP (ref 0–2)
BILIRUB SERPL-MCNC: 0.2 MG/DL
BUN SERPL-MCNC: 21 MG/DL
CALCIUM SERPL-MCNC: 9.3 MG/DL
CHLORIDE SERPL-SCNC: 104 MMOL/L
CO2 SERPL-SCNC: 26 MMOL/L
CREAT SERPL-MCNC: 0.95 MG/DL
EOSINOPHIL # BLD AUTO: 0.26 K/UL — SIGNIFICANT CHANGE UP (ref 0–0.5)
EOSINOPHIL NFR BLD AUTO: 5 % — SIGNIFICANT CHANGE UP (ref 0–6)
GLUCOSE SERPL-MCNC: 103 MG/DL
HCT VFR BLD CALC: 41.1 % — SIGNIFICANT CHANGE UP (ref 39–50)
HGB BLD-MCNC: 13.5 G/DL — SIGNIFICANT CHANGE UP (ref 13–17)
IMM GRANULOCYTES NFR BLD AUTO: 0.2 % — SIGNIFICANT CHANGE UP (ref 0–1.5)
LDH SERPL-CCNC: 219 U/L
LYMPHOCYTES # BLD AUTO: 1.95 K/UL — SIGNIFICANT CHANGE UP (ref 1–3.3)
LYMPHOCYTES # BLD AUTO: 37.7 % — SIGNIFICANT CHANGE UP (ref 13–44)
MAGNESIUM SERPL-MCNC: 2.1 MG/DL
MCHC RBC-ENTMCNC: 31.7 PG — SIGNIFICANT CHANGE UP (ref 27–34)
MCHC RBC-ENTMCNC: 32.8 G/DL — SIGNIFICANT CHANGE UP (ref 32–36)
MCV RBC AUTO: 96.5 FL — SIGNIFICANT CHANGE UP (ref 80–100)
MONOCYTES # BLD AUTO: 0.51 K/UL — SIGNIFICANT CHANGE UP (ref 0–0.9)
MONOCYTES NFR BLD AUTO: 9.9 % — SIGNIFICANT CHANGE UP (ref 2–14)
NEUTROPHILS # BLD AUTO: 2.42 K/UL — SIGNIFICANT CHANGE UP (ref 1.8–7.4)
NEUTROPHILS NFR BLD AUTO: 46.8 % — SIGNIFICANT CHANGE UP (ref 43–77)
NRBC # BLD: 0 /100 WBCS — SIGNIFICANT CHANGE UP (ref 0–0)
PLATELET # BLD AUTO: 181 K/UL — SIGNIFICANT CHANGE UP (ref 150–400)
POTASSIUM SERPL-SCNC: 5.5 MMOL/L
PROT SERPL-MCNC: 6.6 G/DL
RBC # BLD: 4.26 M/UL — SIGNIFICANT CHANGE UP (ref 4.2–5.8)
RBC # FLD: 13.4 % — SIGNIFICANT CHANGE UP (ref 10.3–14.5)
SODIUM SERPL-SCNC: 141 MMOL/L
WBC # BLD: 5.17 K/UL — SIGNIFICANT CHANGE UP (ref 3.8–10.5)
WBC # FLD AUTO: 5.17 K/UL — SIGNIFICANT CHANGE UP (ref 3.8–10.5)

## 2021-11-15 PROCEDURE — 99214 OFFICE O/P EST MOD 30 MIN: CPT

## 2021-11-15 RX ORDER — AMMONIUM LACTATE 12 %
12 CREAM (GRAM) TOPICAL
Qty: 1 | Refills: 11 | Status: DISCONTINUED | COMMUNITY
Start: 2020-11-19 | End: 2021-11-15

## 2021-11-18 LAB
NON-GYNECOLOGICAL CYTOLOGY STUDY: SIGNIFICANT CHANGE UP
TM INTERPRETATION: SIGNIFICANT CHANGE UP

## 2021-11-25 NOTE — REVIEW OF SYSTEMS
[Negative] : Allergic/Immunologic [Fatigue] : no fatigue [Skin Rash] : no skin rash [Skin Wound] : no skin wound [Easy Bleeding] : no tendency for easy bleeding [Easy Bruising] : no tendency for easy bruising [de-identified] : Generalized pruritus  [de-identified] : Right groin lymphadenopathy s/p biopsy

## 2021-11-25 NOTE — HISTORY OF PRESENT ILLNESS
[de-identified] : Mr. Chavez is a 53 year old male with a history of MCL, initially diagnosed 10/2016. On 11/15/2016 he was seen by Dr. Bravo at [facility], after noticing a left inguinal mass for which he sought medical attention. CT scans (3/21/2016) revealed left-sided pelvic and left inguinal adenopathy. A 0.7 cm indeterminate lesion in the right iliac bone was also noted. A left inguinal lymph node biopsy was performed 9/24/2016 which revealed morphologic and immunophenotypic findings consistent with mantle cell lymphoma. He is status post 4 cycles of R-CHOP, and two cycles of RICE consolidation. A bone marrow biopsy on 2/10/17 showed normocellular bone marrow with trilineage hematopoiesis and maturation. He was admitted 3/27/17 for an autologous peripheral blood stem cell transplant with CBV  regimen. \par  \par Upon admission, a TLC was placed in IR. Mr. Chavez received IV hydration, nutritional support, pain management, antinausea medication, antidiarrheals, antibiotic, antiviral, PCP, antifungal and GI prophylaxis. Labs were monitored daily. Mr. Chavez received transfusional support and electrolyte repletion as needed. When his ANC dropped below 1000, he was started on prophylactic ciprofloxacin. When he became febrile, his antibiotic coverage was broadened as appropriate. \par  \par On 4/5/17, Mr. Chavez received 314ml of thawed, pooled, washed, mobilized, plasma reduced, HPC apheresis over approximately 1 hour. He tolerated the infusion well with no adverse side effects noted. Engraftment was noted on 4/16/17, and the Zarxio and antibiotics were discontinued. \par  \par Mr. Chavez's transplant course was complicated by recurrent headaches, oral and GI mucositis, dizziness, and chest pain resulting in a RRT on 4/10/17. The chest pain was self limiting. A second RRT was called on 4/16/17, for palpitations and dizziness. Mr. Chavez was found to be in atrial flutter with RVR. He was transferred to telemetry and followed by cardiology. \par A-fib/a-flutter was resistant to single rate agent, and while in arrhythmia, pt would become symptomatic, hypotensive. Ablation was was preformed, however was unsuccessful. Pt continued to have frequent changes in rhythm. As a-fib was usually preceded by PAC, and mediport tip was in r atrium, concern was this may have been involved. Mediport was removed but patient continued to have runs of non conducted APCs. Patient was followed by EP during this admission who ruled out any need for PPM insertion. Patient was started on Cardizem and Toprol which he tolerated well. He did have episodes of bradycardia during his sleep with episodes of Atrial flutter with RVR as well. However, he remained asymptomatic. \par Pt was d/c 5/5/2017 to home in stable condition with instructions to follow up with hematology and EP. [de-identified] : Presenting today for a follow up appointment. He has completed  maintenance Rituxan. Last Rituxan was received in jan... PENELOPE....Patient reports continued fatigue. Recent CT/PET scan PENELOPE.  Patient also offers that he had the shingles and was treated with valtrex..some residual discomfort...He continues to be followed by cardiology; his cardiologist increased the Diltiazem dosage to 180mg q daily, however patient reported feeling increasingly fatigued and returned to 120mg q daily and now notes less fatigue. He is in eval for a cardiac procedure.. His cardiologist was waiting for the rituxan to be completed and covid to pass..He is compliant with all medications. Denies mouth sores, eye dryness, nausea, vomiting, diarrhea. No LE edema, CP or SOB. \par \par On 10/18/21, patient presents for a follow up visit. Overall patient is well. States has generalized pruritus and recently was seen by dermatologist. Has been applying triamcinolone cream with no relief. States recently felt a "lump" of the right groin. Given the patients past medical history patient is very concerned. Denies fever, chills, nausea, vomiting, diarrhea, rash, mouth sores, dysuria or any signs of active bleeding. Denies SOB, chest pain or B/L LE edema. Remains compliant with medications prescribed. \par \par On 11/5/21, patient presents with spouse for a follow up visit. Overall patient feels well. Had a lymph node biopsy on 11/12/21 and results are pending. Denies fever, chills, nausea, vomiting, diarrhea, rash, mouth sores, dysuria or any signs of active bleeding. Denies SOB, chest pain or B/L LE edema. Remains compliant with medications prescribed. \par

## 2021-11-25 NOTE — PHYSICAL EXAM
[Ambulatory and capable of all self care but unable to carry out any work activities] : Status 2- Ambulatory and capable of all self care but unable to carry out any work activities. Up and about more than 50% of waking hours [Normal Male] : prostate smooth, symmetric with no modularity or induration [Normal] : affect appropriate [de-identified] : Right groin lymphadenopathy size of a quarter s/p biopsy [de-identified] : healed scar from port removal

## 2021-11-25 NOTE — ASSESSMENT
[FreeTextEntry1] : 56 y/o gentleman with hx of mantle cell lymphoma initially dx'ed Oct 2016 s/p RCHOP chemotherapy X 4 cycles and RICE X 2 cycles with stem cell collected off of the 2nd RICE..he is now s/p CBV-conditioned autologous stem cell transplant on 4/5/17. Hospital course complicated by a-fib/a-flutter s/p ablation and now stable on Cardizem and Toprol. Also now on Xarelto. Port also removed. He demonstrated engraftment on 4/16 and d/c'ed in stable condition on 5/5.\par \par 1) Mantle Cell lymphoma\par S/P AUTO PBSCT 4/5/17\par Rituxan maintenance complete (Short Rituxan, once a week for 2 weeks). Last Rituxan treatment was  on 1/22/20  (Short Rituxan, once a week for 2 weeks).\par CT scans completed 7/1/21: PENELOPE\par PET CT 10/28/21 Completed. Concern for relapse disease. Lymph node biopsy completed on 11/12/21. Pending results. \par Pending on lymph node biopsy results patient may be potentially placed on calquence. Patient information printed and given to patient/spouse. Side effects explained including serious side effect of AFIB. Patient has a known cardiac history.\par Long term goal would be Car T. \par \par 2) Heme\par Counts stable. No indication for transfusion today. Discussed with patient.\par 11/5/21: WBC 5.17  H/H 13.5/41.1    ANC 2.42 \par \par 3) ID\par Not on prophylaxis\par \par 4) GI\par Continue omeprazole\par \par 5) Afib\par Continue medications as prescribed by Cardiologist\par \par 6) Other\par Pruritus- continue triamcinolone acetonide cream as prescribed by dermatologist. \par Continue medications as prescribed\par Maintain f/u with cardiology. \par Maintain f/u with his regular internist. \par Well care and cancer screening stressed\par Patient has been advised to contact clinic if he has any concerns.\par Questions and concerns addressed. Reassurance provided. \par \par Completed Post transplant vaccines:\par 12 month vaccines completed in April 2018. \par 14 month vaccines completed July 2018. \par  24 month re-vaccinations completed in April 2019. \par \par 7) Plan\par Pending lymph node biopsy results completed on 11/12/21.\par Follow up with Dr Banks in two weeks

## 2021-11-30 ENCOUNTER — RESULT REVIEW (OUTPATIENT)
Age: 56
End: 2021-11-30

## 2021-11-30 ENCOUNTER — APPOINTMENT (OUTPATIENT)
Dept: HEMATOLOGY ONCOLOGY | Facility: CLINIC | Age: 56
End: 2021-11-30
Payer: MEDICARE

## 2021-11-30 VITALS
OXYGEN SATURATION: 95 % | TEMPERATURE: 96.9 F | WEIGHT: 218.68 LBS | BODY MASS INDEX: 30.5 KG/M2 | DIASTOLIC BLOOD PRESSURE: 89 MMHG | SYSTOLIC BLOOD PRESSURE: 135 MMHG | HEART RATE: 75 BPM | RESPIRATION RATE: 16 BRPM

## 2021-11-30 DIAGNOSIS — Z51.11 ENCOUNTER FOR ANTINEOPLASTIC CHEMOTHERAPY: ICD-10-CM

## 2021-11-30 LAB
BASOPHILS # BLD AUTO: 0.01 K/UL — SIGNIFICANT CHANGE UP (ref 0–0.2)
BASOPHILS NFR BLD AUTO: 0.2 % — SIGNIFICANT CHANGE UP (ref 0–2)
EOSINOPHIL # BLD AUTO: 0.21 K/UL — SIGNIFICANT CHANGE UP (ref 0–0.5)
EOSINOPHIL NFR BLD AUTO: 4.2 % — SIGNIFICANT CHANGE UP (ref 0–6)
HCT VFR BLD CALC: 40.7 % — SIGNIFICANT CHANGE UP (ref 39–50)
HGB BLD-MCNC: 13.6 G/DL — SIGNIFICANT CHANGE UP (ref 13–17)
IMM GRANULOCYTES NFR BLD AUTO: 0.4 % — SIGNIFICANT CHANGE UP (ref 0–1.5)
LYMPHOCYTES # BLD AUTO: 1.87 K/UL — SIGNIFICANT CHANGE UP (ref 1–3.3)
LYMPHOCYTES # BLD AUTO: 37.2 % — SIGNIFICANT CHANGE UP (ref 13–44)
MCHC RBC-ENTMCNC: 32.5 PG — SIGNIFICANT CHANGE UP (ref 27–34)
MCHC RBC-ENTMCNC: 33.4 G/DL — SIGNIFICANT CHANGE UP (ref 32–36)
MCV RBC AUTO: 97.1 FL — SIGNIFICANT CHANGE UP (ref 80–100)
MONOCYTES # BLD AUTO: 0.54 K/UL — SIGNIFICANT CHANGE UP (ref 0–0.9)
MONOCYTES NFR BLD AUTO: 10.7 % — SIGNIFICANT CHANGE UP (ref 2–14)
NEUTROPHILS # BLD AUTO: 2.38 K/UL — SIGNIFICANT CHANGE UP (ref 1.8–7.4)
NEUTROPHILS NFR BLD AUTO: 47.3 % — SIGNIFICANT CHANGE UP (ref 43–77)
NRBC # BLD: 0 /100 WBCS — SIGNIFICANT CHANGE UP (ref 0–0)
PLATELET # BLD AUTO: 197 K/UL — SIGNIFICANT CHANGE UP (ref 150–400)
RBC # BLD: 4.19 M/UL — LOW (ref 4.2–5.8)
RBC # FLD: 13.6 % — SIGNIFICANT CHANGE UP (ref 10.3–14.5)
WBC # BLD: 5.03 K/UL — SIGNIFICANT CHANGE UP (ref 3.8–10.5)
WBC # FLD AUTO: 5.03 K/UL — SIGNIFICANT CHANGE UP (ref 3.8–10.5)

## 2021-11-30 PROCEDURE — 99215 OFFICE O/P EST HI 40 MIN: CPT

## 2021-12-01 PROBLEM — Z51.11 CHEMOTHERAPY MANAGEMENT, ENCOUNTER FOR: Status: ACTIVE | Noted: 2017-02-21

## 2021-12-01 LAB
ALBUMIN SERPL ELPH-MCNC: 4.7 G/DL
ALP BLD-CCNC: 96 U/L
ALT SERPL-CCNC: 24 U/L
ANION GAP SERPL CALC-SCNC: 13 MMOL/L
AST SERPL-CCNC: 25 U/L
BILIRUB SERPL-MCNC: 0.2 MG/DL
BUN SERPL-MCNC: 18 MG/DL
CALCIUM SERPL-MCNC: 9.4 MG/DL
CHLORIDE SERPL-SCNC: 103 MMOL/L
CO2 SERPL-SCNC: 25 MMOL/L
CREAT SERPL-MCNC: 1.03 MG/DL
GLUCOSE SERPL-MCNC: 117 MG/DL
LDH SERPL-CCNC: 205 U/L
MAGNESIUM SERPL-MCNC: 2.1 MG/DL
POTASSIUM SERPL-SCNC: 5.4 MMOL/L
PROT SERPL-MCNC: 6.5 G/DL
SODIUM SERPL-SCNC: 141 MMOL/L

## 2021-12-01 NOTE — ASSESSMENT
[FreeTextEntry1] : 55 y/o gentleman with hx of mantle cell lymphoma initially dx'ed Oct 2016 s/p RCHOP chemotherapy X 4 cycles and RICE X 2 cycles with stem cell collected off of the 2nd RICE..he is now s/p CBV-conditioned autologous stem cell transplant on 4/5/17. Hospital course complicated by a-fib/a-flutter s/p ablation and now stable on Cardizem and Toprol. Also now on Xarelto. Port also removed. He demonstrated engraftment on 4/16 and d/c'ed in stable condition on 5/5.\par \par 1) Mantle Cell lymphoma\par S/P AUTO PBSCT 4/5/17\par Rituxan maintenance complete (Short Rituxan, once a week for 2 weeks). Last Rituxan treatment was  on 1/22/20  (Short Rituxan, once a week for 2 weeks).\par CT scans completed 7/1/21: PENELOPE\par PET CT 10/28/21 Completed. Concern for relapse disease. Lymph node biopsy completed on 11/12/21.  Results reviewed..c/w MCL.... \par Discussed being placed on calquence vs Silas rituxan. Patient information printed and given to patient/spouse. Side effects explained including serious side effect of AFIB. Patient has a known cardiac history. Message left for pt's cardiologist..\par Long term goal would be Car T....tecartus... This is why I would like to avoid Silas..Literature provided for cart..discussed risks, benefits, alternatives, rationale and logistics..\par \par 2) Heme\par Counts stable. No indication for transfusion today. Discussed with patient. Will repeat BM Bx at some point..as well as assess for CNS disease\par 11/5/21: WBC 5.17  H/H 13.5/41.1    ANC 2.42 \par \par 3) ID\par Not on prophylaxis\par \par 4) GI\par Continue omeprazole\par \par 5) Afib\par Continue medications as prescribed by Cardiologist\par \par 6) Other\par Pruritus- continue triamcinolone acetonide cream as prescribed by dermatologist. \par Continue medications as prescribed\par Maintain f/u with cardiology. \par Maintain f/u with his regular internist. \par Well care and cancer screening stressed\par Patient has been advised to contact clinic if he has any concerns.\par Questions and concerns addressed. Reassurance provided. \par \par Completed Post transplant vaccines:\par 12 month vaccines completed in April 2018. \par 14 month vaccines completed July 2018. \par  24 month re-vaccinations completed in April 2019. \par \par 7) Plan\par Follow up with Dr Banks in 8 weeks\par see NP in 2 to 3 for lab check\par may want to avoid omeprazole

## 2021-12-01 NOTE — REVIEW OF SYSTEMS
[Negative] : Allergic/Immunologic [Fatigue] : fatigue [Skin Rash] : no skin rash [Skin Wound] : no skin wound [Easy Bleeding] : no tendency for easy bleeding [Easy Bruising] : no tendency for easy bruising [de-identified] : Generalized pruritus  [de-identified] : Right groin lymphadenopathy s/p biopsy

## 2021-12-01 NOTE — HISTORY OF PRESENT ILLNESS
[de-identified] : Mr. Chavez is a 53 year old male with a history of MCL, initially diagnosed 10/2016. On 11/15/2016 he was seen by Dr. Bravo at [facility], after noticing a left inguinal mass for which he sought medical attention. CT scans (3/21/2016) revealed left-sided pelvic and left inguinal adenopathy. A 0.7 cm indeterminate lesion in the right iliac bone was also noted. A left inguinal lymph node biopsy was performed 9/24/2016 which revealed morphologic and immunophenotypic findings consistent with mantle cell lymphoma. He is status post 4 cycles of R-CHOP, and two cycles of RICE consolidation. A bone marrow biopsy on 2/10/17 showed normocellular bone marrow with trilineage hematopoiesis and maturation. He was admitted 3/27/17 for an autologous peripheral blood stem cell transplant with CBV  regimen. \par  \par Upon admission, a TLC was placed in IR. Mr. Chavez received IV hydration, nutritional support, pain management, antinausea medication, antidiarrheals, antibiotic, antiviral, PCP, antifungal and GI prophylaxis. Labs were monitored daily. Mr. Chavez received transfusional support and electrolyte repletion as needed. When his ANC dropped below 1000, he was started on prophylactic ciprofloxacin. When he became febrile, his antibiotic coverage was broadened as appropriate. \par  \par On 4/5/17, Mr. Chavez received 314ml of thawed, pooled, washed, mobilized, plasma reduced, HPC apheresis over approximately 1 hour. He tolerated the infusion well with no adverse side effects noted. Engraftment was noted on 4/16/17, and the Zarxio and antibiotics were discontinued. \par  \par Mr. Chavez's transplant course was complicated by recurrent headaches, oral and GI mucositis, dizziness, and chest pain resulting in a RRT on 4/10/17. The chest pain was self limiting. A second RRT was called on 4/16/17, for palpitations and dizziness. Mr. Chavez was found to be in atrial flutter with RVR. He was transferred to telemetry and followed by cardiology. \par A-fib/a-flutter was resistant to single rate agent, and while in arrhythmia, pt would become symptomatic, hypotensive. Ablation was was preformed, however was unsuccessful. Pt continued to have frequent changes in rhythm. As a-fib was usually preceded by PAC, and mediport tip was in r atrium, concern was this may have been involved. Mediport was removed but patient continued to have runs of non conducted APCs. Patient was followed by EP during this admission who ruled out any need for PPM insertion. Patient was started on Cardizem and Toprol which he tolerated well. He did have episodes of bradycardia during his sleep with episodes of Atrial flutter with RVR as well. However, he remained asymptomatic. \par Pt was d/c 5/5/2017 to home in stable condition with instructions to follow up with hematology and EP. [de-identified] : Presenting today for a follow up appointment. He has completed  maintenance Rituxan. Last Rituxan was received in jan... PENELOPE....Patient reports continued fatigue. Recent CT/PET scan PENELOPE.  Patient also offers that he had the shingles and was treated with valtrex..some residual discomfort...He continues to be followed by cardiology; his cardiologist increased the Diltiazem dosage to 180mg q daily, however patient reported feeling increasingly fatigued and returned to 120mg q daily and now notes less fatigue. He is in eval for a cardiac procedure.. His cardiologist was waiting for the rituxan to be completed and covid to pass..He is compliant with all medications. Denies mouth sores, eye dryness, nausea, vomiting, diarrhea. No LE edema, CP or SOB. \par \par On 10/18/21, patient presents for a follow up visit. Overall patient is well. States has generalized pruritus and recently was seen by dermatologist. Has been applying triamcinolone cream with no relief. States recently felt a "lump" of the right groin. Given the patients past medical history patient is very concerned. Denies fever, chills, nausea, vomiting, diarrhea, rash, mouth sores, dysuria or any signs of active bleeding. Denies SOB, chest pain or B/L LE edema. Remains compliant with medications prescribed. \par \par On 11/30//21, patient presents with spouse for a follow up visit. Overall patient feels fatigued. Had a lymph node biopsy on 11/12/21 and results are c/w relapsed MCL.. Denies fever, chills, nausea, vomiting, diarrhea, rash, mouth sores, dysuria or any signs of active bleeding. Denies SOB, chest pain or B/L LE edema. Remains compliant with medications prescribed. Here to discuss further therapy..\par

## 2021-12-01 NOTE — PHYSICAL EXAM
[Ambulatory and capable of all self care but unable to carry out any work activities] : Status 2- Ambulatory and capable of all self care but unable to carry out any work activities. Up and about more than 50% of waking hours [Normal Male] : prostate smooth, symmetric with no modularity or induration [Normal] : affect appropriate [de-identified] : Right groin lymphadenopathy size of a quarter s/p biopsy [de-identified] : healed scar from port removal

## 2021-12-16 ENCOUNTER — OUTPATIENT (OUTPATIENT)
Dept: OUTPATIENT SERVICES | Facility: HOSPITAL | Age: 56
LOS: 1 days | Discharge: HOME | End: 2021-12-16

## 2021-12-16 ENCOUNTER — NON-APPOINTMENT (OUTPATIENT)
Age: 56
End: 2021-12-16

## 2021-12-16 DIAGNOSIS — Z98.890 OTHER SPECIFIED POSTPROCEDURAL STATES: Chronic | ICD-10-CM

## 2021-12-16 DIAGNOSIS — Z95.818 PRESENCE OF OTHER CARDIAC IMPLANTS AND GRAFTS: Chronic | ICD-10-CM

## 2021-12-16 DIAGNOSIS — C83.10 MANTLE CELL LYMPHOMA, UNSPECIFIED SITE: ICD-10-CM

## 2021-12-16 DIAGNOSIS — Z94.84 STEM CELLS TRANSPLANT STATUS: Chronic | ICD-10-CM

## 2021-12-17 ENCOUNTER — APPOINTMENT (OUTPATIENT)
Dept: CARDIOLOGY | Facility: CLINIC | Age: 56
End: 2021-12-17
Payer: MEDICARE

## 2021-12-17 PROCEDURE — 93298 REM INTERROG DEV EVAL SCRMS: CPT

## 2021-12-17 PROCEDURE — G2066: CPT

## 2021-12-20 ENCOUNTER — RESULT REVIEW (OUTPATIENT)
Age: 56
End: 2021-12-20

## 2021-12-20 ENCOUNTER — APPOINTMENT (OUTPATIENT)
Dept: HEMATOLOGY ONCOLOGY | Facility: CLINIC | Age: 56
End: 2021-12-20
Payer: MEDICARE

## 2021-12-20 VITALS
BODY MASS INDEX: 30.71 KG/M2 | HEIGHT: 71 IN | RESPIRATION RATE: 16 BRPM | OXYGEN SATURATION: 95 % | HEART RATE: 71 BPM | WEIGHT: 219.36 LBS | TEMPERATURE: 97.3 F | SYSTOLIC BLOOD PRESSURE: 114 MMHG | DIASTOLIC BLOOD PRESSURE: 70 MMHG

## 2021-12-20 LAB
BASOPHILS # BLD AUTO: 0.03 K/UL — SIGNIFICANT CHANGE UP (ref 0–0.2)
BASOPHILS NFR BLD AUTO: 0.5 % — SIGNIFICANT CHANGE UP (ref 0–2)
EOSINOPHIL # BLD AUTO: 0.14 K/UL — SIGNIFICANT CHANGE UP (ref 0–0.5)
EOSINOPHIL NFR BLD AUTO: 2.3 % — SIGNIFICANT CHANGE UP (ref 0–6)
HCT VFR BLD CALC: 42.7 % — SIGNIFICANT CHANGE UP (ref 39–50)
HGB BLD-MCNC: 13.7 G/DL — SIGNIFICANT CHANGE UP (ref 13–17)
IMM GRANULOCYTES NFR BLD AUTO: 0.3 % — SIGNIFICANT CHANGE UP (ref 0–1.5)
LYMPHOCYTES # BLD AUTO: 2.52 K/UL — SIGNIFICANT CHANGE UP (ref 1–3.3)
LYMPHOCYTES # BLD AUTO: 42.2 % — SIGNIFICANT CHANGE UP (ref 13–44)
MCHC RBC-ENTMCNC: 31.6 PG — SIGNIFICANT CHANGE UP (ref 27–34)
MCHC RBC-ENTMCNC: 32.1 G/DL — SIGNIFICANT CHANGE UP (ref 32–36)
MCV RBC AUTO: 98.4 FL — SIGNIFICANT CHANGE UP (ref 80–100)
MONOCYTES # BLD AUTO: 0.66 K/UL — SIGNIFICANT CHANGE UP (ref 0–0.9)
MONOCYTES NFR BLD AUTO: 11.1 % — SIGNIFICANT CHANGE UP (ref 2–14)
NEUTROPHILS # BLD AUTO: 2.6 K/UL — SIGNIFICANT CHANGE UP (ref 1.8–7.4)
NEUTROPHILS NFR BLD AUTO: 43.6 % — SIGNIFICANT CHANGE UP (ref 43–77)
NRBC # BLD: 0 /100 WBCS — SIGNIFICANT CHANGE UP (ref 0–0)
PLATELET # BLD AUTO: 175 K/UL — SIGNIFICANT CHANGE UP (ref 150–400)
RBC # BLD: 4.34 M/UL — SIGNIFICANT CHANGE UP (ref 4.2–5.8)
RBC # FLD: 13.8 % — SIGNIFICANT CHANGE UP (ref 10.3–14.5)
WBC # BLD: 5.97 K/UL — SIGNIFICANT CHANGE UP (ref 3.8–10.5)
WBC # FLD AUTO: 5.97 K/UL — SIGNIFICANT CHANGE UP (ref 3.8–10.5)

## 2021-12-20 PROCEDURE — 99214 OFFICE O/P EST MOD 30 MIN: CPT

## 2021-12-20 RX ORDER — KETOCONAZOLE 20.5 MG/ML
2 SHAMPOO, SUSPENSION TOPICAL
Qty: 1 | Refills: 11 | Status: DISCONTINUED | COMMUNITY
Start: 2020-11-19 | End: 2021-12-20

## 2021-12-20 RX ORDER — OMEPRAZOLE 40 MG/1
40 CAPSULE, DELAYED RELEASE ORAL
Qty: 90 | Refills: 3 | Status: DISCONTINUED | COMMUNITY
Start: 1900-01-01 | End: 2021-12-20

## 2021-12-20 RX ORDER — TRIAMCINOLONE ACETONIDE 1 MG/G
0.1 CREAM TOPICAL
Qty: 1 | Refills: 3 | Status: DISCONTINUED | COMMUNITY
Start: 2021-09-13 | End: 2021-12-20

## 2021-12-21 LAB
ALBUMIN SERPL ELPH-MCNC: 4.6 G/DL
ALP BLD-CCNC: 97 U/L
ALT SERPL-CCNC: 27 U/L
ANION GAP SERPL CALC-SCNC: 11 MMOL/L
AST SERPL-CCNC: 23 U/L
BILIRUB SERPL-MCNC: 0.4 MG/DL
BUN SERPL-MCNC: 24 MG/DL
CALCIUM SERPL-MCNC: 9.4 MG/DL
CHLORIDE SERPL-SCNC: 102 MMOL/L
CO2 SERPL-SCNC: 27 MMOL/L
CREAT SERPL-MCNC: 1.19 MG/DL
GLUCOSE SERPL-MCNC: 104 MG/DL
LDH SERPL-CCNC: 170 U/L
MAGNESIUM SERPL-MCNC: 2.3 MG/DL
POTASSIUM SERPL-SCNC: 4.7 MMOL/L
PROT SERPL-MCNC: 6.7 G/DL
SODIUM SERPL-SCNC: 140 MMOL/L

## 2021-12-23 NOTE — PHYSICAL EXAM
[Ambulatory and capable of all self care but unable to carry out any work activities] : Status 2- Ambulatory and capable of all self care but unable to carry out any work activities. Up and about more than 50% of waking hours [Normal Male] : prostate smooth, symmetric with no modularity or induration [Normal] : affect appropriate [de-identified] : Right groin lymphadenopathy decreasing in size [de-identified] : healed scar from port removal

## 2021-12-23 NOTE — REVIEW OF SYSTEMS
[Fatigue] : no fatigue [Dysphagia] : no dysphagia [Loss of Hearing] : no loss of hearing [Nosebleeds] : no nosebleeds [Hoarseness] : no hoarseness [Odynophagia] : no odynophagia [Mucosal Pain] : no mucosal pain [Abdominal Pain] : no abdominal pain [Vomiting] : no vomiting [Constipation] : no constipation [Diarrhea] : no diarrhea [Skin Rash] : no skin rash [Skin Wound] : no skin wound [Easy Bleeding] : no tendency for easy bleeding [Easy Bruising] : no tendency for easy bruising [Negative] : Integumentary [FreeTextEntry4] : Complains of headaches every other day. [FreeTextEntry7] : Acid reflux [de-identified] : Right groin lymphadenopathy decreasing in size

## 2021-12-23 NOTE — HISTORY OF PRESENT ILLNESS
[de-identified] : Mr. Chavez is a 53 year old male with a history of MCL, initially diagnosed 10/2016. On 11/15/2016 he was seen by Dr. Bravo at [facility], after noticing a left inguinal mass for which he sought medical attention. CT scans (3/21/2016) revealed left-sided pelvic and left inguinal adenopathy. A 0.7 cm indeterminate lesion in the right iliac bone was also noted. A left inguinal lymph node biopsy was performed 9/24/2016 which revealed morphologic and immunophenotypic findings consistent with mantle cell lymphoma. He is status post 4 cycles of R-CHOP, and two cycles of RICE consolidation. A bone marrow biopsy on 2/10/17 showed normocellular bone marrow with trilineage hematopoiesis and maturation. He was admitted 3/27/17 for an autologous peripheral blood stem cell transplant with CBV  regimen. \par  \par Upon admission, a TLC was placed in IR. Mr. hCavez received IV hydration, nutritional support, pain management, antinausea medication, antidiarrheals, antibiotic, antiviral, PCP, antifungal and GI prophylaxis. Labs were monitored daily. Mr. Chavez received transfusional support and electrolyte repletion as needed. When his ANC dropped below 1000, he was started on prophylactic ciprofloxacin. When he became febrile, his antibiotic coverage was broadened as appropriate. \par  \par On 4/5/17, Mr. Chavez received 314ml of thawed, pooled, washed, mobilized, plasma reduced, HPC apheresis over approximately 1 hour. He tolerated the infusion well with no adverse side effects noted. Engraftment was noted on 4/16/17, and the Zarxio and antibiotics were discontinued. \par  \par Mr. Chavez's transplant course was complicated by recurrent headaches, oral and GI mucositis, dizziness, and chest pain resulting in a RRT on 4/10/17. The chest pain was self limiting. A second RRT was called on 4/16/17, for palpitations and dizziness. Mr. Chavez was found to be in atrial flutter with RVR. He was transferred to telemetry and followed by cardiology. \par A-fib/a-flutter was resistant to single rate agent, and while in arrhythmia, pt would become symptomatic, hypotensive. Ablation was was preformed, however was unsuccessful. Pt continued to have frequent changes in rhythm. As a-fib was usually preceded by PAC, and mediport tip was in r atrium, concern was this may have been involved. Mediport was removed but patient continued to have runs of non conducted APCs. Patient was followed by EP during this admission who ruled out any need for PPM insertion. Patient was started on Cardizem and Toprol which he tolerated well. He did have episodes of bradycardia during his sleep with episodes of Atrial flutter with RVR as well. However, he remained asymptomatic. \par Pt was d/c 5/5/2017 to home in stable condition with instructions to follow up with hematology and EP. [de-identified] : Presenting today for a follow up appointment. He has completed  maintenance Rituxan. Last Rituxan was received in jan... PENELOPE....Patient reports continued fatigue. Recent CT/PET scan PENELOPE.  Patient also offers that he had the shingles and was treated with valtrex..some residual discomfort...He continues to be followed by cardiology; his cardiologist increased the Diltiazem dosage to 180mg q daily, however patient reported feeling increasingly fatigued and returned to 120mg q daily and now notes less fatigue. He is in eval for a cardiac procedure.. His cardiologist was waiting for the rituxan to be completed and covid to pass..He is compliant with all medications. Denies mouth sores, eye dryness, nausea, vomiting, diarrhea. No LE edema, CP or SOB. \par \par On 10/18/21, patient presents for a follow up visit. Overall patient is well. States has generalized pruritus and recently was seen by dermatologist. Has been applying triamcinolone cream with no relief. States recently felt a "lump" of the right groin. Given the patients past medical history patient is very concerned. Denies fever, chills, nausea, vomiting, diarrhea, rash, mouth sores, dysuria or any signs of active bleeding. Denies SOB, chest pain or B/L LE edema. Remains compliant with medications prescribed. \par \par On 11/30//21, patient presents with spouse for a follow up visit. Overall patient feels fatigued. Had a lymph node biopsy on 11/12/21 and results are c/w relapsed MCL.. Denies fever, chills, nausea, vomiting, diarrhea, rash, mouth sores, dysuria or any signs of active bleeding. Denies SOB, chest pain or B/L LE edema. Remains compliant with medications prescribed. Here to discuss further therapy..\par \par On 12/20/21, patient presents for a follow up visit. Overall well with no acute concerns. On calquence and tolerating well. Complains of acid reflux and takes tums prn. Complains of headaches every other day. Denies fever, chills, nausea, vomiting, diarrhea, rash, mouth sores, dysuria or any signs of active bleeding. Denies SOB, chest pain or B/L LE edema.

## 2021-12-27 ENCOUNTER — APPOINTMENT (OUTPATIENT)
Dept: ELECTROPHYSIOLOGY | Facility: CLINIC | Age: 56
End: 2021-12-27
Payer: MEDICARE

## 2021-12-27 VITALS
DIASTOLIC BLOOD PRESSURE: 70 MMHG | OXYGEN SATURATION: 99 % | WEIGHT: 217 LBS | TEMPERATURE: 97.3 F | SYSTOLIC BLOOD PRESSURE: 110 MMHG | HEART RATE: 84 BPM | BODY MASS INDEX: 30.38 KG/M2 | HEIGHT: 71 IN

## 2021-12-27 PROCEDURE — 99214 OFFICE O/P EST MOD 30 MIN: CPT

## 2021-12-30 NOTE — PHYSICAL EXAM
[No Acute Distress] : no acute distress [Normal Conjunctiva] : normal conjunctiva [Normal Venous Pressure] : normal venous pressure [Normal S1, S2] : normal S1, S2 [No Murmur] : no murmur [No Rub] : no rub [No Gallop] : no gallop [Clear Lung Fields] : clear lung fields [Non Tender] : non-tender [Normal Gait] : normal gait [No Edema] : no edema [No Rash] : no rash [No Focal Deficits] : no focal deficits [Alert and Oriented] : alert and oriented [General Appearance - In No Acute Distress] : no acute distress

## 2021-12-30 NOTE — REASON FOR VISIT
[Arrhythmia/ECG Abnorrmalities] : arrhythmia/ECG abnormalities [Family Member] : family member [Other: _____] : [unfilled]

## 2022-01-01 NOTE — HISTORY OF PRESENT ILLNESS
[FreeTextEntry1] : Patient has been doing extremely well from the arrhythmia standpoint and denies any palpitations.  He has been feeling much better since the catheter ablation procedure and denies any chest pain, shortness breath, dizziness, lightheadedness, syncope or presyncope\par \par Unfortunately he was diagnosed with new lesions within his lymphoma and is undergoing additional treatment.\par \par Interrogation of implantable loop monitor did not show any A. fib since the procedure.\par \par The patient has a prior history of mantle cell lymphoma.  He has got a previous cycles of chemotherapy.\par \par The patient has had prior chest discomfort and previously underwent cardiac catheterization which showed nonobstructive coronaries.\par \par He has had previous atrial flutter and underwent catheter ablation for atrial flutter.\par \par He subsequently had atrial fibrillation which is being treated with medical therapy.  We had discussed catheter ablation procedure for atrial fibrillation and had reviewed with his hematologist oncologist the possibility of using anticoagulants especially post procedure.  He was clear to be treated with a NOAC/anticoagulant.\par \par  [id number] score: 0 - No history of diabetes, hypertension, TIA, CVA, vascular disease\par \par Echocardiogram from 4/1/2019 showed left atrial diameter 3.7 cm left atrial volume index 36 cc/m², LVEF 40 to 45%.\par

## 2022-01-20 ENCOUNTER — NON-APPOINTMENT (OUTPATIENT)
Age: 57
End: 2022-01-20

## 2022-01-21 ENCOUNTER — APPOINTMENT (OUTPATIENT)
Dept: CARDIOLOGY | Facility: CLINIC | Age: 57
End: 2022-01-21
Payer: MEDICARE

## 2022-01-21 PROCEDURE — 93298 REM INTERROG DEV EVAL SCRMS: CPT

## 2022-01-21 PROCEDURE — G2066: CPT

## 2022-01-22 ENCOUNTER — OUTPATIENT (OUTPATIENT)
Dept: OUTPATIENT SERVICES | Facility: HOSPITAL | Age: 57
LOS: 1 days | Discharge: HOME | End: 2022-01-22

## 2022-01-22 DIAGNOSIS — Z98.890 OTHER SPECIFIED POSTPROCEDURAL STATES: Chronic | ICD-10-CM

## 2022-01-22 DIAGNOSIS — Z94.84 STEM CELLS TRANSPLANT STATUS: Chronic | ICD-10-CM

## 2022-01-22 DIAGNOSIS — C83.10 MANTLE CELL LYMPHOMA, UNSPECIFIED SITE: ICD-10-CM

## 2022-01-22 DIAGNOSIS — Z95.818 PRESENCE OF OTHER CARDIAC IMPLANTS AND GRAFTS: Chronic | ICD-10-CM

## 2022-01-23 ENCOUNTER — EMERGENCY (EMERGENCY)
Facility: HOSPITAL | Age: 57
LOS: 1 days | Discharge: HOME | End: 2022-01-23
Attending: EMERGENCY MEDICINE
Payer: COMMERCIAL

## 2022-01-23 VITALS
DIASTOLIC BLOOD PRESSURE: 90 MMHG | SYSTOLIC BLOOD PRESSURE: 148 MMHG | WEIGHT: 216.93 LBS | OXYGEN SATURATION: 97 % | RESPIRATION RATE: 22 BRPM | TEMPERATURE: 98 F | HEART RATE: 80 BPM | HEIGHT: 71 IN

## 2022-01-23 DIAGNOSIS — Z98.890 OTHER SPECIFIED POSTPROCEDURAL STATES: Chronic | ICD-10-CM

## 2022-01-23 DIAGNOSIS — Z94.84 STEM CELLS TRANSPLANT STATUS: Chronic | ICD-10-CM

## 2022-01-23 DIAGNOSIS — Z95.818 PRESENCE OF OTHER CARDIAC IMPLANTS AND GRAFTS: Chronic | ICD-10-CM

## 2022-01-23 LAB
ALBUMIN SERPL ELPH-MCNC: 4.9 G/DL — SIGNIFICANT CHANGE UP (ref 3.3–5)
ALP SERPL-CCNC: 88 U/L — SIGNIFICANT CHANGE UP (ref 40–120)
ALT FLD-CCNC: 20 U/L — SIGNIFICANT CHANGE UP (ref 10–45)
ANION GAP SERPL CALC-SCNC: 12 MMOL/L — SIGNIFICANT CHANGE UP (ref 5–17)
APTT BLD: 32.8 SEC — SIGNIFICANT CHANGE UP (ref 27.5–35.5)
AST SERPL-CCNC: 20 U/L — SIGNIFICANT CHANGE UP (ref 10–40)
BASE EXCESS BLDV CALC-SCNC: 3.3 MMOL/L — HIGH (ref -2–2)
BASOPHILS # BLD AUTO: 0.03 K/UL — SIGNIFICANT CHANGE UP (ref 0–0.2)
BASOPHILS NFR BLD AUTO: 0.3 % — SIGNIFICANT CHANGE UP (ref 0–2)
BILIRUB SERPL-MCNC: 0.2 MG/DL — SIGNIFICANT CHANGE UP (ref 0.2–1.2)
BLOOD GAS VENOUS - CREATININE: SIGNIFICANT CHANGE UP MG/DL (ref 0.5–1.3)
BUN SERPL-MCNC: 26 MG/DL — HIGH (ref 7–23)
CA-I SERPL-SCNC: 1.17 MMOL/L — SIGNIFICANT CHANGE UP (ref 1.15–1.33)
CALCIUM SERPL-MCNC: 9.3 MG/DL — SIGNIFICANT CHANGE UP (ref 8.4–10.5)
CHLORIDE BLDV-SCNC: 103 MMOL/L — SIGNIFICANT CHANGE UP (ref 96–108)
CHLORIDE SERPL-SCNC: 103 MMOL/L — SIGNIFICANT CHANGE UP (ref 96–108)
CO2 BLDV-SCNC: 31 MMOL/L — HIGH (ref 22–26)
CO2 SERPL-SCNC: 24 MMOL/L — SIGNIFICANT CHANGE UP (ref 22–31)
CREAT SERPL-MCNC: 1.19 MG/DL — SIGNIFICANT CHANGE UP (ref 0.5–1.3)
EOSINOPHIL # BLD AUTO: 0.13 K/UL — SIGNIFICANT CHANGE UP (ref 0–0.5)
EOSINOPHIL NFR BLD AUTO: 1.5 % — SIGNIFICANT CHANGE UP (ref 0–6)
GAS PNL BLDV: 136 MMOL/L — SIGNIFICANT CHANGE UP (ref 136–145)
GAS PNL BLDV: SIGNIFICANT CHANGE UP
GAS PNL BLDV: SIGNIFICANT CHANGE UP
GLUCOSE BLDV-MCNC: 129 MG/DL — HIGH (ref 70–99)
GLUCOSE SERPL-MCNC: 133 MG/DL — HIGH (ref 70–99)
HCO3 BLDV-SCNC: 29 MMOL/L — SIGNIFICANT CHANGE UP (ref 22–29)
HCT VFR BLD CALC: 41.2 % — SIGNIFICANT CHANGE UP (ref 39–50)
HCT VFR BLDA CALC: 40 % — SIGNIFICANT CHANGE UP (ref 39–51)
HGB BLD CALC-MCNC: 13.4 G/DL — SIGNIFICANT CHANGE UP (ref 12.6–17.4)
HGB BLD-MCNC: 13.2 G/DL — SIGNIFICANT CHANGE UP (ref 13–17)
IMM GRANULOCYTES NFR BLD AUTO: 0.5 % — SIGNIFICANT CHANGE UP (ref 0–1.5)
INR BLD: 1.11 RATIO — SIGNIFICANT CHANGE UP (ref 0.88–1.16)
LACTATE BLDV-MCNC: 1 MMOL/L — SIGNIFICANT CHANGE UP (ref 0.7–2)
LIDOCAIN IGE QN: 29 U/L — SIGNIFICANT CHANGE UP (ref 7–60)
LYMPHOCYTES # BLD AUTO: 1.99 K/UL — SIGNIFICANT CHANGE UP (ref 1–3.3)
LYMPHOCYTES # BLD AUTO: 22.4 % — SIGNIFICANT CHANGE UP (ref 13–44)
MCHC RBC-ENTMCNC: 31.2 PG — SIGNIFICANT CHANGE UP (ref 27–34)
MCHC RBC-ENTMCNC: 32 GM/DL — SIGNIFICANT CHANGE UP (ref 32–36)
MCV RBC AUTO: 97.4 FL — SIGNIFICANT CHANGE UP (ref 80–100)
MONOCYTES # BLD AUTO: 0.85 K/UL — SIGNIFICANT CHANGE UP (ref 0–0.9)
MONOCYTES NFR BLD AUTO: 9.6 % — SIGNIFICANT CHANGE UP (ref 2–14)
NEUTROPHILS # BLD AUTO: 5.83 K/UL — SIGNIFICANT CHANGE UP (ref 1.8–7.4)
NEUTROPHILS NFR BLD AUTO: 65.7 % — SIGNIFICANT CHANGE UP (ref 43–77)
NRBC # BLD: 0 /100 WBCS — SIGNIFICANT CHANGE UP (ref 0–0)
PCO2 BLDV: 48 MMHG — SIGNIFICANT CHANGE UP (ref 42–55)
PH BLDV: 7.39 — SIGNIFICANT CHANGE UP (ref 7.32–7.43)
PLATELET # BLD AUTO: 196 K/UL — SIGNIFICANT CHANGE UP (ref 150–400)
PO2 BLDV: 50 MMHG — HIGH (ref 25–45)
POTASSIUM BLDV-SCNC: 4.2 MMOL/L — SIGNIFICANT CHANGE UP (ref 3.5–5.1)
POTASSIUM SERPL-MCNC: 4.4 MMOL/L — SIGNIFICANT CHANGE UP (ref 3.5–5.3)
POTASSIUM SERPL-SCNC: 4.4 MMOL/L — SIGNIFICANT CHANGE UP (ref 3.5–5.3)
PROT SERPL-MCNC: 7.3 G/DL — SIGNIFICANT CHANGE UP (ref 6–8.3)
PROTHROM AB SERPL-ACNC: 13.2 SEC — SIGNIFICANT CHANGE UP (ref 10.6–13.6)
RBC # BLD: 4.23 M/UL — SIGNIFICANT CHANGE UP (ref 4.2–5.8)
RBC # FLD: 13.7 % — SIGNIFICANT CHANGE UP (ref 10.3–14.5)
SAO2 % BLDV: 84.8 % — SIGNIFICANT CHANGE UP (ref 67–88)
SODIUM SERPL-SCNC: 139 MMOL/L — SIGNIFICANT CHANGE UP (ref 135–145)
TROPONIN T, HIGH SENSITIVITY RESULT: 7 NG/L — SIGNIFICANT CHANGE UP (ref 0–51)
WBC # BLD: 8.87 K/UL — SIGNIFICANT CHANGE UP (ref 3.8–10.5)
WBC # FLD AUTO: 8.87 K/UL — SIGNIFICANT CHANGE UP (ref 3.8–10.5)

## 2022-01-23 PROCEDURE — 99285 EMERGENCY DEPT VISIT HI MDM: CPT

## 2022-01-23 PROCEDURE — 74174 CTA ABD&PLVS W/CONTRAST: CPT | Mod: 26,MA

## 2022-01-23 RX ORDER — MORPHINE SULFATE 50 MG/1
4 CAPSULE, EXTENDED RELEASE ORAL ONCE
Refills: 0 | Status: DISCONTINUED | OUTPATIENT
Start: 2022-01-23 | End: 2022-01-23

## 2022-01-23 RX ORDER — SODIUM CHLORIDE 9 MG/ML
1000 INJECTION INTRAMUSCULAR; INTRAVENOUS; SUBCUTANEOUS ONCE
Refills: 0 | Status: COMPLETED | OUTPATIENT
Start: 2022-01-23 | End: 2022-01-23

## 2022-01-23 RX ADMIN — SODIUM CHLORIDE 1000 MILLILITER(S): 9 INJECTION INTRAMUSCULAR; INTRAVENOUS; SUBCUTANEOUS at 22:01

## 2022-01-23 RX ADMIN — MORPHINE SULFATE 4 MILLIGRAM(S): 50 CAPSULE, EXTENDED RELEASE ORAL at 23:04

## 2022-01-23 RX ADMIN — MORPHINE SULFATE 4 MILLIGRAM(S): 50 CAPSULE, EXTENDED RELEASE ORAL at 22:01

## 2022-01-23 NOTE — ED PROVIDER NOTE - OBJECTIVE STATEMENT
56 year old male with history of non-hodgkin's lymphoma on chemo, Afib on Xarelto, GERD presenting with abdominal pain x 2d. States having initial onset of epigastric pain associated with N/V 2d ago, non-radiating, intermittent in nature. Had return of pain few hours PTA that was significantly worse and intolerable despite taking tylenol/motrin. No significant etoh or tobacco use.    Sees Dr. Banks @ [facility]

## 2022-01-23 NOTE — ED ADULT NURSE NOTE - OBJECTIVE STATEMENT
55 yo m arrived to the ed c/o abd pain x 3 days; reports new medication (protonix) started 2 days ago, unsure why medication was started; denies gi bleed; reports since starting the medication symptoms worse; reports mid abd pain, non radiating; denies chest pain, SOB, HA, N/V/D, fever/chills, urinary symptoms, hematuria, weakness, dizziness, numbness, tinging. Peripheral pulses present b/l. Skin warm, dry and pink. Pt placed in position of comfort. Pt educated on call bell system and provided call bell. Bed in lowest position, wheels locked, appropriate side rails raised. Pt denies needs at this time.

## 2022-01-23 NOTE — ED PROVIDER NOTE - ATTENDING CONTRIBUTION TO CARE
Patient presenting complaining of epigastric pain.  Began two days ago spontaneously, less intense than today and resolved, today reoccurred more severely.  Associated with nausea and decreased appetite.  No similar prior episodes.  Non radiating.  Denying chest pains, shortness of breath.  Tried tylenol at home without relief.    A 14 point review of systems is negative except as in HPI or otherwise documented.    Exam:  General: Patient well appearing, uncomfortable but vital signs within normal limits  HEENT: airway patent with moist mucous membranes  Cardiac: RRR S1/S2 with strong peripheral pulses  Respiratory: lungs clear without respiratory distress  GI: abdomen soft, non tender, non distended, localizes pain to epigastrum but cannot reproduce on exam  Neuro: no gross neurologic deficits  Skin: warm, well perfused  Psych: normal mood and affect    Patient presentation is concerning for pain out of proportion to exam suggesting a possible ischemic etiology - plan for lab, lactate, pain control, CTA abdomen/pelvis

## 2022-01-23 NOTE — ED PROVIDER NOTE - PROGRESS NOTE DETAILS
Efrem Goldsmith MD: Pt signed out to me by Cholo. Imaging and labwork normal. Pt without any abdominal pain currently and tolerating PO. Will d/c.
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
## 2022-01-23 NOTE — ED PROVIDER NOTE - NSICDXPASTMEDICALHX_GEN_ALL_CORE_FT
PAST MEDICAL HISTORY:  Atrial flutter     GERD (gastroesophageal reflux disease)     Migraine     Non Hodgkin's lymphoma     Paroxysmal atrial fibrillation     Sinus bradycardia

## 2022-01-23 NOTE — ED PROVIDER NOTE - PATIENT PORTAL LINK FT
You can access the FollowMyHealth Patient Portal offered by [facility] by registering at the following website: [email or web address]. By joining [facility]’s FollowMyHealth portal, you will also be able to view your health information using other applications (apps) compatible with our system.

## 2022-01-24 VITALS
HEART RATE: 72 BPM | DIASTOLIC BLOOD PRESSURE: 63 MMHG | RESPIRATION RATE: 20 BRPM | TEMPERATURE: 98 F | OXYGEN SATURATION: 98 % | SYSTOLIC BLOOD PRESSURE: 102 MMHG

## 2022-01-24 LAB
APPEARANCE UR: CLEAR — SIGNIFICANT CHANGE UP
BACTERIA # UR AUTO: NEGATIVE — SIGNIFICANT CHANGE UP
BILIRUB UR-MCNC: NEGATIVE — SIGNIFICANT CHANGE UP
COLOR SPEC: SIGNIFICANT CHANGE UP
DIFF PNL FLD: NEGATIVE — SIGNIFICANT CHANGE UP
GLUCOSE UR QL: NEGATIVE — SIGNIFICANT CHANGE UP
KETONES UR-MCNC: NEGATIVE — SIGNIFICANT CHANGE UP
LEUKOCYTE ESTERASE UR-ACNC: NEGATIVE — SIGNIFICANT CHANGE UP
NITRITE UR-MCNC: NEGATIVE — SIGNIFICANT CHANGE UP
PH UR: 6.5 — SIGNIFICANT CHANGE UP (ref 5–8)
PROT UR-MCNC: ABNORMAL
RBC CASTS # UR COMP ASSIST: 1 /HPF — SIGNIFICANT CHANGE UP (ref 0–4)
SP GR SPEC: >1.05 (ref 1.01–1.02)
TROPONIN T, HIGH SENSITIVITY RESULT: 7 NG/L — SIGNIFICANT CHANGE UP (ref 0–51)
UROBILINOGEN FLD QL: NEGATIVE — SIGNIFICANT CHANGE UP
WBC UR QL: 0 /HPF — SIGNIFICANT CHANGE UP (ref 0–5)

## 2022-01-24 PROCEDURE — 85025 COMPLETE CBC W/AUTO DIFF WBC: CPT

## 2022-01-24 PROCEDURE — 84484 ASSAY OF TROPONIN QUANT: CPT

## 2022-01-24 PROCEDURE — 93005 ELECTROCARDIOGRAM TRACING: CPT

## 2022-01-24 PROCEDURE — 86901 BLOOD TYPING SEROLOGIC RH(D): CPT

## 2022-01-24 PROCEDURE — 96374 THER/PROPH/DIAG INJ IV PUSH: CPT | Mod: XU

## 2022-01-24 PROCEDURE — 82565 ASSAY OF CREATININE: CPT

## 2022-01-24 PROCEDURE — 85730 THROMBOPLASTIN TIME PARTIAL: CPT

## 2022-01-24 PROCEDURE — 83690 ASSAY OF LIPASE: CPT

## 2022-01-24 PROCEDURE — 84295 ASSAY OF SERUM SODIUM: CPT

## 2022-01-24 PROCEDURE — 74174 CTA ABD&PLVS W/CONTRAST: CPT | Mod: MA

## 2022-01-24 PROCEDURE — 82330 ASSAY OF CALCIUM: CPT

## 2022-01-24 PROCEDURE — 99284 EMERGENCY DEPT VISIT MOD MDM: CPT | Mod: 25

## 2022-01-24 PROCEDURE — 85014 HEMATOCRIT: CPT

## 2022-01-24 PROCEDURE — 85018 HEMOGLOBIN: CPT

## 2022-01-24 PROCEDURE — 81001 URINALYSIS AUTO W/SCOPE: CPT

## 2022-01-24 PROCEDURE — 80053 COMPREHEN METABOLIC PANEL: CPT

## 2022-01-24 PROCEDURE — 82803 BLOOD GASES ANY COMBINATION: CPT

## 2022-01-24 PROCEDURE — 84132 ASSAY OF SERUM POTASSIUM: CPT

## 2022-01-24 PROCEDURE — 86850 RBC ANTIBODY SCREEN: CPT

## 2022-01-24 PROCEDURE — 87086 URINE CULTURE/COLONY COUNT: CPT

## 2022-01-24 PROCEDURE — 82947 ASSAY GLUCOSE BLOOD QUANT: CPT

## 2022-01-24 PROCEDURE — 86900 BLOOD TYPING SEROLOGIC ABO: CPT

## 2022-01-24 PROCEDURE — 96376 TX/PRO/DX INJ SAME DRUG ADON: CPT | Mod: XU

## 2022-01-24 PROCEDURE — 82435 ASSAY OF BLOOD CHLORIDE: CPT

## 2022-01-24 PROCEDURE — 85610 PROTHROMBIN TIME: CPT

## 2022-01-24 PROCEDURE — 83605 ASSAY OF LACTIC ACID: CPT

## 2022-01-25 ENCOUNTER — APPOINTMENT (OUTPATIENT)
Dept: HEMATOLOGY ONCOLOGY | Facility: CLINIC | Age: 57
End: 2022-01-25
Payer: MEDICARE

## 2022-01-25 ENCOUNTER — RESULT REVIEW (OUTPATIENT)
Age: 57
End: 2022-01-25

## 2022-01-25 VITALS
OXYGEN SATURATION: 97 % | TEMPERATURE: 97.7 F | BODY MASS INDEX: 30.38 KG/M2 | SYSTOLIC BLOOD PRESSURE: 106 MMHG | RESPIRATION RATE: 16 BRPM | HEART RATE: 78 BPM | WEIGHT: 217 LBS | DIASTOLIC BLOOD PRESSURE: 65 MMHG | HEIGHT: 71 IN

## 2022-01-25 LAB
BASOPHILS # BLD AUTO: 0.02 K/UL — SIGNIFICANT CHANGE UP (ref 0–0.2)
BASOPHILS NFR BLD AUTO: 0.4 % — SIGNIFICANT CHANGE UP (ref 0–2)
CULTURE RESULTS: SIGNIFICANT CHANGE UP
EOSINOPHIL # BLD AUTO: 0.17 K/UL — SIGNIFICANT CHANGE UP (ref 0–0.5)
EOSINOPHIL NFR BLD AUTO: 3 % — SIGNIFICANT CHANGE UP (ref 0–6)
HCT VFR BLD CALC: 39.7 % — SIGNIFICANT CHANGE UP (ref 39–50)
HGB BLD-MCNC: 12.8 G/DL — LOW (ref 13–17)
IMM GRANULOCYTES NFR BLD AUTO: 0.2 % — SIGNIFICANT CHANGE UP (ref 0–1.5)
LYMPHOCYTES # BLD AUTO: 1.93 K/UL — SIGNIFICANT CHANGE UP (ref 1–3.3)
LYMPHOCYTES # BLD AUTO: 33.9 % — SIGNIFICANT CHANGE UP (ref 13–44)
MCHC RBC-ENTMCNC: 32.1 PG — SIGNIFICANT CHANGE UP (ref 27–34)
MCHC RBC-ENTMCNC: 32.2 G/DL — SIGNIFICANT CHANGE UP (ref 32–36)
MCV RBC AUTO: 99.5 FL — SIGNIFICANT CHANGE UP (ref 80–100)
MONOCYTES # BLD AUTO: 0.55 K/UL — SIGNIFICANT CHANGE UP (ref 0–0.9)
MONOCYTES NFR BLD AUTO: 9.7 % — SIGNIFICANT CHANGE UP (ref 2–14)
NEUTROPHILS # BLD AUTO: 3.01 K/UL — SIGNIFICANT CHANGE UP (ref 1.8–7.4)
NEUTROPHILS NFR BLD AUTO: 52.8 % — SIGNIFICANT CHANGE UP (ref 43–77)
NRBC # BLD: 0 /100 WBCS — SIGNIFICANT CHANGE UP (ref 0–0)
PLATELET # BLD AUTO: 174 K/UL — SIGNIFICANT CHANGE UP (ref 150–400)
RBC # BLD: 3.99 M/UL — LOW (ref 4.2–5.8)
RBC # FLD: 13.7 % — SIGNIFICANT CHANGE UP (ref 10.3–14.5)
SPECIMEN SOURCE: SIGNIFICANT CHANGE UP
WBC # BLD: 5.69 K/UL — SIGNIFICANT CHANGE UP (ref 3.8–10.5)
WBC # FLD AUTO: 5.69 K/UL — SIGNIFICANT CHANGE UP (ref 3.8–10.5)

## 2022-01-25 PROCEDURE — 99215 OFFICE O/P EST HI 40 MIN: CPT

## 2022-01-26 LAB
ALBUMIN SERPL ELPH-MCNC: 4.9 G/DL
ALP BLD-CCNC: 86 U/L
ALT SERPL-CCNC: 19 U/L
ANION GAP SERPL CALC-SCNC: 14 MMOL/L
AST SERPL-CCNC: 22 U/L
BILIRUB SERPL-MCNC: 0.3 MG/DL
BUN SERPL-MCNC: 26 MG/DL
CALCIUM SERPL-MCNC: 9.4 MG/DL
CD3 CELLS # BLD: 1386 /UL
CHLORIDE SERPL-SCNC: 105 MMOL/L
CO2 SERPL-SCNC: 24 MMOL/L
CREAT SERPL-MCNC: 1.27 MG/DL
GLUCOSE SERPL-MCNC: 99 MG/DL
LDH SERPL-CCNC: 178 U/L
MAGNESIUM SERPL-MCNC: 2.2 MG/DL
POTASSIUM SERPL-SCNC: 4.9 MMOL/L
PROT SERPL-MCNC: 6.4 G/DL
SODIUM SERPL-SCNC: 143 MMOL/L

## 2022-02-01 NOTE — REVIEW OF SYSTEMS
[Negative] : Allergic/Immunologic [Abdominal Pain] : abdominal pain [Fatigue] : no fatigue [Dysphagia] : no dysphagia [Loss of Hearing] : no loss of hearing [Nosebleeds] : no nosebleeds [Hoarseness] : no hoarseness [Odynophagia] : no odynophagia [Mucosal Pain] : no mucosal pain [Vomiting] : no vomiting [Constipation] : no constipation [Diarrhea] : no diarrhea [Skin Rash] : no skin rash [Skin Wound] : no skin wound [Easy Bleeding] : no tendency for easy bleeding [Easy Bruising] : no tendency for easy bruising [FreeTextEntry4] : Complains of headaches every other day. [FreeTextEntry7] : Acid reflux [de-identified] : Right groin lymphadenopathy decreasing in size

## 2022-02-01 NOTE — PHYSICAL EXAM
[Ambulatory and capable of all self care but unable to carry out any work activities] : Status 2- Ambulatory and capable of all self care but unable to carry out any work activities. Up and about more than 50% of waking hours [Normal Male] : prostate smooth, symmetric with no modularity or induration [Normal] : affect appropriate [de-identified] : Right groin lymphadenopathy decreasing in size [de-identified] : healed scar from port removal

## 2022-02-01 NOTE — HISTORY OF PRESENT ILLNESS
[de-identified] : Mr. Chavez is a 53 year old male with a history of MCL, initially diagnosed 10/2016. On 11/15/2016 he was seen by Dr. Bravo at [facility], after noticing a left inguinal mass for which he sought medical attention. CT scans (3/21/2016) revealed left-sided pelvic and left inguinal adenopathy. A 0.7 cm indeterminate lesion in the right iliac bone was also noted. A left inguinal lymph node biopsy was performed 9/24/2016 which revealed morphologic and immunophenotypic findings consistent with mantle cell lymphoma. He is status post 4 cycles of R-CHOP, and two cycles of RICE consolidation. A bone marrow biopsy on 2/10/17 showed normocellular bone marrow with trilineage hematopoiesis and maturation. He was admitted 3/27/17 for an autologous peripheral blood stem cell transplant with CBV  regimen. \par  \par Upon admission, a TLC was placed in IR. Mr. Chavez received IV hydration, nutritional support, pain management, antinausea medication, antidiarrheals, antibiotic, antiviral, PCP, antifungal and GI prophylaxis. Labs were monitored daily. Mr. Chavez received transfusional support and electrolyte repletion as needed. When his ANC dropped below 1000, he was started on prophylactic ciprofloxacin. When he became febrile, his antibiotic coverage was broadened as appropriate. \par  \par On 4/5/17, Mr. Chavez received 314ml of thawed, pooled, washed, mobilized, plasma reduced, HPC apheresis over approximately 1 hour. He tolerated the infusion well with no adverse side effects noted. Engraftment was noted on 4/16/17, and the Zarxio and antibiotics were discontinued. \par  \par Mr. Chavez's transplant course was complicated by recurrent headaches, oral and GI mucositis, dizziness, and chest pain resulting in a RRT on 4/10/17. The chest pain was self limiting. A second RRT was called on 4/16/17, for palpitations and dizziness. Mr. Chavez was found to be in atrial flutter with RVR. He was transferred to telemetry and followed by cardiology. \par A-fib/a-flutter was resistant to single rate agent, and while in arrhythmia, pt would become symptomatic, hypotensive. Ablation was was preformed, however was unsuccessful. Pt continued to have frequent changes in rhythm. As a-fib was usually preceded by PAC, and mediport tip was in r atrium, concern was this may have been involved. Mediport was removed but patient continued to have runs of non conducted APCs. Patient was followed by EP during this admission who ruled out any need for PPM insertion. Patient was started on Cardizem and Toprol which he tolerated well. He did have episodes of bradycardia during his sleep with episodes of Atrial flutter with RVR as well. However, he remained asymptomatic. \par Pt was d/c 5/5/2017 to home in stable condition with instructions to follow up with hematology and EP. [de-identified] : Presenting today for a follow up appointment. He has completed  maintenance Rituxan. Last Rituxan was received in jan... PENELOPE....Patient reports continued fatigue. Recent CT/PET scan PENELOPE.  Patient also offers that he had the shingles and was treated with valtrex..some residual discomfort...He continues to be followed by cardiology; his cardiologist increased the Diltiazem dosage to 180mg q daily, however patient reported feeling increasingly fatigued and returned to 120mg q daily and now notes less fatigue. He is in eval for a cardiac procedure.. His cardiologist was waiting for the rituxan to be completed and covid to pass..He is compliant with all medications. Denies mouth sores, eye dryness, nausea, vomiting, diarrhea. No LE edema, CP or SOB. \par \par On 10/18/21, patient presents for a follow up visit. Overall patient is well. States has generalized pruritus and recently was seen by dermatologist. Has been applying triamcinolone cream with no relief. States recently felt a "lump" of the right groin. Given the patients past medical history patient is very concerned. Denies fever, chills, nausea, vomiting, diarrhea, rash, mouth sores, dysuria or any signs of active bleeding. Denies SOB, chest pain or B/L LE edema. Remains compliant with medications prescribed. \par \par On 11/30//21, patient presents with spouse for a follow up visit. Overall patient feels fatigued. Had a lymph node biopsy on 11/12/21 and results are c/w relapsed MCL.. Denies fever, chills, nausea, vomiting, diarrhea, rash, mouth sores, dysuria or any signs of active bleeding. Denies SOB, chest pain or B/L LE edema. Remains compliant with medications prescribed. Here to discuss further therapy..\par \par On 1/25/22, patient presents for a follow up visit.  On calquence and not  tolerating well. Was in ER with severe epigastric pain...now on protonix...Complains of acid reflux and takes tums prn. Complains of headaches every other day. Denies fever, chills, nausea, vomiting, diarrhea, rash, mouth sores, dysuria or any signs of active bleeding. Denies SOB, chest pain or B/L LE edema. in wheelchair today

## 2022-02-01 NOTE — ASSESSMENT
[FreeTextEntry1] : 57 y/o gentleman with hx of mantle cell lymphoma initially dx'ed Oct 2016 s/p RCHOP chemotherapy X 4 cycles and RICE X 2 cycles with stem cell collected off of the 2nd RICE..he is now s/p CBV-conditioned autologous stem cell transplant on 4/5/17. Hospital course complicated by a-fib/a-flutter s/p ablation and now stable on Cardizem and Toprol. Also now on Xarelto. Port also removed. He demonstrated engraftment on 4/16 and d/c'ed in stable condition on 5/5.\par \par 1) Mantle Cell lymphoma\par S/P AUTO PBSCT 4/5/17\par Rituxan maintenance complete (Short Rituxan, once a week for 2 weeks). Last Rituxan treatment was  on 1/22/20  (Short Rituxan, once a week for 2 weeks).\par CT scans completed 7/1/21: PENELOPE\par PET CT 10/28/21 Completed. Concern for relapse disease. Lymph node biopsy completed on 11/12/21.  Results reviewed..c/w MCL.... \par Discussed being placed on calquence vs Sials rituxan. Patient information printed and given to patient/spouse. Side effects explained including serious side effect of AFIB. Patient has a known cardiac history. Message left for pt's cardiologist..\par Long term goal would be Car T....tecartus... This is why I would like to avoid Silas..Literature provided for cart..discussed risks, benefits, alternatives, rationale and logistics..\par \par Continue calquence 100 mg capsule- take 1 capsule twice a day...worsening GI sx on protonix...instructed on how to administer\par \par 2) Heme\par Counts stable. No indication for transfusion today. Discussed with patient. Will repeat BM Bx at some point..as well as assess for CNS disease\par 12/20/21: WBC 5.97  H/H 13.7/42.7    ANC 2.60\par \par 3) ID\par Not on prophylaxis\par \par 4) GI\par Tums prn for acid reflux\par \par 5) Afib\par Continue medications as prescribed by Cardiologist\par \par 6) Other\par Headaches- tylenol prn\par Continue medications as prescribed\par Maintain f/u with cardiology. \par Maintain f/u with his regular internist. \par Well care and cancer screening stressed\par Patient has been advised to contact clinic if he has any concerns.\par Questions and concerns addressed. Reassurance provided. \par \par Completed Post transplant vaccines:\par 12 month vaccines completed in April 2018. \par 14 month vaccines completed July 2018. \par  24 month re-vaccinations completed in April 2019. \par \par 7) Plan\par Follow up  in 3 weeks...short interval given symptoms...advised to see GI\par will need f/u scanning\par

## 2022-02-17 ENCOUNTER — RESULT REVIEW (OUTPATIENT)
Age: 57
End: 2022-02-17

## 2022-02-18 ENCOUNTER — RESULT REVIEW (OUTPATIENT)
Age: 57
End: 2022-02-18

## 2022-02-18 ENCOUNTER — APPOINTMENT (OUTPATIENT)
Dept: HEMATOLOGY ONCOLOGY | Facility: CLINIC | Age: 57
End: 2022-02-18
Payer: MEDICARE

## 2022-02-18 VITALS
OXYGEN SATURATION: 98 % | DIASTOLIC BLOOD PRESSURE: 66 MMHG | RESPIRATION RATE: 17 BRPM | HEIGHT: 70.98 IN | WEIGHT: 217.16 LBS | SYSTOLIC BLOOD PRESSURE: 102 MMHG | BODY MASS INDEX: 30.4 KG/M2 | TEMPERATURE: 97.3 F | HEART RATE: 72 BPM

## 2022-02-18 DIAGNOSIS — R10.13 EPIGASTRIC PAIN: ICD-10-CM

## 2022-02-18 LAB
ALBUMIN SERPL ELPH-MCNC: 4.8 G/DL
ALP BLD-CCNC: 90 U/L
ALT SERPL-CCNC: 21 U/L
ANION GAP SERPL CALC-SCNC: 9 MMOL/L
AST SERPL-CCNC: 22 U/L
BASOPHILS # BLD AUTO: 0.02 K/UL — SIGNIFICANT CHANGE UP (ref 0–0.2)
BASOPHILS NFR BLD AUTO: 0.3 % — SIGNIFICANT CHANGE UP (ref 0–2)
BILIRUB SERPL-MCNC: 0.4 MG/DL
BUN SERPL-MCNC: 25 MG/DL
CALCIUM SERPL-MCNC: 9.2 MG/DL
CHLORIDE SERPL-SCNC: 103 MMOL/L
CO2 SERPL-SCNC: 28 MMOL/L
CREAT SERPL-MCNC: 1.05 MG/DL
EOSINOPHIL # BLD AUTO: 0.15 K/UL — SIGNIFICANT CHANGE UP (ref 0–0.5)
EOSINOPHIL NFR BLD AUTO: 2.6 % — SIGNIFICANT CHANGE UP (ref 0–6)
GLUCOSE SERPL-MCNC: 93 MG/DL
HCT VFR BLD CALC: 43.2 % — SIGNIFICANT CHANGE UP (ref 39–50)
HGB BLD-MCNC: 14 G/DL — SIGNIFICANT CHANGE UP (ref 13–17)
IMM GRANULOCYTES NFR BLD AUTO: 0.5 % — SIGNIFICANT CHANGE UP (ref 0–1.5)
LDH SERPL-CCNC: 158 U/L
LYMPHOCYTES # BLD AUTO: 2.51 K/UL — SIGNIFICANT CHANGE UP (ref 1–3.3)
LYMPHOCYTES # BLD AUTO: 43.4 % — SIGNIFICANT CHANGE UP (ref 13–44)
MAGNESIUM SERPL-MCNC: 2.3 MG/DL
MCHC RBC-ENTMCNC: 31.8 PG — SIGNIFICANT CHANGE UP (ref 27–34)
MCHC RBC-ENTMCNC: 32.4 G/DL — SIGNIFICANT CHANGE UP (ref 32–36)
MCV RBC AUTO: 98.2 FL — SIGNIFICANT CHANGE UP (ref 80–100)
MONOCYTES # BLD AUTO: 0.53 K/UL — SIGNIFICANT CHANGE UP (ref 0–0.9)
MONOCYTES NFR BLD AUTO: 9.2 % — SIGNIFICANT CHANGE UP (ref 2–14)
NEUTROPHILS # BLD AUTO: 2.55 K/UL — SIGNIFICANT CHANGE UP (ref 1.8–7.4)
NEUTROPHILS NFR BLD AUTO: 44 % — SIGNIFICANT CHANGE UP (ref 43–77)
NRBC # BLD: 0 /100 WBCS — SIGNIFICANT CHANGE UP (ref 0–0)
PLATELET # BLD AUTO: 179 K/UL — SIGNIFICANT CHANGE UP (ref 150–400)
POTASSIUM SERPL-SCNC: 5.2 MMOL/L
PROT SERPL-MCNC: 6.7 G/DL
RBC # BLD: 4.4 M/UL — SIGNIFICANT CHANGE UP (ref 4.2–5.8)
RBC # FLD: 13.4 % — SIGNIFICANT CHANGE UP (ref 10.3–14.5)
SODIUM SERPL-SCNC: 141 MMOL/L
WBC # BLD: 5.79 K/UL — SIGNIFICANT CHANGE UP (ref 3.8–10.5)
WBC # FLD AUTO: 5.79 K/UL — SIGNIFICANT CHANGE UP (ref 3.8–10.5)

## 2022-02-18 PROCEDURE — 99214 OFFICE O/P EST MOD 30 MIN: CPT

## 2022-02-18 NOTE — PHYSICAL EXAM
[Ambulatory and capable of all self care but unable to carry out any work activities] : Status 2- Ambulatory and capable of all self care but unable to carry out any work activities. Up and about more than 50% of waking hours [Normal Male] : prostate smooth, symmetric with no modularity or induration [Normal] : affect appropriate [de-identified] : Right groin lymphadenopathy decreasing in size [de-identified] : healed scar from port removal

## 2022-02-18 NOTE — ASSESSMENT
[FreeTextEntry1] : 55 y/o gentleman with hx of mantle cell lymphoma initially dx'ed Oct 2016 s/p RCHOP chemotherapy X 4 cycles and RICE X 2 cycles with stem cell collected off of the 2nd RICE..he is now s/p CBV-conditioned autologous stem cell transplant on 4/5/17. Hospital course complicated by a-fib/a-flutter s/p ablation and now stable on Cardizem and Toprol. Also now on Xarelto. Port also removed. He demonstrated engraftment on 4/16 and d/c'ed in stable condition on 5/5.\par \par 1) Mantle Cell lymphoma\par S/P AUTO PBSCT 4/5/17\par Rituxan maintenance complete (Short Rituxan, once a week for 2 weeks). Last Rituxan treatment was  on 1/22/20  (Short Rituxan, once a week for 2 weeks).\par CT scans completed 7/1/21: PENELOPE\par PET CT 10/28/21 Completed. Concern for relapse disease. Lymph node biopsy completed on 11/12/21.  Results reviewed..c/w MCL.... \par Discussed being placed on calquence vs Silas rituxan. Patient information printed and given to patient/spouse. Side effects explained including serious side effect of AFIB. Patient has a known cardiac history. Message left for pt's cardiologist..\par Long term goal would be Car T....tecartus... This is why I would like to avoid Silas..Literature provided for cart..discussed risks, benefits, alternatives, rationale and logistics..\par \par Continue calquence 100 mg capsule- take 1 capsule twice a day...worsening GI sx on protonix...instructed on how to administer\par \par 2) Heme\par Counts stable. No indication for transfusion today. Discussed with patient. Will repeat BM Bx at some point..as well as assess for CNS disease\par 2/18/22: WBC 5.79 H/H 14.0/43.2  [id number]  ANC 2.55\par Will repeat f/u PET CT in March\par \par 3) ID\par Not on prophylaxis\par \par 4) GI\par Tums prn for acid reflux\par Pantoprazole 40mg daily\par \par 5) Afib\par Continue medications as prescribed by Cardiologist\par \par 6) Other\par Headaches- tylenol prn\par Continue medications as prescribed\par Maintain f/u with cardiology and GI \par Maintain f/u with his regular internist. \par Well care and cancer screening stressed\par Patient has been advised to contact clinic if he has any concerns.\par Questions and concerns addressed. Reassurance provided. \par \par Completed Post transplant vaccines:\par 12 month vaccines completed in April 2018. \par 14 month vaccines completed July 2018. \par  24 month re-vaccinations completed in April 2019. \par \par 7) Plan\par Follow up  in 3 weeks...short interval given symptoms...advised to see GI\par Will do f/u PET CT in March\par \par I examined patient under Dr. Banks's supervision and Dr. Banks agrees to plan of care as listed above\par \par

## 2022-02-18 NOTE — REVIEW OF SYSTEMS
[Abdominal Pain] : abdominal pain [Negative] : Allergic/Immunologic [Fatigue] : no fatigue [Dysphagia] : no dysphagia [Loss of Hearing] : no loss of hearing [Nosebleeds] : no nosebleeds [Hoarseness] : no hoarseness [Odynophagia] : no odynophagia [Mucosal Pain] : no mucosal pain [Vomiting] : no vomiting [Constipation] : no constipation [Diarrhea] : no diarrhea [Skin Rash] : no skin rash [Skin Wound] : no skin wound [Easy Bleeding] : no tendency for easy bleeding [Easy Bruising] : no tendency for easy bruising [FreeTextEntry4] : Complains of headaches every other day. [FreeTextEntry7] : Acid reflux,Epigastric pain [de-identified] : Right groin lymphadenopathy decreasing in size

## 2022-02-18 NOTE — HISTORY OF PRESENT ILLNESS
[de-identified] : Mr. Chavez is a 53 year old male with a history of MCL, initially diagnosed 10/2016. On 11/15/2016 he was seen by Dr. Bravo at [facility], after noticing a left inguinal mass for which he sought medical attention. CT scans (3/21/2016) revealed left-sided pelvic and left inguinal adenopathy. A 0.7 cm indeterminate lesion in the right iliac bone was also noted. A left inguinal lymph node biopsy was performed 9/24/2016 which revealed morphologic and immunophenotypic findings consistent with mantle cell lymphoma. He is status post 4 cycles of R-CHOP, and two cycles of RICE consolidation. A bone marrow biopsy on 2/10/17 showed normocellular bone marrow with trilineage hematopoiesis and maturation. He was admitted 3/27/17 for an autologous peripheral blood stem cell transplant with CBV  regimen. \par  \par Upon admission, a TLC was placed in IR. Mr. Chavez received IV hydration, nutritional support, pain management, antinausea medication, antidiarrheals, antibiotic, antiviral, PCP, antifungal and GI prophylaxis. Labs were monitored daily. Mr. Chavez received transfusional support and electrolyte repletion as needed. When his ANC dropped below 1000, he was started on prophylactic ciprofloxacin. When he became febrile, his antibiotic coverage was broadened as appropriate. \par  \par On 4/5/17, Mr. Chavez received 314ml of thawed, pooled, washed, mobilized, plasma reduced, HPC apheresis over approximately 1 hour. He tolerated the infusion well with no adverse side effects noted. Engraftment was noted on 4/16/17, and the Zarxio and antibiotics were discontinued. \par  \par Mr. Chavez's transplant course was complicated by recurrent headaches, oral and GI mucositis, dizziness, and chest pain resulting in a RRT on 4/10/17. The chest pain was self limiting. A second RRT was called on 4/16/17, for palpitations and dizziness. Mr. Chavez was found to be in atrial flutter with RVR. He was transferred to telemetry and followed by cardiology. \par A-fib/a-flutter was resistant to single rate agent, and while in arrhythmia, pt would become symptomatic, hypotensive. Ablation was was preformed, however was unsuccessful. Pt continued to have frequent changes in rhythm. As a-fib was usually preceded by PAC, and mediport tip was in r atrium, concern was this may have been involved. Mediport was removed but patient continued to have runs of non conducted APCs. Patient was followed by EP during this admission who ruled out any need for PPM insertion. Patient was started on Cardizem and Toprol which he tolerated well. He did have episodes of bradycardia during his sleep with episodes of Atrial flutter with RVR as well. However, he remained asymptomatic. \par Pt was d/c 5/5/2017 to home in stable condition with instructions to follow up with hematology and EP. [de-identified] : Presenting today for a follow up appointment. He has completed  maintenance Rituxan. Last Rituxan was received in jan... PENELOPE....Patient reports continued fatigue. Recent CT/PET scan PENELOPE.  Patient also offers that he had the shingles and was treated with valtrex..some residual discomfort...He continues to be followed by cardiology; his cardiologist increased the Diltiazem dosage to 180mg q daily, however patient reported feeling increasingly fatigued and returned to 120mg q daily and now notes less fatigue. He is in eval for a cardiac procedure.. His cardiologist was waiting for the rituxan to be completed and covid to pass..He is compliant with all medications. Denies mouth sores, eye dryness, nausea, vomiting, diarrhea. No LE edema, CP or SOB. \par \par On 10/18/21, patient presents for a follow up visit. Overall patient is well. States has generalized pruritus and recently was seen by dermatologist. Has been applying triamcinolone cream with no relief. States recently felt a "lump" of the right groin. Given the patients past medical history patient is very concerned. Denies fever, chills, nausea, vomiting, diarrhea, rash, mouth sores, dysuria or any signs of active bleeding. Denies SOB, chest pain or B/L LE edema. Remains compliant with medications prescribed. \par \par On 11/30//21, patient presents with spouse for a follow up visit. Overall patient feels fatigued. Had a lymph node biopsy on 11/12/21 and results are c/w relapsed MCL.. Denies fever, chills, nausea, vomiting, diarrhea, rash, mouth sores, dysuria or any signs of active bleeding. Denies SOB, chest pain or B/L LE edema. Remains compliant with medications prescribed. Here to discuss further therapy..\par \par On 1/25/22, patient presents for a follow up visit.  On calquence and not  tolerating well. Was in ER with severe epigastric pain...now on protonix...Complains of acid reflux and takes tums prn. Complains of headaches every other day. Denies fever, chills, nausea, vomiting, diarrhea, rash, mouth sores, dysuria or any signs of active bleeding. Denies SOB, chest pain or B/L LE edema. in wheelchair today\par \par 2/18/22: Patient is here for a follow up visit.He states that he still has epigastric pain with Calquence.He takes Pantoprazole as prescribed.Denies fever,chills, nausea, vomiting, diarrhea,rash, mouth sores,SOB,chest pain or any signs of active bleeding.He has an appointment with PCP tomorrow to get a referral for an endoscopy by GI.

## 2022-02-23 ENCOUNTER — OUTPATIENT (OUTPATIENT)
Dept: OUTPATIENT SERVICES | Facility: HOSPITAL | Age: 57
LOS: 1 days | End: 2022-02-23
Payer: COMMERCIAL

## 2022-02-23 ENCOUNTER — APPOINTMENT (OUTPATIENT)
Dept: MRI IMAGING | Facility: CLINIC | Age: 57
End: 2022-02-23
Payer: MEDICARE

## 2022-02-23 DIAGNOSIS — Z98.890 OTHER SPECIFIED POSTPROCEDURAL STATES: Chronic | ICD-10-CM

## 2022-02-23 DIAGNOSIS — Z95.818 PRESENCE OF OTHER CARDIAC IMPLANTS AND GRAFTS: Chronic | ICD-10-CM

## 2022-02-23 DIAGNOSIS — Z94.84 STEM CELLS TRANSPLANT STATUS: Chronic | ICD-10-CM

## 2022-02-23 DIAGNOSIS — Z01.818 ENCOUNTER FOR OTHER PREPROCEDURAL EXAMINATION: ICD-10-CM

## 2022-02-23 DIAGNOSIS — Z00.00 ENCOUNTER FOR GENERAL ADULT MEDICAL EXAMINATION WITHOUT ABNORMAL FINDINGS: ICD-10-CM

## 2022-02-23 PROCEDURE — 70553 MRI BRAIN STEM W/O & W/DYE: CPT

## 2022-02-23 PROCEDURE — 70553 MRI BRAIN STEM W/O & W/DYE: CPT | Mod: 26

## 2022-02-23 PROCEDURE — A9585: CPT

## 2022-02-25 ENCOUNTER — APPOINTMENT (OUTPATIENT)
Dept: CARDIOLOGY | Facility: CLINIC | Age: 57
End: 2022-02-25
Payer: MEDICARE

## 2022-02-25 ENCOUNTER — NON-APPOINTMENT (OUTPATIENT)
Age: 57
End: 2022-02-25

## 2022-02-25 PROCEDURE — G2066: CPT

## 2022-02-25 PROCEDURE — 93298 REM INTERROG DEV EVAL SCRMS: CPT

## 2022-03-09 ENCOUNTER — OUTPATIENT (OUTPATIENT)
Dept: OUTPATIENT SERVICES | Facility: HOSPITAL | Age: 57
LOS: 1 days | Discharge: HOME | End: 2022-03-09

## 2022-03-09 DIAGNOSIS — C83.10 MANTLE CELL LYMPHOMA, UNSPECIFIED SITE: ICD-10-CM

## 2022-03-09 DIAGNOSIS — Z94.84 STEM CELLS TRANSPLANT STATUS: Chronic | ICD-10-CM

## 2022-03-09 DIAGNOSIS — Z98.890 OTHER SPECIFIED POSTPROCEDURAL STATES: Chronic | ICD-10-CM

## 2022-03-09 DIAGNOSIS — Z95.818 PRESENCE OF OTHER CARDIAC IMPLANTS AND GRAFTS: Chronic | ICD-10-CM

## 2022-03-11 ENCOUNTER — RESULT REVIEW (OUTPATIENT)
Age: 57
End: 2022-03-11

## 2022-03-11 ENCOUNTER — APPOINTMENT (OUTPATIENT)
Dept: HEMATOLOGY ONCOLOGY | Facility: CLINIC | Age: 57
End: 2022-03-11
Payer: MEDICARE

## 2022-03-11 VITALS
DIASTOLIC BLOOD PRESSURE: 78 MMHG | SYSTOLIC BLOOD PRESSURE: 119 MMHG | WEIGHT: 221.56 LBS | BODY MASS INDEX: 30.92 KG/M2 | HEART RATE: 71 BPM | RESPIRATION RATE: 16 BRPM | OXYGEN SATURATION: 98 % | TEMPERATURE: 97.2 F

## 2022-03-11 LAB
ALBUMIN SERPL ELPH-MCNC: 5.1 G/DL
ALP BLD-CCNC: 95 U/L
ALT SERPL-CCNC: 24 U/L
ANION GAP SERPL CALC-SCNC: 13 MMOL/L
AST SERPL-CCNC: 24 U/L
BASOPHILS # BLD AUTO: 0.02 K/UL — SIGNIFICANT CHANGE UP (ref 0–0.2)
BASOPHILS NFR BLD AUTO: 0.3 % — SIGNIFICANT CHANGE UP (ref 0–2)
BILIRUB SERPL-MCNC: 0.6 MG/DL
BUN SERPL-MCNC: 21 MG/DL
CALCIUM SERPL-MCNC: 9.3 MG/DL
CHLORIDE SERPL-SCNC: 105 MMOL/L
CO2 SERPL-SCNC: 27 MMOL/L
CREAT SERPL-MCNC: 0.99 MG/DL
EGFR: 89 ML/MIN/1.73M2
EOSINOPHIL # BLD AUTO: 0.12 K/UL — SIGNIFICANT CHANGE UP (ref 0–0.5)
EOSINOPHIL NFR BLD AUTO: 2 % — SIGNIFICANT CHANGE UP (ref 0–6)
GLUCOSE SERPL-MCNC: 98 MG/DL
HCT VFR BLD CALC: 43.9 % — SIGNIFICANT CHANGE UP (ref 39–50)
HGB BLD-MCNC: 14 G/DL — SIGNIFICANT CHANGE UP (ref 13–17)
IMM GRANULOCYTES NFR BLD AUTO: 0.2 % — SIGNIFICANT CHANGE UP (ref 0–1.5)
LDH SERPL-CCNC: 180 U/L
LYMPHOCYTES # BLD AUTO: 2.32 K/UL — SIGNIFICANT CHANGE UP (ref 1–3.3)
LYMPHOCYTES # BLD AUTO: 39.3 % — SIGNIFICANT CHANGE UP (ref 13–44)
MAGNESIUM SERPL-MCNC: 2.2 MG/DL
MCHC RBC-ENTMCNC: 31.6 PG — SIGNIFICANT CHANGE UP (ref 27–34)
MCHC RBC-ENTMCNC: 31.9 G/DL — LOW (ref 32–36)
MCV RBC AUTO: 99.1 FL — SIGNIFICANT CHANGE UP (ref 80–100)
MONOCYTES # BLD AUTO: 0.57 K/UL — SIGNIFICANT CHANGE UP (ref 0–0.9)
MONOCYTES NFR BLD AUTO: 9.7 % — SIGNIFICANT CHANGE UP (ref 2–14)
NEUTROPHILS # BLD AUTO: 2.86 K/UL — SIGNIFICANT CHANGE UP (ref 1.8–7.4)
NEUTROPHILS NFR BLD AUTO: 48.5 % — SIGNIFICANT CHANGE UP (ref 43–77)
NRBC # BLD: 0 /100 WBCS — SIGNIFICANT CHANGE UP (ref 0–0)
PLATELET # BLD AUTO: 189 K/UL — SIGNIFICANT CHANGE UP (ref 150–400)
POTASSIUM SERPL-SCNC: 4.7 MMOL/L
PROT SERPL-MCNC: 6.9 G/DL
RBC # BLD: 4.43 M/UL — SIGNIFICANT CHANGE UP (ref 4.2–5.8)
RBC # FLD: 13.4 % — SIGNIFICANT CHANGE UP (ref 10.3–14.5)
SODIUM SERPL-SCNC: 145 MMOL/L
WBC # BLD: 5.9 K/UL — SIGNIFICANT CHANGE UP (ref 3.8–10.5)
WBC # FLD AUTO: 5.9 K/UL — SIGNIFICANT CHANGE UP (ref 3.8–10.5)

## 2022-03-11 PROCEDURE — 99215 OFFICE O/P EST HI 40 MIN: CPT

## 2022-03-11 NOTE — PHYSICAL EXAM
[Ambulatory and capable of all self care but unable to carry out any work activities] : Status 2- Ambulatory and capable of all self care but unable to carry out any work activities. Up and about more than 50% of waking hours [Normal] : affect appropriate [de-identified] : Right groin lymphadenopathy decreasing in size [de-identified] : healed scar from port removal

## 2022-03-11 NOTE — ASSESSMENT
[FreeTextEntry1] : 55 y/o gentleman with hx of mantle cell lymphoma initially dx'ed Oct 2016 s/p RCHOP chemotherapy X 4 cycles and RICE X 2 cycles with stem cell collected off of the 2nd RICE..he is now s/p CBV-conditioned autologous stem cell transplant on 4/5/17. Hospital course complicated by a-fib/a-flutter s/p ablation and now stable on Cardizem and Toprol. Also now on Xarelto. Port also removed. He demonstrated engraftment on 4/16 and d/c'ed in stable condition on 5/5.\par \par 1) Mantle Cell lymphoma\par S/P AUTO PBSCT 4/5/17\par Rituxan maintenance complete (Short Rituxan, once a week for 2 weeks). Last Rituxan treatment was  on 1/22/20  (Short Rituxan, once a week for 2 weeks).\par CT scans completed 7/1/21: PENELOPE\par PET CT 10/28/21 Completed. Concern for relapse disease. Lymph node biopsy completed on 11/12/21.  Results reviewed..c/w MCL.... \par Discussed being placed on calquence vs Silas rituxan. Patient information printed and given to patient/spouse. Side effects explained including serious side effect of AFIB. Patient has a known cardiac history. Message left for pt's cardiologist..\par Long term goal would be Car T....tecartus... This is why I would like to avoid Silas..Literature provided for cart..discussed risks, benefits, alternatives, rationale and logistics..\par \par Continue calquence 100 mg capsule- take 1 capsule twice a day...worsening GI sx on protonix...instructed on how to administer\par \par 3/11/22: Will stop Calquence on 3/14/22 ;1 week prior to CAR-T collection\par \par 2) Heme\par Counts stable. No indication for transfusion today. Discussed with patient. Will repeat BM Bx at some point..as well as assess for CNS disease\par 3/11/22:: WBC 5.90 H/H 14.0/43.9    ANC 2.86\par F/U PET CT  ordered to be done in March;Patient advised to schedule for the test\par \par 3) ID\par Not on prophylaxis\par \par 4) GI\par Tums prn for acid reflux\par Pantoprazole 40mg daily;STOPPED Pantoprazole and switched to Pepcid\par \par 5) Afib\par Continue medications as prescribed by Cardiologist\par \par 6) Other\par Headaches- tylenol prn\par Continue medications as prescribed\par Maintain f/u with cardiology and GI \par Maintain f/u with his regular internist. \par Well care and cancer screening stressed\par Patient has been advised to contact clinic if he has any concerns.\par Questions and concerns addressed. Reassurance provided. \par \par Completed Post transplant vaccines:\par 12 month vaccines completed in April 2018. \par 14 month vaccines completed July 2018. \par  24 month re-vaccinations completed in April 2019. \par \par 7) Plan\par CAR-T collection scheduled on 3/21/22\par Pretesting scheduled and patient is aware of the test dates and times\par STOP Calquence on 3/14/22 and Xarelto on 3/18/22\par ..short interval given symptoms...advised to see GI\par F/U PET CT ordered is not scheduled;Patient advised to call radiology  and schedule for the test\par \par I examined patient under Dr. Banks's supervision and Dr. Banks agrees to plan of care as listed above\par \par

## 2022-03-11 NOTE — HISTORY OF PRESENT ILLNESS
[de-identified] : Mr. Chavez is a 53 year old male with a history of MCL, initially diagnosed 10/2016. On 11/15/2016 he was seen by Dr. Bravo at [facility], after noticing a left inguinal mass for which he sought medical attention. CT scans (3/21/2016) revealed left-sided pelvic and left inguinal adenopathy. A 0.7 cm indeterminate lesion in the right iliac bone was also noted. A left inguinal lymph node biopsy was performed 9/24/2016 which revealed morphologic and immunophenotypic findings consistent with mantle cell lymphoma. He is status post 4 cycles of R-CHOP, and two cycles of RICE consolidation. A bone marrow biopsy on 2/10/17 showed normocellular bone marrow with trilineage hematopoiesis and maturation. He was admitted 3/27/17 for an autologous peripheral blood stem cell transplant with CBV  regimen. \par  \par Upon admission, a TLC was placed in IR. Mr. Chavez received IV hydration, nutritional support, pain management, antinausea medication, antidiarrheals, antibiotic, antiviral, PCP, antifungal and GI prophylaxis. Labs were monitored daily. Mr. Chavez received transfusional support and electrolyte repletion as needed. When his ANC dropped below 1000, he was started on prophylactic ciprofloxacin. When he became febrile, his antibiotic coverage was broadened as appropriate. \par  \par On 4/5/17, Mr. Chavez received 314ml of thawed, pooled, washed, mobilized, plasma reduced, HPC apheresis over approximately 1 hour. He tolerated the infusion well with no adverse side effects noted. Engraftment was noted on 4/16/17, and the Zarxio and antibiotics were discontinued. \par  \par Mr. Chavez's transplant course was complicated by recurrent headaches, oral and GI mucositis, dizziness, and chest pain resulting in a RRT on 4/10/17. The chest pain was self limiting. A second RRT was called on 4/16/17, for palpitations and dizziness. Mr. Chavez was found to be in atrial flutter with RVR. He was transferred to telemetry and followed by cardiology. \par A-fib/a-flutter was resistant to single rate agent, and while in arrhythmia, pt would become symptomatic, hypotensive. Ablation was was preformed, however was unsuccessful. Pt continued to have frequent changes in rhythm. As a-fib was usually preceded by PAC, and mediport tip was in r atrium, concern was this may have been involved. Mediport was removed but patient continued to have runs of non conducted APCs. Patient was followed by EP during this admission who ruled out any need for PPM insertion. Patient was started on Cardizem and Toprol which he tolerated well. He did have episodes of bradycardia during his sleep with episodes of Atrial flutter with RVR as well. However, he remained asymptomatic. \par Pt was d/c 5/5/2017 to home in stable condition with instructions to follow up with hematology and EP. [de-identified] : Presenting today for a follow up appointment. He has completed  maintenance Rituxan. Last Rituxan was received in jan... PENELOPE....Patient reports continued fatigue. Recent CT/PET scan PENELOPE.  Patient also offers that he had the shingles and was treated with valtrex..some residual discomfort...He continues to be followed by cardiology; his cardiologist increased the Diltiazem dosage to 180mg q daily, however patient reported feeling increasingly fatigued and returned to 120mg q daily and now notes less fatigue. He is in eval for a cardiac procedure.. His cardiologist was waiting for the rituxan to be completed and covid to pass..He is compliant with all medications. Denies mouth sores, eye dryness, nausea, vomiting, diarrhea. No LE edema, CP or SOB. \par \par On 10/18/21, patient presents for a follow up visit. Overall patient is well. States has generalized pruritus and recently was seen by dermatologist. Has been applying triamcinolone cream with no relief. States recently felt a "lump" of the right groin. Given the patients past medical history patient is very concerned. Denies fever, chills, nausea, vomiting, diarrhea, rash, mouth sores, dysuria or any signs of active bleeding. Denies SOB, chest pain or B/L LE edema. Remains compliant with medications prescribed. \par \par On 11/30//21, patient presents with spouse for a follow up visit. Overall patient feels fatigued. Had a lymph node biopsy on 11/12/21 and results are c/w relapsed MCL.. Denies fever, chills, nausea, vomiting, diarrhea, rash, mouth sores, dysuria or any signs of active bleeding. Denies SOB, chest pain or B/L LE edema. Remains compliant with medications prescribed. Here to discuss further therapy..\par \par On 1/25/22, patient presents for a follow up visit.  On calquence and not  tolerating well. Was in ER with severe epigastric pain...now on protonix...Complains of acid reflux and takes tums prn. Complains of headaches every other day. Denies fever, chills, nausea, vomiting, diarrhea, rash, mouth sores, dysuria or any signs of active bleeding. Denies SOB, chest pain or B/L LE edema. in wheelchair today\par \par 2/18/22: Patient is here for a follow up visit.He states that he still has epigastric pain with Calquence.He takes Pantoprazole as prescribed.Denies fever,chills, nausea, vomiting, diarrhea,rash, mouth sores,SOB,chest pain or any signs of active bleeding.He has an appointment with PCP tomorrow to get a referral for an endoscopy by GI.\par \par 3/11/22:Patient is seen for a follow up visit pre CAR-T collection.He is accompanied by his wife.Denies fever, chills, nausea, vomiting, diarrhea,mouth sores, skin rash,SOB,chest pain or any signs of active bleeding. He states that he still has occasional epigastric pain from Calquence.He states that he went to ER on 2/28/22 secondary to epigastric pain and was advised to follow up with GI.He states that he doesn't want to be seen by his previous GI doctor as he is not affiliated to [facility]. [facility] contact number given to him and advised to schedule an appointment.He takes Pepcid for epigastric discomfort.

## 2022-03-11 NOTE — REVIEW OF SYSTEMS
[Abdominal Pain] : abdominal pain [Negative] : Allergic/Immunologic [Fatigue] : no fatigue [Dysphagia] : no dysphagia [Loss of Hearing] : no loss of hearing [Nosebleeds] : no nosebleeds [Hoarseness] : no hoarseness [Odynophagia] : no odynophagia [Mucosal Pain] : no mucosal pain [Vomiting] : no vomiting [Constipation] : no constipation [Diarrhea] : no diarrhea [Skin Rash] : no skin rash [Skin Wound] : no skin wound [Easy Bleeding] : no tendency for easy bleeding [Easy Bruising] : no tendency for easy bruising [FreeTextEntry4] : Complains of headaches every other day. [FreeTextEntry7] : Acid reflux,Epigastric pain [de-identified] : Right groin lymphadenopathy decreasing in size

## 2022-03-12 LAB — SARS-COV-2 N GENE NPH QL NAA+PROBE: NOT DETECTED

## 2022-03-14 ENCOUNTER — LABORATORY RESULT (OUTPATIENT)
Age: 57
End: 2022-03-14

## 2022-03-14 ENCOUNTER — RESULT REVIEW (OUTPATIENT)
Age: 57
End: 2022-03-14

## 2022-03-14 ENCOUNTER — APPOINTMENT (OUTPATIENT)
Dept: PULMONOLOGY | Facility: CLINIC | Age: 57
End: 2022-03-14
Payer: MEDICARE

## 2022-03-14 ENCOUNTER — OUTPATIENT (OUTPATIENT)
Dept: OUTPATIENT SERVICES | Facility: HOSPITAL | Age: 57
LOS: 1 days | End: 2022-03-14

## 2022-03-14 ENCOUNTER — APPOINTMENT (OUTPATIENT)
Dept: HEMATOLOGY ONCOLOGY | Facility: CLINIC | Age: 57
End: 2022-03-14

## 2022-03-14 DIAGNOSIS — Z94.84 STEM CELLS TRANSPLANT STATUS: Chronic | ICD-10-CM

## 2022-03-14 DIAGNOSIS — Z98.890 OTHER SPECIFIED POSTPROCEDURAL STATES: Chronic | ICD-10-CM

## 2022-03-14 DIAGNOSIS — Z94.84 STEM CELLS TRANSPLANT STATUS: ICD-10-CM

## 2022-03-14 DIAGNOSIS — Z95.818 PRESENCE OF OTHER CARDIAC IMPLANTS AND GRAFTS: Chronic | ICD-10-CM

## 2022-03-14 LAB
BASOPHILS # BLD AUTO: 0.03 K/UL — SIGNIFICANT CHANGE UP (ref 0–0.2)
BASOPHILS NFR BLD AUTO: 0.6 % — SIGNIFICANT CHANGE UP (ref 0–2)
EOSINOPHIL # BLD AUTO: 0.1 K/UL — SIGNIFICANT CHANGE UP (ref 0–0.5)
EOSINOPHIL NFR BLD AUTO: 1.9 % — SIGNIFICANT CHANGE UP (ref 0–6)
HCT VFR BLD CALC: 44.2 % — SIGNIFICANT CHANGE UP (ref 39–50)
HGB BLD-MCNC: 14.1 G/DL — SIGNIFICANT CHANGE UP (ref 13–17)
IMM GRANULOCYTES NFR BLD AUTO: 0.4 % — SIGNIFICANT CHANGE UP (ref 0–1.5)
LYMPHOCYTES # BLD AUTO: 2.02 K/UL — SIGNIFICANT CHANGE UP (ref 1–3.3)
LYMPHOCYTES # BLD AUTO: 37.5 % — SIGNIFICANT CHANGE UP (ref 13–44)
MCHC RBC-ENTMCNC: 31.3 PG — SIGNIFICANT CHANGE UP (ref 27–34)
MCHC RBC-ENTMCNC: 31.9 G/DL — LOW (ref 32–36)
MCV RBC AUTO: 98 FL — SIGNIFICANT CHANGE UP (ref 80–100)
MONOCYTES # BLD AUTO: 0.46 K/UL — SIGNIFICANT CHANGE UP (ref 0–0.9)
MONOCYTES NFR BLD AUTO: 8.6 % — SIGNIFICANT CHANGE UP (ref 2–14)
NEUTROPHILS # BLD AUTO: 2.75 K/UL — SIGNIFICANT CHANGE UP (ref 1.8–7.4)
NEUTROPHILS NFR BLD AUTO: 51 % — SIGNIFICANT CHANGE UP (ref 43–77)
NRBC # BLD: 0 /100 WBCS — SIGNIFICANT CHANGE UP (ref 0–0)
PLATELET # BLD AUTO: 201 K/UL — SIGNIFICANT CHANGE UP (ref 150–400)
RBC # BLD: 4.51 M/UL — SIGNIFICANT CHANGE UP (ref 4.2–5.8)
RBC # FLD: 13.4 % — SIGNIFICANT CHANGE UP (ref 10.3–14.5)
WBC # BLD: 5.38 K/UL — SIGNIFICANT CHANGE UP (ref 3.8–10.5)
WBC # FLD AUTO: 5.38 K/UL — SIGNIFICANT CHANGE UP (ref 3.8–10.5)

## 2022-03-14 PROCEDURE — 36600 WITHDRAWAL OF ARTERIAL BLOOD: CPT | Mod: 59

## 2022-03-14 PROCEDURE — 94726 PLETHYSMOGRAPHY LUNG VOLUMES: CPT

## 2022-03-14 PROCEDURE — 94729 DIFFUSING CAPACITY: CPT

## 2022-03-14 PROCEDURE — 82803 BLOOD GASES ANY COMBINATION: CPT

## 2022-03-14 PROCEDURE — 94010 BREATHING CAPACITY TEST: CPT

## 2022-03-15 ENCOUNTER — APPOINTMENT (OUTPATIENT)
Dept: NUCLEAR MEDICINE | Facility: HOSPITAL | Age: 57
End: 2022-03-15

## 2022-03-15 ENCOUNTER — OUTPATIENT (OUTPATIENT)
Dept: OUTPATIENT SERVICES | Facility: HOSPITAL | Age: 57
LOS: 1 days | End: 2022-03-15
Payer: COMMERCIAL

## 2022-03-15 ENCOUNTER — APPOINTMENT (OUTPATIENT)
Dept: CV DIAGNOSITCS | Facility: HOSPITAL | Age: 57
End: 2022-03-15

## 2022-03-15 ENCOUNTER — OUTPATIENT (OUTPATIENT)
Dept: OUTPATIENT SERVICES | Facility: HOSPITAL | Age: 57
LOS: 1 days | End: 2022-03-15
Payer: MEDICARE

## 2022-03-15 DIAGNOSIS — Z98.890 OTHER SPECIFIED POSTPROCEDURAL STATES: Chronic | ICD-10-CM

## 2022-03-15 DIAGNOSIS — Z94.84 STEM CELLS TRANSPLANT STATUS: Chronic | ICD-10-CM

## 2022-03-15 DIAGNOSIS — Z95.818 PRESENCE OF OTHER CARDIAC IMPLANTS AND GRAFTS: Chronic | ICD-10-CM

## 2022-03-15 DIAGNOSIS — I25.10 ATHEROSCLEROTIC HEART DISEASE OF NATIVE CORONARY ARTERY WITHOUT ANGINA PECTORIS: ICD-10-CM

## 2022-03-15 DIAGNOSIS — Z01.818 ENCOUNTER FOR OTHER PREPROCEDURAL EXAMINATION: ICD-10-CM

## 2022-03-15 PROCEDURE — 78472 GATED HEART PLANAR SINGLE: CPT | Mod: 26

## 2022-03-15 PROCEDURE — 70220 X-RAY EXAM OF SINUSES: CPT

## 2022-03-15 PROCEDURE — 93306 TTE W/DOPPLER COMPLETE: CPT | Mod: 26

## 2022-03-15 PROCEDURE — A9560: CPT

## 2022-03-15 PROCEDURE — 70220 X-RAY EXAM OF SINUSES: CPT | Mod: 26

## 2022-03-15 PROCEDURE — 78472 GATED HEART PLANAR SINGLE: CPT

## 2022-03-15 PROCEDURE — 93306 TTE W/DOPPLER COMPLETE: CPT

## 2022-03-16 ENCOUNTER — NON-APPOINTMENT (OUTPATIENT)
Age: 57
End: 2022-03-16

## 2022-03-17 ENCOUNTER — APPOINTMENT (OUTPATIENT)
Dept: RADIOLOGY | Facility: HOSPITAL | Age: 57
End: 2022-03-17
Payer: MEDICARE

## 2022-03-17 ENCOUNTER — OUTPATIENT (OUTPATIENT)
Dept: OUTPATIENT SERVICES | Facility: HOSPITAL | Age: 57
LOS: 1 days | End: 2022-03-17
Payer: COMMERCIAL

## 2022-03-17 ENCOUNTER — RESULT REVIEW (OUTPATIENT)
Age: 57
End: 2022-03-17

## 2022-03-17 DIAGNOSIS — Z00.00 ENCOUNTER FOR GENERAL ADULT MEDICAL EXAMINATION WITHOUT ABNORMAL FINDINGS: ICD-10-CM

## 2022-03-17 DIAGNOSIS — Z01.818 ENCOUNTER FOR OTHER PREPROCEDURAL EXAMINATION: ICD-10-CM

## 2022-03-17 DIAGNOSIS — Z95.818 PRESENCE OF OTHER CARDIAC IMPLANTS AND GRAFTS: Chronic | ICD-10-CM

## 2022-03-17 DIAGNOSIS — Z98.890 OTHER SPECIFIED POSTPROCEDURAL STATES: Chronic | ICD-10-CM

## 2022-03-17 DIAGNOSIS — Z94.84 STEM CELLS TRANSPLANT STATUS: Chronic | ICD-10-CM

## 2022-03-17 LAB
APPEARANCE CSF: CLEAR — SIGNIFICANT CHANGE UP
COLOR CSF: SIGNIFICANT CHANGE UP
GLUCOSE CSF-MCNC: 73 MG/DL — HIGH (ref 40–70)
LYMPHOCYTES # CSF: 71 % — SIGNIFICANT CHANGE UP (ref 40–80)
MONOS+MACROS NFR CSF: 29 % — SIGNIFICANT CHANGE UP (ref 15–45)
NEUTROPHILS # CSF: 0 % — SIGNIFICANT CHANGE UP (ref 0–6)
NRBC NFR CSF: 1 /UL — SIGNIFICANT CHANGE UP (ref 0–5)
PROT CSF-MCNC: 44 MG/DL — SIGNIFICANT CHANGE UP (ref 15–45)
RBC # CSF: 96 /UL — HIGH (ref 0–0)
TUBE TYPE: SIGNIFICANT CHANGE UP

## 2022-03-17 PROCEDURE — 88108 CYTOPATH CONCENTRATE TECH: CPT | Mod: 26,59

## 2022-03-17 PROCEDURE — 62328 DX LMBR SPI PNXR W/FLUOR/CT: CPT

## 2022-03-17 PROCEDURE — 88189 FLOWCYTOMETRY/READ 16 & >: CPT

## 2022-03-18 ENCOUNTER — RESULT REVIEW (OUTPATIENT)
Age: 57
End: 2022-03-18

## 2022-03-18 ENCOUNTER — APPOINTMENT (OUTPATIENT)
Dept: HEMATOLOGY ONCOLOGY | Facility: CLINIC | Age: 57
End: 2022-03-18

## 2022-03-18 LAB
APTT BLD: 27.7 SEC
BASOPHILS # BLD AUTO: 0.01 K/UL — SIGNIFICANT CHANGE UP (ref 0–0.2)
BASOPHILS NFR BLD AUTO: 0.2 % — SIGNIFICANT CHANGE UP (ref 0–2)
EOSINOPHIL # BLD AUTO: 0.11 K/UL — SIGNIFICANT CHANGE UP (ref 0–0.5)
EOSINOPHIL NFR BLD AUTO: 2.3 % — SIGNIFICANT CHANGE UP (ref 0–6)
HCT VFR BLD CALC: 42.7 % — SIGNIFICANT CHANGE UP (ref 39–50)
HGB BLD-MCNC: 13.7 G/DL — SIGNIFICANT CHANGE UP (ref 13–17)
IMM GRANULOCYTES NFR BLD AUTO: 0.4 % — SIGNIFICANT CHANGE UP (ref 0–1.5)
INR PPP: 1.05 RATIO
LYMPHOCYTES # BLD AUTO: 1.46 K/UL — SIGNIFICANT CHANGE UP (ref 1–3.3)
LYMPHOCYTES # BLD AUTO: 30.7 % — SIGNIFICANT CHANGE UP (ref 13–44)
MCHC RBC-ENTMCNC: 31.4 PG — SIGNIFICANT CHANGE UP (ref 27–34)
MCHC RBC-ENTMCNC: 32.1 G/DL — SIGNIFICANT CHANGE UP (ref 32–36)
MCV RBC AUTO: 97.7 FL — SIGNIFICANT CHANGE UP (ref 80–100)
MONOCYTES # BLD AUTO: 0.5 K/UL — SIGNIFICANT CHANGE UP (ref 0–0.9)
MONOCYTES NFR BLD AUTO: 10.5 % — SIGNIFICANT CHANGE UP (ref 2–14)
NEUTROPHILS # BLD AUTO: 2.65 K/UL — SIGNIFICANT CHANGE UP (ref 1.8–7.4)
NEUTROPHILS NFR BLD AUTO: 55.9 % — SIGNIFICANT CHANGE UP (ref 43–77)
NRBC # BLD: 0 /100 WBCS — SIGNIFICANT CHANGE UP (ref 0–0)
PLATELET # BLD AUTO: 185 K/UL — SIGNIFICANT CHANGE UP (ref 150–400)
PT BLD: 12.4 SEC
RBC # BLD: 4.37 M/UL — SIGNIFICANT CHANGE UP (ref 4.2–5.8)
RBC # FLD: 13.2 % — SIGNIFICANT CHANGE UP (ref 10.3–14.5)
WBC # BLD: 4.75 K/UL — SIGNIFICANT CHANGE UP (ref 3.8–10.5)
WBC # FLD AUTO: 4.75 K/UL — SIGNIFICANT CHANGE UP (ref 3.8–10.5)

## 2022-03-21 ENCOUNTER — OUTPATIENT (OUTPATIENT)
Dept: OUTPATIENT SERVICES | Facility: HOSPITAL | Age: 57
LOS: 1 days | End: 2022-03-21
Payer: COMMERCIAL

## 2022-03-21 ENCOUNTER — RESULT REVIEW (OUTPATIENT)
Age: 57
End: 2022-03-21

## 2022-03-21 VITALS
RESPIRATION RATE: 18 BRPM | HEART RATE: 61 BPM | DIASTOLIC BLOOD PRESSURE: 87 MMHG | OXYGEN SATURATION: 97 % | SYSTOLIC BLOOD PRESSURE: 136 MMHG

## 2022-03-21 VITALS
RESPIRATION RATE: 18 BRPM | HEART RATE: 69 BPM | WEIGHT: 216.93 LBS | SYSTOLIC BLOOD PRESSURE: 127 MMHG | TEMPERATURE: 98 F | OXYGEN SATURATION: 98 % | DIASTOLIC BLOOD PRESSURE: 78 MMHG | HEIGHT: 71 IN

## 2022-03-21 DIAGNOSIS — Z98.890 OTHER SPECIFIED POSTPROCEDURAL STATES: Chronic | ICD-10-CM

## 2022-03-21 DIAGNOSIS — Z94.84 STEM CELLS TRANSPLANT STATUS: Chronic | ICD-10-CM

## 2022-03-21 DIAGNOSIS — C83.10 MANTLE CELL LYMPHOMA, UNSPECIFIED SITE: ICD-10-CM

## 2022-03-21 DIAGNOSIS — Z95.818 PRESENCE OF OTHER CARDIAC IMPLANTS AND GRAFTS: Chronic | ICD-10-CM

## 2022-03-21 DIAGNOSIS — C83.12: ICD-10-CM

## 2022-03-21 LAB
CA-I BLD-SCNC: 1.16 MMOL/L — SIGNIFICANT CHANGE UP (ref 1.15–1.33)
CD3 CELLS # BLD: 1212 /UL
CD3 CELLS # SPEC: 1743 /UL — SIGNIFICANT CHANGE UP
EOSINOPHIL NFR BLD AUTO: 2 — SIGNIFICANT CHANGE UP
HCT VFR BLD CALC: 40.7 % — SIGNIFICANT CHANGE UP (ref 39–50)
HGB BLD-MCNC: 13 G/DL — SIGNIFICANT CHANGE UP (ref 13–17)
LYMPHOCYTES # BLD AUTO: 31 % — SIGNIFICANT CHANGE UP (ref 13–44)
MCHC RBC-ENTMCNC: 31.6 PG — SIGNIFICANT CHANGE UP (ref 27–34)
MCHC RBC-ENTMCNC: 31.9 GM/DL — LOW (ref 32–36)
MCV RBC AUTO: 99 FL — SIGNIFICANT CHANGE UP (ref 80–100)
MONOCYTES NFR BLD AUTO: 14 % — SIGNIFICANT CHANGE UP (ref 2–14)
NEUTROPHILS NFR BLD AUTO: 53 % — SIGNIFICANT CHANGE UP (ref 43–77)
NRBC # BLD: 0 /100 WBCS — SIGNIFICANT CHANGE UP (ref 0–0)
PLAT MORPH BLD: NORMAL — SIGNIFICANT CHANGE UP
PLATELET # BLD AUTO: 187 K/UL — SIGNIFICANT CHANGE UP (ref 150–400)
RBC # BLD: 4.11 M/UL — LOW (ref 4.2–5.8)
RBC # FLD: 13.2 % — SIGNIFICANT CHANGE UP (ref 10.3–14.5)
RBC BLD AUTO: SIGNIFICANT CHANGE UP
SARS-COV-2 N GENE NPH QL NAA+PROBE: NOT DETECTED
WBC # BLD: 5.96 K/UL — SIGNIFICANT CHANGE UP (ref 3.8–10.5)
WBC # FLD AUTO: 5.96 K/UL — SIGNIFICANT CHANGE UP (ref 3.8–10.5)

## 2022-03-21 PROCEDURE — 38206 HARVEST AUTO STEM CELLS: CPT

## 2022-03-21 PROCEDURE — 86367 STEM CELLS TOTAL COUNT: CPT

## 2022-03-21 PROCEDURE — 82330 ASSAY OF CALCIUM: CPT

## 2022-03-21 PROCEDURE — 77001 FLUOROGUIDE FOR VEIN DEVICE: CPT | Mod: 26

## 2022-03-21 PROCEDURE — 77001 FLUOROGUIDE FOR VEIN DEVICE: CPT

## 2022-03-21 PROCEDURE — 99214 OFFICE O/P EST MOD 30 MIN: CPT | Mod: 25

## 2022-03-21 PROCEDURE — C1769: CPT

## 2022-03-21 PROCEDURE — 36556 INSERT NON-TUNNEL CV CATH: CPT

## 2022-03-21 PROCEDURE — 76937 US GUIDE VASCULAR ACCESS: CPT | Mod: 26

## 2022-03-21 PROCEDURE — 85007 BL SMEAR W/DIFF WBC COUNT: CPT

## 2022-03-21 PROCEDURE — 86901 BLOOD TYPING SEROLOGIC RH(D): CPT

## 2022-03-21 PROCEDURE — 86850 RBC ANTIBODY SCREEN: CPT

## 2022-03-21 PROCEDURE — C1894: CPT

## 2022-03-21 PROCEDURE — 76937 US GUIDE VASCULAR ACCESS: CPT

## 2022-03-21 PROCEDURE — 86900 BLOOD TYPING SEROLOGIC ABO: CPT

## 2022-03-21 PROCEDURE — C1752: CPT

## 2022-03-21 PROCEDURE — 85027 COMPLETE CBC AUTOMATED: CPT

## 2022-03-21 RX ORDER — RIVAROXABAN 15 MG-20MG
1 KIT ORAL
Qty: 0 | Refills: 0 | DISCHARGE

## 2022-03-21 NOTE — CHART NOTE - NSCHARTNOTEFT_GEN_A_CORE
Patient was lying flat and IJ HD catheter was removed under standard fashion. Direct pressure was applied to stop bleeding x 10 minutes and tegaderm dressing was applied after hemostasis achieved. Patient tolerated procedure well. Vitals stable during the procedure. Patient's RN was instructed to check dressing frequently. No complications.

## 2022-03-21 NOTE — ASU DISCHARGE PLAN (ADULT/PEDIATRIC) - NS MD DC FALL RISK RISK
For information on Fall & Injury Prevention, visit: [email or web address] OR  [email or web address] OR  https://www.cdc.gov/steadi/patient.html

## 2022-03-21 NOTE — ASU DISCHARGE PLAN (ADULT/PEDIATRIC) - ASU DC SPECIAL INSTRUCTIONSFT
Non-Tunneled Central Venous Catheter Placement    Discharge Instructions  - You have had a non-tunneled central venous catheter placed in your neck.  - The catheter is ready for use.  - You may shower as long as the catheter and dressing remains dry.  -  No soaking or swimming until the catheter is removed or when the site is completely healed.  - Keep the area covered and dry for as long as the catheter remains in. It may be removed and changed by a nurse as needed.  - Do not perform any heavy lifting or put tension on the area for the next week or until the site is healed.  - You may resume your normal diet.  - You may resume your normal medications.  - It is normal to experience some pain over the site for the next few days. You may take Tylenol for that pain. Do not take more frequently than every 6 hours and do not exceed more than 3000mg of Tylenol in a 24 hour period.    Notify your primary physician and/or Interventional Radiology IMMEDIATELY if you experience any of the following       - Fever of 101F or 38C       - Chills or Rigors/ Shakes       - Swelling and/or Redness in the area around the port       - Worsening Pain       - Blood soaked bandages or worsening bleeding       - Lightheadedness and/or dizziness upon standing       - Chest Pain/ Tightness       - Shortness of Breath       - Difficulty walking    If you have a problem that you believe requires IMMEDIATE attention, please go to your NEAREST Emergency Room. If you believe your problem can safely wait until you speak to a physician, please call Interventional Radiology for any concerns.      Central venous catheter Removal    Discharge Instructions  - You have had a central venous catheter removed.  - Sit upright x2hrs to prevent bleeding  - You may shower in 48 hours. No soaking or swimming until the site is completely healed.  - Keep the area covered and dry for the next 48 hours.  - Do not perform any heavy lifting for the next few days or until the site is healed.  - You may resume your normal diet.  - It is normal to experience some pain over the site for the next few days. You may take Tylenol for that pain. Do not take more frequently than every 6 hours and do not exceed more than 3000mg of Tylenol in a 24 hour period.    Notify your primary physician and/or Interventional Radiology IMMEDIATELY if you experience any of the following       - Fever of 101F or 38C       - Chills or Rigors/ Shakes       - Swelling and/or Redness in the area around the catheter removal site       - Worsening Pain       - Blood soaked bandages or worsening bleeding       - Lightheadedness and/or dizziness upon standing       - Chest Pain/ Tightness       - Shortness of Breath       - Difficulty walking    If you have a problem that you believe requires IMMEDIATE attention, please go to your NEAREST Emergency Room. If you believe your problem can safely wait until you speak to a physician, please call Interventional Radiology for any concerns.    Please feel free to contact us at [phone] if any problems arise. After 6PM, Monday through Friday, on weekends and on holidays, please call [phone] and ask for the radiology resident on call to be paged.

## 2022-03-21 NOTE — PROGRESS NOTE ADULT - SUBJECTIVE AND OBJECTIVE BOX
Interventional Radiology    HPI: 56y Male with NH lymphoma planned for stem cell harvesting who presents to IR for shiley placement.     Allergies:   Medications (Abx/Cardiac/Anticoagulation/Blood Products)      Data:  180.3  98.4  T(C): 36.9  HR: 69  BP: 127/78  RR: 18  SpO2: 98%    Exam  General: No acute distress, A&Ox3    -WBC 4.75 / HgB 13.7 / Hct 42.7 / Plt 185      Plan: 56y Male presents for shiley placement.   -Risks/Benefits/alternatives explained with the patient and/or healthcare proxy and witnessed informed consent obtained.

## 2022-03-21 NOTE — ASU DISCHARGE PLAN (ADULT/PEDIATRIC) - NURSING INSTRUCTIONS
Please feel free to contact us at [phone] if any problems arise. After 6PM, Monday through Friday, on weekends and on holidays, please call [phone] and ask for the radiology resident on call to be paged.

## 2022-03-23 ENCOUNTER — RESULT REVIEW (OUTPATIENT)
Age: 57
End: 2022-03-23

## 2022-03-23 ENCOUNTER — APPOINTMENT (OUTPATIENT)
Dept: HEMATOLOGY ONCOLOGY | Facility: CLINIC | Age: 57
End: 2022-03-23
Payer: MEDICARE

## 2022-03-23 VITALS
DIASTOLIC BLOOD PRESSURE: 79 MMHG | RESPIRATION RATE: 16 BRPM | SYSTOLIC BLOOD PRESSURE: 118 MMHG | BODY MASS INDEX: 31.1 KG/M2 | TEMPERATURE: 97.1 F | OXYGEN SATURATION: 99 % | HEART RATE: 69 BPM | WEIGHT: 222.87 LBS

## 2022-03-23 LAB
ALBUMIN SERPL ELPH-MCNC: 4.7 G/DL
ALP BLD-CCNC: 87 U/L
ALT SERPL-CCNC: 18 U/L
ANION GAP SERPL CALC-SCNC: 10 MMOL/L
AST SERPL-CCNC: 22 U/L
BASOPHILS # BLD AUTO: 0.01 K/UL — SIGNIFICANT CHANGE UP (ref 0–0.2)
BASOPHILS NFR BLD AUTO: 0.2 % — SIGNIFICANT CHANGE UP (ref 0–2)
BILIRUB SERPL-MCNC: 0.3 MG/DL
BUN SERPL-MCNC: 20 MG/DL
CALCIUM SERPL-MCNC: 9.1 MG/DL
CHLORIDE SERPL-SCNC: 106 MMOL/L
CO2 SERPL-SCNC: 28 MMOL/L
CREAT SERPL-MCNC: 0.97 MG/DL
EGFR: 91 ML/MIN/1.73M2
EOSINOPHIL # BLD AUTO: 0.08 K/UL — SIGNIFICANT CHANGE UP (ref 0–0.5)
EOSINOPHIL NFR BLD AUTO: 1.8 % — SIGNIFICANT CHANGE UP (ref 0–6)
GLUCOSE SERPL-MCNC: 99 MG/DL
HCT VFR BLD CALC: 42.2 % — SIGNIFICANT CHANGE UP (ref 39–50)
HGB BLD-MCNC: 13.8 G/DL — SIGNIFICANT CHANGE UP (ref 13–17)
IMM GRANULOCYTES NFR BLD AUTO: 0.4 % — SIGNIFICANT CHANGE UP (ref 0–1.5)
LDH SERPL-CCNC: 186 U/L
LYMPHOCYTES # BLD AUTO: 1.69 K/UL — SIGNIFICANT CHANGE UP (ref 1–3.3)
LYMPHOCYTES # BLD AUTO: 37.1 % — SIGNIFICANT CHANGE UP (ref 13–44)
MAGNESIUM SERPL-MCNC: 2.1 MG/DL
MCHC RBC-ENTMCNC: 31.8 PG — SIGNIFICANT CHANGE UP (ref 27–34)
MCHC RBC-ENTMCNC: 32.7 G/DL — SIGNIFICANT CHANGE UP (ref 32–36)
MCV RBC AUTO: 97.2 FL — SIGNIFICANT CHANGE UP (ref 80–100)
MONOCYTES # BLD AUTO: 0.46 K/UL — SIGNIFICANT CHANGE UP (ref 0–0.9)
MONOCYTES NFR BLD AUTO: 10.1 % — SIGNIFICANT CHANGE UP (ref 2–14)
NEUTROPHILS # BLD AUTO: 2.3 K/UL — SIGNIFICANT CHANGE UP (ref 1.8–7.4)
NEUTROPHILS NFR BLD AUTO: 50.4 % — SIGNIFICANT CHANGE UP (ref 43–77)
NRBC # BLD: 0 /100 WBCS — SIGNIFICANT CHANGE UP (ref 0–0)
PLATELET # BLD AUTO: 140 K/UL — LOW (ref 150–400)
POTASSIUM SERPL-SCNC: 5 MMOL/L
PROT SERPL-MCNC: 6.5 G/DL
RBC # BLD: 4.34 M/UL — SIGNIFICANT CHANGE UP (ref 4.2–5.8)
RBC # FLD: 13.2 % — SIGNIFICANT CHANGE UP (ref 10.3–14.5)
SODIUM SERPL-SCNC: 144 MMOL/L
TM INTERPRETATION: SIGNIFICANT CHANGE UP
WBC # BLD: 4.56 K/UL — SIGNIFICANT CHANGE UP (ref 3.8–10.5)
WBC # FLD AUTO: 4.56 K/UL — SIGNIFICANT CHANGE UP (ref 3.8–10.5)

## 2022-03-23 PROCEDURE — 99215 OFFICE O/P EST HI 40 MIN: CPT

## 2022-03-23 NOTE — HISTORY OF PRESENT ILLNESS
[de-identified] : Mr. Chavez is a 53 year old male with a history of MCL, initially diagnosed 10/2016. On 11/15/2016 he was seen by Dr. Bravo at [facility], after noticing a left inguinal mass for which he sought medical attention. CT scans (3/21/2016) revealed left-sided pelvic and left inguinal adenopathy. A 0.7 cm indeterminate lesion in the right iliac bone was also noted. A left inguinal lymph node biopsy was performed 9/24/2016 which revealed morphologic and immunophenotypic findings consistent with mantle cell lymphoma. He is status post 4 cycles of R-CHOP, and two cycles of RICE consolidation. A bone marrow biopsy on 2/10/17 showed normocellular bone marrow with trilineage hematopoiesis and maturation. He was admitted 3/27/17 for an autologous peripheral blood stem cell transplant with CBV  regimen. \par  \par Upon admission, a TLC was placed in IR. Mr. Chavez received IV hydration, nutritional support, pain management, antinausea medication, antidiarrheals, antibiotic, antiviral, PCP, antifungal and GI prophylaxis. Labs were monitored daily. Mr. Chavez received transfusional support and electrolyte repletion as needed. When his ANC dropped below 1000, he was started on prophylactic ciprofloxacin. When he became febrile, his antibiotic coverage was broadened as appropriate. \par  \par On 4/5/17, Mr. Chavez received 314ml of thawed, pooled, washed, mobilized, plasma reduced, HPC apheresis over approximately 1 hour. He tolerated the infusion well with no adverse side effects noted. Engraftment was noted on 4/16/17, and the Zarxio and antibiotics were discontinued. \par  \par Mr. Chavez's transplant course was complicated by recurrent headaches, oral and GI mucositis, dizziness, and chest pain resulting in a RRT on 4/10/17. The chest pain was self limiting. A second RRT was called on 4/16/17, for palpitations and dizziness. Mr. Chavez was found to be in atrial flutter with RVR. He was transferred to telemetry and followed by cardiology. \par A-fib/a-flutter was resistant to single rate agent, and while in arrhythmia, pt would become symptomatic, hypotensive. Ablation was was preformed, however was unsuccessful. Pt continued to have frequent changes in rhythm. As a-fib was usually preceded by PAC, and mediport tip was in r atrium, concern was this may have been involved. Mediport was removed but patient continued to have runs of non conducted APCs. Patient was followed by EP during this admission who ruled out any need for PPM insertion. Patient was started on Cardizem and Toprol which he tolerated well. He did have episodes of bradycardia during his sleep with episodes of Atrial flutter with RVR as well. However, he remained asymptomatic. \par Pt was d/c 5/5/2017 to home in stable condition with instructions to follow up with hematology and EP. [de-identified] : Presenting today for a follow up appointment. He has completed  maintenance Rituxan. Last Rituxan was received in jan... PENELOPE....Patient reports continued fatigue. Recent CT/PET scan PENELOPE.  Patient also offers that he had the shingles and was treated with valtrex..some residual discomfort...He continues to be followed by cardiology; his cardiologist increased the Diltiazem dosage to 180mg q daily, however patient reported feeling increasingly fatigued and returned to 120mg q daily and now notes less fatigue. He is in eval for a cardiac procedure.. His cardiologist was waiting for the rituxan to be completed and covid to pass..He is compliant with all medications. Denies mouth sores, eye dryness, nausea, vomiting, diarrhea. No LE edema, CP or SOB. \par \par On 10/18/21, patient presents for a follow up visit. Overall patient is well. States has generalized pruritus and recently was seen by dermatologist. Has been applying triamcinolone cream with no relief. States recently felt a "lump" of the right groin. Given the patients past medical history patient is very concerned. Denies fever, chills, nausea, vomiting, diarrhea, rash, mouth sores, dysuria or any signs of active bleeding. Denies SOB, chest pain or B/L LE edema. Remains compliant with medications prescribed. \par \par On 11/30//21, patient presents with spouse for a follow up visit. Overall patient feels fatigued. Had a lymph node biopsy on 11/12/21 and results are c/w relapsed MCL.. Denies fever, chills, nausea, vomiting, diarrhea, rash, mouth sores, dysuria or any signs of active bleeding. Denies SOB, chest pain or B/L LE edema. Remains compliant with medications prescribed. Here to discuss further therapy..\par \par On 1/25/22, patient presents for a follow up visit.  On calquence and not  tolerating well. Was in ER with severe epigastric pain...now on protonix...Complains of acid reflux and takes tums prn. Complains of headaches every other day. Denies fever, chills, nausea, vomiting, diarrhea, rash, mouth sores, dysuria or any signs of active bleeding. Denies SOB, chest pain or B/L LE edema. in wheelchair today\par \par 2/18/22: Patient is here for a follow up visit.He states that he still has epigastric pain with Calquence.He takes Pantoprazole as prescribed.Denies fever,chills, nausea, vomiting, diarrhea,rash, mouth sores,SOB,chest pain or any signs of active bleeding.He has an appointment with PCP tomorrow to get a referral for an endoscopy by GI.\par \par 3/23/22:Patient is seen for a follow up visit s/p  CAR-T collection.He is accompanied by his wife.Denies fever, chills, nausea, vomiting, diarrhea,mouth sores, skin rash,SOB,chest pain or any signs of active bleeding. He states that his occasional epigastric pain from Calquence is improved off drug....He states that he went to ER on 2/28/22 secondary to epigastric pain and was advised to follow up with GI. He is off calquence and blood thinners...

## 2022-03-23 NOTE — ASSESSMENT
[FreeTextEntry1] : 55 y/o gentleman with hx of mantle cell lymphoma initially dx'ed Oct 2016 s/p RCHOP chemotherapy X 4 cycles and RICE X 2 cycles with stem cell collected off of the 2nd RICE..he is now s/p CBV-conditioned autologous stem cell transplant on 4/5/17. Hospital course complicated by a-fib/a-flutter s/p ablation and now stable on Cardizem and Toprol. Also now on Xarelto. Port also removed. He demonstrated engraftment on 4/16 and d/c'ed in stable condition on 5/5.\par \par 1) Mantle Cell lymphoma\par S/P AUTO PBSCT 4/5/17\par Rituxan maintenance complete (Short Rituxan, once a week for 2 weeks). Last Rituxan treatment was  on 1/22/20  (Short Rituxan, once a week for 2 weeks).\par CT scans completed 7/1/21: PENELOPE\par PET CT 10/28/21 Completed. Concern for relapse disease. Lymph node biopsy completed on 11/12/21.  Results reviewed..c/w MCL.... \par Discussed being placed on calquence vs Silas rituxan. Patient information printed and given to patient/spouse. Side effects explained including serious side effect of AFIB. Patient has a known cardiac history. Message left for pt's cardiologist..\par Long term goal would be Car T....tecartus... This is why I would like to avoid Silas..Literature and consents provided for cart..discussed risks, benefits, alternatives, rationale and logistics..3 week hospital stay discussed...tlc placement...lymphodepleting prep, monitoring for CRS and neurotox, prolonged cytopenias and hypogamma also discussed\par \par Continue calquence 100 mg capsule- take 1 capsule twice a day...worsening GI sx on protonix...instructed on how to administer\par \par 3/11/22: Will stop Calquence on 3/14/22 ;1 week prior to CAR-T collection...resume 3/23..stop 4/11 in prep for admit 4/14\par \par 2) Heme\par Counts stable. No indication for transfusion today. Discussed with patient. Will repeat BM Bx at some point..as well as assess for CNS disease\par \par F/U PET CT  ordered to be done in March 31;\par \par 3) ID\par Not on prophylaxis\par \par 4) GI\par Tums prn for acid reflux\par Pantoprazole 40mg daily;STOPPED Pantoprazole and switched to Pepcid\par \par 5) Afib\par Continue medications as prescribed by Cardiologist\par \par 6) Other\par Headaches- tylenol prn\par Continue medications as prescribed\par Maintain f/u with cardiology and GI \par Maintain f/u with his regular internist. \par Well care and cancer screening stressed\par Patient has been advised to contact clinic if he has any concerns.\par Questions and concerns addressed. Reassurance provided. \par \par Completed Post transplant vaccines:\par 12 month vaccines completed in April 2018. \par 14 month vaccines completed July 2018. \par  24 month re-vaccinations completed in April 2019. \par \par 7) Plan\par CAR-T collection completed on 3/21/22...manufacture in progress..possible admit 4/14...hold xarelto and calequence as og 4/11..\par wallet card and FACT sheet provided.\par pt advised no driving for 8 weeks post \par pt to bring consents 3/31 or 4/1\par Pretesting scheduled and patient is aware of the test dates and times..results reviewed\par STOPped  Calquence on 3/14/22 and Xarelto on 3/18/22\par .\par \par \par \par

## 2022-03-23 NOTE — PHYSICAL EXAM
[Ambulatory and capable of all self care but unable to carry out any work activities] : Status 2- Ambulatory and capable of all self care but unable to carry out any work activities. Up and about more than 50% of waking hours [Normal] : affect appropriate [de-identified] : Right groin lymphadenopathy decreasing in size [de-identified] : healed scar from port removal

## 2022-03-23 NOTE — REVIEW OF SYSTEMS
[Negative] : ENT [Fatigue] : no fatigue [Dysphagia] : no dysphagia [Loss of Hearing] : no loss of hearing [Nosebleeds] : no nosebleeds [Hoarseness] : no hoarseness [Odynophagia] : no odynophagia [Mucosal Pain] : no mucosal pain [Abdominal Pain] : no abdominal pain [Vomiting] : no vomiting [Constipation] : no constipation [Diarrhea] : no diarrhea [Skin Rash] : no skin rash [Skin Wound] : no skin wound [Easy Bleeding] : no tendency for easy bleeding [Easy Bruising] : no tendency for easy bruising [FreeTextEntry7] : Acid reflux,Epigastric pain resolved [de-identified] : Right groin lymphadenopathy decreasing in size

## 2022-03-24 DIAGNOSIS — C85.90 NON-HODGKIN LYMPHOMA, UNSPECIFIED, UNSPECIFIED SITE: ICD-10-CM

## 2022-03-24 DIAGNOSIS — Z49.01 ENCOUNTER FOR FITTING AND ADJUSTMENT OF EXTRACORPOREAL DIALYSIS CATHETER: ICD-10-CM

## 2022-03-28 ENCOUNTER — NON-APPOINTMENT (OUTPATIENT)
Age: 57
End: 2022-03-28

## 2022-03-31 ENCOUNTER — APPOINTMENT (OUTPATIENT)
Dept: NUCLEAR MEDICINE | Facility: CLINIC | Age: 57
End: 2022-03-31
Payer: MEDICARE

## 2022-03-31 ENCOUNTER — NON-APPOINTMENT (OUTPATIENT)
Age: 57
End: 2022-03-31

## 2022-03-31 ENCOUNTER — OUTPATIENT (OUTPATIENT)
Dept: OUTPATIENT SERVICES | Facility: HOSPITAL | Age: 57
LOS: 1 days | End: 2022-03-31
Payer: COMMERCIAL

## 2022-03-31 DIAGNOSIS — Z94.84 STEM CELLS TRANSPLANT STATUS: Chronic | ICD-10-CM

## 2022-03-31 DIAGNOSIS — Z98.890 OTHER SPECIFIED POSTPROCEDURAL STATES: Chronic | ICD-10-CM

## 2022-03-31 DIAGNOSIS — Z95.818 PRESENCE OF OTHER CARDIAC IMPLANTS AND GRAFTS: Chronic | ICD-10-CM

## 2022-03-31 DIAGNOSIS — Z94.84 STEM CELLS TRANSPLANT STATUS: ICD-10-CM

## 2022-03-31 DIAGNOSIS — C83.10 MANTLE CELL LYMPHOMA, UNSPECIFIED SITE: ICD-10-CM

## 2022-03-31 PROCEDURE — 78815 PET IMAGE W/CT SKULL-THIGH: CPT

## 2022-03-31 PROCEDURE — A9552: CPT

## 2022-03-31 PROCEDURE — 78815 PET IMAGE W/CT SKULL-THIGH: CPT | Mod: 26,PS

## 2022-04-01 ENCOUNTER — APPOINTMENT (OUTPATIENT)
Dept: CARDIOLOGY | Facility: CLINIC | Age: 57
End: 2022-04-01
Payer: MEDICARE

## 2022-04-01 PROCEDURE — G2066: CPT

## 2022-04-01 PROCEDURE — 93298 REM INTERROG DEV EVAL SCRMS: CPT

## 2022-04-06 ENCOUNTER — APPOINTMENT (OUTPATIENT)
Dept: CARDIOLOGY | Facility: CLINIC | Age: 57
End: 2022-04-06
Payer: MEDICARE

## 2022-04-06 ENCOUNTER — NON-APPOINTMENT (OUTPATIENT)
Age: 57
End: 2022-04-06

## 2022-04-06 VITALS
RESPIRATION RATE: 14 BRPM | HEART RATE: 79 BPM | DIASTOLIC BLOOD PRESSURE: 78 MMHG | HEIGHT: 71 IN | TEMPERATURE: 98.5 F | BODY MASS INDEX: 30.52 KG/M2 | OXYGEN SATURATION: 97 % | SYSTOLIC BLOOD PRESSURE: 122 MMHG | WEIGHT: 218 LBS

## 2022-04-06 PROCEDURE — 93000 ELECTROCARDIOGRAM COMPLETE: CPT

## 2022-04-06 PROCEDURE — 99214 OFFICE O/P EST MOD 30 MIN: CPT

## 2022-04-06 NOTE — HISTORY OF PRESENT ILLNESS
[FreeTextEntry1] : 57 yo man, , father of two children, [place] speaking. 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 No radiotherapy. Still on medical treatment with Rituxan, next treatment in 1/2020.\par Was on remission for 4 years. Has recurrence of the disease. Started chemo oral again. Will undergo CART cell transplant on 4/14/2022 at [place]. \par A. Fibrillation/Flutter that was treated with ablation (Flutter ablation) (2017 and 20221) and now stable on Cardizem and Toprol. Had a ILR implanted in 3/2018, interrogation has shown that he still has AF episodes. His CHADS-VASC is 0 and therefore he is not treated with Xarelto any longer. Treated only with aspirin low dose and CCB. Has been under the care of Dr Prescott and had an ILR implanted that revealed several episodes of A. Fib with a 41% burden. Last ablation in Oct 2021) with Dr. Prescott. \par Has not been started on Xarelto yet. \par In April 2018 underwent a stress test that revealed small anterior wall reversible ischemia. \par Cardiac catheterization in May 2018 that revealed normal coronaries. Last echo was done 3/27/2018 that revealed normal EF (59%). \par On 3//18/2021 he had an echo that revealed a reduction in his global EF to 40-45%, mildly enlarged LA. No change. \par On 3/18/2011 revealed normal LV function 57%. No ischemia. . \par Today at the clinic for routine follow up. C/O general fatigue, generalized muscle pains and chest burning. Not effort-related. Occasional effort-related SOB, no orthopnea or PND. No change since his last visit in 2019. \par C/O erectile dysfunction. \par Received the COVID 19 vaccine\par Doesn't smoke and does not drink alcohol.\par Denies the use of illicit drugs.

## 2022-04-06 NOTE — DISCUSSION/SUMMARY
[FreeTextEntry1] : 55 yo man post A. Flutter ablation and now with PAF under the care of Dr Prescott.Underwent A. Fib ablation with good resolution.\par Has recurrence of the Mantle cell Lymphoma and will undergo CAR T cell therapy on 4/14/2022 at [facility].\par In the meantime will continue same medications\par Follow up in 6 months.

## 2022-04-06 NOTE — ASSESSMENT
[FreeTextEntry1] : ECG performed today at the office revealed NSR 77 bpm. Normal AQRS, QRS and QTc. \par

## 2022-04-06 NOTE — REVIEW OF SYSTEMS
[Headache] : headache [Feeling Fatigued] : feeling fatigued [Nocturia] : nocturia [Negative] : Heme/Lymph [SOB] : no shortness of breath [Dyspnea on exertion] : not dyspnea during exertion [Chest Discomfort] : no chest discomfort [Lower Ext Edema] : no extremity edema [Leg Claudication] : no intermittent leg claudication [Palpitations] : no palpitations [Orthopnea] : no orthopnea [PND] : no PND [Syncope] : no syncope

## 2022-04-07 ENCOUNTER — OUTPATIENT (OUTPATIENT)
Dept: OUTPATIENT SERVICES | Facility: HOSPITAL | Age: 57
LOS: 1 days | Discharge: HOME | End: 2022-04-07

## 2022-04-07 DIAGNOSIS — Z98.890 OTHER SPECIFIED POSTPROCEDURAL STATES: Chronic | ICD-10-CM

## 2022-04-07 DIAGNOSIS — Z94.84 STEM CELLS TRANSPLANT STATUS: Chronic | ICD-10-CM

## 2022-04-07 DIAGNOSIS — C83.10 MANTLE CELL LYMPHOMA, UNSPECIFIED SITE: ICD-10-CM

## 2022-04-07 DIAGNOSIS — Z95.818 PRESENCE OF OTHER CARDIAC IMPLANTS AND GRAFTS: Chronic | ICD-10-CM

## 2022-04-07 PROCEDURE — 84157 ASSAY OF PROTEIN OTHER: CPT

## 2022-04-07 PROCEDURE — 89051 BODY FLUID CELL COUNT: CPT

## 2022-04-07 PROCEDURE — 82945 GLUCOSE OTHER FLUID: CPT

## 2022-04-07 PROCEDURE — 88185 FLOWCYTOMETRY/TC ADD-ON: CPT

## 2022-04-07 PROCEDURE — 87205 SMEAR GRAM STAIN: CPT

## 2022-04-07 PROCEDURE — 62328 DX LMBR SPI PNXR W/FLUOR/CT: CPT

## 2022-04-11 ENCOUNTER — RESULT REVIEW (OUTPATIENT)
Age: 57
End: 2022-04-11

## 2022-04-11 ENCOUNTER — APPOINTMENT (OUTPATIENT)
Dept: HEMATOLOGY ONCOLOGY | Facility: CLINIC | Age: 57
End: 2022-04-11

## 2022-04-11 LAB
ALBUMIN SERPL ELPH-MCNC: 4.7 G/DL
ALP BLD-CCNC: 84 U/L
ALT SERPL-CCNC: 18 U/L
ANION GAP SERPL CALC-SCNC: 9 MMOL/L
APTT BLD: 29.8 SEC
AST SERPL-CCNC: 21 U/L
BASOPHILS # BLD AUTO: 0.02 K/UL — SIGNIFICANT CHANGE UP (ref 0–0.2)
BASOPHILS NFR BLD AUTO: 0.4 % — SIGNIFICANT CHANGE UP (ref 0–2)
BILIRUB SERPL-MCNC: 0.4 MG/DL
BUN SERPL-MCNC: 22 MG/DL
CALCIUM SERPL-MCNC: 8.6 MG/DL
CHLORIDE SERPL-SCNC: 106 MMOL/L
CO2 SERPL-SCNC: 28 MMOL/L
CREAT SERPL-MCNC: 1.03 MG/DL
EGFR: 85 ML/MIN/1.73M2
EOSINOPHIL # BLD AUTO: 0.13 K/UL — SIGNIFICANT CHANGE UP (ref 0–0.5)
EOSINOPHIL NFR BLD AUTO: 2.3 % — SIGNIFICANT CHANGE UP (ref 0–6)
GLUCOSE SERPL-MCNC: 108 MG/DL
HCT VFR BLD CALC: 39.5 % — SIGNIFICANT CHANGE UP (ref 39–50)
HGB BLD-MCNC: 12.7 G/DL — LOW (ref 13–17)
IMM GRANULOCYTES NFR BLD AUTO: 0.2 % — SIGNIFICANT CHANGE UP (ref 0–1.5)
INR PPP: 1.1 RATIO
LDH SERPL-CCNC: 168 U/L
LYMPHOCYTES # BLD AUTO: 1.84 K/UL — SIGNIFICANT CHANGE UP (ref 1–3.3)
LYMPHOCYTES # BLD AUTO: 32.7 % — SIGNIFICANT CHANGE UP (ref 13–44)
MAGNESIUM SERPL-MCNC: 2.1 MG/DL
MCHC RBC-ENTMCNC: 31.4 PG — SIGNIFICANT CHANGE UP (ref 27–34)
MCHC RBC-ENTMCNC: 32.2 G/DL — SIGNIFICANT CHANGE UP (ref 32–36)
MCV RBC AUTO: 97.8 FL — SIGNIFICANT CHANGE UP (ref 80–100)
MONOCYTES # BLD AUTO: 0.54 K/UL — SIGNIFICANT CHANGE UP (ref 0–0.9)
MONOCYTES NFR BLD AUTO: 9.6 % — SIGNIFICANT CHANGE UP (ref 2–14)
NEUTROPHILS # BLD AUTO: 3.09 K/UL — SIGNIFICANT CHANGE UP (ref 1.8–7.4)
NEUTROPHILS NFR BLD AUTO: 54.8 % — SIGNIFICANT CHANGE UP (ref 43–77)
NRBC # BLD: 0 /100 WBCS — SIGNIFICANT CHANGE UP (ref 0–0)
PLATELET # BLD AUTO: 164 K/UL — SIGNIFICANT CHANGE UP (ref 150–400)
POTASSIUM SERPL-SCNC: 4.7 MMOL/L
PROT SERPL-MCNC: 6.5 G/DL
PT BLD: 12.8 SEC
RBC # BLD: 4.04 M/UL — LOW (ref 4.2–5.8)
RBC # FLD: 13.8 % — SIGNIFICANT CHANGE UP (ref 10.3–14.5)
SODIUM SERPL-SCNC: 143 MMOL/L
WBC # BLD: 5.63 K/UL — SIGNIFICANT CHANGE UP (ref 3.8–10.5)
WBC # FLD AUTO: 5.63 K/UL — SIGNIFICANT CHANGE UP (ref 3.8–10.5)

## 2022-04-12 ENCOUNTER — APPOINTMENT (OUTPATIENT)
Dept: INFUSION THERAPY | Facility: HOSPITAL | Age: 57
End: 2022-04-12

## 2022-04-12 ENCOUNTER — APPOINTMENT (OUTPATIENT)
Dept: HEMATOLOGY ONCOLOGY | Facility: CLINIC | Age: 57
End: 2022-04-12

## 2022-04-12 DIAGNOSIS — Z23 ENCOUNTER FOR IMMUNIZATION: ICD-10-CM

## 2022-04-13 ENCOUNTER — APPOINTMENT (OUTPATIENT)
Dept: HEMATOLOGY ONCOLOGY | Facility: CLINIC | Age: 57
End: 2022-04-13
Payer: MEDICARE

## 2022-04-13 ENCOUNTER — APPOINTMENT (OUTPATIENT)
Dept: INFUSION THERAPY | Facility: HOSPITAL | Age: 57
End: 2022-04-13

## 2022-04-13 VITALS
DIASTOLIC BLOOD PRESSURE: 77 MMHG | SYSTOLIC BLOOD PRESSURE: 122 MMHG | BODY MASS INDEX: 31.36 KG/M2 | WEIGHT: 224.87 LBS | RESPIRATION RATE: 16 BRPM | TEMPERATURE: 96.9 F | HEART RATE: 76 BPM | OXYGEN SATURATION: 97 %

## 2022-04-13 LAB — SARS-COV-2 N GENE NPH QL NAA+PROBE: NOT DETECTED

## 2022-04-13 PROCEDURE — 99215 OFFICE O/P EST HI 40 MIN: CPT

## 2022-04-14 ENCOUNTER — INPATIENT (INPATIENT)
Facility: HOSPITAL | Age: 57
LOS: 21 days | Discharge: HOME HEALTH | DRG: 18 | End: 2022-05-06
Attending: INTERNAL MEDICINE | Admitting: INTERNAL MEDICINE
Payer: COMMERCIAL

## 2022-04-14 VITALS
WEIGHT: 221.34 LBS | OXYGEN SATURATION: 99 % | HEIGHT: 71.26 IN | TEMPERATURE: 98 F | RESPIRATION RATE: 18 BRPM | DIASTOLIC BLOOD PRESSURE: 75 MMHG | SYSTOLIC BLOOD PRESSURE: 128 MMHG | HEART RATE: 74 BPM

## 2022-04-14 DIAGNOSIS — Z98.890 OTHER SPECIFIED POSTPROCEDURAL STATES: Chronic | ICD-10-CM

## 2022-04-14 DIAGNOSIS — C83.10 MANTLE CELL LYMPHOMA, UNSPECIFIED SITE: ICD-10-CM

## 2022-04-14 DIAGNOSIS — Z94.84 STEM CELLS TRANSPLANT STATUS: Chronic | ICD-10-CM

## 2022-04-14 DIAGNOSIS — Z95.818 PRESENCE OF OTHER CARDIAC IMPLANTS AND GRAFTS: Chronic | ICD-10-CM

## 2022-04-14 DIAGNOSIS — Z29.9 ENCOUNTER FOR PROPHYLACTIC MEASURES, UNSPECIFIED: ICD-10-CM

## 2022-04-14 DIAGNOSIS — S83.419D SPRAIN OF MEDIAL COLLATERAL LIGAMENT OF UNSPECIFIED KNEE, SUBSEQUENT ENCOUNTER: ICD-10-CM

## 2022-04-14 LAB
ALBUMIN SERPL ELPH-MCNC: 4.6 G/DL — SIGNIFICANT CHANGE UP (ref 3.3–5)
ALP SERPL-CCNC: 82 U/L — SIGNIFICANT CHANGE UP (ref 40–120)
ALT FLD-CCNC: 23 U/L — SIGNIFICANT CHANGE UP (ref 10–45)
ANION GAP SERPL CALC-SCNC: 11 MMOL/L — SIGNIFICANT CHANGE UP (ref 5–17)
APPEARANCE UR: CLEAR — SIGNIFICANT CHANGE UP
APTT BLD: 27.7 SEC — SIGNIFICANT CHANGE UP (ref 27.5–35.5)
AST SERPL-CCNC: 26 U/L — SIGNIFICANT CHANGE UP (ref 10–40)
BASOPHILS # BLD AUTO: 0.01 K/UL — SIGNIFICANT CHANGE UP (ref 0–0.2)
BASOPHILS NFR BLD AUTO: 0.2 % — SIGNIFICANT CHANGE UP (ref 0–2)
BILIRUB DIRECT SERPL-MCNC: 0.1 MG/DL — SIGNIFICANT CHANGE UP (ref 0–0.3)
BILIRUB INDIRECT FLD-MCNC: 0.2 MG/DL — SIGNIFICANT CHANGE UP (ref 0.2–1)
BILIRUB SERPL-MCNC: 0.3 MG/DL — SIGNIFICANT CHANGE UP (ref 0.2–1.2)
BILIRUB UR-MCNC: NEGATIVE — SIGNIFICANT CHANGE UP
BLD GP AB SCN SERPL QL: NEGATIVE — SIGNIFICANT CHANGE UP
BUN SERPL-MCNC: 19 MG/DL — SIGNIFICANT CHANGE UP (ref 7–23)
CALCIUM SERPL-MCNC: 9.2 MG/DL — SIGNIFICANT CHANGE UP (ref 8.4–10.5)
CHLORIDE SERPL-SCNC: 104 MMOL/L — SIGNIFICANT CHANGE UP (ref 96–108)
CK SERPL-CCNC: 166 U/L — SIGNIFICANT CHANGE UP (ref 30–200)
CO2 SERPL-SCNC: 27 MMOL/L — SIGNIFICANT CHANGE UP (ref 22–31)
COLOR SPEC: SIGNIFICANT CHANGE UP
CREAT SERPL-MCNC: 0.89 MG/DL — SIGNIFICANT CHANGE UP (ref 0.5–1.3)
CRP SERPL-MCNC: 4 MG/L — SIGNIFICANT CHANGE UP (ref 0–4)
D DIMER BLD IA.RAPID-MCNC: 208 NG/ML DDU — SIGNIFICANT CHANGE UP
DIFF PNL FLD: NEGATIVE — SIGNIFICANT CHANGE UP
EGFR: 100 ML/MIN/1.73M2 — SIGNIFICANT CHANGE UP
EOSINOPHIL # BLD AUTO: 0.15 K/UL — SIGNIFICANT CHANGE UP (ref 0–0.5)
EOSINOPHIL NFR BLD AUTO: 3.1 % — SIGNIFICANT CHANGE UP (ref 0–6)
FERRITIN SERPL-MCNC: 113 NG/ML — SIGNIFICANT CHANGE UP (ref 30–400)
FIBRINOGEN PPP-MCNC: 448 MG/DL — SIGNIFICANT CHANGE UP (ref 330–520)
GLUCOSE SERPL-MCNC: 117 MG/DL — HIGH (ref 70–99)
GLUCOSE UR QL: NEGATIVE — SIGNIFICANT CHANGE UP
HCT VFR BLD CALC: 40.5 % — SIGNIFICANT CHANGE UP (ref 39–50)
HGB BLD-MCNC: 12.9 G/DL — LOW (ref 13–17)
IMM GRANULOCYTES NFR BLD AUTO: 0.2 % — SIGNIFICANT CHANGE UP (ref 0–1.5)
INR BLD: 1.05 RATIO — SIGNIFICANT CHANGE UP (ref 0.88–1.16)
KETONES UR-MCNC: NEGATIVE — SIGNIFICANT CHANGE UP
LDH SERPL L TO P-CCNC: 192 U/L — SIGNIFICANT CHANGE UP (ref 50–242)
LEUKOCYTE ESTERASE UR-ACNC: NEGATIVE — SIGNIFICANT CHANGE UP
LYMPHOCYTES # BLD AUTO: 1.59 K/UL — SIGNIFICANT CHANGE UP (ref 1–3.3)
LYMPHOCYTES # BLD AUTO: 33.1 % — SIGNIFICANT CHANGE UP (ref 13–44)
MAGNESIUM SERPL-MCNC: 2.1 MG/DL — SIGNIFICANT CHANGE UP (ref 1.6–2.6)
MCHC RBC-ENTMCNC: 31.1 PG — SIGNIFICANT CHANGE UP (ref 27–34)
MCHC RBC-ENTMCNC: 31.9 GM/DL — LOW (ref 32–36)
MCV RBC AUTO: 97.6 FL — SIGNIFICANT CHANGE UP (ref 80–100)
MONOCYTES # BLD AUTO: 0.54 K/UL — SIGNIFICANT CHANGE UP (ref 0–0.9)
MONOCYTES NFR BLD AUTO: 11.2 % — SIGNIFICANT CHANGE UP (ref 2–14)
NEUTROPHILS # BLD AUTO: 2.51 K/UL — SIGNIFICANT CHANGE UP (ref 1.8–7.4)
NEUTROPHILS NFR BLD AUTO: 52.2 % — SIGNIFICANT CHANGE UP (ref 43–77)
NITRITE UR-MCNC: NEGATIVE — SIGNIFICANT CHANGE UP
NRBC # BLD: 0 /100 WBCS — SIGNIFICANT CHANGE UP (ref 0–0)
NT-PROBNP SERPL-SCNC: 116 PG/ML — SIGNIFICANT CHANGE UP (ref 0–300)
PH UR: 6.5 — SIGNIFICANT CHANGE UP (ref 5–8)
PHOSPHATE SERPL-MCNC: 2.8 MG/DL — SIGNIFICANT CHANGE UP (ref 2.5–4.5)
PLATELET # BLD AUTO: 170 K/UL — SIGNIFICANT CHANGE UP (ref 150–400)
POTASSIUM SERPL-MCNC: 3.9 MMOL/L — SIGNIFICANT CHANGE UP (ref 3.5–5.3)
POTASSIUM SERPL-SCNC: 3.9 MMOL/L — SIGNIFICANT CHANGE UP (ref 3.5–5.3)
PROT SERPL-MCNC: 7 G/DL — SIGNIFICANT CHANGE UP (ref 6–8.3)
PROT UR-MCNC: NEGATIVE — SIGNIFICANT CHANGE UP
PROTHROM AB SERPL-ACNC: 12.1 SEC — SIGNIFICANT CHANGE UP (ref 10.5–13.4)
RBC # BLD: 4.15 M/UL — LOW (ref 4.2–5.8)
RBC # FLD: 13.9 % — SIGNIFICANT CHANGE UP (ref 10.3–14.5)
RH IG SCN BLD-IMP: POSITIVE — SIGNIFICANT CHANGE UP
SODIUM SERPL-SCNC: 142 MMOL/L — SIGNIFICANT CHANGE UP (ref 135–145)
SP GR SPEC: 1.02 — SIGNIFICANT CHANGE UP (ref 1.01–1.02)
URATE SERPL-MCNC: 6.1 MG/DL — SIGNIFICANT CHANGE UP (ref 3.4–8.8)
UROBILINOGEN FLD QL: NEGATIVE — SIGNIFICANT CHANGE UP
WBC # BLD: 4.81 K/UL — SIGNIFICANT CHANGE UP (ref 3.8–10.5)
WBC # FLD AUTO: 4.81 K/UL — SIGNIFICANT CHANGE UP (ref 3.8–10.5)

## 2022-04-14 PROCEDURE — 36556 INSERT NON-TUNNEL CV CATH: CPT

## 2022-04-14 PROCEDURE — 77001 FLUOROGUIDE FOR VEIN DEVICE: CPT | Mod: 26

## 2022-04-14 PROCEDURE — 99223 1ST HOSP IP/OBS HIGH 75: CPT

## 2022-04-14 PROCEDURE — 93010 ELECTROCARDIOGRAM REPORT: CPT

## 2022-04-14 PROCEDURE — 76937 US GUIDE VASCULAR ACCESS: CPT | Mod: 26

## 2022-04-14 RX ORDER — CYCLOPHOSPHAMIDE 100 MG
1105 VIAL (EA) INTRAVENOUS EVERY 24 HOURS
Refills: 0 | Status: COMPLETED | OUTPATIENT
Start: 2022-04-14 | End: 2022-04-16

## 2022-04-14 RX ORDER — SALIVA SUBSTITUTE COMB NO.11 351 MG
5 POWDER IN PACKET (EA) MUCOUS MEMBRANE
Refills: 0 | Status: DISCONTINUED | OUTPATIENT
Start: 2022-04-14 | End: 2022-05-06

## 2022-04-14 RX ORDER — ACETAMINOPHEN 500 MG
650 TABLET ORAL ONCE
Refills: 0 | Status: COMPLETED | OUTPATIENT
Start: 2022-04-19 | End: 2022-04-19

## 2022-04-14 RX ORDER — SODIUM BICARBONATE 1 MEQ/ML
10 SYRINGE (ML) INTRAVENOUS
Refills: 0 | Status: DISCONTINUED | OUTPATIENT
Start: 2022-04-14 | End: 2022-05-06

## 2022-04-14 RX ORDER — ACETAMINOPHEN 500 MG
650 TABLET ORAL EVERY 6 HOURS
Refills: 0 | Status: DISCONTINUED | OUTPATIENT
Start: 2022-04-14 | End: 2022-05-06

## 2022-04-14 RX ORDER — ALLOPURINOL 300 MG
300 TABLET ORAL DAILY
Refills: 0 | Status: DISCONTINUED | OUTPATIENT
Start: 2022-04-14 | End: 2022-05-06

## 2022-04-14 RX ORDER — SODIUM CHLORIDE 9 MG/ML
10 INJECTION INTRAMUSCULAR; INTRAVENOUS; SUBCUTANEOUS
Refills: 0 | Status: DISCONTINUED | OUTPATIENT
Start: 2022-04-14 | End: 2022-05-06

## 2022-04-14 RX ORDER — FLUDARABINE PHOSPHATE 25 MG/ML
66 INJECTION, SOLUTION INTRAVENOUS EVERY 24 HOURS
Refills: 0 | Status: COMPLETED | OUTPATIENT
Start: 2022-04-14 | End: 2022-04-16

## 2022-04-14 RX ORDER — FOLIC ACID 0.8 MG
1 TABLET ORAL DAILY
Refills: 0 | Status: DISCONTINUED | OUTPATIENT
Start: 2022-04-14 | End: 2022-05-06

## 2022-04-14 RX ORDER — SODIUM CHLORIDE 9 MG/ML
1000 INJECTION, SOLUTION INTRAVENOUS
Refills: 0 | Status: DISCONTINUED | OUTPATIENT
Start: 2022-04-14 | End: 2022-04-19

## 2022-04-14 RX ORDER — METOCLOPRAMIDE HCL 10 MG
10 TABLET ORAL EVERY 6 HOURS
Refills: 0 | Status: DISCONTINUED | OUTPATIENT
Start: 2022-04-14 | End: 2022-05-06

## 2022-04-14 RX ORDER — ACYCLOVIR SODIUM 500 MG
400 VIAL (EA) INTRAVENOUS EVERY 8 HOURS
Refills: 0 | Status: DISCONTINUED | OUTPATIENT
Start: 2022-04-19 | End: 2022-05-06

## 2022-04-14 RX ORDER — CARVEDILOL PHOSPHATE 80 MG/1
3.12 CAPSULE, EXTENDED RELEASE ORAL EVERY 12 HOURS
Refills: 0 | Status: DISCONTINUED | OUTPATIENT
Start: 2022-04-14 | End: 2022-05-06

## 2022-04-14 RX ORDER — FLUCONAZOLE 150 MG/1
400 TABLET ORAL DAILY
Refills: 0 | Status: DISCONTINUED | OUTPATIENT
Start: 2022-04-14 | End: 2022-05-06

## 2022-04-14 RX ORDER — CLOTRIMAZOLE 10 MG
1 TROCHE MUCOUS MEMBRANE
Refills: 0 | Status: DISCONTINUED | OUTPATIENT
Start: 2022-04-14 | End: 2022-05-06

## 2022-04-14 RX ORDER — ONDANSETRON 8 MG/1
16 TABLET, FILM COATED ORAL EVERY 24 HOURS
Refills: 0 | Status: COMPLETED | OUTPATIENT
Start: 2022-04-14 | End: 2022-04-16

## 2022-04-14 RX ORDER — SODIUM CHLORIDE 9 MG/ML
1000 INJECTION INTRAMUSCULAR; INTRAVENOUS; SUBCUTANEOUS
Refills: 0 | Status: DISCONTINUED | OUTPATIENT
Start: 2022-04-14 | End: 2022-04-17

## 2022-04-14 RX ORDER — DIPHENHYDRAMINE HCL 50 MG
25 CAPSULE ORAL ONCE
Refills: 0 | Status: COMPLETED | OUTPATIENT
Start: 2022-04-19 | End: 2022-04-19

## 2022-04-14 RX ORDER — CHLORHEXIDINE GLUCONATE 213 G/1000ML
1 SOLUTION TOPICAL
Refills: 0 | Status: DISCONTINUED | OUTPATIENT
Start: 2022-04-14 | End: 2022-05-06

## 2022-04-14 RX ORDER — PANTOPRAZOLE SODIUM 20 MG/1
40 TABLET, DELAYED RELEASE ORAL
Refills: 0 | Status: DISCONTINUED | OUTPATIENT
Start: 2022-04-14 | End: 2022-04-24

## 2022-04-14 RX ORDER — SENNA PLUS 8.6 MG/1
1 TABLET ORAL AT BEDTIME
Refills: 0 | Status: DISCONTINUED | OUTPATIENT
Start: 2022-04-14 | End: 2022-05-06

## 2022-04-14 RX ADMIN — FLUDARABINE PHOSPHATE 205.28 MILLIGRAM(S): 25 INJECTION, SOLUTION INTRAVENOUS at 16:06

## 2022-04-14 RX ADMIN — Medication 10 MILLILITER(S): at 15:05

## 2022-04-14 RX ADMIN — Medication 1 TABLET(S): at 17:09

## 2022-04-14 RX ADMIN — Medication 300 MILLIGRAM(S): at 13:43

## 2022-04-14 RX ADMIN — CARVEDILOL PHOSPHATE 3.12 MILLIGRAM(S): 80 CAPSULE, EXTENDED RELEASE ORAL at 17:09

## 2022-04-14 RX ADMIN — Medication 1 LOZENGE: at 15:05

## 2022-04-14 RX ADMIN — Medication 5 MILLILITER(S): at 15:05

## 2022-04-14 RX ADMIN — ONDANSETRON 116 MILLIGRAM(S): 8 TABLET, FILM COATED ORAL at 17:07

## 2022-04-14 RX ADMIN — Medication 5 MILLILITER(S): at 20:57

## 2022-04-14 RX ADMIN — Medication 1 LOZENGE: at 20:57

## 2022-04-14 RX ADMIN — Medication 152.63 MILLIGRAM(S): at 17:07

## 2022-04-14 RX ADMIN — PANTOPRAZOLE SODIUM 40 MILLIGRAM(S): 20 TABLET, DELAYED RELEASE ORAL at 13:43

## 2022-04-14 RX ADMIN — SODIUM CHLORIDE 165 MILLILITER(S): 9 INJECTION, SOLUTION INTRAVENOUS at 13:46

## 2022-04-14 RX ADMIN — Medication 10 MILLILITER(S): at 20:57

## 2022-04-14 RX ADMIN — CHLORHEXIDINE GLUCONATE 1 APPLICATION(S): 213 SOLUTION TOPICAL at 13:44

## 2022-04-14 RX ADMIN — FLUCONAZOLE 400 MILLIGRAM(S): 150 TABLET ORAL at 13:43

## 2022-04-14 RX ADMIN — Medication 1 MILLIGRAM(S): at 13:43

## 2022-04-14 RX ADMIN — SODIUM CHLORIDE 20 MILLILITER(S): 9 INJECTION INTRAMUSCULAR; INTRAVENOUS; SUBCUTANEOUS at 13:47

## 2022-04-14 RX ADMIN — Medication 1 TABLET(S): at 13:43

## 2022-04-14 NOTE — H&P ADULT - PROBLEM SELECTOR PLAN 1
Admitted for CAR T cell therapy with Tecartus (bresucabtagene autoleucel)  1. Admit to BMTU   2. Day -5 through day -1 record CARTOX / ICANS score daily   3. Day - 5 through day - 3 Fludarabine 30mg / m2 IV given over 30 minutes daily   4. Day - 5 - start cyclophosphamide hydration - 0.9% NS + 10mEq KCL / L at 75 ml/m2 and continue for 24 hours post infusion of last dose of CTX   5. Day - 5 through day - 3 CTX 500mg / m2 IV QD to be given over 2 hours   6. Starting day - 5 monitor labs BID: CBC with differential, CMP, LDH, uric acid, mag, phos, CPK, BNP, PT / PTT / INR / Fibrinogen, d-dimer, CRP, hepatic profile, ferritin  7. Weekly IL-6 levels on Monday, twice weekly (Monday / Thursday) CMV / EBV PCR, three times a week (Monday / Wednesday / Friday) type and screen   8. On day - 5 start allopurinol 300mg po QD   9. Day -2 and -1 REST DAYS   10. On day -1 begin to record CARTOX / ICANS score q 12 hours   11. Day 0 infuse Brexucabtagene autoleucel

## 2022-04-14 NOTE — PATIENT PROFILE ADULT - FALL HARM RISK - UNIVERSAL INTERVENTIONS
Bed in lowest position, wheels locked, appropriate side rails in place/Call bell, personal items and telephone in reach/Instruct patient to call for assistance before getting out of bed or chair/Non-slip footwear when patient is out of bed/[place] to call system/Physically safe environment - no spills, clutter or unnecessary equipment/Purposeful Proactive Rounding/Room/bathroom lighting operational, light cord in reach

## 2022-04-14 NOTE — H&P ADULT - ASSESSMENT
56 year old male with relapsed, refractory MCL s/p R-CHOP x 4, RICE x 2, maintenance RTX, salvage Calquence, admitted for CAR T cell therapy with Tecartus product.

## 2022-04-14 NOTE — PRE PROCEDURE NOTE - PRE PROCEDURE EVALUATION
Interventional Radiology    HPI: 56y Male with lymphoma presenting for non tunneled central venous catheter for stem cell exchange.     Allergies:   Medications (Abx/Cardiac/Anticoagulation/Blood Products)      Data:  181  100.4, 101.999  T(C): 36.9  HR: 74  BP: 128/75  RR: 18  SpO2: 99%    Exam  General: No acute distress  Chest: Non labored breathing  Abdomen: Non-distended  Extremities: No swelling, warm    -WBC 5.63 / HgB 12.7 / Hct 39.5 / Plt 164      Imaging:   < from: IR Procedure (03.21.22 @ 09:57) >  Impression:  Successful insertion of a temporary dialysis catheter via the right   internal jugular vein. Tip of catheter is at the distal SVC.    --- End of Report ---    < end of copied text >      Plan: 56y Male w/ lymphoma presents for temporary central access.  -Risks/Benefits/alternatives explained with the patient and/or healthcare proxy and witnessed informed consent obtained.    Interventional Radiology    HPI: 56y Male with lymphoma presenting for non tunneled central venous catheter for stem cell exchange.     Allergies:   Medications (Abx/Cardiac/Anticoagulation/Blood Products)      Data:  181  100.4, 101.999  T(C): 36.9  HR: 74  BP: 128/75  RR: 18  SpO2: 99%    Exam  General: No acute distress  Chest: Non labored breathing  Abdomen: Non-distended  Extremities: No swelling, warm    -WBC 5.63 / HgB 12.7 / Hct 39.5 / Plt 164      Imaging:   < from: IR Procedure (03.21.22 @ 09:57) >  Impression:  Successful insertion of a temporary dialysis catheter via the right   internal jugular vein. Tip of catheter is at the distal SVC.    --- End of Report ---    < end of copied text >      Plan: 56y Male w/ lymphoma presents for central venous catheter.  -Risks/Benefits/alternatives explained with the patient and/or healthcare proxy and witnessed informed consent obtained.

## 2022-04-14 NOTE — H&P ADULT - HISTORY OF PRESENT ILLNESS
This is a 56 year old male with a history of atrial fibrillation (s/p ablation x 2, internal loop recorder) and  MCL, initially diagnosed 10/2016. He was initially seen at [facility] for a left inguinal mass. CT scans 3/21/16 showed left sided pelvic and inguinal adenopathy. A left inguinal lymph node biopsy 9/26/16 showed morphologic and immunophenotypic findings consistent with MCL. He was treated with 4 cycles of R-CHOP, and 2 cycles of RICE consolidation. A bone marrow biopsy 2/10/17 showed normocellular bone marrow. He was admitted 3/27/17 for an autologous pbsct with CBV prep, and received the cells 4/5/17. He engrafted 4/16/17. He was discharged and completed maintenance Rituxan. In 10/21, he noticed a lump in the right groin. A lymph node biopsy 11/12/21 was consistent with relapsed MCL. He was treated with Calquence. He didn't tolerate Calquence well, with reports of frequent abdominal pain. He is admitted today for CAR T therapy with Tecartus product for relapsed, refractory MCL. His disease status at this time is partial remission.

## 2022-04-14 NOTE — PATIENT PROFILE ADULT - PUBLIC BENEFITS
Received fax's from [facility] pharmacy authorization request and a need for clarification notice as well.   Fax placed in clinical staff work bin.      no

## 2022-04-14 NOTE — H&P ADULT - ENMT
Addended by: KATHARINE ACOSTA on: 4/13/2020 09:07 AM     Modules accepted: Orders     detailed exam details…

## 2022-04-15 DIAGNOSIS — Z79.899 OTHER LONG TERM (CURRENT) DRUG THERAPY: ICD-10-CM

## 2022-04-15 DIAGNOSIS — F41.9 ANXIETY DISORDER, UNSPECIFIED: ICD-10-CM

## 2022-04-15 DIAGNOSIS — Z51.5 ENCOUNTER FOR PALLIATIVE CARE: ICD-10-CM

## 2022-04-15 DIAGNOSIS — R53.81 OTHER MALAISE: ICD-10-CM

## 2022-04-15 LAB
ALBUMIN SERPL ELPH-MCNC: 3.9 G/DL — SIGNIFICANT CHANGE UP (ref 3.3–5)
ALP SERPL-CCNC: 71 U/L — SIGNIFICANT CHANGE UP (ref 40–120)
ALT FLD-CCNC: 20 U/L — SIGNIFICANT CHANGE UP (ref 10–45)
ANION GAP SERPL CALC-SCNC: 11 MMOL/L — SIGNIFICANT CHANGE UP (ref 5–17)
APTT BLD: 26.9 SEC — LOW (ref 27.5–35.5)
AST SERPL-CCNC: 21 U/L — SIGNIFICANT CHANGE UP (ref 10–40)
BASOPHILS # BLD AUTO: 0.01 K/UL — SIGNIFICANT CHANGE UP (ref 0–0.2)
BASOPHILS NFR BLD AUTO: 0.2 % — SIGNIFICANT CHANGE UP (ref 0–2)
BILIRUB DIRECT SERPL-MCNC: 0.1 MG/DL — SIGNIFICANT CHANGE UP (ref 0–0.3)
BILIRUB INDIRECT FLD-MCNC: 0.2 MG/DL — SIGNIFICANT CHANGE UP (ref 0.2–1)
BILIRUB SERPL-MCNC: 0.3 MG/DL — SIGNIFICANT CHANGE UP (ref 0.2–1.2)
BLD GP AB SCN SERPL QL: NEGATIVE — SIGNIFICANT CHANGE UP
BUN SERPL-MCNC: 17 MG/DL — SIGNIFICANT CHANGE UP (ref 7–23)
CALCIUM SERPL-MCNC: 8.3 MG/DL — LOW (ref 8.4–10.5)
CHLORIDE SERPL-SCNC: 106 MMOL/L — SIGNIFICANT CHANGE UP (ref 96–108)
CK SERPL-CCNC: 106 U/L — SIGNIFICANT CHANGE UP (ref 30–200)
CO2 SERPL-SCNC: 24 MMOL/L — SIGNIFICANT CHANGE UP (ref 22–31)
CREAT SERPL-MCNC: 1.1 MG/DL — SIGNIFICANT CHANGE UP (ref 0.5–1.3)
CRP SERPL-MCNC: 3 MG/L — SIGNIFICANT CHANGE UP (ref 0–4)
D DIMER BLD IA.RAPID-MCNC: <150 NG/ML DDU — SIGNIFICANT CHANGE UP
EBV EA AB SER IA-ACNC: 59.5 U/ML — HIGH
EBV EA AB TITR SER IF: POSITIVE
EBV EA IGG SER-ACNC: POSITIVE
EBV NA IGG SER IA-ACNC: >600 U/ML — HIGH
EBV PATRN SPEC IB-IMP: SIGNIFICANT CHANGE UP
EBV VCA IGG AVIDITY SER QL IA: POSITIVE
EBV VCA IGM SER IA-ACNC: 92 U/ML — HIGH
EBV VCA IGM SER IA-ACNC: <10 U/ML — SIGNIFICANT CHANGE UP
EBV VCA IGM TITR FLD: NEGATIVE — SIGNIFICANT CHANGE UP
EGFR: 78 ML/MIN/1.73M2 — SIGNIFICANT CHANGE UP
EOSINOPHIL # BLD AUTO: 0.1 K/UL — SIGNIFICANT CHANGE UP (ref 0–0.5)
EOSINOPHIL NFR BLD AUTO: 1.9 % — SIGNIFICANT CHANGE UP (ref 0–6)
FERRITIN SERPL-MCNC: 89 NG/ML — SIGNIFICANT CHANGE UP (ref 30–400)
FIBRINOGEN PPP-MCNC: 409 MG/DL — SIGNIFICANT CHANGE UP (ref 330–520)
GLUCOSE SERPL-MCNC: 106 MG/DL — HIGH (ref 70–99)
HCT VFR BLD CALC: 38.5 % — LOW (ref 39–50)
HCV AB S/CO SERPL IA: 0.12 S/CO — SIGNIFICANT CHANGE UP (ref 0–0.99)
HCV AB SERPL-IMP: SIGNIFICANT CHANGE UP
HGB BLD-MCNC: 12.1 G/DL — LOW (ref 13–17)
IMM GRANULOCYTES NFR BLD AUTO: 0.4 % — SIGNIFICANT CHANGE UP (ref 0–1.5)
INR BLD: 1.04 RATIO — SIGNIFICANT CHANGE UP (ref 0.88–1.16)
LDH SERPL L TO P-CCNC: 156 U/L — SIGNIFICANT CHANGE UP (ref 50–242)
LYMPHOCYTES # BLD AUTO: 1.34 K/UL — SIGNIFICANT CHANGE UP (ref 1–3.3)
LYMPHOCYTES # BLD AUTO: 25.4 % — SIGNIFICANT CHANGE UP (ref 13–44)
MAGNESIUM SERPL-MCNC: 2 MG/DL — SIGNIFICANT CHANGE UP (ref 1.6–2.6)
MCHC RBC-ENTMCNC: 30.9 PG — SIGNIFICANT CHANGE UP (ref 27–34)
MCHC RBC-ENTMCNC: 31.4 GM/DL — LOW (ref 32–36)
MCV RBC AUTO: 98.2 FL — SIGNIFICANT CHANGE UP (ref 80–100)
MONOCYTES # BLD AUTO: 0.52 K/UL — SIGNIFICANT CHANGE UP (ref 0–0.9)
MONOCYTES NFR BLD AUTO: 9.8 % — SIGNIFICANT CHANGE UP (ref 2–14)
NEUTROPHILS # BLD AUTO: 3.29 K/UL — SIGNIFICANT CHANGE UP (ref 1.8–7.4)
NEUTROPHILS NFR BLD AUTO: 62.3 % — SIGNIFICANT CHANGE UP (ref 43–77)
NRBC # BLD: 0 /100 WBCS — SIGNIFICANT CHANGE UP (ref 0–0)
NT-PROBNP SERPL-SCNC: 184 PG/ML — SIGNIFICANT CHANGE UP (ref 0–300)
PHOSPHATE SERPL-MCNC: 3.1 MG/DL — SIGNIFICANT CHANGE UP (ref 2.5–4.5)
PLATELET # BLD AUTO: 165 K/UL — SIGNIFICANT CHANGE UP (ref 150–400)
POTASSIUM SERPL-MCNC: 4.4 MMOL/L — SIGNIFICANT CHANGE UP (ref 3.5–5.3)
POTASSIUM SERPL-SCNC: 4.4 MMOL/L — SIGNIFICANT CHANGE UP (ref 3.5–5.3)
PROT SERPL-MCNC: 5.8 G/DL — LOW (ref 6–8.3)
PROTHROM AB SERPL-ACNC: 12 SEC — SIGNIFICANT CHANGE UP (ref 10.5–13.4)
RBC # BLD: 3.92 M/UL — LOW (ref 4.2–5.8)
RBC # FLD: 13.7 % — SIGNIFICANT CHANGE UP (ref 10.3–14.5)
RH IG SCN BLD-IMP: POSITIVE — SIGNIFICANT CHANGE UP
SODIUM SERPL-SCNC: 141 MMOL/L — SIGNIFICANT CHANGE UP (ref 135–145)
URATE SERPL-MCNC: 5.1 MG/DL — SIGNIFICANT CHANGE UP (ref 3.4–8.8)
WBC # BLD: 5.28 K/UL — SIGNIFICANT CHANGE UP (ref 3.8–10.5)
WBC # FLD AUTO: 5.28 K/UL — SIGNIFICANT CHANGE UP (ref 3.8–10.5)

## 2022-04-15 PROCEDURE — 99231 SBSQ HOSP IP/OBS SF/LOW 25: CPT

## 2022-04-15 PROCEDURE — 99223 1ST HOSP IP/OBS HIGH 75: CPT

## 2022-04-15 PROCEDURE — 99291 CRITICAL CARE FIRST HOUR: CPT

## 2022-04-15 RX ORDER — FUROSEMIDE 40 MG
20 TABLET ORAL ONCE
Refills: 0 | Status: COMPLETED | OUTPATIENT
Start: 2022-04-15 | End: 2022-04-15

## 2022-04-15 RX ADMIN — Medication 5 MILLILITER(S): at 19:00

## 2022-04-15 RX ADMIN — Medication 10 MILLILITER(S): at 12:28

## 2022-04-15 RX ADMIN — Medication 152.63 MILLIGRAM(S): at 15:30

## 2022-04-15 RX ADMIN — Medication 1 TABLET(S): at 12:31

## 2022-04-15 RX ADMIN — Medication 5 MILLILITER(S): at 12:29

## 2022-04-15 RX ADMIN — Medication 1 LOZENGE: at 23:32

## 2022-04-15 RX ADMIN — FLUCONAZOLE 400 MILLIGRAM(S): 150 TABLET ORAL at 12:30

## 2022-04-15 RX ADMIN — Medication 5 MILLILITER(S): at 23:33

## 2022-04-15 RX ADMIN — Medication 1 TABLET(S): at 17:43

## 2022-04-15 RX ADMIN — Medication 1 LOZENGE: at 02:45

## 2022-04-15 RX ADMIN — Medication 10 MILLILITER(S): at 17:39

## 2022-04-15 RX ADMIN — Medication 300 MILLIGRAM(S): at 12:30

## 2022-04-15 RX ADMIN — Medication 5 MILLILITER(S): at 02:44

## 2022-04-15 RX ADMIN — ONDANSETRON 116 MILLIGRAM(S): 8 TABLET, FILM COATED ORAL at 17:38

## 2022-04-15 RX ADMIN — SODIUM CHLORIDE 20 MILLILITER(S): 9 INJECTION INTRAMUSCULAR; INTRAVENOUS; SUBCUTANEOUS at 21:27

## 2022-04-15 RX ADMIN — Medication 5 MILLILITER(S): at 17:39

## 2022-04-15 RX ADMIN — Medication 20 MILLIGRAM(S): at 12:30

## 2022-04-15 RX ADMIN — Medication 1 LOZENGE: at 19:27

## 2022-04-15 RX ADMIN — Medication 10 MILLILITER(S): at 12:27

## 2022-04-15 RX ADMIN — Medication 10 MILLILITER(S): at 19:28

## 2022-04-15 RX ADMIN — Medication 1 LOZENGE: at 12:27

## 2022-04-15 RX ADMIN — Medication 1 LOZENGE: at 12:29

## 2022-04-15 RX ADMIN — Medication 1 MILLIGRAM(S): at 12:31

## 2022-04-15 RX ADMIN — Medication 1 LOZENGE: at 17:39

## 2022-04-15 RX ADMIN — CHLORHEXIDINE GLUCONATE 1 APPLICATION(S): 213 SOLUTION TOPICAL at 12:29

## 2022-04-15 RX ADMIN — CARVEDILOL PHOSPHATE 3.12 MILLIGRAM(S): 80 CAPSULE, EXTENDED RELEASE ORAL at 17:42

## 2022-04-15 RX ADMIN — FLUDARABINE PHOSPHATE 205.28 MILLIGRAM(S): 25 INJECTION, SOLUTION INTRAVENOUS at 13:53

## 2022-04-15 RX ADMIN — Medication 10 MILLILITER(S): at 02:45

## 2022-04-15 RX ADMIN — SODIUM CHLORIDE 165 MILLILITER(S): 9 INJECTION, SOLUTION INTRAVENOUS at 21:27

## 2022-04-15 RX ADMIN — Medication 1 TABLET(S): at 05:37

## 2022-04-15 RX ADMIN — Medication 10 MILLILITER(S): at 23:32

## 2022-04-15 RX ADMIN — PANTOPRAZOLE SODIUM 40 MILLIGRAM(S): 20 TABLET, DELAYED RELEASE ORAL at 05:37

## 2022-04-15 RX ADMIN — CARVEDILOL PHOSPHATE 3.12 MILLIGRAM(S): 80 CAPSULE, EXTENDED RELEASE ORAL at 05:37

## 2022-04-15 RX ADMIN — Medication 5 MILLILITER(S): at 12:27

## 2022-04-15 NOTE — DIETITIAN INITIAL EVALUATION ADULT - OTHER INFO
GI: Denies N/V. Last BM was  today 04/15 , denies diarrhea/constipation. Bowel regimen: senna     pounds x 1 months ago, reports weight gain. Current admission weight of 221 pounds (dosing 04/14). ? if fluid shifts secondary to IV fluids. RD to continue to monitor weight trends as available/able.

## 2022-04-15 NOTE — CONSULT NOTE ADULT - PROBLEM SELECTOR RECOMMENDATION 3
Condition: Mantel Cell Lymphoma  Previous transplant: AUTO SCT 2017  Active treatment: Fludarabine   Transplant Type: Tecartus (brexucabtagene autoleucel)  Transplant Day: Day -4   Clinical impact on complexity: Significant

## 2022-04-15 NOTE — PROGRESS NOTE ADULT - SUBJECTIVE AND OBJECTIVE BOX
Interventional Radiology Follow-Up Note    Patient seen and examined @ bedside     This is a 56y Male s/p RIJ non tunneled CVC on 4/14 in Interventional Radiology     No complaint offered.      Medication:  carvedilol: (04-15)  clotrimazole Lozenge: (04-15)  fluconAZOLE   Tablet: (04-14)  trimethoprim  160 mG/sulfamethoxazole 800 mG: (04-15)    Vitals:  T(F): 97.3, Max: 98.4 (10:14)  HR: 74  BP: 127/83  RR: 18  SpO2: 97%    Physical Exam:  General: Nontoxic, in NAD.  Neck: right neck HD catheter site dressing c/d/i. No hematoma noted. No ttp        LABS:  Na: 141 (04-15 @ 06:06), 142 (04-14 @ 14:44)  K: 4.4 (04-15 @ 06:06), 3.9 (04-14 @ 14:44)  Cl: 106 (04-15 @ 06:06), 104 (04-14 @ 14:44)  CO2: 24 (04-15 @ 06:06), 27 (04-14 @ 14:44)  BUN: 17 (04-15 @ 06:06), 19 (04-14 @ 14:44)  Cr: 1.10 (04-15 @ 06:06), 0.89 (04-14 @ 14:44)  Glu: 106(04-15 @ 06:06), 117(04-14 @ 14:44)  Hgb: 12.1 (04-15 @ 06:06), 12.9 (04-14 @ 14:43)  Hct: 38.5 (04-15 @ 06:06), 40.5 (04-14 @ 14:43)  WBC: 5.28 (04-15 @ 06:06), 4.81 (04-14 @ 14:43)  Plt: 165 (04-15 @ 06:06), 170 (04-14 @ 14:43)  INR: 1.04 04-15-22 @ 06:06, 1.05 04-14-22 @ 14:44  PTT: 26.9 04-15-22 @ 06:06, 27.7 04-14-22 @ 14:44      LIVER FUNCTIONS - ( 15 Apr 2022 06:06 )  Alb: 3.9 g/dL / Pro: 5.8 g/dL / ALK PHOS: 71 U/L / ALT: 20 U/L / AST: 21 U/L / GGT: x         Aspartate Aminotransferase (AST/SGOT): 21 U/L (04-15-22 @ 06:06)  Alanine Aminotransferase (ALT/SGPT): 20 U/L (04-15-22 @ 06:06)  Aspartate Aminotransferase (AST/SGOT): 26 U/L (04-14-22 @ 14:44)  Alanine Aminotransferase (ALT/SGPT): 23 U/L (04-14-22 @ 14:44)           Assessment/Plan: This is a 56y Male s/p RIJ non tunneled CVC on 4/14 in Interventional Radiology      - Okay to use catheter.  - IR will sign off.     Please call IR at  [phone] with any questions, concerns, or issues regarding above.    Also available on Teams.

## 2022-04-15 NOTE — PROGRESS NOTE ADULT - CRITICAL CARE ATTENDING COMMENT
Admitted for CAR T cell therapy with Tecartus (bresucabtagene autoleucel)  1. Admit to BMTU   2. Day -5 through day -1 record CARTOX / ICANS score daily   3. Day - 5 through day - 3 Fludarabine 30mg / m2 IV given over 30 minutes daily   4. Day - 5 - start cyclophosphamide hydration - 0.9% NS + 10mEq KCL / L at 75 ml/m2 and continue for 24 hours post infusion of last dose of CTX   5. Day - 5 through day - 3 CTX 500mg / m2 IV QD to be given over 2 hours   6. Starting day - 5 monitor labs BID: CBC with differential, CMP, LDH, uric acid, mag, phos, CPK, BNP, PT / PTT / INR / Fibrinogen, d-dimer, CRP, hepatic profile, ferritin  7. Weekly IL-6 levels on Monday, twice weekly (Monday / Thursday) CMV / EBV PCR, three times a week (Monday / Wednesday / Friday) type and screen   8. On day - 5 start allopurinol 300mg po QD   9. Day -2 and -1 REST DAYS   10. On day -1 begin to record CARTOX / ICANS score q 12 hours   11. Day 0 infuse Brexucabtagene autoleucel  hx of afib...seen by [facility] cardiology  4/15 Doing well today

## 2022-04-15 NOTE — CONSULT NOTE ADULT - PROBLEM SELECTOR RECOMMENDATION 5
Actions:  [x] Rapport building     [x] Symptom assessment    [] Eliciting preferences of goals   [] Prognostic understanding    [] Emotional Support  [] Coping skill development  []  Other  Interdisciplinary Referrals: None  Communication: d/w SW  Documentation Review: [x] Primary Team [] Consultants [] Interdisciplinary team  Content: n/a

## 2022-04-15 NOTE — DIETITIAN INITIAL EVALUATION ADULT - NS FNS DIET ORDER
Diet, Regular:   GlutaSolve(Glutamine) 15gm pkg     Qty per Day:  1 (04-14-22 @ 10:04) [Active]

## 2022-04-15 NOTE — CONSULT NOTE ADULT - PROBLEM SELECTOR RECOMMENDATION 4
PPSv2 prior to admission: 80  Current functional PPSv2: 80  Nursing care required: Minimal assistance with ADLs  Code status documented: Full code  A review of the paper chart has been conducted  HCP form present:               Yes and valid []               Yes but invalid []                No [x]   MOLST present:                   Yes and valid []               Yes but invalid []                No [x]  Incapacity form present:   Yes and valid []                Yes but invalid []                No []                 N/A [x]  Living Will:                            Yes and valid []                Yes but invalid []                No [x]      Family Health Care Decision Act (FHCDA) Surrogate Decision Maker Hierarchy in the absence of a health care agent  [facility] Article 81 Guardian-->Spouse or domestic partner--> Adult child-->Parent-->Sibling--> Close friend

## 2022-04-15 NOTE — DIETITIAN INITIAL EVALUATION ADULT - REASON
no overt signs of muscle/fat depletion upon visual observation; nutrition focused physical examination not indicated at this time

## 2022-04-15 NOTE — PROGRESS NOTE ADULT - SUBJECTIVE AND OBJECTIVE BOX
[facility]PC Transplant Team                                                      Critical / Counseling Time Provided: 30 minutes                                                                                                                                                        Chief Complaint: CAR T therapy with [employer] product for treatment of relapsed, refractory MCL    S: Patient seen and examined with HPC Transplant Team:     All other ROS negative     O: Vitals:   Vital Signs Last 24 Hrs  T(C): 36.3 (15 Apr 2022 05:01), Max: 36.9 (2022 10:14)  T(F): 97.3 (15 Apr 2022 05:01), Max: 98.4 (2022 10:14)  HR: 74 (15 Apr 2022 05:01) (62 - 74)  BP: 127/83 (15 Apr 2022 05:01) (127/83 - 137/81)  BP(mean): --  RR: 18 (15 Apr 2022 05:01) (18 - 18)  SpO2: 97% (15 Apr 2022 05:01) (97% - 99%)    Admit weight: 100.4kg   Daily Height in cm: 181 (2022 10:40)    Daily Weight in k.4 (2022 10:14)  Today's weight:     Intake / Output:   -14 @ 07:01  -  15 @ 07:00  --------------------------------------------------------  IN: 3709 mL / OUT: 4050 mL / NET: -341 mL      PE:   Oropharynx: no erythema or ulcerations   Oral Mucositis:                  -                                      Grade: n/a  CVS: S1, S2 RRR   Lungs: CTA throughout bilaterally   Abdomen: + BS x 4, soft, NT, ND   Extremities: no edema   Gastric Mucositis:       -                                           Grade: n/a  Intestinal Mucositis:     -                                         Grade: n/a   Skin: no rash   TLC: CDI   Neuro: A&O x 3   Pain: 0    Labs:   CBC Full  -  ( 15 Apr 2022 06:06 )  WBC Count : 5.28 K/uL  Hemoglobin : 12.1 g/dL  Hematocrit : 38.5 %  Platelet Count - Automated : 165 K/uL  Mean Cell Volume : 98.2 fl  Mean Cell Hemoglobin : 30.9 pg  Mean Cell Hemoglobin Concentration : 31.4 gm/dL  Auto Neutrophil # : 3.29 K/uL  Auto Lymphocyte # : 1.34 K/uL  Auto Monocyte # : 0.52 K/uL  Auto Eosinophil # : 0.10 K/uL  Auto Basophil # : 0.01 K/uL  Auto Neutrophil % : 62.3 %  Auto Lymphocyte % : 25.4 %  Auto Monocyte % : 9.8 %  Auto Eosinophil % : 1.9 %  Auto Basophil % : 0.2 %                          12.1   5.28  )-----------( 165      ( 15 Apr 2022 06:06 )             38.5     -15    141  |  106  |  17  ----------------------------<  106<H>  4.4   |  24  |  1.10    Ca    8.3<L>      15 Apr 2022 06:06  Phos  3.1     -15  Mg     2.0     04-15    TPro  5.8<L>  /  Alb  3.9  /  TBili  0.3  /  DBili  0.1  /  AST  21  /  ALT  20  /  AlkPhos  71  -15    PT/INR - ( 15 Apr 2022 06:06 )   PT: 12.0 sec;   INR: 1.04 ratio         PTT - ( 15 Apr 2022 06:06 )  PTT:26.9 sec  LIVER FUNCTIONS - ( 15 Apr 2022 06:06 )  Alb: 3.9 g/dL / Pro: 5.8 g/dL / ALK PHOS: 71 U/L / ALT: 20 U/L / AST: 21 U/L / GGT: x           Lactate Dehydrogenase, Serum: 156 U/L (04-15 @ 06:06)  Lactate Dehydrogenase, Serum: 192 U/L ( @ 14:44)        Meds:   Antimicrobials:   clotrimazole Lozenge 1 Lozenge Oral five times a day  fluconAZOLE   Tablet 400 milliGRAM(s) Oral daily  trimethoprim  160 mG/sulfamethoxazole 800 mG 1 Tablet(s) Oral every 12 hours      Heme / Onc:   cyclophosphamide IVPB (eMAR) 1105 milliGRAM(s) IV Intermittent every 24 hours  fludarabine IVPB (eMAR) 66 milliGRAM(s) IV Intermittent every 24 hours      GI:  pantoprazole    Tablet 40 milliGRAM(s) Oral before breakfast  senna 1 Tablet(s) Oral at bedtime PRN  sodium bicarbonate Mouth Rinse 10 milliLiter(s) Swish and Spit five times a day      Cardiovascular:   carvedilol 3.125 milliGRAM(s) Oral every 12 hours      Immunologic:       Other medications:   acetaminophen     Tablet .. 650 milliGRAM(s) Oral every 6 hours  allopurinol 300 milliGRAM(s) Oral daily  Biotene Dry Mouth Oral Rinse 5 milliLiter(s) Swish and Spit five times a day  chlorhexidine 4% Liquid 1 Application(s) Topical <User Schedule>  folic acid 1 milliGRAM(s) Oral daily  multivitamin 1 Tablet(s) Oral daily  ondansetron  IVPB 16 milliGRAM(s) IV Intermittent every 24 hours  sodium chloride 0.9% 1000 milliLiter(s) IV Continuous <Continuous>  sodium chloride 0.9%. 1000 milliLiter(s) IV Continuous <Continuous>      PRN:   acetaminophen     Tablet .. 650 milliGRAM(s) Oral every 6 hours PRN  LORazepam   Injectable 1 milliGRAM(s) IV Push every 6 hours PRN  metoclopramide Injectable 10 milliGRAM(s) IV Push every 6 hours PRN  senna 1 Tablet(s) Oral at bedtime PRN  sodium chloride 0.9% lock flush 10 milliLiter(s) IV Push every 1 hour PRN    A/P: 56 year old male with relapsed, refractory MCL s/p R-CHOP x 4, RICE x 2, maintenance RTX, salvage Calquence, admitted for CAR T cell therapy with [employer] product. Disease status at time of admission is partial response.   Day - 4    1. Relapsed, refractory MCL   4/15- day - 4- flu / ctx 2/3 - continue CTX hydration until 24 hours post infusion of last dose   Strict I&O, daily weights, prn diuresis   CRS / ICANS score daily until infusion of CAR T cells (22) and twice daily thereafter   BID labs 0500 & 1500 - monitor for TLS     2. Prophylactic measures   clotrimazole Lozenge 1 Lozenge Oral five times a day  fluconAZOLE   Tablet 400 milliGRAM(s) Oral daily  trimethoprim  160 mG/sulfamethoxazole 800 mG 1 Tablet(s) Oral every 12 hours  Acyclovir 400mg po TID to start on day -1   If / when neutropenic - will add cipro 500mg po BID   GI Prophylaxis:  Protonix    3. Mouthcare - NS / NaHCO3 rinses, Mycelex, Biotene; Skin care     4. Transfuse & replete electrolytes prn     7. IV hydration, daily weights, strict I&O, prn diuresis     8. Antiemetics, anti-diarrhea medications:   LORazepam   Injectable 1 milliGRAM(s) IV Push every 6 hours PRN  metoclopramide Injectable 10 milliGRAM(s) IV Push every 6 hours PRN  ondansetron  IVPB 16 milliGRAM(s) IV Intermittent every 24 hours      I have written the above note for Dr. Banks who performed service with me in the room.   Tiffany Lucero NP-C ([phone])    I have seen and examined patient with NP, I agree with above note as scribed.                    [facility] Transplant Team                                                      Critical / Counseling Time Provided: 30 minutes                                                                                                                                                        Chief Complaint: CAR T therapy with [employer] product for treatment of relapsed, refractory MCL    S: Patient seen and examined with [facility]PC Transplant Team:     All other ROS negative     O: Vitals:   Vital Signs Last 24 Hrs  T(C): 36.3 (15 Apr 2022 05:01), Max: 36.9 (2022 10:14)  T(F): 97.3 (15 Apr 2022 05:01), Max: 98.4 (2022 10:14)  HR: 74 (15 Apr 2022 05:01) (62 - 74)  BP: 127/83 (15 Apr 2022 05:01) (127/83 - 137/81)  BP(mean): --  RR: 18 (15 Apr 2022 05:01) (18 - 18)  SpO2: 97% (15 Apr 2022 05:01) (97% - 99%)    Admit weight: 100.4kg   Daily Height in cm: 181 (2022 10:40)    Daily Weight in k.4 (2022 10:14)  Today's weight:     Intake / Output:   -14 @ 07:01  -  15 @ 07:00  --------------------------------------------------------  IN: 3709 mL / OUT: 4050 mL / NET: -341 mL      PE:   Oropharynx: no erythema or ulcerations   Oral Mucositis:                  -                                      Grade: n/a  CVS: S1, S2 RRR   Lungs: CTA throughout bilaterally   Abdomen: + BS x 4, soft, NT, ND   Extremities: no edema   Gastric Mucositis:       -                                           Grade: n/a  Intestinal Mucositis:     -                                         Grade: n/a   Skin: no rash   TLC: CDI   Neuro: A&O x 3   Pain: 0    Labs:   CBC Full  -  ( 15 Apr 2022 06:06 )  WBC Count : 5.28 K/uL  Hemoglobin : 12.1 g/dL  Hematocrit : 38.5 %  Platelet Count - Automated : 165 K/uL  Mean Cell Volume : 98.2 fl  Mean Cell Hemoglobin : 30.9 pg  Mean Cell Hemoglobin Concentration : 31.4 gm/dL  Auto Neutrophil # : 3.29 K/uL  Auto Lymphocyte # : 1.34 K/uL  Auto Monocyte # : 0.52 K/uL  Auto Eosinophil # : 0.10 K/uL  Auto Basophil # : 0.01 K/uL  Auto Neutrophil % : 62.3 %  Auto Lymphocyte % : 25.4 %  Auto Monocyte % : 9.8 %  Auto Eosinophil % : 1.9 %  Auto Basophil % : 0.2 %                          12.1   5.28  )-----------( 165      ( 15 Apr 2022 06:06 )             38.5     -15    141  |  106  |  17  ----------------------------<  106<H>  4.4   |  24  |  1.10    Ca    8.3<L>      15 Apr 2022 06:06  Phos  3.1     -15  Mg     2.0     04-15    TPro  5.8<L>  /  Alb  3.9  /  TBili  0.3  /  DBili  0.1  /  AST  21  /  ALT  20  /  AlkPhos  71  -15    PT/INR - ( 15 Apr 2022 06:06 )   PT: 12.0 sec;   INR: 1.04 ratio         PTT - ( 15 Apr 2022 06:06 )  PTT:26.9 sec  LIVER FUNCTIONS - ( 15 Apr 2022 06:06 )  Alb: 3.9 g/dL / Pro: 5.8 g/dL / ALK PHOS: 71 U/L / ALT: 20 U/L / AST: 21 U/L / GGT: x           Lactate Dehydrogenase, Serum: 156 U/L (04-15 @ 06:06)  Lactate Dehydrogenase, Serum: 192 U/L ( @ 14:44)        Meds:   Antimicrobials:   clotrimazole Lozenge 1 Lozenge Oral five times a day  fluconAZOLE   Tablet 400 milliGRAM(s) Oral daily  trimethoprim  160 mG/sulfamethoxazole 800 mG 1 Tablet(s) Oral every 12 hours      Heme / Onc:   cyclophosphamide IVPB (eMAR) 1105 milliGRAM(s) IV Intermittent every 24 hours  fludarabine IVPB (eMAR) 66 milliGRAM(s) IV Intermittent every 24 hours      GI:  pantoprazole    Tablet 40 milliGRAM(s) Oral before breakfast  senna 1 Tablet(s) Oral at bedtime PRN  sodium bicarbonate Mouth Rinse 10 milliLiter(s) Swish and Spit five times a day      Cardiovascular:   carvedilol 3.125 milliGRAM(s) Oral every 12 hours      Immunologic:       Other medications:   acetaminophen     Tablet .. 650 milliGRAM(s) Oral every 6 hours  allopurinol 300 milliGRAM(s) Oral daily  Biotene Dry Mouth Oral Rinse 5 milliLiter(s) Swish and Spit five times a day  chlorhexidine 4% Liquid 1 Application(s) Topical <User Schedule>  folic acid 1 milliGRAM(s) Oral daily  multivitamin 1 Tablet(s) Oral daily  ondansetron  IVPB 16 milliGRAM(s) IV Intermittent every 24 hours  sodium chloride 0.9% 1000 milliLiter(s) IV Continuous <Continuous>  sodium chloride 0.9%. 1000 milliLiter(s) IV Continuous <Continuous>      PRN:   acetaminophen     Tablet .. 650 milliGRAM(s) Oral every 6 hours PRN  LORazepam   Injectable 1 milliGRAM(s) IV Push every 6 hours PRN  metoclopramide Injectable 10 milliGRAM(s) IV Push every 6 hours PRN  senna 1 Tablet(s) Oral at bedtime PRN  sodium chloride 0.9% lock flush 10 milliLiter(s) IV Push every 1 hour PRN    A/P: 56 year old male with relapsed, refractory MCL s/p R-CHOP x 4, RICE x 2, maintenance RTX, salvage Calquence, admitted for CAR T cell therapy with [employer] product. Disease status at time of admission is partial response.   Day - 4    1. Relapsed, refractory MCL   4/15- day - 4- flu / ctx 2/3 - continue CTX hydration until 24 hours post infusion of last dose   Strict I&O, daily weights, prn diuresis   CRS / ICANS score daily until infusion of CAR T cells (22) and twice daily thereafter   BID labs 0500 & 1500 - monitor for TLS     2. Prophylactic measures   clotrimazole Lozenge 1 Lozenge Oral five times a day  fluconAZOLE   Tablet 400 milliGRAM(s) Oral daily  trimethoprim  160 mG/sulfamethoxazole 800 mG 1 Tablet(s) Oral every 12 hours  Acyclovir 400mg po TID to start on day -1   If / when neutropenic - will add cipro 500mg po BID   GI Prophylaxis:  Protonix    3. Mouthcare - NS / NaHCO3 rinses, Mycelex, Biotene; Skin care     4. Transfuse & replete electrolytes prn     7. IV hydration, daily weights, strict I&O, prn diuresis   Lasix 20mg IV x 1     8. Antiemetics, anti-diarrhea medications:   LORazepam   Injectable 1 milliGRAM(s) IV Push every 6 hours PRN  metoclopramide Injectable 10 milliGRAM(s) IV Push every 6 hours PRN  ondansetron  IVPB 16 milliGRAM(s) IV Intermittent every 24 hours      I have written the above note for Dr. Banks who performed service with me in the room.   Tiffany Lucero NP-C ([phone])    I have seen and examined patient with NP, I agree with above note as scribed.

## 2022-04-15 NOTE — DIETITIAN INITIAL EVALUATION ADULT - PERTINENT MEDS FT
MEDICATIONS  (STANDING):  acetaminophen     Tablet .. 650 milliGRAM(s) Oral every 6 hours  allopurinol 300 milliGRAM(s) Oral daily  Biotene Dry Mouth Oral Rinse 5 milliLiter(s) Swish and Spit five times a day  carvedilol 3.125 milliGRAM(s) Oral every 12 hours  chlorhexidine 4% Liquid 1 Application(s) Topical <User Schedule>  clotrimazole Lozenge 1 Lozenge Oral five times a day  cyclophosphamide IVPB (eMAR) 1105 milliGRAM(s) IV Intermittent every 24 hours  fluconAZOLE   Tablet 400 milliGRAM(s) Oral daily  fludarabine IVPB (eMAR) 66 milliGRAM(s) IV Intermittent every 24 hours  folic acid 1 milliGRAM(s) Oral daily  multivitamin 1 Tablet(s) Oral daily  ondansetron  IVPB 16 milliGRAM(s) IV Intermittent every 24 hours  pantoprazole    Tablet 40 milliGRAM(s) Oral before breakfast  sodium bicarbonate Mouth Rinse 10 milliLiter(s) Swish and Spit five times a day  sodium chloride 0.9% 1000 milliLiter(s) (165 mL/Hr) IV Continuous <Continuous>  sodium chloride 0.9%. 1000 milliLiter(s) (20 mL/Hr) IV Continuous <Continuous>  trimethoprim  160 mG/sulfamethoxazole 800 mG 1 Tablet(s) Oral every 12 hours    MEDICATIONS  (PRN):  acetaminophen     Tablet .. 650 milliGRAM(s) Oral every 6 hours PRN Temp greater or equal to 38C (100.4F), Mild Pain (1 - 3)  LORazepam   Injectable 1 milliGRAM(s) IV Push every 6 hours PRN Anxiety / nausea / vomiting  metoclopramide Injectable 10 milliGRAM(s) IV Push every 6 hours PRN Nausea and/or Vomiting  senna 1 Tablet(s) Oral at bedtime PRN Constipation  sodium chloride 0.9% lock flush 10 milliLiter(s) IV Push every 1 hour PRN Pre/post blood products, medications, blood draw, and to maintain line patency

## 2022-04-15 NOTE — DIETITIAN INITIAL EVALUATION ADULT - PERTINENT LABORATORY DATA
04-15    141  |  106  |  17  ----------------------------<  106<H>  4.4   |  24  |  1.10    Ca    8.3<L>      15 Apr 2022 06:06  Phos  3.1     04-15  Mg     2.0     04-15    TPro  5.8<L>  /  Alb  3.9  /  TBili  0.3  /  DBili  0.1  /  AST  21  /  ALT  20  /  AlkPhos  71  04-15

## 2022-04-15 NOTE — DIETITIAN INITIAL EVALUATION ADULT - NSFNSADHERENCEPTAFT_GEN_A_CORE
Therapeutic restrictions/modifications: avoids concentrated sweets (family Hx of DM), avoids dairy (secondary to Unsupported Beliefs/Attitudes About Food related to cancer diagnosis)

## 2022-04-15 NOTE — DIETITIAN INITIAL EVALUATION ADULT - REASON FOR ADMISSION
Per chart "56 year old male with relapsed, refractory MCL s/p R-CHOP x 4, RICE x 2, maintenance RTX, salvage Calquence, admitted for CAR T cell therapy with [employer] product. Disease status at time of admission is partial response. Day - 4"

## 2022-04-15 NOTE — CONSULT NOTE ADULT - PROBLEM SELECTOR RECOMMENDATION 2
IV chemotherapy as part of CAR-T treatment  Continue to monitor for known adverse effects/symptoms  Risk of infectious complications given anticipated impact on hematopoietic lineages and resultant immune compromise  Monitor for neurologic changes and/or signs of CRS  PPx/Tx per primary team  Relevant considerations:

## 2022-04-15 NOTE — DIETITIAN INITIAL EVALUATION ADULT - ENERGY INTAKE
Adequate (%) Pt reports good appetite/PO intake, ~% of meals consumed since admit. Requesting double portions to optimize PO intake, RD will honor as able. Declined oral nutritional supplement at this time. Food preferences obtained; RD to honor as able.

## 2022-04-15 NOTE — DIETITIAN INITIAL EVALUATION ADULT - NSFNSNUTRHOMESUPPLEMENTFT_GEN_A_CORE
Micronutrient supplementation: multivitamin, omega 3, Vitamin B12. Protein-energy supplementation: none.

## 2022-04-15 NOTE — CONSULT NOTE ADULT - SUBJECTIVE AND OBJECTIVE BOX
HPI:  This is a 56 year old male with a history of atrial fibrillation (s/p ablation x 2, internal loop recorder) and  MCL, initially diagnosed 10/2016. He was initially seen at [facility] for a left inguinal mass. CT scans 3/21/16 showed left sided pelvic and inguinal adenopathy. A left inguinal lymph node biopsy 16 showed morphologic and immunophenotypic findings consistent with MCL. He was treated with 4 cycles of R-CHOP, and 2 cycles of RICE consolidation. A bone marrow biopsy 2/10/17 showed normocellular bone marrow. He was admitted 3/27/17 for an autologous pbsct with CBV prep, and received the cells 17. He engrafted 17. He was discharged and completed maintenance Rituxan. In 10/21, he noticed a lump in the right groin. A lymph node biopsy 21 was consistent with relapsed MCL. He was treated with Calquence. He didn't tolerate Calquence well, with reports of frequent abdominal pain. He is admitted today for CAR T therapy with Tecartus product for relapsed, refractory MCL. His disease status at this time is partial remission.  (2022 12:36)      Palliative Global Assessment  A review of the paper chart has been conducted  HCP form present:               Yes and valid []               Yes but invalid []                No [x]   MOLST present:                   Yes and valid []               Yes but invalid []                No [x]  Incapacity form present:   Yes and valid []                Yes but invalid []                No []                 N/A [x]  Living Will:                            Yes and valid []                Yes but invalid []                No [x]      Family Heatlh Care Decision Act Surrogate Decision Maker Hierarchy  [facility] Article 81 Guardian-->Spouse or domestic partner--> Adult child-->Parent-->Sibling--> Close friend      Contacts listed in the paper or electronic chart  Name Aga Kapoor Wife  Number(s) [phone]  Translation Required: None  Spouse or Partner: Aga  Family unit: Wife and children, his youngest is 13  Family history of hematologic malignancy: None  Residence: [ ] Apartment   [x] House  [ ] Other  Degree of functionality in the home: Full   Performance Status (PPSv2): 80  BMI:BMI (kg/m2): 30.6 (22 @ 10:40)  Engagement in the home:  Employment: [ ] Currently employed [ x] Exited workforce due to illness  [ ] Retired prior to illness  [ ] No/distant history of employment   Support network (degree):  ---Family:        [ ] Low  [ ] Moderate  [ x] High  ---Neighbors: [ x] Low  [ ] Moderate  [ ] High  ---Social:         [ ] Low  [ ] Moderate  [ x] High  ---Spiritual:    [ x] Low  [ ] Moderate  [ ] High  Financial concerns: TBD  Coping Strategies: Listening to music and speaking with his wife  Caregiver burden/fatigue/needs: Childcare issues/concerns given his wife's visitation/  Grief identified: [] Yes [x] No  Medical communication and decision making preferences: TBD        PERTINENT PM/SXH:   Non Hodgkin&#x27;s lymphoma    Atrial flutter    Migraine    Sinus bradycardia    Paroxysmal atrial fibrillation    GERD (gastroesophageal reflux disease)      H/O total knee replacement    S/P right knee arthroscopy    History of arthroscopy of left shoulder    H/O prior ablation treatment    Status post placement of implantable loop recorder    H/O stem cell transplant      FAMILY HISTORY:  Family history of hypertension (Mother)  Mother    Family history of diabetes mellitus (Father)  Father      ITEMS NOT CHECKED ARE NOT PRESENT    SOCIAL HISTORY:   Significant other/partner[x ]  Children[x ]  Congregational/Spirituality:  Substance hx:  [ ]   Tobacco hx:  [ ]   Alcohol hx: [ ]   Home Opioid hx:  [ ] I-Stop Reference No:  Living Situation: [ ]Home  [ ]Long term care  [ ]Rehab [ ]Other    ADVANCE DIRECTIVES:    DNR  MOLST  [ ]  Living Will  [ ]   DECISION MAKER(s):  [ ] Health Care Proxy(s)  [ ] Surrogate(s)  [ ] Guardian           Name(s): Phone Number(s):    BASELINE (I)ADL(s) (prior to admission):  [place]: [ ]Total  [ ] Moderate [ ]Dependent    Allergies    No Known Allergies    Intolerances    MEDICATIONS  (STANDING):  acetaminophen     Tablet .. 650 milliGRAM(s) Oral every 6 hours  allopurinol 300 milliGRAM(s) Oral daily  Biotene Dry Mouth Oral Rinse 5 milliLiter(s) Swish and Spit five times a day  carvedilol 3.125 milliGRAM(s) Oral every 12 hours  chlorhexidine 4% Liquid 1 Application(s) Topical <User Schedule>  clotrimazole Lozenge 1 Lozenge Oral five times a day  cyclophosphamide IVPB (eMAR) 1105 milliGRAM(s) IV Intermittent every 24 hours  fluconAZOLE   Tablet 400 milliGRAM(s) Oral daily  fludarabine IVPB (eMAR) 66 milliGRAM(s) IV Intermittent every 24 hours  folic acid 1 milliGRAM(s) Oral daily  multivitamin 1 Tablet(s) Oral daily  ondansetron  IVPB 16 milliGRAM(s) IV Intermittent every 24 hours  pantoprazole    Tablet 40 milliGRAM(s) Oral before breakfast  sodium bicarbonate Mouth Rinse 10 milliLiter(s) Swish and Spit five times a day  sodium chloride 0.9% 1000 milliLiter(s) (165 mL/Hr) IV Continuous <Continuous>  sodium chloride 0.9%. 1000 milliLiter(s) (20 mL/Hr) IV Continuous <Continuous>  trimethoprim  160 mG/sulfamethoxazole 800 mG 1 Tablet(s) Oral every 12 hours    MEDICATIONS  (PRN):  acetaminophen     Tablet .. 650 milliGRAM(s) Oral every 6 hours PRN Temp greater or equal to 38C (100.4F), Mild Pain (1 - 3)  LORazepam   Injectable 1 milliGRAM(s) IV Push every 6 hours PRN Anxiety / nausea / vomiting  metoclopramide Injectable 10 milliGRAM(s) IV Push every 6 hours PRN Nausea and/or Vomiting  senna 1 Tablet(s) Oral at bedtime PRN Constipation  sodium chloride 0.9% lock flush 10 milliLiter(s) IV Push every 1 hour PRN Pre/post blood products, medications, blood draw, and to maintain line patency    PRESENT SYMPTOMS: [ ]Unable to obtain due to poor mentation   Source if other than patient:  [ ]Family   [ ]Team [ ] Inferred/Assessed    Pain: [ ]yes [x ]no  QOL impact -   Location -                    Aggravating factors -  Quality -  Radiation -  Timing-  Severity (0-10 scale):  Minimal acceptable level (0-10 scale):     PAIN AD Score:     [email or web address]/[email or web address] (press ctrl +  left click to view)    [ ] Inferred Symptoms    Fatigue:                             [x ] None  [ ]Mild [ ]Moderate [ ]Severe  Drowsiness:                      [x ] None  [ ]Mild [ ]Moderate [ ]Severe  Nausea:                             [ x] None  [ ]Mild [ ]Moderate [ ]Severe  Dysgeusia:                         [ x] None  [ ]Mild [ ]Moderate [ ]Severe  Loss of appetite:              [ x] None  [ ]Mild [ ]Moderate [ ]Severe  Constipation:                    [x ] None  [ ]Mild [ ]Moderate [ ]Severe  Dyspnea:                           [x ] None  [ ]Mild [ ]Moderate [ ]Severe  Anxiety:                             [ ] None  [ x]Mild [ ]Moderate [ ]Severe  Depression:                      [x ] None  [ ]Mild [ ]Moderate [ ]Severe  Cognitive changes:          [ x] None  [ ]Mild [ ]Moderate [ ]Severe  Insomnia      :                    [ x] None  [ ]Mild [ ]Moderate [ ]Severe  Isolation:                           [x ] None  [ ]Mild [ ]Moderate [ ]Severe  Loneliness:                        [x ] None  [ ]Mild [ ]Moderate [ ]Severe  Lack of   Wellbeing:                        [ x] None  [ ]Mild [ ]Moderate [ ]Severe    Other Symptoms: None  [x ]All other review of systems negative     Palliative Performance Status Version 2:      80   %    http://[facility].org/files/news/palliative_performance_scale_[email or web address].pdf  PHYSICAL EXAM:  Vital Signs Last 24 Hrs  T(C): 36.8 (15 Apr 2022 13:22), Max: 36.8 (15 Apr 2022 13:22)  T(F): 98.2 (15 Apr 2022 13:22), Max: 98.2 (15 Apr 2022 13:22)  HR: 70 (15 Apr 2022 13:22) (62 - 74)  BP: 111/68 (15 Apr 2022 13:22) (111/68 - 137/81)  BP(mean): --  RR: 18 (15 Apr 2022 13:22) (18 - 18)  SpO2: 98% (15 Apr 2022 13:22) (97% - 99%) I&O's Summary    2022 07:01  -  15 Apr 2022 07:00  --------------------------------------------------------  IN: 3709 mL / OUT: 4050 mL / NET: -341 mL    15 Apr 2022 07:01  -  15 Apr 2022 16:34  --------------------------------------------------------  IN: 1842 mL / OUT: 3500 mL / NET: -1658 mL      GENERAL:  [x ]Alert  [ ]Oriented x   [ ]Lethargic  [ ]Cachexia  [ ]Unarousable  [ ]Verbal  [ ]Non-Verbal [ ] Engaging   [  ] Confused  [  ] No distress  Behavioral:   [ ] Anxiety  [ ] Delirium [ ] Agitation [ x] Calm   [  ] Restless [ ] Other  HEENT:  [x ]Normal   [ ]Dry mouth   [ ]ET Tube/Trach  [ ]Oral lesions   [ ] NGT  [ ] Mucositis [ ] Oral  Bleeding  PULMONARY:   [ x]Clear [ ]Tachypnea  [ ]Audible excessive secretions [  ] No tachypnea  [ ]Rhonchi        [ ]Right [ ]Left [ ]Bilateral  [ ]Crackles        [ ]Right [ ]Left [ ]Bilateral  [ ]Wheezing     [ ]Right [ ]Left [ ]Bilateral  [ ]Diminished breath sounds [ ]right [ ]left [ ]bilateral  CARDIOVASCULAR:    [ x]Regular [ ]Irregular [ ]Tachy  [ ]Venkata [ ]Murmur [ ] JVD  GASTROINTESTINAL:  [x ]Soft  [ ]Distended   [ ]+BS  [ ]Non tender [ ]Tender  [ ]PEG [ ]OGT/ NGT  Last BM:   GENITOURINARY:  [x ]Normal [ ] Incontinent   [ ]Oliguria/Anuria   [ ]Hinds  MUSCULOSKELETAL:   [ x]Normal   [ ]Weakness  [ ]Bed/Wheelchair bound [ ]Edema  NEUROLOGIC:   [ x]No focal deficits  [ ]Cognitive impairment  [ ]Dysphagia [ ]Dysarthria [ ]Paresis [ ]Other   SKIN:   [x ]Normal    [ ]Rash  [ ]Pressure ulcer(s)   [  ] Lesion   [    ]  Wounds       Present on admission [ ]y [ ]n    CRITICAL CARE:  [ ] Shock Present  [ ]Septic [ ]Cardiogenic [ ]Neurologic [ ]Hypovolemic  [ ]  Vasopressors [ ]  Inotropes   [ ]Respiratory failure present [ ]Mechanical ventilation [ ]Non-invasive ventilatory support [ ]High flow  [ ]Acute  [ ]Chronic [ ]Hypoxic  [ ]Hypercarbic [ ]Other  [ ]Other organ failure     LABS:                        12.1   5.28  )-----------( 165      ( 15 Apr 2022 06:06 )             38.5   04-15    141  |  106  |  17  ----------------------------<  106<H>  4.4   |  24  |  1.10    Ca    8.3<L>      15 Apr 2022 06:06  Phos  3.1     04-15  Mg     2.0     -15    TPro  5.8<L>  /  Alb  3.9  /  TBili  0.3  /  DBili  0.1  /  AST  21  /  ALT  20  /  AlkPhos  71  04-15  PT/INR - ( 15 Apr 2022 06:06 )   PT: 12.0 sec;   INR: 1.04 ratio         PTT - ( 15 Apr 2022 06:06 )  PTT:26.9 sec    Urinalysis Basic - ( 2022 14:44 )    Color: Light Yellow / Appearance: Clear / S.017 / pH: x  Gluc: x / Ketone: Negative  / Bili: Negative / Urobili: Negative   Blood: x / Protein: Negative / Nitrite: Negative   Leuk Esterase: Negative / RBC: x / WBC x   Sq Epi: x / Non Sq Epi: x / Bacteria: x      RADIOLOGY & ADDITIONAL STUDIES:    PROTEIN CALORIE MALNUTRITION PRESENT: [ ]mild [ ]moderate [ ]severe [ ]underweight [ ]morbid obesity  https://www.andeal.org/vault/2440/web/files/ONC/Table_Clinical%20Characteristics%20to%20Document%20Malnutrition-White%20JV%20et%20al%2020.pdf    Height (cm): 181 (22 @ 10:40), 180.3 (22 @ 08:57), 180 (22 @ 16:31)  Weight (kg): 100.4 (22 @ 10:40), 101.999 (22 @ 17:00), 98.4 (22 @ 08:57)  BMI (kg/m2): 30.6 (22 @ 10:40), 31.4 (22 @ 17:00), 30.3 (22 @ 08:57)    [ ]PPSV2 < or = to 30% [ ]significant weight loss  [ ]poor nutritional intake  [ ]anasarca      [ ]Artificial Nutrition      REFERRALS:   [ ]Chaplaincy  [ ]Hospice  [ ]Child Life  [ ]Social Work  [ ]Case management [ ]Holistic Therapy     Goals of Care Document:

## 2022-04-15 NOTE — CONSULT NOTE ADULT - PROBLEM SELECTOR RECOMMENDATION 9
Predominant symptom: anxiety  Likely due to concerns over illness  Degree of control: well controlled  Relative to previous day: comparable  Current treatment regimen: none  Recommendations: continue to provide support  Risk mitigation: n/a  Adverse events noted: none

## 2022-04-15 NOTE — DIETITIAN INITIAL EVALUATION ADULT - PERSON TAUGHT/METHOD
Discussed importance of adequate consumption of meals/supplements to optimize protein-energy intake. Encouraged small/frequent meals, nutrient dense snacks, prioritizing protein foods at meal time. Reviewed BMT nutrition recommendations: food safety recommendations, increased protein-energy needs./verbal instruction/individual instruction/teach back - (Patient repeats in own words)/patient instructed

## 2022-04-16 LAB
ALBUMIN SERPL ELPH-MCNC: 4.1 G/DL — SIGNIFICANT CHANGE UP (ref 3.3–5)
ALBUMIN SERPL ELPH-MCNC: 4.2 G/DL — SIGNIFICANT CHANGE UP (ref 3.3–5)
ALP SERPL-CCNC: 82 U/L — SIGNIFICANT CHANGE UP (ref 40–120)
ALP SERPL-CCNC: 89 U/L — SIGNIFICANT CHANGE UP (ref 40–120)
ALT FLD-CCNC: 19 U/L — SIGNIFICANT CHANGE UP (ref 10–45)
ALT FLD-CCNC: 21 U/L — SIGNIFICANT CHANGE UP (ref 10–45)
ANION GAP SERPL CALC-SCNC: 12 MMOL/L — SIGNIFICANT CHANGE UP (ref 5–17)
ANION GAP SERPL CALC-SCNC: 13 MMOL/L — SIGNIFICANT CHANGE UP (ref 5–17)
APTT BLD: 24.9 SEC — LOW (ref 27.5–35.5)
AST SERPL-CCNC: 23 U/L — SIGNIFICANT CHANGE UP (ref 10–40)
AST SERPL-CCNC: 23 U/L — SIGNIFICANT CHANGE UP (ref 10–40)
BASOPHILS # BLD AUTO: 0.01 K/UL — SIGNIFICANT CHANGE UP (ref 0–0.2)
BASOPHILS NFR BLD AUTO: 0.3 % — SIGNIFICANT CHANGE UP (ref 0–2)
BILIRUB SERPL-MCNC: 0.2 MG/DL — SIGNIFICANT CHANGE UP (ref 0.2–1.2)
BILIRUB SERPL-MCNC: 0.3 MG/DL — SIGNIFICANT CHANGE UP (ref 0.2–1.2)
BUN SERPL-MCNC: 18 MG/DL — SIGNIFICANT CHANGE UP (ref 7–23)
BUN SERPL-MCNC: 18 MG/DL — SIGNIFICANT CHANGE UP (ref 7–23)
CALCIUM SERPL-MCNC: 8.5 MG/DL — SIGNIFICANT CHANGE UP (ref 8.4–10.5)
CALCIUM SERPL-MCNC: 8.6 MG/DL — SIGNIFICANT CHANGE UP (ref 8.4–10.5)
CHLORIDE SERPL-SCNC: 103 MMOL/L — SIGNIFICANT CHANGE UP (ref 96–108)
CHLORIDE SERPL-SCNC: 104 MMOL/L — SIGNIFICANT CHANGE UP (ref 96–108)
CK SERPL-CCNC: 131 U/L — SIGNIFICANT CHANGE UP (ref 30–200)
CO2 SERPL-SCNC: 23 MMOL/L — SIGNIFICANT CHANGE UP (ref 22–31)
CO2 SERPL-SCNC: 24 MMOL/L — SIGNIFICANT CHANGE UP (ref 22–31)
CREAT SERPL-MCNC: 1.2 MG/DL — SIGNIFICANT CHANGE UP (ref 0.5–1.3)
CREAT SERPL-MCNC: 1.22 MG/DL — SIGNIFICANT CHANGE UP (ref 0.5–1.3)
CRP SERPL-MCNC: 10 MG/L — HIGH (ref 0–4)
D DIMER BLD IA.RAPID-MCNC: 318 NG/ML DDU — HIGH
EGFR: 69 ML/MIN/1.73M2 — SIGNIFICANT CHANGE UP
EGFR: 71 ML/MIN/1.73M2 — SIGNIFICANT CHANGE UP
EOSINOPHIL # BLD AUTO: 0.07 K/UL — SIGNIFICANT CHANGE UP (ref 0–0.5)
EOSINOPHIL NFR BLD AUTO: 1.8 % — SIGNIFICANT CHANGE UP (ref 0–6)
FERRITIN SERPL-MCNC: 106 NG/ML — SIGNIFICANT CHANGE UP (ref 30–400)
FIBRINOGEN PPP-MCNC: 491 MG/DL — SIGNIFICANT CHANGE UP (ref 330–520)
GLUCOSE SERPL-MCNC: 116 MG/DL — HIGH (ref 70–99)
GLUCOSE SERPL-MCNC: 118 MG/DL — HIGH (ref 70–99)
HCT VFR BLD CALC: 38 % — LOW (ref 39–50)
HCT VFR BLD CALC: 38.5 % — LOW (ref 39–50)
HGB BLD-MCNC: 12.1 G/DL — LOW (ref 13–17)
HGB BLD-MCNC: 12.3 G/DL — LOW (ref 13–17)
IMM GRANULOCYTES NFR BLD AUTO: 0.5 % — SIGNIFICANT CHANGE UP (ref 0–1.5)
INR BLD: 1.03 RATIO — SIGNIFICANT CHANGE UP (ref 0.88–1.16)
LDH SERPL L TO P-CCNC: 180 U/L — SIGNIFICANT CHANGE UP (ref 50–242)
LDH SERPL L TO P-CCNC: 182 U/L — SIGNIFICANT CHANGE UP (ref 50–242)
LYMPHOCYTES # BLD AUTO: 0.45 K/UL — LOW (ref 1–3.3)
LYMPHOCYTES # BLD AUTO: 11.3 % — LOW (ref 13–44)
MAGNESIUM SERPL-MCNC: 2 MG/DL — SIGNIFICANT CHANGE UP (ref 1.6–2.6)
MAGNESIUM SERPL-MCNC: 2.1 MG/DL — SIGNIFICANT CHANGE UP (ref 1.6–2.6)
MCHC RBC-ENTMCNC: 30.8 PG — SIGNIFICANT CHANGE UP (ref 27–34)
MCHC RBC-ENTMCNC: 31.2 PG — SIGNIFICANT CHANGE UP (ref 27–34)
MCHC RBC-ENTMCNC: 31.8 GM/DL — LOW (ref 32–36)
MCHC RBC-ENTMCNC: 31.9 GM/DL — LOW (ref 32–36)
MCV RBC AUTO: 96.3 FL — SIGNIFICANT CHANGE UP (ref 80–100)
MCV RBC AUTO: 97.9 FL — SIGNIFICANT CHANGE UP (ref 80–100)
MONOCYTES # BLD AUTO: 0.29 K/UL — SIGNIFICANT CHANGE UP (ref 0–0.9)
MONOCYTES NFR BLD AUTO: 7.3 % — SIGNIFICANT CHANGE UP (ref 2–14)
NEUTROPHILS # BLD AUTO: 3.13 K/UL — SIGNIFICANT CHANGE UP (ref 1.8–7.4)
NEUTROPHILS NFR BLD AUTO: 78.8 % — HIGH (ref 43–77)
NRBC # BLD: 0 /100 WBCS — SIGNIFICANT CHANGE UP (ref 0–0)
NRBC # BLD: 0 /100 WBCS — SIGNIFICANT CHANGE UP (ref 0–0)
NT-PROBNP SERPL-SCNC: 153 PG/ML — SIGNIFICANT CHANGE UP (ref 0–300)
PHOSPHATE SERPL-MCNC: 3.2 MG/DL — SIGNIFICANT CHANGE UP (ref 2.5–4.5)
PHOSPHATE SERPL-MCNC: 3.3 MG/DL — SIGNIFICANT CHANGE UP (ref 2.5–4.5)
PLATELET # BLD AUTO: 161 K/UL — SIGNIFICANT CHANGE UP (ref 150–400)
PLATELET # BLD AUTO: 187 K/UL — SIGNIFICANT CHANGE UP (ref 150–400)
POTASSIUM SERPL-MCNC: 4.2 MMOL/L — SIGNIFICANT CHANGE UP (ref 3.5–5.3)
POTASSIUM SERPL-MCNC: 4.4 MMOL/L — SIGNIFICANT CHANGE UP (ref 3.5–5.3)
POTASSIUM SERPL-SCNC: 4.2 MMOL/L — SIGNIFICANT CHANGE UP (ref 3.5–5.3)
POTASSIUM SERPL-SCNC: 4.4 MMOL/L — SIGNIFICANT CHANGE UP (ref 3.5–5.3)
PROT SERPL-MCNC: 6.1 G/DL — SIGNIFICANT CHANGE UP (ref 6–8.3)
PROT SERPL-MCNC: 6.3 G/DL — SIGNIFICANT CHANGE UP (ref 6–8.3)
PROTHROM AB SERPL-ACNC: 12 SEC — SIGNIFICANT CHANGE UP (ref 10.5–13.4)
RBC # BLD: 3.88 M/UL — LOW (ref 4.2–5.8)
RBC # BLD: 4 M/UL — LOW (ref 4.2–5.8)
RBC # FLD: 13.7 % — SIGNIFICANT CHANGE UP (ref 10.3–14.5)
RBC # FLD: 13.8 % — SIGNIFICANT CHANGE UP (ref 10.3–14.5)
SODIUM SERPL-SCNC: 139 MMOL/L — SIGNIFICANT CHANGE UP (ref 135–145)
SODIUM SERPL-SCNC: 140 MMOL/L — SIGNIFICANT CHANGE UP (ref 135–145)
URATE SERPL-MCNC: 4.4 MG/DL — SIGNIFICANT CHANGE UP (ref 3.4–8.8)
URATE SERPL-MCNC: 5.3 MG/DL — SIGNIFICANT CHANGE UP (ref 3.4–8.8)
WBC # BLD: 3.33 K/UL — LOW (ref 3.8–10.5)
WBC # BLD: 3.97 K/UL — SIGNIFICANT CHANGE UP (ref 3.8–10.5)
WBC # FLD AUTO: 3.33 K/UL — LOW (ref 3.8–10.5)
WBC # FLD AUTO: 3.97 K/UL — SIGNIFICANT CHANGE UP (ref 3.8–10.5)

## 2022-04-16 PROCEDURE — 99291 CRITICAL CARE FIRST HOUR: CPT

## 2022-04-16 RX ADMIN — CARVEDILOL PHOSPHATE 3.12 MILLIGRAM(S): 80 CAPSULE, EXTENDED RELEASE ORAL at 17:04

## 2022-04-16 RX ADMIN — Medication 1 LOZENGE: at 16:53

## 2022-04-16 RX ADMIN — Medication 1 LOZENGE: at 12:37

## 2022-04-16 RX ADMIN — Medication 5 MILLILITER(S): at 21:03

## 2022-04-16 RX ADMIN — Medication 10 MILLIGRAM(S): at 22:00

## 2022-04-16 RX ADMIN — Medication 1 LOZENGE: at 23:30

## 2022-04-16 RX ADMIN — FLUDARABINE PHOSPHATE 205.28 MILLIGRAM(S): 25 INJECTION, SOLUTION INTRAVENOUS at 15:58

## 2022-04-16 RX ADMIN — Medication 10 MILLILITER(S): at 09:05

## 2022-04-16 RX ADMIN — SODIUM CHLORIDE 165 MILLILITER(S): 9 INJECTION, SOLUTION INTRAVENOUS at 17:01

## 2022-04-16 RX ADMIN — FLUCONAZOLE 400 MILLIGRAM(S): 150 TABLET ORAL at 12:37

## 2022-04-16 RX ADMIN — Medication 5 MILLILITER(S): at 09:05

## 2022-04-16 RX ADMIN — CARVEDILOL PHOSPHATE 3.12 MILLIGRAM(S): 80 CAPSULE, EXTENDED RELEASE ORAL at 05:11

## 2022-04-16 RX ADMIN — Medication 1 TABLET(S): at 05:11

## 2022-04-16 RX ADMIN — Medication 1 MILLIGRAM(S): at 12:35

## 2022-04-16 RX ADMIN — Medication 1 LOZENGE: at 21:03

## 2022-04-16 RX ADMIN — Medication 10 MILLILITER(S): at 16:52

## 2022-04-16 RX ADMIN — Medication 1 TABLET(S): at 17:04

## 2022-04-16 RX ADMIN — Medication 10 MILLILITER(S): at 23:30

## 2022-04-16 RX ADMIN — Medication 10 MILLILITER(S): at 12:37

## 2022-04-16 RX ADMIN — ONDANSETRON 116 MILLIGRAM(S): 8 TABLET, FILM COATED ORAL at 17:01

## 2022-04-16 RX ADMIN — Medication 1 TABLET(S): at 12:35

## 2022-04-16 RX ADMIN — Medication 10 MILLIGRAM(S): at 05:39

## 2022-04-16 RX ADMIN — Medication 5 MILLILITER(S): at 23:30

## 2022-04-16 RX ADMIN — Medication 10 MILLILITER(S): at 21:03

## 2022-04-16 RX ADMIN — PANTOPRAZOLE SODIUM 40 MILLIGRAM(S): 20 TABLET, DELAYED RELEASE ORAL at 05:11

## 2022-04-16 RX ADMIN — Medication 300 MILLIGRAM(S): at 12:35

## 2022-04-16 RX ADMIN — Medication 5 MILLILITER(S): at 12:35

## 2022-04-16 RX ADMIN — Medication 5 MILLILITER(S): at 16:53

## 2022-04-16 RX ADMIN — Medication 1 LOZENGE: at 09:05

## 2022-04-16 RX ADMIN — CHLORHEXIDINE GLUCONATE 1 APPLICATION(S): 213 SOLUTION TOPICAL at 09:04

## 2022-04-16 RX ADMIN — Medication 152.63 MILLIGRAM(S): at 16:54

## 2022-04-16 NOTE — HISTORY OF PRESENT ILLNESS
[de-identified] : Mr. Chavez is a 53 year old male with a history of MCL, initially diagnosed 10/2016. On 11/15/2016 he was seen by Dr. Bravo at [facility], after noticing a left inguinal mass for which he sought medical attention. CT scans (3/21/2016) revealed left-sided pelvic and left inguinal adenopathy. A 0.7 cm indeterminate lesion in the right iliac bone was also noted. A left inguinal lymph node biopsy was performed 9/24/2016 which revealed morphologic and immunophenotypic findings consistent with mantle cell lymphoma. He is status post 4 cycles of R-CHOP, and two cycles of RICE consolidation. A bone marrow biopsy on 2/10/17 showed normocellular bone marrow with trilineage hematopoiesis and maturation. He was admitted 3/27/17 for an autologous peripheral blood stem cell transplant with CBV  regimen. \par  \par Upon admission, a TLC was placed in IR. Mr. Chavez received IV hydration, nutritional support, pain management, antinausea medication, antidiarrheals, antibiotic, antiviral, PCP, antifungal and GI prophylaxis. Labs were monitored daily. Mr. Chavez received transfusional support and electrolyte repletion as needed. When his ANC dropped below 1000, he was started on prophylactic ciprofloxacin. When he became febrile, his antibiotic coverage was broadened as appropriate. \par  \par On 4/5/17, Mr. Chavez received 314ml of thawed, pooled, washed, mobilized, plasma reduced, HPC apheresis over approximately 1 hour. He tolerated the infusion well with no adverse side effects noted. Engraftment was noted on 4/16/17, and the Zarxio and antibiotics were discontinued. \par  \par Mr. Chavez's transplant course was complicated by recurrent headaches, oral and GI mucositis, dizziness, and chest pain resulting in a RRT on 4/10/17. The chest pain was self limiting. A second RRT was called on 4/16/17, for palpitations and dizziness. Mr. Chavez was found to be in atrial flutter with RVR. He was transferred to telemetry and followed by cardiology. \par A-fib/a-flutter was resistant to single rate agent, and while in arrhythmia, pt would become symptomatic, hypotensive. Ablation was was preformed, however was unsuccessful. Pt continued to have frequent changes in rhythm. As a-fib was usually preceded by PAC, and mediport tip was in r atrium, concern was this may have been involved. Mediport was removed but patient continued to have runs of non conducted APCs. Patient was followed by EP during this admission who ruled out any need for PPM insertion. Patient was started on Cardizem and Toprol which he tolerated well. He did have episodes of bradycardia during his sleep with episodes of Atrial flutter with RVR as well. However, he remained asymptomatic. \par Pt was d/c 5/5/2017 to home in stable condition with instructions to follow up with hematology and EP. [de-identified] : Presenting today for a follow up appointment. He has completed  maintenance Rituxan. Last Rituxan was received in jan... PENELOPE....Patient reports continued fatigue. Recent CT/PET scan PENELOPE.  Patient also offers that he had the shingles and was treated with valtrex..some residual discomfort...He continues to be followed by cardiology; his cardiologist increased the Diltiazem dosage to 180mg q daily, however patient reported feeling increasingly fatigued and returned to 120mg q daily and now notes less fatigue. He is in eval for a cardiac procedure.. His cardiologist was waiting for the rituxan to be completed and covid to pass..He is compliant with all medications. Denies mouth sores, eye dryness, nausea, vomiting, diarrhea. No LE edema, CP or SOB. \par \par On 10/18/21, patient presents for a follow up visit. Overall patient is well. States has generalized pruritus and recently was seen by dermatologist. Has been applying triamcinolone cream with no relief. States recently felt a "lump" of the right groin. Given the patients past medical history patient is very concerned. Denies fever, chills, nausea, vomiting, diarrhea, rash, mouth sores, dysuria or any signs of active bleeding. Denies SOB, chest pain or B/L LE edema. Remains compliant with medications prescribed. \par \par On 11/30//21, patient presents with spouse for a follow up visit. Overall patient feels fatigued. Had a lymph node biopsy on 11/12/21 and results are c/w relapsed MCL.. Denies fever, chills, nausea, vomiting, diarrhea, rash, mouth sores, dysuria or any signs of active bleeding. Denies SOB, chest pain or B/L LE edema. Remains compliant with medications prescribed. Here to discuss further therapy..\par \par On 1/25/22, patient presents for a follow up visit.  On calquence and not  tolerating well. Was in ER with severe epigastric pain...now on protonix...Complains of acid reflux and takes tums prn. Complains of headaches every other day. Denies fever, chills, nausea, vomiting, diarrhea, rash, mouth sores, dysuria or any signs of active bleeding. Denies SOB, chest pain or B/L LE edema. in wheelchair today\par \par 2/18/22: Patient is here for a follow up visit.He states that he still has epigastric pain with Calquence.He takes Pantoprazole as prescribed.Denies fever,chills, nausea, vomiting, diarrhea,rash, mouth sores,SOB,chest pain or any signs of active bleeding.He has an appointment with PCP tomorrow to get a referral for an endoscopy by GI.\par \par 4/13//22:Patient is seen for a follow up visit s/p  CAR-T collection.He is accompanied by his wife.Denies fever, chills, nausea, vomiting, diarrhea,mouth sores, skin rash,SOB,chest pain or any signs of active bleeding. He states that his occasional epigastric pain from Calquence which  is improved off drug....He states that he went to ER on 2/28/22 secondary to epigastric pain and was advised to follow up with GI. He is off calquence and blood thinners...collection of apheresis material is done and manufacture was successful

## 2022-04-16 NOTE — PROGRESS NOTE ADULT - CRITICAL CARE ATTENDING COMMENT
Admitted for CAR T cell therapy with Tecartus (bresucabtagene autoleucel)  1. Admit to BMTU   2. Day -5 through day -1 record CARTOX / ICANS score daily   3. Day - 5 through day - 3 Fludarabine 30mg / m2 IV given over 30 minutes daily   4. Day - 5 - start cyclophosphamide hydration - 0.9% NS + 10mEq KCL / L at 75 ml/m2 and continue for 24 hours post infusion of last dose of CTX   5. Day - 5 through day - 3 CTX 500mg / m2 IV QD to be given over 2 hours   6. Starting day - 5 monitor labs BID: CBC with differential, CMP, LDH, uric acid, mag, phos, CPK, BNP, PT / PTT / INR / Fibrinogen, d-dimer, CRP, hepatic profile, ferritin  7. Weekly IL-6 levels on Monday, twice weekly (Monday / Thursday) CMV / EBV PCR, three times a week (Monday / Wednesday / Friday) type and screen   8. On day - 5 start allopurinol 300mg po QD   9. Day -2 and -1 REST DAYS   10. On day -1 begin to record CARTOX / ICANS score q 12 hours   11. Day 0 infuse Brexucabtagene autoleucel  hx of afib...seen by [facility] cardiology  4/15 Doing well today Admitted for CAR T cell therapy with Tecartus (bresucabtagene autoleucel)  1. Admit to BMTU   2. Day -5 through day -1 record CARTOX / ICANS score daily   3. Day - 5 through day - 3 Fludarabine 30mg / m2 IV given over 30 minutes daily   4. Day - 5 - start cyclophosphamide hydration - 0.9% NS + 10mEq KCL / L at 75 ml/m2 and continue for 24 hours post infusion of last dose of CTX   5. Day - 5 through day - 3 CTX 500mg / m2 IV QD to be given over 2 hours   6. Starting day - 5 monitor labs BID: CBC with differential, CMP, LDH, uric acid, mag, phos, CPK, BNP, PT / PTT / INR / Fibrinogen, d-dimer, CRP, hepatic profile, ferritin  7. Weekly IL-6 levels on Monday, twice weekly (Monday / Thursday) CMV / EBV PCR, three times a week (Monday / Wednesday / Friday) type and screen   8. On day - 5 start allopurinol 300mg po QD   9. Day -2 and -1 REST DAYS   10. On day -1 begin to record CARTOX / ICANS score q 12 hours   11. Day 0 infuse Brexucabtagene autoleucel  hx of afib...seen by [facility] cardiology  4/16 Doing well today, I/Os are negative

## 2022-04-16 NOTE — REVIEW OF SYSTEMS
[Negative] : Allergic/Immunologic [Fatigue] : no fatigue [Dysphagia] : no dysphagia [Loss of Hearing] : no loss of hearing [Nosebleeds] : no nosebleeds [Hoarseness] : no hoarseness [Odynophagia] : no odynophagia [Mucosal Pain] : no mucosal pain [Abdominal Pain] : no abdominal pain [Vomiting] : no vomiting [Constipation] : no constipation [Diarrhea] : no diarrhea [Skin Rash] : no skin rash [Skin Wound] : no skin wound [Easy Bleeding] : no tendency for easy bleeding [Easy Bruising] : no tendency for easy bruising [FreeTextEntry7] : Acid reflux,Epigastric pain resolved [de-identified] : Right groin lymphadenopathy decreasing in size

## 2022-04-16 NOTE — PHYSICAL EXAM
[Ambulatory and capable of all self care but unable to carry out any work activities] : Status 2- Ambulatory and capable of all self care but unable to carry out any work activities. Up and about more than 50% of waking hours [Normal] : affect appropriate [de-identified] : Right groin lymphadenopathy decreasing in size [de-identified] : healed scar from port removal

## 2022-04-16 NOTE — PROGRESS NOTE ADULT - SUBJECTIVE AND OBJECTIVE BOX
HPC Transplant Team                                                      Critical / Counseling Time Provided: 30 minutes                                                                                                                                                        Chief Complaint: CAR T therapy with Tecartus product for treatment of relapsed, refractory MCL    S: Patient seen and examined with HPC Transplant Team:     All other ROS negative     O: Vitals:   Vital Signs Last 24 Hrs  T(C): 36.9 (16 Apr 2022 05:37), Max: 36.9 (15 Apr 2022 20:20)  T(F): 98.4 (16 Apr 2022 05:37), Max: 98.4 (15 Apr 2022 20:20)  HR: 70 (16 Apr 2022 05:37) (62 - 70)  BP: 130/73 (16 Apr 2022 05:37) (102/66 - 131/78)  BP(mean): --  RR: 18 (16 Apr 2022 05:37) (18 - 19)  SpO2: 90% (16 Apr 2022 05:37) (90% - 100%)        Intake / Output:   04-15 @ 07:01  -  04-16 @ 07:00  --------------------------------------------------------  IN: 5631 mL / OUT: 7575 mL / NET: -1944 mL      Admit weight: 100.4kg   Daily Weight in kG:     PE:   Oropharynx: no erythema or ulcerations   Oral Mucositis:                  -                                      Grade: n/a  CVS: S1, S2 RRR   Lungs: CTA throughout bilaterally   Abdomen: + BS x 4, soft, NT, ND   Extremities: no edema   Gastric Mucositis:       -                                           Grade: n/a  Intestinal Mucositis:     -                                         Grade: n/a   Skin: no rash   TLC: CDI   Neuro: A&O x 3   Pain: 0    Labs:   CBC Full  -  ( 16 Apr 2022 05:41 )  WBC Count : 3.97 K/uL  Hemoglobin : 12.3 g/dL  Hematocrit : 38.5 %  Platelet Count - Automated : 161 K/uL  Mean Cell Volume : 96.3 fl  Mean Cell Hemoglobin : 30.8 pg  Mean Cell Hemoglobin Concentration : 31.9 gm/dL  Auto Neutrophil # : 3.13 K/uL  Auto Lymphocyte # : 0.45 K/uL  Auto Monocyte # : 0.29 K/uL  Auto Eosinophil # : 0.07 K/uL  Auto Basophil # : 0.01 K/uL  Auto Neutrophil % : 78.8 %  Auto Lymphocyte % : 11.3 %  Auto Monocyte % : 7.3 %  Auto Eosinophil % : 1.8 %  Auto Basophil % : 0.3 %                          12.3   3.97  )-----------( 161      ( 16 Apr 2022 05:41 )             38.5     04-16    140  |  104  |  18  ----------------------------<  118<H>  4.4   |  24  |  1.22    Ca    8.5      16 Apr 2022 05:41  Phos  3.2     04-16  Mg     2.1     04-16    TPro  6.1  /  Alb  4.1  /  TBili  0.3  /  DBili  x   /  AST  23  /  ALT  19  /  AlkPhos  82  04-16    PT/INR - ( 16 Apr 2022 05:41 )   PT: 12.0 sec;   INR: 1.03 ratio         PTT - ( 16 Apr 2022 05:41 )  PTT:24.9 sec  LIVER FUNCTIONS - ( 16 Apr 2022 05:41 )  Alb: 4.1 g/dL / Pro: 6.1 g/dL / ALK PHOS: 82 U/L / ALT: 19 U/L / AST: 23 U/L / GGT: x           Lactate Dehydrogenase, Serum: 180 U/L (04-16 @ 05:41)      Cultures:   NA    Radiology:   NA    Meds:   Antimicrobials:   clotrimazole Lozenge 1 Lozenge Oral five times a day  fluconAZOLE   Tablet 400 milliGRAM(s) Oral daily  trimethoprim  160 mG/sulfamethoxazole 800 mG 1 Tablet(s) Oral every 12 hours    Heme / Onc:   cyclophosphamide IVPB (eMAR) 1105 milliGRAM(s) IV Intermittent every 24 hours  fludarabine IVPB (eMAR) 66 milliGRAM(s) IV Intermittent every 24 hours    GI:  pantoprazole    Tablet 40 milliGRAM(s) Oral before breakfast  senna 1 Tablet(s) Oral at bedtime PRN  sodium bicarbonate Mouth Rinse 10 milliLiter(s) Swish and Spit five times a day    Cardiovascular:   carvedilol 3.125 milliGRAM(s) Oral every 12 hours    Other medications:   acetaminophen     Tablet .. 650 milliGRAM(s) Oral every 6 hours  allopurinol 300 milliGRAM(s) Oral daily  Biotene Dry Mouth Oral Rinse 5 milliLiter(s) Swish and Spit five times a day  chlorhexidine 4% Liquid 1 Application(s) Topical <User Schedule>  folic acid 1 milliGRAM(s) Oral daily  multivitamin 1 Tablet(s) Oral daily  ondansetron  IVPB 16 milliGRAM(s) IV Intermittent every 24 hours  sodium chloride 0.9% 1000 milliLiter(s) IV Continuous <Continuous>  sodium chloride 0.9%. 1000 milliLiter(s) IV Continuous <Continuous>    PRN:   acetaminophen     Tablet .. 650 milliGRAM(s) Oral every 6 hours PRN  LORazepam   Injectable 1 milliGRAM(s) IV Push every 6 hours PRN  metoclopramide Injectable 10 milliGRAM(s) IV Push every 6 hours PRN  senna 1 Tablet(s) Oral at bedtime PRN  sodium chloride 0.9% lock flush 10 milliLiter(s) IV Push every 1 hour PRN    A/P:  56 year old male with relapsed, refractory MCL s/p R-CHOP x 4, RICE x 2, maintenance RTX, salvage Calquence, admitted for CAR T cell therapy with Tecartus product. Disease status at time of admission is partial response.   Day -  3    1. Relapsed, refractory MCL   4/15- day - 4- flu / ctx 2/3 - continue CTX hydration until 24 hours post infusion of last dose   Strict I&O, daily weights, prn diuresis   CRS / ICANS score daily until infusion of CAR T cells (4/19/22) and twice daily thereafter   BID labs 0500 & 1500 - monitor for TLS     2. Prophylactic measures   clotrimazole Lozenge 1 Lozenge Oral five times a day  fluconAZOLE   Tablet 400 milliGRAM(s) Oral daily  trimethoprim  160 mG/sulfamethoxazole 800 mG 1 Tablet(s) Oral every 12 hours  Acyclovir 400mg po TID to start on day -1   If / when neutropenic - will add cipro 500mg po BID   GI Prophylaxis:  Protonix    3. Mouthcare - NS / NaHCO3 rinses, Mycelex, Biotene; Skin care     4. Transfuse & replete electrolytes prn     7. IV hydration, daily weights, strict I&O, prn diuresis   Lasix 20mg IV x 1     8. Antiemetics, anti-diarrhea medications:   LORazepam   Injectable 1 milliGRAM(s) IV Push every 6 hours PRN  metoclopramide Injectable 10 milliGRAM(s) IV Push every 6 hours PRN  ondansetron  IVPB 16 milliGRAM(s) IV Intermittent every 24 hours      I have written the above note for Dr. Khan  who performed service with me in the room.   Aurelia Steele NP-C ([phone])    I have seen and examined patient with NP, I agree with above note as scribed.                    HPC Transplant Team                                                      Critical / Counseling Time Provided: 30 minutes                                                                                                                                                        Chief Complaint: CAR T therapy with Tecartus product for treatment of relapsed, refractory MCL    S: Patient seen and examined with HPC Transplant Team:   + intermittent nausea  +generalized weakness, fatigue  +tingling , swelling upper lip    All other ROS negative     O: Vitals:   Vital Signs Last 24 Hrs  T(C): 36.9 (16 Apr 2022 05:37), Max: 36.9 (15 Apr 2022 20:20)  T(F): 98.4 (16 Apr 2022 05:37), Max: 98.4 (15 Apr 2022 20:20)  HR: 70 (16 Apr 2022 05:37) (62 - 70)  BP: 130/73 (16 Apr 2022 05:37) (102/66 - 131/78)  BP(mean): --  RR: 18 (16 Apr 2022 05:37) (18 - 19)  SpO2: 90% (16 Apr 2022 05:37) (90% - 100%)    Intake / Output:   04-15 @ 07:01  -  04-16 @ 07:00  --------------------------------------------------------  IN: 5631 mL / OUT: 7575 mL / NET: -1944 mL    Admit weight: 100.4kg   Daily Weight in kG:     PE:   Oropharynx: no erythema or ulcerations , swelling + in upper lip right more than left.  Oral Mucositis:                  -                                      Grade: n/a  CVS: S1, S2 RRR   Lungs: CTA throughout bilaterally   Abdomen: + BS x 4, soft, NT, ND   Extremities: no edema   Gastric Mucositis:       -                                           Grade: n/a  Intestinal Mucositis:     -                                         Grade: n/a   Skin: no rash   TLC: CDI   Neuro: A&O x 3   Pain: 0    Labs:   CBC Full  -  ( 16 Apr 2022 05:41 )  WBC Count : 3.97 K/uL  Hemoglobin : 12.3 g/dL  Hematocrit : 38.5 %  Platelet Count - Automated : 161 K/uL  Mean Cell Volume : 96.3 fl  Mean Cell Hemoglobin : 30.8 pg  Mean Cell Hemoglobin Concentration : 31.9 gm/dL  Auto Neutrophil # : 3.13 K/uL  Auto Lymphocyte # : 0.45 K/uL  Auto Monocyte # : 0.29 K/uL  Auto Eosinophil # : 0.07 K/uL  Auto Basophil # : 0.01 K/uL  Auto Neutrophil % : 78.8 %  Auto Lymphocyte % : 11.3 %  Auto Monocyte % : 7.3 %  Auto Eosinophil % : 1.8 %  Auto Basophil % : 0.3 %                          12.3   3.97  )-----------( 161      ( 16 Apr 2022 05:41 )             38.5     04-16    140  |  104  |  18  ----------------------------<  118<H>  4.4   |  24  |  1.22    Ca    8.5      16 Apr 2022 05:41  Phos  3.2     04-16  Mg     2.1     04-16    TPro  6.1  /  Alb  4.1  /  TBili  0.3  /  DBili  x   /  AST  23  /  ALT  19  /  AlkPhos  82  04-16    PT/INR - ( 16 Apr 2022 05:41 )   PT: 12.0 sec;   INR: 1.03 ratio         PTT - ( 16 Apr 2022 05:41 )  PTT:24.9 sec  LIVER FUNCTIONS - ( 16 Apr 2022 05:41 )  Alb: 4.1 g/dL / Pro: 6.1 g/dL / ALK PHOS: 82 U/L / ALT: 19 U/L / AST: 23 U/L / GGT: x         Lactate Dehydrogenase, Serum: 180 U/L (04-16 @ 05:41)    Cultures:   NA    Radiology:   NA    Meds:   Antimicrobials:   clotrimazole Lozenge 1 Lozenge Oral five times a day  fluconAZOLE   Tablet 400 milliGRAM(s) Oral daily  trimethoprim  160 mG/sulfamethoxazole 800 mG 1 Tablet(s) Oral every 12 hours    Heme / Onc:   cyclophosphamide IVPB (eMAR) 1105 milliGRAM(s) IV Intermittent every 24 hours  fludarabine IVPB (eMAR) 66 milliGRAM(s) IV Intermittent every 24 hours    GI:  pantoprazole    Tablet 40 milliGRAM(s) Oral before breakfast  senna 1 Tablet(s) Oral at bedtime PRN  sodium bicarbonate Mouth Rinse 10 milliLiter(s) Swish and Spit five times a day    Cardiovascular:   carvedilol 3.125 milliGRAM(s) Oral every 12 hours    Other medications:   acetaminophen     Tablet .. 650 milliGRAM(s) Oral every 6 hours  allopurinol 300 milliGRAM(s) Oral daily  Biotene Dry Mouth Oral Rinse 5 milliLiter(s) Swish and Spit five times a day  chlorhexidine 4% Liquid 1 Application(s) Topical <User Schedule>  folic acid 1 milliGRAM(s) Oral daily  multivitamin 1 Tablet(s) Oral daily  ondansetron  IVPB 16 milliGRAM(s) IV Intermittent every 24 hours  sodium chloride 0.9% 1000 milliLiter(s) IV Continuous <Continuous>  sodium chloride 0.9%. 1000 milliLiter(s) IV Continuous <Continuous>    PRN:   acetaminophen     Tablet .. 650 milliGRAM(s) Oral every 6 hours PRN  LORazepam   Injectable 1 milliGRAM(s) IV Push every 6 hours PRN  metoclopramide Injectable 10 milliGRAM(s) IV Push every 6 hours PRN  senna 1 Tablet(s) Oral at bedtime PRN  sodium chloride 0.9% lock flush 10 milliLiter(s) IV Push every 1 hour PRN    A/P:  56 year old male with relapsed, refractory MCL s/p R-CHOP x 4, RICE x 2, maintenance RTX, salvage Calquence, admitted for CAR T cell therapy with Tecartus product. Disease status at time of admission is partial response.   Day -  3    1. Relapsed, refractory MCL   4/15- day - 4- flu / ctx 2/3 - continue CTX hydration until 24 hours post infusion of last dose   Strict I&O, daily weights, prn diuresis   CRS / ICANS score daily until infusion of CAR T cells (4/19/22) and twice daily thereafter   BID labs 0500 & 1500 - monitor for TLS     2. Prophylactic measures   clotrimazole Lozenge 1 Lozenge Oral five times a day  fluconAZOLE   Tablet 400 milliGRAM(s) Oral daily  trimethoprim  160 mG/sulfamethoxazole 800 mG 1 Tablet(s) Oral every 12 hours  Acyclovir 400mg po TID to start on day -1   If / when neutropenic - will add cipro 500mg po BID   GI Prophylaxis:  Protonix    3. Mouthcare - NS / NaHCO3 rinses, Mycelex, Biotene; Skin care     4. Transfuse & replete electrolytes prn     7. IV hydration, daily weights, strict I&O, prn diuresis   Lasix 20mg IV x 1     8. Antiemetics, anti-diarrhea medications:   LORazepam   Injectable 1 milliGRAM(s) IV Push every 6 hours PRN  metoclopramide Injectable 10 milliGRAM(s) IV Push every 6 hours PRN  ondansetron  IVPB 16 milliGRAM(s) IV Intermittent every 24 hours      I have written the above note for Dr. Khan  who performed service with me in the room.   Aurelia Steele  NP-C ([phone])    I have seen and examined patient with NP, I agree with above note as scribed.                    HPC Transplant Team                                                      Critical / Counseling Time Provided: 30 minutes                                                                                                                                                        Chief Complaint: CAR T therapy with Tecartus product for treatment of relapsed, refractory MCL    S: Patient seen and examined with HPC Transplant Team:   + intermittent nausea  +generalized weakness, fatigue  +tingling , swelling upper lip    All other ROS negative     O: Vitals:   Vital Signs Last 24 Hrs  T(C): 36.9 (2022 05:37), Max: 36.9 (15 Apr 2022 20:20)  T(F): 98.4 (2022 05:37), Max: 98.4 (15 Apr 2022 20:20)  HR: 70 (2022 05:37) (62 - 70)  BP: 130/73 (2022 05:37) (102/66 - 131/78)  BP(mean): --  RR: 18 (2022 05:37) (18 - 19)  SpO2: 90% (2022 05:37) (90% - 100%)    Intake / Output:   -15 @ 07:01  -  -16 @ 07:00  --------------------------------------------------------  IN: 5631 mL / OUT: 7575 mL / NET: -1944 mL    Admit weight: 100.4kg   Daily Weight in k kg    PE:   Oropharynx: no erythema or ulcerations , swelling + in upper lip right more than left.  Oral Mucositis:                  -                                      Grade: n/a  CVS: S1, S2 RRR   Lungs: CTA throughout bilaterally   Abdomen: + BS x 4, soft, NT, ND   Extremities: no edema   Gastric Mucositis:       -                                           Grade: n/a  Intestinal Mucositis:     -                                         Grade: n/a   Skin: no rash   TLC: CDI   Neuro: A&O x 3   Pain: 0    Labs:   CBC Full  -  ( 2022 05:41 )  WBC Count : 3.97 K/uL  Hemoglobin : 12.3 g/dL  Hematocrit : 38.5 %  Platelet Count - Automated : 161 K/uL  Mean Cell Volume : 96.3 fl  Mean Cell Hemoglobin : 30.8 pg  Mean Cell Hemoglobin Concentration : 31.9 gm/dL  Auto Neutrophil # : 3.13 K/uL  Auto Lymphocyte # : 0.45 K/uL  Auto Monocyte # : 0.29 K/uL  Auto Eosinophil # : 0.07 K/uL  Auto Basophil # : 0.01 K/uL  Auto Neutrophil % : 78.8 %  Auto Lymphocyte % : 11.3 %  Auto Monocyte % : 7.3 %  Auto Eosinophil % : 1.8 %  Auto Basophil % : 0.3 %                          12.3   3.97  )-----------( 161      ( 2022 05:41 )             38.5         140  |  104  |  18  ----------------------------<  118<H>  4.4   |  24  |  1.22    Ca    8.5      2022 05:41  Phos  3.2       Mg     2.1         TPro  6.1  /  Alb  4.1  /  TBili  0.3  /  DBili  x   /  AST  23  /  ALT  19  /  AlkPhos  82  -    PT/INR - ( 2022 05:41 )   PT: 12.0 sec;   INR: 1.03 ratio         PTT - ( 2022 05:41 )  PTT:24.9 sec  LIVER FUNCTIONS - ( 2022 05:41 )  Alb: 4.1 g/dL / Pro: 6.1 g/dL / ALK PHOS: 82 U/L / ALT: 19 U/L / AST: 23 U/L / GGT: x         Lactate Dehydrogenase, Serum: 180 U/L ( @ 05:41)    Cultures:   NA    Radiology:   NA    Meds:   Antimicrobials:   clotrimazole Lozenge 1 Lozenge Oral five times a day  fluconAZOLE   Tablet 400 milliGRAM(s) Oral daily  trimethoprim  160 mG/sulfamethoxazole 800 mG 1 Tablet(s) Oral every 12 hours    Heme / Onc:   cyclophosphamide IVPB (eMAR) 1105 milliGRAM(s) IV Intermittent every 24 hours  fludarabine IVPB (eMAR) 66 milliGRAM(s) IV Intermittent every 24 hours    GI:  pantoprazole    Tablet 40 milliGRAM(s) Oral before breakfast  senna 1 Tablet(s) Oral at bedtime PRN  sodium bicarbonate Mouth Rinse 10 milliLiter(s) Swish and Spit five times a day    Cardiovascular:   carvedilol 3.125 milliGRAM(s) Oral every 12 hours    Other medications:   acetaminophen     Tablet .. 650 milliGRAM(s) Oral every 6 hours  allopurinol 300 milliGRAM(s) Oral daily  Biotene Dry Mouth Oral Rinse 5 milliLiter(s) Swish and Spit five times a day  chlorhexidine 4% Liquid 1 Application(s) Topical <User Schedule>  folic acid 1 milliGRAM(s) Oral daily  multivitamin 1 Tablet(s) Oral daily  ondansetron  IVPB 16 milliGRAM(s) IV Intermittent every 24 hours  sodium chloride 0.9% 1000 milliLiter(s) IV Continuous <Continuous>  sodium chloride 0.9%. 1000 milliLiter(s) IV Continuous <Continuous>    PRN:   acetaminophen     Tablet .. 650 milliGRAM(s) Oral every 6 hours PRN  LORazepam   Injectable 1 milliGRAM(s) IV Push every 6 hours PRN  metoclopramide Injectable 10 milliGRAM(s) IV Push every 6 hours PRN  senna 1 Tablet(s) Oral at bedtime PRN  sodium chloride 0.9% lock flush 10 milliLiter(s) IV Push every 1 hour PRN    A/P:  56 year old male with relapsed, refractory MCL s/p R-CHOP x 4, RICE x 2, maintenance RTX, salvage Calquence, admitted for CAR T cell therapy with Tecartus product. Disease status at time of admission is partial response.   Day -  3    1. Relapsed, refractory MCL   4/15- day - 4- flu / ctx 2/3 - continue CTX hydration until 24 hours post infusion of last dose   Strict I&O, daily weights, prn diuresis   CRS / ICANS score daily until infusion of CAR T cells (22) and twice daily thereafter   BID labs 0500 & 1500 - monitor for TLS     2. Prophylactic measures   clotrimazole Lozenge 1 Lozenge Oral five times a day  fluconAZOLE   Tablet 400 milliGRAM(s) Oral daily  trimethoprim  160 mG/sulfamethoxazole 800 mG 1 Tablet(s) Oral every 12 hours  Acyclovir 400mg po TID to start on day -1   If / when neutropenic - will add cipro 500mg po BID   GI Prophylaxis:  Protonix    3. Mouthcare - NS / NaHCO3 rinses, Mycelex, Biotene; Skin care     4. Transfuse & replete electrolytes prn     7. IV hydration, daily weights, strict I&O, prn diuresis   Lasix 20mg IV x 1     8. Antiemetics, anti-diarrhea medications:   LORazepam   Injectable 1 milliGRAM(s) IV Push every 6 hours PRN  metoclopramide Injectable 10 milliGRAM(s) IV Push every 6 hours PRN  ondansetron  IVPB 16 milliGRAM(s) IV Intermittent every 24 hours    I have written the above note for Dr. Khan  who performed service with me in the room.   Aurelia Steele  NP-C ([phone])    I have seen and examined patient with NP, I agree with above note as scribed.

## 2022-04-16 NOTE — ASSESSMENT
[FreeTextEntry1] : 57 y/o gentleman with hx of mantle cell lymphoma initially dx'ed Oct 2016 s/p RCHOP chemotherapy X 4 cycles and RICE X 2 cycles with stem cell collected off of the 2nd RICE..he is now s/p CBV-conditioned autologous stem cell transplant on 4/5/17. Hospital course complicated by a-fib/a-flutter s/p ablation and now stable on Cardizem and Toprol. Also now on Xarelto. Port also removed. He demonstrated engraftment on 4/16 and d/c'ed in stable condition on 5/5.\par \par 1) Mantle Cell lymphoma\par S/P AUTO PBSCT 4/5/17\par Rituxan maintenance complete (Short Rituxan, once a week for 2 weeks). Last Rituxan treatment was  on 1/22/20  (Short Rituxan, once a week for 2 weeks).\par CT scans completed 7/1/21: [facility]\par PET CT 10/28/21 Completed. Concern for relapse disease. Lymph node biopsy completed on 11/12/21.  Results reviewed..c/w MCL.... \par Discussed being placed on calquence vs Silas rituxan. Patient information printed and given to patient/spouse. Side effects explained including serious side effect of AFIB. Patient has a known cardiac history. Message left for pt's cardiologist..\par Long term goal would be Car T....tecartus... This is why I would like to avoid Silas..Literature and consents provided for cart..discussed risks, benefits, alternatives, rationale and logistics..3 week hospital stay discussed...tlc placement...lymphodepleting prep, monitoring for CRS and neurotox, prolonged cytopenias and hypogamma also discussed\par \par Continue calquence 100 mg capsule- take 1 capsule twice a day...worsening GI sx on protonix...instructed on how to administer\par \par 3/11/22: Will stop Calquence on 3/14/22 ;1 week prior to CAR-T collection...resume 3/23..stop 4/11 in prep for admit 4/14...\par \par 2) Heme\par Counts stable. No indication for transfusion today. Discussed with patient. no uptake in BM on pet....as well as negative assessment  for CNS disease\par \par F/U PET CT  ordered to be done in March 31; partial response\par \par 3) ID\par Not on prophylaxis\par \par 4) GI\par Tums prn for acid reflux\par Pantoprazole 40mg daily;STOPPED Pantoprazole and switched to Pepcid\par \par 5) Afib\par Continue medications as prescribed by Cardiologist\par \par 6) Other\par Headaches- tylenol prn\par Continue medications as prescribed\par Maintain f/u with cardiology and GI \par Maintain f/u with his regular internist. \par Well care and cancer screening stressed\par Patient has been advised to contact clinic if he has any concerns.\par Questions and concerns addressed. Reassurance provided. \par \par Completed Post transplant vaccines:\par 12 month vaccines completed in April 2018. \par 14 month vaccines completed July 2018. \par  24 month re-vaccinations completed in April 2019. \par \par 7) Plan\par CAR-T collection completed on 3/21/22...manufacture in progress..possible admit 4/14...hold xarelto and calequence as og 4/11..\par wallet card and FACT sheet provided.\par pt advised no driving for 8 weeks post \par quests addressedconsents signed\par Pretesting scheduled and patient is aware of the test dates and times..results reviewed\par STOPped  Calquence on 3/14/22 and Xarelto on 3/18/22\par .\par \par \par \par

## 2022-04-17 LAB
ALBUMIN SERPL ELPH-MCNC: 4.1 G/DL — SIGNIFICANT CHANGE UP (ref 3.3–5)
ALBUMIN SERPL ELPH-MCNC: 4.2 G/DL — SIGNIFICANT CHANGE UP (ref 3.3–5)
ALP SERPL-CCNC: 90 U/L — SIGNIFICANT CHANGE UP (ref 40–120)
ALP SERPL-CCNC: 90 U/L — SIGNIFICANT CHANGE UP (ref 40–120)
ALT FLD-CCNC: 21 U/L — SIGNIFICANT CHANGE UP (ref 10–45)
ALT FLD-CCNC: 22 U/L — SIGNIFICANT CHANGE UP (ref 10–45)
ANION GAP SERPL CALC-SCNC: 13 MMOL/L — SIGNIFICANT CHANGE UP (ref 5–17)
ANION GAP SERPL CALC-SCNC: 13 MMOL/L — SIGNIFICANT CHANGE UP (ref 5–17)
APTT BLD: 25.7 SEC — LOW (ref 27.5–35.5)
AST SERPL-CCNC: 22 U/L — SIGNIFICANT CHANGE UP (ref 10–40)
AST SERPL-CCNC: 23 U/L — SIGNIFICANT CHANGE UP (ref 10–40)
BASOPHILS # BLD AUTO: 0 K/UL — SIGNIFICANT CHANGE UP (ref 0–0.2)
BASOPHILS # BLD AUTO: 0 K/UL — SIGNIFICANT CHANGE UP (ref 0–0.2)
BASOPHILS NFR BLD AUTO: 0 % — SIGNIFICANT CHANGE UP (ref 0–2)
BASOPHILS NFR BLD AUTO: 0 % — SIGNIFICANT CHANGE UP (ref 0–2)
BILIRUB DIRECT SERPL-MCNC: <0.1 MG/DL — SIGNIFICANT CHANGE UP (ref 0–0.3)
BILIRUB INDIRECT FLD-MCNC: >0.2 MG/DL — SIGNIFICANT CHANGE UP (ref 0.2–1)
BILIRUB SERPL-MCNC: 0.2 MG/DL — SIGNIFICANT CHANGE UP (ref 0.2–1.2)
BILIRUB SERPL-MCNC: 0.3 MG/DL — SIGNIFICANT CHANGE UP (ref 0.2–1.2)
BILIRUB SERPL-MCNC: 0.3 MG/DL — SIGNIFICANT CHANGE UP (ref 0.2–1.2)
BUN SERPL-MCNC: 17 MG/DL — SIGNIFICANT CHANGE UP (ref 7–23)
BUN SERPL-MCNC: 18 MG/DL — SIGNIFICANT CHANGE UP (ref 7–23)
CALCIUM SERPL-MCNC: 8.6 MG/DL — SIGNIFICANT CHANGE UP (ref 8.4–10.5)
CALCIUM SERPL-MCNC: 8.8 MG/DL — SIGNIFICANT CHANGE UP (ref 8.4–10.5)
CHLORIDE SERPL-SCNC: 102 MMOL/L — SIGNIFICANT CHANGE UP (ref 96–108)
CHLORIDE SERPL-SCNC: 103 MMOL/L — SIGNIFICANT CHANGE UP (ref 96–108)
CK SERPL-CCNC: 105 U/L — SIGNIFICANT CHANGE UP (ref 30–200)
CO2 SERPL-SCNC: 23 MMOL/L — SIGNIFICANT CHANGE UP (ref 22–31)
CO2 SERPL-SCNC: 23 MMOL/L — SIGNIFICANT CHANGE UP (ref 22–31)
CREAT SERPL-MCNC: 1.04 MG/DL — SIGNIFICANT CHANGE UP (ref 0.5–1.3)
CREAT SERPL-MCNC: 1.38 MG/DL — HIGH (ref 0.5–1.3)
CRP SERPL-MCNC: 12 MG/L — HIGH (ref 0–4)
D DIMER BLD IA.RAPID-MCNC: 364 NG/ML DDU — HIGH
EGFR: 60 ML/MIN/1.73M2 — SIGNIFICANT CHANGE UP
EGFR: 84 ML/MIN/1.73M2 — SIGNIFICANT CHANGE UP
EOSINOPHIL # BLD AUTO: 0.06 K/UL — SIGNIFICANT CHANGE UP (ref 0–0.5)
EOSINOPHIL # BLD AUTO: 0.1 K/UL — SIGNIFICANT CHANGE UP (ref 0–0.5)
EOSINOPHIL NFR BLD AUTO: 1.7 % — SIGNIFICANT CHANGE UP (ref 0–6)
EOSINOPHIL NFR BLD AUTO: 2.5 % — SIGNIFICANT CHANGE UP (ref 0–6)
FERRITIN SERPL-MCNC: 127 NG/ML — SIGNIFICANT CHANGE UP (ref 30–400)
FIBRINOGEN PPP-MCNC: 492 MG/DL — SIGNIFICANT CHANGE UP (ref 330–520)
GLUCOSE SERPL-MCNC: 116 MG/DL — HIGH (ref 70–99)
GLUCOSE SERPL-MCNC: 119 MG/DL — HIGH (ref 70–99)
HCT VFR BLD CALC: 36 % — LOW (ref 39–50)
HCT VFR BLD CALC: 38.8 % — LOW (ref 39–50)
HGB BLD-MCNC: 11.6 G/DL — LOW (ref 13–17)
HGB BLD-MCNC: 12.6 G/DL — LOW (ref 13–17)
IMM GRANULOCYTES NFR BLD AUTO: 0.2 % — SIGNIFICANT CHANGE UP (ref 0–1.5)
IMM GRANULOCYTES NFR BLD AUTO: 0.8 % — SIGNIFICANT CHANGE UP (ref 0–1.5)
INR BLD: 1.02 RATIO — SIGNIFICANT CHANGE UP (ref 0.88–1.16)
LDH SERPL L TO P-CCNC: 163 U/L — SIGNIFICANT CHANGE UP (ref 50–242)
LDH SERPL L TO P-CCNC: 179 U/L — SIGNIFICANT CHANGE UP (ref 50–242)
LYMPHOCYTES # BLD AUTO: 0.09 K/UL — LOW (ref 1–3.3)
LYMPHOCYTES # BLD AUTO: 0.11 K/UL — LOW (ref 1–3.3)
LYMPHOCYTES # BLD AUTO: 2.2 % — LOW (ref 13–44)
LYMPHOCYTES # BLD AUTO: 3 % — LOW (ref 13–44)
MAGNESIUM SERPL-MCNC: 2 MG/DL — SIGNIFICANT CHANGE UP (ref 1.6–2.6)
MAGNESIUM SERPL-MCNC: 2 MG/DL — SIGNIFICANT CHANGE UP (ref 1.6–2.6)
MCHC RBC-ENTMCNC: 30.9 PG — SIGNIFICANT CHANGE UP (ref 27–34)
MCHC RBC-ENTMCNC: 30.9 PG — SIGNIFICANT CHANGE UP (ref 27–34)
MCHC RBC-ENTMCNC: 32.2 GM/DL — SIGNIFICANT CHANGE UP (ref 32–36)
MCHC RBC-ENTMCNC: 32.5 GM/DL — SIGNIFICANT CHANGE UP (ref 32–36)
MCV RBC AUTO: 95.1 FL — SIGNIFICANT CHANGE UP (ref 80–100)
MCV RBC AUTO: 95.7 FL — SIGNIFICANT CHANGE UP (ref 80–100)
MONOCYTES # BLD AUTO: 0.16 K/UL — SIGNIFICANT CHANGE UP (ref 0–0.9)
MONOCYTES # BLD AUTO: 0.2 K/UL — SIGNIFICANT CHANGE UP (ref 0–0.9)
MONOCYTES NFR BLD AUTO: 4.4 % — SIGNIFICANT CHANGE UP (ref 2–14)
MONOCYTES NFR BLD AUTO: 4.9 % — SIGNIFICANT CHANGE UP (ref 2–14)
NEUTROPHILS # BLD AUTO: 3.25 K/UL — SIGNIFICANT CHANGE UP (ref 1.8–7.4)
NEUTROPHILS # BLD AUTO: 3.68 K/UL — SIGNIFICANT CHANGE UP (ref 1.8–7.4)
NEUTROPHILS NFR BLD AUTO: 90.1 % — HIGH (ref 43–77)
NEUTROPHILS NFR BLD AUTO: 90.2 % — HIGH (ref 43–77)
NRBC # BLD: 0 /100 WBCS — SIGNIFICANT CHANGE UP (ref 0–0)
NRBC # BLD: 0 /100 WBCS — SIGNIFICANT CHANGE UP (ref 0–0)
NT-PROBNP SERPL-SCNC: 121 PG/ML — SIGNIFICANT CHANGE UP (ref 0–300)
PHOSPHATE SERPL-MCNC: 2.6 MG/DL — SIGNIFICANT CHANGE UP (ref 2.5–4.5)
PHOSPHATE SERPL-MCNC: 2.9 MG/DL — SIGNIFICANT CHANGE UP (ref 2.5–4.5)
PLATELET # BLD AUTO: 159 K/UL — SIGNIFICANT CHANGE UP (ref 150–400)
PLATELET # BLD AUTO: 175 K/UL — SIGNIFICANT CHANGE UP (ref 150–400)
POTASSIUM SERPL-MCNC: 4.3 MMOL/L — SIGNIFICANT CHANGE UP (ref 3.5–5.3)
POTASSIUM SERPL-MCNC: 4.5 MMOL/L — SIGNIFICANT CHANGE UP (ref 3.5–5.3)
POTASSIUM SERPL-SCNC: 4.3 MMOL/L — SIGNIFICANT CHANGE UP (ref 3.5–5.3)
POTASSIUM SERPL-SCNC: 4.5 MMOL/L — SIGNIFICANT CHANGE UP (ref 3.5–5.3)
PROT SERPL-MCNC: 6.4 G/DL — SIGNIFICANT CHANGE UP (ref 6–8.3)
PROT SERPL-MCNC: 6.4 G/DL — SIGNIFICANT CHANGE UP (ref 6–8.3)
PROTHROM AB SERPL-ACNC: 11.8 SEC — SIGNIFICANT CHANGE UP (ref 10.5–13.4)
RBC # BLD: 3.76 M/UL — LOW (ref 4.2–5.8)
RBC # BLD: 4.08 M/UL — LOW (ref 4.2–5.8)
RBC # FLD: 13.8 % — SIGNIFICANT CHANGE UP (ref 10.3–14.5)
RBC # FLD: 13.9 % — SIGNIFICANT CHANGE UP (ref 10.3–14.5)
SODIUM SERPL-SCNC: 138 MMOL/L — SIGNIFICANT CHANGE UP (ref 135–145)
SODIUM SERPL-SCNC: 139 MMOL/L — SIGNIFICANT CHANGE UP (ref 135–145)
URATE SERPL-MCNC: 3.5 MG/DL — SIGNIFICANT CHANGE UP (ref 3.4–8.8)
URATE SERPL-MCNC: 4.1 MG/DL — SIGNIFICANT CHANGE UP (ref 3.4–8.8)
WBC # BLD: 3.61 K/UL — LOW (ref 3.8–10.5)
WBC # BLD: 4.08 K/UL — SIGNIFICANT CHANGE UP (ref 3.8–10.5)
WBC # FLD AUTO: 3.61 K/UL — LOW (ref 3.8–10.5)
WBC # FLD AUTO: 4.08 K/UL — SIGNIFICANT CHANGE UP (ref 3.8–10.5)

## 2022-04-17 PROCEDURE — 99291 CRITICAL CARE FIRST HOUR: CPT

## 2022-04-17 RX ORDER — SODIUM CHLORIDE 9 MG/ML
1000 INJECTION INTRAMUSCULAR; INTRAVENOUS; SUBCUTANEOUS
Refills: 0 | Status: DISCONTINUED | OUTPATIENT
Start: 2022-04-17 | End: 2022-04-25

## 2022-04-17 RX ADMIN — Medication 10 MILLILITER(S): at 09:34

## 2022-04-17 RX ADMIN — Medication 300 MILLIGRAM(S): at 12:56

## 2022-04-17 RX ADMIN — Medication 10 MILLIGRAM(S): at 23:02

## 2022-04-17 RX ADMIN — Medication 5 MILLILITER(S): at 22:15

## 2022-04-17 RX ADMIN — Medication 10 MILLILITER(S): at 12:55

## 2022-04-17 RX ADMIN — Medication 650 MILLIGRAM(S): at 17:41

## 2022-04-17 RX ADMIN — Medication 10 MILLILITER(S): at 22:14

## 2022-04-17 RX ADMIN — FLUCONAZOLE 400 MILLIGRAM(S): 150 TABLET ORAL at 12:56

## 2022-04-17 RX ADMIN — Medication 1 TABLET(S): at 06:33

## 2022-04-17 RX ADMIN — CARVEDILOL PHOSPHATE 3.12 MILLIGRAM(S): 80 CAPSULE, EXTENDED RELEASE ORAL at 06:33

## 2022-04-17 RX ADMIN — Medication 5 MILLILITER(S): at 09:34

## 2022-04-17 RX ADMIN — Medication 1 MILLIGRAM(S): at 12:55

## 2022-04-17 RX ADMIN — Medication 10 MILLILITER(S): at 17:33

## 2022-04-17 RX ADMIN — Medication 1 LOZENGE: at 22:16

## 2022-04-17 RX ADMIN — CARVEDILOL PHOSPHATE 3.12 MILLIGRAM(S): 80 CAPSULE, EXTENDED RELEASE ORAL at 17:35

## 2022-04-17 RX ADMIN — Medication 5 MILLILITER(S): at 17:34

## 2022-04-17 RX ADMIN — Medication 1 TABLET(S): at 17:35

## 2022-04-17 RX ADMIN — Medication 1 TABLET(S): at 12:55

## 2022-04-17 RX ADMIN — Medication 1 LOZENGE: at 12:54

## 2022-04-17 RX ADMIN — Medication 1 LOZENGE: at 09:33

## 2022-04-17 RX ADMIN — PANTOPRAZOLE SODIUM 40 MILLIGRAM(S): 20 TABLET, DELAYED RELEASE ORAL at 06:33

## 2022-04-17 RX ADMIN — Medication 5 MILLILITER(S): at 12:54

## 2022-04-17 RX ADMIN — Medication 1 LOZENGE: at 17:34

## 2022-04-17 RX ADMIN — Medication 650 MILLIGRAM(S): at 18:11

## 2022-04-17 RX ADMIN — CHLORHEXIDINE GLUCONATE 1 APPLICATION(S): 213 SOLUTION TOPICAL at 09:34

## 2022-04-17 NOTE — PROGRESS NOTE ADULT - CRITICAL CARE ATTENDING COMMENT
Admitted for CAR T cell therapy with Tecartus (bresucabtagene autoleucel)  1. Admit to BMTU   2. Day -5 through day -1 record CARTOX / ICANS score daily   3. Day - 5 through day - 3 Fludarabine 30mg / m2 IV given over 30 minutes daily   4. Day - 5 - start cyclophosphamide hydration - 0.9% NS + 10mEq KCL / L at 75 ml/m2 and continue for 24 hours post infusion of last dose of CTX   5. Day - 5 through day - 3 CTX 500mg / m2 IV QD to be given over 2 hours   6. Starting day - 5 monitor labs BID: CBC with differential, CMP, LDH, uric acid, mag, phos, CPK, BNP, PT / PTT / INR / Fibrinogen, d-dimer, CRP, hepatic profile, ferritin  7. Weekly IL-6 levels on Monday, twice weekly (Monday / Thursday) CMV / EBV PCR, three times a week (Monday / Wednesday / Friday) type and screen   8. On day - 5 start allopurinol 300mg po QD   9. Day -2 and -1 REST DAYS   10. On day -1 begin to record CARTOX / ICANS score q 12 hours   11. Day 0 infuse Brexucabtagene autoleucel  hx of afib...seen by [facility] cardiology  4/16 Doing well today, I/Os are negative Admitted for CAR T cell therapy with Tecartus (bresucabtagene autoleucel)  today is -2    1. Admit to BMTU   2. Day -5 through day -1 record CARTOX / ICANS score daily   3. Day - 5 through day - 3 Fludarabine 30mg / m2 IV given over 30 minutes daily   4. Day - 5 - start cyclophosphamide hydration - 0.9% NS + 10mEq KCL / L at 75 ml/m2 and continue for 24 hours post infusion of last dose of CTX   5. Day - 5 through day - 3 CTX 500mg / m2 IV QD to be given over 2 hours   6. Starting day - 5 monitor labs BID: CBC with differential, CMP, LDH, uric acid, mag, phos, CPK, BNP, PT / PTT / INR / Fibrinogen, d-dimer, CRP, hepatic profile, ferritin  7. Weekly IL-6 levels on Monday, twice weekly (Monday / Thursday) CMV / EBV PCR, three times a week (Monday / Wednesday / Friday) type and screen   8. On day - 5 start allopurinol 300mg po QD   9. Day -2 and -1 REST DAYS   10. On day -1 begin to record CARTOX / ICANS score q 12 hours   11. Day 0 infuse Brexucabtagene autoleucel  hx of afib...seen by [facility] cardiology    4/16-17 Doing well, I/Os are negative, transient lip swelling, now resolved, some fatigue

## 2022-04-17 NOTE — PROGRESS NOTE ADULT - SUBJECTIVE AND OBJECTIVE BOX
HPC Transplant Team                                                      Critical / Counseling Time Provided: 30 minutes    Chief Complaint: CAR T therapy with [employer] product for treatment of relapsed, refractory MCL    S: Patient seen and examined with HPC Transplant Team:   + intermittent nausea  +generalized weakness, fatigue  +tingling , swelling upper lip    All other ROS negative     O: Vitals:   Vital Signs Last 24 Hrs  T(C): 36.4 (2022 06:00), Max: 37.3 (2022 14:09)  T(F): 97.5 (2022 06:00), Max: 99.1 (2022 14:09)  HR: 69 (2022 06:00) (69 - 73)  BP: 106/71 (2022 06:00) (106/71 - 129/80)  BP(mean): --  RR: 18 (2022 06:00) (18 - 18)  SpO2: 95% (2022 06:00) (95% - 98%)    Intake / Output:   -16 @ 07:01  -  04-17 @ 07:00  --------------------------------------------------------  IN: 5914 mL / OUT: 6175 mL / NET: -261 mL    Admit weight: 100.4kg   Daily Weight in k kg      PE:   Oropharynx: no erythema or ulcerations , swelling + in upper lip right more than left.  Oral Mucositis:                  -                                      Grade: n/a  CVS: S1, S2 RRR   Lungs: CTA throughout bilaterally   Abdomen: + BS x 4, soft, NT, ND   Extremities: no edema   Gastric Mucositis:       -                                           Grade: n/a  Intestinal Mucositis:     -                                         Grade: n/a   Skin: no rash   TLC: CDI   Neuro: A&O x 3   Pain: 0    Labs:   CBC Full  -  ( 2022 07:10 )  WBC Count : 3.61 K/uL  Hemoglobin : 12.6 g/dL  Hematocrit : 38.8 %  Platelet Count - Automated : 175 K/uL  Mean Cell Volume : 95.1 fl  Mean Cell Hemoglobin : 30.9 pg  Mean Cell Hemoglobin Concentration : 32.5 gm/dL  Auto Neutrophil # : 3.25 K/uL  Auto Lymphocyte # : 0.11 K/uL  Auto Monocyte # : 0.16 K/uL  Auto Eosinophil # : 0.06 K/uL  Auto Basophil # : 0.00 K/uL  Auto Neutrophil % : 90.1 %  Auto Lymphocyte % : 3.0 %  Auto Monocyte % : 4.4 %  Auto Eosinophil % : 1.7 %  Auto Basophil % : 0.0 %                          12.6   3.61  )-----------( 175      ( 2022 07:10 )             38.8     -    139  |  103  |  18  ----------------------------<  116<H>  4.2   |  23  |  1.20    Ca    8.6      2022 15:17  Phos  3.3     -16  Mg     2.0         TPro  6.3  /  Alb  4.2  /  TBili  0.2  /  DBili  x   /  AST  23  /  ALT  21  /  AlkPhos  89  -16    PT/INR - ( 2022 05:41 )   PT: 12.0 sec;   INR: 1.03 ratio         PTT - ( 2022 05:41 )  PTT:24.9 sec  LIVER FUNCTIONS - ( 2022 15:17 )  Alb: 4.2 g/dL / Pro: 6.3 g/dL / ALK PHOS: 89 U/L / ALT: 21 U/L / AST: 23 U/L / GGT: x           Lactate Dehydrogenase, Serum: 182 U/L ( @ 15:17)    Cultures:         Radiology:       Meds:   Antimicrobials:   clotrimazole Lozenge 1 Lozenge Oral five times a day  fluconAZOLE   Tablet 400 milliGRAM(s) Oral daily  trimethoprim  160 mG/sulfamethoxazole 800 mG 1 Tablet(s) Oral every 12 hours    GI:  pantoprazole    Tablet 40 milliGRAM(s) Oral before breakfast  senna 1 Tablet(s) Oral at bedtime PRN  sodium bicarbonate Mouth Rinse 10 milliLiter(s) Swish and Spit five times a day    Cardiovascular:   carvedilol 3.125 milliGRAM(s) Oral every 12 hours    Other medications:   acetaminophen     Tablet .. 650 milliGRAM(s) Oral every 6 hours  allopurinol 300 milliGRAM(s) Oral daily  Biotene Dry Mouth Oral Rinse 5 milliLiter(s) Swish and Spit five times a day  chlorhexidine 4% Liquid 1 Application(s) Topical <User Schedule>  folic acid 1 milliGRAM(s) Oral daily  multivitamin 1 Tablet(s) Oral daily  sodium chloride 0.9% 1000 milliLiter(s) IV Continuous <Continuous>  sodium chloride 0.9%. 1000 milliLiter(s) IV Continuous <Continuous>    PRN:   acetaminophen     Tablet .. 650 milliGRAM(s) Oral every 6 hours PRN  LORazepam   Injectable 1 milliGRAM(s) IV Push every 6 hours PRN  metoclopramide Injectable 10 milliGRAM(s) IV Push every 6 hours PRN  senna 1 Tablet(s) Oral at bedtime PRN  sodium chloride 0.9% lock flush 10 milliLiter(s) IV Push every 1 hour PRN    A/P:    56 year old male with relapsed, refractory MCL s/p R-CHOP x 4, RICE x 2, maintenance RTX, salvage Calquence, admitted for CAR T cell therapy with [employer] product. Disease status at time of admission is partial response.   Day -  2    1. Relapsed, refractory MCL   4/15- day - 4- flu / ctx 2/3 - continue CTX hydration until 24 hours post infusion of last dose   Strict I&O, daily weights, prn diuresis   CRS / ICANS score daily until infusion of CAR T cells (22) and twice daily thereafter   BID labs 0500 & 1500 - monitor for TLS     2. Prophylactic measures   clotrimazole Lozenge 1 Lozenge Oral five times a day  fluconAZOLE   Tablet 400 milliGRAM(s) Oral daily  trimethoprim  160 mG/sulfamethoxazole 800 mG 1 Tablet(s) Oral every 12 hours  Acyclovir 400mg po TID to start on day -1   If / when neutropenic - will add cipro 500mg po BID   GI Prophylaxis:  Protonix    3. Mouthcare - NS / NaHCO3 rinses, Mycelex, Biotene; Skin care     4. Transfuse & replete electrolytes prn     7. IV hydration, daily weights, strict I&O, prn diuresis   Lasix 20mg IV x 1     8. Antiemetics, anti-diarrhea medications:   LORazepam   Injectable 1 milliGRAM(s) IV Push every 6 hours PRN  metoclopramide Injectable 10 milliGRAM(s) IV Push every 6 hours PRN  ondansetron  IVPB 16 milliGRAM(s) IV Intermittent every 24 hours    I have written the above note for Dr. Khan  who performed service with me in the room.   Aurelia Steele  NP-C ([phone])    I have seen and examined patient with NP, I agree with above note as scribed.                    HPC Transplant Team                                                      Critical / Counseling Time Provided: 30 minutes    Chief Complaint: CAR T therapy with [employer] product for treatment of relapsed, refractory MCL    S: Patient seen and examined with HPC Transplant Team:     +generalized weakness, fatigue   swelling upper lip resolved.  nausea resolved  BM x 1 yesterday.    All other ROS negative     O: Vitals:   Vital Signs Last 24 Hrs  T(C): 36.4 (2022 06:00), Max: 37.3 (2022 14:09)  T(F): 97.5 (2022 06:00), Max: 99.1 (2022 14:09)  HR: 69 (2022 06:00) (69 - 73)  BP: 106/71 (2022 06:00) (106/71 - 129/80)  BP(mean): --  RR: 18 (2022 06:00) (18 - 18)  SpO2: 95% (2022 06:00) (95% - 98%)    Intake / Output:   04-16 @ 07:01  -  -17 @ 07:00  --------------------------------------------------------  IN: 5914 mL / OUT: 6175 mL / NET: -261 mL    Admit weight: 100.4 kg   Daily Weight in k .5 kg    PE:   Oropharynx: no erythema or ulcerations , swelling + in upper lip right more than left.  Oral Mucositis:  NA                                                   Grade: n/a  CVS: S1, S2 RRR   Lungs: CTA throughout bilaterally   Abdomen: + BS x 4, soft, NT, ND   Extremities: no edema   Gastric Mucositis:       -                                           Grade: n/a  Intestinal Mucositis:     -                                         Grade: n/a   Skin: no rash   TLC: CDI   Neuro: A&O x 3   Pain: 0    Labs:   CBC Full  -  ( 2022 07:10 )  WBC Count : 3.61 K/uL  Hemoglobin : 12.6 g/dL  Hematocrit : 38.8 %  Platelet Count - Automated : 175 K/uL  Mean Cell Volume : 95.1 fl  Mean Cell Hemoglobin : 30.9 pg  Mean Cell Hemoglobin Concentration : 32.5 gm/dL  Auto Neutrophil # : 3.25 K/uL  Auto Lymphocyte # : 0.11 K/uL  Auto Monocyte # : 0.16 K/uL  Auto Eosinophil # : 0.06 K/uL  Auto Basophil # : 0.00 K/uL  Auto Neutrophil % : 90.1 %  Auto Lymphocyte % : 3.0 %  Auto Monocyte % : 4.4 %  Auto Eosinophil % : 1.7 %  Auto Basophil % : 0.0 %                          12.6   3.61  )-----------( 175      ( 2022 07:10 )             38.8     -16    139  |  103  |  18  ----------------------------<  116<H>  4.2   |  23  |  1.20    Ca    8.6      2022 15:17  Phos  3.3     -  Mg     2.0         TPro  6.3  /  Alb  4.2  /  TBili  0.2  /  DBili  x   /  AST  23  /  ALT  21  /  AlkPhos  89  -16    PT/INR - ( 2022 05:41 )   PT: 12.0 sec;   INR: 1.03 ratio         PTT - ( 2022 05:41 )  PTT:24.9 sec  LIVER FUNCTIONS - ( 2022 15:17 )  Alb: 4.2 g/dL / Pro: 6.3 g/dL / ALK PHOS: 89 U/L / ALT: 21 U/L / AST: 23 U/L / GGT: x           Lactate Dehydrogenase, Serum: 182 U/L ( @ 15:17)    Cultures:   NA    Radiology:   NA    Meds:   Antimicrobials:   clotrimazole Lozenge 1 Lozenge Oral five times a day  fluconAZOLE   Tablet 400 milliGRAM(s) Oral daily  trimethoprim  160 mG/sulfamethoxazole 800 mG 1 Tablet(s) Oral every 12 hours    GI:  pantoprazole    Tablet 40 milliGRAM(s) Oral before breakfast  senna 1 Tablet(s) Oral at bedtime PRN  sodium bicarbonate Mouth Rinse 10 milliLiter(s) Swish and Spit five times a day    Cardiovascular:   carvedilol 3.125 milliGRAM(s) Oral every 12 hours    Other medications:   acetaminophen     Tablet .. 650 milliGRAM(s) Oral every 6 hours  allopurinol 300 milliGRAM(s) Oral daily  Biotene Dry Mouth Oral Rinse 5 milliLiter(s) Swish and Spit five times a day  chlorhexidine 4% Liquid 1 Application(s) Topical <User Schedule>  folic acid 1 milliGRAM(s) Oral daily  multivitamin 1 Tablet(s) Oral daily  sodium chloride 0.9% 1000 milliLiter(s) IV Continuous <Continuous>  sodium chloride 0.9%. 1000 milliLiter(s) IV Continuous <Continuous>    PRN:   acetaminophen     Tablet .. 650 milliGRAM(s) Oral every 6 hours PRN  LORazepam   Injectable 1 milliGRAM(s) IV Push every 6 hours PRN  metoclopramide Injectable 10 milliGRAM(s) IV Push every 6 hours PRN  senna 1 Tablet(s) Oral at bedtime PRN  sodium chloride 0.9% lock flush 10 milliLiter(s) IV Push every 1 hour PRN    A/P:    56 year old male with relapsed, refractory MCL s/p R-CHOP x 4, RICE x 2, maintenance RTX, salvage Calquence, admitted for CAR T cell therapy with [employer] product. Disease status at time of admission is partial response.   Day -  2    1. Relapsed, refractory MCL   4/15- day - 4- flu / ctx 2/3 - continue CTX hydration until 24 hours post infusion of last dose   Strict I&O, daily weights, prn diuresis   CRS / ICANS score daily until infusion of CAR T cells (22) and twice daily thereafter   BID labs 0500 & 1500 - monitor for TLS     2. Prophylactic measures   clotrimazole Lozenge 1 Lozenge Oral five times a day  fluconAZOLE   Tablet 400 milliGRAM(s) Oral daily  trimethoprim  160 mG/sulfamethoxazole 800 mG 1 Tablet(s) Oral every 12 hours  Acyclovir 400mg po TID to start on day -1   If / when neutropenic - will add cipro 500mg po BID   GI Prophylaxis:  Protonix    3. Mouthcare - NS / NaHCO3 rinses, Mycelex, Biotene; Skin care     4. Transfuse & replete electrolytes prn     7. IV hydration, daily weights, strict I&O, prn diuresis   Lasix 20mg IV x 1     8. Antiemetics, anti-diarrhea medications:   LORazepam   Injectable 1 milliGRAM(s) IV Push every 6 hours PRN  metoclopramide Injectable 10 milliGRAM(s) IV Push every 6 hours PRN  ondansetron  IVPB 16 milliGRAM(s) IV Intermittent every 24 hours    I have written the above note for Dr. Khan  who performed service with me in the room.   Aurelia KIMC ([phone])    I have seen and examined patient with NP, I agree with above note as scribed.

## 2022-04-18 LAB
ALBUMIN SERPL ELPH-MCNC: 4.6 G/DL — SIGNIFICANT CHANGE UP (ref 3.3–5)
ALBUMIN SERPL ELPH-MCNC: 4.8 G/DL — SIGNIFICANT CHANGE UP (ref 3.3–5)
ALP SERPL-CCNC: 95 U/L — SIGNIFICANT CHANGE UP (ref 40–120)
ALP SERPL-CCNC: 98 U/L — SIGNIFICANT CHANGE UP (ref 40–120)
ALT FLD-CCNC: 22 U/L — SIGNIFICANT CHANGE UP (ref 10–45)
ALT FLD-CCNC: 26 U/L — SIGNIFICANT CHANGE UP (ref 10–45)
ANION GAP SERPL CALC-SCNC: 14 MMOL/L — SIGNIFICANT CHANGE UP (ref 5–17)
ANION GAP SERPL CALC-SCNC: 9 MMOL/L — SIGNIFICANT CHANGE UP (ref 5–17)
APTT BLD: 25.4 SEC — LOW (ref 27.5–35.5)
AST SERPL-CCNC: 29 U/L — SIGNIFICANT CHANGE UP (ref 10–40)
AST SERPL-CCNC: 33 U/L — SIGNIFICANT CHANGE UP (ref 10–40)
BASOPHILS # BLD AUTO: 0 K/UL — SIGNIFICANT CHANGE UP (ref 0–0.2)
BASOPHILS # BLD AUTO: 0 K/UL — SIGNIFICANT CHANGE UP (ref 0–0.2)
BASOPHILS NFR BLD AUTO: 0 % — SIGNIFICANT CHANGE UP (ref 0–2)
BASOPHILS NFR BLD AUTO: 0 % — SIGNIFICANT CHANGE UP (ref 0–2)
BILIRUB DIRECT SERPL-MCNC: <0.1 MG/DL — SIGNIFICANT CHANGE UP (ref 0–0.3)
BILIRUB INDIRECT FLD-MCNC: >0.3 MG/DL — SIGNIFICANT CHANGE UP (ref 0.2–1)
BILIRUB SERPL-MCNC: 0.3 MG/DL — SIGNIFICANT CHANGE UP (ref 0.2–1.2)
BILIRUB SERPL-MCNC: 0.4 MG/DL — SIGNIFICANT CHANGE UP (ref 0.2–1.2)
BLD GP AB SCN SERPL QL: NEGATIVE — SIGNIFICANT CHANGE UP
BUN SERPL-MCNC: 16 MG/DL — SIGNIFICANT CHANGE UP (ref 7–23)
BUN SERPL-MCNC: 21 MG/DL — SIGNIFICANT CHANGE UP (ref 7–23)
CALCIUM SERPL-MCNC: 9.1 MG/DL — SIGNIFICANT CHANGE UP (ref 8.4–10.5)
CALCIUM SERPL-MCNC: 9.2 MG/DL — SIGNIFICANT CHANGE UP (ref 8.4–10.5)
CHLORIDE SERPL-SCNC: 102 MMOL/L — SIGNIFICANT CHANGE UP (ref 96–108)
CHLORIDE SERPL-SCNC: 97 MMOL/L — SIGNIFICANT CHANGE UP (ref 96–108)
CK SERPL-CCNC: 115 U/L — SIGNIFICANT CHANGE UP (ref 30–200)
CMV DNA CSF QL NAA+PROBE: SIGNIFICANT CHANGE UP
CO2 SERPL-SCNC: 25 MMOL/L — SIGNIFICANT CHANGE UP (ref 22–31)
CO2 SERPL-SCNC: 26 MMOL/L — SIGNIFICANT CHANGE UP (ref 22–31)
CREAT SERPL-MCNC: 1.02 MG/DL — SIGNIFICANT CHANGE UP (ref 0.5–1.3)
CREAT SERPL-MCNC: 1.19 MG/DL — SIGNIFICANT CHANGE UP (ref 0.5–1.3)
CRP SERPL-MCNC: 12 MG/L — HIGH (ref 0–4)
D DIMER BLD IA.RAPID-MCNC: 170 NG/ML DDU — SIGNIFICANT CHANGE UP
EBV EA AB SER IA-ACNC: 64.4 U/ML — HIGH
EBV EA AB TITR SER IF: POSITIVE
EBV EA IGG SER-ACNC: POSITIVE
EBV NA IGG SER IA-ACNC: >600 U/ML — HIGH
EBV PATRN SPEC IB-IMP: SIGNIFICANT CHANGE UP
EBV VCA IGG AVIDITY SER QL IA: POSITIVE
EBV VCA IGM SER IA-ACNC: 92.9 U/ML — HIGH
EBV VCA IGM SER IA-ACNC: <10 U/ML — SIGNIFICANT CHANGE UP
EBV VCA IGM TITR FLD: NEGATIVE — SIGNIFICANT CHANGE UP
EGFR: 71 ML/MIN/1.73M2 — SIGNIFICANT CHANGE UP
EGFR: 86 ML/MIN/1.73M2 — SIGNIFICANT CHANGE UP
EOSINOPHIL # BLD AUTO: 0.11 K/UL — SIGNIFICANT CHANGE UP (ref 0–0.5)
EOSINOPHIL # BLD AUTO: 0.15 K/UL — SIGNIFICANT CHANGE UP (ref 0–0.5)
EOSINOPHIL NFR BLD AUTO: 3.7 % — SIGNIFICANT CHANGE UP (ref 0–6)
EOSINOPHIL NFR BLD AUTO: 4.7 % — SIGNIFICANT CHANGE UP (ref 0–6)
FERRITIN SERPL-MCNC: 180 NG/ML — SIGNIFICANT CHANGE UP (ref 30–400)
FIBRINOGEN PPP-MCNC: 543 MG/DL — HIGH (ref 330–520)
GLUCOSE SERPL-MCNC: 110 MG/DL — HIGH (ref 70–99)
GLUCOSE SERPL-MCNC: 127 MG/DL — HIGH (ref 70–99)
HCT VFR BLD CALC: 39.2 % — SIGNIFICANT CHANGE UP (ref 39–50)
HCT VFR BLD CALC: 40 % — SIGNIFICANT CHANGE UP (ref 39–50)
HGB BLD-MCNC: 12.9 G/DL — LOW (ref 13–17)
HGB BLD-MCNC: 13 G/DL — SIGNIFICANT CHANGE UP (ref 13–17)
IMM GRANULOCYTES NFR BLD AUTO: 0.3 % — SIGNIFICANT CHANGE UP (ref 0–1.5)
IMM GRANULOCYTES NFR BLD AUTO: 0.3 % — SIGNIFICANT CHANGE UP (ref 0–1.5)
INR BLD: 1.04 RATIO — SIGNIFICANT CHANGE UP (ref 0.88–1.16)
LDH SERPL L TO P-CCNC: 180 U/L — SIGNIFICANT CHANGE UP (ref 50–242)
LDH SERPL L TO P-CCNC: 203 U/L — SIGNIFICANT CHANGE UP (ref 50–242)
LYMPHOCYTES # BLD AUTO: 0.07 K/UL — LOW (ref 1–3.3)
LYMPHOCYTES # BLD AUTO: 0.07 K/UL — LOW (ref 1–3.3)
LYMPHOCYTES # BLD AUTO: 2.2 % — LOW (ref 13–44)
LYMPHOCYTES # BLD AUTO: 2.4 % — LOW (ref 13–44)
MAGNESIUM SERPL-MCNC: 2.1 MG/DL — SIGNIFICANT CHANGE UP (ref 1.6–2.6)
MAGNESIUM SERPL-MCNC: 2.2 MG/DL — SIGNIFICANT CHANGE UP (ref 1.6–2.6)
MCHC RBC-ENTMCNC: 31 PG — SIGNIFICANT CHANGE UP (ref 27–34)
MCHC RBC-ENTMCNC: 31.9 PG — SIGNIFICANT CHANGE UP (ref 27–34)
MCHC RBC-ENTMCNC: 32.3 GM/DL — SIGNIFICANT CHANGE UP (ref 32–36)
MCHC RBC-ENTMCNC: 33.2 GM/DL — SIGNIFICANT CHANGE UP (ref 32–36)
MCV RBC AUTO: 96.2 FL — SIGNIFICANT CHANGE UP (ref 80–100)
MCV RBC AUTO: 96.3 FL — SIGNIFICANT CHANGE UP (ref 80–100)
MONOCYTES # BLD AUTO: 0.16 K/UL — SIGNIFICANT CHANGE UP (ref 0–0.9)
MONOCYTES # BLD AUTO: 0.17 K/UL — SIGNIFICANT CHANGE UP (ref 0–0.9)
MONOCYTES NFR BLD AUTO: 5.3 % — SIGNIFICANT CHANGE UP (ref 2–14)
MONOCYTES NFR BLD AUTO: 5.4 % — SIGNIFICANT CHANGE UP (ref 2–14)
NEUTROPHILS # BLD AUTO: 2.6 K/UL — SIGNIFICANT CHANGE UP (ref 1.8–7.4)
NEUTROPHILS # BLD AUTO: 2.81 K/UL — SIGNIFICANT CHANGE UP (ref 1.8–7.4)
NEUTROPHILS NFR BLD AUTO: 87.5 % — HIGH (ref 43–77)
NEUTROPHILS NFR BLD AUTO: 88.2 % — HIGH (ref 43–77)
NRBC # BLD: 0 /100 WBCS — SIGNIFICANT CHANGE UP (ref 0–0)
NRBC # BLD: 0 /100 WBCS — SIGNIFICANT CHANGE UP (ref 0–0)
NT-PROBNP SERPL-SCNC: 95 PG/ML — SIGNIFICANT CHANGE UP (ref 0–300)
PHOSPHATE SERPL-MCNC: 2.9 MG/DL — SIGNIFICANT CHANGE UP (ref 2.5–4.5)
PHOSPHATE SERPL-MCNC: 3.7 MG/DL — SIGNIFICANT CHANGE UP (ref 2.5–4.5)
PLATELET # BLD AUTO: 184 K/UL — SIGNIFICANT CHANGE UP (ref 150–400)
PLATELET # BLD AUTO: 199 K/UL — SIGNIFICANT CHANGE UP (ref 150–400)
POTASSIUM SERPL-MCNC: 4.2 MMOL/L — SIGNIFICANT CHANGE UP (ref 3.5–5.3)
POTASSIUM SERPL-MCNC: 4.3 MMOL/L — SIGNIFICANT CHANGE UP (ref 3.5–5.3)
POTASSIUM SERPL-SCNC: 4.2 MMOL/L — SIGNIFICANT CHANGE UP (ref 3.5–5.3)
POTASSIUM SERPL-SCNC: 4.3 MMOL/L — SIGNIFICANT CHANGE UP (ref 3.5–5.3)
PROT SERPL-MCNC: 6.9 G/DL — SIGNIFICANT CHANGE UP (ref 6–8.3)
PROT SERPL-MCNC: 7 G/DL — SIGNIFICANT CHANGE UP (ref 6–8.3)
PROTHROM AB SERPL-ACNC: 12 SEC — SIGNIFICANT CHANGE UP (ref 10.5–13.4)
RBC # BLD: 4.07 M/UL — LOW (ref 4.2–5.8)
RBC # BLD: 4.16 M/UL — LOW (ref 4.2–5.8)
RBC # FLD: 13.6 % — SIGNIFICANT CHANGE UP (ref 10.3–14.5)
RBC # FLD: 13.7 % — SIGNIFICANT CHANGE UP (ref 10.3–14.5)
RH IG SCN BLD-IMP: POSITIVE — SIGNIFICANT CHANGE UP
SODIUM SERPL-SCNC: 136 MMOL/L — SIGNIFICANT CHANGE UP (ref 135–145)
SODIUM SERPL-SCNC: 137 MMOL/L — SIGNIFICANT CHANGE UP (ref 135–145)
URATE SERPL-MCNC: 4 MG/DL — SIGNIFICANT CHANGE UP (ref 3.4–8.8)
URATE SERPL-MCNC: 4.5 MG/DL — SIGNIFICANT CHANGE UP (ref 3.4–8.8)
WBC # BLD: 2.95 K/UL — LOW (ref 3.8–10.5)
WBC # BLD: 3.21 K/UL — LOW (ref 3.8–10.5)
WBC # FLD AUTO: 2.95 K/UL — LOW (ref 3.8–10.5)
WBC # FLD AUTO: 3.21 K/UL — LOW (ref 3.8–10.5)

## 2022-04-18 PROCEDURE — 99291 CRITICAL CARE FIRST HOUR: CPT

## 2022-04-18 RX ORDER — SENNA PLUS 8.6 MG/1
2 TABLET ORAL AT BEDTIME
Refills: 0 | Status: DISCONTINUED | OUTPATIENT
Start: 2022-04-18 | End: 2022-04-18

## 2022-04-18 RX ORDER — POLYETHYLENE GLYCOL 3350 17 G/17G
17 POWDER, FOR SOLUTION ORAL DAILY
Refills: 0 | Status: DISCONTINUED | OUTPATIENT
Start: 2022-04-18 | End: 2022-05-06

## 2022-04-18 RX ORDER — FUROSEMIDE 40 MG
40 TABLET ORAL ONCE
Refills: 0 | Status: COMPLETED | OUTPATIENT
Start: 2022-04-18 | End: 2022-04-18

## 2022-04-18 RX ORDER — ONDANSETRON 8 MG/1
8 TABLET, FILM COATED ORAL EVERY 8 HOURS
Refills: 0 | Status: DISCONTINUED | OUTPATIENT
Start: 2022-04-18 | End: 2022-05-06

## 2022-04-18 RX ADMIN — Medication 1 LOZENGE: at 00:02

## 2022-04-18 RX ADMIN — Medication 1 MILLIGRAM(S): at 11:49

## 2022-04-18 RX ADMIN — POLYETHYLENE GLYCOL 3350 17 GRAM(S): 17 POWDER, FOR SOLUTION ORAL at 21:43

## 2022-04-18 RX ADMIN — Medication 10 MILLILITER(S): at 15:28

## 2022-04-18 RX ADMIN — Medication 1 TABLET(S): at 11:49

## 2022-04-18 RX ADMIN — FLUCONAZOLE 400 MILLIGRAM(S): 150 TABLET ORAL at 11:48

## 2022-04-18 RX ADMIN — Medication 1 TABLET(S): at 05:56

## 2022-04-18 RX ADMIN — SENNA PLUS 1 TABLET(S): 8.6 TABLET ORAL at 10:19

## 2022-04-18 RX ADMIN — Medication 10 MILLILITER(S): at 11:47

## 2022-04-18 RX ADMIN — CARVEDILOL PHOSPHATE 3.12 MILLIGRAM(S): 80 CAPSULE, EXTENDED RELEASE ORAL at 17:18

## 2022-04-18 RX ADMIN — Medication 1 LOZENGE: at 20:28

## 2022-04-18 RX ADMIN — Medication 10 MILLIGRAM(S): at 12:14

## 2022-04-18 RX ADMIN — Medication 300 MILLIGRAM(S): at 11:48

## 2022-04-18 RX ADMIN — Medication 40 MILLIGRAM(S): at 10:18

## 2022-04-18 RX ADMIN — CHLORHEXIDINE GLUCONATE 1 APPLICATION(S): 213 SOLUTION TOPICAL at 07:48

## 2022-04-18 RX ADMIN — SODIUM CHLORIDE 50 MILLILITER(S): 9 INJECTION INTRAMUSCULAR; INTRAVENOUS; SUBCUTANEOUS at 21:42

## 2022-04-18 RX ADMIN — Medication 10 MILLILITER(S): at 20:30

## 2022-04-18 RX ADMIN — Medication 10 MILLILITER(S): at 00:02

## 2022-04-18 RX ADMIN — Medication 1 LOZENGE: at 11:48

## 2022-04-18 RX ADMIN — PANTOPRAZOLE SODIUM 40 MILLIGRAM(S): 20 TABLET, DELAYED RELEASE ORAL at 05:56

## 2022-04-18 RX ADMIN — Medication 1 TABLET(S): at 17:18

## 2022-04-18 RX ADMIN — Medication 10 MILLILITER(S): at 07:48

## 2022-04-18 RX ADMIN — Medication 5 MILLILITER(S): at 07:48

## 2022-04-18 RX ADMIN — CARVEDILOL PHOSPHATE 3.12 MILLIGRAM(S): 80 CAPSULE, EXTENDED RELEASE ORAL at 05:57

## 2022-04-18 RX ADMIN — Medication 1 LOZENGE: at 07:48

## 2022-04-18 RX ADMIN — Medication 5 MILLILITER(S): at 15:28

## 2022-04-18 RX ADMIN — Medication 1 LOZENGE: at 15:28

## 2022-04-18 RX ADMIN — Medication 5 MILLILITER(S): at 11:49

## 2022-04-18 RX ADMIN — Medication 5 MILLILITER(S): at 20:28

## 2022-04-18 RX ADMIN — Medication 5 MILLILITER(S): at 00:03

## 2022-04-18 NOTE — CHART NOTE - NSCHARTNOTEFT_GEN_A_CORE
Nutrition Follow Up Note  Patient seen for: BMT follow-up     Chart reviewed, events noted. Per chart " 56 year old male with relapsed, refractory MCL s/p R-CHOP x 4, RICE x 2, maintenance RTX, salvage Calquence, admitted for CAR T cell therapy with [employer] product. Disease status at time of admission is partial response. Day -  1"     Source: [X] Patient       [x] EMR        [X] RN            Diet Order:   Diet, Regular:   GlutaSolve(Glutamine) 15gm pkg     Qty per Day:  1 (22)    - Is current order appropriate/adequate? [] Yes  [X]  No: likely meeting <50% EER daily, recommend oral nutritional supplement     - PO intake :   [] >75%  Adequate    [] 50-75%  Fair       [X] <50%  Poor    - Nutrition-related concerns:      - Pt reports decreased appetite/PO intake, ~50% of meals consumed x 2 days in setting of N/V. Food preferences obtained; RD to honor as able.       - Amenable to receiving oral nutritional supplement to optimize PO intake: Ensure Clear 2x/day (400 tom, 14 Gm protein). RD will provide High Protein Gelatin 1x/day (18 Gm protein).       - K+ Phos and Mg WNL today . Continue to monitor and replete electrolytes if low. Micronutrient supplementation: multivitamin and folic acid.     GI: Pt C/o nausea and vomiting (most recent episode  1x). No difficulty chewing/swallowing. Last BM x 2 days ago () . Reports constipation.   Bowel Regimen? [X] Yes (senna, Miralax)      Weights:   Daily Weight in k (), Weight in k.5 (), Weight in k (), Weight in k.3 (04-15), Weight in k.4 ()    Nutritionally Pertinent MEDICATIONS  (STANDING):  allopurinol  carvedilol  clotrimazole Lozenge  fluconAZOLE   Tablet  folic acid  multivitamin  pantoprazole    Tablet  sodium bicarbonate Mouth Rinse  sodium chloride 0.9%  sodium chloride 0.9%.  trimethoprim  160 mG/sulfamethoxazole 800 mG    Pertinent Labs:  @ 07:03: Na 137, BUN 16, Cr 1.02, <H>, K+ 4.2, Phos 2.9, Mg 2.2, Alk Phos 95, ALT/SGPT 22, AST/SGOT 29, HbA1c --  04-17 @ 15:39: Na 138, BUN 18, Cr 1.38<H>, <H>, K+ 4.3, Phos 2.9, Mg 2.0, Alk Phos 90, ALT/SGPT 21, AST/SGOT 23, HbA1c --    Skin per nursing documentation: no pressure injury noted   Edema: note noted per nursing documentation     Estimated Needs:   [X] no change since previous assessment  [] recalculated:     Previous Nutrition Diagnosis: Predicted inadequate protein-energy intake   Nutrition Diagnosis is: [X] upgraded    New Nutrition Diagnosis: [X] Inadequate protein-energy intake RT decreased ability to consume adequate protein-energy in setting of treatment side effects AEB CAR-T day -1    Nutrition Care Plan:  [X] In Progress  [] Achieved  [] Not applicable    Nutrition Interventions:     Education Provided:       [X] Yes: Discussed importance of adequate consumption of meals/supplements to optimize protein-energy intake. Encouraged small/frequent meals, nutrient dense snacks, prioritizing protein foods at meal time. N/V and constipation nutrition therapy.        Recommendations:         [X] Continue current diet order: Regular            [X] Add oral nutrition supplement: Ensure Clear 2x/day            [X] RD will provide High Protein Gelatin 1x/day      [X] Encourage adequate consumption of meals/supplements to optimize protein-energy intake.      [X] Continue to monitor and replete electrolytes if low.      [X] Continue current micronutrient supplementation: multivitamin and folic acid.     Monitoring and Evaluation:   Continue to monitor nutritional intake, tolerance to diet prescription, weights, labs, skin integrity      RD remains available upon request and will follow up per protocol  Anna Krishnamurthy RDN, CDN - Pager #[phone]

## 2022-04-18 NOTE — PROGRESS NOTE ADULT - SUBJECTIVE AND OBJECTIVE BOX
HPC Transplant Team                                                      Critical / Counseling Time Provided: 30 minutes                                                                                                                                                        Chief Complaint: CAR T therapy with Tecartus product for treatment of relapsed, refractory MCL    S: Patient seen and examined with HPC Transplant Team:     All other ROS negative     O: Vitals:   Vital Signs Last 24 Hrs  T(C): 36.4 (2022 06:25), Max: 37.2 (2022 13:35)  T(F): 97.5 (2022 06:25), Max: 99 (2022 13:35)  HR: 67 (2022 06:25) (67 - 77)  BP: 126/81 (2022 06:25) (112/69 - 126/81)  BP(mean): --  RR: 18 (2022 06:25) (18 - 18)  SpO2: 98% (2022 06:25) (97% - 98%)    Admit weight: 100.4kg   Daily Weight in k.5 (2022 09:30)  Today's weight:     Intake / Output:   04-17 @ 07:01  -  -18 @ 07:00  --------------------------------------------------------  IN: 3614 mL / OUT: 7000 mL / NET: -3386 mL    PE:   Oropharynx: no erythema or ulcerations , swelling + in upper lip right more than left.  Oral Mucositis:  NA                                                   Grade: n/a  CVS: S1, S2 RRR   Lungs: CTA throughout bilaterally   Abdomen: + BS x 4, soft, NT, ND   Extremities: no edema   Gastric Mucositis:       -                                           Grade: n/a  Intestinal Mucositis:     -                                         Grade: n/a   Skin: no rash   TLC: CDI   Neuro: A&O x 3   Pain: 0    Labs:   CBC Full  -  ( 2022 07:06 )  WBC Count : 2.95 K/uL  Hemoglobin : 13.0 g/dL  Hematocrit : 39.2 %  Platelet Count - Automated : 184 K/uL  Mean Cell Volume : 96.3 fl  Mean Cell Hemoglobin : 31.9 pg  Mean Cell Hemoglobin Concentration : 33.2 gm/dL  Auto Neutrophil # : 2.60 K/uL  Auto Lymphocyte # : 0.07 K/uL  Auto Monocyte # : 0.16 K/uL  Auto Eosinophil # : 0.11 K/uL  Auto Basophil # : 0.00 K/uL  Auto Neutrophil % : 88.2 %  Auto Lymphocyte % : 2.4 %  Auto Monocyte % : 5.4 %  Auto Eosinophil % : 3.7 %  Auto Basophil % : 0.0 %                          13.0   2.95  )-----------( 184      ( 2022 07:06 )             39.2         137  |  102  |  16  ----------------------------<  110<H>  4.2   |  26  |  1.02    Ca    9.2      2022 07:03  Phos  2.9       Mg     2.2         TPro  6.9  /  Alb  4.6  /  TBili  0.4  /  DBili  <0.1  /  AST  29  /  ALT  22  /  AlkPhos  95      PT/INR - ( 2022 07:07 )   PT: 12.0 sec;   INR: 1.04 ratio         PTT - ( 2022 07:07 )  PTT:25.4 sec  LIVER FUNCTIONS - ( 2022 07:03 )  Alb: 4.6 g/dL / Pro: 6.9 g/dL / ALK PHOS: 95 U/L / ALT: 22 U/L / AST: 29 U/L / GGT: x           Lactate Dehydrogenase, Serum: 180 U/L ( @ 07:03)  Lactate Dehydrogenase, Serum: 179 U/L ( @ 15:39)    Meds:   Antimicrobials:   clotrimazole Lozenge 1 Lozenge Oral five times a day  fluconAZOLE   Tablet 400 milliGRAM(s) Oral daily  trimethoprim  160 mG/sulfamethoxazole 800 mG 1 Tablet(s) Oral every 12 hours      Heme / Onc:       GI:  pantoprazole    Tablet 40 milliGRAM(s) Oral before breakfast  senna 1 Tablet(s) Oral at bedtime PRN  sodium bicarbonate Mouth Rinse 10 milliLiter(s) Swish and Spit five times a day      Cardiovascular:   carvedilol 3.125 milliGRAM(s) Oral every 12 hours  furosemide   Injectable 40 milliGRAM(s) IV Push once      Immunologic:       Other medications:   acetaminophen     Tablet .. 650 milliGRAM(s) Oral every 6 hours  allopurinol 300 milliGRAM(s) Oral daily  Biotene Dry Mouth Oral Rinse 5 milliLiter(s) Swish and Spit five times a day  chlorhexidine 4% Liquid 1 Application(s) Topical <User Schedule>  folic acid 1 milliGRAM(s) Oral daily  multivitamin 1 Tablet(s) Oral daily  sodium chloride 0.9% 1000 milliLiter(s) IV Continuous <Continuous>  sodium chloride 0.9%. 1000 milliLiter(s) IV Continuous <Continuous>      PRN:   acetaminophen     Tablet .. 650 milliGRAM(s) Oral every 6 hours PRN  LORazepam   Injectable 1 milliGRAM(s) IV Push every 6 hours PRN  metoclopramide Injectable 10 milliGRAM(s) IV Push every 6 hours PRN  senna 1 Tablet(s) Oral at bedtime PRN  sodium chloride 0.9% lock flush 10 milliLiter(s) IV Push every 1 hour PRN    A/P:    56 year old male with relapsed, refractory MCL s/p R-CHOP x 4, RICE x 2, maintenance RTX, salvage Calquence, admitted for CAR T cell therapy with Tecartus product. Disease status at time of admission is partial response.   Day -  1    1. Relapsed, refractory MCL   4/15- day - 4- flu / ctx 2/3 - continue CTX hydration until 24 hours post infusion of last dose   Strict I&O, daily weights, prn diuresis   CRS / ICANS score daily until infusion of CAR T cells (22) and twice daily thereafter   BID labs 0500 & 1500 - monitor for TLS     2. Prophylactic measures   clotrimazole Lozenge 1 Lozenge Oral five times a day  fluconAZOLE   Tablet 400 milliGRAM(s) Oral daily  trimethoprim  160 mG/sulfamethoxazole 800 mG 1 Tablet(s) Oral every 12 hours  Acyclovir 400mg po TID to start on day -1   If / when neutropenic - will add cipro 500mg po BID   GI Prophylaxis:  Protonix    3. Mouthcare - NS / NaHCO3 rinses, Mycelex, Biotene; Skin care     4. Transfuse & replete electrolytes prn     7. IV hydration, daily weights, strict I&O, prn diuresis   Lasix 40mg IV x 1     8. Antiemetics, anti-diarrhea medications:   LORazepam   Injectable 1 milliGRAM(s) IV Push every 6 hours PRN  metoclopramide Injectable 10 milliGRAM(s) IV Push every 6 hours PRN    9. CRS / ICANS scoring   4/18 CRS grade 0, ICANS score 10     I have written the above note for Dr. Banks who performed service with me in the room.   Tiffany Lucero NP-C ([phone])    I have seen and examined patient with NP, I agree with above note as scribed.                    [facility]PC Transplant Team                                                      Critical / Counseling Time Provided: 30 minutes                                                                                                                                                        Chief Complaint: CAR T therapy with Tecartus product for treatment of relapsed, refractory MCL    S: Patient seen and examined with HPC Transplant Team:   + fatigue   + nausea   + constipation   All other ROS negative     O: Vitals:   Vital Signs Last 24 Hrs  T(C): 36.4 (2022 06:25), Max: 37.2 (2022 13:35)  T(F): 97.5 (2022 06:25), Max: 99 (2022 13:35)  HR: 67 (2022 06:25) (67 - 77)  BP: 126/81 (2022 06:25) (112/69 - 126/81)  BP(mean): --  RR: 18 (2022 06:25) (18 - 18)  SpO2: 98% (2022 06:25) (97% - 98%)    Admit weight: 100.4kg   Daily Weight in k.5 (2022 09:30)  Today's weight: 98kg     Intake / Output:   -17 @ 07:01  -  -18 @ 07:00  --------------------------------------------------------  IN: 3614 mL / OUT: 7000 mL / NET: -3386 mL    PE:   Oropharynx: no erythema or ulcerations , swelling + in upper lip right more than left.  Oral Mucositis:  NA                                                   Grade: n/a  CVS: S1, S2 RRR   Lungs: CTA throughout bilaterally   Abdomen: + BS x 4, soft, NT, ND   Extremities: no edema   Gastric Mucositis:       -                                           Grade: n/a  Intestinal Mucositis:     -                                         Grade: n/a   Skin: no rash   TLC: CDI   Neuro: A&O x 3   Pain: 0    Labs:   CBC Full  -  ( 2022 07:06 )  WBC Count : 2.95 K/uL  Hemoglobin : 13.0 g/dL  Hematocrit : 39.2 %  Platelet Count - Automated : 184 K/uL  Mean Cell Volume : 96.3 fl  Mean Cell Hemoglobin : 31.9 pg  Mean Cell Hemoglobin Concentration : 33.2 gm/dL  Auto Neutrophil # : 2.60 K/uL  Auto Lymphocyte # : 0.07 K/uL  Auto Monocyte # : 0.16 K/uL  Auto Eosinophil # : 0.11 K/uL  Auto Basophil # : 0.00 K/uL  Auto Neutrophil % : 88.2 %  Auto Lymphocyte % : 2.4 %  Auto Monocyte % : 5.4 %  Auto Eosinophil % : 3.7 %  Auto Basophil % : 0.0 %                          13.0   2.95  )-----------( 184      ( 2022 07:06 )             39.2     0418    137  |  102  |  16  ----------------------------<  110<H>  4.2   |  26  |  1.02    Ca    9.2      2022 07:03  Phos  2.9       Mg     2.2         TPro  6.9  /  Alb  4.6  /  TBili  0.4  /  DBili  <0.1  /  AST  29  /  ALT  22  /  AlkPhos  95      PT/INR - ( 2022 07:07 )   PT: 12.0 sec;   INR: 1.04 ratio         PTT - ( 2022 07:07 )  PTT:25.4 sec  LIVER FUNCTIONS - ( 2022 07:03 )  Alb: 4.6 g/dL / Pro: 6.9 g/dL / ALK PHOS: 95 U/L / ALT: 22 U/L / AST: 29 U/L / GGT: x           Lactate Dehydrogenase, Serum: 180 U/L ( @ 07:03)  Lactate Dehydrogenase, Serum: 179 U/L ( @ 15:39)    Meds:   Antimicrobials:   clotrimazole Lozenge 1 Lozenge Oral five times a day  fluconAZOLE   Tablet 400 milliGRAM(s) Oral daily  trimethoprim  160 mG/sulfamethoxazole 800 mG 1 Tablet(s) Oral every 12 hours      Heme / Onc:       GI:  pantoprazole    Tablet 40 milliGRAM(s) Oral before breakfast  senna 1 Tablet(s) Oral at bedtime PRN  sodium bicarbonate Mouth Rinse 10 milliLiter(s) Swish and Spit five times a day      Cardiovascular:   carvedilol 3.125 milliGRAM(s) Oral every 12 hours  furosemide   Injectable 40 milliGRAM(s) IV Push once      Immunologic:       Other medications:   acetaminophen     Tablet .. 650 milliGRAM(s) Oral every 6 hours  allopurinol 300 milliGRAM(s) Oral daily  Biotene Dry Mouth Oral Rinse 5 milliLiter(s) Swish and Spit five times a day  chlorhexidine 4% Liquid 1 Application(s) Topical <User Schedule>  folic acid 1 milliGRAM(s) Oral daily  multivitamin 1 Tablet(s) Oral daily  sodium chloride 0.9% 1000 milliLiter(s) IV Continuous <Continuous>  sodium chloride 0.9%. 1000 milliLiter(s) IV Continuous <Continuous>      PRN:   acetaminophen     Tablet .. 650 milliGRAM(s) Oral every 6 hours PRN  LORazepam   Injectable 1 milliGRAM(s) IV Push every 6 hours PRN  metoclopramide Injectable 10 milliGRAM(s) IV Push every 6 hours PRN  senna 1 Tablet(s) Oral at bedtime PRN  sodium chloride 0.9% lock flush 10 milliLiter(s) IV Push every 1 hour PRN    A/P:    56 year old male with relapsed, refractory MCL s/p R-CHOP x 4, RICE x 2, maintenance RTX, salvage Calquence, admitted for CAR T cell therapy with Tec[employer] product. Disease status at time of admission is partial response.   Day -  1    1. Relapsed, refractory MCL   4/15- day - 4- flu / ctx 2/3 - continue CTX hydration until 24 hours post infusion of last dose   Strict I&O, daily weights, prn diuresis   CRS / ICANS score daily until infusion of CAR T cells (22) and twice daily thereafter   BID labs 0500 & 1500 - monitor for TLS     2. Prophylactic measures   clotrimazole Lozenge 1 Lozenge Oral five times a day  fluconAZOLE   Tablet 400 milliGRAM(s) Oral daily  trimethoprim  160 mG/sulfamethoxazole 800 mG 1 Tablet(s) Oral every 12 hours  Acyclovir 400mg po TID to start on day -1   If / when neutropenic - will add cipro 500mg po BID   GI Prophylaxis:  Protonix    3. Mouthcare - NS / NaHCO3 rinses, Mycelex, Biotene; Skin care     4. Transfuse & replete electrolytes prn     7. IV hydration, daily weights, strict I&O, prn diuresis   Lasix 40mg IV x 1     8. Antiemetics, anti-diarrhea medications:   LORazepam   Injectable 1 milliGRAM(s) IV Push every 6 hours PRN  metoclopramide Injectable 10 milliGRAM(s) IV Push every 6 hours PRN    9. CRS / ICANS scoring   /18 CRS grade 0, ICANS score 10     I have written the above note for Dr. Banks who performed service with me in the room.   Tiffany Lucero NP-C ([phone])    I have seen and examined patient with NP, I agree with above note as scribed.

## 2022-04-18 NOTE — PROGRESS NOTE ADULT - CRITICAL CARE ATTENDING COMMENT
Admitted for CAR T cell therapy with Tecartus (bresucabtagene autoleucel)  today is - 1    1. Admit to BMTU   2. Day -5 through day -1 record CARTOX / ICANS score daily   3. Day - 5 through day - 3 Fludarabine 30mg / m2 IV given over 30 minutes daily   4. Day - 5 - start cyclophosphamide hydration - 0.9% NS + 10mEq KCL / L at 75 ml/m2 and continue for 24 hours post infusion of last dose of CTX   5. Day - 5 through day - 3 CTX 500mg / m2 IV QD to be given over 2 hours   6. Starting day - 5 monitor labs BID: CBC with differential, CMP, LDH, uric acid, mag, phos, CPK, BNP, PT / PTT / INR / Fibrinogen, d-dimer, CRP, hepatic profile, ferritin  7. Weekly IL-6 levels on Monday, twice weekly (Monday / Thursday) CMV / EBV PCR, three times a week (Monday / Wednesday / Friday) type and screen   8. On day - 5 start allopurinol 300mg po QD   9. Day -2 and -1 REST DAYS   10. On day -1 begin to record CARTOX / ICANS score q 12 hours   11. Day 0 infuse Brexucabtagene autoleucel  hx of afib...seen by [facility] cardiology    4/16-17 Doing well, I/Os are negative, transient lip swelling, now resolved, some fatigue Admitted for CAR T cell therapy with T[employer]art[employer] (bresucabtagene autoleucel)  today is - 1..some nausea    1. Admit to BMTU   2. Day -5 through day -1 record CARTOX / ICANS score daily   3. Day - 5 through day - 3 Fludarabine 30mg / m2 IV given over 30 minutes daily   4. Day - 5 - start cyclophosphamide hydration - 0.9% NS + 10mEq KCL / L at 75 ml/m2 and continue for 24 hours post infusion of last dose of CTX   5. Day - 5 through day - 3 CTX 500mg / m2 IV QD to be given over 2 hours   6. Starting day - 5 monitor labs BID: CBC with differential, CMP, LDH, uric acid, mag, phos, CPK, BNP, PT / PTT / INR / Fibrinogen, d-dimer, CRP, hepatic profile, ferritin  7. Weekly IL-6 levels on Monday, twice weekly (Monday / Thursday) CMV / EBV PCR, three times a week (Monday / Wednesday / Friday) type and screen   8. On day - 5 start allopurinol 300mg po QD   9. Day -2 and -1 REST DAYS   10. On day -1 begin to record CARTOX / ICANS score q 12 hours   11. Day 0 infuse Brexucabtagene autoleucel  hx of afib...seen by [facility] cardiology    4/16-17 Doing well, I/Os are negative, transient lip swelling, now resolved, some fatigue  4/18 CRS, ICANS 0

## 2022-04-19 DIAGNOSIS — Z94.84 STEM CELLS TRANSPLANT STATUS: ICD-10-CM

## 2022-04-19 DIAGNOSIS — R11.2 NAUSEA WITH VOMITING, UNSPECIFIED: ICD-10-CM

## 2022-04-19 DIAGNOSIS — Z92.850 PERSONAL HISTORY OF CHIMERIC ANTIGEN RECEPTOR T-CELL THERAPY: ICD-10-CM

## 2022-04-19 DIAGNOSIS — R43.2 PARAGEUSIA: ICD-10-CM

## 2022-04-19 DIAGNOSIS — R53.83 OTHER FATIGUE: ICD-10-CM

## 2022-04-19 LAB
ALBUMIN SERPL ELPH-MCNC: 4.5 G/DL — SIGNIFICANT CHANGE UP (ref 3.3–5)
ALBUMIN SERPL ELPH-MCNC: 4.6 G/DL — SIGNIFICANT CHANGE UP (ref 3.3–5)
ALP SERPL-CCNC: 96 U/L — SIGNIFICANT CHANGE UP (ref 40–120)
ALP SERPL-CCNC: 97 U/L — SIGNIFICANT CHANGE UP (ref 40–120)
ALT FLD-CCNC: 29 U/L — SIGNIFICANT CHANGE UP (ref 10–45)
ALT FLD-CCNC: 31 U/L — SIGNIFICANT CHANGE UP (ref 10–45)
ANION GAP SERPL CALC-SCNC: 12 MMOL/L — SIGNIFICANT CHANGE UP (ref 5–17)
ANION GAP SERPL CALC-SCNC: 13 MMOL/L — SIGNIFICANT CHANGE UP (ref 5–17)
APTT BLD: 27.4 SEC — LOW (ref 27.5–35.5)
AST SERPL-CCNC: 32 U/L — SIGNIFICANT CHANGE UP (ref 10–40)
AST SERPL-CCNC: 35 U/L — SIGNIFICANT CHANGE UP (ref 10–40)
BASOPHILS # BLD AUTO: 0 K/UL — SIGNIFICANT CHANGE UP (ref 0–0.2)
BASOPHILS # BLD AUTO: 0.01 K/UL — SIGNIFICANT CHANGE UP (ref 0–0.2)
BASOPHILS NFR BLD AUTO: 0 % — SIGNIFICANT CHANGE UP (ref 0–2)
BASOPHILS NFR BLD AUTO: 0.4 % — SIGNIFICANT CHANGE UP (ref 0–2)
BILIRUB DIRECT SERPL-MCNC: 0.1 MG/DL — SIGNIFICANT CHANGE UP (ref 0–0.3)
BILIRUB INDIRECT FLD-MCNC: 0.3 MG/DL — SIGNIFICANT CHANGE UP (ref 0.2–1)
BILIRUB SERPL-MCNC: 0.4 MG/DL — SIGNIFICANT CHANGE UP (ref 0.2–1.2)
BILIRUB SERPL-MCNC: 0.7 MG/DL — SIGNIFICANT CHANGE UP (ref 0.2–1.2)
BUN SERPL-MCNC: 20 MG/DL — SIGNIFICANT CHANGE UP (ref 7–23)
BUN SERPL-MCNC: 21 MG/DL — SIGNIFICANT CHANGE UP (ref 7–23)
CALCIUM SERPL-MCNC: 9 MG/DL — SIGNIFICANT CHANGE UP (ref 8.4–10.5)
CALCIUM SERPL-MCNC: 9.2 MG/DL — SIGNIFICANT CHANGE UP (ref 8.4–10.5)
CHLORIDE SERPL-SCNC: 98 MMOL/L — SIGNIFICANT CHANGE UP (ref 96–108)
CHLORIDE SERPL-SCNC: 99 MMOL/L — SIGNIFICANT CHANGE UP (ref 96–108)
CK SERPL-CCNC: 327 U/L — HIGH (ref 30–200)
CO2 SERPL-SCNC: 24 MMOL/L — SIGNIFICANT CHANGE UP (ref 22–31)
CO2 SERPL-SCNC: 25 MMOL/L — SIGNIFICANT CHANGE UP (ref 22–31)
CREAT SERPL-MCNC: 1.02 MG/DL — SIGNIFICANT CHANGE UP (ref 0.5–1.3)
CREAT SERPL-MCNC: 1.07 MG/DL — SIGNIFICANT CHANGE UP (ref 0.5–1.3)
CRP SERPL-MCNC: 8 MG/L — HIGH (ref 0–4)
D DIMER BLD IA.RAPID-MCNC: <150 NG/ML DDU — SIGNIFICANT CHANGE UP
EGFR: 81 ML/MIN/1.73M2 — SIGNIFICANT CHANGE UP
EGFR: 86 ML/MIN/1.73M2 — SIGNIFICANT CHANGE UP
EOSINOPHIL # BLD AUTO: 0.18 K/UL — SIGNIFICANT CHANGE UP (ref 0–0.5)
EOSINOPHIL # BLD AUTO: 0.21 K/UL — SIGNIFICANT CHANGE UP (ref 0–0.5)
EOSINOPHIL NFR BLD AUTO: 6.5 % — HIGH (ref 0–6)
EOSINOPHIL NFR BLD AUTO: 8.4 % — HIGH (ref 0–6)
FERRITIN SERPL-MCNC: 232 NG/ML — SIGNIFICANT CHANGE UP (ref 30–400)
FIBRINOGEN PPP-MCNC: 530 MG/DL — HIGH (ref 330–520)
GLUCOSE SERPL-MCNC: 115 MG/DL — HIGH (ref 70–99)
GLUCOSE SERPL-MCNC: 96 MG/DL — SIGNIFICANT CHANGE UP (ref 70–99)
HCT VFR BLD CALC: 39.2 % — SIGNIFICANT CHANGE UP (ref 39–50)
HCT VFR BLD CALC: 39.3 % — SIGNIFICANT CHANGE UP (ref 39–50)
HGB BLD-MCNC: 12.8 G/DL — LOW (ref 13–17)
HGB BLD-MCNC: 13 G/DL — SIGNIFICANT CHANGE UP (ref 13–17)
IL6 FLD-MCNC: <2.5 PG/ML — SIGNIFICANT CHANGE UP (ref 0–13)
IMM GRANULOCYTES NFR BLD AUTO: 0.4 % — SIGNIFICANT CHANGE UP (ref 0–1.5)
INR BLD: 1.06 RATIO — SIGNIFICANT CHANGE UP (ref 0.88–1.16)
LDH SERPL L TO P-CCNC: 179 U/L — SIGNIFICANT CHANGE UP (ref 50–242)
LDH SERPL L TO P-CCNC: 187 U/L — SIGNIFICANT CHANGE UP (ref 50–242)
LYMPHOCYTES # BLD AUTO: 0.06 K/UL — LOW (ref 1–3.3)
LYMPHOCYTES # BLD AUTO: 0.07 K/UL — LOW (ref 1–3.3)
LYMPHOCYTES # BLD AUTO: 2.5 % — LOW (ref 13–44)
LYMPHOCYTES # BLD AUTO: 2.5 % — LOW (ref 13–44)
MAGNESIUM SERPL-MCNC: 2.2 MG/DL — SIGNIFICANT CHANGE UP (ref 1.6–2.6)
MAGNESIUM SERPL-MCNC: 2.2 MG/DL — SIGNIFICANT CHANGE UP (ref 1.6–2.6)
MANUAL SMEAR VERIFICATION: SIGNIFICANT CHANGE UP
MCHC RBC-ENTMCNC: 30.9 PG — SIGNIFICANT CHANGE UP (ref 27–34)
MCHC RBC-ENTMCNC: 31.4 PG — SIGNIFICANT CHANGE UP (ref 27–34)
MCHC RBC-ENTMCNC: 32.7 GM/DL — SIGNIFICANT CHANGE UP (ref 32–36)
MCHC RBC-ENTMCNC: 33.1 GM/DL — SIGNIFICANT CHANGE UP (ref 32–36)
MCV RBC AUTO: 94.7 FL — SIGNIFICANT CHANGE UP (ref 80–100)
MCV RBC AUTO: 94.9 FL — SIGNIFICANT CHANGE UP (ref 80–100)
MONOCYTES # BLD AUTO: 0.02 K/UL — SIGNIFICANT CHANGE UP (ref 0–0.9)
MONOCYTES # BLD AUTO: 0.14 K/UL — SIGNIFICANT CHANGE UP (ref 0–0.9)
MONOCYTES NFR BLD AUTO: 0.9 % — LOW (ref 2–14)
MONOCYTES NFR BLD AUTO: 5.1 % — SIGNIFICANT CHANGE UP (ref 2–14)
NEUTROPHILS # BLD AUTO: 2.23 K/UL — SIGNIFICANT CHANGE UP (ref 1.8–7.4)
NEUTROPHILS # BLD AUTO: 2.34 K/UL — SIGNIFICANT CHANGE UP (ref 1.8–7.4)
NEUTROPHILS NFR BLD AUTO: 85.1 % — HIGH (ref 43–77)
NEUTROPHILS NFR BLD AUTO: 87.4 % — HIGH (ref 43–77)
NEUTS BAND # BLD: 0.8 % — SIGNIFICANT CHANGE UP (ref 0–8)
NRBC # BLD: 0 /100 WBCS — SIGNIFICANT CHANGE UP (ref 0–0)
NT-PROBNP SERPL-SCNC: 30 PG/ML — SIGNIFICANT CHANGE UP (ref 0–300)
PHOSPHATE SERPL-MCNC: 3 MG/DL — SIGNIFICANT CHANGE UP (ref 2.5–4.5)
PHOSPHATE SERPL-MCNC: 3.3 MG/DL — SIGNIFICANT CHANGE UP (ref 2.5–4.5)
PLAT MORPH BLD: NORMAL — SIGNIFICANT CHANGE UP
PLATELET # BLD AUTO: 191 K/UL — SIGNIFICANT CHANGE UP (ref 150–400)
PLATELET # BLD AUTO: 195 K/UL — SIGNIFICANT CHANGE UP (ref 150–400)
POTASSIUM SERPL-MCNC: 4.4 MMOL/L — SIGNIFICANT CHANGE UP (ref 3.5–5.3)
POTASSIUM SERPL-MCNC: 4.5 MMOL/L — SIGNIFICANT CHANGE UP (ref 3.5–5.3)
POTASSIUM SERPL-SCNC: 4.4 MMOL/L — SIGNIFICANT CHANGE UP (ref 3.5–5.3)
POTASSIUM SERPL-SCNC: 4.5 MMOL/L — SIGNIFICANT CHANGE UP (ref 3.5–5.3)
PROT SERPL-MCNC: 7 G/DL — SIGNIFICANT CHANGE UP (ref 6–8.3)
PROT SERPL-MCNC: 7.1 G/DL — SIGNIFICANT CHANGE UP (ref 6–8.3)
PROTHROM AB SERPL-ACNC: 12.2 SEC — SIGNIFICANT CHANGE UP (ref 10.5–13.4)
RBC # BLD: 4.14 M/UL — LOW (ref 4.2–5.8)
RBC # BLD: 4.14 M/UL — LOW (ref 4.2–5.8)
RBC # FLD: 13.2 % — SIGNIFICANT CHANGE UP (ref 10.3–14.5)
RBC # FLD: 13.4 % — SIGNIFICANT CHANGE UP (ref 10.3–14.5)
RBC BLD AUTO: NORMAL — SIGNIFICANT CHANGE UP
SODIUM SERPL-SCNC: 135 MMOL/L — SIGNIFICANT CHANGE UP (ref 135–145)
SODIUM SERPL-SCNC: 136 MMOL/L — SIGNIFICANT CHANGE UP (ref 135–145)
URATE SERPL-MCNC: 4 MG/DL — SIGNIFICANT CHANGE UP (ref 3.4–8.8)
URATE SERPL-MCNC: 4 MG/DL — SIGNIFICANT CHANGE UP (ref 3.4–8.8)
WBC # BLD: 2.53 K/UL — LOW (ref 3.8–10.5)
WBC # BLD: 2.75 K/UL — LOW (ref 3.8–10.5)
WBC # FLD AUTO: 2.53 K/UL — LOW (ref 3.8–10.5)
WBC # FLD AUTO: 2.75 K/UL — LOW (ref 3.8–10.5)

## 2022-04-19 PROCEDURE — 0540T: CPT

## 2022-04-19 PROCEDURE — 99233 SBSQ HOSP IP/OBS HIGH 50: CPT

## 2022-04-19 PROCEDURE — 99291 CRITICAL CARE FIRST HOUR: CPT | Mod: 25

## 2022-04-19 RX ADMIN — Medication 10 MILLILITER(S): at 12:52

## 2022-04-19 RX ADMIN — Medication 400 MILLIGRAM(S): at 15:07

## 2022-04-19 RX ADMIN — Medication 1 TABLET(S): at 12:56

## 2022-04-19 RX ADMIN — CARVEDILOL PHOSPHATE 3.12 MILLIGRAM(S): 80 CAPSULE, EXTENDED RELEASE ORAL at 18:09

## 2022-04-19 RX ADMIN — Medication 400 MILLIGRAM(S): at 06:14

## 2022-04-19 RX ADMIN — PANTOPRAZOLE SODIUM 40 MILLIGRAM(S): 20 TABLET, DELAYED RELEASE ORAL at 06:14

## 2022-04-19 RX ADMIN — Medication 1 LOZENGE: at 17:01

## 2022-04-19 RX ADMIN — Medication 5 MILLILITER(S): at 12:53

## 2022-04-19 RX ADMIN — Medication 400 MILLIGRAM(S): at 21:28

## 2022-04-19 RX ADMIN — Medication 10 MILLILITER(S): at 00:50

## 2022-04-19 RX ADMIN — Medication 1 LOZENGE: at 00:49

## 2022-04-19 RX ADMIN — Medication 300 MILLIGRAM(S): at 12:55

## 2022-04-19 RX ADMIN — Medication 10 MILLILITER(S): at 20:55

## 2022-04-19 RX ADMIN — Medication 650 MILLIGRAM(S): at 12:28

## 2022-04-19 RX ADMIN — SODIUM CHLORIDE 50 MILLILITER(S): 9 INJECTION INTRAMUSCULAR; INTRAVENOUS; SUBCUTANEOUS at 06:13

## 2022-04-19 RX ADMIN — Medication 1 MILLIGRAM(S): at 12:56

## 2022-04-19 RX ADMIN — SENNA PLUS 1 TABLET(S): 8.6 TABLET ORAL at 21:28

## 2022-04-19 RX ADMIN — Medication 25 MILLIGRAM(S): at 11:09

## 2022-04-19 RX ADMIN — Medication 650 MILLIGRAM(S): at 11:12

## 2022-04-19 RX ADMIN — Medication 1 LOZENGE: at 20:55

## 2022-04-19 RX ADMIN — Medication 10 MILLILITER(S): at 17:01

## 2022-04-19 RX ADMIN — Medication 1 LOZENGE: at 08:46

## 2022-04-19 RX ADMIN — CHLORHEXIDINE GLUCONATE 1 APPLICATION(S): 213 SOLUTION TOPICAL at 08:45

## 2022-04-19 RX ADMIN — SODIUM CHLORIDE 50 MILLILITER(S): 9 INJECTION INTRAMUSCULAR; INTRAVENOUS; SUBCUTANEOUS at 21:28

## 2022-04-19 RX ADMIN — Medication 5 MILLILITER(S): at 08:47

## 2022-04-19 RX ADMIN — FLUCONAZOLE 400 MILLIGRAM(S): 150 TABLET ORAL at 12:55

## 2022-04-19 RX ADMIN — Medication 5 MILLILITER(S): at 17:02

## 2022-04-19 RX ADMIN — CARVEDILOL PHOSPHATE 3.12 MILLIGRAM(S): 80 CAPSULE, EXTENDED RELEASE ORAL at 06:13

## 2022-04-19 RX ADMIN — Medication 10 MILLILITER(S): at 08:46

## 2022-04-19 RX ADMIN — Medication 1 LOZENGE: at 12:53

## 2022-04-19 RX ADMIN — Medication 5 MILLILITER(S): at 20:54

## 2022-04-19 RX ADMIN — Medication 5 MILLILITER(S): at 00:50

## 2022-04-19 NOTE — PROGRESS NOTE ADULT - PROBLEM SELECTOR PLAN 1
Predominant symptom: nausea  Likely due to chemo  Degree of control: moderate degree of control  Relative to previous day: comparable  Current treatment regimen: PRN reglan/PRN ativan  Recommendations: monitor for escalation  Risk mitigation:  Adverse events noted:

## 2022-04-19 NOTE — PROGRESS NOTE ADULT - SUBJECTIVE AND OBJECTIVE BOX
HPC Transplant Team                                                      Critical / Counseling Time Provided: 30 minutes                                                                                                                                                        Chief Complaint: CAR T therapy with Tecartus product for treatment of relapsed, refractory MCL    S: Patient seen and examined with HPC Transplant Team:     All other ROS negative      O: Vitals:   Vital Signs Last 24 Hrs  T(C): 36.4 (2022 06:10), Max: 37.1 (2022 21:41)  T(F): 97.5 (2022 06:10), Max: 98.8 (2022 21:41)  HR: 67 (2022 06:10) (66 - 76)  BP: 138/91 (2022 06:10) (105/70 - 138/91)  BP(mean): --  RR: 18 (2022 06:10) (18 - 18)  SpO2: 97% (2022 06:10) (96% - 99%)    Admit weight: 100.4kg   Daily Weight in k (2022 09:15)  Today's weight:     Intake / Output:   04-18 @ 07:01  -  04-19 @ 07:00  --------------------------------------------------------  IN: 1681 mL / OUT: 3825 mL / NET: -2144 mL      PE:   Oropharynx: no erythema or ulcerations , swelling + in upper lip right more than left.  Oral Mucositis:  NA                                                   Grade: n/a  CVS: S1, S2 RRR   Lungs: CTA throughout bilaterally   Abdomen: + BS x 4, soft, NT, ND   Extremities: no edema   Gastric Mucositis:       -                                           Grade: n/a  Intestinal Mucositis:     -                                         Grade: n/a   Skin: no rash   TLC: CDI   Neuro: A&O x 3   Pain: 0    Labs:   CBC Full  -  ( 2022 06:27 )  WBC Count : 2.75 K/uL  Hemoglobin : 13.0 g/dL  Hematocrit : 39.3 %  Platelet Count - Automated : 195 K/uL  Mean Cell Volume : 94.9 fl  Mean Cell Hemoglobin : 31.4 pg  Mean Cell Hemoglobin Concentration : 33.1 gm/dL  Auto Neutrophil # : 2.34 K/uL  Auto Lymphocyte # : 0.07 K/uL  Auto Monocyte # : 0.14 K/uL  Auto Eosinophil # : 0.18 K/uL  Auto Basophil # : 0.01 K/uL  Auto Neutrophil % : 85.1 %  Auto Lymphocyte % : 2.5 %  Auto Monocyte % : 5.1 %  Auto Eosinophil % : 6.5 %  Auto Basophil % : 0.4 %                          13.0   2.75  )-----------( 195      ( 2022 06:27 )             39.3         135  |  98  |  21  ----------------------------<  115<H>  4.5   |  24  |  1.02    Ca    9.2      2022 06:27  Phos  3.0       Mg     2.2         TPro  7.0  /  Alb  4.5  /  TBili  0.4  /  DBili  0.1  /  AST  32  /  ALT  29  /  AlkPhos  96      PT/INR - ( 2022 06:27 )   PT: 12.2 sec;   INR: 1.06 ratio         PTT - ( 2022 06:27 )  PTT:27.4 sec  LIVER FUNCTIONS - ( 2022 06:27 )  Alb: 4.5 g/dL / Pro: 7.0 g/dL / ALK PHOS: 96 U/L / ALT: 29 U/L / AST: 32 U/L / GGT: x           Lactate Dehydrogenase, Serum: 179 U/L ( @ 06:27)  Lactate Dehydrogenase, Serum: 203 U/L ( @ 15:19)      Meds:   Antimicrobials:   acyclovir   Oral Tab/Cap 400 milliGRAM(s) Oral every 8 hours  clotrimazole Lozenge 1 Lozenge Oral five times a day  fluconAZOLE   Tablet 400 milliGRAM(s) Oral daily      Heme / Onc:       GI:  pantoprazole    Tablet 40 milliGRAM(s) Oral before breakfast  polyethylene glycol 3350 17 Gram(s) Oral daily PRN  senna 1 Tablet(s) Oral at bedtime PRN  sodium bicarbonate Mouth Rinse 10 milliLiter(s) Swish and Spit five times a day      Cardiovascular:   carvedilol 3.125 milliGRAM(s) Oral every 12 hours      Immunologic:       Other medications:   acetaminophen     Tablet .. 650 milliGRAM(s) Oral every 6 hours  acetaminophen     Tablet .. 650 milliGRAM(s) Oral once  allopurinol 300 milliGRAM(s) Oral daily  Biotene Dry Mouth Oral Rinse 5 milliLiter(s) Swish and Spit five times a day  chlorhexidine 4% Liquid 1 Application(s) Topical <User Schedule>  diphenhydrAMINE 25 milliGRAM(s) Oral once  folic acid 1 milliGRAM(s) Oral daily  multivitamin 1 Tablet(s) Oral daily  sodium chloride 0.9%. 1000 milliLiter(s) IV Continuous <Continuous>      PRN:   acetaminophen     Tablet .. 650 milliGRAM(s) Oral every 6 hours PRN  LORazepam   Injectable 1 milliGRAM(s) IV Push every 6 hours PRN  metoclopramide Injectable 10 milliGRAM(s) IV Push every 6 hours PRN  ondansetron Injectable 8 milliGRAM(s) IV Push every 8 hours PRN  polyethylene glycol 3350 17 Gram(s) Oral daily PRN  senna 1 Tablet(s) Oral at bedtime PRN  sodium chloride 0.9% lock flush 10 milliLiter(s) IV Push every 1 hour PRN    A/P:    56 year old male with relapsed, refractory MCL s/p R-CHOP x 4, RICE x 2, maintenance RTX, salvage Calquence, admitted for CAR T cell therapy with Tecartus product. Disease status at time of admission is partial response.   Day 0    1. Relapsed, refractory MCL   4/15- day - 4- flu / ctx 2/3 - continue CTX hydration until 24 hours post infusion of last dose   Strict I&O, daily weights, prn diuresis   CRS / ICANS score daily until infusion of CAR T cells (22) and twice daily thereafter   BID labs 0500 & 1500 - monitor for TLS     2. Prophylactic measures   clotrimazole Lozenge 1 Lozenge Oral five times a day  fluconAZOLE   Tablet 400 milliGRAM(s) Oral daily  trimethoprim  160 mG/sulfamethoxazole 800 mG 1 Tablet(s) Oral every 12 hours  Acyclovir 400mg po TID to start on day -1   If / when neutropenic - will add cipro 500mg po BID   GI Prophylaxis:  Protonix    3. Mouthcare - NS / NaHCO3 rinses, Mycelex, Biotene; Skin care     4. Transfuse & replete electrolytes prn     7. IV hydration, daily weights, strict I&O, prn diuresis     8. Antiemetics, anti-diarrhea medications:   LORazepam   Injectable 1 milliGRAM(s) IV Push every 6 hours PRN  metoclopramide Injectable 10 milliGRAM(s) IV Push every 6 hours PRN  ondansetron 8mg IV q 8 hours prn nausea     9. CRS / ICANS scoring    CRS grade 0, ICANS score 10   - CRS grade 0, ICANS score 10     I have written the above note for Dr. Banks who performed service with me in the room.   Tiffany Lucero NP-C ([phone])    I have seen and examined patient with NP, I agree with above note as scribed.                    [facility]PC Transplant Team                                                      Critical / Counseling Time Provided: 30 minutes                                                                                                                                                        Chief Complaint: CAR T therapy with Tecartus product for treatment of relapsed, refractory MCL    S: Patient seen and examined with HPC Transplant Team:   + mild, intermittent nausea   All other ROS negative      O: Vitals:   Vital Signs Last 24 Hrs  T(C): 36.4 (2022 06:10), Max: 37.1 (2022 21:41)  T(F): 97.5 (2022 06:10), Max: 98.8 (2022 21:41)  HR: 67 (2022 06:10) (66 - 76)  BP: 138/91 (2022 06:10) (105/70 - 138/91)  BP(mean): --  RR: 18 (2022 06:10) (18 - 18)  SpO2: 97% (2022 06:10) (96% - 99%)    Admit weight: 100.4kg   Daily Weight in k (2022 09:15)  Today's weight: 97.4kg     Intake / Output:   -18 @ 07:01  -  04-19 @ 07:00  --------------------------------------------------------  IN: 1681 mL / OUT: 3825 mL / NET: -2144 mL      PE:   Oropharynx: no erythema or ulcerations , swelling + in upper lip right more than left.  Oral Mucositis:  NA                                                   Grade: n/a  CVS: S1, S2 RRR   Lungs: CTA throughout bilaterally   Abdomen: + BS x 4, soft, NT, ND   Extremities: no edema   Gastric Mucositis:       -                                           Grade: n/a  Intestinal Mucositis:     -                                         Grade: n/a   Skin: no rash   TLC: CDI   Neuro: A&O x 3   Pain: 0    Labs:   CBC Full  -  ( 2022 06:27 )  WBC Count : 2.75 K/uL  Hemoglobin : 13.0 g/dL  Hematocrit : 39.3 %  Platelet Count - Automated : 195 K/uL  Mean Cell Volume : 94.9 fl  Mean Cell Hemoglobin : 31.4 pg  Mean Cell Hemoglobin Concentration : 33.1 gm/dL  Auto Neutrophil # : 2.34 K/uL  Auto Lymphocyte # : 0.07 K/uL  Auto Monocyte # : 0.14 K/uL  Auto Eosinophil # : 0.18 K/uL  Auto Basophil # : 0.01 K/uL  Auto Neutrophil % : 85.1 %  Auto Lymphocyte % : 2.5 %  Auto Monocyte % : 5.1 %  Auto Eosinophil % : 6.5 %  Auto Basophil % : 0.4 %                          13.0   2.75  )-----------( 195      ( 2022 06:27 )             39.3         135  |  98  |  21  ----------------------------<  115<H>  4.5   |  24  |  1.02    Ca    9.2      2022 06:27  Phos  3.0       Mg     2.2         TPro  7.0  /  Alb  4.5  /  TBili  0.4  /  DBili  0.1  /  AST  32  /  ALT  29  /  AlkPhos  96      PT/INR - ( 2022 06:27 )   PT: 12.2 sec;   INR: 1.06 ratio         PTT - ( 2022 06:27 )  PTT:27.4 sec  LIVER FUNCTIONS - ( 2022 06:27 )  Alb: 4.5 g/dL / Pro: 7.0 g/dL / ALK PHOS: 96 U/L / ALT: 29 U/L / AST: 32 U/L / GGT: x           Lactate Dehydrogenase, Serum: 179 U/L ( @ 06:27)  Lactate Dehydrogenase, Serum: 203 U/L ( @ 15:19)      Meds:   Antimicrobials:   acyclovir   Oral Tab/Cap 400 milliGRAM(s) Oral every 8 hours  clotrimazole Lozenge 1 Lozenge Oral five times a day  fluconAZOLE   Tablet 400 milliGRAM(s) Oral daily      Heme / Onc:       GI:  pantoprazole    Tablet 40 milliGRAM(s) Oral before breakfast  polyethylene glycol 3350 17 Gram(s) Oral daily PRN  senna 1 Tablet(s) Oral at bedtime PRN  sodium bicarbonate Mouth Rinse 10 milliLiter(s) Swish and Spit five times a day      Cardiovascular:   carvedilol 3.125 milliGRAM(s) Oral every 12 hours      Immunologic:       Other medications:   acetaminophen     Tablet .. 650 milliGRAM(s) Oral every 6 hours  acetaminophen     Tablet .. 650 milliGRAM(s) Oral once  allopurinol 300 milliGRAM(s) Oral daily  Biotene Dry Mouth Oral Rinse 5 milliLiter(s) Swish and Spit five times a day  chlorhexidine 4% Liquid 1 Application(s) Topical <User Schedule>  diphenhydrAMINE 25 milliGRAM(s) Oral once  folic acid 1 milliGRAM(s) Oral daily  multivitamin 1 Tablet(s) Oral daily  sodium chloride 0.9%. 1000 milliLiter(s) IV Continuous <Continuous>      PRN:   acetaminophen     Tablet .. 650 milliGRAM(s) Oral every 6 hours PRN  LORazepam   Injectable 1 milliGRAM(s) IV Push every 6 hours PRN  metoclopramide Injectable 10 milliGRAM(s) IV Push every 6 hours PRN  ondansetron Injectable 8 milliGRAM(s) IV Push every 8 hours PRN  polyethylene glycol 3350 17 Gram(s) Oral daily PRN  senna 1 Tablet(s) Oral at bedtime PRN  sodium chloride 0.9% lock flush 10 milliLiter(s) IV Push every 1 hour PRN    A/P:    56 year old male with relapsed, refractory MCL s/p R-CHOP x 4, RICE x 2, maintenance RTX, salvage [place]quence, admitted for CAR T cell therapy with Tec[employer] product. Disease status at time of admission is partial response.   Day 0    1. Relapsed, refractory MCL   4/15- day - 4- flu / ctx 2/3 - continue CTX hydration until 24 hours post infusion of last dose   Strict I&O, daily weights, prn diuresis   CRS / ICANS score daily until infusion of CAR T cells (22) and twice daily thereafter   BID labs 0500 & 1500 - monitor for TLS     2. Prophylactic measures   clotrimazole Lozenge 1 Lozenge Oral five times a day  fluconAZOLE   Tablet 400 milliGRAM(s) Oral daily  trimethoprim  160 mG/sulfamethoxazole 800 mG 1 Tablet(s) Oral every 12 hours  Acyclovir 400mg po TID to start on day -1   If / when neutropenic - will add cipro 500mg po BID   GI Prophylaxis:  Protonix    3. Mouthcare - NS / NaHCO3 rinses, Mycelex, Biotene; Skin care     4. Transfuse & replete electrolytes prn     7. IV hydration, daily weights, strict I&O, prn diuresis     8. Antiemetics, anti-diarrhea medications:   LORazepam   Injectable 1 milliGRAM(s) IV Push every 6 hours PRN  metoclopramide Injectable 10 milliGRAM(s) IV Push every 6 hours PRN  ondansetron 8mg IV q 8 hours prn nausea     9. CRS / ICANS scoring    CRS grade 0, ICANS score 10   - CRS grade 0, ICANS score 10     I have written the above note for Dr. Banks who performed service with me in the room.   Tiffany Lucero NP-C ([phone])    I have seen and examined patient with NP, I agree with above note as scribed.

## 2022-04-19 NOTE — PROGRESS NOTE ADULT - CRITICAL CARE ATTENDING COMMENT
Admitted for CAR T cell therapy with Tecartus (bresucabtagene autoleucel)  today is 0..some nausea    1. Admit to BMTU   2. Day -5 through day -1 record CARTOX / ICANS score daily   3. Day - 5 through day - 3 Fludarabine 30mg / m2 IV given over 30 minutes daily   4. Day - 5 - start cyclophosphamide hydration - 0.9% NS + 10mEq KCL / L at 75 ml/m2 and continue for 24 hours post infusion of last dose of CTX   5. Day - 5 through day - 3 CTX 500mg / m2 IV QD to be given over 2 hours   6. Starting day - 5 monitor labs BID: CBC with differential, CMP, LDH, uric acid, mag, phos, CPK, BNP, PT / PTT / INR / Fibrinogen, d-dimer, CRP, hepatic profile, ferritin  7. Weekly IL-6 levels on Monday, twice weekly (Monday / Thursday) CMV / EBV PCR, three times a week (Monday / Wednesday / Friday) type and screen   8. On day - 5 start allopurinol 300mg po QD   9. Day -2 and -1 REST DAYS   10. On day -1 begin to record CARTOX / ICANS score q 12 hours   11. Day 0 infuse Brexucabtagene autoleucel  hx of afib...seen by [facility] cardiology    4/16-17 Doing well, I/Os are negative, transient lip swelling, now resolved, some fatigue  4/18 CRS, ICANS 0 Admitted for CAR T cell therapy with Tecart[employer] (bresucabtagene autoleucel)  today is 0..some nausea    1. Admit to BMTU   2. Day -5 through day -1 record CARTOX / ICANS score daily   3. Day - 5 through day - 3 Fludarabine 30mg / m2 IV given over 30 minutes daily   4. Day - 5 - start cyclophosphamide hydration - 0.9% NS + 10mEq KCL / L at 75 ml/m2 and continue for 24 hours post infusion of last dose of CTX   5. Day - 5 through day - 3 CTX 500mg / m2 IV QD to be given over 2 hours   6. Starting day - 5 monitor labs BID: CBC with differential, CMP, LDH, uric acid, mag, phos, CPK, BNP, PT / PTT / INR / Fibrinogen, d-dimer, CRP, hepatic profile, ferritin  7. Weekly IL-6 levels on Monday, twice weekly (Monday / Thursday) CMV / EBV PCR, three times a week (Monday / Wednesday / Friday) type and screen   8. On day - 5 start allopurinol 300mg po QD   9. Day -2 and -1 REST DAYS   10. On day -1 begin to record CARTOX / ICANS score q 12 hours   11. Day 0 infuse Brexucabtagene autoleucel  hx of afib...seen by [facility] cardiology    4/16-17 Doing well, I/Os are negative, transient lip swelling, now resolved, some fatigue  4/18, 19 CRS 0, ICANS 10

## 2022-04-19 NOTE — CHART NOTE - NSCHARTNOTEFT_GEN_A_CORE
After pre-medication, Mr. Humphries received 68ml of Tecartus CAR T product over approximately 20 minutes. He tolerated the infusion well with no adverse reactions noted. Tiffany Lucero NP-C [phone]

## 2022-04-19 NOTE — PROGRESS NOTE ADULT - SUBJECTIVE AND OBJECTIVE BOX
HPI:  This is a 56 year old male with a history of atrial fibrillation (s/p ablation x 2, internal loop recorder) and  MCL, initially diagnosed 10/2016. He was initially seen at [facility] for a left inguinal mass. CT scans 3/21/16 showed left sided pelvic and inguinal adenopathy. A left inguinal lymph node biopsy 16 showed morphologic and immunophenotypic findings consistent with MCL. He was treated with 4 cycles of R-CHOP, and 2 cycles of RICE consolidation. A bone marrow biopsy 2/10/17 showed normocellular bone marrow. He was admitted 3/27/17 for an autologous pbsct with CBV prep, and received the cells 17. He engrafted 17. He was discharged and completed maintenance Rituxan. In 10/21, he noticed a lump in the right groin. A lymph node biopsy 21 was consistent with relapsed MCL. He was treated with Calquence. He didn't tolerate Calquence well, with reports of frequent abdominal pain. He is admitted today for CAR T therapy with Tecartus product for relapsed, refractory MCL. His disease status at this time is partial remission.  (2022 12:36)              22 @ 14:54  [facility] 719 W1    Overnight events: No major events  Staff reports: Plan is for CAR-T infusion today  Patient: Pt reports feeling moderate to severe nausea which is relieved by antiemetics. His nausea can be triggered by the sight and smell of food. He reports a moderate degree of dysgeusia which is impacting his anorexia.  PRN use: n/a        RECENT VITALS/LABS/MEDICATIONS/ASSAYS     22 @ 14:54    T(C): 37 (22 @ 10:07), Max: 37.1 (22 @ 21:41)  HR: 87 (22 @ 10:07) (66 - 87)  BP: 108/72 (22 @ 10:07) (105/70 - 138/91)  RR: 18 (22 @ 10:07) (18 - 18)  SpO2: 97% (22 @ 10:07) (96% - 99%)  Wt(kg): --      2022 07: 07:00  --------------------------------------------------------  IN:    Oral Fluid: 480 mL    sodium chloride 0.9%: 1201 mL  Total IN: 1681 mL    OUT:    Voided (mL): 3825 mL  Total OUT: 3825 mL    Total NET: -4 mL      2022 07:  -  2022 14:54  --------------------------------------------------------  IN:    Oral Fluid: 200 mL    sodium chloride 0.9%: 102 mL  Total IN: 302 mL    OUT:    Voided (mL): 700 mL  Total OUT: 700 mL    Total NET: -398 mL           @ 07: @ 07:00  --------------------------------------------------------  IN: 1681 mL / OUT: 3825 mL / NET: -2144 mL     @ 07: @ 14:54  --------------------------------------------------------  IN: 302 mL / OUT: 700 mL / NET: -398 mL      CAPILLARY BLOOD GLUCOSE            acetaminophen     Tablet .. 650 milliGRAM(s) Oral every 6 hours PRN  acetaminophen     Tablet .. 650 milliGRAM(s) Oral every 6 hours  acyclovir   Oral Tab/Cap 400 milliGRAM(s) Oral every 8 hours  allopurinol 300 milliGRAM(s) Oral daily  Biotene Dry Mouth Oral Rinse 5 milliLiter(s) Swish and Spit five times a day  carvedilol 3.125 milliGRAM(s) Oral every 12 hours  chlorhexidine 4% Liquid 1 Application(s) Topical <User Schedule>  clotrimazole Lozenge 1 Lozenge Oral five times a day  fluconAZOLE   Tablet 400 milliGRAM(s) Oral daily  folic acid 1 milliGRAM(s) Oral daily  LORazepam   Injectable 1 milliGRAM(s) IV Push every 6 hours PRN  metoclopramide Injectable 10 milliGRAM(s) IV Push every 6 hours PRN  multivitamin 1 Tablet(s) Oral daily  ondansetron Injectable 8 milliGRAM(s) IV Push every 8 hours PRN  pantoprazole    Tablet 40 milliGRAM(s) Oral before breakfast  polyethylene glycol 3350 17 Gram(s) Oral daily PRN  senna 1 Tablet(s) Oral at bedtime PRN  sodium bicarbonate Mouth Rinse 10 milliLiter(s) Swish and Spit five times a day  sodium chloride 0.9% lock flush 10 milliLiter(s) IV Push every 1 hour PRN  sodium chloride 0.9%. 1000 milliLiter(s) IV Continuous <Continuous>              135  |  98  |  21  ----------------------------<  115<H>  4.5   |  24  |  1.02    Ca    9.2      2022 06:27  Phos  3.0       Mg     2.2         TPro  7.0  /  Alb  4.5  /  TBili  0.4  /  DBili  0.1  /  AST  32  /  ALT  29  /  AlkPhos  96        Procalc  BNPSerum Pro-Brain Natriuretic Peptide: 30 pg/mL (22 @ 06:27)  Serum Pro-Brain Natriuretic Peptide: 95 pg/mL (22 @ 07:03)    ABG                          13.0   2.75  )-----------( 195      ( 2022 06:27 )             39.3   PT/INR - ( 2022 06:27 )   PT: 12.2 sec;   INR: 1.06 ratio         PTT - ( 2022 06:27 )  PTT:27.4 sec        blood and urine cultures              ===================================================================================  Palliative Global Assessment-Initial   A review of the paper chart has been conducted  HCP form present:               Yes and valid []               Yes but invalid []                No [x]   MOLST present:                   Yes and valid []               Yes but invalid []                No [x]  Incapacity form present:   Yes and valid []                Yes but invalid []                No []                 N/A [x]  Living Will:                            Yes and valid []                Yes but invalid []                No [x]      Family Heatlh Care Decision Act Surrogate Decision Maker [facility] Article 81 Guardian-->Spouse or domestic partner--> Adult child-->Parent-->Sibling--> Close friend      Contacts listed in the paper or electronic chart  Name Aga Kapoor Wife  Number(s) [phone]  Translation Required: None  Spouse or Partner: Aga  Family unit: Wife and children, his youngest is 13  Family history of hematologic malignancy: None  Residence: [ ] Apartment   [x] House  [ ] Other  Degree of functionality in the home: Full   Performance Status (PPSv2): 80  BMI:BMI (kg/m2): 30.6 (22 @ 10:40)  Engagement in the home:  Employment: [ ] Currently employed [ x] Exited workforce due to illness  [ ] Retired prior to illness  [ ] No/distant history of employment   Support network (degree):  ---Family:        [ ] Low  [ ] Moderate  [ x] High  ---Neighbors: [ x] Low  [ ] Moderate  [ ] High  ---Social:         [ ] Low  [ ] Moderate  [ x] High  ---Spiritual:    [ x] Low  [ ] Moderate  [ ] High  Financial concerns: TBD  Coping Strategies: Listening to music and speaking with his wife  Caregiver burden/fatigue/needs: Childcare issues/concerns given his wife's visitation/  Grief identified: [] Yes [x] No  Medical communication and decision making preferences: TBD        PERTINENT PM/SXH:   Non Hodgkin&#x27;s lymphoma    Atrial flutter    Migraine    Sinus bradycardia    Paroxysmal atrial fibrillation    GERD (gastroesophageal reflux disease)      H/O total knee replacement    S/P right knee arthroscopy    History of arthroscopy of left shoulder    H/O prior ablation treatment    Status post placement of implantable loop recorder    H/O stem cell transplant      FAMILY HISTORY:  Family history of hypertension (Mother)  Mother    Family history of diabetes mellitus (Father)  Father      ITEMS NOT CHECKED ARE NOT PRESENT    SOCIAL HISTORY:   Significant other/partner[x ]  Children[x ]  Hoahaoism/Spirituality:  Substance hx:  [ ]   Tobacco hx:  [ ]   Alcohol hx: [ ]   Home Opioid hx:  [ ] I-Stop Reference No:  Living Situation: [ ]Home  [ ]Long term care  [ ]Rehab [ ]Other    ADVANCE DIRECTIVES:    DNR  MOLST  [ ]  Living Will  [ ]   DECISION MAKER(s):  [ ] Health Care Proxy(s)  [ ] Surrogate(s)  [ ] Guardian           Name(s): Phone Number(s):    BASELINE (I)ADL(s) (prior to admission):  [place]: [ ]Total  [ ] Moderate [ ]Dependent    Allergies    No Known Allergies    Intolerances         PRESENT SYMPTOMS: [ ]Unable to obtain due to poor mentation   Source if other than patient:  [ ]Family   [ ]Team [ ] Inferred/Assessed    Pain: [ ]yes [x ]no  QOL impact -   Location -                    Aggravating factors -  Quality -  Radiation -  Timing-  Severity (0-10 scale):  Minimal acceptable level (0-10 scale):     PAIN AD Score:     [email or web address] (press ctrl +  left click to view)    [ ] Inferred Symptoms    Fatigue:                             [  ] None  [x ]Mild [ ]Moderate [ ]Severe  Drowsiness:                      [x ] None  [ ]Mild [ ]Moderate [ ]Severe  Nausea:                             [ x ] None  [ ]Mild [ x]Moderate [ x]Severe  Dysgeusia:                         [  } None  [ ]Mild [ x]Moderate [ ]Severe  Loss of appetite:              [  ] None  [ ]Mild [x ]Moderate [ ]Severe  Constipation:                    [x ] None  [ ]Mild [ ]Moderate [ ]Severe  Dyspnea:                           [x ] None  [ ]Mild [ ]Moderate [ ]Severe  Anxiety:                             [ x] None  [  ]Mild [ ]Moderate [ ]Severe  Depression:                      [x ] None  [ ]Mild [ ]Moderate [ ]Severe  Cognitive changes:          [ x] None  [ ]Mild [ ]Moderate [ ]Severe  Insomnia      :                    [ x] None  [ ]Mild [ ]Moderate [ ]Severe  Isolation:                           [x ] None  [ ]Mild [ ]Moderate [ ]Severe  Loneliness:                        [x ] None  [ ]Mild [ ]Moderate [ ]Severe  Lack of   Wellbeing:                        [ x] None  [ ]Mild [ ]Moderate [ ]Severe    Other Symptoms: None  [x ]All other review of systems negative     Palliative Performance Status Version 2:      80   %    [email or web address]/[email or web address]_performance_[email or web address]_[email or web address].pdf  PHYSICAL EXAM:    GENERAL:  [x ]Alert  [ ]Oriented x   [ ]Lethargic  [ ]Cachexia  [ ]Unarousable  [ ]Verbal  [ ]Non-Verbal [ ] Engaging   [  ] Confused  [  ] No distress  Behavioral:   [ ] Anxiety  [ ] Delirium [ ] Agitation [ x] Calm   [  ] Restless [ ] Other  HEENT:  [x ]Normal   [ ]Dry mouth   [ ]ET Tube/Trach  [ ]Oral lesions   [ ] NGT  [ ] Mucositis [ ] Oral  Bleeding  PULMONARY:   [ x]Clear [ ]Tachypnea  [ ]Audible excessive secretions [  ] No tachypnea  [ ]Rhonchi        [ ]Right [ ]Left [ ]Bilateral  [ ]Crackles        [ ]Right [ ]Left [ ]Bilateral  [ ]Wheezing     [ ]Right [ ]Left [ ]Bilateral  [ ]Diminished breath sounds [ ]right [ ]left [ ]bilateral  CARDIOVASCULAR:    [ x]Regular [ ]Irregular [ ]Tachy  [ ]Venkata [ ]Murmur [ ] JVD  GASTROINTESTINAL:  [x ]Soft  [ ]Distended   [ ]+BS  [ ]Non tender [ ]Tender  [ ]PEG [ ]OGT/ NGT  Last BM:   GENITOURINARY:  [x ]Normal [ ] Incontinent   [ ]Oliguria/Anuria   [ ]Hinds  MUSCULOSKELETAL:   [ x]Normal   [ ]Weakness  [ ]Bed/Wheelchair bound [ ]Edema  NEUROLOGIC:   [ x]No focal deficits  [ ]Cognitive impairment  [ ]Dysphagia [ ]Dysarthria [ ]Paresis [ ]Other   SKIN:   [x ]Normal    [ ]Rash  [ ]Pressure ulcer(s)   [  ] Lesion   [    ]  Wounds       Present on admission [ ]y [ ]n    CRITICAL CARE:  [ ] Shock Present  [ ]Septic [ ]Cardiogenic [ ]Neurologic [ ]Hypovolemic  [ ]  Vasopressors [ ]  Inotropes   [ ]Respiratory failure present [ ]Mechanical ventilation [ ]Non-invasive ventilatory support [ ]High flow  [ ]Acute  [ ]Chronic [ ]Hypoxic  [ ]Hypercarbic [ ]Other  [ ]Other organ failure     LABS:                        12.1   5.28  )-----------( 165      ( 15 Apr 2022 06:06 )             38.5   04-15    141  |  106  |  17  ----------------------------<  106<H>  4.4   |  24  |  1.10    Ca    8.3<L>      15 Apr 2022 06:06  Phos  3.1     -15  Mg     2.0     -15    TPro  5.8<L>  /  Alb  3.9  /  TBili  0.3  /  DBili  0.1  /  AST  21  /  ALT  20  /  AlkPhos  71  04-15  PT/INR - ( 15 Apr 2022 06:06 )   PT: 12.0 sec;   INR: 1.04 ratio         PTT - ( 15 Apr 2022 06:06 )  PTT:26.9 sec    Urinalysis Basic - ( 2022 14:44 )    Color: Light Yellow / Appearance: Clear / S.017 / pH: x  Gluc: x / Ketone: Negative  / Bili: Negative / Urobili: Negative   Blood: x / Protein: Negative / Nitrite: Negative   Leuk Esterase: Negative / RBC: x / WBC x   Sq Epi: x / Non Sq Epi: x / Bacteria: x      RADIOLOGY & ADDITIONAL STUDIES:    PROTEIN CALORIE MALNUTRITION PRESENT: [ ]mild [ ]moderate [ ]severe [ ]underweight [ ]morbid obesity  https://www.andeal.org/vault/2440/web/files/ONC/Table_Clinical%20Characteristics%20to%20Document%20Malnutrition-White%20JV%20et%20al%2020.pdf    Height (cm): 181 (22 @ 10:40), 180.3 (22 @ 08:57), 180 (22 @ 16:31)  Weight (kg): 100.4 (22 @ 10:40), 101.999 (22 @ 17:00), 98.4 (22 @ 08:57)  BMI (kg/m2): 30.6 (22 @ 10:40), 31.4 (22 @ 17:00), 30.3 (22 @ 08:57)    [ ]PPSV2 < or = to 30% [ ]significant weight loss  [ ]poor nutritional intake  [ ]anasarca      [ ]Artificial Nutrition      REFERRALS:   [ ]Chaplaincy  [ ]Hospice  [ ]Child Life  [ ]Social Work  [ ]Case management [ ]Holistic Therapy     Goals of Care Document:

## 2022-04-19 NOTE — PROGRESS NOTE ADULT - PROBLEM SELECTOR PLAN 7
Recommendation: Condition: Mantel Cell Lymphoma  Previous transplant: AUTO SCT 2017  Active treatment:    Transplant Type: Tecartus (brexucabtagene autoleucel)  Transplant Day: Day 0  Clinical impact on complexity: Significant.

## 2022-04-20 DIAGNOSIS — Z92.850 PERSONAL HISTORY OF CHIMERIC ANTIGEN RECEPTOR T-CELL THERAPY: ICD-10-CM

## 2022-04-20 DIAGNOSIS — R63.0 ANOREXIA: ICD-10-CM

## 2022-04-20 DIAGNOSIS — G47.00 INSOMNIA, UNSPECIFIED: ICD-10-CM

## 2022-04-20 LAB
ALBUMIN SERPL ELPH-MCNC: 4.4 G/DL — SIGNIFICANT CHANGE UP (ref 3.3–5)
ALBUMIN SERPL ELPH-MCNC: 4.7 G/DL — SIGNIFICANT CHANGE UP (ref 3.3–5)
ALP SERPL-CCNC: 100 U/L — SIGNIFICANT CHANGE UP (ref 40–120)
ALP SERPL-CCNC: 103 U/L — SIGNIFICANT CHANGE UP (ref 40–120)
ALT FLD-CCNC: 28 U/L — SIGNIFICANT CHANGE UP (ref 10–45)
ALT FLD-CCNC: 31 U/L — SIGNIFICANT CHANGE UP (ref 10–45)
ANION GAP SERPL CALC-SCNC: 12 MMOL/L — SIGNIFICANT CHANGE UP (ref 5–17)
ANION GAP SERPL CALC-SCNC: 12 MMOL/L — SIGNIFICANT CHANGE UP (ref 5–17)
APTT BLD: 26.1 SEC — LOW (ref 27.5–35.5)
AST SERPL-CCNC: 34 U/L — SIGNIFICANT CHANGE UP (ref 10–40)
AST SERPL-CCNC: 36 U/L — SIGNIFICANT CHANGE UP (ref 10–40)
BASOPHILS # BLD AUTO: 0 K/UL — SIGNIFICANT CHANGE UP (ref 0–0.2)
BASOPHILS # BLD AUTO: 0 K/UL — SIGNIFICANT CHANGE UP (ref 0–0.2)
BASOPHILS NFR BLD AUTO: 0 % — SIGNIFICANT CHANGE UP (ref 0–2)
BASOPHILS NFR BLD AUTO: 0 % — SIGNIFICANT CHANGE UP (ref 0–2)
BILIRUB DIRECT SERPL-MCNC: 0.1 MG/DL — SIGNIFICANT CHANGE UP (ref 0–0.3)
BILIRUB INDIRECT FLD-MCNC: 0.5 MG/DL — SIGNIFICANT CHANGE UP (ref 0.2–1)
BILIRUB SERPL-MCNC: 0.4 MG/DL — SIGNIFICANT CHANGE UP (ref 0.2–1.2)
BILIRUB SERPL-MCNC: 0.6 MG/DL — SIGNIFICANT CHANGE UP (ref 0.2–1.2)
BLD GP AB SCN SERPL QL: NEGATIVE — SIGNIFICANT CHANGE UP
BUN SERPL-MCNC: 21 MG/DL — SIGNIFICANT CHANGE UP (ref 7–23)
BUN SERPL-MCNC: 22 MG/DL — SIGNIFICANT CHANGE UP (ref 7–23)
CALCIUM SERPL-MCNC: 9 MG/DL — SIGNIFICANT CHANGE UP (ref 8.4–10.5)
CALCIUM SERPL-MCNC: 9.1 MG/DL — SIGNIFICANT CHANGE UP (ref 8.4–10.5)
CHLORIDE SERPL-SCNC: 101 MMOL/L — SIGNIFICANT CHANGE UP (ref 96–108)
CHLORIDE SERPL-SCNC: 98 MMOL/L — SIGNIFICANT CHANGE UP (ref 96–108)
CK SERPL-CCNC: 447 U/L — HIGH (ref 30–200)
CMV DNA CSF QL NAA+PROBE: SIGNIFICANT CHANGE UP
CO2 SERPL-SCNC: 24 MMOL/L — SIGNIFICANT CHANGE UP (ref 22–31)
CO2 SERPL-SCNC: 25 MMOL/L — SIGNIFICANT CHANGE UP (ref 22–31)
CREAT SERPL-MCNC: 0.98 MG/DL — SIGNIFICANT CHANGE UP (ref 0.5–1.3)
CREAT SERPL-MCNC: 0.98 MG/DL — SIGNIFICANT CHANGE UP (ref 0.5–1.3)
CRP SERPL-MCNC: 5 MG/L — HIGH (ref 0–4)
D DIMER BLD IA.RAPID-MCNC: <150 NG/ML DDU — SIGNIFICANT CHANGE UP
EGFR: 90 ML/MIN/1.73M2 — SIGNIFICANT CHANGE UP
EGFR: 90 ML/MIN/1.73M2 — SIGNIFICANT CHANGE UP
EOSINOPHIL # BLD AUTO: 0.18 K/UL — SIGNIFICANT CHANGE UP (ref 0–0.5)
EOSINOPHIL # BLD AUTO: 0.24 K/UL — SIGNIFICANT CHANGE UP (ref 0–0.5)
EOSINOPHIL NFR BLD AUTO: 10.5 % — HIGH (ref 0–6)
EOSINOPHIL NFR BLD AUTO: 6.8 % — HIGH (ref 0–6)
FERRITIN SERPL-MCNC: 286 NG/ML — SIGNIFICANT CHANGE UP (ref 30–400)
FIBRINOGEN PPP-MCNC: 504 MG/DL — SIGNIFICANT CHANGE UP (ref 330–520)
GLUCOSE SERPL-MCNC: 109 MG/DL — HIGH (ref 70–99)
GLUCOSE SERPL-MCNC: 91 MG/DL — SIGNIFICANT CHANGE UP (ref 70–99)
HCT VFR BLD CALC: 38.2 % — LOW (ref 39–50)
HCT VFR BLD CALC: 38.6 % — LOW (ref 39–50)
HGB BLD-MCNC: 12.5 G/DL — LOW (ref 13–17)
HGB BLD-MCNC: 12.6 G/DL — LOW (ref 13–17)
IL6 FLD-MCNC: 3.1 PG/ML — SIGNIFICANT CHANGE UP (ref 0–13)
IMM GRANULOCYTES NFR BLD AUTO: 0.4 % — SIGNIFICANT CHANGE UP (ref 0–1.5)
IMM GRANULOCYTES NFR BLD AUTO: 0.4 % — SIGNIFICANT CHANGE UP (ref 0–1.5)
INR BLD: 1.04 RATIO — SIGNIFICANT CHANGE UP (ref 0.88–1.16)
LDH SERPL L TO P-CCNC: 177 U/L — SIGNIFICANT CHANGE UP (ref 50–242)
LDH SERPL L TO P-CCNC: 181 U/L — SIGNIFICANT CHANGE UP (ref 50–242)
LYMPHOCYTES # BLD AUTO: 0.06 K/UL — LOW (ref 1–3.3)
LYMPHOCYTES # BLD AUTO: 0.12 K/UL — LOW (ref 1–3.3)
LYMPHOCYTES # BLD AUTO: 2.3 % — LOW (ref 13–44)
LYMPHOCYTES # BLD AUTO: 5.2 % — LOW (ref 13–44)
MAGNESIUM SERPL-MCNC: 2.2 MG/DL — SIGNIFICANT CHANGE UP (ref 1.6–2.6)
MAGNESIUM SERPL-MCNC: 2.2 MG/DL — SIGNIFICANT CHANGE UP (ref 1.6–2.6)
MCHC RBC-ENTMCNC: 31 PG — SIGNIFICANT CHANGE UP (ref 27–34)
MCHC RBC-ENTMCNC: 31.1 PG — SIGNIFICANT CHANGE UP (ref 27–34)
MCHC RBC-ENTMCNC: 32.6 GM/DL — SIGNIFICANT CHANGE UP (ref 32–36)
MCHC RBC-ENTMCNC: 32.7 GM/DL — SIGNIFICANT CHANGE UP (ref 32–36)
MCV RBC AUTO: 94.8 FL — SIGNIFICANT CHANGE UP (ref 80–100)
MCV RBC AUTO: 95 FL — SIGNIFICANT CHANGE UP (ref 80–100)
MONOCYTES # BLD AUTO: 0.14 K/UL — SIGNIFICANT CHANGE UP (ref 0–0.9)
MONOCYTES # BLD AUTO: 0.16 K/UL — SIGNIFICANT CHANGE UP (ref 0–0.9)
MONOCYTES NFR BLD AUTO: 5.3 % — SIGNIFICANT CHANGE UP (ref 2–14)
MONOCYTES NFR BLD AUTO: 7 % — SIGNIFICANT CHANGE UP (ref 2–14)
NEUTROPHILS # BLD AUTO: 1.76 K/UL — LOW (ref 1.8–7.4)
NEUTROPHILS # BLD AUTO: 2.26 K/UL — SIGNIFICANT CHANGE UP (ref 1.8–7.4)
NEUTROPHILS NFR BLD AUTO: 76.9 % — SIGNIFICANT CHANGE UP (ref 43–77)
NEUTROPHILS NFR BLD AUTO: 85.2 % — HIGH (ref 43–77)
NRBC # BLD: 0 /100 WBCS — SIGNIFICANT CHANGE UP (ref 0–0)
NRBC # BLD: 0 /100 WBCS — SIGNIFICANT CHANGE UP (ref 0–0)
NT-PROBNP SERPL-SCNC: 32 PG/ML — SIGNIFICANT CHANGE UP (ref 0–300)
PHOSPHATE SERPL-MCNC: 2.9 MG/DL — SIGNIFICANT CHANGE UP (ref 2.5–4.5)
PHOSPHATE SERPL-MCNC: 2.9 MG/DL — SIGNIFICANT CHANGE UP (ref 2.5–4.5)
PLATELET # BLD AUTO: 190 K/UL — SIGNIFICANT CHANGE UP (ref 150–400)
PLATELET # BLD AUTO: 190 K/UL — SIGNIFICANT CHANGE UP (ref 150–400)
POTASSIUM SERPL-MCNC: 4.4 MMOL/L — SIGNIFICANT CHANGE UP (ref 3.5–5.3)
POTASSIUM SERPL-MCNC: 4.6 MMOL/L — SIGNIFICANT CHANGE UP (ref 3.5–5.3)
POTASSIUM SERPL-SCNC: 4.4 MMOL/L — SIGNIFICANT CHANGE UP (ref 3.5–5.3)
POTASSIUM SERPL-SCNC: 4.6 MMOL/L — SIGNIFICANT CHANGE UP (ref 3.5–5.3)
PROT SERPL-MCNC: 6.7 G/DL — SIGNIFICANT CHANGE UP (ref 6–8.3)
PROT SERPL-MCNC: 6.9 G/DL — SIGNIFICANT CHANGE UP (ref 6–8.3)
PROTHROM AB SERPL-ACNC: 12.1 SEC — SIGNIFICANT CHANGE UP (ref 10.5–13.4)
RBC # BLD: 4.02 M/UL — LOW (ref 4.2–5.8)
RBC # BLD: 4.07 M/UL — LOW (ref 4.2–5.8)
RBC # FLD: 12.9 % — SIGNIFICANT CHANGE UP (ref 10.3–14.5)
RBC # FLD: 13.2 % — SIGNIFICANT CHANGE UP (ref 10.3–14.5)
RH IG SCN BLD-IMP: POSITIVE — SIGNIFICANT CHANGE UP
SODIUM SERPL-SCNC: 135 MMOL/L — SIGNIFICANT CHANGE UP (ref 135–145)
SODIUM SERPL-SCNC: 137 MMOL/L — SIGNIFICANT CHANGE UP (ref 135–145)
URATE SERPL-MCNC: 3.3 MG/DL — LOW (ref 3.4–8.8)
URATE SERPL-MCNC: 3.4 MG/DL — SIGNIFICANT CHANGE UP (ref 3.4–8.8)
WBC # BLD: 2.29 K/UL — LOW (ref 3.8–10.5)
WBC # BLD: 2.65 K/UL — LOW (ref 3.8–10.5)
WBC # FLD AUTO: 2.29 K/UL — LOW (ref 3.8–10.5)
WBC # FLD AUTO: 2.65 K/UL — LOW (ref 3.8–10.5)

## 2022-04-20 PROCEDURE — 99232 SBSQ HOSP IP/OBS MODERATE 35: CPT

## 2022-04-20 PROCEDURE — 99291 CRITICAL CARE FIRST HOUR: CPT

## 2022-04-20 RX ADMIN — Medication 1 MILLIGRAM(S): at 12:41

## 2022-04-20 RX ADMIN — Medication 10 MILLILITER(S): at 08:29

## 2022-04-20 RX ADMIN — Medication 5 MILLILITER(S): at 20:54

## 2022-04-20 RX ADMIN — Medication 400 MILLIGRAM(S): at 21:07

## 2022-04-20 RX ADMIN — Medication 400 MILLIGRAM(S): at 16:21

## 2022-04-20 RX ADMIN — PANTOPRAZOLE SODIUM 40 MILLIGRAM(S): 20 TABLET, DELAYED RELEASE ORAL at 05:28

## 2022-04-20 RX ADMIN — Medication 10 MILLILITER(S): at 12:39

## 2022-04-20 RX ADMIN — Medication 1 TABLET(S): at 12:41

## 2022-04-20 RX ADMIN — Medication 10 MILLILITER(S): at 20:54

## 2022-04-20 RX ADMIN — Medication 5 MILLILITER(S): at 08:30

## 2022-04-20 RX ADMIN — Medication 1 LOZENGE: at 00:29

## 2022-04-20 RX ADMIN — Medication 1 LOZENGE: at 08:28

## 2022-04-20 RX ADMIN — Medication 5 MILLILITER(S): at 00:28

## 2022-04-20 RX ADMIN — CARVEDILOL PHOSPHATE 3.12 MILLIGRAM(S): 80 CAPSULE, EXTENDED RELEASE ORAL at 18:23

## 2022-04-20 RX ADMIN — Medication 300 MILLIGRAM(S): at 12:40

## 2022-04-20 RX ADMIN — Medication 1 LOZENGE: at 20:54

## 2022-04-20 RX ADMIN — Medication 5 MILLILITER(S): at 12:39

## 2022-04-20 RX ADMIN — Medication 10 MILLILITER(S): at 16:22

## 2022-04-20 RX ADMIN — Medication 10 MILLILITER(S): at 00:28

## 2022-04-20 RX ADMIN — Medication 1 LOZENGE: at 12:39

## 2022-04-20 RX ADMIN — Medication 5 MILLILITER(S): at 16:21

## 2022-04-20 RX ADMIN — CARVEDILOL PHOSPHATE 3.12 MILLIGRAM(S): 80 CAPSULE, EXTENDED RELEASE ORAL at 05:28

## 2022-04-20 RX ADMIN — SODIUM CHLORIDE 50 MILLILITER(S): 9 INJECTION INTRAMUSCULAR; INTRAVENOUS; SUBCUTANEOUS at 21:06

## 2022-04-20 RX ADMIN — CHLORHEXIDINE GLUCONATE 1 APPLICATION(S): 213 SOLUTION TOPICAL at 08:27

## 2022-04-20 RX ADMIN — Medication 1 LOZENGE: at 16:22

## 2022-04-20 RX ADMIN — Medication 400 MILLIGRAM(S): at 05:28

## 2022-04-20 RX ADMIN — POLYETHYLENE GLYCOL 3350 17 GRAM(S): 17 POWDER, FOR SOLUTION ORAL at 21:07

## 2022-04-20 RX ADMIN — FLUCONAZOLE 400 MILLIGRAM(S): 150 TABLET ORAL at 12:41

## 2022-04-20 NOTE — PROGRESS NOTE ADULT - SUBJECTIVE AND OBJECTIVE BOX
HPC Transplant Team                                                      Critical / Counseling Time Provided: 30 minutes                                                                                                                                                        Chief Complaint: CAR T therapy with Tecartus product for treatment of relapsed, refractory MCL    S: Patient seen and examined with HPC Transplant Team:     All other ROS negative     O: Vitals:   Vital Signs Last 24 Hrs  T(C): 36.8 (2022 05:31), Max: 37.1 (2022 17:44)  T(F): 98.2 (2022 05:31), Max: 98.8 (2022 17:44)  HR: 75 (2022 05:31) (65 - 87)  BP: 127/81 (2022 05:31) (108/72 - 132/75)  BP(mean): --  RR: 18 (2022 05:31) (18 - 18)  SpO2: 98% (2022 05:31) (95% - 98%)    Admit weight: 100.4kg   Daily Weight in k.4 (2022 10:07)  Today's weight:     Intake / Output:   04-19 @ 07:01  -  04-20 @ 07:00  --------------------------------------------------------  IN: 2594 mL / OUT: 2775 mL / NET: -181 mL    PE:   Oropharynx: no erythema or ulcerations , swelling + in upper lip right more than left.  Oral Mucositis:  NA                                                   Grade: n/a  CVS: S1, S2 RRR   Lungs: CTA throughout bilaterally   Abdomen: + BS x 4, soft, NT, ND   Extremities: no edema   Gastric Mucositis:       -                                           Grade: n/a  Intestinal Mucositis:     -                                         Grade: n/a   Skin: no rash   TLC: CDI   Neuro: A&O x 3   Pain: 0      Labs:   CBC Full  -  ( 2022 05:59 )  WBC Count : 2.65 K/uL  Hemoglobin : 12.6 g/dL  Hematocrit : 38.6 %  Platelet Count - Automated : 190 K/uL  Mean Cell Volume : 94.8 fl  Mean Cell Hemoglobin : 31.0 pg  Mean Cell Hemoglobin Concentration : 32.6 gm/dL  Auto Neutrophil # : 2.26 K/uL  Auto Lymphocyte # : 0.06 K/uL  Auto Monocyte # : 0.14 K/uL  Auto Eosinophil # : 0.18 K/uL  Auto Basophil # : 0.00 K/uL  Auto Neutrophil % : 85.2 %  Auto Lymphocyte % : 2.3 %  Auto Monocyte % : 5.3 %  Auto Eosinophil % : 6.8 %  Auto Basophil % : 0.0 %                          12.6   2.65  )-----------( 190      ( 2022 05:59 )             38.6         137  |  101  |  21  ----------------------------<  109<H>  4.6   |  24  |  0.98    Ca    9.0      2022 05:59  Phos  2.9       Mg     2.2         TPro  6.7  /  Alb  4.4  /  TBili  0.6  /  DBili  0.1  /  AST  36  /  ALT  31  /  AlkPhos  100      PT/INR - ( 2022 05:59 )   PT: 12.1 sec;   INR: 1.04 ratio         PTT - ( 2022 05:59 )  PTT:26.1 sec  LIVER FUNCTIONS - ( 2022 05:59 )  Alb: 4.4 g/dL / Pro: 6.7 g/dL / ALK PHOS: 100 U/L / ALT: 31 U/L / AST: 36 U/L / GGT: x           Lactate Dehydrogenase, Serum: 177 U/L ( @ 05:59)  Lactate Dehydrogenase, Serum: 187 U/L ( @ 15:28)      Meds:   Antimicrobials:   acyclovir   Oral Tab/Cap 400 milliGRAM(s) Oral every 8 hours  clotrimazole Lozenge 1 Lozenge Oral five times a day  fluconAZOLE   Tablet 400 milliGRAM(s) Oral daily      Heme / Onc:       GI:  pantoprazole    Tablet 40 milliGRAM(s) Oral before breakfast  polyethylene glycol 3350 17 Gram(s) Oral daily PRN  senna 1 Tablet(s) Oral at bedtime PRN  sodium bicarbonate Mouth Rinse 10 milliLiter(s) Swish and Spit five times a day      Cardiovascular:   carvedilol 3.125 milliGRAM(s) Oral every 12 hours      Immunologic:       Other medications:   acetaminophen     Tablet .. 650 milliGRAM(s) Oral every 6 hours  allopurinol 300 milliGRAM(s) Oral daily  Biotene Dry Mouth Oral Rinse 5 milliLiter(s) Swish and Spit five times a day  chlorhexidine 4% Liquid 1 Application(s) Topical <User Schedule>  folic acid 1 milliGRAM(s) Oral daily  multivitamin 1 Tablet(s) Oral daily  sodium chloride 0.9%. 1000 milliLiter(s) IV Continuous <Continuous>      PRN:   acetaminophen     Tablet .. 650 milliGRAM(s) Oral every 6 hours PRN  LORazepam   Injectable 1 milliGRAM(s) IV Push every 6 hours PRN  metoclopramide Injectable 10 milliGRAM(s) IV Push every 6 hours PRN  ondansetron Injectable 8 milliGRAM(s) IV Push every 8 hours PRN  polyethylene glycol 3350 17 Gram(s) Oral daily PRN  senna 1 Tablet(s) Oral at bedtime PRN  sodium chloride 0.9% lock flush 10 milliLiter(s) IV Push every 1 hour PRN      A/P:    56 year old male with relapsed, refractory MCL s/p R-CHOP x 4, RICE x 2, maintenance RTX, salvage Calquence, admitted for CAR T cell therapy with Tecartus product. Disease status at time of admission is partial response.   Day + 1    1. Relapsed, refractory MCL   4/15- day - 4- flu / ctx 2/3 - continue CTX hydration until 24 hours post infusion of last dose   Strict I&O, daily weights, prn diuresis   CRS / ICANS score daily until infusion of CAR T cells (22) and twice daily thereafter   BID labs 0500 & 1500 - monitor for TLS     2. Prophylactic measures   clotrimazole Lozenge 1 Lozenge Oral five times a day  fluconAZOLE   Tablet 400 milliGRAM(s) Oral daily  trimethoprim  160 mG/sulfamethoxazole 800 mG 1 Tablet(s) Oral every 12 hours  Acyclovir 400mg po TID to start on day -1   If / when neutropenic - will add cipro 500mg po BID   GI Prophylaxis:  Protonix    3. Mouthcare - NS / NaHCO3 rinses, Mycelex, Biotene; Skin care     4. Transfuse & replete electrolytes prn     7. IV hydration, daily weights, strict I&O, prn diuresis     8. Antiemetics, anti-diarrhea medications:   LORazepam   Injectable 1 milliGRAM(s) IV Push every 6 hours PRN  metoclopramide Injectable 10 milliGRAM(s) IV Push every 6 hours PRN  ondansetron 8mg IV q 8 hours prn nausea     9. CRS / ICANS scoring    CRS grade 0, ICANS score 10   - CRS grade 0, ICANS score 10   4 AM - CRS grade 10, ICANS score 10     I have written the above note for Dr. Banks who performed service with me in the room.   Tiffany Lucero NP-C ([phone])    I have seen and examined patient with NP, I agree with above note as scribed.                    [facility] Transplant Team                                                      Critical / Counseling Time Provided: 30 minutes                                                                                                                                                        Chief Complaint: CAR T therapy with Tecartus product for treatment of relapsed, refractory MCL    S: Patient seen and examined with HPC Transplant Team:     All other ROS negative     O: Vitals:   Vital Signs Last 24 Hrs  T(C): 36.8 (2022 05:31), Max: 37.1 (2022 17:44)  T(F): 98.2 (2022 05:31), Max: 98.8 (2022 17:44)  HR: 75 (2022 05:31) (65 - 87)  BP: 127/81 (2022 05:31) (108/72 - 132/75)  BP(mean): --  RR: 18 (2022 05:31) (18 - 18)  SpO2: 98% (2022 05:31) (95% - 98%)    Admit weight: 100.4kg   Daily Weight in k.4 (2022 10:07)  Today's weight:     Intake / Output:   04-19 @ 07:01  -  04-20 @ 07:00  --------------------------------------------------------  IN: 2594 mL / OUT: 2775 mL / NET: -181 mL    PE:   Oropharynx: no erythema or ulcerations , swelling + in upper lip right more than left.  Oral Mucositis:  NA                                                   Grade: n/a  CVS: S1, S2 RRR   Lungs: CTA throughout bilaterally   Abdomen: + BS x 4, soft, NT, ND   Extremities: no edema   Gastric Mucositis:       -                                           Grade: n/a  Intestinal Mucositis:     -                                         Grade: n/a   Skin: no rash   TLC: CDI   Neuro: A&O x 3   Pain: 0      Labs:   CBC Full  -  ( 2022 05:59 )  WBC Count : 2.65 K/uL  Hemoglobin : 12.6 g/dL  Hematocrit : 38.6 %  Platelet Count - Automated : 190 K/uL  Mean Cell Volume : 94.8 fl  Mean Cell Hemoglobin : 31.0 pg  Mean Cell Hemoglobin Concentration : 32.6 gm/dL  Auto Neutrophil # : 2.26 K/uL  Auto Lymphocyte # : 0.06 K/uL  Auto Monocyte # : 0.14 K/uL  Auto Eosinophil # : 0.18 K/uL  Auto Basophil # : 0.00 K/uL  Auto Neutrophil % : 85.2 %  Auto Lymphocyte % : 2.3 %  Auto Monocyte % : 5.3 %  Auto Eosinophil % : 6.8 %  Auto Basophil % : 0.0 %                          12.6   2.65  )-----------( 190      ( 2022 05:59 )             38.6         137  |  101  |  21  ----------------------------<  109<H>  4.6   |  24  |  0.98    Ca    9.0      2022 05:59  Phos  2.9       Mg     2.2         TPro  6.7  /  Alb  4.4  /  TBili  0.6  /  DBili  0.1  /  AST  36  /  ALT  31  /  AlkPhos  100      PT/INR - ( 2022 05:59 )   PT: 12.1 sec;   INR: 1.04 ratio         PTT - ( 2022 05:59 )  PTT:26.1 sec  LIVER FUNCTIONS - ( 2022 05:59 )  Alb: 4.4 g/dL / Pro: 6.7 g/dL / ALK PHOS: 100 U/L / ALT: 31 U/L / AST: 36 U/L / GGT: x           Lactate Dehydrogenase, Serum: 177 U/L ( @ 05:59)  Lactate Dehydrogenase, Serum: 187 U/L ( @ 15:28)      Meds:   Antimicrobials:   acyclovir   Oral Tab/Cap 400 milliGRAM(s) Oral every 8 hours  clotrimazole Lozenge 1 Lozenge Oral five times a day  fluconAZOLE   Tablet 400 milliGRAM(s) Oral daily      Heme / Onc:       GI:  pantoprazole    Tablet 40 milliGRAM(s) Oral before breakfast  polyethylene glycol 3350 17 Gram(s) Oral daily PRN  senna 1 Tablet(s) Oral at bedtime PRN  sodium bicarbonate Mouth Rinse 10 milliLiter(s) Swish and Spit five times a day      Cardiovascular:   carvedilol 3.125 milliGRAM(s) Oral every 12 hours      Immunologic:       Other medications:   acetaminophen     Tablet .. 650 milliGRAM(s) Oral every 6 hours  allopurinol 300 milliGRAM(s) Oral daily  Biotene Dry Mouth Oral Rinse 5 milliLiter(s) Swish and Spit five times a day  chlorhexidine 4% Liquid 1 Application(s) Topical <User Schedule>  folic acid 1 milliGRAM(s) Oral daily  multivitamin 1 Tablet(s) Oral daily  sodium chloride 0.9%. 1000 milliLiter(s) IV Continuous <Continuous>      PRN:   acetaminophen     Tablet .. 650 milliGRAM(s) Oral every 6 hours PRN  LORazepam   Injectable 1 milliGRAM(s) IV Push every 6 hours PRN  metoclopramide Injectable 10 milliGRAM(s) IV Push every 6 hours PRN  ondansetron Injectable 8 milliGRAM(s) IV Push every 8 hours PRN  polyethylene glycol 3350 17 Gram(s) Oral daily PRN  senna 1 Tablet(s) Oral at bedtime PRN  sodium chloride 0.9% lock flush 10 milliLiter(s) IV Push every 1 hour PRN      A/P:    56 year old male with relapsed, refractory MCL s/p R-CHOP x 4, RICE x 2, maintenance RTX, salvage [place]quence, admitted for CAR T cell therapy with [employer] product. Disease status at time of admission is partial response.   Day + 1    1. Relapsed, refractory MCL   4/15- day - 4- flu / ctx 2/3 - continue CTX hydration until 24 hours post infusion of last dose   Strict I&O, daily weights, prn diuresis   CRS / ICANS score daily until infusion of CAR T cells (22) and twice daily thereafter   BID labs 0500 & 1500 - monitor for TLS     2. Prophylactic measures   clotrimazole Lozenge 1 Lozenge Oral five times a day  fluconAZOLE   Tablet 400 milliGRAM(s) Oral daily  trimethoprim  160 mG/sulfamethoxazole 800 mG 1 Tablet(s) Oral every 12 hours  Acyclovir 400mg po TID to start on day -1   If / when neutropenic - will add cipro 500mg po BID   GI Prophylaxis:  Protonix    3. Mouthcare - NS / NaHCO3 rinses, Mycelex, Biotene; Skin care     4. Transfuse & replete electrolytes prn     7. IV hydration, daily weights, strict I&O, prn diuresis     8. Antiemetics, anti-diarrhea medications:   LORazepam   Injectable 1 milliGRAM(s) IV Push every 6 hours PRN  metoclopramide Injectable 10 milliGRAM(s) IV Push every 6 hours PRN  ondansetron 8mg IV q 8 hours prn nausea     9. CRS / ICANS scoring    CRS grade 0, ICANS score 10   - CRS grade 0, ICANS score 10    AM - CRS grade 0, ICANS score 10   -pm-CRS grade 0, ICANS score 10    I have written the above note for Dr. Banks who performed service with me in the room.   Tiffany Lucero NP-C ([phone])    I have seen and examined patient with NP, I agree with above note as scribed.

## 2022-04-20 NOTE — PROGRESS NOTE ADULT - CRITICAL CARE ATTENDING COMMENT
Admitted for CAR T cell therapy with Tecartus (bresucabtagene autoleucel)  today is + 1..some nausea    1. Admit to BMTU   2. Day -5 through day -1 record CARTOX / ICANS score daily   3. Day - 5 through day - 3 Fludarabine 30mg / m2 IV given over 30 minutes daily   4. Day - 5 - start cyclophosphamide hydration - 0.9% NS + 10mEq KCL / L at 75 ml/m2 and continue for 24 hours post infusion of last dose of CTX   5. Day - 5 through day - 3 CTX 500mg / m2 IV QD to be given over 2 hours   6. Starting day - 5 monitor labs BID: CBC with differential, CMP, LDH, uric acid, mag, phos, CPK, BNP, PT / PTT / INR / Fibrinogen, d-dimer, CRP, hepatic profile, ferritin  7. Weekly IL-6 levels on Monday, twice weekly (Monday / Thursday) CMV / EBV PCR, three times a week (Monday / Wednesday / Friday) type and screen   8. On day - 5 start allopurinol 300mg po QD   9. Day -2 and -1 REST DAYS   10. On day -1 begin to record CARTOX / ICANS score q 12 hours   11. Day 0 infuse Brexucabtagene autoleucel  hx of afib...seen by [facility] cardiology    4/16-17 Doing well, I/Os are negative, transient lip swelling, now resolved, some fatigue  4/18, 19 CRS 0, ICANS 10 Admitted for CAR T cell therapy with T[employer] (bresucabtagene autoleucel)  today is + 1..some nausea    1. Admit to BMTU   2. Day -5 through day -1 record CARTOX / ICANS score daily   3. Day - 5 through day - 3 Fludarabine 30mg / m2 IV given over 30 minutes daily   4. Day - 5 - start cyclophosphamide hydration - 0.9% NS + 10mEq KCL / L at 75 ml/m2 and continue for 24 hours post infusion of last dose of CTX   5. Day - 5 through day - 3 CTX 500mg / m2 IV QD to be given over 2 hours   6. Starting day - 5 monitor labs BID: CBC with differential, CMP, LDH, uric acid, mag, phos, CPK, BNP, PT / PTT / INR / Fibrinogen, d-dimer, CRP, hepatic profile, ferritin  7. Weekly IL-6 levels on Monday, twice weekly (Monday / Thursday) CMV / EBV PCR, three times a week (Monday / Wednesday / Friday) type and screen   8. On day - 5 start allopurinol 300mg po QD   9. Day -2 and -1 REST DAYS   10. On day -1 begin to record CARTOX / ICANS score q 12 hours   11. Day 0 infuse Brexucabtagene autoleucel  hx of afib...seen by [facility] cardiology    4/16-17 Doing well, I/Os are negative, transient lip swelling, now resolved, some fatigue  4/18, 19, 20 CRS 0, ICANS 10

## 2022-04-20 NOTE — PROGRESS NOTE ADULT - SUBJECTIVE AND OBJECTIVE BOX
HPI:  This is a 56 year old male with a history of atrial fibrillation (s/p ablation x 2, internal loop recorder) and  MCL, initially diagnosed 10/2016. He was initially seen at [facility] for a left inguinal mass. CT scans 3/21/16 showed left sided pelvic and inguinal adenopathy. A left inguinal lymph node biopsy 16 showed morphologic and immunophenotypic findings consistent with MCL. He was treated with 4 cycles of R-CHOP, and 2 cycles of RICE consolidation. A bone marrow biopsy 2/10/17 showed normocellular bone marrow. He was admitted 3/27/17 for an autologous pbsct with CBV prep, and received the cells 17. He engrafted 17. He was discharged and completed maintenance Rituxan. In 10/21, he noticed a lump in the right groin. A lymph node biopsy 21 was consistent with relapsed MCL. He was treated with Calquence. He didn't tolerate Calquence well, with reports of frequent abdominal pain. He is admitted today for CAR T therapy with Tecartus product for relapsed, refractory MCL. His disease status at this time is partial remission.  (2022 12:36)              22 @ 15:02  [facility] 719 W1    Overnight events: No major events. s/p infusion of CAR-T  Staff reports: no new issues  Patient: Pt feels significant improvement in his symptoms. He reports improvements in appetite, resolution of transient nausea, new mild insomnia due to mattress, and comparable loss of energy. Dysgeusia persists.  PRN use: n/a        RECENT VITALS/LABS/MEDICATIONS/ASSAYS     22 @ 15:02    T(C): 36.9 (22 @ 09:46), Max: 37.1 (22 @ 17:44)  HR: 94 (22 @ 09:46) (65 - 94)  BP: 113/80 (22 @ 09:46) (113/80 - 132/75)  RR: 18 (22 @ 09:46) (18 - 18)  SpO2: 96% (22 @ 09:46) (95% - 98%)  Wt(kg): --      2022 07:01  -  2022 07:00  --------------------------------------------------------  IN:    IV PiggyBack: 68 mL    Oral Fluid: 1430 mL    sodium chloride 0.9%: 1096 mL  Total IN: 2594 mL    OUT:    Voided (mL): 2775 mL  Total OUT: 2775 mL    Total NET: -181 mL          04-19 @ 07:  -   @ 07:00  --------------------------------------------------------  IN: 2594 mL / OUT: 2775 mL / NET: -181 mL      CAPILLARY BLOOD GLUCOSE            acetaminophen     Tablet .. 650 milliGRAM(s) Oral every 6 hours PRN  acetaminophen     Tablet .. 650 milliGRAM(s) Oral every 6 hours  acyclovir   Oral Tab/Cap 400 milliGRAM(s) Oral every 8 hours  allopurinol 300 milliGRAM(s) Oral daily  Biotene Dry Mouth Oral Rinse 5 milliLiter(s) Swish and Spit five times a day  carvedilol 3.125 milliGRAM(s) Oral every 12 hours  chlorhexidine 4% Liquid 1 Application(s) Topical <User Schedule>  clotrimazole Lozenge 1 Lozenge Oral five times a day  fluconAZOLE   Tablet 400 milliGRAM(s) Oral daily  folic acid 1 milliGRAM(s) Oral daily  LORazepam   Injectable 1 milliGRAM(s) IV Push every 6 hours PRN  metoclopramide Injectable 10 milliGRAM(s) IV Push every 6 hours PRN  multivitamin 1 Tablet(s) Oral daily  ondansetron Injectable 8 milliGRAM(s) IV Push every 8 hours PRN  pantoprazole    Tablet 40 milliGRAM(s) Oral before breakfast  polyethylene glycol 3350 17 Gram(s) Oral daily PRN  senna 1 Tablet(s) Oral at bedtime PRN  sodium bicarbonate Mouth Rinse 10 milliLiter(s) Swish and Spit five times a day  sodium chloride 0.9% lock flush 10 milliLiter(s) IV Push every 1 hour PRN  sodium chloride 0.9%. 1000 milliLiter(s) IV Continuous <Continuous>              137  |  101  |  21  ----------------------------<  109<H>  4.6   |  24  |  0.98    Ca    9.0      2022 05:59  Phos  2.9       Mg     2.2         TPro  6.7  /  Alb  4.4  /  TBili  0.6  /  DBili  0.1  /  AST  36  /  ALT  31  /  AlkPhos  100        Procalc  BNPSerum Pro-Brain Natriuretic Peptide: 32 pg/mL (22 @ 05:59)  Serum Pro-Brain Natriuretic Peptide: 30 pg/mL (22 @ 06:27)    ABG                          12.6   2.65  )-----------( 190      ( 2022 05:59 )             38.6   PT/INR - ( 2022 05:59 )   PT: 12.1 sec;   INR: 1.04 ratio         PTT - ( 2022 05:59 )  PTT:26.1 sec        blood and urine cultures                ===================================================================================  Palliative Global Assessment-Initial   A review of the paper chart has been conducted  HCP form present:               Yes and valid []               Yes but invalid []                No [x]   MOLST present:                   Yes and valid []               Yes but invalid []                No [x]  Incapacity form present:   Yes and valid []                Yes but invalid []                No []                 N/A [x]  Living Will:                            Yes and valid []                Yes but invalid []                No [x]      Family Heatlh Care Decision Act Surrogate Decision Maker Hierarchy  [facility] Article 81 Guardian-->Spouse or domestic partner--> Adult child-->Parent-->Sibling--> Close friend      Contacts listed in the paper or electronic chart  Name Aga  Relationship Wife  Number(s) [phone]  Translation Required: None  Spouse or Partner: Aga  Family unit: Wife and children, his youngest is 13  Family history of hematologic malignancy: None  Residence: [ ] Apartment   [x] House  [ ] Other  Degree of functionality in the home: Full   Performance Status (PPSv2): 80  BMI:BMI (kg/m2): 30.6 (22 @ 10:40)  Engagement in the home:  Employment: [ ] Currently employed [ x] Exited workforce due to illness  [ ] Retired prior to illness  [ ] No/distant history of employment   Support network (degree):  ---Family:        [ ] Low  [ ] Moderate  [ x] High  ---Neighbors: [ x] Low  [ ] Moderate  [ ] High  ---Social:         [ ] Low  [ ] Moderate  [ x] High  ---Spiritual:    [ x] Low  [ ] Moderate  [ ] High  Financial concerns: TBD  Coping Strategies: Listening to music and speaking with his wife  Caregiver burden/fatigue/needs: Childcare issues/concerns given his wife's visitation/  Grief identified: [] Yes [x] No  Medical communication and decision making preferences: TBD        PERTINENT PM/SXH:   Non Hodgkin&#x27;s lymphoma    Atrial flutter    Migraine    Sinus bradycardia    Paroxysmal atrial fibrillation    GERD (gastroesophageal reflux disease)      H/O total knee replacement    S/P right knee arthroscopy    History of arthroscopy of left shoulder    H/O prior ablation treatment    Status post placement of implantable loop recorder    H/O stem cell transplant      FAMILY HISTORY:  Family history of hypertension (Mother)  Mother    Family history of diabetes mellitus (Father)  Father      ITEMS NOT CHECKED ARE NOT PRESENT    SOCIAL HISTORY:   Significant other/partner[x ]  Children[x ]  Zoroastrian/Spirituality:  Substance hx:  [ ]   Tobacco hx:  [ ]   Alcohol hx: [ ]   Home Opioid hx:  [ ] I-Stop Reference No:  Living Situation: [ ]Home  [ ]Long term care  [ ]Rehab [ ]Other    ADVANCE DIRECTIVES:    DNR  MOLST  [ ]  Living Will  [ ]   DECISION MAKER(s):  [ ] Health Care Proxy(s)  [ ] Surrogate(s)  [ ] Guardian           Name(s): Phone Number(s):    BASELINE (I)ADL(s) (prior to admission):  [place]: [ ]Total  [ ] Moderate [ ]Dependent    Allergies    No Known Allergies    Intolerances         PRESENT SYMPTOMS: [ ]Unable to obtain due to poor mentation   Source if other than patient:  [ ]Family   [ ]Team [ ] Inferred/Assessed    Pain: [ ]yes [x ]no  QOL impact -   Location -                    Aggravating factors -  Quality -  Radiation -  Timing-  Severity (0-10 scale):  Minimal acceptable level (0-10 scale):     PAIN AD Score:     http://geriatrictoolkit.[email or web address]/painad.pdf (press ctrl +  left click to view)    [ ] Inferred Symptoms    Fatigue:                             [  ] None  [x ]Mild [ ]Moderate [ ]Severe  Drowsiness:                      [x ] None  [ ]Mild [ ]Moderate [ ]Severe  Nausea:                             [ x  ] None  [ ]Mild [  ]Moderate [  ]Severe  Dysgeusia:                         [  } None  [ ]Mild [ x]Moderate [ ]Severe  Loss of appetite:              [  ] None  [ x]Mild   ]Moderate [ ]Severe  Constipation:                    [x ] None  [ ]Mild [ ]Moderate [ ]Severe  Dyspnea:                           [x ] None  [ ]Mild [ ]Moderate [ ]Severe  Anxiety:                             [ x] None  [  ]Mild [ ]Moderate [ ]Severe  Depression:                      [x ] None  [ ]Mild [ ]Moderate [ ]Severe  Cognitive changes:          [ x] None  [ ]Mild [ ]Moderate [ ]Severe  Insomnia      :                    [  ] None  [x ]Mild [ ]Moderate [ ]Severe  Isolation:                           [x ] None  [ ]Mild [ ]Moderate [ ]Severe  Loneliness:                        [x ] None  [ ]Mild [ ]Moderate [ ]Severe  Lack of   Wellbeing:                        [ x] None  [ ]Mild [ ]Moderate [ ]Severe    Other Symptoms: None  [x ]All other review of systems negative     Palliative Performance Status Version 2:      80   %    http://npcrc.org/files/news/palliative_performance_scale_ppsv2.pdf  PHYSICAL EXAM:    GENERAL:  [x ]Alert  [ ]Oriented x   [ ]Lethargic  [ ]Cachexia  [ ]Unarousable  [ ]Verbal  [ ]Non-Verbal [ ] Engaging   [  ] Confused  [  ] No distress  Behavioral:   [ ] Anxiety  [ ] Delirium [ ] Agitation [ x] Calm   [  ] Restless [ ] Other  HEENT:  [x ]Normal   [ ]Dry mouth   [ ]ET Tube/Trach  [ ]Oral lesions   [ ] NGT  [ ] Mucositis [ ] Oral  Bleeding  PULMONARY:   [ x]Clear [ ]Tachypnea  [ ]Audible excessive secretions [  ] No tachypnea  [ ]Rhonchi        [ ]Right [ ]Left [ ]Bilateral  [ ]Crackles        [ ]Right [ ]Left [ ]Bilateral  [ ]Wheezing     [ ]Right [ ]Left [ ]Bilateral  [ ]Diminished breath sounds [ ]right [ ]left [ ]bilateral  CARDIOVASCULAR:    [ x]Regular [ ]Irregular [ ]Tachy  [ ]Venkata [ ]Murmur [ ] JVD  GASTROINTESTINAL:  [x ]Soft  [ ]Distended   [ ]+BS  [ ]Non tender [ ]Tender  [ ]PEG [ ]OGT/ NGT  Last BM:   GENITOURINARY:  [x ]Normal [ ] Incontinent   [ ]Oliguria/Anuria   [ ]Hinds  MUSCULOSKELETAL:   [ x]Normal   [ ]Weakness  [ ]Bed/Wheelchair bound [ ]Edema  NEUROLOGIC:   [ x]No focal deficits  [ ]Cognitive impairment  [ ]Dysphagia [ ]Dysarthria [ ]Paresis [ ]Other   SKIN:   [x ]Normal    [ ]Rash  [ ]Pressure ulcer(s)   [  ] Lesion   [    ]  Wounds       Present on admission [ ]y [ ]n    CRITICAL CARE:  [ ] Shock Present  [ ]Septic [ ]Cardiogenic [ ]Neurologic [ ]Hypovolemic  [ ]  Vasopressors [ ]  Inotropes   [ ]Respiratory failure present [ ]Mechanical ventilation [ ]Non-invasive ventilatory support [ ]High flow  [ ]Acute  [ ]Chronic [ ]Hypoxic  [ ]Hypercarbic [ ]Other  [ ]Other organ failure     LABS:                        12.1   5.28  )-----------( 165      ( 15 Apr 2022 06:06 )             38.5   04-15    141  |  106  |  17  ----------------------------<  106<H>  4.4   |  24  |  1.10    Ca    8.3<L>      15 Apr 2022 06:06  Phos  3.1     04-15  Mg     2.0     04-15    TPro  5.8<L>  /  Alb  3.9  /  TBili  0.3  /  DBili  0.1  /  AST  21  /  ALT  20  /  AlkPhos  71  04-15  PT/INR - ( 15 Apr 2022 06:06 )   PT: 12.0 sec;   INR: 1.04 ratio         PTT - ( 15 Apr 2022 06:06 )  PTT:26.9 sec    Urinalysis Basic - ( 2022 14:44 )    Color: Light Yellow / Appearance: Clear / S.017 / pH: x  Gluc: x / Ketone: Negative  / Bili: Negative / Urobili: Negative   Blood: x / Protein: Negative / Nitrite: Negative   Leuk Esterase: Negative / RBC: x / WBC x   Sq Epi: x / Non Sq Epi: x / Bacteria: x      RADIOLOGY & ADDITIONAL STUDIES:    PROTEIN CALORIE MALNUTRITION PRESENT: [ ]mild [ ]moderate [ ]severe [ ]underweight [ ]morbid obesity  https://www.andeal.org/vault/2440/web/files/ONC/Table_Clinical%20Characteristics%20to%20Document%20Malnutrition-White%20JV%20et%20al%2020.pdf    Height (cm): 181 (22 @ 10:40), 180.3 (22 @ 08:57), 180 (22 @ 16:31)  Weight (kg): 100.4 (22 @ 10:40), 101.999 (22 @ 17:00), 98.4 (22 @ 08:57)  BMI (kg/m2): 30.6 (22 @ 10:40), 31.4 (22 @ 17:00), 30.3 (22 @ 08:57)    [ ]PPSV2 < or = to 30% [ ]significant weight loss  [ ]poor nutritional intake  [ ]anasarca      [ ]Artificial Nutrition      REFERRALS:   [ ]Chaplaincy  [ ]Hospice  [ ]Child Life  [ ]Social Work  [ ]Case management [ ]Holistic Therapy     Goals of Care Document:

## 2022-04-20 NOTE — PROGRESS NOTE ADULT - PROBLEM SELECTOR PLAN 9
Recommendation: Condition: Mantel Cell Lymphoma  Previous transplant: AUTO SCT 2017  Active treatment:  CAR-T infusion  Transplant Type: Tecartus (brexucabtagene autoleucel)  Transplant Day: Day +1  Clinical impact on complexity: Significant.

## 2022-04-21 LAB
ALBUMIN SERPL ELPH-MCNC: 4.3 G/DL — SIGNIFICANT CHANGE UP (ref 3.3–5)
ALBUMIN SERPL ELPH-MCNC: 4.3 G/DL — SIGNIFICANT CHANGE UP (ref 3.3–5)
ALP SERPL-CCNC: 96 U/L — SIGNIFICANT CHANGE UP (ref 40–120)
ALP SERPL-CCNC: 97 U/L — SIGNIFICANT CHANGE UP (ref 40–120)
ALT FLD-CCNC: 23 U/L — SIGNIFICANT CHANGE UP (ref 10–45)
ALT FLD-CCNC: 24 U/L — SIGNIFICANT CHANGE UP (ref 10–45)
ANION GAP SERPL CALC-SCNC: 10 MMOL/L — SIGNIFICANT CHANGE UP (ref 5–17)
ANION GAP SERPL CALC-SCNC: 11 MMOL/L — SIGNIFICANT CHANGE UP (ref 5–17)
APTT BLD: 25.3 SEC — LOW (ref 27.5–35.5)
AST SERPL-CCNC: 27 U/L — SIGNIFICANT CHANGE UP (ref 10–40)
AST SERPL-CCNC: 28 U/L — SIGNIFICANT CHANGE UP (ref 10–40)
BASOPHILS # BLD AUTO: 0 K/UL — SIGNIFICANT CHANGE UP (ref 0–0.2)
BASOPHILS # BLD AUTO: 0 K/UL — SIGNIFICANT CHANGE UP (ref 0–0.2)
BASOPHILS NFR BLD AUTO: 0 % — SIGNIFICANT CHANGE UP (ref 0–2)
BASOPHILS NFR BLD AUTO: 0 % — SIGNIFICANT CHANGE UP (ref 0–2)
BILIRUB DIRECT SERPL-MCNC: <0.1 MG/DL — SIGNIFICANT CHANGE UP (ref 0–0.3)
BILIRUB INDIRECT FLD-MCNC: >0.4 MG/DL — SIGNIFICANT CHANGE UP (ref 0.2–1)
BILIRUB SERPL-MCNC: 0.2 MG/DL — SIGNIFICANT CHANGE UP (ref 0.2–1.2)
BILIRUB SERPL-MCNC: 0.5 MG/DL — SIGNIFICANT CHANGE UP (ref 0.2–1.2)
BUN SERPL-MCNC: 17 MG/DL — SIGNIFICANT CHANGE UP (ref 7–23)
BUN SERPL-MCNC: 18 MG/DL — SIGNIFICANT CHANGE UP (ref 7–23)
CALCIUM SERPL-MCNC: 8.7 MG/DL — SIGNIFICANT CHANGE UP (ref 8.4–10.5)
CALCIUM SERPL-MCNC: 8.8 MG/DL — SIGNIFICANT CHANGE UP (ref 8.4–10.5)
CHLORIDE SERPL-SCNC: 101 MMOL/L — SIGNIFICANT CHANGE UP (ref 96–108)
CHLORIDE SERPL-SCNC: 102 MMOL/L — SIGNIFICANT CHANGE UP (ref 96–108)
CK SERPL-CCNC: 304 U/L — HIGH (ref 30–200)
CO2 SERPL-SCNC: 25 MMOL/L — SIGNIFICANT CHANGE UP (ref 22–31)
CO2 SERPL-SCNC: 26 MMOL/L — SIGNIFICANT CHANGE UP (ref 22–31)
CREAT SERPL-MCNC: 0.91 MG/DL — SIGNIFICANT CHANGE UP (ref 0.5–1.3)
CREAT SERPL-MCNC: 1.16 MG/DL — SIGNIFICANT CHANGE UP (ref 0.5–1.3)
CRP SERPL-MCNC: 4 MG/L — SIGNIFICANT CHANGE UP (ref 0–4)
D DIMER BLD IA.RAPID-MCNC: <150 NG/ML DDU — SIGNIFICANT CHANGE UP
EBV EA AB SER IA-ACNC: 58.9 U/ML — HIGH
EBV EA AB TITR SER IF: POSITIVE
EBV EA IGG SER-ACNC: POSITIVE
EBV NA IGG SER IA-ACNC: >600 U/ML — HIGH
EBV PATRN SPEC IB-IMP: SIGNIFICANT CHANGE UP
EBV VCA IGG AVIDITY SER QL IA: POSITIVE
EBV VCA IGM SER IA-ACNC: 92.7 U/ML — HIGH
EBV VCA IGM SER IA-ACNC: <10 U/ML — SIGNIFICANT CHANGE UP
EBV VCA IGM TITR FLD: NEGATIVE — SIGNIFICANT CHANGE UP
EGFR: 73 ML/MIN/1.73M2 — SIGNIFICANT CHANGE UP
EGFR: 98 ML/MIN/1.73M2 — SIGNIFICANT CHANGE UP
EOSINOPHIL # BLD AUTO: 0.27 K/UL — SIGNIFICANT CHANGE UP (ref 0–0.5)
EOSINOPHIL # BLD AUTO: 0.32 K/UL — SIGNIFICANT CHANGE UP (ref 0–0.5)
EOSINOPHIL NFR BLD AUTO: 12.3 % — HIGH (ref 0–6)
EOSINOPHIL NFR BLD AUTO: 13.6 % — HIGH (ref 0–6)
FERRITIN SERPL-MCNC: 309 NG/ML — SIGNIFICANT CHANGE UP (ref 30–400)
FIBRINOGEN PPP-MCNC: 467 MG/DL — SIGNIFICANT CHANGE UP (ref 330–520)
GLUCOSE SERPL-MCNC: 108 MG/DL — HIGH (ref 70–99)
GLUCOSE SERPL-MCNC: 117 MG/DL — HIGH (ref 70–99)
HCT VFR BLD CALC: 36.2 % — LOW (ref 39–50)
HCT VFR BLD CALC: 37.6 % — LOW (ref 39–50)
HGB BLD-MCNC: 11.7 G/DL — LOW (ref 13–17)
HGB BLD-MCNC: 12.2 G/DL — LOW (ref 13–17)
IMM GRANULOCYTES NFR BLD AUTO: 0.4 % — SIGNIFICANT CHANGE UP (ref 0–1.5)
IMM GRANULOCYTES NFR BLD AUTO: 0.5 % — SIGNIFICANT CHANGE UP (ref 0–1.5)
INR BLD: 1.15 RATIO — SIGNIFICANT CHANGE UP (ref 0.88–1.16)
LDH SERPL L TO P-CCNC: 162 U/L — SIGNIFICANT CHANGE UP (ref 50–242)
LDH SERPL L TO P-CCNC: 168 U/L — SIGNIFICANT CHANGE UP (ref 50–242)
LYMPHOCYTES # BLD AUTO: 0.11 K/UL — LOW (ref 1–3.3)
LYMPHOCYTES # BLD AUTO: 0.15 K/UL — LOW (ref 1–3.3)
LYMPHOCYTES # BLD AUTO: 4.2 % — LOW (ref 13–44)
LYMPHOCYTES # BLD AUTO: 7.6 % — LOW (ref 13–44)
MAGNESIUM SERPL-MCNC: 2.1 MG/DL — SIGNIFICANT CHANGE UP (ref 1.6–2.6)
MAGNESIUM SERPL-MCNC: 2.1 MG/DL — SIGNIFICANT CHANGE UP (ref 1.6–2.6)
MCHC RBC-ENTMCNC: 30.9 PG — SIGNIFICANT CHANGE UP (ref 27–34)
MCHC RBC-ENTMCNC: 31 PG — SIGNIFICANT CHANGE UP (ref 27–34)
MCHC RBC-ENTMCNC: 32.3 GM/DL — SIGNIFICANT CHANGE UP (ref 32–36)
MCHC RBC-ENTMCNC: 32.4 GM/DL — SIGNIFICANT CHANGE UP (ref 32–36)
MCV RBC AUTO: 95.2 FL — SIGNIFICANT CHANGE UP (ref 80–100)
MCV RBC AUTO: 96 FL — SIGNIFICANT CHANGE UP (ref 80–100)
MONOCYTES # BLD AUTO: 0.14 K/UL — SIGNIFICANT CHANGE UP (ref 0–0.9)
MONOCYTES # BLD AUTO: 0.18 K/UL — SIGNIFICANT CHANGE UP (ref 0–0.9)
MONOCYTES NFR BLD AUTO: 6.9 % — SIGNIFICANT CHANGE UP (ref 2–14)
MONOCYTES NFR BLD AUTO: 7.1 % — SIGNIFICANT CHANGE UP (ref 2–14)
NEUTROPHILS # BLD AUTO: 1.41 K/UL — LOW (ref 1.8–7.4)
NEUTROPHILS # BLD AUTO: 1.99 K/UL — SIGNIFICANT CHANGE UP (ref 1.8–7.4)
NEUTROPHILS NFR BLD AUTO: 71.2 % — SIGNIFICANT CHANGE UP (ref 43–77)
NEUTROPHILS NFR BLD AUTO: 76.2 % — SIGNIFICANT CHANGE UP (ref 43–77)
NRBC # BLD: 0 /100 WBCS — SIGNIFICANT CHANGE UP (ref 0–0)
NRBC # BLD: 0 /100 WBCS — SIGNIFICANT CHANGE UP (ref 0–0)
NT-PROBNP SERPL-SCNC: 36 PG/ML — SIGNIFICANT CHANGE UP (ref 0–300)
PHOSPHATE SERPL-MCNC: 2.8 MG/DL — SIGNIFICANT CHANGE UP (ref 2.5–4.5)
PHOSPHATE SERPL-MCNC: 2.9 MG/DL — SIGNIFICANT CHANGE UP (ref 2.5–4.5)
PLATELET # BLD AUTO: 173 K/UL — SIGNIFICANT CHANGE UP (ref 150–400)
PLATELET # BLD AUTO: 178 K/UL — SIGNIFICANT CHANGE UP (ref 150–400)
POTASSIUM SERPL-MCNC: 4.2 MMOL/L — SIGNIFICANT CHANGE UP (ref 3.5–5.3)
POTASSIUM SERPL-MCNC: 4.2 MMOL/L — SIGNIFICANT CHANGE UP (ref 3.5–5.3)
POTASSIUM SERPL-SCNC: 4.2 MMOL/L — SIGNIFICANT CHANGE UP (ref 3.5–5.3)
POTASSIUM SERPL-SCNC: 4.2 MMOL/L — SIGNIFICANT CHANGE UP (ref 3.5–5.3)
PROT SERPL-MCNC: 6.4 G/DL — SIGNIFICANT CHANGE UP (ref 6–8.3)
PROT SERPL-MCNC: 6.4 G/DL — SIGNIFICANT CHANGE UP (ref 6–8.3)
PROTHROM AB SERPL-ACNC: 13.3 SEC — SIGNIFICANT CHANGE UP (ref 10.5–13.4)
RBC # BLD: 3.77 M/UL — LOW (ref 4.2–5.8)
RBC # BLD: 3.95 M/UL — LOW (ref 4.2–5.8)
RBC # FLD: 12.9 % — SIGNIFICANT CHANGE UP (ref 10.3–14.5)
RBC # FLD: 13 % — SIGNIFICANT CHANGE UP (ref 10.3–14.5)
SODIUM SERPL-SCNC: 137 MMOL/L — SIGNIFICANT CHANGE UP (ref 135–145)
SODIUM SERPL-SCNC: 138 MMOL/L — SIGNIFICANT CHANGE UP (ref 135–145)
URATE SERPL-MCNC: 3.3 MG/DL — LOW (ref 3.4–8.8)
URATE SERPL-MCNC: 3.6 MG/DL — SIGNIFICANT CHANGE UP (ref 3.4–8.8)
WBC # BLD: 1.98 K/UL — LOW (ref 3.8–10.5)
WBC # BLD: 2.61 K/UL — LOW (ref 3.8–10.5)
WBC # FLD AUTO: 1.98 K/UL — LOW (ref 3.8–10.5)
WBC # FLD AUTO: 2.61 K/UL — LOW (ref 3.8–10.5)

## 2022-04-21 PROCEDURE — 99291 CRITICAL CARE FIRST HOUR: CPT

## 2022-04-21 RX ADMIN — FLUCONAZOLE 400 MILLIGRAM(S): 150 TABLET ORAL at 12:55

## 2022-04-21 RX ADMIN — Medication 1 LOZENGE: at 15:32

## 2022-04-21 RX ADMIN — Medication 10 MILLILITER(S): at 21:54

## 2022-04-21 RX ADMIN — Medication 400 MILLIGRAM(S): at 15:32

## 2022-04-21 RX ADMIN — Medication 400 MILLIGRAM(S): at 21:54

## 2022-04-21 RX ADMIN — Medication 1 LOZENGE: at 21:54

## 2022-04-21 RX ADMIN — Medication 1 MILLIGRAM(S): at 12:55

## 2022-04-21 RX ADMIN — CHLORHEXIDINE GLUCONATE 1 APPLICATION(S): 213 SOLUTION TOPICAL at 12:59

## 2022-04-21 RX ADMIN — Medication 5 MILLILITER(S): at 12:55

## 2022-04-21 RX ADMIN — Medication 1 TABLET(S): at 12:55

## 2022-04-21 RX ADMIN — Medication 300 MILLIGRAM(S): at 12:55

## 2022-04-21 RX ADMIN — PANTOPRAZOLE SODIUM 40 MILLIGRAM(S): 20 TABLET, DELAYED RELEASE ORAL at 06:01

## 2022-04-21 RX ADMIN — CARVEDILOL PHOSPHATE 3.12 MILLIGRAM(S): 80 CAPSULE, EXTENDED RELEASE ORAL at 18:34

## 2022-04-21 RX ADMIN — Medication 10 MILLILITER(S): at 12:54

## 2022-04-21 RX ADMIN — Medication 1 LOZENGE: at 12:59

## 2022-04-21 RX ADMIN — Medication 10 MILLILITER(S): at 00:44

## 2022-04-21 RX ADMIN — Medication 5 MILLILITER(S): at 00:43

## 2022-04-21 RX ADMIN — Medication 5 MILLILITER(S): at 12:59

## 2022-04-21 RX ADMIN — Medication 10 MILLILITER(S): at 15:32

## 2022-04-21 RX ADMIN — CARVEDILOL PHOSPHATE 3.12 MILLIGRAM(S): 80 CAPSULE, EXTENDED RELEASE ORAL at 06:01

## 2022-04-21 RX ADMIN — Medication 1 LOZENGE: at 12:55

## 2022-04-21 RX ADMIN — Medication 5 MILLILITER(S): at 21:55

## 2022-04-21 RX ADMIN — Medication 1 LOZENGE: at 00:44

## 2022-04-21 RX ADMIN — Medication 5 MILLILITER(S): at 15:32

## 2022-04-21 RX ADMIN — Medication 400 MILLIGRAM(S): at 06:01

## 2022-04-21 NOTE — PROGRESS NOTE ADULT - CRITICAL CARE ATTENDING COMMENT
Admitted for CAR T cell therapy with Tecartus (bresucabtagene autoleucel)  today is + 2..some nausea    1. Admit to BMTU   2. Day -5 through day -1 record CARTOX / ICANS score daily   3. Day - 5 through day - 3 Fludarabine 30mg / m2 IV given over 30 minutes daily   4. Day - 5 - start cyclophosphamide hydration - 0.9% NS + 10mEq KCL / L at 75 ml/m2 and continue for 24 hours post infusion of last dose of CTX   5. Day - 5 through day - 3 CTX 500mg / m2 IV QD to be given over 2 hours   6. Starting day - 5 monitor labs BID: CBC with differential, CMP, LDH, uric acid, mag, phos, CPK, BNP, PT / PTT / INR / Fibrinogen, d-dimer, CRP, hepatic profile, ferritin  7. Weekly IL-6 levels on Monday, twice weekly (Monday / Thursday) CMV / EBV PCR, three times a week (Monday / Wednesday / Friday) type and screen   8. On day - 5 start allopurinol 300mg po QD   9. Day -2 and -1 REST DAYS   10. On day -1 begin to record CARTOX / ICANS score q 12 hours   11. Day 0 infuse Brexucabtagene autoleucel  hx of afib...seen by [facility] cardiology    4/16-17 Doing well, I/Os are negative, transient lip swelling, now resolved, some fatigue  4/18, 19, 20 CRS 0, ICANS 10 Admitted for CAR T cell therapy with Tecartus (bresucabtagene autoleucel)  today is + 2..some nausea    1. Admit to BMTU   2. Day -5 through day -1 record CARTOX / ICANS score daily   3. Day - 5 through day - 3 Fludarabine 30mg / m2 IV given over 30 minutes daily   4. Day - 5 - start cyclophosphamide hydration - 0.9% NS + 10mEq KCL / L at 75 ml/m2 and continue for 24 hours post infusion of last dose of CTX   5. Day - 5 through day - 3 CTX 500mg / m2 IV QD to be given over 2 hours   6. Starting day - 5 monitor labs BID: CBC with differential, CMP, LDH, uric acid, mag, phos, CPK, BNP, PT / PTT / INR / Fibrinogen, d-dimer, CRP, hepatic profile, ferritin  7. Weekly IL-6 levels on Monday, twice weekly (Monday / Thursday) CMV / EBV PCR, three times a week (Monday / Wednesday / Friday) type and screen   8. On day - 5 start allopurinol 300mg po QD   9. Day -2 and -1 REST DAYS   10. On day -1 begin to record CARTOX / ICANS score q 12 hours   11. Day 0 infuse Brexucabtagene autoleucel  hx of afib...seen by [facility] cardiology    4/16-17 Doing well, I/Os are negative, transient lip swelling, now resolved, some fatigue  4/18, 19, 20, 21 CRS 0, ICANS 10

## 2022-04-21 NOTE — PROGRESS NOTE ADULT - SUBJECTIVE AND OBJECTIVE BOX
[facility]PC Transplant Team                                                      Critical / Counseling Time Provided: 30 minutes                                                                                                                                                        Chief Complaint: CAR T therapy with [employer] product for treatment of relapsed, refractory MCL    S: Patient seen and examined with HPC Transplant Team:     All other ROS negative     O: Vitals:   Vital Signs Last 24 Hrs  T(C): 36 (2022 06:00), Max: 37.3 (2022 14:00)  T(F): 96.8 (2022 06:00), Max: 99.1 (2022 14:00)  HR: 66 (2022 06:00) (66 - 94)  BP: 113/65 (2022 06:00) (109/63 - 128/78)  BP(mean): --  RR: 16 (2022 06:00) (16 - 18)  SpO2: 97% (2022 06:00) (96% - 100%)    Admit weight: 100.4kg   Daily Weight in k.6 (2022 10:47)  Today's weight:     Intake / Output:   04-20 @ 07:01  -  04-21 @ 07:00  --------------------------------------------------------  IN: 1492 mL / OUT: 3900 mL / NET: -2408 mL      PE:   Oropharynx: no erythema or ulcerations , swelling + in upper lip right more than left.  Oral Mucositis:  NA                                                   Grade: n/a  CVS: S1, S2 RRR   Lungs: CTA throughout bilaterally   Abdomen: + BS x 4, soft, NT, ND   Extremities: no edema   Gastric Mucositis:       -                                           Grade: n/a  Intestinal Mucositis:     -                                         Grade: n/a   Skin: no rash   TLC: CDI   Neuro: A&O x 3   Pain: 0      Labs:   CBC Full  -  ( 2022 06:26 )  WBC Count : 2.61 K/uL  Hemoglobin : 12.2 g/dL  Hematocrit : 37.6 %  Platelet Count - Automated : 173 K/uL  Mean Cell Volume : 95.2 fl  Mean Cell Hemoglobin : 30.9 pg  Mean Cell Hemoglobin Concentration : 32.4 gm/dL  Auto Neutrophil # : 1.99 K/uL  Auto Lymphocyte # : 0.11 K/uL  Auto Monocyte # : 0.18 K/uL  Auto Eosinophil # : 0.32 K/uL  Auto Basophil # : 0.00 K/uL  Auto Neutrophil % : 76.2 %  Auto Lymphocyte % : 4.2 %  Auto Monocyte % : 6.9 %  Auto Eosinophil % : 12.3 %  Auto Basophil % : 0.0 %                          12.2   2.61  )-----------( 173      ( 2022 06:26 )             37.6         137  |  101  |  17  ----------------------------<  108<H>  4.2   |  25  |  0.91    Ca    8.8      2022 06:26  Phos  2.8       Mg     2.1         TPro  6.4  /  Alb  4.3  /  TBili  0.5  /  DBili  <0.1  /  AST  27  /  ALT  24  /  AlkPhos  96      PT/INR - ( 2022 06:26 )   PT: 13.3 sec;   INR: 1.15 ratio         PTT - ( 2022 06:26 )  PTT:25.3 sec  LIVER FUNCTIONS - ( 2022 06:26 )  Alb: 4.3 g/dL / Pro: 6.4 g/dL / ALK PHOS: 96 U/L / ALT: 24 U/L / AST: 27 U/L / GGT: x           Lactate Dehydrogenase, Serum: 162 U/L ( @ 06:26)  Lactate Dehydrogenase, Serum: 181 U/L ( @ 16:06)      Meds:   Antimicrobials:   acyclovir   Oral Tab/Cap 400 milliGRAM(s) Oral every 8 hours  clotrimazole Lozenge 1 Lozenge Oral five times a day  fluconAZOLE   Tablet 400 milliGRAM(s) Oral daily      Heme / Onc:       GI:  pantoprazole    Tablet 40 milliGRAM(s) Oral before breakfast  polyethylene glycol 3350 17 Gram(s) Oral daily PRN  senna 1 Tablet(s) Oral at bedtime PRN  sodium bicarbonate Mouth Rinse 10 milliLiter(s) Swish and Spit five times a day      Cardiovascular:   carvedilol 3.125 milliGRAM(s) Oral every 12 hours      Immunologic:       Other medications:   acetaminophen     Tablet .. 650 milliGRAM(s) Oral every 6 hours  allopurinol 300 milliGRAM(s) Oral daily  Biotene Dry Mouth Oral Rinse 5 milliLiter(s) Swish and Spit five times a day  chlorhexidine 4% Liquid 1 Application(s) Topical <User Schedule>  folic acid 1 milliGRAM(s) Oral daily  multivitamin 1 Tablet(s) Oral daily  sodium chloride 0.9%. 1000 milliLiter(s) IV Continuous <Continuous>      PRN:   acetaminophen     Tablet .. 650 milliGRAM(s) Oral every 6 hours PRN  LORazepam   Injectable 1 milliGRAM(s) IV Push every 6 hours PRN  metoclopramide Injectable 10 milliGRAM(s) IV Push every 6 hours PRN  ondansetron Injectable 8 milliGRAM(s) IV Push every 8 hours PRN  polyethylene glycol 3350 17 Gram(s) Oral daily PRN  senna 1 Tablet(s) Oral at bedtime PRN  sodium chloride 0.9% lock flush 10 milliLiter(s) IV Push every 1 hour PRN      A/P:    56 year old male with relapsed, refractory MCL s/p R-CHOP x 4, RICE x 2, maintenance RTX, salvage Calquence, admitted for CAR T cell therapy with [employer] product. Disease status at time of admission is partial response.   Day + 2    1. Relapsed, refractory MCL   4/15- day - 4- flu / ctx 2/3 - continue CTX hydration until 24 hours post infusion of last dose   Strict I&O, daily weights, prn diuresis   CRS / ICANS score daily until infusion of CAR T cells (22) and twice daily thereafter   BID labs 0500 & 1500 - monitor for TLS     2. Prophylactic measures   clotrimazole Lozenge 1 Lozenge Oral five times a day  fluconAZOLE   Tablet 400 milliGRAM(s) Oral daily  trimethoprim  160 mG/sulfamethoxazole 800 mG 1 Tablet(s) Oral every 12 hours  Acyclovir 400mg po TID to start on day -1   If / when neutropenic - will add cipro 500mg po BID   GI Prophylaxis:  Protonix    3. Mouthcare - NS / NaHCO3 rinses, Mycelex, Biotene; Skin care     4. Transfuse & replete electrolytes prn     7. IV hydration, daily weights, strict I&O, prn diuresis     8. Antiemetics, anti-diarrhea medications:   LORazepam   Injectable 1 milliGRAM(s) IV Push every 6 hours PRN  metoclopramide Injectable 10 milliGRAM(s) IV Push every 6 hours PRN  ondansetron 8mg IV q 8 hours prn nausea     9. CRS / ICANS scoring    CRS grade 0, ICANS score 10   - CRS grade 0, ICANS score 10    AM - CRS grade 0, ICANS score 10   -pm-CRS grade 0, ICANS score 10   AM - CRS grade 0, ICANS score 10     I have written the above note for Dr. Banks who performed service with me in the room.   Tiffany Lucero NP-C ([phone])    I have seen and examined patient with NP, I agree with above note as scribed.                    [facility] Transplant Team                                                      Critical / Counseling Time Provided: 30 minutes                                                                                                                                                        Chief Complaint: CAR T therapy with [employer] product for treatment of relapsed, refractory MCL    S: Patient seen and examined with [facility]PC Transplant Team:   no complaints  OOB, tolerating PO  All other ROS negative     O: Vitals:   Vital Signs Last 24 Hrs  T(C): 36 (2022 06:00), Max: 37.3 (2022 14:00)  T(F): 96.8 (2022 06:00), Max: 99.1 (2022 14:00)  HR: 66 (2022 06:00) (66 - 94)  BP: 113/65 (2022 06:00) (109/63 - 128/78)  BP(mean): --  RR: 16 (2022 06:00) (16 - 18)  SpO2: 97% (2022 06:00) (96% - 100%)    Admit weight: 100.4kg   Daily Weight in k.6 (2022 10:47)  Today's weight: pending     Intake / Output:   -20 @ 07:01  -  - @ 07:00  --------------------------------------------------------  IN: 1492 mL / OUT: 3900 mL / NET: -2408 mL      PE:   Oropharynx: no erythema or ulcerations ,   Oral Mucositis:  NA                                                   Grade: n/a  CVS: S1, S2 RRR   Lungs: CTA throughout bilaterally   Abdomen: + BS x 4, soft, NT, ND   Extremities: no edema   Gastric Mucositis:       -                                           Grade: n/a  Intestinal Mucositis:     -                                         Grade: n/a   Skin: no rash   TLC: CDI   Neuro: A&O x 3   Pain: 0      Labs:   CBC Full  -  ( 2022 06:26 )  WBC Count : 2.61 K/uL  Hemoglobin : 12.2 g/dL  Hematocrit : 37.6 %  Platelet Count - Automated : 173 K/uL  Mean Cell Volume : 95.2 fl  Mean Cell Hemoglobin : 30.9 pg  Mean Cell Hemoglobin Concentration : 32.4 gm/dL  Auto Neutrophil # : 1.99 K/uL  Auto Lymphocyte # : 0.11 K/uL  Auto Monocyte # : 0.18 K/uL  Auto Eosinophil # : 0.32 K/uL  Auto Basophil # : 0.00 K/uL  Auto Neutrophil % : 76.2 %  Auto Lymphocyte % : 4.2 %  Auto Monocyte % : 6.9 %  Auto Eosinophil % : 12.3 %  Auto Basophil % : 0.0 %                          12.2   2.61  )-----------( 173      ( 2022 06:26 )             37.6         137  |  101  |  17  ----------------------------<  108<H>  4.2   |  25  |  0.91    Ca    8.8      2022 06:26  Phos  2.8       Mg     2.1         TPro  6.4  /  Alb  4.3  /  TBili  0.5  /  DBili  <0.1  /  AST  27  /  ALT  24  /  AlkPhos  96      PT/INR - ( 2022 06:26 )   PT: 13.3 sec;   INR: 1.15 ratio         PTT - ( 2022 06:26 )  PTT:25.3 sec  LIVER FUNCTIONS - ( 2022 06:26 )  Alb: 4.3 g/dL / Pro: 6.4 g/dL / ALK PHOS: 96 U/L / ALT: 24 U/L / AST: 27 U/L / GGT: x           Lactate Dehydrogenase, Serum: 162 U/L ( @ 06:26)  Lactate Dehydrogenase, Serum: 181 U/L ( @ 16:06)      Meds:   Antimicrobials:   acyclovir   Oral Tab/Cap 400 milliGRAM(s) Oral every 8 hours  clotrimazole Lozenge 1 Lozenge Oral five times a day  fluconAZOLE   Tablet 400 milliGRAM(s) Oral daily      Heme / Onc:       GI:  pantoprazole    Tablet 40 milliGRAM(s) Oral before breakfast  polyethylene glycol 3350 17 Gram(s) Oral daily PRN  senna 1 Tablet(s) Oral at bedtime PRN  sodium bicarbonate Mouth Rinse 10 milliLiter(s) Swish and Spit five times a day      Cardiovascular:   carvedilol 3.125 milliGRAM(s) Oral every 12 hours      Immunologic:       Other medications:   acetaminophen     Tablet .. 650 milliGRAM(s) Oral every 6 hours  allopurinol 300 milliGRAM(s) Oral daily  Biotene Dry Mouth Oral Rinse 5 milliLiter(s) Swish and Spit five times a day  chlorhexidine 4% Liquid 1 Application(s) Topical <User Schedule>  folic acid 1 milliGRAM(s) Oral daily  multivitamin 1 Tablet(s) Oral daily  sodium chloride 0.9%. 1000 milliLiter(s) IV Continuous <Continuous>      PRN:   acetaminophen     Tablet .. 650 milliGRAM(s) Oral every 6 hours PRN  LORazepam   Injectable 1 milliGRAM(s) IV Push every 6 hours PRN  metoclopramide Injectable 10 milliGRAM(s) IV Push every 6 hours PRN  ondansetron Injectable 8 milliGRAM(s) IV Push every 8 hours PRN  polyethylene glycol 3350 17 Gram(s) Oral daily PRN  senna 1 Tablet(s) Oral at bedtime PRN  sodium chloride 0.9% lock flush 10 milliLiter(s) IV Push every 1 hour PRN      A/P:    56 year old male with relapsed, refractory MCL s/p R-CHOP x 4, RICE x 2, maintenance RTX, salvage [place]qu[place], admitted for CAR T cell therapy with [employer] product. Disease status at time of admission is partial response.   Day + 2    1. Relapsed, refractory MCL   4/15- day - 4- flu / ctx 2/3 - continue CTX hydration until 24 hours post infusion of last dose   Strict I&O, daily weights, prn diuresis   CRS / ICANS score daily until infusion of CAR T cells (22) and twice daily thereafter   BID labs 0500 & 1500 - monitor for TLS     2. Prophylactic measures   clotrimazole Lozenge 1 Lozenge Oral five times a day  fluconAZOLE   Tablet 400 milliGRAM(s) Oral daily  trimethoprim  160 mG/sulfamethoxazole 800 mG 1 Tablet(s) Oral every 12 hours  Acyclovir 400mg po TID to start on day -1   If / when neutropenic - will add cipro 500mg po BID   GI Prophylaxis:  Protonix    3. Mouthcare - NS / NaHCO3 rinses, Mycelex, Biotene; Skin care     4. Transfuse & replete electrolytes prn     7. IV hydration, daily weights, strict I&O, prn diuresis     8. Antiemetics, anti-diarrhea medications:   LORazepam   Injectable 1 milliGRAM(s) IV Push every 6 hours PRN  metoclopramide Injectable 10 milliGRAM(s) IV Push every 6 hours PRN  ondansetron 8mg IV q 8 hours prn nausea     9. CRS / ICANS scoring    CRS grade 0, ICANS score 10   - CRS grade 0, ICANS score 10    AM - CRS grade 0, ICANS score 10   -pm-CRS grade 0, ICANS score 10   AM - CRS grade 0, ICANS score 10     I have written the above note for Dr. Banks who performed service with me in the room.   Tiffany Lucero NP-C ([phone])    I have seen and examined patient with NP, I agree with above note as scribed.

## 2022-04-22 LAB
ALBUMIN SERPL ELPH-MCNC: 4.2 G/DL — SIGNIFICANT CHANGE UP (ref 3.3–5)
ALBUMIN SERPL ELPH-MCNC: 4.3 G/DL — SIGNIFICANT CHANGE UP (ref 3.3–5)
ALP SERPL-CCNC: 96 U/L — SIGNIFICANT CHANGE UP (ref 40–120)
ALP SERPL-CCNC: 98 U/L — SIGNIFICANT CHANGE UP (ref 40–120)
ALT FLD-CCNC: 21 U/L — SIGNIFICANT CHANGE UP (ref 10–45)
ALT FLD-CCNC: 22 U/L — SIGNIFICANT CHANGE UP (ref 10–45)
ANION GAP SERPL CALC-SCNC: 11 MMOL/L — SIGNIFICANT CHANGE UP (ref 5–17)
ANION GAP SERPL CALC-SCNC: 12 MMOL/L — SIGNIFICANT CHANGE UP (ref 5–17)
APPEARANCE UR: CLEAR — SIGNIFICANT CHANGE UP
APTT BLD: 24.7 SEC — LOW (ref 27.5–35.5)
AST SERPL-CCNC: 21 U/L — SIGNIFICANT CHANGE UP (ref 10–40)
AST SERPL-CCNC: 24 U/L — SIGNIFICANT CHANGE UP (ref 10–40)
BASOPHILS # BLD AUTO: 0 K/UL — SIGNIFICANT CHANGE UP (ref 0–0.2)
BASOPHILS # BLD AUTO: 0 K/UL — SIGNIFICANT CHANGE UP (ref 0–0.2)
BASOPHILS NFR BLD AUTO: 0 % — SIGNIFICANT CHANGE UP (ref 0–2)
BASOPHILS NFR BLD AUTO: 0 % — SIGNIFICANT CHANGE UP (ref 0–2)
BILIRUB DIRECT SERPL-MCNC: <0.1 MG/DL — SIGNIFICANT CHANGE UP (ref 0–0.3)
BILIRUB INDIRECT FLD-MCNC: >0.1 MG/DL — LOW (ref 0.2–1)
BILIRUB SERPL-MCNC: 0.2 MG/DL — SIGNIFICANT CHANGE UP (ref 0.2–1.2)
BILIRUB SERPL-MCNC: 0.2 MG/DL — SIGNIFICANT CHANGE UP (ref 0.2–1.2)
BILIRUB UR-MCNC: NEGATIVE — SIGNIFICANT CHANGE UP
BLD GP AB SCN SERPL QL: NEGATIVE — SIGNIFICANT CHANGE UP
BUN SERPL-MCNC: 20 MG/DL — SIGNIFICANT CHANGE UP (ref 7–23)
BUN SERPL-MCNC: 20 MG/DL — SIGNIFICANT CHANGE UP (ref 7–23)
CALCIUM SERPL-MCNC: 8.4 MG/DL — SIGNIFICANT CHANGE UP (ref 8.4–10.5)
CALCIUM SERPL-MCNC: 8.8 MG/DL — SIGNIFICANT CHANGE UP (ref 8.4–10.5)
CHLORIDE SERPL-SCNC: 102 MMOL/L — SIGNIFICANT CHANGE UP (ref 96–108)
CHLORIDE SERPL-SCNC: 103 MMOL/L — SIGNIFICANT CHANGE UP (ref 96–108)
CK SERPL-CCNC: 135 U/L — SIGNIFICANT CHANGE UP (ref 30–200)
CO2 SERPL-SCNC: 24 MMOL/L — SIGNIFICANT CHANGE UP (ref 22–31)
CO2 SERPL-SCNC: 26 MMOL/L — SIGNIFICANT CHANGE UP (ref 22–31)
COLOR SPEC: SIGNIFICANT CHANGE UP
CREAT SERPL-MCNC: 1 MG/DL — SIGNIFICANT CHANGE UP (ref 0.5–1.3)
CREAT SERPL-MCNC: 1.2 MG/DL — SIGNIFICANT CHANGE UP (ref 0.5–1.3)
CRP SERPL-MCNC: 4 MG/L — SIGNIFICANT CHANGE UP (ref 0–4)
D DIMER BLD IA.RAPID-MCNC: <150 NG/ML DDU — SIGNIFICANT CHANGE UP
DIFF PNL FLD: NEGATIVE — SIGNIFICANT CHANGE UP
EGFR: 71 ML/MIN/1.73M2 — SIGNIFICANT CHANGE UP
EGFR: 88 ML/MIN/1.73M2 — SIGNIFICANT CHANGE UP
EOSINOPHIL # BLD AUTO: 0.17 K/UL — SIGNIFICANT CHANGE UP (ref 0–0.5)
EOSINOPHIL # BLD AUTO: 0.28 K/UL — SIGNIFICANT CHANGE UP (ref 0–0.5)
EOSINOPHIL NFR BLD AUTO: 11.7 % — HIGH (ref 0–6)
EOSINOPHIL NFR BLD AUTO: 6.4 % — HIGH (ref 0–6)
FERRITIN SERPL-MCNC: 271 NG/ML — SIGNIFICANT CHANGE UP (ref 30–400)
FIBRINOGEN PPP-MCNC: 441 MG/DL — SIGNIFICANT CHANGE UP (ref 330–520)
GIANT PLATELETS BLD QL SMEAR: PRESENT — SIGNIFICANT CHANGE UP
GLUCOSE SERPL-MCNC: 111 MG/DL — HIGH (ref 70–99)
GLUCOSE SERPL-MCNC: 127 MG/DL — HIGH (ref 70–99)
GLUCOSE UR QL: NEGATIVE — SIGNIFICANT CHANGE UP
HCT VFR BLD CALC: 35.6 % — LOW (ref 39–50)
HCT VFR BLD CALC: 37.3 % — LOW (ref 39–50)
HGB BLD-MCNC: 11.4 G/DL — LOW (ref 13–17)
HGB BLD-MCNC: 12 G/DL — LOW (ref 13–17)
IMM GRANULOCYTES NFR BLD AUTO: 0.8 % — SIGNIFICANT CHANGE UP (ref 0–1.5)
INR BLD: 1.04 RATIO — SIGNIFICANT CHANGE UP (ref 0.88–1.16)
KETONES UR-MCNC: NEGATIVE — SIGNIFICANT CHANGE UP
LDH SERPL L TO P-CCNC: 160 U/L — SIGNIFICANT CHANGE UP (ref 50–242)
LDH SERPL L TO P-CCNC: 165 U/L — SIGNIFICANT CHANGE UP (ref 50–242)
LEUKOCYTE ESTERASE UR-ACNC: NEGATIVE — SIGNIFICANT CHANGE UP
LYMPHOCYTES # BLD AUTO: 0.07 K/UL — LOW (ref 1–3.3)
LYMPHOCYTES # BLD AUTO: 0.16 K/UL — LOW (ref 1–3.3)
LYMPHOCYTES # BLD AUTO: 2.7 % — LOW (ref 13–44)
LYMPHOCYTES # BLD AUTO: 6.7 % — LOW (ref 13–44)
MAGNESIUM SERPL-MCNC: 2 MG/DL — SIGNIFICANT CHANGE UP (ref 1.6–2.6)
MAGNESIUM SERPL-MCNC: 2.1 MG/DL — SIGNIFICANT CHANGE UP (ref 1.6–2.6)
MANUAL SMEAR VERIFICATION: SIGNIFICANT CHANGE UP
MCHC RBC-ENTMCNC: 30.8 PG — SIGNIFICANT CHANGE UP (ref 27–34)
MCHC RBC-ENTMCNC: 30.9 PG — SIGNIFICANT CHANGE UP (ref 27–34)
MCHC RBC-ENTMCNC: 32 GM/DL — SIGNIFICANT CHANGE UP (ref 32–36)
MCHC RBC-ENTMCNC: 32.2 GM/DL — SIGNIFICANT CHANGE UP (ref 32–36)
MCV RBC AUTO: 96.1 FL — SIGNIFICANT CHANGE UP (ref 80–100)
MCV RBC AUTO: 96.2 FL — SIGNIFICANT CHANGE UP (ref 80–100)
MONOCYTES # BLD AUTO: 0.07 K/UL — SIGNIFICANT CHANGE UP (ref 0–0.9)
MONOCYTES # BLD AUTO: 0.17 K/UL — SIGNIFICANT CHANGE UP (ref 0–0.9)
MONOCYTES NFR BLD AUTO: 2.7 % — SIGNIFICANT CHANGE UP (ref 2–14)
MONOCYTES NFR BLD AUTO: 7.1 % — SIGNIFICANT CHANGE UP (ref 2–14)
NEUTROPHILS # BLD AUTO: 1.77 K/UL — LOW (ref 1.8–7.4)
NEUTROPHILS # BLD AUTO: 2.25 K/UL — SIGNIFICANT CHANGE UP (ref 1.8–7.4)
NEUTROPHILS NFR BLD AUTO: 73.7 % — SIGNIFICANT CHANGE UP (ref 43–77)
NEUTROPHILS NFR BLD AUTO: 87.3 % — HIGH (ref 43–77)
NITRITE UR-MCNC: NEGATIVE — SIGNIFICANT CHANGE UP
NRBC # BLD: 0 /100 WBCS — SIGNIFICANT CHANGE UP (ref 0–0)
NT-PROBNP SERPL-SCNC: 49 PG/ML — SIGNIFICANT CHANGE UP (ref 0–300)
PH UR: 6 — SIGNIFICANT CHANGE UP (ref 5–8)
PHOSPHATE SERPL-MCNC: 2.9 MG/DL — SIGNIFICANT CHANGE UP (ref 2.5–4.5)
PHOSPHATE SERPL-MCNC: 3 MG/DL — SIGNIFICANT CHANGE UP (ref 2.5–4.5)
PLAT MORPH BLD: NORMAL — SIGNIFICANT CHANGE UP
PLATELET # BLD AUTO: 173 K/UL — SIGNIFICANT CHANGE UP (ref 150–400)
PLATELET # BLD AUTO: 178 K/UL — SIGNIFICANT CHANGE UP (ref 150–400)
POTASSIUM SERPL-MCNC: 4 MMOL/L — SIGNIFICANT CHANGE UP (ref 3.5–5.3)
POTASSIUM SERPL-MCNC: 4.1 MMOL/L — SIGNIFICANT CHANGE UP (ref 3.5–5.3)
POTASSIUM SERPL-SCNC: 4 MMOL/L — SIGNIFICANT CHANGE UP (ref 3.5–5.3)
POTASSIUM SERPL-SCNC: 4.1 MMOL/L — SIGNIFICANT CHANGE UP (ref 3.5–5.3)
PROT SERPL-MCNC: 6.4 G/DL — SIGNIFICANT CHANGE UP (ref 6–8.3)
PROT SERPL-MCNC: 6.4 G/DL — SIGNIFICANT CHANGE UP (ref 6–8.3)
PROT UR-MCNC: NEGATIVE — SIGNIFICANT CHANGE UP
PROTHROM AB SERPL-ACNC: 12 SEC — SIGNIFICANT CHANGE UP (ref 10.5–13.4)
RBC # BLD: 3.7 M/UL — LOW (ref 4.2–5.8)
RBC # BLD: 3.88 M/UL — LOW (ref 4.2–5.8)
RBC # FLD: 12.7 % — SIGNIFICANT CHANGE UP (ref 10.3–14.5)
RBC # FLD: 12.9 % — SIGNIFICANT CHANGE UP (ref 10.3–14.5)
RBC BLD AUTO: SIGNIFICANT CHANGE UP
RH IG SCN BLD-IMP: POSITIVE — SIGNIFICANT CHANGE UP
SMUDGE CELLS # BLD: PRESENT — SIGNIFICANT CHANGE UP
SODIUM SERPL-SCNC: 139 MMOL/L — SIGNIFICANT CHANGE UP (ref 135–145)
SODIUM SERPL-SCNC: 139 MMOL/L — SIGNIFICANT CHANGE UP (ref 135–145)
SP GR SPEC: 1.02 — SIGNIFICANT CHANGE UP (ref 1.01–1.02)
URATE SERPL-MCNC: 3.4 MG/DL — SIGNIFICANT CHANGE UP (ref 3.4–8.8)
URATE SERPL-MCNC: 3.5 MG/DL — SIGNIFICANT CHANGE UP (ref 3.4–8.8)
UROBILINOGEN FLD QL: NEGATIVE — SIGNIFICANT CHANGE UP
VARIANT LYMPHS # BLD: 0.9 % — SIGNIFICANT CHANGE UP (ref 0–6)
WBC # BLD: 2.4 K/UL — LOW (ref 3.8–10.5)
WBC # BLD: 2.58 K/UL — LOW (ref 3.8–10.5)
WBC # FLD AUTO: 2.4 K/UL — LOW (ref 3.8–10.5)
WBC # FLD AUTO: 2.58 K/UL — LOW (ref 3.8–10.5)

## 2022-04-22 PROCEDURE — 71045 X-RAY EXAM CHEST 1 VIEW: CPT | Mod: 26

## 2022-04-22 PROCEDURE — 99291 CRITICAL CARE FIRST HOUR: CPT

## 2022-04-22 PROCEDURE — 99233 SBSQ HOSP IP/OBS HIGH 50: CPT

## 2022-04-22 RX ORDER — DEXAMETHASONE 0.5 MG/5ML
4 ELIXIR ORAL ONCE
Refills: 0 | Status: COMPLETED | OUTPATIENT
Start: 2022-04-22 | End: 2022-04-22

## 2022-04-22 RX ORDER — LEVETIRACETAM 250 MG/1
500 TABLET, FILM COATED ORAL
Refills: 0 | Status: DISCONTINUED | OUTPATIENT
Start: 2022-04-22 | End: 2022-05-06

## 2022-04-22 RX ORDER — CEFEPIME 1 G/1
2000 INJECTION, POWDER, FOR SOLUTION INTRAMUSCULAR; INTRAVENOUS ONCE
Refills: 0 | Status: COMPLETED | OUTPATIENT
Start: 2022-04-22 | End: 2022-04-22

## 2022-04-22 RX ORDER — CEFEPIME 1 G/1
2000 INJECTION, POWDER, FOR SOLUTION INTRAMUSCULAR; INTRAVENOUS EVERY 8 HOURS
Refills: 0 | Status: DISCONTINUED | OUTPATIENT
Start: 2022-04-23 | End: 2022-05-01

## 2022-04-22 RX ORDER — CEFEPIME 1 G/1
INJECTION, POWDER, FOR SOLUTION INTRAMUSCULAR; INTRAVENOUS
Refills: 0 | Status: DISCONTINUED | OUTPATIENT
Start: 2022-04-22 | End: 2022-05-01

## 2022-04-22 RX ORDER — DIPHENHYDRAMINE HCL 50 MG
25 CAPSULE ORAL ONCE
Refills: 0 | Status: COMPLETED | OUTPATIENT
Start: 2022-04-22 | End: 2022-04-22

## 2022-04-22 RX ORDER — TOCILIZUMAB 20 MG/ML
800 INJECTION, SOLUTION, CONCENTRATE INTRAVENOUS ONCE
Refills: 0 | Status: COMPLETED | OUTPATIENT
Start: 2022-04-22 | End: 2022-04-22

## 2022-04-22 RX ADMIN — Medication 400 MILLIGRAM(S): at 14:47

## 2022-04-22 RX ADMIN — TOCILIZUMAB 100 MILLIGRAM(S): 20 INJECTION, SOLUTION, CONCENTRATE INTRAVENOUS at 22:10

## 2022-04-22 RX ADMIN — Medication 5 MILLILITER(S): at 00:34

## 2022-04-22 RX ADMIN — CARVEDILOL PHOSPHATE 3.12 MILLIGRAM(S): 80 CAPSULE, EXTENDED RELEASE ORAL at 05:47

## 2022-04-22 RX ADMIN — CARVEDILOL PHOSPHATE 3.12 MILLIGRAM(S): 80 CAPSULE, EXTENDED RELEASE ORAL at 17:17

## 2022-04-22 RX ADMIN — PANTOPRAZOLE SODIUM 40 MILLIGRAM(S): 20 TABLET, DELAYED RELEASE ORAL at 05:47

## 2022-04-22 RX ADMIN — Medication 5 MILLILITER(S): at 07:54

## 2022-04-22 RX ADMIN — SODIUM CHLORIDE 50 MILLILITER(S): 9 INJECTION INTRAMUSCULAR; INTRAVENOUS; SUBCUTANEOUS at 22:23

## 2022-04-22 RX ADMIN — Medication 1 TABLET(S): at 12:01

## 2022-04-22 RX ADMIN — CEFEPIME 100 MILLIGRAM(S): 1 INJECTION, POWDER, FOR SOLUTION INTRAMUSCULAR; INTRAVENOUS at 22:09

## 2022-04-22 RX ADMIN — Medication 10 MILLILITER(S): at 20:28

## 2022-04-22 RX ADMIN — Medication 650 MILLIGRAM(S): at 20:15

## 2022-04-22 RX ADMIN — Medication 1 LOZENGE: at 20:28

## 2022-04-22 RX ADMIN — Medication 5 MILLILITER(S): at 11:40

## 2022-04-22 RX ADMIN — Medication 300 MILLIGRAM(S): at 11:41

## 2022-04-22 RX ADMIN — SODIUM CHLORIDE 50 MILLILITER(S): 9 INJECTION INTRAMUSCULAR; INTRAVENOUS; SUBCUTANEOUS at 05:47

## 2022-04-22 RX ADMIN — Medication 10 MILLILITER(S): at 11:40

## 2022-04-22 RX ADMIN — Medication 1 MILLIGRAM(S): at 12:02

## 2022-04-22 RX ADMIN — Medication 1 LOZENGE: at 07:55

## 2022-04-22 RX ADMIN — Medication 1 LOZENGE: at 15:56

## 2022-04-22 RX ADMIN — Medication 5 MILLILITER(S): at 15:57

## 2022-04-22 RX ADMIN — FLUCONAZOLE 400 MILLIGRAM(S): 150 TABLET ORAL at 12:01

## 2022-04-22 RX ADMIN — Medication 650 MILLIGRAM(S): at 20:45

## 2022-04-22 RX ADMIN — Medication 400 MILLIGRAM(S): at 05:47

## 2022-04-22 RX ADMIN — Medication 10 MILLILITER(S): at 00:34

## 2022-04-22 RX ADMIN — Medication 10 MILLILITER(S): at 07:54

## 2022-04-22 RX ADMIN — Medication 400 MILLIGRAM(S): at 21:18

## 2022-04-22 RX ADMIN — Medication 25 MILLIGRAM(S): at 21:18

## 2022-04-22 RX ADMIN — Medication 1 LOZENGE: at 00:34

## 2022-04-22 RX ADMIN — Medication 1 LOZENGE: at 11:40

## 2022-04-22 RX ADMIN — CHLORHEXIDINE GLUCONATE 1 APPLICATION(S): 213 SOLUTION TOPICAL at 07:55

## 2022-04-22 RX ADMIN — LEVETIRACETAM 500 MILLIGRAM(S): 250 TABLET, FILM COATED ORAL at 22:09

## 2022-04-22 RX ADMIN — Medication 10 MILLILITER(S): at 15:57

## 2022-04-22 RX ADMIN — Medication 4 MILLIGRAM(S): at 21:19

## 2022-04-22 RX ADMIN — Medication 5 MILLILITER(S): at 20:28

## 2022-04-22 NOTE — PROGRESS NOTE ADULT - SUBJECTIVE AND OBJECTIVE BOX
HPC Transplant Team                                                      Critical / Counseling Time Provided: 30 minutes                                                                                                                                                        Chief Complaint: CAR T therapy with Tecartus product for treatment of relapsed, refractory MCL    S: Patient seen and examined with HPC Transplant Team:     O:       Vital Signs Last 24 Hrs  T(C): 36.8 (22 Apr 2022 06:05), Max: 37.1 (21 Apr 2022 13:35)  T(F): 98.2 (22 Apr 2022 06:05), Max: 98.8 (21 Apr 2022 13:35)  HR: 68 (22 Apr 2022 06:05) (65 - 78)  BP: 121/76 (22 Apr 2022 06:05) (107/65 - 130/78)  BP(mean): --  RR: 18 (22 Apr 2022 06:05) (18 - 18)  SpO2: 97% (22 Apr 2022 06:05) (97% - 100%)    Admit weight: 100.4kg  Daily Weight in kG:     Intake / Output:   04-21 @ 07:01  -  04-22 @ 07:00  --------------------------------------------------------  IN: 2664 mL / OUT: 2700 mL / NET: -36 mL    PE:   Oropharynx: no erythema or ulcerations ,   Oral Mucositis:  NA                                                   Grade: n/a  CVS: S1, S2 RRR   Lungs: CTA throughout bilaterally   Abdomen: + BS x 4, soft, NT, ND   Extremities: no edema   Gastric Mucositis:       -                                           Grade: n/a  Intestinal Mucositis:     -                                         Grade: n/a   Skin: no rash   TLC: CDI   Neuro: A&O x 3   Pain: 0      Labs:     CBC Full  -  ( 22 Apr 2022 06:26 )  WBC Count : 2.40 K/uL  Hemoglobin : 12.0 g/dL  Hematocrit : 37.3 %  Platelet Count - Automated : 173 K/uL  Mean Cell Volume : 96.1 fl  Mean Cell Hemoglobin : 30.9 pg  Mean Cell Hemoglobin Concentration : 32.2 gm/dL  Auto Neutrophil # : 1.77 K/uL  Auto Lymphocyte # : 0.16 K/uL  Auto Monocyte # : 0.17 K/uL  Auto Eosinophil # : 0.28 K/uL  Auto Basophil # : 0.00 K/uL  Auto Neutrophil % : 73.7 %  Auto Lymphocyte % : 6.7 %  Auto Monocyte % : 7.1 %  Auto Eosinophil % : 11.7 %  Auto Basophil % : 0.0 %                          12.0   2.40  )-----------( 173      ( 22 Apr 2022 06:26 )             37.3     04-22    139  |  102  |  20  ----------------------------<  111<H>  4.1   |  26  |  1.00    Ca    8.8      22 Apr 2022 06:26  Phos  2.9     04-22  Mg     2.1     04-22    TPro  6.4  /  Alb  4.2  /  TBili  0.2  /  DBili  <0.1  /  AST  24  /  ALT  22  /  AlkPhos  98  04-22    PT/INR - ( 22 Apr 2022 06:26 )   PT: 12.0 sec;   INR: 1.04 ratio         PTT - ( 22 Apr 2022 06:26 )  PTT:24.7 sec  LIVER FUNCTIONS - ( 22 Apr 2022 06:26 )  Alb: 4.2 g/dL / Pro: 6.4 g/dL / ALK PHOS: 98 U/L / ALT: 22 U/L / AST: 24 U/L / GGT: x           Lactate Dehydrogenase, Serum: 160 U/L (04-22 @ 06:26)  Lactate Dehydrogenase, Serum: 168 U/L (04-21 @ 15:25)        Cultures:         Radiology:       Meds:   Antimicrobials:   acyclovir   Oral Tab/Cap 400 milliGRAM(s) Oral every 8 hours  clotrimazole Lozenge 1 Lozenge Oral five times a day  fluconAZOLE   Tablet 400 milliGRAM(s) Oral daily      Heme / Onc:       GI:  pantoprazole    Tablet 40 milliGRAM(s) Oral before breakfast  polyethylene glycol 3350 17 Gram(s) Oral daily PRN  senna 1 Tablet(s) Oral at bedtime PRN  sodium bicarbonate Mouth Rinse 10 milliLiter(s) Swish and Spit five times a day      Cardiovascular:   carvedilol 3.125 milliGRAM(s) Oral every 12 hours      Immunologic:       Other medications:   acetaminophen     Tablet .. 650 milliGRAM(s) Oral every 6 hours  allopurinol 300 milliGRAM(s) Oral daily  Biotene Dry Mouth Oral Rinse 5 milliLiter(s) Swish and Spit five times a day  chlorhexidine 4% Liquid 1 Application(s) Topical <User Schedule>  folic acid 1 milliGRAM(s) Oral daily  multivitamin 1 Tablet(s) Oral daily  sodium chloride 0.9%. 1000 milliLiter(s) IV Continuous <Continuous>      PRN:   acetaminophen     Tablet .. 650 milliGRAM(s) Oral every 6 hours PRN  metoclopramide Injectable 10 milliGRAM(s) IV Push every 6 hours PRN  ondansetron Injectable 8 milliGRAM(s) IV Push every 8 hours PRN  polyethylene glycol 3350 17 Gram(s) Oral daily PRN  senna 1 Tablet(s) Oral at bedtime PRN  sodium chloride 0.9% lock flush 10 milliLiter(s) IV Push every 1 hour PRN      A/P:    56 year old male with relapsed, refractory MCL s/p R-CHOP x 4, RICE x 2, maintenance RTX, salvage Calquence, admitted for CAR T cell therapy with Tecartus product. Disease status at time of admission is partial response.   Day + 3    1. Relapsed, refractory MCL   4/15- day - 4- flu / ctx 2/3 - continue CTX hydration until 24 hours post infusion of last dose   Strict I&O, daily weights, prn diuresis   CRS / ICANS score daily until infusion of CAR T cells (4/19/22) and twice daily thereafter   BID labs 0500 & 1500 - monitor for TLS     2. Prophylactic measures   clotrimazole Lozenge 1 Lozenge Oral five times a day  fluconAZOLE   Tablet 400 milliGRAM(s) Oral daily  trimethoprim  160 mG/sulfamethoxazole 800 mG 1 Tablet(s) Oral every 12 hours  Acyclovir 400mg po TID to start on day -1   If / when neutropenic - will add cipro 500mg po BID   GI Prophylaxis:  Protonix    3. Mouthcare - NS / NaHCO3 rinses, Mycelex, Biotene; Skin care     4. Transfuse & replete electrolytes prn     7. IV hydration, daily weights, strict I&O, prn diuresis     8. Antiemetics, anti-diarrhea medications:   LORazepam   Injectable 1 milliGRAM(s) IV Push every 6 hours PRN  metoclopramide Injectable 10 milliGRAM(s) IV Push every 6 hours PRN  ondansetron 8mg IV q 8 hours prn nausea     9. CRS / ICANS scoring   4/18 CRS grade 0, ICANS score 10   4/19- CRS grade 0, ICANS score 10   4/20 AM - CRS grade 0, ICANS score 10   4/20-pm-CRS grade 0, ICANS score 10  4/21 AM - CRS grade 0, ICANS score 10   4/21 PM CRS grade 0, ICANS score 10    I have written the above note for Dr. Banks who performed service with me in the room.   Miki Espinoza PA-C  ([phone])    I have seen and examined patient with PA, I agree with above note as scribed.                  HPC Transplant Team                                                      Critical / Counseling Time Provided: 30 minutes                                                                                                                                                        Chief Complaint: CAR T therapy with Tecart[employer] product for treatment of relapsed, refractory MCL    S: Patient seen and examined with HPC Transplant Team: No overnight events, afebrile, +OOB walking    O: Denies n/v, abdominal pain, HA or dizziness      Vital Signs Last 24 Hrs  T(C): 36.8 (2022 06:05), Max: 37.1 (2022 13:35)  T(F): 98.2 (2022 06:05), Max: 98.8 (2022 13:35)  HR: 68 (2022 06:05) (65 - 78)  BP: 121/76 (2022 06:05) (107/65 - 130/78)  BP(mean): --  RR: 18 (2022 06:05) (18 - 18)  SpO2: 97% (2022 06:05) (97% - 100%)    Admit weight: 100.4kg  Daily Weight in k.1kg    Intake / Output:   - @ 07:01  -  -22 @ 07:00  --------------------------------------------------------  IN: 2664 mL / OUT: 2700 mL / NET: -36 mL    PE:   Oropharynx: no erythema or ulcerations ,   Oral Mucositis:  NA                                                   Grade: n/a  CVS: S1, S2 RRR   Lungs: CTA throughout bilaterally   Abdomen: + BS x 4, soft, NT, ND   Extremities: no edema   Gastric Mucositis:       -                                           Grade: n/a  Intestinal Mucositis:     -                                         Grade: n/a   Skin: no rash   TLC: CDI   Neuro: A&O x 3   Pain: 0      Labs:     CBC Full  -  ( 2022 06:26 )  WBC Count : 2.40 K/uL  Hemoglobin : 12.0 g/dL  Hematocrit : 37.3 %  Platelet Count - Automated : 173 K/uL  Mean Cell Volume : 96.1 fl  Mean Cell Hemoglobin : 30.9 pg  Mean Cell Hemoglobin Concentration : 32.2 gm/dL  Auto Neutrophil # : 1.77 K/uL  Auto Lymphocyte # : 0.16 K/uL  Auto Monocyte # : 0.17 K/uL  Auto Eosinophil # : 0.28 K/uL  Auto Basophil # : 0.00 K/uL  Auto Neutrophil % : 73.7 %  Auto Lymphocyte % : 6.7 %  Auto Monocyte % : 7.1 %  Auto Eosinophil % : 11.7 %  Auto Basophil % : 0.0 %                          12.0   2.40  )-----------( 173      ( 2022 06:26 )             37.3         139  |  102  |  20  ----------------------------<  111<H>  4.1   |  26  |  1.00    Ca    8.8      2022 06:26  Phos  2.9       Mg     2.1         TPro  6.4  /  Alb  4.2  /  TBili  0.2  /  DBili  <0.1  /  AST  24  /  ALT  22  /  AlkPhos  98      PT/INR - ( 2022 06:26 )   PT: 12.0 sec;   INR: 1.04 ratio         PTT - ( 2022 06:26 )  PTT:24.7 sec  LIVER FUNCTIONS - ( 2022 06:26 )  Alb: 4.2 g/dL / Pro: 6.4 g/dL / ALK PHOS: 98 U/L / ALT: 22 U/L / AST: 24 U/L / GGT: x           Lactate Dehydrogenase, Serum: 160 U/L ( @ 06:26)  Lactate Dehydrogenase, Serum: 168 U/L ( @ 15:25)        Cultures:         Radiology:       Meds:   Antimicrobials:   acyclovir   Oral Tab/Cap 400 milliGRAM(s) Oral every 8 hours  clotrimazole Lozenge 1 Lozenge Oral five times a day  fluconAZOLE   Tablet 400 milliGRAM(s) Oral daily      Heme / Onc:       GI:  pantoprazole    Tablet 40 milliGRAM(s) Oral before breakfast  polyethylene glycol 3350 17 Gram(s) Oral daily PRN  senna 1 Tablet(s) Oral at bedtime PRN  sodium bicarbonate Mouth Rinse 10 milliLiter(s) Swish and Spit five times a day      Cardiovascular:   carvedilol 3.125 milliGRAM(s) Oral every 12 hours      Immunologic:       Other medications:   acetaminophen     Tablet .. 650 milliGRAM(s) Oral every 6 hours  allopurinol 300 milliGRAM(s) Oral daily  Biotene Dry Mouth Oral Rinse 5 milliLiter(s) Swish and Spit five times a day  chlorhexidine 4% Liquid 1 Application(s) Topical <User Schedule>  folic acid 1 milliGRAM(s) Oral daily  multivitamin 1 Tablet(s) Oral daily  sodium chloride 0.9%. 1000 milliLiter(s) IV Continuous <Continuous>      PRN:   acetaminophen     Tablet .. 650 milliGRAM(s) Oral every 6 hours PRN  metoclopramide Injectable 10 milliGRAM(s) IV Push every 6 hours PRN  ondansetron Injectable 8 milliGRAM(s) IV Push every 8 hours PRN  polyethylene glycol 3350 17 Gram(s) Oral daily PRN  senna 1 Tablet(s) Oral at bedtime PRN  sodium chloride 0.9% lock flush 10 milliLiter(s) IV Push every 1 hour PRN      A/P:    56 year old male with relapsed, refractory MCL s/p R-CHOP x 4, RICE x 2, maintenance RTX, salvage Calquence, admitted for CAR T cell therapy with [employer] product. Disease status at time of admission is partial response.   Day +3    1. Relapsed, refractory MCL   4/15- day - 4- flu / ctx 2/3 - continue CTX hydration until 24 hours post infusion of last dose   Strict I&O, daily weights, prn diuresis   CRS / ICANS score daily until infusion of CAR T cells (22) and twice daily thereafter   BID labs 0500 & 1500 - monitor for TLS     2. Prophylactic measures   clotrimazole Lozenge 1 Lozenge Oral five times a day  fluconAZOLE   Tablet 400 milliGRAM(s) Oral daily  trimethoprim  160 mG/sulfamethoxazole 800 mG 1 Tablet(s) Oral every 12 hours  Acyclovir 400mg po TID to start on day -1   If / when neutropenic - will add cipro 500mg po BID   GI Prophylaxis:  Protonix    3. Mouthcare - NS / NaHCO3 rinses, Mycelex, Biotene; Skin care     4. Transfuse & replete electrolytes prn     7. IV hydration, daily weights, strict I&O, prn diuresis     8. Antiemetics, anti-diarrhea medications:   LORazepam   Injectable 1 milliGRAM(s) IV Push every 6 hours PRN  metoclopramide Injectable 10 milliGRAM(s) IV Push every 6 hours PRN  ondansetron 8mg IV q 8 hours prn nausea     9. CRS / ICANS scoring    CRS grade 0, ICANS score 10   - CRS grade 0, ICANS score 10    AM - CRS grade 0, ICANS score 10   -pm-CRS grade 0, ICANS score 10   AM - CRS grade 0, ICANS score 10    PM CRS grade 0, ICANS score 10   AM CRS grade 0, ICANS score 10    I have written the above note for Dr. Banks who performed service with me in the room.   Miki Espinoza PA-C  ([phone])    I have seen and examined patient with PA, I agree with above note as scribed.                  HPC Transplant Team                                                      Critical / Counseling Time Provided: 30 minutes                                                                                                                                                        Chief Complaint: CAR T therapy with T[employer] product for treatment of relapsed, refractory MCL    S: Patient seen and examined with HPC Transplant Team: No overnight events, afebrile, +OOB walking    O: Denies n/v, abdominal pain, HA or dizziness      Vital Signs Last 24 Hrs  T(C): 36.8 (2022 06:05), Max: 37.1 (2022 13:35)  T(F): 98.2 (2022 06:05), Max: 98.8 (2022 13:35)  HR: 68 (2022 06:05) (65 - 78)  BP: 121/76 (2022 06:05) (107/65 - 130/78)  BP(mean): --  RR: 18 (2022 06:05) (18 - 18)  SpO2: 97% (2022 06:05) (97% - 100%)    Admit weight: 100.4kg  Daily Weight in k.1kg    Intake / Output:   - @ 07:01  -  -22 @ 07:00  --------------------------------------------------------  IN: 2664 mL / OUT: 2700 mL / NET: -36 mL    PE:   Oropharynx: no erythema or ulcerations ,   Oral Mucositis:  NA                                                   Grade: n/a  CVS: S1, S2 RRR   Lungs: CTA throughout bilaterally   Abdomen: + BS x 4, soft, NT, ND   Extremities: no edema   Gastric Mucositis:       -                                           Grade: n/a  Intestinal Mucositis:     -                                         Grade: n/a   Skin: no rash   TLC: CDI   Neuro: A&O x 3   Pain: 0      Labs:     CBC Full  -  ( 2022 06:26 )  WBC Count : 2.40 K/uL  Hemoglobin : 12.0 g/dL  Hematocrit : 37.3 %  Platelet Count - Automated : 173 K/uL  Mean Cell Volume : 96.1 fl  Mean Cell Hemoglobin : 30.9 pg  Mean Cell Hemoglobin Concentration : 32.2 gm/dL  Auto Neutrophil # : 1.77 K/uL  Auto Lymphocyte # : 0.16 K/uL  Auto Monocyte # : 0.17 K/uL  Auto Eosinophil # : 0.28 K/uL  Auto Basophil # : 0.00 K/uL  Auto Neutrophil % : 73.7 %  Auto Lymphocyte % : 6.7 %  Auto Monocyte % : 7.1 %  Auto Eosinophil % : 11.7 %  Auto Basophil % : 0.0 %                          12.0   2.40  )-----------( 173      ( 2022 06:26 )             37.3         139  |  102  |  20  ----------------------------<  111<H>  4.1   |  26  |  1.00    Ca    8.8      2022 06:26  Phos  2.9       Mg     2.1         TPro  6.4  /  Alb  4.2  /  TBili  0.2  /  DBili  <0.1  /  AST  24  /  ALT  22  /  AlkPhos  98      PT/INR - ( 2022 06:26 )   PT: 12.0 sec;   INR: 1.04 ratio         PTT - ( 2022 06:26 )  PTT:24.7 sec  LIVER FUNCTIONS - ( 2022 06:26 )  Alb: 4.2 g/dL / Pro: 6.4 g/dL / ALK PHOS: 98 U/L / ALT: 22 U/L / AST: 24 U/L / GGT: x           Lactate Dehydrogenase, Serum: 160 U/L ( @ 06:26)  Lactate Dehydrogenase, Serum: 168 U/L ( @ 15:25)        Cultures:         Radiology:       Meds:   Antimicrobials:   acyclovir   Oral Tab/Cap 400 milliGRAM(s) Oral every 8 hours  clotrimazole Lozenge 1 Lozenge Oral five times a day  fluconAZOLE   Tablet 400 milliGRAM(s) Oral daily      Heme / Onc:       GI:  pantoprazole    Tablet 40 milliGRAM(s) Oral before breakfast  polyethylene glycol 3350 17 Gram(s) Oral daily PRN  senna 1 Tablet(s) Oral at bedtime PRN  sodium bicarbonate Mouth Rinse 10 milliLiter(s) Swish and Spit five times a day      Cardiovascular:   carvedilol 3.125 milliGRAM(s) Oral every 12 hours      Immunologic:       Other medications:   acetaminophen     Tablet .. 650 milliGRAM(s) Oral every 6 hours  allopurinol 300 milliGRAM(s) Oral daily  Biotene Dry Mouth Oral Rinse 5 milliLiter(s) Swish and Spit five times a day  chlorhexidine 4% Liquid 1 Application(s) Topical <User Schedule>  folic acid 1 milliGRAM(s) Oral daily  multivitamin 1 Tablet(s) Oral daily  sodium chloride 0.9%. 1000 milliLiter(s) IV Continuous <Continuous>      PRN:   acetaminophen     Tablet .. 650 milliGRAM(s) Oral every 6 hours PRN  metoclopramide Injectable 10 milliGRAM(s) IV Push every 6 hours PRN  ondansetron Injectable 8 milliGRAM(s) IV Push every 8 hours PRN  polyethylene glycol 3350 17 Gram(s) Oral daily PRN  senna 1 Tablet(s) Oral at bedtime PRN  sodium chloride 0.9% lock flush 10 milliLiter(s) IV Push every 1 hour PRN      A/P:    56 year old male with relapsed, refractory MCL s/p R-CHOP x 4, RICE x 2, maintenance RTX, salvage Calquence, admitted for CAR T cell therapy with [employer] product. Disease status at time of admission is partial response.   Day +3  Monitor for CRS and neurotox...follow CRS/neuro tox protocol  1. Relapsed, refractory MCL   4/15- day - 4- flu / ctx 2/3 - continue CTX hydration until 24 hours post infusion of last dose   Strict I&O, daily weights, prn diuresis   CRS / ICANS score daily until infusion of CAR T cells (22) and twice daily thereafter   BID labs 0500 & 1500 - monitor for TLS     2. Prophylactic measures   clotrimazole Lozenge 1 Lozenge Oral five times a day  fluconAZOLE   Tablet 400 milliGRAM(s) Oral daily  trimethoprim  160 mG/sulfamethoxazole 800 mG 1 Tablet(s) Oral every 12 hours  Acyclovir 400mg po TID to start on day -1   If / when neutropenic - will add cipro 500mg po BID   GI Prophylaxis:  Protonix    3. Mouthcare - NS / NaHCO3 rinses, Mycelex, Biotene; Skin care     4. Transfuse & replete electrolytes prn     7. IV hydration, daily weights, strict I&O, prn diuresis     8. Antiemetics, anti-diarrhea medications:   LORazepam   Injectable 1 milliGRAM(s) IV Push every 6 hours PRN  metoclopramide Injectable 10 milliGRAM(s) IV Push every 6 hours PRN  ondansetron 8mg IV q 8 hours prn nausea     9. CRS / ICANS scoring    CRS grade 0, ICANS score 10   - CRS grade 0, ICANS score 10    AM - CRS grade 0, ICANS score 10   -pm-CRS grade 0, ICANS score 10   AM - CRS grade 0, ICANS score 10    PM CRS grade 0, ICANS score 10   AM CRS grade 0, ICANS score 10    I have written the above note for Dr. Banks who performed service with me in the room.   Miki Espinoza PA-C  ([phone])    I have seen and examined patient with PA, I agree with above note as scribed.

## 2022-04-22 NOTE — PROGRESS NOTE ADULT - CRITICAL CARE ATTENDING COMMENT
Admitted for CAR T cell therapy with T[employer] (bresucabtagene autoleucel)  today is + 3..some nausea..now improved...monitor closely for CRS and neurotox...pt is at lower risk for these complications given minimal disease burden    1. Admit to BMTU   2. Day -5 through day -1 record CARTOX / ICANS score daily   3. Day - 5 through day - 3 Fludarabine 30mg / m2 IV given over 30 minutes daily   4. Day - 5 - start cyclophosphamide hydration - 0.9% NS + 10mEq KCL / L at 75 ml/m2 and continue for 24 hours post infusion of last dose of CTX   5. Day - 5 through day - 3 CTX 500mg / m2 IV QD to be given over 2 hours   6. Starting day - 5 monitor labs BID: CBC with differential, CMP, LDH, uric acid, mag, phos, CPK, BNP, PT / PTT / INR / Fibrinogen, d-dimer, CRP, hepatic profile, ferritin  7. Weekly IL-6 levels on Monday, twice weekly (Monday / Thursday) CMV / EBV PCR, three times a week (Monday / Wednesday / Friday) type and screen   8. On day - 5 start allopurinol 300mg po QD   9. Day -2 and -1 REST DAYS   10. On day -1 begin to record CARTOX / ICANS score q 12 hours   11. Day 0 infuse Brexucabtagene autoleucel  hx of afib...seen by [facility] cardiology    4/16-17 Doing well, I/Os are negative, transient lip swelling, now resolved, some fatigue  4/18, 19, 20, 21, 22 CRS 0, ICANS 10  if spikes fever above 100.4....will dose toci with premeds, start tele, kepra, pan cx and start antibiotics..

## 2022-04-22 NOTE — PROGRESS NOTE ADULT - PROBLEM SELECTOR PLAN 1
Anesthesia ROS/Med Hx    Overall Review:  Pts. EKG was reviewed     Pulmonary Review:    Negative for pulmonary    Neuro/Psych Review:    Negative for all neuro/psych ROS    Cardiovascular Review:    Exercise tolerance: good  Pt. positive for CAD  Pt. positive for hypertension - well controlled  Pt. positive for hyperlipidemia    GI/HEPATIC/RENAL Review:    Pt. positive for GERD - well controlled      Anesthesia Plan     ASA Status: 2  Anesthesia Type: MAC  Induction: Intravenous  Reviewed: Lab Results, EKG, Past Med History, Allergies, Consultations, Nursing Notes, Patient Summary, NPO Status, Problem List, Medications, Pre-Induction Reassessment and Pregnancy Test Results  The proposed anesthetic plan, including its risks and benefits, have been discussed with the Patient - along with the risks and benefits of alternatives.  Questions were encouraged and answered and the patient and/or representative agrees to proceed.  Blood Products: Not Anticipated  Plan Comments: I reviewed the H&P, I examined the patient, and there are no changes in the patient's condition.    I discussed with patient, the risks and benefits of the proposed anesthesia plan, MAC anesthesia. Patient's health status has been optimized. I discussed with the patient the following: oropharyngeal trauma, cardiac and pulmonary complication, peripheral paresthesias and post-operative nausea and vomiting. The patient understands, questions have been answered and wishes to proceed.       Physical Exam  Mallampati: II  TM Distance: >3 FB  Neck ROM: Full  Cardio Rhythm: Regular  Cardio Rate: Normal  cardiovascular exam normal  Breath sounds clear to auscultation:  Yes  pulmonary exam normal  dental exam normal                   Improved with exercise and activity

## 2022-04-22 NOTE — PROGRESS NOTE ADULT - PROBLEM SELECTOR PLAN 9
Recommendation: Condition: Mantel Cell Lymphoma  Previous transplant: AUTO SCT 2017  Active treatment:  CAR-T infusion  Transplant Type: Tecartus (brexucabtagene autoleucel)  Transplant Day: Day +3  Clinical impact on complexity: Significant.

## 2022-04-22 NOTE — CHART NOTE - NSCHARTNOTEFT_GEN_A_CORE
PA Medicine Note     Notified by RN for patient with a temperature of 100.8F.     - Blood cultures x 2 sent along with UA, Ucx, Chest XR  - Premedication for tocilizumab ordered, tylenol po and benadryl po   -   -   -   -   - PA Medicine Note   Notified by RN for patient with a temperature of 100.8F. Patient is A&Ox4, and all other vitals are stable.     Assessment/Plan:  56 year old male with a history of atrial fibrillation (s/p ablation x 2, internal loop recorder) and  MCL, initially diagnosed 10/2016. He was initially seen at [facility] for a left inguinal mass. CT scans 3/21/16 showed left sided pelvic and inguinal adenopathy. A left inguinal lymph node biopsy 9/26/16 showed morphologic and immunophenotypic findings consistent with MCL. He was treated with 4 cycles of R-CHOP, and 2 cycles of RICE consolidation. A bone marrow biopsy 2/10/17 showed normocellular bone marrow. He was admitted 3/27/17 for an autologous pbsct with CBV prep, and received the cells 4/5/17. He engrafted 4/16/17. He was discharged and completed maintenance Rituxan. In 10/21, he noticed a lump in the right groin. A lymph node biopsy 11/12/21 was consistent with relapsed MCL. He was treated with Calquence. He didn't tolerate Calquence well, with reports of frequent abdominal pain.     He is admitted for CAR T therapy with Tecartus product for relapsed, refractory MCL. His disease status at this time is partial remission.   Now presenting with a fever, 100.8F.     #Fever, CRS Grade 1   - Blood cultures x 2 sent along with UA, Ucx, Chest XR  - Premedication for tocilizumab ordered, tylenol po and benadryl po given  - Tocilizumab 800mg IV x 1 given stat based on the patient's weight   - Keppra 500mg po BID started   - Cefepime 2g IV added on to antibiotic regimen   - Dexamethasone 4mg IV x1 given  - IL-6 level sent   - Will continue to monitor patient's vitals closely overnight along with signs of   - Discussed above plan with heme/onc fellow overnight Dr. Starr. Dr. Banks also aware.   - Will update team on patient's status overnight     Margoth Barragan PA-C  Department of Medicine PA Medicine Note   Notified by RN for patient with a temperature of 100.8F. Patient is A&Ox4, and all other vitals are stable.     Assessment/Plan:  56 year old male with a history of atrial fibrillation (s/p ablation x 2, internal loop recorder) and  MCL, initially diagnosed 10/2016. He was initially seen at [facility] for a left inguinal mass. CT scans 3/21/16 showed left sided pelvic and inguinal adenopathy. A left inguinal lymph node biopsy 9/26/16 showed morphologic and immunophenotypic findings consistent with MCL. He was treated with 4 cycles of R-CHOP, and 2 cycles of RICE consolidation. A bone marrow biopsy 2/10/17 showed normocellular bone marrow. He was admitted 3/27/17 for an autologous pbsct with CBV prep, and received the cells 4/5/17. He engrafted 4/16/17. He was discharged and completed maintenance Rituxan. In 10/21, he noticed a lump in the right groin. A lymph node biopsy 11/12/21 was consistent with relapsed MCL. He was treated with Calquence. He didn't tolerate Calquence well, with reports of frequent abdominal pain.     He is admitted for CAR T therapy with Tecartus product for relapsed, refractory MCL. His disease status at this time is partial remission.   Now presenting with a fever, 100.8F.     #Fever, CRS Grade 1   - Blood cultures x 2 sent along with UA, Ucx, Chest XR  - Premedication for tocilizumab ordered, tylenol po and benadryl po given  - Tocilizumab 800mg IV x 1 given stat based on the patient's weight   - Keppra 500mg po BID started   - Cefepime 2g IV added on to antibiotic regimen   - Dexamethasone 4mg IV x1 given  - IL-6 level sent   - Will continue to monitor patient's vitals closely overnight   - Discussed above plan with heme/onc fellow overnight Dr. Starr. Dr. Banks also aware.   - Will update team on patient's status overnight     Margoth Barragan PA-C  Department of Medicine

## 2022-04-22 NOTE — PROGRESS NOTE ADULT - SUBJECTIVE AND OBJECTIVE BOX
HPI:  This is a 56 year old male with a history of atrial fibrillation (s/p ablation x 2, internal loop recorder) and  MCL, initially diagnosed 10/2016. He was initially seen at [facility] for a left inguinal mass. CT scans 3/21/16 showed left sided pelvic and inguinal adenopathy. A left inguinal lymph node biopsy 9/26/16 showed morphologic and immunophenotypic findings consistent with MCL. He was treated with 4 cycles of R-CHOP, and 2 cycles of RICE consolidation. A bone marrow biopsy 2/10/17 showed normocellular bone marrow. He was admitted 3/27/17 for an autologous pbsct with CBV prep, and received the cells 4/5/17. He engrafted 4/16/17. He was discharged and completed maintenance Rituxan. In 10/21, he noticed a lump in the right groin. A lymph node biopsy 11/12/21 was consistent with relapsed MCL. He was treated with Calquence. He didn't tolerate Calquence well, with reports of frequent abdominal pain. He is admitted today for CAR T therapy with Tecartus product for relapsed, refractory MCL. His disease status at this time is partial remission.  (14 Apr 2022 12:36)           04-22-22 @ 15:53  [facility] 7[address] W1    Overnight events:  None  Staff reports: No issues  Patient: He feels well  PRN use: n/a        RECENT VITALS/LABS/MEDICATIONS/ASSAYS     04-22-22 @ 15:53    T(C): 37.4 (04-22-22 @ 13:21), Max: 37.4 (04-22-22 @ 13:21)  HR: 91 (04-22-22 @ 13:21) (68 - 92)  BP: 135/73 (04-22-22 @ 13:21) (121/67 - 135/73)  RR: 18 (04-22-22 @ 13:21) (18 - 18)  SpO2: 100% (04-22-22 @ 13:21) (97% - 100%)  Wt(kg): --      21 Apr 2022 07:01  -  22 Apr 2022 07:00  --------------------------------------------------------  IN:    Oral Fluid: 1520 mL    sodium chloride 0.9%: 1144 mL  Total IN: 2664 mL    OUT:    Voided (mL): 2700 mL  Total OUT: 2700 mL    Total NET: -36 mL      22 Apr 2022 07:01  -  22 Apr 2022 15:53  --------------------------------------------------------  IN:    Oral Fluid: 480 mL    sodium chloride 0.9%: 245 mL  Total IN: 725 mL    OUT:    Voided (mL): 550 mL  Total OUT: 550 mL    Total NET: 175 mL          04-21 @ 07:01 - 04-22 @ 07:00  --------------------------------------------------------  IN: 2664 mL / OUT: 2700 mL / NET: -36 mL    04-22 @ 07:01 - 04-22 @ 15:53  --------------------------------------------------------  IN: 725 mL / OUT: 550 mL / NET: 175 mL      CAPILLARY BLOOD GLUCOSE            acetaminophen     Tablet .. 650 milliGRAM(s) Oral every 6 hours PRN  acetaminophen     Tablet .. 650 milliGRAM(s) Oral every 6 hours  acyclovir   Oral Tab/Cap 400 milliGRAM(s) Oral every 8 hours  allopurinol 300 milliGRAM(s) Oral daily  Biotene Dry Mouth Oral Rinse 5 milliLiter(s) Swish and Spit five times a day  carvedilol 3.125 milliGRAM(s) Oral every 12 hours  chlorhexidine 4% Liquid 1 Application(s) Topical <User Schedule>  clotrimazole Lozenge 1 Lozenge Oral five times a day  fluconAZOLE   Tablet 400 milliGRAM(s) Oral daily  folic acid 1 milliGRAM(s) Oral daily  metoclopramide Injectable 10 milliGRAM(s) IV Push every 6 hours PRN  multivitamin 1 Tablet(s) Oral daily  ondansetron Injectable 8 milliGRAM(s) IV Push every 8 hours PRN  pantoprazole    Tablet 40 milliGRAM(s) Oral before breakfast  polyethylene glycol 3350 17 Gram(s) Oral daily PRN  senna 1 Tablet(s) Oral at bedtime PRN  sodium bicarbonate Mouth Rinse 10 milliLiter(s) Swish and Spit five times a day  sodium chloride 0.9% lock flush 10 milliLiter(s) IV Push every 1 hour PRN  sodium chloride 0.9%. 1000 milliLiter(s) IV Continuous <Continuous>          04-22    139  |  103  |  20  ----------------------------<  127<H>  4.0   |  24  |  1.20    Ca    8.4      22 Apr 2022 15:03  Phos  3.0     04-22  Mg     2.0     04-22    TPro  6.4  /  Alb  4.3  /  TBili  0.2  /  DBili  x   /  AST  21  /  ALT  21  /  AlkPhos  96  04-22      Procalc  BNPSerum Pro-Brain Natriuretic Peptide: 49 pg/mL (04-22-22 @ 06:26)  Serum Pro-Brain Natriuretic Peptide: 36 pg/mL (04-21-22 @ 06:26)    ABG                          11.4   2.58  )-----------( 178      ( 22 Apr 2022 15:03 )             35.6   PT/INR - ( 22 Apr 2022 06:26 )   PT: 12.0 sec;   INR: 1.04 ratio         PTT - ( 22 Apr 2022 06:26 )  PTT:24.7 sec        blood and urine cultures                ===================================================================================  Palliative Global Assessment-Initial   A review of the paper chart has been conducted  HCP form present:               Yes and valid []               Yes but invalid []                No [x]   MOLST present:                   Yes and valid []               Yes but invalid []                No [x]  Incapacity form present:   Yes and valid []                Yes but invalid []                No []                 N/A [x]  Living Will:                            Yes and valid []                Yes but invalid []                No [x]      Family Heatlh Care Decision Act Surrogate Decision Maker Hierarchy  [facility] Article [address] Guardian-->Spouse or domestic partner--> Adult child-->Parent-->Sibling--> Close friend      Contacts listed in the paper or electronic chart  Name Aga Kapoor Wife  Number(s) [phone]  Translation Required: None  Spouse or Partner: Aga  Family unit: Wife and children, his youngest is 13  Family history of hematologic malignancy: None  Residence: [ ] Apartment   [x] House  [ ] Other  Degree of functionality in the home: Full   Performance Status (PPSv2): 80  BMI:BMI (kg/m2): 30.6 (04-14-22 @ 10:40)  Engagement in the home:  Employment: [ ] Currently employed [ x] Exited workforce due to illness  [ ] Retired prior to illness  [ ] No/distant history of employment   Support network (degree):  ---Family:        [ ] Low  [ ] Moderate  [ x] High  ---Neighbors: [ x] Low  [ ] Moderate  [ ] High  ---Social:         [ ] Low  [ ] Moderate  [ x] High  ---Spiritual:    [ x] Low  [ ] Moderate  [ ] High  Financial concerns: TBD  Coping Strategies: Listening to music and speaking with his wife  Caregiver burden/fatigue/needs: Childcare issues/concerns given his wife's visitation/  Grief identified: [] Yes [x] No  Medical communication and decision making preferences: TBD        PERTINENT PM/SXH:   Non Hodgkin&#x27;s lymphoma    Atrial flutter    Migraine    Sinus bradycardia    Paroxysmal atrial fibrillation    GERD (gastroesophageal reflux disease)      H/O total knee replacement    S/P right knee arthroscopy    History of arthroscopy of left shoulder    H/O prior ablation treatment    Status post placement of implantable loop recorder    H/O stem cell transplant      FAMILY HISTORY:  Family history of hypertension (Mother)  Mother    Family history of diabetes mellitus (Father)  Father      ITEMS NOT CHECKED ARE NOT PRESENT    SOCIAL HISTORY:   Significant other/partner[x ]  Children[x ]  Anglican/Spirituality:  Substance hx:  [ ]   Tobacco hx:  [ ]   Alcohol hx: [ ]   Home Opioid hx:  [ ] I-Stop Reference No:  Living Situation: [ ]Home  [ ]Long term care  [ ]Rehab [ ]Other    ADVANCE DIRECTIVES:    DNR  MOLST  [ ]  Living Will  [ ]   DECISION MAKER(s):  [ ] Health Care Proxy(s)  [ ] Surrogate(s)  [ ] Guardian           Name(s): Phone Number(s):    BASELINE (I)ADL(s) (prior to admission):  [place]: [ ]Total  [ ] Moderate [ ]Dependent    Allergies    No Known Allergies    Intolerances         PRESENT SYMPTOMS: [ ]Unable to obtain due to poor mentation   Source if other than patient:  [ ]Family   [ ]Team [ ] Inferred/Assessed    Pain: [ ]yes [x ]no  QOL impact -   Location -                    Aggravating factors -  Quality -  Radiation -  Timing-  Severity (0-10 scale):  Minimal acceptable level (0-10 scale):     PAIN AD Score:     [email or web address] (press ctrl +  left click to view)    [ ] Inferred Symptoms    Fatigue:                             [x ] None  [ ]Mild [ ]Moderate [ ]Severe  Drowsiness:                      [x ] None  [ ]Mild [ ]Moderate [ ]Severe  Nausea:                             [ x  ] None  [ ]Mild [  ]Moderate [  ]Severe  Dysgeusia:                         [  } None  [x ]Mild [  ]Moderate [ ]Severe  Loss of appetite:              [  ] None  [ x]Mild   ]Moderate [ ]Severe  Constipation:                    [x ] None  [ ]Mild [ ]Moderate [ ]Severe  Dyspnea:                           [x ] None  [ ]Mild [ ]Moderate [ ]Severe  Anxiety:                             [ x] None  [  ]Mild [ ]Moderate [ ]Severe  Depression:                      [x ] None  [ ]Mild [ ]Moderate [ ]Severe  Cognitive changes:          [ x] None  [ ]Mild [ ]Moderate [ ]Severe  Insomnia      :                    [ x ] None  [  ]Mild [ ]Moderate [ ]Severe  Isolation:                           [x ] None  [ ]Mild [ ]Moderate [ ]Severe  Loneliness:                        [x ] None  [ ]Mild [ ]Moderate [ ]Severe  Lack of   Wellbeing:                        [ x] None  [ ]Mild [ ]Moderate [ ]Severe    Other Symptoms: None  [x ]All other review of systems negative     Palliative Performance Status Version 2:      80   %    [email or web address]  PHYSICAL EXAM:    GENERAL:  [x ]Alert  [ ]Oriented x   [ ]Lethargic  [ ]Cachexia  [ ]Unarousable  [ ]Verbal  [ ]Non-Verbal [ ] Engaging   [  ] Confused  [  ] No distress  Behavioral:   [ ] Anxiety  [ ] Delirium [ ] Agitation [ x] Calm   [  ] Restless [ ] Other  HEENT:  [x ]Normal   [ ]Dry mouth   [ ]ET Tube/Trach  [ ]Oral lesions   [ ] NGT  [ ] Mucositis [ ] Oral  Bleeding  PULMONARY:   [ x]Clear [ ]Tachypnea  [ ]Audible excessive secretions [  ] No tachypnea  [ ]Rhonchi        [ ]Right [ ]Left [ ]Bilateral  [ ]Crackles        [ ]Right [ ]Left [ ]Bilateral  [ ]Wheezing     [ ]Right [ ]Left [ ]Bilateral  [ ]Diminished breath sounds [ ]right [ ]left [ ]bilateral  CARDIOVASCULAR:    [ x]Regular [ ]Irregular [ ]Tachy  [ ]Venkata [ ]Murmur [ ] JVD  GASTROINTESTINAL:  [x ]Soft  [ ]Distended   [ ]+BS  [ ]Non tender [ ]Tender  [ ]PEG [ ]OGT/ NGT  Last BM:   GENITOURINARY:  [x ]Normal [ ] Incontinent   [ ]Oliguria/Anuria   [ ]Hinds  MUSCULOSKELETAL:   [ x]Normal   [ ]Weakness  [ ]Bed/Wheelchair bound [ ]Edema  NEUROLOGIC:   [ x]No focal deficits  [ ]Cognitive impairment  [ ]Dysphagia [ ]Dysarthria [ ]Paresis [ ]Other   SKIN:   [x ]Normal    [ ]Rash  [ ]Pressure ulcer(s)   [  ] Lesion   [    ]  Wounds       Present on admission [ ]y [ ]n    CRITICAL CARE:  [ ] Shock Present  [ ]Septic [ ]Cardiogenic [ ]Neurologic [ ]Hypovolemic  [ ]  Vasopressors [ ]  Inotropes   [ ]Respiratory failure present [ ]Mechanical ventilation [ ]Non-invasive ventilatory support [ ]High flow  [ ]Acute  [ ]Chronic [ ]Hypoxic  [ ]Hypercarbic [ ]Other  [ ]Other organ failure          RADIOLOGY & ADDITIONAL STUDIES:    PROTEIN CALORIE MALNUTRITION PRESENT: [ ]mild [ ]moderate [ ]severe [ ]underweight [ ]morbid obesity  https://www.andeal.org/vault/3650/web/files/ONC/Table_Clinical%20Characteristics%20to%20Document%20Malnutrition-White%20JV%20et%20al%202012.pdf    Height (cm): 181 (04-14-22 @ 10:40), 180.3 (03-21-22 @ 08:57), 180 (03-17-22 @ 16:31)  Weight (kg): 100.4 (04-14-22 @ 10:40), 101.999 (04-13-22 @ 17:00), 98.4 (03-21-22 @ 08:57)  BMI (kg/m2): 30.6 (04-14-22 @ 10:40), 31.4 (04-13-22 @ 17:00), 30.3 (03-21-22 @ 08:57)    [ ]PPSV2 < or = to 30% [ ]significant weight loss  [ ]poor nutritional intake  [ ]anasarca      [ ]Artificial Nutrition      REFERRALS:   [ ]Chaplaincy  [ ]Hospice  [ ]Child Life  [ ]Social Work  [ ]Case management [ ]Holistic Therapy     Goals of Care Document:

## 2022-04-23 LAB
ALBUMIN SERPL ELPH-MCNC: 4.4 G/DL — SIGNIFICANT CHANGE UP (ref 3.3–5)
ALBUMIN SERPL ELPH-MCNC: 4.5 G/DL — SIGNIFICANT CHANGE UP (ref 3.3–5)
ALP SERPL-CCNC: 96 U/L — SIGNIFICANT CHANGE UP (ref 40–120)
ALP SERPL-CCNC: 98 U/L — SIGNIFICANT CHANGE UP (ref 40–120)
ALT FLD-CCNC: 22 U/L — SIGNIFICANT CHANGE UP (ref 10–45)
ALT FLD-CCNC: 25 U/L — SIGNIFICANT CHANGE UP (ref 10–45)
ANION GAP SERPL CALC-SCNC: 13 MMOL/L — SIGNIFICANT CHANGE UP (ref 5–17)
ANION GAP SERPL CALC-SCNC: 13 MMOL/L — SIGNIFICANT CHANGE UP (ref 5–17)
APTT BLD: 25.9 SEC — LOW (ref 27.5–35.5)
AST SERPL-CCNC: 19 U/L — SIGNIFICANT CHANGE UP (ref 10–40)
AST SERPL-CCNC: 35 U/L — SIGNIFICANT CHANGE UP (ref 10–40)
BASOPHILS # BLD AUTO: 0 K/UL — SIGNIFICANT CHANGE UP (ref 0–0.2)
BASOPHILS # BLD AUTO: 0.01 K/UL — SIGNIFICANT CHANGE UP (ref 0–0.2)
BASOPHILS NFR BLD AUTO: 0 % — SIGNIFICANT CHANGE UP (ref 0–2)
BASOPHILS NFR BLD AUTO: 0.3 % — SIGNIFICANT CHANGE UP (ref 0–2)
BILIRUB DIRECT SERPL-MCNC: <0.1 MG/DL — SIGNIFICANT CHANGE UP (ref 0–0.3)
BILIRUB INDIRECT FLD-MCNC: >0.1 MG/DL — LOW (ref 0.2–1)
BILIRUB SERPL-MCNC: 0.2 MG/DL — SIGNIFICANT CHANGE UP (ref 0.2–1.2)
BILIRUB SERPL-MCNC: 0.2 MG/DL — SIGNIFICANT CHANGE UP (ref 0.2–1.2)
BUN SERPL-MCNC: 22 MG/DL — SIGNIFICANT CHANGE UP (ref 7–23)
BUN SERPL-MCNC: 23 MG/DL — SIGNIFICANT CHANGE UP (ref 7–23)
CALCIUM SERPL-MCNC: 8.9 MG/DL — SIGNIFICANT CHANGE UP (ref 8.4–10.5)
CALCIUM SERPL-MCNC: 9.1 MG/DL — SIGNIFICANT CHANGE UP (ref 8.4–10.5)
CHLORIDE SERPL-SCNC: 102 MMOL/L — SIGNIFICANT CHANGE UP (ref 96–108)
CHLORIDE SERPL-SCNC: 102 MMOL/L — SIGNIFICANT CHANGE UP (ref 96–108)
CK SERPL-CCNC: 102 U/L — SIGNIFICANT CHANGE UP (ref 30–200)
CMV DNA CSF QL NAA+PROBE: SIGNIFICANT CHANGE UP
CO2 SERPL-SCNC: 23 MMOL/L — SIGNIFICANT CHANGE UP (ref 22–31)
CO2 SERPL-SCNC: 23 MMOL/L — SIGNIFICANT CHANGE UP (ref 22–31)
CREAT SERPL-MCNC: 0.97 MG/DL — SIGNIFICANT CHANGE UP (ref 0.5–1.3)
CREAT SERPL-MCNC: 1.01 MG/DL — SIGNIFICANT CHANGE UP (ref 0.5–1.3)
CRP SERPL-MCNC: 4 MG/L — SIGNIFICANT CHANGE UP (ref 0–4)
D DIMER BLD IA.RAPID-MCNC: <150 NG/ML DDU — SIGNIFICANT CHANGE UP
EGFR: 87 ML/MIN/1.73M2 — SIGNIFICANT CHANGE UP
EGFR: 91 ML/MIN/1.73M2 — SIGNIFICANT CHANGE UP
EOSINOPHIL # BLD AUTO: 0.09 K/UL — SIGNIFICANT CHANGE UP (ref 0–0.5)
EOSINOPHIL # BLD AUTO: 0.23 K/UL — SIGNIFICANT CHANGE UP (ref 0–0.5)
EOSINOPHIL NFR BLD AUTO: 4.3 % — SIGNIFICANT CHANGE UP (ref 0–6)
EOSINOPHIL NFR BLD AUTO: 6 % — SIGNIFICANT CHANGE UP (ref 0–6)
FERRITIN SERPL-MCNC: 264 NG/ML — SIGNIFICANT CHANGE UP (ref 30–400)
FIBRINOGEN PPP-MCNC: 517 MG/DL — SIGNIFICANT CHANGE UP (ref 330–520)
GIANT PLATELETS BLD QL SMEAR: PRESENT — SIGNIFICANT CHANGE UP
GLUCOSE SERPL-MCNC: 154 MG/DL — HIGH (ref 70–99)
GLUCOSE SERPL-MCNC: 171 MG/DL — HIGH (ref 70–99)
HCT VFR BLD CALC: 36.6 % — LOW (ref 39–50)
HCT VFR BLD CALC: 39.2 % — SIGNIFICANT CHANGE UP (ref 39–50)
HGB BLD-MCNC: 12.1 G/DL — LOW (ref 13–17)
HGB BLD-MCNC: 12.5 G/DL — LOW (ref 13–17)
IMM GRANULOCYTES NFR BLD AUTO: 1.3 % — SIGNIFICANT CHANGE UP (ref 0–1.5)
INR BLD: 1.09 RATIO — SIGNIFICANT CHANGE UP (ref 0.88–1.16)
LDH SERPL L TO P-CCNC: 180 U/L — SIGNIFICANT CHANGE UP (ref 50–242)
LDH SERPL L TO P-CCNC: 350 U/L — HIGH (ref 50–242)
LYMPHOCYTES # BLD AUTO: 0.02 K/UL — LOW (ref 1–3.3)
LYMPHOCYTES # BLD AUTO: 0.13 K/UL — LOW (ref 1–3.3)
LYMPHOCYTES # BLD AUTO: 0.9 % — LOW (ref 13–44)
LYMPHOCYTES # BLD AUTO: 3.4 % — LOW (ref 13–44)
MAGNESIUM SERPL-MCNC: 1.9 MG/DL — SIGNIFICANT CHANGE UP (ref 1.6–2.6)
MAGNESIUM SERPL-MCNC: 2.1 MG/DL — SIGNIFICANT CHANGE UP (ref 1.6–2.6)
MANUAL SMEAR VERIFICATION: SIGNIFICANT CHANGE UP
MCHC RBC-ENTMCNC: 30.3 PG — SIGNIFICANT CHANGE UP (ref 27–34)
MCHC RBC-ENTMCNC: 31.3 PG — SIGNIFICANT CHANGE UP (ref 27–34)
MCHC RBC-ENTMCNC: 31.9 GM/DL — LOW (ref 32–36)
MCHC RBC-ENTMCNC: 33.1 GM/DL — SIGNIFICANT CHANGE UP (ref 32–36)
MCV RBC AUTO: 94.6 FL — SIGNIFICANT CHANGE UP (ref 80–100)
MCV RBC AUTO: 94.9 FL — SIGNIFICANT CHANGE UP (ref 80–100)
MONOCYTES # BLD AUTO: 0.02 K/UL — SIGNIFICANT CHANGE UP (ref 0–0.9)
MONOCYTES # BLD AUTO: 0.21 K/UL — SIGNIFICANT CHANGE UP (ref 0–0.9)
MONOCYTES NFR BLD AUTO: 0.9 % — LOW (ref 2–14)
MONOCYTES NFR BLD AUTO: 5.5 % — SIGNIFICANT CHANGE UP (ref 2–14)
NEUTROPHILS # BLD AUTO: 2.01 K/UL — SIGNIFICANT CHANGE UP (ref 1.8–7.4)
NEUTROPHILS # BLD AUTO: 3.21 K/UL — SIGNIFICANT CHANGE UP (ref 1.8–7.4)
NEUTROPHILS NFR BLD AUTO: 83.5 % — HIGH (ref 43–77)
NEUTROPHILS NFR BLD AUTO: 93.9 % — HIGH (ref 43–77)
NRBC # BLD: 0 /100 WBCS — SIGNIFICANT CHANGE UP (ref 0–0)
NT-PROBNP SERPL-SCNC: 77 PG/ML — SIGNIFICANT CHANGE UP (ref 0–300)
PHOSPHATE SERPL-MCNC: 2.1 MG/DL — LOW (ref 2.5–4.5)
PHOSPHATE SERPL-MCNC: 3 MG/DL — SIGNIFICANT CHANGE UP (ref 2.5–4.5)
PLAT MORPH BLD: ABNORMAL
PLATELET # BLD AUTO: 219 K/UL — SIGNIFICANT CHANGE UP (ref 150–400)
PLATELET # BLD AUTO: 219 K/UL — SIGNIFICANT CHANGE UP (ref 150–400)
POTASSIUM SERPL-MCNC: 4.6 MMOL/L — SIGNIFICANT CHANGE UP (ref 3.5–5.3)
POTASSIUM SERPL-MCNC: 5.1 MMOL/L — SIGNIFICANT CHANGE UP (ref 3.5–5.3)
POTASSIUM SERPL-SCNC: 4.6 MMOL/L — SIGNIFICANT CHANGE UP (ref 3.5–5.3)
POTASSIUM SERPL-SCNC: 5.1 MMOL/L — SIGNIFICANT CHANGE UP (ref 3.5–5.3)
PROT SERPL-MCNC: 6.8 G/DL — SIGNIFICANT CHANGE UP (ref 6–8.3)
PROT SERPL-MCNC: 6.9 G/DL — SIGNIFICANT CHANGE UP (ref 6–8.3)
PROTHROM AB SERPL-ACNC: 12.6 SEC — SIGNIFICANT CHANGE UP (ref 10.5–13.4)
RBC # BLD: 3.87 M/UL — LOW (ref 4.2–5.8)
RBC # BLD: 4.13 M/UL — LOW (ref 4.2–5.8)
RBC # FLD: 12.7 % — SIGNIFICANT CHANGE UP (ref 10.3–14.5)
RBC # FLD: 12.7 % — SIGNIFICANT CHANGE UP (ref 10.3–14.5)
RBC BLD AUTO: SIGNIFICANT CHANGE UP
SODIUM SERPL-SCNC: 138 MMOL/L — SIGNIFICANT CHANGE UP (ref 135–145)
SODIUM SERPL-SCNC: 138 MMOL/L — SIGNIFICANT CHANGE UP (ref 135–145)
URATE SERPL-MCNC: 4.2 MG/DL — SIGNIFICANT CHANGE UP (ref 3.4–8.8)
URATE SERPL-MCNC: 4.3 MG/DL — SIGNIFICANT CHANGE UP (ref 3.4–8.8)
WBC # BLD: 2.14 K/UL — LOW (ref 3.8–10.5)
WBC # BLD: 3.84 K/UL — SIGNIFICANT CHANGE UP (ref 3.8–10.5)
WBC # FLD AUTO: 2.14 K/UL — LOW (ref 3.8–10.5)
WBC # FLD AUTO: 3.84 K/UL — SIGNIFICANT CHANGE UP (ref 3.8–10.5)

## 2022-04-23 PROCEDURE — 99291 CRITICAL CARE FIRST HOUR: CPT

## 2022-04-23 RX ORDER — POTASSIUM PHOSPHATE, MONOBASIC POTASSIUM PHOSPHATE, DIBASIC 236; 224 MG/ML; MG/ML
15 INJECTION, SOLUTION INTRAVENOUS ONCE
Refills: 0 | Status: COMPLETED | OUTPATIENT
Start: 2022-04-23 | End: 2022-04-23

## 2022-04-23 RX ADMIN — Medication 1 LOZENGE: at 23:09

## 2022-04-23 RX ADMIN — Medication 5 MILLILITER(S): at 20:25

## 2022-04-23 RX ADMIN — Medication 1 MILLIGRAM(S): at 12:17

## 2022-04-23 RX ADMIN — Medication 1 LOZENGE: at 15:43

## 2022-04-23 RX ADMIN — Medication 5 MILLILITER(S): at 08:42

## 2022-04-23 RX ADMIN — Medication 1 LOZENGE: at 12:13

## 2022-04-23 RX ADMIN — CEFEPIME 100 MILLIGRAM(S): 1 INJECTION, POWDER, FOR SOLUTION INTRAMUSCULAR; INTRAVENOUS at 14:18

## 2022-04-23 RX ADMIN — Medication 400 MILLIGRAM(S): at 21:09

## 2022-04-23 RX ADMIN — Medication 10 MILLILITER(S): at 12:12

## 2022-04-23 RX ADMIN — CHLORHEXIDINE GLUCONATE 1 APPLICATION(S): 213 SOLUTION TOPICAL at 08:45

## 2022-04-23 RX ADMIN — FLUCONAZOLE 400 MILLIGRAM(S): 150 TABLET ORAL at 12:17

## 2022-04-23 RX ADMIN — Medication 10 MILLILITER(S): at 15:43

## 2022-04-23 RX ADMIN — Medication 10 MILLILITER(S): at 08:41

## 2022-04-23 RX ADMIN — CEFEPIME 100 MILLIGRAM(S): 1 INJECTION, POWDER, FOR SOLUTION INTRAMUSCULAR; INTRAVENOUS at 05:08

## 2022-04-23 RX ADMIN — Medication 400 MILLIGRAM(S): at 05:08

## 2022-04-23 RX ADMIN — Medication 1 LOZENGE: at 20:24

## 2022-04-23 RX ADMIN — Medication 5 MILLILITER(S): at 12:13

## 2022-04-23 RX ADMIN — Medication 5 MILLILITER(S): at 15:43

## 2022-04-23 RX ADMIN — Medication 1 LOZENGE: at 08:41

## 2022-04-23 RX ADMIN — LEVETIRACETAM 500 MILLIGRAM(S): 250 TABLET, FILM COATED ORAL at 17:33

## 2022-04-23 RX ADMIN — Medication 10 MILLILITER(S): at 23:08

## 2022-04-23 RX ADMIN — Medication 400 MILLIGRAM(S): at 14:17

## 2022-04-23 RX ADMIN — Medication 10 MILLILITER(S): at 00:46

## 2022-04-23 RX ADMIN — Medication 10 MILLILITER(S): at 20:25

## 2022-04-23 RX ADMIN — PANTOPRAZOLE SODIUM 40 MILLIGRAM(S): 20 TABLET, DELAYED RELEASE ORAL at 05:09

## 2022-04-23 RX ADMIN — CARVEDILOL PHOSPHATE 3.12 MILLIGRAM(S): 80 CAPSULE, EXTENDED RELEASE ORAL at 05:08

## 2022-04-23 RX ADMIN — CARVEDILOL PHOSPHATE 3.12 MILLIGRAM(S): 80 CAPSULE, EXTENDED RELEASE ORAL at 17:32

## 2022-04-23 RX ADMIN — CEFEPIME 100 MILLIGRAM(S): 1 INJECTION, POWDER, FOR SOLUTION INTRAMUSCULAR; INTRAVENOUS at 21:10

## 2022-04-23 RX ADMIN — POTASSIUM PHOSPHATE, MONOBASIC POTASSIUM PHOSPHATE, DIBASIC 62.5 MILLIMOLE(S): 236; 224 INJECTION, SOLUTION INTRAVENOUS at 17:31

## 2022-04-23 RX ADMIN — Medication 1 TABLET(S): at 12:18

## 2022-04-23 RX ADMIN — Medication 5 MILLILITER(S): at 23:08

## 2022-04-23 RX ADMIN — LEVETIRACETAM 500 MILLIGRAM(S): 250 TABLET, FILM COATED ORAL at 05:08

## 2022-04-23 RX ADMIN — Medication 1 LOZENGE: at 00:46

## 2022-04-23 RX ADMIN — Medication 5 MILLILITER(S): at 00:46

## 2022-04-23 RX ADMIN — SODIUM CHLORIDE 50 MILLILITER(S): 9 INJECTION INTRAMUSCULAR; INTRAVENOUS; SUBCUTANEOUS at 21:09

## 2022-04-23 RX ADMIN — SODIUM CHLORIDE 50 MILLILITER(S): 9 INJECTION INTRAMUSCULAR; INTRAVENOUS; SUBCUTANEOUS at 20:25

## 2022-04-23 RX ADMIN — Medication 300 MILLIGRAM(S): at 12:14

## 2022-04-23 NOTE — PROGRESS NOTE ADULT - SUBJECTIVE AND OBJECTIVE BOX
HPC Transplant Team                                                      Critical / Counseling Time Provided: 30 minutes                                                                                                                                                        Chief Complaint: CAR T therapy with Tecartus product for treatment of relapsed, refractory MCL    S: Patient seen and examined with HPC Transplant Team:   +Febrile, chills overnight      O:     Vital Signs Last 24 Hrs  T(C): 36.1 (23 Apr 2022 05:15), Max: 38.3 (22 Apr 2022 22:02)  T(F): 97 (23 Apr 2022 05:15), Max: 100.9 (22 Apr 2022 22:02)  HR: 71 (23 Apr 2022 05:15) (71 - 95)  BP: 115/72 (23 Apr 2022 05:15) (115/72 - 138/81)  BP(mean): --  RR: 18 (23 Apr 2022 05:15) (18 - 19)  SpO2: 97% (23 Apr 2022 05:15) (97% - 100%)    Admit weight: 100.4kg    Daily Weight in kG:     Intake / Output:   04-22 @ 07:01  -  04-23 @ 07:00  --------------------------------------------------------  IN: 2955 mL / OUT: 3950 mL / NET: -995 mL    PE:   Oropharynx: no erythema or ulcerations ,   Oral Mucositis:  NA                                                   Grade: n/a  CVS: S1, S2 RRR   Lungs: CTA throughout bilaterally   Abdomen: + BS x 4, soft, NT, ND   Extremities: no edema   Gastric Mucositis:       -                                           Grade: n/a  Intestinal Mucositis:     -                                         Grade: n/a   Skin: no rash   TLC: CDI   Neuro: A&O x 3   Pain: 0          Labs:   CBC Full  -  ( 23 Apr 2022 06:22 )  WBC Count : 2.14 K/uL  Hemoglobin : 12.5 g/dL  Hematocrit : 39.2 %  Platelet Count - Automated : 219 K/uL  Mean Cell Volume : 94.9 fl  Mean Cell Hemoglobin : 30.3 pg  Mean Cell Hemoglobin Concentration : 31.9 gm/dL  Auto Neutrophil # : 2.01 K/uL  Auto Lymphocyte # : 0.02 K/uL  Auto Monocyte # : 0.02 K/uL  Auto Eosinophil # : 0.09 K/uL  Auto Basophil # : 0.00 K/uL  Auto Neutrophil % : 93.9 %  Auto Lymphocyte % : 0.9 %  Auto Monocyte % : 0.9 %  Auto Eosinophil % : 4.3 %  Auto Basophil % : 0.0 %                          12.5   2.14  )-----------( 219      ( 23 Apr 2022 06:22 )             39.2     04-23    138  |  102  |  22  ----------------------------<  171<H>  5.1   |  23  |  0.97    Ca    8.9      23 Apr 2022 06:21  Phos  3.0     04-23  Mg     2.1     04-23    TPro  6.9  /  Alb  4.4  /  TBili  0.2  /  DBili  <0.1  /  AST  35  /  ALT  25  /  AlkPhos  96  04-23    PT/INR - ( 23 Apr 2022 06:22 )   PT: 12.6 sec;   INR: 1.09 ratio         PTT - ( 23 Apr 2022 06:22 )  PTT:25.9 sec  LIVER FUNCTIONS - ( 23 Apr 2022 06:21 )  Alb: 4.4 g/dL / Pro: 6.9 g/dL / ALK PHOS: 96 U/L / ALT: 25 U/L / AST: 35 U/L / GGT: x           Lactate Dehydrogenase, Serum: 350 U/L (04-23 @ 06:21)  Lactate Dehydrogenase, Serum: 165 U/L (04-22 @ 15:03)        Cultures:     4/22 - p    Radiology:         Meds:   Antimicrobials:   acyclovir   Oral Tab/Cap 400 milliGRAM(s) Oral every 8 hours  cefepime   IVPB      cefepime   IVPB 2000 milliGRAM(s) IV Intermittent every 8 hours  clotrimazole Lozenge 1 Lozenge Oral five times a day  fluconAZOLE   Tablet 400 milliGRAM(s) Oral daily      Heme / Onc:       GI:  pantoprazole    Tablet 40 milliGRAM(s) Oral before breakfast  polyethylene glycol 3350 17 Gram(s) Oral daily PRN  senna 1 Tablet(s) Oral at bedtime PRN  sodium bicarbonate Mouth Rinse 10 milliLiter(s) Swish and Spit five times a day      Cardiovascular:   carvedilol 3.125 milliGRAM(s) Oral every 12 hours      Immunologic:       Other medications:   acetaminophen     Tablet .. 650 milliGRAM(s) Oral every 6 hours  allopurinol 300 milliGRAM(s) Oral daily  Biotene Dry Mouth Oral Rinse 5 milliLiter(s) Swish and Spit five times a day  chlorhexidine 4% Liquid 1 Application(s) Topical <User Schedule>  folic acid 1 milliGRAM(s) Oral daily  levETIRAcetam 500 milliGRAM(s) Oral two times a day  multivitamin 1 Tablet(s) Oral daily  sodium chloride 0.9%. 1000 milliLiter(s) IV Continuous <Continuous>      PRN:   acetaminophen     Tablet .. 650 milliGRAM(s) Oral every 6 hours PRN  metoclopramide Injectable 10 milliGRAM(s) IV Push every 6 hours PRN  ondansetron Injectable 8 milliGRAM(s) IV Push every 8 hours PRN  polyethylene glycol 3350 17 Gram(s) Oral daily PRN  senna 1 Tablet(s) Oral at bedtime PRN  sodium chloride 0.9% lock flush 10 milliLiter(s) IV Push every 1 hour PRN    A/P:    56 year old male with relapsed, refractory MCL s/p R-CHOP x 4, RICE x 2, maintenance RTX, salvage Calquence, admitted for CAR T cell therapy with Tecartus product. Disease status at time of admission is partial response.   Day +4  Monitor for CRS and neurotox...follow CRS/neuro tox protocol - GIVEN TOCILIZUMAB 4/22 for CRS - 1  1. Relapsed, refractory MCL   4/15- day - 4- flu / ctx 2/3 - continue CTX hydration until 24 hours post infusion of last dose   Strict I&O, daily weights, prn diuresis   CRS / ICANS score daily until infusion of CAR T cells (4/19/22) and twice daily thereafter   BID labs 0500 & 1500 - monitor for TLS     2. Prophylactic measures   clotrimazole Lozenge 1 Lozenge Oral five times a day  fluconAZOLE   Tablet 400 milliGRAM(s) Oral daily  trimethoprim  160 mG/sulfamethoxazole 800 mG 1 Tablet(s) Oral every 12 hours  Acyclovir 400mg po TID to start on day -1   If / when neutropenic - will add cipro 500mg po BID   GI Prophylaxis:  Protonix    3. Mouthcare - NS / NaHCO3 rinses, Mycelex, Biotene; Skin care     4. Transfuse & replete electrolytes prn     7. IV hydration, daily weights, strict I&O, prn diuresis     8. Antiemetics, anti-diarrhea medications:   LORazepam   Injectable 1 milliGRAM(s) IV Push every 6 hours PRN  metoclopramide Injectable 10 milliGRAM(s) IV Push every 6 hours PRN  ondansetron 8mg IV q 8 hours prn nausea     9. CRS / ICANS scoring   4/18 CRS grade 0, ICANS score 10   4/19- CRS grade 0, ICANS score 10   4/20 AM - CRS grade 0, ICANS score 10   4/20-pm-CRS grade 0, ICANS score 10  4/21 AM - CRS grade 0, ICANS score 10   4/21 PM CRS grade 0, ICANS score 10  4/22 AM CRS grade 0, ICANS score 10  4/22 PM CRS grade 0, ICANS score 10 --> 9PM CRS score - 1, ICANS - 10    I have written the above note for Dr. Banks who performed service with me in the room.   Miki Espinoza PA-C  ([phone])    I have seen and examined patient with PA, I agree with above note as scribed.                        HPC Transplant Team                                                      Critical / Counseling Time Provided: 30 minutes                                                                                                                                                        Chief Complaint: CAR T therapy with Tecart[employer] product for treatment of relapsed, refractory MCL    S: Patient seen and examined with HPC Transplant Team: +Febrile 38.3, some chills. VSS  +Febrile, chills overnight, CRS - 1. Given Tocilizumab, Dexamethasone, Cefepime and Keppra started. Now Telemetry (no events)    O: Denies alteration/fogging of mentation, tremors, HA or dizziness, n/v, cough, abdominal pain    Vital Signs Last 24 Hrs  T(C): 36.1 (2022 05:15), Max: 38.3 (2022 22:02)  T(F): 97 (2022 05:15), Max: 100.9 (2022 22:02)  HR: 71 (2022 05:15) (71 - 95)  BP: 115/72 (2022 05:15) (115/72 - 138/81)  BP(mean): --  RR: 18 (2022 05:15) (18 - 19)  SpO2: 97% (2022 05:15) (97% - 100%)    Admit weight: 100.4kg  Daily Weight in k.4kg    Intake / Output:    @ 07:01  -   @ 07:00  --------------------------------------------------------  IN: 2955 mL / OUT: 3950 mL / NET: -995 mL    PE:   Oropharynx: no erythema or ulcerations ,   Oral Mucositis:  NA                                                   Grade: n/a  CVS: S1, S2 RRR   Lungs: CTA throughout bilaterally   Abdomen: + BS x 4, soft, NT, ND   Extremities: no edema   Gastric Mucositis:       -                                           Grade: n/a  Intestinal Mucositis:     -                                         Grade: n/a   Skin: no rash   TLC: CDI   Neuro: A&O x 3   Pain: 0      Labs:     ()BNP - 77, Ferritin - 264 , CRP - 4,  D- dimer - <150, LDH - 350,     CBC Full  -  ( 2022 06:22 )  WBC Count : 2.14 K/uL  Hemoglobin : 12.5 g/dL  Hematocrit : 39.2 %  Platelet Count - Automated : 219 K/uL  Mean Cell Volume : 94.9 fl  Mean Cell Hemoglobin : 30.3 pg  Mean Cell Hemoglobin Concentration : 31.9 gm/dL  Auto Neutrophil # : 2.01 K/uL  Auto Lymphocyte # : 0.02 K/uL  Auto Monocyte # : 0.02 K/uL  Auto Eosinophil # : 0.09 K/uL  Auto Basophil # : 0.00 K/uL  Auto Neutrophil % : 93.9 %  Auto Lymphocyte % : 0.9 %  Auto Monocyte % : 0.9 %  Auto Eosinophil % : 4.3 %  Auto Basophil % : 0.0 %                          12.5   2.14  )-----------( 219      ( 2022 06:22 )             39.2         138  |  102  |  22  ----------------------------<  171<H>  5.1   |  23  |  0.97    Ca    8.9      2022 06:21  Phos  3.0       Mg     2.1         TPro  6.9  /  Alb  4.4  /  TBili  0.2  /  DBili  <0.1  /  AST  35  /  ALT  25  /  AlkPhos  96      PT/INR - ( 2022 06:22 )   PT: 12.6 sec;   INR: 1.09 ratio         PTT - ( 2022 06:22 )  PTT:25.9 sec  LIVER FUNCTIONS - ( 2022 06:21 )  Alb: 4.4 g/dL / Pro: 6.9 g/dL / ALK PHOS: 96 U/L / ALT: 25 U/L / AST: 35 U/L / GGT: x           Lactate Dehydrogenase, Serum: 350 U/L ( @ 06:21)  Lactate Dehydrogenase, Serum: 165 U/L ( @ 15:03)        Cultures:      - p    Radiology:     CXR - clear ()    Meds:   Antimicrobials:   acyclovir   Oral Tab/Cap 400 milliGRAM(s) Oral every 8 hours  cefepime   IVPB      cefepime   IVPB 2000 milliGRAM(s) IV Intermittent every 8 hours  clotrimazole Lozenge 1 Lozenge Oral five times a day  fluconAZOLE   Tablet 400 milliGRAM(s) Oral daily      Heme / Onc:       GI:  pantoprazole    Tablet 40 milliGRAM(s) Oral before breakfast  polyethylene glycol 3350 17 Gram(s) Oral daily PRN  senna 1 Tablet(s) Oral at bedtime PRN  sodium bicarbonate Mouth Rinse 10 milliLiter(s) Swish and Spit five times a day      Cardiovascular:   carvedilol 3.125 milliGRAM(s) Oral every 12 hours      Immunologic:       Other medications:   acetaminophen     Tablet .. 650 milliGRAM(s) Oral every 6 hours  allopurinol 300 milliGRAM(s) Oral daily  Biotene Dry Mouth Oral Rinse 5 milliLiter(s) Swish and Spit five times a day  chlorhexidine 4% Liquid 1 Application(s) Topical <User Schedule>  folic acid 1 milliGRAM(s) Oral daily  levETIRAcetam 500 milliGRAM(s) Oral two times a day  multivitamin 1 Tablet(s) Oral daily  sodium chloride 0.9%. 1000 milliLiter(s) IV Continuous <Continuous>      PRN:   acetaminophen     Tablet .. 650 milliGRAM(s) Oral every 6 hours PRN  metoclopramide Injectable 10 milliGRAM(s) IV Push every 6 hours PRN  ondansetron Injectable 8 milliGRAM(s) IV Push every 8 hours PRN  polyethylene glycol 3350 17 Gram(s) Oral daily PRN  senna 1 Tablet(s) Oral at bedtime PRN  sodium chloride 0.9% lock flush 10 milliLiter(s) IV Push every 1 hour PRN    A/P:    56 year old male with relapsed, refractory MCL s/p R-CHOP x 4, RICE x 2, maintenance RTX, salvage [place]quence, admitted for CAR T cell therapy with T[employer] product. Disease status at time of admission is partial response.   Day +4  Monitor for CRS and neurotox...follow CRS/neuro tox protocol - GIVEN TOCILIZUMAB  pm for CRS - 1    1. Relapsed, refractory MCL   4/15- day - 4- flu / ctx 2/3 - continue CTX hydration until 24 hours post infusion of last dose   Strict I&O, daily weights, prn diuresis   CRS / ICANS score daily until infusion of CAR T cells (22) and twice daily thereafter   BID labs 0500 & 1500 - monitor for TLS     2. Prophylactic measures   acyclovir   Oral Tab/Cap 400 milliGRAM(s) Oral every 8 hours  cefepime   IVPB      cefepime   IVPB 2000 milliGRAM(s) IV Intermittent every 8 hours  clotrimazole Lozenge 1 Lozenge Oral five times a day  fluconAZOLE   Tablet 400 milliGRAM(s) Oral daily  Keppra 500 q12h    GI Prophylaxis:  Protonix    3. Mouthcare - NS / NaHCO3 rinses, Mycelex, Biotene; Skin care     4. Transfuse & replete electrolytes prn     7. IV hydration, daily weights, strict I&O, prn diuresis     8. Antiemetics, anti-diarrhea medications:   LORazepam   Injectable 1 milliGRAM(s) IV Push every 6 hours PRN  metoclopramide Injectable 10 milliGRAM(s) IV Push every 6 hours PRN  ondansetron 8mg IV q 8 hours prn nausea     9. CRS / ICANS scoring   /18 CRS grade 0, ICANS score 10   - CRS grade 0, ICANS score 10   4/20 AM - CRS grade 0, ICANS score 10   /20-pm-CRS grade 0, ICANS score 10  4/21 AM - CRS grade 0, ICANS score 10   4/21 PM CRS grade 0, ICANS score 10  4/22 AM CRS grade 0, ICANS score 10  4/22 PM CRS grade 0, ICANS score 10 --> 9PM CRS score - 1, ICANS - 10  4/23 AM CRS grade 0, ICANS score 10  4/23 PM CRS grade 0, ICANS score 10    I have written the above note for Dr. Banks who performed service with me in the room.   Miki Espinoza PA-C  ([phone])    I have seen and examined patient with PA, I agree with above note as scribed.

## 2022-04-24 LAB
ALBUMIN SERPL ELPH-MCNC: 4.3 G/DL — SIGNIFICANT CHANGE UP (ref 3.3–5)
ALBUMIN SERPL ELPH-MCNC: 4.5 G/DL — SIGNIFICANT CHANGE UP (ref 3.3–5)
ALP SERPL-CCNC: 93 U/L — SIGNIFICANT CHANGE UP (ref 40–120)
ALP SERPL-CCNC: 95 U/L — SIGNIFICANT CHANGE UP (ref 40–120)
ALT FLD-CCNC: 26 U/L — SIGNIFICANT CHANGE UP (ref 10–45)
ALT FLD-CCNC: 33 U/L — SIGNIFICANT CHANGE UP (ref 10–45)
ANION GAP SERPL CALC-SCNC: 13 MMOL/L — SIGNIFICANT CHANGE UP (ref 5–17)
ANION GAP SERPL CALC-SCNC: 14 MMOL/L — SIGNIFICANT CHANGE UP (ref 5–17)
APTT BLD: 22.3 SEC — LOW (ref 27.5–35.5)
AST SERPL-CCNC: 20 U/L — SIGNIFICANT CHANGE UP (ref 10–40)
AST SERPL-CCNC: 24 U/L — SIGNIFICANT CHANGE UP (ref 10–40)
BASOPHILS # BLD AUTO: 0 K/UL — SIGNIFICANT CHANGE UP (ref 0–0.2)
BASOPHILS NFR BLD AUTO: 0 % — SIGNIFICANT CHANGE UP (ref 0–2)
BILIRUB SERPL-MCNC: 0.1 MG/DL — LOW (ref 0.2–1.2)
BILIRUB SERPL-MCNC: 0.2 MG/DL — SIGNIFICANT CHANGE UP (ref 0.2–1.2)
BUN SERPL-MCNC: 20 MG/DL — SIGNIFICANT CHANGE UP (ref 7–23)
BUN SERPL-MCNC: 23 MG/DL — SIGNIFICANT CHANGE UP (ref 7–23)
CALCIUM SERPL-MCNC: 8.6 MG/DL — SIGNIFICANT CHANGE UP (ref 8.4–10.5)
CALCIUM SERPL-MCNC: 8.8 MG/DL — SIGNIFICANT CHANGE UP (ref 8.4–10.5)
CHLORIDE SERPL-SCNC: 101 MMOL/L — SIGNIFICANT CHANGE UP (ref 96–108)
CHLORIDE SERPL-SCNC: 98 MMOL/L — SIGNIFICANT CHANGE UP (ref 96–108)
CK SERPL-CCNC: 38 U/L — SIGNIFICANT CHANGE UP (ref 30–200)
CK SERPL-CCNC: 42 U/L — SIGNIFICANT CHANGE UP (ref 30–200)
CO2 SERPL-SCNC: 23 MMOL/L — SIGNIFICANT CHANGE UP (ref 22–31)
CO2 SERPL-SCNC: 24 MMOL/L — SIGNIFICANT CHANGE UP (ref 22–31)
CREAT SERPL-MCNC: 1.05 MG/DL — SIGNIFICANT CHANGE UP (ref 0.5–1.3)
CREAT SERPL-MCNC: 1.3 MG/DL — SIGNIFICANT CHANGE UP (ref 0.5–1.3)
CRP SERPL-MCNC: <3 MG/L — SIGNIFICANT CHANGE UP (ref 0–4)
D DIMER BLD IA.RAPID-MCNC: 290 NG/ML DDU — HIGH
EGFR: 64 ML/MIN/1.73M2 — SIGNIFICANT CHANGE UP
EGFR: 83 ML/MIN/1.73M2 — SIGNIFICANT CHANGE UP
EOSINOPHIL # BLD AUTO: 0.37 K/UL — SIGNIFICANT CHANGE UP (ref 0–0.5)
EOSINOPHIL NFR BLD AUTO: 13.6 % — HIGH (ref 0–6)
FERRITIN SERPL-MCNC: 218 NG/ML — SIGNIFICANT CHANGE UP (ref 30–400)
GLUCOSE SERPL-MCNC: 129 MG/DL — HIGH (ref 70–99)
GLUCOSE SERPL-MCNC: 244 MG/DL — HIGH (ref 70–99)
HCT VFR BLD CALC: 36.1 % — LOW (ref 39–50)
HCT VFR BLD CALC: 39.4 % — SIGNIFICANT CHANGE UP (ref 39–50)
HGB BLD-MCNC: 11.9 G/DL — LOW (ref 13–17)
HGB BLD-MCNC: 12.9 G/DL — LOW (ref 13–17)
IMM GRANULOCYTES NFR BLD AUTO: 1.5 % — SIGNIFICANT CHANGE UP (ref 0–1.5)
INR BLD: 1.14 RATIO — SIGNIFICANT CHANGE UP (ref 0.88–1.16)
LDH SERPL L TO P-CCNC: 166 U/L — SIGNIFICANT CHANGE UP (ref 50–242)
LDH SERPL L TO P-CCNC: 180 U/L — SIGNIFICANT CHANGE UP (ref 50–242)
LYMPHOCYTES # BLD AUTO: 0.26 K/UL — LOW (ref 1–3.3)
LYMPHOCYTES # BLD AUTO: 9.6 % — LOW (ref 13–44)
MAGNESIUM SERPL-MCNC: 1.8 MG/DL — SIGNIFICANT CHANGE UP (ref 1.6–2.6)
MAGNESIUM SERPL-MCNC: 2 MG/DL — SIGNIFICANT CHANGE UP (ref 1.6–2.6)
MCHC RBC-ENTMCNC: 30.9 PG — SIGNIFICANT CHANGE UP (ref 27–34)
MCHC RBC-ENTMCNC: 31 PG — SIGNIFICANT CHANGE UP (ref 27–34)
MCHC RBC-ENTMCNC: 32.7 GM/DL — SIGNIFICANT CHANGE UP (ref 32–36)
MCHC RBC-ENTMCNC: 33 GM/DL — SIGNIFICANT CHANGE UP (ref 32–36)
MCV RBC AUTO: 93.8 FL — SIGNIFICANT CHANGE UP (ref 80–100)
MCV RBC AUTO: 94.7 FL — SIGNIFICANT CHANGE UP (ref 80–100)
MONOCYTES # BLD AUTO: 0.23 K/UL — SIGNIFICANT CHANGE UP (ref 0–0.9)
MONOCYTES NFR BLD AUTO: 8.5 % — SIGNIFICANT CHANGE UP (ref 2–14)
NEUTROPHILS # BLD AUTO: 1.82 K/UL — SIGNIFICANT CHANGE UP (ref 1.8–7.4)
NEUTROPHILS NFR BLD AUTO: 66.8 % — SIGNIFICANT CHANGE UP (ref 43–77)
NRBC # BLD: 0 /100 WBCS — SIGNIFICANT CHANGE UP (ref 0–0)
NRBC # BLD: 0 /100 WBCS — SIGNIFICANT CHANGE UP (ref 0–0)
NT-PROBNP SERPL-SCNC: 91 PG/ML — SIGNIFICANT CHANGE UP (ref 0–300)
PHOSPHATE SERPL-MCNC: 2.7 MG/DL — SIGNIFICANT CHANGE UP (ref 2.5–4.5)
PHOSPHATE SERPL-MCNC: 3.1 MG/DL — SIGNIFICANT CHANGE UP (ref 2.5–4.5)
PLATELET # BLD AUTO: 199 K/UL — SIGNIFICANT CHANGE UP (ref 150–400)
PLATELET # BLD AUTO: 228 K/UL — SIGNIFICANT CHANGE UP (ref 150–400)
POTASSIUM SERPL-MCNC: 4 MMOL/L — SIGNIFICANT CHANGE UP (ref 3.5–5.3)
POTASSIUM SERPL-MCNC: 4.3 MMOL/L — SIGNIFICANT CHANGE UP (ref 3.5–5.3)
POTASSIUM SERPL-SCNC: 4 MMOL/L — SIGNIFICANT CHANGE UP (ref 3.5–5.3)
POTASSIUM SERPL-SCNC: 4.3 MMOL/L — SIGNIFICANT CHANGE UP (ref 3.5–5.3)
PROT SERPL-MCNC: 6.4 G/DL — SIGNIFICANT CHANGE UP (ref 6–8.3)
PROT SERPL-MCNC: 6.8 G/DL — SIGNIFICANT CHANGE UP (ref 6–8.3)
PROTHROM AB SERPL-ACNC: 13.2 SEC — SIGNIFICANT CHANGE UP (ref 10.5–13.4)
RBC # BLD: 3.85 M/UL — LOW (ref 4.2–5.8)
RBC # BLD: 4.16 M/UL — LOW (ref 4.2–5.8)
RBC # FLD: 13.2 % — SIGNIFICANT CHANGE UP (ref 10.3–14.5)
RBC # FLD: 13.3 % — SIGNIFICANT CHANGE UP (ref 10.3–14.5)
SODIUM SERPL-SCNC: 135 MMOL/L — SIGNIFICANT CHANGE UP (ref 135–145)
SODIUM SERPL-SCNC: 138 MMOL/L — SIGNIFICANT CHANGE UP (ref 135–145)
TROPONIN T, HIGH SENSITIVITY RESULT: 6 NG/L — SIGNIFICANT CHANGE UP (ref 0–51)
TROPONIN T, HIGH SENSITIVITY RESULT: <6 NG/L — SIGNIFICANT CHANGE UP (ref 0–51)
URATE SERPL-MCNC: 3.9 MG/DL — SIGNIFICANT CHANGE UP (ref 3.4–8.8)
URATE SERPL-MCNC: 4.1 MG/DL — SIGNIFICANT CHANGE UP (ref 3.4–8.8)
WBC # BLD: 2.68 K/UL — LOW (ref 3.8–10.5)
WBC # BLD: 2.72 K/UL — LOW (ref 3.8–10.5)
WBC # FLD AUTO: 2.68 K/UL — LOW (ref 3.8–10.5)
WBC # FLD AUTO: 2.72 K/UL — LOW (ref 3.8–10.5)

## 2022-04-24 PROCEDURE — 71045 X-RAY EXAM CHEST 1 VIEW: CPT | Mod: 26

## 2022-04-24 PROCEDURE — 93010 ELECTROCARDIOGRAM REPORT: CPT

## 2022-04-24 PROCEDURE — 99291 CRITICAL CARE FIRST HOUR: CPT

## 2022-04-24 RX ORDER — ACETAMINOPHEN 500 MG
1000 TABLET ORAL ONCE
Refills: 0 | Status: COMPLETED | OUTPATIENT
Start: 2022-04-24 | End: 2022-04-24

## 2022-04-24 RX ORDER — DEXAMETHASONE 0.5 MG/5ML
4 ELIXIR ORAL ONCE
Refills: 0 | Status: DISCONTINUED | OUTPATIENT
Start: 2022-04-24 | End: 2022-04-24

## 2022-04-24 RX ORDER — DEXAMETHASONE 0.5 MG/5ML
4 ELIXIR ORAL ONCE
Refills: 0 | Status: COMPLETED | OUTPATIENT
Start: 2022-04-24 | End: 2022-04-24

## 2022-04-24 RX ORDER — MORPHINE SULFATE 50 MG/1
2 CAPSULE, EXTENDED RELEASE ORAL ONCE
Refills: 0 | Status: DISCONTINUED | OUTPATIENT
Start: 2022-04-24 | End: 2022-04-24

## 2022-04-24 RX ORDER — MORPHINE SULFATE 50 MG/1
4 CAPSULE, EXTENDED RELEASE ORAL ONCE
Refills: 0 | Status: DISCONTINUED | OUTPATIENT
Start: 2022-04-24 | End: 2022-04-24

## 2022-04-24 RX ORDER — PANTOPRAZOLE SODIUM 20 MG/1
40 TABLET, DELAYED RELEASE ORAL
Refills: 0 | Status: DISCONTINUED | OUTPATIENT
Start: 2022-04-24 | End: 2022-05-06

## 2022-04-24 RX ORDER — MAGNESIUM SULFATE 500 MG/ML
2 VIAL (ML) INJECTION ONCE
Refills: 0 | Status: COMPLETED | OUTPATIENT
Start: 2022-04-24 | End: 2022-04-24

## 2022-04-24 RX ORDER — TOCILIZUMAB 20 MG/ML
800 INJECTION, SOLUTION, CONCENTRATE INTRAVENOUS ONCE
Refills: 0 | Status: COMPLETED | OUTPATIENT
Start: 2022-04-24 | End: 2022-04-24

## 2022-04-24 RX ADMIN — Medication 10 MILLILITER(S): at 20:50

## 2022-04-24 RX ADMIN — TOCILIZUMAB 100 MILLIGRAM(S): 20 INJECTION, SOLUTION, CONCENTRATE INTRAVENOUS at 19:28

## 2022-04-24 RX ADMIN — Medication 400 MILLIGRAM(S): at 13:37

## 2022-04-24 RX ADMIN — PANTOPRAZOLE SODIUM 40 MILLIGRAM(S): 20 TABLET, DELAYED RELEASE ORAL at 04:53

## 2022-04-24 RX ADMIN — Medication 650 MILLIGRAM(S): at 11:33

## 2022-04-24 RX ADMIN — Medication 400 MILLIGRAM(S): at 22:40

## 2022-04-24 RX ADMIN — Medication 1000 MILLIGRAM(S): at 14:15

## 2022-04-24 RX ADMIN — CHLORHEXIDINE GLUCONATE 1 APPLICATION(S): 213 SOLUTION TOPICAL at 12:37

## 2022-04-24 RX ADMIN — Medication 650 MILLIGRAM(S): at 18:41

## 2022-04-24 RX ADMIN — CARVEDILOL PHOSPHATE 3.12 MILLIGRAM(S): 80 CAPSULE, EXTENDED RELEASE ORAL at 04:53

## 2022-04-24 RX ADMIN — Medication 1 TABLET(S): at 13:33

## 2022-04-24 RX ADMIN — Medication 300 MILLIGRAM(S): at 13:34

## 2022-04-24 RX ADMIN — Medication 5 MILLILITER(S): at 08:30

## 2022-04-24 RX ADMIN — Medication 4 MILLIGRAM(S): at 10:15

## 2022-04-24 RX ADMIN — Medication 1 LOZENGE: at 17:15

## 2022-04-24 RX ADMIN — Medication 400 MILLIGRAM(S): at 21:12

## 2022-04-24 RX ADMIN — MORPHINE SULFATE 4 MILLIGRAM(S): 50 CAPSULE, EXTENDED RELEASE ORAL at 11:33

## 2022-04-24 RX ADMIN — TOCILIZUMAB 100 MILLIGRAM(S): 20 INJECTION, SOLUTION, CONCENTRATE INTRAVENOUS at 10:48

## 2022-04-24 RX ADMIN — LEVETIRACETAM 500 MILLIGRAM(S): 250 TABLET, FILM COATED ORAL at 17:17

## 2022-04-24 RX ADMIN — Medication 1 MILLIGRAM(S): at 13:32

## 2022-04-24 RX ADMIN — Medication 5 MILLILITER(S): at 12:38

## 2022-04-24 RX ADMIN — Medication 5 MILLILITER(S): at 20:49

## 2022-04-24 RX ADMIN — CARVEDILOL PHOSPHATE 3.12 MILLIGRAM(S): 80 CAPSULE, EXTENDED RELEASE ORAL at 17:16

## 2022-04-24 RX ADMIN — CEFEPIME 100 MILLIGRAM(S): 1 INJECTION, POWDER, FOR SOLUTION INTRAMUSCULAR; INTRAVENOUS at 04:53

## 2022-04-24 RX ADMIN — Medication 10 MILLILITER(S): at 12:38

## 2022-04-24 RX ADMIN — Medication 1 LOZENGE: at 12:38

## 2022-04-24 RX ADMIN — Medication 10 MILLILITER(S): at 17:14

## 2022-04-24 RX ADMIN — Medication 400 MILLIGRAM(S): at 04:53

## 2022-04-24 RX ADMIN — PANTOPRAZOLE SODIUM 40 MILLIGRAM(S): 20 TABLET, DELAYED RELEASE ORAL at 17:17

## 2022-04-24 RX ADMIN — MORPHINE SULFATE 2 MILLIGRAM(S): 50 CAPSULE, EXTENDED RELEASE ORAL at 18:41

## 2022-04-24 RX ADMIN — Medication 4 MILLIGRAM(S): at 18:26

## 2022-04-24 RX ADMIN — MORPHINE SULFATE 2 MILLIGRAM(S): 50 CAPSULE, EXTENDED RELEASE ORAL at 18:29

## 2022-04-24 RX ADMIN — LEVETIRACETAM 500 MILLIGRAM(S): 250 TABLET, FILM COATED ORAL at 04:53

## 2022-04-24 RX ADMIN — Medication 400 MILLIGRAM(S): at 13:36

## 2022-04-24 RX ADMIN — Medication 10 MILLILITER(S): at 08:24

## 2022-04-24 RX ADMIN — Medication 650 MILLIGRAM(S): at 10:20

## 2022-04-24 RX ADMIN — FLUCONAZOLE 400 MILLIGRAM(S): 150 TABLET ORAL at 13:33

## 2022-04-24 RX ADMIN — SODIUM CHLORIDE 75 MILLILITER(S): 9 INJECTION INTRAMUSCULAR; INTRAVENOUS; SUBCUTANEOUS at 19:31

## 2022-04-24 RX ADMIN — Medication 1 LOZENGE: at 20:49

## 2022-04-24 RX ADMIN — MORPHINE SULFATE 4 MILLIGRAM(S): 50 CAPSULE, EXTENDED RELEASE ORAL at 10:17

## 2022-04-24 RX ADMIN — Medication 1 LOZENGE: at 08:24

## 2022-04-24 RX ADMIN — Medication 650 MILLIGRAM(S): at 18:26

## 2022-04-24 RX ADMIN — Medication 25 GRAM(S): at 17:14

## 2022-04-24 RX ADMIN — CEFEPIME 100 MILLIGRAM(S): 1 INJECTION, POWDER, FOR SOLUTION INTRAMUSCULAR; INTRAVENOUS at 13:36

## 2022-04-24 RX ADMIN — CEFEPIME 100 MILLIGRAM(S): 1 INJECTION, POWDER, FOR SOLUTION INTRAMUSCULAR; INTRAVENOUS at 21:12

## 2022-04-24 RX ADMIN — Medication 5 MILLILITER(S): at 17:15

## 2022-04-24 NOTE — PROGRESS NOTE ADULT - CRITICAL CARE ATTENDING COMMENT
Attending comments: Admitted for CAR T cell therapy with Tecart[employer] (bresucabtagene autoleucel)  today is + 4..some nausea..now improved...monitor closely for CRS and neurotox...pt is at lower risk for these complications given minimal disease burden    1. Admit to BMTU   2. Day -5 through day -1 record CARTOX / ICANS score daily   3. Day - 5 through day - 3 Fludarabine 30mg / m2 IV given over 30 minutes daily   4. Day - 5 - start cyclophosphamide hydration - 0.9% NS + 10mEq KCL / L at 75 ml/m2 and continue for 24 hours post infusion of last dose of CTX   5. Day - 5 through day - 3 CTX 500mg / m2 IV QD to be given over 2 hours   6. Starting day - 5 monitor labs BID: CBC with differential, CMP, LDH, uric acid, mag, phos, CPK, BNP, PT / PTT / INR / Fibrinogen, d-dimer, CRP, hepatic profile, ferritin  7. Weekly IL-6 levels on Monday, twice weekly (Monday / Thursday) CMV / EBV PCR, three times a week (Monday / Wednesday / Friday) type and screen   8. On day - 5 start allopurinol 300mg po QD   9. Day -2 and -1 REST DAYS   10. On day -1 begin to record CARTOX / ICANS score q 12 hours   11. Day 0 infuse Brexucabtagene autoleucel  hx of afib...seen by [facility] cardiology    4/16-17 Doing well, I/Os are negative, transient lip swelling, now resolved, some fatigue  4/18, 19, 20, 21, 22 CRS 0, ICANS 10  4/22/22-[place]ase fever overnight, chills, given Toci, was started on Cefipime. Stable. CRS / ICANS scoring this morning 10.  4/24/22- Fever 102oF, complaints of bilateral upper and lower extremities cramps, chest discomfort, EKG no changes. Patient was treated with Toci/Decadron as per protocol. Morphine prn for pain. Clinically improved. Pancultures sent.

## 2022-04-24 NOTE — PROGRESS NOTE ADULT - SUBJECTIVE AND OBJECTIVE BOX
HPC Transplant Team                                                      Critical / Counseling Time Provided: 30 minutes                                                                                                                                                        Chief Complaint: CAR T therapy with [employer] product for treatment of relapsed, refractory MCL      S: Patient seen and examined with HPC Transplant Team:       O:     Vital Signs Last 24 Hrs  T(C): 37.4 (24 Apr 2022 05:11), Max: 37.8 (23 Apr 2022 13:30)  T(F): 99.3 (24 Apr 2022 05:11), Max: 100 (23 Apr 2022 13:30)  HR: 77 (24 Apr 2022 05:11) (75 - 101)  BP: 122/74 (24 Apr 2022 05:11) (104/67 - 142/81)  BP(mean): --  RR: 19 (24 Apr 2022 05:11) (18 - 19)  SpO2: 97% (24 Apr 2022 05:11) (97% - 99%)    Admit weight:   Daily       Intake / Output:   04-23 @ 07:01  -  04-24 @ 07:00  --------------------------------------------------------  IN: 2391 mL / OUT: 3150 mL / NET: -759 mL    PE:   Oropharynx: no erythema or ulcerations ,   Oral Mucositis:  NA                                                   Grade: n/a  CVS: S1, S2 RRR   Lungs: CTA throughout bilaterally   Abdomen: + BS x 4, soft, NT, ND   Extremities: no edema   Gastric Mucositis:       -                                           Grade: n/a  Intestinal Mucositis:     -                                         Grade: n/a   Skin: no rash   TLC: CDI   Neuro: A&O x 3   Pain: 0        Labs:   CBC Full  -  ( 24 Apr 2022 05:33 )  WBC Count : 2.72 K/uL  Hemoglobin : 11.9 g/dL  Hematocrit : 36.1 %  Platelet Count - Automated : 199 K/uL  Mean Cell Volume : 93.8 fl  Mean Cell Hemoglobin : 30.9 pg  Mean Cell Hemoglobin Concentration : 33.0 gm/dL  Auto Neutrophil # : 1.82 K/uL  Auto Lymphocyte # : 0.26 K/uL  Auto Monocyte # : 0.23 K/uL  Auto Eosinophil # : 0.37 K/uL  Auto Basophil # : 0.00 K/uL  Auto Neutrophil % : 66.8 %  Auto Lymphocyte % : 9.6 %  Auto Monocyte % : 8.5 %  Auto Eosinophil % : 13.6 %  Auto Basophil % : 0.0 %                          11.9   2.72  )-----------( 199      ( 24 Apr 2022 05:33 )             36.1     04-24    138  |  101  |  23  ----------------------------<  129<H>  4.0   |  24  |  1.05    Ca    8.8      24 Apr 2022 05:33  Phos  3.1     04-24  Mg     2.0     04-24    TPro  6.4  /  Alb  4.3  /  TBili  0.1<L>  /  DBili  x   /  AST  20  /  ALT  26  /  AlkPhos  95  04-24    PT/INR - ( 24 Apr 2022 05:33 )   PT: 13.2 sec;   INR: 1.14 ratio         PTT - ( 24 Apr 2022 05:33 )  PTT:22.3 sec  LIVER FUNCTIONS - ( 24 Apr 2022 05:33 )  Alb: 4.3 g/dL / Pro: 6.4 g/dL / ALK PHOS: 95 U/L / ALT: 26 U/L / AST: 20 U/L / GGT: x           Lactate Dehydrogenase, Serum: 166 U/L (04-24 @ 05:33)  Lactate Dehydrogenase, Serum: 180 U/L (04-23 @ 15:38)        Cultures:         Radiology:       Meds:   Antimicrobials:   acyclovir   Oral Tab/Cap 400 milliGRAM(s) Oral every 8 hours  cefepime   IVPB      cefepime   IVPB 2000 milliGRAM(s) IV Intermittent every 8 hours  clotrimazole Lozenge 1 Lozenge Oral five times a day  fluconAZOLE   Tablet 400 milliGRAM(s) Oral daily      Heme / Onc:       GI:  pantoprazole    Tablet 40 milliGRAM(s) Oral two times a day  polyethylene glycol 3350 17 Gram(s) Oral daily PRN  senna 1 Tablet(s) Oral at bedtime PRN  sodium bicarbonate Mouth Rinse 10 milliLiter(s) Swish and Spit five times a day      Cardiovascular:   carvedilol 3.125 milliGRAM(s) Oral every 12 hours      Immunologic:   tocilizumab IVPB 800 milliGRAM(s) IV Intermittent once      Other medications:   acetaminophen     Tablet .. 650 milliGRAM(s) Oral every 6 hours  allopurinol 300 milliGRAM(s) Oral daily  Biotene Dry Mouth Oral Rinse 5 milliLiter(s) Swish and Spit five times a day  chlorhexidine 4% Liquid 1 Application(s) Topical <User Schedule>  dexAMETHasone  Injectable 4 milliGRAM(s) IV Push once  folic acid 1 milliGRAM(s) Oral daily  levETIRAcetam 500 milliGRAM(s) Oral two times a day  morphine  - Injectable 4 milliGRAM(s) IV Push once  multivitamin 1 Tablet(s) Oral daily  sodium chloride 0.9%. 1000 milliLiter(s) IV Continuous <Continuous>      PRN:   acetaminophen     Tablet .. 650 milliGRAM(s) Oral every 6 hours PRN  metoclopramide Injectable 10 milliGRAM(s) IV Push every 6 hours PRN  ondansetron Injectable 8 milliGRAM(s) IV Push every 8 hours PRN  polyethylene glycol 3350 17 Gram(s) Oral daily PRN  senna 1 Tablet(s) Oral at bedtime PRN  sodium chloride 0.9% lock flush 10 milliLiter(s) IV Push every 1 hour PRN      A/P:    56 year old male with relapsed, refractory MCL s/p R-CHOP x 4, RICE x 2, maintenance RTX, salvage Calquence, admitted for CAR T cell therapy with [employer] product. Disease status at time of admission is partial response.   Day +5  Monitor for CRS and neurotox...follow CRS/neuro tox protocol - GIVEN TOCILIZUMAB 4/22 pm for CRS - 1    1. Relapsed, refractory MCL   4/15- day - 4- flu / ctx 2/3 - continue CTX hydration until 24 hours post infusion of last dose   Strict I&O, daily weights, prn diuresis   CRS / ICANS score daily until infusion of CAR T cells (4/19/22) and twice daily thereafter   BID labs 0500 & 1500 - monitor for TLS     2. Prophylactic measures   acyclovir   Oral Tab/Cap 400 milliGRAM(s) Oral every 8 hours  cefepime   IVPB      cefepime   IVPB 2000 milliGRAM(s) IV Intermittent every 8 hours  clotrimazole Lozenge 1 Lozenge Oral five times a day  fluconAZOLE   Tablet 400 milliGRAM(s) Oral daily  Keppra 500 q12h    GI Prophylaxis:  Protonix    3. Mouthcare - NS / NaHCO3 rinses, Mycelex, Biotene; Skin care     4. Transfuse & replete electrolytes prn     7. IV hydration, daily weights, strict I&O, prn diuresis     8. Antiemetics, anti-diarrhea medications:   LORazepam   Injectable 1 milliGRAM(s) IV Push every 6 hours PRN  metoclopramide Injectable 10 milliGRAM(s) IV Push every 6 hours PRN  ondansetron 8mg IV q 8 hours prn nausea     9. CRS / ICANS scoring   4/18 CRS grade 0, ICANS score 10   4/19- CRS grade 0, ICANS score 10   4/20 AM - CRS grade 0, ICANS score 10   4/20-pm-CRS grade 0, ICANS score 10  4/21 AM - CRS grade 0, ICANS score 10   4/21 PM CRS grade 0, ICANS score 10  4/22 AM CRS grade 0, ICANS score 10  4/22 PM CRS grade 0, ICANS score 10 --> 9PM CRS score - 1, ICANS - 10  4/23 AM CRS grade 0, ICANS score 10  4/23 PM CRS grade 0, ICANS score 10    I have written the above note for Dr. Banks who performed service with me in the room.   Miki Espinoza PA-C  ([phone])    I have seen and examined patient with PA, I agree with above note as scribed.                    HPC Transplant Team                                                      Critical / Counseling Time Provided: 30 minutes                                                                                                                                                        Chief Complaint: CAR T therapy with [employer] product for treatment of relapsed, refractory MCL      S: Patient seen and examined with HPC Transplant Team: No events overnight. +Back pain (from neck-->lower back), BUE/BLE transient pains - resolved.   +Fever 38.3 after am rounds. +CP (7/10) x 20min's, L sternum, non radiating. Full resolved with MSO4- (4mg)     O: No chills/rigors, SOB, n/v, HA or dizziness.   ICANS - 10, CRS - 1 (10am)    Vital Signs Last 24 Hrs  T(C): 37.4 (24 Apr 2022 05:11), Max: 37.8 (23 Apr 2022 13:30)  T(F): 99.3 (24 Apr 2022 05:11), Max: 100 (23 Apr 2022 13:30)  HR: 77 (24 Apr 2022 05:11) (75 - 101)  BP: 122/74 (24 Apr 2022 05:11) (104/67 - 142/81)  BP(mean): --  RR: 19 (24 Apr 2022 05:11) (18 - 19)  SpO2: 97% (24 Apr 2022 05:11) (97% - 99%)    Admit weight: 100.4kg  Daily: 97.9kg    Intake / Output:   04-23 @ 07:01  -  04-24 @ 07:00  --------------------------------------------------------  IN: 2391 mL / OUT: 3150 mL / NET: -759 mL    PE:   Oropharynx: no erythema or ulcerations ,   Oral Mucositis:  NA                                                   Grade: n/a  CVS: S1, S2 RRR   Lungs: CTA throughout bilaterally   Abdomen: + BS x 4, soft, NT, ND   Extremities: no edema BLE's  Gastric Mucositis:       -                                           Grade: n/a  Intestinal Mucositis:     -                                         Grade: n/a   Skin: no rashes  TLC: CDI   Neuro: A&O x 3   Pain: 0        Labs:   CBC Full  -  ( 24 Apr 2022 05:33 )  WBC Count : 2.72 K/uL  Hemoglobin : 11.9 g/dL  Hematocrit : 36.1 %  Platelet Count - Automated : 199 K/uL  Mean Cell Volume : 93.8 fl  Mean Cell Hemoglobin : 30.9 pg  Mean Cell Hemoglobin Concentration : 33.0 gm/dL  Auto Neutrophil # : 1.82 K/uL  Auto Lymphocyte # : 0.26 K/uL  Auto Monocyte # : 0.23 K/uL  Auto Eosinophil # : 0.37 K/uL  Auto Basophil # : 0.00 K/uL  Auto Neutrophil % : 66.8 %  Auto Lymphocyte % : 9.6 %  Auto Monocyte % : 8.5 %  Auto Eosinophil % : 13.6 %  Auto Basophil % : 0.0 %                          11.9   2.72  )-----------( 199      ( 24 Apr 2022 05:33 )             36.1     04-24    138  |  101  |  23  ----------------------------<  129<H>  4.0   |  24  |  1.05    Ca    8.8      24 Apr 2022 05:33  Phos  3.1     04-24  Mg     2.0     04-24    TPro  6.4  /  Alb  4.3  /  TBili  0.1<L>  /  DBili  x   /  AST  20  /  ALT  26  /  AlkPhos  95  04-24    PT/INR - ( 24 Apr 2022 05:33 )   PT: 13.2 sec;   INR: 1.14 ratio         PTT - ( 24 Apr 2022 05:33 )  PTT:22.3 sec  LIVER FUNCTIONS - ( 24 Apr 2022 05:33 )  Alb: 4.3 g/dL / Pro: 6.4 g/dL / ALK PHOS: 95 U/L / ALT: 26 U/L / AST: 20 U/L / GGT: x           Lactate Dehydrogenase, Serum: 166 U/L (04-24 @ 05:33)  Lactate Dehydrogenase, Serum: 180 U/L (04-23 @ 15:38)    Troponin/CK - , Ferritin - , BNP - , D-dimer - , CRP -   Cultures:     Culture - Blood (04.23.22 @ 01:04)   Specimen Source: .Blood Blood   Culture Results: No growth to date.   Culture - Blood (04.23.22 @ 01:04)   Specimen Source: .Blood Blood   Culture Results: No growth to date.   Ucx (4/22) - p    Radiology:     CXR (4/24) - p    CXR (4/22) - clear    ECG - NSR 87 b/m, N axis, No ST-T abnorm's    Meds:   Antimicrobials:   acyclovir   Oral Tab/Cap 400 milliGRAM(s) Oral every 8 hours  cefepime   IVPB      cefepime   IVPB 2000 milliGRAM(s) IV Intermittent every 8 hours  clotrimazole Lozenge 1 Lozenge Oral five times a day  fluconAZOLE   Tablet 400 milliGRAM(s) Oral daily      Heme / Onc:       GI:  pantoprazole    Tablet 40 milliGRAM(s) Oral two times a day  polyethylene glycol 3350 17 Gram(s) Oral daily PRN  senna 1 Tablet(s) Oral at bedtime PRN  sodium bicarbonate Mouth Rinse 10 milliLiter(s) Swish and Spit five times a day      Cardiovascular:   carvedilol 3.125 milliGRAM(s) Oral every 12 hours      Immunologic:   tocilizumab IVPB 800 milliGRAM(s) IV Intermittent once      Other medications:   acetaminophen     Tablet .. 650 milliGRAM(s) Oral every 6 hours  allopurinol 300 milliGRAM(s) Oral daily  Biotene Dry Mouth Oral Rinse 5 milliLiter(s) Swish and Spit five times a day  chlorhexidine 4% Liquid 1 Application(s) Topical <User Schedule>  dexAMETHasone  Injectable 4 milliGRAM(s) IV Push once  folic acid 1 milliGRAM(s) Oral daily  levETIRAcetam 500 milliGRAM(s) Oral two times a day  morphine  - Injectable 4 milliGRAM(s) IV Push once  multivitamin 1 Tablet(s) Oral daily  sodium chloride 0.9%. 1000 milliLiter(s) IV Continuous <Continuous>      PRN:   acetaminophen     Tablet .. 650 milliGRAM(s) Oral every 6 hours PRN  metoclopramide Injectable 10 milliGRAM(s) IV Push every 6 hours PRN  ondansetron Injectable 8 milliGRAM(s) IV Push every 8 hours PRN  polyethylene glycol 3350 17 Gram(s) Oral daily PRN  senna 1 Tablet(s) Oral at bedtime PRN  sodium chloride 0.9% lock flush 10 milliLiter(s) IV Push every 1 hour PRN      A/P:    56 year old male with relapsed, refractory MCL s/p R-CHOP x 4, RICE x 2, maintenance RTX, salvage Calquence, admitted for CAR T cell therapy with [employer] product. Disease status at time of admission is partial response.   Day +5  Monitor for CRS and neurotox...follow CRS/neuro tox protocol - GIVEN TOCILIZUMAB 4/22 pm and  4/24 AM for CRS - 1     - Relapsed, refractory MCL   4/15- day - 4- flu / ctx 2/3 - continue CTX hydration until 24 hours post infusion of last dose   Strict I&O, daily weights, prn diuresis   CRS / ICANS score daily until infusion of CAR T cells (4/19/22) and twice daily thereafter   BID labs 0500 & 1500 - monitor for TLS   4/24 F/U repeat blood cx's for fevers (4/22 and 4/24)     -  Prophylactic measures   acyclovir   Oral Tab/Cap 400 milliGRAM(s) Oral every 8 hours  cefepime   IVPB      cefepime   IVPB 2000 milliGRAM(s) IV Intermittent every 8 hours  clotrimazole Lozenge 1 Lozenge Oral five times a day  fluconAZOLE   Tablet 400 milliGRAM(s) Oral daily  Keppra 500 q12h  GI Prophylaxis:  Protonix      - Cardiac / Hx Afib   F/U repeat troponin,  now CP free. ECG non ischemic   Continue Tele monitoring   Continue Coreg bid     -  Mouthcare - NS / NaHCO3 rinses, Mycelex, Biotene; Skin care      - Transfuse & replete electrolytes prn     - IV hydration, daily weights, strict I&O, prn diuresis      -  Antiemetics, anti-diarrhea medications:   LORazepam   Injectable 1 milliGRAM(s) IV Push every 6 hours PRN  metoclopramide Injectable 10 milliGRAM(s) IV Push every 6 hours PRN  ondansetron 8mg IV q 8 hours prn nausea      - CRS / ICANS scoring   4/18 CRS grade 0, ICANS score 10   4/19- CRS grade 0, ICANS score 10   4/20 AM - CRS grade 0, ICANS score 10   4/20-pm-CRS grade 0, ICANS score 10  4/21 AM - CRS grade 0, ICANS score 10   4/21 PM CRS grade 0, ICANS score 10  4/22 AM CRS grade 0, ICANS score 10  4/22 PM CRS grade 0, ICANS score 10 --> 9PM CRS score - 1, ICANS - 10  4/23 AM CRS grade 0, ICANS score 10  4/23 PM CRS grade 0, ICANS score 10  4/24 AM CRS grade 1 10am (grade 0 @ 8:30am), ICANS score 10    I have written the above note for Dr. Banks who performed service with me in the room.   Miki Espinoza PA-C  ([phone])    I have seen and examined patient with PA, I agree with above note as scribed.

## 2022-04-24 NOTE — CHART NOTE - NSCHARTNOTEFT_GEN_A_CORE
MEDICINE PA    Notified by RN patient with temperature . Seen and examined patient at bedside. Patient is alert, NAD. Denies HA, CP, SOB, cough, N/V, or abd pain.    VITAL SIGNS:  T(C): 39.6 (04-24-22 @ 21:06), Max: 39.6 (04-24-22 @ 21:06)  HR: 103 (04-24-22 @ 21:06) (75 - 103)  BP: 113/72 (04-24-22 @ 21:06) (113/59 - 142/81)  RR: 18 (04-24-22 @ 21:06) (18 - 20)  SpO2: 98% (04-24-22 @ 21:06) (97% - 100%)  Wt(kg): --      LABORATORY:                          12.9   2.68  )-----------( 228      ( 24 Apr 2022 15:38 )             39.4       04-24    135  |  98  |  20  ----------------------------<  244<H>  4.3   |  23  |  1.30    Ca    8.6      24 Apr 2022 15:36  Phos  2.7     04-24  Mg     1.8     04-24    TPro  6.8  /  Alb  4.5  /  TBili  0.2  /  DBili  x   /  AST  24  /  ALT  33  /  AlkPhos  93  04-24          MICROBIOLOGY:         RADIOLOGY:          PHYSICAL EXAM:    Constitutional: AOx3. NAD.    Respiratory: clear lungs bilaterally. No wheezing, rhonchi, or crackles.    Cardiovascular: S1 S2. No murmurs.    Gastrointestinal: BS X4 active. soft. nontender.    Extremities/Vascular: +2 pulses bilaterally. No BLE edema.      ASSESSMENT/PLAN:   HPI:  This is a 56 year old male with a history of atrial fibrillation (s/p ablation x 2, internal loop recorder) and  MCL, initially diagnosed 10/2016. He was initially seen at [facility] for a left inguinal mass. CT scans 3/21/16 showed left sided pelvic and inguinal adenopathy. A left inguinal lymph node biopsy 9/26/16 showed morphologic and immunophenotypic findings consistent with MCL. He was treated with 4 cycles of R-CHOP, and 2 cycles of RICE consolidation. A bone marrow biopsy 2/10/17 showed normocellular bone marrow. He was admitted 3/27/17 for an autologous pbsct with CBV prep, and received the cells 4/5/17. He engrafted 4/16/17. He was discharged and completed maintenance Rituxan. In 10/21, he noticed a lump in the right groin. A lymph node biopsy 11/12/21 was consistent with relapsed MCL. He was treated with Calquence. He didn't tolerate Calquence well, with reports of frequent abdominal pain. He is admitted today for CAR T therapy with [employer] product for relapsed, refractory MCL. His disease status at this time is partial remission.  (14 Apr 2022 12:36)        1) Neutropenic Fever  -tylenol and cooling measures prn for pyrexia  -BC x2, UA/UC  -CXR  -c/w   -F/U primary team in [place] Pena PA-C   Department of Medicine MEDICINE PA    Notified by RN patient with temperature 103.3 . Seen and examined patient at bedside. Patient is alert, NAD. Denies HA, CP, SOB, cough, N/V, or abd pain. Of note, received second dose of toci around 7 PM with dexamethasone, few hours before fever spike. Otherwise HD stable. ICANS score of 10, CRS grade 1 based of fever.    VITAL SIGNS:  T(C): 39.6 (04-24-22 @ 21:06), Max: 39.6 (04-24-22 @ 21:06)  HR: 103 (04-24-22 @ 21:06) (75 - 103)  BP: 113/72 (04-24-22 @ 21:06) (113/59 - 142/81)  RR: 18 (04-24-22 @ 21:06) (18 - 20)  SpO2: 98% (04-24-22 @ 21:06) (97% - 100%)  Wt(kg): --      LABORATORY:                          12.9   2.68  )-----------( 228      ( 24 Apr 2022 15:38 )             39.4       04-24    135  |  98  |  20  ----------------------------<  244<H>  4.3   |  23  |  1.30    Ca    8.6      24 Apr 2022 15:36  Phos  2.7     04-24  Mg     1.8     04-24    TPro  6.8  /  Alb  4.5  /  TBili  0.2  /  DBili  x   /  AST  24  /  ALT  33  /  AlkPhos  93  04-24          MICROBIOLOGY:   Culture - Blood (04.23.22 @ 01:04)    Specimen Source: .Blood Blood    Culture Results:   No growth to date.  Culture - Blood (04.23.22 @ 01:04)    Specimen Source: .Blood Blood    Culture Results:   No growth to date.  Culture - Urine (04.23.22 @ 00:54)    Specimen Source: Clean Catch Clean Catch (Midstream)    Culture Results:   10,000 - 49,000 CFU/mL Escherichia coli      RADIOLOGY:  < from: Xray Chest 1 View- PORTABLE-Routine (Xray Chest 1 View- PORTABLE-Routine .) (04.22.22 @ 20:44) >    Impression:  Negative for acute cardiopulmonary pathology.    < end of copied text >        PHYSICAL EXAM:    Constitutional: AOx3. NAD.    Respiratory: clear lungs bilaterally. No wheezing, rhonchi, or crackles.    Cardiovascular: S1 S2. No murmurs.    Gastrointestinal: BS X4 active. soft. nontender.    Extremities/Vascular: +2 pulses bilaterally. No BLE edema.      ASSESSMENT/PLAN:   HPI:   56 year old male with relapsed, refractory MCL s/p R-CHOP x 4, RICE x 2, maintenance RTX, salvage Calquence, admitted for CAR T cell therapy with [employer] product. Disease status at time of admission is partial response.   Day +5  Monitor for CRS and neurotox...follow CRS/neuro tox protocol - GIVEN TOCILIZUMAB 4/22 pm and  4/24 AM for CRS - 1 and 4/24 PM.    And now with recurrent fever.    1) Fever CRS  -tylenol and cooling measures prn for pyrexia  -BC x2 from 24th, UC from 23rd   -CXR appreciated  -c/w cefepime  -Case d/w on call heme onc fellow. Will likely require toci and dexamethasone for third dose in 24 hours, if febrile later tonight.  -F/U primary team in AM    Landen Pena PA-C   Department of Medicine  [id number]

## 2022-04-25 LAB
-  AMIKACIN: SIGNIFICANT CHANGE UP
-  AMOXICILLIN/CLAVULANIC ACID: SIGNIFICANT CHANGE UP
-  AMPICILLIN/SULBACTAM: SIGNIFICANT CHANGE UP
-  AMPICILLIN: SIGNIFICANT CHANGE UP
-  AZTREONAM: SIGNIFICANT CHANGE UP
-  CEFAZOLIN: SIGNIFICANT CHANGE UP
-  CEFEPIME: SIGNIFICANT CHANGE UP
-  CEFOXITIN: SIGNIFICANT CHANGE UP
-  CEFTRIAXONE: SIGNIFICANT CHANGE UP
-  CIPROFLOXACIN: SIGNIFICANT CHANGE UP
-  ERTAPENEM: SIGNIFICANT CHANGE UP
-  GENTAMICIN: SIGNIFICANT CHANGE UP
-  IMIPENEM: SIGNIFICANT CHANGE UP
-  LEVOFLOXACIN: SIGNIFICANT CHANGE UP
-  MEROPENEM: SIGNIFICANT CHANGE UP
-  NITROFURANTOIN: SIGNIFICANT CHANGE UP
-  PIPERACILLIN/TAZOBACTAM: SIGNIFICANT CHANGE UP
-  TIGECYCLINE: SIGNIFICANT CHANGE UP
-  TOBRAMYCIN: SIGNIFICANT CHANGE UP
-  TRIMETHOPRIM/SULFAMETHOXAZOLE: SIGNIFICANT CHANGE UP
ALBUMIN SERPL ELPH-MCNC: 4 G/DL — SIGNIFICANT CHANGE UP (ref 3.3–5)
ALBUMIN SERPL ELPH-MCNC: 4.2 G/DL — SIGNIFICANT CHANGE UP (ref 3.3–5)
ALP SERPL-CCNC: 74 U/L — SIGNIFICANT CHANGE UP (ref 40–120)
ALP SERPL-CCNC: 85 U/L — SIGNIFICANT CHANGE UP (ref 40–120)
ALT FLD-CCNC: 36 U/L — SIGNIFICANT CHANGE UP (ref 10–45)
ALT FLD-CCNC: 43 U/L — SIGNIFICANT CHANGE UP (ref 10–45)
ANION GAP SERPL CALC-SCNC: 10 MMOL/L — SIGNIFICANT CHANGE UP (ref 5–17)
ANION GAP SERPL CALC-SCNC: 13 MMOL/L — SIGNIFICANT CHANGE UP (ref 5–17)
APPEARANCE UR: CLEAR — SIGNIFICANT CHANGE UP
APTT BLD: 23.8 SEC — LOW (ref 27.5–35.5)
AST SERPL-CCNC: 22 U/L — SIGNIFICANT CHANGE UP (ref 10–40)
AST SERPL-CCNC: 24 U/L — SIGNIFICANT CHANGE UP (ref 10–40)
BASOPHILS # BLD AUTO: 0 K/UL — SIGNIFICANT CHANGE UP (ref 0–0.2)
BASOPHILS NFR BLD AUTO: 0 % — SIGNIFICANT CHANGE UP (ref 0–2)
BILIRUB DIRECT SERPL-MCNC: <0.1 MG/DL — SIGNIFICANT CHANGE UP (ref 0–0.3)
BILIRUB INDIRECT FLD-MCNC: SIGNIFICANT CHANGE UP MG/DL (ref 0.2–1)
BILIRUB SERPL-MCNC: 0.1 MG/DL — LOW (ref 0.2–1.2)
BILIRUB SERPL-MCNC: 0.2 MG/DL — SIGNIFICANT CHANGE UP (ref 0.2–1.2)
BILIRUB UR-MCNC: NEGATIVE — SIGNIFICANT CHANGE UP
BLD GP AB SCN SERPL QL: NEGATIVE — SIGNIFICANT CHANGE UP
BUN SERPL-MCNC: 19 MG/DL — SIGNIFICANT CHANGE UP (ref 7–23)
BUN SERPL-MCNC: 22 MG/DL — SIGNIFICANT CHANGE UP (ref 7–23)
C DIFF GDH STL QL: NEGATIVE — SIGNIFICANT CHANGE UP
C DIFF GDH STL QL: SIGNIFICANT CHANGE UP
CALCIUM SERPL-MCNC: 7.7 MG/DL — LOW (ref 8.4–10.5)
CALCIUM SERPL-MCNC: 8 MG/DL — LOW (ref 8.4–10.5)
CHLORIDE SERPL-SCNC: 98 MMOL/L — SIGNIFICANT CHANGE UP (ref 96–108)
CHLORIDE SERPL-SCNC: 99 MMOL/L — SIGNIFICANT CHANGE UP (ref 96–108)
CK SERPL-CCNC: 26 U/L — LOW (ref 30–200)
CMV DNA CSF QL NAA+PROBE: SIGNIFICANT CHANGE UP
CMV DNA SPEC NAA+PROBE-LOG#: SIGNIFICANT CHANGE UP LOG10IU/ML
CO2 SERPL-SCNC: 21 MMOL/L — LOW (ref 22–31)
CO2 SERPL-SCNC: 22 MMOL/L — SIGNIFICANT CHANGE UP (ref 22–31)
COLOR SPEC: SIGNIFICANT CHANGE UP
CREAT SERPL-MCNC: 1.04 MG/DL — SIGNIFICANT CHANGE UP (ref 0.5–1.3)
CREAT SERPL-MCNC: 1.05 MG/DL — SIGNIFICANT CHANGE UP (ref 0.5–1.3)
CRP SERPL-MCNC: <3 MG/L — SIGNIFICANT CHANGE UP (ref 0–4)
CULTURE RESULTS: SIGNIFICANT CHANGE UP
D DIMER BLD IA.RAPID-MCNC: 1117 NG/ML DDU — HIGH
DIFF PNL FLD: NEGATIVE — SIGNIFICANT CHANGE UP
EBV EA AB SER IA-ACNC: 55.8 U/ML — HIGH
EBV EA AB TITR SER IF: POSITIVE
EBV EA IGG SER-ACNC: POSITIVE
EBV NA IGG SER IA-ACNC: >600 U/ML — HIGH
EBV PATRN SPEC IB-IMP: SIGNIFICANT CHANGE UP
EBV VCA IGG AVIDITY SER QL IA: POSITIVE
EBV VCA IGM SER IA-ACNC: 90.5 U/ML — HIGH
EBV VCA IGM SER IA-ACNC: <10 U/ML — SIGNIFICANT CHANGE UP
EBV VCA IGM TITR FLD: NEGATIVE — SIGNIFICANT CHANGE UP
EGFR: 83 ML/MIN/1.73M2 — SIGNIFICANT CHANGE UP
EGFR: 84 ML/MIN/1.73M2 — SIGNIFICANT CHANGE UP
EOSINOPHIL # BLD AUTO: 0.09 K/UL — SIGNIFICANT CHANGE UP (ref 0–0.5)
EOSINOPHIL NFR BLD AUTO: 2.7 % — SIGNIFICANT CHANGE UP (ref 0–6)
FERRITIN SERPL-MCNC: 170 NG/ML — SIGNIFICANT CHANGE UP (ref 30–400)
FIBRINOGEN PPP-MCNC: 392 MG/DL — SIGNIFICANT CHANGE UP (ref 330–520)
GIANT PLATELETS BLD QL SMEAR: PRESENT — SIGNIFICANT CHANGE UP
GLUCOSE SERPL-MCNC: 151 MG/DL — HIGH (ref 70–99)
GLUCOSE SERPL-MCNC: 214 MG/DL — HIGH (ref 70–99)
GLUCOSE UR QL: NEGATIVE — SIGNIFICANT CHANGE UP
HCT VFR BLD CALC: 37.3 % — LOW (ref 39–50)
HCT VFR BLD CALC: 40.7 % — SIGNIFICANT CHANGE UP (ref 39–50)
HGB BLD-MCNC: 12.2 G/DL — LOW (ref 13–17)
HGB BLD-MCNC: 13.4 G/DL — SIGNIFICANT CHANGE UP (ref 13–17)
INR BLD: 1.16 RATIO — SIGNIFICANT CHANGE UP (ref 0.88–1.16)
KETONES UR-MCNC: NEGATIVE — SIGNIFICANT CHANGE UP
LDH SERPL L TO P-CCNC: 183 U/L — SIGNIFICANT CHANGE UP (ref 50–242)
LDH SERPL L TO P-CCNC: 202 U/L — SIGNIFICANT CHANGE UP (ref 50–242)
LEUKOCYTE ESTERASE UR-ACNC: NEGATIVE — SIGNIFICANT CHANGE UP
LYMPHOCYTES # BLD AUTO: 0.24 K/UL — LOW (ref 1–3.3)
LYMPHOCYTES # BLD AUTO: 7.2 % — LOW (ref 13–44)
MAGNESIUM SERPL-MCNC: 2 MG/DL — SIGNIFICANT CHANGE UP (ref 1.6–2.6)
MAGNESIUM SERPL-MCNC: 2.1 MG/DL — SIGNIFICANT CHANGE UP (ref 1.6–2.6)
MANUAL SMEAR VERIFICATION: SIGNIFICANT CHANGE UP
MCHC RBC-ENTMCNC: 30.7 PG — SIGNIFICANT CHANGE UP (ref 27–34)
MCHC RBC-ENTMCNC: 31.1 PG — SIGNIFICANT CHANGE UP (ref 27–34)
MCHC RBC-ENTMCNC: 32.7 GM/DL — SIGNIFICANT CHANGE UP (ref 32–36)
MCHC RBC-ENTMCNC: 32.9 GM/DL — SIGNIFICANT CHANGE UP (ref 32–36)
MCV RBC AUTO: 93.7 FL — SIGNIFICANT CHANGE UP (ref 80–100)
MCV RBC AUTO: 94.4 FL — SIGNIFICANT CHANGE UP (ref 80–100)
METAMYELOCYTES # FLD: 0.9 % — HIGH (ref 0–0)
METHOD TYPE: SIGNIFICANT CHANGE UP
MONOCYTES # BLD AUTO: 0.18 K/UL — SIGNIFICANT CHANGE UP (ref 0–0.9)
MONOCYTES NFR BLD AUTO: 5.4 % — SIGNIFICANT CHANGE UP (ref 2–14)
NEUTROPHILS # BLD AUTO: 2.74 K/UL — SIGNIFICANT CHANGE UP (ref 1.8–7.4)
NEUTROPHILS NFR BLD AUTO: 49.6 % — SIGNIFICANT CHANGE UP (ref 43–77)
NEUTS BAND # BLD: 31.5 % — HIGH (ref 0–8)
NITRITE UR-MCNC: NEGATIVE — SIGNIFICANT CHANGE UP
NRBC # BLD: 0 /100 WBCS — SIGNIFICANT CHANGE UP (ref 0–0)
NRBC # BLD: 1 /100 — HIGH (ref 0–0)
NT-PROBNP SERPL-SCNC: 237 PG/ML — SIGNIFICANT CHANGE UP (ref 0–300)
ORGANISM # SPEC MICROSCOPIC CNT: SIGNIFICANT CHANGE UP
ORGANISM # SPEC MICROSCOPIC CNT: SIGNIFICANT CHANGE UP
PH UR: 6 — SIGNIFICANT CHANGE UP (ref 5–8)
PHOSPHATE SERPL-MCNC: 2.4 MG/DL — LOW (ref 2.5–4.5)
PHOSPHATE SERPL-MCNC: 3.4 MG/DL — SIGNIFICANT CHANGE UP (ref 2.5–4.5)
PLAT MORPH BLD: ABNORMAL
PLATELET # BLD AUTO: 184 K/UL — SIGNIFICANT CHANGE UP (ref 150–400)
PLATELET # BLD AUTO: 206 K/UL — SIGNIFICANT CHANGE UP (ref 150–400)
POTASSIUM SERPL-MCNC: 4.1 MMOL/L — SIGNIFICANT CHANGE UP (ref 3.5–5.3)
POTASSIUM SERPL-MCNC: 4.2 MMOL/L — SIGNIFICANT CHANGE UP (ref 3.5–5.3)
POTASSIUM SERPL-SCNC: 4.1 MMOL/L — SIGNIFICANT CHANGE UP (ref 3.5–5.3)
POTASSIUM SERPL-SCNC: 4.2 MMOL/L — SIGNIFICANT CHANGE UP (ref 3.5–5.3)
PROMYELOCYTES # FLD: 0.9 % — HIGH (ref 0–0)
PROT SERPL-MCNC: 6.1 G/DL — SIGNIFICANT CHANGE UP (ref 6–8.3)
PROT SERPL-MCNC: 6.3 G/DL — SIGNIFICANT CHANGE UP (ref 6–8.3)
PROT UR-MCNC: NEGATIVE — SIGNIFICANT CHANGE UP
PROTHROM AB SERPL-ACNC: 13.4 SEC — SIGNIFICANT CHANGE UP (ref 10.5–13.4)
RAPID RVP RESULT: SIGNIFICANT CHANGE UP
RBC # BLD: 3.98 M/UL — LOW (ref 4.2–5.8)
RBC # BLD: 4.31 M/UL — SIGNIFICANT CHANGE UP (ref 4.2–5.8)
RBC # FLD: 13.2 % — SIGNIFICANT CHANGE UP (ref 10.3–14.5)
RBC # FLD: 13.4 % — SIGNIFICANT CHANGE UP (ref 10.3–14.5)
RBC BLD AUTO: SIGNIFICANT CHANGE UP
RH IG SCN BLD-IMP: POSITIVE — SIGNIFICANT CHANGE UP
SARS-COV-2 RNA SPEC QL NAA+PROBE: SIGNIFICANT CHANGE UP
SODIUM SERPL-SCNC: 131 MMOL/L — LOW (ref 135–145)
SODIUM SERPL-SCNC: 132 MMOL/L — LOW (ref 135–145)
SP GR SPEC: 1.02 — SIGNIFICANT CHANGE UP (ref 1.01–1.02)
SPECIMEN SOURCE: SIGNIFICANT CHANGE UP
URATE SERPL-MCNC: 3.1 MG/DL — LOW (ref 3.4–8.8)
URATE SERPL-MCNC: 3.6 MG/DL — SIGNIFICANT CHANGE UP (ref 3.4–8.8)
UROBILINOGEN FLD QL: NEGATIVE — SIGNIFICANT CHANGE UP
VARIANT LYMPHS # BLD: 1.8 % — SIGNIFICANT CHANGE UP (ref 0–6)
WBC # BLD: 3.38 K/UL — LOW (ref 3.8–10.5)
WBC # BLD: 3.65 K/UL — LOW (ref 3.8–10.5)
WBC # FLD AUTO: 3.38 K/UL — LOW (ref 3.8–10.5)
WBC # FLD AUTO: 3.65 K/UL — LOW (ref 3.8–10.5)

## 2022-04-25 PROCEDURE — 99291 CRITICAL CARE FIRST HOUR: CPT

## 2022-04-25 PROCEDURE — 93308 TTE F-UP OR LMTD: CPT | Mod: 26

## 2022-04-25 PROCEDURE — 93356 MYOCRD STRAIN IMG SPCKL TRCK: CPT

## 2022-04-25 PROCEDURE — 93321 DOPPLER ECHO F-UP/LMTD STD: CPT | Mod: 26

## 2022-04-25 RX ORDER — POTASSIUM PHOSPHATE, MONOBASIC POTASSIUM PHOSPHATE, DIBASIC 236; 224 MG/ML; MG/ML
15 INJECTION, SOLUTION INTRAVENOUS ONCE
Refills: 0 | Status: COMPLETED | OUTPATIENT
Start: 2022-04-25 | End: 2022-04-25

## 2022-04-25 RX ORDER — DIPHENHYDRAMINE HCL 50 MG
25 CAPSULE ORAL ONCE
Refills: 0 | Status: COMPLETED | OUTPATIENT
Start: 2022-04-25 | End: 2022-04-25

## 2022-04-25 RX ORDER — DEXAMETHASONE 0.5 MG/5ML
10 ELIXIR ORAL ONCE
Refills: 0 | Status: COMPLETED | OUTPATIENT
Start: 2022-04-25 | End: 2022-04-25

## 2022-04-25 RX ORDER — TOCILIZUMAB 20 MG/ML
800 INJECTION, SOLUTION, CONCENTRATE INTRAVENOUS ONCE
Refills: 0 | Status: COMPLETED | OUTPATIENT
Start: 2022-04-25 | End: 2022-04-25

## 2022-04-25 RX ORDER — DEXAMETHASONE 0.5 MG/5ML
4 ELIXIR ORAL ONCE
Refills: 0 | Status: COMPLETED | OUTPATIENT
Start: 2022-04-25 | End: 2022-04-25

## 2022-04-25 RX ORDER — MORPHINE SULFATE 50 MG/1
2 CAPSULE, EXTENDED RELEASE ORAL EVERY 4 HOURS
Refills: 0 | Status: DISCONTINUED | OUTPATIENT
Start: 2022-04-25 | End: 2022-04-27

## 2022-04-25 RX ORDER — ACETAMINOPHEN 500 MG
1000 TABLET ORAL ONCE
Refills: 0 | Status: COMPLETED | OUTPATIENT
Start: 2022-04-25 | End: 2022-04-25

## 2022-04-25 RX ORDER — SODIUM CHLORIDE 9 MG/ML
1000 INJECTION INTRAMUSCULAR; INTRAVENOUS; SUBCUTANEOUS
Refills: 0 | Status: DISCONTINUED | OUTPATIENT
Start: 2022-04-25 | End: 2022-05-06

## 2022-04-25 RX ADMIN — PANTOPRAZOLE SODIUM 40 MILLIGRAM(S): 20 TABLET, DELAYED RELEASE ORAL at 17:32

## 2022-04-25 RX ADMIN — Medication 400 MILLIGRAM(S): at 05:30

## 2022-04-25 RX ADMIN — TOCILIZUMAB 100 MILLIGRAM(S): 20 INJECTION, SOLUTION, CONCENTRATE INTRAVENOUS at 06:15

## 2022-04-25 RX ADMIN — FLUCONAZOLE 400 MILLIGRAM(S): 150 TABLET ORAL at 11:57

## 2022-04-25 RX ADMIN — Medication 650 MILLIGRAM(S): at 12:05

## 2022-04-25 RX ADMIN — CARVEDILOL PHOSPHATE 3.12 MILLIGRAM(S): 80 CAPSULE, EXTENDED RELEASE ORAL at 05:02

## 2022-04-25 RX ADMIN — Medication 4 MILLIGRAM(S): at 06:12

## 2022-04-25 RX ADMIN — Medication 10 MILLILITER(S): at 11:58

## 2022-04-25 RX ADMIN — ONDANSETRON 8 MILLIGRAM(S): 8 TABLET, FILM COATED ORAL at 14:28

## 2022-04-25 RX ADMIN — Medication 10 MILLILITER(S): at 17:30

## 2022-04-25 RX ADMIN — Medication 1 MILLIGRAM(S): at 11:56

## 2022-04-25 RX ADMIN — POTASSIUM PHOSPHATE, MONOBASIC POTASSIUM PHOSPHATE, DIBASIC 62.5 MILLIMOLE(S): 236; 224 INJECTION, SOLUTION INTRAVENOUS at 17:30

## 2022-04-25 RX ADMIN — Medication 10 MILLILITER(S): at 01:03

## 2022-04-25 RX ADMIN — Medication 1 LOZENGE: at 01:01

## 2022-04-25 RX ADMIN — CHLORHEXIDINE GLUCONATE 1 APPLICATION(S): 213 SOLUTION TOPICAL at 09:25

## 2022-04-25 RX ADMIN — LEVETIRACETAM 500 MILLIGRAM(S): 250 TABLET, FILM COATED ORAL at 17:32

## 2022-04-25 RX ADMIN — PANTOPRAZOLE SODIUM 40 MILLIGRAM(S): 20 TABLET, DELAYED RELEASE ORAL at 05:02

## 2022-04-25 RX ADMIN — Medication 1 LOZENGE: at 20:58

## 2022-04-25 RX ADMIN — Medication 1 LOZENGE: at 17:31

## 2022-04-25 RX ADMIN — Medication 10 MILLILITER(S): at 09:24

## 2022-04-25 RX ADMIN — Medication 400 MILLIGRAM(S): at 21:02

## 2022-04-25 RX ADMIN — Medication 5 MILLILITER(S): at 09:24

## 2022-04-25 RX ADMIN — SODIUM CHLORIDE 100 MILLILITER(S): 9 INJECTION INTRAMUSCULAR; INTRAVENOUS; SUBCUTANEOUS at 12:06

## 2022-04-25 RX ADMIN — Medication 1 LOZENGE: at 09:24

## 2022-04-25 RX ADMIN — Medication 5 MILLILITER(S): at 11:58

## 2022-04-25 RX ADMIN — Medication 300 MILLIGRAM(S): at 11:57

## 2022-04-25 RX ADMIN — Medication 5 MILLILITER(S): at 01:01

## 2022-04-25 RX ADMIN — Medication 5 MILLILITER(S): at 17:30

## 2022-04-25 RX ADMIN — Medication 650 MILLIGRAM(S): at 11:17

## 2022-04-25 RX ADMIN — Medication 1 TABLET(S): at 11:56

## 2022-04-25 RX ADMIN — Medication 5 MILLILITER(S): at 20:58

## 2022-04-25 RX ADMIN — MORPHINE SULFATE 2 MILLIGRAM(S): 50 CAPSULE, EXTENDED RELEASE ORAL at 10:05

## 2022-04-25 RX ADMIN — LEVETIRACETAM 500 MILLIGRAM(S): 250 TABLET, FILM COATED ORAL at 05:02

## 2022-04-25 RX ADMIN — CEFEPIME 100 MILLIGRAM(S): 1 INJECTION, POWDER, FOR SOLUTION INTRAMUSCULAR; INTRAVENOUS at 05:02

## 2022-04-25 RX ADMIN — Medication 102 MILLIGRAM(S): at 13:19

## 2022-04-25 RX ADMIN — MORPHINE SULFATE 2 MILLIGRAM(S): 50 CAPSULE, EXTENDED RELEASE ORAL at 09:37

## 2022-04-25 RX ADMIN — Medication 25 MILLIGRAM(S): at 06:40

## 2022-04-25 RX ADMIN — Medication 400 MILLIGRAM(S): at 13:11

## 2022-04-25 RX ADMIN — SODIUM CHLORIDE 100 MILLILITER(S): 9 INJECTION INTRAMUSCULAR; INTRAVENOUS; SUBCUTANEOUS at 21:01

## 2022-04-25 RX ADMIN — Medication 400 MILLIGRAM(S): at 05:02

## 2022-04-25 RX ADMIN — CEFEPIME 100 MILLIGRAM(S): 1 INJECTION, POWDER, FOR SOLUTION INTRAMUSCULAR; INTRAVENOUS at 21:02

## 2022-04-25 RX ADMIN — CARVEDILOL PHOSPHATE 3.12 MILLIGRAM(S): 80 CAPSULE, EXTENDED RELEASE ORAL at 17:32

## 2022-04-25 RX ADMIN — CEFEPIME 100 MILLIGRAM(S): 1 INJECTION, POWDER, FOR SOLUTION INTRAMUSCULAR; INTRAVENOUS at 13:13

## 2022-04-25 RX ADMIN — Medication 1 LOZENGE: at 11:58

## 2022-04-25 RX ADMIN — Medication 400 MILLIGRAM(S): at 17:31

## 2022-04-25 RX ADMIN — Medication 10 MILLILITER(S): at 20:58

## 2022-04-25 NOTE — PROGRESS NOTE ADULT - SUBJECTIVE AND OBJECTIVE BOX
Chief Complaint: CAR T therapy with [employer] product for treatment of relapsed, refractory MCL      S: Patient seen and examined with HPC Transplant Team    O: Vitals:   Vital Signs Last 24 Hrs  T(C): 39.7 (2022 05:01), Max: 39.7 (2022 05:01)  T(F): 103.5 (2022 05:01), Max: 103.5 (2022 05:01)  HR: 104 (2022 05:01) (88 - 116)  BP: 119/73 (2022 05:01) (99/59 - 132/73)  BP(mean): --  RR: 18 (2022 05:01) (18 - 20)  SpO2: 98% (2022 05:) (96% - 100%)    Admit weight: 100.4kg  Daily     Daily Weight in k.9 (2022 13:16)    Intake / Output:   - @ 07:01  -   @ 07:00  --------------------------------------------------------  IN: 2461 mL / OUT: 2900 mL / NET: -439 mL        PE:   Oropharynx: no erythema or ulcerations ,   Oral Mucositis:  NA                                                   Grade: n/a  CVS: S1, S2 RRR   Lungs: CTA throughout bilaterally   Abdomen: + BS x 4, soft, NT, ND   Extremities: no edema BLE's  Gastric Mucositis:       -                                           Grade: n/a  Intestinal Mucositis:     -                                         Grade: n/a   Skin: no rashes  TLC: CDI   Neuro: A&O x 3   Pain: 0          Labs:   CBC Full  -  ( 2022 06:02 )  WBC Count : 3.38 K/uL  Hemoglobin : 13.4 g/dL  Hematocrit : 40.7 %  Platelet Count - Automated : 206 K/uL  Mean Cell Volume : 94.4 fl  Mean Cell Hemoglobin : 31.1 pg  Mean Cell Hemoglobin Concentration : 32.9 gm/dL  Auto Neutrophil # : 2.74 K/uL  Auto Lymphocyte # : 0.24 K/uL  Auto Monocyte # : 0.18 K/uL  Auto Eosinophil # : 0.09 K/uL  Auto Basophil # : 0.00 K/uL  Auto Neutrophil % : 49.6 %  Auto Lymphocyte % : 7.2 %  Auto Monocyte % : 5.4 %  Auto Eosinophil % : 2.7 %  Auto Basophil % : 0.0 %                          13.4   3.38  )-----------( 206      ( 2022 06:02 )             40.7         132<L>  |  98  |  22  ----------------------------<  151<H>  4.2   |  21<L>  |  1.05    Ca    8.0<L>      2022 06:00  Phos  3.4       Mg     2.1         TPro  6.3  /  Alb  4.2  /  TBili  0.2  /  DBili  <0.1  /  AST  22  /  ALT  36  /  AlkPhos  85      PT/INR - ( 2022 06:03 )   PT: 13.4 sec;   INR: 1.16 ratio         PTT - ( 2022 06:03 )  PTT:23.8 sec  LIVER FUNCTIONS - ( 2022 06:00 )  Alb: 4.2 g/dL / Pro: 6.3 g/dL / ALK PHOS: 85 U/L / ALT: 36 U/L / AST: 22 U/L / GGT: x           Lactate Dehydrogenase, Serum: 183 U/L ( @ 06:00)  Lactate Dehydrogenase, Serum: 180 U/L ( @ 15:36)    Troponin T, High Sensitivity (22 @ 15:36)   Troponin T, High Sensitivity Result: <6: Specimen not hemolyzed Troponin T, High Sensitivity (22 @ 10:30)   Troponin T, High Sensitivity Result: 6: Specimen not hemolyzed     Cultures:     Culture - Blood (22 @ 01:04)   Specimen Source: .Blood Blood   Culture Results:   No growth to date. Culture - Blood (22 @ 01:04)   Specimen Source: .Blood Blood   Culture Results:   No growth to date. Culture - Urine (22 @ 00:54)   Specimen Source: Clean Catch Clean Catch (Midstream)     Radiology:   < from: Xray Chest 1 View- PORTABLE-Routine (Xray Chest 1 View- PORTABLE-Routine .) (22 @ 20:44) >    Impression:  Negative for acute cardiopulmonary pathology.    < end of copied text >        ECG - NSR 87 b/m, N axis, No ST-T abnorm's    Meds:   Antimicrobials:   acyclovir   Oral Tab/Cap 400 milliGRAM(s) Oral every 8 hours  cefepime   IVPB      cefepime   IVPB 2000 milliGRAM(s) IV Intermittent every 8 hours  clotrimazole Lozenge 1 Lozenge Oral five times a day  fluconAZOLE   Tablet 400 milliGRAM(s) Oral daily      Heme / Onc:       GI:  pantoprazole    Tablet 40 milliGRAM(s) Oral two times a day  polyethylene glycol 3350 17 Gram(s) Oral daily PRN  senna 1 Tablet(s) Oral at bedtime PRN  sodium bicarbonate Mouth Rinse 10 milliLiter(s) Swish and Spit five times a day      Cardiovascular:   carvedilol 3.125 milliGRAM(s) Oral every 12 hours      Immunologic:       Other medications:   acetaminophen     Tablet .. 650 milliGRAM(s) Oral every 6 hours  allopurinol 300 milliGRAM(s) Oral daily  Biotene Dry Mouth Oral Rinse 5 milliLiter(s) Swish and Spit five times a day  chlorhexidine 4% Liquid 1 Application(s) Topical <User Schedule>  folic acid 1 milliGRAM(s) Oral daily  levETIRAcetam 500 milliGRAM(s) Oral two times a day  multivitamin 1 Tablet(s) Oral daily  sodium chloride 0.9%. 1000 milliLiter(s) IV Continuous <Continuous>      PRN:   acetaminophen     Tablet .. 650 milliGRAM(s) Oral every 6 hours PRN  metoclopramide Injectable 10 milliGRAM(s) IV Push every 6 hours PRN  ondansetron Injectable 8 milliGRAM(s) IV Push every 8 hours PRN  polyethylene glycol 3350 17 Gram(s) Oral daily PRN  senna 1 Tablet(s) Oral at bedtime PRN  sodium chloride 0.9% lock flush 10 milliLiter(s) IV Push every 1 hour PRN        A/P:    56 year old male with relapsed, refractory MCL s/p R-CHOP x 4, RICE x 2, maintenance RTX, salvage Calquence, admitted for CAR T cell therapy with [employer] product. Disease status at time of admission is partial response.   Day +6  Monitor for CRS and neurotox...follow CRS/neuro tox protocol - GIVEN TOCILIZUMAB  pm and   AM for CRS - 1     - Relapsed, refractory MCL   4/15- day - 4- flu / ctx 2/3 - continue CTX hydration until 24 hours post infusion of last dose   Strict I&O, daily weights, prn diuresis   CRS / ICANS score daily until infusion of CAR T cells (22) and twice daily thereafter   BID labs 0500 & 1500 - monitor for TLS   -CRS grade 1 s/p Tocilizumab on tele started on Keppra as per protocol, pancx, started on cefepime    F/U repeat blood cx's for fevers ( and ) s/p Toci x2  - CRS grade 1 given Toci x1 overnight      -  Prophylactic measures   acyclovir   Oral Tab/Cap 400 milliGRAM(s) Oral every 8 hours  cefepime   IVPB      cefepime   IVPB 2000 milliGRAM(s) IV Intermittent every 8 hours  clotrimazole Lozenge 1 Lozenge Oral five times a day  fluconAZOLE   Tablet 400 milliGRAM(s) Oral daily  Keppra 500 q12h  GI Prophylaxis:  Protonix      - Cardiac / Hx Afib   F/U repeat troponin,  now CP free. ECG non ischemic   Continue Tele monitoring   Continue Coreg bid     -  Mouthcare - NS / NaHCO3 rinses, Mycelex, Biotene; Skin care      - Transfuse & replete electrolytes prn     - IV hydration, daily weights, strict I&O, prn diuresis      -  Antiemetics, anti-diarrhea medications:   LORazepam   Injectable 1 milliGRAM(s) IV Push every 6 hours PRN  metoclopramide Injectable 10 milliGRAM(s) IV Push every 6 hours PRN  ondansetron 8mg IV q 8 hours prn nausea      - CRS / ICANS scoring    CRS grade 0, ICANS score 10   - CRS grade 0, ICANS score 10    AM - CRS grade 0, ICANS score 10   -pm-CRS grade 0, ICANS score 10   AM - CRS grade 0, ICANS score 10    PM CRS grade 0, ICANS score 10   AM CRS grade 0, ICANS score 10   PM CRS grade 0, ICANS score 10 --> 9PM CRS score - 1, ICANS - 10   AM CRS grade 0, ICANS score 10   PM CRS grade 0, ICANS score 10   AM CRS grade 1 10am (grade 0 @ 8:30am), ICANS score 10    I have written the above note for Dr. Banks who performed service with me in the room.   Nevin Gonzales PA-C    I have seen and examined patient with PA, I agree with above note as scribed.                  Chief Complaint: CAR T therapy with [employer] product for treatment of relapsed, refractory MCL      S: Patient seen and examined with HPC Transplant Team    O: Vitals:   Vital Signs Last 24 Hrs  T(C): 39.7 (2022 05:01), Max: 39.7 (2022 05:01)  T(F): 103.5 (2022 05:01), Max: 103.5 (2022 05:01)  HR: 104 (2022 05:01) (88 - 116)  BP: 119/73 (2022 05:01) (99/59 - 132/73)  BP(mean): --  RR: 18 (2022 05:01) (18 - 20)  SpO2: 98% (2022 05:) (96% - 100%)    Admit weight: 100.4kg  Daily     Daily Weight in k.9 (2022 13:16)    Intake / Output:   - @ 07:01  -   @ 07:00  --------------------------------------------------------  IN: 2461 mL / OUT: 2900 mL / NET: -439 mL        PE:   Oropharynx: no erythema or ulcerations ,   Oral Mucositis:  NA                                                   Grade: n/a  CVS: S1, S2 RRR   Lungs: CTA throughout bilaterally   Abdomen: + BS x 4, soft, NT, ND   Extremities: no edema BLE's  Gastric Mucositis:       -                                           Grade: n/a  Intestinal Mucositis:     -                                         Grade: n/a   Skin: no rashes  TLC: CDI   Neuro: A&O x 3   Pain: 0          Labs:   CBC Full  -  ( 2022 06:02 )  WBC Count : 3.38 K/uL  Hemoglobin : 13.4 g/dL  Hematocrit : 40.7 %  Platelet Count - Automated : 206 K/uL  Mean Cell Volume : 94.4 fl  Mean Cell Hemoglobin : 31.1 pg  Mean Cell Hemoglobin Concentration : 32.9 gm/dL  Auto Neutrophil # : 2.74 K/uL  Auto Lymphocyte # : 0.24 K/uL  Auto Monocyte # : 0.18 K/uL  Auto Eosinophil # : 0.09 K/uL  Auto Basophil # : 0.00 K/uL  Auto Neutrophil % : 49.6 %  Auto Lymphocyte % : 7.2 %  Auto Monocyte % : 5.4 %  Auto Eosinophil % : 2.7 %  Auto Basophil % : 0.0 %                          13.4   3.38  )-----------( 206      ( 2022 06:02 )             40.7         132<L>  |  98  |  22  ----------------------------<  151<H>  4.2   |  21<L>  |  1.05    Ca    8.0<L>      2022 06:00  Phos  3.4       Mg     2.1         TPro  6.3  /  Alb  4.2  /  TBili  0.2  /  DBili  <0.1  /  AST  22  /  ALT  36  /  AlkPhos  85      PT/INR - ( 2022 06:03 )   PT: 13.4 sec;   INR: 1.16 ratio         PTT - ( 2022 06:03 )  PTT:23.8 sec  LIVER FUNCTIONS - ( 2022 06:00 )  Alb: 4.2 g/dL / Pro: 6.3 g/dL / ALK PHOS: 85 U/L / ALT: 36 U/L / AST: 22 U/L / GGT: x           Lactate Dehydrogenase, Serum: 183 U/L ( @ 06:00)  Lactate Dehydrogenase, Serum: 180 U/L ( @ 15:36)    Troponin T, High Sensitivity (22 @ 15:36)   Troponin T, High Sensitivity Result: <6: Specimen not hemolyzed Troponin T, High Sensitivity (22 @ 10:30)   Troponin T, High Sensitivity Result: 6: Specimen not hemolyzed     Cultures:     Culture - Blood (22 @ 01:04)   Specimen Source: .Blood Blood   Culture Results:   No growth to date. Culture - Blood (22 @ 01:04)   Specimen Source: .Blood Blood   Culture Results:   No growth to date. Culture - Urine (22 @ 00:54)   Specimen Source: Clean Catch Clean Catch (Midstream)     Radiology:   < from: Xray Chest 1 View- PORTABLE-Routine (Xray Chest 1 View- PORTABLE-Routine .) (22 @ 20:44) >    Impression:  Negative for acute cardiopulmonary pathology.    < end of copied text >        ECG - NSR 87 b/m, N axis, No ST-T abnorm's    Meds:   Antimicrobials:   acyclovir   Oral Tab/Cap 400 milliGRAM(s) Oral every 8 hours  cefepime   IVPB      cefepime   IVPB 2000 milliGRAM(s) IV Intermittent every 8 hours  clotrimazole Lozenge 1 Lozenge Oral five times a day  fluconAZOLE   Tablet 400 milliGRAM(s) Oral daily      Heme / Onc:       GI:  pantoprazole    Tablet 40 milliGRAM(s) Oral two times a day  polyethylene glycol 3350 17 Gram(s) Oral daily PRN  senna 1 Tablet(s) Oral at bedtime PRN  sodium bicarbonate Mouth Rinse 10 milliLiter(s) Swish and Spit five times a day      Cardiovascular:   carvedilol 3.125 milliGRAM(s) Oral every 12 hours      Immunologic:       Other medications:   acetaminophen     Tablet .. 650 milliGRAM(s) Oral every 6 hours  allopurinol 300 milliGRAM(s) Oral daily  Biotene Dry Mouth Oral Rinse 5 milliLiter(s) Swish and Spit five times a day  chlorhexidine 4% Liquid 1 Application(s) Topical <User Schedule>  folic acid 1 milliGRAM(s) Oral daily  levETIRAcetam 500 milliGRAM(s) Oral two times a day  multivitamin 1 Tablet(s) Oral daily  sodium chloride 0.9%. 1000 milliLiter(s) IV Continuous <Continuous>      PRN:   acetaminophen     Tablet .. 650 milliGRAM(s) Oral every 6 hours PRN  metoclopramide Injectable 10 milliGRAM(s) IV Push every 6 hours PRN  ondansetron Injectable 8 milliGRAM(s) IV Push every 8 hours PRN  polyethylene glycol 3350 17 Gram(s) Oral daily PRN  senna 1 Tablet(s) Oral at bedtime PRN  sodium chloride 0.9% lock flush 10 milliLiter(s) IV Push every 1 hour PRN        A/P:    56 year old male with relapsed, refractory MCL s/p R-CHOP x 4, RICE x 2, maintenance RTX, salvage [place]qu[place], admitted for CAR T cell therapy with [employer] product. Disease status at time of admission is partial response.   Day +6  Monitor for CRS and neurotox...follow CRS/neuro tox protocol - GIVEN TOCILIZUMAB  pm and   AM for CRS - 1     - Relapsed, refractory MCL   4/15- day - 4- flu / ctx 2/3 - continue CTX hydration until 24 hours post infusion of last dose   Strict I&O, daily weights, prn diuresis   CRS / ICANS score daily until infusion of CAR T cells (22) and twice daily thereafter   BID labs 0500 & 1500 - monitor for TLS   -CRS grade 1 s/p Tocilizumab on tele started on Keppra as per protocol, pancx, started on cefepime  s/p dexamethasone 4 mg IV x1   F/U repeat blood cx's for fevers ( and ) s/p Toci x2 s/p dexamethasone 4 mg IV x2 also with CP: EKG WNL trops negative s/p morphine with good effect   - CRS grade 1 given Toci x1 and dexamethasone 4 mg IV overnight       -  Prophylactic measures   acyclovir   Oral Tab/Cap 400 milliGRAM(s) Oral every 8 hours  cefepime   IVPB      cefepime   IVPB 2000 milliGRAM(s) IV Intermittent every 8 hours  clotrimazole Lozenge 1 Lozenge Oral five times a day  fluconAZOLE   Tablet 400 milliGRAM(s) Oral daily  Keppra 500 q12h  GI Prophylaxis:  Protonix      - Cardiac / Hx Afib   F/U repeat troponin,  now CP free. ECG non ischemic   Continue Tele monitoring   Continue Coreg bid     -  Mouthcare - NS / NaHCO3 rinses, Mycelex, Biotene; Skin care      - Transfuse & replete electrolytes prn     - IV hydration, daily weights, strict I&O, prn diuresis      -  Antiemetics, anti-diarrhea medications:   LORazepam   Injectable 1 milliGRAM(s) IV Push every 6 hours PRN  metoclopramide Injectable 10 milliGRAM(s) IV Push every 6 hours PRN  ondansetron 8mg IV q 8 hours prn nausea      - CRS / ICANS scoring    CRS grade 0, ICANS score 10   - CRS grade 0, ICANS score 10    AM - CRS grade 0, ICANS score 10   -pm-CRS grade 0, ICANS score 10   AM - CRS grade 0, ICANS score 10    PM CRS grade 0, ICANS score 10   AM CRS grade 0, ICANS score 10   PM CRS grade 0, ICANS score 10 --> 9PM CRS score - 1, ICANS - 10   AM CRS grade 0, ICANS score 10   PM CRS grade 0, ICANS score 10   AM CRS grade 1 10am (grade 0 @ 8:30am), ICANS score 10    I have written the above note for Dr. Banks who performed service with me in the room.   Nevin Gonzales PA-C    I have seen and examined patient with PA, I agree with above note as scribed.                  Chief Complaint: CAR T therapy with [employer] product for treatment of relapsed, refractory MCL      S: Patient seen and examined with HPC Transplant Team  +generalized weakness  +febrile     O: Vitals:   Vital Signs Last 24 Hrs  T(C): 39.7 (2022 05:01), Max: 39.7 (2022 05:01)  T(F): 103.5 (2022 05:01), Max: 103.5 (2022 05:01)  HR: 104 (2022 05:01) (88 - 116)  BP: 119/73 (2022 05:01) (99/59 - 132/73)  BP(mean): --  RR: 18 (2022 05:01) (18 - 20)  SpO2: 98% (2022 05:01) (96% - 100%)    Admit weight: 100.4kg  Daily     Daily Weight in k.9 (2022 13:16)    Intake / Output:    @ 07:01  -   @ 07:00  --------------------------------------------------------  IN: 2461 mL / OUT: 2900 mL / NET: -439 mL        PE:   Oropharynx: no erythema or ulcerations ,   Oral Mucositis:  NA                                                   Grade: n/a  CVS: S1, S2 RRR   Lungs: CTA throughout bilaterally   Abdomen: + BS x 4, soft, NT, ND   Extremities: no edema BLE's  Gastric Mucositis:       -                                           Grade: n/a  Intestinal Mucositis:     -                                         Grade: n/a   Skin: no rashes  TLC: CDI   Neuro: A&O x 3   Pain: 0          Labs:   CBC Full  -  ( 2022 06:02 )  WBC Count : 3.38 K/uL  Hemoglobin : 13.4 g/dL  Hematocrit : 40.7 %  Platelet Count - Automated : 206 K/uL  Mean Cell Volume : 94.4 fl  Mean Cell Hemoglobin : 31.1 pg  Mean Cell Hemoglobin Concentration : 32.9 gm/dL  Auto Neutrophil # : 2.74 K/uL  Auto Lymphocyte # : 0.24 K/uL  Auto Monocyte # : 0.18 K/uL  Auto Eosinophil # : 0.09 K/uL  Auto Basophil # : 0.00 K/uL  Auto Neutrophil % : 49.6 %  Auto Lymphocyte % : 7.2 %  Auto Monocyte % : 5.4 %  Auto Eosinophil % : 2.7 %  Auto Basophil % : 0.0 %                          13.4   3.38  )-----------( 206      ( 2022 06:02 )             40.7         132<L>  |  98  |  22  ----------------------------<  151<H>  4.2   |  21<L>  |  1.05    Ca    8.0<L>      2022 06:00  Phos  3.4       Mg     2.1         TPro  6.3  /  Alb  4.2  /  TBili  0.2  /  DBili  <0.1  /  AST  22  /  ALT  36  /  AlkPhos  85      PT/INR - ( 2022 06:03 )   PT: 13.4 sec;   INR: 1.16 ratio         PTT - ( 2022 06:03 )  PTT:23.8 sec  LIVER FUNCTIONS - ( 2022 06:00 )  Alb: 4.2 g/dL / Pro: 6.3 g/dL / ALK PHOS: 85 U/L / ALT: 36 U/L / AST: 22 U/L / GGT: x           Lactate Dehydrogenase, Serum: 183 U/L ( @ 06:00)  Lactate Dehydrogenase, Serum: 180 U/L ( @ 15:36)    Troponin T, High Sensitivity (22 @ 15:36)   Troponin T, High Sensitivity Result: <6: Specimen not hemolyzed Troponin T, High Sensitivity (22 @ 10:30)   Troponin T, High Sensitivity Result: 6: Specimen not hemolyzed     Cultures:     Culture - Blood (22 @ 01:04)   Specimen Source: .Blood Blood   Culture Results:   No growth to date. Culture - Blood (22 @ 01:04)   Specimen Source: .Blood Blood   Culture Results:   No growth to date. Culture - Urine (22 @ 00:54)   Specimen Source: Clean Catch Clean Catch (Midstream)     Radiology:   < from: Xray Chest 1 View- PORTABLE-Routine (Xray Chest 1 View- PORTABLE-Routine .) (22 @ 20:44) >    Impression:  Negative for acute cardiopulmonary pathology.    < end of copied text >        ECG - NSR 87 b/m, N axis, No ST-T abnorm's    Meds:   Antimicrobials:   acyclovir   Oral Tab/Cap 400 milliGRAM(s) Oral every 8 hours  cefepime   IVPB      cefepime   IVPB 2000 milliGRAM(s) IV Intermittent every 8 hours  clotrimazole Lozenge 1 Lozenge Oral five times a day  fluconAZOLE   Tablet 400 milliGRAM(s) Oral daily      Heme / Onc:       GI:  pantoprazole    Tablet 40 milliGRAM(s) Oral two times a day  polyethylene glycol 3350 17 Gram(s) Oral daily PRN  senna 1 Tablet(s) Oral at bedtime PRN  sodium bicarbonate Mouth Rinse 10 milliLiter(s) Swish and Spit five times a day      Cardiovascular:   carvedilol 3.125 milliGRAM(s) Oral every 12 hours      Immunologic:       Other medications:   acetaminophen     Tablet .. 650 milliGRAM(s) Oral every 6 hours  allopurinol 300 milliGRAM(s) Oral daily  Biotene Dry Mouth Oral Rinse 5 milliLiter(s) Swish and Spit five times a day  chlorhexidine 4% Liquid 1 Application(s) Topical <User Schedule>  folic acid 1 milliGRAM(s) Oral daily  levETIRAcetam 500 milliGRAM(s) Oral two times a day  multivitamin 1 Tablet(s) Oral daily  sodium chloride 0.9%. 1000 milliLiter(s) IV Continuous <Continuous>      PRN:   acetaminophen     Tablet .. 650 milliGRAM(s) Oral every 6 hours PRN  metoclopramide Injectable 10 milliGRAM(s) IV Push every 6 hours PRN  ondansetron Injectable 8 milliGRAM(s) IV Push every 8 hours PRN  polyethylene glycol 3350 17 Gram(s) Oral daily PRN  senna 1 Tablet(s) Oral at bedtime PRN  sodium chloride 0.9% lock flush 10 milliLiter(s) IV Push every 1 hour PRN        A/P:    56 year old male with relapsed, refractory MCL s/p R-CHOP x 4, RICE x 2, maintenance RTX, salvage [place]qu[place], admitted for CAR T cell therapy with [employer] product. Disease status at time of admission is partial response.   Day +6  Monitor for CRS and neurotox...follow CRS/neuro tox protocol - GIVEN TOCILIZUMAB  pm and   AM for CRS - 1     - Relapsed, refractory MCL   4/15- day - 4- flu / ctx 2/3 - continue CTX hydration until 24 hours post infusion of last dose   Strict I&O, daily weights, prn diuresis   CRS / ICANS score daily until infusion of CAR T cells (22) and twice daily thereafter   BID labs 0500 & 1500 - monitor for TLS   -CRS grade 1 s/p Tocilizumab on tele started on Keppra as per protocol, pancx, started on cefepime  s/p dexamethasone 4 mg IV x1   F/U repeat blood cx's for fevers ( and ) s/p Toci x2 s/p dexamethasone 4 mg IV x2 also with CP: EKG WNL trops negative s/p morphine with good effect   - CRS grade 1 given Toci x1 and dexamethasone 4 mg IV overnight       -  Prophylactic measures   acyclovir   Oral Tab/Cap 400 milliGRAM(s) Oral every 8 hours  cefepime   IVPB      cefepime   IVPB 2000 milliGRAM(s) IV Intermittent every 8 hours  clotrimazole Lozenge 1 Lozenge Oral five times a day  fluconAZOLE   Tablet 400 milliGRAM(s) Oral daily  Keppra 500 q12h  GI Prophylaxis:  Protonix      - Cardiac / Hx Afib   F/U repeat troponin,  now CP free. ECG non ischemic   Continue Tele monitoring   Continue Coreg bid     -  Mouthcare - NS / NaHCO3 rinses, Mycelex, Biotene; Skin care      - Transfuse & replete electrolytes prn     - IV hydration, daily weights, strict I&O, prn diuresis      -  Antiemetics, anti-diarrhea medications:   LORazepam   Injectable 1 milliGRAM(s) IV Push every 6 hours PRN  metoclopramide Injectable 10 milliGRAM(s) IV Push every 6 hours PRN  ondansetron 8mg IV q 8 hours prn nausea      - CRS / ICANS scoring    CRS grade 0, ICANS score 10   - CRS grade 0, ICANS score 10    AM - CRS grade 0, ICANS score 10   -pm-CRS grade 0, ICANS score 10   AM - CRS grade 0, ICANS score 10    PM CRS grade 0, ICANS score 10   AM CRS grade 0, ICANS score 10   PM CRS grade 0, ICANS score 10 --> 9PM CRS score - 1, ICANS - 10   AM CRS grade 0, ICANS score 10   PM CRS grade 0, ICANS score 10   AM CRS grade 1 10am (grade 0 @ 8:30am), ICANS score 10  M CRS grade 2( persistently febrile) ICANS score 10    I have written the above note for Dr. Banks who performed service with me in the room.   Nevin Gonzales PA-C    I have seen and examined patient with PA, I agree with above note as scribed.                  Chief Complaint: CAR T therapy with [employer] product for treatment of relapsed, refractory MCL      S: Patient seen and examined with HPC Transplant Team  +generalized weakness  +febrile     O: Vitals:   Vital Signs Last 24 Hrs  T(C): 39.7 (2022 05:01), Max: 39.7 (2022 05:01)  T(F): 103.5 (2022 05:01), Max: 103.5 (2022 05:01)  HR: 104 (2022 05:01) (88 - 116)  BP: 119/73 (2022 05:01) (99/59 - 132/73)  BP(mean): --  RR: 18 (2022 05:01) (18 - 20)  SpO2: 98% (2022 05:01) (96% - 100%)    Admit weight: 100.4kg  Daily     Daily Weight in k.9 (2022 13:16)    Intake / Output:    @ 07:01  -   @ 07:00  --------------------------------------------------------  IN: 2461 mL / OUT: 2900 mL / NET: -439 mL        PE:   Oropharynx: no erythema or ulcerations ,   Oral Mucositis:  NA                                                   Grade: n/a  CVS: S1, S2 RRR   Lungs: CTA throughout bilaterally   Abdomen: + BS x 4, soft, NT, ND   Extremities: no edema BLE's  Gastric Mucositis:       -                                           Grade: n/a  Intestinal Mucositis:     -                                         Grade: n/a   Skin: no rashes  TLC: CDI   Neuro: A&O x 3   Pain: 0          Labs:   CBC Full  -  ( 2022 06:02 )  WBC Count : 3.38 K/uL  Hemoglobin : 13.4 g/dL  Hematocrit : 40.7 %  Platelet Count - Automated : 206 K/uL  Mean Cell Volume : 94.4 fl  Mean Cell Hemoglobin : 31.1 pg  Mean Cell Hemoglobin Concentration : 32.9 gm/dL  Auto Neutrophil # : 2.74 K/uL  Auto Lymphocyte # : 0.24 K/uL  Auto Monocyte # : 0.18 K/uL  Auto Eosinophil # : 0.09 K/uL  Auto Basophil # : 0.00 K/uL  Auto Neutrophil % : 49.6 %  Auto Lymphocyte % : 7.2 %  Auto Monocyte % : 5.4 %  Auto Eosinophil % : 2.7 %  Auto Basophil % : 0.0 %                          13.4   3.38  )-----------( 206      ( 2022 06:02 )             40.7         132<L>  |  98  |  22  ----------------------------<  151<H>  4.2   |  21<L>  |  1.05    Ca    8.0<L>      2022 06:00  Phos  3.4       Mg     2.1         TPro  6.3  /  Alb  4.2  /  TBili  0.2  /  DBili  <0.1  /  AST  22  /  ALT  36  /  AlkPhos  85      PT/INR - ( 2022 06:03 )   PT: 13.4 sec;   INR: 1.16 ratio         PTT - ( 2022 06:03 )  PTT:23.8 sec  LIVER FUNCTIONS - ( 2022 06:00 )  Alb: 4.2 g/dL / Pro: 6.3 g/dL / ALK PHOS: 85 U/L / ALT: 36 U/L / AST: 22 U/L / GGT: x           Lactate Dehydrogenase, Serum: 183 U/L ( @ 06:00)  Lactate Dehydrogenase, Serum: 180 U/L ( @ 15:36)    Troponin T, High Sensitivity (22 @ 15:36)   Troponin T, High Sensitivity Result: <6: Specimen not hemolyzed Troponin T, High Sensitivity (22 @ 10:30)   Troponin T, High Sensitivity Result: 6: Specimen not hemolyzed     Cultures:     Culture - Blood (22 @ 01:04)   Specimen Source: .Blood Blood   Culture Results:   No growth to date. Culture - Blood (22 @ 01:04)   Specimen Source: .Blood Blood   Culture Results:   No growth to date. Culture - Urine (22 @ 00:54)   Specimen Source: Clean Catch Clean Catch (Midstream)     Radiology:   < from: Xray Chest 1 View- PORTABLE-Routine (Xray Chest 1 View- PORTABLE-Routine .) (22 @ 20:44) >    Impression:  Negative for acute cardiopulmonary pathology.    < end of copied text >        ECG - NSR 87 b/m, N axis, No ST-T abnorm's    Meds:   Antimicrobials:   acyclovir   Oral Tab/Cap 400 milliGRAM(s) Oral every 8 hours  cefepime   IVPB      cefepime   IVPB 2000 milliGRAM(s) IV Intermittent every 8 hours  clotrimazole Lozenge 1 Lozenge Oral five times a day  fluconAZOLE   Tablet 400 milliGRAM(s) Oral daily      Heme / Onc:       GI:  pantoprazole    Tablet 40 milliGRAM(s) Oral two times a day  polyethylene glycol 3350 17 Gram(s) Oral daily PRN  senna 1 Tablet(s) Oral at bedtime PRN  sodium bicarbonate Mouth Rinse 10 milliLiter(s) Swish and Spit five times a day      Cardiovascular:   carvedilol 3.125 milliGRAM(s) Oral every 12 hours      Immunologic:       Other medications:   acetaminophen     Tablet .. 650 milliGRAM(s) Oral every 6 hours  allopurinol 300 milliGRAM(s) Oral daily  Biotene Dry Mouth Oral Rinse 5 milliLiter(s) Swish and Spit five times a day  chlorhexidine 4% Liquid 1 Application(s) Topical <User Schedule>  folic acid 1 milliGRAM(s) Oral daily  levETIRAcetam 500 milliGRAM(s) Oral two times a day  multivitamin 1 Tablet(s) Oral daily  sodium chloride 0.9%. 1000 milliLiter(s) IV Continuous <Continuous>      PRN:   acetaminophen     Tablet .. 650 milliGRAM(s) Oral every 6 hours PRN  metoclopramide Injectable 10 milliGRAM(s) IV Push every 6 hours PRN  ondansetron Injectable 8 milliGRAM(s) IV Push every 8 hours PRN  polyethylene glycol 3350 17 Gram(s) Oral daily PRN  senna 1 Tablet(s) Oral at bedtime PRN  sodium chloride 0.9% lock flush 10 milliLiter(s) IV Push every 1 hour PRN        A/P:    56 year old male with relapsed, refractory MCL s/p R-CHOP x 4, RICE x 2, maintenance RTX, salvage Calquence, admitted for CAR T cell therapy with Tecartus product. Disease status at time of admission is partial response.   Day +6  Monitor for CRS and neurotox...follow CRS/neuro tox protocol - GIVEN TOCILIZUMAB  pm and   AM for CRS - 1     - Relapsed, refractory MCL   4/15- day - 4- flu / ctx 2/3 - continue CTX hydration until 24 hours post infusion of last dose   Strict I&O, daily weights, prn diuresis   CRS / ICANS score daily until infusion of CAR T cells (22) and twice daily thereafter   BID labs 0500 & 1500 - monitor for TLS   -CRS grade 1 s/p Tocilizumab on tele started on Keppra as per protocol, pancx, started on cefepime  s/p dexamethasone 4 mg IV x1   F/U repeat blood cx's for fevers ( and ) s/p Toci x2 s/p dexamethasone 4 mg IV x2 also with CP: EKG WNL trops negative s/p morphine with good effect   - CRS grade 2( persistent fevers) given Toci x1 and dexamethasone 4 mg IV overnight, continues to be febrile Tmax 104 F tremors given additional Dexamethasone 10 mg IV      -  Prophylactic measures   acyclovir   Oral Tab/Cap 400 milliGRAM(s) Oral every 8 hours  cefepime   IVPB      cefepime   IVPB 2000 milliGRAM(s) IV Intermittent every 8 hours  clotrimazole Lozenge 1 Lozenge Oral five times a day  fluconAZOLE   Tablet 400 milliGRAM(s) Oral daily  Keppra 500 q12h  GI Prophylaxis:  Protonix      - Cardiac / Hx Afib   F/U repeat troponin,  now CP free. ECG non ischemic   Continue Tele monitoring   Continue Coreg bid     -  Mouthcare - NS / NaHCO3 rinses, Mycelex, Biotene; Skin care      - Transfuse & replete electrolytes prn     - IV hydration, daily weights, strict I&O, prn diuresis      -  Antiemetics, anti-diarrhea medications:   LORazepam   Injectable 1 milliGRAM(s) IV Push every 6 hours PRN  metoclopramide Injectable 10 milliGRAM(s) IV Push every 6 hours PRN  ondansetron 8mg IV q 8 hours prn nausea      - CRS / ICANS scoring    CRS grade 0, ICANS score 10   - CRS grade 0, ICANS score 10    AM - CRS grade 0, ICANS score 10   -pm-CRS grade 0, ICANS score 10   AM - CRS grade 0, ICANS score 10    PM CRS grade 0, ICANS score 10   AM CRS grade 0, ICANS score 10   PM CRS grade 0, ICANS score 10 --> 9PM CRS score - 1, ICANS - 10   AM CRS grade 0, ICANS score 10   PM CRS grade 0, ICANS score 10   AM CRS grade 1 10am (grade 0 @ 8:30am), ICANS score 10  M CRS grade 2( persistently febrile) ICANS score 10    I have written the above note for Dr. Banks who performed service with me in the room.   Nevin Gonzales PA-C    I have seen and examined patient with PA, I agree with above note as scribed.

## 2022-04-25 NOTE — PROGRESS NOTE ADULT - NS PANP COMMENT GEN_ALL_CORE FT
Admitted for CAR T cell therapy with Tecartus (bresucabtagene autoleucel)  today is + 6..some nausea..now improved...monitor closely for CRS and neurotox...pt is at lower risk for these complications given minimal disease burden    1. Admit to BMTU   2. Day -5 through day -1 record CARTOX / ICANS score daily   3. Day - 5 through day - 3 Fludarabine 30mg / m2 IV given over 30 minutes daily   4. Day - 5 - start cyclophosphamide hydration - 0.9% NS + 10mEq KCL / L at 75 ml/m2 and continue for 24 hours post infusion of last dose of CTX   5. Day - 5 through day - 3 CTX 500mg / m2 IV QD to be given over 2 hours   6. Starting day - 5 monitor labs BID: CBC with differential, CMP, LDH, uric acid, mag, phos, CPK, BNP, PT / PTT / INR / Fibrinogen, d-dimer, CRP, hepatic profile, ferritin  7. Weekly IL-6 levels on Monday, twice weekly (Monday / Thursday) CMV / EBV PCR, three times a week (Monday / Wednesday / Friday) type and screen   8. On day - 5 start allopurinol 300mg po QD   9. Day -2 and -1 REST DAYS   10. On day -1 begin to record CARTOX / ICANS score q 12 hours   11. Day 0 infuse Brexucabtagene autoleucel  hx of afib...seen by [facility] cardiology    4/16-17 Doing well, I/Os are negative, transient lip swelling, now resolved, some fatigue  4/18, 19, 20, 21, 22 CRS 0, ICANS 10  4/22/22-Chase fever overnight, chills, given Toci, was started on Cefipime. Stable. CRS / ICANS scoring this morning 10.  4/24/22- Fever 102oF, complaints of bilateral upper and lower extremities cramps, chest discomfort, EKG no changes. Patient was treated with Toci/Decadron as per protocol. Morphine prn for pain. Clinically improved. Pancultures sent. Admitted for CAR T cell therapy with Tecartus (bresucabtagene autoleucel)  today is + 6..some nausea..now improved...monitor closely for CRS and neurotox...pt is at lower risk for these complications given minimal disease burden..hoever he has been on and off with high temps since fri evening..first dose of toci given for grade 1 crs......s/p 4 total  doses of  toci and dex 4 mg X 2    1. Admit to BMTU   2. Day -5 through day -1 record CARTOX / ICANS score daily   3. Day - 5 through day - 3 Fludarabine 30mg / m2 IV given over 30 minutes daily   4. Day - 5 - start cyclophosphamide hydration - 0.9% NS + 10mEq KCL / L at 75 ml/m2 and continue for 24 hours post infusion of last dose of CTX   5. Day - 5 through day - 3 CTX 500mg / m2 IV QD to be given over 2 hours   6. Starting day - 5 monitor labs BID: CBC with differential, CMP, LDH, uric acid, mag, phos, CPK, BNP, PT / PTT / INR / Fibrinogen, d-dimer, CRP, hepatic profile, ferritin  7. Weekly IL-6 levels on Monday, twice weekly (Monday / Thursday) CMV / EBV PCR, three times a week (Monday / Wednesday / Friday) type and screen   8. On day - 5 start allopurinol 300mg po QD   9. Day -2 and -1 REST DAYS   10. On day -1 begin to record CARTOX / ICANS score q 12 hours   11. Day 0 infuse Brexucabtagene autoleucel  hx of afib...seen by [facility] cardiology  12. F/U cx's..on cefapime, acyclovir and fluconazole..send rvp  13 Cont tele and keppra  4/16-17 Doing well, I/Os are negative, transient lip swelling, now resolved, some fatigue  4/18, 19, 20, 21, 22 CRS 0, ICANS 10  4/22/22-Chase fever overnight, chills, given Toci, was started on Cefipime. Stable. CRS / ICANS scoring this morning 10.  4/23/22, 4/24/22 CRS 1 ICANS 10  4/24/22- Fever 102oF, complaints of bilateral upper and lower extremities cramps, chest discomfort, EKG no changes. Patient was treated with Toci/Decadron as per protocol. Morphine prn for pain. Clinically improved. Pancultures sent.  4/25/22 CRS 2...persistent high temps.....ICANS 10...tremor noted with fever...dex 10 mg dosed today

## 2022-04-26 DIAGNOSIS — R52 PAIN, UNSPECIFIED: ICD-10-CM

## 2022-04-26 DIAGNOSIS — R43.2 PARAGEUSIA: ICD-10-CM

## 2022-04-26 DIAGNOSIS — R40.0 SOMNOLENCE: ICD-10-CM

## 2022-04-26 LAB
ALBUMIN SERPL ELPH-MCNC: 3.9 G/DL — SIGNIFICANT CHANGE UP (ref 3.3–5)
ALBUMIN SERPL ELPH-MCNC: 4 G/DL — SIGNIFICANT CHANGE UP (ref 3.3–5)
ALP SERPL-CCNC: 70 U/L — SIGNIFICANT CHANGE UP (ref 40–120)
ALP SERPL-CCNC: 71 U/L — SIGNIFICANT CHANGE UP (ref 40–120)
ALT FLD-CCNC: 53 U/L — HIGH (ref 10–45)
ALT FLD-CCNC: 61 U/L — HIGH (ref 10–45)
ANION GAP SERPL CALC-SCNC: 13 MMOL/L — SIGNIFICANT CHANGE UP (ref 5–17)
ANION GAP SERPL CALC-SCNC: 14 MMOL/L — SIGNIFICANT CHANGE UP (ref 5–17)
APTT BLD: 23.1 SEC — LOW (ref 27.5–35.5)
AST SERPL-CCNC: 24 U/L — SIGNIFICANT CHANGE UP (ref 10–40)
AST SERPL-CCNC: 27 U/L — SIGNIFICANT CHANGE UP (ref 10–40)
BASOPHILS # BLD AUTO: 0 K/UL — SIGNIFICANT CHANGE UP (ref 0–0.2)
BASOPHILS NFR BLD AUTO: 0 % — SIGNIFICANT CHANGE UP (ref 0–2)
BILIRUB DIRECT SERPL-MCNC: <0.1 MG/DL — SIGNIFICANT CHANGE UP (ref 0–0.3)
BILIRUB INDIRECT FLD-MCNC: >0.1 MG/DL — LOW (ref 0.2–1)
BILIRUB SERPL-MCNC: 0.2 MG/DL — SIGNIFICANT CHANGE UP (ref 0.2–1.2)
BILIRUB SERPL-MCNC: 0.2 MG/DL — SIGNIFICANT CHANGE UP (ref 0.2–1.2)
BUN SERPL-MCNC: 17 MG/DL — SIGNIFICANT CHANGE UP (ref 7–23)
BUN SERPL-MCNC: 20 MG/DL — SIGNIFICANT CHANGE UP (ref 7–23)
CALCIUM SERPL-MCNC: 8.1 MG/DL — LOW (ref 8.4–10.5)
CALCIUM SERPL-MCNC: 8.2 MG/DL — LOW (ref 8.4–10.5)
CHLORIDE SERPL-SCNC: 101 MMOL/L — SIGNIFICANT CHANGE UP (ref 96–108)
CHLORIDE SERPL-SCNC: 99 MMOL/L — SIGNIFICANT CHANGE UP (ref 96–108)
CK SERPL-CCNC: 18 U/L — LOW (ref 30–200)
CO2 SERPL-SCNC: 22 MMOL/L — SIGNIFICANT CHANGE UP (ref 22–31)
CO2 SERPL-SCNC: 23 MMOL/L — SIGNIFICANT CHANGE UP (ref 22–31)
CREAT SERPL-MCNC: 1.09 MG/DL — SIGNIFICANT CHANGE UP (ref 0.5–1.3)
CREAT SERPL-MCNC: 1.1 MG/DL — SIGNIFICANT CHANGE UP (ref 0.5–1.3)
CRP SERPL-MCNC: <3 MG/L — SIGNIFICANT CHANGE UP (ref 0–4)
CULTURE RESULTS: NO GROWTH — SIGNIFICANT CHANGE UP
D DIMER BLD IA.RAPID-MCNC: 550 NG/ML DDU — HIGH
EGFR: 78 ML/MIN/1.73M2 — SIGNIFICANT CHANGE UP
EGFR: 79 ML/MIN/1.73M2 — SIGNIFICANT CHANGE UP
EOSINOPHIL # BLD AUTO: 0.06 K/UL — SIGNIFICANT CHANGE UP (ref 0–0.5)
EOSINOPHIL NFR BLD AUTO: 0.9 % — SIGNIFICANT CHANGE UP (ref 0–6)
FERRITIN SERPL-MCNC: 194 NG/ML — SIGNIFICANT CHANGE UP (ref 30–400)
FIBRINOGEN PPP-MCNC: 317 MG/DL — LOW (ref 330–520)
GIANT PLATELETS BLD QL SMEAR: PRESENT — SIGNIFICANT CHANGE UP
GLUCOSE SERPL-MCNC: 151 MG/DL — HIGH (ref 70–99)
GLUCOSE SERPL-MCNC: 168 MG/DL — HIGH (ref 70–99)
HCT VFR BLD CALC: 35.7 % — LOW (ref 39–50)
HCT VFR BLD CALC: 36.5 % — LOW (ref 39–50)
HGB BLD-MCNC: 11.7 G/DL — LOW (ref 13–17)
HGB BLD-MCNC: 12 G/DL — LOW (ref 13–17)
IL6 FLD-MCNC: 10.1 PG/ML — SIGNIFICANT CHANGE UP (ref 0–13)
INR BLD: 1.37 RATIO — HIGH (ref 0.88–1.16)
LDH SERPL L TO P-CCNC: 194 U/L — SIGNIFICANT CHANGE UP (ref 50–242)
LDH SERPL L TO P-CCNC: 194 U/L — SIGNIFICANT CHANGE UP (ref 50–242)
LYMPHOCYTES # BLD AUTO: 1.38 K/UL — SIGNIFICANT CHANGE UP (ref 1–3.3)
LYMPHOCYTES # BLD AUTO: 21 % — SIGNIFICANT CHANGE UP (ref 13–44)
MACROCYTES BLD QL: SLIGHT — SIGNIFICANT CHANGE UP
MAGNESIUM SERPL-MCNC: 2 MG/DL — SIGNIFICANT CHANGE UP (ref 1.6–2.6)
MAGNESIUM SERPL-MCNC: 2.1 MG/DL — SIGNIFICANT CHANGE UP (ref 1.6–2.6)
MANUAL SMEAR VERIFICATION: SIGNIFICANT CHANGE UP
MCHC RBC-ENTMCNC: 31 PG — SIGNIFICANT CHANGE UP (ref 27–34)
MCHC RBC-ENTMCNC: 31.2 PG — SIGNIFICANT CHANGE UP (ref 27–34)
MCHC RBC-ENTMCNC: 32.8 GM/DL — SIGNIFICANT CHANGE UP (ref 32–36)
MCHC RBC-ENTMCNC: 32.9 GM/DL — SIGNIFICANT CHANGE UP (ref 32–36)
MCV RBC AUTO: 94.7 FL — SIGNIFICANT CHANGE UP (ref 80–100)
MCV RBC AUTO: 94.8 FL — SIGNIFICANT CHANGE UP (ref 80–100)
METAMYELOCYTES # FLD: 1.8 % — HIGH (ref 0–0)
MONOCYTES # BLD AUTO: 0.58 K/UL — SIGNIFICANT CHANGE UP (ref 0–0.9)
MONOCYTES NFR BLD AUTO: 8.8 % — SIGNIFICANT CHANGE UP (ref 2–14)
NEUTROPHILS # BLD AUTO: 4.13 K/UL — SIGNIFICANT CHANGE UP (ref 1.8–7.4)
NEUTROPHILS NFR BLD AUTO: 60.5 % — SIGNIFICANT CHANGE UP (ref 43–77)
NEUTS BAND # BLD: 2.6 % — SIGNIFICANT CHANGE UP (ref 0–8)
NRBC # BLD: 0 /100 WBCS — SIGNIFICANT CHANGE UP (ref 0–0)
NT-PROBNP SERPL-SCNC: 144 PG/ML — SIGNIFICANT CHANGE UP (ref 0–300)
OVALOCYTES BLD QL SMEAR: SLIGHT — SIGNIFICANT CHANGE UP
PHOSPHATE SERPL-MCNC: 3.2 MG/DL — SIGNIFICANT CHANGE UP (ref 2.5–4.5)
PHOSPHATE SERPL-MCNC: 3.6 MG/DL — SIGNIFICANT CHANGE UP (ref 2.5–4.5)
PLAT MORPH BLD: NORMAL — SIGNIFICANT CHANGE UP
PLATELET # BLD AUTO: 176 K/UL — SIGNIFICANT CHANGE UP (ref 150–400)
PLATELET # BLD AUTO: 188 K/UL — SIGNIFICANT CHANGE UP (ref 150–400)
PLATELET COUNT - ESTIMATE: ABNORMAL
POLYCHROMASIA BLD QL SMEAR: SLIGHT — SIGNIFICANT CHANGE UP
POTASSIUM SERPL-MCNC: 3.9 MMOL/L — SIGNIFICANT CHANGE UP (ref 3.5–5.3)
POTASSIUM SERPL-MCNC: 4.5 MMOL/L — SIGNIFICANT CHANGE UP (ref 3.5–5.3)
POTASSIUM SERPL-SCNC: 3.9 MMOL/L — SIGNIFICANT CHANGE UP (ref 3.5–5.3)
POTASSIUM SERPL-SCNC: 4.5 MMOL/L — SIGNIFICANT CHANGE UP (ref 3.5–5.3)
PROT SERPL-MCNC: 5.8 G/DL — LOW (ref 6–8.3)
PROT SERPL-MCNC: 6.2 G/DL — SIGNIFICANT CHANGE UP (ref 6–8.3)
PROTHROM AB SERPL-ACNC: 15.9 SEC — HIGH (ref 10.5–13.4)
RBC # BLD: 3.77 M/UL — LOW (ref 4.2–5.8)
RBC # BLD: 3.85 M/UL — LOW (ref 4.2–5.8)
RBC # FLD: 13.3 % — SIGNIFICANT CHANGE UP (ref 10.3–14.5)
RBC # FLD: 13.3 % — SIGNIFICANT CHANGE UP (ref 10.3–14.5)
RBC BLD AUTO: SIGNIFICANT CHANGE UP
SMUDGE CELLS # BLD: PRESENT — SIGNIFICANT CHANGE UP
SODIUM SERPL-SCNC: 135 MMOL/L — SIGNIFICANT CHANGE UP (ref 135–145)
SODIUM SERPL-SCNC: 137 MMOL/L — SIGNIFICANT CHANGE UP (ref 135–145)
SPECIMEN SOURCE: SIGNIFICANT CHANGE UP
URATE SERPL-MCNC: 2.9 MG/DL — LOW (ref 3.4–8.8)
URATE SERPL-MCNC: 3 MG/DL — LOW (ref 3.4–8.8)
VARIANT LYMPHS # BLD: 4.4 % — SIGNIFICANT CHANGE UP (ref 0–6)
WBC # BLD: 6.55 K/UL — SIGNIFICANT CHANGE UP (ref 3.8–10.5)
WBC # BLD: 7.67 K/UL — SIGNIFICANT CHANGE UP (ref 3.8–10.5)
WBC # FLD AUTO: 6.55 K/UL — SIGNIFICANT CHANGE UP (ref 3.8–10.5)
WBC # FLD AUTO: 7.67 K/UL — SIGNIFICANT CHANGE UP (ref 3.8–10.5)

## 2022-04-26 PROCEDURE — 99291 CRITICAL CARE FIRST HOUR: CPT

## 2022-04-26 PROCEDURE — 99233 SBSQ HOSP IP/OBS HIGH 50: CPT

## 2022-04-26 RX ORDER — ACYCLOVIR SODIUM 500 MG
1 VIAL (EA) INTRAVENOUS
Qty: 90 | Refills: 3
Start: 2022-04-26 | End: 2022-08-23

## 2022-04-26 RX ORDER — LEVETIRACETAM 250 MG/1
1 TABLET, FILM COATED ORAL
Qty: 60 | Refills: 3
Start: 2022-04-26 | End: 2022-08-23

## 2022-04-26 RX ORDER — ALLOPURINOL 300 MG
1 TABLET ORAL
Qty: 30 | Refills: 3
Start: 2022-04-26 | End: 2022-08-23

## 2022-04-26 RX ORDER — LOPERAMIDE HCL 2 MG
2 TABLET ORAL EVERY 4 HOURS
Refills: 0 | Status: DISCONTINUED | OUTPATIENT
Start: 2022-04-26 | End: 2022-05-06

## 2022-04-26 RX ORDER — FLUCONAZOLE 150 MG/1
2 TABLET ORAL
Qty: 60 | Refills: 3
Start: 2022-04-26 | End: 2022-08-23

## 2022-04-26 RX ORDER — METOCLOPRAMIDE HCL 10 MG
1 TABLET ORAL
Qty: 90 | Refills: 3
Start: 2022-04-26 | End: 2022-08-23

## 2022-04-26 RX ORDER — ONDANSETRON 8 MG/1
1 TABLET, FILM COATED ORAL
Qty: 90 | Refills: 3
Start: 2022-04-26 | End: 2022-08-23

## 2022-04-26 RX ORDER — CARVEDILOL PHOSPHATE 80 MG/1
1 CAPSULE, EXTENDED RELEASE ORAL
Qty: 60 | Refills: 3
Start: 2022-04-26 | End: 2022-08-23

## 2022-04-26 RX ORDER — PHYTONADIONE (VIT K1) 5 MG
10 TABLET ORAL DAILY
Refills: 0 | Status: COMPLETED | OUTPATIENT
Start: 2022-04-26 | End: 2022-04-28

## 2022-04-26 RX ORDER — FOLIC ACID 0.8 MG
1 TABLET ORAL
Qty: 30 | Refills: 3
Start: 2022-04-26 | End: 2022-08-23

## 2022-04-26 RX ORDER — PANTOPRAZOLE SODIUM 20 MG/1
1 TABLET, DELAYED RELEASE ORAL
Qty: 60 | Refills: 3
Start: 2022-04-26 | End: 2022-08-23

## 2022-04-26 RX ADMIN — CHLORHEXIDINE GLUCONATE 1 APPLICATION(S): 213 SOLUTION TOPICAL at 08:24

## 2022-04-26 RX ADMIN — Medication 1 LOZENGE: at 15:58

## 2022-04-26 RX ADMIN — CARVEDILOL PHOSPHATE 3.12 MILLIGRAM(S): 80 CAPSULE, EXTENDED RELEASE ORAL at 17:23

## 2022-04-26 RX ADMIN — MORPHINE SULFATE 2 MILLIGRAM(S): 50 CAPSULE, EXTENDED RELEASE ORAL at 08:53

## 2022-04-26 RX ADMIN — CEFEPIME 100 MILLIGRAM(S): 1 INJECTION, POWDER, FOR SOLUTION INTRAMUSCULAR; INTRAVENOUS at 21:06

## 2022-04-26 RX ADMIN — CARVEDILOL PHOSPHATE 3.12 MILLIGRAM(S): 80 CAPSULE, EXTENDED RELEASE ORAL at 05:10

## 2022-04-26 RX ADMIN — LEVETIRACETAM 500 MILLIGRAM(S): 250 TABLET, FILM COATED ORAL at 17:23

## 2022-04-26 RX ADMIN — Medication 10 MILLILITER(S): at 20:40

## 2022-04-26 RX ADMIN — Medication 5 MILLILITER(S): at 20:40

## 2022-04-26 RX ADMIN — MORPHINE SULFATE 2 MILLIGRAM(S): 50 CAPSULE, EXTENDED RELEASE ORAL at 12:42

## 2022-04-26 RX ADMIN — Medication 1 MILLIGRAM(S): at 11:35

## 2022-04-26 RX ADMIN — MORPHINE SULFATE 2 MILLIGRAM(S): 50 CAPSULE, EXTENDED RELEASE ORAL at 17:32

## 2022-04-26 RX ADMIN — Medication 10 MILLILITER(S): at 00:19

## 2022-04-26 RX ADMIN — Medication 650 MILLIGRAM(S): at 00:03

## 2022-04-26 RX ADMIN — Medication 5 MILLILITER(S): at 08:23

## 2022-04-26 RX ADMIN — Medication 5 MILLILITER(S): at 15:58

## 2022-04-26 RX ADMIN — SODIUM CHLORIDE 100 MILLILITER(S): 9 INJECTION INTRAMUSCULAR; INTRAVENOUS; SUBCUTANEOUS at 21:10

## 2022-04-26 RX ADMIN — Medication 650 MILLIGRAM(S): at 08:24

## 2022-04-26 RX ADMIN — Medication 5 MILLILITER(S): at 11:35

## 2022-04-26 RX ADMIN — Medication 10 MILLILITER(S): at 15:58

## 2022-04-26 RX ADMIN — Medication 1 LOZENGE: at 08:24

## 2022-04-26 RX ADMIN — MORPHINE SULFATE 2 MILLIGRAM(S): 50 CAPSULE, EXTENDED RELEASE ORAL at 18:02

## 2022-04-26 RX ADMIN — FLUCONAZOLE 400 MILLIGRAM(S): 150 TABLET ORAL at 11:35

## 2022-04-26 RX ADMIN — Medication 5 MILLILITER(S): at 00:18

## 2022-04-26 RX ADMIN — Medication 650 MILLIGRAM(S): at 07:39

## 2022-04-26 RX ADMIN — Medication 1 TABLET(S): at 11:35

## 2022-04-26 RX ADMIN — Medication 650 MILLIGRAM(S): at 17:24

## 2022-04-26 RX ADMIN — Medication 1 LOZENGE: at 00:18

## 2022-04-26 RX ADMIN — Medication 400 MILLIGRAM(S): at 21:05

## 2022-04-26 RX ADMIN — Medication 400 MILLIGRAM(S): at 13:44

## 2022-04-26 RX ADMIN — Medication 1 LOZENGE: at 11:36

## 2022-04-26 RX ADMIN — Medication 10 MILLIGRAM(S): at 12:53

## 2022-04-26 RX ADMIN — Medication 300 MILLIGRAM(S): at 11:49

## 2022-04-26 RX ADMIN — Medication 10 MILLILITER(S): at 11:35

## 2022-04-26 RX ADMIN — PANTOPRAZOLE SODIUM 40 MILLIGRAM(S): 20 TABLET, DELAYED RELEASE ORAL at 05:10

## 2022-04-26 RX ADMIN — LEVETIRACETAM 500 MILLIGRAM(S): 250 TABLET, FILM COATED ORAL at 05:10

## 2022-04-26 RX ADMIN — MORPHINE SULFATE 2 MILLIGRAM(S): 50 CAPSULE, EXTENDED RELEASE ORAL at 08:23

## 2022-04-26 RX ADMIN — CEFEPIME 100 MILLIGRAM(S): 1 INJECTION, POWDER, FOR SOLUTION INTRAMUSCULAR; INTRAVENOUS at 13:44

## 2022-04-26 RX ADMIN — Medication 650 MILLIGRAM(S): at 18:01

## 2022-04-26 RX ADMIN — PANTOPRAZOLE SODIUM 40 MILLIGRAM(S): 20 TABLET, DELAYED RELEASE ORAL at 17:24

## 2022-04-26 RX ADMIN — CEFEPIME 100 MILLIGRAM(S): 1 INJECTION, POWDER, FOR SOLUTION INTRAMUSCULAR; INTRAVENOUS at 05:10

## 2022-04-26 RX ADMIN — MORPHINE SULFATE 2 MILLIGRAM(S): 50 CAPSULE, EXTENDED RELEASE ORAL at 12:12

## 2022-04-26 RX ADMIN — Medication 400 MILLIGRAM(S): at 05:10

## 2022-04-26 RX ADMIN — Medication 1 LOZENGE: at 20:40

## 2022-04-26 RX ADMIN — Medication 10 MILLILITER(S): at 08:23

## 2022-04-26 NOTE — CHART NOTE - NSCHARTNOTEFT_GEN_A_CORE
Medicine PA Fever Note    Patient is a 56y old  Male who presents with a chief complaint of CAR T therapy with Tecartus product for treatment of relapsed, refractory MCL (25 Apr 2022 07:39)      Event Summary:   Notified by RN patient with fever, Temp 38.1C.   Patient seen and examined patient at bedside.  Patient is alert, denies HA, visual changes, CP, SOB, cough, N/V, dysuria, or abd pain.    >VITAL SIGNS:  T(C): 38.1 (04-26-22 @ 00:05), Max: 40 (04-25-22 @ 12:30)  T(F): 100.6 (04-26-22 @ 00:05), Max: 104 (04-25-22 @ 12:30)  HR: 84 (04-26-22 @ 00:05) (82 - 112)  BP: 130/69 (04-26-22 @ 00:05) (109/61 - 156/80)  RR: 18 (04-26-22 @ 00:05) (18 - 20)  SpO2: 95% (04-26-22 @ 00:05) (94% - 98%)      >Review Of Systems:   CONSTITUTIONAL:  No fever.   No chills  EYES: No visual changes.    ENT:  No  throat pain.  No neck stiffness  RESPIRATORY: No cough, wheezing, hemoptysis; No shortness of breath  CARDIOVASCULAR: No chest pain or palpitations  GASTROINTESTINAL: No abdominal or epigastric pain.  No diarrhea.    GENITOURINARY: No dysuria, frequency or hematuria  NEUROLOGICAL: No numbness or weakness  SKIN: No itching, burning, rashes, or lesions       >PHYSICAL EXAM:  Constitutional: AOx3. NAD.  Neuro:  Grossly intact   Cardiovascular: +S1 S2. RRR.  No murmurs.  No LE edema.  Respiratory:  Even, unlabored.  Clear lungs bilaterally. No wheezing, rhonchi, or crackles.  Gastrointestinal: +BS X4 active. Soft. Nontender. Nondistended   Extremities/Vascular: +2 pulses bilaterally.   Skin:  +Feverish to touch     >MEDICATIONS:    MEDICATIONS  (STANDING):  acetaminophen     Tablet .. 650 milliGRAM(s) Oral every 6 hours  acyclovir   Oral Tab/Cap 400 milliGRAM(s) Oral every 8 hours  allopurinol 300 milliGRAM(s) Oral daily  Biotene Dry Mouth Oral Rinse 5 milliLiter(s) Swish and Spit five times a day  carvedilol 3.125 milliGRAM(s) Oral every 12 hours  cefepime   IVPB      cefepime   IVPB 2000 milliGRAM(s) IV Intermittent every 8 hours  chlorhexidine 4% Liquid 1 Application(s) Topical <User Schedule>  clotrimazole Lozenge 1 Lozenge Oral five times a day  fluconAZOLE   Tablet 400 milliGRAM(s) Oral daily  folic acid 1 milliGRAM(s) Oral daily  levETIRAcetam 500 milliGRAM(s) Oral two times a day  multivitamin 1 Tablet(s) Oral daily  pantoprazole    Tablet 40 milliGRAM(s) Oral two times a day  sodium bicarbonate Mouth Rinse 10 milliLiter(s) Swish and Spit five times a day  sodium chloride 0.9%. 1000 milliLiter(s) (100 mL/Hr) IV Continuous <Continuous>      >LABORATORY:                          12.2   3.65  )-----------( 184      ( 25 Apr 2022 15:20 )             37.3       04-25    131<L>  |  99  |  19  ----------------------------<  214<H>  4.1   |  22  |  1.04    Ca    7.7<L>      25 Apr 2022 15:20  Phos  2.4     04-25  Mg     2.0     04-25    TPro  6.1  /  Alb  4.0  /  TBili  0.1<L>  /  DBili  x   /  AST  24  /  ALT  43  /  AlkPhos  74  04-25          >MICROBIOLOGY:     Urine Culture: PENDING  Blood Culture: Culture - Blood (04.24.22 @ 16:40)    Specimen Source: .Blood Blood-Catheter    Culture Results:   No growth to date.      >RADIOLOGY:    CXR: < from: Xray Chest 1 View- PORTABLE-Urgent (Xray Chest 1 View- PORTABLE-Urgent .) (04.24.22 @ 10:46) >      ACC: [id number] EXAM:  XR CHEST PORTABLE URGENT 1V                          PROCEDURE DATE:  04/24/2022          INTERPRETATION:  EXAMINATION: XR CHEST URGENT    CLINICAL INDICATION: chest pain    TECHNIQUE: Single frontal, portable view of the chestwas obtained.    COMPARISON: Chest x-ray 4/22/2022    FINDINGS:  Left-sided loop recorder. Right-sided jugular central line with tip in   the SVC.  The heart is normal in size.  The lungs are clear.  There is no pneumothorax or pleural effusion.    IMPRESSION:  Clear lungs.    --- End of Report ---          PIPER MACKEY MD; Resident Radiologist  This document has been electronically signed.  KARSTEN OLVERA MD; Attending Interventional Radiologist  This document has been electronically signed.Apr 25 2022  9:34AM    < end of copied text >        >ASSESSMENT/PLAN:   HPI:  This is a 56 year old male with a history of atrial fibrillation (s/p ablation x 2, internal loop recorder) and  MCL, initially diagnosed 10/2016. He was initially seen at [facility] for a left inguinal mass. CT scans 3/21/16 showed left sided pelvic and inguinal adenopathy. A left inguinal lymph node biopsy 9/26/16 showed morphologic and immunophenotypic findings consistent with MCL. He was treated with 4 cycles of R-CHOP, and 2 cycles of RICE consolidation. A bone marrow biopsy 2/10/17 showed normocellular bone marrow. He was admitted 3/27/17 for an autologous pbsct with CBV prep, and received the cells 4/5/17. He engrafted 4/16/17. He was discharged and completed maintenance Rituxan. In 10/21, he noticed a lump in the right groin. A lymph node biopsy 11/12/21 was consistent with relapsed MCL. He was treated with Calquence. He didn't tolerate Calquence well, with reports of frequent abdominal pain. He is admitted today for CAR T therapy with Tecartus product for relapsed, refractory MCL. His disease status at this time is partial remission.  (14 Apr 2022 12:36)    Now w/ recurrent fever of 38.1C.    1) Fever - Recurrent   - Tylenol / Cooling measures prn for Pyrexia  - BC x2, CBC, BMP, Lactate, UA/UC  - CXR above & reviewed  - c/w Current Abx/ Antifungal/ IVF regimen   - Monitor VS  - ID on board, f/u with team  - F/U Primary  care team in AM     Corrine Morse PA-C  Department of Medicine  [facility] [id number] Medicine PA Fever Note    Patient is a 56y old  Male who presents with a chief complaint of CAR T therapy with Tecartus product for treatment of relapsed, refractory MCL (25 Apr 2022 07:39)      Event Summary:   Notified by RN patient with fever, Temp 38.1C.   Patient seen and examined patient at bedside.  Patient is alert, denies HA, visual changes, CP, SOB, cough, N/V, dysuria, or abd pain.    >VITAL SIGNS:  T(C): 38.1 (04-26-22 @ 00:05), Max: 40 (04-25-22 @ 12:30)  T(F): 100.6 (04-26-22 @ 00:05), Max: 104 (04-25-22 @ 12:30)  HR: 84 (04-26-22 @ 00:05) (82 - 112)  BP: 130/69 (04-26-22 @ 00:05) (109/61 - 156/80)  RR: 18 (04-26-22 @ 00:05) (18 - 20)  SpO2: 95% (04-26-22 @ 00:05) (94% - 98%)      >Review Of Systems:   CONSTITUTIONAL:  No fever.   No chills  EYES: No visual changes.    ENT:  No  throat pain.  No neck stiffness  RESPIRATORY: No cough, wheezing, hemoptysis; No shortness of breath  CARDIOVASCULAR: No chest pain or palpitations  GASTROINTESTINAL: No abdominal or epigastric pain.  No diarrhea.    GENITOURINARY: No dysuria, frequency or hematuria  NEUROLOGICAL: No numbness or weakness  SKIN: No itching, burning, rashes, or lesions       >PHYSICAL EXAM:  Constitutional: AOx3. NAD.  Neuro:  Grossly intact   Cardiovascular: +S1 S2. Tachycardic. No murmurs.  No LE edema.  Respiratory:  Even, unlabored.  Clear lungs bilaterally. No wheezing, rhonchi, or crackles.  Gastrointestinal: +BS X4 active. Soft. Nontender. Nondistended   Extremities/Vascular: +2 pulses bilaterally.   Skin:  +Feverish to touch     >MEDICATIONS:    MEDICATIONS  (STANDING):  acetaminophen     Tablet .. 650 milliGRAM(s) Oral every 6 hours  acyclovir   Oral Tab/Cap 400 milliGRAM(s) Oral every 8 hours  allopurinol 300 milliGRAM(s) Oral daily  Biotene Dry Mouth Oral Rinse 5 milliLiter(s) Swish and Spit five times a day  carvedilol 3.125 milliGRAM(s) Oral every 12 hours  cefepime   IVPB      cefepime   IVPB 2000 milliGRAM(s) IV Intermittent every 8 hours  chlorhexidine 4% Liquid 1 Application(s) Topical <User Schedule>  clotrimazole Lozenge 1 Lozenge Oral five times a day  fluconAZOLE   Tablet 400 milliGRAM(s) Oral daily  folic acid 1 milliGRAM(s) Oral daily  levETIRAcetam 500 milliGRAM(s) Oral two times a day  multivitamin 1 Tablet(s) Oral daily  pantoprazole    Tablet 40 milliGRAM(s) Oral two times a day  sodium bicarbonate Mouth Rinse 10 milliLiter(s) Swish and Spit five times a day  sodium chloride 0.9%. 1000 milliLiter(s) (100 mL/Hr) IV Continuous <Continuous>      >LABORATORY:                          12.2   3.65  )-----------( 184      ( 25 Apr 2022 15:20 )             37.3       04-25    131<L>  |  99  |  19  ----------------------------<  214<H>  4.1   |  22  |  1.04    Ca    7.7<L>      25 Apr 2022 15:20  Phos  2.4     04-25  Mg     2.0     04-25    TPro  6.1  /  Alb  4.0  /  TBili  0.1<L>  /  DBili  x   /  AST  24  /  ALT  43  /  AlkPhos  74  04-25          >MICROBIOLOGY:     Urine Culture: PENDING  Blood Culture: Culture - Blood (04.24.22 @ 16:40)    Specimen Source: .Blood Blood-Catheter    Culture Results:   No growth to date.      >RADIOLOGY:    CXR: < from: Xray Chest 1 View- PORTABLE-Urgent (Xray Chest 1 View- PORTABLE-Urgent .) (04.24.22 @ 10:46) >      ACC: [id number] EXAM:  XR CHEST PORTABLE URGENT 1V                          PROCEDURE DATE:  04/24/2022          INTERPRETATION:  EXAMINATION: XR CHEST URGENT    CLINICAL INDICATION: chest pain    TECHNIQUE: Single frontal, portable view of the chestwas obtained.    COMPARISON: Chest x-ray 4/22/2022    FINDINGS:  Left-sided loop recorder. Right-sided jugular central line with tip in   the SVC.  The heart is normal in size.  The lungs are clear.  There is no pneumothorax or pleural effusion.    IMPRESSION:  Clear lungs.    --- End of Report ---          PIPER MACKEY MD; Resident Radiologist  This document has been electronically signed.  KARSTEN OLVERA MD; Attending Interventional Radiologist  This document has been electronically signed.Apr 25 2022  9:34AM    < end of copied text >        >ASSESSMENT/PLAN:   HPI:  This is a 56 year old male with a history of atrial fibrillation (s/p ablation x 2, internal loop recorder) and  MCL, initially diagnosed 10/2016. He was initially seen at [facility] for a left inguinal mass. CT scans 3/21/16 showed left sided pelvic and inguinal adenopathy. A left inguinal lymph node biopsy 9/26/16 showed morphologic and immunophenotypic findings consistent with MCL. He was treated with 4 cycles of R-CHOP, and 2 cycles of RICE consolidation. A bone marrow biopsy 2/10/17 showed normocellular bone marrow. He was admitted 3/27/17 for an autologous pbsct with CBV prep, and received the cells 4/5/17. He engrafted 4/16/17. He was discharged and completed maintenance Rituxan. In 10/21, he noticed a lump in the right groin. A lymph node biopsy 11/12/21 was consistent with relapsed MCL. He was treated with Calquence. He didn't tolerate Calquence well, with reports of frequent abdominal pain. He is admitted today for CAR T therapy with Tecartus product for relapsed, refractory MCL. His disease status at this time is partial remission.  (14 Apr 2022 12:36)    Now w/ recurrent fever of 38.1C.    1) Fever - Recurrent   - Tylenol / Cooling measures prn for Pyrexia  - BC x2, CBC, BMP, Lactate, UA/UC  - ICANS score 10, ICE score 0, currently CRS grade 1  - S/P Toci & Dexamethasone  - CXR above & reviewed  - c/w Current Abx/ Antifungal/ IVF regimen   - Monitor VS  - F/U Primary  care team in AM     Corrine Morse PA-C  Department of Medicine  [facility] [id number]

## 2022-04-26 NOTE — DIETITIAN NUTRITION RISK NOTIFICATION - TREATMENT: THE FOLLOWING DIET HAS BEEN RECOMMENDED
Diet, Regular:   GlutaSolve(Glutamine) 15gm pkg     Qty per Day:  1  Supplement Feeding Modality:  Oral  Ensure Clear Cans or Servings Per Day:  1       Frequency:  Daily (04-26-22 @ 16:29) [Pending Verification By Attending]  Diet, Regular:   GlutaSolve(Glutamine) 15gm pkg     Qty per Day:  1  Supplement Feeding Modality:  Oral  Ensure Clear Cans or Servings Per Day:  2       Frequency:  Daily (04-18-22 @ 14:27) [Active]

## 2022-04-26 NOTE — PROGRESS NOTE ADULT - PROBLEM SELECTOR PLAN 9
Recommendation: Condition: Mantel Cell Lymphoma  Previous transplant: AUTO SCT 2017  Active treatment:  CAR-T infusion  Transplant Type: Tecartus (brexucabtagene autoleucel)  Transplant Day: Day +7  Clinical impact on complexity: Significant.

## 2022-04-26 NOTE — PROGRESS NOTE ADULT - SUBJECTIVE AND OBJECTIVE BOX
HPC Transplant Team                                                      Critical / Counseling Time Provided: 30 minutes                                                                                                                                                        Chief Complaint: CAR T therapy with [employer] product for treatment of relapsed, refractory MCL      S: Patient seen and examined with HPC Transplant Team  +generalized weakness  +febrile     O: Vitals:   Vital Signs Last 24 Hrs  T(C): 38.2 (2022 07:39), Max: 40 (2022 12:30)  T(F): 100.8 (2022 07:39), Max: 104 (2022 12:30)  HR: 82 (2022 07:39) (74 - 112)  BP: 145/81 (2022 07:39) (109/61 - 156/80)  BP(mean): --  RR: 18 (2022 07:39) (18 - 20)  SpO2: 97% (2022 07:39) (94% - 97%)      Admit weight: 100.4kg  Daily     Daily Weight in k.9 (2022 13:16)    Intake / Output:    @ 07: @ 07:00  --------------------------------------------------------  IN: 3227 mL / OUT: 3450 mL / NET: -223 mL     @ 07: @ 07:42  --------------------------------------------------------  IN: 114 mL / OUT: 400 mL / NET: -286 mL      PE:   Oropharynx: no erythema or ulcerations ,   Oral Mucositis:  NA                                                   Grade: n/a  CVS: S1, S2 RRR   Lungs: CTA throughout bilaterally   Abdomen: + BS x 4, soft, NT, ND   Extremities: no edema BLE's  Gastric Mucositis:       -                                           Grade: n/a  Intestinal Mucositis:     -                                         Grade: n/a   Skin: no rashes  TLC: CDI   Neuro: A&O x 3   Pain: 0      Labs:   CBC Full  -  ( 2022 05:47 )  WBC Count : 6.55 K/uL  Hemoglobin : 12.0 g/dL  Hematocrit : 36.5 %  Platelet Count - Automated : 188 K/uL  Mean Cell Volume : 94.8 fl  Mean Cell Hemoglobin : 31.2 pg  Mean Cell Hemoglobin Concentration : 32.9 gm/dL  Auto Neutrophil # : 4.13 K/uL  Auto Lymphocyte # : 1.38 K/uL  Auto Monocyte # : 0.58 K/uL  Auto Eosinophil # : 0.06 K/uL  Auto Basophil # : 0.00 K/uL  Auto Neutrophil % : 60.5 %  Auto Lymphocyte % : 21.0 %  Auto Monocyte % : 8.8 %  Auto Eosinophil % : 0.9 %  Auto Basophil % : 0.0 %                          12.0   6.55  )-----------( 188      ( 2022 05:47 )             36.5         137  |  101  |  17  ----------------------------<  151<H>  4.5   |  23  |  1.09    Ca    8.1<L>      2022 05:41  Phos  3.6       Mg     2.1         TPro  6.2  /  Alb  4.0  /  TBili  0.2  /  DBili  <0.1  /  AST  24  /  ALT  53<H>  /  AlkPhos  71      PT/INR - ( 2022 05:47 )   PT: 15.9 sec;   INR: 1.37 ratio         PTT - ( 2022 05:47 )  PTT:23.1 sec  LIVER FUNCTIONS - ( 2022 05:41 )  Alb: 4.0 g/dL / Pro: 6.2 g/dL / ALK PHOS: 71 U/L / ALT: 53 U/L / AST: 24 U/L / GGT: x           Lactate Dehydrogenase, Serum: 194 U/L ( @ 05:41)  Lactate Dehydrogenase, Serum: 202 U/L ( @ 15:20)          Karnofsky / Lansky Scale:   GVHD:   Skin:   Liver:   Gut:   Overall Grade:       Cultures:         Radiology:       Meds:   Antimicrobials:   acyclovir   Oral Tab/Cap 400 milliGRAM(s) Oral every 8 hours  cefepime   IVPB      cefepime   IVPB 2000 milliGRAM(s) IV Intermittent every 8 hours  clotrimazole Lozenge 1 Lozenge Oral five times a day  fluconAZOLE   Tablet 400 milliGRAM(s) Oral daily      Heme / Onc:       GI:  pantoprazole    Tablet 40 milliGRAM(s) Oral two times a day  polyethylene glycol 3350 17 Gram(s) Oral daily PRN  senna 1 Tablet(s) Oral at bedtime PRN  sodium bicarbonate Mouth Rinse 10 milliLiter(s) Swish and Spit five times a day      Cardiovascular:   carvedilol 3.125 milliGRAM(s) Oral every 12 hours      Immunologic:       Other medications:   acetaminophen     Tablet .. 650 milliGRAM(s) Oral every 6 hours  allopurinol 300 milliGRAM(s) Oral daily  Biotene Dry Mouth Oral Rinse 5 milliLiter(s) Swish and Spit five times a day  chlorhexidine 4% Liquid 1 Application(s) Topical <User Schedule>  folic acid 1 milliGRAM(s) Oral daily  levETIRAcetam 500 milliGRAM(s) Oral two times a day  multivitamin 1 Tablet(s) Oral daily  sodium chloride 0.9%. 1000 milliLiter(s) IV Continuous <Continuous>      PRN:   acetaminophen     Tablet .. 650 milliGRAM(s) Oral every 6 hours PRN  metoclopramide Injectable 10 milliGRAM(s) IV Push every 6 hours PRN  morphine  - Injectable 2 milliGRAM(s) IV Push every 4 hours PRN  ondansetron Injectable 8 milliGRAM(s) IV Push every 8 hours PRN  polyethylene glycol 3350 17 Gram(s) Oral daily PRN  senna 1 Tablet(s) Oral at bedtime PRN  sodium chloride 0.9% lock flush 10 milliLiter(s) IV Push every 1 hour PRN      A/P: 56 year old male with relapsed, refractory MCL s/p R-CHOP x 4, RICE x 2, maintenance RTX, salvage Calquence, admitted for CAR T cell therapy with [employer] product. Disease status at time of admission is partial response.   Day + 7  Monitor for CRS and neurotox...follow CRS/neuro tox protocol - GIVEN TOCILIZUMAB  pm and  24 AM for CRS - 1     - Relapsed, refractory MCL   4/15- day - 4- flu / ctx 2/3 - continue CTX hydration until 24 hours post infusion of last dose   Strict I&O, daily weights, prn diuresis   CRS / ICANS score daily until infusion of CAR T cells (22) and twice daily thereafter   BID labs 0500 & 1500 - monitor for TLS   -CRS grade 1 s/p Tocilizumab on tele started on Keppra as per protocol, pancx, started on cefepime  s/p dexamethasone 4 mg IV x1   F/U repeat blood cx's for fevers ( and ) s/p Toci x2 s/p dexamethasone 4 mg IV x2 also with CP: EKG WNL trops negative s/p morphine with good effect   - CRS grade 2( persistent fevers) given Toci x1 and dexamethasone 4 mg IV overnight, continues to be febrile Tmax 104 F tremors given additional Dexamethasone 10 mg IV      -  Prophylactic measures   acyclovir   Oral Tab/Cap 400 milliGRAM(s) Oral every 8 hours  cefepime   IVPB      cefepime   IVPB 2000 milliGRAM(s) IV Intermittent every 8 hours  clotrimazole Lozenge 1 Lozenge Oral five times a day  fluconAZOLE   Tablet 400 milliGRAM(s) Oral daily  Keppra 500 q12h  GI Prophylaxis:  Protonix      - Cardiac / Hx Afib   F/U repeat troponin,  now CP free. ECG non ischemic   Continue Tele monitoring   Continue Coreg bid     -  Mouthcare - NS / NaHCO3 rinses, Mycelex, Biotene; Skin care      - Transfuse & replete electrolytes prn     - IV hydration, daily weights, strict I&O, prn diuresis      -  Antiemetics, anti-diarrhea medications:   LORazepam   Injectable 1 milliGRAM(s) IV Push every 6 hours PRN  metoclopramide Injectable 10 milliGRAM(s) IV Push every 6 hours PRN  ondansetron 8mg IV q 8 hours prn nausea      - CRS / ICANS scoring    CRS grade 0, ICANS score 10   - CRS grade 0, ICANS score 10    AM - CRS grade 0, ICANS score 10   -pm-CRS grade 0, ICANS score 10   AM - CRS grade 0, ICANS score 10    PM CRS grade 0, ICANS score 10   AM CRS grade 0, ICANS score 10   PM CRS grade 0, ICANS score 10 --> 9PM CRS score - 1, ICANS - 10  4 AM CRS grade 0, ICANS score 10   PM CRS grade 0, ICANS score 10   AM CRS grade 1 10am (grade 0 @ 8:30am), ICANS score 10  M CRS grade 2( persistently febrile) ICANS score 10    I have written the above note for Dr. Banks who performed service with me in the room.   Nevin Gonzales PA-C    I have seen and examined patient with PA, I agree with above note as scribed.                HPC Transplant Team                                                      Critical / Counseling Time Provided: 30 minutes                                                                                                                                                        Chief Complaint: CAR T therapy with [employer] product for treatment of relapsed, refractory MCL      S: Patient seen and examined with HPC Transplant Team  Feel better today.  +generalized weakness, fatigue  + diarrhea x4 O/N  +burning urination  +poor appetite  +fever improving.    O: Vitals:   Vital Signs Last 24 Hrs  T(C): 38.2 (26 Apr 2022 07:39), Max: 40 (25 Apr 2022 12:30)  T(F): 100.8 (26 Apr 2022 07:39), Max: 104 (25 Apr 2022 12:30)  HR: 82 (26 Apr 2022 07:39) (74 - 112)  BP: 145/81 (26 Apr 2022 07:39) (109/61 - 156/80)  BP(mean): --  RR: 18 (26 Apr 2022 07:39) (18 - 20)  SpO2: 97% (26 Apr 2022 07:39) (94% - 97%)      Admit weight: 100.4kg  Daily Weight in kG:      Intake / Output:   04-25 @ 07:01  -  04-26 @ 07:00  --------------------------------------------------------  IN: 3227 mL / OUT: 3450 mL / NET: -223 mL    04-26 @ 07:01 - 04-26 @ 07:42  --------------------------------------------------------  IN: 114 mL / OUT: 400 mL / NET: -286 mL      PE:   Oropharynx: no erythema or ulcerations ,   Oral Mucositis:  NA                                                   Grade: n/a  CVS: S1, S2 RRR   Lungs: CTA throughout bilaterally   Abdomen: + BS x 4, soft, NT, ND   Extremities: no edema BLE's  Gastric Mucositis:       -                                           Grade: n/a  Intestinal Mucositis:     -                                         Grade: n/a   Skin: no rashes  TLC: CDI   Neuro: A&O x 3   Pain: 0      Labs:   CBC Full  -  ( 26 Apr 2022 05:47 )  WBC Count : 6.55 K/uL  Hemoglobin : 12.0 g/dL  Hematocrit : 36.5 %  Platelet Count - Automated : 188 K/uL  Mean Cell Volume : 94.8 fl  Mean Cell Hemoglobin : 31.2 pg  Mean Cell Hemoglobin Concentration : 32.9 gm/dL  Auto Neutrophil # : 4.13 K/uL  Auto Lymphocyte # : 1.38 K/uL  Auto Monocyte # : 0.58 K/uL  Auto Eosinophil # : 0.06 K/uL  Auto Basophil # : 0.00 K/uL  Auto Neutrophil % : 60.5 %  Auto Lymphocyte % : 21.0 %  Auto Monocyte % : 8.8 %  Auto Eosinophil % : 0.9 %  Auto Basophil % : 0.0 %                          12.0   6.55  )-----------( 188      ( 26 Apr 2022 05:47 )             36.5     04-26    137  |  101  |  17  ----------------------------<  151<H>  4.5   |  23  |  1.09    Ca    8.1<L>      26 Apr 2022 05:41  Phos  3.6     04-26  Mg     2.1     04-26    TPro  6.2  /  Alb  4.0  /  TBili  0.2  /  DBili  <0.1  /  AST  24  /  ALT  53<H>  /  AlkPhos  71  04-26    PT/INR - ( 26 Apr 2022 05:47 )   PT: 15.9 sec;   INR: 1.37 ratio         PTT - ( 26 Apr 2022 05:47 )  PTT:23.1 sec  LIVER FUNCTIONS - ( 26 Apr 2022 05:41 )  Alb: 4.0 g/dL / Pro: 6.2 g/dL / ALK PHOS: 71 U/L / ALT: 53 U/L / AST: 24 U/L / GGT: x           Lactate Dehydrogenase, Serum: 194 U/L (04-26 @ 05:41)  Lactate Dehydrogenase, Serum: 202 U/L (04-25 @ 15:20)          Karnofsky / Lansky Scale:   GVHD:   Skin:   Liver:   Gut:   Overall Grade:       Cultures:         Radiology:       Meds:   Antimicrobials:   acyclovir   Oral Tab/Cap 400 milliGRAM(s) Oral every 8 hours  cefepime   IVPB      cefepime   IVPB 2000 milliGRAM(s) IV Intermittent every 8 hours  clotrimazole Lozenge 1 Lozenge Oral five times a day  fluconAZOLE   Tablet 400 milliGRAM(s) Oral daily      Heme / Onc:       GI:  pantoprazole    Tablet 40 milliGRAM(s) Oral two times a day  polyethylene glycol 3350 17 Gram(s) Oral daily PRN  senna 1 Tablet(s) Oral at bedtime PRN  sodium bicarbonate Mouth Rinse 10 milliLiter(s) Swish and Spit five times a day      Cardiovascular:   carvedilol 3.125 milliGRAM(s) Oral every 12 hours      Immunologic:       Other medications:   acetaminophen     Tablet .. 650 milliGRAM(s) Oral every 6 hours  allopurinol 300 milliGRAM(s) Oral daily  Biotene Dry Mouth Oral Rinse 5 milliLiter(s) Swish and Spit five times a day  chlorhexidine 4% Liquid 1 Application(s) Topical <User Schedule>  folic acid 1 milliGRAM(s) Oral daily  levETIRAcetam 500 milliGRAM(s) Oral two times a day  multivitamin 1 Tablet(s) Oral daily  sodium chloride 0.9%. 1000 milliLiter(s) IV Continuous <Continuous>      PRN:   acetaminophen     Tablet .. 650 milliGRAM(s) Oral every 6 hours PRN  metoclopramide Injectable 10 milliGRAM(s) IV Push every 6 hours PRN  morphine  - Injectable 2 milliGRAM(s) IV Push every 4 hours PRN  ondansetron Injectable 8 milliGRAM(s) IV Push every 8 hours PRN  polyethylene glycol 3350 17 Gram(s) Oral daily PRN  senna 1 Tablet(s) Oral at bedtime PRN  sodium chloride 0.9% lock flush 10 milliLiter(s) IV Push every 1 hour PRN      A/P: 56 year old male with relapsed, refractory MCL s/p R-CHOP x 4, RICE x 2, maintenance RTX, salvage Calquence, admitted for CAR T cell therapy with [employer] product. Disease status at time of admission is partial response.   Day + 7  Monitor for CRS and neurotox...follow CRS/neuro tox protocol - GIVEN TOCILIZUMAB 4/22 pm and  4/24 AM for CRS - 1     - Relapsed, refractory MCL   4/15- day - 4- flu / ctx 2/3 - continue CTX hydration until 24 hours post infusion of last dose   Strict I&O, daily weights, prn diuresis   CRS / ICANS score daily until infusion of CAR T cells (4/19/22) and twice daily thereafter   BID labs 0500 & 1500 - monitor for TLS   4/22-CRS grade 1 s/p Tocilizumab on tele started on Keppra as per protocol, pancx, started on cefepime  s/p dexamethasone 4 mg IV x1  4/24 F/U repeat blood cx's for fevers (4/22 and 4/24) s/p Toci x2 s/p dexamethasone 4 mg IV x2 also with CP: EKG WNL trops negative s/p morphine with good effect   4/25- CRS grade 2( persistent fevers) given Toci x1 and dexamethasone 4 mg IV overnight, continues to be febrile Tmax 104 F tremors given additional Dexamethasone 10 mg IV      -  Prophylactic measures   acyclovir   Oral Tab/Cap 400 milliGRAM(s) Oral every 8 hours  cefepime   IVPB      cefepime   IVPB 2000 milliGRAM(s) IV Intermittent every 8 hours  clotrimazole Lozenge 1 Lozenge Oral five times a day  fluconAZOLE   Tablet 400 milliGRAM(s) Oral daily  Keppra 500 q12h  GI Prophylaxis:  Protonix      - Cardiac / Hx Afib   F/U repeat troponin,  now CP free. ECG non ischemic   Continue Tele monitoring   Continue Coreg bid     -  Mouthcare - NS / NaHCO3 rinses, Mycelex, Biotene; Skin care      - Transfuse & replete electrolytes prn     - IV hydration, daily weights, strict I&O, prn diuresis      -  Antiemetics, anti-diarrhea medications:   LORazepam   Injectable 1 milliGRAM(s) IV Push every 6 hours PRN  metoclopramide Injectable 10 milliGRAM(s) IV Push every 6 hours PRN  ondansetron 8mg IV q 8 hours prn nausea      - CRS / ICANS scoring   4/18 CRS grade 0, ICANS score 10   4/19- CRS grade 0, ICANS score 10   4/20 AM - CRS grade 0, ICANS score 10   4/20-pm-CRS grade 0, ICANS score 10  4/21 AM - CRS grade 0, ICANS score 10   4/21 PM CRS grade 0, ICANS score 10  4/22 AM CRS grade 0, ICANS score 10  4/22 PM CRS grade 0, ICANS score 10 --> 9PM CRS score - 1, ICANS - 10  4/23 AM CRS grade 0, ICANS score 10  4/23 PM CRS grade 0, ICANS score 10  4/24 AM CRS grade 1 10am (grade 0 @ 8:30am), ICANS score 10  4/25M CRS grade 2( persistently febrile) ICANS score 10    I have written the above note for Dr. Banks who performed service with me in the room.   Nevin Gonzales PA-C    I have seen and examined patient with PA, I agree with above note as scribed.

## 2022-04-26 NOTE — CHART NOTE - NSCHARTNOTEFT_GEN_A_CORE
Nutrition Follow Up Note  Patient seen for: BMT follow-up     Chart reviewed, events noted. Per chart " 56 year old male with relapsed, refractory MCL s/p R-CHOP x 4, RICE x 2, maintenance RTX, salvage Calquence, admitted for CAR T cell therapy with [employer] product. Disease status at time of admission is partial response. Day +7"     Source: [X] Patient       [x] EMR        [X] RN            Diet, Regular:   GlutaSolve(Glutamine) 15gm pkg     Qty per Day:  1  Supplement Feeding Modality:  Oral  Ensure Clear Cans or Servings Per Day:  2       Frequency:  Daily (22 @ 14:27) [Active]    - Is current order appropriate/adequate? [] Yes  [X]  No: would like to decrease oral nutritional supplement frequency     - PO intake :   [] >75%  Adequate    [] 50-75%  Fair       [X] <50%  Poor    - Nutrition-related concerns:      - Pt reports ongoing poor appetite/PO intake, ~50% of meals consumed. Pt with multiple  complaints (RD Will communicate to N&D department). Food preferences obtained; RD to honor as able.      - Drinking Ensure Clear 1x/day (200 tom, 7 Gm protein), multiple unopened at bedside. RD encouraged Pt to drinking 2x/day, Pt requesting to decrease frequency of oral nutritional supplement in diet order so he can drink bottles at bedside. RD will provide High Protein Gelatin 1x/day (18 Gm protein).       - K+ Phos and Mg WNL today . Continue to monitor and replete electrolytes if low. Micronutrient supplementation: multivitamin, folic acid, vitamin K.     GI: Denies N/V at time of visit. Swallowing pain secondary to mucositis, no chewing difficulty reported. Last BM today  . No constipation reported   Bowel Regimen? [X] Yes (senna, Miralax)    Nutrition focused physical exam conducted:    Subcutaneous fat loss: [mild] Orbital fat pads region, [mild ]Buccal fat region  Muscle wasting: [ mild]Temples region, [mild ]Clavicle region    Weights:   Admit weight in k.4 (-14)  Daily Weight in k.6 (-), Weight in k.7 (), Weight in k.9 (-), Weight in k.4 (), Weight in k.1 (-), Weight in k.6 (-), Weight in k.6 (-20)  ** Weight trending downward (4% weight loss x ~2 weeks) Of note Pt has received IV fluids and diuretics in-house. Weight changes may be partially secondary to fluid shifts. RD to continue to monitor weight trends as able.     Nutritionally Pertinent MEDICATIONS  (STANDING):  acyclovir   Oral Tab/Cap  allopurinol  carvedilol  cefepime   IVPB  cefepime   IVPB  clotrimazole Lozenge  fluconAZOLE   Tablet  folic acid  multivitamin  pantoprazole    Tablet  phytonadione   Solution  sodium bicarbonate Mouth Rinse  sodium chloride 0.9%.    Pertinent Labs:  @ 05:41: Na 137, BUN 17, Cr 1.09, <H>, K+ 4.5, Phos 3.6, Mg 2.1, Alk Phos 71, ALT/SGPT 53<H>, AST/SGOT 24, HbA1c --    Skin per nursing documentation: no pressure injury noted   Edema: note noted per nursing documentation     Estimated Needs:   [X] no change since previous assessment  [] recalculated:     Previous Nutrition Diagnosis #1: Predicted inadequate protein-energy intake   Nutrition Diagnosis is: [X] upgraded    Previous Nutrition Diagnosis #2: Predicted inadequate protein-energy intake   Nutrition Diagnosis is: [X] upgraded    New Nutrition Diagnosis: [X] Severe acute Malnutrition RT decreased ability to consume adequate protein-energy in setting of treatment side effects AEB <50% EER x > 5 days, mild muscle/fat depletion, 4% weight loss x ~2 weeks.    Nutrition Care Plan:  [X] In Progress  [] Achieved  [] Not applicable    Nutrition Interventions:     Education Provided:       [X] Yes: Discussed importance of adequate consumption of meals/supplements to optimize protein-energy intake. Encouraged small/frequent meals, nutrient dense snacks, prioritizing protein foods at meal time. Mucositis nutrition therapy.        Recommendations:         [X] Continue current diet order: Regular            [X] Change oral nutrition supplement: Ensure Clear 1x/day            [X] RD will provide High Protein Gelatin 1x/day      [X] Encourage adequate consumption of meals/supplements to optimize protein-energy intake.      [X] Continue to monitor and replete electrolytes if low.      [X] Continue current micronutrient supplementation: multivitamin and folic acid.      [X] New malnutrition notification sent.     Monitoring and Evaluation:   Continue to monitor nutritional intake, tolerance to diet prescription, weights, labs, skin integrity    RD remains available upon request and will follow up per protocol  Anna Krishnamurthy RDN, CDN - Pager #[phone] Nutrition Follow Up Note  Patient seen for: BMT follow-up     Chart reviewed, events noted. Per chart " 56 year old male with relapsed, refractory MCL s/p R-CHOP x 4, RICE x 2, maintenance RTX, salvage Calquence, admitted for CAR T cell therapy with [employer] product. Disease status at time of admission is partial response. Day +7"     Source: [X] Patient       [x] EMR        [X] RN            Diet, Regular:   GlutaSolve(Glutamine) 15gm pkg     Qty per Day:  1  Supplement Feeding Modality:  Oral  Ensure Clear Cans or Servings Per Day:  2       Frequency:  Daily (22 @ 14:27) [Active]    - Is current order appropriate/adequate? [] Yes  [X]  No: would like to decrease oral nutritional supplement frequency     - PO intake :   [] >75%  Adequate    [] 50-75%  Fair       [X] <50%  Poor    - Nutrition-related concerns:      - Pt reports ongoing poor appetite/PO intake, ~50% of meals consumed. Pt with multiple  complaints (RD Will communicate to N&D department). Food preferences obtained; RD to honor as able.      - Drinking Ensure Clear 1x/day (200 tom, 7 Gm protein), multiple unopened at bedside. RD encouraged Pt to drinking 2x/day, Pt requesting to decrease frequency of oral nutritional supplement in diet order so he can drink bottles at bedside. RD will provide High Protein Gelatin 1x/day (18 Gm protein).       - K+ Phos and Mg WNL today . Continue to monitor and replete electrolytes if low. Micronutrient supplementation: multivitamin, folic acid, vitamin K.     GI: Denies N/V at time of visit. Swallowing pain secondary to mucositis, no chewing difficulty reported. Last BM today  . No constipation reported   Bowel Regimen? [X] Yes (senna, Miralax)    Nutrition focused physical exam conducted:    Subcutaneous fat loss: [mild] Orbital fat pads region, [mild ]Buccal fat region  Muscle wasting: [ mild]Temples region, [mild ]Clavicle region    Weights:   Admit weight in k.4 (-14)  Daily Weight in k.6 (-), Weight in k.7 (), Weight in k.9 (-), Weight in k.4 (), Weight in k.1 (-), Weight in k.6 (-), Weight in k.6 (-20)  ** Weight trending downward (4% weight loss x ~2 weeks) Of note Pt has received IV fluids and diuretics in-house. Weight changes may be partially secondary to fluid shifts. RD to continue to monitor weight trends as able.     Nutritionally Pertinent MEDICATIONS  (STANDING):  acyclovir   Oral Tab/Cap  allopurinol  carvedilol  cefepime   IVPB  cefepime   IVPB  clotrimazole Lozenge  fluconAZOLE   Tablet  folic acid  multivitamin  pantoprazole    Tablet  phytonadione   Solution  sodium bicarbonate Mouth Rinse  sodium chloride 0.9%.    Pertinent Labs:  @ 05:41: Na 137, BUN 17, Cr 1.09, <H>, K+ 4.5, Phos 3.6, Mg 2.1, Alk Phos 71, ALT/SGPT 53<H>, AST/SGOT 24, HbA1c --    Skin per nursing documentation: no pressure injury noted   Edema: note noted per nursing documentation     Estimated Needs:   [X] no change since previous assessment  [] recalculated:     Previous Nutrition Diagnosis #1: Predicted inadequate protein-energy intake   Nutrition Diagnosis is: [X] upgraded    Previous Nutrition Diagnosis #2: Predicted inadequate protein-energy intake   Nutrition Diagnosis is: [X] upgraded    New Nutrition Diagnosis: [X] Moderate acute Malnutrition RT decreased ability to consume adequate protein-energy in setting of treatment side effects AEB <50% EER x > 5 days, mild muscle/fat depletion, 4% weight loss x ~2 weeks.    Nutrition Care Plan:  [X] In Progress  [] Achieved  [] Not applicable    Nutrition Interventions:     Education Provided:       [X] Yes: Discussed importance of adequate consumption of meals/supplements to optimize protein-energy intake. Encouraged small/frequent meals, nutrient dense snacks, prioritizing protein foods at meal time. Mucositis nutrition therapy.        Recommendations:         [X] Continue current diet order: Regular            [X] Change oral nutrition supplement: Ensure Clear 1x/day            [X] RD will provide High Protein Gelatin 1x/day      [X] Encourage adequate consumption of meals/supplements to optimize protein-energy intake.      [X] Continue to monitor and replete electrolytes if low.      [X] Continue current micronutrient supplementation: multivitamin and folic acid.      [X] New malnutrition notification sent.     Monitoring and Evaluation:   Continue to monitor nutritional intake, tolerance to diet prescription, weights, labs, skin integrity    RD remains available upon request and will follow up per protocol  Anna Krishnamurthy RDN, CDN - Pager #[phone]

## 2022-04-26 NOTE — PROGRESS NOTE ADULT - SUBJECTIVE AND OBJECTIVE BOX
HPI:  This is a 56 year old male with a history of atrial fibrillation (s/p ablation x 2, internal loop recorder) and  MCL, initially diagnosed 10/2016. He was initially seen at [facility] for a left inguinal mass. CT scans 3/21/16 showed left sided pelvic and inguinal adenopathy. A left inguinal lymph node biopsy 16 showed morphologic and immunophenotypic findings consistent with MCL. He was treated with 4 cycles of R-CHOP, and 2 cycles of RICE consolidation. A bone marrow biopsy 2/10/17 showed normocellular bone marrow. He was admitted 3/27/17 for an autologous pbsct with CBV prep, and received the cells 17. He engrafted 17. He was discharged and completed maintenance Rituxan. In 10/21, he noticed a lump in the right groin. A lymph node biopsy 21 was consistent with relapsed MCL. He was treated with Calquence. He didn't tolerate Calquence well, with reports of frequent abdominal pain. He is admitted today for CAR T therapy with Tecartus product for relapsed, refractory MCL. His disease status at this time is partial remission.  (2022 12:36)            22 @ 16:10  [facility] BMAR 719 W1    Overnight events: Febrile espisode  Staff reports: PT is more fatigued since yesterday. He is using morphine for HA and body aches associated with the fever  Patient: He is much more fatigued today and sleepy. He also reports throat pain that partially interferes with the ingestion of food.           RECENT VITALS/LABS/MEDICATIONS/ASSAYS     22 @ 16:10    T(C): 37.6 (22 @ 13:18), Max: 39.4 (22 @ 17:37)  HR: 72 (22 @ 13:18) (72 - 94)  BP: 130/85 (22 @ 13:18) (109/61 - 145/81)  RR: 18 (22 @ 13:18) (18 - 20)  SpO2: 97% (22 @ 13:18) (94% - 97%)  Wt(kg): --      2022 07:01  -  2022 07:00  --------------------------------------------------------  IN:    IV PiggyBack: 108 mL    Oral Fluid: 570 mL    sodium chloride 0.9%: 2549 mL  Total IN: 3227 mL    OUT:    Voided (mL): 3450 mL  Total OUT: 3450 mL    Total NET: -223 mL      2022 07: 16:10  --------------------------------------------------------  IN:    Oral Fluid: 480 mL    sodium chloride 0.9%: 684 mL  Total IN: 1164 mL    OUT:    Voided (mL): 1450 mL  Total OUT: 1450 mL    Total NET: -286 mL           @ 07: @ 07:00  --------------------------------------------------------  IN: 3227 mL / OUT: 3450 mL / NET: -223 mL     @ 07: @ 16:10  --------------------------------------------------------  IN: 1164 mL / OUT: 1450 mL / NET: -286 mL      CAPILLARY BLOOD GLUCOSE            acetaminophen     Tablet .. 650 milliGRAM(s) Oral every 6 hours PRN  acetaminophen     Tablet .. 650 milliGRAM(s) Oral every 6 hours  acyclovir   Oral Tab/Cap 400 milliGRAM(s) Oral every 8 hours  allopurinol 300 milliGRAM(s) Oral daily  Biotene Dry Mouth Oral Rinse 5 milliLiter(s) Swish and Spit five times a day  carvedilol 3.125 milliGRAM(s) Oral every 12 hours  cefepime   IVPB      cefepime   IVPB 2000 milliGRAM(s) IV Intermittent every 8 hours  chlorhexidine 4% Liquid 1 Application(s) Topical <User Schedule>  clotrimazole Lozenge 1 Lozenge Oral five times a day  fluconAZOLE   Tablet 400 milliGRAM(s) Oral daily  folic acid 1 milliGRAM(s) Oral daily  levETIRAcetam 500 milliGRAM(s) Oral two times a day  loperamide 2 milliGRAM(s) Oral every 4 hours PRN  metoclopramide Injectable 10 milliGRAM(s) IV Push every 6 hours PRN  morphine  - Injectable 2 milliGRAM(s) IV Push every 4 hours PRN  multivitamin 1 Tablet(s) Oral daily  ondansetron Injectable 8 milliGRAM(s) IV Push every 8 hours PRN  pantoprazole    Tablet 40 milliGRAM(s) Oral two times a day  phytonadione   Solution 10 milliGRAM(s) Oral daily  polyethylene glycol 3350 17 Gram(s) Oral daily PRN  senna 1 Tablet(s) Oral at bedtime PRN  sodium bicarbonate Mouth Rinse 10 milliLiter(s) Swish and Spit five times a day  sodium chloride 0.9% lock flush 10 milliLiter(s) IV Push every 1 hour PRN  sodium chloride 0.9%. 1000 milliLiter(s) IV Continuous <Continuous>              137  |  101  |  17  ----------------------------<  151<H>  4.5   |  23  |  1.09    Ca    8.1<L>      2022 05:41  Phos  3.6       Mg     2.1         TPro  6.2  /  Alb  4.0  /  TBili  0.2  /  DBili  <0.1  /  AST  24  /  ALT  53<H>  /  AlkPhos  71        Procalc  BNPSerum Pro-Brain Natriuretic Peptide: 144 pg/mL (22 @ 05:41)  Serum Pro-Brain Natriuretic Peptide: 237 pg/mL (22 @ 06:00)    ABG                          11.7   7.67  )-----------( 176      ( 2022 15:47 )             35.7   PT/INR - ( 2022 05:47 )   PT: 15.9 sec;   INR: 1.37 ratio         PTT - ( 2022 05:47 )  PTT:23.1 sec      Urinalysis Basic - ( 2022 09:50 )    Color: Light Yellow / Appearance: Clear / S.018 / pH: x  Gluc: x / Ketone: Negative  / Bili: Negative / Urobili: Negative   Blood: x / Protein: Negative / Nitrite: Negative   Leuk Esterase: Negative / RBC: x / WBC x   Sq Epi: x / Non Sq Epi: x / Bacteria: x      blood and urine cultures                    ===================================================================================  Palliative Global Assessment-Initial   A review of the paper chart has been conducted  HCP form present:               Yes and valid []               Yes but invalid []                No [x]   MOLST present:                   Yes and valid []               Yes but invalid []                No [x]  Incapacity form present:   Yes and valid []                Yes but invalid []                No []                 N/A [x]  Living Will:                            Yes and valid []                Yes but invalid []                No [x]      Family Heatlh Care Decision Act Surrogate Decision Maker Hierarchy  [facility] Article [address] Guardian-->Spouse or domestic partner--> Adult child-->Parent-->Sibling--> Close friend      Contacts listed in the paper or electronic chart  Name Aga Kapoor Wife  Number(s) [phone]  Translation Required: None  Spouse or Partner: Aga  Family unit: Wife and children, his youngest is 13  Family history of hematologic malignancy: None  Residence: [ ] Apartment   [x] House  [ ] Other  Degree of functionality in the home: Full   Performance Status (PPSv2): 80  BMI:BMI (kg/m2): 30.6 (22 @ 10:40)  Engagement in the home:  Employment: [ ] Currently employed [ x] Exited workforce due to illness  [ ] Retired prior to illness  [ ] No/distant history of employment   Support network (degree):  ---Family:        [ ] Low  [ ] Moderate  [ x] High  ---Neighbors: [ x] Low  [ ] Moderate  [ ] High  ---Social:         [ ] Low  [ ] Moderate  [ x] High  ---Spiritual:    [ x] Low  [ ] Moderate  [ ] High  Financial concerns: TBD  Coping Strategies: Listening to music and speaking with his wife  Caregiver burden/fatigue/needs: Childcare issues/concerns given his wife's visitation/  Grief identified: [] Yes [x] No  Medical communication and decision making preferences: TBD        PERTINENT PM/SXH:   Non Hodgkin&#x27;s lymphoma    Atrial flutter    Migraine    Sinus bradycardia    Paroxysmal atrial fibrillation    GERD (gastroesophageal reflux disease)      H/O total knee replacement    S/P right knee arthroscopy    History of arthroscopy of left shoulder    H/O prior ablation treatment    Status post placement of implantable loop recorder    H/O stem cell transplant      FAMILY HISTORY:  Family history of hypertension (Mother)  Mother    Family history of diabetes mellitus (Father)  Father      ITEMS NOT CHECKED ARE NOT PRESENT    SOCIAL HISTORY:   Significant other/partner[x ]  Children[x ]  Taoism/Spirituality:  Substance hx:  [ ]   Tobacco hx:  [ ]   Alcohol hx: [ ]   Home Opioid hx:  [ ] I-Stop Reference No:  Living Situation: [ ]Home  [ ]Long term care  [ ]Rehab [ ]Other    ADVANCE DIRECTIVES:    DNR  MOLST  [ ]  Living Will  [ ]   DECISION MAKER(s):  [ ] Health Care Proxy(s)  [ ] Surrogate(s)  [ ] Guardian           Name(s): Phone Number(s):    BASELINE (I)ADL(s) (prior to admission):  [place]: [ ]Total  [ ] Moderate [ ]Dependent    Allergies    No Known Allergies    Intolerances         PRESENT SYMPTOMS: [ ]Unable to obtain due to poor mentation   Source if other than patient:  [ ]Family   [ ]Team [ ] Inferred/Assessed    Pain: [ x]yes [ ]no  QOL impact - significant  Location -  throat                  Aggravating factors - oral intake  Quality - burning   Radiation - none  Timing- with consumption  Severity (0-10 scale):  Minimal acceptable level (0-10 scale):      PAIN AD Score:     [email or web address] (press ctrl +  left click to view)    [ ] Inferred Symptoms    Fatigue:                             [ ] None  [ ]Mild [ x]Moderate [ ]Severe  Drowsiness:                      [ ] None  [ ]Mild [ x ]Moderate [ ]Severe  Nausea:                             [ x  ] None  [ ]Mild [  ]Moderate [  ]Severe  Dysgeusia:                         [  } None  [x ]Mild [  ]Moderate [ ]Severe  Loss of appetite:              [  ] None  [ x]Mild   ]Moderate [ ]Severe  Constipation:                    [x ] None  [ ]Mild [ ]Moderate [ ]Severe  Dyspnea:                           [x ] None  [ ]Mild [ ]Moderate [ ]Severe  Anxiety:                             [ x] None  [  ]Mild [ ]Moderate [ ]Severe  Depression:                      [x ] None  [ ]Mild [ ]Moderate [ ]Severe  Cognitive changes:          [ x] None  [ ]Mild [ ]Moderate [ ]Severe  Insomnia      :                    [ x ] None  [  ]Mild [ ]Moderate [ ]Severe  Isolation:                           [x ] None  [ ]Mild [ ]Moderate [ ]Severe  Loneliness:                        [x ] None  [ ]Mild [ ]Moderate [ ]Severe  Lack of   Wellbeing:                        [  ] None  [ ]Mild [ x]Moderate [ ]Severe    Other Symptoms: None  [x ]All other review of systems negative     Palliative Performance Status Version 2:      80   %    [email or web address]/files/[email or web address]_performance_[email or web address]_[email or web address]  PHYSICAL EXAM:    GENERAL:  [ ]Alert  [ ]Oriented x   [ x]Lethargic  [ ]Cachexia  [ ]Unarousable  [ ]Verbal  [ ]Non-Verbal [ ] Engaging   [  ] Confused  [  ] No distress  Behavioral:   [ ] Anxiety  [ ] Delirium [ ] Agitation [ x] Calm   [  ] Restless [ ] Other  HEENT:  [x ]Normal   [ ]Dry mouth   [ ]ET Tube/Trach  [ ]Oral lesions   [ ] NGT  [ ] Mucositis [ ] Oral  Bleeding  PULMONARY:   [ x]Clear [ ]Tachypnea  [ ]Audible excessive secretions [  ] No tachypnea  [ ]Rhonchi        [ ]Right [ ]Left [ ]Bilateral  [ ]Crackles        [ ]Right [ ]Left [ ]Bilateral  [ ]Wheezing     [ ]Right [ ]Left [ ]Bilateral  [ ]Diminished breath sounds [ ]right [ ]left [ ]bilateral  CARDIOVASCULAR:    [ x]Regular [ ]Irregular [ ]Tachy  [ ]Venkata [ ]Murmur [ ] JVD  GASTROINTESTINAL:  [x ]Soft  [ ]Distended   [ ]+BS  [ ]Non tender [ ]Tender  [ ]PEG [ ]OGT/ NGT  Last BM:   GENITOURINARY:  [x ]Normal [ ] Incontinent   [ ]Oliguria/Anuria   [ ]Hinds  MUSCULOSKELETAL:   [  ]Normal   [x ]Weakness due to fatigue  [ ]Bed/Wheelchair bound [ ]Edema  NEUROLOGIC:   [ x]No focal deficits  [ ]Cognitive impairment  [ ]Dysphagia [ ]Dysarthria [ ]Paresis [ ]Other   SKIN:   [x ]Normal    [ ]Rash  [ ]Pressure ulcer(s)   [  ] Lesion   [    ]  Wounds       Present on admission [ ]y [ ]n    CRITICAL CARE:  [ ] Shock Present  [ ]Septic [ ]Cardiogenic [ ]Neurologic [ ]Hypovolemic  [ ]  Vasopressors [ ]  Inotropes   [ ]Respiratory failure present [ ]Mechanical ventilation [ ]Non-invasive ventilatory support [ ]High flow  [ ]Acute  [ ]Chronic [ ]Hypoxic  [ ]Hypercarbic [ ]Other  [ ]Other organ failure          RADIOLOGY & ADDITIONAL STUDIES:    PROTEIN CALORIE MALNUTRITION PRESENT: [ ]mild [ ]moderate [ ]severe [ ]underweight [ ]morbid obesity  https://www.andeal.org/vault/2440/web/files/ONC/Table_Clinical%20Characteristics%20to%20Document%20Malnutrition-White%20JV%20et%20al%205317.pdf    Height (cm): 181 (22 @ 10:40), 180.3 (22 @ 08:57), 180 (22 @ 16:31)  Weight (kg): 100.4 (22 @ 10:40), 101.999 (22 @ 17:00), 98.4 (22 @ 08:57)  BMI (kg/m2): 30.6 (22 @ 10:40), 31.4 (22 @ 17:00), 30.3 (22 @ 08:57)    [ ]PPSV2 < or = to 30% [ ]significant weight loss  [ ]poor nutritional intake  [ ]anasarca      [ ]Artificial Nutrition      REFERRALS:   [ ]Chaplaincy  [ ]Hospice  [ ]Child Life  [ ]Social Work  [ ]Case management [ ]Holistic Therapy     Goals of Care Document:

## 2022-04-26 NOTE — PROGRESS NOTE ADULT - NS PANP COMMENT GEN_ALL_CORE FT
Admitted for CAR T cell therapy with Tecartus (bresucabtagene autoleucel)  today is + 6..some nausea..now improved...monitor closely for CRS and neurotox...pt is at lower risk for these complications given minimal disease burden..hoever he has been on and off with high temps since fri evening..first dose of toci given for grade 1 crs......s/p 4 total  doses of  toci and dex 4 mg X 2    1. Admit to BMTU   2. Day -5 through day -1 record CARTOX / ICANS score daily   3. Day - 5 through day - 3 Fludarabine 30mg / m2 IV given over 30 minutes daily   4. Day - 5 - start cyclophosphamide hydration - 0.9% NS + 10mEq KCL / L at 75 ml/m2 and continue for 24 hours post infusion of last dose of CTX   5. Day - 5 through day - 3 CTX 500mg / m2 IV QD to be given over 2 hours   6. Starting day - 5 monitor labs BID: CBC with differential, CMP, LDH, uric acid, mag, phos, CPK, BNP, PT / PTT / INR / Fibrinogen, d-dimer, CRP, hepatic profile, ferritin  7. Weekly IL-6 levels on Monday, twice weekly (Monday / Thursday) CMV / EBV PCR, three times a week (Monday / Wednesday / Friday) type and screen   8. On day - 5 start allopurinol 300mg po QD   9. Day -2 and -1 REST DAYS   10. On day -1 begin to record CARTOX / ICANS score q 12 hours   11. Day 0 infuse Brexucabtagene autoleucel  hx of afib...seen by [facility] cardiology  12. F/U cx's..on cefapime, acyclovir and fluconazole..send rvp  13 Cont tele and keppra  4/16-17 Doing well, I/Os are negative, transient lip swelling, now resolved, some fatigue  4/18, 19, 20, 21, 22 CRS 0, ICANS 10  4/22/22-Chase fever overnight, chills, given Toci, was started on Cefipime. Stable. CRS / ICANS scoring this morning 10.  4/23/22, 4/24/22 CRS 1 ICANS 10  4/24/22- Fever 102oF, complaints of bilateral upper and lower extremities cramps, chest discomfort, EKG no changes. Patient was treated with Toci/Decadron as per protocol. Morphine prn for pain. Clinically improved. Pancultures sent.  4/25/22 CRS 2...persistent high temps.....ICANS 10...tremor noted with fever...dex 10 mg dosed today Admitted for CAR T cell therapy with Tecartus (bresucabtagene autoleucel)  today is + 7..some nausea..now improved...monitor closely for CRS and neurotox...pt is at lower risk for these complications given minimal disease burden..hoever he has been on and off with high temps since fri evening..first dose of toci given for grade 1 crs......s/p 4 total  doses of  toci and dex 4 mg X 4 and 10 mg X 1...improved    1. Admit to BMTU   2. Day -5 through day -1 record CARTOX / ICANS score daily   3. Day - 5 through day - 3 Fludarabine 30mg / m2 IV given over 30 minutes daily   4. Day - 5 - start cyclophosphamide hydration - 0.9% NS + 10mEq KCL / L at 75 ml/m2 and continue for 24 hours post infusion of last dose of CTX   5. Day - 5 through day - 3 CTX 500mg / m2 IV QD to be given over 2 hours   6. Starting day - 5 monitor labs BID: CBC with differential, CMP, LDH, uric acid, mag, phos, CPK, BNP, PT / PTT / INR / Fibrinogen, d-dimer, CRP, hepatic profile, ferritin  7. Weekly IL-6 levels on Monday, twice weekly (Monday / Thursday) CMV / EBV PCR, three times a week (Monday / Wednesday / Friday) type and screen   8. On day - 5 start allopurinol 300mg po QD   9. Day -2 and -1 REST DAYS   10. On day -1 begin to record CARTOX / ICANS score q 12 hours   11. Day 0 infuse Brexucabtagene autoleucel  hx of afib...seen by [facility] cardiology  12. F/U cx's..on cefapime, acyclovir and fluconazole..send rvp  13 Cont tele and keppra  4/16-17 Doing well, I/Os are negative, transient lip swelling, now resolved, some fatigue  4/18, 19, 20, 21, 22 CRS 0, ICANS 10  4/22/22-Chase fever overnight, chills, given Toci, was started on Cefipime. Stable. CRS / ICANS scoring this morning 10.  4/23/22, 4/24/22 CRS 1 ICANS 10  4/24/22- Fever 102oF, complaints of bilateral upper and lower extremities cramps, chest discomfort, EKG no changes. Patient was treated with Toci/Decadron as per protocol. Morphine prn for pain. Clinically improved. Pancultures sent.  4/25/22 CRS 2...persistent high temps.....ICANS 10...tremor noted with fever...dex 10 mg dosed today  4/26/22 CRS 1, Icans 10

## 2022-04-27 ENCOUNTER — TRANSCRIPTION ENCOUNTER (OUTPATIENT)
Age: 57
End: 2022-04-27

## 2022-04-27 LAB
ALBUMIN SERPL ELPH-MCNC: 3.9 G/DL — SIGNIFICANT CHANGE UP (ref 3.3–5)
ALBUMIN SERPL ELPH-MCNC: 4.3 G/DL — SIGNIFICANT CHANGE UP (ref 3.3–5)
ALP SERPL-CCNC: 68 U/L — SIGNIFICANT CHANGE UP (ref 40–120)
ALP SERPL-CCNC: 70 U/L — SIGNIFICANT CHANGE UP (ref 40–120)
ALT FLD-CCNC: 67 U/L — HIGH (ref 10–45)
ALT FLD-CCNC: 69 U/L — HIGH (ref 10–45)
ANION GAP SERPL CALC-SCNC: 11 MMOL/L — SIGNIFICANT CHANGE UP (ref 5–17)
ANION GAP SERPL CALC-SCNC: 13 MMOL/L — SIGNIFICANT CHANGE UP (ref 5–17)
APTT BLD: 23.2 SEC — LOW (ref 27.5–35.5)
AST SERPL-CCNC: 20 U/L — SIGNIFICANT CHANGE UP (ref 10–40)
AST SERPL-CCNC: 28 U/L — SIGNIFICANT CHANGE UP (ref 10–40)
BASOPHILS # BLD AUTO: 0.07 K/UL — SIGNIFICANT CHANGE UP (ref 0–0.2)
BASOPHILS NFR BLD AUTO: 1 % — SIGNIFICANT CHANGE UP (ref 0–2)
BILIRUB DIRECT SERPL-MCNC: <0.1 MG/DL — SIGNIFICANT CHANGE UP (ref 0–0.3)
BILIRUB INDIRECT FLD-MCNC: >0.1 MG/DL — LOW (ref 0.2–1)
BILIRUB SERPL-MCNC: 0.2 MG/DL — SIGNIFICANT CHANGE UP (ref 0.2–1.2)
BILIRUB SERPL-MCNC: 0.2 MG/DL — SIGNIFICANT CHANGE UP (ref 0.2–1.2)
BLD GP AB SCN SERPL QL: NEGATIVE — SIGNIFICANT CHANGE UP
BUN SERPL-MCNC: 19 MG/DL — SIGNIFICANT CHANGE UP (ref 7–23)
BUN SERPL-MCNC: 19 MG/DL — SIGNIFICANT CHANGE UP (ref 7–23)
CALCIUM SERPL-MCNC: 8.1 MG/DL — LOW (ref 8.4–10.5)
CALCIUM SERPL-MCNC: 8.3 MG/DL — LOW (ref 8.4–10.5)
CHLORIDE SERPL-SCNC: 98 MMOL/L — SIGNIFICANT CHANGE UP (ref 96–108)
CHLORIDE SERPL-SCNC: 99 MMOL/L — SIGNIFICANT CHANGE UP (ref 96–108)
CK SERPL-CCNC: 17 U/L — LOW (ref 30–200)
CO2 SERPL-SCNC: 25 MMOL/L — SIGNIFICANT CHANGE UP (ref 22–31)
CO2 SERPL-SCNC: 25 MMOL/L — SIGNIFICANT CHANGE UP (ref 22–31)
CREAT SERPL-MCNC: 0.93 MG/DL — SIGNIFICANT CHANGE UP (ref 0.5–1.3)
CREAT SERPL-MCNC: 1.04 MG/DL — SIGNIFICANT CHANGE UP (ref 0.5–1.3)
CRP SERPL-MCNC: <3 MG/L — SIGNIFICANT CHANGE UP (ref 0–4)
D DIMER BLD IA.RAPID-MCNC: 331 NG/ML DDU — HIGH
EGFR: 84 ML/MIN/1.73M2 — SIGNIFICANT CHANGE UP
EGFR: 96 ML/MIN/1.73M2 — SIGNIFICANT CHANGE UP
EOSINOPHIL # BLD AUTO: 0.71 K/UL — HIGH (ref 0–0.5)
EOSINOPHIL NFR BLD AUTO: 10 % — HIGH (ref 0–6)
FERRITIN SERPL-MCNC: 158 NG/ML — SIGNIFICANT CHANGE UP (ref 30–400)
FIBRINOGEN PPP-MCNC: 284 MG/DL — LOW (ref 330–520)
GLUCOSE SERPL-MCNC: 109 MG/DL — HIGH (ref 70–99)
GLUCOSE SERPL-MCNC: 140 MG/DL — HIGH (ref 70–99)
HCT VFR BLD CALC: 36.5 % — LOW (ref 39–50)
HCT VFR BLD CALC: 38.8 % — LOW (ref 39–50)
HGB BLD-MCNC: 12.2 G/DL — LOW (ref 13–17)
HGB BLD-MCNC: 12.6 G/DL — LOW (ref 13–17)
INR BLD: 1.29 RATIO — HIGH (ref 0.88–1.16)
LDH SERPL L TO P-CCNC: 168 U/L — SIGNIFICANT CHANGE UP (ref 50–242)
LDH SERPL L TO P-CCNC: 178 U/L — SIGNIFICANT CHANGE UP (ref 50–242)
LYMPHOCYTES # BLD AUTO: 1.99 K/UL — SIGNIFICANT CHANGE UP (ref 1–3.3)
LYMPHOCYTES # BLD AUTO: 28 % — SIGNIFICANT CHANGE UP (ref 13–44)
MAGNESIUM SERPL-MCNC: 2.1 MG/DL — SIGNIFICANT CHANGE UP (ref 1.6–2.6)
MAGNESIUM SERPL-MCNC: 2.1 MG/DL — SIGNIFICANT CHANGE UP (ref 1.6–2.6)
MANUAL SMEAR VERIFICATION: SIGNIFICANT CHANGE UP
MCHC RBC-ENTMCNC: 30.6 PG — SIGNIFICANT CHANGE UP (ref 27–34)
MCHC RBC-ENTMCNC: 31.3 PG — SIGNIFICANT CHANGE UP (ref 27–34)
MCHC RBC-ENTMCNC: 32.5 GM/DL — SIGNIFICANT CHANGE UP (ref 32–36)
MCHC RBC-ENTMCNC: 33.4 GM/DL — SIGNIFICANT CHANGE UP (ref 32–36)
MCV RBC AUTO: 93.6 FL — SIGNIFICANT CHANGE UP (ref 80–100)
MCV RBC AUTO: 94.2 FL — SIGNIFICANT CHANGE UP (ref 80–100)
MONOCYTES # BLD AUTO: 1.35 K/UL — HIGH (ref 0–0.9)
MONOCYTES NFR BLD AUTO: 19 % — HIGH (ref 2–14)
NEUTROPHILS # BLD AUTO: 2.2 K/UL — SIGNIFICANT CHANGE UP (ref 1.8–7.4)
NEUTROPHILS NFR BLD AUTO: 30 % — LOW (ref 43–77)
NEUTS BAND # BLD: 1 % — SIGNIFICANT CHANGE UP (ref 0–8)
NRBC # BLD: 0 /100 WBCS — SIGNIFICANT CHANGE UP (ref 0–0)
NRBC # BLD: 1 /100 — HIGH (ref 0–0)
NT-PROBNP SERPL-SCNC: 194 PG/ML — SIGNIFICANT CHANGE UP (ref 0–300)
PHOSPHATE SERPL-MCNC: 3 MG/DL — SIGNIFICANT CHANGE UP (ref 2.5–4.5)
PHOSPHATE SERPL-MCNC: 3.7 MG/DL — SIGNIFICANT CHANGE UP (ref 2.5–4.5)
PLAT MORPH BLD: NORMAL — SIGNIFICANT CHANGE UP
PLATELET # BLD AUTO: 191 K/UL — SIGNIFICANT CHANGE UP (ref 150–400)
PLATELET # BLD AUTO: 211 K/UL — SIGNIFICANT CHANGE UP (ref 150–400)
POTASSIUM SERPL-MCNC: 4 MMOL/L — SIGNIFICANT CHANGE UP (ref 3.5–5.3)
POTASSIUM SERPL-MCNC: 4.8 MMOL/L — SIGNIFICANT CHANGE UP (ref 3.5–5.3)
POTASSIUM SERPL-SCNC: 4 MMOL/L — SIGNIFICANT CHANGE UP (ref 3.5–5.3)
POTASSIUM SERPL-SCNC: 4.8 MMOL/L — SIGNIFICANT CHANGE UP (ref 3.5–5.3)
PROT SERPL-MCNC: 5.8 G/DL — LOW (ref 6–8.3)
PROT SERPL-MCNC: 6.4 G/DL — SIGNIFICANT CHANGE UP (ref 6–8.3)
PROTHROM AB SERPL-ACNC: 14.9 SEC — HIGH (ref 10.5–13.4)
RBC # BLD: 3.9 M/UL — LOW (ref 4.2–5.8)
RBC # BLD: 4.12 M/UL — LOW (ref 4.2–5.8)
RBC # FLD: 13.5 % — SIGNIFICANT CHANGE UP (ref 10.3–14.5)
RBC # FLD: 13.6 % — SIGNIFICANT CHANGE UP (ref 10.3–14.5)
RBC BLD AUTO: SIGNIFICANT CHANGE UP
RH IG SCN BLD-IMP: POSITIVE — SIGNIFICANT CHANGE UP
SODIUM SERPL-SCNC: 135 MMOL/L — SIGNIFICANT CHANGE UP (ref 135–145)
SODIUM SERPL-SCNC: 136 MMOL/L — SIGNIFICANT CHANGE UP (ref 135–145)
URATE SERPL-MCNC: 3.2 MG/DL — LOW (ref 3.4–8.8)
URATE SERPL-MCNC: 3.5 MG/DL — SIGNIFICANT CHANGE UP (ref 3.4–8.8)
VARIANT LYMPHS # BLD: 11 % — HIGH (ref 0–6)
WBC # BLD: 5.29 K/UL — SIGNIFICANT CHANGE UP (ref 3.8–10.5)
WBC # BLD: 7.1 K/UL — SIGNIFICANT CHANGE UP (ref 3.8–10.5)
WBC # FLD AUTO: 5.29 K/UL — SIGNIFICANT CHANGE UP (ref 3.8–10.5)
WBC # FLD AUTO: 7.1 K/UL — SIGNIFICANT CHANGE UP (ref 3.8–10.5)

## 2022-04-27 PROCEDURE — 93010 ELECTROCARDIOGRAM REPORT: CPT

## 2022-04-27 PROCEDURE — 70553 MRI BRAIN STEM W/O & W/DYE: CPT | Mod: 26

## 2022-04-27 PROCEDURE — 70450 CT HEAD/BRAIN W/O DYE: CPT | Mod: 26

## 2022-04-27 PROCEDURE — 99291 CRITICAL CARE FIRST HOUR: CPT

## 2022-04-27 RX ORDER — ATOVAQUONE 750 MG/5ML
5 SUSPENSION ORAL
Qty: 300 | Refills: 3
Start: 2022-04-27 | End: 2022-08-24

## 2022-04-27 RX ORDER — DEXAMETHASONE 0.5 MG/5ML
10 ELIXIR ORAL ONCE
Refills: 0 | Status: COMPLETED | OUTPATIENT
Start: 2022-04-27 | End: 2022-04-27

## 2022-04-27 RX ORDER — OXYCODONE HYDROCHLORIDE 5 MG/1
5 TABLET ORAL EVERY 6 HOURS
Refills: 0 | Status: DISCONTINUED | OUTPATIENT
Start: 2022-04-27 | End: 2022-05-04

## 2022-04-27 RX ADMIN — Medication 650 MILLIGRAM(S): at 15:00

## 2022-04-27 RX ADMIN — Medication 10 MILLILITER(S): at 00:15

## 2022-04-27 RX ADMIN — Medication 300 MILLIGRAM(S): at 13:03

## 2022-04-27 RX ADMIN — Medication 400 MILLIGRAM(S): at 22:08

## 2022-04-27 RX ADMIN — Medication 5 MILLILITER(S): at 00:15

## 2022-04-27 RX ADMIN — CHLORHEXIDINE GLUCONATE 1 APPLICATION(S): 213 SOLUTION TOPICAL at 08:52

## 2022-04-27 RX ADMIN — CARVEDILOL PHOSPHATE 3.12 MILLIGRAM(S): 80 CAPSULE, EXTENDED RELEASE ORAL at 05:03

## 2022-04-27 RX ADMIN — FLUCONAZOLE 400 MILLIGRAM(S): 150 TABLET ORAL at 13:07

## 2022-04-27 RX ADMIN — CEFEPIME 100 MILLIGRAM(S): 1 INJECTION, POWDER, FOR SOLUTION INTRAMUSCULAR; INTRAVENOUS at 14:08

## 2022-04-27 RX ADMIN — MORPHINE SULFATE 2 MILLIGRAM(S): 50 CAPSULE, EXTENDED RELEASE ORAL at 03:06

## 2022-04-27 RX ADMIN — Medication 10 MILLIGRAM(S): at 14:10

## 2022-04-27 RX ADMIN — Medication 1 LOZENGE: at 16:12

## 2022-04-27 RX ADMIN — OXYCODONE HYDROCHLORIDE 5 MILLIGRAM(S): 5 TABLET ORAL at 12:28

## 2022-04-27 RX ADMIN — PANTOPRAZOLE SODIUM 40 MILLIGRAM(S): 20 TABLET, DELAYED RELEASE ORAL at 05:02

## 2022-04-27 RX ADMIN — Medication 5 MILLILITER(S): at 08:53

## 2022-04-27 RX ADMIN — Medication 102 MILLIGRAM(S): at 12:29

## 2022-04-27 RX ADMIN — Medication 400 MILLIGRAM(S): at 14:08

## 2022-04-27 RX ADMIN — Medication 5 MILLILITER(S): at 12:59

## 2022-04-27 RX ADMIN — Medication 10 MILLILITER(S): at 13:00

## 2022-04-27 RX ADMIN — Medication 10 MILLILITER(S): at 19:58

## 2022-04-27 RX ADMIN — Medication 650 MILLIGRAM(S): at 09:45

## 2022-04-27 RX ADMIN — CEFEPIME 100 MILLIGRAM(S): 1 INJECTION, POWDER, FOR SOLUTION INTRAMUSCULAR; INTRAVENOUS at 22:07

## 2022-04-27 RX ADMIN — Medication 400 MILLIGRAM(S): at 05:03

## 2022-04-27 RX ADMIN — Medication 10 MILLILITER(S): at 08:53

## 2022-04-27 RX ADMIN — Medication 650 MILLIGRAM(S): at 08:51

## 2022-04-27 RX ADMIN — Medication 1 LOZENGE: at 13:00

## 2022-04-27 RX ADMIN — Medication 1 LOZENGE: at 08:54

## 2022-04-27 RX ADMIN — Medication 10 MILLILITER(S): at 16:12

## 2022-04-27 RX ADMIN — CEFEPIME 100 MILLIGRAM(S): 1 INJECTION, POWDER, FOR SOLUTION INTRAMUSCULAR; INTRAVENOUS at 05:02

## 2022-04-27 RX ADMIN — Medication 5 MILLILITER(S): at 16:13

## 2022-04-27 RX ADMIN — Medication 1 LOZENGE: at 19:58

## 2022-04-27 RX ADMIN — LEVETIRACETAM 500 MILLIGRAM(S): 250 TABLET, FILM COATED ORAL at 17:56

## 2022-04-27 RX ADMIN — Medication 1 LOZENGE: at 00:14

## 2022-04-27 RX ADMIN — MORPHINE SULFATE 2 MILLIGRAM(S): 50 CAPSULE, EXTENDED RELEASE ORAL at 02:36

## 2022-04-27 RX ADMIN — Medication 1 MILLIGRAM(S): at 13:07

## 2022-04-27 RX ADMIN — OXYCODONE HYDROCHLORIDE 5 MILLIGRAM(S): 5 TABLET ORAL at 13:12

## 2022-04-27 RX ADMIN — Medication 1 TABLET(S): at 13:07

## 2022-04-27 RX ADMIN — Medication 650 MILLIGRAM(S): at 14:07

## 2022-04-27 RX ADMIN — Medication 5 MILLILITER(S): at 19:58

## 2022-04-27 RX ADMIN — PANTOPRAZOLE SODIUM 40 MILLIGRAM(S): 20 TABLET, DELAYED RELEASE ORAL at 17:57

## 2022-04-27 RX ADMIN — CARVEDILOL PHOSPHATE 3.12 MILLIGRAM(S): 80 CAPSULE, EXTENDED RELEASE ORAL at 17:56

## 2022-04-27 RX ADMIN — LEVETIRACETAM 500 MILLIGRAM(S): 250 TABLET, FILM COATED ORAL at 05:02

## 2022-04-27 RX ADMIN — Medication 650 MILLIGRAM(S): at 00:10

## 2022-04-27 NOTE — DISCHARGE NOTE PROVIDER - NSDCCPCAREPLAN_GEN_ALL_CORE_FT
PRINCIPAL DISCHARGE DIAGNOSIS  Diagnosis: MCL (mantle cell lymphoma)  Assessment and Plan of Treatment: s/p CART with Tecartus on 4/19/22  -Stay within close proximity (within 2 hs) of the location where you received your treatment  for at least 4 weeks after receiving Tecartus   -Do not drive/ operate any machinery for 8 weeks after your CART  -Call [facility] or get emergency help right away if you have any of these symptoms: Fever(100.4F/38C or Higher), difficulty breathing, confusion, dizziness or lightheadedness,  severe nausea omitting or diarrhea, fast or irregular heartbeat--- Bring your wallet card with you   -Do not donate blood   Follow up at [facility] as advised  Take all medications as prescribed        SECONDARY DISCHARGE DIAGNOSES  Diagnosis: Need for prophylactic measure  Assessment and Plan of Treatment: Take all medications as prescribed  -Fluconazole: Anti fungal  -Acyclovir: Anti viral  -Mepron: Anti PCP pneumonia  -Keppra: Anti Seizure  Notify your physician if bleeding; swelling; persistent nausea and vomiting; unable to urinate; pain not relieved by medications; fever; numbness, tingling; excessive diarrhea, inability to tolerate liquids or foods; increased irritability or sluggishness, rash  Dr. Banks's phone number at the [facility] is [phone]. Dr. Banks's cell phone number IN CASE OF EMERGENCY is [phone]. Transplant unit phone number if you have questions or concerns is [phone]

## 2022-04-27 NOTE — DISCHARGE NOTE PROVIDER - NSDCFUADDINST_GEN_ALL_CORE_FT
Stay within close proximity (within 2 hs) of the location where you received your treatment  for at least 4 weeks after receiving Tecartus  Do not drive/ operate any machinery for 8 weeks after your CART  Call [facility] or get emergency help right away if you have any of these symptoms: Fever(100.4F/38C or Higher), difficulty breathing, confusion, dizziness or lightheadedness,  severe nausea omitting or diarrhea, fast or irregular heartbeat--- Bring your wallet card with you   Notify your physician if bleeding; swelling; persistent nausea and vomiting; unable to urinate; pain not relieved by medications; fever; numbness, tingling; excessive diarrhea, inability to tolerate liquids or foods; increased irritability or sluggishness, rash    Dr. Banks's phone number at the [facility] is [phone]. Dr. Banks's cell phone number IN CASE OF EMERGENCY is [phone]. Transplant unit phone number if you have questions or concerns is [phone].

## 2022-04-27 NOTE — DISCHARGE NOTE PROVIDER - HOSPITAL COURSE
Mr. Humphries is a 56 year old male with a history of atrial fibrillation (s/p ablation x 2, internal loop recorder) and  MCL, initially diagnosed 10/2016. He was initially seen at [facility] for a left inguinal mass. CT scans 3/21/16 showed left sided pelvic and inguinal adenopathy. A left inguinal lymph node biopsy 9/26/16 showed morphologic and immunophenotypic findings consistent with MCL. He was treated with 4 cycles of R-CHOP, and 2 cycles of RICE consolidation. A bone marrow biopsy 2/10/17 showed normocellular bone marrow. He was admitted 3/27/17 for an autologous pbsct with CBV prep, and received the cells 4/5/17. He engrafted 4/16/17. He was discharged and completed maintenance Rituxan. In 10/21, he noticed a lump in the right groin. A lymph node biopsy 11/12/21 was consistent with relapsed MCL. He was treated with Calquence. He didn't tolerate Calquence well, with reports of frequent abdominal pain.   On 4/14 he was admitted to [facility]  for CAR T therapy with Tecartus product for relapsed, refractory MCL. His disease status at this time is partial remission. He offered no complaints at the time of admission.    Upon admission, a TLC was placed in IR. Mr. Humphries received IV hydration, pain management, anxiolysis, antiemetics, nutritional support, and antibacterial / antiviral / antifungal / GI / PCP and VOD (SOS) prophylaxis.  Labs were monitored twice a day  and he received electrolyte repletion and transfusional support as needed.    On 4/19/2022 After pre-medication, Mr. Humphries received 68ml of Tecartus CAR T product over approximately 20 minutes. He tolerated the infusion well with no adverse reactions noted- CRS/ICANS were monitored twice a day.    While admitted, Mr. Humphries had pancytopenia related to high dose chemotherapy prep regimen. On4/22 he became febrile ( CRS grade1 )lood and urine cultures were sent and CXR completed. He was  started on  cefepime   for anti-microbial coverage. Infectious work  up was negative with, negative blood/urine cultures and CXR showing clear lungs. Once febrile he was started on prophylaxis keppra and placed on tele. He received  hist 1st dose of Tocilizumab. He continued to be febrile( CRS grade 2) and received 4 doses of Tocilizumab and was treated with steroids (dexamethasone). He also developed chest pain on 4/22 EKGs showed no changes troponin were negative and his chest pain resolved after receiving morphine.    On 4/27 Mr. Humphries developed 10/10 headache. ( neurotox grade 2) He was taken to CT head which showed No acute hemorrhage, mass effect or extra-axial collections. He was treated with 10 mg IV of dexamethasone. MR Head was performed on the same day and showed________________________    Currently,  Mr. Humphries is ready  for discharge with a follow up appointments at [facility].         Mr. Humphries is a 56 year old male with a history of atrial fibrillation (s/p ablation x 2, internal loop recorder) and  MCL, initially diagnosed 10/2016. He was initially seen at [facility] for a left inguinal mass. CT scans 3/21/16 showed left sided pelvic and inguinal adenopathy. A left inguinal lymph node biopsy 9/26/16 showed morphologic and immunophenotypic findings consistent with MCL. He was treated with 4 cycles of R-CHOP, and 2 cycles of RICE consolidation. A bone marrow biopsy 2/10/17 showed normocellular bone marrow. He was admitted 3/27/17 for an autologous pbsct with CBV prep, and received the cells 4/5/17. He engrafted 4/16/17. He was discharged and completed maintenance Rituxan. In 10/21, he noticed a lump in the right groin. A lymph node biopsy 11/12/21 was consistent with relapsed MCL. He was treated with Calquence. He didn't tolerate Calquence well, with reports of frequent abdominal pain.   On 4/14 he was admitted to [facility]  for CAR T therapy with Tecartus product for relapsed, refractory MCL. His disease status at this time is partial remission. He offered no complaints at the time of admission.    Upon admission, a TLC was placed in IR. Mr. Humphries received IV hydration, pain management, anxiolysis, antiemetics, nutritional support, and antibacterial / antiviral / antifungal / GI / PCP and VOD (SOS) prophylaxis.  Labs were monitored twice a day  and he received electrolyte repletion and transfusional support as needed.    On 4/19/2022 After pre-medication, Mr. Humphries received 68ml of Tecartus CAR T product over approximately 20 minutes. He tolerated the infusion well with no adverse reactions noted- CRS/ICANS were monitored twice a day.    While admitted, Mr. Humphries had pancytopenia related to high dose chemotherapy prep regimen. On4/22 he became febrile ( CRS grade1 )lood and urine cultures were sent and CXR completed. He was  started on  cefepime   for anti-microbial coverage. Infectious work  up was negative with, negative blood/urine cultures and CXR showing clear lungs. Once febrile he was started on prophylaxis keppra and placed on tele. He received  hist 1st dose of Tocilizumab. He continued to be febrile( CRS grade 2) and received 4 doses of Tocilizumab and was treated with steroids (dexamethasone). He also developed chest pain on 4/22 EKGs showed no changes troponin were negative and his chest pain resolved after receiving morphine.    On 4/27 Mr. Humphries developed 10/10 headache. ( neurotox grade 2) He was taken to CT head which showed No acute hemorrhage, mass effect or extra-axial collections. He was treated with 10 mg IV of dexamethasone. MR Head was performed on the same day and showed No hydrocephalus, mass effect, acute intracranial hemorrhage, vasogenic edema, or acute territorial infarct. No abnormal parenchymal or leptomeningeal enhancement.  His symptoms resolved  with steroids.     Currently,  Mr. Humphries is ready  for discharge with a follow up appointments at [facility].         Mr. Humphries is a 56 year old male with a history of atrial fibrillation (s/p ablation x 2, internal loop recorder) and  MCL, initially diagnosed 10/2016. He was initially seen at [facility] for a left inguinal mass. CT scans 3/21/16 showed left sided pelvic and inguinal adenopathy. A left inguinal lymph node biopsy 9/26/16 showed morphologic and immunophenotypic findings consistent with MCL. He was treated with 4 cycles of R-CHOP, and 2 cycles of RICE consolidation. A bone marrow biopsy 2/10/17 showed normocellular bone marrow. He was admitted 3/27/17 for an autologous pbsct with CBV prep, and received the cells 4/5/17. He engrafted 4/16/17. He was discharged and completed maintenance Rituxan. In 10/21, he noticed a lump in the right groin. A lymph node biopsy 11/12/21 was consistent with relapsed MCL. He was treated with Calquence. He didn't tolerate Calquence well, with reports of frequent abdominal pain.   On 4/14 he was admitted to [facility]  for CAR T therapy with Tecartus product for relapsed, refractory MCL. His disease status at this time is partial remission. He offered no complaints at the time of admission.    Upon admission, a TLC was placed in IR. Mr. Humphries received IV hydration, pain management, anxiolysis, antiemetics, nutritional support, and antibacterial / antiviral / antifungal / GI / PCP and VOD (SOS) prophylaxis.  Labs were monitored twice a day  and he received electrolyte repletion and transfusional support as needed.    On 4/19/2022 After pre-medication, Mr. Humphries received 68ml of Tecartus CAR T product over approximately 20 minutes. He tolerated the infusion well with no adverse reactions noted- CRS/ICANS were monitored twice a day.    While admitted, Mr. Humphries had pancytopenia related to high dose chemotherapy prep regimen. On4/22 he became febrile ( CRS grade1 )lood and urine cultures were sent and CXR completed. He was  started on  cefepime   for anti-microbial coverage. Infectious work  up was negative with, negative blood/urine cultures and CXR showing clear lungs. Once febrile he was started on prophylaxis keppra and placed on tele. He received  hist 1st dose of Tocilizumab. He continued to be febrile( CRS grade 2) and received 4 doses of Tocilizumab and was treated with steroids (dexamethasone). He also developed chest pain on 4/22 EKGs showed no changes troponin were negative and his chest pain resolved after receiving morphine.    On 4/27 Mr. Humphries developed 10/10 headache. ( neurotox grade 2) He was taken to CT head which showed No acute hemorrhage, mass effect or extra-axial collections. He was treated with 10 mg IV of dexamethasone. MR Head was performed on the same day and showed No hydrocephalus, mass effect, acute intracranial hemorrhage, vasogenic edema, or acute territorial infarct. No abnormal parenchymal or leptomeningeal enhancement.  His symptoms resolved  with steroids.     Currently,  Mr. Humphries is ready  for discharge with a follow up appointments at [facility], wallet cards( in Citizen of [place] and english) provided upon discharge.          Mr. Humphries is a 56 year old male with a history of atrial fibrillation (s/p ablation x 2, internal loop recorder) and  MCL, initially diagnosed 10/2016. He was initially seen at [facility] for a left inguinal mass. CT scans 3/21/16 showed left sided pelvic and inguinal adenopathy. A left inguinal lymph node biopsy 9/26/16 showed morphologic and immunophenotypic findings consistent with MCL. He was treated with 4 cycles of R-CHOP, and 2 cycles of RICE consolidation. A bone marrow biopsy 2/10/17 showed normocellular bone marrow. He was admitted 3/27/17 for an autologous pbsct with CBV prep, and received the cells 4/5/17. He engrafted 4/16/17. He was discharged and completed maintenance Rituxan. In 10/21, he noticed a lump in the right groin. A lymph node biopsy 11/12/21 was consistent with relapsed MCL. He was treated with Calquence. He didn't tolerate Calquence well, with reports of frequent abdominal pain.   On 4/14 he was admitted to [facility]  for CAR T therapy with Tecartus product for relapsed, refractory MCL. His disease status at this time is partial remission. He offered no complaints at the time of admission.    Upon admission, a TLC was placed in IR. Mr. Humphries received IV hydration, pain management, anxiolysis, antiemetics, nutritional support, and antibacterial / antiviral / antifungal / GI / PCP and VOD (SOS) prophylaxis.  Labs were monitored twice a day  and he received electrolyte repletion and transfusional support as needed.    On 4/19/2022 After pre-medication, Mr. Humphries received 68ml of Tecartus CAR T product over approximately 20 minutes. He tolerated the infusion well with no adverse reactions noted- CRS/ICANS were monitored twice a day.    While admitted, Mr. Humphries had pancytopenia related to high dose chemotherapy prep regimen. On4/22 he became febrile ( CRS grade1 )lood and urine cultures were sent and CXR completed. He was  started on  cefepime   for anti-microbial coverage. Infectious work  up was negative with, negative blood/urine cultures and CXR showing clear lungs. Once febrile he was started on prophylaxis keppra and placed on tele. He received  hist 1st dose of Tocilizumab. He continued to be febrile( CRS grade 2) and received 4 doses of Tocilizumab and was treated with steroids (dexamethasone). He also developed chest pain on 4/22 EKGs showed no changes troponin were negative and his chest pain resolved after receiving morphine.    On 4/27 Mr. Humphries developed 10/10 headache. ( neurotox grade 2) He was taken to CT head which showed No acute hemorrhage, mass effect or extra-axial collections. He was treated with 10 mg IV of dexamethasone. MR Head was performed on the same day and showed No hydrocephalus, mass effect, acute intracranial hemorrhage, vasogenic edema, or acute territorial infarct. No abnormal parenchymal or leptomeningeal enhancement.  His symptoms resolved  with steroids.     Currently,  Mr. Humphries is ready  for discharge with a follow up appointments at [facility], wallet cards( in Portuguese and english) provided upon discharge

## 2022-04-27 NOTE — DISCHARGE NOTE PROVIDER - NSDCMRMEDTOKEN_GEN_ALL_CORE_FT
acyclovir 400 mg oral tablet: 1 tab(s) orally every 8 hours  allopurinol 300 mg oral tablet: 1 tab(s) orally once a day  atovaquone 750 mg/5 mL oral suspension: 5 milliliter(s) orally 2 times a day   carvedilol 3.125 mg oral tablet: 1 tab(s) orally every 12 hours  fluconazole 200 mg oral tablet: 2 tab(s) orally once a day  folic acid 1 mg oral tablet: 1 tab(s) orally once a day  levETIRAcetam 500 mg oral tablet: 1 tab(s) orally 2 times a day  metoclopramide 10 mg oral tablet: 1 tab(s) orally every 8 hours, As Needed   Multiple Vitamins oral tablet: 1 tab(s) orally once a day  omeprazole 40 mg oral delayed release capsule: **Dose increase** 1 cap(s) orally 2 times a day x 30 days post ablation, then resume 20mg once daily.   ondansetron 8 mg oral tablet: 1 tab(s) orally every 8 hours, As Needed   pantoprazole 40 mg oral delayed release tablet: 1 tab(s) orally 2 times a day   acyclovir 400 mg oral tablet: 1 tab(s) orally every 8 hours  allopurinol 300 mg oral tablet: 1 tab(s) orally once a day  atovaquone 750 mg/5 mL oral suspension: 5 milliliter(s) orally 2 times a day   carvedilol 3.125 mg oral tablet: 1 tab(s) orally every 12 hours  fluconazole 200 mg oral tablet: 2 tab(s) orally once a day  folic acid 1 mg oral tablet: 1 tab(s) orally once a day  gabapentin 300 mg oral capsule: 1 cap(s) orally once a day (at bedtime)   levETIRAcetam 500 mg oral tablet: 1 tab(s) orally 2 times a day  metoclopramide 10 mg oral tablet: 1 tab(s) orally every 8 hours, As Needed   Multiple Vitamins oral tablet: 1 tab(s) orally once a day  ondansetron 8 mg oral tablet: 1 tab(s) orally every 8 hours, As Needed   pantoprazole 40 mg oral delayed release tablet: 1 tab(s) orally 2 times a day

## 2022-04-27 NOTE — PROGRESS NOTE ADULT - SUBJECTIVE AND OBJECTIVE BOX
HPC Transplant Team                                                      Critical / Counseling Time Provided: 30 minutes                                                                                                                                                         Chief Complaint: CAR T therapy with [employer] product for treatment of relapsed, refractory MCL      S: Patient seen and examined with HPC Transplant Team  Feel better today.  +generalized weakness, fatigue  + diarrhea x4 O/N  +burning urination  +poor appetite  +fever improving.    O: Vitals:   Vital Signs Last 24 Hrs  T(C): 37.6 (27 Apr 2022 05:00), Max: 38.5 (26 Apr 2022 17:25)  T(F): 99.7 (27 Apr 2022 05:00), Max: 101.3 (26 Apr 2022 17:25)  HR: 76 (27 Apr 2022 05:00) (72 - 89)  BP: 134/79 (27 Apr 2022 05:00) (118/68 - 145/81)  BP(mean): --  RR: 18 (27 Apr 2022 05:00) (18 - 18)  SpO2: 94% (27 Apr 2022 05:00) (94% - 99%)      Admit weight: 100.4kg  Daily Weight in kG:    Intake / Output:   04-26 @ 07:01  -  04-27 @ 07:00  --------------------------------------------------------  IN: 3657 mL / OUT: 4000 mL / NET: -343 mL    PE:   Oropharynx: no erythema or ulcerations ,   Oral Mucositis:  NA                                                   Grade: n/a  CVS: S1, S2 RRR   Lungs: CTA throughout bilaterally   Abdomen: + BS x 4, soft, NT, ND   Extremities: no edema BLE's  Gastric Mucositis:       -                                           Grade: n/a  Intestinal Mucositis:     -                                         Grade: n/a   Skin: no rashes  TLC: CDI   Neuro: A&O x 3   Pain: 0    Labs:   CBC Full  -  ( 27 Apr 2022 05:50 )  WBC Count : 7.10 K/uL  Hemoglobin : 12.2 g/dL  Hematocrit : 36.5 %  Platelet Count - Automated : 191 K/uL  Mean Cell Volume : 93.6 fl  Mean Cell Hemoglobin : 31.3 pg  Mean Cell Hemoglobin Concentration : 33.4 gm/dL  Auto Neutrophil # : x  Auto Lymphocyte # : x  Auto Monocyte # : x  Auto Eosinophil # : x  Auto Basophil # : x  Auto Neutrophil % : x  Auto Lymphocyte % : x  Auto Monocyte % : x  Auto Eosinophil % : x  Auto Basophil % : x                          12.2   7.10  )-----------( 191      ( 27 Apr 2022 05:50 )             36.5     04-27    135  |  99  |  19  ----------------------------<  109<H>  4.0   |  25  |  1.04    Ca    8.1<L>      27 Apr 2022 05:50  Phos  3.0     04-27  Mg     2.1     04-27    TPro  5.8<L>  /  Alb  3.9  /  TBili  0.2  /  DBili  <0.1  /  AST  28  /  ALT  69<H>  /  AlkPhos  68  04-27    PT/INR - ( 27 Apr 2022 05:50 )   PT: 14.9 sec;   INR: 1.29 ratio         PTT - ( 27 Apr 2022 05:50 )  PTT:23.2 sec  LIVER FUNCTIONS - ( 27 Apr 2022 05:50 )  Alb: 3.9 g/dL / Pro: 5.8 g/dL / ALK PHOS: 68 U/L / ALT: 69 U/L / AST: 28 U/L / GGT: x           Lactate Dehydrogenase, Serum: 178 U/L (04-27 @ 05:50)  Lactate Dehydrogenase, Serum: 194 U/L (04-26 @ 15:47)    Cultures:         Radiology:       Meds:   Antimicrobials:   acyclovir   Oral Tab/Cap 400 milliGRAM(s) Oral every 8 hours  cefepime   IVPB 2000 milliGRAM(s) IV Intermittent every 8 hours  cefepime   IVPB      clotrimazole Lozenge 1 Lozenge Oral five times a day  fluconAZOLE   Tablet 400 milliGRAM(s) Oral daily      GI:  loperamide 2 milliGRAM(s) Oral every 4 hours PRN  pantoprazole    Tablet 40 milliGRAM(s) Oral two times a day  polyethylene glycol 3350 17 Gram(s) Oral daily PRN  senna 1 Tablet(s) Oral at bedtime PRN  sodium bicarbonate Mouth Rinse 10 milliLiter(s) Swish and Spit five times a day      Cardiovascular:   carvedilol 3.125 milliGRAM(s) Oral every 12 hours    Other medications:   acetaminophen     Tablet .. 650 milliGRAM(s) Oral every 6 hours  allopurinol 300 milliGRAM(s) Oral daily  Biotene Dry Mouth Oral Rinse 5 milliLiter(s) Swish and Spit five times a day  chlorhexidine 4% Liquid 1 Application(s) Topical <User Schedule>  folic acid 1 milliGRAM(s) Oral daily  levETIRAcetam 500 milliGRAM(s) Oral two times a day  multivitamin 1 Tablet(s) Oral daily  phytonadione   Solution 10 milliGRAM(s) Oral daily  sodium chloride 0.9%. 1000 milliLiter(s) IV Continuous <Continuous>      PRN:   acetaminophen     Tablet .. 650 milliGRAM(s) Oral every 6 hours PRN  loperamide 2 milliGRAM(s) Oral every 4 hours PRN  metoclopramide Injectable 10 milliGRAM(s) IV Push every 6 hours PRN  morphine  - Injectable 2 milliGRAM(s) IV Push every 4 hours PRN  ondansetron Injectable 8 milliGRAM(s) IV Push every 8 hours PRN  polyethylene glycol 3350 17 Gram(s) Oral daily PRN  senna 1 Tablet(s) Oral at bedtime PRN  sodium chloride 0.9% lock flush 10 milliLiter(s) IV Push every 1 hour PRN      A/P: 56 year old male with relapsed, refractory MCL s/p R-CHOP x 4, RICE x 2, maintenance RTX, salvage [place]qu[place], admitted for CAR T cell therapy with [employer] product. Disease status at time of admission is partial response.   Day +  8  Monitor for CRS and neurotox...follow CRS/neuro tox protocol - GIVEN TOCILIZUMAB 4/22 pm and  4/24 AM for CRS - 1     - Relapsed, refractory MCL   4/15- day - 4- flu / ctx 2/3 - continue CTX hydration until 24 hours post infusion of last dose   Strict I&O, daily weights, prn diuresis   CRS / ICANS score daily until infusion of CAR T cells (4/19/22) and twice daily thereafter   BID labs 0500 & 1500 - monitor for TLS   4/22-CRS grade 1 s/p Tocilizumab on tele started on Keppra as per protocol, pancx, started on cefepime  s/p dexamethasone 4 mg IV x1  4/24 F/U repeat blood cx's for fevers (4/22 and 4/24) s/p Toci x2 s/p dexamethasone 4 mg IV x2 also with CP: EKG WNL trops negative s/p morphine with good effect   4/25- CRS grade 2( persistent fevers) given Toci x1 and dexamethasone 4 mg IV overnight, continues to be febrile Tmax 104 F tremors given additional Dexamethasone 10 mg IV      -  Prophylactic measures   acyclovir   Oral Tab/Cap 400 milliGRAM(s) Oral every 8 hours  cefepime   IVPB      cefepime   IVPB 2000 milliGRAM(s) IV Intermittent every 8 hours  clotrimazole Lozenge 1 Lozenge Oral five times a day  fluconAZOLE   Tablet 400 milliGRAM(s) Oral daily  Keppra 500 q12h  GI Prophylaxis:  Protonix      - Cardiac / Hx Afib   F/U repeat troponin,  now CP free. ECG non ischemic   Continue Tele monitoring   Continue Coreg bid     -  Mouth care - NS / NaHCO3 rinses, Mycelex, Biotene; Skin care      - Transfuse & replete electrolytes prn     - IV hydration, daily weights, strict I&O, prn diuresis      -  Antiemetics, anti-diarrhea medications:   LORazepam   Injectable 1 milliGRAM(s) IV Push every 6 hours PRN  metoclopramide Injectable 10 milliGRAM(s) IV Push every 6 hours PRN  ondansetron 8mg IV q 8 hours prn nausea      - CRS / ICANS scoring   4/18 CRS grade 0, ICANS score 10   4/19- CRS grade 0, ICANS score 10   4/20 AM - CRS grade 0, ICANS score 10   4/20-pm-CRS grade 0, ICANS score 10  4/21 AM - CRS grade 0, ICANS score 10   4/21 PM CRS grade 0, ICANS score 10  4/22 AM CRS grade 0, ICANS score 10  4/22 PM CRS grade 0, ICANS score 10 --> 9PM CRS score - 1, ICANS - 10  4/23 AM CRS grade 0, ICANS score 10  4/23 PM CRS grade 0, ICANS score 10  4/24 AM CRS grade 1 10am (grade 0 @ 8:30am), ICANS score 10  4/25M CRS grade 2( persistently febrile) ICANS score 10   4/26 AM CRS GRADE 1, ICANS score 10  4/26 PM CRS GRADE 1, ICANS score 10    I have written the above note for Dr. Banks who performed service with me in the room.   Aurelia Steele  NP-C ([phone])    I have seen and examined patient with NP, I agree with above note as scribed.                    HPC Transplant Team                                                      Critical / Counseling Time Provided: 30 minutes                                                                                                                                                         Chief Complaint: CAR T therapy with [employer] product for treatment of relapsed, refractory MCL      S: Patient seen and examined with HPC Transplant Team  Feel better today.  +generalized weakness, fatigue  + diarrhea x4 O/N  +burning urination  +poor appetite  +fever improving.    O: Vitals:   Vital Signs Last 24 Hrs  T(C): 37.6 (27 Apr 2022 05:00), Max: 38.5 (26 Apr 2022 17:25)  T(F): 99.7 (27 Apr 2022 05:00), Max: 101.3 (26 Apr 2022 17:25)  HR: 76 (27 Apr 2022 05:00) (72 - 89)  BP: 134/79 (27 Apr 2022 05:00) (118/68 - 145/81)  BP(mean): --  RR: 18 (27 Apr 2022 05:00) (18 - 18)  SpO2: 94% (27 Apr 2022 05:00) (94% - 99%)      Admit weight: 100.4kg  Daily Weight in kG:    Intake / Output:   04-26 @ 07:01  -  04-27 @ 07:00  --------------------------------------------------------  IN: 3657 mL / OUT: 4000 mL / NET: -343 mL    PE:   Oropharynx: no erythema or ulcerations ,   Oral Mucositis:  NA                                                   Grade: n/a  CVS: S1, S2 RRR   Lungs: CTA throughout bilaterally   Abdomen: + BS x 4, soft, NT, ND   Extremities: no edema BLE's  Gastric Mucositis:       -                                           Grade: n/a  Intestinal Mucositis:     -                                         Grade: n/a   Skin: no rashes  TLC: CDI   Neuro: A&O x 3   Pain: 0    Labs:   CBC Full  -  ( 27 Apr 2022 05:50 )  WBC Count : 7.10 K/uL  Hemoglobin : 12.2 g/dL  Hematocrit : 36.5 %  Platelet Count - Automated : 191 K/uL  Mean Cell Volume : 93.6 fl  Mean Cell Hemoglobin : 31.3 pg  Mean Cell Hemoglobin Concentration : 33.4 gm/dL  Auto Neutrophil # : x  Auto Lymphocyte # : x  Auto Monocyte # : x  Auto Eosinophil # : x  Auto Basophil # : x  Auto Neutrophil % : x  Auto Lymphocyte % : x  Auto Monocyte % : x  Auto Eosinophil % : x  Auto Basophil % : x                          12.2   7.10  )-----------( 191      ( 27 Apr 2022 05:50 )             36.5     04-27    135  |  99  |  19  ----------------------------<  109<H>  4.0   |  25  |  1.04    Ca    8.1<L>      27 Apr 2022 05:50  Phos  3.0     04-27  Mg     2.1     04-27    TPro  5.8<L>  /  Alb  3.9  /  TBili  0.2  /  DBili  <0.1  /  AST  28  /  ALT  69<H>  /  AlkPhos  68  04-27    PT/INR - ( 27 Apr 2022 05:50 )   PT: 14.9 sec;   INR: 1.29 ratio         PTT - ( 27 Apr 2022 05:50 )  PTT:23.2 sec  LIVER FUNCTIONS - ( 27 Apr 2022 05:50 )  Alb: 3.9 g/dL / Pro: 5.8 g/dL / ALK PHOS: 68 U/L / ALT: 69 U/L / AST: 28 U/L / GGT: x           Lactate Dehydrogenase, Serum: 178 U/L (04-27 @ 05:50)  Lactate Dehydrogenase, Serum: 194 U/L (04-26 @ 15:47)    Cultures:         Radiology:       Meds:   Antimicrobials:   acyclovir   Oral Tab/Cap 400 milliGRAM(s) Oral every 8 hours  cefepime   IVPB 2000 milliGRAM(s) IV Intermittent every 8 hours  cefepime   IVPB      clotrimazole Lozenge 1 Lozenge Oral five times a day  fluconAZOLE   Tablet 400 milliGRAM(s) Oral daily      GI:  loperamide 2 milliGRAM(s) Oral every 4 hours PRN  pantoprazole    Tablet 40 milliGRAM(s) Oral two times a day  polyethylene glycol 3350 17 Gram(s) Oral daily PRN  senna 1 Tablet(s) Oral at bedtime PRN  sodium bicarbonate Mouth Rinse 10 milliLiter(s) Swish and Spit five times a day      Cardiovascular:   carvedilol 3.125 milliGRAM(s) Oral every 12 hours    Other medications:   acetaminophen     Tablet .. 650 milliGRAM(s) Oral every 6 hours  allopurinol 300 milliGRAM(s) Oral daily  Biotene Dry Mouth Oral Rinse 5 milliLiter(s) Swish and Spit five times a day  chlorhexidine 4% Liquid 1 Application(s) Topical <User Schedule>  folic acid 1 milliGRAM(s) Oral daily  levETIRAcetam 500 milliGRAM(s) Oral two times a day  multivitamin 1 Tablet(s) Oral daily  phytonadione   Solution 10 milliGRAM(s) Oral daily  sodium chloride 0.9%. 1000 milliLiter(s) IV Continuous <Continuous>      PRN:   acetaminophen     Tablet .. 650 milliGRAM(s) Oral every 6 hours PRN  loperamide 2 milliGRAM(s) Oral every 4 hours PRN  metoclopramide Injectable 10 milliGRAM(s) IV Push every 6 hours PRN  morphine  - Injectable 2 milliGRAM(s) IV Push every 4 hours PRN  ondansetron Injectable 8 milliGRAM(s) IV Push every 8 hours PRN  polyethylene glycol 3350 17 Gram(s) Oral daily PRN  senna 1 Tablet(s) Oral at bedtime PRN  sodium chloride 0.9% lock flush 10 milliLiter(s) IV Push every 1 hour PRN      A/P: 56 year old male with relapsed, refractory MCL s/p R-CHOP x 4, RICE x 2, maintenance RTX, salvage [place], admitted for CAR T cell therapy with [employer] product. Disease status at time of admission is partial response.   Day +  8  Monitor for CRS and neurotox...follow CRS/neuro tox protocol - GIVEN TOCILIZUMAB 4/22 pm and  4/24 AM for CRS - 1     - Relapsed, refractory MCL   4/15- day - 4- flu / ctx 2/3 - continue CTX hydration until 24 hours post infusion of last dose   Strict I&O, daily weights, prn diuresis   CRS / ICANS score daily until infusion of CAR T cells (4/19/22) and twice daily thereafter   BID labs 0500 & 1500 - monitor for TLS   4/22-CRS grade 1 s/p Tocilizumab on tele started on Keppra as per protocol, pancx, started on cefepime  s/p dexamethasone 4 mg IV x1  4/24 F/U repeat blood cx's for fevers (4/22 and 4/24) s/p Toci x2 s/p dexamethasone 4 mg IV x2 also with CP: EKG WNL trops negative s/p morphine with good effect   4/25- CRS grade 2( persistent fevers) given Toci x1 and dexamethasone 4 mg IV overnight, continues to be febrile Tmax 104 F tremors given additional Dexamethasone 10 mg IV      -  Prophylactic measures   acyclovir   Oral Tab/Cap 400 milliGRAM(s) Oral every 8 hours  cefepime   IVPB      cefepime   IVPB 2000 milliGRAM(s) IV Intermittent every 8 hours  clotrimazole Lozenge 1 Lozenge Oral five times a day  fluconAZOLE   Tablet 400 milliGRAM(s) Oral daily  Keppra 500 q12h  GI Prophylaxis:  Protonix      - Cardiac / Hx Afib   F/U repeat troponin,  now CP free. ECG non ischemic   Continue Tele monitoring   Continue Coreg bid     -  Mouth care - NS / NaHCO3 rinses, Mycelex, Biotene; Skin care      - Transfuse & replete electrolytes prn     - IV hydration, daily weights, strict I&O, prn diuresis      -  Antiemetics, anti-diarrhea medications:   LORazepam   Injectable 1 milliGRAM(s) IV Push every 6 hours PRN  metoclopramide Injectable 10 milliGRAM(s) IV Push every 6 hours PRN  ondansetron 8mg IV q 8 hours prn nausea      - CRS / ICANS scoring   4/18 CRS grade 0, ICANS score 10   4/19- CRS grade 0, ICANS score 10   4/20 AM - CRS grade 0, ICANS score 10   4/20-pm-CRS grade 0, ICANS score 10  4/21 AM - CRS grade 0, ICANS score 10   4/21 PM CRS grade 0, ICANS score 10  4/22 AM CRS grade 0, ICANS score 10  4/22 PM CRS grade 0, ICANS score 10 --> 9PM CRS score - 1, ICANS - 10  4/23 AM CRS grade 0, ICANS score 10  4/23 PM CRS grade 0, ICANS score 10  4/24 AM CRS grade 1 10am (grade 0 @ 8:30am), ICANS score 10  4/25M CRS grade 2( persistently febrile) ICANS score 10   4/26 AM CRS GRADE 1, ICANS score 10  4/27 PM CRS GRADE 1, ICANS score 10    I have written the above note for Dr. Banks who performed service with me in the room.   Aurelia Steele  NP-C ([phone])    I have seen and examined patient with NP, I agree with above note as scribed.                    [facility] Transplant Team                                                      Critical / Counseling Time Provided: 30 minutes                                                                                                                                                         Chief Complaint: CAR T therapy with [employer] product for treatment of relapsed, refractory MCL      S: Patient seen and examined with HPC Transplant Team  Feel better today.  +generalized weakness, fatigue  + diarrhea x4 O/N  +burning urination  +poor appetite  +fever improving.    O: Vitals:   Vital Signs Last 24 Hrs  T(C): 37.6 (2022 05:00), Max: 38.5 (2022 17:25)  T(F): 99.7 (2022 05:00), Max: 101.3 (2022 17:25)  HR: 76 (2022 05:00) (72 - 89)  BP: 134/79 (2022 05:00) (118/68 - 145/81)  BP(mean): --  RR: 18 (2022 05:00) (18 - 18)  SpO2: 94% (2022 05:00) (94% - 99%)      Admit weight: 100.4kg  Daily Weight in k kg     Intake / Output:    @ 07:01  -   @ 07:00  --------------------------------------------------------  IN: 3657 mL / OUT: 4000 mL / NET: -343 mL    PE:   Oropharynx: no erythema or ulcerations ,   Oral Mucositis:  NA                                                   Grade: n/a  CVS: S1, S2 RRR   Lungs: CTA throughout bilaterally   Abdomen: + BS x 4, soft, NT, ND   Extremities: no edema BLE's  Gastric Mucositis:       -                                           Grade: n/a  Intestinal Mucositis:     -                                         Grade: n/a   Skin: no rashes  TLC: CDI   Neuro: A&O x 3   Pain: 0    Labs:   CBC Full  -  ( 2022 05:50 )  WBC Count : 7.10 K/uL  Hemoglobin : 12.2 g/dL  Hematocrit : 36.5 %  Platelet Count - Automated : 191 K/uL  Mean Cell Volume : 93.6 fl  Mean Cell Hemoglobin : 31.3 pg  Mean Cell Hemoglobin Concentration : 33.4 gm/dL  Auto Neutrophil # : x  Auto Lymphocyte # : x  Auto Monocyte # : x  Auto Eosinophil # : x  Auto Basophil # : x  Auto Neutrophil % : x  Auto Lymphocyte % : x  Auto Monocyte % : x  Auto Eosinophil % : x  Auto Basophil % : x                          12.2   7.10  )-----------( 191      ( 2022 05:50 )             36.5         135  |  99  |  19  ----------------------------<  109<H>  4.0   |  25  |  1.04    Ca    8.1<L>      2022 05:50  Phos  3.0       Mg     2.1         TPro  5.8<L>  /  Alb  3.9  /  TBili  0.2  /  DBili  <0.1  /  AST  28  /  ALT  69<H>  /  AlkPhos  68      PT/INR - ( 2022 05:50 )   PT: 14.9 sec;   INR: 1.29 ratio         PTT - ( 2022 05:50 )  PTT:23.2 sec  LIVER FUNCTIONS - ( 2022 05:50 )  Alb: 3.9 g/dL / Pro: 5.8 g/dL / ALK PHOS: 68 U/L / ALT: 69 U/L / AST: 28 U/L / GGT: x           Lactate Dehydrogenase, Serum: 178 U/L ( @ 05:50)  Lactate Dehydrogenase, Serum: 194 U/L ( @ 15:47)    Cultures:     Culture - Urine (22 @ 13:44)   Specimen Source: Clean Catch Clean Catch (Midstream)   Culture Results:   No growth Culture - Blood (22 @ 16:40)   Specimen Source: .Blood Blood-Catheter   Culture Results:   No growth to date. Culture - Blood (22 @ 16:40)   Specimen Source: .Blood Blood-Catheter   Culture Results:   No growth to date. Culture - Blood (22 @ 01:04)   Specimen Source: .Blood Blood   Culture Results:   No growth to date.     Radiology:   < from: Xray Chest 1 View- PORTABLE-Urgent (Xray Chest 1 View- PORTABLE-Urgent .) (22 @ 10:46) >    IMPRESSION:  Clear lungs.    --- End of Report ---    < end of copied text >      Meds:   Antimicrobials:   acyclovir   Oral Tab/Cap 400 milliGRAM(s) Oral every 8 hours  cefepime   IVPB 2000 milliGRAM(s) IV Intermittent every 8 hours  cefepime   IVPB      clotrimazole Lozenge 1 Lozenge Oral five times a day  fluconAZOLE   Tablet 400 milliGRAM(s) Oral daily      GI:  loperamide 2 milliGRAM(s) Oral every 4 hours PRN  pantoprazole    Tablet 40 milliGRAM(s) Oral two times a day  polyethylene glycol 3350 17 Gram(s) Oral daily PRN  senna 1 Tablet(s) Oral at bedtime PRN  sodium bicarbonate Mouth Rinse 10 milliLiter(s) Swish and Spit five times a day      Cardiovascular:   carvedilol 3.125 milliGRAM(s) Oral every 12 hours    Other medications:   acetaminophen     Tablet .. 650 milliGRAM(s) Oral every 6 hours  allopurinol 300 milliGRAM(s) Oral daily  Biotene Dry Mouth Oral Rinse 5 milliLiter(s) Swish and Spit five times a day  chlorhexidine 4% Liquid 1 Application(s) Topical <User Schedule>  folic acid 1 milliGRAM(s) Oral daily  levETIRAcetam 500 milliGRAM(s) Oral two times a day  multivitamin 1 Tablet(s) Oral daily  phytonadione   Solution 10 milliGRAM(s) Oral daily  sodium chloride 0.9%. 1000 milliLiter(s) IV Continuous <Continuous>      PRN:   acetaminophen     Tablet .. 650 milliGRAM(s) Oral every 6 hours PRN  loperamide 2 milliGRAM(s) Oral every 4 hours PRN  metoclopramide Injectable 10 milliGRAM(s) IV Push every 6 hours PRN  morphine  - Injectable 2 milliGRAM(s) IV Push every 4 hours PRN  ondansetron Injectable 8 milliGRAM(s) IV Push every 8 hours PRN  polyethylene glycol 3350 17 Gram(s) Oral daily PRN  senna 1 Tablet(s) Oral at bedtime PRN  sodium chloride 0.9% lock flush 10 milliLiter(s) IV Push every 1 hour PRN      A/P: 56 year old male with relapsed, refractory MCL s/p R-CHOP x 4, RICE x 2, maintenance RTX, salvage [place], admitted for CAR T cell therapy with [employer] product. Disease status at time of admission is partial response.   Day +  8  Monitor for CRS and neurotox...follow CRS/neuro tox protocol - GIVEN TOCILIZUMAB  pm and   AM for CRS - 1     - Relapsed, refractory MCL   4/15- day - 4- flu / ctx 2/3 - continue CTX hydration until 24 hours post infusion of last dose   Strict I&O, daily weights, prn diuresis   CRS / ICANS score daily until infusion of CAR T cells (22) and twice daily thereafter   BID labs 0500 & 1500 - monitor for TLS   -CRS grade 1 s/p Tocilizumab on tele started on Keppra as per protocol, pancx, started on cefepime  s/p dexamethasone 4 mg IV x1   F/U repeat blood cx's for fevers ( and ) s/p Toci x2 s/p dexamethasone 4 mg IV x2 also with CP: EKG WNL trops negative s/p morphine with good effect   - CRS grade 2( persistent fevers) given Toci x1 and dexamethasone 4 mg IV overnight, continues to be febrile Tmax 104 F tremors given additional Dexamethasone 10 mg IV  - fever curve improved: CRS grade 1  - Grade 2 neurotox with 10/10 Headaches- taken for CT head, s.p Dexamethasone 10 mg IV pending MR head      -  Prophylactic measures   acyclovir   Oral Tab/Cap 400 milliGRAM(s) Oral every 8 hours  cefepime   IVPB      cefepime   IVPB 2000 milliGRAM(s) IV Intermittent every 8 hours  clotrimazole Lozenge 1 Lozenge Oral five times a day  fluconAZOLE   Tablet 400 milliGRAM(s) Oral daily  Keppra 500 q12h  GI Prophylaxis:  Protonix      - Cardiac / Hx Afib   F/U repeat troponin,  now CP free. ECG non ischemic   Continue Tele monitoring   Continue Coreg bid     -  Mouth care - NS / NaHCO3 rinses, Mycelex, Biotene; Skin care      - Transfuse & replete electrolytes prn     - IV hydration, daily weights, strict I&O, prn diuresis      -  Antiemetics, anti-diarrhea medications:   LORazepam   Injectable 1 milliGRAM(s) IV Push every 6 hours PRN  metoclopramide Injectable 10 milliGRAM(s) IV Push every 6 hours PRN  ondansetron 8mg IV q 8 hours prn nausea      - CRS / ICANS scoring    CRS grade 0, ICANS score 10   - CRS grade 0, ICANS score 10    AM - CRS grade 0, ICANS score 10   -pm-CRS grade 0, ICANS score 10   AM - CRS grade 0, ICANS score 10    PM CRS grade 0, ICANS score 10   AM CRS grade 0, ICANS score 10   PM CRS grade 0, ICANS score 10 --> 9PM CRS score - 1, ICANS - 10   AM CRS grade 0, ICANS score 10   PM CRS grade 0, ICANS score 10   AM CRS grade 1 10am (grade 0 @ 8:30am), ICANS score 10  M CRS grade 2( persistently febrile) ICANS score 10    AM CRS GRADE 1, ICANS score 10  - AM CRS GRADE 1, ICANS score 10   PM CRS GRADE 1, ICANS score 10   noon: with persistent headache: Grade 2 neurotox-   taken for CT head, s.p Dexamethasone 10 mg IV pending MR head    I have written the above note for Dr. Banks who performed service with me in the room.   Aurelia Steele  NP-C ([phone])    I have seen and examined patient with NP, I agree with above note as scribed.                    [facility] Transplant Team                                                      Critical / Counseling Time Provided: 30 minutes                                                                                                                                                         Chief Complaint: CAR T therapy with [employer] product for treatment of relapsed, refractory MCL      S: Patient seen and examined with HPC Transplant Team  Feel better today.  +generalized weakness, fatigue  + diarrhea x4 O/N  +burning urination  +poor appetite  +fever improving.    O: Vitals:   Vital Signs Last 24 Hrs  T(C): 37.6 (2022 05:00), Max: 38.5 (2022 17:25)  T(F): 99.7 (2022 05:00), Max: 101.3 (2022 17:25)  HR: 76 (2022 05:00) (72 - 89)  BP: 134/79 (2022 05:00) (118/68 - 145/81)  BP(mean): --  RR: 18 (2022 05:00) (18 - 18)  SpO2: 94% (2022 05:00) (94% - 99%)      Admit weight: 100.4kg  Daily Weight in k kg     Intake / Output:    @ 07:01  -   @ 07:00  --------------------------------------------------------  IN: 3657 mL / OUT: 4000 mL / NET: -343 mL    PE:   Oropharynx: no erythema or ulcerations ,   Oral Mucositis:  NA                                                   Grade: n/a  CVS: S1, S2 RRR   Lungs: CTA throughout bilaterally   Abdomen: + BS x 4, soft, NT, ND   Extremities: no edema BLE's  Gastric Mucositis:       -                                           Grade: n/a  Intestinal Mucositis:     -                                         Grade: n/a   Skin: no rashes  TLC: CDI   Neuro: A&O x 3   Pain: 0    Labs:   CBC Full  -  ( 2022 05:50 )  WBC Count : 7.10 K/uL  Hemoglobin : 12.2 g/dL  Hematocrit : 36.5 %  Platelet Count - Automated : 191 K/uL  Mean Cell Volume : 93.6 fl  Mean Cell Hemoglobin : 31.3 pg  Mean Cell Hemoglobin Concentration : 33.4 gm/dL  Auto Neutrophil # : x  Auto Lymphocyte # : x  Auto Monocyte # : x  Auto Eosinophil # : x  Auto Basophil # : x  Auto Neutrophil % : x  Auto Lymphocyte % : x  Auto Monocyte % : x  Auto Eosinophil % : x  Auto Basophil % : x                          12.2   7.10  )-----------( 191      ( 2022 05:50 )             36.5         135  |  99  |  19  ----------------------------<  109<H>  4.0   |  25  |  1.04    Ca    8.1<L>      2022 05:50  Phos  3.0       Mg     2.1         TPro  5.8<L>  /  Alb  3.9  /  TBili  0.2  /  DBili  <0.1  /  AST  28  /  ALT  69<H>  /  AlkPhos  68      PT/INR - ( 2022 05:50 )   PT: 14.9 sec;   INR: 1.29 ratio         PTT - ( 2022 05:50 )  PTT:23.2 sec  LIVER FUNCTIONS - ( 2022 05:50 )  Alb: 3.9 g/dL / Pro: 5.8 g/dL / ALK PHOS: 68 U/L / ALT: 69 U/L / AST: 28 U/L / GGT: x           Lactate Dehydrogenase, Serum: 178 U/L ( @ 05:50)  Lactate Dehydrogenase, Serum: 194 U/L ( @ 15:47)    Cultures:     Culture - Urine (22 @ 13:44)   Specimen Source: Clean Catch Clean Catch (Midstream)   Culture Results:   No growth Culture - Blood (22 @ 16:40)   Specimen Source: .Blood Blood-Catheter   Culture Results:   No growth to date. Culture - Blood (22 @ 16:40)   Specimen Source: .Blood Blood-Catheter   Culture Results:   No growth to date. Culture - Blood (22 @ 01:04)   Specimen Source: .Blood Blood   Culture Results:   No growth to date.     Radiology:   < from: Xray Chest 1 View- PORTABLE-Urgent (Xray Chest 1 View- PORTABLE-Urgent .) (22 @ 10:46) >    IMPRESSION:  Clear lungs.    --- End of Report ---    < end of copied text >      Meds:   Antimicrobials:   acyclovir   Oral Tab/Cap 400 milliGRAM(s) Oral every 8 hours  cefepime   IVPB 2000 milliGRAM(s) IV Intermittent every 8 hours  cefepime   IVPB      clotrimazole Lozenge 1 Lozenge Oral five times a day  fluconAZOLE   Tablet 400 milliGRAM(s) Oral daily      GI:  loperamide 2 milliGRAM(s) Oral every 4 hours PRN  pantoprazole    Tablet 40 milliGRAM(s) Oral two times a day  polyethylene glycol 3350 17 Gram(s) Oral daily PRN  senna 1 Tablet(s) Oral at bedtime PRN  sodium bicarbonate Mouth Rinse 10 milliLiter(s) Swish and Spit five times a day      Cardiovascular:   carvedilol 3.125 milliGRAM(s) Oral every 12 hours    Other medications:   acetaminophen     Tablet .. 650 milliGRAM(s) Oral every 6 hours  allopurinol 300 milliGRAM(s) Oral daily  Biotene Dry Mouth Oral Rinse 5 milliLiter(s) Swish and Spit five times a day  chlorhexidine 4% Liquid 1 Application(s) Topical <User Schedule>  folic acid 1 milliGRAM(s) Oral daily  levETIRAcetam 500 milliGRAM(s) Oral two times a day  multivitamin 1 Tablet(s) Oral daily  phytonadione   Solution 10 milliGRAM(s) Oral daily  sodium chloride 0.9%. 1000 milliLiter(s) IV Continuous <Continuous>      PRN:   acetaminophen     Tablet .. 650 milliGRAM(s) Oral every 6 hours PRN  loperamide 2 milliGRAM(s) Oral every 4 hours PRN  metoclopramide Injectable 10 milliGRAM(s) IV Push every 6 hours PRN  morphine  - Injectable 2 milliGRAM(s) IV Push every 4 hours PRN  ondansetron Injectable 8 milliGRAM(s) IV Push every 8 hours PRN  polyethylene glycol 3350 17 Gram(s) Oral daily PRN  senna 1 Tablet(s) Oral at bedtime PRN  sodium chloride 0.9% lock flush 10 milliLiter(s) IV Push every 1 hour PRN      A/P: 56 year old male with relapsed, refractory MCL s/p R-CHOP x 4, RICE x 2, maintenance RTX, salvage Calquence, admitted for CAR T cell therapy with Tecartus product. Disease status at time of admission is partial response.   Day +  8  Monitor for CRS and neurotox...follow CRS/neuro tox protocol - GIVEN TOCILIZUMAB  pm and   AM for CRS - 1     - Relapsed, refractory MCL   4/15- day - 4- flu / ctx 2/3 - continue CTX hydration until 24 hours post infusion of last dose   Strict I&O, daily weights, prn diuresis   CRS / ICANS score daily until infusion of CAR T cells (22) and twice daily thereafter   BID labs 0500 & 1500 - monitor for TLS   -CRS grade 1 s/p Tocilizumab on tele started on Keppra as per protocol, pancx, started on cefepime  s/p dexamethasone 4 mg IV x1   F/U repeat blood cx's for fevers ( and ) s/p Toci x2 s/p dexamethasone 4 mg IV x2 also with CP: EKG WNL trops negative s/p morphine with good effect   - CRS grade 2( persistent fevers) given Toci x1 and dexamethasone 4 mg IV overnight, continues to be febrile Tmax 104 F tremors given additional Dexamethasone 10 mg IV  - fever curve improved: CRS grade 1  - Grade 2 neurotox with 10/10 Headaches- taken for CT head( with no abnormalities no cerebral edema), s.p Dexamethasone 10 mg IV pending MR head      -  Prophylactic measures   acyclovir   Oral Tab/Cap 400 milliGRAM(s) Oral every 8 hours  cefepime   IVPB      cefepime   IVPB 2000 milliGRAM(s) IV Intermittent every 8 hours  clotrimazole Lozenge 1 Lozenge Oral five times a day  fluconAZOLE   Tablet 400 milliGRAM(s) Oral daily  Keppra 500 q12h  GI Prophylaxis:  Protonix      - Cardiac / Hx Afib   F/U repeat troponin,  now CP free. ECG non ischemic   Continue Tele monitoring   Continue Coreg bid     -  Mouth care - NS / NaHCO3 rinses, Mycelex, Biotene; Skin care      - Transfuse & replete electrolytes prn     - IV hydration, daily weights, strict I&O, prn diuresis      -  Antiemetics, anti-diarrhea medications:   LORazepam   Injectable 1 milliGRAM(s) IV Push every 6 hours PRN  metoclopramide Injectable 10 milliGRAM(s) IV Push every 6 hours PRN  ondansetron 8mg IV q 8 hours prn nausea      - CRS / ICANS scoring    CRS grade 0, ICANS score 10   - CRS grade 0, ICANS score 10    AM - CRS grade 0, ICANS score 10   -pm-CRS grade 0, ICANS score 10   AM - CRS grade 0, ICANS score 10    PM CRS grade 0, ICANS score 10   AM CRS grade 0, ICANS score 10   PM CRS grade 0, ICANS score 10 --> 9PM CRS score - 1, ICANS - 10   AM CRS grade 0, ICANS score 10   PM CRS grade 0, ICANS score 10   AM CRS grade 1 10am (grade 0 @ 8:30am), ICANS score 10  M CRS grade 2( persistently febrile) ICANS score 10    AM CRS GRADE 1, ICANS score 10  - AM CRS GRADE 1, ICANS score 10   PM CRS GRADE 1, ICANS score 10   noon: with persistent headache: Grade 2 neurotox-   taken for CT head( ( with no abnormalities no cerebral edema) s.p Dexamethasone 10 mg IV pending MR head    I have written the above note for Dr. Banks who performed service with me in the room.   Aurelia Steele NP-C ([phone])    I have seen and examined patient with NP, I agree with above note as scribed.                    [facility] Team                                                      Critical / Counseling Time Provided: 30 minutes                                                                                                                                                         Chief Complaint: CAR T therapy with Tecartus product for treatment of relapsed, refractory MCL      S: Patient seen and examined with [facility] Transplant Team  Feel better today.  +generalized weakness, fatigue  + diarrhea x4 O/N  +burning urination  +poor appetite  +fever improving.    O: Vitals:   Vital Signs Last 24 Hrs  T(C): 37.6 (2022 05:00), Max: 38.5 (2022 17:25)  T(F): 99.7 (2022 05:00), Max: 101.3 (2022 17:25)  HR: 76 (2022 05:00) (72 - 89)  BP: 134/79 (2022 05:00) (118/68 - 145/81)  BP(mean): --  RR: 18 (2022 05:00) (18 - 18)  SpO2: 94% (2022 05:00) (94% - 99%)      Admit weight: 100.4kg  Daily Weight in k kg     Intake / Output:    @ 07:01  -   @ 07:00  --------------------------------------------------------  IN: 3657 mL / OUT: 4000 mL / NET: -343 mL    PE:   Oropharynx: no erythema or ulcerations ,   Oral Mucositis:  NA                                                   Grade: n/a  CVS: S1, S2 RRR   Lungs: CTA throughout bilaterally   Abdomen: + BS x 4, soft, NT, ND   Extremities: no edema BLE's  Gastric Mucositis:       -                                           Grade: n/a  Intestinal Mucositis:     -                                         Grade: n/a   Skin: no rashes  TLC: CDI   Neuro: A&O x 3   Pain: 0    Labs:   CBC Full  -  ( 2022 05:50 )  WBC Count : 7.10 K/uL  Hemoglobin : 12.2 g/dL  Hematocrit : 36.5 %  Platelet Count - Automated : 191 K/uL  Mean Cell Volume : 93.6 fl  Mean Cell Hemoglobin : 31.3 pg  Mean Cell Hemoglobin Concentration : 33.4 gm/dL  Auto Neutrophil # : x  Auto Lymphocyte # : x  Auto Monocyte # : x  Auto Eosinophil # : x  Auto Basophil # : x  Auto Neutrophil % : x  Auto Lymphocyte % : x  Auto Monocyte % : x  Auto Eosinophil % : x  Auto Basophil % : x                          12.2   7.10  )-----------( 191      ( 2022 05:50 )             36.5         135  |  99  |  19  ----------------------------<  109<H>  4.0   |  25  |  1.04    Ca    8.1<L>      2022 05:50  Phos  3.0       Mg     2.1         TPro  5.8<L>  /  Alb  3.9  /  TBili  0.2  /  DBili  <0.1  /  AST  28  /  ALT  69<H>  /  AlkPhos  68      PT/INR - ( 2022 05:50 )   PT: 14.9 sec;   INR: 1.29 ratio         PTT - ( 2022 05:50 )  PTT:23.2 sec  LIVER FUNCTIONS - ( 2022 05:50 )  Alb: 3.9 g/dL / Pro: 5.8 g/dL / ALK PHOS: 68 U/L / ALT: 69 U/L / AST: 28 U/L / GGT: x           Lactate Dehydrogenase, Serum: 178 U/L ( @ 05:50)  Lactate Dehydrogenase, Serum: 194 U/L ( @ 15:47)    Cultures:     Culture - Urine (22 @ 13:44)   Specimen Source: Clean Catch Clean Catch (Midstream)   Culture Results:   No growth Culture - Blood (22 @ 16:40)   Specimen Source: .Blood Blood-Catheter   Culture Results:   No growth to date. Culture - Blood (22 @ 16:40)   Specimen Source: .Blood Blood-Catheter   Culture Results:   No growth to date. Culture - Blood (22 @ 01:04)   Specimen Source: .Blood Blood   Culture Results:   No growth to date.     Radiology:   < from: Xray Chest 1 View- PORTABLE-Urgent (Xray Chest 1 View- PORTABLE-Urgent .) (22 @ 10:46) >    IMPRESSION:  Clear lungs.    --- End of Report ---    < end of copied text >      Meds:   Antimicrobials:   acyclovir   Oral Tab/Cap 400 milliGRAM(s) Oral every 8 hours  cefepime   IVPB 2000 milliGRAM(s) IV Intermittent every 8 hours  cefepime   IVPB      clotrimazole Lozenge 1 Lozenge Oral five times a day  fluconAZOLE   Tablet 400 milliGRAM(s) Oral daily      GI:  loperamide 2 milliGRAM(s) Oral every 4 hours PRN  pantoprazole    Tablet 40 milliGRAM(s) Oral two times a day  polyethylene glycol 3350 17 Gram(s) Oral daily PRN  senna 1 Tablet(s) Oral at bedtime PRN  sodium bicarbonate Mouth Rinse 10 milliLiter(s) Swish and Spit five times a day      Cardiovascular:   carvedilol 3.125 milliGRAM(s) Oral every 12 hours    Other medications:   acetaminophen     Tablet .. 650 milliGRAM(s) Oral every 6 hours  allopurinol 300 milliGRAM(s) Oral daily  Biotene Dry Mouth Oral Rinse 5 milliLiter(s) Swish and Spit five times a day  chlorhexidine 4% Liquid 1 Application(s) Topical <User Schedule>  folic acid 1 milliGRAM(s) Oral daily  levETIRAcetam 500 milliGRAM(s) Oral two times a day  multivitamin 1 Tablet(s) Oral daily  phytonadione   Solution 10 milliGRAM(s) Oral daily  sodium chloride 0.9%. 1000 milliLiter(s) IV Continuous <Continuous>      PRN:   acetaminophen     Tablet .. 650 milliGRAM(s) Oral every 6 hours PRN  loperamide 2 milliGRAM(s) Oral every 4 hours PRN  metoclopramide Injectable 10 milliGRAM(s) IV Push every 6 hours PRN  morphine  - Injectable 2 milliGRAM(s) IV Push every 4 hours PRN  ondansetron Injectable 8 milliGRAM(s) IV Push every 8 hours PRN  polyethylene glycol 3350 17 Gram(s) Oral daily PRN  senna 1 Tablet(s) Oral at bedtime PRN  sodium chloride 0.9% lock flush 10 milliLiter(s) IV Push every 1 hour PRN      A/P: 56 year old male with relapsed, refractory MCL s/p R-CHOP x 4, RICE x 2, maintenance RTX, salvage Calquence, admitted for CAR T cell therapy with Tecartus product. Disease status at time of admission is partial response.   Day +  8  Monitor for CRS and neurotox...follow CRS/neuro tox protocol - GIVEN TOCILIZUMAB  pm and   AM for CRS - 1     - Relapsed, refractory MCL   4/15- day - 4- flu / ctx 2/3 - continue CTX hydration until 24 hours post infusion of last dose   Strict I&O, daily weights, prn diuresis   CRS / ICANS score daily until infusion of CAR T cells (22) and twice daily thereafter   BID labs 0500 & 1500 - monitor for TLS   -CRS grade 1 s/p Tocilizumab on tele started on Keppra as per protocol, pancx, started on cefepime  s/p dexamethasone 4 mg IV x1   F/U repeat blood cx's for fevers ( and ) s/p Toci x2 s/p dexamethasone 4 mg IV x2 also with CP: EKG WNL trops negative s/p morphine with good effect   - CRS grade 2( persistent fevers) given Toci x1 and dexamethasone 4 mg IV overnight, continues to be febrile Tmax 104 F tremors given additional Dexamethasone 10 mg IV  - fever curve improved: CRS grade 1  - Grade 2 neurotox with 10/10 Headaches- taken for CT head( with no abnormalities no cerebral edema), s.p Dexamethasone 10 mg IV pending MR head      -  Prophylactic measures   acyclovir   Oral Tab/Cap 400 milliGRAM(s) Oral every 8 hours  cefepime   IVPB      cefepime   IVPB 2000 milliGRAM(s) IV Intermittent every 8 hours  clotrimazole Lozenge 1 Lozenge Oral five times a day  fluconAZOLE   Tablet 400 milliGRAM(s) Oral daily  Keppra 500 q12h  GI Prophylaxis:  Protonix      - Cardiac / Hx Afib   F/U repeat troponin,  now CP free. ECG non ischemic   Continue Tele monitoring   Continue Coreg bid     -  Mouth care - NS / NaHCO3 rinses, Mycelex, Biotene; Skin care      - Transfuse & replete electrolytes prn     - IV hydration, daily weights, strict I&O, prn diuresis      -  Antiemetics, anti-diarrhea medications:   LORazepam   Injectable 1 milliGRAM(s) IV Push every 6 hours PRN  metoclopramide Injectable 10 milliGRAM(s) IV Push every 6 hours PRN  ondansetron 8mg IV q 8 hours prn nausea      - CRS / ICANS scoring    CRS grade 0, ICANS score 10   - CRS grade 0, ICANS score 10    AM - CRS grade 0, ICANS score 10   -pm-CRS grade 0, ICANS score 10   AM - CRS grade 0, ICANS score 10    PM CRS grade 0, ICANS score 10   AM CRS grade 0, ICANS score 10   PM CRS grade 0, ICANS score 10 --> 9PM CRS score - 1, ICANS - 10   AM CRS grade 0, ICANS score 10   PM CRS grade 0, ICANS score 10   AM CRS grade 1 10am (grade 0 @ 8:30am), ICANS score 10  M CRS grade 2( persistently febrile) ICANS score 10    AM CRS GRADE 1, ICANS score 10  - AM CRS GRADE 1, ICANS score 10   AM CRS GRADE 1, ICANS score 10   noon: with persistent headache: Grade 2 neurotox-   taken for CT head( ( with no abnormalities no cerebral edema) s.p Dexamethasone 10 mg IV pending MR head   PM CRS GRADE 1, ICANS score 10( HA resolved)     I have written the above note for Dr. Banks who performed service with me in the room.   Aurelia Steele  NP-C ([phone])    I have seen and examined patient with NP, I agree with above note as scribed.

## 2022-04-27 NOTE — DISCHARGE NOTE PROVIDER - NSDCFUADDAPPT_GEN_ALL_CORE_FT
You have a follow up at the [facility] on:  -  -  -  Please arrive 15 min prior to your scheduled appointment for blood draw  You have a follow up at the [facility] center on:  -Tuesday   -  -  Please arrive 15 min prior to your scheduled appointment for blood draw  You have a follow up at the [facility] on:  -Tuesday 5/10 at 10:00 with Dr. Banks  -Tuesday 5/17 at2:30 with Alex Martinez NP  -Tuesday 5/14 at 2:30 with Alex Martinez NP  Please arrive 15 min prior to your scheduled appointment for blood draw  You have a follow up at the [facility] on:  -Tuesday 5/10 at 10:00 with Dr. Banks  -Tuesday 5/17 at2:30 with Alex Martinez NP  -Tuesday 5/24 at 2:30 with Alex Martinez NP  Please arrive 15 min prior to your scheduled appointment for blood draw

## 2022-04-27 NOTE — DISCHARGE NOTE PROVIDER - CARE PROVIDER_API CALL
Gerardo Banks)  Medical Oncology  [address] [place] [address]  Phone: [phone]  Fax: [phone]  Follow Up Time:

## 2022-04-27 NOTE — DISCHARGE NOTE PROVIDER - INSTRUCTIONS
Diet and activities as per [facility] discharge guidelines and safe food handling guidelines. NO RESTAURANT OR TAKE OUT FOOD AT THIS TIME, ONLY HOME COOKED PREPARED/FROZEN FOODS. You are allowed to have fresh baked pizza right out of the oven. This is the ONLY takeout food at this time.

## 2022-04-27 NOTE — PROGRESS NOTE ADULT - NS PANP COMMENT GEN_ALL_CORE FT
Admitted for CAR T cell therapy with Tecartus (bresucabtagene autoleucel)  today is + 7..some nausea..now improved...monitor closely for CRS and neurotox...pt is at lower risk for these complications given minimal disease burden..hoever he has been on and off with high temps since fri evening..first dose of toci given for grade 1 crs......s/p 4 total  doses of  toci and dex 4 mg X 4 and 10 mg X 1...improved    1. Admit to BMTU   2. Day -5 through day -1 record CARTOX / ICANS score daily   3. Day - 5 through day - 3 Fludarabine 30mg / m2 IV given over 30 minutes daily   4. Day - 5 - start cyclophosphamide hydration - 0.9% NS + 10mEq KCL / L at 75 ml/m2 and continue for 24 hours post infusion of last dose of CTX   5. Day - 5 through day - 3 CTX 500mg / m2 IV QD to be given over 2 hours   6. Starting day - 5 monitor labs BID: CBC with differential, CMP, LDH, uric acid, mag, phos, CPK, BNP, PT / PTT / INR / Fibrinogen, d-dimer, CRP, hepatic profile, ferritin  7. Weekly IL-6 levels on Monday, twice weekly (Monday / Thursday) CMV / EBV PCR, three times a week (Monday / Wednesday / Friday) type and screen   8. On day - 5 start allopurinol 300mg po QD   9. Day -2 and -1 REST DAYS   10. On day -1 begin to record CARTOX / ICANS score q 12 hours   11. Day 0 infuse Brexucabtagene autoleucel  hx of afib...seen by [facility] cardiology  12. F/U cx's..on cefapime, acyclovir and fluconazole..send rvp  13 Cont tele and keppra  4/16-17 Doing well, I/Os are negative, transient lip swelling, now resolved, some fatigue  4/18, 19, 20, 21, 22 CRS 0, ICANS 10  4/22/22-Chase fever overnight, chills, given Toci, was started on Cefipime. Stable. CRS / ICANS scoring this morning 10.  4/23/22, 4/24/22 CRS 1 ICANS 10  4/24/22- Fever 102oF, complaints of bilateral upper and lower extremities cramps, chest discomfort, EKG no changes. Patient was treated with Toci/Decadron as per protocol. Morphine prn for pain. Clinically improved. Pancultures sent.  4/25/22 CRS 2...persistent high temps.....ICANS 10...tremor noted with fever...dex 10 mg dosed today  4/26/22 CRS 1, Icans 10 Admitted for CAR T cell therapy with Tecartus (bresucabtagene autoleucel)  today is + 9..some nausea..now improved...monitor closely for CRS and neurotox...pt is at lower risk for these complications given minimal disease burden..hoever he has been on and off with high temps since fri evening..first dose of toci given for grade 1 crs......s/p 4 total  doses of  toci and dex 4 mg X 4 and 10 mg X 1...improved..this am 8/10 HA with somulence...c/w grade 2 neurotox...Stat ct head, MRI..dose dex 10 mg    1. Admit to BMTU   2. Day -5 through day -1 record CARTOX / ICANS score daily   3. Day - 5 through day - 3 Fludarabine 30mg / m2 IV given over 30 minutes daily   4. Day - 5 - start cyclophosphamide hydration - 0.9% NS + 10mEq KCL / L at 75 ml/m2 and continue for 24 hours post infusion of last dose of CTX   5. Day - 5 through day - 3 CTX 500mg / m2 IV QD to be given over 2 hours   6. Starting day - 5 monitor labs BID: CBC with differential, CMP, LDH, uric acid, mag, phos, CPK, BNP, PT / PTT / INR / Fibrinogen, d-dimer, CRP, hepatic profile, ferritin  7. Weekly IL-6 levels on Monday, twice weekly (Monday / Thursday) CMV / EBV PCR, three times a week (Monday / Wednesday / Friday) type and screen   8. On day - 5 start allopurinol 300mg po QD   9. Day -2 and -1 REST DAYS   10. On day -1 begin to record CARTOX / ICANS score q 12 hours   11. Day 0 infuse Brexucabtagene autoleucel  hx of afib...seen by [facility] cardiology  12. F/U cx's..on cefapime, acyclovir and fluconazole..send rvp  13 Cont tele and keppra  4/16-17 Doing well, I/Os are negative, transient lip swelling, now resolved, some fatigue  4/18, 19, 20, 21, 22 CRS 0, ICANS 10  4/22/22-Chase fever overnight, chills, given Toci, was started on Cefipime. Stable. CRS / ICANS scoring this morning 10.  4/23/22, 4/24/22 CRS 1 ICANS 10  4/24/22- Fever 102oF, complaints of bilateral upper and lower extremities cramps, chest discomfort, EKG no changes. Patient was treated with Toci/Decadron as per protocol. Morphine prn for pain. Clinically improved. Pancultures sent.  4/25/22 CRS 2...persistent high temps.....ICANS 10...tremor noted with fever...dex 10 mg dosed today  4/26/22 CRS 1, Icans 10  4/27 crs 1, Icans 10...neurotox 2

## 2022-04-27 NOTE — CHART NOTE - NSCHARTNOTEFT_GEN_A_CORE
Medicine PA Fever Note    Patient is a 56y old  Male who presents with a chief complaint of CAR T therapy with Tecartus product for treatment of relapsed, refractory MCL (26 Apr 2022 16:09)      Event Summary:   Notified by RN patient with fever, Temp 100.4F.   Patient seen and examined patient at bedside.  Patient is alert, denies HA, visual changes, CP, SOB, cough, N/V, dysuria, or abd pain.    >VITAL SIGNS:  T(C): 38 (04-27-22 @ 00:05), Max: 38.5 (04-26-22 @ 17:25)  T(F): 100.4 (04-27-22 @ 00:05), Max: 101.3 (04-26-22 @ 17:25)  HR: 74 (04-27-22 @ 00:05) (72 - 82)  BP: 126/74 (04-27-22 @ 00:05) (118/68 - 145/81)  RR: 18 (04-27-22 @ 00:05) (18 - 18)  SpO2: 96% (04-27-22 @ 00:05) (94% - 99%)      >Review Of Systems:   CONSTITUTIONAL:  No fever.   No chills  EYES: No visual changes.    ENT:  No  throat pain.  No neck stiffness  RESPIRATORY: No cough, wheezing, hemoptysis; No shortness of breath  CARDIOVASCULAR: No chest pain or palpitations  GASTROINTESTINAL: No abdominal or epigastric pain.  No diarrhea.    GENITOURINARY: No dysuria, frequency or hematuria  NEUROLOGICAL: No numbness or weakness  SKIN: No itching, burning, rashes, or lesions       >PHYSICAL EXAM:  Constitutional: AOx3. NAD.  Neuro:  Grossly intact   Cardiovascular: +S1 S2. No murmurs.  No LE edema.  Respiratory:  Even, unlabored.  Clear lungs bilaterally. No wheezing, rhonchi, or crackles.  Gastrointestinal: +BS X4 active. Soft. Nontender. Nondistended   Extremities/Vascular: +2 pulses bilaterally.   Skin:  +Feverish to touch     >MEDICATIONS:    MEDICATIONS  (STANDING):  acetaminophen     Tablet .. 650 milliGRAM(s) Oral every 6 hours  acyclovir   Oral Tab/Cap 400 milliGRAM(s) Oral every 8 hours  allopurinol 300 milliGRAM(s) Oral daily  Biotene Dry Mouth Oral Rinse 5 milliLiter(s) Swish and Spit five times a day  carvedilol 3.125 milliGRAM(s) Oral every 12 hours  cefepime   IVPB 2000 milliGRAM(s) IV Intermittent every 8 hours  cefepime   IVPB      chlorhexidine 4% Liquid 1 Application(s) Topical <User Schedule>  clotrimazole Lozenge 1 Lozenge Oral five times a day  fluconAZOLE   Tablet 400 milliGRAM(s) Oral daily  folic acid 1 milliGRAM(s) Oral daily  levETIRAcetam 500 milliGRAM(s) Oral two times a day  multivitamin 1 Tablet(s) Oral daily  pantoprazole    Tablet 40 milliGRAM(s) Oral two times a day  phytonadione   Solution 10 milliGRAM(s) Oral daily  sodium bicarbonate Mouth Rinse 10 milliLiter(s) Swish and Spit five times a day  sodium chloride 0.9%. 1000 milliLiter(s) (100 mL/Hr) IV Continuous <Continuous>      >LABORATORY:                          11.7   7.67  )-----------( 176      ( 26 Apr 2022 15:47 )             35.7       04-26    135  |  99  |  20  ----------------------------<  168<H>  3.9   |  22  |  1.10    Ca    8.2<L>      26 Apr 2022 15:47  Phos  3.2     04-26  Mg     2.0     04-26    TPro  5.8<L>  /  Alb  3.9  /  TBili  0.2  /  DBili  x   /  AST  27  /  ALT  61<H>  /  AlkPhos  70  04-26          >MICROBIOLOGY:     Urine Culture: Culture - Urine (04.25.22 @ 13:44)    Specimen Source: Clean Catch Clean Catch (Midstream)    Culture Results:   No growth    Blood Culture:Culture - Blood (04.24.22 @ 16:40)    Specimen Source: .Blood Blood-Catheter    Culture Results:   No growth to date.      >RADIOLOGY:    CXR: < from: Xray Chest 1 View- PORTABLE-Urgent (Xray Chest 1 View- PORTABLE-Urgent .) (04.24.22 @ 10:46) >      ACC: [id number] EXAM:  XR CHEST PORTABLE URGENT 1V                          PROCEDURE DATE:  04/24/2022          INTERPRETATION:  EXAMINATION: XR CHEST URGENT    CLINICAL INDICATION: chest pain    TECHNIQUE: Single frontal, portable view of the chestwas obtained.    COMPARISON: Chest x-ray 4/22/2022    FINDINGS:  Left-sided loop recorder. Right-sided jugular central line with tip in   the SVC.  The heart is normal in size.  The lungs are clear.  There is no pneumothorax or pleural effusion.    IMPRESSION:  Clear lungs.    --- End of Report ---          PIPER MACKEY MD; Resident Radiologist  This document has been electronically signed.  KARSTEN OLVERA MD; Attending Interventional Radiologist  This document has been electronically signed.Apr 25 2022  9:34AM    < end of copied text >        >ASSESSMENT/PLAN:   HPI:  This is a 56 year old male with a history of atrial fibrillation (s/p ablation x 2, internal loop recorder) and  MCL, initially diagnosed 10/2016. He was initially seen at [facility] for a left inguinal mass. CT scans 3/21/16 showed left sided pelvic and inguinal adenopathy. A left inguinal lymph node biopsy 9/26/16 showed morphologic and immunophenotypic findings consistent with MCL. He was treated with 4 cycles of R-CHOP, and 2 cycles of RICE consolidation. A bone marrow biopsy 2/10/17 showed normocellular bone marrow. He was admitted 3/27/17 for an autologous pbsct with CBV prep, and received the cells 4/5/17. He engrafted 4/16/17. He was discharged and completed maintenance Rituxan. In 10/21, he noticed a lump in the right groin. A lymph node biopsy 11/12/21 was consistent with relapsed MCL. He was treated with Calquence. He didn't tolerate Calquence well, with reports of frequent abdominal pain. He is admitted today for CAR T therapy with Tecartus product for relapsed, refractory MCL. His disease status at this time is partial remission.  (14 Apr 2022 12:36)    Now w/ recurrent fever of 100.4F    1) Fever - Recurrent   - Tylenol / Cooling measures prn for Pyrexia  - BC x2, UA/UC NGTD  - ICANS score 10, ICE score 0, currently CRS grade 1  - S/P Toci & Dexamethasone  - CXR above & reviewed  - c/w Current Abx/ Antifungal/ IVF regimen   - Monitor VS  - F/U Primary  care team in AM     Corrine Morse PA-C  Department of Medicine  [facility] [id number]

## 2022-04-27 NOTE — DISCHARGE NOTE PROVIDER - DETAILS OF MALNUTRITION DIAGNOSIS/DIAGNOSES
This patient has been assessed with a concern for Malnutrition and was treated during this hospitalization for the following Nutrition diagnosis/diagnoses:     -  04/26/2022: Moderate protein-calorie malnutrition

## 2022-04-28 DIAGNOSIS — R41.89 OTHER SYMPTOMS AND SIGNS INVOLVING COGNITIVE FUNCTIONS AND AWARENESS: ICD-10-CM

## 2022-04-28 DIAGNOSIS — R53.1 WEAKNESS: ICD-10-CM

## 2022-04-28 DIAGNOSIS — R11.2 NAUSEA WITH VOMITING, UNSPECIFIED: ICD-10-CM

## 2022-04-28 LAB
ALBUMIN SERPL ELPH-MCNC: 4.1 G/DL — SIGNIFICANT CHANGE UP (ref 3.3–5)
ALBUMIN SERPL ELPH-MCNC: 4.2 G/DL — SIGNIFICANT CHANGE UP (ref 3.3–5)
ALP SERPL-CCNC: 65 U/L — SIGNIFICANT CHANGE UP (ref 40–120)
ALP SERPL-CCNC: 67 U/L — SIGNIFICANT CHANGE UP (ref 40–120)
ALT FLD-CCNC: 65 U/L — HIGH (ref 10–45)
ALT FLD-CCNC: 78 U/L — HIGH (ref 10–45)
ANION GAP SERPL CALC-SCNC: 12 MMOL/L — SIGNIFICANT CHANGE UP (ref 5–17)
ANION GAP SERPL CALC-SCNC: 13 MMOL/L — SIGNIFICANT CHANGE UP (ref 5–17)
APTT BLD: 22.6 SEC — LOW (ref 27.5–35.5)
AST SERPL-CCNC: 21 U/L — SIGNIFICANT CHANGE UP (ref 10–40)
AST SERPL-CCNC: 26 U/L — SIGNIFICANT CHANGE UP (ref 10–40)
BASOPHILS # BLD AUTO: 0.06 K/UL — SIGNIFICANT CHANGE UP (ref 0–0.2)
BASOPHILS NFR BLD AUTO: 1 % — SIGNIFICANT CHANGE UP (ref 0–2)
BILIRUB DIRECT SERPL-MCNC: <0.1 MG/DL — SIGNIFICANT CHANGE UP (ref 0–0.3)
BILIRUB INDIRECT FLD-MCNC: >0.1 MG/DL — LOW (ref 0.2–1)
BILIRUB SERPL-MCNC: 0.2 MG/DL — SIGNIFICANT CHANGE UP (ref 0.2–1.2)
BILIRUB SERPL-MCNC: 0.2 MG/DL — SIGNIFICANT CHANGE UP (ref 0.2–1.2)
BUN SERPL-MCNC: 21 MG/DL — SIGNIFICANT CHANGE UP (ref 7–23)
BUN SERPL-MCNC: 25 MG/DL — HIGH (ref 7–23)
CALCIUM SERPL-MCNC: 8.5 MG/DL — SIGNIFICANT CHANGE UP (ref 8.4–10.5)
CALCIUM SERPL-MCNC: 8.7 MG/DL — SIGNIFICANT CHANGE UP (ref 8.4–10.5)
CHLORIDE SERPL-SCNC: 98 MMOL/L — SIGNIFICANT CHANGE UP (ref 96–108)
CHLORIDE SERPL-SCNC: 99 MMOL/L — SIGNIFICANT CHANGE UP (ref 96–108)
CK SERPL-CCNC: 16 U/L — LOW (ref 30–200)
CMV DNA CSF QL NAA+PROBE: SIGNIFICANT CHANGE UP
CMV DNA SPEC NAA+PROBE-LOG#: SIGNIFICANT CHANGE UP LOG10IU/ML
CO2 SERPL-SCNC: 23 MMOL/L — SIGNIFICANT CHANGE UP (ref 22–31)
CO2 SERPL-SCNC: 24 MMOL/L — SIGNIFICANT CHANGE UP (ref 22–31)
CREAT SERPL-MCNC: 0.83 MG/DL — SIGNIFICANT CHANGE UP (ref 0.5–1.3)
CREAT SERPL-MCNC: 0.97 MG/DL — SIGNIFICANT CHANGE UP (ref 0.5–1.3)
CRP SERPL-MCNC: <3 MG/L — SIGNIFICANT CHANGE UP (ref 0–4)
CULTURE RESULTS: SIGNIFICANT CHANGE UP
CULTURE RESULTS: SIGNIFICANT CHANGE UP
D DIMER BLD IA.RAPID-MCNC: 283 NG/ML DDU — HIGH
EBV EA AB SER IA-ACNC: 47.9 U/ML — HIGH
EBV EA AB TITR SER IF: POSITIVE
EBV EA IGG SER-ACNC: POSITIVE
EBV NA IGG SER IA-ACNC: >600 U/ML — HIGH
EBV PATRN SPEC IB-IMP: SIGNIFICANT CHANGE UP
EBV VCA IGG AVIDITY SER QL IA: POSITIVE
EBV VCA IGM SER IA-ACNC: 85.1 U/ML — HIGH
EBV VCA IGM SER IA-ACNC: <10 U/ML — SIGNIFICANT CHANGE UP
EBV VCA IGM TITR FLD: NEGATIVE — SIGNIFICANT CHANGE UP
EGFR: 102 ML/MIN/1.73M2 — SIGNIFICANT CHANGE UP
EGFR: 91 ML/MIN/1.73M2 — SIGNIFICANT CHANGE UP
EOSINOPHIL # BLD AUTO: 0 K/UL — SIGNIFICANT CHANGE UP (ref 0–0.5)
EOSINOPHIL NFR BLD AUTO: 0 % — SIGNIFICANT CHANGE UP (ref 0–6)
FERRITIN SERPL-MCNC: 159 NG/ML — SIGNIFICANT CHANGE UP (ref 30–400)
FIBRINOGEN PPP-MCNC: 276 MG/DL — LOW (ref 330–520)
GLUCOSE SERPL-MCNC: 149 MG/DL — HIGH (ref 70–99)
GLUCOSE SERPL-MCNC: 151 MG/DL — HIGH (ref 70–99)
HCT VFR BLD CALC: 36.7 % — LOW (ref 39–50)
HCT VFR BLD CALC: 37.4 % — LOW (ref 39–50)
HGB BLD-MCNC: 12 G/DL — LOW (ref 13–17)
HGB BLD-MCNC: 12.6 G/DL — LOW (ref 13–17)
IL6 FLD-MCNC: 274.2 PG/ML — HIGH (ref 0–13)
IL6 FLD-MCNC: 521.6 PG/ML — HIGH (ref 0–13)
INR BLD: 1.2 RATIO — HIGH (ref 0.88–1.16)
LDH SERPL L TO P-CCNC: 170 U/L — SIGNIFICANT CHANGE UP (ref 50–242)
LDH SERPL L TO P-CCNC: 188 U/L — SIGNIFICANT CHANGE UP (ref 50–242)
LYMPHOCYTES # BLD AUTO: 2.19 K/UL — SIGNIFICANT CHANGE UP (ref 1–3.3)
LYMPHOCYTES # BLD AUTO: 35 % — SIGNIFICANT CHANGE UP (ref 13–44)
MAGNESIUM SERPL-MCNC: 2 MG/DL — SIGNIFICANT CHANGE UP (ref 1.6–2.6)
MAGNESIUM SERPL-MCNC: 2.1 MG/DL — SIGNIFICANT CHANGE UP (ref 1.6–2.6)
MANUAL SMEAR VERIFICATION: SIGNIFICANT CHANGE UP
MCHC RBC-ENTMCNC: 30.5 PG — SIGNIFICANT CHANGE UP (ref 27–34)
MCHC RBC-ENTMCNC: 31.3 PG — SIGNIFICANT CHANGE UP (ref 27–34)
MCHC RBC-ENTMCNC: 32.7 GM/DL — SIGNIFICANT CHANGE UP (ref 32–36)
MCHC RBC-ENTMCNC: 33.7 GM/DL — SIGNIFICANT CHANGE UP (ref 32–36)
MCV RBC AUTO: 92.8 FL — SIGNIFICANT CHANGE UP (ref 80–100)
MCV RBC AUTO: 93.1 FL — SIGNIFICANT CHANGE UP (ref 80–100)
MONOCYTES # BLD AUTO: 1.13 K/UL — HIGH (ref 0–0.9)
MONOCYTES NFR BLD AUTO: 18 % — HIGH (ref 2–14)
NEUTROPHILS # BLD AUTO: 2.5 K/UL — SIGNIFICANT CHANGE UP (ref 1.8–7.4)
NEUTROPHILS NFR BLD AUTO: 38 % — LOW (ref 43–77)
NEUTS BAND # BLD: 2 % — SIGNIFICANT CHANGE UP (ref 0–8)
NRBC # BLD: 0 /100 WBCS — SIGNIFICANT CHANGE UP (ref 0–0)
NRBC # BLD: 0 /100 — SIGNIFICANT CHANGE UP (ref 0–0)
NT-PROBNP SERPL-SCNC: 77 PG/ML — SIGNIFICANT CHANGE UP (ref 0–300)
PHOSPHATE SERPL-MCNC: 3.3 MG/DL — SIGNIFICANT CHANGE UP (ref 2.5–4.5)
PHOSPHATE SERPL-MCNC: 3.9 MG/DL — SIGNIFICANT CHANGE UP (ref 2.5–4.5)
PLAT MORPH BLD: NORMAL — SIGNIFICANT CHANGE UP
PLATELET # BLD AUTO: 232 K/UL — SIGNIFICANT CHANGE UP (ref 150–400)
PLATELET # BLD AUTO: 252 K/UL — SIGNIFICANT CHANGE UP (ref 150–400)
POTASSIUM SERPL-MCNC: 4.1 MMOL/L — SIGNIFICANT CHANGE UP (ref 3.5–5.3)
POTASSIUM SERPL-MCNC: 4.6 MMOL/L — SIGNIFICANT CHANGE UP (ref 3.5–5.3)
POTASSIUM SERPL-SCNC: 4.1 MMOL/L — SIGNIFICANT CHANGE UP (ref 3.5–5.3)
POTASSIUM SERPL-SCNC: 4.6 MMOL/L — SIGNIFICANT CHANGE UP (ref 3.5–5.3)
PROT SERPL-MCNC: 6.1 G/DL — SIGNIFICANT CHANGE UP (ref 6–8.3)
PROT SERPL-MCNC: 6.2 G/DL — SIGNIFICANT CHANGE UP (ref 6–8.3)
PROTHROM AB SERPL-ACNC: 14 SEC — HIGH (ref 10.5–13.4)
RBC # BLD: 3.94 M/UL — LOW (ref 4.2–5.8)
RBC # BLD: 4.03 M/UL — LOW (ref 4.2–5.8)
RBC # FLD: 13.6 % — SIGNIFICANT CHANGE UP (ref 10.3–14.5)
RBC # FLD: 13.6 % — SIGNIFICANT CHANGE UP (ref 10.3–14.5)
RBC BLD AUTO: SIGNIFICANT CHANGE UP
SODIUM SERPL-SCNC: 134 MMOL/L — LOW (ref 135–145)
SODIUM SERPL-SCNC: 135 MMOL/L — SIGNIFICANT CHANGE UP (ref 135–145)
SPECIMEN SOURCE: SIGNIFICANT CHANGE UP
SPECIMEN SOURCE: SIGNIFICANT CHANGE UP
URATE SERPL-MCNC: 3.1 MG/DL — LOW (ref 3.4–8.8)
URATE SERPL-MCNC: 3.2 MG/DL — LOW (ref 3.4–8.8)
VARIANT LYMPHS # BLD: 6 % — SIGNIFICANT CHANGE UP (ref 0–6)
WBC # BLD: 6.26 K/UL — SIGNIFICANT CHANGE UP (ref 3.8–10.5)
WBC # BLD: 6.65 K/UL — SIGNIFICANT CHANGE UP (ref 3.8–10.5)
WBC # FLD AUTO: 6.26 K/UL — SIGNIFICANT CHANGE UP (ref 3.8–10.5)
WBC # FLD AUTO: 6.65 K/UL — SIGNIFICANT CHANGE UP (ref 3.8–10.5)

## 2022-04-28 PROCEDURE — 99233 SBSQ HOSP IP/OBS HIGH 50: CPT

## 2022-04-28 PROCEDURE — 99291 CRITICAL CARE FIRST HOUR: CPT

## 2022-04-28 RX ADMIN — Medication 5 MILLILITER(S): at 07:24

## 2022-04-28 RX ADMIN — Medication 400 MILLIGRAM(S): at 21:08

## 2022-04-28 RX ADMIN — Medication 10 MILLILITER(S): at 19:22

## 2022-04-28 RX ADMIN — OXYCODONE HYDROCHLORIDE 5 MILLIGRAM(S): 5 TABLET ORAL at 22:00

## 2022-04-28 RX ADMIN — Medication 10 MILLILITER(S): at 15:40

## 2022-04-28 RX ADMIN — OXYCODONE HYDROCHLORIDE 5 MILLIGRAM(S): 5 TABLET ORAL at 13:18

## 2022-04-28 RX ADMIN — CEFEPIME 100 MILLIGRAM(S): 1 INJECTION, POWDER, FOR SOLUTION INTRAMUSCULAR; INTRAVENOUS at 21:08

## 2022-04-28 RX ADMIN — Medication 10 MILLILITER(S): at 07:25

## 2022-04-28 RX ADMIN — Medication 1000 MILLIGRAM(S): at 18:01

## 2022-04-28 RX ADMIN — Medication 5 MILLILITER(S): at 15:40

## 2022-04-28 RX ADMIN — Medication 400 MILLIGRAM(S): at 13:05

## 2022-04-28 RX ADMIN — CHLORHEXIDINE GLUCONATE 1 APPLICATION(S): 213 SOLUTION TOPICAL at 07:25

## 2022-04-28 RX ADMIN — Medication 5 MILLILITER(S): at 19:21

## 2022-04-28 RX ADMIN — Medication 1 LOZENGE: at 15:40

## 2022-04-28 RX ADMIN — Medication 10 MILLILITER(S): at 23:12

## 2022-04-28 RX ADMIN — Medication 10 MILLILITER(S): at 00:49

## 2022-04-28 RX ADMIN — Medication 5 MILLILITER(S): at 00:49

## 2022-04-28 RX ADMIN — OXYCODONE HYDROCHLORIDE 5 MILLIGRAM(S): 5 TABLET ORAL at 12:48

## 2022-04-28 RX ADMIN — Medication 1 LOZENGE: at 07:25

## 2022-04-28 RX ADMIN — Medication 1 LOZENGE: at 23:12

## 2022-04-28 RX ADMIN — Medication 5 MILLILITER(S): at 12:34

## 2022-04-28 RX ADMIN — LEVETIRACETAM 500 MILLIGRAM(S): 250 TABLET, FILM COATED ORAL at 17:30

## 2022-04-28 RX ADMIN — LEVETIRACETAM 500 MILLIGRAM(S): 250 TABLET, FILM COATED ORAL at 05:50

## 2022-04-28 RX ADMIN — OXYCODONE HYDROCHLORIDE 5 MILLIGRAM(S): 5 TABLET ORAL at 22:30

## 2022-04-28 RX ADMIN — PANTOPRAZOLE SODIUM 40 MILLIGRAM(S): 20 TABLET, DELAYED RELEASE ORAL at 05:50

## 2022-04-28 RX ADMIN — Medication 1 TABLET(S): at 12:34

## 2022-04-28 RX ADMIN — Medication 1 LOZENGE: at 12:35

## 2022-04-28 RX ADMIN — Medication 1 LOZENGE: at 00:49

## 2022-04-28 RX ADMIN — FLUCONAZOLE 400 MILLIGRAM(S): 150 TABLET ORAL at 12:34

## 2022-04-28 RX ADMIN — Medication 300 MILLIGRAM(S): at 12:35

## 2022-04-28 RX ADMIN — Medication 400 MILLIGRAM(S): at 05:50

## 2022-04-28 RX ADMIN — CEFEPIME 100 MILLIGRAM(S): 1 INJECTION, POWDER, FOR SOLUTION INTRAMUSCULAR; INTRAVENOUS at 05:50

## 2022-04-28 RX ADMIN — Medication 1 MILLIGRAM(S): at 12:34

## 2022-04-28 RX ADMIN — SODIUM CHLORIDE 100 MILLILITER(S): 9 INJECTION INTRAMUSCULAR; INTRAVENOUS; SUBCUTANEOUS at 19:22

## 2022-04-28 RX ADMIN — Medication 650 MILLIGRAM(S): at 21:50

## 2022-04-28 RX ADMIN — Medication 5 MILLILITER(S): at 23:13

## 2022-04-28 RX ADMIN — Medication 1 LOZENGE: at 19:22

## 2022-04-28 RX ADMIN — CARVEDILOL PHOSPHATE 3.12 MILLIGRAM(S): 80 CAPSULE, EXTENDED RELEASE ORAL at 05:51

## 2022-04-28 RX ADMIN — CARVEDILOL PHOSPHATE 3.12 MILLIGRAM(S): 80 CAPSULE, EXTENDED RELEASE ORAL at 17:30

## 2022-04-28 RX ADMIN — Medication 10 MILLILITER(S): at 12:34

## 2022-04-28 RX ADMIN — Medication 10 MILLIGRAM(S): at 12:34

## 2022-04-28 RX ADMIN — PANTOPRAZOLE SODIUM 40 MILLIGRAM(S): 20 TABLET, DELAYED RELEASE ORAL at 17:30

## 2022-04-28 RX ADMIN — Medication 650 MILLIGRAM(S): at 21:20

## 2022-04-28 RX ADMIN — CEFEPIME 100 MILLIGRAM(S): 1 INJECTION, POWDER, FOR SOLUTION INTRAMUSCULAR; INTRAVENOUS at 13:05

## 2022-04-28 NOTE — PROGRESS NOTE ADULT - PROBLEM SELECTOR PLAN 7
HA with febrile episodes  Oral pain ? grade one mucositis  On IV opioids PRN HA with febrile episodes  Mild oral pain

## 2022-04-28 NOTE — PROGRESS NOTE ADULT - PROBLEM SELECTOR PLAN 9
IV chemotherapy as part of CAR-T treatment  Continue to monitor for known adverse effects/symptoms  Risk of infectious complications given anticipated impact on hematopoietic lineages and resultant immune compromise  Monitor for neurologic changes and/or signs of CRS  PPx/Tx per primary team  Relevant considerations: febrile episodes IV chemotherapy as part of CAR-T treatment  Continue to monitor for known adverse effects/symptoms  Risk of infectious complications given anticipated impact on hematopoietic lineages and resultant immune compromise  Monitor for neurologic changes and/or signs of CRS  PPx/Tx per primary team    Relevant considerations: Treatment for CRS  Neurotox 2

## 2022-04-28 NOTE — PROGRESS NOTE ADULT - SUBJECTIVE AND OBJECTIVE BOX
HPI:  This is a 56 year old male with a history of atrial fibrillation (s/p ablation x 2, internal loop recorder) and  MCL, initially diagnosed 10/2016. He was initially seen at [facility] for a left inguinal mass. CT scans 3/21/16 showed left sided pelvic and inguinal adenopathy. A left inguinal lymph node biopsy 9/26/16 showed morphologic and immunophenotypic findings consistent with MCL. He was treated with 4 cycles of R-CHOP, and 2 cycles of RICE consolidation. A bone marrow biopsy 2/10/17 showed normocellular bone marrow. He was admitted 3/27/17 for an autologous pbsct with CBV prep, and received the cells 4/5/17. He engrafted 4/16/17. He was discharged and completed maintenance Rituxan. In 10/21, he noticed a lump in the right groin. A lymph node biopsy 11/12/21 was consistent with relapsed MCL. He was treated with Calquence. He didn't tolerate Calquence well, with reports of frequent abdominal pain. He is admitted today for CAR T therapy with Tecartus product for relapsed, refractory MCL. His disease status at this time is partial remission.  (14 Apr 2022 12:36)              04-28-22 @ 14:02  [facility] BMAR 719 W1    Overnight events: No major events.  Staff reports: Staff reported gait disturbance and LE weakness  Patient:  Physical-->  Cognitive  Family reports-->  PRN use: n/a        RECENT VITALS/LABS/MEDICATIONS/ASSAYS     04-28-22 @ 14:02    T(C): 37.2 (04-28-22 @ 13:51), Max: 37.3 (04-27-22 @ 21:38)  HR: 87 (04-28-22 @ 13:51) (76 - 87)  BP: 112/71 (04-28-22 @ 13:51) (107/67 - 118/75)  RR: 18 (04-28-22 @ 13:51) (18 - 18)  SpO2: 97% (04-28-22 @ 13:51) (95% - 97%)  Wt(kg): --      27 Apr 2022 07:01  -  28 Apr 2022 07:00  --------------------------------------------------------  IN:    IV PiggyBack: 572 mL    Oral Fluid: 1190 mL    sodium chloride 0.9%: 1776.7 mL  Total IN: 3538.7 mL    OUT:    Voided (mL): 5275 mL  Total OUT: 5275 mL    Total NET: -1736.3 mL      28 Apr 2022 07:01 - 28 Apr 2022 14:02  --------------------------------------------------------  IN:    Oral Fluid: 480 mL    sodium chloride 0.9%: 492 mL  Total IN: 972 mL    OUT:    Voided (mL): 300 mL  Total OUT: 300 mL    Total NET: 672 mL          04-27 @ 07:01 - 04-28 @ 07:00  --------------------------------------------------------  IN: 3538.7 mL / OUT: 5275 mL / NET: -1736.3 mL    04-28 @ 07:01 - 04-28 @ 14:02  --------------------------------------------------------  IN: 972 mL / OUT: 300 mL / NET: 672 mL      CAPILLARY BLOOD GLUCOSE            acetaminophen     Tablet .. 650 milliGRAM(s) Oral every 6 hours PRN  acetaminophen     Tablet .. 650 milliGRAM(s) Oral every 6 hours  acyclovir   Oral Tab/Cap 400 milliGRAM(s) Oral every 8 hours  allopurinol 300 milliGRAM(s) Oral daily  Biotene Dry Mouth Oral Rinse 5 milliLiter(s) Swish and Spit five times a day  carvedilol 3.125 milliGRAM(s) Oral every 12 hours  cefepime   IVPB      cefepime   IVPB 2000 milliGRAM(s) IV Intermittent every 8 hours  chlorhexidine 4% Liquid 1 Application(s) Topical <User Schedule>  clotrimazole Lozenge 1 Lozenge Oral five times a day  fluconAZOLE   Tablet 400 milliGRAM(s) Oral daily  folic acid 1 milliGRAM(s) Oral daily  levETIRAcetam 500 milliGRAM(s) Oral two times a day  loperamide 2 milliGRAM(s) Oral every 4 hours PRN  metoclopramide Injectable 10 milliGRAM(s) IV Push every 6 hours PRN  multivitamin 1 Tablet(s) Oral daily  ondansetron Injectable 8 milliGRAM(s) IV Push every 8 hours PRN  oxyCODONE    IR 5 milliGRAM(s) Oral every 6 hours PRN  pantoprazole    Tablet 40 milliGRAM(s) Oral two times a day  polyethylene glycol 3350 17 Gram(s) Oral daily PRN  senna 1 Tablet(s) Oral at bedtime PRN  sodium bicarbonate Mouth Rinse 10 milliLiter(s) Swish and Spit five times a day  sodium chloride 0.9% lock flush 10 milliLiter(s) IV Push every 1 hour PRN  sodium chloride 0.9%. 1000 milliLiter(s) IV Continuous <Continuous>          04-28    135  |  99  |  21  ----------------------------<  149<H>  4.6   |  23  |  0.83    Ca    8.7      28 Apr 2022 05:53  Phos  3.3     04-28  Mg     2.0     04-28    TPro  6.1  /  Alb  4.1  /  TBili  0.2  /  DBili  <0.1  /  AST  21  /  ALT  65<H>  /  AlkPhos  67  04-28      Procalc  BNPSerum Pro-Brain Natriuretic Peptide: 77 pg/mL (04-28-22 @ 05:53)  Serum Pro-Brain Natriuretic Peptide: 194 pg/mL (04-27-22 @ 05:50)    ABG                          12.6   6.26  )-----------( 252      ( 28 Apr 2022 05:53 )             37.4   PT/INR - ( 28 Apr 2022 05:53 )   PT: 14.0 sec;   INR: 1.20 ratio         PTT - ( 28 Apr 2022 05:53 )  PTT:22.6 sec        blood and urine cultures                ===================================================================================  Palliative Global Assessment-Initial   A review of the paper chart has been conducted  HCP form present:               Yes and valid []               Yes but invalid []                No [x]   MOLST present:                   Yes and valid []               Yes but invalid []                No [x]  Incapacity form present:   Yes and valid []                Yes but invalid []                No []                 N/A [x]  Living Will:                            Yes and valid []                Yes but invalid []                No [x]      Family Heatlh Care Decision Act Surrogate Decision Maker Hierarchy  [facility] Article 81 Guardian-->Spouse or domestic partner--> Adult child-->Parent-->Sibling--> Close friend      Contacts listed in the paper or electronic chart  Name Aga Kapoor Wife  Number(s) [phone]  Translation Required: None  Spouse or Partner: Aga  Family unit: Wife and children, his youngest is 13  Family history of hematologic malignancy: None  Residence: [ ] Apartment   [x] House  [ ] Other  Degree of functionality in the home: Full   Performance Status (PPSv2): 80  BMI:BMI (kg/m2): 30.6 (04-14-22 @ 10:40)  Engagement in the home:  Employment: [ ] Currently employed [ x] Exited workforce due to illness  [ ] Retired prior to illness  [ ] No/distant history of employment   Support network (degree):  ---Family:        [ ] Low  [ ] Moderate  [ x] High  ---Neighbors: [ x] Low  [ ] Moderate  [ ] High  ---Social:         [ ] Low  [ ] Moderate  [ x] High  ---Spiritual:    [ x] Low  [ ] Moderate  [ ] High  Financial concerns: TBD  Coping Strategies: Listening to music and speaking with his wife  Caregiver burden/fatigue/needs: Childcare issues/concerns given his wife's visitation/  Grief identified: [] Yes [x] No  Medical communication and decision making preferences: TBD        PERTINENT PM/SXH:   Non Hodgkin&#x27;s lymphoma    Atrial flutter    Migraine    Sinus bradycardia    Paroxysmal atrial fibrillation    GERD (gastroesophageal reflux disease)      H/O total knee replacement    S/P right knee arthroscopy    History of arthroscopy of left shoulder    H/O prior ablation treatment    Status post placement of implantable loop recorder    H/O stem cell transplant      FAMILY HISTORY:  Family history of hypertension (Mother)  Mother    Family history of diabetes mellitus (Father)  Father      ITEMS NOT CHECKED ARE NOT PRESENT    SOCIAL HISTORY:   Significant other/partner[x ]  Children[x ]  Jewish/Spirituality:  Substance hx:  [ ]   Tobacco hx:  [ ]   Alcohol hx: [ ]   Home Opioid hx:  [ ] I-Stop Reference No:  Living Situation: [ ]Home  [ ]Long term care  [ ]Rehab [ ]Other    ADVANCE DIRECTIVES:    DNR  MOLST  [ ]  Living Will  [ ]   DECISION MAKER(s):  [ ] Health Care Proxy(s)  [ ] Surrogate(s)  [ ] Guardian           Name(s): Phone Number(s):    BASELINE (I)ADL(s) (prior to admission):  Orient: [ ]Total  [ ] Moderate [ ]Dependent    Allergies    No Known Allergies    Intolerances         PRESENT SYMPTOMS: [ ]Unable to obtain due to poor mentation   Source if other than patient:  [ ]Family   [ ]Team [ ] Inferred/Assessed    Pain: [ x]yes [ ]no  QOL impact - significant  Location -  throat                  Aggravating factors - oral intake  Quality - burning   Radiation - none  Timing- with consumption  Severity (0-10 scale):  Minimal acceptable level (0-10 scale):      PAIN AD Score:     [email or web address] (press ctrl +  left click to view)    [ ] Inferred Symptoms    Fatigue:                             [ ] None  [ x]Mild [  ]Moderate [ ]Severe  Drowsiness:                      [ ] None  [x ]Mild [   ]Moderate [ ]Severe  Nausea:                             [   ] None  [x ]Mild [  ]Moderate [  ]Severe  Dysgeusia:                         [  } None  [x ]Mild [  ]Moderate [ ]Severe  Loss of appetite:              [  ] None  [ x]Mild   ]Moderate [ ]Severe  Constipation:                    [x ] None  [ ]Mild [ ]Moderate [ ]Severe  Dyspnea:                           [x ] None  [ ]Mild [ ]Moderate [ ]Severe  Anxiety:                             [ x] None  [  ]Mild [ ]Moderate [ ]Severe  Depression:                      [x ] None  [  ]Mild [ ]Moderate [ ]Severe  Cognitive changes:          [  ] None  [x ]Mild [ ]Moderate [ ]Severe  Insomnia      :                    [ x ] None  [  ]Mild [ ]Moderate [ ]Severe  Isolation:                           [x ] None  [ ]Mild [ ]Moderate [ ]Severe  Loneliness:                        [x ] None  [ ]Mild [ ]Moderate [ ]Severe  Lack of   Wellbeing:                        [  ] None  [ ]Mild [ x]Moderate [x ]Severe    Other Symptoms: None  [x ]All other review of systems negative     Palliative Performance Status Version 2:      80   %    [email or web address]  PHYSICAL EXAM:    GENERAL:  [ x]Alert  [ ]Oriented x   [  ]Lethargic  [ ]Cachexia  [ ]Unarousable  [ ]Verbal  [ ]Non-Verbal [ ] Engaging   [  ] Confused  [  ] No distress  Behavioral:   [ ] Anxiety  [ ] Delirium [ ] Agitation [ x] Calm   [  ] Restless [ ] Other  HEENT:  [x ]Normal   [ ]Dry mouth   [ ]ET Tube/Trach  [ ]Oral lesions   [ ] NGT  [ ] Mucositis [ ] Oral  Bleeding  PULMONARY:   [ x]Clear [ ]Tachypnea  [ ]Audible excessive secretions [  ] No tachypnea  [ ]Rhonchi        [ ]Right [ ]Left [ ]Bilateral  [ ]Crackles        [ ]Right [ ]Left [ ]Bilateral  [ ]Wheezing     [ ]Right [ ]Left [ ]Bilateral  [ ]Diminished breath sounds [ ]right [ ]left [ ]bilateral  CARDIOVASCULAR:    [ x]Regular [ ]Irregular [ ]Tachy  [ ]Venkata [ ]Murmur [ ] JVD  GASTROINTESTINAL:  [x ]Soft  [ ]Distended   [ ]+BS  [ ]Non tender [ ]Tender  [ ]PEG [ ]OGT/ NGT  Last BM:   GENITOURINARY:  [x ]Normal [ ] Incontinent   [ ]Oliguria/Anuria   [ ]Hinds  MUSCULOSKELETAL:   [  ]Normal   [x ]Weakness due to fatigue  [ ]Bed/Wheelchair bound [ ]Edema  NEUROLOGIC:   [ x]No focal deficits  [ ]Cognitive impairment  [ ]Dysphagia [ ]Dysarthria [ ]Paresis [ ]Other   SKIN:   [x ]Normal    [ ]Rash  [ ]Pressure ulcer(s)   [  ] Lesion   [    ]  Wounds       Present on admission [ ]y [ ]n    CRITICAL CARE:  [ ] Shock Present  [ ]Septic [ ]Cardiogenic [ ]Neurologic [ ]Hypovolemic  [ ]  Vasopressors [ ]  Inotropes   [ ]Respiratory failure present [ ]Mechanical ventilation [ ]Non-invasive ventilatory support [ ]High flow  [ ]Acute  [ ]Chronic [ ]Hypoxic  [ ]Hypercarbic [ ]Other  [ ]Other organ failure          RADIOLOGY & ADDITIONAL STUDIES:    PROTEIN CALORIE MALNUTRITION PRESENT: [ ]mild [ ]moderate [ ]severe [ ]underweight [ ]morbid obesity  https://www.andeal.org/vault/2440/web/files/ONC/Table_Clinical%20Characteristics%20to%20Document%20Malnutrition-White%20JV%20et%20al%386831.pdf    Height (cm): 181 (04-14-22 @ 10:40), 180.3 (03-21-22 @ 08:57), 180 (03-17-22 @ 16:31)  Weight (kg): 100.4 (04-14-22 @ 10:40), 101.999 (04-13-22 @ 17:00), 98.4 (03-21-22 @ 08:57)  BMI (kg/m2): 30.6 (04-14-22 @ 10:40), 31.4 (04-13-22 @ 17:00), 30.3 (03-21-22 @ 08:57)    [ ]PPSV2 < or = to 30% [ ]significant weight loss  [ ]poor nutritional intake  [ ]anasarca      [ ]Artificial Nutrition      REFERRALS:   [ ]Chaplaincy  [ ]Hospice  [ ]Child Life  [ ]Social Work  [ ]Case management [ ]Holistic Therapy     Goals of Care Document:  HPI:  This is a 56 year old male with a history of atrial fibrillation (s/p ablation x 2, internal loop recorder) and  MCL, initially diagnosed 10/2016. He was initially seen at [facility] for a left inguinal mass. CT scans 3/21/16 showed left sided pelvic and inguinal adenopathy. A left inguinal lymph node biopsy 9/26/16 showed morphologic and immunophenotypic findings consistent with MCL. He was treated with 4 cycles of R-CHOP, and 2 cycles of RICE consolidation. A bone marrow biopsy 2/10/17 showed normocellular bone marrow. He was admitted 3/27/17 for an autologous pbsct with CBV prep, and received the cells 4/5/17. He engrafted 4/16/17. He was discharged and completed maintenance Rituxan. In 10/21, he noticed a lump in the right groin. A lymph node biopsy 11/12/21 was consistent with relapsed MCL. He was treated with Calquence. He didn't tolerate Calquence well, with reports of frequent abdominal pain. He is admitted today for CAR T therapy with Tecartus product for relapsed, refractory MCL. His disease status at this time is partial remission.  (14 Apr 2022 12:36)              04-28-22 @ 14:02  [facility] BMAR 719 W1    Overnight events: No major events.  Staff reports: Staff reported gait disturbance and LE weakness  Patient: He reports feeling an increased lack of wellness due to cognitive changes. As a result, he is more anxious  Physical-->He reports slight imbalance. PT reports HA, originating in the neck and shooting upward like electricity to temples. No vision changes. No aura  Cognitive He is having challenges finding words or with comprehension. Mild cognitive slowing/processinf  Family reports-->Wife states a noticeable change in language  PRN use: n/a        RECENT VITALS/LABS/MEDICATIONS/ASSAYS     04-28-22 @ 14:02    T(C): 37.2 (04-28-22 @ 13:51), Max: 37.3 (04-27-22 @ 21:38)  HR: 87 (04-28-22 @ 13:51) (76 - 87)  BP: 112/71 (04-28-22 @ 13:51) (107/67 - 118/75)  RR: 18 (04-28-22 @ 13:51) (18 - 18)  SpO2: 97% (04-28-22 @ 13:51) (95% - 97%)  Wt(kg): --      27 Apr 2022 07:01 - 28 Apr 2022 07:00  --------------------------------------------------------  IN:    IV PiggyBack: 572 mL    Oral Fluid: 1190 mL    sodium chloride 0.9%: 1776.7 mL  Total IN: 3538.7 mL    OUT:    Voided (mL): 5275 mL  Total OUT: 5275 mL    Total NET: -1736.3 mL      28 Apr 2022 07:01  -  28 Apr 2022 14:02  --------------------------------------------------------  IN:    Oral Fluid: 480 mL    sodium chloride 0.9%: 492 mL  Total IN: 972 mL    OUT:    Voided (mL): 300 mL  Total OUT: 300 mL    Total NET: 672 mL          04-27 @ 07:01 - 04-28 @ 07:00  --------------------------------------------------------  IN: 3538.7 mL / OUT: 5275 mL / NET: -1736.3 mL    04-28 @ 07:01  -  04-28 @ 14:02  --------------------------------------------------------  IN: 972 mL / OUT: 300 mL / NET: 672 mL      CAPILLARY BLOOD GLUCOSE            acetaminophen     Tablet .. 650 milliGRAM(s) Oral every 6 hours PRN  acetaminophen     Tablet .. 650 milliGRAM(s) Oral every 6 hours  acyclovir   Oral Tab/Cap 400 milliGRAM(s) Oral every 8 hours  allopurinol 300 milliGRAM(s) Oral daily  Biotene Dry Mouth Oral Rinse 5 milliLiter(s) Swish and Spit five times a day  carvedilol 3.125 milliGRAM(s) Oral every 12 hours  cefepime   IVPB      cefepime   IVPB 2000 milliGRAM(s) IV Intermittent every 8 hours  chlorhexidine 4% Liquid 1 Application(s) Topical <User Schedule>  clotrimazole Lozenge 1 Lozenge Oral five times a day  fluconAZOLE   Tablet 400 milliGRAM(s) Oral daily  folic acid 1 milliGRAM(s) Oral daily  levETIRAcetam 500 milliGRAM(s) Oral two times a day  loperamide 2 milliGRAM(s) Oral every 4 hours PRN  metoclopramide Injectable 10 milliGRAM(s) IV Push every 6 hours PRN  multivitamin 1 Tablet(s) Oral daily  ondansetron Injectable 8 milliGRAM(s) IV Push every 8 hours PRN  oxyCODONE    IR 5 milliGRAM(s) Oral every 6 hours PRN  pantoprazole    Tablet 40 milliGRAM(s) Oral two times a day  polyethylene glycol 3350 17 Gram(s) Oral daily PRN  senna 1 Tablet(s) Oral at bedtime PRN  sodium bicarbonate Mouth Rinse 10 milliLiter(s) Swish and Spit five times a day  sodium chloride 0.9% lock flush 10 milliLiter(s) IV Push every 1 hour PRN  sodium chloride 0.9%. 1000 milliLiter(s) IV Continuous <Continuous>          04-28    135  |  99  |  21  ----------------------------<  149<H>  4.6   |  23  |  0.83    Ca    8.7      28 Apr 2022 05:53  Phos  3.3     04-28  Mg     2.0     04-28    TPro  6.1  /  Alb  4.1  /  TBili  0.2  /  DBili  <0.1  /  AST  21  /  ALT  65<H>  /  AlkPhos  67  04-28      Procalc  BNPSerum Pro-Brain Natriuretic Peptide: 77 pg/mL (04-28-22 @ 05:53)  Serum Pro-Brain Natriuretic Peptide: 194 pg/mL (04-27-22 @ 05:50)    ABG                          12.6   6.26  )-----------( 252      ( 28 Apr 2022 05:53 )             37.4   PT/INR - ( 28 Apr 2022 05:53 )   PT: 14.0 sec;   INR: 1.20 ratio         PTT - ( 28 Apr 2022 05:53 )  PTT:22.6 sec        blood and urine cultures                ===================================================================================  Palliative Global Assessment-Initial   A review of the paper chart has been conducted  HCP form present:               Yes and valid []               Yes but invalid []                No [x]   MOLST present:                   Yes and valid []               Yes but invalid []                No [x]  Incapacity form present:   Yes and valid []                Yes but invalid []                No []                 N/A [x]  Living Will:                            Yes and valid []                Yes but invalid []                No [x]      Family Heatlh Care Decision Act Surrogate Decision Maker [facility] Article 81 Guardian-->Spouse or domestic partner--> Adult child-->Parent-->Sibling--> Close friend      Contacts listed in the paper or electronic chart  Name Aga Kapoor Wife  Number(s) [phone]  Translation Required: None  Spouse or Partner: Aga  Family unit: Wife and children, his youngest is 13  Family history of hematologic malignancy: None  Residence: [ ] Apartment   [x] House  [ ] Other  Degree of functionality in the home: Full   Performance Status (PPSv2): 80  BMI:BMI (kg/m2): 30.6 (04-14-22 @ 10:40)  Engagement in the home:  Employment: [ ] Currently employed [ x] Exited workforce due to illness  [ ] Retired prior to illness  [ ] No/distant history of employment   Support network (degree):  ---Family:        [ ] Low  [ ] Moderate  [ x] High  ---Neighbors: [ x] Low  [ ] Moderate  [ ] High  ---Social:         [ ] Low  [ ] Moderate  [ x] High  ---Spiritual:    [ x] Low  [ ] Moderate  [ ] High  Financial concerns: TBD  Coping Strategies: Listening to music and speaking with his wife  Caregiver burden/fatigue/needs: Childcare issues/concerns given his wife's visitation/  Grief identified: [] Yes [x] No  Medical communication and decision making preferences: TBD        PERTINENT PM/SXH:   Non Hodgkin&#x27;s lymphoma    Atrial flutter    Migraine    Sinus bradycardia    Paroxysmal atrial fibrillation    GERD (gastroesophageal reflux disease)      H/O total knee replacement    S/P right knee arthroscopy    History of arthroscopy of left shoulder    H/O prior ablation treatment    Status post placement of implantable loop recorder    H/O stem cell transplant      FAMILY HISTORY:  Family history of hypertension (Mother)  Mother    Family history of diabetes mellitus (Father)  Father      ITEMS NOT CHECKED ARE NOT PRESENT    SOCIAL HISTORY:   Significant other/partner[x ]  Children[x ]  Adventist/Spirituality:  Substance hx:  [ ]   Tobacco hx:  [ ]   Alcohol hx: [ ]   Home Opioid hx:  [ ] I-Stop Reference No:  Living Situation: [ ]Home  [ ]Long term care  [ ]Rehab [ ]Other    ADVANCE DIRECTIVES:    DNR  MOLST  [ ]  Living Will  [ ]   DECISION MAKER(s):  [ ] Health Care Proxy(s)  [ ] Surrogate(s)  [ ] Guardian           Name(s): Phone Number(s):    BASELINE (I)ADL(s) (prior to admission):  [place]: [ ]Total  [ ] Moderate [ ]Dependent    Allergies    No Known Allergies    Intolerances         PRESENT SYMPTOMS: [ ]Unable to obtain due to poor mentation   Source if other than patient:  [ ]Family   [ ]Team [ ] Inferred/Assessed    Pain: [ x]yes [ ]no  QOL impact - significant  Location -  throat                  Aggravating factors - oral intake  Quality - burning   Radiation - none  Timing- with consumption  Severity (0-10 scale):  Minimal acceptable level (0-10 scale):      PAIN AD Score:     [email or web address] (press ctrl +  left click to view)    [ ] Inferred Symptoms    Fatigue:                             [ ] None  [ x]Mild [  ]Moderate [ ]Severe  Drowsiness:                      [ ] None  [x ]Mild [   ]Moderate [ ]Severe  Nausea:                             [   ] None  [x ]Mild [  ]Moderate [  ]Severe  Dysgeusia:                         [  } None  [x ]Mild [  ]Moderate [ ]Severe  Loss of appetite:              [  ] None  [ x]Mild   ]Moderate [ ]Severe  Constipation:                    [x ] None  [ ]Mild [ ]Moderate [ ]Severe  Dyspnea:                           [x ] None  [ ]Mild [ ]Moderate [ ]Severe  Anxiety:                             [ x] None  [  ]Mild [ ]Moderate [ ]Severe  Depression:                      [x ] None  [  ]Mild [ ]Moderate [ ]Severe  Cognitive changes:          [  ] None  [x ]Mild [ ]Moderate [ ]Severe  Insomnia      :                    [ x ] None  [  ]Mild [ ]Moderate [ ]Severe  Isolation:                           [x ] None  [ ]Mild [ ]Moderate [ ]Severe  Loneliness:                        [x ] None  [ ]Mild [ ]Moderate [ ]Severe  Lack of   Wellbeing:                        [  ] None  [ ]Mild [ x]Moderate [x ]Severe    Other Symptoms: None  [x ]All other review of systems negative     Palliative Performance Status Version 2:      80   %    [email or web address]  PHYSICAL EXAM:    GENERAL:  [ x]Alert  [ ]Oriented x   [  ]Lethargic  [ ]Cachexia  [ ]Unarousable  [ x]Verbal  [ ]Non-Verbal [ ] Engaging   [  ] Confused  [  ] No distress  Behavioral:   [x ] Anxiety  [ ] Delirium [ ] Agitation [  ] Calm   [  ] Restless [ ] Other  HEENT:  [x ]Normal   [ ]Dry mouth   [ ]ET Tube/Trach  [ ]Oral lesions   [ ] NGT  [ ] Mucositis [ ] Oral  Bleeding  PULMONARY:   [ x]Clear [ ]Tachypnea  [ ]Audible excessive secretions [  ] No tachypnea  [ ]Rhonchi        [ ]Right [ ]Left [ ]Bilateral  [ ]Crackles        [ ]Right [ ]Left [ ]Bilateral  [ ]Wheezing     [ ]Right [ ]Left [ ]Bilateral  [ ]Diminished breath sounds [ ]right [ ]left [ ]bilateral  CARDIOVASCULAR:    [ x]Regular [ ]Irregular [ ]Tachy  [ ]Venkata [ ]Murmur [ ] JVD  GASTROINTESTINAL:  [x ]Soft  [ ]Distended   [ ]+BS  [ ]Non tender [ ]Tender  [ ]PEG [ ]OGT/ NGT  Last BM:   GENITOURINARY:  [x ]Normal [ ] Incontinent   [ ]Oliguria/Anuria   [ ]Hinds  MUSCULOSKELETAL:   [  ]Normal   [x ]Weakness    [ ]Bed/Wheelchair bound [ ]Edema  NEUROLOGIC:   [  No focal deficits  [ x]Cognitive impairment  [ ]Dysphagia [ ]Dysarthria [ ]Paresis [ ]Other   SKIN:   [x ]Normal    [ ]Rash  [ ]Pressure ulcer(s)   [  ] Lesion   [    ]  Wounds       Present on admission [ ]y [ ]n    CRITICAL CARE:  [ ] Shock Present  [ ]Septic [ ]Cardiogenic [ ]Neurologic [ ]Hypovolemic  [ ]  Vasopressors [ ]  Inotropes   [ ]Respiratory failure present [ ]Mechanical ventilation [ ]Non-invasive ventilatory support [ ]High flow  [ ]Acute  [ ]Chronic [ ]Hypoxic  [ ]Hypercarbic [ ]Other  [ ]Other organ failure          RADIOLOGY & ADDITIONAL STUDIES:    PROTEIN CALORIE MALNUTRITION PRESENT: [ ]mild [ ]moderate [ ]severe [ ]underweight [ ]morbid obesity  https://www.andeal.org/vault/2440/web/files/ONC/Table_Clinical%20Characteristics%20to%20Document%20Malnutrition-White%20JV%20et%20al%509262.pdf    Height (cm): 181 (04-14-22 @ 10:40), 180.3 (03-21-22 @ 08:57), 180 (03-17-22 @ 16:31)  Weight (kg): 100.4 (04-14-22 @ 10:40), 101.999 (04-13-22 @ 17:00), 98.4 (03-21-22 @ 08:57)  BMI (kg/m2): 30.6 (04-14-22 @ 10:40), 31.4 (04-13-22 @ 17:00), 30.3 (03-21-22 @ 08:57)    [ ]PPSV2 < or = to 30% [ ]significant weight loss  [ ]poor nutritional intake  [ ]anasarca      [ ]Artificial Nutrition      REFERRALS:   [ ]Chaplaincy  [ ]Hospice  [ ]Child Life  [ ]Social Work  [ ]Case management [ ]Holistic Therapy     Goals of Care Document:

## 2022-04-28 NOTE — PROGRESS NOTE ADULT - PROBLEM SELECTOR PLAN 12
Recommendation: Condition: Mantel Cell Lymphoma  Previous transplant: AUTO SCT 2017  Active treatment:  CAR-T infusion  Transplant Type: Tecartus (brexucabtagene autoleucel)  Transplant Day: Day +7  Clinical impact on complexity: Significant. Recommendation: Condition: Mantel Cell Lymphoma  Previous transplant: AUTO SCT 2017  Active treatment:  CAR-T infusion  Transplant Type: Tecartus (brexucabtagene autoleucel)  Transplant Day: Day +9  Clinical impact on complexity: Significant.

## 2022-04-28 NOTE — PROGRESS NOTE ADULT - SUBJECTIVE AND OBJECTIVE BOX
[facility]PC Transplant Team                                                      Critical / Counseling Time Provided: 30 minutes                                                                                                                                                         Chief Complaint: CAR T therapy with [employer] product for treatment of relapsed, refractory MCL      S: Patient seen and examined with HPC Transplant Team  Feel better today.  +generalized weakness, fatigue  + diarrhea x4 O/N  +burning urination  +poor appetite  Vital Signs Last 24 Hrs  T(C): 37.2 (2022 05:34), Max: 38.1 (2022 13:43)  T(F): 99 (2022 05:34), Max: 100.6 (2022 13:43)  HR: 80 (2022 05:34) (76 - 90)  BP: 107/67 (2022 05:34) (102/66 - 121/77)  BP(mean): --  RR: 18 (2022 05:34) (18 - 18)  SpO2: 95% (2022 05:34) (95% - 99%)+fever improving.    Admit weight: 100.4kg  Daily Weight in k kg     Intake / Output:    @ 07:01  -   @ 07:00  --------------------------------------------------------  IN: 3657 mL / OUT: 4000 mL / NET: -343 mL    PE:   Oropharynx: no erythema or ulcerations ,   Oral Mucositis:  NA                                                   Grade: n/a  CVS: S1, S2 RRR   Lungs: CTA throughout bilaterally   Abdomen: + BS x 4, soft, NT, ND   Extremities: no edema BLE's  Gastric Mucositis:       -                                           Grade: n/a  Intestinal Mucositis:     -                                         Grade: n/a   Skin: no rashes  TLC: CDI   Neuro: A&O x 3   Pain: 0    LABS:                          12.6   6.26  )-----------( 252      ( 2022 05:53 )             37.4         Mean Cell Volume : 92.8 fl  Mean Cell Hemoglobin : 31.3 pg  Mean Cell Hemoglobin Concentration : 33.7 gm/dL  Auto Neutrophil # : 2.50 K/uL  Auto Lymphocyte # : 2.19 K/uL  Auto Monocyte # : 1.13 K/uL  Auto Eosinophil # : 0.00 K/uL  Auto Basophil # : 0.06 K/uL  Auto Neutrophil % : 38.0 %  Auto Lymphocyte % : 35.0 %  Auto Monocyte % : 18.0 %  Auto Eosinophil % : 0.0 %  Auto Basophil % : 1.0 %          135  |  99  |  21  ----------------------------<  149<H>  4.6   |  23  |  0.83    Ca    8.7      2022 05:53  Phos  3.3       Mg     2.0         TPro  6.1  /  Alb  4.1  /  TBili  0.2  /  DBili  <0.1  /  AST  21  /  ALT  65<H>  /  AlkPhos  67          PT/INR - ( 2022 05:53 )   PT: 14.0 sec;   INR: 1.20 ratio         PTT - ( 2022 05:53 )  PTT:22.6 sec      Uric Acid 3.1        Cultures:     Culture - Urine (22 @ 13:44)   Specimen Source: Clean Catch Clean Catch (Midstream)   Culture Results:   No growth Culture - Blood (22 @ 16:40)   Specimen Source: .Blood Blood-Catheter   Culture Results:   No growth to date. Culture - Blood (22 @ 16:40)   Specimen Source: .Blood Blood-Catheter   Culture Results:   No growth to date. Culture - Blood (22 @ 01:04)   Specimen Source: .Blood Blood   Culture Results:   No growth to date.     Radiology:   Xray Chest 1 View- PORTABLE-Urgent (Xray Chest 1 View- PORTABLE-Urgent .) (22 @ 10:46) >    IMPRESSION:  Clear lungs.          Meds:   Antimicrobials:   acyclovir   Oral Tab/Cap 400 milliGRAM(s) Oral every 8 hours  cefepime   IVPB 2000 milliGRAM(s) IV Intermittent every 8 hours  cefepime   IVPB      clotrimazole Lozenge 1 Lozenge Oral five times a day  fluconAZOLE   Tablet 400 milliGRAM(s) Oral daily      GI:  loperamide 2 milliGRAM(s) Oral every 4 hours PRN  pantoprazole    Tablet 40 milliGRAM(s) Oral two times a day  polyethylene glycol 3350 17 Gram(s) Oral daily PRN  senna 1 Tablet(s) Oral at bedtime PRN  sodium bicarbonate Mouth Rinse 10 milliLiter(s) Swish and Spit five times a day      Cardiovascular:   carvedilol 3.125 milliGRAM(s) Oral every 12 hours    Other medications:   acetaminophen     Tablet .. 650 milliGRAM(s) Oral every 6 hours  allopurinol 300 milliGRAM(s) Oral daily  Biotene Dry Mouth Oral Rinse 5 milliLiter(s) Swish and Spit five times a day  chlorhexidine 4% Liquid 1 Application(s) Topical <User Schedule>  folic acid 1 milliGRAM(s) Oral daily  levETIRAcetam 500 milliGRAM(s) Oral two times a day  multivitamin 1 Tablet(s) Oral daily  phytonadione   Solution 10 milliGRAM(s) Oral daily  sodium chloride 0.9%. 1000 milliLiter(s) IV Continuous <Continuous>      PRN:   acetaminophen     Tablet .. 650 milliGRAM(s) Oral every 6 hours PRN  loperamide 2 milliGRAM(s) Oral every 4 hours PRN  metoclopramide Injectable 10 milliGRAM(s) IV Push every 6 hours PRN  morphine  - Injectable 2 milliGRAM(s) IV Push every 4 hours PRN  ondansetron Injectable 8 milliGRAM(s) IV Push every 8 hours PRN  polyethylene glycol 3350 17 Gram(s) Oral daily PRN  senna 1 Tablet(s) Oral at bedtime PRN  sodium chloride 0.9% lock flush 10 milliLiter(s) IV Push every 1 hour PRN      A/P: 56 year old male with relapsed, refractory MCL s/p R-CHOP x 4, RICE x 2, maintenance RTX, salvage Calquence, admitted for CAR T cell therapy with [employer] product. Disease status at time of admission is partial response.   Day +  9  Monitor for CRS and neurotox...follow CRS/neuro tox protocol - GIVEN TOCILIZUMAB  pm and   AM for CRS - 1     - Relapsed, refractory MCL   4/15- day - 4- flu / ctx 2/3 - continue CTX hydration until 24 hours post infusion of last dose   Strict I&O, daily weights, prn diuresis   CRS / ICANS score daily until infusion of CAR T cells (22) and twice daily thereafter   BID labs 0500 & 1500 - monitor for TLS   -CRS grade 1 s/p Tocilizumab on tele started on Keppra as per protocol, pancx, started on cefepime  s/p dexamethasone 4 mg IV x1   F/U repeat blood cx's for fevers ( and ) s/p Toci x2 s/p dexamethasone 4 mg IV x2 also with CP: EKG WNL trops negative s/p morphine with good effect   - CRS grade 2( persistent fevers) given Toci x1 and dexamethasone 4 mg IV overnight, continues to be febrile Tmax 104 F tremors given additional Dexamethasone 10 mg IV  - fever curve improved: CRS grade 1  - Grade 2 neurotox with 10/10 Headaches- taken for CT head( with no abnormalities no cerebral edema), s.p Dexamethasone 10 mg IV pending MR head      -  Prophylactic measures   acyclovir   Oral Tab/Cap 400 milliGRAM(s) Oral every 8 hours  cefepime   IVPB      cefepime   IVPB 2000 milliGRAM(s) IV Intermittent every 8 hours  clotrimazole Lozenge 1 Lozenge Oral five times a day  fluconAZOLE   Tablet 400 milliGRAM(s) Oral daily  Keppra 500 q12h  GI Prophylaxis:  Protonix      - Cardiac / Hx Afib   F/U repeat troponin,  now CP free. ECG non ischemic   Continue Tele monitoring   Continue Coreg bid     -  Mouth care - NS / NaHCO3 rinses, Mycelex, Biotene; Skin care      - Transfuse & replete electrolytes prn     - IV hydration, daily weights, strict I&O, prn diuresis      -  Antiemetics, anti-diarrhea medications:   LORazepam   Injectable 1 milliGRAM(s) IV Push every 6 hours PRN  metoclopramide Injectable 10 milliGRAM(s) IV Push every 6 hours PRN  ondansetron 8mg IV q 8 hours prn nausea      - CRS / ICANS scoring    CRS grade 0, ICANS score 10   - CRS grade 0, ICANS score 10    AM - CRS grade 0, ICANS score 10   -pm-CRS grade 0, ICANS score 10   AM - CRS grade 0, ICANS score 10    PM CRS grade 0, ICANS score 10   AM CRS grade 0, ICANS score 10   PM CRS grade 0, ICANS score 10 --> 9PM CRS score - 1, ICANS - 10   AM CRS grade 0, ICANS score 10   PM CRS grade 0, ICANS score 10   AM CRS grade 1 10am (grade 0 @ 8:30am), ICANS score 10  M CRS grade 2( persistently febrile) ICANS score 10    AM CRS GRADE 1, ICANS score 10  - AM CRS GRADE 1, ICANS score 10   AM CRS GRADE 1, ICANS score 10   noon: with persistent headache: Grade 2 neurotox-   taken for CT head( ( with no abnormalities no cerebral edema) s.p Dexamethasone 10 mg IV pending MR head   PM CRS GRADE 1, ICANS score 10( HA resolved)     I have written the above note for Dr. Banks who performed service with me in the room.   Jaycee Draper   NP-C ([phone])    I have seen and examined patient with NP, I agree with above note as scribed.                    [facility] Transplant Team                                                      Critical / Counseling Time Provided: 30 minutes                                                                                                                                                         Chief Complaint: CAR T therapy with [employer] product for treatment of relapsed, refractory MCL      S: Patient seen and examined with [facility]PC Transplant Team  Feel better today.  +generalized weakness, fatigue  + diarrhea x4 O/N  +burning urination  +poor appetite  Vital Signs Last 24 Hrs  T(C): 37.2 (2022 05:34), Max: 38.1 (2022 13:43)  T(F): 99 (2022 05:34), Max: 100.6 (2022 13:43)  HR: 80 (2022 05:34) (76 - 90)  BP: 107/67 (2022 05:34) (102/66 - 121/77)  BP(mean): --  RR: 18 (2022 05:34) (18 - 18)  SpO2: 95% (2022 05:34) (95% - 99%)+fever improving.    Admit weight: 100.4kg    Daily Weight in k.8 (2022 08:30)    Intake / Output:    @ 07:01  -   @ 07:00  --------------------------------------------------------  IN: 3538.7 mL / OUT: 5275 mL / NET: -1736.3 mL      PE:   Oropharynx: no erythema or ulcerations ,   Oral Mucositis:  NA                                                   Grade: n/a  CVS: S1, S2 RRR   Lungs: CTA throughout bilaterally   Abdomen: + BS x 4, soft, NT, ND   Extremities: no edema BLE's  Gastric Mucositis:       -                                           Grade: n/a  Intestinal Mucositis:     -                                         Grade: n/a   Skin: no rashes  TLC: CDI   Neuro: A&O x 3   Pain: 0    LABS:                          12.6   6.26  )-----------( 252      ( 2022 05:53 )             37.4         Mean Cell Volume : 92.8 fl  Mean Cell Hemoglobin : 31.3 pg  Mean Cell Hemoglobin Concentration : 33.7 gm/dL  Auto Neutrophil # : 2.50 K/uL  Auto Lymphocyte # : 2.19 K/uL  Auto Monocyte # : 1.13 K/uL  Auto Eosinophil # : 0.00 K/uL  Auto Basophil # : 0.06 K/uL  Auto Neutrophil % : 38.0 %  Auto Lymphocyte % : 35.0 %  Auto Monocyte % : 18.0 %  Auto Eosinophil % : 0.0 %  Auto Basophil % : 1.0 %          135  |  99  |  21  ----------------------------<  149<H>  4.6   |  23  |  0.83    Ca    8.7      2022 05:53  Phos  3.3       Mg     2.0         TPro  6.1  /  Alb  4.1  /  TBili  0.2  /  DBili  <0.1  /  AST  21  /  ALT  65<H>  /  AlkPhos  67          PT/INR - ( 2022 05:53 )   PT: 14.0 sec;   INR: 1.20 ratio         PTT - ( 2022 05:53 )  PTT:22.6 sec      Uric Acid 3.1        Cultures:     Culture - Urine (22 @ 13:44)   Specimen Source: Clean Catch Clean Catch (Midstream)   Culture Results:   No growth Culture - Blood (22 @ 16:40)   Specimen Source: .Blood Blood-Catheter   Culture Results:   No growth to date. Culture - Blood (22 @ 16:40)   Specimen Source: .Blood Blood-Catheter   Culture Results:   No growth to date. Culture - Blood (22 @ 01:04)   Specimen Source: .Blood Blood   Culture Results:   No growth to date.     Radiology:   Xray Chest 1 View- PORTABLE-Urgent (Xray Chest 1 View- PORTABLE-Urgent .) (22 @ 10:46) >    IMPRESSION:  Clear lungs.          Meds:   Antimicrobials:   acyclovir   Oral Tab/Cap 400 milliGRAM(s) Oral every 8 hours  cefepime   IVPB 2000 milliGRAM(s) IV Intermittent every 8 hours  cefepime   IVPB      clotrimazole Lozenge 1 Lozenge Oral five times a day  fluconAZOLE   Tablet 400 milliGRAM(s) Oral daily      GI:  loperamide 2 milliGRAM(s) Oral every 4 hours PRN  pantoprazole    Tablet 40 milliGRAM(s) Oral two times a day  polyethylene glycol 3350 17 Gram(s) Oral daily PRN  senna 1 Tablet(s) Oral at bedtime PRN  sodium bicarbonate Mouth Rinse 10 milliLiter(s) Swish and Spit five times a day      Cardiovascular:   carvedilol 3.125 milliGRAM(s) Oral every 12 hours    Other medications:   acetaminophen     Tablet .. 650 milliGRAM(s) Oral every 6 hours  allopurinol 300 milliGRAM(s) Oral daily  Biotene Dry Mouth Oral Rinse 5 milliLiter(s) Swish and Spit five times a day  chlorhexidine 4% Liquid 1 Application(s) Topical <User Schedule>  folic acid 1 milliGRAM(s) Oral daily  levETIRAcetam 500 milliGRAM(s) Oral two times a day  multivitamin 1 Tablet(s) Oral daily  phytonadione   Solution 10 milliGRAM(s) Oral daily  sodium chloride 0.9%. 1000 milliLiter(s) IV Continuous <Continuous>      PRN:   acetaminophen     Tablet .. 650 milliGRAM(s) Oral every 6 hours PRN  loperamide 2 milliGRAM(s) Oral every 4 hours PRN  metoclopramide Injectable 10 milliGRAM(s) IV Push every 6 hours PRN  morphine  - Injectable 2 milliGRAM(s) IV Push every 4 hours PRN  ondansetron Injectable 8 milliGRAM(s) IV Push every 8 hours PRN  polyethylene glycol 3350 17 Gram(s) Oral daily PRN  senna 1 Tablet(s) Oral at bedtime PRN  sodium chloride 0.9% lock flush 10 milliLiter(s) IV Push every 1 hour PRN      A/P: 56 year old male with relapsed, refractory MCL s/p R-CHOP x 4, RICE x 2, maintenance RTX, salvage [place], admitted for CAR T cell therapy with [employer] product. Disease status at time of admission is partial response.   Day +  9  Monitor for CRS and neurotox...follow CRS/neuro tox protocol - GIVEN TOCILIZUMAB  pm and   AM for CRS - 1     - Relapsed, refractory MCL   4/15- day - 4- flu / ctx 2/3 - continue CTX hydration until 24 hours post infusion of last dose   Strict I&O, daily weights, prn diuresis   CRS / ICANS score daily until infusion of CAR T cells (22) and twice daily thereafter   BID labs 0500 & 1500 - monitor for TLS   -CRS grade 1 s/p Tocilizumab on tele started on Keppra as per protocol, pancx, started on cefepime  s/p dexamethasone 4 mg IV x1   F/U repeat blood cx's for fevers ( and ) s/p Toci x2 s/p dexamethasone 4 mg IV x2 also with CP: EKG WNL trops negative s/p morphine with good effect   - CRS grade 2( persistent fevers) given Toci x1 and dexamethasone 4 mg IV overnight, continues to be febrile Tmax 104 F tremors given additional Dexamethasone 10 mg IV  - fever curve improved: CRS grade 1  - Grade 2 neurotox with 10/10 Headaches- taken for CT head( with no abnormalities no cerebral edema), s.p Dexamethasone 10 mg IV pending MR head      -  Prophylactic measures   acyclovir   Oral Tab/Cap 400 milliGRAM(s) Oral every 8 hours  cefepime   IVPB      cefepime   IVPB 2000 milliGRAM(s) IV Intermittent every 8 hours  clotrimazole Lozenge 1 Lozenge Oral five times a day  fluconAZOLE   Tablet 400 milliGRAM(s) Oral daily  Keppra 500 q12h  GI Prophylaxis:  Protonix      - Cardiac / Hx Afib   F/U repeat troponin,  now CP free. ECG non ischemic   Continue Tele monitoring   Continue Coreg bid     -  Mouth care - NS / NaHCO3 rinses, Mycelex, Biotene; Skin care      - Transfuse & replete electrolytes prn     - IV hydration, daily weights, strict I&O, prn diuresis      -  Antiemetics, anti-diarrhea medications:   LORazepam   Injectable 1 milliGRAM(s) IV Push every 6 hours PRN  metoclopramide Injectable 10 milliGRAM(s) IV Push every 6 hours PRN  ondansetron 8mg IV q 8 hours prn nausea      - CRS / ICANS scoring    CRS grade 0, ICANS score 10   - CRS grade 0, ICANS score 10    AM - CRS grade 0, ICANS score 10   -pm-CRS grade 0, ICANS score 10   AM - CRS grade 0, ICANS score 10    PM CRS grade 0, ICANS score 10   AM CRS grade 0, ICANS score 10   PM CRS grade 0, ICANS score 10 --> 9PM CRS score - 1, ICANS - 10   AM CRS grade 0, ICANS score 10   PM CRS grade 0, ICANS score 10   AM CRS grade 1 10am (grade 0 @ 8:30am), ICANS score 10  M CRS grade 2( persistently febrile) ICANS score 10    AM CRS GRADE 1, ICANS score 10  - AM CRS GRADE 1, ICANS score 10   AM CRS GRADE 1, ICANS score 10   noon: with persistent headache: Grade 2 neurotox-   taken for CT head( ( with no abnormalities no cerebral edema) s.p Dexamethasone 10 mg IV pending MR head   PM CRS GRADE 1, ICANS score 10( HA resolved)     I have written the above note for Dr. Banks who performed service with me in the room.   Jaycee Draper   NP-C ([phone])    I have seen and examined patient with NP, I agree with above note as scribed.                    [facility] Transplant Team                                                      Critical / Counseling Time Provided: 30 minutes                                                                                                                                                         Chief Complaint: CAR T therapy with [employer] product for treatment of relapsed, refractory MCL      S: Patient seen and examined with [facility] Transplant Team  Feel better today.  +generalized weakness, fatigue  +some peripheral neuropathy   OOB  Tolerating PO    Vital Signs Last 24 Hrs  T(C): 37.2 (2022 05:34), Max: 38.1 (2022 13:43)  T(F): 99 (2022 05:34), Max: 100.6 (2022 13:43)  HR: 80 (2022 05:34) (76 - 90)  BP: 107/67 (2022 05:34) (102/66 - 121/77)  BP(mean): --  RR: 18 (2022 05:34) (18 - 18)  SpO2: 95% (2022 05:34) (95% - 99%)+fever improving.    Admit weight: 100.4kg    Daily Weight in k.8 (2022 08:30)    Intake / Output:    @ 07:01  -   @ 07:00  --------------------------------------------------------  IN: 3538.7 mL / OUT: 5275 mL / NET: -1736.3 mL      PE:   Oropharynx: no erythema or ulcerations ,   Oral Mucositis:  NA                                                   Grade: n/a  CVS: S1, S2 RRR   Lungs: CTA throughout bilaterally   Abdomen: + BS x 4, soft, NT, ND   Extremities: no edema BLE's  Gastric Mucositis:       -                                           Grade: n/a  Intestinal Mucositis:     -                                         Grade: n/a   Skin: no rashes  TLC: CDI   Neuro: A&O x 3   Pain: 0    LABS:                          12.6   6.26  )-----------( 252      ( 2022 05:53 )             37.4         Mean Cell Volume : 92.8 fl  Mean Cell Hemoglobin : 31.3 pg  Mean Cell Hemoglobin Concentration : 33.7 gm/dL  Auto Neutrophil # : 2.50 K/uL  Auto Lymphocyte # : 2.19 K/uL  Auto Monocyte # : 1.13 K/uL  Auto Eosinophil # : 0.00 K/uL  Auto Basophil # : 0.06 K/uL  Auto Neutrophil % : 38.0 %  Auto Lymphocyte % : 35.0 %  Auto Monocyte % : 18.0 %  Auto Eosinophil % : 0.0 %  Auto Basophil % : 1.0 %          135  |  99  |  21  ----------------------------<  149<H>  4.6   |  23  |  0.83    Ca    8.7      2022 05:53  Phos  3.3       Mg     2.0         TPro  6.1  /  Alb  4.1  /  TBili  0.2  /  DBili  <0.1  /  AST  21  /  ALT  65<H>  /  AlkPhos  67          PT/INR - ( 2022 05:53 )   PT: 14.0 sec;   INR: 1.20 ratio         PTT - ( 2022 05:53 )  PTT:22.6 sec      Uric Acid 3.1        Cultures:     Culture - Urine (22 @ 13:44)   Specimen Source: Clean Catch Clean Catch (Midstream)   Culture Results:   No growth Culture - Blood (22 @ 16:40)   Specimen Source: .Blood Blood-Catheter   Culture Results:   No growth to date. Culture - Blood (22 @ 16:40)   Specimen Source: .Blood Blood-Catheter   Culture Results:   No growth to date. Culture - Blood (22 @ 01:04)   Specimen Source: .Blood Blood   Culture Results:   No growth to date.     Radiology:   Xray Chest 1 View- PORTABLE-Urgent (Xray Chest 1 View- PORTABLE-Urgent .) (22 @ 10:46) >    IMPRESSION:  Clear lungs.          Meds:   Antimicrobials:   acyclovir   Oral Tab/Cap 400 milliGRAM(s) Oral every 8 hours  cefepime   IVPB 2000 milliGRAM(s) IV Intermittent every 8 hours  cefepime   IVPB      clotrimazole Lozenge 1 Lozenge Oral five times a day  fluconAZOLE   Tablet 400 milliGRAM(s) Oral daily      GI:  loperamide 2 milliGRAM(s) Oral every 4 hours PRN  pantoprazole    Tablet 40 milliGRAM(s) Oral two times a day  polyethylene glycol 3350 17 Gram(s) Oral daily PRN  senna 1 Tablet(s) Oral at bedtime PRN  sodium bicarbonate Mouth Rinse 10 milliLiter(s) Swish and Spit five times a day      Cardiovascular:   carvedilol 3.125 milliGRAM(s) Oral every 12 hours    Other medications:   acetaminophen     Tablet .. 650 milliGRAM(s) Oral every 6 hours  allopurinol 300 milliGRAM(s) Oral daily  Biotene Dry Mouth Oral Rinse 5 milliLiter(s) Swish and Spit five times a day  chlorhexidine 4% Liquid 1 Application(s) Topical <User Schedule>  folic acid 1 milliGRAM(s) Oral daily  levETIRAcetam 500 milliGRAM(s) Oral two times a day  multivitamin 1 Tablet(s) Oral daily  phytonadione   Solution 10 milliGRAM(s) Oral daily  sodium chloride 0.9%. 1000 milliLiter(s) IV Continuous <Continuous>      PRN:   acetaminophen     Tablet .. 650 milliGRAM(s) Oral every 6 hours PRN  loperamide 2 milliGRAM(s) Oral every 4 hours PRN  metoclopramide Injectable 10 milliGRAM(s) IV Push every 6 hours PRN  morphine  - Injectable 2 milliGRAM(s) IV Push every 4 hours PRN  ondansetron Injectable 8 milliGRAM(s) IV Push every 8 hours PRN  polyethylene glycol 3350 17 Gram(s) Oral daily PRN  senna 1 Tablet(s) Oral at bedtime PRN  sodium chloride 0.9% lock flush 10 milliLiter(s) IV Push every 1 hour PRN      A/P: 56 year old male with relapsed, refractory MCL s/p R-CHOP x 4, RICE x 2, maintenance RTX, salvage [place], admitted for CAR T cell therapy with [employer] product. Disease status at time of admission is partial response.   Day +  9  Monitor for CRS and neurotox...follow CRS/neuro tox protocol - GIVEN TOCILIZUMAB  pm and   AM for CRS - 1     - Relapsed, refractory MCL   4/15- day - 4- flu / ctx 2/3 - continue CTX hydration until 24 hours post infusion of last dose   Strict I&O, daily weights, prn diuresis   CRS / ICANS score daily until infusion of CAR T cells (22) and twice daily thereafter   BID labs 0500 & 1500 - monitor for TLS   -CRS grade 1 s/p Tocilizumab on tele started on Keppra as per protocol, pancx, started on cefepime  s/p dexamethasone 4 mg IV x1   F/U repeat blood cx's for fevers ( and ) s/p Toci x2 s/p dexamethasone 4 mg IV x2 also with CP: EKG WNL trops negative s/p morphine with good effect   - CRS grade 2( persistent fevers) given Toci x1 and dexamethasone 4 mg IV overnight, continues to be febrile Tmax 104 F tremors given additional Dexamethasone 10 mg IV  - fever curve improved: CRS grade 1  - Grade 2 neurotox with 10/10 Headaches- taken for CT head( with no abnormalities no cerebral edema), s.p Dexamethasone 10 mg IV pending MR head      -  Prophylactic measures   acyclovir   Oral Tab/Cap 400 milliGRAM(s) Oral every 8 hours  cefepime   IVPB      cefepime   IVPB 2000 milliGRAM(s) IV Intermittent every 8 hours  clotrimazole Lozenge 1 Lozenge Oral five times a day  fluconAZOLE   Tablet 400 milliGRAM(s) Oral daily  Keppra 500 q12h  GI Prophylaxis:  Protonix      - Cardiac / Hx Afib   F/U repeat troponin,  now CP free. ECG non ischemic   Continue Tele monitoring   Continue Coreg bid     -  Mouth care - NS / NaHCO3 rinses, Mycelex, Biotene; Skin care      - Transfuse & replete electrolytes prn     - IV hydration, daily weights, strict I&O, prn diuresis      -  Antiemetics, anti-diarrhea medications:   LORazepam   Injectable 1 milliGRAM(s) IV Push every 6 hours PRN  metoclopramide Injectable 10 milliGRAM(s) IV Push every 6 hours PRN  ondansetron 8mg IV q 8 hours prn nausea      - CRS / ICANS scoring    CRS grade 0, ICANS score 10   - CRS grade 0, ICANS score 10    AM - CRS grade 0, ICANS score 10   -pm-CRS grade 0, ICANS score 10   AM - CRS grade 0, ICANS score 10    PM CRS grade 0, ICANS score 10   AM CRS grade 0, ICANS score 10   PM CRS grade 0, ICANS score 10 --> 9PM CRS score - 1, ICANS - 10   AM CRS grade 0, ICANS score 10   PM CRS grade 0, ICANS score 10   AM CRS grade 1 10am (grade 0 @ 8:30am), ICANS score 10  M CRS grade 2( persistently febrile) ICANS score 10    AM CRS GRADE 1, ICANS score 10  - AM CRS GRADE 1, ICANS score 10   AM CRS GRADE 1, ICANS score 10   noon: with persistent headache: Grade 2 neurotox-   taken for CT head( ( with no abnormalities no cerebral edema) s.p Dexamethasone 10 mg IV pending MR head   PM CRS GRADE 1, ICANS score 10( HA resolved)     I have written the above note for Dr. Banks who performed service with me in the room.   Jaycee Draper   NP-C ([phone])    I have seen and examined patient with NP, I agree with above note as scribed.                    [facility] Transplant Team                                                      Critical / Counseling Time Provided: 30 minutes                                                                                                                                                         Chief Complaint: CAR T therapy with [employer] product for treatment of relapsed, refractory MCL      S: Patient seen and examined with [facility]PC Transplant Team  Feel better today.  +generalized weakness, fatigue  +some peripheral neuropathy   OOB  Tolerating PO    Vital Signs Last 24 Hrs  T(C): 37.2 (2022 05:34), Max: 38.1 (2022 13:43)  T(F): 99 (2022 05:34), Max: 100.6 (2022 13:43)  HR: 80 (2022 05:34) (76 - 90)  BP: 107/67 (2022 05:34) (102/66 - 121/77)  BP(mean): --  RR: 18 (2022 05:34) (18 - 18)  SpO2: 95% (2022 05:34) (95% - 99%)+fever improving.    Admit weight: 100.4kg    Daily Weight in k.8 (2022 08:30)    Intake / Output:    @ 07:01  -   @ 07:00  --------------------------------------------------------  IN: 3538.7 mL / OUT: 5275 mL / NET: -1736.3 mL      PE:   Oropharynx: no erythema or ulcerations ,   Oral Mucositis:  NA                                                   Grade: n/a  CVS: S1, S2 RRR   Lungs: CTA throughout bilaterally   Abdomen: + BS x 4, soft, NT, ND   Extremities: no edema BLE's  Gastric Mucositis:       -                                           Grade: n/a  Intestinal Mucositis:     -                                         Grade: n/a   Skin: no rashes  TLC: CDI   Neuro: A&O x 3   Pain: 0    LABS:                          12.6   6.26  )-----------( 252      ( 2022 05:53 )             37.4         Mean Cell Volume : 92.8 fl  Mean Cell Hemoglobin : 31.3 pg  Mean Cell Hemoglobin Concentration : 33.7 gm/dL  Auto Neutrophil # : 2.50 K/uL  Auto Lymphocyte # : 2.19 K/uL  Auto Monocyte # : 1.13 K/uL  Auto Eosinophil # : 0.00 K/uL  Auto Basophil # : 0.06 K/uL  Auto Neutrophil % : 38.0 %  Auto Lymphocyte % : 35.0 %  Auto Monocyte % : 18.0 %  Auto Eosinophil % : 0.0 %  Auto Basophil % : 1.0 %          135  |  99  |  21  ----------------------------<  149<H>  4.6   |  23  |  0.83    Ca    8.7      2022 05:53  Phos  3.3       Mg     2.0         TPro  6.1  /  Alb  4.1  /  TBili  0.2  /  DBili  <0.1  /  AST  21  /  ALT  65<H>  /  AlkPhos  67          PT/INR - ( 2022 05:53 )   PT: 14.0 sec;   INR: 1.20 ratio         PTT - ( 2022 05:53 )  PTT:22.6 sec      Uric Acid 3.1        Cultures:     Culture - Urine (22 @ 13:44)   Specimen Source: Clean Catch Clean Catch (Midstream)   Culture Results:   No growth Culture - Blood (22 @ 16:40)   Specimen Source: .Blood Blood-Catheter   Culture Results:   No growth to date. Culture - Blood (22 @ 16:40)   Specimen Source: .Blood Blood-Catheter   Culture Results:   No growth to date. Culture - Blood (22 @ 01:04)   Specimen Source: .Blood Blood   Culture Results:   No growth to date.     Radiology:    MR Head w/wo IV Cont (22 @ 19:42) >  IMPRESSION:    No hydrocephalus, mass effect, acute intracranial hemorrhage, vasogenic   edema, or acute territorial infarct.    No abnormal parenchymal or leptomeningeal enhancement        Xray Chest 1 View- PORTABLE-Urgent (Xray Chest 1 View- PORTABLE-Urgent .) (22 @ 10:46) >    IMPRESSION:  Clear lungs.          Meds:   Antimicrobials:   acyclovir   Oral Tab/Cap 400 milliGRAM(s) Oral every 8 hours  cefepime   IVPB 2000 milliGRAM(s) IV Intermittent every 8 hours  cefepime   IVPB      clotrimazole Lozenge 1 Lozenge Oral five times a day  fluconAZOLE   Tablet 400 milliGRAM(s) Oral daily      GI:  loperamide 2 milliGRAM(s) Oral every 4 hours PRN  pantoprazole    Tablet 40 milliGRAM(s) Oral two times a day  polyethylene glycol 3350 17 Gram(s) Oral daily PRN  senna 1 Tablet(s) Oral at bedtime PRN  sodium bicarbonate Mouth Rinse 10 milliLiter(s) Swish and Spit five times a day      Cardiovascular:   carvedilol 3.125 milliGRAM(s) Oral every 12 hours    Other medications:   acetaminophen     Tablet .. 650 milliGRAM(s) Oral every 6 hours  allopurinol 300 milliGRAM(s) Oral daily  Biotene Dry Mouth Oral Rinse 5 milliLiter(s) Swish and Spit five times a day  chlorhexidine 4% Liquid 1 Application(s) Topical <User Schedule>  folic acid 1 milliGRAM(s) Oral daily  levETIRAcetam 500 milliGRAM(s) Oral two times a day  multivitamin 1 Tablet(s) Oral daily  phytonadione   Solution 10 milliGRAM(s) Oral daily  sodium chloride 0.9%. 1000 milliLiter(s) IV Continuous <Continuous>      PRN:   acetaminophen     Tablet .. 650 milliGRAM(s) Oral every 6 hours PRN  loperamide 2 milliGRAM(s) Oral every 4 hours PRN  metoclopramide Injectable 10 milliGRAM(s) IV Push every 6 hours PRN  morphine  - Injectable 2 milliGRAM(s) IV Push every 4 hours PRN  ondansetron Injectable 8 milliGRAM(s) IV Push every 8 hours PRN  polyethylene glycol 3350 17 Gram(s) Oral daily PRN  senna 1 Tablet(s) Oral at bedtime PRN  sodium chloride 0.9% lock flush 10 milliLiter(s) IV Push every 1 hour PRN      A/P: 56 year old male with relapsed, refractory MCL s/p R-CHOP x 4, RICE x 2, maintenance RTX, salvage Calquence, admitted for CAR T cell therapy with Tecartus product. Disease status at time of admission is partial response.   Day +  9  Monitor for CRS and neurotox...follow CRS/neuro tox protocol - GIVEN TOCILIZUMAB  pm and   AM for CRS - 1     - Relapsed, refractory MCL   4/15- day - 4- flu / ctx 2/3 - continue CTX hydration until 24 hours post infusion of last dose   Strict I&O, daily weights, prn diuresis   CRS / ICANS score daily until infusion of CAR T cells (22) and twice daily thereafter   BID labs 0500 & 1500 - monitor for TLS   -CRS grade 1 s/p Tocilizumab on tele started on Keppra as per protocol, pancx, started on cefepime  s/p dexamethasone 4 mg IV x1   F/U repeat blood cx's for fevers ( and ) s/p Toci x2 s/p dexamethasone 4 mg IV x2 also with CP: EKG WNL trops negative s/p morphine with good effect   - CRS grade 2( persistent fevers) given Toci x1 and dexamethasone 4 mg IV overnight, continues to be febrile Tmax 104 F tremors given additional Dexamethasone 10 mg IV  - fever curve improved: CRS grade 1  - Grade 2 neurotox with 10/10 Headaches- taken for CT head( with no abnormalities no cerebral edema), s.p Dexamethasone 10 mg IV x 1 , MRI head no significant findings       -  Prophylactic measures   acyclovir   Oral Tab/Cap 400 milliGRAM(s) Oral every 8 hours  cefepime   IVPB      cefepime   IVPB 2000 milliGRAM(s) IV Intermittent every 8 hours  clotrimazole Lozenge 1 Lozenge Oral five times a day  fluconAZOLE   Tablet 400 milliGRAM(s) Oral daily  Keppra 500 q12h  GI Prophylaxis:  Protonix      - Cardiac / Hx Afib   F/U repeat troponin,  now CP free. ECG non ischemic   Continue Tele monitoring   Continue Coreg bid     -  Mouth care - NS / NaHCO3 rinses, Mycelex, Biotene; Skin care      - Transfuse & replete electrolytes prn     - IV hydration, daily weights, strict I&O, prn diuresis      -  Antiemetics, anti-diarrhea medications:   LORazepam   Injectable 1 milliGRAM(s) IV Push every 6 hours PRN  metoclopramide Injectable 10 milliGRAM(s) IV Push every 6 hours PRN  ondansetron 8mg IV q 8 hours prn nausea      - CRS / ICANS scoring    CRS grade 0, ICANS score 10   - CRS grade 0, ICANS score 10    AM - CRS grade 0, ICANS score 10   -pm-CRS grade 0, ICANS score 10   AM - CRS grade 0, ICANS score 10    PM CRS grade 0, ICANS score 10   AM CRS grade 0, ICANS score 10   PM CRS grade 0, ICANS score 10 --> 9PM CRS score - 1, ICANS - 10   AM CRS grade 0, ICANS score 10   PM CRS grade 0, ICANS score 10   AM CRS grade 1 10am (grade 0 @ 8:30am), ICANS score 10  M CRS grade 2( persistently febrile) ICANS score 10    AM CRS GRADE 1, ICANS score 10  - AM CRS GRADE 1, ICANS score 10   AM CRS GRADE 1, ICANS score 10   noon: with persistent headache: Grade 2 neurotox-   taken for CT head( ( with no abnormalities no cerebral edema) s.p Dexamethasone 10 mg IV pending  MR head   PM CRS GRADE 1, ICANS score 10( HA resolved)   - AM CRS GRADE 1, ICANS score 10    I have written the above note for Dr. Banks who performed service with me in the room.   Jaycee Draper   NP-C ([phone])    I have seen and examined patient with NP, I agree with above note as scribed.                    [facility] Team                                                      Critical / Counseling Time Provided: 30 minutes                                                                                                                                                         Chief Complaint: CAR T therapy with Tecartus product for treatment of relapsed, refractory MCL      S: Patient seen and examined with [facility] Transplant Team  Feel better today.  +generalized weakness, fatigue  +some peripheral neuropathy   OOB  Tolerating PO    Vital Signs Last 24 Hrs  T(C): 37.2 (2022 05:34), Max: 38.1 (2022 13:43)  T(F): 99 (2022 05:34), Max: 100.6 (2022 13:43)  HR: 80 (2022 05:34) (76 - 90)  BP: 107/67 (2022 05:34) (102/66 - 121/77)  BP(mean): --  RR: 18 (2022 05:34) (18 - 18)  SpO2: 95% (2022 05:34) (95% - 99%)+fever improving.    Admit weight: 100.4kg    Daily Weight in k.8 (2022 08:30)    Intake / Output:    @ 07:01  -   @ 07:00  --------------------------------------------------------  IN: 3538.7 mL / OUT: 5275 mL / NET: -1736.3 mL      PE:   Oropharynx: no erythema or ulcerations ,   Oral Mucositis:  NA                                                   Grade: n/a  CVS: S1, S2 RRR   Lungs: CTA throughout bilaterally   Abdomen: + BS x 4, soft, NT, ND   Extremities: no edema BLE's  Gastric Mucositis:       -                                           Grade: n/a  Intestinal Mucositis:     -                                         Grade: n/a   Skin: no rashes  TLC: CDI   Neuro: A&O x 3   Pain: 0    LABS:                          12.6   6.26  )-----------( 252      ( 2022 05:53 )             37.4         Mean Cell Volume : 92.8 fl  Mean Cell Hemoglobin : 31.3 pg  Mean Cell Hemoglobin Concentration : 33.7 gm/dL  Auto Neutrophil # : 2.50 K/uL  Auto Lymphocyte # : 2.19 K/uL  Auto Monocyte # : 1.13 K/uL  Auto Eosinophil # : 0.00 K/uL  Auto Basophil # : 0.06 K/uL  Auto Neutrophil % : 38.0 %  Auto Lymphocyte % : 35.0 %  Auto Monocyte % : 18.0 %  Auto Eosinophil % : 0.0 %  Auto Basophil % : 1.0 %          135  |  99  |  21  ----------------------------<  149<H>  4.6   |  23  |  0.83    Ca    8.7      2022 05:53  Phos  3.3       Mg     2.0         TPro  6.1  /  Alb  4.1  /  TBili  0.2  /  DBili  <0.1  /  AST  21  /  ALT  65<H>  /  AlkPhos  67          PT/INR - ( 2022 05:53 )   PT: 14.0 sec;   INR: 1.20 ratio         PTT - ( 2022 05:53 )  PTT:22.6 sec      Uric Acid 3.1        Cultures:     Culture - Urine (22 @ 13:44)   Specimen Source: Clean Catch Clean Catch (Midstream)   Culture Results:   No growth Culture - Blood (22 @ 16:40)   Specimen Source: .Blood Blood-Catheter   Culture Results:   No growth to date. Culture - Blood (22 @ 16:40)   Specimen Source: .Blood Blood-Catheter   Culture Results:   No growth to date. Culture - Blood (22 @ 01:04)   Specimen Source: .Blood Blood   Culture Results:   No growth to date.     Radiology:    MR Head w/wo IV Cont (22 @ 19:42) >  IMPRESSION:    No hydrocephalus, mass effect, acute intracranial hemorrhage, vasogenic   edema, or acute territorial infarct.    No abnormal parenchymal or leptomeningeal enhancement        Xray Chest 1 View- PORTABLE-Urgent (Xray Chest 1 View- PORTABLE-Urgent .) (22 @ 10:46) >    IMPRESSION:  Clear lungs.          Meds:   Antimicrobials:   acyclovir   Oral Tab/Cap 400 milliGRAM(s) Oral every 8 hours  cefepime   IVPB 2000 milliGRAM(s) IV Intermittent every 8 hours  cefepime   IVPB      clotrimazole Lozenge 1 Lozenge Oral five times a day  fluconAZOLE   Tablet 400 milliGRAM(s) Oral daily      GI:  loperamide 2 milliGRAM(s) Oral every 4 hours PRN  pantoprazole    Tablet 40 milliGRAM(s) Oral two times a day  polyethylene glycol 3350 17 Gram(s) Oral daily PRN  senna 1 Tablet(s) Oral at bedtime PRN  sodium bicarbonate Mouth Rinse 10 milliLiter(s) Swish and Spit five times a day      Cardiovascular:   carvedilol 3.125 milliGRAM(s) Oral every 12 hours    Other medications:   acetaminophen     Tablet .. 650 milliGRAM(s) Oral every 6 hours  allopurinol 300 milliGRAM(s) Oral daily  Biotene Dry Mouth Oral Rinse 5 milliLiter(s) Swish and Spit five times a day  chlorhexidine 4% Liquid 1 Application(s) Topical <User Schedule>  folic acid 1 milliGRAM(s) Oral daily  levETIRAcetam 500 milliGRAM(s) Oral two times a day  multivitamin 1 Tablet(s) Oral daily  phytonadione   Solution 10 milliGRAM(s) Oral daily  sodium chloride 0.9%. 1000 milliLiter(s) IV Continuous <Continuous>      PRN:   acetaminophen     Tablet .. 650 milliGRAM(s) Oral every 6 hours PRN  loperamide 2 milliGRAM(s) Oral every 4 hours PRN  metoclopramide Injectable 10 milliGRAM(s) IV Push every 6 hours PRN  morphine  - Injectable 2 milliGRAM(s) IV Push every 4 hours PRN  ondansetron Injectable 8 milliGRAM(s) IV Push every 8 hours PRN  polyethylene glycol 3350 17 Gram(s) Oral daily PRN  senna 1 Tablet(s) Oral at bedtime PRN  sodium chloride 0.9% lock flush 10 milliLiter(s) IV Push every 1 hour PRN      A/P: 56 year old male with relapsed, refractory MCL s/p R-CHOP x 4, RICE x 2, maintenance RTX, salvage Calquence, admitted for CAR T cell therapy with Tec[employer] product. Disease status at time of admission is partial response.   Day +  9  Monitor for CRS and neurotox...follow CRS/neuro tox protocol - GIVEN TOCILIZUMAB  pm and   AM for CRS - 1     - Relapsed, refractory MCL   4/15- day - 4- flu / ctx 2/3 - continue CTX hydration until 24 hours post infusion of last dose   Strict I&O, daily weights, prn diuresis   CRS / ICANS score daily until infusion of CAR T cells (22) and twice daily thereafter   BID labs 0500 & 1500 - monitor for TLS   -CRS grade 1 s/p Tocilizumab on tele started on Keppra as per protocol, pancx, started on cefepime  s/p dexamethasone 4 mg IV x1   F/U repeat blood cx's for fevers ( and ) s/p Toci x2 s/p dexamethasone 4 mg IV x2 also with CP: EKG WNL trops negative s/p morphine with good effect   - CRS grade 2( persistent fevers) given Toci x1 and dexamethasone 4 mg IV overnight, continues to be febrile Tmax 104 F tremors given additional Dexamethasone 10 mg IV  - fever curve improved: CRS grade 1  - Grade 2 neurotox with 10/10 Headaches- taken for CT head( with no abnormalities no cerebral edema), s.p Dexamethasone 10 mg IV x 1 , MRI head no significant findings       -  Prophylactic measures   acyclovir   Oral Tab/Cap 400 milliGRAM(s) Oral every 8 hours  cefepime   IVPB      cefepime   IVPB 2000 milliGRAM(s) IV Intermittent every 8 hours  clotrimazole Lozenge 1 Lozenge Oral five times a day  fluconAZOLE   Tablet 400 milliGRAM(s) Oral daily  Keppra 500 q12h  GI Prophylaxis:  Protonix      - Cardiac / Hx Afib   F/U repeat troponin,  now CP free. ECG non ischemic   Continue Tele monitoring   Continue Coreg bid     -  Mouth care - NS / NaHCO3 rinses, Mycelex, Biotene; Skin care      - Transfuse & replete electrolytes prn     - IV hydration, daily weights, strict I&O, prn diuresis      -  Antiemetics, anti-diarrhea medications:   LORazepam   Injectable 1 milliGRAM(s) IV Push every 6 hours PRN  metoclopramide Injectable 10 milliGRAM(s) IV Push every 6 hours PRN  ondansetron 8mg IV q 8 hours prn nausea      - CRS / ICANS scoring    CRS grade 0, ICANS score 10   - CRS grade 0, ICANS score 10    AM - CRS grade 0, ICANS score 10   -pm-CRS grade 0, ICANS score 10   AM - CRS grade 0, ICANS score 10    PM CRS grade 0, ICANS score 10   AM CRS grade 0, ICANS score 10   PM CRS grade 0, ICANS score 10 --> 9PM CRS score - 1, ICANS - 10   AM CRS grade 0, ICANS score 10   PM CRS grade 0, ICANS score 10   AM CRS grade 1 10am (grade 0 @ 8:30am), ICANS score 10  M CRS grade 2( persistently febrile) ICANS score 10    AM CRS GRADE 1, ICANS score 10  - AM CRS GRADE 1, ICANS score 10   AM CRS GRADE 1, ICANS score 10   noon: with persistent headache: Grade 2 neurotox-   taken for CT head( ( with no abnormalities no cerebral edema) s.p Dexamethasone 10 mg IV pending  MR head   PM CRS GRADE 1, ICANS score 10( HA resolved)   - AM CRS GRADE 1, ICANS score 10  - PM CRS GRADE 1, ICANS score 10    I have written the above note for Dr. Banks who performed service with me in the room.   Jaycee Draper   NP-C ([phone])    I have seen and examined patient with NP, I agree with above note as scribed.

## 2022-04-28 NOTE — PROGRESS NOTE ADULT - PROBLEM SELECTOR PLAN 1
No Sz like activity  No myoclonus  Subtle changes apparent to the patient and his wife  He reports challenges in processing the speech of others as quicly   Difficulties   Wife reports his language has changed: verbosity has decreased, less descriptive  d/w Cellular Therapies team No Sz like activity  No myoclonus  Subtle changes apparent to the patient and his wife  He reports challenges in processing the speech of others as quickly   Difficulties with language, but no gross deficits:  Wife reports his language has changed: verbosity has decreased, less descriptive  d/w Cellular Therapies team  Also on keppra  No overt/gross deficits  Continue to monitor

## 2022-04-28 NOTE — PROGRESS NOTE ADULT - NS PANP COMMENT GEN_ALL_CORE FT
Admitted for CAR T cell therapy with Tecartus (bresucabtagene autoleucel)  today is + 9..some nausea..now improved...monitor closely for CRS and neurotox...pt is at lower risk for these complications given minimal disease burden..hoever he has been on and off with high temps since fri evening..first dose of toci given for grade 1 crs......s/p 4 total  doses of  toci and dex 4 mg X 4 and 10 mg X 1...improved..this am 8/10 HA with somulence...c/w grade 2 neurotox...Stat ct head, MRI..dose dex 10 mg    1. Admit to BMTU   2. Day -5 through day -1 record CARTOX / ICANS score daily   3. Day - 5 through day - 3 Fludarabine 30mg / m2 IV given over 30 minutes daily   4. Day - 5 - start cyclophosphamide hydration - 0.9% NS + 10mEq KCL / L at 75 ml/m2 and continue for 24 hours post infusion of last dose of CTX   5. Day - 5 through day - 3 CTX 500mg / m2 IV QD to be given over 2 hours   6. Starting day - 5 monitor labs BID: CBC with differential, CMP, LDH, uric acid, mag, phos, CPK, BNP, PT / PTT / INR / Fibrinogen, d-dimer, CRP, hepatic profile, ferritin  7. Weekly IL-6 levels on Monday, twice weekly (Monday / Thursday) CMV / EBV PCR, three times a week (Monday / Wednesday / Friday) type and screen   8. On day - 5 start allopurinol 300mg po QD   9. Day -2 and -1 REST DAYS   10. On day -1 begin to record CARTOX / ICANS score q 12 hours   11. Day 0 infuse Brexucabtagene autoleucel  hx of afib...seen by [facility] cardiology  12. F/U cx's..on cefapime, acyclovir and fluconazole..send rvp  13 Cont tele and keppra  4/16-17 Doing well, I/Os are negative, transient lip swelling, now resolved, some fatigue  4/18, 19, 20, 21, 22 CRS 0, ICANS 10  4/22/22-Chase fever overnight, chills, given Toci, was started on Cefipime. Stable. CRS / ICANS scoring this morning 10.  4/23/22, 4/24/22 CRS 1 ICANS 10  4/24/22- Fever 102oF, complaints of bilateral upper and lower extremities cramps, chest discomfort, EKG no changes. Patient was treated with Toci/Decadron as per protocol. Morphine prn for pain. Clinically improved. Pancultures sent.  4/25/22 CRS 2...persistent high temps.....ICANS 10...tremor noted with fever...dex 10 mg dosed today  4/26/22 CRS 1, Icans 10  4/27 crs 1, Icans 10...neurotox 2 Admitted for CAR T cell therapy with Tecartus (bresucabtagene autoleucel)  today is + 9..some nausea..now improved...monitor closely for CRS and neurotox...pt is at lower risk for these complications given minimal disease burden..hoever he has been on and off with high temps since fri evening..first dose of toci given for grade 1 crs......s/p 4 total  doses of  toci and dex 4 mg X 4 and 10 mg X 1...improved..this am 8/10 HA with somulence...c/w grade 2 neurotox...Stat ct head, MRI..dose dex 10 mg...improved 4/28    1. Admit to BMTU   2. Day -5 through day -1 record CARTOX / ICANS score daily   3. Day - 5 through day - 3 Fludarabine 30mg / m2 IV given over 30 minutes daily   4. Day - 5 - start cyclophosphamide hydration - 0.9% NS + 10mEq KCL / L at 75 ml/m2 and continue for 24 hours post infusion of last dose of CTX   5. Day - 5 through day - 3 CTX 500mg / m2 IV QD to be given over 2 hours   6. Starting day - 5 monitor labs BID: CBC with differential, CMP, LDH, uric acid, mag, phos, CPK, BNP, PT / PTT / INR / Fibrinogen, d-dimer, CRP, hepatic profile, ferritin  7. Weekly IL-6 levels on Monday, twice weekly (Monday / Thursday) CMV / EBV PCR, three times a week (Monday / Wednesday / Friday) type and screen   8. On day - 5 start allopurinol 300mg po QD   9. Day -2 and -1 REST DAYS   10. On day -1 begin to record CARTOX / ICANS score q 12 hours   11. Day 0 infuse Brexucabtagene autoleucel  hx of afib...seen by [facility] cardiology  12. F/U cx's..on cefapime, acyclovir and fluconazole..send rvp  13 Cont tele and keppra  4/16-17 Doing well, I/Os are negative, transient lip swelling, now resolved, some fatigue  4/18, 19, 20, 21, 22 CRS 0, ICANS 10  4/22/22-Chase fever overnight, chills, given Toci, was started on Cefipime. Stable. CRS / ICANS scoring this morning 10.  4/23/22, 4/24/22 CRS 1 ICANS 10  4/24/22- Fever 102oF, complaints of bilateral upper and lower extremities cramps, chest discomfort, EKG no changes. Patient was treated with Toci/Decadron as per protocol. Morphine prn for pain. Clinically improved. Pancultures sent.  4/25/22 CRS 2...persistent high temps.....ICANS 10...tremor noted with fever...dex 10 mg dosed today  4/26/22 CRS 1, Icans 10  4/27 crs 1, Icans 10...neurotox 2  4/28 crs 0..icans 10

## 2022-04-29 LAB
ALBUMIN SERPL ELPH-MCNC: 3.8 G/DL — SIGNIFICANT CHANGE UP (ref 3.3–5)
ALBUMIN SERPL ELPH-MCNC: 4.1 G/DL — SIGNIFICANT CHANGE UP (ref 3.3–5)
ALP SERPL-CCNC: 63 U/L — SIGNIFICANT CHANGE UP (ref 40–120)
ALP SERPL-CCNC: 65 U/L — SIGNIFICANT CHANGE UP (ref 40–120)
ALT FLD-CCNC: 105 U/L — HIGH (ref 10–45)
ALT FLD-CCNC: 93 U/L — HIGH (ref 10–45)
ANION GAP SERPL CALC-SCNC: 10 MMOL/L — SIGNIFICANT CHANGE UP (ref 5–17)
ANION GAP SERPL CALC-SCNC: 11 MMOL/L — SIGNIFICANT CHANGE UP (ref 5–17)
ANISOCYTOSIS BLD QL: SLIGHT — SIGNIFICANT CHANGE UP
ANISOCYTOSIS BLD QL: SLIGHT — SIGNIFICANT CHANGE UP
APTT BLD: 20.9 SEC — LOW (ref 27.5–35.5)
AST SERPL-CCNC: 32 U/L — SIGNIFICANT CHANGE UP (ref 10–40)
AST SERPL-CCNC: 48 U/L — HIGH (ref 10–40)
BASOPHILS # BLD AUTO: 0 K/UL — SIGNIFICANT CHANGE UP (ref 0–0.2)
BASOPHILS # BLD AUTO: 0.04 K/UL — SIGNIFICANT CHANGE UP (ref 0–0.2)
BASOPHILS NFR BLD AUTO: 0 % — SIGNIFICANT CHANGE UP (ref 0–2)
BASOPHILS NFR BLD AUTO: 0.9 % — SIGNIFICANT CHANGE UP (ref 0–2)
BILIRUB SERPL-MCNC: 0.3 MG/DL — SIGNIFICANT CHANGE UP (ref 0.2–1.2)
BILIRUB SERPL-MCNC: 0.3 MG/DL — SIGNIFICANT CHANGE UP (ref 0.2–1.2)
BLD GP AB SCN SERPL QL: NEGATIVE — SIGNIFICANT CHANGE UP
BUN SERPL-MCNC: 21 MG/DL — SIGNIFICANT CHANGE UP (ref 7–23)
BUN SERPL-MCNC: 22 MG/DL — SIGNIFICANT CHANGE UP (ref 7–23)
CALCIUM SERPL-MCNC: 8.4 MG/DL — SIGNIFICANT CHANGE UP (ref 8.4–10.5)
CALCIUM SERPL-MCNC: 8.6 MG/DL — SIGNIFICANT CHANGE UP (ref 8.4–10.5)
CHLORIDE SERPL-SCNC: 100 MMOL/L — SIGNIFICANT CHANGE UP (ref 96–108)
CHLORIDE SERPL-SCNC: 99 MMOL/L — SIGNIFICANT CHANGE UP (ref 96–108)
CK SERPL-CCNC: 17 U/L — LOW (ref 30–200)
CO2 SERPL-SCNC: 25 MMOL/L — SIGNIFICANT CHANGE UP (ref 22–31)
CO2 SERPL-SCNC: 26 MMOL/L — SIGNIFICANT CHANGE UP (ref 22–31)
CREAT SERPL-MCNC: 0.9 MG/DL — SIGNIFICANT CHANGE UP (ref 0.5–1.3)
CREAT SERPL-MCNC: 1.01 MG/DL — SIGNIFICANT CHANGE UP (ref 0.5–1.3)
CRP SERPL-MCNC: <3 MG/L — SIGNIFICANT CHANGE UP (ref 0–4)
CULTURE RESULTS: SIGNIFICANT CHANGE UP
CULTURE RESULTS: SIGNIFICANT CHANGE UP
D DIMER BLD IA.RAPID-MCNC: 567 NG/ML DDU — HIGH
DACRYOCYTES BLD QL SMEAR: SLIGHT — SIGNIFICANT CHANGE UP
EGFR: 100 ML/MIN/1.73M2 — SIGNIFICANT CHANGE UP
EGFR: 87 ML/MIN/1.73M2 — SIGNIFICANT CHANGE UP
EOSINOPHIL # BLD AUTO: 1.04 K/UL — HIGH (ref 0–0.5)
EOSINOPHIL # BLD AUTO: 1.26 K/UL — HIGH (ref 0–0.5)
EOSINOPHIL NFR BLD AUTO: 23.9 % — HIGH (ref 0–6)
EOSINOPHIL NFR BLD AUTO: 25.2 % — HIGH (ref 0–6)
FERRITIN SERPL-MCNC: 210 NG/ML — SIGNIFICANT CHANGE UP (ref 30–400)
FIBRINOGEN PPP-MCNC: 277 MG/DL — LOW (ref 330–520)
GIANT PLATELETS BLD QL SMEAR: PRESENT — SIGNIFICANT CHANGE UP
GLUCOSE SERPL-MCNC: 114 MG/DL — HIGH (ref 70–99)
GLUCOSE SERPL-MCNC: 117 MG/DL — HIGH (ref 70–99)
HCT VFR BLD CALC: 36.4 % — LOW (ref 39–50)
HCT VFR BLD CALC: 37.4 % — LOW (ref 39–50)
HGB BLD-MCNC: 11.8 G/DL — LOW (ref 13–17)
HGB BLD-MCNC: 12.3 G/DL — LOW (ref 13–17)
INR BLD: 1.13 RATIO — SIGNIFICANT CHANGE UP (ref 0.88–1.16)
LDH SERPL L TO P-CCNC: 158 U/L — SIGNIFICANT CHANGE UP (ref 50–242)
LDH SERPL L TO P-CCNC: 194 U/L — SIGNIFICANT CHANGE UP (ref 50–242)
LYMPHOCYTES # BLD AUTO: 0.91 K/UL — LOW (ref 1–3.3)
LYMPHOCYTES # BLD AUTO: 0.92 K/UL — LOW (ref 1–3.3)
LYMPHOCYTES # BLD AUTO: 18.2 % — SIGNIFICANT CHANGE UP (ref 13–44)
LYMPHOCYTES # BLD AUTO: 21.2 % — SIGNIFICANT CHANGE UP (ref 13–44)
MACROCYTES BLD QL: SLIGHT — SIGNIFICANT CHANGE UP
MAGNESIUM SERPL-MCNC: 1.8 MG/DL — SIGNIFICANT CHANGE UP (ref 1.6–2.6)
MAGNESIUM SERPL-MCNC: 1.9 MG/DL — SIGNIFICANT CHANGE UP (ref 1.6–2.6)
MANUAL SMEAR VERIFICATION: SIGNIFICANT CHANGE UP
MANUAL SMEAR VERIFICATION: SIGNIFICANT CHANGE UP
MCHC RBC-ENTMCNC: 30.9 PG — SIGNIFICANT CHANGE UP (ref 27–34)
MCHC RBC-ENTMCNC: 31 PG — SIGNIFICANT CHANGE UP (ref 27–34)
MCHC RBC-ENTMCNC: 32.4 GM/DL — SIGNIFICANT CHANGE UP (ref 32–36)
MCHC RBC-ENTMCNC: 32.9 GM/DL — SIGNIFICANT CHANGE UP (ref 32–36)
MCV RBC AUTO: 94 FL — SIGNIFICANT CHANGE UP (ref 80–100)
MCV RBC AUTO: 95.5 FL — SIGNIFICANT CHANGE UP (ref 80–100)
METAMYELOCYTES # FLD: 0.9 % — HIGH (ref 0–0)
MONOCYTES # BLD AUTO: 1.17 K/UL — HIGH (ref 0–0.9)
MONOCYTES # BLD AUTO: 1.27 K/UL — HIGH (ref 0–0.9)
MONOCYTES NFR BLD AUTO: 23.5 % — HIGH (ref 2–14)
MONOCYTES NFR BLD AUTO: 29.2 % — HIGH (ref 2–14)
MYELOCYTES NFR BLD: 1.7 % — HIGH (ref 0–0)
NEUTROPHILS # BLD AUTO: 1.12 K/UL — LOW (ref 1.8–7.4)
NEUTROPHILS # BLD AUTO: 1.48 K/UL — LOW (ref 1.8–7.4)
NEUTROPHILS NFR BLD AUTO: 23.9 % — LOW (ref 43–77)
NEUTROPHILS NFR BLD AUTO: 28.7 % — LOW (ref 43–77)
NEUTS BAND # BLD: 0.9 % — SIGNIFICANT CHANGE UP (ref 0–8)
NEUTS BAND # BLD: 1.8 % — SIGNIFICANT CHANGE UP (ref 0–8)
NRBC # BLD: 1 /100 — HIGH (ref 0–0)
NT-PROBNP SERPL-SCNC: 67 PG/ML — SIGNIFICANT CHANGE UP (ref 0–300)
OVALOCYTES BLD QL SMEAR: SLIGHT — SIGNIFICANT CHANGE UP
PHOSPHATE SERPL-MCNC: 3.4 MG/DL — SIGNIFICANT CHANGE UP (ref 2.5–4.5)
PHOSPHATE SERPL-MCNC: 4 MG/DL — SIGNIFICANT CHANGE UP (ref 2.5–4.5)
PLAT MORPH BLD: NORMAL — SIGNIFICANT CHANGE UP
PLAT MORPH BLD: NORMAL — SIGNIFICANT CHANGE UP
PLATELET # BLD AUTO: 223 K/UL — SIGNIFICANT CHANGE UP (ref 150–400)
PLATELET # BLD AUTO: 234 K/UL — SIGNIFICANT CHANGE UP (ref 150–400)
POIKILOCYTOSIS BLD QL AUTO: SLIGHT — SIGNIFICANT CHANGE UP
POLYCHROMASIA BLD QL SMEAR: SLIGHT — SIGNIFICANT CHANGE UP
POLYCHROMASIA BLD QL SMEAR: SLIGHT — SIGNIFICANT CHANGE UP
POTASSIUM SERPL-MCNC: 4.1 MMOL/L — SIGNIFICANT CHANGE UP (ref 3.5–5.3)
POTASSIUM SERPL-MCNC: 4.6 MMOL/L — SIGNIFICANT CHANGE UP (ref 3.5–5.3)
POTASSIUM SERPL-SCNC: 4.1 MMOL/L — SIGNIFICANT CHANGE UP (ref 3.5–5.3)
POTASSIUM SERPL-SCNC: 4.6 MMOL/L — SIGNIFICANT CHANGE UP (ref 3.5–5.3)
PROT SERPL-MCNC: 5.6 G/DL — LOW (ref 6–8.3)
PROT SERPL-MCNC: 5.8 G/DL — LOW (ref 6–8.3)
PROTHROM AB SERPL-ACNC: 13 SEC — SIGNIFICANT CHANGE UP (ref 10.5–13.4)
RBC # BLD: 3.81 M/UL — LOW (ref 4.2–5.8)
RBC # BLD: 3.98 M/UL — LOW (ref 4.2–5.8)
RBC # FLD: 13.9 % — SIGNIFICANT CHANGE UP (ref 10.3–14.5)
RBC # FLD: 14.3 % — SIGNIFICANT CHANGE UP (ref 10.3–14.5)
RBC BLD AUTO: ABNORMAL
RBC BLD AUTO: SIGNIFICANT CHANGE UP
RH IG SCN BLD-IMP: POSITIVE — SIGNIFICANT CHANGE UP
SODIUM SERPL-SCNC: 135 MMOL/L — SIGNIFICANT CHANGE UP (ref 135–145)
SODIUM SERPL-SCNC: 136 MMOL/L — SIGNIFICANT CHANGE UP (ref 135–145)
SPECIMEN SOURCE: SIGNIFICANT CHANGE UP
SPECIMEN SOURCE: SIGNIFICANT CHANGE UP
TOXIC GRANULES BLD QL SMEAR: PRESENT — SIGNIFICANT CHANGE UP
URATE SERPL-MCNC: 3.3 MG/DL — LOW (ref 3.4–8.8)
URATE SERPL-MCNC: 3.5 MG/DL — SIGNIFICANT CHANGE UP (ref 3.4–8.8)
WBC # BLD: 4.36 K/UL — SIGNIFICANT CHANGE UP (ref 3.8–10.5)
WBC # BLD: 4.99 K/UL — SIGNIFICANT CHANGE UP (ref 3.8–10.5)
WBC # FLD AUTO: 4.36 K/UL — SIGNIFICANT CHANGE UP (ref 3.8–10.5)
WBC # FLD AUTO: 4.99 K/UL — SIGNIFICANT CHANGE UP (ref 3.8–10.5)

## 2022-04-29 PROCEDURE — 99291 CRITICAL CARE FIRST HOUR: CPT

## 2022-04-29 RX ORDER — GABAPENTIN 400 MG/1
100 CAPSULE ORAL THREE TIMES A DAY
Refills: 0 | Status: DISCONTINUED | OUTPATIENT
Start: 2022-04-29 | End: 2022-05-06

## 2022-04-29 RX ADMIN — Medication 5 MILLILITER(S): at 15:58

## 2022-04-29 RX ADMIN — Medication 1 LOZENGE: at 23:55

## 2022-04-29 RX ADMIN — Medication 5 MILLILITER(S): at 07:34

## 2022-04-29 RX ADMIN — Medication 1 LOZENGE: at 20:34

## 2022-04-29 RX ADMIN — OXYCODONE HYDROCHLORIDE 5 MILLIGRAM(S): 5 TABLET ORAL at 05:00

## 2022-04-29 RX ADMIN — Medication 1 MILLIGRAM(S): at 12:44

## 2022-04-29 RX ADMIN — Medication 10 MILLILITER(S): at 07:36

## 2022-04-29 RX ADMIN — GABAPENTIN 100 MILLIGRAM(S): 400 CAPSULE ORAL at 16:31

## 2022-04-29 RX ADMIN — OXYCODONE HYDROCHLORIDE 5 MILLIGRAM(S): 5 TABLET ORAL at 20:40

## 2022-04-29 RX ADMIN — Medication 1 LOZENGE: at 15:58

## 2022-04-29 RX ADMIN — Medication 400 MILLIGRAM(S): at 13:44

## 2022-04-29 RX ADMIN — Medication 10 MILLILITER(S): at 13:44

## 2022-04-29 RX ADMIN — Medication 5 MILLILITER(S): at 12:47

## 2022-04-29 RX ADMIN — CHLORHEXIDINE GLUCONATE 1 APPLICATION(S): 213 SOLUTION TOPICAL at 07:35

## 2022-04-29 RX ADMIN — CEFEPIME 100 MILLIGRAM(S): 1 INJECTION, POWDER, FOR SOLUTION INTRAMUSCULAR; INTRAVENOUS at 21:04

## 2022-04-29 RX ADMIN — SODIUM CHLORIDE 100 MILLILITER(S): 9 INJECTION INTRAMUSCULAR; INTRAVENOUS; SUBCUTANEOUS at 20:35

## 2022-04-29 RX ADMIN — LEVETIRACETAM 500 MILLIGRAM(S): 250 TABLET, FILM COATED ORAL at 17:46

## 2022-04-29 RX ADMIN — Medication 400 MILLIGRAM(S): at 21:04

## 2022-04-29 RX ADMIN — CEFEPIME 100 MILLIGRAM(S): 1 INJECTION, POWDER, FOR SOLUTION INTRAMUSCULAR; INTRAVENOUS at 05:04

## 2022-04-29 RX ADMIN — CARVEDILOL PHOSPHATE 3.12 MILLIGRAM(S): 80 CAPSULE, EXTENDED RELEASE ORAL at 17:46

## 2022-04-29 RX ADMIN — Medication 5 MILLILITER(S): at 20:34

## 2022-04-29 RX ADMIN — Medication 10 MILLILITER(S): at 23:56

## 2022-04-29 RX ADMIN — Medication 1 TABLET(S): at 12:45

## 2022-04-29 RX ADMIN — Medication 650 MILLIGRAM(S): at 23:45

## 2022-04-29 RX ADMIN — Medication 1 LOZENGE: at 12:47

## 2022-04-29 RX ADMIN — Medication 650 MILLIGRAM(S): at 20:40

## 2022-04-29 RX ADMIN — Medication 650 MILLIGRAM(S): at 13:20

## 2022-04-29 RX ADMIN — PANTOPRAZOLE SODIUM 40 MILLIGRAM(S): 20 TABLET, DELAYED RELEASE ORAL at 05:03

## 2022-04-29 RX ADMIN — Medication 1 LOZENGE: at 07:34

## 2022-04-29 RX ADMIN — GABAPENTIN 100 MILLIGRAM(S): 400 CAPSULE ORAL at 21:04

## 2022-04-29 RX ADMIN — CARVEDILOL PHOSPHATE 3.12 MILLIGRAM(S): 80 CAPSULE, EXTENDED RELEASE ORAL at 05:02

## 2022-04-29 RX ADMIN — Medication 5 MILLILITER(S): at 23:55

## 2022-04-29 RX ADMIN — Medication 10 MILLILITER(S): at 15:58

## 2022-04-29 RX ADMIN — Medication 300 MILLIGRAM(S): at 12:46

## 2022-04-29 RX ADMIN — OXYCODONE HYDROCHLORIDE 5 MILLIGRAM(S): 5 TABLET ORAL at 05:30

## 2022-04-29 RX ADMIN — LEVETIRACETAM 500 MILLIGRAM(S): 250 TABLET, FILM COATED ORAL at 05:03

## 2022-04-29 RX ADMIN — OXYCODONE HYDROCHLORIDE 5 MILLIGRAM(S): 5 TABLET ORAL at 20:10

## 2022-04-29 RX ADMIN — FLUCONAZOLE 400 MILLIGRAM(S): 150 TABLET ORAL at 12:44

## 2022-04-29 RX ADMIN — Medication 10 MILLILITER(S): at 20:34

## 2022-04-29 RX ADMIN — PANTOPRAZOLE SODIUM 40 MILLIGRAM(S): 20 TABLET, DELAYED RELEASE ORAL at 17:46

## 2022-04-29 RX ADMIN — OXYCODONE HYDROCHLORIDE 5 MILLIGRAM(S): 5 TABLET ORAL at 12:44

## 2022-04-29 RX ADMIN — Medication 650 MILLIGRAM(S): at 20:10

## 2022-04-29 RX ADMIN — Medication 650 MILLIGRAM(S): at 12:50

## 2022-04-29 RX ADMIN — OXYCODONE HYDROCHLORIDE 5 MILLIGRAM(S): 5 TABLET ORAL at 13:14

## 2022-04-29 RX ADMIN — CEFEPIME 100 MILLIGRAM(S): 1 INJECTION, POWDER, FOR SOLUTION INTRAMUSCULAR; INTRAVENOUS at 13:44

## 2022-04-29 RX ADMIN — Medication 400 MILLIGRAM(S): at 05:02

## 2022-04-29 NOTE — CHART NOTE - NSCHARTNOTEFT_GEN_A_CORE
RN to PA- Patient complaining of "muscle cramping" throughout the body and wanted to see a provider. Patient was seen at bedside, A&Ox4, in no acute distress, had just received oxy 5mg PO 10 mins prior. Patient said cramping was already slowly resolving. Patient had no further complaints, loss of sensation, or any weakness of any extremities. Followed up half hour after, to which patient relayed symptoms had completely resolved and he was feeling much better. Will c/w monitoring overnight for any changes.     Vital Signs Last 24 Hrs  T(C): 37.8 (29 Apr 2022 05:15), Max: 37.8 (28 Apr 2022 20:30)  T(F): 100 (29 Apr 2022 05:15), Max: 100 (28 Apr 2022 20:30)  HR: 82 (29 Apr 2022 05:15) (70 - 87)  BP: 124/78 (29 Apr 2022 05:15) (107/66 - 148/86)  BP(mean): --  RR: 18 (29 Apr 2022 05:15) (18 - 18)  SpO2: 96% (29 Apr 2022 05:15) (96% - 100%)    TONY Mcarthur-C  [id number]

## 2022-04-29 NOTE — PROGRESS NOTE ADULT - SUBJECTIVE AND OBJECTIVE BOX
[facility] Transplant Team                                                      Critical / Counseling Time Provided: 30 minutes                                                                                                                                                         Chief Complaint: CAR T therapy with [employer] product for treatment of relapsed, refractory MCL      S: Patient seen and examined with [facility] Transplant Team  Feel better today.  +generalized weakness, fatigue  +some peripheral neuropathy   OOB  Tolerating PO    Vital Signs Last 24 Hrs  T(C): 37.8 (2022 05:15), Max: 37.8 (2022 20:30)  T(F): 100 (2022 05:15), Max: 100 (2022 20:30)  HR: 82 (2022 05:15) (70 - 87)  BP: 124/78 (2022 05:15) (107/66 - 148/86)  BP(mean): --  RR: 18 (2022 05:15) (18 - 18)  SpO2: 96% (2022 05:15) (96% - 100%)    Admit weight: 100.4kg    Daily Weight in k.8 (2022 08:30)    Intake / Output:    @ 07:01  -   @ 07:00  --------------------------------------------------------  IN: 3538.7 mL / OUT: 5275 mL / NET: -1736.3 mL      PE:   Oropharynx: no erythema or ulcerations ,   Oral Mucositis:  NA                                                   Grade: n/a  CVS: S1, S2 RRR   Lungs: CTA throughout bilaterally   Abdomen: + BS x 4, soft, NT, ND   Extremities: no edema BLE's  Gastric Mucositis:       -                                           Grade: n/a  Intestinal Mucositis:     -                                         Grade: n/a   Skin: no rashes  TLC: CDI   Neuro: A&O x 3   Pain: 0    LABS:                          12.3   4.99  )-----------( 234      ( 2022 05:19 )             37.4         Mean Cell Volume : 94.0 fl  Mean Cell Hemoglobin : 30.9 pg  Mean Cell Hemoglobin Concentration : 32.9 gm/dL  Auto Neutrophil # : 1.48 K/uL  Auto Lymphocyte # : 0.91 K/uL  Auto Monocyte # : 1.17 K/uL  Auto Eosinophil # : 1.26 K/uL  Auto Basophil # : 0.04 K/uL  Auto Neutrophil % : 28.7 %  Auto Lymphocyte % : 18.2 %  Auto Monocyte % : 23.5 %  Auto Eosinophil % : 25.2 %  Auto Basophil % : 0.9 %          136  |  100  |  22  ----------------------------<  117<H>  4.6   |  25  |  1.01    Ca    8.6      2022 05:19  Phos  3.4       Mg     1.9         TPro  5.8<L>  /  Alb  4.1  /  TBili  0.3  /  DBili  x   /  AST  48<H>  /  ALT  105<H>  /  AlkPhos  65          PT/INR - ( 2022 05:19 )   PT: 13.0 sec;   INR: 1.13 ratio         PTT - ( 2022 05:19 )  PTT:20.9 sec      Uric Acid 3.3      Cultures:     Culture - Urine (22 @ 13:44)   Specimen Source: Clean Catch Clean Catch (Midstream)   Culture Results:   No growth Culture - Blood (22 @ 16:40)   Specimen Source: .Blood Blood-Catheter   Culture Results:   No growth to date. Culture - Blood (22 @ 16:40)   Specimen Source: .Blood Blood-Catheter   Culture Results:   No growth to date. Culture - Blood (22 @ 01:04)   Specimen Source: .Blood Blood   Culture Results:   No growth to date.     Radiology:    MR Head w/wo IV Cont (22 @ 19:42) >  IMPRESSION:    No hydrocephalus, mass effect, acute intracranial hemorrhage, vasogenic   edema, or acute territorial infarct.    No abnormal parenchymal or leptomeningeal enhancement        Xray Chest 1 View- PORTABLE-Urgent (Xray Chest 1 View- PORTABLE-Urgent .) (22 @ 10:46) >    IMPRESSION:  Clear lungs.          Meds:   Antimicrobials:   acyclovir   Oral Tab/Cap 400 milliGRAM(s) Oral every 8 hours  cefepime   IVPB 2000 milliGRAM(s) IV Intermittent every 8 hours  cefepime   IVPB      clotrimazole Lozenge 1 Lozenge Oral five times a day  fluconAZOLE   Tablet 400 milliGRAM(s) Oral daily      GI:  loperamide 2 milliGRAM(s) Oral every 4 hours PRN  pantoprazole    Tablet 40 milliGRAM(s) Oral two times a day  polyethylene glycol 3350 17 Gram(s) Oral daily PRN  senna 1 Tablet(s) Oral at bedtime PRN  sodium bicarbonate Mouth Rinse 10 milliLiter(s) Swish and Spit five times a day      Cardiovascular:   carvedilol 3.125 milliGRAM(s) Oral every 12 hours    Other medications:   acetaminophen     Tablet .. 650 milliGRAM(s) Oral every 6 hours  allopurinol 300 milliGRAM(s) Oral daily  Biotene Dry Mouth Oral Rinse 5 milliLiter(s) Swish and Spit five times a day  chlorhexidine 4% Liquid 1 Application(s) Topical <User Schedule>  folic acid 1 milliGRAM(s) Oral daily  levETIRAcetam 500 milliGRAM(s) Oral two times a day  multivitamin 1 Tablet(s) Oral daily  phytonadione   Solution 10 milliGRAM(s) Oral daily  sodium chloride 0.9%. 1000 milliLiter(s) IV Continuous <Continuous>      PRN:   acetaminophen     Tablet .. 650 milliGRAM(s) Oral every 6 hours PRN  loperamide 2 milliGRAM(s) Oral every 4 hours PRN  metoclopramide Injectable 10 milliGRAM(s) IV Push every 6 hours PRN  morphine  - Injectable 2 milliGRAM(s) IV Push every 4 hours PRN  ondansetron Injectable 8 milliGRAM(s) IV Push every 8 hours PRN  polyethylene glycol 3350 17 Gram(s) Oral daily PRN  senna 1 Tablet(s) Oral at bedtime PRN  sodium chloride 0.9% lock flush 10 milliLiter(s) IV Push every 1 hour PRN      A/P: 56 year old male with relapsed, refractory MCL s/p R-CHOP x 4, RICE x 2, maintenance RTX, salvage Calqu[place], admitted for CAR T cell therapy with [employer] product. Disease status at time of admission is partial response.   Day +  10  Monitor for CRS and neurotox...follow CRS/neuro tox protocol - GIVEN TOCILIZUMAB  pm and   AM for CRS - 1     - Relapsed, refractory MCL   4/15- day - 4- flu / ctx 2/3 - continue CTX hydration until 24 hours post infusion of last dose   Strict I&O, daily weights, prn diuresis   CRS / ICANS score daily until infusion of CAR T cells (22) and twice daily thereafter   BID labs 0500 & 1500 - monitor for TLS   -CRS grade 1 s/p Tocilizumab on tele started on Keppra as per protocol, pancx, started on cefepime  s/p dexamethasone 4 mg IV x1   F/U repeat blood cx's for fevers ( and ) s/p Toci x2 s/p dexamethasone 4 mg IV x2 also with CP: EKG WNL trops negative s/p morphine with good effect   - CRS grade 2( persistent fevers) given Toci x1 and dexamethasone 4 mg IV overnight, continues to be febrile Tmax 104 F tremors given additional Dexamethasone 10 mg IV  - fever curve improved: CRS grade 1  - Grade 2 neurotox with 10/10 Headaches- taken for CT head( with no abnormalities no cerebral edema), s.p Dexamethasone 10 mg IV x 1 , MRI head no significant findings       -  Prophylactic measures   acyclovir   Oral Tab/Cap 400 milliGRAM(s) Oral every 8 hours  cefepime   IVPB      cefepime   IVPB 2000 milliGRAM(s) IV Intermittent every 8 hours  clotrimazole Lozenge 1 Lozenge Oral five times a day  fluconAZOLE   Tablet 400 milliGRAM(s) Oral daily  Keppra 500 q12h  GI Prophylaxis:  Protonix      - Cardiac / Hx Afib   F/U repeat troponin,  now CP free. ECG non ischemic   Continue Tele monitoring   Continue Coreg bid     -  Mouth care - NS / NaHCO3 rinses, Mycelex, Biotene; Skin care      - Transfuse & replete electrolytes prn     - IV hydration, daily weights, strict I&O, prn diuresis      -  Antiemetics, anti-diarrhea medications:   LORazepam   Injectable 1 milliGRAM(s) IV Push every 6 hours PRN  metoclopramide Injectable 10 milliGRAM(s) IV Push every 6 hours PRN  ondansetron 8mg IV q 8 hours prn nausea      - CRS / ICANS scoring    CRS grade 0, ICANS score 10   - CRS grade 0, ICANS score 10    AM - CRS grade 0, ICANS score 10   -pm-CRS grade 0, ICANS score 10   AM - CRS grade 0, ICANS score 10    PM CRS grade 0, ICANS score 10   AM CRS grade 0, ICANS score 10   PM CRS grade 0, ICANS score 10 --> 9PM CRS score - 1, ICANS - 10   AM CRS grade 0, ICANS score 10   PM CRS grade 0, ICANS score 10   AM CRS grade 1 10am (grade 0 @ 8:30am), ICANS score 10  M CRS grade 2( persistently febrile) ICANS score 10    AM CRS GRADE 1, ICANS score 10  - AM CRS GRADE 1, ICANS score 10   AM CRS GRADE 1, ICANS score 10   noon: with persistent headache: Grade 2 neurotox-   taken for CT head( ( with no abnormalities no cerebral edema) s.p Dexamethasone 10 mg IV pending  MR head   PM CRS GRADE 1, ICANS score 10( HA resolved)   - AM CRS GRADE 1, ICANS score 10  - PM CRS GRADE 1, ICANS score 10    I have written the above note for Dr. Banks who performed service with me in the room.   Jaycee Draper   NP-C ([phone])    I have seen and examined patient with NP, I agree with above note as scribed.                    [facility] Transplant Team                                                      Critical / Counseling Time Provided: 30 minutes                                                                                                                                                         Chief Complaint: CAR T therapy with [employer] product for treatment of relapsed, refractory MCL      S: Patient seen and examined with [facility] Transplant Team  Feel better today.  +generalized weakness, fatigue  +some peripheral neuropathy   OOB  Tolerating PO    Vital Signs Last 24 Hrs  T(C): 37.8 (2022 05:15), Max: 37.8 (2022 20:30)  T(F): 100 (2022 05:15), Max: 100 (2022 20:30)  HR: 82 (2022 05:15) (70 - 87)  BP: 124/78 (2022 05:15) (107/66 - 148/86)  BP(mean): --  RR: 18 (2022 05:15) (18 - 18)  SpO2: 96% (2022 05:15) (96% - 100%)    Admit weight: 100.4kg  Daily Weight in k.5 (2022 08:00)    Intake / Output:    @ 07:  -   @ 07:00  --------------------------------------------------------  IN: 3685 mL / OUT: 2420 mL / NET: 1265 mL     @ 07:01   @ 13:46  --------------------------------------------------------  IN: 450 mL / OUT: 500 mL / NET: -50 mL            PE:   Oropharynx: no erythema or ulcerations ,   Oral Mucositis:  NA                                                   Grade: n/a  CVS: S1, S2 RRR   Lungs: CTA throughout bilaterally   Abdomen: + BS x 4, soft, NT, ND   Extremities: no edema BLE's  Gastric Mucositis:       -                                           Grade: n/a  Intestinal Mucositis:     -                                         Grade: n/a   Skin: no rashes  TLC: CDI   Neuro: A&O x 3   Pain: 0    LABS:                          12.3   4.99  )-----------( 234      ( 2022 05:19 )             37.4         Mean Cell Volume : 94.0 fl  Mean Cell Hemoglobin : 30.9 pg  Mean Cell Hemoglobin Concentration : 32.9 gm/dL  Auto Neutrophil # : 1.48 K/uL  Auto Lymphocyte # : 0.91 K/uL  Auto Monocyte # : 1.17 K/uL  Auto Eosinophil # : 1.26 K/uL  Auto Basophil # : 0.04 K/uL  Auto Neutrophil % : 28.7 %  Auto Lymphocyte % : 18.2 %  Auto Monocyte % : 23.5 %  Auto Eosinophil % : 25.2 %  Auto Basophil % : 0.9 %          136  |  100  |  22  ----------------------------<  117<H>  4.6   |  25  |  1.01    Ca    8.6      2022 05:19  Phos  3.4       Mg     1.9         TPro  5.8<L>  /  Alb  4.1  /  TBili  0.3  /  DBili  x   /  AST  48<H>  /  ALT  105<H>  /  AlkPhos  65  04-29        PT/INR - ( 2022 05:19 )   PT: 13.0 sec;   INR: 1.13 ratio         PTT - ( 2022 05:19 )  PTT:20.9 sec      Uric Acid 3.3      Cultures:     Culture - Urine (22 @ 13:44)   Specimen Source: Clean Catch Clean Catch (Midstream)   Culture Results:   No growth Culture - Blood (22 @ 16:40)   Specimen Source: .Blood Blood-Catheter   Culture Results:   No growth to date. Culture - Blood (22 @ 16:40)   Specimen Source: .Blood Blood-Catheter   Culture Results:   No growth to date. Culture - Blood (22 @ 01:04)   Specimen Source: .Blood Blood   Culture Results:   No growth to date.     Radiology:    MR Head w/wo IV Cont (22 @ 19:42) >  IMPRESSION:    No hydrocephalus, mass effect, acute intracranial hemorrhage, vasogenic   edema, or acute territorial infarct.    No abnormal parenchymal or leptomeningeal enhancement        Xray Chest 1 View- PORTABLE-Urgent (Xray Chest 1 View- PORTABLE-Urgent .) (22 @ 10:46) >    IMPRESSION:  Clear lungs.          Meds:   Antimicrobials:   acyclovir   Oral Tab/Cap 400 milliGRAM(s) Oral every 8 hours  cefepime   IVPB 2000 milliGRAM(s) IV Intermittent every 8 hours  cefepime   IVPB      clotrimazole Lozenge 1 Lozenge Oral five times a day  fluconAZOLE   Tablet 400 milliGRAM(s) Oral daily      GI:  loperamide 2 milliGRAM(s) Oral every 4 hours PRN  pantoprazole    Tablet 40 milliGRAM(s) Oral two times a day  polyethylene glycol 3350 17 Gram(s) Oral daily PRN  senna 1 Tablet(s) Oral at bedtime PRN  sodium bicarbonate Mouth Rinse 10 milliLiter(s) Swish and Spit five times a day      Cardiovascular:   carvedilol 3.125 milliGRAM(s) Oral every 12 hours    Other medications:   acetaminophen     Tablet .. 650 milliGRAM(s) Oral every 6 hours  allopurinol 300 milliGRAM(s) Oral daily  Biotene Dry Mouth Oral Rinse 5 milliLiter(s) Swish and Spit five times a day  chlorhexidine 4% Liquid 1 Application(s) Topical <User Schedule>  folic acid 1 milliGRAM(s) Oral daily  levETIRAcetam 500 milliGRAM(s) Oral two times a day  multivitamin 1 Tablet(s) Oral daily  phytonadione   Solution 10 milliGRAM(s) Oral daily  sodium chloride 0.9%. 1000 milliLiter(s) IV Continuous <Continuous>      PRN:   acetaminophen     Tablet .. 650 milliGRAM(s) Oral every 6 hours PRN  loperamide 2 milliGRAM(s) Oral every 4 hours PRN  metoclopramide Injectable 10 milliGRAM(s) IV Push every 6 hours PRN  morphine  - Injectable 2 milliGRAM(s) IV Push every 4 hours PRN  ondansetron Injectable 8 milliGRAM(s) IV Push every 8 hours PRN  polyethylene glycol 3350 17 Gram(s) Oral daily PRN  senna 1 Tablet(s) Oral at bedtime PRN  sodium chloride 0.9% lock flush 10 milliLiter(s) IV Push every 1 hour PRN      A/P: 56 year old male with relapsed, refractory MCL s/p R-CHOP x 4, RICE x 2, maintenance RTX, salvage [place], admitted for CAR T cell therapy with [employer] product. Disease status at time of admission is partial response.   Day +  10  Monitor for CRS and neurotox...follow CRS/neuro tox protocol - GIVEN TOCILIZUMAB  pm and   AM for CRS - 1     - Relapsed, refractory MCL   4/15- day - 4- flu / ctx 2/3 - continue CTX hydration until 24 hours post infusion of last dose   Strict I&O, daily weights, prn diuresis   CRS / ICANS score daily until infusion of CAR T cells (22) and twice daily thereafter   BID labs 0500 & 1500 - monitor for TLS   -CRS grade 1 s/p Tocilizumab on tele started on Keppra as per protocol, pancx, started on cefepime  s/p dexamethasone 4 mg IV x1   F/U repeat blood cx's for fevers ( and ) s/p Toci x2 s/p dexamethasone 4 mg IV x2 also with CP: EKG WNL trops negative s/p morphine with good effect   - CRS grade 2( persistent fevers) given Toci x1 and dexamethasone 4 mg IV overnight, continues to be febrile Tmax 104 F tremors given additional Dexamethasone 10 mg IV  - fever curve improved: CRS grade 1  - Grade 2 neurotox with 10/10 Headaches- taken for CT head( with no abnormalities no cerebral edema), s.p Dexamethasone 10 mg IV x 1 , MRI head no significant findings    Discharge planning next week        -  Prophylactic measures   acyclovir   Oral Tab/Cap 400 milliGRAM(s) Oral every 8 hours  cefepime   IVPB      cefepime   IVPB 2000 milliGRAM(s) IV Intermittent every 8 hours  clotrimazole Lozenge 1 Lozenge Oral five times a day  fluconAZOLE   Tablet 400 milliGRAM(s) Oral daily  Keppra 500 q12h  GI Prophylaxis:  Protonix      - Cardiac / Hx Afib   F/U repeat troponin,  now CP free. ECG non ischemic   Continue Tele monitoring   Continue Coreg bid     -  Mouth care - NS / NaHCO3 rinses, Mycelex, Biotene; Skin care      - Transfuse & replete electrolytes prn     - IV hydration, daily weights, strict I&O, prn diuresis      -  Antiemetics, anti-diarrhea medications:   LORazepam   Injectable 1 milliGRAM(s) IV Push every 6 hours PRN  metoclopramide Injectable 10 milliGRAM(s) IV Push every 6 hours PRN  ondansetron 8mg IV q 8 hours prn nausea      - CRS / ICANS scoring    CRS grade 0, ICANS score 10   - CRS grade 0, ICANS score 10    AM - CRS grade 0, ICANS score 10   -pm-CRS grade 0, ICANS score 10   AM - CRS grade 0, ICANS score 10    PM CRS grade 0, ICANS score 10   AM CRS grade 0, ICANS score 10   PM CRS grade 0, ICANS score 10 --> 9PM CRS score - 1, ICANS - 10   AM CRS grade 0, ICANS score 10   PM CRS grade 0, ICANS score 10   AM CRS grade 1 10am (grade 0 @ 8:30am), ICANS score 10  M CRS grade 2( persistently febrile) ICANS score 10    AM CRS GRADE 1, ICANS score 10  - AM CRS GRADE 1, ICANS score 10   AM CRS GRADE 1, ICANS score 10   noon: with persistent headache: Grade 2 neurotox-   taken for CT head( ( with no abnormalities no cerebral edema) s.p Dexamethasone 10 mg IV pending  MR head   PM CRS GRADE 1, ICANS score 10( HA resolved)   - AM CRS GRADE 1, ICANS score 10  - PM CRS GRADE 1, ICANS score 10  - PM CRS GRADE 1, ICANS score 10    I have written the above note for Dr. Banks who performed service with me in the room.   Jaycee Draper   NP-C ([phone])    I have seen and examined patient with NP, I agree with above note as scribed.                    [facility] Transplant Team                                                      Critical / Counseling Time Provided: 30 minutes                                                                                                                                                         Chief Complaint: CAR T therapy with [employer] product for treatment of relapsed, refractory MCL      S: Patient seen and examined with [facility] Transplant Team  Feel better today.  +generalized weakness, fatigue  +some peripheral neuropathy   OOB  Tolerating PO    Vital Signs Last 24 Hrs  T(C): 37.8 (2022 05:15), Max: 37.8 (2022 20:30)  T(F): 100 (2022 05:15), Max: 100 (2022 20:30)  HR: 82 (2022 05:15) (70 - 87)  BP: 124/78 (2022 05:15) (107/66 - 148/86)  BP(mean): --  RR: 18 (2022 05:15) (18 - 18)  SpO2: 96% (2022 05:15) (96% - 100%)    Admit weight: 100.4kg  Daily Weight in k.5 (2022 08:00)    Intake / Output:    @ 07:  -   @ 07:00  --------------------------------------------------------  IN: 3685 mL / OUT: 2420 mL / NET: 1265 mL     @ 07:01   @ 13:46  --------------------------------------------------------  IN: 450 mL / OUT: 500 mL / NET: -50 mL            PE:   Oropharynx: no erythema or ulcerations ,   Oral Mucositis:  NA                                                   Grade: n/a  CVS: S1, S2 RRR   Lungs: CTA throughout bilaterally   Abdomen: + BS x 4, soft, NT, ND   Extremities: no edema BLE's  Gastric Mucositis:       -                                           Grade: n/a  Intestinal Mucositis:     -                                         Grade: n/a   Skin: no rashes  TLC: CDI   Neuro: A&O x 3   Pain: 0    LABS:                          12.3   4.99  )-----------( 234      ( 2022 05:19 )             37.4         Mean Cell Volume : 94.0 fl  Mean Cell Hemoglobin : 30.9 pg  Mean Cell Hemoglobin Concentration : 32.9 gm/dL  Auto Neutrophil # : 1.48 K/uL  Auto Lymphocyte # : 0.91 K/uL  Auto Monocyte # : 1.17 K/uL  Auto Eosinophil # : 1.26 K/uL  Auto Basophil # : 0.04 K/uL  Auto Neutrophil % : 28.7 %  Auto Lymphocyte % : 18.2 %  Auto Monocyte % : 23.5 %  Auto Eosinophil % : 25.2 %  Auto Basophil % : 0.9 %          136  |  100  |  22  ----------------------------<  117<H>  4.6   |  25  |  1.01    Ca    8.6      2022 05:19  Phos  3.4       Mg     1.9         TPro  5.8<L>  /  Alb  4.1  /  TBili  0.3  /  DBili  x   /  AST  48<H>  /  ALT  105<H>  /  AlkPhos  65  04-29        PT/INR - ( 2022 05:19 )   PT: 13.0 sec;   INR: 1.13 ratio         PTT - ( 2022 05:19 )  PTT:20.9 sec      Uric Acid 3.3      Cultures:     Culture - Urine (22 @ 13:44)   Specimen Source: Clean Catch Clean Catch (Midstream)   Culture Results:   No growth Culture - Blood (22 @ 16:40)   Specimen Source: .Blood Blood-Catheter   Culture Results:   No growth to date. Culture - Blood (22 @ 16:40)   Specimen Source: .Blood Blood-Catheter   Culture Results:   No growth to date. Culture - Blood (22 @ 01:04)   Specimen Source: .Blood Blood   Culture Results:   No growth to date.     Radiology:    MR Head w/wo IV Cont (22 @ 19:42) >  IMPRESSION:    No hydrocephalus, mass effect, acute intracranial hemorrhage, vasogenic   edema, or acute territorial infarct.    No abnormal parenchymal or leptomeningeal enhancement        Xray Chest 1 View- PORTABLE-Urgent (Xray Chest 1 View- PORTABLE-Urgent .) (22 @ 10:46) >    IMPRESSION:  Clear lungs.          Meds:   Antimicrobials:   acyclovir   Oral Tab/Cap 400 milliGRAM(s) Oral every 8 hours  cefepime   IVPB 2000 milliGRAM(s) IV Intermittent every 8 hours  cefepime   IVPB      clotrimazole Lozenge 1 Lozenge Oral five times a day  fluconAZOLE   Tablet 400 milliGRAM(s) Oral daily      GI:  loperamide 2 milliGRAM(s) Oral every 4 hours PRN  pantoprazole    Tablet 40 milliGRAM(s) Oral two times a day  polyethylene glycol 3350 17 Gram(s) Oral daily PRN  senna 1 Tablet(s) Oral at bedtime PRN  sodium bicarbonate Mouth Rinse 10 milliLiter(s) Swish and Spit five times a day      Cardiovascular:   carvedilol 3.125 milliGRAM(s) Oral every 12 hours    Other medications:   acetaminophen     Tablet .. 650 milliGRAM(s) Oral every 6 hours  allopurinol 300 milliGRAM(s) Oral daily  Biotene Dry Mouth Oral Rinse 5 milliLiter(s) Swish and Spit five times a day  chlorhexidine 4% Liquid 1 Application(s) Topical <User Schedule>  folic acid 1 milliGRAM(s) Oral daily  levETIRAcetam 500 milliGRAM(s) Oral two times a day  multivitamin 1 Tablet(s) Oral daily  phytonadione   Solution 10 milliGRAM(s) Oral daily  sodium chloride 0.9%. 1000 milliLiter(s) IV Continuous <Continuous>      PRN:   acetaminophen     Tablet .. 650 milliGRAM(s) Oral every 6 hours PRN  loperamide 2 milliGRAM(s) Oral every 4 hours PRN  metoclopramide Injectable 10 milliGRAM(s) IV Push every 6 hours PRN  morphine  - Injectable 2 milliGRAM(s) IV Push every 4 hours PRN  ondansetron Injectable 8 milliGRAM(s) IV Push every 8 hours PRN  polyethylene glycol 3350 17 Gram(s) Oral daily PRN  senna 1 Tablet(s) Oral at bedtime PRN  sodium chloride 0.9% lock flush 10 milliLiter(s) IV Push every 1 hour PRN      A/P: 56 year old male with relapsed, refractory MCL s/p R-CHOP x 4, RICE x 2, maintenance RTX, salvage [place]qu[place], admitted for CAR T cell therapy with [employer] product. Disease status at time of admission is partial response.   Day +  10  Monitor for CRS and neurotox...follow CRS/neuro tox protocol - GIVEN TOCILIZUMAB  pm and   AM for CRS - 1     - Relapsed, refractory MCL   4/15- day - 4- flu / ctx 2/3 - continue CTX hydration until 24 hours post infusion of last dose   Strict I&O, daily weights, prn diuresis   CRS / ICANS score daily until infusion of CAR T cells (22) and twice daily thereafter   BID labs 0500 & 1500 - monitor for TLS   -CRS grade 1 s/p Tocilizumab on tele started on Keppra as per protocol, pancx, started on cefepime  s/p dexamethasone 4 mg IV x1   F/U repeat blood cx's for fevers ( and ) s/p Toci x2 s/p dexamethasone 4 mg IV x2 also with CP: EKG WNL trops negative s/p morphine with good effect   - CRS grade 2( persistent fevers) given Toci x1 and dexamethasone 4 mg IV overnight, continues to be febrile Tmax 104 F tremors given additional Dexamethasone 10 mg IV  - fever curve improved: CRS grade 1  - Grade 2 neurotox with 10/10 Headaches- taken for CT head( with no abnormalities no cerebral edema), s.p Dexamethasone 10 mg IV x 1 , MRI head no significant findings    Grade 1 transaminitis -Monitor closely   Discharge planning next week        -  Prophylactic measures   acyclovir   Oral Tab/Cap 400 milliGRAM(s) Oral every 8 hours  cefepime   IVPB      cefepime   IVPB 2000 milliGRAM(s) IV Intermittent every 8 hours  clotrimazole Lozenge 1 Lozenge Oral five times a day  fluconAZOLE   Tablet 400 milliGRAM(s) Oral daily  Keppra 500 q12h  GI Prophylaxis:  Protonix      - Cardiac / Hx Afib   F/U repeat troponin,  now CP free. ECG non ischemic   Continue Tele monitoring   Continue Coreg bid     -  Mouth care - NS / NaHCO3 rinses, Mycelex, Biotene; Skin care      - Transfuse & replete electrolytes prn     - IV hydration, daily weights, strict I&O, prn diuresis      -  Antiemetics, anti-diarrhea medications:   LORazepam   Injectable 1 milliGRAM(s) IV Push every 6 hours PRN  metoclopramide Injectable 10 milliGRAM(s) IV Push every 6 hours PRN  ondansetron 8mg IV q 8 hours prn nausea      - CRS / ICANS scoring    CRS grade 0, ICANS score 10   - CRS grade 0, ICANS score 10    AM - CRS grade 0, ICANS score 10   -pm-CRS grade 0, ICANS score 10   AM - CRS grade 0, ICANS score 10    PM CRS grade 0, ICANS score 10   AM CRS grade 0, ICANS score 10   PM CRS grade 0, ICANS score 10 --> 9PM CRS score - 1, ICANS - 10   AM CRS grade 0, ICANS score 10   PM CRS grade 0, ICANS score 10   AM CRS grade 1 10am (grade 0 @ 8:30am), ICANS score 10  M CRS grade 2( persistently febrile) ICANS score 10    AM CRS GRADE 1, ICANS score 10  - AM CRS GRADE 1, ICANS score 10   AM CRS GRADE 1, ICANS score 10   noon: with persistent headache: Grade 2 neurotox-   taken for CT head( ( with no abnormalities no cerebral edema) s.p Dexamethasone 10 mg IV pending  MR head   PM CRS GRADE 1, ICANS score 10( HA resolved)   - AM CRS GRADE 1, ICANS score 10  - PM CRS GRADE 1, ICANS score 10  - PM CRS GRADE 1, ICANS score 10    I have written the above note for Dr. Banks who performed service with me in the room.   Jaycee Draper   NP-C ([phone])    I have seen and examined patient with NP, I agree with above note as scribed.

## 2022-04-29 NOTE — CHART NOTE - NSCHARTNOTEFT_GEN_A_CORE
MEDICINE NP    CC: Notified by RN patient with temperature 101.1F at 20:14. Seen and examined patient at bedside. Patient is alert, NAD, complains of ongoing HA and generalized burning sensation of skin. As per patient, HA is an intermittent cramping that comes and going, presently as minimized, but has not gotten worsen from previous episodes. Denies CP, SOB, cough, N/V, or abd pain. Last received  toci on 4/25, maxed out on dose indicated on chart, he also received last dexamethasone on 4/27, fever curve trending down, last noted 100.3F. Otherwise HD and last lab work stable. ICANS score of 10, CRS grade 1 based on fever.    Vital Signs Last 24 Hrs  T(C): 37.9 (29 Apr 2022 20:49), Max: 38.4 (29 Apr 2022 20:14)  T(F): 100.3 (29 Apr 2022 20:49), Max: 101.1 (29 Apr 2022 20:14)  HR: 87 (29 Apr 2022 20:49) (77 - 93)  BP: 108/67 (29 Apr 2022 20:49) (100/62 - 124/78)  BP(mean): --  RR: 18 (29 Apr 2022 20:49) (18 - 18)  SpO2: 96% (29 Apr 2022 20:49) (94% - 98%)    LABORATORY:  Comprehensive Metabolic Panel (04.29.22 @ 13:40)   Sodium, Serum: 135 mmol/L   Potassium, Serum: 4.1 mmol/L   Chloride, Serum: 99 mmol/L   Carbon Dioxide, Serum: 26 mmol/L   Anion Gap, Serum: 10 mmol/L   Blood Urea Nitrogen, Serum: 21 mg/dL   Creatinine, Serum: 0.90 mg/dL   Glucose, Serum: 114 mg/dL   Calcium, Total Serum: 8.4 mg/dL   Protein Total, Serum: 5.6 g/dL   Albumin, Serum: 3.8 g/dL   Bilirubin Total, Serum: 0.3 mg/dL   Alkaline Phosphatase, Serum: 63 U/L   Aspartate Aminotransferase (AST/SGOT): 32 U/L   Alanine Aminotransferase (ALT/SGPT): 93 U/L   eGFR: 100:Magnesium, Serum (04.29.22 @ 13:40)   Magnesium, Serum: 1.8 mg/dL Phosphorus Level, Serum (04.29.22 @ 13:40)   Phosphorus Level, Serum: 4.0 mg/dL Complete Blood Count + Automated Diff (04.29.22 @ 13:40)   WBC Count: 4.36 K/uL   RBC Count: 3.81 M/uL   Hemoglobin: 11.8 g/dL   Hematocrit: 36.4 %   Mean Cell Volume: 95.5 fl   Mean Cell Hemoglobin: 31.0 pg   Mean Cell Hemoglobin Conc: 32.4 gm/dL   Red Cell Distrib Width: 14.3 %   Platelet Count - Automated: 223 K/uL   Auto Neutrophil #: 1.12 K/uL   Auto Lymphocyte #: 0.92 K/uL   Auto Monocyte #: 1.27 K/uL   Auto Eosinophil #: 1.04 K/uL   Auto Basophil #: 0.00 K/uL   Auto Neutrophil %: 23.9: Differential percentages must be correlated with absolute numbers for   clinical significance. %   Auto Lymphocyte %: 21.2 %   Auto Monocyte %: 29.2 %   Auto Eosinophil %: 23.9 %   Auto Basophil %: 0.0 %         MICROBIOLOGY:   Culture - Blood (04.26.22 @ 19:54)   Specimen Source: .Blood Blood   Culture Results:   No growth to date. Culture - Blood (04.26.22 @ 19:54)   Specimen Source: .Blood Blood   Culture Results:   No growth to date. Culture - Urine (04.25.22 @ 13:44)   Specimen Source: Clean Catch Clean Catch (Midstream)   Culture Results:   No growth     RADIOLOGY:  < from: Xray Chest 1 View- PORTABLE-Urgent (Xray Chest 1 View- PORTABLE-Urgent .) (04.24.22 @ 10:46) >    IMPRESSION:  Clear lungs.    --- End of Report ---    < end of copied text >          PHYSICAL EXAM:    Constitutional: AOx3. NAD.    Respiratory: clear lungs bilaterally. No wheezing, rhonchi, or crackles.    Cardiovascular: S1 S2. No murmurs.    Gastrointestinal: BS X4 active. soft. nontender.    Extremities/Vascular: +2 pulses bilaterally. No BLE edema.      ASSESSMENT/PLAN:    56 year old male with relapsed, refractory MCL s/p R-CHOP x 4, RICE x 2, maintenance RTX, salvage Calquence, admitted for CAR T cell therapy with Tecartus product. Disease status at time of admission is partial response.   Day +  10  Monitor for CRS and neurotox...follow CRS/neuro tox protocol - GIVEN TOCILIZUMAB 4/22 pm and  4/24 AM for CRS - 1  And now with recurrent fever.    1)  Fever  -tylenol and cooling measures prn for pyrexia  -BC x2 and Urine culture sent earlier today, last BC from 4/26 NTD, Urine Cx from 4/23 w/ E.coli  -CXR last 4/24 with clear lungs  -c/w cefepime, Fluconazole, and acyclovir   - CRS Grade 1 in setting of fever /ICANS score of 10  -F/U primary team in AM    Walt Alamo, MILEY   [id number] MEDICINE NP    CC: Notified by RN patient with temperature 101.1F at 20:14. Seen and examined patient at bedside. Patient is alert, NAD, complains of ongoing HA and generalized burning sensation of skin. As per patient, HA is an intermittent cramping that comes and going, presently as minimized, but has not gotten worsen from previous episodes. Denies CP, SOB, cough, N/V, or abd pain. Last received  toci on 4/25, maxed out on dose indicated on chart, he also received last dexamethasone on 4/27, fever curve trending down, last noted 100.3F. Otherwise HD and last lab work stable. ICANS score of 10, CRS grade 1 based on fever.    Vital Signs Last 24 Hrs  T(C): 37.9 (29 Apr 2022 20:49), Max: 38.4 (29 Apr 2022 20:14)  T(F): 100.3 (29 Apr 2022 20:49), Max: 101.1 (29 Apr 2022 20:14)  HR: 87 (29 Apr 2022 20:49) (77 - 93)  BP: 108/67 (29 Apr 2022 20:49) (100/62 - 124/78)  BP(mean): --  RR: 18 (29 Apr 2022 20:49) (18 - 18)  SpO2: 96% (29 Apr 2022 20:49) (94% - 98%)    LABORATORY:  Comprehensive Metabolic Panel (04.29.22 @ 13:40)   Sodium, Serum: 135 mmol/L   Potassium, Serum: 4.1 mmol/L   Chloride, Serum: 99 mmol/L   Carbon Dioxide, Serum: 26 mmol/L   Anion Gap, Serum: 10 mmol/L   Blood Urea Nitrogen, Serum: 21 mg/dL   Creatinine, Serum: 0.90 mg/dL   Glucose, Serum: 114 mg/dL   Calcium, Total Serum: 8.4 mg/dL   Protein Total, Serum: 5.6 g/dL   Albumin, Serum: 3.8 g/dL   Bilirubin Total, Serum: 0.3 mg/dL   Alkaline Phosphatase, Serum: 63 U/L   Aspartate Aminotransferase (AST/SGOT): 32 U/L   Alanine Aminotransferase (ALT/SGPT): 93 U/L   eGFR: 100:Magnesium, Serum (04.29.22 @ 13:40)   Magnesium, Serum: 1.8 mg/dL Phosphorus Level, Serum (04.29.22 @ 13:40)   Phosphorus Level, Serum: 4.0 mg/dL Complete Blood Count + Automated Diff (04.29.22 @ 13:40)   WBC Count: 4.36 K/uL   RBC Count: 3.81 M/uL   Hemoglobin: 11.8 g/dL   Hematocrit: 36.4 %   Mean Cell Volume: 95.5 fl   Mean Cell Hemoglobin: 31.0 pg   Mean Cell Hemoglobin Conc: 32.4 gm/dL   Red Cell Distrib Width: 14.3 %   Platelet Count - Automated: 223 K/uL   Auto Neutrophil #: 1.12 K/uL   Auto Lymphocyte #: 0.92 K/uL   Auto Monocyte #: 1.27 K/uL   Auto Eosinophil #: 1.04 K/uL   Auto Basophil #: 0.00 K/uL   Auto Neutrophil %: 23.9: Differential percentages must be correlated with absolute numbers for   clinical significance. %   Auto Lymphocyte %: 21.2 %   Auto Monocyte %: 29.2 %   Auto Eosinophil %: 23.9 %   Auto Basophil %: 0.0 %         MICROBIOLOGY:   Culture - Blood (04.26.22 @ 19:54)   Specimen Source: .Blood Blood   Culture Results:   No growth to date. Culture - Blood (04.26.22 @ 19:54)   Specimen Source: .Blood Blood   Culture Results:   No growth to date. Culture - Urine (04.25.22 @ 13:44)   Specimen Source: Clean Catch Clean Catch (Midstream)   Culture Results:   No growth     RADIOLOGY:  < from: Xray Chest 1 View- PORTABLE-Urgent (Xray Chest 1 View- PORTABLE-Urgent .) (04.24.22 @ 10:46) >    IMPRESSION:  Clear lungs.    --- End of Report ---    < end of copied text >          PHYSICAL EXAM:    Constitutional: AOx3. NAD.    Respiratory: clear lungs bilaterally. No wheezing, rhonchi, or crackles.    Cardiovascular: S1 S2. No murmurs.    Gastrointestinal: BS X4 active. soft. nontender.    Extremities/Vascular: +2 pulses bilaterally. No BLE edema.      ASSESSMENT/PLAN:    56 year old male with relapsed, refractory MCL s/p R-CHOP x 4, RICE x 2, maintenance RTX, salvage Calquence, admitted for CAR T cell therapy with Tecartus product. Disease status at time of admission is partial response.   Day +  10  Monitor for CRS and neurotox...follow CRS/neuro tox protocol - GIVEN TOCILIZUMAB 4/22 pm and  4/24 AM for CRS - 1  And now with recurrent fever.    1)  Fever  -tylenol and cooling measures prn for pyrexia  -BC x2 and Urine culture sent earlier today, last BC from 4/26 NTD, Urine Cx from 4/23 w/ E.coli  -CXR last 4/24 with clear lungs  -c/w cefepime, Fluconazole, and acyclovir   - CRS Grade 1 in setting of fever /ICANS score of 10  -F/U primary team in AM    Walt Alamo NP   [id number]    Addendum @ 00:36 4/30/22  Pt with recurrent fever @ 23:45 at 101.1F  unchanged at 0:16, Pt seen at bedside with no acute distress. Tylenol give at 23:45, vital signs otherwise stable, pt sleeping with no complaints of  CP, SOB, cough, N/V, or abd pain.  Vital Signs Last 24 Hrs  T(C): 38.4 (30 Apr 2022 00:16), Max: 38.4 (29 Apr 2022 20:14)  T(F): 101.1 (30 Apr 2022 00:16), Max: 101.1 (29 Apr 2022 20:14)  HR: 90 (29 Apr 2022 23:45) (77 - 93)  BP: 129/74 (29 Apr 2022 23:45) (100/62 - 129/74)  BP(mean): --  RR: 19 (29 Apr 2022 23:45) (18 - 19)  SpO2: 95% (29 Apr 2022 23:45) (94% - 98%)    - Will continue to monitor  Walt Alamo NP   [id number]

## 2022-04-29 NOTE — PROGRESS NOTE ADULT - NS PANP COMMENT GEN_ALL_CORE FT
Admitted for CAR T cell therapy with Tecartus (bresucabtagene autoleucel)  today is + 9..some nausea..now improved...monitor closely for CRS and neurotox...pt is at lower risk for these complications given minimal disease burden..hoever he has been on and off with high temps since fri evening..first dose of toci given for grade 1 crs......s/p 4 total  doses of  toci and dex 4 mg X 4 and 10 mg X 1...improved..this am 8/10 HA with somulence...c/w grade 2 neurotox...Stat ct head, MRI..dose dex 10 mg...improved 4/28    1. Admit to BMTU   2. Day -5 through day -1 record CARTOX / ICANS score daily   3. Day - 5 through day - 3 Fludarabine 30mg / m2 IV given over 30 minutes daily   4. Day - 5 - start cyclophosphamide hydration - 0.9% NS + 10mEq KCL / L at 75 ml/m2 and continue for 24 hours post infusion of last dose of CTX   5. Day - 5 through day - 3 CTX 500mg / m2 IV QD to be given over 2 hours   6. Starting day - 5 monitor labs BID: CBC with differential, CMP, LDH, uric acid, mag, phos, CPK, BNP, PT / PTT / INR / Fibrinogen, d-dimer, CRP, hepatic profile, ferritin  7. Weekly IL-6 levels on Monday, twice weekly (Monday / Thursday) CMV / EBV PCR, three times a week (Monday / Wednesday / Friday) type and screen   8. On day - 5 start allopurinol 300mg po QD   9. Day -2 and -1 REST DAYS   10. On day -1 begin to record CARTOX / ICANS score q 12 hours   11. Day 0 infuse Brexucabtagene autoleucel  hx of afib...seen by [facility] cardiology  12. F/U cx's..on cefapime, acyclovir and fluconazole..send rvp  13 Cont tele and keppra  4/16-17 Doing well, I/Os are negative, transient lip swelling, now resolved, some fatigue  4/18, 19, 20, 21, 22 CRS 0, ICANS 10  4/22/22-Chase fever overnight, chills, given Toci, was started on Cefipime. Stable. CRS / ICANS scoring this morning 10.  4/23/22, 4/24/22 CRS 1 ICANS 10  4/24/22- Fever 102oF, complaints of bilateral upper and lower extremities cramps, chest discomfort, EKG no changes. Patient was treated with Toci/Decadron as per protocol. Morphine prn for pain. Clinically improved. Pancultures sent.  4/25/22 CRS 2...persistent high temps.....ICANS 10...tremor noted with fever...dex 10 mg dosed today  4/26/22 CRS 1, Icans 10  4/27 crs 1, Icans 10...neurotox 2  4/28 crs 0..icans 10 Admitted for CAR T cell therapy with Tecart[employer] (bresucabtagene autoleucel)  today is + 10..some nausea..now improved...monitor closely for CRS and neurotox...pt is at lower risk for these complications given minimal disease burden..hoever he has been on and off with high temps since fri evening..first dose of toci given for grade 1 crs......s/p 4 total  doses of  toci and dex 4 mg X 4 and 10 mg X 1...improved..this am 8/10 HA with somulence...c/w grade 2 neurotox...Stat ct head, MRI..dose dex 10 mg...improved 4/28..4/29 body aches..improved with oxycodone..possibly neuropathic...could consider gabapentin    1. Admit to BMTU   2. Day -5 through day -1 record CARTOX / ICANS score daily   3. Day - 5 through day - 3 Fludarabine 30mg / m2 IV given over 30 minutes daily   4. Day - 5 - start cyclophosphamide hydration - 0.9% NS + 10mEq KCL / L at 75 ml/m2 and continue for 24 hours post infusion of last dose of CTX   5. Day - 5 through day - 3 CTX 500mg / m2 IV QD to be given over 2 hours   6. Starting day - 5 monitor labs BID: CBC with differential, CMP, LDH, uric acid, mag, phos, CPK, BNP, PT / PTT / INR / Fibrinogen, d-dimer, CRP, hepatic profile, ferritin  7. Weekly IL-6 levels on Monday, twice weekly (Monday / Thursday) CMV / EBV PCR, three times a week (Monday / Wednesday / Friday) type and screen   8. On day - 5 start allopurinol 300mg po QD   9. Day -2 and -1 REST DAYS   10. On day -1 begin to record CARTOX / ICANS score q 12 hours   11. Day 0 infuse Brexucabtagene autoleucel  hx of afib...seen by [facility] cardiology  12. F/U cx's..on cefapime, acyclovir and fluconazole..send rvp  13 Cont tele and keppra  4/16-17 Doing well, I/Os are negative, transient lip swelling, now resolved, some fatigue  4/18, 19, 20, 21, 22 CRS 0, ICANS 10  4/22/22-Chase fever overnight, chills, given Toci, was started on Cefipime. Stable. CRS / ICANS scoring this morning 10.  4/23/22, 4/24/22 CRS 1 ICANS 10  4/24/22- Fever 102oF, complaints of bilateral upper and lower extremities cramps, chest discomfort, EKG no changes. Patient was treated with Toci/Decadron as per protocol. Morphine prn for pain. Clinically improved. Pancultures sent.  4/25/22 CRS 2...persistent high temps.....ICANS 10...tremor noted with fever...dex 10 mg dosed today  4/26/22 CRS 1, Icans 10  4/27 crs 1, Icans 10...neurotox 2  4/28, 4/29  crs 0..icans 10

## 2022-04-30 LAB
ALBUMIN SERPL ELPH-MCNC: 3.8 G/DL — SIGNIFICANT CHANGE UP (ref 3.3–5)
ALBUMIN SERPL ELPH-MCNC: 3.9 G/DL — SIGNIFICANT CHANGE UP (ref 3.3–5)
ALP SERPL-CCNC: 60 U/L — SIGNIFICANT CHANGE UP (ref 40–120)
ALP SERPL-CCNC: 61 U/L — SIGNIFICANT CHANGE UP (ref 40–120)
ALT FLD-CCNC: 73 U/L — HIGH (ref 10–45)
ALT FLD-CCNC: 80 U/L — HIGH (ref 10–45)
ANION GAP SERPL CALC-SCNC: 11 MMOL/L — SIGNIFICANT CHANGE UP (ref 5–17)
ANION GAP SERPL CALC-SCNC: 11 MMOL/L — SIGNIFICANT CHANGE UP (ref 5–17)
APTT BLD: 22.1 SEC — LOW (ref 27.5–35.5)
AST SERPL-CCNC: 21 U/L — SIGNIFICANT CHANGE UP (ref 10–40)
AST SERPL-CCNC: 26 U/L — SIGNIFICANT CHANGE UP (ref 10–40)
BASOPHILS # BLD AUTO: 0.04 K/UL — SIGNIFICANT CHANGE UP (ref 0–0.2)
BASOPHILS NFR BLD AUTO: 0.9 % — SIGNIFICANT CHANGE UP (ref 0–2)
BILIRUB SERPL-MCNC: 0.2 MG/DL — SIGNIFICANT CHANGE UP (ref 0.2–1.2)
BILIRUB SERPL-MCNC: 0.4 MG/DL — SIGNIFICANT CHANGE UP (ref 0.2–1.2)
BUN SERPL-MCNC: 18 MG/DL — SIGNIFICANT CHANGE UP (ref 7–23)
BUN SERPL-MCNC: 20 MG/DL — SIGNIFICANT CHANGE UP (ref 7–23)
CALCIUM SERPL-MCNC: 8.5 MG/DL — SIGNIFICANT CHANGE UP (ref 8.4–10.5)
CALCIUM SERPL-MCNC: 8.6 MG/DL — SIGNIFICANT CHANGE UP (ref 8.4–10.5)
CHLORIDE SERPL-SCNC: 100 MMOL/L — SIGNIFICANT CHANGE UP (ref 96–108)
CHLORIDE SERPL-SCNC: 99 MMOL/L — SIGNIFICANT CHANGE UP (ref 96–108)
CK SERPL-CCNC: 12 U/L — LOW (ref 30–200)
CO2 SERPL-SCNC: 25 MMOL/L — SIGNIFICANT CHANGE UP (ref 22–31)
CO2 SERPL-SCNC: 26 MMOL/L — SIGNIFICANT CHANGE UP (ref 22–31)
CREAT SERPL-MCNC: 0.95 MG/DL — SIGNIFICANT CHANGE UP (ref 0.5–1.3)
CREAT SERPL-MCNC: 0.97 MG/DL — SIGNIFICANT CHANGE UP (ref 0.5–1.3)
CRP SERPL-MCNC: <3 MG/L — SIGNIFICANT CHANGE UP (ref 0–4)
CULTURE RESULTS: NO GROWTH — SIGNIFICANT CHANGE UP
D DIMER BLD IA.RAPID-MCNC: 777 NG/ML DDU — HIGH
EBV DNA SERPL NAA+PROBE-ACNC: SIGNIFICANT CHANGE UP IU/ML
EGFR: 91 ML/MIN/1.73M2 — SIGNIFICANT CHANGE UP
EGFR: 93 ML/MIN/1.73M2 — SIGNIFICANT CHANGE UP
EOSINOPHIL # BLD AUTO: 1.24 K/UL — HIGH (ref 0–0.5)
EOSINOPHIL NFR BLD AUTO: 30.7 % — HIGH (ref 0–6)
FERRITIN SERPL-MCNC: 159 NG/ML — SIGNIFICANT CHANGE UP (ref 30–400)
FIBRINOGEN PPP-MCNC: 293 MG/DL — LOW (ref 330–520)
GLUCOSE SERPL-MCNC: 110 MG/DL — HIGH (ref 70–99)
GLUCOSE SERPL-MCNC: 127 MG/DL — HIGH (ref 70–99)
HCT VFR BLD CALC: 37.4 % — LOW (ref 39–50)
HCT VFR BLD CALC: 38.2 % — LOW (ref 39–50)
HGB BLD-MCNC: 12.2 G/DL — LOW (ref 13–17)
HGB BLD-MCNC: 12.2 G/DL — LOW (ref 13–17)
INR BLD: 1.17 RATIO — HIGH (ref 0.88–1.16)
LDH SERPL L TO P-CCNC: 141 U/L — SIGNIFICANT CHANGE UP (ref 50–242)
LDH SERPL L TO P-CCNC: 144 U/L — SIGNIFICANT CHANGE UP (ref 50–242)
LYMPHOCYTES # BLD AUTO: 0.75 K/UL — LOW (ref 1–3.3)
LYMPHOCYTES # BLD AUTO: 18.4 % — SIGNIFICANT CHANGE UP (ref 13–44)
MAGNESIUM SERPL-MCNC: 1.8 MG/DL — SIGNIFICANT CHANGE UP (ref 1.6–2.6)
MAGNESIUM SERPL-MCNC: 2 MG/DL — SIGNIFICANT CHANGE UP (ref 1.6–2.6)
MANUAL SMEAR VERIFICATION: SIGNIFICANT CHANGE UP
MCHC RBC-ENTMCNC: 30.7 PG — SIGNIFICANT CHANGE UP (ref 27–34)
MCHC RBC-ENTMCNC: 31 PG — SIGNIFICANT CHANGE UP (ref 27–34)
MCHC RBC-ENTMCNC: 31.9 GM/DL — LOW (ref 32–36)
MCHC RBC-ENTMCNC: 32.6 GM/DL — SIGNIFICANT CHANGE UP (ref 32–36)
MCV RBC AUTO: 94.9 FL — SIGNIFICANT CHANGE UP (ref 80–100)
MCV RBC AUTO: 96.2 FL — SIGNIFICANT CHANGE UP (ref 80–100)
MONOCYTES # BLD AUTO: 1.14 K/UL — HIGH (ref 0–0.9)
MONOCYTES NFR BLD AUTO: 28.1 % — HIGH (ref 2–14)
MYELOCYTES NFR BLD: 0.9 % — HIGH (ref 0–0)
NEUTROPHILS # BLD AUTO: 0.85 K/UL — LOW (ref 1.8–7.4)
NEUTROPHILS NFR BLD AUTO: 19.3 % — LOW (ref 43–77)
NEUTS BAND # BLD: 1.7 % — SIGNIFICANT CHANGE UP (ref 0–8)
NRBC # BLD: 0 /100 WBCS — SIGNIFICANT CHANGE UP (ref 0–0)
NT-PROBNP SERPL-SCNC: 61 PG/ML — SIGNIFICANT CHANGE UP (ref 0–300)
PHOSPHATE SERPL-MCNC: 3.1 MG/DL — SIGNIFICANT CHANGE UP (ref 2.5–4.5)
PHOSPHATE SERPL-MCNC: 3.4 MG/DL — SIGNIFICANT CHANGE UP (ref 2.5–4.5)
PLAT MORPH BLD: NORMAL — SIGNIFICANT CHANGE UP
PLATELET # BLD AUTO: 236 K/UL — SIGNIFICANT CHANGE UP (ref 150–400)
PLATELET # BLD AUTO: 262 K/UL — SIGNIFICANT CHANGE UP (ref 150–400)
POTASSIUM SERPL-MCNC: 4.3 MMOL/L — SIGNIFICANT CHANGE UP (ref 3.5–5.3)
POTASSIUM SERPL-MCNC: 4.4 MMOL/L — SIGNIFICANT CHANGE UP (ref 3.5–5.3)
POTASSIUM SERPL-SCNC: 4.3 MMOL/L — SIGNIFICANT CHANGE UP (ref 3.5–5.3)
POTASSIUM SERPL-SCNC: 4.4 MMOL/L — SIGNIFICANT CHANGE UP (ref 3.5–5.3)
PROT SERPL-MCNC: 5.6 G/DL — LOW (ref 6–8.3)
PROT SERPL-MCNC: 5.7 G/DL — LOW (ref 6–8.3)
PROTHROM AB SERPL-ACNC: 13.6 SEC — HIGH (ref 10.5–13.4)
RBC # BLD: 3.94 M/UL — LOW (ref 4.2–5.8)
RBC # BLD: 3.97 M/UL — LOW (ref 4.2–5.8)
RBC # FLD: 14.6 % — HIGH (ref 10.3–14.5)
RBC # FLD: 14.6 % — HIGH (ref 10.3–14.5)
RBC BLD AUTO: SIGNIFICANT CHANGE UP
SODIUM SERPL-SCNC: 135 MMOL/L — SIGNIFICANT CHANGE UP (ref 135–145)
SODIUM SERPL-SCNC: 137 MMOL/L — SIGNIFICANT CHANGE UP (ref 135–145)
SPECIMEN SOURCE: SIGNIFICANT CHANGE UP
URATE SERPL-MCNC: 3.4 MG/DL — SIGNIFICANT CHANGE UP (ref 3.4–8.8)
URATE SERPL-MCNC: 3.4 MG/DL — SIGNIFICANT CHANGE UP (ref 3.4–8.8)
WBC # BLD: 4.01 K/UL — SIGNIFICANT CHANGE UP (ref 3.8–10.5)
WBC # BLD: 4.05 K/UL — SIGNIFICANT CHANGE UP (ref 3.8–10.5)
WBC # FLD AUTO: 4.01 K/UL — SIGNIFICANT CHANGE UP (ref 3.8–10.5)
WBC # FLD AUTO: 4.05 K/UL — SIGNIFICANT CHANGE UP (ref 3.8–10.5)

## 2022-04-30 PROCEDURE — 99291 CRITICAL CARE FIRST HOUR: CPT

## 2022-04-30 RX ORDER — MAGNESIUM SULFATE 500 MG/ML
2 VIAL (ML) INJECTION ONCE
Refills: 0 | Status: COMPLETED | OUTPATIENT
Start: 2022-04-30 | End: 2022-04-30

## 2022-04-30 RX ORDER — DEXAMETHASONE 0.5 MG/5ML
4 ELIXIR ORAL ONCE
Refills: 0 | Status: COMPLETED | OUTPATIENT
Start: 2022-04-30 | End: 2022-04-30

## 2022-04-30 RX ADMIN — CHLORHEXIDINE GLUCONATE 1 APPLICATION(S): 213 SOLUTION TOPICAL at 08:07

## 2022-04-30 RX ADMIN — Medication 650 MILLIGRAM(S): at 00:15

## 2022-04-30 RX ADMIN — Medication 650 MILLIGRAM(S): at 17:11

## 2022-04-30 RX ADMIN — Medication 10 MILLILITER(S): at 16:05

## 2022-04-30 RX ADMIN — Medication 400 MILLIGRAM(S): at 05:11

## 2022-04-30 RX ADMIN — LEVETIRACETAM 500 MILLIGRAM(S): 250 TABLET, FILM COATED ORAL at 17:12

## 2022-04-30 RX ADMIN — Medication 1 LOZENGE: at 16:05

## 2022-04-30 RX ADMIN — Medication 1 MILLIGRAM(S): at 11:49

## 2022-04-30 RX ADMIN — Medication 10 MILLILITER(S): at 21:02

## 2022-04-30 RX ADMIN — Medication 300 MILLIGRAM(S): at 11:50

## 2022-04-30 RX ADMIN — Medication 10 MILLILITER(S): at 08:07

## 2022-04-30 RX ADMIN — Medication 5 MILLILITER(S): at 16:05

## 2022-04-30 RX ADMIN — Medication 1 LOZENGE: at 21:03

## 2022-04-30 RX ADMIN — PANTOPRAZOLE SODIUM 40 MILLIGRAM(S): 20 TABLET, DELAYED RELEASE ORAL at 17:12

## 2022-04-30 RX ADMIN — CARVEDILOL PHOSPHATE 3.12 MILLIGRAM(S): 80 CAPSULE, EXTENDED RELEASE ORAL at 05:11

## 2022-04-30 RX ADMIN — Medication 10 MILLILITER(S): at 11:44

## 2022-04-30 RX ADMIN — OXYCODONE HYDROCHLORIDE 5 MILLIGRAM(S): 5 TABLET ORAL at 17:19

## 2022-04-30 RX ADMIN — Medication 4 MILLIGRAM(S): at 18:35

## 2022-04-30 RX ADMIN — Medication 1 TABLET(S): at 11:49

## 2022-04-30 RX ADMIN — Medication 650 MILLIGRAM(S): at 17:19

## 2022-04-30 RX ADMIN — CEFEPIME 100 MILLIGRAM(S): 1 INJECTION, POWDER, FOR SOLUTION INTRAMUSCULAR; INTRAVENOUS at 21:22

## 2022-04-30 RX ADMIN — GABAPENTIN 100 MILLIGRAM(S): 400 CAPSULE ORAL at 13:02

## 2022-04-30 RX ADMIN — Medication 400 MILLIGRAM(S): at 21:22

## 2022-04-30 RX ADMIN — PANTOPRAZOLE SODIUM 40 MILLIGRAM(S): 20 TABLET, DELAYED RELEASE ORAL at 06:37

## 2022-04-30 RX ADMIN — Medication 400 MILLIGRAM(S): at 13:02

## 2022-04-30 RX ADMIN — Medication 1 LOZENGE: at 11:44

## 2022-04-30 RX ADMIN — Medication 5 MILLILITER(S): at 21:02

## 2022-04-30 RX ADMIN — GABAPENTIN 100 MILLIGRAM(S): 400 CAPSULE ORAL at 21:22

## 2022-04-30 RX ADMIN — SODIUM CHLORIDE 100 MILLILITER(S): 9 INJECTION INTRAMUSCULAR; INTRAVENOUS; SUBCUTANEOUS at 21:25

## 2022-04-30 RX ADMIN — OXYCODONE HYDROCHLORIDE 5 MILLIGRAM(S): 5 TABLET ORAL at 05:10

## 2022-04-30 RX ADMIN — Medication 5 MILLILITER(S): at 08:06

## 2022-04-30 RX ADMIN — GABAPENTIN 100 MILLIGRAM(S): 400 CAPSULE ORAL at 05:11

## 2022-04-30 RX ADMIN — LEVETIRACETAM 500 MILLIGRAM(S): 250 TABLET, FILM COATED ORAL at 05:11

## 2022-04-30 RX ADMIN — CARVEDILOL PHOSPHATE 3.12 MILLIGRAM(S): 80 CAPSULE, EXTENDED RELEASE ORAL at 17:13

## 2022-04-30 RX ADMIN — Medication 650 MILLIGRAM(S): at 06:00

## 2022-04-30 RX ADMIN — OXYCODONE HYDROCHLORIDE 5 MILLIGRAM(S): 5 TABLET ORAL at 05:40

## 2022-04-30 RX ADMIN — Medication 1 LOZENGE: at 08:07

## 2022-04-30 RX ADMIN — Medication 650 MILLIGRAM(S): at 05:20

## 2022-04-30 RX ADMIN — Medication 25 GRAM(S): at 08:06

## 2022-04-30 RX ADMIN — FLUCONAZOLE 400 MILLIGRAM(S): 150 TABLET ORAL at 11:48

## 2022-04-30 RX ADMIN — CEFEPIME 100 MILLIGRAM(S): 1 INJECTION, POWDER, FOR SOLUTION INTRAMUSCULAR; INTRAVENOUS at 13:01

## 2022-04-30 RX ADMIN — OXYCODONE HYDROCHLORIDE 5 MILLIGRAM(S): 5 TABLET ORAL at 16:10

## 2022-04-30 RX ADMIN — CEFEPIME 100 MILLIGRAM(S): 1 INJECTION, POWDER, FOR SOLUTION INTRAMUSCULAR; INTRAVENOUS at 05:27

## 2022-04-30 RX ADMIN — Medication 5 MILLILITER(S): at 11:43

## 2022-04-30 NOTE — PROGRESS NOTE ADULT - NS PANP COMMENT GEN_ALL_CORE FT
Admitted for CAR T cell therapy with Tecart[employer] (bresucabtagene autoleucel)  today is + 11..some nausea..now improved...monitor closely for CRS and neurotox...pt is at lower risk for these complications given minimal disease burden..hoever he has been on and off with high temps since fri evening..first dose of toci given for grade 1 crs......s/p 4 total  doses of  toci and dex 4 mg X 4 and 10 mg X 1...improved..this am 8/10 HA with somulence...c/w grade 2 neurotox...Stat ct head, MRI..dose dex 10 mg...improved 4/28..4/29, 30 body aches..improved with oxycodone..possibly neuropathic...on gabapentin    1. Admit to BMTU   2. Day -5 through day -1 record CARTOX / ICANS score daily   3. Day - 5 through day - 3 Fludarabine 30mg / m2 IV given over 30 minutes daily   4. Day - 5 - start cyclophosphamide hydration - 0.9% NS + 10mEq KCL / L at 75 ml/m2 and continue for 24 hours post infusion of last dose of CTX   5. Day - 5 through day - 3 CTX 500mg / m2 IV QD to be given over 2 hours   6. Starting day - 5 monitor labs BID: CBC with differential, CMP, LDH, uric acid, mag, phos, CPK, BNP, PT / PTT / INR / Fibrinogen, d-dimer, CRP, hepatic profile, ferritin  7. Weekly IL-6 levels on Monday, twice weekly (Monday / Thursday) CMV / EBV PCR, three times a week (Monday / Wednesday / Friday) type and screen   8. On day - 5 start allopurinol 300mg po QD   9. Day -2 and -1 REST DAYS   10. On day -1 begin to record CARTOX / ICANS score q 12 hours   11. Day 0 infuse Brexucabtagene autoleucel  hx of afib...seen by [facility] cardiology  12. F/U cx's..on cefapime,  will d/c today...acyclovir and fluconazole..send rvp  13 Cont tele and keppra  4/16-17 Doing well, I/Os are negative, transient lip swelling, now resolved, some fatigue  4/18, 19, 20, 21, 22 CRS 0, ICANS 10  4/22/22-Chase fever overnight, chills, given Toci, was started on Cefipime. Stable. CRS / ICANS scoring this morning 10.  4/23/22, 4/24/22 CRS 1 ICANS 10  4/24/22- Fever 102oF, complaints of bilateral upper and lower extremities cramps, chest discomfort, EKG no changes. Patient was treated with Toci/Decadron as per protocol. Morphine prn for pain. Clinically improved. Pancultures sent.  4/25/22 CRS 2...persistent high temps.....ICANS 10...tremor noted with fever...dex 10 mg dosed today  4/26/22 CRS 1, Icans 10  4/27 crs 1, Icans 10...neurotox 2  4/28, 4/29, 30  crs 0..icans 10 Admitted for CAR T cell therapy with Tecartus (bresucabtagene autoleucel)  today is + 11..some nausea..now improved...monitor closely for CRS and neurotox...pt is at lower risk for these complications given minimal disease burden..hoever he has been on and off with high temps since fri evening..first dose of toci given for grade 1 crs......s/p 4 total  doses of  toci and dex 4 mg X 4 and 10 mg X 1...improved..this am 8/10 HA with somulence...c/w grade 2 neurotox...Stat ct head, MRI..dose dex 10 mg...improved 4/28..4/29, 30 body aches..improved with oxycodone..possibly neuropathic...on gabapentin  4/30 temp noted...may consider additional dex  1. Admit to BMTU   2. Day -5 through day -1 record CARTOX / ICANS score daily   3. Day - 5 through day - 3 Fludarabine 30mg / m2 IV given over 30 minutes daily   4. Day - 5 - start cyclophosphamide hydration - 0.9% NS + 10mEq KCL / L at 75 ml/m2 and continue for 24 hours post infusion of last dose of CTX   5. Day - 5 through day - 3 CTX 500mg / m2 IV QD to be given over 2 hours   6. Starting day - 5 monitor labs BID: CBC with differential, CMP, LDH, uric acid, mag, phos, CPK, BNP, PT / PTT / INR / Fibrinogen, d-dimer, CRP, hepatic profile, ferritin  7. Weekly IL-6 levels on Monday, twice weekly (Monday / Thursday) CMV / EBV PCR, three times a week (Monday / Wednesday / Friday) type and screen   8. On day - 5 start allopurinol 300mg po QD   9. Day -2 and -1 REST DAYS   10. On day -1 begin to record CARTOX / ICANS score q 12 hours   11. Day 0 infuse Brexucabtagene autoleucel  hx of afib...seen by [facility] cardiology  12. F/U cx's..on cefapime,  ...acyclovir and fluconazole..send rvp  13 Cont tele and keppra  4/16-17 Doing well, I/Os are negative, transient lip swelling, now resolved, some fatigue  4/18, 19, 20, 21, 22 CRS 0, ICANS 10  4/22/22-Chase fever overnight, chills, given Toci, was started on Cefipime. Stable. CRS / ICANS scoring this morning 10.  4/23/22, 4/24/22 CRS 1 ICANS 10  4/24/22- Fever 102oF, complaints of bilateral upper and lower extremities cramps, chest discomfort, EKG no changes. Patient was treated with Toci/Decadron as per protocol. Morphine prn for pain. Clinically improved. Pancultures sent.  4/25/22 CRS 2...persistent high temps.....ICANS 10...tremor noted with fever...dex 10 mg dosed today  4/26/22 CRS 1, Icans 10  4/27 crs 1, Icans 10...neurotox 2  4/28, 4/29,  crs 0..icans 10  4/30 CRS 1  ICANS 10

## 2022-04-30 NOTE — PROGRESS NOTE ADULT - SUBJECTIVE AND OBJECTIVE BOX
HPC Transplant Team                                                      Critical / Counseling Time Provided: 30 minutes                                                                                                                                                        Chief Complaint:  CAR T therapy with [employer] product for treatment of relapsed, refractory MCL    S: Patient seen and examined with HPC Transplant Team:     O:     Vital Signs Last 24 Hrs  T(C): 36.9 (30 Apr 2022 10:25), Max: 38.4 (29 Apr 2022 20:14)  T(F): 98.4 (30 Apr 2022 10:25), Max: 101.1 (29 Apr 2022 20:14)  HR: 74 (30 Apr 2022 10:25) (71 - 93)  BP: 113/73 (30 Apr 2022 10:25) (100/62 - 129/74)  BP(mean): --  RR: 18 (30 Apr 2022 10:25) (18 - 19)  SpO2: 96% (30 Apr 2022 10:25) (94% - 98%)    Admit weight: 100.4kg     Daily Weight in kG:     Intake / Output:   04-29 @ 07:01 - 04-30 @ 07:00  --------------------------------------------------------  IN: 3519 mL / OUT: 5000 mL / NET: -1481 mL    04-30 @ 07:01 - 04-30 @ 11:38  --------------------------------------------------------  IN: 288 mL / OUT: 0 mL / NET: 288 mL    PE:   Oropharynx: no erythema or ulcerations ,   Oral Mucositis:  NA                                                   Grade: n/a  CVS: S1, S2 RRR   Lungs: CTA throughout bilaterally   Abdomen: + BS x 4, soft, NT, ND   Extremities: no edema BLE's  Gastric Mucositis:       -                                           Grade: n/a  Intestinal Mucositis:     -                                         Grade: n/a   Skin: no rashes  TLC: CDI   Neuro: A&O x 3   Pain: 0      Labs:   CBC Full  -  ( 30 Apr 2022 06:04 )  WBC Count : 4.05 K/uL  Hemoglobin : 12.2 g/dL  Hematocrit : 37.4 %  Platelet Count - Automated : 262 K/uL  Mean Cell Volume : 94.9 fl  Mean Cell Hemoglobin : 31.0 pg  Mean Cell Hemoglobin Concentration : 32.6 gm/dL  Auto Neutrophil # : 0.85 K/uL  Auto Lymphocyte # : 0.75 K/uL  Auto Monocyte # : 1.14 K/uL  Auto Eosinophil # : 1.24 K/uL  Auto Basophil # : 0.04 K/uL  Auto Neutrophil % : 19.3 %  Auto Lymphocyte % : 18.4 %  Auto Monocyte % : 28.1 %  Auto Eosinophil % : 30.7 %  Auto Basophil % : 0.9 %                          12.2   4.05  )-----------( 262      ( 30 Apr 2022 06:04 )             37.4     04-30    135  |  99  |  20  ----------------------------<  110<H>  4.3   |  25  |  0.95    Ca    8.5      30 Apr 2022 06:04  Phos  3.4     04-30  Mg     1.8     04-30    TPro  5.6<L>  /  Alb  3.8  /  TBili  0.2  /  DBili  x   /  AST  26  /  ALT  80<H>  /  AlkPhos  61  04-30    PT/INR - ( 30 Apr 2022 06:04 )   PT: 13.6 sec;   INR: 1.17 ratio         PTT - ( 30 Apr 2022 06:04 )  PTT:22.1 sec  LIVER FUNCTIONS - ( 30 Apr 2022 06:04 )  Alb: 3.8 g/dL / Pro: 5.6 g/dL / ALK PHOS: 61 U/L / ALT: 80 U/L / AST: 26 U/L / GGT: x           Lactate Dehydrogenase, Serum: 141 U/L (04-30 @ 06:04)  Lactate Dehydrogenase, Serum: 158 U/L (04-29 @ 13:40)          Cultures:     Culture - Urine (04.25.22 @ 13:44)   Specimen Source: Clean Catch Clean Catch (Midstream)   Culture Results:   No growth Culture - Blood (04.24.22 @ 16:40)   Specimen Source: .Blood Blood-Catheter   Culture Results:   No growth to date. Culture - Blood (04.24.22 @ 16:40)   Specimen Source: .Blood Blood-Catheter   Culture Results:   No growth to date. Culture - Blood (04.23.22 @ 01:04)   Specimen Source: .Blood Blood   Culture Results:   No growth to date.     Radiology:       Meds:   Antimicrobials:   acyclovir   Oral Tab/Cap 400 milliGRAM(s) Oral every 8 hours  cefepime   IVPB      cefepime   IVPB 2000 milliGRAM(s) IV Intermittent every 8 hours  clotrimazole Lozenge 1 Lozenge Oral five times a day  fluconAZOLE   Tablet 400 milliGRAM(s) Oral daily      Heme / Onc:       GI:  loperamide 2 milliGRAM(s) Oral every 4 hours PRN  pantoprazole    Tablet 40 milliGRAM(s) Oral two times a day  polyethylene glycol 3350 17 Gram(s) Oral daily PRN  senna 1 Tablet(s) Oral at bedtime PRN  sodium bicarbonate Mouth Rinse 10 milliLiter(s) Swish and Spit five times a day      Cardiovascular:   carvedilol 3.125 milliGRAM(s) Oral every 12 hours      Immunologic:       Other medications:   acetaminophen     Tablet .. 650 milliGRAM(s) Oral every 6 hours  allopurinol 300 milliGRAM(s) Oral daily  Biotene Dry Mouth Oral Rinse 5 milliLiter(s) Swish and Spit five times a day  chlorhexidine 4% Liquid 1 Application(s) Topical <User Schedule>  folic acid 1 milliGRAM(s) Oral daily  gabapentin 100 milliGRAM(s) Oral three times a day  levETIRAcetam 500 milliGRAM(s) Oral two times a day  multivitamin 1 Tablet(s) Oral daily  sodium chloride 0.9%. 1000 milliLiter(s) IV Continuous <Continuous>      PRN:   acetaminophen     Tablet .. 650 milliGRAM(s) Oral every 6 hours PRN  loperamide 2 milliGRAM(s) Oral every 4 hours PRN  metoclopramide Injectable 10 milliGRAM(s) IV Push every 6 hours PRN  ondansetron Injectable 8 milliGRAM(s) IV Push every 8 hours PRN  oxyCODONE    IR 5 milliGRAM(s) Oral every 6 hours PRN  polyethylene glycol 3350 17 Gram(s) Oral daily PRN  senna 1 Tablet(s) Oral at bedtime PRN  sodium chloride 0.9% lock flush 10 milliLiter(s) IV Push every 1 hour PRN        A/P: 56 year old male with relapsed, refractory MCL s/p R-CHOP x 4, RICE x 2, maintenance RTX, salvage Calquence, admitted for CAR T cell therapy with [employer] product. Disease status at time of admission is partial response.   Day +  11  Monitor for CRS and neurotox...follow CRS/neuro tox protocol - GIVEN TOCILIZUMAB 4/22 pm and  4/24 AM for CRS - 1     - Relapsed, refractory MCL   4/15- day - 4- flu / ctx 2/3 - continue CTX hydration until 24 hours post infusion of last dose   Strict I&O, daily weights, prn diuresis   CRS / ICANS score daily until infusion of CAR T cells (4/19/22) and twice daily thereafter   BID labs 0500 & 1500 - monitor for TLS   4/22-CRS grade 1 s/p Tocilizumab on tele started on Keppra as per protocol, pancx, started on cefepime  s/p dexamethasone 4 mg IV x1  4/24 F/U repeat blood cx's for fevers (4/22 and 4/24) s/p Toci x2 s/p dexamethasone 4 mg IV x2 also with CP: EKG WNL trops negative s/p morphine with good effect   4/25- CRS grade 2( persistent fevers) given Toci x1 and dexamethasone 4 mg IV overnight, continues to be febrile Tmax 104 F tremors given additional Dexamethasone 10 mg IV  4/26- fever curve improved: CRS grade 1  4/27- Grade 2 neurotox with 10/10 Headaches- taken for CT head( with no abnormalities no cerebral edema), s.p Dexamethasone 10 mg IV x 1 , MRI head no significant findings   4/29 Grade 1 transaminitis -Monitor closely  4/49 Discharge planning next week        -  Prophylactic measures   acyclovir   Oral Tab/Cap 400 milliGRAM(s) Oral every 8 hours  cefepime   IVPB      cefepime   IVPB 2000 milliGRAM(s) IV Intermittent every 8 hours  clotrimazole Lozenge 1 Lozenge Oral five times a day  fluconAZOLE   Tablet 400 milliGRAM(s) Oral daily  Keppra 500 q12h  GI Prophylaxis:  Protonix      - Cardiac / Hx Afib   F/U repeat troponin,  now CP free. ECG non ischemic   Continue Tele monitoring   Continue Coreg bid     -  Mouth care - NS / NaHCO3 rinses, Mycelex, Biotene; Skin care      - Transfuse & replete electrolytes prn     - IV hydration, daily weights, strict I&O, prn diuresis      -  Antiemetics, anti-diarrhea medications:   LORazepam   Injectable 1 milliGRAM(s) IV Push every 6 hours PRN  metoclopramide Injectable 10 milliGRAM(s) IV Push every 6 hours PRN  ondansetron 8mg IV q 8 hours prn nausea      - CRS / ICANS scoring   4/18 CRS grade 0, ICANS score 10   4/19- CRS grade 0, ICANS score 10   4/20 AM - CRS grade 0, ICANS score 10   4/20-pm-CRS grade 0, ICANS score 10  4/21 AM - CRS grade 0, ICANS score 10   4/21 PM CRS grade 0, ICANS score 10  4/22 AM CRS grade 0, ICANS score 10  4/22 PM CRS grade 0, ICANS score 10 --> 9PM CRS score - 1, ICANS - 10  4/23 AM CRS grade 0, ICANS score 10  4/23 PM CRS grade 0, ICANS score 10  4/24 AM CRS grade 1 10am (grade 0 @ 8:30am), ICANS score 10  4/25M CRS grade 2( persistently febrile) ICANS score 10   4/26 AM CRS GRADE 1, ICANS score 10  4/26- AM CRS GRADE 1, ICANS score 10  4/27 AM CRS GRADE 1, ICANS score 10  4/27 noon: with persistent headache: Grade 2 neurotox-   taken for CT head( ( with no abnormalities no cerebral edema) s.p Dexamethasone 10 mg IV pending  MR head  4/27 PM CRS GRADE 1, ICANS score 10( HA resolved)   4/28- AM CRS GRADE 1, ICANS score 10  4/28- PM CRS GRADE 1, ICANS score 10  4/29- PM CRS GRADE 1, ICANS score 10  4/30 AM  GRADE 0, ICANS score - 10  I have written the above note for Dr. Banks who performed service with me in the room.   Miki Espinoza PA-C ([phone])    I have seen and examined patient with NP, I agree with above note as scribed.                        HPC Transplant Team                                                      Critical / Counseling Time Provided: 30 minutes                                                                                                                                                        Chief Complaint:  CAR T therapy with [employer] product for treatment of relapsed, refractory MCL    S: Patient seen and examined with HPC Transplant Team: Febrile overnight - CRS -1  +body pain  + mild dizziness when standing    O: Denies HA's, CP/palp's, SOB, abdominal pain    Vital Signs Last 24 Hrs  T(C): 36.9 (2022 10:25), Max: 38.4 (2022 20:14)  T(F): 98.4 (2022 10:25), Max: 101.1 (2022 20:14)  HR: 74 (2022 10:25) (71 - 93)  BP: 113/73 (2022 10:25) (100/62 - 129/74)  BP(mean): --  RR: 18 (2022 10:25) (18 - 19)  SpO2: 96% (2022 10:25) (94% - 98%)    Admit weight: 100.4kg     Daily Weight in k.2kg    Intake / Output:    @ 07: @ 07:00  --------------------------------------------------------  IN: 3519 mL / OUT: 5000 mL / NET: -1481 mL     @ 07: @ 11:38  --------------------------------------------------------  IN: 288 mL / OUT: 0 mL / NET: 288 mL    PE:   Oropharynx: no erythema or ulcerations ,   Oral Mucositis:  NA                                                   Grade: n/a  CVS: S1, S2 RRR   Lungs: CTA throughout bilaterally   Abdomen: + BS x 4, soft, NT, ND   Extremities: no edema BLE's  Gastric Mucositis:       -                                           Grade: n/a  Intestinal Mucositis:     -                                         Grade: n/a   Skin: no rashes  TLC: CDI   Neuro: A&O x 3   Pain: 0      Labs:   CBC Full  -  ( 2022 06:04 )  WBC Count : 4.05 K/uL  Hemoglobin : 12.2 g/dL  Hematocrit : 37.4 %  Platelet Count - Automated : 262 K/uL  Mean Cell Volume : 94.9 fl  Mean Cell Hemoglobin : 31.0 pg  Mean Cell Hemoglobin Concentration : 32.6 gm/dL  Auto Neutrophil # : 0.85 K/uL  Auto Lymphocyte # : 0.75 K/uL  Auto Monocyte # : 1.14 K/uL  Auto Eosinophil # : 1.24 K/uL  Auto Basophil # : 0.04 K/uL  Auto Neutrophil % : 19.3 %  Auto Lymphocyte % : 18.4 %  Auto Monocyte % : 28.1 %  Auto Eosinophil % : 30.7 %  Auto Basophil % : 0.9 %                          12.2   4.05  )-----------( 262      ( 2022 06:04 )             37.4     04-30    135  |  99  |  20  ----------------------------<  110<H>  4.3   |  25  |  0.95    Ca    8.5      2022 06:04  Phos  3.4     04-30  Mg     1.8     04-30    TPro  5.6<L>  /  Alb  3.8  /  TBili  0.2  /  DBili  x   /  AST  26  /  ALT  80<H>  /  AlkPhos  61  04-30    PT/INR - ( 2022 06:04 )   PT: 13.6 sec;   INR: 1.17 ratio    PTT - ( 2022 06:04 )  PTT:22.1 sec    LIVER FUNCTIONS - ( 2022 06:04 )  Alb: 3.8 g/dL / Pro: 5.6 g/dL / ALK PHOS: 61 U/L / ALT: 80 U/L / AST: 26 U/L / GGT: x           Lactate Dehydrogenase, Serum: 141 U/L ( @ 06:04)  Lactate Dehydrogenase, Serum: 158 U/L ( @ 13:40)          Cultures:     Culture - Blood (22 @ 19:54)   Specimen Source: .Blood Blood   Culture Results: No growth to date.   Culture - Blood (22 @ 19:54)   Specimen Source: .Blood Blood   Culture Results:   No growth to date.     Culture - Urine (22 @ 13:44)   Specimen Source: Clean Catch Clean Catch (Midstream)   Culture Results: No growth Culture - Blood (22 @ 16:40)   Specimen Source: .Blood Blood-Catheter   Culture Results:   No growth to date. Culture - Blood (22 @ 16:40)   Specimen Source: .Blood Blood-Catheter   Culture Results:   No growth to date. Culture - Blood (22 @ 01:04)   Specimen Source: .Blood Blood   Culture Results:   No growth to date.     Radiology:       Meds:   Antimicrobials:   acyclovir   Oral Tab/Cap 400 milliGRAM(s) Oral every 8 hours  cefepime   IVPB      cefepime   IVPB 2000 milliGRAM(s) IV Intermittent every 8 hours  clotrimazole Lozenge 1 Lozenge Oral five times a day  fluconAZOLE   Tablet 400 milliGRAM(s) Oral daily      Heme / Onc:       GI:  loperamide 2 milliGRAM(s) Oral every 4 hours PRN  pantoprazole    Tablet 40 milliGRAM(s) Oral two times a day  polyethylene glycol 3350 17 Gram(s) Oral daily PRN  senna 1 Tablet(s) Oral at bedtime PRN  sodium bicarbonate Mouth Rinse 10 milliLiter(s) Swish and Spit five times a day      Cardiovascular:   carvedilol 3.125 milliGRAM(s) Oral every 12 hours      Immunologic:       Other medications:   acetaminophen     Tablet .. 650 milliGRAM(s) Oral every 6 hours  allopurinol 300 milliGRAM(s) Oral daily  Biotene Dry Mouth Oral Rinse 5 milliLiter(s) Swish and Spit five times a day  chlorhexidine 4% Liquid 1 Application(s) Topical <User Schedule>  folic acid 1 milliGRAM(s) Oral daily  gabapentin 100 milliGRAM(s) Oral three times a day  levETIRAcetam 500 milliGRAM(s) Oral two times a day  multivitamin 1 Tablet(s) Oral daily  sodium chloride 0.9%. 1000 milliLiter(s) IV Continuous <Continuous>      PRN:   acetaminophen     Tablet .. 650 milliGRAM(s) Oral every 6 hours PRN  loperamide 2 milliGRAM(s) Oral every 4 hours PRN  metoclopramide Injectable 10 milliGRAM(s) IV Push every 6 hours PRN  ondansetron Injectable 8 milliGRAM(s) IV Push every 8 hours PRN  oxyCODONE    IR 5 milliGRAM(s) Oral every 6 hours PRN  polyethylene glycol 3350 17 Gram(s) Oral daily PRN  senna 1 Tablet(s) Oral at bedtime PRN  sodium chloride 0.9% lock flush 10 milliLiter(s) IV Push every 1 hour PRN        A/P: 56 year old male with relapsed, refractory MCL s/p R-CHOP x 4, RICE x 2, maintenance RTX, salvage Calquence, admitted for CAR T cell therapy with [employer] product. Disease status at time of admission is partial response.   Day +  11  Monitor for CRS and neurotox...follow CRS/neuro tox protocol - GIVEN TOCILIZUMAB  pm and   AM for CRS - 1     - Relapsed, refractory MCL   4/15- day - 4- flu / ctx 2/3 - continue CTX hydration until 24 hours post infusion of last dose   Strict I&O, daily weights, prn diuresis   CRS / ICANS score daily until infusion of CAR T cells (22) and twice daily thereafter   BID labs 0500 & 1500 - monitor for TLS   -CRS grade 1 s/p Tocilizumab on tele started on Keppra as per protocol, pancx, started on cefepime  s/p dexamethasone 4 mg IV x1   F/U repeat blood cx's for fevers ( and ) s/p Toci x2 s/p dexamethasone 4 mg IV x2 also with CP: EKG WNL trops negative s/p morphine with good effect   - CRS grade 2( persistent fevers) given Toci x1 and dexamethasone 4 mg IV overnight, continues to be febrile Tmax 104 F tremors given additional Dexamethasone 10 mg IV  - fever curve improved: CRS grade 1  - Grade 2 neurotox with 10/10 Headaches- taken for CT head( with no abnormalities no cerebral edema), s.p Dexamethasone 10 mg IV x 1 , MRI head no significant findings    Grade 1 transaminitis -Monitor closely   Discharge planning next week        -  Prophylactic measures   acyclovir   Oral Tab/Cap 400 milliGRAM(s) Oral every 8 hours  cefepime   IVPB      cefepime   IVPB 2000 milliGRAM(s) IV Intermittent every 8 hours  clotrimazole Lozenge 1 Lozenge Oral five times a day  fluconAZOLE   Tablet 400 milliGRAM(s) Oral daily  Keppra 500 q12h  GI Prophylaxis:  Protonix      - Cardiac / Hx Afib   F/U repeat troponin,  now CP free. ECG non ischemic   Continue Tele monitoring   Continue Coreg bid     -  Mouth care - NS / NaHCO3 rinses, Mycelex, Biotene; Skin care      - Transfuse & replete electrolytes prn     - IV hydration, daily weights, strict I&O, prn diuresis      -  Antiemetics, anti-diarrhea medications:   LORazepam   Injectable 1 milliGRAM(s) IV Push every 6 hours PRN  metoclopramide Injectable 10 milliGRAM(s) IV Push every 6 hours PRN  ondansetron 8mg IV q 8 hours prn nausea      - CRS / ICANS scoring    CRS grade 0, ICANS score 10   - CRS grade 0, ICANS score 10    AM - CRS grade 0, ICANS score 10   -pm-CRS grade 0, ICANS score 10   AM - CRS grade 0, ICANS score 10    PM CRS grade 0, ICANS score 10   AM CRS grade 0, ICANS score 10   PM CRS grade 0, ICANS score 10 --> 9PM CRS score - 1, ICANS - 10   AM CRS grade 0, ICANS score 10   PM CRS grade 0, ICANS score 10   AM CRS grade 1 10am (grade 0 @ 8:30am), ICANS score 10  M CRS grade 2( persistently febrile) ICANS score 10    AM CRS GRADE 1, ICANS score 10  - AM CRS GRADE 1, ICANS score 10   AM CRS GRADE 1, ICANS score 10   noon: with persistent headache: Grade 2 neurotox-   taken for CT head( ( with no abnormalities no cerebral edema) s.p Dexamethasone 10 mg IV pending  MR head   PM CRS GRADE 1, ICANS score 10( HA resolved)   - AM CRS GRADE 1, ICANS score 10  - PM CRS GRADE 1, ICANS score 10  - PM CRS GRADE 1, ICANS score 10   AM  GRADE 0, ICANS score - 10  I have written the above note for Dr. Banks who performed service with me in the room.   Miki Esipnoza PA-C ([phone])    I have seen and examined patient with NP, I agree with above note as scribed.                        [facility] Transplant Team                                                      Critical / Counseling Time Provided: 30 minutes                                                                                                                                                        Chief Complaint:  CAR T therapy with [employer] product for treatment of relapsed, refractory MCL    S: Patient seen and examined with HPC Transplant Team: Febrile overnight - CRS -1  +body pain  + mild dizziness when standing    O: Denies HA's, CP/palp's, SOB, abdominal pain    Vital Signs Last 24 Hrs  T(C): 36.9 (2022 10:25), Max: 38.4 (2022 20:14)  T(F): 98.4 (2022 10:25), Max: 101.1 (2022 20:14)  HR: 74 (2022 10:25) (71 - 93)  BP: 113/73 (2022 10:25) (100/62 - 129/74)  BP(mean): --  RR: 18 (2022 10:25) (18 - 19)  SpO2: 96% (2022 10:25) (94% - 98%)    Admit weight: 100.4kg     Daily Weight in k.2kg    Intake / Output:    @ 07: @ 07:00  --------------------------------------------------------  IN: 3519 mL / OUT: 5000 mL / NET: -1481 mL     @ 07: @ 11:38  --------------------------------------------------------  IN: 288 mL / OUT: 0 mL / NET: 288 mL    PE:   Oropharynx: no erythema or ulcerations ,   Oral Mucositis:  NA                                                   Grade: n/a  CVS: S1, S2 RRR   Lungs: CTA throughout bilaterally   Abdomen: + BS x 4, soft, NT, ND   Extremities: no edema BLE's  Gastric Mucositis:       -                                           Grade: n/a  Intestinal Mucositis:     -                                         Grade: n/a   Skin: no rashes  TLC: CDI   Neuro: A&O x 3   Pain: 0      Labs:   CBC Full  -  ( 2022 06:04 )  WBC Count : 4.05 K/uL  Hemoglobin : 12.2 g/dL  Hematocrit : 37.4 %  Platelet Count - Automated : 262 K/uL  Mean Cell Volume : 94.9 fl  Mean Cell Hemoglobin : 31.0 pg  Mean Cell Hemoglobin Concentration : 32.6 gm/dL  Auto Neutrophil # : 0.85 K/uL  Auto Lymphocyte # : 0.75 K/uL  Auto Monocyte # : 1.14 K/uL  Auto Eosinophil # : 1.24 K/uL  Auto Basophil # : 0.04 K/uL  Auto Neutrophil % : 19.3 %  Auto Lymphocyte % : 18.4 %  Auto Monocyte % : 28.1 %  Auto Eosinophil % : 30.7 %  Auto Basophil % : 0.9 %                          12.2   4.05  )-----------( 262      ( 2022 06:04 )             37.4     04-30    135  |  99  |  20  ----------------------------<  110<H>  4.3   |  25  |  0.95    Ca    8.5      2022 06:04  Phos  3.4     04-30  Mg     1.8     04-30    TPro  5.6<L>  /  Alb  3.8  /  TBili  0.2  /  DBili  x   /  AST  26  /  ALT  80<H>  /  AlkPhos  61  04-30    PT/INR - ( 2022 06:04 )   PT: 13.6 sec;   INR: 1.17 ratio    PTT - ( 2022 06:04 )  PTT:22.1 sec    LIVER FUNCTIONS - ( 2022 06:04 )  Alb: 3.8 g/dL / Pro: 5.6 g/dL / ALK PHOS: 61 U/L / ALT: 80 U/L / AST: 26 U/L / GGT: x           Lactate Dehydrogenase, Serum: 141 U/L ( @ 06:04)  Lactate Dehydrogenase, Serum: 158 U/L ( @ 13:40)          Cultures:     Culture - Blood (22 @ 19:54)   Specimen Source: .Blood Blood   Culture Results: No growth to date.   Culture - Blood (22 @ 19:54)   Specimen Source: .Blood Blood   Culture Results:   No growth to date.     Culture - Urine (22 @ 13:44)   Specimen Source: Clean Catch Clean Catch (Midstream)   Culture Results: No growth Culture - Blood (22 @ 16:40)   Specimen Source: .Blood Blood-Catheter   Culture Results:   No growth to date. Culture - Blood (22 @ 16:40)   Specimen Source: .Blood Blood-Catheter   Culture Results:   No growth to date. Culture - Blood (22 @ 01:04)   Specimen Source: .Blood Blood   Culture Results:   No growth to date.     Radiology:       Meds:   Antimicrobials:   acyclovir   Oral Tab/Cap 400 milliGRAM(s) Oral every 8 hours  cefepime   IVPB      cefepime   IVPB 2000 milliGRAM(s) IV Intermittent every 8 hours  clotrimazole Lozenge 1 Lozenge Oral five times a day  fluconAZOLE   Tablet 400 milliGRAM(s) Oral daily      Heme / Onc:       GI:  loperamide 2 milliGRAM(s) Oral every 4 hours PRN  pantoprazole    Tablet 40 milliGRAM(s) Oral two times a day  polyethylene glycol 3350 17 Gram(s) Oral daily PRN  senna 1 Tablet(s) Oral at bedtime PRN  sodium bicarbonate Mouth Rinse 10 milliLiter(s) Swish and Spit five times a day      Cardiovascular:   carvedilol 3.125 milliGRAM(s) Oral every 12 hours      Immunologic:       Other medications:   acetaminophen     Tablet .. 650 milliGRAM(s) Oral every 6 hours  allopurinol 300 milliGRAM(s) Oral daily  Biotene Dry Mouth Oral Rinse 5 milliLiter(s) Swish and Spit five times a day  chlorhexidine 4% Liquid 1 Application(s) Topical <User Schedule>  folic acid 1 milliGRAM(s) Oral daily  gabapentin 100 milliGRAM(s) Oral three times a day  levETIRAcetam 500 milliGRAM(s) Oral two times a day  multivitamin 1 Tablet(s) Oral daily  sodium chloride 0.9%. 1000 milliLiter(s) IV Continuous <Continuous>      PRN:   acetaminophen     Tablet .. 650 milliGRAM(s) Oral every 6 hours PRN  loperamide 2 milliGRAM(s) Oral every 4 hours PRN  metoclopramide Injectable 10 milliGRAM(s) IV Push every 6 hours PRN  ondansetron Injectable 8 milliGRAM(s) IV Push every 8 hours PRN  oxyCODONE    IR 5 milliGRAM(s) Oral every 6 hours PRN  polyethylene glycol 3350 17 Gram(s) Oral daily PRN  senna 1 Tablet(s) Oral at bedtime PRN  sodium chloride 0.9% lock flush 10 milliLiter(s) IV Push every 1 hour PRN        A/P: 56 year old male with relapsed, refractory MCL s/p R-CHOP x 4, RICE x 2, maintenance RTX, salvage Calquence, admitted for CAR T cell therapy with [employer] product. Disease status at time of admission is partial response.   Day +  11  Monitor for CRS and neurotox...follow CRS/neuro tox protocol - GIVEN TOCILIZUMAB  pm and   AM for CRS - 1     - Relapsed, refractory MCL   4/15- day - 4- flu / ctx 2/3 - continue CTX hydration until 24 hours post infusion of last dose   Strict I&O, daily weights, prn diuresis   CRS / ICANS score daily until infusion of CAR T cells (22) and twice daily thereafter   BID labs 0500 & 1500 - monitor for TLS   -CRS grade 1 s/p Tocilizumab on tele started on Keppra as per protocol, pancx, started on cefepime  s/p dexamethasone 4 mg IV x1   F/U repeat blood cx's for fevers ( and ) s/p Toci x2 s/p dexamethasone 4 mg IV x2 also with CP: EKG WNL trops negative s/p morphine with good effect   - CRS grade 2( persistent fevers) given Toci x1 and dexamethasone 4 mg IV overnight, continues to be febrile Tmax 104 F tremors given additional Dexamethasone 10 mg IV  - fever curve improved: CRS grade 1  - Grade 2 neurotox with 10/10 Headaches- taken for CT head (with no abnormalities no cerebral edema), s.p Dexamethasone 10 mg IV x 1, MRI head no significant findings    Grade 1 transaminitis -Monitor closely   Discharge planning next week     Decadron 4mg IV given for Grade 1 CRS      -  Prophylactic measures   acyclovir   Oral Tab/Cap 400 milliGRAM(s) Oral every 8 hours  cefepime   IVPB      cefepime   IVPB 2000 milliGRAM(s) IV Intermittent every 8 hours  clotrimazole Lozenge 1 Lozenge Oral five times a day  fluconAZOLE   Tablet 400 milliGRAM(s) Oral daily  Keppra 500 q12h  GI Prophylaxis:  Protonix      - Cardiac / Hx Afib   F/U repeat troponin,  now CP free. ECG non ischemic   Continue Tele monitoring   Continue Coreg bid     -  Mouth care - NS / NaHCO3 rinses, Mycelex, Biotene; Skin care      - Transfuse & replete electrolytes prn     - IV hydration, daily weights, strict I&O, prn diuresis      -  Antiemetics, anti-diarrhea medications:   LORazepam   Injectable 1 milliGRAM(s) IV Push every 6 hours PRN  metoclopramide Injectable 10 milliGRAM(s) IV Push every 6 hours PRN  ondansetron 8mg IV q 8 hours prn nausea      - CRS / ICANS scoring    CRS grade 0, ICANS score 10   - CRS grade 0, ICANS score 10    AM - CRS grade 0, ICANS score 10   -pm-CRS grade 0, ICANS score 10   AM - CRS grade 0, ICANS score 10    PM CRS grade 0, ICANS score 10   AM CRS grade 0, ICANS score 10   PM CRS grade 0, ICANS score 10 --> 9PM CRS score - 1, ICANS - 10   AM CRS grade 0, ICANS score 10   PM CRS grade 0, ICANS score 10   AM CRS grade 1 10am (grade 0 @ 8:30am), ICANS score 10  M CRS grade 2( persistently febrile) ICANS score 10    AM CRS GRADE 1, ICANS score 10  - AM CRS GRADE 1, ICANS score 10   AM CRS GRADE 1, ICANS score 10   noon: with persistent headache: Grade 2 neurotox-   taken for CT head( ( with no abnormalities no cerebral edema) s.p Dexamethasone 10 mg IV pending  MR head   PM CRS GRADE 1, ICANS score 10( HA resolved)   - AM CRS GRADE 1, ICANS score 10  - PM CRS GRADE 1, ICANS score 10  - PM CRS GRADE 1, ICANS score 10  4/30 AM  GRADE 0, ICANS score - 10  4/30 PM score GRADE 1, ICANS score - 10    I have written the above note for Dr. Banks who performed service with me in the room.   Miki Espinoza PA-C ([phone])    I have seen and examined patient with PA, I agree with above note as scribed.

## 2022-05-01 LAB
ALBUMIN SERPL ELPH-MCNC: 4 G/DL — SIGNIFICANT CHANGE UP (ref 3.3–5)
ALBUMIN SERPL ELPH-MCNC: 4.1 G/DL — SIGNIFICANT CHANGE UP (ref 3.3–5)
ALP SERPL-CCNC: 63 U/L — SIGNIFICANT CHANGE UP (ref 40–120)
ALP SERPL-CCNC: 64 U/L — SIGNIFICANT CHANGE UP (ref 40–120)
ALT FLD-CCNC: 67 U/L — HIGH (ref 10–45)
ALT FLD-CCNC: 71 U/L — HIGH (ref 10–45)
ANION GAP SERPL CALC-SCNC: 11 MMOL/L — SIGNIFICANT CHANGE UP (ref 5–17)
ANION GAP SERPL CALC-SCNC: 12 MMOL/L — SIGNIFICANT CHANGE UP (ref 5–17)
ANISOCYTOSIS BLD QL: SLIGHT — SIGNIFICANT CHANGE UP
APTT BLD: 23.7 SEC — LOW (ref 27.5–35.5)
AST SERPL-CCNC: 18 U/L — SIGNIFICANT CHANGE UP (ref 10–40)
AST SERPL-CCNC: 25 U/L — SIGNIFICANT CHANGE UP (ref 10–40)
BASOPHILS # BLD AUTO: 0.17 K/UL — SIGNIFICANT CHANGE UP (ref 0–0.2)
BASOPHILS NFR BLD AUTO: 4.5 % — HIGH (ref 0–2)
BILIRUB DIRECT SERPL-MCNC: <0.1 MG/DL — SIGNIFICANT CHANGE UP (ref 0–0.3)
BILIRUB INDIRECT FLD-MCNC: >0.3 MG/DL — SIGNIFICANT CHANGE UP (ref 0.2–1)
BILIRUB SERPL-MCNC: 0.3 MG/DL — SIGNIFICANT CHANGE UP (ref 0.2–1.2)
BILIRUB SERPL-MCNC: 0.4 MG/DL — SIGNIFICANT CHANGE UP (ref 0.2–1.2)
BUN SERPL-MCNC: 19 MG/DL — SIGNIFICANT CHANGE UP (ref 7–23)
BUN SERPL-MCNC: 24 MG/DL — HIGH (ref 7–23)
CALCIUM SERPL-MCNC: 8.7 MG/DL — SIGNIFICANT CHANGE UP (ref 8.4–10.5)
CALCIUM SERPL-MCNC: 8.9 MG/DL — SIGNIFICANT CHANGE UP (ref 8.4–10.5)
CHLORIDE SERPL-SCNC: 100 MMOL/L — SIGNIFICANT CHANGE UP (ref 96–108)
CHLORIDE SERPL-SCNC: 98 MMOL/L — SIGNIFICANT CHANGE UP (ref 96–108)
CK SERPL-CCNC: 17 U/L — LOW (ref 30–200)
CO2 SERPL-SCNC: 25 MMOL/L — SIGNIFICANT CHANGE UP (ref 22–31)
CO2 SERPL-SCNC: 25 MMOL/L — SIGNIFICANT CHANGE UP (ref 22–31)
CREAT SERPL-MCNC: 0.85 MG/DL — SIGNIFICANT CHANGE UP (ref 0.5–1.3)
CREAT SERPL-MCNC: 1.02 MG/DL — SIGNIFICANT CHANGE UP (ref 0.5–1.3)
CRP SERPL-MCNC: <3 MG/L — SIGNIFICANT CHANGE UP (ref 0–4)
CULTURE RESULTS: SIGNIFICANT CHANGE UP
CULTURE RESULTS: SIGNIFICANT CHANGE UP
D DIMER BLD IA.RAPID-MCNC: 726 NG/ML DDU — HIGH
EGFR: 101 ML/MIN/1.73M2 — SIGNIFICANT CHANGE UP
EGFR: 86 ML/MIN/1.73M2 — SIGNIFICANT CHANGE UP
EOSINOPHIL # BLD AUTO: 0 K/UL — SIGNIFICANT CHANGE UP (ref 0–0.5)
EOSINOPHIL NFR BLD AUTO: 0 % — SIGNIFICANT CHANGE UP (ref 0–6)
FERRITIN SERPL-MCNC: 174 NG/ML — SIGNIFICANT CHANGE UP (ref 30–400)
FIBRINOGEN PPP-MCNC: 289 MG/DL — LOW (ref 330–520)
GLUCOSE SERPL-MCNC: 154 MG/DL — HIGH (ref 70–99)
GLUCOSE SERPL-MCNC: 157 MG/DL — HIGH (ref 70–99)
HCT VFR BLD CALC: 36.6 % — LOW (ref 39–50)
HCT VFR BLD CALC: 38.9 % — LOW (ref 39–50)
HGB BLD-MCNC: 11.9 G/DL — LOW (ref 13–17)
HGB BLD-MCNC: 12.7 G/DL — LOW (ref 13–17)
INR BLD: 1.11 RATIO — SIGNIFICANT CHANGE UP (ref 0.88–1.16)
LDH SERPL L TO P-CCNC: 157 U/L — SIGNIFICANT CHANGE UP (ref 50–242)
LDH SERPL L TO P-CCNC: 173 U/L — SIGNIFICANT CHANGE UP (ref 50–242)
LYMPHOCYTES # BLD AUTO: 0.6 K/UL — LOW (ref 1–3.3)
LYMPHOCYTES # BLD AUTO: 16.1 % — SIGNIFICANT CHANGE UP (ref 13–44)
MACROCYTES BLD QL: SLIGHT — SIGNIFICANT CHANGE UP
MAGNESIUM SERPL-MCNC: 1.9 MG/DL — SIGNIFICANT CHANGE UP (ref 1.6–2.6)
MAGNESIUM SERPL-MCNC: 1.9 MG/DL — SIGNIFICANT CHANGE UP (ref 1.6–2.6)
MANUAL SMEAR VERIFICATION: SIGNIFICANT CHANGE UP
MCHC RBC-ENTMCNC: 31 PG — SIGNIFICANT CHANGE UP (ref 27–34)
MCHC RBC-ENTMCNC: 31.3 PG — SIGNIFICANT CHANGE UP (ref 27–34)
MCHC RBC-ENTMCNC: 32.5 GM/DL — SIGNIFICANT CHANGE UP (ref 32–36)
MCHC RBC-ENTMCNC: 32.6 GM/DL — SIGNIFICANT CHANGE UP (ref 32–36)
MCV RBC AUTO: 95.3 FL — SIGNIFICANT CHANGE UP (ref 80–100)
MCV RBC AUTO: 95.8 FL — SIGNIFICANT CHANGE UP (ref 80–100)
METAMYELOCYTES # FLD: 0.9 % — HIGH (ref 0–0)
MONOCYTES # BLD AUTO: 0.3 K/UL — SIGNIFICANT CHANGE UP (ref 0–0.9)
MONOCYTES NFR BLD AUTO: 8 % — SIGNIFICANT CHANGE UP (ref 2–14)
MYELOCYTES NFR BLD: 2.7 % — HIGH (ref 0–0)
NEUTROPHILS # BLD AUTO: 2.51 K/UL — SIGNIFICANT CHANGE UP (ref 1.8–7.4)
NEUTROPHILS NFR BLD AUTO: 66.9 % — SIGNIFICANT CHANGE UP (ref 43–77)
NEUTS BAND # BLD: 0.9 % — SIGNIFICANT CHANGE UP (ref 0–8)
NRBC # BLD: 0 /100 WBCS — SIGNIFICANT CHANGE UP (ref 0–0)
NT-PROBNP SERPL-SCNC: 44 PG/ML — SIGNIFICANT CHANGE UP (ref 0–300)
PHOSPHATE SERPL-MCNC: 2.4 MG/DL — LOW (ref 2.5–4.5)
PHOSPHATE SERPL-MCNC: 2.8 MG/DL — SIGNIFICANT CHANGE UP (ref 2.5–4.5)
PLAT MORPH BLD: NORMAL — SIGNIFICANT CHANGE UP
PLATELET # BLD AUTO: 256 K/UL — SIGNIFICANT CHANGE UP (ref 150–400)
PLATELET # BLD AUTO: 257 K/UL — SIGNIFICANT CHANGE UP (ref 150–400)
POTASSIUM SERPL-MCNC: 4.3 MMOL/L — SIGNIFICANT CHANGE UP (ref 3.5–5.3)
POTASSIUM SERPL-MCNC: 4.7 MMOL/L — SIGNIFICANT CHANGE UP (ref 3.5–5.3)
POTASSIUM SERPL-SCNC: 4.3 MMOL/L — SIGNIFICANT CHANGE UP (ref 3.5–5.3)
POTASSIUM SERPL-SCNC: 4.7 MMOL/L — SIGNIFICANT CHANGE UP (ref 3.5–5.3)
PROT SERPL-MCNC: 5.9 G/DL — LOW (ref 6–8.3)
PROT SERPL-MCNC: 6 G/DL — SIGNIFICANT CHANGE UP (ref 6–8.3)
PROTHROM AB SERPL-ACNC: 12.9 SEC — SIGNIFICANT CHANGE UP (ref 10.5–13.4)
RBC # BLD: 3.84 M/UL — LOW (ref 4.2–5.8)
RBC # BLD: 4.06 M/UL — LOW (ref 4.2–5.8)
RBC # FLD: 14.3 % — SIGNIFICANT CHANGE UP (ref 10.3–14.5)
RBC # FLD: 14.5 % — SIGNIFICANT CHANGE UP (ref 10.3–14.5)
RBC BLD AUTO: SIGNIFICANT CHANGE UP
SODIUM SERPL-SCNC: 135 MMOL/L — SIGNIFICANT CHANGE UP (ref 135–145)
SODIUM SERPL-SCNC: 136 MMOL/L — SIGNIFICANT CHANGE UP (ref 135–145)
SPECIMEN SOURCE: SIGNIFICANT CHANGE UP
SPECIMEN SOURCE: SIGNIFICANT CHANGE UP
URATE SERPL-MCNC: 3 MG/DL — LOW (ref 3.4–8.8)
URATE SERPL-MCNC: 3.2 MG/DL — LOW (ref 3.4–8.8)
WBC # BLD: 3.7 K/UL — LOW (ref 3.8–10.5)
WBC # BLD: 6.16 K/UL — SIGNIFICANT CHANGE UP (ref 3.8–10.5)
WBC # FLD AUTO: 3.7 K/UL — LOW (ref 3.8–10.5)
WBC # FLD AUTO: 6.16 K/UL — SIGNIFICANT CHANGE UP (ref 3.8–10.5)

## 2022-05-01 PROCEDURE — 99291 CRITICAL CARE FIRST HOUR: CPT

## 2022-05-01 PROCEDURE — 99231 SBSQ HOSP IP/OBS SF/LOW 25: CPT | Mod: GC

## 2022-05-01 RX ORDER — SODIUM,POTASSIUM PHOSPHATES 278-250MG
1 POWDER IN PACKET (EA) ORAL
Refills: 0 | Status: COMPLETED | OUTPATIENT
Start: 2022-05-01 | End: 2022-05-02

## 2022-05-01 RX ADMIN — Medication 1 TABLET(S): at 12:00

## 2022-05-01 RX ADMIN — Medication 5 MILLILITER(S): at 15:27

## 2022-05-01 RX ADMIN — SODIUM CHLORIDE 100 MILLILITER(S): 9 INJECTION INTRAMUSCULAR; INTRAVENOUS; SUBCUTANEOUS at 05:16

## 2022-05-01 RX ADMIN — CHLORHEXIDINE GLUCONATE 1 APPLICATION(S): 213 SOLUTION TOPICAL at 07:27

## 2022-05-01 RX ADMIN — Medication 300 MILLIGRAM(S): at 12:02

## 2022-05-01 RX ADMIN — SODIUM CHLORIDE 100 MILLILITER(S): 9 INJECTION INTRAMUSCULAR; INTRAVENOUS; SUBCUTANEOUS at 19:48

## 2022-05-01 RX ADMIN — Medication 1 LOZENGE: at 00:38

## 2022-05-01 RX ADMIN — GABAPENTIN 100 MILLIGRAM(S): 400 CAPSULE ORAL at 05:17

## 2022-05-01 RX ADMIN — Medication 5 MILLILITER(S): at 19:49

## 2022-05-01 RX ADMIN — Medication 10 MILLILITER(S): at 00:38

## 2022-05-01 RX ADMIN — PANTOPRAZOLE SODIUM 40 MILLIGRAM(S): 20 TABLET, DELAYED RELEASE ORAL at 05:16

## 2022-05-01 RX ADMIN — Medication 1 PACKET(S): at 16:50

## 2022-05-01 RX ADMIN — Medication 10 MILLILITER(S): at 23:12

## 2022-05-01 RX ADMIN — Medication 400 MILLIGRAM(S): at 13:02

## 2022-05-01 RX ADMIN — Medication 1 LOZENGE: at 19:48

## 2022-05-01 RX ADMIN — CARVEDILOL PHOSPHATE 3.12 MILLIGRAM(S): 80 CAPSULE, EXTENDED RELEASE ORAL at 16:49

## 2022-05-01 RX ADMIN — Medication 10 MILLILITER(S): at 19:49

## 2022-05-01 RX ADMIN — Medication 5 MILLILITER(S): at 23:11

## 2022-05-01 RX ADMIN — LEVETIRACETAM 500 MILLIGRAM(S): 250 TABLET, FILM COATED ORAL at 05:16

## 2022-05-01 RX ADMIN — Medication 10 MILLILITER(S): at 15:27

## 2022-05-01 RX ADMIN — Medication 1 LOZENGE: at 23:12

## 2022-05-01 RX ADMIN — Medication 400 MILLIGRAM(S): at 05:17

## 2022-05-01 RX ADMIN — CARVEDILOL PHOSPHATE 3.12 MILLIGRAM(S): 80 CAPSULE, EXTENDED RELEASE ORAL at 05:17

## 2022-05-01 RX ADMIN — Medication 1 LOZENGE: at 12:02

## 2022-05-01 RX ADMIN — Medication 5 MILLILITER(S): at 12:01

## 2022-05-01 RX ADMIN — Medication 1 MILLIGRAM(S): at 12:00

## 2022-05-01 RX ADMIN — Medication 10 MILLILITER(S): at 07:26

## 2022-05-01 RX ADMIN — CEFEPIME 100 MILLIGRAM(S): 1 INJECTION, POWDER, FOR SOLUTION INTRAMUSCULAR; INTRAVENOUS at 05:18

## 2022-05-01 RX ADMIN — Medication 400 MILLIGRAM(S): at 22:11

## 2022-05-01 RX ADMIN — FLUCONAZOLE 400 MILLIGRAM(S): 150 TABLET ORAL at 12:00

## 2022-05-01 RX ADMIN — SENNA PLUS 1 TABLET(S): 8.6 TABLET ORAL at 10:40

## 2022-05-01 RX ADMIN — LEVETIRACETAM 500 MILLIGRAM(S): 250 TABLET, FILM COATED ORAL at 16:49

## 2022-05-01 RX ADMIN — GABAPENTIN 100 MILLIGRAM(S): 400 CAPSULE ORAL at 13:02

## 2022-05-01 RX ADMIN — Medication 1 LOZENGE: at 07:26

## 2022-05-01 RX ADMIN — GABAPENTIN 100 MILLIGRAM(S): 400 CAPSULE ORAL at 22:11

## 2022-05-01 RX ADMIN — PANTOPRAZOLE SODIUM 40 MILLIGRAM(S): 20 TABLET, DELAYED RELEASE ORAL at 16:49

## 2022-05-01 RX ADMIN — Medication 1 LOZENGE: at 15:27

## 2022-05-01 RX ADMIN — Medication 10 MILLILITER(S): at 12:01

## 2022-05-01 RX ADMIN — Medication 5 MILLILITER(S): at 07:26

## 2022-05-01 RX ADMIN — Medication 5 MILLILITER(S): at 00:38

## 2022-05-01 NOTE — PROGRESS NOTE ADULT - PROBLEM SELECTOR PLAN 9
IV chemotherapy as part of CAR-T treatment  Continue to monitor for known adverse effects/symptoms  Risk of infectious complications given anticipated impact on hematopoietic lineages and resultant immune compromise  Monitor for neurologic changes and/or signs of CRS  PPx/Tx per primary team    Relevant considerations: Treatment for CRS  Neurotox 2

## 2022-05-01 NOTE — PROGRESS NOTE ADULT - PROBLEM SELECTOR PLAN 1
No Sz like activity  No myoclonus  Subtle changes apparent to the patient and his wife  He reports challenges in processing the speech of others as quickly   Difficulties with language and memory, but no gross deficits:  Wife reports his language has changed: verbosity has decreased, less descriptive  d/w Cellular Therapies team  Also on IV keppra  No overt/gross deficits  Continue to monitor

## 2022-05-01 NOTE — PROGRESS NOTE ADULT - PROBLEM SELECTOR PLAN 12
Recommendation: Condition: Mantel Cell Lymphoma  Previous transplant: AUTO SCT 2017  Active treatment:  CAR-T infusion  Transplant Type: Tecartus (brexucabtagene autoleucel)  Transplant Day: Day +13 (Tx day 4/19/2022)  Clinical impact on complexity: Significant.

## 2022-05-01 NOTE — PROGRESS NOTE ADULT - NS PANP COMMENT GEN_ALL_CORE FT
Admitted for CAR T cell therapy with Tecartus (bresucabtagene autoleucel)  today is + 12..some nausea..now improved...monitor closely for CRS and neurotox...pt is at lower risk for these complications given minimal disease burden..hoever he has been on and off with high temps since fri evening..first dose of toci given for grade 1 crs......s/p 4 total  doses of  toci and dex 4 mg X 4 and 10 mg X 1...improved..this am 8/10 HA with somulence...c/w grade 2 neurotox...Stat ct head, MRI..dose dex 10 mg...improved 4/28..4/29, 30 body aches..improved with oxycodone..possibly neuropathic...on gabapentin..4/30 given dex 4 mg x1...temp 38.1  4/30 temp noted...may consider additional dex  1. Admit to BMTU   2. Day -5 through day -1 record CARTOX / ICANS score daily   3. Day - 5 through day - 3 Fludarabine 30mg / m2 IV given over 30 minutes daily   4. Day - 5 - start cyclophosphamide hydration - 0.9% NS + 10mEq KCL / L at 75 ml/m2 and continue for 24 hours post infusion of last dose of CTX   5. Day - 5 through day - 3 CTX 500mg / m2 IV QD to be given over 2 hours   6. Starting day - 5 monitor labs BID: CBC with differential, CMP, LDH, uric acid, mag, phos, CPK, BNP, PT / PTT / INR / Fibrinogen, d-dimer, CRP, hepatic profile, ferritin  7. Weekly IL-6 levels on Monday, twice weekly (Monday / Thursday) CMV / EBV PCR, three times a week (Monday / Wednesday / Friday) type and screen   8. On day - 5 start allopurinol 300mg po QD   9. Day -2 and -1 REST DAYS   10. On day -1 begin to record CARTOX / ICANS score q 12 hours   11. Day 0 infuse Brexucabtagene autoleucel  hx of afib...seen by [facility] cardiology  12. F/U cx's..on cefapime,  ...acyclovir and fluconazole..send rvp  13 Cont tele and keppra  4/16-17 Doing well, I/Os are negative, transient lip swelling, now resolved, some fatigue  4/18, 19, 20, 21, 22 CRS 0, ICANS 10  4/22/22-[place]ase fever overnight, chills, given Toci, was started on Cefipime. Stable. CRS / ICANS scoring this morning 10.  4/23/22, 4/24/22 CRS 1 ICANS 10  4/24/22- Fever 102oF, complaints of bilateral upper and lower extremities cramps, chest discomfort, EKG no changes. Patient was treated with Toci/Decadron as per protocol. Morphine prn for pain. Clinically improved. Pancultures sent.  4/25/22 CRS 2...persistent high temps.....ICANS 10...tremor noted with fever...dex 10 mg dosed today  4/26/22 CRS 1, Icans 10  4/27 crs 1, Icans 10...neurotox 2  4/28, 4/29,  crs 0..icans 10  4/30 CRS 1  ICANS 10  5/1 CRS 0, ICANS 10...d/c cefapime...d/c planning

## 2022-05-01 NOTE — PROGRESS NOTE ADULT - PROBLEM SELECTOR PLAN 7
intermittent HA  and back pain with febrile episodes  Mild oral pain  Back pain controlled on PRN Oxycodone 5mg q6h (used x2 in 24hr)

## 2022-05-01 NOTE — PROGRESS NOTE ADULT - SUBJECTIVE AND OBJECTIVE BOX
HPC Transplant Team                                                      Critical / Counseling Time Provided: 30 minutes                                                                                                                                                        Chief Complaint: CAR T therapy with [employer] product for treatment of relapsed, refractory MCL    S: Patient seen and examined with HPC Transplant Team:       O:     Vital Signs Last 24 Hrs  T(C): 37.1 (01 May 2022 05:15), Max: 38.1 (30 Apr 2022 17:14)  T(F): 98.8 (01 May 2022 05:15), Max: 100.6 (30 Apr 2022 17:14)  HR: 80 (01 May 2022 05:15) (71 - 98)  BP: 111/69 (01 May 2022 05:15) (106/73 - 127/84)  BP(mean): --  RR: 18 (01 May 2022 05:15) (18 - 19)  SpO2: 96% (01 May 2022 05:15) (94% - 98%)    Admit weight: 100.4kg      Daily Weight in kG:     Intake / Output:   04-30 @ 07:01  -  05-01 @ 07:00  --------------------------------------------------------  IN: 3302 mL / OUT: 4175 mL / NET: -873 mL    PE:   Oropharynx: no erythema or ulcerations ,   Oral Mucositis:  NA                                                   Grade: n/a  CVS: S1, S2 RRR   Lungs: CTA throughout bilaterally   Abdomen: + BS x 4, soft, NT, ND   Extremities: no edema BLE's  Gastric Mucositis:       -                                           Grade: n/a  Intestinal Mucositis:     -                                         Grade: n/a   Skin: no rashes  TLC: CDI   Neuro: A&O x 3   Pain: 0        Labs:   CBC Full  -  ( 01 May 2022 05:48 )  WBC Count : 3.70 K/uL  Hemoglobin : 12.7 g/dL  Hematocrit : 38.9 %  Platelet Count - Automated : 256 K/uL  Mean Cell Volume : 95.8 fl  Mean Cell Hemoglobin : 31.3 pg  Mean Cell Hemoglobin Concentration : 32.6 gm/dL  Auto Neutrophil # : 2.51 K/uL  Auto Lymphocyte # : 0.60 K/uL  Auto Monocyte # : 0.30 K/uL  Auto Eosinophil # : 0.00 K/uL  Auto Basophil # : 0.17 K/uL  Auto Neutrophil % : 66.9 %  Auto Lymphocyte % : 16.1 %  Auto Monocyte % : 8.0 %  Auto Eosinophil % : 0.0 %  Auto Basophil % : 4.5 %                          12.7   3.70  )-----------( 256      ( 01 May 2022 05:48 )             38.9     05-01    135  |  98  |  19  ----------------------------<  157<H>  4.7   |  25  |  0.85    Ca    8.9      01 May 2022 05:48  Phos  2.8     05-01  Mg     1.9     05-01    TPro  6.0  /  Alb  4.1  /  TBili  0.4  /  DBili  <0.1  /  AST  25  /  ALT  71<H>  /  AlkPhos  64  05-01    PT/INR - ( 01 May 2022 05:48 )   PT: 12.9 sec;   INR: 1.11 ratio         PTT - ( 01 May 2022 05:48 )  PTT:23.7 sec  LIVER FUNCTIONS - ( 01 May 2022 05:48 )  Alb: 4.1 g/dL / Pro: 6.0 g/dL / ALK PHOS: 64 U/L / ALT: 71 U/L / AST: 25 U/L / GGT: x           Lactate Dehydrogenase, Serum: 173 U/L (05-01 @ 05:48)  Lactate Dehydrogenase, Serum: 144 U/L (04-30 @ 15:46)          Cultures:     Culture - Urine (04.29.22 @ 22:16)   Specimen Source: Clean Catch Clean Catch (Midstream)   Culture Results: No growth   Culture - Blood (04.29.22 @ 17:06)   Specimen Source: .Blood Blood   Culture Results: No growth to date.   Culture - Blood (04.29.22 @ 17:06)   Specimen Source: .Blood Blood   Culture Results: No growth to date.   Culture - Blood (04.26.22 @ 19:54)   Specimen Source: .Blood Blood   Culture Results: No growth to date.   Culture - Blood (04.26.22 @ 19:54)   Specimen Source: .Blood Blood   Culture Results:   No growth to date.     Culture - Urine (04.25.22 @ 13:44)   Specimen Source: Clean Catch Clean Catch (Midstream)   Culture Results: No growth Culture - Blood (04.24.22 @ 16:40)   Specimen Source: .Blood Blood-Catheter   Culture Results:   No growth to date. Culture - Blood (04.24.22 @ 16:40)   Specimen Source: .Blood Blood-Catheter   Culture Results: FINAL (04.23.22 @ 01:04)   Specimen Source: .Blood Blood   Culture Results: Final    Radiology:       Meds:   Antimicrobials:   acyclovir   Oral Tab/Cap 400 milliGRAM(s) Oral every 8 hours  cefepime   IVPB      cefepime   IVPB 2000 milliGRAM(s) IV Intermittent every 8 hours  clotrimazole Lozenge 1 Lozenge Oral five times a day  fluconAZOLE   Tablet 400 milliGRAM(s) Oral daily      Heme / Onc:       GI:  loperamide 2 milliGRAM(s) Oral every 4 hours PRN  pantoprazole    Tablet 40 milliGRAM(s) Oral two times a day  polyethylene glycol 3350 17 Gram(s) Oral daily PRN  senna 1 Tablet(s) Oral at bedtime PRN  sodium bicarbonate Mouth Rinse 10 milliLiter(s) Swish and Spit five times a day      Cardiovascular:   carvedilol 3.125 milliGRAM(s) Oral every 12 hours      Immunologic:       Other medications:   acetaminophen     Tablet .. 650 milliGRAM(s) Oral every 6 hours  allopurinol 300 milliGRAM(s) Oral daily  Biotene Dry Mouth Oral Rinse 5 milliLiter(s) Swish and Spit five times a day  chlorhexidine 4% Liquid 1 Application(s) Topical <User Schedule>  folic acid 1 milliGRAM(s) Oral daily  gabapentin 100 milliGRAM(s) Oral three times a day  levETIRAcetam 500 milliGRAM(s) Oral two times a day  multivitamin 1 Tablet(s) Oral daily  sodium chloride 0.9%. 1000 milliLiter(s) IV Continuous <Continuous>      PRN:   acetaminophen     Tablet .. 650 milliGRAM(s) Oral every 6 hours PRN  loperamide 2 milliGRAM(s) Oral every 4 hours PRN  metoclopramide Injectable 10 milliGRAM(s) IV Push every 6 hours PRN  ondansetron Injectable 8 milliGRAM(s) IV Push every 8 hours PRN  oxyCODONE    IR 5 milliGRAM(s) Oral every 6 hours PRN  polyethylene glycol 3350 17 Gram(s) Oral daily PRN  senna 1 Tablet(s) Oral at bedtime PRN  sodium chloride 0.9% lock flush 10 milliLiter(s) IV Push every 1 hour PRN      A/P: 56 year old male with relapsed, refractory MCL s/p R-CHOP x 4, RICE x 2, maintenance RTX, salvage Calquence, admitted for CAR T cell therapy with [employer] product. Disease status at time of admission is partial response.   Day +  12  Monitor for CRS and neurotox...follow CRS/neuro tox protocol - GIVEN TOCILIZUMAB 4/22 pm and  4/24 AM for CRS - 1     - Relapsed, refractory MCL   4/15- day - 4- flu / ctx 2/3 - continue CTX hydration until 24 hours post infusion of last dose   Strict I&O, daily weights, prn diuresis   CRS / ICANS score daily until infusion of CAR T cells (4/19/22) and twice daily thereafter   BID labs 0500 & 1500 - monitor for TLS   4/22-CRS grade 1 s/p Tocilizumab on tele started on Keppra as per protocol, pancx, started on cefepime  s/p dexamethasone 4 mg IV x1  4/24 F/U repeat blood cx's for fevers (4/22 and 4/24) s/p Toci x2 s/p dexamethasone 4 mg IV x2 also with CP: EKG WNL trops negative s/p morphine with good effect   4/25- CRS grade 2( persistent fevers) given Toci x1 and dexamethasone 4 mg IV overnight, continues to be febrile Tmax 104 F tremors given additional Dexamethasone 10 mg IV  4/26- fever curve improved: CRS grade 1  4/27- Grade 2 neurotox with 10/10 Headaches- taken for CT head (with no abnormalities no cerebral edema), s.p Dexamethasone 10 mg IV x 1, MRI head no significant findings   4/29 Grade 1 transaminitis -Monitor closely  4/29 Discharge planning next week    4/30 Decadron 4mg IV given for Grade 1 CRS      -  Prophylactic measures   acyclovir   Oral Tab/Cap 400 milliGRAM(s) Oral every 8 hours  cefepime   IVPB      cefepime   IVPB 2000 milliGRAM(s) IV Intermittent every 8 hours  clotrimazole Lozenge 1 Lozenge Oral five times a day  fluconAZOLE   Tablet 400 milliGRAM(s) Oral daily  Keppra 500 q12h  GI Prophylaxis:  Protonix      - Cardiac / Hx Afib   F/U repeat troponin,  now CP free. ECG non ischemic   Continue Tele monitoring   Continue Coreg bid     -  Mouth care - NS / NaHCO3 rinses, Mycelex, Biotene; Skin care      - Transfuse & replete electrolytes prn     - IV hydration, daily weights, strict I&O, prn diuresis      -  Antiemetics, anti-diarrhea medications:   LORazepam   Injectable 1 milliGRAM(s) IV Push every 6 hours PRN  metoclopramide Injectable 10 milliGRAM(s) IV Push every 6 hours PRN  ondansetron 8mg IV q 8 hours prn nausea      - CRS / ICANS scoring   4/18 CRS grade 0, ICANS score 10   4/19- CRS grade 0, ICANS score 10   4/20 AM - CRS grade 0, ICANS score 10   4/20-pm-CRS grade 0, ICANS score 10  4/21 AM - CRS grade 0, ICANS score 10   4/21 PM CRS grade 0, ICANS score 10  4/22 AM CRS grade 0, ICANS score 10  4/22 PM CRS grade 0, ICANS score 10 --> 9PM CRS score - 1, ICANS - 10  4/23 AM CRS grade 0, ICANS score 10  4/23 PM CRS grade 0, ICANS score 10  4/24 AM CRS grade 1 10am (grade 0 @ 8:30am), ICANS score 10  4/25M CRS grade 2( persistently febrile) ICANS score 10   4/26 AM CRS GRADE 1, ICANS score 10  4/26- AM CRS GRADE 1, ICANS score 10  4/27 AM CRS GRADE 1, ICANS score 10  4/27 noon: with persistent headache: Grade 2 neurotox-   taken for CT head( ( with no abnormalities no cerebral edema) s.p Dexamethasone 10 mg IV pending  MR head  4/27 PM CRS GRADE 1, ICANS score 10( HA resolved)   4/28- AM CRS GRADE 1, ICANS score 10  4/28- PM CRS GRADE 1, ICANS score 10  4/29- PM CRS GRADE 1, ICANS score 10  4/30 AM  GRADE 0, ICANS score - 10  4/30 PM score GRADE 1, ICANS score - 10    I have written the above note for Dr. Banks who performed service with me in the room.   Miki Espinoza PA-C ([phone])    I have seen and examined patient with PA, I agree with above note as scribed.                  [facility]PC Transplant Team                                                      Critical / Counseling Time Provided: 30 minutes                                                                                                                                                        Chief Complaint: CAR T therapy with [employer] product for treatment of relapsed, refractory MCL    S: Patient seen and examined with HPC Transplant Team: No overnight events  +Fatigue  +body pain  + dizziness (improved)    O: Denies HA's, N/V, CP/palp's, SOB, mouth/throat pain     Vital Signs Last 24 Hrs  T(C): 37.1 (01 May 2022 05:15), Max: 38.1 (2022 17:14)  T(F): 98.8 (01 May 2022 05:15), Max: 100.6 (2022 17:14)  HR: 80 (01 May 2022 05:15) (71 - 98)  BP: 111/69 (01 May 2022 05:15) (106/73 - 127/84)  BP(mean): --  RR: 18 (01 May 2022 05:15) (18 - 19)  SpO2: 96% (01 May 2022 05:15) (94% - 98%)    Admit weight: 100.4kg      Daily Weight in k.5kg    Intake / Output:   30 @ 07:01  -  05-01 @ 07:00  --------------------------------------------------------  IN: 3302 mL / OUT: 4175 mL / NET: -873 mL    PE:   Oropharynx: no erythema or ulcerations ,   Oral Mucositis:  NA                                                   Grade: n/a  CVS: S1, S2 RRR   Lungs: CTA throughout bilaterally   Abdomen: + BS x 4, soft, NT, ND   Extremities: no edema BLE's  Gastric Mucositis:       -                                           Grade: n/a  Intestinal Mucositis:     -                                         Grade: n/a   Skin: no rashes  TLC: CDI   Neuro: A&O x 3   Pain: 0        Labs:   CBC Full  -  ( 01 May 2022 05:48 )  WBC Count : 3.70 K/uL  Hemoglobin : 12.7 g/dL  Hematocrit : 38.9 %  Platelet Count - Automated : 256 K/uL  Mean Cell Volume : 95.8 fl  Mean Cell Hemoglobin : 31.3 pg  Mean Cell Hemoglobin Concentration : 32.6 gm/dL  Auto Neutrophil # : 2.51 K/uL  Auto Lymphocyte # : 0.60 K/uL  Auto Monocyte # : 0.30 K/uL  Auto Eosinophil # : 0.00 K/uL  Auto Basophil # : 0.17 K/uL  Auto Neutrophil % : 66.9 %  Auto Lymphocyte % : 16.1 %  Auto Monocyte % : 8.0 %  Auto Eosinophil % : 0.0 %  Auto Basophil % : 4.5 %                          12.7   3.70  )-----------( 256      ( 01 May 2022 05:48 )             38.9     -    135  |  98  |  19  ----------------------------<  157<H>  4.7   |  25  |  0.85    Ca    8.9      01 May 2022 05:48  Phos  2.8       Mg     1.9         TPro  6.0  /  Alb  4.1  /  TBili  0.4  /  DBili  <0.1  /  AST  25  /  ALT  71<H>  /  AlkPhos  64      PT/INR - ( 01 May 2022 05:48 )   PT: 12.9 sec;   INR: 1.11 ratio         PTT - ( 01 May 2022 05:48 )  PTT:23.7 sec  LIVER FUNCTIONS - ( 01 May 2022 05:48 )  Alb: 4.1 g/dL / Pro: 6.0 g/dL / ALK PHOS: 64 U/L / ALT: 71 U/L / AST: 25 U/L / GGT: x           Lactate Dehydrogenase, Serum: 173 U/L ( @ 05:48)  Lactate Dehydrogenase, Serum: 144 U/L ( @ 15:46)          Cultures:         Culture - Urine (22 @ 22:16)   Specimen Source: Clean Catch Clean Catch (Midstream)   Culture Results: No growth   Culture - Blood (22 @ 17:06)   Specimen Source: .Blood Blood   Culture Results: No growth to date.   Culture - Blood (22 @ 17:06)   Specimen Source: .Blood Blood   Culture Results: No growth to date.   Culture - Blood (22 @ 19:54)   Specimen Source: .Blood Blood   Culture Results: No growth to date.   Culture - Blood (22 @ 19:54)   Specimen Source: .Blood Blood   Culture Results:   No growth to date.     Culture - Urine (22 @ 13:44)   Specimen Source: Clean Catch Clean Catch (Midstream)   Culture Results: No growth Culture - Blood (22 @ 16:40)   Specimen Source: .Blood Blood-Catheter   Culture Results:   No growth to date. Culture - Blood (22 @ 16:40)   Specimen Source: .Blood Blood-Catheter   Culture Results: FINAL (22 @ 01:04)   Specimen Source: .Blood Blood   Culture Results: Final    Radiology:     Xray Chest 1 View- PORTABLE-Urgent (Xray Chest 1 View- PORTABLE-Urgent .) (22 @ 10:46) >    IMPRESSION:  Clear lungs      Meds:   Antimicrobials:   acyclovir   Oral Tab/Cap 400 milliGRAM(s) Oral every 8 hours  cefepime   IVPB      cefepime   IVPB 2000 milliGRAM(s) IV Intermittent every 8 hours  clotrimazole Lozenge 1 Lozenge Oral five times a day  fluconAZOLE   Tablet 400 milliGRAM(s) Oral daily      Heme / Onc:       GI:  loperamide 2 milliGRAM(s) Oral every 4 hours PRN  pantoprazole    Tablet 40 milliGRAM(s) Oral two times a day  polyethylene glycol 3350 17 Gram(s) Oral daily PRN  senna 1 Tablet(s) Oral at bedtime PRN  sodium bicarbonate Mouth Rinse 10 milliLiter(s) Swish and Spit five times a day      Cardiovascular:   carvedilol 3.125 milliGRAM(s) Oral every 12 hours      Immunologic:       Other medications:   acetaminophen     Tablet .. 650 milliGRAM(s) Oral every 6 hours  allopurinol 300 milliGRAM(s) Oral daily  Biotene Dry Mouth Oral Rinse 5 milliLiter(s) Swish and Spit five times a day  chlorhexidine 4% Liquid 1 Application(s) Topical <User Schedule>  folic acid 1 milliGRAM(s) Oral daily  gabapentin 100 milliGRAM(s) Oral three times a day  levETIRAcetam 500 milliGRAM(s) Oral two times a day  multivitamin 1 Tablet(s) Oral daily  sodium chloride 0.9%. 1000 milliLiter(s) IV Continuous <Continuous>      PRN:   acetaminophen     Tablet .. 650 milliGRAM(s) Oral every 6 hours PRN  loperamide 2 milliGRAM(s) Oral every 4 hours PRN  metoclopramide Injectable 10 milliGRAM(s) IV Push every 6 hours PRN  ondansetron Injectable 8 milliGRAM(s) IV Push every 8 hours PRN  oxyCODONE    IR 5 milliGRAM(s) Oral every 6 hours PRN  polyethylene glycol 3350 17 Gram(s) Oral daily PRN  senna 1 Tablet(s) Oral at bedtime PRN  sodium chloride 0.9% lock flush 10 milliLiter(s) IV Push every 1 hour PRN      A/P: 56 year old male with relapsed, refractory MCL s/p R-CHOP x 4, RICE x 2, maintenance RTX, salvage [place]qu[place], admitted for CAR T cell therapy with [employer] product. Disease status at time of admission is partial response.   Day +12  Monitor for CRS and neurotox...follow CRS/neuro tox protocol - GIVEN TOCILIZUMAB  pm and   AM for CRS - 1     - Relapsed, refractory MCL   4/15- day - 4- flu / ctx 2/3 - continue CTX hydration until 24 hours post infusion of last dose   Strict I&O, daily weights, prn diuresis   CRS / ICANS score daily until infusion of CAR T cells (22) and twice daily thereafter   BID labs 0500 & 1500 - monitor for TLS   -CRS grade 1 s/p Tocilizumab on tele started on Keppra as per protocol, pancx, started on cefepime  s/p dexamethasone 4 mg IV x1   F/U repeat blood cx's for fevers ( and ) s/p Toci x2 s/p dexamethasone 4 mg IV x2 also with CP: EKG WNL trops negative s/p morphine with good effect   - CRS grade 2( persistent fevers) given Toci x1 and dexamethasone 4 mg IV overnight, continues to be febrile Tmax 104 F tremors given additional Dexamethasone 10 mg IV  - fever curve improved: CRS grade 1  - Grade 2 neurotox with 10/10 Headaches- taken for CT head (with no abnormalities no cerebral edema), s.p Dexamethasone 10 mg IV x 1, MRI head no significant findings    Grade 1 transaminitis -Monitor closely   Discharge planning next week     Decadron 4mg IV given for Grade 1 CRS      -  Prophylactic measures   acyclovir   Oral Tab/Cap 400 milliGRAM(s) Oral every 8 hours  cefepime   IVPB      cefepime   IVPB 2000 milliGRAM(s) IV Intermittent every 8 hours  clotrimazole Lozenge 1 Lozenge Oral five times a day  fluconAZOLE   Tablet 400 milliGRAM(s) Oral daily  Keppra 500 q12h  GI Prophylaxis:  Protonix      - Cardiac / Hx Afib   F/U repeat troponin,  now CP free. ECG non ischemic   Continue Tele monitoring   Continue Coreg bid    - Mouth care - NS / NaHCO3 rinses, Mycelex, Biotene; Skin care     -Transfuse & replete electrolytes prn     -IV hydration, daily weights, strict I&O, prn diuresis      -  Antiemetics, anti-diarrhea medications:   LORazepam   Injectable 1 milliGRAM(s) IV Push every 6 hours PRN  metoclopramide Injectable 10 milliGRAM(s) IV Push every 6 hours PRN  ondansetron 8mg IV q 8 hours prn nausea      - CRS / ICANS scoring    CRS grade 0, ICANS score 10   - CRS grade 0, ICANS score 10    AM - CRS grade 0, ICANS score 10   -pm-CRS grade 0, ICANS score 10   AM - CRS grade 0, ICANS score 10    PM CRS grade 0, ICANS score 10   AM CRS grade 0, ICANS score 10   PM CRS grade 0, ICANS score 10 --> 9PM CRS score - 1, ICANS - 10   AM CRS grade 0, ICANS score 10   PM CRS grade 0, ICANS score 10   AM CRS grade 1 10am (grade 0 @ 8:30am), ICANS score 10  M CRS grade 2( persistently febrile) ICANS score 10    AM CRS GRADE 1, ICANS score 10  - AM CRS GRADE 1, ICANS score 10   AM CRS GRADE 1, ICANS score 10   noon: with persistent headache: Grade 2 neurotox-   taken for CT head( ( with no abnormalities no cerebral edema) s.p Dexamethasone 10 mg IV pending  MR head   PM CRS GRADE 1, ICANS score 10( HA resolved)   - AM CRS GRADE 1, ICANS score 10  - PM CRS GRADE 1, ICANS score 10  - PM CRS GRADE 1, ICANS score 10   AM  GRADE 0, ICANS score - 10   PM score GRADE 1, ICANS score - 10   AM score GRADE 0, ICANS score - 10   PM score GRADE 0, ICANS score - 10      I have written the above note for Dr. Banks who performed service with me in the room.   Miki Espinoza PA-C ([phone])    I have seen and examined patient with PA, I agree with above note as scribed.

## 2022-05-01 NOTE — PROGRESS NOTE ADULT - SUBJECTIVE AND OBJECTIVE BOX
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 719 W1    Overnight events: No major events.    Staff reports: persistent intermittent gait disturbance and LE weakness. Intermittent fever with back pain, controlled on Gabapentin and PRN Oxycodone     Patient: He reports gait disturbance with walking, which he likes to do. Reports good support from wife. [employer], open to talking with South African-speaking  this week.    Physical-->He reports slight imbalance. No HA or vision changes today.    Cognitive --> He is having challenges finding words or with comprehension. Mild cognitive slowing/processing    Family reports-->n/a  PRN use: Oxycodone 5mg x2      DNR on chart: No    Allergies  No Known Allergies  Intolerances    Palliative Global Assessment  A review of the paper chart has been conducted  HCP form present:               Yes and valid []               Yes but invalid []                No [x]   MOLST present:                   Yes and valid []               Yes but invalid []                No [x]  Incapacity form present:   Yes and valid []                Yes but invalid []                No []                 N/A [x]  Living Will:                            Yes and valid []                Yes but invalid []                No [x]    Family Heatl Care Decision Act Surrogate Decision Maker Hierarchy  [facility] Article 81 Guardian-->Spouse or domestic partner--> Adult child-->Parent-->Sibling--> Close friend    Contacts listed in the paper or electronic chart  Name Aga Kapoor Wife  Number(s) [phone]  Translation Required: None  Spouse or Partner: Aga  Family unit: Wife and children, his youngest is 13  Family history of hematologic malignancy: None  Residence: [ ] Apartment   [x] House  [ ] Other  Degree of functionality in the home: Full   Performance Status (PPSv2): 80  BMI:BMI (kg/m2): 30.6 (04-14-22 @ 10:40)  Engagement in the home:  Employment: [ ] Currently employed [ x] Exited workforce due to illness  [ ] Retired prior to illness  [ ] No/distant history of employment   Support network (degree):  ---Family:        [ ] Low  [ ] Moderate  [ x] High  ---Neighbors: [ x] Low  [ ] Moderate  [ ] High  ---Social:         [ ] Low  [ ] Moderate  [ x] High  ---Spiritual:    [ x] Low  [ ] Moderate  [ ] High  Financial concerns: TBD  Coping Strategies: Listening to music and speaking with his wife  Caregiver burden/fatigue/needs: Childcare issues/concerns given his wife's visitation/  Grief identified: [] Yes [x] No  Medical communication and decision making preferences: TBD      MEDICATIONS  (STANDING):  acetaminophen     Tablet .. 650 milliGRAM(s) Oral every 6 hours  acyclovir   Oral Tab/Cap 400 milliGRAM(s) Oral every 8 hours  allopurinol 300 milliGRAM(s) Oral daily  Biotene Dry Mouth Oral Rinse 5 milliLiter(s) Swish and Spit five times a day  carvedilol 3.125 milliGRAM(s) Oral every 12 hours  cefepime   IVPB      cefepime   IVPB 2000 milliGRAM(s) IV Intermittent every 8 hours  chlorhexidine 4% Liquid 1 Application(s) Topical <User Schedule>  clotrimazole Lozenge 1 Lozenge Oral five times a day  fluconAZOLE   Tablet 400 milliGRAM(s) Oral daily  folic acid 1 milliGRAM(s) Oral daily  gabapentin 100 milliGRAM(s) Oral three times a day  levETIRAcetam 500 milliGRAM(s) Oral two times a day  multivitamin 1 Tablet(s) Oral daily  pantoprazole    Tablet 40 milliGRAM(s) Oral two times a day  sodium bicarbonate Mouth Rinse 10 milliLiter(s) Swish and Spit five times a day  sodium chloride 0.9%. 1000 milliLiter(s) (100 mL/Hr) IV Continuous <Continuous>    MEDICATIONS  (PRN):  acetaminophen     Tablet .. 650 milliGRAM(s) Oral every 6 hours PRN Temp greater or equal to 38C (100.4F), Mild Pain (1 - 3)  loperamide 2 milliGRAM(s) Oral every 4 hours PRN Diarrhea  metoclopramide Injectable 10 milliGRAM(s) IV Push every 6 hours PRN Nausea and/or Vomiting  ondansetron Injectable 8 milliGRAM(s) IV Push every 8 hours PRN Nausea and/or Vomiting  oxyCODONE    IR 5 milliGRAM(s) Oral every 6 hours PRN Severe Pain (7 - 10)  polyethylene glycol 3350 17 Gram(s) Oral daily PRN Constipation  senna 1 Tablet(s) Oral at bedtime PRN Constipation  sodium chloride 0.9% lock flush 10 milliLiter(s) IV Push every 1 hour PRN Pre/post blood products, medications, blood draw, and to maintain line patency      ITEMS UNCHECKED ARE NOT PRESENT    PRESENT SYMPTOMS: [ ]Unable to self-report - see [ ] CPOT [ ] PAINADS [ ] RDOS  Source if other than patient:  [ ]Family   [ ]Team     Pain:  [x]yes [ ]no  QOL impact - affects walking  Location - back  Aggravating factors - lying down  Quality - electric  Radiation - n/a  Timing- constant  Severity (0-10 scale): 8/10, improved to 3/10 with Oxycodone  Minimal acceptable level (0-10 scale):  3/10    CPOT:    https://www.[email or web address]/getattachment/[email or web address]/Critical-Care-Pain-Observation-Tool-(CPOT)    PAIN AD Score:	  http://geriatrictoolkit.[email or web address]/cog/painad.pdf (Ctrl + left click to view)    Dyspnea:                           [ ]Mild [ ]Moderate [ ]Severe    RDOS: 0  0 to 2  minimal or no respiratory distress   3  mild distress  4 to 6 moderate distress  >7 severe distress  https://[email or web address]/handouts/hen/Respiratory_Distress_Observation_Scale.pdf (Ctrl +  left click to view)     Extensive ROS:  Fatigue:                             [ ] None  [ x]Mild [  ]Moderate [ ]Severe  Drowsiness:                      [x] None  [ ]Mild [   ]Moderate [ ]Severe  Nausea:                             [ x  ] None  [ ]Mild [  ]Moderate [  ]Severe  Dysgeusia:                         [  } None  [x ]Mild [  ]Moderate [ ]Severe  Loss of appetite:              [  ] None  [ ]Mild   ]Moderate [x ]Severe  Constipation:                    [ ] None  [x ]Mild [ ]Moderate [ ]Severe  Dyspnea:                           [x ] None  [ ]Mild [ ]Moderate [ ]Severe  Anxiety:                             [ x] None  [  ]Mild [ ]Moderate [ ]Severe  Depression:                      [x ] None  [  ]Mild [ ]Moderate [ ]Severe  Cognitive changes:          [  ] None  [x ]Mild [ ]Moderate [ ]Severe  Insomnia      :                    [ x ] None  [  ]Mild [ ]Moderate [ ]Severe  Isolation:                           [x ] None  [ ]Mild [ ]Moderate [ ]Severe  Loneliness:                        [x ] None  [ ]Mild [ ]Moderate [ ]Severe  Lack of   Wellbeing:                        [ x ] None  [ ]Mild [ ]Moderate [ ]Severe    Other Symptoms: None  [x ]All other review of systems negative     Palliative Performance Status Version 2:   80%      http://[facility].org/files/news/palliative_performance_scale_ppsv2.pdf    PHYSICAL EXAM:  Vital Signs Last 24 Hrs  T(C): 37.2 (01 May 2022 09:10), Max: 38.1 (30 Apr 2022 17:14)  T(F): 99 (01 May 2022 09:10), Max: 100.6 (30 Apr 2022 17:14)  HR: 97 (01 May 2022 09:10) (75 - 98)  BP: 129/88 (01 May 2022 09:10) (106/73 - 129/88)  BP(mean): --  RR: 18 (01 May 2022 09:10) (18 - 18)  SpO2: 98% (01 May 2022 09:10) (94% - 98%) I&O's Summary    30 Apr 2022 07:01  -  01 May 2022 07:00  --------------------------------------------------------  IN: 3302 mL / OUT: 4175 mL / NET: -873 mL    01 May 2022 07:01  -  01 May 2022 10:36  --------------------------------------------------------  IN: 500 mL / OUT: 450 mL / NET: 50 mL       GENERAL:  [ x]Alert  [x ]Oriented x 3  [  ]Lethargic  [ ]Cachexia  [ ]Unarousable  [ x]Verbal  [ ]Non-Verbal [ ] Engaging   [  ] Confused  [  ] No distress  Behavioral:   [x ] Anxiety  [ ] Delirium [ ] Agitation [  ] Calm   [  ] Restless [ ] Other  HEENT:  [x ]Normal   [ ]Dry mouth   [ ]ET Tube/Trach  [ ]Oral lesions   [ ] NGT  [ ] Mucositis [ ] Oral  Bleeding  PULMONARY:   [ x]Clear [ ]Tachypnea  [ ]Audible excessive secretions [  ] No tachypnea  [ ]Rhonchi        [ ]Right [ ]Left [ ]Bilateral  [ ]Crackles        [ ]Right [ ]Left [ ]Bilateral  [ ]Wheezing     [ ]Right [ ]Left [ ]Bilateral  [ ]Diminished breath sounds [ ]right [ ]left [ ]bilateral  CARDIOVASCULAR:    [ x]Regular [ ]Irregular [ ]Tachy  [ ]Venkata [ ]Murmur [ ] JVD  GASTROINTESTINAL:  [x ]Soft  [ ]Distended   [ ]+BS  [ ]Non tender [ ]Tender  [ ]PEG [ ]OGT/ NGT  Last BM: 4/29  GENITOURINARY:  [x ]Normal [ ] Incontinent   [ ]Oliguria/Anuria   [ ]Hinds  MUSCULOSKELETAL:   [  ]Normal   [x ]Weakness    [ ]Bed/Wheelchair bound [ ]Edema  NEUROLOGIC:   [  No focal deficits  [ x]Cognitive impairment  [ ]Dysphagia [ ]Dysarthria [ ]Paresis [ ]Other   SKIN:   [x ]Normal    [ ]Rash  [ ]Pressure ulcer(s)   [  ] Lesion   [    ]  Wounds       Present on admission [ ]y [ ]n    CRITICAL CARE:  [ ] Shock Present  [ ]Septic [ ]Cardiogenic [ ]Neurologic [ ]Hypovolemic  [ ]  Vasopressors [ ]  Inotropes   [ ]Respiratory failure present [ ]Mechanical ventilation [ ]Non-invasive ventilatory support [ ]High flow  [ ]Acute  [ ]Chronic [ ]Hypoxic  [ ]Hypercarbic [ ]Other  [ ]Other organ failure     LABS:                        12.7   3.70  )-----------( 256      ( 01 May 2022 05:48 )             38.9   05-01    135  |  98  |  19  ----------------------------<  157<H>  4.7   |  25  |  0.85    Ca    8.9      01 May 2022 05:48  Phos  2.8     05-01  Mg     1.9     05-01    TPro  6.0  /  Alb  4.1  /  TBili  0.4  /  DBili  <0.1  /  AST  25  /  ALT  71<H>  /  AlkPhos  64  05-01  PT/INR - ( 01 May 2022 05:48 )   PT: 12.9 sec;   INR: 1.11 ratio         PTT - ( 01 May 2022 05:48 )  PTT:23.7 sec      RADIOLOGY & ADDITIONAL STUDIES: Prior reviewed    Protein Calorie Malnutrition Present: [ ]mild [ ]moderate [ ]severe [ ]underweight [ ]morbid obesity  https://www.andeal.org/vault/2440/web/files/ONC/Table_Clinical%20Characteristics%20to%20Document%20Malnutrition-White%20JV%20et%20al%202012.pdf    Height (cm): 181 (04-14-22 @ 10:40), 180.3 (03-21-22 @ 08:57), 180 (03-17-22 @ 16:31)  Weight (kg): 100.4 (04-14-22 @ 10:40), 101.999 (04-13-22 @ 17:00), 98.4 (03-21-22 @ 08:57)  BMI (kg/m2): 30.6 (04-14-22 @ 10:40), 31.4 (04-13-22 @ 17:00), 30.3 (03-21-22 @ 08:57)    [ ]PPSV2 < or = 30%  [ ]significant weight loss [ ]poor nutritional intake [ ]anasarca[ ]Artificial Nutrition    REFERRALS:   [ ]Chaplaincy  [ ]Hospice  [ ]Child Life  [ ]Social Work  [ ]Case management [ ]Holistic Therapy     Goals of Care Document:

## 2022-05-02 DIAGNOSIS — Z91.89 OTHER SPECIFIED PERSONAL RISK FACTORS, NOT ELSEWHERE CLASSIFIED: ICD-10-CM

## 2022-05-02 DIAGNOSIS — R26.9 UNSPECIFIED ABNORMALITIES OF GAIT AND MOBILITY: ICD-10-CM

## 2022-05-02 LAB
ALBUMIN SERPL ELPH-MCNC: 3.9 G/DL — SIGNIFICANT CHANGE UP (ref 3.3–5)
ALBUMIN SERPL ELPH-MCNC: 4.2 G/DL — SIGNIFICANT CHANGE UP (ref 3.3–5)
ALP SERPL-CCNC: 56 U/L — SIGNIFICANT CHANGE UP (ref 40–120)
ALP SERPL-CCNC: 57 U/L — SIGNIFICANT CHANGE UP (ref 40–120)
ALT FLD-CCNC: 59 U/L — HIGH (ref 10–45)
ALT FLD-CCNC: 61 U/L — HIGH (ref 10–45)
ANION GAP SERPL CALC-SCNC: 10 MMOL/L — SIGNIFICANT CHANGE UP (ref 5–17)
ANION GAP SERPL CALC-SCNC: 12 MMOL/L — SIGNIFICANT CHANGE UP (ref 5–17)
APTT BLD: 21.8 SEC — LOW (ref 27.5–35.5)
AST SERPL-CCNC: 19 U/L — SIGNIFICANT CHANGE UP (ref 10–40)
AST SERPL-CCNC: 21 U/L — SIGNIFICANT CHANGE UP (ref 10–40)
BASOPHILS # BLD AUTO: 0.13 K/UL — SIGNIFICANT CHANGE UP (ref 0–0.2)
BASOPHILS # BLD AUTO: 0.39 K/UL — HIGH (ref 0–0.2)
BASOPHILS NFR BLD AUTO: 2.6 % — HIGH (ref 0–2)
BASOPHILS NFR BLD AUTO: 7.8 % — HIGH (ref 0–2)
BILIRUB DIRECT SERPL-MCNC: 0.1 MG/DL — SIGNIFICANT CHANGE UP (ref 0–0.3)
BILIRUB INDIRECT FLD-MCNC: 0.3 MG/DL — SIGNIFICANT CHANGE UP (ref 0.2–1)
BILIRUB SERPL-MCNC: 0.4 MG/DL — SIGNIFICANT CHANGE UP (ref 0.2–1.2)
BILIRUB SERPL-MCNC: 0.4 MG/DL — SIGNIFICANT CHANGE UP (ref 0.2–1.2)
BLD GP AB SCN SERPL QL: NEGATIVE — SIGNIFICANT CHANGE UP
BUN SERPL-MCNC: 20 MG/DL — SIGNIFICANT CHANGE UP (ref 7–23)
BUN SERPL-MCNC: 21 MG/DL — SIGNIFICANT CHANGE UP (ref 7–23)
CALCIUM SERPL-MCNC: 8.3 MG/DL — LOW (ref 8.4–10.5)
CALCIUM SERPL-MCNC: 8.4 MG/DL — SIGNIFICANT CHANGE UP (ref 8.4–10.5)
CHLORIDE SERPL-SCNC: 100 MMOL/L — SIGNIFICANT CHANGE UP (ref 96–108)
CHLORIDE SERPL-SCNC: 101 MMOL/L — SIGNIFICANT CHANGE UP (ref 96–108)
CK SERPL-CCNC: 14 U/L — LOW (ref 30–200)
CMV DNA CSF QL NAA+PROBE: SIGNIFICANT CHANGE UP
CMV DNA SPEC NAA+PROBE-LOG#: SIGNIFICANT CHANGE UP LOG10IU/ML
CO2 SERPL-SCNC: 25 MMOL/L — SIGNIFICANT CHANGE UP (ref 22–31)
CO2 SERPL-SCNC: 26 MMOL/L — SIGNIFICANT CHANGE UP (ref 22–31)
CREAT SERPL-MCNC: 0.85 MG/DL — SIGNIFICANT CHANGE UP (ref 0.5–1.3)
CREAT SERPL-MCNC: 0.92 MG/DL — SIGNIFICANT CHANGE UP (ref 0.5–1.3)
CRP SERPL-MCNC: <3 MG/L — SIGNIFICANT CHANGE UP (ref 0–4)
D DIMER BLD IA.RAPID-MCNC: 777 NG/ML DDU — HIGH
EBV EA AB SER IA-ACNC: 47.5 U/ML — HIGH
EBV EA AB TITR SER IF: POSITIVE
EBV EA IGG SER-ACNC: POSITIVE
EBV NA IGG SER IA-ACNC: 517 U/ML — HIGH
EBV PATRN SPEC IB-IMP: SIGNIFICANT CHANGE UP
EBV VCA IGG AVIDITY SER QL IA: POSITIVE
EBV VCA IGM SER IA-ACNC: 79.7 U/ML — HIGH
EBV VCA IGM SER IA-ACNC: <10 U/ML — SIGNIFICANT CHANGE UP
EBV VCA IGM TITR FLD: NEGATIVE — SIGNIFICANT CHANGE UP
EGFR: 101 ML/MIN/1.73M2 — SIGNIFICANT CHANGE UP
EGFR: 97 ML/MIN/1.73M2 — SIGNIFICANT CHANGE UP
EOSINOPHIL # BLD AUTO: 0.6 K/UL — HIGH (ref 0–0.5)
EOSINOPHIL # BLD AUTO: 0.91 K/UL — HIGH (ref 0–0.5)
EOSINOPHIL NFR BLD AUTO: 12.1 % — HIGH (ref 0–6)
EOSINOPHIL NFR BLD AUTO: 18.6 % — HIGH (ref 0–6)
FERRITIN SERPL-MCNC: 184 NG/ML — SIGNIFICANT CHANGE UP (ref 30–400)
FIBRINOGEN PPP-MCNC: 262 MG/DL — LOW (ref 330–520)
GIANT PLATELETS BLD QL SMEAR: PRESENT — SIGNIFICANT CHANGE UP
GLUCOSE SERPL-MCNC: 113 MG/DL — HIGH (ref 70–99)
GLUCOSE SERPL-MCNC: 88 MG/DL — SIGNIFICANT CHANGE UP (ref 70–99)
HCT VFR BLD CALC: 35.9 % — LOW (ref 39–50)
HCT VFR BLD CALC: 36.7 % — LOW (ref 39–50)
HGB BLD-MCNC: 11.7 G/DL — LOW (ref 13–17)
HGB BLD-MCNC: 12.1 G/DL — LOW (ref 13–17)
INR BLD: 1.17 RATIO — HIGH (ref 0.88–1.16)
LDH SERPL L TO P-CCNC: 146 U/L — SIGNIFICANT CHANGE UP (ref 50–242)
LDH SERPL L TO P-CCNC: 156 U/L — SIGNIFICANT CHANGE UP (ref 50–242)
LYMPHOCYTES # BLD AUTO: 0.3 K/UL — LOW (ref 1–3.3)
LYMPHOCYTES # BLD AUTO: 0.35 K/UL — LOW (ref 1–3.3)
LYMPHOCYTES # BLD AUTO: 6 % — LOW (ref 13–44)
LYMPHOCYTES # BLD AUTO: 7.1 % — LOW (ref 13–44)
MAGNESIUM SERPL-MCNC: 1.9 MG/DL — SIGNIFICANT CHANGE UP (ref 1.6–2.6)
MAGNESIUM SERPL-MCNC: 1.9 MG/DL — SIGNIFICANT CHANGE UP (ref 1.6–2.6)
MANUAL SMEAR VERIFICATION: SIGNIFICANT CHANGE UP
MANUAL SMEAR VERIFICATION: SIGNIFICANT CHANGE UP
MCHC RBC-ENTMCNC: 31.3 PG — SIGNIFICANT CHANGE UP (ref 27–34)
MCHC RBC-ENTMCNC: 31.8 PG — SIGNIFICANT CHANGE UP (ref 27–34)
MCHC RBC-ENTMCNC: 32.6 GM/DL — SIGNIFICANT CHANGE UP (ref 32–36)
MCHC RBC-ENTMCNC: 33 GM/DL — SIGNIFICANT CHANGE UP (ref 32–36)
MCV RBC AUTO: 96 FL — SIGNIFICANT CHANGE UP (ref 80–100)
MCV RBC AUTO: 96.3 FL — SIGNIFICANT CHANGE UP (ref 80–100)
METAMYELOCYTES # FLD: 0.9 % — HIGH (ref 0–0)
METAMYELOCYTES # FLD: 1.7 % — HIGH (ref 0–0)
MONOCYTES # BLD AUTO: 0.39 K/UL — SIGNIFICANT CHANGE UP (ref 0–0.9)
MONOCYTES # BLD AUTO: 0.95 K/UL — HIGH (ref 0–0.9)
MONOCYTES NFR BLD AUTO: 19.5 % — HIGH (ref 2–14)
MONOCYTES NFR BLD AUTO: 7.8 % — SIGNIFICANT CHANGE UP (ref 2–14)
MYELOCYTES NFR BLD: 4.3 % — HIGH (ref 0–0)
NEUTROPHILS # BLD AUTO: 2.45 K/UL — SIGNIFICANT CHANGE UP (ref 1.8–7.4)
NEUTROPHILS # BLD AUTO: 3 K/UL — SIGNIFICANT CHANGE UP (ref 1.8–7.4)
NEUTROPHILS NFR BLD AUTO: 50.4 % — SIGNIFICANT CHANGE UP (ref 43–77)
NEUTROPHILS NFR BLD AUTO: 60.3 % — SIGNIFICANT CHANGE UP (ref 43–77)
NRBC # BLD: 1 /100 — HIGH (ref 0–0)
NT-PROBNP SERPL-SCNC: 83 PG/ML — SIGNIFICANT CHANGE UP (ref 0–300)
PHOSPHATE SERPL-MCNC: 2.3 MG/DL — LOW (ref 2.5–4.5)
PHOSPHATE SERPL-MCNC: 3.4 MG/DL — SIGNIFICANT CHANGE UP (ref 2.5–4.5)
PLAT MORPH BLD: NORMAL — SIGNIFICANT CHANGE UP
PLAT MORPH BLD: NORMAL — SIGNIFICANT CHANGE UP
PLATELET # BLD AUTO: 254 K/UL — SIGNIFICANT CHANGE UP (ref 150–400)
PLATELET # BLD AUTO: 261 K/UL — SIGNIFICANT CHANGE UP (ref 150–400)
POLYCHROMASIA BLD QL SMEAR: SLIGHT — SIGNIFICANT CHANGE UP
POTASSIUM SERPL-MCNC: 4.2 MMOL/L — SIGNIFICANT CHANGE UP (ref 3.5–5.3)
POTASSIUM SERPL-MCNC: 4.3 MMOL/L — SIGNIFICANT CHANGE UP (ref 3.5–5.3)
POTASSIUM SERPL-SCNC: 4.2 MMOL/L — SIGNIFICANT CHANGE UP (ref 3.5–5.3)
POTASSIUM SERPL-SCNC: 4.3 MMOL/L — SIGNIFICANT CHANGE UP (ref 3.5–5.3)
PROMYELOCYTES # FLD: 0.9 % — HIGH (ref 0–0)
PROT SERPL-MCNC: 5.7 G/DL — LOW (ref 6–8.3)
PROT SERPL-MCNC: 6 G/DL — SIGNIFICANT CHANGE UP (ref 6–8.3)
PROTHROM AB SERPL-ACNC: 13.6 SEC — HIGH (ref 10.5–13.4)
RBC # BLD: 3.74 M/UL — LOW (ref 4.2–5.8)
RBC # BLD: 3.81 M/UL — LOW (ref 4.2–5.8)
RBC # FLD: 14.7 % — HIGH (ref 10.3–14.5)
RBC # FLD: 14.9 % — HIGH (ref 10.3–14.5)
RBC BLD AUTO: SIGNIFICANT CHANGE UP
RBC BLD AUTO: SIGNIFICANT CHANGE UP
RH IG SCN BLD-IMP: POSITIVE — SIGNIFICANT CHANGE UP
SARS-COV-2 RNA SPEC QL NAA+PROBE: SIGNIFICANT CHANGE UP
SODIUM SERPL-SCNC: 137 MMOL/L — SIGNIFICANT CHANGE UP (ref 135–145)
SODIUM SERPL-SCNC: 137 MMOL/L — SIGNIFICANT CHANGE UP (ref 135–145)
URATE SERPL-MCNC: 2.9 MG/DL — LOW (ref 3.4–8.8)
URATE SERPL-MCNC: 3.1 MG/DL — LOW (ref 3.4–8.8)
WBC # BLD: 4.87 K/UL — SIGNIFICANT CHANGE UP (ref 3.8–10.5)
WBC # BLD: 4.97 K/UL — SIGNIFICANT CHANGE UP (ref 3.8–10.5)
WBC # FLD AUTO: 4.87 K/UL — SIGNIFICANT CHANGE UP (ref 3.8–10.5)
WBC # FLD AUTO: 4.97 K/UL — SIGNIFICANT CHANGE UP (ref 3.8–10.5)

## 2022-05-02 PROCEDURE — 99233 SBSQ HOSP IP/OBS HIGH 50: CPT

## 2022-05-02 PROCEDURE — 99291 CRITICAL CARE FIRST HOUR: CPT

## 2022-05-02 RX ORDER — POTASSIUM PHOSPHATE, MONOBASIC POTASSIUM PHOSPHATE, DIBASIC 236; 224 MG/ML; MG/ML
15 INJECTION, SOLUTION INTRAVENOUS ONCE
Refills: 0 | Status: COMPLETED | OUTPATIENT
Start: 2022-05-02 | End: 2022-05-02

## 2022-05-02 RX ADMIN — Medication 5 MILLILITER(S): at 07:54

## 2022-05-02 RX ADMIN — Medication 400 MILLIGRAM(S): at 05:38

## 2022-05-02 RX ADMIN — Medication 400 MILLIGRAM(S): at 13:48

## 2022-05-02 RX ADMIN — CARVEDILOL PHOSPHATE 3.12 MILLIGRAM(S): 80 CAPSULE, EXTENDED RELEASE ORAL at 17:02

## 2022-05-02 RX ADMIN — Medication 10 MILLILITER(S): at 15:29

## 2022-05-02 RX ADMIN — OXYCODONE HYDROCHLORIDE 5 MILLIGRAM(S): 5 TABLET ORAL at 12:43

## 2022-05-02 RX ADMIN — SODIUM CHLORIDE 100 MILLILITER(S): 9 INJECTION INTRAMUSCULAR; INTRAVENOUS; SUBCUTANEOUS at 05:39

## 2022-05-02 RX ADMIN — Medication 1 LOZENGE: at 07:54

## 2022-05-02 RX ADMIN — POTASSIUM PHOSPHATE, MONOBASIC POTASSIUM PHOSPHATE, DIBASIC 62.5 MILLIMOLE(S): 236; 224 INJECTION, SOLUTION INTRAVENOUS at 07:56

## 2022-05-02 RX ADMIN — Medication 1 LOZENGE: at 23:21

## 2022-05-02 RX ADMIN — OXYCODONE HYDROCHLORIDE 5 MILLIGRAM(S): 5 TABLET ORAL at 13:13

## 2022-05-02 RX ADMIN — OXYCODONE HYDROCHLORIDE 5 MILLIGRAM(S): 5 TABLET ORAL at 21:15

## 2022-05-02 RX ADMIN — Medication 1 LOZENGE: at 12:52

## 2022-05-02 RX ADMIN — CARVEDILOL PHOSPHATE 3.12 MILLIGRAM(S): 80 CAPSULE, EXTENDED RELEASE ORAL at 05:38

## 2022-05-02 RX ADMIN — Medication 650 MILLIGRAM(S): at 20:00

## 2022-05-02 RX ADMIN — Medication 1 PACKET(S): at 15:30

## 2022-05-02 RX ADMIN — Medication 5 MILLILITER(S): at 12:52

## 2022-05-02 RX ADMIN — Medication 1 LOZENGE: at 19:29

## 2022-05-02 RX ADMIN — OXYCODONE HYDROCHLORIDE 5 MILLIGRAM(S): 5 TABLET ORAL at 20:45

## 2022-05-02 RX ADMIN — Medication 5 MILLILITER(S): at 19:29

## 2022-05-02 RX ADMIN — Medication 1 PACKET(S): at 05:39

## 2022-05-02 RX ADMIN — LEVETIRACETAM 500 MILLIGRAM(S): 250 TABLET, FILM COATED ORAL at 05:38

## 2022-05-02 RX ADMIN — GABAPENTIN 100 MILLIGRAM(S): 400 CAPSULE ORAL at 13:48

## 2022-05-02 RX ADMIN — PANTOPRAZOLE SODIUM 40 MILLIGRAM(S): 20 TABLET, DELAYED RELEASE ORAL at 05:38

## 2022-05-02 RX ADMIN — CHLORHEXIDINE GLUCONATE 1 APPLICATION(S): 213 SOLUTION TOPICAL at 07:56

## 2022-05-02 RX ADMIN — Medication 5 MILLILITER(S): at 15:29

## 2022-05-02 RX ADMIN — LEVETIRACETAM 500 MILLIGRAM(S): 250 TABLET, FILM COATED ORAL at 17:02

## 2022-05-02 RX ADMIN — Medication 10 MILLILITER(S): at 23:21

## 2022-05-02 RX ADMIN — Medication 10 MILLILITER(S): at 15:17

## 2022-05-02 RX ADMIN — Medication 300 MILLIGRAM(S): at 12:50

## 2022-05-02 RX ADMIN — GABAPENTIN 100 MILLIGRAM(S): 400 CAPSULE ORAL at 19:28

## 2022-05-02 RX ADMIN — GABAPENTIN 100 MILLIGRAM(S): 400 CAPSULE ORAL at 05:38

## 2022-05-02 RX ADMIN — Medication 650 MILLIGRAM(S): at 13:13

## 2022-05-02 RX ADMIN — Medication 5 MILLILITER(S): at 23:20

## 2022-05-02 RX ADMIN — Medication 10 MILLILITER(S): at 19:29

## 2022-05-02 RX ADMIN — Medication 10 MILLILITER(S): at 07:54

## 2022-05-02 RX ADMIN — SODIUM CHLORIDE 100 MILLILITER(S): 9 INJECTION INTRAMUSCULAR; INTRAVENOUS; SUBCUTANEOUS at 19:28

## 2022-05-02 RX ADMIN — Medication 1 MILLIGRAM(S): at 12:43

## 2022-05-02 RX ADMIN — Medication 650 MILLIGRAM(S): at 12:43

## 2022-05-02 RX ADMIN — Medication 650 MILLIGRAM(S): at 19:28

## 2022-05-02 RX ADMIN — Medication 400 MILLIGRAM(S): at 19:28

## 2022-05-02 RX ADMIN — PANTOPRAZOLE SODIUM 40 MILLIGRAM(S): 20 TABLET, DELAYED RELEASE ORAL at 17:01

## 2022-05-02 RX ADMIN — FLUCONAZOLE 400 MILLIGRAM(S): 150 TABLET ORAL at 12:44

## 2022-05-02 RX ADMIN — Medication 1 TABLET(S): at 12:47

## 2022-05-02 RX ADMIN — Medication 1 LOZENGE: at 15:29

## 2022-05-02 NOTE — PROGRESS NOTE ADULT - PROBLEM SELECTOR PLAN 1
Problem: Cognitive changes  Degree: active  Status: No sz, ? tremor reported by staff. Word finding and cognitive haze issue  Likely due to treatment  Relevant factors: Challenges perceived by the patient, his wife, and daughter  He is increasing dejected by this issue  Rx regimen/treatment strategy: On keppra, continue screening and monitor for changes  Consultative team involvement: n/a

## 2022-05-02 NOTE — PROGRESS NOTE ADULT - NS PANP COMMENT GEN_ALL_CORE FT
Admitted for CAR T cell therapy with Tecartus (bresucabtagene autoleucel)  today is + 13..some nausea..now improved...monitor closely for CRS and neurotox...pt is at lower risk for these complications given minimal disease burden..hoever he has been on and off with high temps since fri evening..first dose of toci given for grade 1 crs......s/p 4 total  doses of  toci and dex 4 mg X 4 and 10 mg X 1...improved..this am 8/10 HA with somulence...c/w grade 2 neurotox...Stat ct head, MRI..dose dex 10 mg...improved 4/28..4/29, 30 body aches..improved with oxycodone..possibly neuropathic...on gabapentin..4/30 given dex 4 mg x1...temp 38.1  4/30 temp noted...may consider additional dex  1. Admit to BMTU   2. Day -5 through day -1 record CARTOX / ICANS score daily   3. Day - 5 through day - 3 Fludarabine 30mg / m2 IV given over 30 minutes daily   4. Day - 5 - start cyclophosphamide hydration - 0.9% NS + 10mEq KCL / L at 75 ml/m2 and continue for 24 hours post infusion of last dose of CTX   5. Day - 5 through day - 3 CTX 500mg / m2 IV QD to be given over 2 hours   6. Starting day - 5 monitor labs BID: CBC with differential, CMP, LDH, uric acid, mag, phos, CPK, BNP, PT / PTT / INR / Fibrinogen, d-dimer, CRP, hepatic profile, ferritin  7. Weekly IL-6 levels on Monday, twice weekly (Monday / Thursday) CMV / EBV PCR, three times a week (Monday / Wednesday / Friday) type and screen   8. On day - 5 start allopurinol 300mg po QD   9. Day -2 and -1 REST DAYS   10. On day -1 begin to record CARTOX / ICANS score q 12 hours   11. Day 0 infuse Brexucabtagene autoleucel  hx of afib...seen by [facility] cardiology  12. F/U cx's..on cefapime,  ...acyclovir and fluconazole..send rvp  13 Cont tele and keppra  4/16-17 Doing well, I/Os are negative, transient lip swelling, now resolved, some fatigue  4/18, 19, 20, 21, 22 CRS 0, ICANS 10  4/22/22-Chase fever overnight, chills, given Toci, was started on Cefipime. Stable. CRS / ICANS scoring this morning 10.  4/23/22, 4/24/22 CRS 1 ICANS 10  4/24/22- Fever 102oF, complaints of bilateral upper and lower extremities cramps, chest discomfort, EKG no changes. Patient was treated with Toci/Decadron as per protocol. Morphine prn for pain. Clinically improved. Pancultures sent.  4/25/22 CRS 2...persistent high temps.....ICANS 10...tremor noted with fever...dex 10 mg dosed today  4/26/22 CRS 1, Icans 10  4/27 crs 1, Icans 10...neurotox 2  4/28, 4/29,  crs 0..icans 10  4/30 CRS 1  ICANS 10  5/1 CRS 0, ICANS 10...d/c cefapime...d/c planning Admitted for CAR T cell therapy with Tecartus (bresucabtagene autoleucel)  today is + 13..some nausea..now improved...monitor closely for CRS and neurotox...pt is at lower risk for these complications given minimal disease burden..hoever he has been on and off with high temps since last  fri evening..first dose of toci given for grade 1 crs......s/p 4 total  doses of  toci and dex 4 mg X 4 and 10 mg X 1... 8/10 HA with somulence...c/w grade 2 neurotox...Stat ct head, MRI..dose dex 10 mg...improved 4/28..4/29, 30 body aches..improved with oxycodone..possibly neuropathic...on gabapentin..4/30 given dex 4 mg x1...temp 38.1...5/2 chills today..  4/30 temp noted...may consider additional dex  1. Admit to BMTU   2. Day -5 through day -1 record CARTOX / ICANS score daily   3. Day - 5 through day - 3 Fludarabine 30mg / m2 IV given over 30 minutes daily   4. Day - 5 - start cyclophosphamide hydration - 0.9% NS + 10mEq KCL / L at 75 ml/m2 and continue for 24 hours post infusion of last dose of CTX   5. Day - 5 through day - 3 CTX 500mg / m2 IV QD to be given over 2 hours   6. Starting day - 5 monitor labs BID: CBC with differential, CMP, LDH, uric acid, mag, phos, CPK, BNP, PT / PTT / INR / Fibrinogen, d-dimer, CRP, hepatic profile, ferritin  7. Weekly IL-6 levels on Monday, twice weekly (Monday / Thursday) CMV / EBV PCR, three times a week (Monday / Wednesday / Friday) type and screen   8. On day - 5 start allopurinol 300mg po QD   9. Day -2 and -1 REST DAYS   10. On day -1 begin to record CARTOX / ICANS score q 12 hours   11. Day 0 infuse Brexucabtagene autoleucel  hx of afib...seen by [facility] cardiology  12. F/U cx's..on cefapime,  ...acyclovir and fluconazole..send rvp  13 Cont tele and keppra  4/16-17 Doing well, I/Os are negative, transient lip swelling, now resolved, some fatigue  4/18, 19, 20, 21, 22 CRS 0, ICANS 10  4/22/22-Chase fever overnight, chills, given Toci, was started on Cefipime. Stable. CRS / ICANS scoring this morning 10.  4/23/22, 4/24/22 CRS 1 ICANS 10  4/24/22- Fever 102oF, complaints of bilateral upper and lower extremities cramps, chest discomfort, EKG no changes. Patient was treated with Toci/Decadron as per protocol. Morphine prn for pain. Clinically improved. Pancultures sent.  4/25/22 CRS 2...persistent high temps.....ICANS 10...tremor noted with fever...dex 10 mg dosed today  4/26/22 CRS 1, Icans 10  4/27 crs 1, Icans 10...neurotox 2  4/28, 4/29,  crs 0..icans 10  4/30 CRS 1  ICANS 10  5/1 CRS 0, ICANS 10...d/c cefapime...d/c planning  5/2 chills this am...will monitor......consider d/c tlc...f/u cx's

## 2022-05-02 NOTE — PROVIDER CONTACT NOTE (OTHER) - ASSESSMENT
Alert & oriented X4.  BC X 2 sent 4/26 UA/CS sent 4/25.
Alert & oriented X4.  Pt s/p 1GM Tylenol IV @ 18:30.  BC X 2 sent yesterday.  UA/CS sent today.
pt. alert and oriented x4
pt./ alert and oriented x4
pt. alert and oriented x4
Alert & oriented X4.  BC X 2 & CXR completed 4/24.  UA/CS sent 4/25.
Alert & oriented X4.  IV ABT already in progress. Pt received 2nd & 3rd dose of Tocilizumab yesterday.
Alert & oriented X4. S/P 2 doses of Tocilizumab today.
Alert & oriented, s/p Tocilizumab.  S/P Tylenol 1GM IV @ 22:30
pt. alert and dimitriinted x4
pt. alert and oriented x4
pt. alert and oriented x4

## 2022-05-02 NOTE — PROGRESS NOTE ADULT - PROBLEM SELECTOR PLAN 7
intermittent HA  and back pain with febrile episodes  Electricity like at time  Now on gabapentin  100mg TID

## 2022-05-02 NOTE — PROVIDER CONTACT NOTE (OTHER) - ACTION/TREATMENT ORDERED:
Tylenol 1GM IV X 1 dose.
Tylenol 1Gm IV X 1 dose, Tocilizumab 800MG IV X 1 dose, Decadron 4MG IV X 1 dose
no further orders at this time.
Monitor.
oxycodone 5mgs given po
No new orders.
Monitor.
no further orders at this time. will continue to monitor.
continue present meds and antibiotics.
monitor
tylenol 650mgs given po
toci 800mgs IV given as first dose at 2215.all orders were done

## 2022-05-02 NOTE — PROVIDER CONTACT NOTE (OTHER) - BACKGROUND
S/P Car T cell
S/p CarT Cell
Day + 6 Car-T, Mantle cell lymphoma
S/P CART - Cell
S/P Cart Cell
s/ p CarT Cell, on remote tele
Day + 8 Car-T, Mantle cell lymphoma,
Day +6 Car-T, Mantle Cell Lymphoma
Day +7 Car-T, Mantle cell Lymphoma
Day + 5 Car-T, Mantle Cell Lymphoma
S/p Car T Cell
Day +6 Car-T, Mantle cell lymphoma,

## 2022-05-02 NOTE — PROVIDER CONTACT NOTE (OTHER) - SITUATION
Temp 102
temp 103.3
pt. had a temp 38.2 , c/o little chill anf first spike
temp 103.5
pt. c/o of shooting pain on both arms going down to his fingers,  and legs going down to his feet lasting for a few minutes and goes away
pt. is s/p Car T Cell pt. as per pt. he  felt dizzy and as per pt. he almost fell 2x today. pt was given po tylenol, gabapentin. . had  fever  today , no fever right now on continous bed alarm ,
pt. c/o pain earlier, tylenol 650mgs given po , and now pt. is complaining of cramps all over,  and oxycodone 5mgs given po ,and pts. wants somebody to see him for his cramps
pt. had a fever, 38.4 and pt is already on cefepime IV cultured today on 2 ports, and urine C&S done.pt. already had gabapentin ,tylenol po , oxycodone po .
pt. had another temperature 38.4
temp 100.4
temp 38.1
temp 38.1

## 2022-05-02 NOTE — PROGRESS NOTE ADULT - PROBLEM SELECTOR PLAN 12
MCL (mantle cell lymphoma).   ·  Plan: Recommendation: Condition: Mantel Cell Lymphoma  Previous transplant: AUTO SCT 2017  Active treatment:  CAR-T infusion  Transplant Type: Tecartus (brexucabtagene autoleucel)  Transplant Day: Day +13  Clinical impact on complexity: Significant.

## 2022-05-02 NOTE — PROVIDER CONTACT NOTE (OTHER) - REASON
Elevated temp
pt. c/o cramps all over his body
Elevated temp
Elevated temp
temp 38.4
temp 38.4
feeling dizzy and almost fell 2x today
temp 38.2
pt. c/o of shooting pain on a both upper and lower extremity lasting for a few  minutes

## 2022-05-02 NOTE — PROGRESS NOTE ADULT - SUBJECTIVE AND OBJECTIVE BOX
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   Staff reports: PT still  with gait challenges  Patient:  -Physical Domains: He expressed significant increase in bodily fatigue as well as drowsiness.   -Cognitive Domains. He feels as if his linguistic challenges are worsening  -Existential challenges  PRN use: Oxy X 1        RECENT VITALS/LABS/MEDICATIONS/ASSAYS     05-02-22 @ 13:11    T(C): 37.7 (05-02-22 @ 11:55), Max: 37.7 (05-01-22 @ 18:07)  HR: 84 (05-02-22 @ 11:55) (73 - 84)  BP: 111/70 (05-02-22 @ 11:55) (106/67 - 130/80)  RR: 18 (05-02-22 @ 11:55) (18 - 18)  SpO2: 99% (05-02-22 @ 11:55) (95% - 100%)  Wt(kg): --      01 May 2022 07:01  -  02 May 2022 07:00  --------------------------------------------------------  IN:    Oral Fluid: 1160 mL    sodium chloride 0.9%: 2419 mL  Total IN: 3579 mL    OUT:    Voided (mL): 4100 mL  Total OUT: 4100 mL    Total NET: -521 mL      02 May 2022 07:01  -  02 May 2022 13:11  --------------------------------------------------------  IN:    Oral Fluid: 240 mL    sodium chloride 0.9%: 191 mL  Total IN: 431 mL    OUT:    Voided (mL): 450 mL  Total OUT: 450 mL    Total NET: -19 mL          05-01 @ 07:01 - 05-02 @ 07:00  --------------------------------------------------------  IN: 3579 mL / OUT: 4100 mL / NET: -521 mL    05-02 @ 07:01 - 05-02 @ 13:11  --------------------------------------------------------  IN: 431 mL / OUT: 450 mL / NET: -19 mL      CAPILLARY BLOOD GLUCOSE            acetaminophen     Tablet .. 650 milliGRAM(s) Oral every 6 hours PRN  acetaminophen     Tablet .. 650 milliGRAM(s) Oral every 6 hours  acyclovir   Oral Tab/Cap 400 milliGRAM(s) Oral every 8 hours  allopurinol 300 milliGRAM(s) Oral daily  Biotene Dry Mouth Oral Rinse 5 milliLiter(s) Swish and Spit five times a day  carvedilol 3.125 milliGRAM(s) Oral every 12 hours  chlorhexidine 4% Liquid 1 Application(s) Topical <User Schedule>  clotrimazole Lozenge 1 Lozenge Oral five times a day  fluconAZOLE   Tablet 400 milliGRAM(s) Oral daily  folic acid 1 milliGRAM(s) Oral daily  gabapentin 100 milliGRAM(s) Oral three times a day  levETIRAcetam 500 milliGRAM(s) Oral two times a day  loperamide 2 milliGRAM(s) Oral every 4 hours PRN  metoclopramide Injectable 10 milliGRAM(s) IV Push every 6 hours PRN  multivitamin 1 Tablet(s) Oral daily  ondansetron Injectable 8 milliGRAM(s) IV Push every 8 hours PRN  oxyCODONE    IR 5 milliGRAM(s) Oral every 6 hours PRN  pantoprazole    Tablet 40 milliGRAM(s) Oral two times a day  polyethylene glycol 3350 17 Gram(s) Oral daily PRN  potassium phosphate / sodium phosphate Powder (PHOS-NaK) 1 Packet(s) Oral three times a day before meals  senna 1 Tablet(s) Oral at bedtime PRN  sodium bicarbonate Mouth Rinse 10 milliLiter(s) Swish and Spit five times a day  sodium chloride 0.9% lock flush 10 milliLiter(s) IV Push every 1 hour PRN  sodium chloride 0.9%. 1000 milliLiter(s) IV Continuous <Continuous>          05-02    137  |  101  |  20  ----------------------------<  113<H>  4.3   |  26  |  0.92    Ca    8.4      02 May 2022 05:50  Phos  2.3     05-02  Mg     1.9     05-02    TPro  5.7<L>  /  Alb  3.9  /  TBili  0.4  /  DBili  0.1  /  AST  21  /  ALT  61<H>  /  AlkPhos  56  05-02      Procalc  BNPSerum Pro-Brain Natriuretic Peptide: 83 pg/mL (05-02-22 @ 05:50)  Serum Pro-Brain Natriuretic Peptide: 44 pg/mL (05-01-22 @ 05:48)    ABG                          11.7   4.97  )-----------( 254      ( 02 May 2022 05:50 )             35.9   PT/INR - ( 02 May 2022 05:50 )   PT: 13.6 sec;   INR: 1.17 ratio         PTT - ( 02 May 2022 05:50 )  PTT:21.8 sec        blood and urine cultures            DNR on chart: No    Allergies  No Known Allergies  Intolerances  ===========================================================================================  Palliative Global Assessment  A review of the paper chart has been conducted  HCP form present:               Yes and valid []               Yes but invalid []                No [x]   MOLST present:                   Yes and valid []               Yes but invalid []                No [x]  Incapacity form present:   Yes and valid []                Yes but invalid []                No []                 N/A [x]  Living Will:                            Yes and valid []                Yes but invalid []                No [x]    Family Heatlh Care Decision Act Surrogate Decision Maker [facility] Guardian-->Spouse or domestic partner--> Adult child-->Parent-->Sibling--> Close friend    Contacts listed in the paper or electronic chart  Name Aga Kapoor Wife  Number(s) [phone]  Translation Required: None  Spouse or Partner: Aga  Family unit: Wife and children, his youngest is 13  Family history of hematologic malignancy: None  Residence: [ ] Apartment   [x] House  [ ] Other  Degree of functionality in the home: Full   Performance Status (PPSv2): 80  BMI:BMI (kg/m2): 30.6 (04-14-22 @ 10:40)  Engagement in the home:  Employment: [ ] Currently employed [ x] Exited workforce due to illness  [ ] Retired prior to illness  [ ] No/distant history of employment   Support network (degree):  ---Family:        [ ] Low  [ ] Moderate  [ x] High  ---Neighbors: [ x] Low  [ ] Moderate  [ ] High  ---Social:         [ ] Low  [ ] Moderate  [ x] High  ---Spiritual:    [ x] Low  [ ] Moderate  [ ] High  Financial concerns: TBD  Coping Strategies: Listening to music and speaking with his wife  Caregiver burden/fatigue/needs: Childcare issues/concerns given his wife's visitation/  Grief identified: [] Yes [x] No  Medical communication and decision making preferences: TBD       ITEMS UNCHECKED ARE NOT PRESENT    PRESENT SYMPTOMS: [ ]Unable to self-report - see [ ] CPOT [ ] PAINADS [ ] RDOS  Source if other than patient:  [ ]Family   [ ]Team     Pain:  [x]yes [ ]no  QOL impact - affects walking  Location - back  Aggravating factors - lying down  Quality - electric  Radiation - n/a  Timing- constant  Severity (0-10 scale): 8/10, improved to 3/10 with Oxycodone  Minimal acceptable level (0-10 scale):  3/10    CPOT:    https://www.[facility].org/getattachment/ugv48j22-8e8i-1u1f-7t0u-8696e1948a4e/Critical-Care-Pain-Observation-Tool-(CPOT)    PAIN AD Score:	  http://[email or web address]/painad.pdf (Ctrl + left click to view)    Dyspnea:                           [ ]Mild [ ]Moderate [ ]Severe    RDOS: 0  0 to 2  minimal or no respiratory distress   3  mild distress  4 to 6 moderate distress  >7 severe distress  https://[email or web address]/handouts/hen/Respiratory_Distress_Observation_Scale.pdf (Ctrl +  left click to view)     Extensive ROS:  Fatigue:                             [ ] None  [  ]Mild [  x]Moderate [ ]Severe  Drowsiness:                      [ ] None  [ x]Mild [   ]Moderate [ ]Severe  Nausea:                             [ x  ] None  [ ]Mild [  ]Moderate [  ]Severe  Dysgeusia:                         [  } None  [x ]Mild [  ]Moderate [ ]Severe  Loss of appetite:              [  ] None  [ ]Mild   ]Moderate [x ]Severe  Constipation:                    [ ] None  [x ]Mild [ ]Moderate [ ]Severe  Dyspnea:                           [x ] None  [ ]Mild [ ]Moderate [ ]Severe  Anxiety:                             [  ] None  [ x ]Mild [ ]Moderate [ ]Severe  Depression:                      [  ] None  [ x ]Mild [ ]Moderate [ ]Severe  Cognitive changes:          [  ] None  [  ]Mild [x ]Moderate [ ]Severe  Insomnia      :                    [ x ] None  [  ]Mild [ ]Moderate [ ]Severe  Isolation:                           [x ] None  [ ]Mild [ ]Moderate [ ]Severe  Loneliness:                        [x ] None  [ ]Mild [ ]Moderate [ ]Severe  Lack of   Wellbeing:                        [   ] None  [ x]Mild [x ]Moderate [ ]Severe    Other Symptoms: None  [x ]All other review of systems negative     Palliative Performance Status Version 2:   80%      http://[email or web address]/files/news/palliative_performance_scale_[email or web address].pdf    P   IN: 500 mL / OUT: 450 mL / NET: 50 mL       GENERAL:  [ x]Alert  [x ]Oriented x 3  [  ]Lethargic  [ ]Cachexia  [ ]Unarousable  [ x]Verbal  [ ]Non-Verbal [ ] Engaging   [  ] Confused  [  ] No distress  Behavioral:   [x ] Anxiety  [ ] Delirium [ ] Agitation [  ] Calm   [  ] Restless [ ] Other  HEENT:  [x ]Normal   [ ]Dry mouth   [ ]ET Tube/Trach  [ ]Oral lesions   [ ] NGT  [ ] Mucositis [ ] Oral  Bleeding  PULMONARY:   [ x]Clear [ ]Tachypnea  [ ]Audible excessive secretions [  ] No tachypnea  [ ]Rhonchi        [ ]Right [ ]Left [ ]Bilateral  [ ]Crackles        [ ]Right [ ]Left [ ]Bilateral  [ ]Wheezing     [ ]Right [ ]Left [ ]Bilateral  [ ]Diminished breath sounds [ ]right [ ]left [ ]bilateral  CARDIOVASCULAR:    [ x]Regular [ ]Irregular [ ]Tachy  [ ]Venkata [ ]Murmur [ ] JVD  GASTROINTESTINAL:  [x ]Soft  [ ]Distended   [ ]+BS  [ ]Non tender [ ]Tender  [ ]PEG [ ]OGT/ NGT  Last BM: 4/29  GENITOURINARY:  [x ]Normal [ ] Incontinent   [ ]Oliguria/Anuria   [ ]Hinds  MUSCULOSKELETAL:   [  ]Normal   [x ]Weakness    [ ]Bed/Wheelchair bound [ ]Edema  NEUROLOGIC:   [  No focal deficits  [ x]Cognitive impairment  [ ]Dysphagia [ ]Dysarthria [ ]Paresis [x ]Other gait issue  SKIN:   [x ]Normal    [ ]Rash  [ ]Pressure ulcer(s)   [  ] Lesion   [    ]  Wounds       Present on admission [ ]y [ ]n    CRITICAL CARE:  [ ] Shock Present  [ ]Septic [ ]Cardiogenic [ ]Neurologic [ ]Hypovolemic  [ ]  Vasopressors [ ]  Inotropes   [ ]Respiratory failure present [ ]Mechanical ventilation [ ]Non-invasive ventilatory support [ ]High flow  [ ]Acute  [ ]Chronic [ ]Hypoxic  [ ]Hypercarbic [ ]Other  [ ]Other organ failure     LABS:                  RADIOLOGY & ADDITIONAL STUDIES: Prior reviewed    Protein Calorie Malnutrition Present: [ ]mild [ ]moderate [ ]severe [ ]underweight [ ]morbid obesity  https://www.andeal.org/vault/2440/web/files/ONC/Table_Clinical%20Characteristics%20to%20Document%20Malnutrition-White%20JV%20et%20al%163955.pdf    Height (cm): 181 (04-14-22 @ 10:40), 180.3 (03-21-22 @ 08:57), 180 (03-17-22 @ 16:31)  Weight (kg): 100.4 (04-14-22 @ 10:40), 101.999 (04-13-22 @ 17:00), 98.4 (03-21-22 @ 08:57)  BMI (kg/m2): 30.6 (04-14-22 @ 10:40), 31.4 (04-13-22 @ 17:00), 30.3 (03-21-22 @ 08:57)    [ ]PPSV2 < or = 30%  [ ]significant weight loss [ ]poor nutritional intake [ ]anasarca[ ]Artificial Nutrition    REFERRALS:   [ ]Chaplaincy  [ ]Hospice  [ ]Child Life  [ ]Social Work  [ ]Case management [ ]Holistic Therapy     Goals of Care Document:

## 2022-05-02 NOTE — PROVIDER CONTACT NOTE (OTHER) - NAME OF MD/NP/PA/DO NOTIFIED:
Corrine JIMENEZ
TONY Barnes
Corrine JIMENEZ
TONY Norman
Corrine JIMENEZ
Landen JIMENEZ
MILEY Godoy
MILEY Godoy
TONY Schuster
TONY Justin
Landen JIMENEZ
Landen Santiago

## 2022-05-02 NOTE — PROGRESS NOTE ADULT - SUBJECTIVE AND OBJECTIVE BOX
HPC Transplant Team                                                      Critical / Counseling Time Provided: 30 minutes                                                                                                                                                        Chief Complaint: CAR T therapy with Tecartus product for treatment of relapsed, refractory MCL    S: Patient seen and examined with HPC Transplant Team:     All other ROS negative     O: Vitals:   Vital Signs Last 24 Hrs  T(C): 37.1 (02 May 2022 05:15), Max: 37.7 (01 May 2022 18:07)  T(F): 98.8 (02 May 2022 05:15), Max: 99.9 (01 May 2022 18:07)  HR: 74 (02 May 2022 05:15) (73 - 97)  BP: 127/80 (02 May 2022 05:15) (106/67 - 130/80)  BP(mean): --  RR: 18 (02 May 2022 05:15) (18 - 18)  SpO2: 95% (02 May 2022 05:15) (95% - 100%)    Admit weight: 100.4kg   Daily Weight in k.5 (01 May 2022 09:10)  Today's weight:     Intake / Output:   - @ 07:01  -  -02 @ 07:00  --------------------------------------------------------  IN: 3579 mL / OUT: 4100 mL / NET: -521 mL        PE:   Oropharynx: no erythema or ulcerations ,   Oral Mucositis:  NA                                                   Grade: n/a  CVS: S1, S2 RRR   Lungs: CTA throughout bilaterally   Abdomen: + BS x 4, soft, NT, ND   Extremities: no edema BLE's  Gastric Mucositis:       -                                           Grade: n/a  Intestinal Mucositis:     -                                         Grade: n/a   Skin: no rashes  TLC: CDI   Neuro: A&O x 3   Pain: 0      Labs:   CBC Full  -  ( 02 May 2022 05:50 )  WBC Count : 4.97 K/uL  Hemoglobin : 11.7 g/dL  Hematocrit : 35.9 %  Platelet Count - Automated : 254 K/uL  Mean Cell Volume : 96.0 fl  Mean Cell Hemoglobin : 31.3 pg  Mean Cell Hemoglobin Concentration : 32.6 gm/dL  Auto Neutrophil # : 3.00 K/uL  Auto Lymphocyte # : 0.30 K/uL  Auto Monocyte # : 0.39 K/uL  Auto Eosinophil # : 0.60 K/uL  Auto Basophil # : 0.39 K/uL  Auto Neutrophil % : 60.3 %  Auto Lymphocyte % : 6.0 %  Auto Monocyte % : 7.8 %  Auto Eosinophil % : 12.1 %  Auto Basophil % : 7.8 %                          11.7   4.97  )-----------( 254      ( 02 May 2022 05:50 )             35.9     05-02    137  |  101  |  20  ----------------------------<  113<H>  4.3   |  26  |  0.92    Ca    8.4      02 May 2022 05:50  Phos  2.3       Mg     1.9         TPro  5.7<L>  /  Alb  3.9  /  TBili  0.4  /  DBili  0.1  /  AST  21  /  ALT  61<H>  /  AlkPhos  56  02    PT/INR - ( 02 May 2022 05:50 )   PT: 13.6 sec;   INR: 1.17 ratio         PTT - ( 02 May 2022 05:50 )  PTT:21.8 sec  LIVER FUNCTIONS - ( 02 May 2022 05:50 )  Alb: 3.9 g/dL / Pro: 5.7 g/dL / ALK PHOS: 56 U/L / ALT: 61 U/L / AST: 21 U/L / GGT: x           Lactate Dehydrogenase, Serum: 146 U/L ( @ 05:50)  Lactate Dehydrogenase, Serum: 157 U/L ( @ 14:53)      Cultures:   Culture Results:   No growth (22 @ 22:16)    Culture Results:   No growth to date. (22 @ 17:06)    Culture Results:   No growth to date. (22 @ 17:06)    Culture Results:   No Growth Final (22 @ 19:54)    Culture Results:   No Growth Final (22 @ 19:54)    Culture Results:   No growth (22 @ 13:44)    Culture Results:   No Growth Final (22 @ 16:40)    Culture Results:   No Growth Final (22 @ 16:40)    Culture Results:   No Growth Final (22 @ 01:04)    Culture Results:   No Growth Final (22 @ 01:04)    Culture Results:   10,000 - 49,000 CFU/mL Escherichia coli (22 @ 00:54)    Radiology:   ACC: [id number] EXAM:  MR BRAIN WAW IC                        PROCEDURE DATE:  2022    IMPRESSION:  No hydrocephalus, mass effect, acute intracranial hemorrhage, vasogenic   edema, or acute territorial infarct.  No abnormal parenchymal or leptomeningeal enhancement    ACC: [id number] EXAM:  CT BRAIN                        PROCEDURE DATE:  2022    IMPRESSION:  No acute hemorrhage, mass effect or extra-axial collections.    ACC: [id number] EXAM:  XR CHEST PORTABLE URGENT 1V                        PROCEDURE DATE:  2022    IMPRESSION:  Clear lungs.    Meds:   Antimicrobials:   acyclovir   Oral Tab/Cap 400 milliGRAM(s) Oral every 8 hours  clotrimazole Lozenge 1 Lozenge Oral five times a day  fluconAZOLE   Tablet 400 milliGRAM(s) Oral daily      Heme / Onc:       GI:  loperamide 2 milliGRAM(s) Oral every 4 hours PRN  pantoprazole    Tablet 40 milliGRAM(s) Oral two times a day  polyethylene glycol 3350 17 Gram(s) Oral daily PRN  senna 1 Tablet(s) Oral at bedtime PRN  sodium bicarbonate Mouth Rinse 10 milliLiter(s) Swish and Spit five times a day      Cardiovascular:   carvedilol 3.125 milliGRAM(s) Oral every 12 hours      Immunologic:       Other medications:   acetaminophen     Tablet .. 650 milliGRAM(s) Oral every 6 hours  allopurinol 300 milliGRAM(s) Oral daily  Biotene Dry Mouth Oral Rinse 5 milliLiter(s) Swish and Spit five times a day  chlorhexidine 4% Liquid 1 Application(s) Topical <User Schedule>  folic acid 1 milliGRAM(s) Oral daily  gabapentin 100 milliGRAM(s) Oral three times a day  levETIRAcetam 500 milliGRAM(s) Oral two times a day  multivitamin 1 Tablet(s) Oral daily  potassium phosphate / sodium phosphate Powder (PHOS-NaK) 1 Packet(s) Oral three times a day before meals  sodium chloride 0.9%. 1000 milliLiter(s) IV Continuous <Continuous>      PRN:   acetaminophen     Tablet .. 650 milliGRAM(s) Oral every 6 hours PRN  loperamide 2 milliGRAM(s) Oral every 4 hours PRN  metoclopramide Injectable 10 milliGRAM(s) IV Push every 6 hours PRN  ondansetron Injectable 8 milliGRAM(s) IV Push every 8 hours PRN  oxyCODONE    IR 5 milliGRAM(s) Oral every 6 hours PRN  polyethylene glycol 3350 17 Gram(s) Oral daily PRN  senna 1 Tablet(s) Oral at bedtime PRN  sodium chloride 0.9% lock flush 10 milliLiter(s) IV Push every 1 hour PRN    A/P: 56 year old male with relapsed, refractory MCL s/p R-CHOP x 4, RICE x 2, maintenance RTX, salvage [place], admitted for CAR T cell therapy with Tecartus product. Disease status at time of admission is partial response.   Day +13  Monitor for CRS and neurotox...follow CRS/neuro tox protocol - GIVEN TOCILIZUMAB  pm and   AM for CRS - 1    1. Relapsed, refractory MCL   4/15- day - 4- flu / ctx 2/3 - continue CTX hydration until 24 hours post infusion of last dose   Strict I&O, daily weights, prn diuresis   CRS / ICANS score daily until infusion of CAR T cells (22) and twice daily thereafter   BID labs 0500 & 1500 - monitor for TLS   -CRS grade 1 s/p Tocilizumab on tele started on Keppra as per protocol, pancx, started on cefepime  s/p dexamethasone 4 mg IV x1   F/U repeat blood cx's for fevers ( and ) s/p Toci x2 s/p dexamethasone 4 mg IV x2 also with CP: EKG WNL trops negative s/p morphine with good effect   - CRS grade 2( persistent fevers) given Toci x1 and dexamethasone 4 mg IV overnight, continues to be febrile Tmax 104 F tremors given additional Dexamethasone 10 mg IV  - fever curve improved: CRS grade 1  - Grade 2 neurotox with 10/10 Headaches- taken for CT head (with no abnormalities no cerebral edema), s.p Dexamethasone 10 mg IV x 1, MRI head no significant findings    Grade 1 transaminitis -Monitor closely   Discharge planning next week     Decadron 4mg IV given for Grade 1 CRS      2. Prophylactic measures   acyclovir   Oral Tab/Cap 400 milliGRAM(s) Oral every 8 hours  cefepime   IVPB      cefepime   IVPB 2000 milliGRAM(s) IV Intermittent every 8 hours  clotrimazole Lozenge 1 Lozenge Oral five times a day  fluconAZOLE   Tablet 400 milliGRAM(s) Oral daily  Keppra 500 q12h  GI Prophylaxis:  Protonix     3. Cardiac / Hx Afib   -F/U repeat troponin,  now CP free. ECG non ischemic   Continue Tele monitoring   Continue Coreg bid    4. Mouth care - NS / NaHCO3 rinses, Mycelex, Biotene; Skin care     5. Transfuse & replete electrolytes prn     6. IV hydration, daily weights, strict I&O, prn diuresis     7. Antiemetics, anti-diarrhea medications:   LORazepam   Injectable 1 milliGRAM(s) IV Push every 6 hours PRN  metoclopramide Injectable 10 milliGRAM(s) IV Push every 6 hours PRN  ondansetron 8mg IV q 8 hours prn nausea   loperamide 2 milliGRAM(s) Oral every 4 hours PRN.    8. CRS / ICANS scoring    CRS grade 0, ICANS score 10   - CRS grade 0, ICANS score 10    AM - CRS grade 0, ICANS score 10   -pm-CRS grade 0, ICANS score 10   AM - CRS grade 0, ICANS score 10    PM CRS grade 0, ICANS score 10   AM CRS grade 0, ICANS score 10   PM CRS grade 0, ICANS score 10 --> 9PM CRS score - 1, ICANS - 10   AM CRS grade 0, ICANS score 10   PM CRS grade 0, ICANS score 10   AM CRS grade 1 10am (grade 0 @ 8:30am), ICANS score 10  M CRS grade 2( persistently febrile) ICANS score 10    AM CRS GRADE 1, ICANS score 10  - AM CRS GRADE 1, ICANS score 10   AM CRS GRADE 1, ICANS score 10   noon: with persistent headache: Grade 2 neurotox-   taken for CT head( ( with no abnormalities no cerebral edema) s.p Dexamethasone 10 mg IV pending  MR head   PM CRS GRADE 1, ICANS score 10( HA resolved)   - AM CRS GRADE 1, ICANS score 10  - PM CRS GRADE 1, ICANS score 10  - PM CRS GRADE 1, ICANS score 10   AM  GRADE 0, ICANS score - 10   PM score GRADE 1, ICANS score - 10   AM score GRADE 0, ICANS score - 10   PM score GRADE 0, ICANS score - 10  - AM CRS grade 0, ICANS score 10    I have written the above note for Dr. Banks who performed service with me in the room.   Tiffany Lucero NP-C ([phone])    I have seen and examined patient with NP, I agree with above note as scribed.

## 2022-05-02 NOTE — PROGRESS NOTE ADULT - PROBLEM SELECTOR PLAN 9
Pt's changes remind him of his mother's decline with AD, which he finds distressing.  He is aware that this is transient but still is bothered by this association.   Chaplaincy follow up

## 2022-05-02 NOTE — PROGRESS NOTE ADULT - PROBLEM SELECTOR PLAN 2
He is experiencing significant distress surrounding the cognitive changes.  The patient reports this has changed how he experiences time here.   The communication challenges are an issue for him since he identifies as being a talkative, engaging, and outspoken individual. The departure from this has resulted in significant distress.   He requested support with linguistic concordance to help alleviate the distress.  He was also open to peer support from past CAR-T patients if that exists, d/w SW

## 2022-05-02 NOTE — PROVIDER CONTACT NOTE (OTHER) - RECOMMENDATIONS
notify provider, do bld .cultures, UA, C&S, chest X-ray, draw interleukine . start cefepime as ordered by provider, start po keppra, tylenol po , dexamethasone, toci , benadryl po . remote tele.
notify provider.
BC X 2, CXR completed earlier today.
Tylenol 650MG oral given.
notify provider, and TONY Norman came and see and examined the pt. no further orders at this time.
Tylenol 650Mg oral given
Tylenol, Tocilizumab
none
notify provider, give po tylenol
notify provider.
Ice packs applied.
notify provider, give oxycodone po now

## 2022-05-03 LAB
ALBUMIN SERPL ELPH-MCNC: 3.7 G/DL — SIGNIFICANT CHANGE UP (ref 3.3–5)
ALBUMIN SERPL ELPH-MCNC: 4.2 G/DL — SIGNIFICANT CHANGE UP (ref 3.3–5)
ALP SERPL-CCNC: 52 U/L — SIGNIFICANT CHANGE UP (ref 40–120)
ALP SERPL-CCNC: 60 U/L — SIGNIFICANT CHANGE UP (ref 40–120)
ALT FLD-CCNC: 44 U/L — SIGNIFICANT CHANGE UP (ref 10–45)
ALT FLD-CCNC: 46 U/L — HIGH (ref 10–45)
ANION GAP SERPL CALC-SCNC: 11 MMOL/L — SIGNIFICANT CHANGE UP (ref 5–17)
ANION GAP SERPL CALC-SCNC: 11 MMOL/L — SIGNIFICANT CHANGE UP (ref 5–17)
ANISOCYTOSIS BLD QL: SLIGHT — SIGNIFICANT CHANGE UP
APTT BLD: 22.8 SEC — LOW (ref 27.5–35.5)
AST SERPL-CCNC: 14 U/L — SIGNIFICANT CHANGE UP (ref 10–40)
AST SERPL-CCNC: 16 U/L — SIGNIFICANT CHANGE UP (ref 10–40)
BASOPHILS # BLD AUTO: 0.15 K/UL — SIGNIFICANT CHANGE UP (ref 0–0.2)
BASOPHILS NFR BLD AUTO: 5.3 % — HIGH (ref 0–2)
BILIRUB SERPL-MCNC: 0.5 MG/DL — SIGNIFICANT CHANGE UP (ref 0.2–1.2)
BILIRUB SERPL-MCNC: 0.6 MG/DL — SIGNIFICANT CHANGE UP (ref 0.2–1.2)
BUN SERPL-MCNC: 17 MG/DL — SIGNIFICANT CHANGE UP (ref 7–23)
BUN SERPL-MCNC: 19 MG/DL — SIGNIFICANT CHANGE UP (ref 7–23)
CALCIUM SERPL-MCNC: 8.4 MG/DL — SIGNIFICANT CHANGE UP (ref 8.4–10.5)
CALCIUM SERPL-MCNC: 8.5 MG/DL — SIGNIFICANT CHANGE UP (ref 8.4–10.5)
CHLORIDE SERPL-SCNC: 101 MMOL/L — SIGNIFICANT CHANGE UP (ref 96–108)
CHLORIDE SERPL-SCNC: 102 MMOL/L — SIGNIFICANT CHANGE UP (ref 96–108)
CK SERPL-CCNC: 15 U/L — LOW (ref 30–200)
CO2 SERPL-SCNC: 24 MMOL/L — SIGNIFICANT CHANGE UP (ref 22–31)
CO2 SERPL-SCNC: 25 MMOL/L — SIGNIFICANT CHANGE UP (ref 22–31)
CREAT SERPL-MCNC: 0.83 MG/DL — SIGNIFICANT CHANGE UP (ref 0.5–1.3)
CREAT SERPL-MCNC: 0.86 MG/DL — SIGNIFICANT CHANGE UP (ref 0.5–1.3)
CRP SERPL-MCNC: <3 MG/L — SIGNIFICANT CHANGE UP (ref 0–4)
D DIMER BLD IA.RAPID-MCNC: 995 NG/ML DDU — HIGH
DACRYOCYTES BLD QL SMEAR: SLIGHT — SIGNIFICANT CHANGE UP
EGFR: 101 ML/MIN/1.73M2 — SIGNIFICANT CHANGE UP
EGFR: 102 ML/MIN/1.73M2 — SIGNIFICANT CHANGE UP
EOSINOPHIL # BLD AUTO: 0.52 K/UL — HIGH (ref 0–0.5)
EOSINOPHIL NFR BLD AUTO: 17.9 % — HIGH (ref 0–6)
FERRITIN SERPL-MCNC: 154 NG/ML — SIGNIFICANT CHANGE UP (ref 30–400)
FIBRINOGEN PPP-MCNC: 253 MG/DL — LOW (ref 330–520)
GLUCOSE SERPL-MCNC: 145 MG/DL — HIGH (ref 70–99)
GLUCOSE SERPL-MCNC: 93 MG/DL — SIGNIFICANT CHANGE UP (ref 70–99)
HCT VFR BLD CALC: 36.1 % — LOW (ref 39–50)
HCT VFR BLD CALC: 39.7 % — SIGNIFICANT CHANGE UP (ref 39–50)
HGB BLD-MCNC: 11.6 G/DL — LOW (ref 13–17)
HGB BLD-MCNC: 12.6 G/DL — LOW (ref 13–17)
INR BLD: 1.16 RATIO — SIGNIFICANT CHANGE UP (ref 0.88–1.16)
LDH SERPL L TO P-CCNC: 130 U/L — SIGNIFICANT CHANGE UP (ref 50–242)
LDH SERPL L TO P-CCNC: 155 U/L — SIGNIFICANT CHANGE UP (ref 50–242)
LYMPHOCYTES # BLD AUTO: 0.18 K/UL — LOW (ref 1–3.3)
LYMPHOCYTES # BLD AUTO: 6.2 % — LOW (ref 13–44)
MACROCYTES BLD QL: SLIGHT — SIGNIFICANT CHANGE UP
MAGNESIUM SERPL-MCNC: 1.9 MG/DL — SIGNIFICANT CHANGE UP (ref 1.6–2.6)
MAGNESIUM SERPL-MCNC: 1.9 MG/DL — SIGNIFICANT CHANGE UP (ref 1.6–2.6)
MANUAL SMEAR VERIFICATION: SIGNIFICANT CHANGE UP
MCHC RBC-ENTMCNC: 31.1 PG — SIGNIFICANT CHANGE UP (ref 27–34)
MCHC RBC-ENTMCNC: 31.1 PG — SIGNIFICANT CHANGE UP (ref 27–34)
MCHC RBC-ENTMCNC: 31.7 GM/DL — LOW (ref 32–36)
MCHC RBC-ENTMCNC: 32.1 GM/DL — SIGNIFICANT CHANGE UP (ref 32–36)
MCV RBC AUTO: 96.8 FL — SIGNIFICANT CHANGE UP (ref 80–100)
MCV RBC AUTO: 98 FL — SIGNIFICANT CHANGE UP (ref 80–100)
MONOCYTES # BLD AUTO: 0.36 K/UL — SIGNIFICANT CHANGE UP (ref 0–0.9)
MONOCYTES NFR BLD AUTO: 12.5 % — SIGNIFICANT CHANGE UP (ref 2–14)
MYELOCYTES NFR BLD: 0.9 % — HIGH (ref 0–0)
NEUTROPHILS # BLD AUTO: 1.66 K/UL — LOW (ref 1.8–7.4)
NEUTROPHILS NFR BLD AUTO: 54.5 % — SIGNIFICANT CHANGE UP (ref 43–77)
NEUTS BAND # BLD: 2.7 % — SIGNIFICANT CHANGE UP (ref 0–8)
NRBC # BLD: 0 /100 WBCS — SIGNIFICANT CHANGE UP (ref 0–0)
NT-PROBNP SERPL-SCNC: 53 PG/ML — SIGNIFICANT CHANGE UP (ref 0–300)
PHOSPHATE SERPL-MCNC: 3.2 MG/DL — SIGNIFICANT CHANGE UP (ref 2.5–4.5)
PHOSPHATE SERPL-MCNC: 3.5 MG/DL — SIGNIFICANT CHANGE UP (ref 2.5–4.5)
PLAT MORPH BLD: NORMAL — SIGNIFICANT CHANGE UP
PLATELET # BLD AUTO: 232 K/UL — SIGNIFICANT CHANGE UP (ref 150–400)
PLATELET # BLD AUTO: 232 K/UL — SIGNIFICANT CHANGE UP (ref 150–400)
POIKILOCYTOSIS BLD QL AUTO: SLIGHT — SIGNIFICANT CHANGE UP
POLYCHROMASIA BLD QL SMEAR: SLIGHT — SIGNIFICANT CHANGE UP
POTASSIUM SERPL-MCNC: 4.2 MMOL/L — SIGNIFICANT CHANGE UP (ref 3.5–5.3)
POTASSIUM SERPL-MCNC: 4.3 MMOL/L — SIGNIFICANT CHANGE UP (ref 3.5–5.3)
POTASSIUM SERPL-SCNC: 4.2 MMOL/L — SIGNIFICANT CHANGE UP (ref 3.5–5.3)
POTASSIUM SERPL-SCNC: 4.3 MMOL/L — SIGNIFICANT CHANGE UP (ref 3.5–5.3)
PROT SERPL-MCNC: 5.2 G/DL — LOW (ref 6–8.3)
PROT SERPL-MCNC: 6.2 G/DL — SIGNIFICANT CHANGE UP (ref 6–8.3)
PROTHROM AB SERPL-ACNC: 13.4 SEC — SIGNIFICANT CHANGE UP (ref 10.5–13.4)
RBC # BLD: 3.73 M/UL — LOW (ref 4.2–5.8)
RBC # BLD: 4.05 M/UL — LOW (ref 4.2–5.8)
RBC # FLD: 15.3 % — HIGH (ref 10.3–14.5)
RBC # FLD: 15.4 % — HIGH (ref 10.3–14.5)
RBC BLD AUTO: ABNORMAL
SODIUM SERPL-SCNC: 136 MMOL/L — SIGNIFICANT CHANGE UP (ref 135–145)
SODIUM SERPL-SCNC: 138 MMOL/L — SIGNIFICANT CHANGE UP (ref 135–145)
URATE SERPL-MCNC: 2.9 MG/DL — LOW (ref 3.4–8.8)
URATE SERPL-MCNC: 3.1 MG/DL — LOW (ref 3.4–8.8)
WBC # BLD: 2.91 K/UL — LOW (ref 3.8–10.5)
WBC # BLD: 2.97 K/UL — LOW (ref 3.8–10.5)
WBC # FLD AUTO: 2.91 K/UL — LOW (ref 3.8–10.5)
WBC # FLD AUTO: 2.97 K/UL — LOW (ref 3.8–10.5)

## 2022-05-03 PROCEDURE — 99291 CRITICAL CARE FIRST HOUR: CPT

## 2022-05-03 RX ADMIN — Medication 1 LOZENGE: at 20:13

## 2022-05-03 RX ADMIN — GABAPENTIN 100 MILLIGRAM(S): 400 CAPSULE ORAL at 05:48

## 2022-05-03 RX ADMIN — CARVEDILOL PHOSPHATE 3.12 MILLIGRAM(S): 80 CAPSULE, EXTENDED RELEASE ORAL at 17:45

## 2022-05-03 RX ADMIN — Medication 1 LOZENGE: at 09:11

## 2022-05-03 RX ADMIN — Medication 5 MILLILITER(S): at 20:13

## 2022-05-03 RX ADMIN — Medication 1 LOZENGE: at 15:14

## 2022-05-03 RX ADMIN — Medication 1 TABLET(S): at 12:34

## 2022-05-03 RX ADMIN — Medication 650 MILLIGRAM(S): at 21:30

## 2022-05-03 RX ADMIN — OXYCODONE HYDROCHLORIDE 5 MILLIGRAM(S): 5 TABLET ORAL at 21:23

## 2022-05-03 RX ADMIN — SODIUM CHLORIDE 100 MILLILITER(S): 9 INJECTION INTRAMUSCULAR; INTRAVENOUS; SUBCUTANEOUS at 20:13

## 2022-05-03 RX ADMIN — Medication 5 MILLILITER(S): at 12:33

## 2022-05-03 RX ADMIN — Medication 5 MILLILITER(S): at 23:32

## 2022-05-03 RX ADMIN — GABAPENTIN 100 MILLIGRAM(S): 400 CAPSULE ORAL at 21:23

## 2022-05-03 RX ADMIN — OXYCODONE HYDROCHLORIDE 5 MILLIGRAM(S): 5 TABLET ORAL at 21:30

## 2022-05-03 RX ADMIN — OXYCODONE HYDROCHLORIDE 5 MILLIGRAM(S): 5 TABLET ORAL at 15:13

## 2022-05-03 RX ADMIN — Medication 5 MILLILITER(S): at 15:14

## 2022-05-03 RX ADMIN — Medication 400 MILLIGRAM(S): at 15:14

## 2022-05-03 RX ADMIN — OXYCODONE HYDROCHLORIDE 5 MILLIGRAM(S): 5 TABLET ORAL at 15:40

## 2022-05-03 RX ADMIN — Medication 10 MILLILITER(S): at 09:11

## 2022-05-03 RX ADMIN — Medication 5 MILLILITER(S): at 09:11

## 2022-05-03 RX ADMIN — GABAPENTIN 100 MILLIGRAM(S): 400 CAPSULE ORAL at 15:14

## 2022-05-03 RX ADMIN — Medication 650 MILLIGRAM(S): at 05:20

## 2022-05-03 RX ADMIN — CHLORHEXIDINE GLUCONATE 1 APPLICATION(S): 213 SOLUTION TOPICAL at 09:12

## 2022-05-03 RX ADMIN — Medication 10 MILLILITER(S): at 20:13

## 2022-05-03 RX ADMIN — Medication 650 MILLIGRAM(S): at 21:00

## 2022-05-03 RX ADMIN — PANTOPRAZOLE SODIUM 40 MILLIGRAM(S): 20 TABLET, DELAYED RELEASE ORAL at 17:45

## 2022-05-03 RX ADMIN — LEVETIRACETAM 500 MILLIGRAM(S): 250 TABLET, FILM COATED ORAL at 05:20

## 2022-05-03 RX ADMIN — CARVEDILOL PHOSPHATE 3.12 MILLIGRAM(S): 80 CAPSULE, EXTENDED RELEASE ORAL at 05:19

## 2022-05-03 RX ADMIN — OXYCODONE HYDROCHLORIDE 5 MILLIGRAM(S): 5 TABLET ORAL at 05:50

## 2022-05-03 RX ADMIN — FLUCONAZOLE 400 MILLIGRAM(S): 150 TABLET ORAL at 12:33

## 2022-05-03 RX ADMIN — Medication 10 MILLILITER(S): at 12:33

## 2022-05-03 RX ADMIN — Medication 1 MILLIGRAM(S): at 12:34

## 2022-05-03 RX ADMIN — Medication 10 MILLILITER(S): at 15:14

## 2022-05-03 RX ADMIN — OXYCODONE HYDROCHLORIDE 5 MILLIGRAM(S): 5 TABLET ORAL at 05:20

## 2022-05-03 RX ADMIN — Medication 300 MILLIGRAM(S): at 12:33

## 2022-05-03 RX ADMIN — Medication 400 MILLIGRAM(S): at 05:19

## 2022-05-03 RX ADMIN — Medication 400 MILLIGRAM(S): at 21:22

## 2022-05-03 RX ADMIN — LEVETIRACETAM 500 MILLIGRAM(S): 250 TABLET, FILM COATED ORAL at 17:45

## 2022-05-03 RX ADMIN — Medication 10 MILLILITER(S): at 23:32

## 2022-05-03 RX ADMIN — Medication 1 LOZENGE: at 23:32

## 2022-05-03 RX ADMIN — PANTOPRAZOLE SODIUM 40 MILLIGRAM(S): 20 TABLET, DELAYED RELEASE ORAL at 05:20

## 2022-05-03 RX ADMIN — Medication 1 LOZENGE: at 12:33

## 2022-05-03 RX ADMIN — Medication 650 MILLIGRAM(S): at 05:50

## 2022-05-03 NOTE — PROGRESS NOTE ADULT - NS PANP COMMENT GEN_ALL_CORE FT
Admitted for CAR T cell therapy with [employer] (bresucabtagene autoleucel)  today is + 14..some nausea..now improved...monitor closely for CRS and neurotox...pt is at lower risk for these complications given minimal disease burden..hoever he has been on and off with high temps since last  fri evening..first dose of toci given for grade 1 crs......s/p 4 total  doses of  toci and dex 4 mg X 4 and 10 mg X 1... 8/10 HA with somulence...c/w grade 2 neurotox...Stat ct head, MRI..dose dex 10 mg...improved 4/28..4/29, 30 body aches..improved with oxycodone..possibly neuropathic...on gabapentin..4/30 given dex 4 mg x1...temp 38.1...5/2 chills today..  4/30 temp noted...may consider additional dex  1. Admit to BMTU   2. Day -5 through day -1 record CARTOX / ICANS score daily   3. Day - 5 through day - 3 Fludarabine 30mg / m2 IV given over 30 minutes daily   4. Day - 5 - start cyclophosphamide hydration - 0.9% NS + 10mEq KCL / L at 75 ml/m2 and continue for 24 hours post infusion of last dose of CTX   5. Day - 5 through day - 3 CTX 500mg / m2 IV QD to be given over 2 hours   6. Starting day - 5 monitor labs BID: CBC with differential, CMP, LDH, uric acid, mag, phos, CPK, BNP, PT / PTT / INR / Fibrinogen, d-dimer, CRP, hepatic profile, ferritin  7. Weekly IL-6 levels on Monday, twice weekly (Monday / Thursday) CMV / EBV PCR, three times a week (Monday / Wednesday / Friday) type and screen   8. On day - 5 start allopurinol 300mg po QD   9. Day -2 and -1 REST DAYS   10. On day -1 begin to record CARTOX / ICANS score q 12 hours   11. Day 0 infuse Brexucabtagene autoleucel  hx of afib...seen by [facility] cardiology  12. F/U cx's..on cefapime,  ...acyclovir and fluconazole..send rvp  13 Cont tele and keppra  4/16-17 Doing well, I/Os are negative, transient lip swelling, now resolved, some fatigue  4/18, 19, 20, 21, 22 CRS 0, ICANS 10  4/22/22-Chase fever overnight, chills, given Toci, was started on Cefipime. Stable. CRS / ICANS scoring this morning 10.  4/23/22, 4/24/22 CRS 1 ICANS 10  4/24/22- Fever 102oF, complaints of bilateral upper and lower extremities cramps, chest discomfort, EKG no changes. Patient was treated with Toci/Decadron as per protocol. Morphine prn for pain. Clinically improved. Pancultures sent.  4/25/22 CRS 2...persistent high temps.....ICANS 10...tremor noted with fever...dex 10 mg dosed today  4/26/22 CRS 1, Icans 10  4/27 crs 1, Icans 10...neurotox 2  4/28, 4/29,  crs 0..icans 10  4/30 CRS 1  ICANS 10  5/1 CRS 0, ICANS 10...d/c cefapime...d/c planning  5/2 chills this am...will monitor......consider d/c tlc...f/u cx's Admitted for CAR T cell therapy with [employer] (bresucabtagene autoleucel)  today is + 14..some nausea..now improved...monitor closely for CRS and neurotox...pt is at lower risk for these complications given minimal disease burden..hoever he has been on and off with high temps since last  fri evening..first dose of toci given for grade 1 crs......s/p 4 total  doses of  toci and dex 4 mg X 4 and 10 mg X 1... 8/10 HA with somulence...c/w grade 2 neurotox...Stat ct head, MRI..dose dex 10 mg...improved 4/28..4/29, 30 body aches..improved with oxycodone..possibly neuropathic...on gabapentin..4/30 given dex 4 mg x1...temp 38.1...5/2 chills today..  4/30 temp noted...may consider additional dex  1. Admit to BMTU   2. Day -5 through day -1 record CARTOX / ICANS score daily   3. Day - 5 through day - 3 Fludarabine 30mg / m2 IV given over 30 minutes daily   4. Day - 5 - start cyclophosphamide hydration - 0.9% NS + 10mEq KCL / L at 75 ml/m2 and continue for 24 hours post infusion of last dose of CTX   5. Day - 5 through day - 3 CTX 500mg / m2 IV QD to be given over 2 hours   6. Starting day - 5 monitor labs BID: CBC with differential, CMP, LDH, uric acid, mag, phos, CPK, BNP, PT / PTT / INR / Fibrinogen, d-dimer, CRP, hepatic profile, ferritin  7. Weekly IL-6 levels on Monday, twice weekly (Monday / Thursday) CMV / EBV PCR, three times a week (Monday / Wednesday / Friday) type and screen   8. On day - 5 start allopurinol 300mg po QD   9. Day -2 and -1 REST DAYS   10. On day -1 begin to record CARTOX / ICANS score q 12 hours   11. Day 0 infuse Brexucabtagene autoleucel  hx of afib...seen by [facility] cardiology  12. F/U cx's..on cefapime,  ...acyclovir and fluconazole..send rvp  13 Cont tele and keppra  4/16-17 Doing well, I/Os are negative, transient lip swelling, now resolved, some fatigue  4/18, 19, 20, 21, 22 CRS 0, ICANS 10  4/22/22-Chase fever overnight, chills, given Toci, was started on Cefipime. Stable. CRS / ICANS scoring this morning 10.  4/23/22, 4/24/22 CRS 1 ICANS 10  4/24/22- Fever 102oF, complaints of bilateral upper and lower extremities cramps, chest discomfort, EKG no changes. Patient was treated with Toci/Decadron as per protocol. Morphine prn for pain. Clinically improved. Pancultures sent.  4/25/22 CRS 2...persistent high temps.....ICANS 10...tremor noted with fever...dex 10 mg dosed today  4/26/22 CRS 1, Icans 10  4/27 crs 1, Icans 10...neurotox 2  4/28, 4/29,  crs 0..icans 10  4/30 CRS 1  ICANS 10  5/1, 5/2, 5/3 CRS 0, ICANS 10...d/c cefapime...d/c planning  5/2 chills this am...will monitor......consider d/c tlc...f/u cx's  5/3 no chills or fever...anxious...d/c planning for thurs...pt f/u...explained to patient that the weakness he is feeling is expected..

## 2022-05-03 NOTE — PROGRESS NOTE ADULT - SUBJECTIVE AND OBJECTIVE BOX
[facility]PC Transplant Team                                                      Critical / Counseling Time Provided: 30 minutes                                                                                                                                                        Chief Complaint: CAR T therapy with [employer] product for treatment of relapsed, refractory MCL    S: Patient seen and examined with HPC Transplant Team:     All other ROS negative     O: Vitals:   Vital Signs Last 24 Hrs  T(C): 36.9 (03 May 2022 05:43), Max: 38 (02 May 2022 12:05)  T(F): 98.4 (03 May 2022 05:43), Max: 100.4 (02 May 2022 12:05)  HR: 68 (03 May 2022 05:43) (65 - 84)  BP: 112/63 (03 May 2022 05:43) (100/64 - 142/82)  BP(mean): --  RR: 18 (03 May 2022 05:43) (18 - 19)  SpO2: 96% (03 May 2022 05:43) (96% - 99%)    Admit weight: 100.4g   Daily Weight in k.6 (02 May 2022 11:55)  Today's weight:     Intake / Output:   05-02 @ 07:01  -  -03 @ 07:00  --------------------------------------------------------  IN: 3171 mL / OUT: 2950 mL / NET: 221 mL      PE:   Oropharynx: no erythema or ulcerations ,   Oral Mucositis:  NA                                                   Grade: n/a  CVS: S1, S2 RRR   Lungs: CTA throughout bilaterally   Abdomen: + BS x 4, soft, NT, ND   Extremities: no edema BLE's  Gastric Mucositis:       -                                           Grade: n/a  Intestinal Mucositis:     -                                         Grade: n/a   Skin: no rashes  TLC: CDI   Neuro: A&O x 3   Pain: 0    Labs:   CBC Full  -  ( 03 May 2022 06:01 )  WBC Count : 2.91 K/uL  Hemoglobin : 11.6 g/dL  Hematocrit : 36.1 %  Platelet Count - Automated : 232 K/uL  Mean Cell Volume : 96.8 fl  Mean Cell Hemoglobin : 31.1 pg  Mean Cell Hemoglobin Concentration : 32.1 gm/dL  Auto Neutrophil # : 1.66 K/uL  Auto Lymphocyte # : 0.18 K/uL  Auto Monocyte # : 0.36 K/uL  Auto Eosinophil # : 0.52 K/uL  Auto Basophil # : 0.15 K/uL  Auto Neutrophil % : 54.5 %  Auto Lymphocyte % : 6.2 %  Auto Monocyte % : 12.5 %  Auto Eosinophil % : 17.9 %  Auto Basophil % : 5.3 %                          11.6   2.91  )-----------( 232      ( 03 May 2022 06:01 )             36.1     05-03    138  |  102  |  17  ----------------------------<  93  4.3   |  25  |  0.83    Ca    8.4      03 May 2022 06:01  Phos  3.2     05-  Mg     1.9     -    TPro  5.2<L>  /  Alb  3.7  /  TBili  0.5  /  DBili  x   /  AST  14  /  ALT  44  /  AlkPhos  52  05-03    PT/INR - ( 03 May 2022 06:01 )   PT: 13.4 sec;   INR: 1.16 ratio         PTT - ( 03 May 2022 06:01 )  PTT:22.8 sec  LIVER FUNCTIONS - ( 03 May 2022 06:01 )  Alb: 3.7 g/dL / Pro: 5.2 g/dL / ALK PHOS: 52 U/L / ALT: 44 U/L / AST: 14 U/L / GGT: x           Lactate Dehydrogenase, Serum: 130 U/L ( @ 06:01)  Lactate Dehydrogenase, Serum: 156 U/L ( @ 13:09)      Cultures:   Culture Results:   No growth (22 @ 22:16)    Culture Results:   No growth to date. (22 @ 17:06)    Culture Results:   No growth to date. (22 @ 17:06)    Culture Results:   No Growth Final (22 @ 19:54)    Culture Results:   No Growth Final (22 @ 19:54)    Culture Results:   No growth (22 @ 13:44)    Culture Results:   No Growth Final (22 @ 16:40)    Culture Results:   No Growth Final (22 @ 16:40)    Culture Results:   No Growth Final (22 @ 01:04)    Culture Results:   No Growth Final (22 @ 01:04)    Culture Results:   10,000 - 49,000 CFU/mL Escherichia coli (22 @ 00:54)    Radiology:   ACC: [id number] EXAM:  MR BRAIN WAW IC                        PROCEDURE DATE:  2022    IMPRESSION:  No hydrocephalus, mass effect, acute intracranial hemorrhage, vasogenic   edema, or acute territorial infarct.  No abnormal parenchymal or leptomeningeal enhancement    ACC: [id number] EXAM:  CT BRAIN                        PROCEDURE DATE:  2022    IMPRESSION:  No acute hemorrhage, mass effect or extra-axial collections.    ACC: [id number] EXAM:  XR CHEST PORTABLE URGENT 1V                        PROCEDURE DATE:  2022    IMPRESSION:  Clear lungs.    Meds:   Antimicrobials:   acyclovir   Oral Tab/Cap 400 milliGRAM(s) Oral every 8 hours  clotrimazole Lozenge 1 Lozenge Oral five times a day  fluconAZOLE   Tablet 400 milliGRAM(s) Oral daily      Heme / Onc:       GI:  loperamide 2 milliGRAM(s) Oral every 4 hours PRN  pantoprazole    Tablet 40 milliGRAM(s) Oral two times a day  polyethylene glycol 3350 17 Gram(s) Oral daily PRN  senna 1 Tablet(s) Oral at bedtime PRN  sodium bicarbonate Mouth Rinse 10 milliLiter(s) Swish and Spit five times a day      Cardiovascular:   carvedilol 3.125 milliGRAM(s) Oral every 12 hours      Immunologic:       Other medications:   acetaminophen     Tablet .. 650 milliGRAM(s) Oral every 6 hours  allopurinol 300 milliGRAM(s) Oral daily  Biotene Dry Mouth Oral Rinse 5 milliLiter(s) Swish and Spit five times a day  chlorhexidine 4% Liquid 1 Application(s) Topical <User Schedule>  folic acid 1 milliGRAM(s) Oral daily  gabapentin 100 milliGRAM(s) Oral three times a day  levETIRAcetam 500 milliGRAM(s) Oral two times a day  multivitamin 1 Tablet(s) Oral daily  sodium chloride 0.9%. 1000 milliLiter(s) IV Continuous <Continuous>      PRN:   acetaminophen     Tablet .. 650 milliGRAM(s) Oral every 6 hours PRN  loperamide 2 milliGRAM(s) Oral every 4 hours PRN  metoclopramide Injectable 10 milliGRAM(s) IV Push every 6 hours PRN  ondansetron Injectable 8 milliGRAM(s) IV Push every 8 hours PRN  oxyCODONE    IR 5 milliGRAM(s) Oral every 6 hours PRN  polyethylene glycol 3350 17 Gram(s) Oral daily PRN  senna 1 Tablet(s) Oral at bedtime PRN  sodium chloride 0.9% lock flush 10 milliLiter(s) IV Push every 1 hour PRN    A/P: 56 year old male with relapsed, refractory MCL s/p R-CHOP x 4, RICE x 2, maintenance RTX, salvage [place], admitted for CAR T cell therapy with [employer] product. Disease status at time of admission is partial response.   Day +14  Monitor for CRS and neurotox...follow CRS/neuro tox protocol - GIVEN TOCILIZUMAB  pm and   AM for CRS - 1    1. Relapsed, refractory MCL   4/15- day - 4- flu / ctx 2/3 - continue CTX hydration until 24 hours post infusion of last dose   Strict I&O, daily weights, prn diuresis   CRS / ICANS score daily until infusion of CAR T cells (22) and twice daily thereafter   BID labs 0500 & 1500 - monitor for TLS   -CRS grade 1 s/p Tocilizumab on tele started on Keppra as per protocol, pancx, started on cefepime  s/p dexamethasone 4 mg IV x1   F/U repeat blood cx's for fevers ( and ) s/p Toci x2 s/p dexamethasone 4 mg IV x2 also with CP: EKG WNL trops negative s/p morphine with good effect   - CRS grade 2( persistent fevers) given Toci x1 and dexamethasone 4 mg IV overnight, continues to be febrile Tmax 104 F tremors given additional Dexamethasone 10 mg IV  - fever curve improved: CRS grade 1  - Grade 2 neurotox with 10/10 Headaches- taken for CT head (with no abnormalities no cerebral edema), s.p Dexamethasone 10 mg IV x 1, MRI head no significant findings    Grade 1 transaminitis -Monitor closely   Discharge planning next week     Decadron 4mg IV given for Grade 1 CRS      2. Prophylactic measures   acyclovir   Oral Tab/Cap 400 milliGRAM(s) Oral every 8 hours  cefepime   IVPB      cefepime   IVPB 2000 milliGRAM(s) IV Intermittent every 8 hours  clotrimazole Lozenge 1 Lozenge Oral five times a day  fluconAZOLE   Tablet 400 milliGRAM(s) Oral daily  Keppra 500 q12h  GI Prophylaxis:  Protonix     3. Cardiac / Hx Afib   -F/U repeat troponin,  now CP free. ECG non ischemic   Continue Tele monitoring   Continue Coreg bid    4. Mouth care - NS / NaHCO3 rinses, Mycelex, Biotene; Skin care     5. Transfuse & replete electrolytes prn     6. IV hydration, daily weights, strict I&O, prn diuresis     7. Antiemetics, anti-diarrhea medications:   metoclopramide Injectable 10 milliGRAM(s) IV Push every 6 hours PRN  ondansetron 8mg IV q 8 hours prn nausea   loperamide 2 milliGRAM(s) Oral every 4 hours PRN.    8. CRS / ICANS scoring    CRS grade 0, ICANS score 10   - CRS grade 0, ICANS score 10    AM - CRS grade 0, ICANS score 10   -pm-CRS grade 0, ICANS score 10   AM - CRS grade 0, ICANS score 10    PM CRS grade 0, ICANS score 10   AM CRS grade 0, ICANS score 10   PM CRS grade 0, ICANS score 10 --> 9PM CRS score - 1, ICANS - 10   AM CRS grade 0, ICANS score 10   PM CRS grade 0, ICANS score 10   AM CRS grade 1 10am (grade 0 @ 8:30am), ICANS score 10  25M CRS grade 2( persistently febrile) ICANS score 10    AM CRS GRADE 1, ICANS score 10  - AM CRS GRADE 1, ICANS score 10   AM CRS GRADE 1, ICANS score 10   noon: with persistent headache: Grade 2 neurotox-   taken for CT head( ( with no abnormalities no cerebral edema) s.p Dexamethasone 10 mg IV pending  MR head   PM CRS GRADE 1, ICANS score 10( HA resolved)   - AM CRS GRADE 1, ICANS score 10  - PM CRS GRADE 1, ICANS score 10  - PM CRS GRADE 1, ICANS score 10   AM  GRADE 0, ICANS score - 10   PM score GRADE 1, ICANS score - 10   AM score GRADE 0, ICANS score - 10   PM score GRADE 0, ICANS score - 10  /- AM CRS grade 0, ICANS score 10  5/2- PM CRS grade 1 (fever), ICANS score 10   5/3- AM - CRS grade 0, ICANS score 0    I have written the above note for Dr. Banks who performed service with me in the room.   Tiffany Lucero NP-C ([phone])    I have seen and examined patient with NP, I agree with above note as scribed.                    [facility] Transplant Team                                                      Critical / Counseling Time Provided: 30 minutes                                                                                                                                                        Chief Complaint: CAR T therapy with [employer] product for treatment of relapsed, refractory MCL    S: Patient seen and examined with [facility]PC Transplant Team:   + generalized weakness  + dizziness this AM   All other ROS negative     O: Vitals:   Vital Signs Last 24 Hrs  T(C): 36.9 (03 May 2022 05:43), Max: 38 (02 May 2022 12:05)  T(F): 98.4 (03 May 2022 05:43), Max: 100.4 (02 May 2022 12:05)  HR: 68 (03 May 2022 05:43) (65 - 84)  BP: 112/63 (03 May 2022 05:43) (100/64 - 142/82)  BP(mean): --  RR: 18 (03 May 2022 05:43) (18 - 19)  SpO2: 96% (03 May 2022 05:43) (96% - 99%)    Admit weight: 100.4g   Daily Weight in k.6 (02 May 2022 11:55)  Today's weight:     Intake / Output:   05-02 @ 07:01  -  -03 @ 07:00  --------------------------------------------------------  IN: 3171 mL / OUT: 2950 mL / NET: 221 mL      PE:   Oropharynx: no erythema or ulcerations ,   Oral Mucositis:  NA                                                   Grade: n/a  CVS: S1, S2 RRR   Lungs: CTA throughout bilaterally   Abdomen: + BS x 4, soft, NT, ND   Extremities: no edema BLE's  Gastric Mucositis:       -                                           Grade: n/a  Intestinal Mucositis:     -                                         Grade: n/a   Skin: no rashes  TLC: CDI   Neuro: A&O x 3   Pain: 0    Labs:   CBC Full  -  ( 03 May 2022 06:01 )  WBC Count : 2.91 K/uL  Hemoglobin : 11.6 g/dL  Hematocrit : 36.1 %  Platelet Count - Automated : 232 K/uL  Mean Cell Volume : 96.8 fl  Mean Cell Hemoglobin : 31.1 pg  Mean Cell Hemoglobin Concentration : 32.1 gm/dL  Auto Neutrophil # : 1.66 K/uL  Auto Lymphocyte # : 0.18 K/uL  Auto Monocyte # : 0.36 K/uL  Auto Eosinophil # : 0.52 K/uL  Auto Basophil # : 0.15 K/uL  Auto Neutrophil % : 54.5 %  Auto Lymphocyte % : 6.2 %  Auto Monocyte % : 12.5 %  Auto Eosinophil % : 17.9 %  Auto Basophil % : 5.3 %                          11.6   2.91  )-----------( 232      ( 03 May 2022 06:01 )             36.1     05-03    138  |  102  |  17  ----------------------------<  93  4.3   |  25  |  0.83    Ca    8.4      03 May 2022 06:01  Phos  3.2     05-03  Mg     1.9     -03    TPro  5.2<L>  /  Alb  3.7  /  TBili  0.5  /  DBili  x   /  AST  14  /  ALT  44  /  AlkPhos  52  05-03    PT/INR - ( 03 May 2022 06:01 )   PT: 13.4 sec;   INR: 1.16 ratio         PTT - ( 03 May 2022 06:01 )  PTT:22.8 sec  LIVER FUNCTIONS - ( 03 May 2022 06:01 )  Alb: 3.7 g/dL / Pro: 5.2 g/dL / ALK PHOS: 52 U/L / ALT: 44 U/L / AST: 14 U/L / GGT: x           Lactate Dehydrogenase, Serum: 130 U/L ( @ 06:01)  Lactate Dehydrogenase, Serum: 156 U/L ( @ 13:09)      Cultures:   Culture Results:   No growth (22 @ 22:16)    Culture Results:   No growth to date. (22 @ 17:06)    Culture Results:   No growth to date. (22 @ 17:06)    Culture Results:   No Growth Final (22 @ 19:54)    Culture Results:   No Growth Final (22 @ 19:54)    Culture Results:   No growth (22 @ 13:44)    Culture Results:   No Growth Final (22 @ 16:40)    Culture Results:   No Growth Final (22 @ 16:40)    Culture Results:   No Growth Final (22 @ 01:04)    Culture Results:   No Growth Final (22 @ 01:04)    Culture Results:   10,000 - 49,000 CFU/mL Escherichia coli (22 @ 00:54)    Radiology:   ACC: [id number] EXAM:  MR BRAIN WAW IC                        PROCEDURE DATE:  2022    IMPRESSION:  No hydrocephalus, mass effect, acute intracranial hemorrhage, vasogenic   edema, or acute territorial infarct.  No abnormal parenchymal or leptomeningeal enhancement    ACC: [id number] EXAM:  CT BRAIN                        PROCEDURE DATE:  2022    IMPRESSION:  No acute hemorrhage, mass effect or extra-axial collections.    ACC: [id number] EXAM:  XR CHEST PORTABLE URGENT 1V                        PROCEDURE DATE:  2022    IMPRESSION:  Clear lungs.    Meds:   Antimicrobials:   acyclovir   Oral Tab/Cap 400 milliGRAM(s) Oral every 8 hours  clotrimazole Lozenge 1 Lozenge Oral five times a day  fluconAZOLE   Tablet 400 milliGRAM(s) Oral daily      Heme / Onc:       GI:  loperamide 2 milliGRAM(s) Oral every 4 hours PRN  pantoprazole    Tablet 40 milliGRAM(s) Oral two times a day  polyethylene glycol 3350 17 Gram(s) Oral daily PRN  senna 1 Tablet(s) Oral at bedtime PRN  sodium bicarbonate Mouth Rinse 10 milliLiter(s) Swish and Spit five times a day      Cardiovascular:   carvedilol 3.125 milliGRAM(s) Oral every 12 hours      Immunologic:       Other medications:   acetaminophen     Tablet .. 650 milliGRAM(s) Oral every 6 hours  allopurinol 300 milliGRAM(s) Oral daily  Biotene Dry Mouth Oral Rinse 5 milliLiter(s) Swish and Spit five times a day  chlorhexidine 4% Liquid 1 Application(s) Topical <User Schedule>  folic acid 1 milliGRAM(s) Oral daily  gabapentin 100 milliGRAM(s) Oral three times a day  levETIRAcetam 500 milliGRAM(s) Oral two times a day  multivitamin 1 Tablet(s) Oral daily  sodium chloride 0.9%. 1000 milliLiter(s) IV Continuous <Continuous>      PRN:   acetaminophen     Tablet .. 650 milliGRAM(s) Oral every 6 hours PRN  loperamide 2 milliGRAM(s) Oral every 4 hours PRN  metoclopramide Injectable 10 milliGRAM(s) IV Push every 6 hours PRN  ondansetron Injectable 8 milliGRAM(s) IV Push every 8 hours PRN  oxyCODONE    IR 5 milliGRAM(s) Oral every 6 hours PRN  polyethylene glycol 3350 17 Gram(s) Oral daily PRN  senna 1 Tablet(s) Oral at bedtime PRN  sodium chloride 0.9% lock flush 10 milliLiter(s) IV Push every 1 hour PRN    A/P: 56 year old male with relapsed, refractory MCL s/p R-CHOP x 4, RICE x 2, maintenance RTX, salvage [place], admitted for CAR T cell therapy with [employer] product. Disease status at time of admission is partial response.   Day +  Monitor for CRS and neurotox...follow CRS/neuro tox protocol - GIVEN TOCILIZUMAB  pm and   AM for CRS - 1    1. Relapsed, refractory MCL   4/15- day - 4- flu / ctx 2/3 - continue CTX hydration until 24 hours post infusion of last dose   Strict I&O, daily weights, prn diuresis   CRS / ICANS score daily until infusion of CAR T cells (22) and twice daily thereafter   BID labs 0500 & 1500 - monitor for TLS   -CRS grade 1 s/p Tocilizumab on tele started on Keppra as per protocol, pancx, started on cefepime  s/p dexamethasone 4 mg IV x1   F/U repeat blood cx's for fevers ( and ) s/p Toci x2 s/p dexamethasone 4 mg IV x2 also with CP: EKG WNL trops negative s/p morphine with good effect   - CRS grade 2( persistent fevers) given Toci x1 and dexamethasone 4 mg IV overnight, continues to be febrile Tmax 104 F tremors given additional Dexamethasone 10 mg IV  - fever curve improved: CRS grade 1  - Grade 2 neurotox with 10/10 Headaches- taken for CT head (with no abnormalities no cerebral edema), s.p Dexamethasone 10 mg IV x 1, MRI head no significant findings    Grade 1 transaminitis -Monitor closely   Discharge planning next week     Decadron 4mg IV given for Grade 1 CRS      2. Prophylactic measures   acyclovir   Oral Tab/Cap 400 milliGRAM(s) Oral every 8 hours  cefepime   IVPB      cefepime   IVPB 2000 milliGRAM(s) IV Intermittent every 8 hours  clotrimazole Lozenge 1 Lozenge Oral five times a day  fluconAZOLE   Tablet 400 milliGRAM(s) Oral daily  Keppra 500 q12h  GI Prophylaxis:  Protonix     3. Cardiac / Hx Afib   -F/U repeat troponin,  now CP free. ECG non ischemic   Continue Tele monitoring   Continue Coreg bid    4. Mouth care - NS / NaHCO3 rinses, Mycelex, Biotene; Skin care     5. Transfuse & replete electrolytes prn     6. IV hydration, daily weights, strict I&O, prn diuresis     7. Antiemetics, anti-diarrhea medications:   metoclopramide Injectable 10 milliGRAM(s) IV Push every 6 hours PRN  ondansetron 8mg IV q 8 hours prn nausea   loperamide 2 milliGRAM(s) Oral every 4 hours PRN.    8. CRS / ICANS scoring    CRS grade 0, ICANS score 10   - CRS grade 0, ICANS score 10    AM - CRS grade 0, ICANS score 10   -pm-CRS grade 0, ICANS score 10   AM - CRS grade 0, ICANS score 10    PM CRS grade 0, ICANS score 10   AM CRS grade 0, ICANS score 10   PM CRS grade 0, ICANS score 10 --> 9PM CRS score - 1, ICANS - 10   AM CRS grade 0, ICANS score 10   PM CRS grade 0, ICANS score 10   AM CRS grade 1 10am (grade 0 @ 8:30am), ICANS score 10  M CRS grade 2( persistently febrile) ICANS score 10    AM CRS GRADE 1, ICANS score 10  - AM CRS GRADE 1, ICANS score 10   AM CRS GRADE 1, ICANS score 10   noon: with persistent headache: Grade 2 neurotox-   taken for CT head( ( with no abnormalities no cerebral edema) s.p Dexamethasone 10 mg IV pending  MR head   PM CRS GRADE 1, ICANS score 10( HA resolved)   - AM CRS GRADE 1, ICANS score 10  - PM CRS GRADE 1, ICANS score 10  - PM CRS GRADE 1, ICANS score 10   AM  GRADE 0, ICANS score - 10   PM score GRADE 1, ICANS score - 10   AM score GRADE 0, ICANS score - 10   PM score GRADE 0, ICANS score - 10  /2- AM CRS grade 0, ICANS score 10  5/2- PM CRS grade 1 (fever), ICANS score 10   /3- AM - CRS grade 0, ICANS score 0    I have written the above note for Dr. Banks who performed service with me in the room.   Tiffany Lucero NP-C ([phone])    I have seen and examined patient with NP, I agree with above note as scribed.                    [facility] Transplant Team                                                      Critical / Counseling Time Provided: 30 minutes                                                                                                                                                        Chief Complaint: CAR T therapy with [employer] product for treatment of relapsed, refractory MCL    S: Patient seen and examined with [facility]PC Transplant Team:   + generalized weakness  + dizziness this AM   All other ROS negative     O: Vitals:   Vital Signs Last 24 Hrs  T(C): 36.9 (03 May 2022 05:43), Max: 38 (02 May 2022 12:05)  T(F): 98.4 (03 May 2022 05:43), Max: 100.4 (02 May 2022 12:05)  HR: 68 (03 May 2022 05:43) (65 - 84)  BP: 112/63 (03 May 2022 05:43) (100/64 - 142/82)  BP(mean): --  RR: 18 (03 May 2022 05:43) (18 - 19)  SpO2: 96% (03 May 2022 05:43) (96% - 99%)    Admit weight: 100.4g   Daily Weight in k.6 (02 May 2022 11:55)  Today's weight:     Intake / Output:   05-02 @ 07:01  -  -03 @ 07:00  --------------------------------------------------------  IN: 3171 mL / OUT: 2950 mL / NET: 221 mL      PE:   Oropharynx: no erythema or ulcerations ,   Oral Mucositis:  NA                                                   Grade: n/a  CVS: S1, S2 RRR   Lungs: CTA throughout bilaterally   Abdomen: + BS x 4, soft, NT, ND   Extremities: no edema BLE's  Gastric Mucositis:       -                                           Grade: n/a  Intestinal Mucositis:     -                                         Grade: n/a   Skin: no rashes  TLC: CDI   Neuro: A&O x 3   Pain: 0    Labs:   CBC Full  -  ( 03 May 2022 06:01 )  WBC Count : 2.91 K/uL  Hemoglobin : 11.6 g/dL  Hematocrit : 36.1 %  Platelet Count - Automated : 232 K/uL  Mean Cell Volume : 96.8 fl  Mean Cell Hemoglobin : 31.1 pg  Mean Cell Hemoglobin Concentration : 32.1 gm/dL  Auto Neutrophil # : 1.66 K/uL  Auto Lymphocyte # : 0.18 K/uL  Auto Monocyte # : 0.36 K/uL  Auto Eosinophil # : 0.52 K/uL  Auto Basophil # : 0.15 K/uL  Auto Neutrophil % : 54.5 %  Auto Lymphocyte % : 6.2 %  Auto Monocyte % : 12.5 %  Auto Eosinophil % : 17.9 %  Auto Basophil % : 5.3 %                          11.6   2.91  )-----------( 232      ( 03 May 2022 06:01 )             36.1     05-03    138  |  102  |  17  ----------------------------<  93  4.3   |  25  |  0.83    Ca    8.4      03 May 2022 06:01  Phos  3.2     05-03  Mg     1.9     -03    TPro  5.2<L>  /  Alb  3.7  /  TBili  0.5  /  DBili  x   /  AST  14  /  ALT  44  /  AlkPhos  52  05-03    PT/INR - ( 03 May 2022 06:01 )   PT: 13.4 sec;   INR: 1.16 ratio         PTT - ( 03 May 2022 06:01 )  PTT:22.8 sec  LIVER FUNCTIONS - ( 03 May 2022 06:01 )  Alb: 3.7 g/dL / Pro: 5.2 g/dL / ALK PHOS: 52 U/L / ALT: 44 U/L / AST: 14 U/L / GGT: x           Lactate Dehydrogenase, Serum: 130 U/L ( @ 06:01)  Lactate Dehydrogenase, Serum: 156 U/L ( @ 13:09)      Cultures:   Culture Results:   No growth (22 @ 22:16)    Culture Results:   No growth to date. (22 @ 17:06)    Culture Results:   No growth to date. (22 @ 17:06)    Culture Results:   No Growth Final (22 @ 19:54)    Culture Results:   No Growth Final (22 @ 19:54)    Culture Results:   No growth (22 @ 13:44)    Culture Results:   No Growth Final (22 @ 16:40)    Culture Results:   No Growth Final (22 @ 16:40)    Culture Results:   No Growth Final (22 @ 01:04)    Culture Results:   No Growth Final (22 @ 01:04)    Culture Results:   10,000 - 49,000 CFU/mL Escherichia coli (22 @ 00:54)    Radiology:   ACC: [id number] EXAM:  MR BRAIN WAW IC                        PROCEDURE DATE:  2022    IMPRESSION:  No hydrocephalus, mass effect, acute intracranial hemorrhage, vasogenic   edema, or acute territorial infarct.  No abnormal parenchymal or leptomeningeal enhancement    ACC: [id number] EXAM:  CT BRAIN                        PROCEDURE DATE:  2022    IMPRESSION:  No acute hemorrhage, mass effect or extra-axial collections.    ACC: [id number] EXAM:  XR CHEST PORTABLE URGENT 1V                        PROCEDURE DATE:  2022    IMPRESSION:  Clear lungs.    Meds:   Antimicrobials:   acyclovir   Oral Tab/Cap 400 milliGRAM(s) Oral every 8 hours  clotrimazole Lozenge 1 Lozenge Oral five times a day  fluconAZOLE   Tablet 400 milliGRAM(s) Oral daily      Heme / Onc:       GI:  loperamide 2 milliGRAM(s) Oral every 4 hours PRN  pantoprazole    Tablet 40 milliGRAM(s) Oral two times a day  polyethylene glycol 3350 17 Gram(s) Oral daily PRN  senna 1 Tablet(s) Oral at bedtime PRN  sodium bicarbonate Mouth Rinse 10 milliLiter(s) Swish and Spit five times a day      Cardiovascular:   carvedilol 3.125 milliGRAM(s) Oral every 12 hours      Immunologic:       Other medications:   acetaminophen     Tablet .. 650 milliGRAM(s) Oral every 6 hours  allopurinol 300 milliGRAM(s) Oral daily  Biotene Dry Mouth Oral Rinse 5 milliLiter(s) Swish and Spit five times a day  chlorhexidine 4% Liquid 1 Application(s) Topical <User Schedule>  folic acid 1 milliGRAM(s) Oral daily  gabapentin 100 milliGRAM(s) Oral three times a day  levETIRAcetam 500 milliGRAM(s) Oral two times a day  multivitamin 1 Tablet(s) Oral daily  sodium chloride 0.9%. 1000 milliLiter(s) IV Continuous <Continuous>      PRN:   acetaminophen     Tablet .. 650 milliGRAM(s) Oral every 6 hours PRN  loperamide 2 milliGRAM(s) Oral every 4 hours PRN  metoclopramide Injectable 10 milliGRAM(s) IV Push every 6 hours PRN  ondansetron Injectable 8 milliGRAM(s) IV Push every 8 hours PRN  oxyCODONE    IR 5 milliGRAM(s) Oral every 6 hours PRN  polyethylene glycol 3350 17 Gram(s) Oral daily PRN  senna 1 Tablet(s) Oral at bedtime PRN  sodium chloride 0.9% lock flush 10 milliLiter(s) IV Push every 1 hour PRN    A/P: 56 year old male with relapsed, refractory MCL s/p R-CHOP x 4, RICE x 2, maintenance RTX, salvage Calquence, admitted for CAR T cell therapy with Tecartus product. Disease status at time of admission is partial response.   Day +  Monitor for CRS and neurotox...follow CRS/neuro tox protocol - GIVEN TOCILIZUMAB  pm and   AM for CRS - 1    1. Relapsed, refractory MCL   4/15- day - 4- flu / ctx 2/3 - continue CTX hydration until 24 hours post infusion of last dose   Strict I&O, daily weights, prn diuresis   CRS / ICANS score daily until infusion of CAR T cells (22) and twice daily thereafter   BID labs 0500 & 1500 - monitor for TLS   -CRS grade 1 s/p Tocilizumab on tele started on Keppra as per protocol, pancx, started on cefepime  s/p dexamethasone 4 mg IV x1   F/U repeat blood cx's for fevers ( and ) s/p Toci x2 s/p dexamethasone 4 mg IV x2 also with CP: EKG WNL trops negative s/p morphine with good effect   - CRS grade 2( persistent fevers) given Toci x1 and dexamethasone 4 mg IV overnight, continues to be febrile Tmax 104 F tremors given additional Dexamethasone 10 mg IV  - fever curve improved: CRS grade 1  - Grade 2 neurotox with 10/10 Headaches- taken for CT head (with no abnormalities no cerebral edema), s.p Dexamethasone 10 mg IV x 1, MRI head no significant findings    Grade 1 transaminitis -Monitor closely   Discharge planning next week     Decadron 4mg IV given for Grade 1 CRS      2. Prophylactic measures   acyclovir   Oral Tab/Cap 400 milliGRAM(s) Oral every 8 hours  cefepime   IVPB      cefepime   IVPB 2000 milliGRAM(s) IV Intermittent every 8 hours  clotrimazole Lozenge 1 Lozenge Oral five times a day  fluconAZOLE   Tablet 400 milliGRAM(s) Oral daily  Keppra 500 q12h  GI Prophylaxis:  Protonix     3. Cardiac / Hx Afib   -F/U repeat troponin,  now CP free. ECG non ischemic   Continue Tele monitoring   Continue Coreg bid    4. Mouth care - NS / NaHCO3 rinses, Mycelex, Biotene; Skin care     5. Transfuse & replete electrolytes prn     6. IV hydration, daily weights, strict I&O, prn diuresis     7. Antiemetics, anti-diarrhea medications:   metoclopramide Injectable 10 milliGRAM(s) IV Push every 6 hours PRN  ondansetron 8mg IV q 8 hours prn nausea   loperamide 2 milliGRAM(s) Oral every 4 hours PRN.    8. CRS / ICANS scoring    CRS grade 0, ICANS score 10   - CRS grade 0, ICANS score 10    AM - CRS grade 0, ICANS score 10   -pm-CRS grade 0, ICANS score 10   AM - CRS grade 0, ICANS score 10    PM CRS grade 0, ICANS score 10   AM CRS grade 0, ICANS score 10   PM CRS grade 0, ICANS score 10 --> 9PM CRS score - 1, ICANS - 10   AM CRS grade 0, ICANS score 10   PM CRS grade 0, ICANS score 10   AM CRS grade 1 10am (grade 0 @ 8:30am), ICANS score 10  M CRS grade 2( persistently febrile) ICANS score 10    AM CRS GRADE 1, ICANS score 10  - AM CRS GRADE 1, ICANS score 10   AM CRS GRADE 1, ICANS score 10   noon: with persistent headache: Grade 2 neurotox-   taken for CT head( ( with no abnormalities no cerebral edema) s.p Dexamethasone 10 mg IV pending  MR head   PM CRS GRADE 1, ICANS score 10( HA resolved)   - AM CRS GRADE 1, ICANS score 10  - PM CRS GRADE 1, ICANS score 10  - PM CRS GRADE 1, ICANS score 10   AM  GRADE 0, ICANS score - 10   PM score GRADE 1, ICANS score - 10   AM score GRADE 0, ICANS score - 10   PM score GRADE 0, ICANS score - 10  /2- AM CRS grade 0, ICANS score 10  5/2- PM CRS grade 1 (fever), ICANS score 10   5/3- AM - CRS grade 0, ICANS score 10    I have written the above note for Dr. Banks who performed service with me in the room.   Tiffany Lucero NP-C ([phone])    I have seen and examined patient with NP, I agree with above note as scribed.

## 2022-05-03 NOTE — CHART NOTE - NSCHARTNOTEFT_GEN_A_CORE
Nutrition Follow Up Note  Patient seen for: BMT follow-up     Chart reviewed, events noted. Per chart " 56 year old male with relapsed, refractory MCL s/p R-CHOP x 4, RICE x 2, maintenance RTX, salvage Calquence, admitted for CAR T cell therapy with Tecartus product. Disease status at time of admission is partial response. Day +15"     Source: [X] Patient       [x] EMR        [X] RN            Diet, Regular:   GlutaSolve(Glutamine) 15gm pkg     Qty per Day:  1  Supplement Feeding Modality:  Oral  Ensure Clear Cans or Servings Per Day:  1       Frequency:  Daily (22 @ 16:29) [Active]    - Is current order appropriate/adequate? [x] Yes     - PO intake :   [] >75%  Adequate    [x] 50-75%  Fair       [] <50%  Poor    - Nutrition-related concerns:      - Pt reports ongoing suboptimal appetite/PO intake, ~50% of meals consumed.       - Drinking Ensure Clear 1x/day (200 tom, 7 Gm protein) + High Protein Gelatin 1x/day (114 tom, 18 Gm protein), will continue.       - K+ Phos and Mg WNL today . Continue to monitor and replete electrolytes if low. Micronutrient supplementation: multivitamin, folic acid, vitamin K.     GI: Denies N/V at time of visit. No difficulty chewing/swallowing. Last BM yesterday  . Denies diarrhea/constipation.  Bowel Regimen? [X] Yes (senna, Miralax)    Weights:   Admit weight in k.4 (-14)  Daily Weight in k (-), Weight in k.6 (-), Weight in k.5 (-), Weight in k.2 (-), Weight in k.5 (-), Weight in k.8 (-), Weight in k.3 (-)  ** Weight trending downward (5% weight loss x ~3 weeks) Of note Pt has received IV fluids and diuretics in-house. Weight changes may be partially secondary to fluid shifts. RD to continue to monitor weight trends as able.       Nutritionally Pertinent MEDICATIONS  (STANDING):  acyclovir   Oral Tab/Cap  allopurinol  carvedilol  clotrimazole Lozenge  fluconAZOLE   Tablet  folic acid  multivitamin  pantoprazole    Tablet  sodium bicarbonate Mouth Rinse  sodium chloride 0.9%.    Pertinent Labs:  @ 06:01: Na 138, BUN 17, Cr 0.83, BG 93, K+ 4.3, Phos 3.2, Mg 1.9, Alk Phos 52, ALT/SGPT 44, AST/SGOT 14    Skin per nursing documentation: no pressure injury noted   Edema: note noted per nursing documentation     Estimated Needs:   [X] no change since previous assessment  [] recalculated:     Previous Nutrition Diagnosis #1: Predicted inadequate protein-energy intake   Nutrition Diagnosis is: [X] upgraded    Previous Nutrition Diagnosis #2: inadequate protein-energy intake   Nutrition Diagnosis is: [X] upgraded    Previous Nutrition Diagnosis #3: Moderate Malnutrition   Nutrition Diagnosis is: [X] ongoing  - being addressed with PO intake, oral nutritional supplements, food preferences     New Nutrition Diagnosis: [X] N/A    Nutrition Care Plan:  [X] In Progress  [] Achieved  [] Not applicable    Nutrition Interventions:     Education Provided:       [X] Yes: Reviewed transplant food safety precautions and answered all questions as able. Discussed avoiding any take out/outside foods (including no bakery/deli items), emphasis on consuming home cooked or frozen meals. Discussed consuming bottled or filtered water. Discussed importance of adequate intake when home, including nutrient dense foods as part of diet, consuming small, frequent meals to optimize overall intake.        Recommendations:         [X] Continue current diet order: Regular            [X] Continue oral nutrition supplement: Ensure Clear 1x/day            [X] RD will provide High Protein Gelatin 1x/day      [X] Encourage adequate consumption of meals/supplements to optimize protein-energy intake.      [X] Continue to monitor and replete electrolytes if low.      [X] Continue current micronutrient supplementation: multivitamin and folic acid.     Monitoring and Evaluation:   Continue to monitor nutritional intake, tolerance to diet prescription, weights, labs, skin integrity    RD remains available upon request and will follow up per protocol  Anna Krishnamurthy RDN, CDN - Pager #[phone]

## 2022-05-03 NOTE — CHART NOTE - NSCHARTNOTESELECT_GEN_ALL_CORE
Nutrition Services
BMTU CAR T infusion/Event Note
Event Note
FEVER/Event Note
Fever Note/Event Note
Fever/Event Note
Fever/Event Note
Muscle cramps/Event Note
Nutrition Services
Nutrition Services

## 2022-05-04 LAB
ALBUMIN SERPL ELPH-MCNC: 3.5 G/DL — SIGNIFICANT CHANGE UP (ref 3.3–5)
ALP SERPL-CCNC: 54 U/L — SIGNIFICANT CHANGE UP (ref 40–120)
ALT FLD-CCNC: 38 U/L — SIGNIFICANT CHANGE UP (ref 10–45)
ANION GAP SERPL CALC-SCNC: 12 MMOL/L — SIGNIFICANT CHANGE UP (ref 5–17)
APTT BLD: 23 SEC — LOW (ref 27.5–35.5)
AST SERPL-CCNC: 23 U/L — SIGNIFICANT CHANGE UP (ref 10–40)
BASOPHILS # BLD AUTO: 0.02 K/UL — SIGNIFICANT CHANGE UP (ref 0–0.2)
BASOPHILS NFR BLD AUTO: 0.9 % — SIGNIFICANT CHANGE UP (ref 0–2)
BILIRUB SERPL-MCNC: 0.4 MG/DL — SIGNIFICANT CHANGE UP (ref 0.2–1.2)
BLD GP AB SCN SERPL QL: NEGATIVE — SIGNIFICANT CHANGE UP
BUN SERPL-MCNC: 15 MG/DL — SIGNIFICANT CHANGE UP (ref 7–23)
CALCIUM SERPL-MCNC: 8.2 MG/DL — LOW (ref 8.4–10.5)
CHLORIDE SERPL-SCNC: 103 MMOL/L — SIGNIFICANT CHANGE UP (ref 96–108)
CK SERPL-CCNC: 24 U/L — LOW (ref 30–200)
CO2 SERPL-SCNC: 25 MMOL/L — SIGNIFICANT CHANGE UP (ref 22–31)
CREAT SERPL-MCNC: 0.88 MG/DL — SIGNIFICANT CHANGE UP (ref 0.5–1.3)
CRP SERPL-MCNC: <3 MG/L — SIGNIFICANT CHANGE UP (ref 0–4)
CULTURE RESULTS: NO GROWTH — SIGNIFICANT CHANGE UP
CULTURE RESULTS: SIGNIFICANT CHANGE UP
CULTURE RESULTS: SIGNIFICANT CHANGE UP
D DIMER BLD IA.RAPID-MCNC: 1367 NG/ML DDU — HIGH
EGFR: 100 ML/MIN/1.73M2 — SIGNIFICANT CHANGE UP
EOSINOPHIL # BLD AUTO: 0.53 K/UL — HIGH (ref 0–0.5)
EOSINOPHIL NFR BLD AUTO: 22.6 % — HIGH (ref 0–6)
FERRITIN SERPL-MCNC: 167 NG/ML — SIGNIFICANT CHANGE UP (ref 30–400)
FIBRINOGEN PPP-MCNC: 245 MG/DL — LOW (ref 330–520)
GLUCOSE SERPL-MCNC: 107 MG/DL — HIGH (ref 70–99)
HCT VFR BLD CALC: 36 % — LOW (ref 39–50)
HGB BLD-MCNC: 11.8 G/DL — LOW (ref 13–17)
IL6 FLD-MCNC: 24.5 PG/ML — HIGH (ref 0–13)
INR BLD: 1.14 RATIO — SIGNIFICANT CHANGE UP (ref 0.88–1.16)
LDH SERPL L TO P-CCNC: 216 U/L — SIGNIFICANT CHANGE UP (ref 50–242)
LYMPHOCYTES # BLD AUTO: 0.26 K/UL — LOW (ref 1–3.3)
LYMPHOCYTES # BLD AUTO: 11.3 % — LOW (ref 13–44)
MAGNESIUM SERPL-MCNC: 1.8 MG/DL — SIGNIFICANT CHANGE UP (ref 1.6–2.6)
MANUAL SMEAR VERIFICATION: SIGNIFICANT CHANGE UP
MCHC RBC-ENTMCNC: 31.8 PG — SIGNIFICANT CHANGE UP (ref 27–34)
MCHC RBC-ENTMCNC: 32.8 GM/DL — SIGNIFICANT CHANGE UP (ref 32–36)
MCV RBC AUTO: 97 FL — SIGNIFICANT CHANGE UP (ref 80–100)
METAMYELOCYTES # FLD: 4.4 % — HIGH (ref 0–0)
MONOCYTES # BLD AUTO: 0.32 K/UL — SIGNIFICANT CHANGE UP (ref 0–0.9)
MONOCYTES NFR BLD AUTO: 13.9 % — SIGNIFICANT CHANGE UP (ref 2–14)
MYELOCYTES NFR BLD: 1.7 % — HIGH (ref 0–0)
NEUTROPHILS # BLD AUTO: 1.05 K/UL — LOW (ref 1.8–7.4)
NEUTROPHILS NFR BLD AUTO: 43.5 % — SIGNIFICANT CHANGE UP (ref 43–77)
NEUTS BAND # BLD: 1.7 % — SIGNIFICANT CHANGE UP (ref 0–8)
NRBC # BLD: 2 /100 — HIGH (ref 0–0)
NT-PROBNP SERPL-SCNC: 60 PG/ML — SIGNIFICANT CHANGE UP (ref 0–300)
PHOSPHATE SERPL-MCNC: 3.6 MG/DL — SIGNIFICANT CHANGE UP (ref 2.5–4.5)
PLAT MORPH BLD: NORMAL — SIGNIFICANT CHANGE UP
PLATELET # BLD AUTO: 198 K/UL — SIGNIFICANT CHANGE UP (ref 150–400)
POTASSIUM SERPL-MCNC: 4.2 MMOL/L — SIGNIFICANT CHANGE UP (ref 3.5–5.3)
POTASSIUM SERPL-SCNC: 4.2 MMOL/L — SIGNIFICANT CHANGE UP (ref 3.5–5.3)
PROT SERPL-MCNC: 5.2 G/DL — LOW (ref 6–8.3)
PROTHROM AB SERPL-ACNC: 13.3 SEC — SIGNIFICANT CHANGE UP (ref 10.5–13.4)
RBC # BLD: 3.71 M/UL — LOW (ref 4.2–5.8)
RBC # FLD: 15.1 % — HIGH (ref 10.3–14.5)
RBC BLD AUTO: SIGNIFICANT CHANGE UP
RH IG SCN BLD-IMP: POSITIVE — SIGNIFICANT CHANGE UP
SODIUM SERPL-SCNC: 140 MMOL/L — SIGNIFICANT CHANGE UP (ref 135–145)
SPECIMEN SOURCE: SIGNIFICANT CHANGE UP
URATE SERPL-MCNC: 3 MG/DL — LOW (ref 3.4–8.8)
WBC # BLD: 2.33 K/UL — LOW (ref 3.8–10.5)
WBC # FLD AUTO: 2.33 K/UL — LOW (ref 3.8–10.5)

## 2022-05-04 PROCEDURE — 99291 CRITICAL CARE FIRST HOUR: CPT

## 2022-05-04 PROCEDURE — 99233 SBSQ HOSP IP/OBS HIGH 50: CPT

## 2022-05-04 RX ORDER — OXYCODONE HYDROCHLORIDE 5 MG/1
5 TABLET ORAL EVERY 6 HOURS
Refills: 0 | Status: DISCONTINUED | OUTPATIENT
Start: 2022-05-04 | End: 2022-05-06

## 2022-05-04 RX ADMIN — Medication 1 LOZENGE: at 08:17

## 2022-05-04 RX ADMIN — OXYCODONE HYDROCHLORIDE 5 MILLIGRAM(S): 5 TABLET ORAL at 05:02

## 2022-05-04 RX ADMIN — LEVETIRACETAM 500 MILLIGRAM(S): 250 TABLET, FILM COATED ORAL at 17:34

## 2022-05-04 RX ADMIN — Medication 1 MILLIGRAM(S): at 12:15

## 2022-05-04 RX ADMIN — Medication 650 MILLIGRAM(S): at 05:01

## 2022-05-04 RX ADMIN — Medication 5 MILLILITER(S): at 23:07

## 2022-05-04 RX ADMIN — Medication 1 LOZENGE: at 20:29

## 2022-05-04 RX ADMIN — OXYCODONE HYDROCHLORIDE 5 MILLIGRAM(S): 5 TABLET ORAL at 12:45

## 2022-05-04 RX ADMIN — LEVETIRACETAM 500 MILLIGRAM(S): 250 TABLET, FILM COATED ORAL at 05:01

## 2022-05-04 RX ADMIN — Medication 5 MILLILITER(S): at 20:29

## 2022-05-04 RX ADMIN — Medication 10 MILLILITER(S): at 20:28

## 2022-05-04 RX ADMIN — Medication 5 MILLILITER(S): at 12:21

## 2022-05-04 RX ADMIN — Medication 1 TABLET(S): at 12:15

## 2022-05-04 RX ADMIN — Medication 5 MILLILITER(S): at 15:26

## 2022-05-04 RX ADMIN — PANTOPRAZOLE SODIUM 40 MILLIGRAM(S): 20 TABLET, DELAYED RELEASE ORAL at 05:01

## 2022-05-04 RX ADMIN — OXYCODONE HYDROCHLORIDE 5 MILLIGRAM(S): 5 TABLET ORAL at 18:56

## 2022-05-04 RX ADMIN — Medication 1 LOZENGE: at 23:08

## 2022-05-04 RX ADMIN — Medication 400 MILLIGRAM(S): at 05:00

## 2022-05-04 RX ADMIN — CARVEDILOL PHOSPHATE 3.12 MILLIGRAM(S): 80 CAPSULE, EXTENDED RELEASE ORAL at 05:01

## 2022-05-04 RX ADMIN — CHLORHEXIDINE GLUCONATE 1 APPLICATION(S): 213 SOLUTION TOPICAL at 08:17

## 2022-05-04 RX ADMIN — OXYCODONE HYDROCHLORIDE 5 MILLIGRAM(S): 5 TABLET ORAL at 19:30

## 2022-05-04 RX ADMIN — Medication 1 LOZENGE: at 12:22

## 2022-05-04 RX ADMIN — Medication 10 MILLILITER(S): at 15:26

## 2022-05-04 RX ADMIN — Medication 5 MILLILITER(S): at 08:16

## 2022-05-04 RX ADMIN — Medication 10 MILLILITER(S): at 23:07

## 2022-05-04 RX ADMIN — Medication 1 LOZENGE: at 15:26

## 2022-05-04 RX ADMIN — Medication 650 MILLIGRAM(S): at 05:30

## 2022-05-04 RX ADMIN — Medication 300 MILLIGRAM(S): at 12:15

## 2022-05-04 RX ADMIN — GABAPENTIN 100 MILLIGRAM(S): 400 CAPSULE ORAL at 15:26

## 2022-05-04 RX ADMIN — FLUCONAZOLE 400 MILLIGRAM(S): 150 TABLET ORAL at 12:15

## 2022-05-04 RX ADMIN — OXYCODONE HYDROCHLORIDE 5 MILLIGRAM(S): 5 TABLET ORAL at 12:16

## 2022-05-04 RX ADMIN — Medication 400 MILLIGRAM(S): at 15:25

## 2022-05-04 RX ADMIN — Medication 10 MILLILITER(S): at 08:17

## 2022-05-04 RX ADMIN — Medication 10 MILLILITER(S): at 12:21

## 2022-05-04 RX ADMIN — PANTOPRAZOLE SODIUM 40 MILLIGRAM(S): 20 TABLET, DELAYED RELEASE ORAL at 17:34

## 2022-05-04 RX ADMIN — GABAPENTIN 100 MILLIGRAM(S): 400 CAPSULE ORAL at 21:06

## 2022-05-04 RX ADMIN — GABAPENTIN 100 MILLIGRAM(S): 400 CAPSULE ORAL at 05:01

## 2022-05-04 RX ADMIN — OXYCODONE HYDROCHLORIDE 5 MILLIGRAM(S): 5 TABLET ORAL at 05:30

## 2022-05-04 RX ADMIN — CARVEDILOL PHOSPHATE 3.12 MILLIGRAM(S): 80 CAPSULE, EXTENDED RELEASE ORAL at 17:33

## 2022-05-04 RX ADMIN — Medication 400 MILLIGRAM(S): at 21:06

## 2022-05-04 NOTE — PROGRESS NOTE ADULT - PROBLEM SELECTOR PROBLEM 2
At risk for depressed mood
Dysgeusia
Dysgeusia
Nausea and/or vomiting
At risk for depressed mood
Dysgeusia
Dysgeusia
Nausea and/or vomiting

## 2022-05-04 NOTE — PROGRESS NOTE ADULT - PROBLEM SELECTOR PLAN 12
Condition: Mantel Cell Lymphoma  Previous transplant: AUTO SCT 2017  Active treatment:  CAR-T infusion  Transplant Type: Tecartus (brexucabtagene autoleucel)  Transplant Day: Day +15  Clinical impact on complexity: Significant.

## 2022-05-04 NOTE — PROGRESS NOTE ADULT - PROBLEM SELECTOR PROBLEM 1
Fatigue
Fatigue
Nausea and/or vomiting
Cognitive changes
Fatigue
Cognitive changes

## 2022-05-04 NOTE — PROGRESS NOTE ADULT - PROBLEM SELECTOR PROBLEM 10
H/O stem cell transplant
Encounter for palliative care
S/P chimeric antigen receptor T-cell therapy
S/P chimeric antigen receptor T-cell therapy
Encounter for palliative care
H/O stem cell transplant
Encounter for palliative care

## 2022-05-04 NOTE — PROGRESS NOTE ADULT - PROBLEM SELECTOR PROBLEM 6
Debility
Dysgeusia
Dysgeusia
Nausea and/or vomiting
S/P chimeric antigen receptor T-cell therapy
S/P chimeric antigen receptor T-cell therapy
Nausea and/or vomiting
S/P chimeric antigen receptor T-cell therapy

## 2022-05-04 NOTE — PROGRESS NOTE ADULT - PROBLEM SELECTOR PLAN 10
Hx of autologous transplant in 2017
Encounter for palliative care.   Recommendation: Actions:  [ ] Rapport building     [x] Symptom assessment    [] Eliciting preferences of goals   [] Prognostic understanding    [ ] Emotional Support  [] Coping skill development  []  Other  Interdisciplinary Referrals: [facility]  Communication: d/w SW  Documentation Review: [x] Primary Team [] Consultants [] Interdisciplinary team  Content: n/a
Encounter for palliative care.   Recommendation: Actions:  [ ] Rapport building     [x] Symptom assessment    [] Eliciting preferences of goals   [] Prognostic understanding    [x ] Emotional Support  [] Coping skill development  []  Other  Interdisciplinary Referrals: None  Communication: d/w nurse  Documentation Review: [x] Primary Team [] Consultants [] Interdisciplinary team  Content: n/a
Encounter for palliative care.   Recommendation: Actions:  [ ] Rapport building     [x] Symptom assessment    [] Eliciting preferences of goals   [] Prognostic understanding    [ ] Emotional Support  [] Coping skill development  []  Other  Interdisciplinary Referrals: PT placido open to child life for his visiting daughter, 13  Communication: d/w SW  Documentation Review: [x] Primary Team [] Consultants [] Interdisciplinary team  Content: n/a
Hx of autologous transplant in 2017
IV chemotherapy as part of CAR-T treatment  Continue to monitor for known adverse effects/symptoms  Risk of infectious complications given anticipated impact on hematopoietic lineages and resultant immune compromise  Monitor for neurologic changes and/or signs of CRS  PPx/Tx per primary team
IV chemotherapy as part of CAR-T treatment  Continue to monitor for known adverse effects/symptoms  Risk of infectious complications given anticipated impact on hematopoietic lineages and resultant immune compromise  Monitor for neurologic changes and/or signs of CRS  PPx/Tx per primary team

## 2022-05-04 NOTE — PROGRESS NOTE ADULT - PROBLEM SELECTOR PLAN 1
Problem: Cognitive changes  Degree: active  Status: Still with processing challenges  ? due to CAR-T  Relevant factors: C   This is a significant source of frustration and triggering due to his   Rx regimen/treatment strategy: On keppra, continue screening and monitor for changes  Consultative team involvement: n/a

## 2022-05-04 NOTE — PROGRESS NOTE ADULT - PROBLEM SELECTOR PROBLEM 7
H/O stem cell transplant
MCL (mantle cell lymphoma)
Pain
H/O stem cell transplant
H/O stem cell transplant
Pain

## 2022-05-04 NOTE — PROGRESS NOTE ADULT - NS PANP COMMENT GEN_ALL_CORE FT
Admitted for CAR T cell therapy with Tecartus (bresucabtagene autoleucel)  today is + 15..some nausea..now improved...monitor closely for CRS and neurotox...pt is at lower risk for these complications given minimal disease burden..hoever he has been on and off with high temps since last  fri evening..first dose of toci given for grade 1 crs......s/p 4 total  doses of  toci and dex 4 mg X 4 and 10 mg X 1... 8/10 HA with somulence...c/w grade 2 neurotox...Stat ct head, MRI..dose dex 10 mg...improved 4/28..4/29, 30 body aches..improved with oxycodone..possibly neuropathic...on gabapentin..4/30 given dex 4 mg x1...temp 38.1...5/2 chills today..  4/30 temp noted...may consider additional dex  1. Admit to BMTU   2. Day -5 through day -1 record CARTOX / ICANS score daily   3. Day - 5 through day - 3 Fludarabine 30mg / m2 IV given over 30 minutes daily   4. Day - 5 - start cyclophosphamide hydration - 0.9% NS + 10mEq KCL / L at 75 ml/m2 and continue for 24 hours post infusion of last dose of CTX   5. Day - 5 through day - 3 CTX 500mg / m2 IV QD to be given over 2 hours   6. Starting day - 5 monitor labs BID: CBC with differential, CMP, LDH, uric acid, mag, phos, CPK, BNP, PT / PTT / INR / Fibrinogen, d-dimer, CRP, hepatic profile, ferritin  7. Weekly IL-6 levels on Monday, twice weekly (Monday / Thursday) CMV / EBV PCR, three times a week (Monday / Wednesday / Friday) type and screen   8. On day - 5 start allopurinol 300mg po QD   9. Day -2 and -1 REST DAYS   10. On day -1 begin to record CARTOX / ICANS score q 12 hours   11. Day 0 infuse Brexucabtagene autoleucel  hx of afib...seen by [facility] cardiology  12. F/U cx's..on cefapime,  ...acyclovir and fluconazole..send rvp  13 Cont tele and keppra  4/16-17 Doing well, I/Os are negative, transient lip swelling, now resolved, some fatigue  4/18, 19, 20, 21, 22 CRS 0, ICANS 10  4/22/22-Chase fever overnight, chills, given Toci, was started on Cefipime. Stable. CRS / ICANS scoring this morning 10.  4/23/22, 4/24/22 CRS 1 ICANS 10  4/24/22- Fever 102oF, complaints of bilateral upper and lower extremities cramps, chest discomfort, EKG no changes. Patient was treated with Toci/Decadron as per protocol. Morphine prn for pain. Clinically improved. Pancultures sent.  4/25/22 CRS 2...persistent high temps.....ICANS 10...tremor noted with fever...dex 10 mg dosed today  4/26/22 CRS 1, Icans 10  4/27 crs 1, Icans 10...neurotox 2  4/28, 4/29,  crs 0..icans 10  4/30 CRS 1  ICANS 10  5/1, 5/2, 5/3 CRS 0, ICANS 10...d/c cefapime...d/c planning  5/2 chills this am...will monitor......consider d/c tlc...f/u cx's  5/3 no chills or fever...anxious...d/c planning for thurs...pt f/u...explained to patient that the weakness he is feeling is expected.. Admitted for CAR T cell therapy with Tecartus (bresucabtagene autoleucel)  today is + 15..some nausea..now improved...monitor closely for CRS and neurotox...pt is at lower risk for these complications given minimal disease burden..hoever he has been on and off with high temps since last  fri evening..first dose of toci given for grade 1 crs......s/p 4 total  doses of  toci and dex 4 mg X 4 and 10 mg X 1... 8/10 HA with somulence...c/w grade 2 neurotox...Stat ct head, MRI..dose dex 10 mg...improved 4/28..4/29, 30 body aches..improved with oxycodone..possibly neuropathic...on gabapentin..4/30 given dex 4 mg x1...temp 38.1...5/2 chills today..  4/30 temp noted...may consider additional dex  5/4 Pt ambulated in [place]...sent PT away....depressed mood..consider zoloft  1. Admit to BMTU   2. Day -5 through day -1 record CARTOX / ICANS score daily   3. Day - 5 through day - 3 Fludarabine 30mg / m2 IV given over 30 minutes daily   4. Day - 5 - start cyclophosphamide hydration - 0.9% NS + 10mEq KCL / L at 75 ml/m2 and continue for 24 hours post infusion of last dose of CTX   5. Day - 5 through day - 3 CTX 500mg / m2 IV QD to be given over 2 hours   6. Starting day - 5 monitor labs BID: CBC with differential, CMP, LDH, uric acid, mag, phos, CPK, BNP, PT / PTT / INR / Fibrinogen, d-dimer, CRP, hepatic profile, ferritin  7. Weekly IL-6 levels on Monday, twice weekly (Monday / Thursday) CMV / EBV PCR, three times a week (Monday / Wednesday / Friday) type and screen ...peak IL 6 over 500  8. On day - 5 start allopurinol 300mg po QD   9. Day -2 and -1 REST DAYS   10. On day -1 begin to record CARTOX / ICANS score q 12 hours   11. Day 0 infuse Brexucabtagene autoleucel  hx of afib...seen by [facility] cardiology  12. F/U cx's..on cefapime,  ...acyclovir and fluconazole..send rvp  13 Cont tele and keppra  4/16-17 Doing well, I/Os are negative, transient lip swelling, now resolved, some fatigue  4/18, 19, 20, 21, 22 CRS 0, ICANS 10  4/22/22-Chase fever overnight, chills, given Toci, was started on Cefipime. Stable. CRS / ICANS scoring this morning 10.  4/23/22, 4/24/22 CRS 1 ICANS 10  4/24/22- Fever 102oF, complaints of bilateral upper and lower extremities cramps, chest discomfort, EKG no changes. Patient was treated with Toci/Decadron as per protocol. Morphine prn for pain. Clinically improved. Pancultures sent.  4/25/22 CRS 2...persistent high temps.....ICANS 10...tremor noted with fever...dex 10 mg dosed today  4/26/22 CRS 1, Icans 10  4/27 crs 1, Icans 10...neurotox 2  4/28, 4/29,  crs 0..icans 10  4/30 CRS 1  ICANS 10  5/1, 5/2, 5/3, 5/4 CRS 0, ICANS 10...d/c cefapime...d/c planning  5/2 chills this am...will monitor......consider d/c tlc...f/u cx's  5/3,4 no chills or fever...anxious...d/c planning for thurs or fri...pt f/u...explained to patient that the weakness he is feeling is expected..

## 2022-05-04 NOTE — PROGRESS NOTE ADULT - PROBLEM SELECTOR PROBLEM 4
Drowsiness
S/P chimeric antigen receptor T-cell therapy
Anorexia
Drowsiness
Fatigue
Drowsiness
Fatigue
Anorexia

## 2022-05-04 NOTE — PROGRESS NOTE ADULT - SUBJECTIVE AND OBJECTIVE BOX
HPC Transplant Team                                                      Critical / Counseling Time Provided: 30 minutes                                                                                                                                                        Chief Complaint: CAR T therapy with [employer] product for treatment of relapsed, refractory MCL    S: Patient seen and examined with HPC Transplant Team:     All other ROS negative     O: Vitals:   Vital Signs Last 24 Hrs  T(C): 37.1 (04 May 2022 05:00), Max: 37.8 (03 May 2022 21:09)  T(F): 98.8 (04 May 2022 05:00), Max: 100 (03 May 2022 21:09)  HR: 68 (04 May 2022 05:00) (68 - 94)  BP: 112/68 (04 May 2022 05:00) (110/69 - 135/85)  BP(mean): --  RR: 18 (04 May 2022 05:00) (16 - 20)  SpO2: 96% (04 May 2022 05:00) (96% - 100%)    Admit weight: 100.4kg   Daily Weight in k (03 May 2022 09:13)  Today's weight:     Intake / Output:   05-03 @ 07:01  -  -04 @ 07:00  --------------------------------------------------------  IN: 2859 mL / OUT: 3800 mL / NET: -941 mL      PE:   Oropharynx: no erythema or ulcerations ,   Oral Mucositis:  NA                                                   Grade: n/a  CVS: S1, S2 RRR   Lungs: CTA throughout bilaterally   Abdomen: + BS x 4, soft, NT, ND   Extremities: no edema BLE's  Gastric Mucositis:       -                                           Grade: n/a  Intestinal Mucositis:     -                                         Grade: n/a   Skin: no rashes  TLC: CDI   Neuro: A&O x 3   Pain: 0    Labs:   CBC Full  -  ( 04 May 2022 05:51 )  WBC Count : 2.33 K/uL  Hemoglobin : 11.8 g/dL  Hematocrit : 36.0 %  Platelet Count - Automated : 198 K/uL  Mean Cell Volume : 97.0 fl  Mean Cell Hemoglobin : 31.8 pg  Mean Cell Hemoglobin Concentration : 32.8 gm/dL  Auto Neutrophil # : 1.05 K/uL  Auto Lymphocyte # : 0.26 K/uL  Auto Monocyte # : 0.32 K/uL  Auto Eosinophil # : 0.53 K/uL  Auto Basophil # : 0.02 K/uL  Auto Neutrophil % : 43.5 %  Auto Lymphocyte % : 11.3 %  Auto Monocyte % : 13.9 %  Auto Eosinophil % : 22.6 %  Auto Basophil % : 0.9 %                          11.8   2.33  )-----------( 198      ( 04 May 2022 05:51 )             36.0     05-04    140  |  103  |  15  ----------------------------<  107<H>  4.2   |  25  |  0.88    Ca    8.2<L>      04 May 2022 05:51  Phos  3.6     05-04  Mg     1.8     05-04    TPro  5.2<L>  /  Alb  3.5  /  TBili  0.4  /  DBili  x   /  AST  23  /  ALT  38  /  AlkPhos  54  05-04    PT/INR - ( 04 May 2022 05:51 )   PT: 13.3 sec;   INR: 1.14 ratio         PTT - ( 04 May 2022 05:51 )  PTT:23.0 sec  LIVER FUNCTIONS - ( 04 May 2022 05:51 )  Alb: 3.5 g/dL / Pro: 5.2 g/dL / ALK PHOS: 54 U/L / ALT: 38 U/L / AST: 23 U/L / GGT: x           Lactate Dehydrogenase, Serum: 216 U/L ( @ 05:51)  Lactate Dehydrogenase, Serum: 155 U/L ( @ 15:11)      Cultures:     Culture Results:   No growth (22 @ 22:16)    Culture Results:   No growth to date. (22 @ 17:06)    Culture Results:   No growth to date. (22 @ 17:06)    Culture Results:   No Growth Final (22 @ 19:54)    Culture Results:   No Growth Final (22 @ 19:54)    Culture Results:   No growth (22 @ 13:44)    Culture Results:   No Growth Final (22 @ 16:40)    Culture Results:   No Growth Final (22 @ 16:40)    Culture Results:   No Growth Final (22 @ 01:04)    Culture Results:   No Growth Final (22 @ 01:04)    Culture Results:   10,000 - 49,000 CFU/mL Escherichia coli (22 @ 00:54)    Radiology:   ACC: [id number] EXAM:  MR BRAIN WAW IC                        PROCEDURE DATE:  2022    IMPRESSION:  No hydrocephalus, mass effect, acute intracranial hemorrhage, vasogenic   edema, or acute territorial infarct.  No abnormal parenchymal or leptomeningeal enhancement    ACC: [id number] EXAM:  CT BRAIN                        PROCEDURE DATE:  2022    IMPRESSION:  No acute hemorrhage, mass effect or extra-axial collections.    ACC: [id number] EXAM:  XR CHEST PORTABLE URGENT 1V                        PROCEDURE DATE:  2022    IMPRESSION:  Clear lungs.      Meds:   Antimicrobials:   acyclovir   Oral Tab/Cap 400 milliGRAM(s) Oral every 8 hours  clotrimazole Lozenge 1 Lozenge Oral five times a day  fluconAZOLE   Tablet 400 milliGRAM(s) Oral daily      Heme / Onc:       GI:  loperamide 2 milliGRAM(s) Oral every 4 hours PRN  pantoprazole    Tablet 40 milliGRAM(s) Oral two times a day  polyethylene glycol 3350 17 Gram(s) Oral daily PRN  senna 1 Tablet(s) Oral at bedtime PRN  sodium bicarbonate Mouth Rinse 10 milliLiter(s) Swish and Spit five times a day      Cardiovascular:   carvedilol 3.125 milliGRAM(s) Oral every 12 hours      Immunologic:       Other medications:   acetaminophen     Tablet .. 650 milliGRAM(s) Oral every 6 hours  allopurinol 300 milliGRAM(s) Oral daily  Biotene Dry Mouth Oral Rinse 5 milliLiter(s) Swish and Spit five times a day  chlorhexidine 4% Liquid 1 Application(s) Topical <User Schedule>  folic acid 1 milliGRAM(s) Oral daily  gabapentin 100 milliGRAM(s) Oral three times a day  levETIRAcetam 500 milliGRAM(s) Oral two times a day  multivitamin 1 Tablet(s) Oral daily  sodium chloride 0.9%. 1000 milliLiter(s) IV Continuous <Continuous>      PRN:   acetaminophen     Tablet .. 650 milliGRAM(s) Oral every 6 hours PRN  loperamide 2 milliGRAM(s) Oral every 4 hours PRN  metoclopramide Injectable 10 milliGRAM(s) IV Push every 6 hours PRN  ondansetron Injectable 8 milliGRAM(s) IV Push every 8 hours PRN  oxyCODONE    IR 5 milliGRAM(s) Oral every 6 hours PRN  polyethylene glycol 3350 17 Gram(s) Oral daily PRN  senna 1 Tablet(s) Oral at bedtime PRN  sodium chloride 0.9% lock flush 10 milliLiter(s) IV Push every 1 hour PRN    A/P: 56 year old male with relapsed, refractory MCL s/p R-CHOP x 4, RICE x 2, maintenance RTX, salvage Calquence, admitted for CAR T cell therapy with [employer] product. Disease status at time of admission is partial response.   Day +15  Monitor for CRS and neurotox...follow CRS/neuro tox protocol - GIVEN TOCILIZUMAB  pm and   AM for CRS - 1    1. Relapsed, refractory MCL   4/15- day - 4- flu / ctx 2/3 - continue CTX hydration until 24 hours post infusion of last dose   Strict I&O, daily weights, prn diuresis   CRS / ICANS score daily until infusion of CAR T cells (22) and twice daily thereafter   BID labs 0500 & 1500 - monitor for TLS   -CRS grade 1 s/p Tocilizumab on tele started on Keppra as per protocol, pancx, started on cefepime  s/p dexamethasone 4 mg IV x1   F/U repeat blood cx's for fevers ( and ) s/p Toci x2 s/p dexamethasone 4 mg IV x2 also with CP: EKG WNL trops negative s/p morphine with good effect   - CRS grade 2( persistent fevers) given Toci x1 and dexamethasone 4 mg IV overnight, continues to be febrile Tmax 104 F tremors given additional Dexamethasone 10 mg IV  - fever curve improved: CRS grade 1  - Grade 2 neurotox with 10/10 Headaches- taken for CT head (with no abnormalities no cerebral edema), s.p Dexamethasone 10 mg IV x 1, MRI head no significant findings    Grade 1 transaminitis -Monitor closely   Discharge planning next week     Decadron 4mg IV given for Grade 1 CRS      2. Prophylactic measures   acyclovir   Oral Tab/Cap 400 milliGRAM(s) Oral every 8 hours  cefepime   IVPB      cefepime   IVPB 2000 milliGRAM(s) IV Intermittent every 8 hours  clotrimazole Lozenge 1 Lozenge Oral five times a day  fluconAZOLE   Tablet 400 milliGRAM(s) Oral daily  Keppra 500 q12h  GI Prophylaxis:  Protonix     3. Cardiac / Hx Afib   -F/U repeat troponin,  now CP free. ECG non ischemic   Continue Tele monitoring   Continue Coreg bid    4. Mouth care - NS / NaHCO3 rinses, Mycelex, Biotene; Skin care     5. Transfuse & replete electrolytes prn     6. IV hydration, daily weights, strict I&O, prn diuresis     7. Antiemetics, anti-diarrhea medications:   metoclopramide Injectable 10 milliGRAM(s) IV Push every 6 hours PRN  ondansetron 8mg IV q 8 hours prn nausea   loperamide 2 milliGRAM(s) Oral every 4 hours PRN.    8. CRS / ICANS scoring    CRS grade 0, ICANS score 10   - CRS grade 0, ICANS score 10    AM - CRS grade 0, ICANS score 10   -pm-CRS grade 0, ICANS score 10   AM - CRS grade 0, ICANS score 10    PM CRS grade 0, ICANS score 10   AM CRS grade 0, ICANS score 10   PM CRS grade 0, ICANS score 10 --> 9PM CRS score - 1, ICANS - 10   AM CRS grade 0, ICANS score 10   PM CRS grade 0, ICANS score 10   AM CRS grade 1 10am (grade 0 @ 8:30am), ICANS score 10  M CRS grade 2( persistently febrile) ICANS score 10    AM CRS GRADE 1, ICANS score 10  - AM CRS GRADE 1, ICANS score 10   AM CRS GRADE 1, ICANS score 10   noon: with persistent headache: Grade 2 neurotox-   taken for CT head( ( with no abnormalities no cerebral edema) s.p Dexamethasone 10 mg IV pending  MR head   PM CRS GRADE 1, ICANS score 10( HA resolved)   - AM CRS GRADE 1, ICANS score 10  - PM CRS GRADE 1, ICANS score 10  - PM CRS GRADE 1, ICANS score 10   AM  GRADE 0, ICANS score - 10   PM score GRADE 1, ICANS score - 10   AM score GRADE 0, ICANS score - 10   PM score GRADE 0, ICANS score - 10  /- AM CRS grade 0, ICANS score 10  /- PM CRS grade 1 (fever), ICANS score 10   /3- AM - CRS grade 0, ICANS score 10  5/3- PM - CRS grade 0, ICANS score 10     I have written the above note for Dr. Banks who performed service with me in the room.   Tiffany Lucero NP-C ([phone])    I have seen and examined patient with NP, I agree with above note as scribed.                    HPC Transplant Team                                                      Critical / Counseling Time Provided: 30 minutes                                                                                                                                                        Chief Complaint: CAR T therapy with [employer] product for treatment of relapsed, refractory MCL    S: Patient seen and examined with HPC Transplant Team:     All other ROS negative     O: Vitals:   Vital Signs Last 24 Hrs  T(C): 37.1 (04 May 2022 05:00), Max: 37.8 (03 May 2022 21:09)  T(F): 98.8 (04 May 2022 05:00), Max: 100 (03 May 2022 21:09)  HR: 68 (04 May 2022 05:00) (68 - 94)  BP: 112/68 (04 May 2022 05:00) (110/69 - 135/85)  BP(mean): --  RR: 18 (04 May 2022 05:00) (16 - 20)  SpO2: 96% (04 May 2022 05:00) (96% - 100%)    Admit weight: 100.4kg   Daily Weight in k (03 May 2022 09:13)  Today's weight: 94.1kg     Intake / Output:   -03 @ 07:01  -  -04 @ 07:00  --------------------------------------------------------  IN: 2859 mL / OUT: 3800 mL / NET: -941 mL      PE:   Oropharynx: no erythema or ulcerations ,   Oral Mucositis:  NA                                                   Grade: n/a  CVS: S1, S2 RRR   Lungs: CTA throughout bilaterally   Abdomen: + BS x 4, soft, NT, ND   Extremities: no edema BLE's  Gastric Mucositis:       -                                           Grade: n/a  Intestinal Mucositis:     -                                         Grade: n/a   Skin: no rashes  TLC: CDI   Neuro: A&O x 3   Pain: 0    Labs:   CBC Full  -  ( 04 May 2022 05:51 )  WBC Count : 2.33 K/uL  Hemoglobin : 11.8 g/dL  Hematocrit : 36.0 %  Platelet Count - Automated : 198 K/uL  Mean Cell Volume : 97.0 fl  Mean Cell Hemoglobin : 31.8 pg  Mean Cell Hemoglobin Concentration : 32.8 gm/dL  Auto Neutrophil # : 1.05 K/uL  Auto Lymphocyte # : 0.26 K/uL  Auto Monocyte # : 0.32 K/uL  Auto Eosinophil # : 0.53 K/uL  Auto Basophil # : 0.02 K/uL  Auto Neutrophil % : 43.5 %  Auto Lymphocyte % : 11.3 %  Auto Monocyte % : 13.9 %  Auto Eosinophil % : 22.6 %  Auto Basophil % : 0.9 %                          11.8   2.33  )-----------( 198      ( 04 May 2022 05:51 )             36.0     05-04    140  |  103  |  15  ----------------------------<  107<H>  4.2   |  25  |  0.88    Ca    8.2<L>      04 May 2022 05:51  Phos  3.6     05-04  Mg     1.8     -04    TPro  5.2<L>  /  Alb  3.5  /  TBili  0.4  /  DBili  x   /  AST  23  /  ALT  38  /  AlkPhos  54  05-04    PT/INR - ( 04 May 2022 05:51 )   PT: 13.3 sec;   INR: 1.14 ratio         PTT - ( 04 May 2022 05:51 )  PTT:23.0 sec  LIVER FUNCTIONS - ( 04 May 2022 05:51 )  Alb: 3.5 g/dL / Pro: 5.2 g/dL / ALK PHOS: 54 U/L / ALT: 38 U/L / AST: 23 U/L / GGT: x           Lactate Dehydrogenase, Serum: 216 U/L ( @ 05:51)  Lactate Dehydrogenase, Serum: 155 U/L ( @ 15:11)      Cultures:     Culture Results:   No growth (22 @ 22:16)    Culture Results:   No growth to date. (22 @ 17:06)    Culture Results:   No growth to date. (22 @ 17:06)    Culture Results:   No Growth Final (22 @ 19:54)    Culture Results:   No Growth Final (22 @ 19:54)    Culture Results:   No growth (22 @ 13:44)    Culture Results:   No Growth Final (22 @ 16:40)    Culture Results:   No Growth Final (22 @ 16:40)    Culture Results:   No Growth Final (22 @ 01:04)    Culture Results:   No Growth Final (22 @ 01:04)    Culture Results:   10,000 - 49,000 CFU/mL Escherichia coli (22 @ 00:54)    Radiology:   ACC: [id number] EXAM:  MR BRAIN WAW IC                        PROCEDURE DATE:  2022    IMPRESSION:  No hydrocephalus, mass effect, acute intracranial hemorrhage, vasogenic   edema, or acute territorial infarct.  No abnormal parenchymal or leptomeningeal enhancement    ACC: [id number] EXAM:  CT BRAIN                        PROCEDURE DATE:  2022    IMPRESSION:  No acute hemorrhage, mass effect or extra-axial collections.    ACC: [id number] EXAM:  XR CHEST PORTABLE URGENT 1V                        PROCEDURE DATE:  2022    IMPRESSION:  Clear lungs.      Meds:   Antimicrobials:   acyclovir   Oral Tab/Cap 400 milliGRAM(s) Oral every 8 hours  clotrimazole Lozenge 1 Lozenge Oral five times a day  fluconAZOLE   Tablet 400 milliGRAM(s) Oral daily      Heme / Onc:       GI:  loperamide 2 milliGRAM(s) Oral every 4 hours PRN  pantoprazole    Tablet 40 milliGRAM(s) Oral two times a day  polyethylene glycol 3350 17 Gram(s) Oral daily PRN  senna 1 Tablet(s) Oral at bedtime PRN  sodium bicarbonate Mouth Rinse 10 milliLiter(s) Swish and Spit five times a day      Cardiovascular:   carvedilol 3.125 milliGRAM(s) Oral every 12 hours      Immunologic:       Other medications:   acetaminophen     Tablet .. 650 milliGRAM(s) Oral every 6 hours  allopurinol 300 milliGRAM(s) Oral daily  Biotene Dry Mouth Oral Rinse 5 milliLiter(s) Swish and Spit five times a day  chlorhexidine 4% Liquid 1 Application(s) Topical <User Schedule>  folic acid 1 milliGRAM(s) Oral daily  gabapentin 100 milliGRAM(s) Oral three times a day  levETIRAcetam 500 milliGRAM(s) Oral two times a day  multivitamin 1 Tablet(s) Oral daily  sodium chloride 0.9%. 1000 milliLiter(s) IV Continuous <Continuous>      PRN:   acetaminophen     Tablet .. 650 milliGRAM(s) Oral every 6 hours PRN  loperamide 2 milliGRAM(s) Oral every 4 hours PRN  metoclopramide Injectable 10 milliGRAM(s) IV Push every 6 hours PRN  ondansetron Injectable 8 milliGRAM(s) IV Push every 8 hours PRN  oxyCODONE    IR 5 milliGRAM(s) Oral every 6 hours PRN  polyethylene glycol 3350 17 Gram(s) Oral daily PRN  senna 1 Tablet(s) Oral at bedtime PRN  sodium chloride 0.9% lock flush 10 milliLiter(s) IV Push every 1 hour PRN    A/P: 56 year old male with relapsed, refractory MCL s/p R-CHOP x 4, RICE x 2, maintenance RTX, salvage [place], admitted for CAR T cell therapy with [employer] product. Disease status at time of admission is partial response.   Day +15  Monitor for CRS and neurotox...follow CRS/neuro tox protocol - GIVEN TOCILIZUMAB  pm and   AM for CRS - 1    1. Relapsed, refractory MCL   4/15- day - 4- flu / ctx 2/3 - continue CTX hydration until 24 hours post infusion of last dose   Strict I&O, daily weights, prn diuresis   CRS / ICANS score daily until infusion of CAR T cells (22) and twice daily thereafter   BID labs 0500 & 1500 - monitor for TLS   -CRS grade 1 s/p Tocilizumab on tele started on Keppra as per protocol, pancx, started on cefepime  s/p dexamethasone 4 mg IV x1   F/U repeat blood cx's for fevers ( and ) s/p Toci x2 s/p dexamethasone 4 mg IV x2 also with CP: EKG WNL trops negative s/p morphine with good effect   - CRS grade 2( persistent fevers) given Toci x1 and dexamethasone 4 mg IV overnight, continues to be febrile Tmax 104 F tremors given additional Dexamethasone 10 mg IV  - fever curve improved: CRS grade 1  - Grade 2 neurotox with 10/10 Headaches- taken for CT head (with no abnormalities no cerebral edema), s.p Dexamethasone 10 mg IV x 1, MRI head no significant findings    Grade 1 transaminitis -Monitor closely   Discharge planning next week     Decadron 4mg IV given for Grade 1 CRS      2. Prophylactic measures   acyclovir   Oral Tab/Cap 400 milliGRAM(s) Oral every 8 hours  cefepime   IVPB      cefepime   IVPB 2000 milliGRAM(s) IV Intermittent every 8 hours  clotrimazole Lozenge 1 Lozenge Oral five times a day  fluconAZOLE   Tablet 400 milliGRAM(s) Oral daily  Keppra 500 q12h  GI Prophylaxis:  Protonix     3. Cardiac / Hx Afib   -F/U repeat troponin,  now CP free. ECG non ischemic   Continue Tele monitoring   Continue Coreg bid    4. Mouth care - NS / NaHCO3 rinses, Mycelex, Biotene; Skin care     5. Transfuse & replete electrolytes prn     6. IV hydration, daily weights, strict I&O, prn diuresis     7. Antiemetics, anti-diarrhea medications:   metoclopramide Injectable 10 milliGRAM(s) IV Push every 6 hours PRN  ondansetron 8mg IV q 8 hours prn nausea   loperamide 2 milliGRAM(s) Oral every 4 hours PRN.    8. CRS / ICANS scoring    CRS grade 0, ICANS score 10   - CRS grade 0, ICANS score 10    AM - CRS grade 0, ICANS score 10   -pm-CRS grade 0, ICANS score 10   AM - CRS grade 0, ICANS score 10    PM CRS grade 0, ICANS score 10   AM CRS grade 0, ICANS score 10   PM CRS grade 0, ICANS score 10 --> 9PM CRS score - 1, ICANS - 10   AM CRS grade 0, ICANS score 10   PM CRS grade 0, ICANS score 10   AM CRS grade 1 10am (grade 0 @ 8:30am), ICANS score 10  M CRS grade 2( persistently febrile) ICANS score 10    AM CRS GRADE 1, ICANS score 10  - AM CRS GRADE 1, ICANS score 10   AM CRS GRADE 1, ICANS score 10   noon: with persistent headache: Grade 2 neurotox-   taken for CT head( ( with no abnormalities no cerebral edema) s.p Dexamethasone 10 mg IV pending  MR head   PM CRS GRADE 1, ICANS score 10( HA resolved)   - AM CRS GRADE 1, ICANS score 10  - PM CRS GRADE 1, ICANS score 10  - PM CRS GRADE 1, ICANS score 10   AM  GRADE 0, ICANS score - 10   PM score GRADE 1, ICANS score - 10   AM score GRADE 0, ICANS score - 10   PM score GRADE 0, ICANS score - 10  /- AM CRS grade 0, ICANS score 10  /- PM CRS grade 1 (fever), ICANS score 10   5/3- AM - CRS grade 0, ICANS score 10  5/3- PM - CRS grade 0, ICANS score 10     I have written the above note for Dr. Banks who performed service with me in the room.   Tiffany Lucero NP-C ([phone])    I have seen and examined patient with NP, I agree with above note as scribed.

## 2022-05-04 NOTE — PROGRESS NOTE ADULT - PROBLEM SELECTOR PLAN 11
Recommendation: PPSv2 prior to admission: 80  Current functional PPSv2: 80  Nursing care required: Minimal assistance with ADLs  Code status documented: Full code  A review of the paper chart has been conducted  HCP form present:               Yes and valid []               Yes but invalid []                No [x]   MOLST present:                   Yes and valid []               Yes but invalid []                No [x]  Incapacity form present:   Yes and valid []                Yes but invalid []                No []                 N/A [x]  Living Will:                            Yes and valid []                Yes but invalid []                No [x]      Family Health Care Decision Act (FHCDA) Surrogate Decision Maker Hierarchy in the absence of a health care agent  [facility] Article 81 Guardian-->Spouse or domestic partner--> Adult child-->Parent-->Sibling--> Close friend.
Recommendation: PPSv2 prior to admission: 80  Current functional PPSv2: 80  Nursing care required: Minimal assistance with ADLs  Code status documented: Full code  A review of the paper chart has been conducted  HCP form present:               Yes and valid []               Yes but invalid []                No [x]   MOLST present:                   Yes and valid []               Yes but invalid []                No [x]  Incapacity form present:   Yes and valid []                Yes but invalid []                No []                 N/A [x]  Living Will:                            Yes and valid []                Yes but invalid []                No [x]      Family Health Care Decision Act (FHCDA) Surrogate Decision Maker Hierarchy in the absence of a health care agent  [facility] Article 81 Guardian-->Spouse or domestic partner--> Adult child-->Parent-->Sibling--> Close friend.
Hx of autologous transplant in 2017
Hx of autologous transplant in 2017

## 2022-05-04 NOTE — PROGRESS NOTE ADULT - PROBLEM SELECTOR PROBLEM 12
MCL (mantle cell lymphoma)

## 2022-05-04 NOTE — PHYSICAL THERAPY INITIAL EVALUATION ADULT - PERTINENT HX OF CURRENT PROBLEM, REHAB EVAL
55 yo M with a history of atrial fibrillation (s/p ablation x 2, internal loop recorder) and  MCL, initially diagnosed 10/2016. He was initially seen at [facility] for a left inguinal mass. CT scans 3/21/16 showed left sided pelvic and inguinal adenopathy. A left inguinal lymph node biopsy 9/26/16 showed morphologic and immunophenotypic findings consistent with MCL.

## 2022-05-04 NOTE — PROGRESS NOTE ADULT - PROBLEM SELECTOR PLAN 7
intermittent HA and back pain with febrile episodes  Electricity like at time  On oxycodone and gabapentin  100mg TID  ? neurotoxicity versus HA

## 2022-05-04 NOTE — PROGRESS NOTE ADULT - SUBJECTIVE AND OBJECTIVE BOX
SUBJECTIVE AND OBJECTIVE:           05-04-22 @ 12:31  [facility] 719 W1    Overnight events: No major events  Staff reports: No new issues  Patient: Pt still with intermittent discomfort of decreasing frequency but similar intensity. Pt is anxious about going home due to fear of newly emerging issues, which may warrant admission and isolation in the ED.  PRN use: n/a        RECENT VITALS/LABS/MEDICATIONS/ASSAYS     05-04-22 @ 12:31    T(C): 36.8 (05-04-22 @ 10:39), Max: 37.8 (05-03-22 @ 21:09)  HR: 79 (05-04-22 @ 10:39) (68 - 94)  BP: 128/74 (05-04-22 @ 10:39) (110/69 - 135/85)  RR: 20 (05-04-22 @ 10:39) (16 - 20)  SpO2: 97% (05-04-22 @ 10:39) (96% - 100%)  Wt(kg): --      03 May 2022 07:01  -  04 May 2022 07:00  --------------------------------------------------------  IN:    IV PiggyBack: 948 mL    Oral Fluid: 960 mL    sodium chloride 0.9%: 951 mL  Total IN: 2859 mL    OUT:    Voided (mL): 3800 mL  Total OUT: 3800 mL    Total NET: -941 mL      04 May 2022 07:01  -  04 May 2022 12:31  --------------------------------------------------------  IN:    IV PiggyBack: 143 mL    Oral Fluid: 220 mL    sodium chloride 0.9%: 143 mL  Total IN: 506 mL    OUT:    Voided (mL): 400 mL  Total OUT: 400 mL    Total NET: 106 mL          05-03 @ 07:01  -  05-04 @ 07:00  --------------------------------------------------------  IN: 2859 mL / OUT: 3800 mL / NET: -941 mL    05-04 @ 07:01  - 05-04 @ 12:31  --------------------------------------------------------  IN: 506 mL / OUT: 400 mL / NET: 106 mL      CAPILLARY BLOOD GLUCOSE            acetaminophen     Tablet .. 650 milliGRAM(s) Oral every 6 hours PRN  acetaminophen     Tablet .. 650 milliGRAM(s) Oral every 6 hours  acyclovir   Oral Tab/Cap 400 milliGRAM(s) Oral every 8 hours  allopurinol 300 milliGRAM(s) Oral daily  Biotene Dry Mouth Oral Rinse 5 milliLiter(s) Swish and Spit five times a day  carvedilol 3.125 milliGRAM(s) Oral every 12 hours  chlorhexidine 4% Liquid 1 Application(s) Topical <User Schedule>  clotrimazole Lozenge 1 Lozenge Oral five times a day  fluconAZOLE   Tablet 400 milliGRAM(s) Oral daily  folic acid 1 milliGRAM(s) Oral daily  gabapentin 100 milliGRAM(s) Oral three times a day  levETIRAcetam 500 milliGRAM(s) Oral two times a day  loperamide 2 milliGRAM(s) Oral every 4 hours PRN  metoclopramide Injectable 10 milliGRAM(s) IV Push every 6 hours PRN  multivitamin 1 Tablet(s) Oral daily  ondansetron Injectable 8 milliGRAM(s) IV Push every 8 hours PRN  oxyCODONE    IR 5 milliGRAM(s) Oral every 6 hours PRN  pantoprazole    Tablet 40 milliGRAM(s) Oral two times a day  polyethylene glycol 3350 17 Gram(s) Oral daily PRN  senna 1 Tablet(s) Oral at bedtime PRN  sodium bicarbonate Mouth Rinse 10 milliLiter(s) Swish and Spit five times a day  sodium chloride 0.9% lock flush 10 milliLiter(s) IV Push every 1 hour PRN  sodium chloride 0.9%. 1000 milliLiter(s) IV Continuous <Continuous>          05-04    140  |  103  |  15  ----------------------------<  107<H>  4.2   |  25  |  0.88    Ca    8.2<L>      04 May 2022 05:51  Phos  3.6     05-04  Mg     1.8     05-04    TPro  5.2<L>  /  Alb  3.5  /  TBili  0.4  /  DBili  x   /  AST  23  /  ALT  38  /  AlkPhos  54  05-04      Procalc  BNPSerum Pro-Brain Natriuretic Peptide: 60 pg/mL (05-04-22 @ 05:51)  Serum Pro-Brain Natriuretic Peptide: 53 pg/mL (05-03-22 @ 06:01)    ABG                          11.8   2.33  )-----------( 198      ( 04 May 2022 05:51 )             36.0   PT/INR - ( 04 May 2022 05:51 )   PT: 13.3 sec;   INR: 1.14 ratio         PTT - ( 04 May 2022 05:51 )  PTT:23.0 sec        blood and urine cultures                          DNR on chart: No    Allergies  No Known Allergies  Intolerances  ===========================================================================================  Palliative Global Assessment  A review of the paper chart has been conducted  HCP form present:               Yes and valid []               Yes but invalid []                No [x]   MOLST present:                   Yes and valid []               Yes but invalid []                No [x]  Incapacity form present:   Yes and valid []                Yes but invalid []                No []                 N/A [x]  Living Will:                            Yes and valid []                Yes but invalid []                No [x]    Family Heatlh Care Decision Act Surrogate Decision Maker Hierarchy  [facility] Article 81 Guardian-->Spouse or domestic partner--> Adult child-->Parent-->Sibling--> Close friend    Contacts listed in the paper or electronic chart  Name Aga  Relationship Wife  Number(s) [phone]  Translation Required: None  Spouse or Partner: Aga  Family unit: Wife and children, his youngest is 13  Family history of hematologic malignancy: None  Residence: [ ] Apartment   [x] House  [ ] Other  Degree of functionality in the home: Full   Performance Status (PPSv2): 80  BMI:BMI (kg/m2): 30.6 (04-14-22 @ 10:40)  Engagement in the home:  Employment: [ ] Currently employed [ x] Exited workforce due to illness  [ ] Retired prior to illness  [ ] No/distant history of employment   Support network (degree):  ---Family:        [ ] Low  [ ] Moderate  [ x] High  ---Neighbors: [ x] Low  [ ] Moderate  [ ] High  ---Social:         [ ] Low  [ ] Moderate  [ x] High  ---Spiritual:    [ x] Low  [ ] Moderate  [ ] High  Financial concerns: TBD  Coping Strategies: Listening to music and speaking with his wife  Caregiver burden/fatigue/needs: Childcare issues/concerns given his wife's visitation/  Grief identified: [] Yes [x] No  Medical communication and decision making preferences: TBD       ITEMS UNCHECKED ARE NOT PRESENT    PRESENT SYMPTOMS: [ ]Unable to self-report - see [ ] CPOT [ ] PAINADS [ ] RDOS  Source if other than patient:  [ ]Family   [ ]Team     Pain:  [x]yes [ ]no  QOL impact - affects walking  Location - back  Aggravating factors - lying down  Quality - electric  Radiation - n/a  Timing- constant  Severity (0-10 scale): 8/10, improved to 3/10 with Oxycodone  Minimal acceptable level (0-10 scale):  3/10    CPOT:    https://www.sccm.org/getattachment/hsu55a96-2d8s-3q7t-1o1c-2460r2805e7v/Critical-Care-Pain-Observation-Tool-(CPOT)    PAIN AD Score:	  [email or web address]://[email or web address]/painad.pdf (Ctrl + left click to view)    Dyspnea:                           [ ]Mild [ ]Moderate [ ]Severe    RDOS: 0  0 to 2  minimal or no respiratory distress   3  mild distress  4 to 6 moderate distress  >7 severe distress  [email or web address]://[email or web address]/[email or web address]_Distress_Observation_Scale.pdf (Ctrl +  left click to view)     Extensive ROS:  Fatigue:                             [ ] None  [  x]Mild [   ]Moderate [ ]Severe  Drowsiness:                      [x ] None  [  ]Mild [   ]Moderate [ ]Severe  Nausea:                             [ x  ] None  [ ]Mild [  ]Moderate [  ]Severe  Dysgeusia:                         [  } None  [x ]Mild [  ]Moderate [ ]Severe  Loss of appetite:              [  ] None  [ x]Mild   ]Moderate [x ]Severe  Constipation:                    [ ] None  [x ]Mild [ ]Moderate [ ]Severe  Dyspnea:                           [x ] None  [ ]Mild [ ]Moderate [ ]Severe  Anxiety:                             [  ] None  [ x ]Mild [ ]Moderate [ ]Severe  Depression:                      [  ] None  [ x ]Mild [ ]Moderate [ ]Severe  Cognitive changes:          [  ] None  [  ]Mild [x ]Moderate [ ]Severe  Insomnia      :                    [ x ] None  [  ]Mild [ ]Moderate [ ]Severe  Isolation:                           [x ] None  [ ]Mild [ ]Moderate [ ]Severe  Loneliness:                        [x ] None  [ ]Mild [ ]Moderate [ ]Severe  Lack of   Wellbeing:                        [   ] None  [  ]Mild [x ]Moderate [ ]Severe    Other Symptoms: None  [x ]All other review of systems negative     Palliative Performance Status Version 2:   80%      [email or web address] [email or web address]/files/[email or web address]_performance_scale_[email or web address].pdf    P   IN: 500 mL / OUT: 450 mL / NET: 50 mL       GENERAL:  [ x]Alert  [x ]Oriented x 3  [  ]Lethargic  [ ]Cachexia  [ ]Unarousable  [ x]Verbal  [ ]Non-Verbal [ ] Engaging   [  ] Confused  [  ] No distress  Behavioral:   [x ] Anxiety  [ ] Delirium [ ] Agitation [  ] Calm   [  ] Restless [ ] Other  HEENT:  [x ]Normal   [ ]Dry mouth   [ ]ET Tube/Trach  [ ]Oral lesions   [ ] NGT  [ ] Mucositis [ ] Oral  Bleeding  PULMONARY:  No tachypnea  [  ]Clear [ ]Tachypnea  [ ]Audible excessive secretions [  ] No tachypnea  [ ]Rhonchi        [ ]Right [ ]Left [ ]Bilateral  [ ]Crackles        [ ]Right [ ]Left [ ]Bilateral  [ ]Wheezing     [ ]Right [ ]Left [ ]Bilateral  [ ]Diminished breath sounds [ ]right [ ]left [ ]bilateral  CARDIOVASCULAR:  No JVD  [  ]Regular [ ]Irregular [ ]Tachy  [ ]Venkata [ ]Murmur [ ] JVD  GASTROINTESTINAL:  [x ]Soft  [ ]Distended   [ ]+BS  [ ]Non tender [ ]Tender  [ ]PEG [ ]OGT/ NGT  Last BM: 4/29  GENITOURINARY:  [x ]Normal [ ] Incontinent   [ ]Oliguria/Anuria   [ ]Hinds  MUSCULOSKELETAL:   [  ]Normal   [x ]Weakness    [ ]Bed/Wheelchair bound [ ]Edema  NEUROLOGIC:   [  No focal deficits  [ x]Cognitive impairment  [ ]Dysphagia [ ]Dysarthria [ ]Paresis [x ]Other gait issue  SKIN:   [x ]Normal    [ ]Rash  [ ]Pressure ulcer(s)   [  ] Lesion   [    ]  Wounds       Present on admission [ ]y [ ]n    CRITICAL CARE:  [ ] Shock Present  [ ]Septic [ ]Cardiogenic [ ]Neurologic [ ]Hypovolemic  [ ]  Vasopressors [ ]  Inotropes   [ ]Respiratory failure present [ ]Mechanical ventilation [ ]Non-invasive ventilatory support [ ]High flow  [ ]Acute  [ ]Chronic [ ]Hypoxic  [ ]Hypercarbic [ ]Other  [ ]Other organ failure     LABS:                  RADIOLOGY & ADDITIONAL STUDIES: Prior reviewed    Protein Calorie Malnutrition Present: [ ]mild [ ]moderate [ ]severe [ ]underweight [ ]morbid obesity  https://www.andeal.org/vault/2440/web/files/ONC/Table_Clinical%20Characteristics%20to%20Document%20Malnutrition-White%20JV%20et%20al%163318.pdf    Height (cm): 181 (04-14-22 @ 10:40), 180.3 (03-21-22 @ 08:57), 180 (03-17-22 @ 16:31)  Weight (kg): 100.4 (04-14-22 @ 10:40), 101.999 (04-13-22 @ 17:00), 98.4 (03-21-22 @ 08:57)  BMI (kg/m2): 30.6 (04-14-22 @ 10:40), 31.4 (04-13-22 @ 17:00), 30.3 (03-21-22 @ 08:57)    [ ]PPSV2 < or = 30%  [ ]significant weight loss [ ]poor nutritional intake [ ]anasarca[ ]Artificial Nutrition    REFERRALS:   [ ]Chaplaincy  [ ]Hospice  [ ]Child Life  [ ]Social Work  [ ]Case management [ ]Holistic Therapy     Goals of Care Document:

## 2022-05-04 NOTE — PROGRESS NOTE ADULT - PROBLEM SELECTOR PROBLEM 9
MCL (mantle cell lymphoma)
At high risk for spiritual distress
MCL (mantle cell lymphoma)
At high risk for spiritual distress
S/P chimeric antigen receptor T-cell therapy
MCL (mantle cell lymphoma)
S/P chimeric antigen receptor T-cell therapy

## 2022-05-04 NOTE — PHYSICAL THERAPY INITIAL EVALUATION ADULT - PRECAUTIONS/LIMITATIONS, REHAB EVAL
He was treated with 4 cycles of R-CHOP, and 2 cycles of RICE consolidation. A bone marrow biopsy 2/10/17 showed normocellular bone marrow. He was admitted 3/27/17 for an autologous pbsct with CBV prep, and received the cells 4/5/17. He engrafted 4/16/17. He was discharged and completed maintenance Rituxan. In 10/21, he noticed a lump in the right groin. A lymph node biopsy 11/12/21 was consistent with relapsed MCL. Pt now admitted for CAR T therapy with Tecartus product for relapsed, refractory MCL.  MR Head 4/27: No hydrocephalus, mass effect, acute intracranial hemorrhage, vasogenic edema, or acute territorial infarct. No abnormal parenchymal or leptomeningeal enhancement. CTH 4/27: No acute hemorrhage, mass effect or extra-axial collections./fall precautions

## 2022-05-04 NOTE — PROGRESS NOTE ADULT - TIME BILLING
Total time spent including the following  [x]chart review and documentation  []review of medical literature related to pathogenesis, disease/illness progress, treatment and/or prognosis  []care coordination:  [x]discussion with the primary team:  [x]discussion with floor staff:  []discussion with consultant(s):  [x]discussion with the patient, surrogate decision maker, or family:  Time spent: >  35 min
Total time spent including the following  [x]chart review and documentation  []review of medical literature related to pathogenesis, disease/illness progress, treatment and/or prognosis  []care coordination:  [x]discussion with the primary team:  [x]discussion with floor staff:  []discussion with consultant(s):  [x]discussion with the patient, surrogate decision maker, or family:  Time spent: >  35 min
Total time spent including the following  [x]chart review and documentation  []review of medical literature related to pathogenesis, disease/illness progress, treatment and/or prognosis  []care coordination:  [x]discussion with the primary team: [facility]  [x]discussion with floor staff: SW and nurse, separately  []discussion with consultant(s):  [x]discussion with the patient, surrogate decision maker, or family: Pt and wife  Time spent: >35 min
Total time spent including the following  [x]chart review and documentation  []review of medical literature related to pathogenesis, disease/illness progress, treatment and/or prognosis  []care coordination:  [x]discussion with the primary team:  [x]discussion with floor staff:  []discussion with consultant(s):  [x]discussion with the patient, surrogate decision maker, or family:  Time spent: >  35 min
Total time spent including the following  [x]chart review and documentation  []review of medical literature related to pathogenesis, disease/illness progress, treatment and/or prognosis  []care coordination:  [x]discussion with the primary team: [facility]  [x]discussion with floor staff: SW  []discussion with consultant(s):  [x]discussion with the patient, surrogate decision maker, or family: Pt and wife  Time spent: >35 min
care coordination, symptom assessment and monitoring. ROS as above. rest of care per primary team.

## 2022-05-04 NOTE — PROGRESS NOTE ADULT - PROBLEM SELECTOR PLAN 8
Recommendation: PPSv2 prior to admission: 80  Current functional PPSv2: 80  Nursing care required: Minimal assistance with ADLs  Code status documented: Full code  A review of the paper chart has been conducted  HCP form present:               Yes and valid []               Yes but invalid []                No [x]   MOLST present:                   Yes and valid []               Yes but invalid []                No [x]  Incapacity form present:   Yes and valid []                Yes but invalid []                No []                 N/A [x]  Living Will:                            Yes and valid []                Yes but invalid []                No [x]      Family Health Care Decision Act (FHCDA) Surrogate Decision Maker Hierarchy in the absence of a health care agent  [facility] Article 81 Guardian-->Spouse or domestic partner--> Adult child-->Parent-->Sibling--> Close friend.
Minimal  Eating breakfast  impacted by dysgeusia
Encounter for palliative care.   Recommendation: Actions:  [x] Rapport building     [x] Symptom assessment    [] Eliciting preferences of goals   [] Prognostic understanding    [x] Emotional Support  [] Coping skill development  []  Other  Interdisciplinary Referrals: Pt and wife open to Child Life  Communication: d/w SW  Documentation Review: [x] Primary Team [] Consultants [] Interdisciplinary team  Content: n/a.
Likely superimposed symptoms  Dysgeusia compounded by pain
Recommendation: PPSv2 prior to admission: 80  Current functional PPSv2: 80  Nursing care required: Minimal assistance with ADLs  Code status documented: Full code  A review of the paper chart has been conducted  HCP form present:               Yes and valid []               Yes but invalid []                No [x]   MOLST present:                   Yes and valid []               Yes but invalid []                No [x]  Incapacity form present:   Yes and valid []                Yes but invalid []                No []                 N/A [x]  Living Will:                            Yes and valid []                Yes but invalid []                No [x]      Family Health Care Decision Act (FHCDA) Surrogate Decision Maker Hierarchy in the absence of a health care agent  [facility] Article 81 Guardian-->Spouse or domestic partner--> Adult child-->Parent-->Sibling--> Close friend.
Likely superimposed symptoms  Dysgeusia compounded by pain
Recommendation: PPSv2 prior to admission: 80  Current functional PPSv2: 80  Nursing care required: Minimal assistance with ADLs  Code status documented: Full code  A review of the paper chart has been conducted  HCP form present:               Yes and valid []               Yes but invalid []                No [x]   MOLST present:                   Yes and valid []               Yes but invalid []                No [x]  Incapacity form present:   Yes and valid []                Yes but invalid []                No []                 N/A [x]  Living Will:                            Yes and valid []                Yes but invalid []                No [x]      Family Health Care Decision Act (FHCDA) Surrogate Decision Maker Hierarchy in the absence of a health care agent  [facility] Article 81 Guardian-->Spouse or domestic partner--> Adult child-->Parent-->Sibling--> Close friend.
Likely superimposed symptoms  Dysgeusia compounded by pain

## 2022-05-04 NOTE — PROGRESS NOTE ADULT - PROBLEM SELECTOR PLAN 4
Minimal drowsiness
Moderate  Similarly worsened by febrile episode
s/ infusion today
Mild but attempting to eat
Mild
Mild  Improving
Mild, Improving
Resolved

## 2022-05-04 NOTE — PROGRESS NOTE ADULT - PROBLEM SELECTOR PLAN 5
Pt reports slight gait issue that has been persistent since CAR-T infusion. Ambulating as tolerated. May benefit from MRI Back to r/o abscess.
Abnormal taste-mild
Abnormal taste  Likely treatment related  Moderate level
Hx of autologous transplant in 2017
Likely superimposed symptoms  Dysgeusia compounded by pain
Pt reports slight gait issue that has been persistent since CAR-T infusion.  No falls or overt Sz
Pt reports slight gait issue that has been persistent since CAR-T infusion.  No falls or overt Sz
Pt reports slight gait issue

## 2022-05-04 NOTE — PROGRESS NOTE ADULT - PROBLEM SELECTOR PLAN 6
IV chemotherapy as part of CAR-T treatment  Continue to monitor for known adverse effects/symptoms  Risk of infectious complications given anticipated impact on hematopoietic lineages and resultant immune compromise  Monitor for neurologic changes and/or signs of CRS  PPx/Tx per primary team  Relevant considerations: bicytopenia
IV chemotherapy as part of CAR-T treatment  Continue to monitor for known adverse effects/symptoms  Risk of infectious complications given anticipated impact on hematopoietic lineages and resultant immune compromise  Monitor for neurologic changes and/or signs of CRS  PPx/Tx per primary team  Relevant considerations: febrile episodes
Mild in nature
Recommendation: PPSv2 prior to admission: 80  Current functional PPSv2: 80  Nursing care required: Minimal assistance with ADLs  Code status documented: Full code  A review of the paper chart has been conducted  HCP form present:               Yes and valid []               Yes but invalid []                No [x]   MOLST present:                   Yes and valid []               Yes but invalid []                No [x]  Incapacity form present:   Yes and valid []                Yes but invalid []                No []                 N/A [x]  Living Will:                            Yes and valid []                Yes but invalid []                No [x]      Family Health Care Decision Act (FHCDA) Surrogate Decision Maker Hierarchy in the absence of a health care agent  [facility] Article 81 Guardian-->Spouse or domestic partner--> Adult child-->Parent-->Sibling--> Close friend.
IV chemotherapy as part of CAR-T treatment  Continue to monitor for known adverse effects/symptoms  Risk of infectious complications given anticipated impact on hematopoietic lineages and resultant immune compromise  Monitor for neurologic changes and/or signs of CRS  PPx/Tx per primary team  Relevant considerations: bicytopenia
Resolved, Monitor for changes
No changes  Taste is blunted
Minimal  Monitor for changes

## 2022-05-05 ENCOUNTER — FORM ENCOUNTER (OUTPATIENT)
Age: 57
End: 2022-05-05

## 2022-05-05 LAB
ALBUMIN SERPL ELPH-MCNC: 3.7 G/DL — SIGNIFICANT CHANGE UP (ref 3.3–5)
ALBUMIN SERPL ELPH-MCNC: 4 G/DL — SIGNIFICANT CHANGE UP (ref 3.3–5)
ALP SERPL-CCNC: 53 U/L — SIGNIFICANT CHANGE UP (ref 40–120)
ALP SERPL-CCNC: 54 U/L — SIGNIFICANT CHANGE UP (ref 40–120)
ALT FLD-CCNC: 33 U/L — SIGNIFICANT CHANGE UP (ref 10–45)
ALT FLD-CCNC: 33 U/L — SIGNIFICANT CHANGE UP (ref 10–45)
ANION GAP SERPL CALC-SCNC: 9 MMOL/L — SIGNIFICANT CHANGE UP (ref 5–17)
ANION GAP SERPL CALC-SCNC: 9 MMOL/L — SIGNIFICANT CHANGE UP (ref 5–17)
APTT BLD: 24.8 SEC — LOW (ref 27.5–35.5)
AST SERPL-CCNC: 16 U/L — SIGNIFICANT CHANGE UP (ref 10–40)
AST SERPL-CCNC: 17 U/L — SIGNIFICANT CHANGE UP (ref 10–40)
BASOPHILS # BLD AUTO: 0.06 K/UL — SIGNIFICANT CHANGE UP (ref 0–0.2)
BASOPHILS # BLD AUTO: 0.15 K/UL — SIGNIFICANT CHANGE UP (ref 0–0.2)
BASOPHILS NFR BLD AUTO: 2.6 % — HIGH (ref 0–2)
BASOPHILS NFR BLD AUTO: 7 % — HIGH (ref 0–2)
BILIRUB SERPL-MCNC: 0.4 MG/DL — SIGNIFICANT CHANGE UP (ref 0.2–1.2)
BILIRUB SERPL-MCNC: 0.4 MG/DL — SIGNIFICANT CHANGE UP (ref 0.2–1.2)
BUN SERPL-MCNC: 14 MG/DL — SIGNIFICANT CHANGE UP (ref 7–23)
BUN SERPL-MCNC: 16 MG/DL — SIGNIFICANT CHANGE UP (ref 7–23)
CALCIUM SERPL-MCNC: 8.5 MG/DL — SIGNIFICANT CHANGE UP (ref 8.4–10.5)
CALCIUM SERPL-MCNC: 8.8 MG/DL — SIGNIFICANT CHANGE UP (ref 8.4–10.5)
CHLORIDE SERPL-SCNC: 102 MMOL/L — SIGNIFICANT CHANGE UP (ref 96–108)
CHLORIDE SERPL-SCNC: 102 MMOL/L — SIGNIFICANT CHANGE UP (ref 96–108)
CK SERPL-CCNC: 18 U/L — LOW (ref 30–200)
CMV DNA CSF QL NAA+PROBE: SIGNIFICANT CHANGE UP
CMV DNA SPEC NAA+PROBE-LOG#: SIGNIFICANT CHANGE UP LOG10IU/ML
CO2 SERPL-SCNC: 26 MMOL/L — SIGNIFICANT CHANGE UP (ref 22–31)
CO2 SERPL-SCNC: 26 MMOL/L — SIGNIFICANT CHANGE UP (ref 22–31)
CREAT SERPL-MCNC: 0.84 MG/DL — SIGNIFICANT CHANGE UP (ref 0.5–1.3)
CREAT SERPL-MCNC: 1.03 MG/DL — SIGNIFICANT CHANGE UP (ref 0.5–1.3)
CRP SERPL-MCNC: <3 MG/L — SIGNIFICANT CHANGE UP (ref 0–4)
D DIMER BLD IA.RAPID-MCNC: 990 NG/ML DDU — HIGH
EBV DNA SERPL NAA+PROBE-ACNC: SIGNIFICANT CHANGE UP IU/ML
EBV DNA SERPL NAA+PROBE-ACNC: SIGNIFICANT CHANGE UP IU/ML
EBV EA AB SER IA-ACNC: 44 U/ML — HIGH
EBV EA AB TITR SER IF: POSITIVE
EBV EA IGG SER-ACNC: POSITIVE
EBV NA IGG SER IA-ACNC: 465 U/ML — HIGH
EBV PATRN SPEC IB-IMP: SIGNIFICANT CHANGE UP
EBV VCA IGG AVIDITY SER QL IA: POSITIVE
EBV VCA IGM SER IA-ACNC: 77.9 U/ML — HIGH
EBV VCA IGM SER IA-ACNC: <10 U/ML — SIGNIFICANT CHANGE UP
EBV VCA IGM TITR FLD: NEGATIVE — SIGNIFICANT CHANGE UP
EGFR: 102 ML/MIN/1.73M2 — SIGNIFICANT CHANGE UP
EGFR: 85 ML/MIN/1.73M2 — SIGNIFICANT CHANGE UP
EOSINOPHIL # BLD AUTO: 0.46 K/UL — SIGNIFICANT CHANGE UP (ref 0–0.5)
EOSINOPHIL # BLD AUTO: 0.47 K/UL — SIGNIFICANT CHANGE UP (ref 0–0.5)
EOSINOPHIL NFR BLD AUTO: 21 % — HIGH (ref 0–6)
EOSINOPHIL NFR BLD AUTO: 21 % — HIGH (ref 0–6)
FERRITIN SERPL-MCNC: 154 NG/ML — SIGNIFICANT CHANGE UP (ref 30–400)
FIBRINOGEN PPP-MCNC: 251 MG/DL — LOW (ref 330–520)
GIANT PLATELETS BLD QL SMEAR: PRESENT — SIGNIFICANT CHANGE UP
GLUCOSE SERPL-MCNC: 106 MG/DL — HIGH (ref 70–99)
GLUCOSE SERPL-MCNC: 122 MG/DL — HIGH (ref 70–99)
HCT VFR BLD CALC: 36.1 % — LOW (ref 39–50)
HCT VFR BLD CALC: 36.2 % — LOW (ref 39–50)
HGB BLD-MCNC: 11.6 G/DL — LOW (ref 13–17)
HGB BLD-MCNC: 11.6 G/DL — LOW (ref 13–17)
INR BLD: 1.2 RATIO — HIGH (ref 0.88–1.16)
LDH SERPL L TO P-CCNC: 146 U/L — SIGNIFICANT CHANGE UP (ref 50–242)
LDH SERPL L TO P-CCNC: 162 U/L — SIGNIFICANT CHANGE UP (ref 50–242)
LYMPHOCYTES # BLD AUTO: 0.15 K/UL — LOW (ref 1–3.3)
LYMPHOCYTES # BLD AUTO: 0.27 K/UL — LOW (ref 1–3.3)
LYMPHOCYTES # BLD AUTO: 12.3 % — LOW (ref 13–44)
LYMPHOCYTES # BLD AUTO: 7 % — LOW (ref 13–44)
MAGNESIUM SERPL-MCNC: 1.7 MG/DL — SIGNIFICANT CHANGE UP (ref 1.6–2.6)
MAGNESIUM SERPL-MCNC: 1.7 MG/DL — SIGNIFICANT CHANGE UP (ref 1.6–2.6)
MANUAL SMEAR VERIFICATION: SIGNIFICANT CHANGE UP
MANUAL SMEAR VERIFICATION: SIGNIFICANT CHANGE UP
MCHC RBC-ENTMCNC: 31.3 PG — SIGNIFICANT CHANGE UP (ref 27–34)
MCHC RBC-ENTMCNC: 31.4 PG — SIGNIFICANT CHANGE UP (ref 27–34)
MCHC RBC-ENTMCNC: 32 GM/DL — SIGNIFICANT CHANGE UP (ref 32–36)
MCHC RBC-ENTMCNC: 32.1 GM/DL — SIGNIFICANT CHANGE UP (ref 32–36)
MCV RBC AUTO: 97.3 FL — SIGNIFICANT CHANGE UP (ref 80–100)
MCV RBC AUTO: 97.8 FL — SIGNIFICANT CHANGE UP (ref 80–100)
METAMYELOCYTES # FLD: 1.8 % — HIGH (ref 0–0)
MONOCYTES # BLD AUTO: 0.27 K/UL — SIGNIFICANT CHANGE UP (ref 0–0.9)
MONOCYTES # BLD AUTO: 0.37 K/UL — SIGNIFICANT CHANGE UP (ref 0–0.9)
MONOCYTES NFR BLD AUTO: 12.3 % — SIGNIFICANT CHANGE UP (ref 2–14)
MONOCYTES NFR BLD AUTO: 16.7 % — HIGH (ref 2–14)
NEUTROPHILS # BLD AUTO: 1.03 K/UL — LOW (ref 1.8–7.4)
NEUTROPHILS # BLD AUTO: 1.12 K/UL — LOW (ref 1.8–7.4)
NEUTROPHILS NFR BLD AUTO: 44.7 % — SIGNIFICANT CHANGE UP (ref 43–77)
NEUTROPHILS NFR BLD AUTO: 46.5 % — SIGNIFICANT CHANGE UP (ref 43–77)
NEUTS BAND # BLD: 1.8 % — SIGNIFICANT CHANGE UP (ref 0–8)
NEUTS BAND # BLD: 4.4 % — SIGNIFICANT CHANGE UP (ref 0–8)
NRBC # BLD: 1 /100 — HIGH (ref 0–0)
NT-PROBNP SERPL-SCNC: 84 PG/ML — SIGNIFICANT CHANGE UP (ref 0–300)
PHOSPHATE SERPL-MCNC: 3.1 MG/DL — SIGNIFICANT CHANGE UP (ref 2.5–4.5)
PHOSPHATE SERPL-MCNC: 3.3 MG/DL — SIGNIFICANT CHANGE UP (ref 2.5–4.5)
PLAT MORPH BLD: NORMAL — SIGNIFICANT CHANGE UP
PLAT MORPH BLD: NORMAL — SIGNIFICANT CHANGE UP
PLATELET # BLD AUTO: 178 K/UL — SIGNIFICANT CHANGE UP (ref 150–400)
PLATELET # BLD AUTO: 196 K/UL — SIGNIFICANT CHANGE UP (ref 150–400)
POTASSIUM SERPL-MCNC: 4 MMOL/L — SIGNIFICANT CHANGE UP (ref 3.5–5.3)
POTASSIUM SERPL-MCNC: 4.1 MMOL/L — SIGNIFICANT CHANGE UP (ref 3.5–5.3)
POTASSIUM SERPL-SCNC: 4 MMOL/L — SIGNIFICANT CHANGE UP (ref 3.5–5.3)
POTASSIUM SERPL-SCNC: 4.1 MMOL/L — SIGNIFICANT CHANGE UP (ref 3.5–5.3)
PROT SERPL-MCNC: 5.5 G/DL — LOW (ref 6–8.3)
PROT SERPL-MCNC: 5.7 G/DL — LOW (ref 6–8.3)
PROTHROM AB SERPL-ACNC: 13.9 SEC — HIGH (ref 10.5–13.4)
RBC # BLD: 3.7 M/UL — LOW (ref 4.2–5.8)
RBC # BLD: 3.71 M/UL — LOW (ref 4.2–5.8)
RBC # FLD: 15.1 % — HIGH (ref 10.3–14.5)
RBC # FLD: 15.1 % — HIGH (ref 10.3–14.5)
RBC BLD AUTO: SIGNIFICANT CHANGE UP
RBC BLD AUTO: SIGNIFICANT CHANGE UP
SODIUM SERPL-SCNC: 137 MMOL/L — SIGNIFICANT CHANGE UP (ref 135–145)
SODIUM SERPL-SCNC: 137 MMOL/L — SIGNIFICANT CHANGE UP (ref 135–145)
URATE SERPL-MCNC: 3.3 MG/DL — LOW (ref 3.4–8.8)
URATE SERPL-MCNC: 3.5 MG/DL — SIGNIFICANT CHANGE UP (ref 3.4–8.8)
VARIANT LYMPHS # BLD: 0.9 % — SIGNIFICANT CHANGE UP (ref 0–6)
WBC # BLD: 2.21 K/UL — LOW (ref 3.8–10.5)
WBC # BLD: 2.22 K/UL — LOW (ref 3.8–10.5)
WBC # FLD AUTO: 2.21 K/UL — LOW (ref 3.8–10.5)
WBC # FLD AUTO: 2.22 K/UL — LOW (ref 3.8–10.5)

## 2022-05-05 PROCEDURE — 99291 CRITICAL CARE FIRST HOUR: CPT

## 2022-05-05 RX ORDER — GABAPENTIN 400 MG/1
1 CAPSULE ORAL
Qty: 30 | Refills: 0
Start: 2022-05-05 | End: 2022-06-03

## 2022-05-05 RX ADMIN — LEVETIRACETAM 500 MILLIGRAM(S): 250 TABLET, FILM COATED ORAL at 17:51

## 2022-05-05 RX ADMIN — Medication 5 MILLILITER(S): at 12:28

## 2022-05-05 RX ADMIN — Medication 400 MILLIGRAM(S): at 21:07

## 2022-05-05 RX ADMIN — Medication 5 MILLILITER(S): at 23:22

## 2022-05-05 RX ADMIN — CARVEDILOL PHOSPHATE 3.12 MILLIGRAM(S): 80 CAPSULE, EXTENDED RELEASE ORAL at 06:13

## 2022-05-05 RX ADMIN — GABAPENTIN 100 MILLIGRAM(S): 400 CAPSULE ORAL at 06:14

## 2022-05-05 RX ADMIN — Medication 10 MILLILITER(S): at 08:56

## 2022-05-05 RX ADMIN — Medication 1 LOZENGE: at 08:56

## 2022-05-05 RX ADMIN — PANTOPRAZOLE SODIUM 40 MILLIGRAM(S): 20 TABLET, DELAYED RELEASE ORAL at 17:51

## 2022-05-05 RX ADMIN — Medication 1 TABLET(S): at 12:30

## 2022-05-05 RX ADMIN — Medication 1 MILLIGRAM(S): at 12:29

## 2022-05-05 RX ADMIN — Medication 400 MILLIGRAM(S): at 14:18

## 2022-05-05 RX ADMIN — Medication 5 MILLILITER(S): at 08:56

## 2022-05-05 RX ADMIN — Medication 10 MILLILITER(S): at 21:07

## 2022-05-05 RX ADMIN — GABAPENTIN 100 MILLIGRAM(S): 400 CAPSULE ORAL at 14:18

## 2022-05-05 RX ADMIN — Medication 1 LOZENGE: at 17:49

## 2022-05-05 RX ADMIN — FLUCONAZOLE 400 MILLIGRAM(S): 150 TABLET ORAL at 12:29

## 2022-05-05 RX ADMIN — Medication 300 MILLIGRAM(S): at 12:27

## 2022-05-05 RX ADMIN — Medication 10 MILLILITER(S): at 23:22

## 2022-05-05 RX ADMIN — Medication 10 MILLILITER(S): at 17:49

## 2022-05-05 RX ADMIN — OXYCODONE HYDROCHLORIDE 5 MILLIGRAM(S): 5 TABLET ORAL at 14:23

## 2022-05-05 RX ADMIN — Medication 1 LOZENGE: at 21:07

## 2022-05-05 RX ADMIN — GABAPENTIN 100 MILLIGRAM(S): 400 CAPSULE ORAL at 21:07

## 2022-05-05 RX ADMIN — Medication 400 MILLIGRAM(S): at 06:14

## 2022-05-05 RX ADMIN — Medication 1 LOZENGE: at 23:22

## 2022-05-05 RX ADMIN — Medication 5 MILLILITER(S): at 21:07

## 2022-05-05 RX ADMIN — Medication 1 LOZENGE: at 12:28

## 2022-05-05 RX ADMIN — LEVETIRACETAM 500 MILLIGRAM(S): 250 TABLET, FILM COATED ORAL at 06:13

## 2022-05-05 RX ADMIN — CARVEDILOL PHOSPHATE 3.12 MILLIGRAM(S): 80 CAPSULE, EXTENDED RELEASE ORAL at 17:50

## 2022-05-05 RX ADMIN — PANTOPRAZOLE SODIUM 40 MILLIGRAM(S): 20 TABLET, DELAYED RELEASE ORAL at 06:14

## 2022-05-05 RX ADMIN — CHLORHEXIDINE GLUCONATE 1 APPLICATION(S): 213 SOLUTION TOPICAL at 08:55

## 2022-05-05 RX ADMIN — Medication 10 MILLILITER(S): at 12:28

## 2022-05-05 RX ADMIN — OXYCODONE HYDROCHLORIDE 5 MILLIGRAM(S): 5 TABLET ORAL at 15:15

## 2022-05-05 RX ADMIN — Medication 5 MILLILITER(S): at 17:49

## 2022-05-05 NOTE — PROGRESS NOTE ADULT - NS PANP OPT1 GEN_ALL_CORE
I attest my time as PA/NP is greater than 50% of the total combined time spent on qualifying patient care activities by the PA/NP and attending.

## 2022-05-05 NOTE — PHARMACOTHERAPY INTERVENTION NOTE - COMMENTS
Allergies    No Known Allergies    Intolerances        MEDICATIONS  (STANDING):  acetaminophen     Tablet .. 650 milliGRAM(s) Oral every 6 hours  acyclovir   Oral Tab/Cap 400 milliGRAM(s) Oral every 8 hours  allopurinol 300 milliGRAM(s) Oral daily  Biotene Dry Mouth Oral Rinse 5 milliLiter(s) Swish and Spit five times a day  carvedilol 3.125 milliGRAM(s) Oral every 12 hours  chlorhexidine 4% Liquid 1 Application(s) Topical <User Schedule>  clotrimazole Lozenge 1 Lozenge Oral five times a day  fluconAZOLE   Tablet 400 milliGRAM(s) Oral daily  folic acid 1 milliGRAM(s) Oral daily  gabapentin 100 milliGRAM(s) Oral three times a day  levETIRAcetam 500 milliGRAM(s) Oral two times a day  multivitamin 1 Tablet(s) Oral daily  pantoprazole    Tablet 40 milliGRAM(s) Oral two times a day  sodium bicarbonate Mouth Rinse 10 milliLiter(s) Swish and Spit five times a day  sodium chloride 0.9%. 1000 milliLiter(s) (100 mL/Hr) IV Continuous <Continuous>    MEDICATIONS  (PRN):  acetaminophen     Tablet .. 650 milliGRAM(s) Oral every 6 hours PRN Temp greater or equal to 38C (100.4F), Mild Pain (1 - 3)  loperamide 2 milliGRAM(s) Oral every 4 hours PRN Diarrhea  metoclopramide Injectable 10 milliGRAM(s) IV Push every 6 hours PRN Nausea and/or Vomiting  ondansetron Injectable 8 milliGRAM(s) IV Push every 8 hours PRN Nausea and/or Vomiting  oxyCODONE    IR 5 milliGRAM(s) Oral every 6 hours PRN Severe Pain (7 - 10)  polyethylene glycol 3350 17 Gram(s) Oral daily PRN Constipation  senna 1 Tablet(s) Oral at bedtime PRN Constipation  sodium chloride 0.9% lock flush 10 milliLiter(s) IV Push every 1 hour PRN Pre/post blood products, medications, blood draw, and to maintain line patency    Recipient of Education:  [ x ]Patient  [  ]Family, please specify ___________  [  ]Other, please specify ____________    Readiness to Learn:  [ x ]Ready  [  ]Not ready: cognitive  [  ]Not ready: physical  [  ]Not ready: emotional  Comment(s):    Barriers to Information Exhange:  [ x ]None identified  [  ]Vision  [  ]Hearing  [  ]Language  [  ]Literacy  [  ]Memory and Learning  [  ]Culture/Druze  [  ]Other, please specify ____________  Comment(s):    Patient Education Topics:  [  ]Anticoagulation  [  ]Heart Failure (HF)  [  ]Chronic Obstructive Pulmonary Disease (COPD)  [  ]Diabetes Mellitus (DM)  [  ]Stroke  [ x ]Other, please specify ____s/p CAR-T medication education ________  Comment(s):    Medication Counseling Points:  [ x ]Medications Reviewed  [ x ]Medication Schedule  [ x ]Precautions  [x  ]Side Effects  [ x ]Indication for Use  [x  ]Drug/Drug Interactions  [ x ]Drug/Food Interactions  [  ]Other, please specify ____________  Comment(s):    Teaching Method:  [x  ]Verbal Instruction  [x  ]Written Material, please specify _medication chart, discharge booklet_____________  [  ]Skill Demonstration  [  ]Teach Back (Patient repeats in own words)  [  ]Ask Me 3  [  ]Computer/Internet  [  ]Other, please specify ____________  Comment(s):    Educational Evaluation:  [ x ]Meets goals/outcomes  [  ]Partially meets - needs review/practice  [  ]Unable to meet - needs instruction  [  ]Reinforced previously met goal  [  ]Patient declines instruction  [  ]Not applicable  Comment(s):    Time spent: ___30__minutes

## 2022-05-05 NOTE — PROGRESS NOTE ADULT - SUBJECTIVE AND OBJECTIVE BOX
HPC Transplant Team                                                      Critical / Counseling Time Provided: 30 minutes                                                                                                                                                        Chief Complaint: CAR T therapy with [employer] product for treatment of relapsed, refractory MCL    S: Patient seen and examined with HPC Transplant Team:     All other ROS negative     O: Vitals:   Vital Signs Last 24 Hrs  T(C): 37.7 (05 May 2022 05:05), Max: 37.8 (04 May 2022 21:42)  T(F): 99.9 (05 May 2022 05:05), Max: 100.1 (04 May 2022 21:42)  HR: 86 (05 May 2022 05:05) (74 - 89)  BP: 102/65 (05 May 2022 05:05) (102/65 - 128/74)  BP(mean): --  RR: 18 (05 May 2022 05:05) (18 - 20)  SpO2: 97% (05 May 2022 05:05) (97% - 100%)    Admit weight: 100.4kg   Daily Weight in k.1 (04 May 2022 10:39)  Today's weight:     Intake / Output:   -04 @ 07:01  -  -05 @ 07:00  --------------------------------------------------------  IN: 2911 mL / OUT: 3510 mL / NET: -599 mL    PE:   Oropharynx: no erythema or ulcerations ,   Oral Mucositis:  NA                                                   Grade: n/a  CVS: S1, S2 RRR   Lungs: CTA throughout bilaterally   Abdomen: + BS x 4, soft, NT, ND   Extremities: no edema BLE's  Gastric Mucositis:       -                                           Grade: n/a  Intestinal Mucositis:     -                                         Grade: n/a   Skin: no rashes  TLC: CDI   Neuro: A&O x 3   Pain: 0      Labs:   CBC Full  -  ( 05 May 2022 07:02 )  WBC Count : 2.21 K/uL  Hemoglobin : 11.6 g/dL  Hematocrit : 36.2 %  Platelet Count - Automated : 196 K/uL  Mean Cell Volume : 97.8 fl  Mean Cell Hemoglobin : 31.4 pg  Mean Cell Hemoglobin Concentration : 32.0 gm/dL  Auto Neutrophil # : x  Auto Lymphocyte # : x  Auto Monocyte # : x  Auto Eosinophil # : x  Auto Basophil # : x  Auto Neutrophil % : x  Auto Lymphocyte % : x  Auto Monocyte % : x  Auto Eosinophil % : x  Auto Basophil % : x                          11.6   2.21  )-----------( 196      ( 05 May 2022 07:02 )             36.2     05-05    137  |  102  |  16  ----------------------------<  106<H>  4.0   |  26  |  1.03    Ca    8.8      05 May 2022 07:01  Phos  3.3     05-  Mg     1.7     -    TPro  5.5<L>  /  Alb  3.7  /  TBili  0.4  /  DBili  x   /  AST  17  /  ALT  33  /  AlkPhos  54  05-05    PT/INR - ( 04 May 2022 05:51 )   PT: 13.3 sec;   INR: 1.14 ratio         PTT - ( 04 May 2022 05:51 )  PTT:23.0 sec  LIVER FUNCTIONS - ( 05 May 2022 07:01 )  Alb: 3.7 g/dL / Pro: 5.5 g/dL / ALK PHOS: 54 U/L / ALT: 33 U/L / AST: 17 U/L / GGT: x           Lactate Dehydrogenase, Serum: 162 U/L ( @ 07:01)      Cultures:   Culture Results:   No growth (22 @ 22:16)    Culture Results:   No growth to date. (22 @ 17:06)    Culture Results:   No growth to date. (22 @ 17:06)    Culture Results:   No Growth Final (22 @ 19:54)    Culture Results:   No Growth Final (22 @ 19:54)    Culture Results:   No growth (22 @ 13:44)    Culture Results:   No Growth Final (22 @ 16:40)    Culture Results:   No Growth Final (22 @ 16:40)    Culture Results:   No Growth Final (22 @ 01:04)    Culture Results:   No Growth Final (22 @ 01:04)    Culture Results:   10,000 - 49,000 CFU/mL Escherichia coli (22 @ 00:54)    Radiology:   ACC: [id number] EXAM:  MR BRAIN WAW IC                        PROCEDURE DATE:  2022    IMPRESSION:  No hydrocephalus, mass effect, acute intracranial hemorrhage, vasogenic   edema, or acute territorial infarct.  No abnormal parenchymal or leptomeningeal enhancement    ACC: [id number] EXAM:  CT BRAIN                        PROCEDURE DATE:  2022    IMPRESSION:  No acute hemorrhage, mass effect or extra-axial collections.    ACC: [id number] EXAM:  XR CHEST PORTABLE URGENT 1V                        PROCEDURE DATE:  2022    IMPRESSION:  Clear lungs.    Meds:   Antimicrobials:   acyclovir   Oral Tab/Cap 400 milliGRAM(s) Oral every 8 hours  clotrimazole Lozenge 1 Lozenge Oral five times a day  fluconAZOLE   Tablet 400 milliGRAM(s) Oral daily      Heme / Onc:       GI:  loperamide 2 milliGRAM(s) Oral every 4 hours PRN  pantoprazole    Tablet 40 milliGRAM(s) Oral two times a day  polyethylene glycol 3350 17 Gram(s) Oral daily PRN  senna 1 Tablet(s) Oral at bedtime PRN  sodium bicarbonate Mouth Rinse 10 milliLiter(s) Swish and Spit five times a day      Cardiovascular:   carvedilol 3.125 milliGRAM(s) Oral every 12 hours      Immunologic:       Other medications:   acetaminophen     Tablet .. 650 milliGRAM(s) Oral every 6 hours  allopurinol 300 milliGRAM(s) Oral daily  Biotene Dry Mouth Oral Rinse 5 milliLiter(s) Swish and Spit five times a day  chlorhexidine 4% Liquid 1 Application(s) Topical <User Schedule>  folic acid 1 milliGRAM(s) Oral daily  gabapentin 100 milliGRAM(s) Oral three times a day  levETIRAcetam 500 milliGRAM(s) Oral two times a day  multivitamin 1 Tablet(s) Oral daily  sodium chloride 0.9%. 1000 milliLiter(s) IV Continuous <Continuous>      PRN:   acetaminophen     Tablet .. 650 milliGRAM(s) Oral every 6 hours PRN  loperamide 2 milliGRAM(s) Oral every 4 hours PRN  metoclopramide Injectable 10 milliGRAM(s) IV Push every 6 hours PRN  ondansetron Injectable 8 milliGRAM(s) IV Push every 8 hours PRN  oxyCODONE    IR 5 milliGRAM(s) Oral every 6 hours PRN  polyethylene glycol 3350 17 Gram(s) Oral daily PRN  senna 1 Tablet(s) Oral at bedtime PRN  sodium chloride 0.9% lock flush 10 milliLiter(s) IV Push every 1 hour PRN    A/P: 56 year old male with relapsed, refractory MCL s/p R-CHOP x 4, RICE x 2, maintenance RTX, salvage [place], admitted for CAR T cell therapy with [employer] product. Disease status at time of admission is partial response.   Day +16  Monitor for CRS and neurotox...follow CRS/neuro tox protocol - GIVEN TOCILIZUMAB  pm and   AM for CRS - 1    1. Relapsed, refractory MCL   4/15- day - 4- flu / ctx 2/3 - continue CTX hydration until 24 hours post infusion of last dose   Strict I&O, daily weights, prn diuresis   CRS / ICANS score daily until infusion of CAR T cells (22) and twice daily thereafter   BID labs 0500 & 1500 - monitor for TLS   -CRS grade 1 s/p Tocilizumab on tele started on Keppra as per protocol, pancx, started on cefepime  s/p dexamethasone 4 mg IV x1   F/U repeat blood cx's for fevers ( and ) s/p Toci x2 s/p dexamethasone 4 mg IV x2 also with CP: EKG WNL trops negative s/p morphine with good effect   - CRS grade 2( persistent fevers) given Toci x1 and dexamethasone 4 mg IV overnight, continues to be febrile Tmax 104 F tremors given additional Dexamethasone 10 mg IV  - fever curve improved: CRS grade 1  - Grade 2 neurotox with 10/10 Headaches- taken for CT head (with no abnormalities no cerebral edema), s.p Dexamethasone 10 mg IV x 1, MRI head no significant findings    Grade 1 transaminitis -Monitor closely   Discharge planning next week     Decadron 4mg IV given for Grade 1 CRS      2. Prophylactic measures   acyclovir   Oral Tab/Cap 400 milliGRAM(s) Oral every 8 hours  clotrimazole Lozenge 1 Lozenge Oral five times a day  fluconAZOLE   Tablet 400 milliGRAM(s) Oral daily  Keppra 500 q12h  GI Prophylaxis:  Protonix     3. Cardiac / Hx Afib   -F/U repeat troponin,  now CP free. ECG non ischemic  Continue Coreg bid    4. Mouth care - NS / NaHCO3 rinses, Mycelex, Biotene; Skin care     5. Transfuse & replete electrolytes prn     6. IV hydration, daily weights, strict I&O, prn diuresis     7. Antiemetics, anti-diarrhea medications:   metoclopramide Injectable 10 milliGRAM(s) IV Push every 6 hours PRN  ondansetron 8mg IV q 8 hours prn nausea   loperamide 2 milliGRAM(s) Oral every 4 hours PRN.    8. CRS / ICANS scoring    CRS grade 0, ICANS score 10   - CRS grade 0, ICANS score 10    AM - CRS grade 0, ICANS score 10   -pm-CRS grade 0, ICANS score 10   AM - CRS grade 0, ICANS score 10    PM CRS grade 0, ICANS score 10   AM CRS grade 0, ICANS score 10   PM CRS grade 0, ICANS score 10 --> 9PM CRS score - 1, ICANS - 10   AM CRS grade 0, ICANS score 10   PM CRS grade 0, ICANS score 10   AM CRS grade 1 10am (grade 0 @ 8:30am), ICANS score 10  M CRS grade 2( persistently febrile) ICANS score 10    AM CRS GRADE 1, ICANS score 10  - AM CRS GRADE 1, ICANS score 10   AM CRS GRADE 1, ICANS score 10   noon: with persistent headache: Grade 2 neurotox-   taken for CT head( ( with no abnormalities no cerebral edema) s.p Dexamethasone 10 mg IV pending  MR head   PM CRS GRADE 1, ICANS score 10( HA resolved)   - AM CRS GRADE 1, ICANS score 10  - PM CRS GRADE 1, ICANS score 10  - PM CRS GRADE 1, ICANS score 10   AM  GRADE 0, ICANS score - 10   PM score GRADE 1, ICANS score - 10   AM score GRADE 0, ICANS score - 10   PM score GRADE 0, ICANS score - 10  5/2- AM CRS grade 0, ICANS score 10  5/2- PM CRS grade 1 (fever), ICANS score 10   5/3- AM - CRS grade 0, ICANS score 10  5/3- PM - CRS grade 0, ICANS score 10   5/4- AM - CRS grade 0, ICANS score 10   5/4-PM - CRS grade 0, ICANS score 10     I have written the above note for Dr. Banks who performed service with me in the room.   Tiffany Lucero NP-C ([phone])    I have seen and examined patient with NP, I agree with above note as scribed.                    HPC Transplant Team                                                      Critical / Counseling Time Provided: 30 minutes                                                                                                                                                        Chief Complaint: CAR T therapy with [employer] product for treatment of relapsed, refractory MCL    S: Patient seen and examined with HPC Transplant Team:   + fatigue   All other ROS negative     O: Vitals:   Vital Signs Last 24 Hrs  T(C): 37.7 (05 May 2022 05:05), Max: 37.8 (04 May 2022 21:42)  T(F): 99.9 (05 May 2022 05:05), Max: 100.1 (04 May 2022 21:42)  HR: 86 (05 May 2022 05:05) (74 - 89)  BP: 102/65 (05 May 2022 05:05) (102/65 - 128/74)  BP(mean): --  RR: 18 (05 May 2022 05:05) (18 - 20)  SpO2: 97% (05 May 2022 05:05) (97% - 100%)    Admit weight: 100.4kg   Daily Weight in k.1 (04 May 2022 10:39)  Today's weight:     Intake / Output:   -04 @ 07:01  -  - @ 07:00  --------------------------------------------------------  IN: 2911 mL / OUT: 3510 mL / NET: -599 mL    PE:   Oropharynx: no erythema or ulcerations ,   Oral Mucositis:  NA                                                   Grade: n/a  CVS: S1, S2 RRR   Lungs: CTA throughout bilaterally   Abdomen: + BS x 4, soft, NT, ND   Extremities: no edema BLE's  Gastric Mucositis:       -                                           Grade: n/a  Intestinal Mucositis:     -                                         Grade: n/a   Skin: no rashes  TLC: CDI   Neuro: A&O x 3   Pain: 0      Labs:   CBC Full  -  ( 05 May 2022 07:02 )  WBC Count : 2.21 K/uL  Hemoglobin : 11.6 g/dL  Hematocrit : 36.2 %  Platelet Count - Automated : 196 K/uL  Mean Cell Volume : 97.8 fl  Mean Cell Hemoglobin : 31.4 pg  Mean Cell Hemoglobin Concentration : 32.0 gm/dL  Auto Neutrophil # : x  Auto Lymphocyte # : x  Auto Monocyte # : x  Auto Eosinophil # : x  Auto Basophil # : x  Auto Neutrophil % : x  Auto Lymphocyte % : x  Auto Monocyte % : x  Auto Eosinophil % : x  Auto Basophil % : x                          11.6   2.21  )-----------( 196      ( 05 May 2022 07:02 )             36.2     05-05    137  |  102  |  16  ----------------------------<  106<H>  4.0   |  26  |  1.03    Ca    8.8      05 May 2022 07:01  Phos  3.3     05-  Mg     1.7     05-    TPro  5.5<L>  /  Alb  3.7  /  TBili  0.4  /  DBili  x   /  AST  17  /  ALT  33  /  AlkPhos  54  05-05    PT/INR - ( 04 May 2022 05:51 )   PT: 13.3 sec;   INR: 1.14 ratio         PTT - ( 04 May 2022 05:51 )  PTT:23.0 sec  LIVER FUNCTIONS - ( 05 May 2022 07:01 )  Alb: 3.7 g/dL / Pro: 5.5 g/dL / ALK PHOS: 54 U/L / ALT: 33 U/L / AST: 17 U/L / GGT: x           Lactate Dehydrogenase, Serum: 162 U/L ( @ 07:01)      Cultures:   Culture Results:   No growth (22 @ 22:16)    Culture Results:   No growth to date. (22 @ 17:06)    Culture Results:   No growth to date. (22 @ 17:06)    Culture Results:   No Growth Final (22 @ 19:54)    Culture Results:   No Growth Final (22 @ 19:54)    Culture Results:   No growth (22 @ 13:44)    Culture Results:   No Growth Final (22 @ 16:40)    Culture Results:   No Growth Final (22 @ 16:40)    Culture Results:   No Growth Final (22 @ 01:04)    Culture Results:   No Growth Final (22 @ 01:04)    Culture Results:   10,000 - 49,000 CFU/mL Escherichia coli (22 @ 00:54)    Radiology:   ACC: [id number] EXAM:  MR BRAIN WAW IC                        PROCEDURE DATE:  2022    IMPRESSION:  No hydrocephalus, mass effect, acute intracranial hemorrhage, vasogenic   edema, or acute territorial infarct.  No abnormal parenchymal or leptomeningeal enhancement    ACC: [id number] EXAM:  CT BRAIN                        PROCEDURE DATE:  2022    IMPRESSION:  No acute hemorrhage, mass effect or extra-axial collections.    ACC: [id number] EXAM:  XR CHEST PORTABLE URGENT 1V                        PROCEDURE DATE:  2022    IMPRESSION:  Clear lungs.    Meds:   Antimicrobials:   acyclovir   Oral Tab/Cap 400 milliGRAM(s) Oral every 8 hours  clotrimazole Lozenge 1 Lozenge Oral five times a day  fluconAZOLE   Tablet 400 milliGRAM(s) Oral daily      Heme / Onc:       GI:  loperamide 2 milliGRAM(s) Oral every 4 hours PRN  pantoprazole    Tablet 40 milliGRAM(s) Oral two times a day  polyethylene glycol 3350 17 Gram(s) Oral daily PRN  senna 1 Tablet(s) Oral at bedtime PRN  sodium bicarbonate Mouth Rinse 10 milliLiter(s) Swish and Spit five times a day      Cardiovascular:   carvedilol 3.125 milliGRAM(s) Oral every 12 hours      Immunologic:       Other medications:   acetaminophen     Tablet .. 650 milliGRAM(s) Oral every 6 hours  allopurinol 300 milliGRAM(s) Oral daily  Biotene Dry Mouth Oral Rinse 5 milliLiter(s) Swish and Spit five times a day  chlorhexidine 4% Liquid 1 Application(s) Topical <User Schedule>  folic acid 1 milliGRAM(s) Oral daily  gabapentin 100 milliGRAM(s) Oral three times a day  levETIRAcetam 500 milliGRAM(s) Oral two times a day  multivitamin 1 Tablet(s) Oral daily  sodium chloride 0.9%. 1000 milliLiter(s) IV Continuous <Continuous>      PRN:   acetaminophen     Tablet .. 650 milliGRAM(s) Oral every 6 hours PRN  loperamide 2 milliGRAM(s) Oral every 4 hours PRN  metoclopramide Injectable 10 milliGRAM(s) IV Push every 6 hours PRN  ondansetron Injectable 8 milliGRAM(s) IV Push every 8 hours PRN  oxyCODONE    IR 5 milliGRAM(s) Oral every 6 hours PRN  polyethylene glycol 3350 17 Gram(s) Oral daily PRN  senna 1 Tablet(s) Oral at bedtime PRN  sodium chloride 0.9% lock flush 10 milliLiter(s) IV Push every 1 hour PRN    A/P: 56 year old male with relapsed, refractory MCL s/p R-CHOP x 4, RICE x 2, maintenance RTX, salvage [place], admitted for CAR T cell therapy with [employer] product. Disease status at time of admission is partial response.   Day +16  Monitor for CRS and neurotox...follow CRS/neuro tox protocol - GIVEN TOCILIZUMAB  pm and   AM for CRS - 1    1. Relapsed, refractory MCL   4/15- day - 4- flu / ctx 2/3 - continue CTX hydration until 24 hours post infusion of last dose   Strict I&O, daily weights, prn diuresis   CRS / ICANS score daily until infusion of CAR T cells (22) and twice daily thereafter   BID labs 0500 & 1500 - monitor for TLS   -CRS grade 1 s/p Tocilizumab on tele started on Keppra as per protocol, pancx, started on cefepime  s/p dexamethasone 4 mg IV x1   F/U repeat blood cx's for fevers ( and ) s/p Toci x2 s/p dexamethasone 4 mg IV x2 also with CP: EKG WNL trops negative s/p morphine with good effect   - CRS grade 2( persistent fevers) given Toci x1 and dexamethasone 4 mg IV overnight, continues to be febrile Tmax 104 F tremors given additional Dexamethasone 10 mg IV  - fever curve improved: CRS grade 1  - Grade 2 neurotox with 10/10 Headaches- taken for CT head (with no abnormalities no cerebral edema), s.p Dexamethasone 10 mg IV x 1, MRI head no significant findings    Grade 1 transaminitis -Monitor closely   Discharge planning next week     Decadron 4mg IV given for Grade 1 CRS      2. Prophylactic measures   acyclovir   Oral Tab/Cap 400 milliGRAM(s) Oral every 8 hours  clotrimazole Lozenge 1 Lozenge Oral five times a day  fluconAZOLE   Tablet 400 milliGRAM(s) Oral daily  Keppra 500 q12h  GI Prophylaxis:  Protonix     3. Cardiac / Hx Afib   -F/U repeat troponin,  now CP free. ECG non ischemic  Continue Coreg bid    4. Mouth care - NS / NaHCO3 rinses, Mycelex, Biotene; Skin care     5. Transfuse & replete electrolytes prn     6. IV hydration, daily weights, strict I&O, prn diuresis     7. Antiemetics, anti-diarrhea medications:   metoclopramide Injectable 10 milliGRAM(s) IV Push every 6 hours PRN  ondansetron 8mg IV q 8 hours prn nausea   loperamide 2 milliGRAM(s) Oral every 4 hours PRN.    8. CRS / ICANS scoring    CRS grade 0, ICANS score 10   - CRS grade 0, ICANS score 10    AM - CRS grade 0, ICANS score 10   -pm-CRS grade 0, ICANS score 10   AM - CRS grade 0, ICANS score 10    PM CRS grade 0, ICANS score 10   AM CRS grade 0, ICANS score 10   PM CRS grade 0, ICANS score 10 --> 9PM CRS score - 1, ICANS - 10   AM CRS grade 0, ICANS score 10   PM CRS grade 0, ICANS score 10   AM CRS grade 1 10am (grade 0 @ 8:30am), ICANS score 10  M CRS grade 2( persistently febrile) ICANS score 10    AM CRS GRADE 1, ICANS score 10  - AM CRS GRADE 1, ICANS score 10   AM CRS GRADE 1, ICANS score 10   noon: with persistent headache: Grade 2 neurotox-   taken for CT head( ( with no abnormalities no cerebral edema) s.p Dexamethasone 10 mg IV pending  MR head   PM CRS GRADE 1, ICANS score 10( HA resolved)   - AM CRS GRADE 1, ICANS score 10  - PM CRS GRADE 1, ICANS score 10  - PM CRS GRADE 1, ICANS score 10   AM  GRADE 0, ICANS score - 10   PM score GRADE 1, ICANS score - 10   AM score GRADE 0, ICANS score - 10   PM score GRADE 0, ICANS score - 10  5/2- AM CRS grade 0, ICANS score 10  5/2- PM CRS grade 1 (fever), ICANS score 10   5/3- AM - CRS grade 0, ICANS score 10  5/3- PM - CRS grade 0, ICANS score 10   5/4- AM - CRS grade 0, ICANS score 10   5/4-PM - CRS grade 0, ICANS score 10     I have written the above note for Dr. Banks who performed service with me in the room.   Tiffany Lucero NP-C ([phone])    I have seen and examined patient with NP, I agree with above note as scribed.

## 2022-05-05 NOTE — PROGRESS NOTE ADULT - NS PANP COMMENT GEN_ALL_CORE FT
Admitted for CAR T cell therapy with [employer] (bresucabtagene autoleucel)  today is + 16..some nausea..now improved...monitor closely for CRS and neurotox...pt is at lower risk for these complications given minimal disease burden..hoever he has been on and off with high temps since last  fri evening..first dose of toci given for grade 1 crs......s/p 4 total  doses of  toci and dex 4 mg X 4 and 10 mg X 1... 8/10 HA with somulence...c/w grade 2 neurotox...Stat ct head, MRI..dose dex 10 mg...improved 4/28..4/29, 30 body aches..improved with oxycodone..possibly neuropathic...on gabapentin..4/30 given dex 4 mg x1...temp 38.1...5/2 chills today..  4/30 temp noted...may consider additional dex  5/4 Pt ambulated in [place]...sent PT away....depressed mood..consider zoloft  1. Admit to BMTU   2. Day -5 through day -1 record CARTOX / ICANS score daily   3. Day - 5 through day - 3 Fludarabine 30mg / m2 IV given over 30 minutes daily   4. Day - 5 - start cyclophosphamide hydration - 0.9% NS + 10mEq KCL / L at 75 ml/m2 and continue for 24 hours post infusion of last dose of CTX   5. Day - 5 through day - 3 CTX 500mg / m2 IV QD to be given over 2 hours   6. Starting day - 5 monitor labs BID: CBC with differential, CMP, LDH, uric acid, mag, phos, CPK, BNP, PT / PTT / INR / Fibrinogen, d-dimer, CRP, hepatic profile, ferritin  7. Weekly IL-6 levels on Monday, twice weekly (Monday / Thursday) CMV / EBV PCR, three times a week (Monday / Wednesday / Friday) type and screen ...peak IL 6 over 500  8. On day - 5 start allopurinol 300mg po QD   9. Day -2 and -1 REST DAYS   10. On day -1 begin to record CARTOX / ICANS score q 12 hours   11. Day 0 infuse Brexucabtagene autoleucel  hx of afib...seen by [facility] cardiology  12. F/U cx's..on cefapime,  ...acyclovir and fluconazole..send rvp  13 Cont tele and keppra  4/16-17 Doing well, I/Os are negative, transient lip swelling, now resolved, some fatigue  4/18, 19, 20, 21, 22 CRS 0, ICANS 10  4/22/22-[place]ase fever overnight, chills, given Toci, was started on Cefipime. Stable. CRS / ICANS scoring this morning 10.  4/23/22, 4/24/22 CRS 1 ICANS 10  4/24/22- Fever 102oF, complaints of bilateral upper and lower extremities cramps, chest discomfort, EKG no changes. Patient was treated with Toci/Decadron as per protocol. Morphine prn for pain. Clinically improved. Pancultures sent.  4/25/22 CRS 2...persistent high temps.....ICANS 10...tremor noted with fever...dex 10 mg dosed today  4/26/22 CRS 1, Icans 10  4/27 crs 1, Icans 10...neurotox 2  4/28, 4/29,  crs 0..icans 10  4/30 CRS 1  ICANS 10  5/1, 5/2, 5/3, 5/4 CRS 0, ICANS 10...d/c cefapime...d/c planning  5/2 chills this am...will monitor......consider d/c tlc...f/u cx's  5/3,4 no chills or fever...anxious...d/c planning for thurs or fri...pt f/u...explained to patient that the weakness he is feeling is expected.. Admitted for CAR T cell therapy with [employer] (bresucabtagene autoleucel)  today is + 16..some nausea..now improved...monitor closely for CRS and neurotox...pt is at lower risk for these complications given minimal disease burden..hoever he has been on and off with high temps since last  fri evening..first dose of toci given for grade 1 crs......s/p 4 total  doses of  toci and dex 4 mg X 4 and 10 mg X 1... 8/10 HA with somulence...c/w grade 2 neurotox...Stat ct head, MRI..dose dex 10 mg...improved 4/28..4/29, 30 body aches..improved with oxycodone..possibly neuropathic...on gabapentin..4/30 given dex 4 mg x1...temp 38.1...5/2 chills today..  4/30 temp noted...may consider additional dex  5/4 Pt ambulated in [place]...sent PT away....depressed mood..consider zoloft..5/5 slowly improving, d/c in am  1. Admit to BMTU   2. Day -5 through day -1 record CARTOX / ICANS score daily   3. Day - 5 through day - 3 Fludarabine 30mg / m2 IV given over 30 minutes daily   4. Day - 5 - start cyclophosphamide hydration - 0.9% NS + 10mEq KCL / L at 75 ml/m2 and continue for 24 hours post infusion of last dose of CTX   5. Day - 5 through day - 3 CTX 500mg / m2 IV QD to be given over 2 hours   6. Starting day - 5 monitor labs BID: CBC with differential, CMP, LDH, uric acid, mag, phos, CPK, BNP, PT / PTT / INR / Fibrinogen, d-dimer, CRP, hepatic profile, ferritin  7. Weekly IL-6 levels on Monday, twice weekly (Monday / Thursday) CMV / EBV PCR, three times a week (Monday / Wednesday / Friday) type and screen ...peak IL 6 over 500  8. On day - 5 start allopurinol 300mg po QD   9. Day -2 and -1 REST DAYS   10. On day -1 begin to record CARTOX / ICANS score q 12 hours   11. Day 0 infuse Brexucabtagene autoleucel  hx of afib...seen by [facility] cardiology  12. F/U cx's..on cefapime,  ...acyclovir and fluconazole..send rvp  13 Cont tele and keppra  4/16-17 Doing well, I/Os are negative, transient lip swelling, now resolved, some fatigue  4/18, 19, 20, 21, 22 CRS 0, ICANS 10  4/22/22-[place]ase fever overnight, chills, given Toci, was started on Cefipime. Stable. CRS / ICANS scoring this morning 10.  4/23/22, 4/24/22 CRS 1 ICANS 10  4/24/22- Fever 102oF, complaints of bilateral upper and lower extremities cramps, chest discomfort, EKG no changes. Patient was treated with Toci/Decadron as per protocol. Morphine prn for pain. Clinically improved. Pancultures sent.  4/25/22 CRS 2...persistent high temps.....ICANS 10...tremor noted with fever...dex 10 mg dosed today  4/26/22 CRS 1, Icans 10  4/27 crs 1, Icans 10...neurotox 2  4/28, 4/29,  crs 0..icans 10  4/30 CRS 1  ICANS 10  5/1, 5/2, 5/3, 5/4, 5/5 CRS 0, ICANS 10...d/c cefapime...d/c planning  5/2 chills this am...will monitor......consider d/c tlc...f/u cx's  5/3,4, 5 no chills or fever...anxious...d/c planning for thurs or fri...pt f/u...explained to patient that the weakness he is feeling is expected..

## 2022-05-06 ENCOUNTER — APPOINTMENT (OUTPATIENT)
Dept: CARDIOLOGY | Facility: CLINIC | Age: 57
End: 2022-05-06
Payer: MEDICARE

## 2022-05-06 ENCOUNTER — APPOINTMENT (OUTPATIENT)
Dept: HEMATOLOGY ONCOLOGY | Facility: CLINIC | Age: 57
End: 2022-05-06

## 2022-05-06 ENCOUNTER — TRANSCRIPTION ENCOUNTER (OUTPATIENT)
Age: 57
End: 2022-05-06

## 2022-05-06 ENCOUNTER — NON-APPOINTMENT (OUTPATIENT)
Age: 57
End: 2022-05-06

## 2022-05-06 VITALS
RESPIRATION RATE: 18 BRPM | TEMPERATURE: 99 F | OXYGEN SATURATION: 97 % | SYSTOLIC BLOOD PRESSURE: 118 MMHG | DIASTOLIC BLOOD PRESSURE: 74 MMHG | HEART RATE: 80 BPM

## 2022-05-06 LAB
ALBUMIN SERPL ELPH-MCNC: 3.7 G/DL — SIGNIFICANT CHANGE UP (ref 3.3–5)
ALP SERPL-CCNC: 52 U/L — SIGNIFICANT CHANGE UP (ref 40–120)
ALT FLD-CCNC: 33 U/L — SIGNIFICANT CHANGE UP (ref 10–45)
ANION GAP SERPL CALC-SCNC: 6 MMOL/L — SIGNIFICANT CHANGE UP (ref 5–17)
APTT BLD: 25.4 SEC — LOW (ref 27.5–35.5)
AST SERPL-CCNC: 15 U/L — SIGNIFICANT CHANGE UP (ref 10–40)
BASOPHILS # BLD AUTO: 0.02 K/UL — SIGNIFICANT CHANGE UP (ref 0–0.2)
BASOPHILS NFR BLD AUTO: 0.9 % — SIGNIFICANT CHANGE UP (ref 0–2)
BILIRUB SERPL-MCNC: 0.3 MG/DL — SIGNIFICANT CHANGE UP (ref 0.2–1.2)
BLD GP AB SCN SERPL QL: NEGATIVE — SIGNIFICANT CHANGE UP
BUN SERPL-MCNC: 16 MG/DL — SIGNIFICANT CHANGE UP (ref 7–23)
CALCIUM SERPL-MCNC: 8.6 MG/DL — SIGNIFICANT CHANGE UP (ref 8.4–10.5)
CHLORIDE SERPL-SCNC: 104 MMOL/L — SIGNIFICANT CHANGE UP (ref 96–108)
CK SERPL-CCNC: 14 U/L — LOW (ref 30–200)
CO2 SERPL-SCNC: 29 MMOL/L — SIGNIFICANT CHANGE UP (ref 22–31)
CREAT SERPL-MCNC: 1.05 MG/DL — SIGNIFICANT CHANGE UP (ref 0.5–1.3)
CRP SERPL-MCNC: <3 MG/L — SIGNIFICANT CHANGE UP (ref 0–4)
D DIMER BLD IA.RAPID-MCNC: 804 NG/ML DDU — HIGH
EGFR: 83 ML/MIN/1.73M2 — SIGNIFICANT CHANGE UP
EOSINOPHIL # BLD AUTO: 0.47 K/UL — SIGNIFICANT CHANGE UP (ref 0–0.5)
EOSINOPHIL NFR BLD AUTO: 18.7 % — HIGH (ref 0–6)
FERRITIN SERPL-MCNC: 147 NG/ML — SIGNIFICANT CHANGE UP (ref 30–400)
FIBRINOGEN PPP-MCNC: 244 MG/DL — LOW (ref 330–520)
GLUCOSE SERPL-MCNC: 109 MG/DL — HIGH (ref 70–99)
HCT VFR BLD CALC: 34.7 % — LOW (ref 39–50)
HGB BLD-MCNC: 11.3 G/DL — LOW (ref 13–17)
INR BLD: 1.22 RATIO — HIGH (ref 0.88–1.16)
LDH SERPL L TO P-CCNC: 131 U/L — SIGNIFICANT CHANGE UP (ref 50–242)
LYMPHOCYTES # BLD AUTO: 0.33 K/UL — LOW (ref 1–3.3)
LYMPHOCYTES # BLD AUTO: 13.1 % — SIGNIFICANT CHANGE UP (ref 13–44)
MAGNESIUM SERPL-MCNC: 1.8 MG/DL — SIGNIFICANT CHANGE UP (ref 1.6–2.6)
MANUAL SMEAR VERIFICATION: SIGNIFICANT CHANGE UP
MCHC RBC-ENTMCNC: 31.5 PG — SIGNIFICANT CHANGE UP (ref 27–34)
MCHC RBC-ENTMCNC: 32.6 GM/DL — SIGNIFICANT CHANGE UP (ref 32–36)
MCV RBC AUTO: 96.7 FL — SIGNIFICANT CHANGE UP (ref 80–100)
MONOCYTES # BLD AUTO: 0.33 K/UL — SIGNIFICANT CHANGE UP (ref 0–0.9)
MONOCYTES NFR BLD AUTO: 13.1 % — SIGNIFICANT CHANGE UP (ref 2–14)
NEUTROPHILS # BLD AUTO: 1.36 K/UL — LOW (ref 1.8–7.4)
NEUTROPHILS NFR BLD AUTO: 54.2 % — SIGNIFICANT CHANGE UP (ref 43–77)
NT-PROBNP SERPL-SCNC: 64 PG/ML — SIGNIFICANT CHANGE UP (ref 0–300)
PHOSPHATE SERPL-MCNC: 3.4 MG/DL — SIGNIFICANT CHANGE UP (ref 2.5–4.5)
PLAT MORPH BLD: NORMAL — SIGNIFICANT CHANGE UP
PLATELET # BLD AUTO: 184 K/UL — SIGNIFICANT CHANGE UP (ref 150–400)
POTASSIUM SERPL-MCNC: 4.2 MMOL/L — SIGNIFICANT CHANGE UP (ref 3.5–5.3)
POTASSIUM SERPL-SCNC: 4.2 MMOL/L — SIGNIFICANT CHANGE UP (ref 3.5–5.3)
PROT SERPL-MCNC: 5.5 G/DL — LOW (ref 6–8.3)
PROTHROM AB SERPL-ACNC: 14.2 SEC — HIGH (ref 10.5–13.4)
RBC # BLD: 3.59 M/UL — LOW (ref 4.2–5.8)
RBC # FLD: 15.3 % — HIGH (ref 10.3–14.5)
RBC BLD AUTO: SIGNIFICANT CHANGE UP
RH IG SCN BLD-IMP: POSITIVE — SIGNIFICANT CHANGE UP
SODIUM SERPL-SCNC: 139 MMOL/L — SIGNIFICANT CHANGE UP (ref 135–145)
URATE SERPL-MCNC: 3.5 MG/DL — SIGNIFICANT CHANGE UP (ref 3.4–8.8)
WBC # BLD: 2.5 K/UL — LOW (ref 3.8–10.5)
WBC # FLD AUTO: 2.5 K/UL — LOW (ref 3.8–10.5)

## 2022-05-06 PROCEDURE — 86665 EPSTEIN-BARR CAPSID VCA: CPT

## 2022-05-06 PROCEDURE — 83735 ASSAY OF MAGNESIUM: CPT

## 2022-05-06 PROCEDURE — 82728 ASSAY OF FERRITIN: CPT

## 2022-05-06 PROCEDURE — 84484 ASSAY OF TROPONIN QUANT: CPT

## 2022-05-06 PROCEDURE — 86140 C-REACTIVE PROTEIN: CPT

## 2022-05-06 PROCEDURE — 38227 CAR-T RECEIPT&PREPJ ADMN: CPT

## 2022-05-06 PROCEDURE — 87324 CLOSTRIDIUM AG IA: CPT

## 2022-05-06 PROCEDURE — 83615 LACTATE (LD) (LDH) ENZYME: CPT

## 2022-05-06 PROCEDURE — 87040 BLOOD CULTURE FOR BACTERIA: CPT

## 2022-05-06 PROCEDURE — 86901 BLOOD TYPING SEROLOGIC RH(D): CPT

## 2022-05-06 PROCEDURE — 70450 CT HEAD/BRAIN W/O DYE: CPT

## 2022-05-06 PROCEDURE — 84550 ASSAY OF BLOOD/URIC ACID: CPT

## 2022-05-06 PROCEDURE — 82248 BILIRUBIN DIRECT: CPT

## 2022-05-06 PROCEDURE — 82550 ASSAY OF CK (CPK): CPT

## 2022-05-06 PROCEDURE — 76937 US GUIDE VASCULAR ACCESS: CPT

## 2022-05-06 PROCEDURE — 36415 COLL VENOUS BLD VENIPUNCTURE: CPT

## 2022-05-06 PROCEDURE — 87799 DETECT AGENT NOS DNA QUANT: CPT

## 2022-05-06 PROCEDURE — C1751: CPT

## 2022-05-06 PROCEDURE — 85025 COMPLETE CBC W/AUTO DIFF WBC: CPT

## 2022-05-06 PROCEDURE — 36556 INSERT NON-TUNNEL CV CATH: CPT

## 2022-05-06 PROCEDURE — 87086 URINE CULTURE/COLONY COUNT: CPT

## 2022-05-06 PROCEDURE — 93308 TTE F-UP OR LMTD: CPT

## 2022-05-06 PROCEDURE — 86663 EPSTEIN-BARR ANTIBODY: CPT

## 2022-05-06 PROCEDURE — 71045 X-RAY EXAM CHEST 1 VIEW: CPT

## 2022-05-06 PROCEDURE — 70553 MRI BRAIN STEM W/O & W/DYE: CPT

## 2022-05-06 PROCEDURE — U0003: CPT

## 2022-05-06 PROCEDURE — 38226 CAR-T PREP T LYMPHCYT F/TRNS: CPT

## 2022-05-06 PROCEDURE — 93005 ELECTROCARDIOGRAM TRACING: CPT

## 2022-05-06 PROCEDURE — 86664 EPSTEIN-BARR NUCLEAR ANTIGEN: CPT

## 2022-05-06 PROCEDURE — A9585: CPT

## 2022-05-06 PROCEDURE — C1894: CPT

## 2022-05-06 PROCEDURE — 85610 PROTHROMBIN TIME: CPT

## 2022-05-06 PROCEDURE — 83880 ASSAY OF NATRIURETIC PEPTIDE: CPT

## 2022-05-06 PROCEDURE — U0005: CPT

## 2022-05-06 PROCEDURE — 86900 BLOOD TYPING SEROLOGIC ABO: CPT

## 2022-05-06 PROCEDURE — 93321 DOPPLER ECHO F-UP/LMTD STD: CPT

## 2022-05-06 PROCEDURE — 87449 NOS EACH ORGANISM AG IA: CPT

## 2022-05-06 PROCEDURE — 77001 FLUOROGUIDE FOR VEIN DEVICE: CPT

## 2022-05-06 PROCEDURE — 85384 FIBRINOGEN ACTIVITY: CPT

## 2022-05-06 PROCEDURE — 85730 THROMBOPLASTIN TIME PARTIAL: CPT

## 2022-05-06 PROCEDURE — 82247 BILIRUBIN TOTAL: CPT

## 2022-05-06 PROCEDURE — 84100 ASSAY OF PHOSPHORUS: CPT

## 2022-05-06 PROCEDURE — 86850 RBC ANTIBODY SCREEN: CPT

## 2022-05-06 PROCEDURE — 99291 CRITICAL CARE FIRST HOUR: CPT

## 2022-05-06 PROCEDURE — 38225 CAR-T HRV BLD-DRV T LYMPHCYT: CPT

## 2022-05-06 PROCEDURE — G2066: CPT

## 2022-05-06 PROCEDURE — 85379 FIBRIN DEGRADATION QUANT: CPT

## 2022-05-06 PROCEDURE — 87186 SC STD MICRODIL/AGAR DIL: CPT

## 2022-05-06 PROCEDURE — 93356 MYOCRD STRAIN IMG SPCKL TRCK: CPT

## 2022-05-06 PROCEDURE — 86803 HEPATITIS C AB TEST: CPT

## 2022-05-06 PROCEDURE — 93298 REM INTERROG DEV EVAL SCRMS: CPT

## 2022-05-06 PROCEDURE — 0225U NFCT DS DNA&RNA 21 SARSCOV2: CPT

## 2022-05-06 PROCEDURE — 81003 URINALYSIS AUTO W/O SCOPE: CPT

## 2022-05-06 PROCEDURE — 83529 ASAY OF INTERLEUKIN-6 (IL-6): CPT

## 2022-05-06 PROCEDURE — C1769: CPT

## 2022-05-06 PROCEDURE — 80053 COMPREHEN METABOLIC PANEL: CPT

## 2022-05-06 RX ORDER — GABAPENTIN 400 MG/1
1 CAPSULE ORAL
Qty: 30 | Refills: 3
Start: 2022-05-06 | End: 2022-09-02

## 2022-05-06 RX ORDER — OXYCODONE HYDROCHLORIDE 5 MG/1
1 TABLET ORAL
Qty: 20 | Refills: 0
Start: 2022-05-06 | End: 2022-05-10

## 2022-05-06 RX ADMIN — Medication 650 MILLIGRAM(S): at 07:00

## 2022-05-06 RX ADMIN — LEVETIRACETAM 500 MILLIGRAM(S): 250 TABLET, FILM COATED ORAL at 05:21

## 2022-05-06 RX ADMIN — Medication 5 MILLILITER(S): at 12:00

## 2022-05-06 RX ADMIN — Medication 10 MILLILITER(S): at 12:01

## 2022-05-06 RX ADMIN — Medication 1 LOZENGE: at 11:21

## 2022-05-06 RX ADMIN — Medication 10 MILLILITER(S): at 11:22

## 2022-05-06 RX ADMIN — Medication 300 MILLIGRAM(S): at 13:16

## 2022-05-06 RX ADMIN — Medication 650 MILLIGRAM(S): at 12:30

## 2022-05-06 RX ADMIN — Medication 1 TABLET(S): at 13:15

## 2022-05-06 RX ADMIN — PANTOPRAZOLE SODIUM 40 MILLIGRAM(S): 20 TABLET, DELAYED RELEASE ORAL at 05:21

## 2022-05-06 RX ADMIN — Medication 1 MILLIGRAM(S): at 13:16

## 2022-05-06 RX ADMIN — CHLORHEXIDINE GLUCONATE 1 APPLICATION(S): 213 SOLUTION TOPICAL at 12:00

## 2022-05-06 RX ADMIN — GABAPENTIN 100 MILLIGRAM(S): 400 CAPSULE ORAL at 13:15

## 2022-05-06 RX ADMIN — Medication 400 MILLIGRAM(S): at 05:21

## 2022-05-06 RX ADMIN — GABAPENTIN 100 MILLIGRAM(S): 400 CAPSULE ORAL at 05:21

## 2022-05-06 RX ADMIN — Medication 5 MILLILITER(S): at 11:22

## 2022-05-06 RX ADMIN — Medication 650 MILLIGRAM(S): at 05:38

## 2022-05-06 RX ADMIN — CARVEDILOL PHOSPHATE 3.12 MILLIGRAM(S): 80 CAPSULE, EXTENDED RELEASE ORAL at 05:21

## 2022-05-06 RX ADMIN — Medication 650 MILLIGRAM(S): at 11:59

## 2022-05-06 RX ADMIN — Medication 1 LOZENGE: at 12:00

## 2022-05-06 RX ADMIN — FLUCONAZOLE 400 MILLIGRAM(S): 150 TABLET ORAL at 13:16

## 2022-05-06 RX ADMIN — Medication 400 MILLIGRAM(S): at 13:16

## 2022-05-06 NOTE — PROGRESS NOTE ADULT - CRITICAL CARE SERVICES PROVIDED
Patient is critically ill, requiring critical care services.

## 2022-05-06 NOTE — PROGRESS NOTE ADULT - SUBJECTIVE AND OBJECTIVE BOX
HPC Transplant Team                                                      Critical / Counseling Time Provided: 30 minutes                                                                                                                                                        Chief Complaint: CAR T therapy with [employer] product for treatment of relapsed, refractory MCL    S: Patient seen and examined with HPC Transplant Team:   + fatigue   All other ROS negative       O: Vitals:   Vital Signs Last 24 Hrs  T(C): 37.6 (06 May 2022 05:30), Max: 37.7 (05 May 2022 21:33)  T(F): 99.7 (06 May 2022 05:30), Max: 99.9 (05 May 2022 21:33)  HR: 75 (06 May 2022 05:30) (75 - 92)  BP: 120/79 (06 May 2022 05:30) (102/64 - 120/79)  BP(mean): --  RR: 18 (06 May 2022 05:30) (18 - 18)  SpO2: 96% (06 May 2022 05:30) (96% - 98%)    Admit weight:  100.4kg   Daily     Daily Weight in k.8 (05 May 2022 09:45)    Intake / Output:   - @ 07:01  -  -06 @ 07:00  --------------------------------------------------------  IN: 2998 mL / OUT: 4660 mL / NET: -1662 mL      PE:   Oropharynx: no erythema or ulcerations ,   Oral Mucositis:  NA                                                   Grade: n/a  CVS: S1, S2 RRR   Lungs: CTA throughout bilaterally   Abdomen: + BS x 4, soft, NT, ND   Extremities: no edema BLE's  Gastric Mucositis:       -                                           Grade: n/a  Intestinal Mucositis:     -                                         Grade: n/a   Skin: no rashes  TLC: CDI   Neuro: A&O x 3   Pain: 0          Labs:   CBC Full  -  ( 06 May 2022 06:11 )  WBC Count : 2.50 K/uL  Hemoglobin : 11.3 g/dL  Hematocrit : 34.7 %  Platelet Count - Automated : 184 K/uL  Mean Cell Volume : 96.7 fl  Mean Cell Hemoglobin : 31.5 pg  Mean Cell Hemoglobin Concentration : 32.6 gm/dL  Auto Neutrophil # : 1.36 K/uL  Auto Lymphocyte # : 0.33 K/uL  Auto Monocyte # : 0.33 K/uL  Auto Eosinophil # : 0.47 K/uL  Auto Basophil # : 0.02 K/uL  Auto Neutrophil % : 54.2 %  Auto Lymphocyte % : 13.1 %  Auto Monocyte % : 13.1 %  Auto Eosinophil % : 18.7 %  Auto Basophil % : 0.9 %                          11.3   2.50  )-----------( 184      ( 06 May 2022 06:11 )             34.7     -    139  |  104  |  16  ----------------------------<  109<H>  4.2   |  29  |  1.05    Ca    8.6      06 May 2022 06:11  Phos  3.4     -  Mg     1.8     -    TPro  5.5<L>  /  Alb  3.7  /  TBili  0.3  /  DBili  x   /  AST  15  /  ALT  33  /  AlkPhos  52  -06    PT/INR - ( 05 May 2022 07:02 )   PT: 13.9 sec;   INR: 1.20 ratio         PTT - ( 05 May 2022 07:02 )  PTT:24.8 sec  LIVER FUNCTIONS - ( 06 May 2022 06:11 )  Alb: 3.7 g/dL / Pro: 5.5 g/dL / ALK PHOS: 52 U/L / ALT: 33 U/L / AST: 15 U/L / GGT: x           Lactate Dehydrogenase, Serum: 131 U/L ( @ 06:11)  Lactate Dehydrogenase, Serum: 146 U/L ( @ 16:02)    Cultures:   Culture Results:   No growth (22 @ 22:16)    Culture Results:   No growth to date. (22 @ 17:06)    Culture Results:   No growth to date. (22 @ 17:06)    Culture Results:   No Growth Final (22 @ 19:54)    Culture Results:   No Growth Final (22 @ 19:54)    Culture Results:   No growth (22 @ 13:44)    Culture Results:   No Growth Final (22 @ 16:40)    Culture Results:   No Growth Final (22 @ 16:40)    Culture Results:   No Growth Final (22 @ 01:04)    Culture Results:   No Growth Final (22 @ 01:04)    Culture Results:   10,000 - 49,000 CFU/mL Escherichia coli (22 @ 00:54)    Radiology:   ACC: [id number] EXAM:  MR BRAIN WAW IC                        PROCEDURE DATE:  2022    IMPRESSION:  No hydrocephalus, mass effect, acute intracranial hemorrhage, vasogenic   edema, or acute territorial infarct.  No abnormal parenchymal or leptomeningeal enhancement    ACC: [id number] EXAM:  CT BRAIN                        PROCEDURE DATE:  2022    IMPRESSION:  No acute hemorrhage, mass effect or extra-axial collections.    ACC: [id number] EXAM:  XR CHEST PORTABLE URGENT 1V                        PROCEDURE DATE:  2022    IMPRESSION:  Clear lungs        Meds:   Antimicrobials:   acyclovir   Oral Tab/Cap 400 milliGRAM(s) Oral every 8 hours  clotrimazole Lozenge 1 Lozenge Oral five times a day  fluconAZOLE   Tablet 400 milliGRAM(s) Oral daily      Heme / Onc:       GI:  loperamide 2 milliGRAM(s) Oral every 4 hours PRN  pantoprazole    Tablet 40 milliGRAM(s) Oral two times a day  polyethylene glycol 3350 17 Gram(s) Oral daily PRN  senna 1 Tablet(s) Oral at bedtime PRN  sodium bicarbonate Mouth Rinse 10 milliLiter(s) Swish and Spit five times a day      Cardiovascular:   carvedilol 3.125 milliGRAM(s) Oral every 12 hours      Immunologic:       Other medications:   acetaminophen     Tablet .. 650 milliGRAM(s) Oral every 6 hours  allopurinol 300 milliGRAM(s) Oral daily  Biotene Dry Mouth Oral Rinse 5 milliLiter(s) Swish and Spit five times a day  chlorhexidine 4% Liquid 1 Application(s) Topical <User Schedule>  folic acid 1 milliGRAM(s) Oral daily  gabapentin 100 milliGRAM(s) Oral three times a day  levETIRAcetam 500 milliGRAM(s) Oral two times a day  multivitamin 1 Tablet(s) Oral daily  sodium chloride 0.9%. 1000 milliLiter(s) IV Continuous <Continuous>      PRN:   acetaminophen     Tablet .. 650 milliGRAM(s) Oral every 6 hours PRN  loperamide 2 milliGRAM(s) Oral every 4 hours PRN  metoclopramide Injectable 10 milliGRAM(s) IV Push every 6 hours PRN  ondansetron Injectable 8 milliGRAM(s) IV Push every 8 hours PRN  oxyCODONE    IR 5 milliGRAM(s) Oral every 6 hours PRN  polyethylene glycol 3350 17 Gram(s) Oral daily PRN  senna 1 Tablet(s) Oral at bedtime PRN  sodium chloride 0.9% lock flush 10 milliLiter(s) IV Push every 1 hour PRN    A/P: 56 year old male with relapsed, refractory MCL s/p R-CHOP x 4, RICE x 2, maintenance RTX, salvage Calquence, admitted for CAR T cell therapy with [employer] product. Disease status at time of admission is partial response.   Day +  Monitor for CRS and neurotox...follow CRS/neuro tox protocol - GIVEN TOCILIZUMAB  pm and   AM for CRS - 1    1. Relapsed, refractory MCL   4/15- day - 4- flu / ctx 2/3 - continue CTX hydration until 24 hours post infusion of last dose   Strict I&O, daily weights, prn diuresis   CRS / ICANS score daily until infusion of CAR T cells (22) and twice daily thereafter   BID labs 0500 & 1500 - monitor for TLS   -CRS grade 1 s/p Tocilizumab on tele started on Keppra as per protocol, pancx, started on cefepime  s/p dexamethasone 4 mg IV x1   F/U repeat blood cx's for fevers ( and ) s/p Toci x2 s/p dexamethasone 4 mg IV x2 also with CP: EKG WNL trops negative s/p morphine with good effect   - CRS grade 2( persistent fevers) given Toci x1 and dexamethasone 4 mg IV overnight, continues to be febrile Tmax 104 F tremors given additional Dexamethasone 10 mg IV  - fever curve improved: CRS grade 1  - Grade 2 neurotox with 10/10 Headaches- taken for CT head (with no abnormalities no cerebral edema), s.p Dexamethasone 10 mg IV x 1, MRI head no significant findings    Grade 1 transaminitis -Monitor closely   Discharge planning next week     Decadron 4mg IV given for Grade 1 CRS  -[place] [facility] [place] lori butler ( in [place] and english) provider upon discharge       2. Prophylactic measures   acyclovir   Oral Tab/Cap 400 milliGRAM(s) Oral every 8 hours  clotrimazole Lozenge 1 Lozenge Oral five times a day  fluconAZOLE   Tablet 400 milliGRAM(s) Oral daily  Keppra 500 q12h  GI Prophylaxis:  Protonix     3. Cardiac / Hx Afib   -F/U repeat troponin,  now CP free. ECG non ischemic  Continue Coreg bid    4. Mouth care - NS / NaHCO3 rinses, Mycelex, Biotene; Skin care     5. Transfuse & replete electrolytes prn     6. IV hydration, daily weights, strict I&O, prn diuresis     7. Antiemetics, anti-diarrhea medications:   metoclopramide Injectable 10 milliGRAM(s) IV Push every 6 hours PRN  ondansetron 8mg IV q 8 hours prn nausea   loperamide 2 milliGRAM(s) Oral every 4 hours PRN.    8. CRS / ICANS scoring    CRS grade 0, ICANS score 10   - CRS grade 0, ICANS score 10    AM - CRS grade 0, ICANS score 10   -pm-CRS grade 0, ICANS score 10   AM - CRS grade 0, ICANS score 10    PM CRS grade 0, ICANS score 10   AM CRS grade 0, ICANS score 10   PM CRS grade 0, ICANS score 10 --> 9PM CRS score - 1, ICANS - 10   AM CRS grade 0, ICANS score 10   PM CRS grade 0, ICANS score 10   AM CRS grade 1 10am (grade 0 @ 8:30am), ICANS score 10  M CRS grade 2( persistently febrile) ICANS score 10    AM CRS GRADE 1, ICANS score 10  - AM CRS GRADE 1, ICANS score 10   AM CRS GRADE 1, ICANS score 10   noon: with persistent headache: Grade 2 neurotox-   taken for CT head( ( with no abnormalities no cerebral edema) s.p Dexamethasone 10 mg IV pending  MR head   PM CRS GRADE 1, ICANS score 10( HA resolved)   - AM CRS GRADE 1, ICANS score 10  - PM CRS GRADE 1, ICANS score 10  - PM CRS GRADE 1, ICANS score 10   AM  GRADE 0, ICANS score - 10   PM score GRADE 1, ICANS score - 10   AM score GRADE 0, ICANS score - 10   PM score GRADE 0, ICANS score - 10  /2- AM CRS grade 0, ICANS score 10  5/2- PM CRS grade 1 (fever), ICANS score 10   5/3- AM - CRS grade 0, ICANS score 10  5/3- PM - CRS grade 0, ICANS score 10   5/4- AM - CRS grade 0, ICANS score 10   5/4-PM - CRS grade 0, ICANS score 10   5/5-CRS grade 0, ICANS score 10   5/6-CRS grade 0, ICANS score 10     I have written the above note for Dr. Banks who performed service with me in the room.   Nevin Gonzales PA-C([phone])    I have seen and examined patient with PA, I agree with above note as scribed.                    HPC Transplant Team                                                      Critical / Counseling Time Provided: 30 minutes                                                                                                                                                        Chief Complaint: CAR T therapy with [employer] product for treatment of relapsed, refractory MCL    S: Patient seen and examined with HPC Transplant Team:   + fatigue   All other ROS negative       O: Vitals:   Vital Signs Last 24 Hrs  T(C): 37.6 (06 May 2022 05:30), Max: 37.7 (05 May 2022 21:33)  T(F): 99.7 (06 May 2022 05:30), Max: 99.9 (05 May 2022 21:33)  HR: 75 (06 May 2022 05:30) (75 - 92)  BP: 120/79 (06 May 2022 05:30) (102/64 - 120/79)  BP(mean): --  RR: 18 (06 May 2022 05:30) (18 - 18)  SpO2: 96% (06 May 2022 05:30) (96% - 98%)    Admit weight:  100.4kg   Daily: 94 kg      Daily Weight in k.8 (05 May 2022 09:45)    Intake / Output:   - @ 07:01  -  - @ 07:00  --------------------------------------------------------  IN: 2998 mL / OUT: 4660 mL / NET: -1662 mL      PE:   Oropharynx: no erythema or ulcerations ,   Oral Mucositis:  NA                                                   Grade: n/a  CVS: S1, S2 RRR   Lungs: CTA throughout bilaterally   Abdomen: + BS x 4, soft, NT, ND   Extremities: no edema BLE's  Gastric Mucositis:       -                                           Grade: n/a  Intestinal Mucositis:     -                                         Grade: n/a   Skin: no rashes  TLC: CDI   Neuro: A&O x 3   Pain: 0          Labs:   CBC Full  -  ( 06 May 2022 06:11 )  WBC Count : 2.50 K/uL  Hemoglobin : 11.3 g/dL  Hematocrit : 34.7 %  Platelet Count - Automated : 184 K/uL  Mean Cell Volume : 96.7 fl  Mean Cell Hemoglobin : 31.5 pg  Mean Cell Hemoglobin Concentration : 32.6 gm/dL  Auto Neutrophil # : 1.36 K/uL  Auto Lymphocyte # : 0.33 K/uL  Auto Monocyte # : 0.33 K/uL  Auto Eosinophil # : 0.47 K/uL  Auto Basophil # : 0.02 K/uL  Auto Neutrophil % : 54.2 %  Auto Lymphocyte % : 13.1 %  Auto Monocyte % : 13.1 %  Auto Eosinophil % : 18.7 %  Auto Basophil % : 0.9 %                          11.3   2.50  )-----------( 184      ( 06 May 2022 06:11 )             34.7     -    139  |  104  |  16  ----------------------------<  109<H>  4.2   |  29  |  1.05    Ca    8.6      06 May 2022 06:11  Phos  3.4     -  Mg     1.8         TPro  5.5<L>  /  Alb  3.7  /  TBili  0.3  /  DBili  x   /  AST  15  /  ALT  33  /  AlkPhos  52  -06    PT/INR - ( 05 May 2022 07:02 )   PT: 13.9 sec;   INR: 1.20 ratio         PTT - ( 05 May 2022 07:02 )  PTT:24.8 sec  LIVER FUNCTIONS - ( 06 May 2022 06:11 )  Alb: 3.7 g/dL / Pro: 5.5 g/dL / ALK PHOS: 52 U/L / ALT: 33 U/L / AST: 15 U/L / GGT: x           Lactate Dehydrogenase, Serum: 131 U/L ( @ 06:11)  Lactate Dehydrogenase, Serum: 146 U/L ( @ 16:02)    Cultures:   Culture Results:   No growth (22 @ 22:16)    Culture Results:   No growth to date. (22 @ 17:06)    Culture Results:   No growth to date. (22 @ 17:06)    Culture Results:   No Growth Final (22 @ 19:54)    Culture Results:   No Growth Final (22 @ 19:54)    Culture Results:   No growth (22 @ 13:44)    Culture Results:   No Growth Final (22 @ 16:40)    Culture Results:   No Growth Final (22 @ 16:40)    Culture Results:   No Growth Final (22 @ 01:04)    Culture Results:   No Growth Final (22 @ 01:04)    Culture Results:   10,000 - 49,000 CFU/mL Escherichia coli (22 @ 00:54)    Radiology:   ACC: [id number] EXAM:  MR BRAIN WAW IC                        PROCEDURE DATE:  2022    IMPRESSION:  No hydrocephalus, mass effect, acute intracranial hemorrhage, vasogenic   edema, or acute territorial infarct.  No abnormal parenchymal or leptomeningeal enhancement    ACC: [id number] EXAM:  CT BRAIN                        PROCEDURE DATE:  2022    IMPRESSION:  No acute hemorrhage, mass effect or extra-axial collections.    ACC: [id number] EXAM:  XR CHEST PORTABLE URGENT 1V                        PROCEDURE DATE:  2022    IMPRESSION:  Clear lungs        Meds:   Antimicrobials:   acyclovir   Oral Tab/Cap 400 milliGRAM(s) Oral every 8 hours  clotrimazole Lozenge 1 Lozenge Oral five times a day  fluconAZOLE   Tablet 400 milliGRAM(s) Oral daily      Heme / Onc:       GI:  loperamide 2 milliGRAM(s) Oral every 4 hours PRN  pantoprazole    Tablet 40 milliGRAM(s) Oral two times a day  polyethylene glycol 3350 17 Gram(s) Oral daily PRN  senna 1 Tablet(s) Oral at bedtime PRN  sodium bicarbonate Mouth Rinse 10 milliLiter(s) Swish and Spit five times a day      Cardiovascular:   carvedilol 3.125 milliGRAM(s) Oral every 12 hours      Immunologic:       Other medications:   acetaminophen     Tablet .. 650 milliGRAM(s) Oral every 6 hours  allopurinol 300 milliGRAM(s) Oral daily  Biotene Dry Mouth Oral Rinse 5 milliLiter(s) Swish and Spit five times a day  chlorhexidine 4% Liquid 1 Application(s) Topical <User Schedule>  folic acid 1 milliGRAM(s) Oral daily  gabapentin 100 milliGRAM(s) Oral three times a day  levETIRAcetam 500 milliGRAM(s) Oral two times a day  multivitamin 1 Tablet(s) Oral daily  sodium chloride 0.9%. 1000 milliLiter(s) IV Continuous <Continuous>      PRN:   acetaminophen     Tablet .. 650 milliGRAM(s) Oral every 6 hours PRN  loperamide 2 milliGRAM(s) Oral every 4 hours PRN  metoclopramide Injectable 10 milliGRAM(s) IV Push every 6 hours PRN  ondansetron Injectable 8 milliGRAM(s) IV Push every 8 hours PRN  oxyCODONE    IR 5 milliGRAM(s) Oral every 6 hours PRN  polyethylene glycol 3350 17 Gram(s) Oral daily PRN  senna 1 Tablet(s) Oral at bedtime PRN  sodium chloride 0.9% lock flush 10 milliLiter(s) IV Push every 1 hour PRN    A/P: 56 year old male with relapsed, refractory MCL s/p R-CHOP x 4, RICE x 2, maintenance RTX, salvage [place], admitted for CAR T cell therapy with [employer] product. Disease status at time of admission is partial response.   Day +  Monitor for CRS and neurotox...follow CRS/neuro tox protocol - GIVEN TOCILIZUMAB  pm and   AM for CRS - 1    1. Relapsed, refractory MCL   4/15- day - 4- flu / ctx 2/3 - continue CTX hydration until 24 hours post infusion of last dose   Strict I&O, daily weights, prn diuresis   CRS / ICANS score daily until infusion of CAR T cells (22) and twice daily thereafter   BID labs 0500 & 1500 - monitor for TLS   -CRS grade 1 s/p Tocilizumab on tele started on Keppra as per protocol, pancx, started on cefepime  s/p dexamethasone 4 mg IV x1   F/U repeat blood cx's for fevers ( and ) s/p Toci x2 s/p dexamethasone 4 mg IV x2 also with CP: EKG WNL trops negative s/p morphine with good effect   - CRS grade 2( persistent fevers) given Toci x1 and dexamethasone 4 mg IV overnight, continues to be febrile Tmax 104 F tremors given additional Dexamethasone 10 mg IV  - fever curve improved: CRS grade 1  - Grade 2 neurotox with 10/10 Headaches- taken for CT head (with no abnormalities no cerebral edema), s.p Dexamethasone 10 mg IV x 1, MRI head no significant findings    Grade 1 transaminitis -Monitor closely   Discharge planning next week     Decadron 4mg IV given for Grade 1 CRS  -Discharge home with a follow up at [facility]: lori butler ( in Swedish and english) provider upon discharge       2. Prophylactic measures   acyclovir   Oral Tab/Cap 400 milliGRAM(s) Oral every 8 hours  clotrimazole Lozenge 1 Lozenge Oral five times a day  fluconAZOLE   Tablet 400 milliGRAM(s) Oral daily  Keppra 500 q12h  GI Prophylaxis:  Protonix     3. Cardiac / Hx Afib   -F/U repeat troponin,  now CP free. ECG non ischemic  Continue Coreg bid    4. Mouth care - NS / NaHCO3 rinses, Mycelex, Biotene; Skin care     5. Transfuse & replete electrolytes prn     6. IV hydration, daily weights, strict I&O, prn diuresis     7. Antiemetics, anti-diarrhea medications:   metoclopramide Injectable 10 milliGRAM(s) IV Push every 6 hours PRN  ondansetron 8mg IV q 8 hours prn nausea   loperamide 2 milliGRAM(s) Oral every 4 hours PRN.    8. CRS / ICANS scoring    CRS grade 0, ICANS score 10   - CRS grade 0, ICANS score 10    AM - CRS grade 0, ICANS score 10   -pm-CRS grade 0, ICANS score 10   AM - CRS grade 0, ICANS score 10    PM CRS grade 0, ICANS score 10   AM CRS grade 0, ICANS score 10   PM CRS grade 0, ICANS score 10 --> 9PM CRS score - 1, ICANS - 10   AM CRS grade 0, ICANS score 10   PM CRS grade 0, ICANS score 10   AM CRS grade 1 10am (grade 0 @ 8:30am), ICANS score 10  M CRS grade 2( persistently febrile) ICANS score 10    AM CRS GRADE 1, ICANS score 10  - AM CRS GRADE 1, ICANS score 10   AM CRS GRADE 1, ICANS score 10   noon: with persistent headache: Grade 2 neurotox-   taken for CT head( ( with no abnormalities no cerebral edema) s.p Dexamethasone 10 mg IV pending  MR head   PM CRS GRADE 1, ICANS score 10( HA resolved)   - AM CRS GRADE 1, ICANS score 10  - PM CRS GRADE 1, ICANS score 10  - PM CRS GRADE 1, ICANS score 10   AM  GRADE 0, ICANS score - 10   PM score GRADE 1, ICANS score - 10   AM score GRADE 0, ICANS score - 10   PM score GRADE 0, ICANS score - 10  5/2- AM CRS grade 0, ICANS score 10  5/2- PM CRS grade 1 (fever), ICANS score 10   5/3- AM - CRS grade 0, ICANS score 10  5/3- PM - CRS grade 0, ICANS score 10   5/4- AM - CRS grade 0, ICANS score 10   5/4-PM - CRS grade 0, ICANS score 10   5/5-CRS grade 0, ICANS score 10   5/6-CRS grade 0, ICANS score 10     I have written the above note for Dr. Banks who performed service with me in the room.   Nevin Gonzales PA-C([phone])    I have seen and examined patient with PA, I agree with above note as scribed.

## 2022-05-06 NOTE — DISCHARGE NOTE NURSING/CASE MANAGEMENT/SOCIAL WORK - NSDCFUADDAPPT_GEN_ALL_CORE_FT
You have a follow up at the [facility] on:  -Tuesday 5/10 at 10:00 with Dr. Banks  -Tuesday 5/17 at2:30 with Alex Martinez NP  -Tuesday 5/14 at 2:30 with Alex Martinez NP  Please arrive 15 min prior to your scheduled appointment for blood draw

## 2022-05-06 NOTE — PROGRESS NOTE ADULT - NUTRITIONAL ASSESSMENT
This patient has been assessed with a concern for Malnutrition and has been determined to have a diagnosis/diagnoses of Moderate protein-calorie malnutrition.    This patient is being managed with:   Diet Regular-  GlutaSolve(Glutamine) 15gm pkg     Qty per Day:  1  Supplement Feeding Modality:  Oral  Ensure Clear Cans or Servings Per Day:  1       Frequency:  Daily  Entered: Apr 26 2022  4:27PM    

## 2022-05-06 NOTE — PROGRESS NOTE ADULT - NS ATTEND BILL GEN_ALL_CORE
PA/NP to bill
Attending to bill
PA/NP to bill

## 2022-05-06 NOTE — DISCHARGE NOTE NURSING/CASE MANAGEMENT/SOCIAL WORK - NSDCPEFALRISK_GEN_ALL_CORE
For information on Fall & Injury Prevention, visit: [email or web address] OR  [email or web address] OR  https://www.cdc.gov/steadi/patient.html

## 2022-05-06 NOTE — PROGRESS NOTE ADULT - NS ATTEND AMEND GEN_ALL_CORE FT
Admitted for CAR T cell therapy with Tecartus (bresucabtagene autoleucel)  today is + 17..some nausea..now improved...monitor closely for CRS and neurotox...pt is at lower risk for these complications given minimal disease burden..hoever he has been on and off with high temps since last  fri evening..first dose of toci given for grade 1 crs......s/p 4 total  doses of  toci and dex 4 mg X 4 and 10 mg X 1... 8/10 HA with somulence...c/w grade 2 neurotox...Stat ct head, MRI..dose dex 10 mg...improved 4/28..4/29, 30 body aches..improved with oxycodone..possibly neuropathic...on gabapentin..4/30 given dex 4 mg x1...temp 38.1...5/2 chills today..  4/30 temp noted...may consider additional dex  5/4 Pt ambulated in [place]...sent PT away....depressed mood..consider zoloft..5/5 slowly improving, d/c in am  1. Admit to BMTU   2. Day -5 through day -1 record CARTOX / ICANS score daily   3. Day - 5 through day - 3 Fludarabine 30mg / m2 IV given over 30 minutes daily   4. Day - 5 - start cyclophosphamide hydration - 0.9% NS + 10mEq KCL / L at 75 ml/m2 and continue for 24 hours post infusion of last dose of CTX   5. Day - 5 through day - 3 CTX 500mg / m2 IV QD to be given over 2 hours   6. Starting day - 5 monitor labs BID: CBC with differential, CMP, LDH, uric acid, mag, phos, CPK, BNP, PT / PTT / INR / Fibrinogen, d-dimer, CRP, hepatic profile, ferritin  7. Weekly IL-6 levels on Monday, twice weekly (Monday / Thursday) CMV / EBV PCR, three times a week (Monday / Wednesday / Friday) type and screen ...peak IL 6 over 500  8. On day - 5 start allopurinol 300mg po QD   9. Day -2 and -1 REST DAYS   10. On day -1 begin to record CARTOX / ICANS score q 12 hours   11. Day 0 infuse Brexucabtagene autoleucel  hx of afib...seen by [facility] cardiology  12. F/U cx's..on cefapime,  ...acyclovir and fluconazole..send rvp  13 Cont tele and keppra  4/16-17 Doing well, I/Os are negative, transient lip swelling, now resolved, some fatigue  4/18, 19, 20, 21, 22 CRS 0, ICANS 10  4/22/22-Chase fever overnight, chills, given Toci, was started on Cefipime. Stable. CRS / ICANS scoring this morning 10.  4/23/22, 4/24/22 CRS 1 ICANS 10  4/24/22- Fever 102oF, complaints of bilateral upper and lower extremities cramps, chest discomfort, EKG no changes. Patient was treated with Toci/Decadron as per protocol. Morphine prn for pain. Clinically improved. Pancultures sent.  4/25/22 CRS 2...persistent high temps.....ICANS 10...tremor noted with fever...dex 10 mg dosed today  4/26/22 CRS 1, Icans 10  4/27 crs 1, Icans 10...neurotox 2  4/28, 4/29,  crs 0..icans 10  4/30 CRS 1  ICANS 10  5/1, 5/2, 5/3, 5/4, 5/5 CRS 0, ICANS 10...d/c cefapime...d/c planning  5/2 chills this am...will monitor......consider d/c tlc...f/u cx's  5/3,4, 5 no chills or fever...anxious...d/c planning for thurs or fri...pt f/u...explained to patient that the weakness he is feeling is expected..     Patient is critically ill, requiring critical care services. Admitted for CAR T cell therapy with Tecartus (bresucabtagene autoleucel)  today is + 17..some nausea..now improved...monitor closely for CRS and neurotox...pt is at lower risk for these complications given minimal disease burden..hoever he has been on and off with high temps since last  fri evening..first dose of toci given for grade 1 crs......s/p 4 total  doses of  toci and dex 4 mg X 4 and 10 mg X 1... 8/10 HA with somulence...c/w grade 2 neurotox...Stat ct head, MRI..dose dex 10 mg...improved 4/28..4/29, 30 body aches..improved with oxycodone..possibly neuropathic...on gabapentin..4/30 given dex 4 mg x1...temp 38.1...5/2 chills today..  4/30 temp noted...may consider additional dex  5/4 Pt ambulated in [place]...sent PT away....depressed mood..consider zoloft..5/5 slowly improving, d/c in am  1. Admit to BMTU   2. Day -5 through day -1 record CARTOX / ICANS score daily   3. Day - 5 through day - 3 Fludarabine 30mg / m2 IV given over 30 minutes daily   4. Day - 5 - start cyclophosphamide hydration - 0.9% NS + 10mEq KCL / L at 75 ml/m2 and continue for 24 hours post infusion of last dose of CTX   5. Day - 5 through day - 3 CTX 500mg / m2 IV QD to be given over 2 hours   6. Starting day - 5 monitor labs BID: CBC with differential, CMP, LDH, uric acid, mag, phos, CPK, BNP, PT / PTT / INR / Fibrinogen, d-dimer, CRP, hepatic profile, ferritin  7. Weekly IL-6 levels on Monday, twice weekly (Monday / Thursday) CMV / EBV PCR, three times a week (Monday / Wednesday / Friday) type and screen ...peak IL 6 over 500  8. On day - 5 start allopurinol 300mg po QD   9. Day -2 and -1 REST DAYS   10. On day -1 begin to record CARTOX / ICANS score q 12 hours   11. Day 0 infuse Brexucabtagene autoleucel  hx of afib...seen by [facility] cardiology  12. F/U cx's..on cefapime,  ...acyclovir and fluconazole..send rvp  13 Cont tele and keppra  4/16-17 Doing well, I/Os are negative, transient lip swelling, now resolved, some fatigue  4/18, 19, 20, 21, 22 CRS 0, ICANS 10  4/22/22-Chase fever overnight, chills, given Toci, was started on Cefipime. Stable. CRS / ICANS scoring this morning 10.  4/23/22, 4/24/22 CRS 1 ICANS 10  4/24/22- Fever 102oF, complaints of bilateral upper and lower extremities cramps, chest discomfort, EKG no changes. Patient was treated with Toci/Decadron as per protocol. Morphine prn for pain. Clinically improved. Pancultures sent.  4/25/22 CRS 2...persistent high temps.....ICANS 10...tremor noted with fever...dex 10 mg dosed today  4/26/22 CRS 1, Icans 10  4/27 crs 1, Icans 10...neurotox 2  4/28, 4/29,  crs 0..icans 10  4/30 CRS 1  ICANS 10  5/1, 5/2, 5/3, 5/4, 5/5 CRS 0, ICANS 10...d/c cefapime...d/c planning  5/2 chills this am...will monitor......consider d/c tlc...f/u cx's  5/3,4, 5, 6 no chills or fever...anxious...d/c planning for thurs or fri...pt f/u...explained to patient that the weakness he is feeling is expected..     Patient is critically ill, requiring critical care services.

## 2022-05-06 NOTE — PROGRESS NOTE ADULT - PROVIDER SPECIALTY LIST ADULT
BMT/SCT
BMT/SCT
Palliative Care
BMT/SCT
Intervent Radiology
BMT/SCT
Palliative Care

## 2022-05-06 NOTE — DISCHARGE NOTE NURSING/CASE MANAGEMENT/SOCIAL WORK - PATIENT PORTAL LINK FT
You can access the FollowMyHealth Patient Portal offered by [facility] by registering at the following website: [email or web address]. By joining [facility] FollowMyHealth portal, you will also be able to view your health information using other applications (apps) compatible with our system.

## 2022-05-06 NOTE — PROGRESS NOTE ADULT - REASON FOR ADMISSION
CAR T therapy with Tecartus product for treatment of relapsed, refractory MCL

## 2022-05-07 LAB
CULTURE RESULTS: SIGNIFICANT CHANGE UP
CULTURE RESULTS: SIGNIFICANT CHANGE UP
SPECIMEN SOURCE: SIGNIFICANT CHANGE UP
SPECIMEN SOURCE: SIGNIFICANT CHANGE UP

## 2022-05-08 ENCOUNTER — OUTPATIENT (OUTPATIENT)
Dept: OUTPATIENT SERVICES | Facility: HOSPITAL | Age: 57
LOS: 1 days | Discharge: HOME | End: 2022-05-08

## 2022-05-08 DIAGNOSIS — Z98.890 OTHER SPECIFIED POSTPROCEDURAL STATES: Chronic | ICD-10-CM

## 2022-05-08 DIAGNOSIS — Z95.818 PRESENCE OF OTHER CARDIAC IMPLANTS AND GRAFTS: Chronic | ICD-10-CM

## 2022-05-08 DIAGNOSIS — Z94.84 STEM CELLS TRANSPLANT STATUS: Chronic | ICD-10-CM

## 2022-05-08 DIAGNOSIS — C83.10 MANTLE CELL LYMPHOMA, UNSPECIFIED SITE: ICD-10-CM

## 2022-05-10 ENCOUNTER — APPOINTMENT (OUTPATIENT)
Dept: HEMATOLOGY ONCOLOGY | Facility: CLINIC | Age: 57
End: 2022-05-10
Payer: MEDICARE

## 2022-05-10 ENCOUNTER — LABORATORY RESULT (OUTPATIENT)
Age: 57
End: 2022-05-10

## 2022-05-10 ENCOUNTER — RESULT REVIEW (OUTPATIENT)
Age: 57
End: 2022-05-10

## 2022-05-10 VITALS
HEART RATE: 77 BPM | BODY MASS INDEX: 29.03 KG/M2 | WEIGHT: 208.09 LBS | SYSTOLIC BLOOD PRESSURE: 87 MMHG | DIASTOLIC BLOOD PRESSURE: 60 MMHG | TEMPERATURE: 97.1 F | RESPIRATION RATE: 16 BRPM | OXYGEN SATURATION: 99 %

## 2022-05-10 LAB
ALBUMIN SERPL ELPH-MCNC: 4.4 G/DL
ALP BLD-CCNC: 59 U/L
ALT SERPL-CCNC: 32 U/L
ANION GAP SERPL CALC-SCNC: 10 MMOL/L
APTT BLD: 24.3 SEC
AST SERPL-CCNC: 20 U/L
BASOPHILS # BLD AUTO: 0 K/UL — SIGNIFICANT CHANGE UP (ref 0–0.2)
BASOPHILS NFR BLD AUTO: 0 % — SIGNIFICANT CHANGE UP (ref 0–2)
BILIRUB SERPL-MCNC: 0.6 MG/DL
BUN SERPL-MCNC: 22 MG/DL
CALCIUM SERPL-MCNC: 9.1 MG/DL
CHLORIDE SERPL-SCNC: 103 MMOL/L
CK SERPL-CCNC: 28 U/L
CO2 SERPL-SCNC: 26 MMOL/L
CREAT SERPL-MCNC: 1.14 MG/DL
CRP SERPL-MCNC: <3 MG/L
DEPRECATED D DIMER PPP IA-ACNC: 475 NG/ML DDU
EGFR: 75 ML/MIN/1.73M2
EOSINOPHIL # BLD AUTO: 0.35 K/UL — SIGNIFICANT CHANGE UP (ref 0–0.5)
EOSINOPHIL NFR BLD AUTO: 16 % — HIGH (ref 0–6)
FERRITIN SERPL-MCNC: 188 NG/ML
FIBRINOGEN PPP COAG.DERIVED-MCNC: 234 MG/DL
GLUCOSE SERPL-MCNC: 124 MG/DL
HCT VFR BLD CALC: 38.9 % — LOW (ref 39–50)
HGB BLD-MCNC: 12.5 G/DL — LOW (ref 13–17)
INR PPP: 1.08 RATIO
LDH SERPL-CCNC: 176 U/L
LYMPHOCYTES # BLD AUTO: 0.24 K/UL — LOW (ref 1–3.3)
LYMPHOCYTES # BLD AUTO: 11 % — LOW (ref 13–44)
MAGNESIUM SERPL-MCNC: 2.1 MG/DL
MCHC RBC-ENTMCNC: 31.6 PG — SIGNIFICANT CHANGE UP (ref 27–34)
MCHC RBC-ENTMCNC: 32.1 G/DL — SIGNIFICANT CHANGE UP (ref 32–36)
MCV RBC AUTO: 98.5 FL — SIGNIFICANT CHANGE UP (ref 80–100)
MONOCYTES # BLD AUTO: 0.57 K/UL — SIGNIFICANT CHANGE UP (ref 0–0.9)
MONOCYTES NFR BLD AUTO: 26 % — HIGH (ref 2–14)
MYELOCYTES NFR BLD: 1 % — HIGH (ref 0–0)
NEUTROPHILS # BLD AUTO: 1.02 K/UL — LOW (ref 1.8–7.4)
NEUTROPHILS NFR BLD AUTO: 46 % — SIGNIFICANT CHANGE UP (ref 43–77)
NRBC # BLD: 0 /100 — SIGNIFICANT CHANGE UP (ref 0–0)
NRBC # BLD: SIGNIFICANT CHANGE UP /100 WBCS (ref 0–0)
NT-PROBNP SERPL-MCNC: 71 PG/ML
PHOSPHATE SERPL-MCNC: 3.3 MG/DL
PLAT MORPH BLD: NORMAL — SIGNIFICANT CHANGE UP
PLATELET # BLD AUTO: 137 K/UL — LOW (ref 150–400)
POTASSIUM SERPL-SCNC: 4.3 MMOL/L
PROT SERPL-MCNC: 6.2 G/DL
PT BLD: 12.8 SEC
RBC # BLD: 3.95 M/UL — LOW (ref 4.2–5.8)
RBC # FLD: 15.5 % — HIGH (ref 10.3–14.5)
RBC BLD AUTO: SIGNIFICANT CHANGE UP
SODIUM SERPL-SCNC: 140 MMOL/L
URATE SERPL-MCNC: 3.4 MG/DL
WBC # BLD: 2.21 K/UL — LOW (ref 3.8–10.5)
WBC # FLD AUTO: 2.21 K/UL — LOW (ref 3.8–10.5)

## 2022-05-10 PROCEDURE — 99215 OFFICE O/P EST HI 40 MIN: CPT

## 2022-05-11 LAB
CMV DNA SPEC QL NAA+PROBE: ABNORMAL IU/ML
CMVPCR LOG: ABNORMAL LOG10IU/ML

## 2022-05-16 NOTE — HISTORY OF PRESENT ILLNESS
[de-identified] : Mr. Chavez is a 53 year old male with a history of MCL, initially diagnosed 10/2016. On 11/15/2016 he was seen by Dr. Bravo at [facility], after noticing a left inguinal mass for which he sought medical attention. CT scans (3/21/2016) revealed left-sided pelvic and left inguinal adenopathy. A 0.7 cm indeterminate lesion in the right iliac bone was also noted. A left inguinal lymph node biopsy was performed 9/24/2016 which revealed morphologic and immunophenotypic findings consistent with mantle cell lymphoma. He is status post 4 cycles of R-CHOP, and two cycles of RICE consolidation. A bone marrow biopsy on 2/10/17 showed normocellular bone marrow with trilineage hematopoiesis and maturation. He was admitted 3/27/17 for an autologous peripheral blood stem cell transplant with CBV  regimen. \par  \par Upon admission, a TLC was placed in IR. Mr. Chavez received IV hydration, nutritional support, pain management, antinausea medication, antidiarrheals, antibiotic, antiviral, PCP, antifungal and GI prophylaxis. Labs were monitored daily. Mr. Chavez received transfusional support and electrolyte repletion as needed. When his ANC dropped below 1000, he was started on prophylactic ciprofloxacin. When he became febrile, his antibiotic coverage was broadened as appropriate. \par  \par On 4/5/17, Mr. Chavez received 314ml of thawed, pooled, washed, mobilized, plasma reduced, HPC apheresis over approximately 1 hour. He tolerated the infusion well with no adverse side effects noted. Engraftment was noted on 4/16/17, and the Zarxio and antibiotics were discontinued. \par  \par Mr. Chavez's transplant course was complicated by recurrent headaches, oral and GI mucositis, dizziness, and chest pain resulting in a RRT on 4/10/17. The chest pain was self limiting. A second RRT was called on 4/16/17, for palpitations and dizziness. Mr. Chavez was found to be in atrial flutter with RVR. He was transferred to telemetry and followed by cardiology. \par A-fib/a-flutter was resistant to single rate agent, and while in arrhythmia, pt would become symptomatic, hypotensive. Ablation was was preformed, however was unsuccessful. Pt continued to have frequent changes in rhythm. As a-fib was usually preceded by PAC, and mediport tip was in r atrium, concern was this may have been involved. Mediport was removed but patient continued to have runs of non conducted APCs. Patient was followed by EP during this admission who ruled out any need for PPM insertion. Patient was started on Cardizem and Toprol which he tolerated well. He did have episodes of bradycardia during his sleep with episodes of Atrial flutter with RVR as well. However, he remained asymptomatic. \par Pt was d/c 5/5/2017 to home in stable condition with instructions to follow up with hematology and EP. [de-identified] : Presenting today for a follow up appointment. He has completed  maintenance Rituxan. Last Rituxan was received in jan... PENELOPE....Patient reports continued fatigue. Recent CT/PET scan PENELOPE.  Patient also offers that he had the shingles and was treated with valtrex..some residual discomfort...He continues to be followed by cardiology; his cardiologist increased the Diltiazem dosage to 180mg q daily, however patient reported feeling increasingly fatigued and returned to 120mg q daily and now notes less fatigue. He is in eval for a cardiac procedure.. His cardiologist was waiting for the rituxan to be completed and covid to pass..He is compliant with all medications. Denies mouth sores, eye dryness, nausea, vomiting, diarrhea. No LE edema, CP or SOB. \par \par On 10/18/21, patient presents for a follow up visit. Overall patient is well. States has generalized pruritus and recently was seen by dermatologist. Has been applying triamcinolone cream with no relief. States recently felt a "lump" of the right groin. Given the patients past medical history patient is very concerned. Denies fever, chills, nausea, vomiting, diarrhea, rash, mouth sores, dysuria or any signs of active bleeding. Denies SOB, chest pain or B/L LE edema. Remains compliant with medications prescribed. \par \par On 11/30//21, patient presents with spouse for a follow up visit. Overall patient feels fatigued. Had a lymph node biopsy on 11/12/21 and results are c/w relapsed MCL.. Denies fever, chills, nausea, vomiting, diarrhea, rash, mouth sores, dysuria or any signs of active bleeding. Denies SOB, chest pain or B/L LE edema. Remains compliant with medications prescribed. Here to discuss further therapy..\par \par On 1/25/22, patient presents for a follow up visit.  On calquence and not  tolerating well. Was in ER with severe epigastric pain...now on protonix...Complains of acid reflux and takes tums prn. Complains of headaches every other day. Denies fever, chills, nausea, vomiting, diarrhea, rash, mouth sores, dysuria or any signs of active bleeding. Denies SOB, chest pain or B/L LE edema. in wheelchair today\par \par 2/18/22: Patient is here for a follow up visit.He states that he still has epigastric pain with Calquence.He takes Pantoprazole as prescribed.Denies fever,chills, nausea, vomiting, diarrhea,rash, mouth sores,SOB,chest pain or any signs of active bleeding.He has an appointment with PCP tomorrow to get a referral for an endoscopy by GI.\par \par 5/10//22:Patient is seen for a follow up visit s/p  CAR-T treatment with Tecartus....He is accompanied by his wife...He describes fatigue and weakness...using oxycodone for pain in legs...likely neuropathic.. Denies fever, chills, nausea, vomiting, diarrhea,mouth sores, skin rash,SOB,chest pain or any signs of active bleeding. He states that he had occasional epigastric pain from Calquence which  is improved off drug....He is off calquence and blood thinners at this time...Hospital course was complicated by CRS and neurotox...see d/c summary...

## 2022-05-16 NOTE — PHYSICAL EXAM
[Ambulatory and capable of all self care but unable to carry out any work activities] : Status 2- Ambulatory and capable of all self care but unable to carry out any work activities. Up and about more than 50% of waking hours [Normal] : no peripheral adenopathy appreciated [de-identified] : healed scar from port removal

## 2022-05-16 NOTE — REVIEW OF SYSTEMS
[Negative] : Allergic/Immunologic [Fatigue] : fatigue [Muscle Weakness] : muscle weakness [Dizziness] : dizziness [Dysphagia] : no dysphagia [Loss of Hearing] : no loss of hearing [Nosebleeds] : no nosebleeds [Hoarseness] : no hoarseness [Odynophagia] : no odynophagia [Mucosal Pain] : no mucosal pain [Abdominal Pain] : no abdominal pain [Vomiting] : no vomiting [Constipation] : no constipation [Diarrhea] : no diarrhea [Skin Rash] : no skin rash [Skin Wound] : no skin wound [Easy Bleeding] : no tendency for easy bleeding [Easy Bruising] : no tendency for easy bruising [FreeTextEntry7] : Acid reflux,Epigastric pain resolved [de-identified] : occ [de-identified] : Right groin lymphadenopathy decreasing in size

## 2022-05-16 NOTE — ASSESSMENT
[FreeTextEntry1] : 57 y/o gentleman with hx of mantle cell lymphoma initially dx'ed Oct 2016 s/p RCHOP chemotherapy X 4 cycles and RICE X 2 cycles with stem cell collected off of the 2nd RICE..he is now s/p CBV-conditioned autologous stem cell transplant on 4/5/17. Hospital course complicated by a-fib/a-flutter s/p ablation and now stable on Cardizem and Toprol. Also now on Xarelto. Port also removed. He demonstrated engraftment on 4/16 and d/c'ed in stable condition on 5/5.\par \par 1) Mantle Cell lymphoma\par S/P AUTO PBSCT 4/5/17\par Rituxan maintenance complete (Short Rituxan, once a week for 2 weeks). Last Rituxan treatment was  on 1/22/20  (Short Rituxan, once a week for 2 weeks).\par CT scans completed 7/1/21: PENELOPE\par PET CT 10/28/21 Completed. Concern for relapse disease. Lymph node biopsy completed on 11/12/21.  Results reviewed..c/w MCL.... \par Discussed being placed on calquence vs Silas rituxan. Patient information printed and given to patient/spouse. Side effects explained including serious side effect of AFIB. Patient has a known cardiac history. Message left for pt's cardiologist..\par Long term goal would be Car T....tecartus... This is why I would like to avoid Silas..Literature and consents provided for cart..discussed risks, benefits, alternatives, rationale and logistics..3 week hospital stay discussed...tlc placement...lymphodepleting prep, monitoring for CRS and neurotox, prolonged cytopenias and hypogamma also discussed\par \par Continue calquence 100 mg capsule- take 1 capsule twice a day...worsening GI sx on protonix...instructed on how to administer\par \par 3/11/22: Will stop Calquence on 3/14/22 ;1 week prior to CAR-T collection...resume 3/23..stop 4/11 in prep for admit 4/14...\par \par 5/10/22 s/p car t therapy with Tecartus in april...here for f/u\par \par 2) Heme\par Counts stable. No indication for transfusion today. \par \par negative assessment  for CRS and neurotox...ICANS 10\par \par F/U PET CT  ordered to be done in March 31; partial response status at time of car t\par repeat at day 30, 100\par \par 3) ID\par Cont prophylaxis\par \par 4) GI\par Tums prn for acid reflux\par Pantoprazole 40mg daily;\par \par 5) Afib\par Continue medications as prescribed by Cardiologist\par \par 6) Other\par oxycodone for leg pain\par Headaches- tylenol prn\par Continue medications as prescribed\par Maintain f/u with cardiology and GI \par Maintain f/u with his regular internist. \par Well care and cancer screening stressed\par Patient has been advised to contact clinic if he has any concerns or signs of CRS / neurotox.\par Questions and concerns addressed. Reassurance provided. \par \par Completed Post transplant vaccines:\par 12 month vaccines completed in April 2018. \par 14 month vaccines completed July 2018. \par  24 month re-vaccinations completed in April 2019. \par Will need to repeat post car t\par \par 7) Plan\par wallet card and FACT sheet provided again prior to d/c.\par pt advised no driving for 8 weeks post \par quests addressed..restrictions discussed\par \par .\par \par \par \par

## 2022-05-17 ENCOUNTER — RESULT REVIEW (OUTPATIENT)
Age: 57
End: 2022-05-17

## 2022-05-17 ENCOUNTER — APPOINTMENT (OUTPATIENT)
Dept: HEMATOLOGY ONCOLOGY | Facility: CLINIC | Age: 57
End: 2022-05-17
Payer: MEDICARE

## 2022-05-17 ENCOUNTER — LABORATORY RESULT (OUTPATIENT)
Age: 57
End: 2022-05-17

## 2022-05-17 VITALS
BODY MASS INDEX: 29.33 KG/M2 | WEIGHT: 210.32 LBS | OXYGEN SATURATION: 99 % | SYSTOLIC BLOOD PRESSURE: 112 MMHG | DIASTOLIC BLOOD PRESSURE: 73 MMHG | RESPIRATION RATE: 16 BRPM | HEART RATE: 80 BPM | TEMPERATURE: 97.1 F

## 2022-05-17 LAB
BASOPHILS # BLD AUTO: 0 K/UL — SIGNIFICANT CHANGE UP (ref 0–0.2)
BASOPHILS NFR BLD AUTO: 0 % — SIGNIFICANT CHANGE UP (ref 0–2)
EOSINOPHIL # BLD AUTO: 0.8 K/UL — HIGH (ref 0–0.5)
EOSINOPHIL NFR BLD AUTO: 28 % — HIGH (ref 0–6)
HCT VFR BLD CALC: 38.1 % — LOW (ref 39–50)
HGB BLD-MCNC: 12.3 G/DL — LOW (ref 13–17)
LYMPHOCYTES # BLD AUTO: 0.71 K/UL — LOW (ref 1–3.3)
LYMPHOCYTES # BLD AUTO: 25 % — SIGNIFICANT CHANGE UP (ref 13–44)
MCHC RBC-ENTMCNC: 32.3 G/DL — SIGNIFICANT CHANGE UP (ref 32–36)
MCHC RBC-ENTMCNC: 32.7 PG — SIGNIFICANT CHANGE UP (ref 27–34)
MCV RBC AUTO: 101.3 FL — HIGH (ref 80–100)
MONOCYTES # BLD AUTO: 0.37 K/UL — SIGNIFICANT CHANGE UP (ref 0–0.9)
MONOCYTES NFR BLD AUTO: 13 % — SIGNIFICANT CHANGE UP (ref 2–14)
MYELOCYTES NFR BLD: 1 % — HIGH (ref 0–0)
NEUTROPHILS # BLD AUTO: 0.94 K/UL — LOW (ref 1.8–7.4)
NEUTROPHILS NFR BLD AUTO: 33 % — LOW (ref 43–77)
NRBC # BLD: 0 /100 — SIGNIFICANT CHANGE UP (ref 0–0)
NRBC # BLD: SIGNIFICANT CHANGE UP /100 WBCS (ref 0–0)
PLAT MORPH BLD: NORMAL — SIGNIFICANT CHANGE UP
PLATELET # BLD AUTO: 131 K/UL — LOW (ref 150–400)
RBC # BLD: 3.76 M/UL — LOW (ref 4.2–5.8)
RBC # FLD: 16.7 % — HIGH (ref 10.3–14.5)
RBC BLD AUTO: SIGNIFICANT CHANGE UP
WBC # BLD: 2.85 K/UL — LOW (ref 3.8–10.5)
WBC # FLD AUTO: 2.85 K/UL — LOW (ref 3.8–10.5)

## 2022-05-17 PROCEDURE — 99214 OFFICE O/P EST MOD 30 MIN: CPT

## 2022-05-18 NOTE — ASSESSMENT
[FreeTextEntry1] : 55 y/o gentleman with hx of mantle cell lymphoma initially dx'ed Oct 2016 s/p RCHOP chemotherapy X 4 cycles and RICE X 2 cycles with stem cell collected off of the 2nd RICE..he is now s/p CBV-conditioned autologous stem cell transplant on 4/5/17. Hospital course complicated by a-fib/a-flutter s/p ablation and now stable on Cardizem and Toprol. Also now on Xarelto. Port also removed. He demonstrated engraftment on 4/16 and d/c'ed in stable condition on 5/5.\par \par 1) Mantle Cell lymphoma\par S/P AUTO PBSCT 4/5/17\par Rituxan maintenance complete (Short Rituxan, once a week for 2 weeks). Last Rituxan treatment was  on 1/22/20  (Short Rituxan, once a week for 2 weeks).\par CT scans completed 7/1/21: PENELOPE\par PET CT 10/28/21 Completed. Concern for relapse disease. Lymph node biopsy completed on 11/12/21.  Results reviewed..c/w MCL.... \par Discussed being placed on calquence vs Silas rituxan. Patient information printed and given to patient/spouse. Side effects explained including serious side effect of AFIB. Patient has a known cardiac history. Message left for pt's cardiologist..\par Long term goal would be Car T....tecartus... This is why I would like to avoid Silas..Literature and consents provided for cart..discussed risks, benefits, alternatives, rationale and logistics..3 week hospital stay discussed...tlc placement...lymphodepleting prep, monitoring for CRS and neurotox, prolonged cytopenias and hypogamma also discussed\par \par Continue calquence 100 mg capsule- take 1 capsule twice a day...worsening GI sx on protonix...instructed on how to administer\par \par 3/11/22: Will stop Calquence on 3/14/22 ;1 week prior to CAR-T collection...resume 3/23..stop 4/11 in prep for admit 4/14...\par \par S/P CAR-T therapy with Tecartus on 4/19/22.\par \par 2) Heme\par Counts stable. No indication for transfusion today. \par \par Negative assessment  for CRS and neurotox...ICANS 10\par \par F/U PET CT  ordered to be done in March 31; partial response status at time of car t\par repeat at day 30, 100.Will do PET CT of  week of 5/23/22\par \par 3) ID\par Continue prophylaxis\par \par 4) GI\par Tums prn for acid reflux\par Pantoprazole 40mg daily;\par \par 5) Afib\par Continue medications as prescribed by Cardiologist\par Has an upcoming appointment with cardiologist on 5/23/22\par \par 6) Other\par Oxycodone for leg pain\par Headaches- tylenol prn\par Dizziness-Advised to make slow position changes;follow up with cardilogist\par Continue medications as prescribed\par Maintain f/u with cardiology and GI \par Maintain f/u with his regular internist. \par Well care and cancer screening stressed\par Patient has been advised to contact clinic if he has any concerns or signs of CRS / neurotoxicity.\par \par Completed Post transplant vaccines:\par 12 month vaccines completed in April 2018. \par 14 month vaccines completed July 2018. \par 24 month re-vaccinations completed in April 2019. \par Will need to repeat post CAR-T\par \par 7) Plan\par Wallet card and FACT sheet provided again prior to d/c.\par Pt advised no driving for 8 weeks post \par Quests addressed..restrictions discussed\par \par I examined patient under Dr. Banks's supervision and Dr. Banks agrees to plan of care as listed above\par \par \par .\par \par \par \par

## 2022-05-18 NOTE — PHYSICAL EXAM
[Ambulatory and capable of all self care but unable to carry out any work activities] : Status 2- Ambulatory and capable of all self care but unable to carry out any work activities. Up and about more than 50% of waking hours [Normal] : affect appropriate [de-identified] : healed scar from port removal

## 2022-05-18 NOTE — REASON FOR VISIT
[Follow-Up Visit] : a follow-up visit for [Lymphoma] : lymphoma [Spouse] : spouse [FreeTextEntry2] : MCL;H/O Auto PSCT on 4/5/17;S/P CAR-T on 4/19/22

## 2022-05-18 NOTE — REVIEW OF SYSTEMS
[Fatigue] : fatigue [Muscle Weakness] : muscle weakness [Dizziness] : dizziness [Muscle Pain] : muscle pain [Negative] : Gastrointestinal [Fever] : no fever [Chills] : no chills [Night Sweats] : no night sweats [Recent Change In Weight] : ~T no recent weight change [Dysphagia] : no dysphagia [Loss of Hearing] : no loss of hearing [Nosebleeds] : no nosebleeds [Hoarseness] : no hoarseness [Odynophagia] : no odynophagia [Mucosal Pain] : no mucosal pain [Abdominal Pain] : no abdominal pain [Vomiting] : no vomiting [Constipation] : no constipation [Diarrhea] : no diarrhea [Joint Pain] : no joint pain [Joint Stiffness] : no joint stiffness [Skin Rash] : no skin rash [Skin Wound] : no skin wound [Confused] : no confusion [Fainting] : no fainting [Difficulty Walking] : no difficulty walking [Easy Bleeding] : no tendency for easy bleeding [Easy Bruising] : no tendency for easy bruising [FreeTextEntry7] : Acid reflux,Epigastric pain -RESOLVED [de-identified] : occasional [de-identified] : Right groin lymphadenopathy decreasing in size

## 2022-05-18 NOTE — HISTORY OF PRESENT ILLNESS
[de-identified] : Mr. Chavez is a 53 year old male with a history of MCL, initially diagnosed 10/2016. On 11/15/2016 he was seen by Dr. Bravo at [facility], after noticing a left inguinal mass for which he sought medical attention. CT scans (3/21/2016) revealed left-sided pelvic and left inguinal adenopathy. A 0.7 cm indeterminate lesion in the right iliac bone was also noted. A left inguinal lymph node biopsy was performed 9/24/2016 which revealed morphologic and immunophenotypic findings consistent with mantle cell lymphoma. He is status post 4 cycles of R-CHOP, and two cycles of RICE consolidation. A bone marrow biopsy on 2/10/17 showed normocellular bone marrow with trilineage hematopoiesis and maturation. He was admitted 3/27/17 for an autologous peripheral blood stem cell transplant with CBV  regimen. \par  \par Upon admission, a TLC was placed in IR. Mr. Chavez received IV hydration, nutritional support, pain management, antinausea medication, antidiarrheals, antibiotic, antiviral, PCP, antifungal and GI prophylaxis. Labs were monitored daily. Mr. Chavez received transfusional support and electrolyte repletion as needed. When his ANC dropped below 1000, he was started on prophylactic ciprofloxacin. When he became febrile, his antibiotic coverage was broadened as appropriate. \par  \par On 4/5/17, Mr. Chavez received 314ml of thawed, pooled, washed, mobilized, plasma reduced, HPC apheresis over approximately 1 hour. He tolerated the infusion well with no adverse side effects noted. Engraftment was noted on 4/16/17, and the Zarxio and antibiotics were discontinued. \par  \par Mr. Chavez's transplant course was complicated by recurrent headaches, oral and GI mucositis, dizziness, and chest pain resulting in a RRT on 4/10/17. The chest pain was self limiting. A second RRT was called on 4/16/17, for palpitations and dizziness. Mr. Chavez was found to be in atrial flutter with RVR. He was transferred to telemetry and followed by cardiology. \par A-fib/a-flutter was resistant to single rate agent, and while in arrhythmia, pt would become symptomatic, hypotensive. Ablation was was preformed, however was unsuccessful. Pt continued to have frequent changes in rhythm. As a-fib was usually preceded by PAC, and mediport tip was in r atrium, concern was this may have been involved. Mediport was removed but patient continued to have runs of non conducted APCs. Patient was followed by EP during this admission who ruled out any need for PPM insertion. Patient was started on Cardizem and Toprol which he tolerated well. He did have episodes of bradycardia during his sleep with episodes of Atrial flutter with RVR as well. However, he remained asymptomatic. \par Pt was d/c 5/5/2017 to home in stable condition with instructions to follow up with hematology and EP. [de-identified] : Presenting today for a follow up appointment. He has completed  maintenance Rituxan. Last Rituxan was received in jan... PENELOPE....Patient reports continued fatigue. Recent CT/PET scan PENELOPE.  Patient also offers that he had the shingles and was treated with valtrex..some residual discomfort...He continues to be followed by cardiology; his cardiologist increased the Diltiazem dosage to 180mg q daily, however patient reported feeling increasingly fatigued and returned to 120mg q daily and now notes less fatigue. He is in eval for a cardiac procedure.. His cardiologist was waiting for the rituxan to be completed and covid to pass..He is compliant with all medications. Denies mouth sores, eye dryness, nausea, vomiting, diarrhea. No LE edema, CP or SOB. \par \par On 10/18/21, patient presents for a follow up visit. Overall patient is well. States has generalized pruritus and recently was seen by dermatologist. Has been applying triamcinolone cream with no relief. States recently felt a "lump" of the right groin. Given the patients past medical history patient is very concerned. Denies fever, chills, nausea, vomiting, diarrhea, rash, mouth sores, dysuria or any signs of active bleeding. Denies SOB, chest pain or B/L LE edema. Remains compliant with medications prescribed. \par \par On 11/30//21, patient presents with spouse for a follow up visit. Overall patient feels fatigued. Had a lymph node biopsy on 11/12/21 and results are c/w relapsed MCL.. Denies fever, chills, nausea, vomiting, diarrhea, rash, mouth sores, dysuria or any signs of active bleeding. Denies SOB, chest pain or B/L LE edema. Remains compliant with medications prescribed. Here to discuss further therapy..\par \par On 1/25/22, patient presents for a follow up visit.  On calquence and not  tolerating well. Was in ER with severe epigastric pain...now on protonix...Complains of acid reflux and takes tums prn. Complains of headaches every other day. Denies fever, chills, nausea, vomiting, diarrhea, rash, mouth sores, dysuria or any signs of active bleeding. Denies SOB, chest pain or B/L LE edema. in wheelchair today\par \par 2/18/22: Patient is here for a follow up visit.He states that he still has epigastric pain with Calquence.He takes Pantoprazole as prescribed.Denies fever,chills, nausea, vomiting, diarrhea,rash, mouth sores,SOB,chest pain or any signs of active bleeding.He has an appointment with PCP tomorrow to get a referral for an endoscopy by GI.\par \par 5/10//22:Patient is seen for a follow up visit s/p  CAR-T treatment with Tecartus....He is accompanied by his wife...He describes fatigue and weakness...using oxycodone for pain in legs...likely neuropathic.. Denies fever, chills, nausea, vomiting, diarrhea,mouth sores, skin rash,SOB,chest pain or any signs of active bleeding. He states that he had occasional epigastric pain from Calquence which  is improved off drug....He is off calquence and blood thinners at this time...Hospital course was complicated by CRS and neurotox...see d/c summary...\par \par 5/18/22: Patient is here for a follow up visit post CAR-T treatment with Tecartus.Today is Day +28.He is here accompanied by his wife.He states that he still feels fatigue and weakness.He continues to use Oxycodone for pain in legs and occasional headache.Denies fever, chills, blurring of vision,SOB, chest pain, mouth sores,nausea, vomiting, diarrhea,skin rash, dysuria or any signs of active bleeding. He states that few days ago when he stood up from sitting position,he felt dizziness which resolved on its own after few minutes.

## 2022-05-19 LAB
ALBUMIN SERPL ELPH-MCNC: 4.7 G/DL
ALP BLD-CCNC: 70 U/L
ALT SERPL-CCNC: 27 U/L
ANION GAP SERPL CALC-SCNC: 10 MMOL/L
APTT BLD: 24.3 SEC
AST SERPL-CCNC: 19 U/L
BILIRUB SERPL-MCNC: 0.4 MG/DL
BUN SERPL-MCNC: 21 MG/DL
CALCIUM SERPL-MCNC: 9.2 MG/DL
CHLORIDE SERPL-SCNC: 104 MMOL/L
CK SERPL-CCNC: 47 U/L
CMV DNA SPEC QL NAA+PROBE: NOT DETECTED IU/ML
CMVPCR LOG: NOT DETECTED LOG10IU/ML
CO2 SERPL-SCNC: 28 MMOL/L
CREAT SERPL-MCNC: 1.37 MG/DL
CRP SERPL-MCNC: <3 MG/L
DEPRECATED D DIMER PPP IA-ACNC: 180 NG/ML DDU
EGFR: 60 ML/MIN/1.73M2
FERRITIN SERPL-MCNC: 117 NG/ML
FIBRINOGEN PPP COAG.DERIVED-MCNC: 200 MG/DL
GLUCOSE SERPL-MCNC: 143 MG/DL
INR PPP: 1.08 RATIO
LDH SERPL-CCNC: 189 U/L
MAGNESIUM SERPL-MCNC: 2 MG/DL
NT-PROBNP SERPL-MCNC: 94 PG/ML
PHOSPHATE SERPL-MCNC: 3.4 MG/DL
POTASSIUM SERPL-SCNC: 5.1 MMOL/L
PROT SERPL-MCNC: 6.1 G/DL
PT BLD: 12.5 SEC
SODIUM SERPL-SCNC: 142 MMOL/L
URATE SERPL-MCNC: 4.1 MG/DL

## 2022-05-23 ENCOUNTER — APPOINTMENT (OUTPATIENT)
Dept: ELECTROPHYSIOLOGY | Facility: CLINIC | Age: 57
End: 2022-05-23
Payer: MEDICARE

## 2022-05-23 VITALS
DIASTOLIC BLOOD PRESSURE: 70 MMHG | TEMPERATURE: 97.5 F | OXYGEN SATURATION: 97 % | HEIGHT: 71 IN | HEART RATE: 77 BPM | BODY MASS INDEX: 29.4 KG/M2 | WEIGHT: 210 LBS | SYSTOLIC BLOOD PRESSURE: 110 MMHG

## 2022-05-23 PROCEDURE — 99213 OFFICE O/P EST LOW 20 MIN: CPT

## 2022-05-23 RX ORDER — CARVEDILOL 3.12 MG/1
3.12 TABLET, FILM COATED ORAL TWICE DAILY
Qty: 180 | Refills: 1 | Status: DISCONTINUED | COMMUNITY
Start: 2021-10-18 | End: 2022-05-23

## 2022-05-23 RX ORDER — PANTOPRAZOLE 40 MG/1
40 TABLET, DELAYED RELEASE ORAL DAILY
Qty: 30 | Refills: 3 | Status: DISCONTINUED | COMMUNITY
Start: 2022-01-21 | End: 2022-05-23

## 2022-05-23 RX ORDER — RIVAROXABAN 20 MG/1
20 TABLET, FILM COATED ORAL
Qty: 90 | Refills: 3 | Status: DISCONTINUED | COMMUNITY
Start: 2021-05-26 | End: 2022-05-23

## 2022-05-23 RX ORDER — METOCLOPRAMIDE 10 MG/1
10 TABLET ORAL EVERY 6 HOURS
Qty: 1 | Refills: 0 | Status: DISCONTINUED | COMMUNITY
Start: 2022-05-06 | End: 2022-05-23

## 2022-05-23 RX ORDER — FAMOTIDINE 20 MG/1
20 TABLET, FILM COATED ORAL TWICE DAILY
Refills: 0 | Status: DISCONTINUED | COMMUNITY
End: 2022-05-23

## 2022-05-23 RX ORDER — ACALABRUTINIB 100 MG/1
100 CAPSULE, GELATIN COATED ORAL
Qty: 60 | Refills: 6 | Status: DISCONTINUED | COMMUNITY
Start: 2021-12-01 | End: 2022-05-23

## 2022-05-24 ENCOUNTER — LABORATORY RESULT (OUTPATIENT)
Age: 57
End: 2022-05-24

## 2022-05-24 ENCOUNTER — APPOINTMENT (OUTPATIENT)
Dept: HEMATOLOGY ONCOLOGY | Facility: CLINIC | Age: 57
End: 2022-05-24
Payer: MEDICARE

## 2022-05-24 ENCOUNTER — RESULT REVIEW (OUTPATIENT)
Age: 57
End: 2022-05-24

## 2022-05-24 VITALS
HEART RATE: 77 BPM | BODY MASS INDEX: 29.55 KG/M2 | OXYGEN SATURATION: 99 % | DIASTOLIC BLOOD PRESSURE: 72 MMHG | SYSTOLIC BLOOD PRESSURE: 105 MMHG | RESPIRATION RATE: 16 BRPM | WEIGHT: 211.86 LBS | TEMPERATURE: 97 F

## 2022-05-24 LAB
BASOPHILS # BLD AUTO: 0.02 K/UL — SIGNIFICANT CHANGE UP (ref 0–0.2)
BASOPHILS NFR BLD AUTO: 0.6 % — SIGNIFICANT CHANGE UP (ref 0–2)
EOSINOPHIL # BLD AUTO: 0.54 K/UL — HIGH (ref 0–0.5)
EOSINOPHIL NFR BLD AUTO: 17.2 % — HIGH (ref 0–6)
HCT VFR BLD CALC: 39.5 % — SIGNIFICANT CHANGE UP (ref 39–50)
HGB BLD-MCNC: 13 G/DL — SIGNIFICANT CHANGE UP (ref 13–17)
IMM GRANULOCYTES NFR BLD AUTO: 0.3 % — SIGNIFICANT CHANGE UP (ref 0–1.5)
LYMPHOCYTES # BLD AUTO: 1.03 K/UL — SIGNIFICANT CHANGE UP (ref 1–3.3)
LYMPHOCYTES # BLD AUTO: 32.8 % — SIGNIFICANT CHANGE UP (ref 13–44)
MCHC RBC-ENTMCNC: 32.8 PG — SIGNIFICANT CHANGE UP (ref 27–34)
MCHC RBC-ENTMCNC: 32.9 G/DL — SIGNIFICANT CHANGE UP (ref 32–36)
MCV RBC AUTO: 99.7 FL — SIGNIFICANT CHANGE UP (ref 80–100)
MONOCYTES # BLD AUTO: 0.7 K/UL — SIGNIFICANT CHANGE UP (ref 0–0.9)
MONOCYTES NFR BLD AUTO: 22.3 % — HIGH (ref 2–14)
NEUTROPHILS # BLD AUTO: 0.84 K/UL — LOW (ref 1.8–7.4)
NEUTROPHILS NFR BLD AUTO: 26.8 % — LOW (ref 43–77)
NRBC # BLD: 0 /100 WBCS — SIGNIFICANT CHANGE UP (ref 0–0)
PLATELET # BLD AUTO: 183 K/UL — SIGNIFICANT CHANGE UP (ref 150–400)
RBC # BLD: 3.96 M/UL — LOW (ref 4.2–5.8)
RBC # FLD: 16.8 % — HIGH (ref 10.3–14.5)
WBC # BLD: 3.14 K/UL — LOW (ref 3.8–10.5)
WBC # FLD AUTO: 3.14 K/UL — LOW (ref 3.8–10.5)

## 2022-05-24 PROCEDURE — 99214 OFFICE O/P EST MOD 30 MIN: CPT

## 2022-05-24 NOTE — REVIEW OF SYSTEMS
[Fatigue] : fatigue [Muscle Pain] : muscle pain [Muscle Weakness] : muscle weakness [Dizziness] : dizziness [Negative] : Allergic/Immunologic [Fever] : no fever [Chills] : no chills [Night Sweats] : no night sweats [Recent Change In Weight] : ~T no recent weight change [Dysphagia] : no dysphagia [Loss of Hearing] : no loss of hearing [Nosebleeds] : no nosebleeds [Hoarseness] : no hoarseness [Odynophagia] : no odynophagia [Mucosal Pain] : no mucosal pain [Abdominal Pain] : no abdominal pain [Vomiting] : no vomiting [Constipation] : no constipation [Diarrhea] : no diarrhea [Joint Pain] : no joint pain [Joint Stiffness] : no joint stiffness [Skin Rash] : no skin rash [Skin Wound] : no skin wound [Confused] : no confusion [Fainting] : no fainting [Difficulty Walking] : no difficulty walking [Easy Bleeding] : no tendency for easy bleeding [Easy Bruising] : no tendency for easy bruising [FreeTextEntry7] : Acid reflux,Epigastric pain -RESOLVED [de-identified] : occasional [de-identified] : Right groin lymphadenopathy decreasing in size

## 2022-05-24 NOTE — ASSESSMENT
[FreeTextEntry1] : 57 y/o gentleman with hx of mantle cell lymphoma initially dx'ed Oct 2016 s/p RCHOP chemotherapy X 4 cycles and RICE X 2 cycles with stem cell collected off of the 2nd RICE..he is now s/p CBV-conditioned autologous stem cell transplant on 4/5/17. Hospital course complicated by a-fib/a-flutter s/p ablation and now stable on Cardizem and Toprol. Also now on Xarelto. Port also removed. He demonstrated engraftment on 4/16 and d/c'ed in stable condition on 5/5.\par \par 1) Mantle Cell lymphoma\par S/P AUTO PBSCT 4/5/17\par Rituxan maintenance complete (Short Rituxan, once a week for 2 weeks). Last Rituxan treatment was  on 1/22/20  (Short Rituxan, once a week for 2 weeks).\par CT scans completed 7/1/21: PENELOPE\par PET CT 10/28/21 Completed. Concern for relapse disease. Lymph node biopsy completed on 11/12/21.  Results reviewed..c/w MCL.... \par Discussed being placed on calquence vs Silas rituxan. Patient information printed and given to patient/spouse. Side effects explained including serious side effect of AFIB. Patient has a known cardiac history. Message left for pt's cardiologist..\par Long term goal would be Car T....tecartus... This is why I would like to avoid Silas..Literature and consents provided for cart..discussed risks, benefits, alternatives, rationale and logistics..3 week hospital stay discussed...tlc placement...lymphodepleting prep, monitoring for CRS and neurotox, prolonged cytopenias and hypogamma also discussed\par \par Continue calquence 100 mg capsule- take 1 capsule twice a day...worsening GI sx on protonix...instructed on how to administer\par \par 3/11/22: Will stop Calquence on 3/14/22 ;1 week prior to CAR-T collection...resume 3/23..stop 4/11 in prep for admit 4/14...\par \par S/P CAR-T therapy with Tecartus on 4/19/22\par \par 2) Heme\par Counts stable. No indication for transfusion today. \par \par Negative assessment  for CRS and neurotox...\par \par F/U PET CT  ordered to be done in March 31; partial response status at time of car t\par repeat at day 30, 100. 30 days post PET CT scheduled on 5/25/22\par \par 3) ID\par Continue prophylaxis\par \par 4) GI\par Tums prn for acid reflux\par Pantoprazole 40mg daily;\par \par 5) Afib\par Continue medications as prescribed by Cardiologist\par Had an  appointment with cardiologist on 5/23/22.......no new recommendations from cardilogist\par Continue to f/u with cardiologist as per recommendations\par \par 6) Other\par Oxycodone for leg pain\par Headaches- tylenol prn\par Dizziness-Advised to make slow position changes-RESOLVED\par Continue medications as prescribed\par Maintain f/u with cardiology and GI \par Maintain f/u with his regular internist. \par Well care and cancer screening stressed\par Patient has been advised to contact clinic if he has any concerns or signs of CRS / neurotoxicity.\par \par Completed Post transplant vaccines:\par 12 month vaccines completed in April 2018. \par 14 month vaccines completed July 2018. \par 24 month re-vaccinations completed in April 2019. \par Will need to repeat post CAR-T\par \par 7) Plan\par Follow up on 5/31/22\par Wallet card and FACT sheet provided again prior to d/c.\par Pt advised no driving for 8 weeks post \par Quests addressed..restrictions discussed\par \par I examined patient under Dr. Banks's supervision and Dr. Banks agrees to plan of care as listed above\par \par \par .\par \par \par \par

## 2022-05-24 NOTE — PHYSICAL EXAM
[Ambulatory and capable of all self care but unable to carry out any work activities] : Status 2- Ambulatory and capable of all self care but unable to carry out any work activities. Up and about more than 50% of waking hours [Normal] : affect appropriate [de-identified] : healed scar from port removal

## 2022-05-24 NOTE — HISTORY OF PRESENT ILLNESS
[de-identified] : Mr. Chavez is a 53 year old male with a history of MCL, initially diagnosed 10/2016. On 11/15/2016 he was seen by Dr. Bravo at [facility], after noticing a left inguinal mass for which he sought medical attention. CT scans (3/21/2016) revealed left-sided pelvic and left inguinal adenopathy. A 0.7 cm indeterminate lesion in the right iliac bone was also noted. A left inguinal lymph node biopsy was performed 9/24/2016 which revealed morphologic and immunophenotypic findings consistent with mantle cell lymphoma. He is status post 4 cycles of R-CHOP, and two cycles of RICE consolidation. A bone marrow biopsy on 2/10/17 showed normocellular bone marrow with trilineage hematopoiesis and maturation. He was admitted 3/27/17 for an autologous peripheral blood stem cell transplant with CBV  regimen. \par  \par Upon admission, a TLC was placed in IR. Mr. Chavez received IV hydration, nutritional support, pain management, antinausea medication, antidiarrheals, antibiotic, antiviral, PCP, antifungal and GI prophylaxis. Labs were monitored daily. Mr. Chavez received transfusional support and electrolyte repletion as needed. When his ANC dropped below 1000, he was started on prophylactic ciprofloxacin. When he became febrile, his antibiotic coverage was broadened as appropriate. \par  \par On 4/5/17, Mr. Chavez received 314ml of thawed, pooled, washed, mobilized, plasma reduced, HPC apheresis over approximately 1 hour. He tolerated the infusion well with no adverse side effects noted. Engraftment was noted on 4/16/17, and the Zarxio and antibiotics were discontinued. \par  \par Mr. Chavez's transplant course was complicated by recurrent headaches, oral and GI mucositis, dizziness, and chest pain resulting in a RRT on 4/10/17. The chest pain was self limiting. A second RRT was called on 4/16/17, for palpitations and dizziness. Mr. Chavez was found to be in atrial flutter with RVR. He was transferred to telemetry and followed by cardiology. \par A-fib/a-flutter was resistant to single rate agent, and while in arrhythmia, pt would become symptomatic, hypotensive. Ablation was was preformed, however was unsuccessful. Pt continued to have frequent changes in rhythm. As a-fib was usually preceded by PAC, and mediport tip was in r atrium, concern was this may have been involved. Mediport was removed but patient continued to have runs of non conducted APCs. Patient was followed by EP during this admission who ruled out any need for PPM insertion. Patient was started on Cardizem and Toprol which he tolerated well. He did have episodes of bradycardia during his sleep with episodes of Atrial flutter with RVR as well. However, he remained asymptomatic. \par Pt was d/c 5/5/2017 to home in stable condition with instructions to follow up with hematology and EP. [de-identified] : Presenting today for a follow up appointment. He has completed  maintenance Rituxan. Last Rituxan was received in jan... PENELOPE....Patient reports continued fatigue. Recent CT/PET scan PENELOPE.  Patient also offers that he had the shingles and was treated with valtrex..some residual discomfort...He continues to be followed by cardiology; his cardiologist increased the Diltiazem dosage to 180mg q daily, however patient reported feeling increasingly fatigued and returned to 120mg q daily and now notes less fatigue. He is in eval for a cardiac procedure.. His cardiologist was waiting for the rituxan to be completed and covid to pass..He is compliant with all medications. Denies mouth sores, eye dryness, nausea, vomiting, diarrhea. No LE edema, CP or SOB. \par \par On 10/18/21, patient presents for a follow up visit. Overall patient is well. States has generalized pruritus and recently was seen by dermatologist. Has been applying triamcinolone cream with no relief. States recently felt a "lump" of the right groin. Given the patients past medical history patient is very concerned. Denies fever, chills, nausea, vomiting, diarrhea, rash, mouth sores, dysuria or any signs of active bleeding. Denies SOB, chest pain or B/L LE edema. Remains compliant with medications prescribed. \par \par On 11/30//21, patient presents with spouse for a follow up visit. Overall patient feels fatigued. Had a lymph node biopsy on 11/12/21 and results are c/w relapsed MCL.. Denies fever, chills, nausea, vomiting, diarrhea, rash, mouth sores, dysuria or any signs of active bleeding. Denies SOB, chest pain or B/L LE edema. Remains compliant with medications prescribed. Here to discuss further therapy..\par \par On 1/25/22, patient presents for a follow up visit.  On calquence and not  tolerating well. Was in ER with severe epigastric pain...now on protonix...Complains of acid reflux and takes tums prn. Complains of headaches every other day. Denies fever, chills, nausea, vomiting, diarrhea, rash, mouth sores, dysuria or any signs of active bleeding. Denies SOB, chest pain or B/L LE edema. in wheelchair today\par \par 2/18/22: Patient is here for a follow up visit.He states that he still has epigastric pain with Calquence.He takes Pantoprazole as prescribed.Denies fever,chills, nausea, vomiting, diarrhea,rash, mouth sores,SOB,chest pain or any signs of active bleeding.He has an appointment with PCP tomorrow to get a referral for an endoscopy by GI.\par \par 5/10//22:Patient is seen for a follow up visit s/p  CAR-T treatment with Tecartus....He is accompanied by his wife...He describes fatigue and weakness...using oxycodone for pain in legs...likely neuropathic.. Denies fever, chills, nausea, vomiting, diarrhea,mouth sores, skin rash,SOB,chest pain or any signs of active bleeding. He states that he had occasional epigastric pain from Calquence which  is improved off drug....He is off calquence and blood thinners at this time...Hospital course was complicated by CRS and neurotox...see d/c summary...\par \par 5/18/22: Patient is here for a follow up visit post CAR-T treatment with Tecartus.Today is Day +28.He is here accompanied by his wife.He states that he still feels fatigue and weakness.He continues to use Oxycodone for pain in legs and occasional headache.Denies fever, chills, blurring of vision,SOB, chest pain, mouth sores,nausea, vomiting, diarrhea,skin rash, dysuria or any signs of active bleeding. He states that few days ago when he stood up from sitting position,he felt dizziness which resolved on its own after few minutes.\par \par 5/24/22:Today is Day +35 post CAR-T therapy with Tecartus.He is accompanied by his wife.He states that he continues to have pricking pain on B/L feet and that he continues to take Gabapentin as prescribed. Denies fever, chills, SOB,chest pain, rash, mouth sores,nausea, vomiting, diarrhea,dysuria or any signs of active bleeding.He had a f/u appointment with cardiology yesterday and there are no changes or adjustments to any of the of the medications. He states that he did not have any episodes of dizziness since last week.

## 2022-05-25 ENCOUNTER — APPOINTMENT (OUTPATIENT)
Dept: NUCLEAR MEDICINE | Facility: CLINIC | Age: 57
End: 2022-05-25
Payer: MEDICARE

## 2022-05-25 ENCOUNTER — OUTPATIENT (OUTPATIENT)
Dept: OUTPATIENT SERVICES | Facility: HOSPITAL | Age: 57
LOS: 1 days | End: 2022-05-25
Payer: COMMERCIAL

## 2022-05-25 DIAGNOSIS — C83.10 MANTLE CELL LYMPHOMA, UNSPECIFIED SITE: ICD-10-CM

## 2022-05-25 DIAGNOSIS — Z98.890 OTHER SPECIFIED POSTPROCEDURAL STATES: Chronic | ICD-10-CM

## 2022-05-25 DIAGNOSIS — Z94.84 STEM CELLS TRANSPLANT STATUS: Chronic | ICD-10-CM

## 2022-05-25 DIAGNOSIS — Z95.818 PRESENCE OF OTHER CARDIAC IMPLANTS AND GRAFTS: Chronic | ICD-10-CM

## 2022-05-25 PROCEDURE — 78815 PET IMAGE W/CT SKULL-THIGH: CPT

## 2022-05-25 PROCEDURE — A9552: CPT

## 2022-05-25 PROCEDURE — 78815 PET IMAGE W/CT SKULL-THIGH: CPT | Mod: 26,PS

## 2022-05-29 LAB
ALBUMIN SERPL ELPH-MCNC: 4.7 G/DL
ALP BLD-CCNC: 70 U/L
ALT SERPL-CCNC: 27 U/L
ANION GAP SERPL CALC-SCNC: 13 MMOL/L
APTT BLD: 25 SEC
AST SERPL-CCNC: 23 U/L
BILIRUB SERPL-MCNC: 0.5 MG/DL
BUN SERPL-MCNC: 17 MG/DL
CALCIUM SERPL-MCNC: 9.4 MG/DL
CHLORIDE SERPL-SCNC: 104 MMOL/L
CK SERPL-CCNC: 58 U/L
CMV DNA SPEC QL NAA+PROBE: NOT DETECTED IU/ML
CMVPCR LOG: NOT DETECTED LOG10IU/ML
CO2 SERPL-SCNC: 26 MMOL/L
CREAT SERPL-MCNC: 1.06 MG/DL
CRP SERPL-MCNC: <3 MG/L
DEPRECATED D DIMER PPP IA-ACNC: <150 NG/ML DDU
EGFR: 82 ML/MIN/1.73M2
FERRITIN SERPL-MCNC: 89 NG/ML
FIBRINOGEN PPP COAG.DERIVED-MCNC: 214 MG/DL
GLUCOSE SERPL-MCNC: 93 MG/DL
INR PPP: 1.02 RATIO
LDH SERPL-CCNC: 203 U/L
MAGNESIUM SERPL-MCNC: 2.2 MG/DL
NT-PROBNP SERPL-MCNC: 192 PG/ML
PHOSPHATE SERPL-MCNC: 3.8 MG/DL
POTASSIUM SERPL-SCNC: 5.3 MMOL/L
PROT SERPL-MCNC: 6.6 G/DL
PT BLD: 12 SEC
SODIUM SERPL-SCNC: 143 MMOL/L
URATE SERPL-MCNC: 3.7 MG/DL

## 2022-05-30 NOTE — HISTORY OF PRESENT ILLNESS
[FreeTextEntry1] : Patient is a 56-year-old man who was seen in follow-up evaluation for his prior atrial fibrillation.\par \par From an arrhythmia standpoint he has been feeling well and has not had recurrent palpitations.\par \par \par Echocardiogram from 4/25/2022 showed normal left ventricular systolic function\par He is status post catheter ablation for atrial fibrillation.  The patient has an implantable loop monitor and this was interrogated today and shows he has not had any recurrence of A. fib.  In follow-up he is feeling well has not had any recurrence of palpitations.  He also denied any symptoms of shortness of breath, dizziness, lightheadedness.\par \par \par \par EKG done today showed sinus rhythm.\par \par \par Interrogation of implantable loop monitor did not show any A. fib since the procedure.\par \par The patient has a prior history of mantle cell lymphoma.  He has got a previous cycles of chemotherapy.\par \par The patient has had prior chest discomfort and previously underwent cardiac catheterization which showed nonobstructive coronaries.\par \par He has had previous atrial flutter and underwent catheter ablation for atrial flutter.\par \par He subsequently had atrial fibrillation which is being treated with medical therapy.  We had discussed catheter ablation procedure for atrial fibrillation and had reviewed with his hematologist oncologist the possibility of using anticoagulants especially post procedure.  He was clear to be treated with a NOAC/anticoagulant.\par \par  [id number] score: 0 - No history of diabetes, hypertension, TIA, CVA, vascular disease\par \par Echocardiogram from 4/1/2019 showed left atrial diameter 3.7 cm left atrial volume index 36 cc/m², LVEF 40 to 45%.\par

## 2022-05-30 NOTE — DISCUSSION/SUMMARY
[FreeTextEntry1] : From an arrhythmia standpoint the patient is doing well and has not had recurrence of atrial fibrillation.\par \par His blood pressure is controlled.\par \par Is on low-dose carvedilol, we will reassess this over time.  He has not had any significant bradycardia on implantable monitor.\par \par His LV systolic function is normal\par \par \par He is seeing heme-onc in follow-up for further treatment of his lymphoma.\par Follow-up remote monitoring.\par \par Follow-up with his cardiologist Dr. Everardo Madsen. \par Follow-up electrophysiology in 6 to 8 months.\par \par \par \par

## 2022-05-31 ENCOUNTER — LABORATORY RESULT (OUTPATIENT)
Age: 57
End: 2022-05-31

## 2022-05-31 ENCOUNTER — RESULT REVIEW (OUTPATIENT)
Age: 57
End: 2022-05-31

## 2022-05-31 ENCOUNTER — APPOINTMENT (OUTPATIENT)
Dept: HEMATOLOGY ONCOLOGY | Facility: CLINIC | Age: 57
End: 2022-05-31
Payer: MEDICARE

## 2022-05-31 VITALS
RESPIRATION RATE: 16 BRPM | SYSTOLIC BLOOD PRESSURE: 107 MMHG | HEIGHT: 71 IN | DIASTOLIC BLOOD PRESSURE: 73 MMHG | BODY MASS INDEX: 29.16 KG/M2 | TEMPERATURE: 97.8 F | WEIGHT: 208.31 LBS | OXYGEN SATURATION: 99 % | HEART RATE: 79 BPM

## 2022-05-31 LAB
ALBUMIN SERPL ELPH-MCNC: 4.8 G/DL
ALP BLD-CCNC: 70 U/L
ALT SERPL-CCNC: 27 U/L
ANION GAP SERPL CALC-SCNC: 14 MMOL/L
APTT BLD: 23.6 SEC
AST SERPL-CCNC: 23 U/L
BASOPHILS # BLD AUTO: 0.02 K/UL — SIGNIFICANT CHANGE UP (ref 0–0.2)
BASOPHILS NFR BLD AUTO: 0.9 % — SIGNIFICANT CHANGE UP (ref 0–2)
BILIRUB SERPL-MCNC: 0.7 MG/DL
BUN SERPL-MCNC: 20 MG/DL
CALCIUM SERPL-MCNC: 9.6 MG/DL
CHLORIDE SERPL-SCNC: 103 MMOL/L
CK SERPL-CCNC: 100 U/L
CO2 SERPL-SCNC: 26 MMOL/L
CREAT SERPL-MCNC: 1.15 MG/DL
CRP SERPL-MCNC: <3 MG/L
DEPRECATED D DIMER PPP IA-ACNC: <150 NG/ML DDU
EGFR: 74 ML/MIN/1.73M2
EOSINOPHIL # BLD AUTO: 0.15 K/UL — SIGNIFICANT CHANGE UP (ref 0–0.5)
EOSINOPHIL NFR BLD AUTO: 6.9 % — HIGH (ref 0–6)
FERRITIN SERPL-MCNC: 77 NG/ML
FIBRINOGEN PPP COAG.DERIVED-MCNC: 219 MG/DL
GLUCOSE SERPL-MCNC: 106 MG/DL
HCT VFR BLD CALC: 39.1 % — SIGNIFICANT CHANGE UP (ref 39–50)
HGB BLD-MCNC: 12.8 G/DL — LOW (ref 13–17)
IMM GRANULOCYTES NFR BLD AUTO: 0.5 % — SIGNIFICANT CHANGE UP (ref 0–1.5)
INR PPP: 0.98 RATIO
LDH SERPL-CCNC: 218 U/L
LYMPHOCYTES # BLD AUTO: 0.67 K/UL — LOW (ref 1–3.3)
LYMPHOCYTES # BLD AUTO: 30.7 % — SIGNIFICANT CHANGE UP (ref 13–44)
MAGNESIUM SERPL-MCNC: 2.1 MG/DL
MCHC RBC-ENTMCNC: 32.7 G/DL — SIGNIFICANT CHANGE UP (ref 32–36)
MCHC RBC-ENTMCNC: 32.9 PG — SIGNIFICANT CHANGE UP (ref 27–34)
MCV RBC AUTO: 100.5 FL — HIGH (ref 80–100)
MONOCYTES # BLD AUTO: 0.5 K/UL — SIGNIFICANT CHANGE UP (ref 0–0.9)
MONOCYTES NFR BLD AUTO: 22.9 % — HIGH (ref 2–14)
NEUTROPHILS # BLD AUTO: 0.83 K/UL — LOW (ref 1.8–7.4)
NEUTROPHILS NFR BLD AUTO: 38.1 % — LOW (ref 43–77)
NRBC # BLD: 0 /100 WBCS — SIGNIFICANT CHANGE UP (ref 0–0)
NT-PROBNP SERPL-MCNC: 99 PG/ML
PHOSPHATE SERPL-MCNC: 3.3 MG/DL
PLATELET # BLD AUTO: 185 K/UL — SIGNIFICANT CHANGE UP (ref 150–400)
POTASSIUM SERPL-SCNC: 5.1 MMOL/L
PROT SERPL-MCNC: 6.4 G/DL
PT BLD: 11.6 SEC
RBC # BLD: 3.89 M/UL — LOW (ref 4.2–5.8)
RBC # FLD: 16.8 % — HIGH (ref 10.3–14.5)
SODIUM SERPL-SCNC: 143 MMOL/L
URATE SERPL-MCNC: 3.9 MG/DL
WBC # BLD: 2.18 K/UL — LOW (ref 3.8–10.5)
WBC # FLD AUTO: 2.18 K/UL — LOW (ref 3.8–10.5)

## 2022-05-31 PROCEDURE — 99214 OFFICE O/P EST MOD 30 MIN: CPT

## 2022-05-31 NOTE — ASSESSMENT
[FreeTextEntry1] : 55 y/o gentleman with hx of mantle cell lymphoma initially dx'ed Oct 2016 s/p RCHOP chemotherapy X 4 cycles and RICE X 2 cycles with stem cell collected off of the 2nd RICE..he is now s/p CBV-conditioned autologous stem cell transplant on 4/5/17. Hospital course complicated by a-fib/a-flutter s/p ablation and now stable on Cardizem and Toprol. Also now on Xarelto. Port also removed. He demonstrated engraftment on 4/16 and d/c'ed in stable condition on 5/5.\par \par 1) Mantle Cell lymphoma\par S/P AUTO PBSCT 4/5/17\par Rituxan maintenance complete (Short Rituxan, once a week for 2 weeks). Last Rituxan treatment was  on 1/22/20  (Short Rituxan, once a week for 2 weeks).\par CT scans completed 7/1/21: PENELOPE\par PET CT 10/28/21 Completed. Concern for relapse disease. Lymph node biopsy completed on 11/12/21.  Results reviewed..c/w MCL.... \par Discussed being placed on calquence vs Silas rituxan. Patient information printed and given to patient/spouse. Side effects explained including serious side effect of AFIB. Patient has a known cardiac history. Message left for pt's cardiologist..\par Long term goal would be Car T....tecartus... This is why I would like to avoid Silas..Literature and consents provided for cart..discussed risks, benefits, alternatives, rationale and logistics..3 week hospital stay discussed...tlc placement...lymphodepleting prep, monitoring for CRS and neurotox, prolonged cytopenias and hypogamma also discussed\par \par Continue calquence 100 mg capsule- take 1 capsule twice a day...worsening GI sx on protonix...instructed on how to administer\par \par 3/11/22: Will stop Calquence on 3/14/22 ;1 week prior to CAR-T collection...resume 3/23..stop 4/11 in prep for admit 4/14...\par \par 5/31/22: S/P CAR-T therapy with Tecartus on 4/19/22\par \par 2) Heme\par Counts stable. No indication for transfusion today. \par \par Negative assessment  for CRS and neurotox...\par \par F/U PET CT  ordered to be done on March 31; partial response status at time of CAR-T\par repeat at day 30, 100. \par 30 days post PET CT done on 5/25/22.Result as follows:\par -No evidence of FDG avid disease or active lymphoma. Interval resolution of minimal FDG activity associated with bilateral external iliac and bilateral inguinal lymph nodes which have either resolved or significantly decreased in size and not well delineated on CT.\par - Interval resolution of mild FDG avidity in the distal esophagus.\par - Unchanged nonspecific mildly heterogeneous small bowel and pancolonic FDG avidity. Please correlate clinically.\par \par 3) ID\par Continue prophylaxis\par \par 4) GI\par Tums prn for acid reflux\par Pantoprazole 40mg daily;\par \par 5) Afib\par Continue medications as prescribed by Cardiologist\par Had an  appointment with cardiologist on 5/23/22.......no new recommendations from cardilogist\par Continue to f/u with cardiologist as per recommendations\par \par 6) Other\par Oxycodone for leg pain\par Headaches- tylenol prn\par Dizziness-Advised to make slow position changes-RESOLVED\par Continue medications as prescribed\par Maintain f/u with cardiology and GI \par Maintain f/u with his regular internist. \par Well care and cancer screening stressed\par Patient has been advised to contact clinic if he has any concerns or signs of CRS / neurotoxicity.\par \par Completed Post transplant vaccines:\par 12 month vaccines completed in April 2018. \par 14 month vaccines completed July 2018. \par 24 month re-vaccinations completed in April 2019. \par Will need to repeat post CAR-T\par \par 7) Plan\par Follow up on 6/7/22 with Dr. Banks\par Wallet card and FACT sheet provided again prior to d/c.\par Pt advised no driving for 8 weeks post \par Quests addressed..restrictions discussed\par \par I examined patient under Dr. Banks's supervision and Dr. Banks agrees to plan of care as listed above\par \par \par \par \par \par \par

## 2022-05-31 NOTE — PHYSICAL EXAM
[Ambulatory and capable of all self care but unable to carry out any work activities] : Status 2- Ambulatory and capable of all self care but unable to carry out any work activities. Up and about more than 50% of waking hours [Normal] : affect appropriate [de-identified] : healed scar from port removal

## 2022-05-31 NOTE — DISCHARGE NOTE NURSING/CASE MANAGEMENT/SOCIAL WORK - PATIENT PORTAL LINK FT
n/a
You can access the FollowMyHealth Patient Portal offered by [facility] by registering at the following website: [email or web address]. By joining [employer]’s FollowMyHealth portal, you will also be able to view your health information using other applications (apps) compatible with our system.

## 2022-05-31 NOTE — REVIEW OF SYSTEMS
[Fatigue] : fatigue [Muscle Pain] : muscle pain [Muscle Weakness] : muscle weakness [Negative] : Neurological [Fever] : no fever [Chills] : no chills [Night Sweats] : no night sweats [Recent Change In Weight] : ~T no recent weight change [Dysphagia] : no dysphagia [Loss of Hearing] : no loss of hearing [Nosebleeds] : no nosebleeds [Hoarseness] : no hoarseness [Odynophagia] : no odynophagia [Mucosal Pain] : no mucosal pain [Abdominal Pain] : no abdominal pain [Vomiting] : no vomiting [Constipation] : no constipation [Diarrhea] : no diarrhea [Joint Pain] : no joint pain [Joint Stiffness] : no joint stiffness [Skin Rash] : no skin rash [Skin Wound] : no skin wound [Easy Bleeding] : no tendency for easy bleeding [Easy Bruising] : no tendency for easy bruising [FreeTextEntry7] : Acid reflux,Epigastric pain -RESOLVED [de-identified] : Right groin lymphadenopathy decreasing in size

## 2022-05-31 NOTE — HISTORY OF PRESENT ILLNESS
[de-identified] : Mr. Chavez is a 53 year old male with a history of MCL, initially diagnosed 10/2016. On 11/15/2016 he was seen by Dr. Bravo at [facility], after noticing a left inguinal mass for which he sought medical attention. CT scans (3/21/2016) revealed left-sided pelvic and left inguinal adenopathy. A 0.7 cm indeterminate lesion in the right iliac bone was also noted. A left inguinal lymph node biopsy was performed 9/24/2016 which revealed morphologic and immunophenotypic findings consistent with mantle cell lymphoma. He is status post 4 cycles of R-CHOP, and two cycles of RICE consolidation. A bone marrow biopsy on 2/10/17 showed normocellular bone marrow with trilineage hematopoiesis and maturation. He was admitted 3/27/17 for an autologous peripheral blood stem cell transplant with CBV  regimen. \par  \par Upon admission, a TLC was placed in IR. Mr. Chavez received IV hydration, nutritional support, pain management, antinausea medication, antidiarrheals, antibiotic, antiviral, PCP, antifungal and GI prophylaxis. Labs were monitored daily. Mr. Chavez received transfusional support and electrolyte repletion as needed. When his ANC dropped below 1000, he was started on prophylactic ciprofloxacin. When he became febrile, his antibiotic coverage was broadened as appropriate. \par  \par On 4/5/17, Mr. Chavez received 314ml of thawed, pooled, washed, mobilized, plasma reduced, HPC apheresis over approximately 1 hour. He tolerated the infusion well with no adverse side effects noted. Engraftment was noted on 4/16/17, and the Zarxio and antibiotics were discontinued. \par  \par Mr. Chavez's transplant course was complicated by recurrent headaches, oral and GI mucositis, dizziness, and chest pain resulting in a RRT on 4/10/17. The chest pain was self limiting. A second RRT was called on 4/16/17, for palpitations and dizziness. Mr. Chavez was found to be in atrial flutter with RVR. He was transferred to telemetry and followed by cardiology. \par A-fib/a-flutter was resistant to single rate agent, and while in arrhythmia, pt would become symptomatic, hypotensive. Ablation was was preformed, however was unsuccessful. Pt continued to have frequent changes in rhythm. As a-fib was usually preceded by PAC, and mediport tip was in r atrium, concern was this may have been involved. Mediport was removed but patient continued to have runs of non conducted APCs. Patient was followed by EP during this admission who ruled out any need for PPM insertion. Patient was started on Cardizem and Toprol which he tolerated well. He did have episodes of bradycardia during his sleep with episodes of Atrial flutter with RVR as well. However, he remained asymptomatic. \par Pt was d/c 5/5/2017 to home in stable condition with instructions to follow up with hematology and EP. [de-identified] : Presenting today for a follow up appointment. He has completed  maintenance Rituxan. Last Rituxan was received in jan... PENELOPE....Patient reports continued fatigue. Recent CT/PET scan PENELOPE.  Patient also offers that he had the shingles and was treated with valtrex..some residual discomfort...He continues to be followed by cardiology; his cardiologist increased the Diltiazem dosage to 180mg q daily, however patient reported feeling increasingly fatigued and returned to 120mg q daily and now notes less fatigue. He is in eval for a cardiac procedure.. His cardiologist was waiting for the rituxan to be completed and covid to pass..He is compliant with all medications. Denies mouth sores, eye dryness, nausea, vomiting, diarrhea. No LE edema, CP or SOB. \par \par On 10/18/21, patient presents for a follow up visit. Overall patient is well. States has generalized pruritus and recently was seen by dermatologist. Has been applying triamcinolone cream with no relief. States recently felt a "lump" of the right groin. Given the patients past medical history patient is very concerned. Denies fever, chills, nausea, vomiting, diarrhea, rash, mouth sores, dysuria or any signs of active bleeding. Denies SOB, chest pain or B/L LE edema. Remains compliant with medications prescribed. \par \par On 11/30//21, patient presents with spouse for a follow up visit. Overall patient feels fatigued. Had a lymph node biopsy on 11/12/21 and results are c/w relapsed MCL.. Denies fever, chills, nausea, vomiting, diarrhea, rash, mouth sores, dysuria or any signs of active bleeding. Denies SOB, chest pain or B/L LE edema. Remains compliant with medications prescribed. Here to discuss further therapy..\par \par On 1/25/22, patient presents for a follow up visit.  On calquence and not  tolerating well. Was in ER with severe epigastric pain...now on protonix...Complains of acid reflux and takes tums prn. Complains of headaches every other day. Denies fever, chills, nausea, vomiting, diarrhea, rash, mouth sores, dysuria or any signs of active bleeding. Denies SOB, chest pain or B/L LE edema. in wheelchair today\par \par 2/18/22: Patient is here for a follow up visit.He states that he still has epigastric pain with Calquence.He takes Pantoprazole as prescribed.Denies fever,chills, nausea, vomiting, diarrhea,rash, mouth sores,SOB,chest pain or any signs of active bleeding.He has an appointment with PCP tomorrow to get a referral for an endoscopy by GI.\par \par 5/10//22:Patient is seen for a follow up visit s/p  CAR-T treatment with Tecartus....He is accompanied by his wife...He describes fatigue and weakness...using oxycodone for pain in legs...likely neuropathic.. Denies fever, chills, nausea, vomiting, diarrhea,mouth sores, skin rash,SOB,chest pain or any signs of active bleeding. He states that he had occasional epigastric pain from Calquence which  is improved off drug....He is off calquence and blood thinners at this time...Hospital course was complicated by CRS and neurotox...see d/c summary...\par \par 5/18/22: Patient is here for a follow up visit post CAR-T treatment with Tecartus.Today is Day +28.He is here accompanied by his wife.He states that he still feels fatigue and weakness.He continues to use Oxycodone for pain in legs and occasional headache.Denies fever, chills, blurring of vision,SOB, chest pain, mouth sores,nausea, vomiting, diarrhea,skin rash, dysuria or any signs of active bleeding. He states that few days ago when he stood up from sitting position,he felt dizziness which resolved on its own after few minutes.\par \par 5/24/22:Today is Day +35 post CAR-T therapy with Tecartus.He is accompanied by his wife.He states that he continues to have pricking pain on B/L feet and that he continues to take Gabapentin as prescribed. Denies fever, chills, SOB,chest pain, rash, mouth sores,nausea, vomiting, diarrhea,dysuria or any signs of active bleeding.He had a f/u appointment with cardiology yesterday and there are no changes or adjustments to any of the of the medications. He states that he did not have any episodes of dizziness since last week.\par \par 5/31/22: Patient is here for a follow up visit post CAR-T therapy with Tecartus. Today is Day + 42.Overall feels better than last week.Denies fever, chills, nausea, vomiting, diarrhea, mouth sores, rash, dysuria,SOB,chest pain or any signs of active bleeding.

## 2022-06-01 LAB
CMV DNA SPEC QL NAA+PROBE: ABNORMAL IU/ML
CMVPCR LOG: ABNORMAL LOG10IU/ML

## 2022-06-07 ENCOUNTER — APPOINTMENT (OUTPATIENT)
Dept: HEMATOLOGY ONCOLOGY | Facility: CLINIC | Age: 57
End: 2022-06-07
Payer: MEDICARE

## 2022-06-07 ENCOUNTER — RESULT REVIEW (OUTPATIENT)
Age: 57
End: 2022-06-07

## 2022-06-07 ENCOUNTER — LABORATORY RESULT (OUTPATIENT)
Age: 57
End: 2022-06-07

## 2022-06-07 VITALS
HEART RATE: 71 BPM | TEMPERATURE: 97 F | DIASTOLIC BLOOD PRESSURE: 74 MMHG | BODY MASS INDEX: 29.4 KG/M2 | RESPIRATION RATE: 16 BRPM | OXYGEN SATURATION: 99 % | SYSTOLIC BLOOD PRESSURE: 111 MMHG | WEIGHT: 210.76 LBS

## 2022-06-07 DIAGNOSIS — Z86.69 PERSONAL HISTORY OF OTHER DISEASES OF THE NERVOUS SYSTEM AND SENSE ORGANS: ICD-10-CM

## 2022-06-07 LAB
ALBUMIN SERPL ELPH-MCNC: 4.7 G/DL
ALP BLD-CCNC: 66 U/L
ALT SERPL-CCNC: 27 U/L
ANION GAP SERPL CALC-SCNC: 12 MMOL/L
APTT BLD: 24.3 SEC
AST SERPL-CCNC: 30 U/L
BASOPHILS # BLD AUTO: 0.02 K/UL — SIGNIFICANT CHANGE UP (ref 0–0.2)
BASOPHILS NFR BLD AUTO: 1 % — SIGNIFICANT CHANGE UP (ref 0–2)
BILIRUB SERPL-MCNC: 1 MG/DL
BUN SERPL-MCNC: 20 MG/DL
CALCIUM SERPL-MCNC: 9.4 MG/DL
CHLORIDE SERPL-SCNC: 105 MMOL/L
CK SERPL-CCNC: 242 U/L
CO2 SERPL-SCNC: 26 MMOL/L
CREAT SERPL-MCNC: 1.12 MG/DL
CRP SERPL-MCNC: <3 MG/L
DEPRECATED D DIMER PPP IA-ACNC: <150 NG/ML DDU
EGFR: 77 ML/MIN/1.73M2
EOSINOPHIL # BLD AUTO: 0.2 K/UL — SIGNIFICANT CHANGE UP (ref 0–0.5)
EOSINOPHIL NFR BLD AUTO: 9.8 % — HIGH (ref 0–6)
FERRITIN SERPL-MCNC: 71 NG/ML
FIBRINOGEN PPP COAG.DERIVED-MCNC: 209 MG/DL
GLUCOSE SERPL-MCNC: 102 MG/DL
HCT VFR BLD CALC: 37.2 % — LOW (ref 39–50)
HGB BLD-MCNC: 12.4 G/DL — LOW (ref 13–17)
IMM GRANULOCYTES NFR BLD AUTO: 0.5 % — SIGNIFICANT CHANGE UP (ref 0–1.5)
INR PPP: 1.02 RATIO
LDH SERPL-CCNC: 232 U/L
LYMPHOCYTES # BLD AUTO: 0.57 K/UL — LOW (ref 1–3.3)
LYMPHOCYTES # BLD AUTO: 27.9 % — SIGNIFICANT CHANGE UP (ref 13–44)
MAGNESIUM SERPL-MCNC: 2.3 MG/DL
MCHC RBC-ENTMCNC: 33.3 G/DL — SIGNIFICANT CHANGE UP (ref 32–36)
MCHC RBC-ENTMCNC: 33.6 PG — SIGNIFICANT CHANGE UP (ref 27–34)
MCV RBC AUTO: 100.8 FL — HIGH (ref 80–100)
MONOCYTES # BLD AUTO: 0.52 K/UL — SIGNIFICANT CHANGE UP (ref 0–0.9)
MONOCYTES NFR BLD AUTO: 25.5 % — HIGH (ref 2–14)
NEUTROPHILS # BLD AUTO: 0.72 K/UL — LOW (ref 1.8–7.4)
NEUTROPHILS NFR BLD AUTO: 35.3 % — LOW (ref 43–77)
NRBC # BLD: 0 /100 WBCS — SIGNIFICANT CHANGE UP (ref 0–0)
NT-PROBNP SERPL-MCNC: 202 PG/ML
PHOSPHATE SERPL-MCNC: 3.3 MG/DL
PLATELET # BLD AUTO: 171 K/UL — SIGNIFICANT CHANGE UP (ref 150–400)
POTASSIUM SERPL-SCNC: 4.7 MMOL/L
PROT SERPL-MCNC: 6.4 G/DL
PT BLD: 11.9 SEC
RBC # BLD: 3.69 M/UL — LOW (ref 4.2–5.8)
RBC # FLD: 17.1 % — HIGH (ref 10.3–14.5)
SODIUM SERPL-SCNC: 142 MMOL/L
URATE SERPL-MCNC: 4.2 MG/DL
WBC # BLD: 2.04 K/UL — LOW (ref 3.8–10.5)
WBC # FLD AUTO: 2.04 K/UL — LOW (ref 3.8–10.5)

## 2022-06-07 PROCEDURE — 99215 OFFICE O/P EST HI 40 MIN: CPT

## 2022-06-08 LAB
CMV DNA SPEC QL NAA+PROBE: NOT DETECTED IU/ML
CMVPCR LOG: NOT DETECTED LOG10IU/ML

## 2022-06-10 ENCOUNTER — APPOINTMENT (OUTPATIENT)
Dept: CARDIOLOGY | Facility: CLINIC | Age: 57
End: 2022-06-10
Payer: MEDICARE

## 2022-06-10 ENCOUNTER — NON-APPOINTMENT (OUTPATIENT)
Age: 57
End: 2022-06-10

## 2022-06-10 PROCEDURE — 93298 REM INTERROG DEV EVAL SCRMS: CPT

## 2022-06-10 PROCEDURE — G2066: CPT

## 2022-06-10 NOTE — REVIEW OF SYSTEMS
[Fatigue] : fatigue [Muscle Weakness] : muscle weakness [Negative] : Allergic/Immunologic [Fever] : no fever [Chills] : no chills [Night Sweats] : no night sweats [Recent Change In Weight] : ~T no recent weight change [Loss of Hearing] : no loss of hearing [Dysphagia] : no dysphagia [Nosebleeds] : no nosebleeds [Hoarseness] : no hoarseness [Odynophagia] : no odynophagia [Mucosal Pain] : no mucosal pain [Abdominal Pain] : no abdominal pain [Vomiting] : no vomiting [Constipation] : no constipation [Diarrhea] : no diarrhea [Joint Pain] : no joint pain [Joint Stiffness] : no joint stiffness [Muscle Pain] : no muscle pain [Skin Rash] : no skin rash [Skin Wound] : no skin wound [Easy Bleeding] : no tendency for easy bleeding [Easy Bruising] : no tendency for easy bruising [FreeTextEntry7] : Acid reflux,Epigastric pain -RESOLVED [de-identified] : Right groin lymphadenopathy decreasing in size

## 2022-06-10 NOTE — PHYSICAL EXAM
[Ambulatory and capable of all self care but unable to carry out any work activities] : Status 2- Ambulatory and capable of all self care but unable to carry out any work activities. Up and about more than 50% of waking hours [Normal] : affect appropriate [de-identified] : healed scar from port removal

## 2022-06-10 NOTE — ASSESSMENT
[FreeTextEntry1] : 55 y/o gentleman with hx of mantle cell lymphoma initially dx'ed Oct 2016 s/p RCHOP chemotherapy X 4 cycles and RICE X 2 cycles with stem cell collected off of the 2nd RICE..he is now s/p CBV-conditioned autologous stem cell transplant on 4/5/17. Hospital course complicated by a-fib/a-flutter s/p ablation and now stable on Cardizem and Toprol. Also now on Xarelto. Port also removed. He demonstrated engraftment on 4/16 and d/c'ed in stable condition on 5/5.\par \par 1) Mantle Cell lymphoma\par S/P AUTO PBSCT 4/5/17\par Rituxan maintenance complete (Short Rituxan, once a week for 2 weeks). Last Rituxan treatment was  on 1/22/20  (Short Rituxan, once a week for 2 weeks).\par CT scans completed 7/1/21: PENELOPE\par PET CT 10/28/21 Completed. Concern for relapse disease. Lymph node biopsy completed on 11/12/21.  Results reviewed..c/w MCL.... \par Discussed being placed on calquence vs Silas rituxan. Patient information printed and given to patient/spouse. Side effects explained including serious side effect of AFIB. Patient has a known cardiac history. Message left for pt's cardiologist..\par Long term goal would be Car T....tecartus... This is why I would like to avoid Silas..Literature and consents provided for cart..discussed risks, benefits, alternatives, rationale and logistics..3 week hospital stay discussed...tlc placement...lymphodepleting prep, monitoring for CRS and neurotox, prolonged cytopenias and hypogamma also discussed\par \par Continue calquence 100 mg capsule- take 1 capsule twice a day...worsening GI sx on protonix...instructed on how to administer\par \par 3/11/22: Will stop Calquence on 3/14/22 ;1 week prior to CAR-T collection...resume 3/23..stop 4/11 in prep for admit 4/14...\par \par 5/31/22: S/P CAR-T therapy with Tecartus on 4/19/22\par \par 2) Heme\par Counts stable. No indication for transfusion today. \par \par Negative assessment  for CRS and neurotox...\par \par F/U PET CT  ordered to be done on March 31; partial response status at time of CAR-T\par repeat at day 30, 100. \par 30 days post PET CT done on 5/25/22.Result as follows:\par -No evidence of FDG avid disease or active lymphoma. Interval resolution of minimal FDG activity associated with bilateral external iliac and bilateral inguinal lymph nodes which have either resolved or significantly decreased in size and not well delineated on CT.\par - Interval resolution of mild FDG avidity in the distal esophagus.\par - Unchanged nonspecific mildly heterogeneous small bowel and pancolonic FDG avidity. Please correlate clinically.\par \par 3) ID\par Continue prophylaxis\par \par 4) GI\par Tums prn for acid reflux\par Pantoprazole 40mg daily;\par \par 5) Afib\par Continue medications as prescribed by Cardiologist\par Had an  appointment with cardiologist on 5/23/22.......no new recommendations from cardiologist\par Continue to f/u with cardiologist as per recommendations\par \par 6) Other\par Oxycodone for leg pain prn\par Headaches- tylenol prn\par Dizziness-Advised to make slow position changes-RESOLVED\par Continue medications as prescribed\par Maintain f/u with cardiology and GI \par Maintain f/u with his regular internist. \par Well care and cancer screening stressed\par Patient has been advised to contact clinic if he has any concerns or signs of CRS / neurotoxicity.\par \par Completed Post transplant vaccines:\par 12 month vaccines completed in April 2018. \par 14 month vaccines completed July 2018. \par 24 month re-vaccinations completed in April 2019. \par Will need to repeat post CAR-T\par \par 7) Plan\par Follow up weekly\par Wallet card and FACT sheet provided again prior to d/c.\par Pt advised no driving for 8 weeks post \par Quests addressed..restrictions discussed\par \par \par \par \par \par \par

## 2022-06-10 NOTE — HISTORY OF PRESENT ILLNESS
[de-identified] : Mr. Chavez is a 53 year old male with a history of MCL, initially diagnosed 10/2016. On 11/15/2016 he was seen by Dr. Bravo at [facility], after noticing a left inguinal mass for which he sought medical attention. CT scans (3/21/2016) revealed left-sided pelvic and left inguinal adenopathy. A 0.7 cm indeterminate lesion in the right iliac bone was also noted. A left inguinal lymph node biopsy was performed 9/24/2016 which revealed morphologic and immunophenotypic findings consistent with mantle cell lymphoma. He is status post 4 cycles of R-CHOP, and two cycles of RICE consolidation. A bone marrow biopsy on 2/10/17 showed normocellular bone marrow with trilineage hematopoiesis and maturation. He was admitted 3/27/17 for an autologous peripheral blood stem cell transplant with CBV  regimen. \par  \par Upon admission, a TLC was placed in IR. Mr. Chavez received IV hydration, nutritional support, pain management, antinausea medication, antidiarrheals, antibiotic, antiviral, PCP, antifungal and GI prophylaxis. Labs were monitored daily. Mr. Chavez received transfusional support and electrolyte repletion as needed. When his ANC dropped below 1000, he was started on prophylactic ciprofloxacin. When he became febrile, his antibiotic coverage was broadened as appropriate. \par  \par On 4/5/17, Mr. Chavez received 314ml of thawed, pooled, washed, mobilized, plasma reduced, HPC apheresis over approximately 1 hour. He tolerated the infusion well with no adverse side effects noted. Engraftment was noted on 4/16/17, and the Zarxio and antibiotics were discontinued. \par  \par Mr. Chavez's transplant course was complicated by recurrent headaches, oral and GI mucositis, dizziness, and chest pain resulting in a RRT on 4/10/17. The chest pain was self limiting. A second RRT was called on 4/16/17, for palpitations and dizziness. Mr. Chavez was found to be in atrial flutter with RVR. He was transferred to telemetry and followed by cardiology. \par A-fib/a-flutter was resistant to single rate agent, and while in arrhythmia, pt would become symptomatic, hypotensive. Ablation was was preformed, however was unsuccessful. Pt continued to have frequent changes in rhythm. As a-fib was usually preceded by PAC, and mediport tip was in r atrium, concern was this may have been involved. Mediport was removed but patient continued to have runs of non conducted APCs. Patient was followed by EP during this admission who ruled out any need for PPM insertion. Patient was started on Cardizem and Toprol which he tolerated well. He did have episodes of bradycardia during his sleep with episodes of Atrial flutter with RVR as well. However, he remained asymptomatic. \par Pt was d/c 5/5/2017 to home in stable condition with instructions to follow up with hematology and EP. [de-identified] : Presenting today for a follow up appointment. He has completed  maintenance Rituxan. Last Rituxan was received in jan... PENELOPE....Patient reports continued fatigue. Recent CT/PET scan PENELOPE.  Patient also offers that he had the shingles and was treated with valtrex..some residual discomfort...He continues to be followed by cardiology; his cardiologist increased the Diltiazem dosage to 180mg q daily, however patient reported feeling increasingly fatigued and returned to 120mg q daily and now notes less fatigue. He is in eval for a cardiac procedure.. His cardiologist was waiting for the rituxan to be completed and covid to pass..He is compliant with all medications. Denies mouth sores, eye dryness, nausea, vomiting, diarrhea. No LE edema, CP or SOB. \par \par On 10/18/21, patient presents for a follow up visit. Overall patient is well. States has generalized pruritus and recently was seen by dermatologist. Has been applying triamcinolone cream with no relief. States recently felt a "lump" of the right groin. Given the patients past medical history patient is very concerned. Denies fever, chills, nausea, vomiting, diarrhea, rash, mouth sores, dysuria or any signs of active bleeding. Denies SOB, chest pain or B/L LE edema. Remains compliant with medications prescribed. \par \par On 11/30//21, patient presents with spouse for a follow up visit. Overall patient feels fatigued. Had a lymph node biopsy on 11/12/21 and results are c/w relapsed MCL.. Denies fever, chills, nausea, vomiting, diarrhea, rash, mouth sores, dysuria or any signs of active bleeding. Denies SOB, chest pain or B/L LE edema. Remains compliant with medications prescribed. Here to discuss further therapy..\par \par On 1/25/22, patient presents for a follow up visit.  On calquence and not  tolerating well. Was in ER with severe epigastric pain...now on protonix...Complains of acid reflux and takes tums prn. Complains of headaches every other day. Denies fever, chills, nausea, vomiting, diarrhea, rash, mouth sores, dysuria or any signs of active bleeding. Denies SOB, chest pain or B/L LE edema. in wheelchair today\par \par 2/18/22: Patient is here for a follow up visit.He states that he still has epigastric pain with Calquence.He takes Pantoprazole as prescribed.Denies fever,chills, nausea, vomiting, diarrhea,rash, mouth sores,SOB,chest pain or any signs of active bleeding.He has an appointment with PCP tomorrow to get a referral for an endoscopy by GI.\par \par 5/10//22:Patient is seen for a follow up visit s/p  CAR-T treatment with [facility]....He is accompanied by his wife...He describes fatigue and weakness...using oxycodone for pain in legs...likely neuropathic.. Denies fever, chills, nausea, vomiting, diarrhea,mouth sores, skin rash,SOB,chest pain or any signs of active bleeding. He states that he had occasional epigastric pain from Calquence which  is improved off drug....He is off calquence and blood thinners at this time...Hospital course was complicated by CRS and neurotox...see d/c summary...\par \par 5/18/22: Patient is here for a follow up visit post CAR-T treatment with [facility].Today is Day +28.He is here accompanied by his wife.He states that he still feels fatigue and weakness.He continues to use Oxycodone for pain in legs and occasional headache.Denies fever, chills, blurring of vision,SOB, chest pain, mouth sores,nausea, vomiting, diarrhea,skin rash, dysuria or any signs of active bleeding. He states that few days ago when he stood up from sitting position,he felt dizziness which resolved on its own after few minutes.\par \par 5/24/22:Today is Day +35 post CAR-T therapy with [facility].He is accompanied by his wife.He states that he continues to have pricking pain on B/L feet and that he continues to take Gabapentin as prescribed. Denies fever, chills, SOB,chest pain, rash, mouth sores,nausea, vomiting, diarrhea,dysuria or any signs of active bleeding.He had a f/u appointment with cardiology yesterday and there are no changes or adjustments to any of the of the medications. He states that he did not have any episodes of dizziness since last week.\par \par 6/7//22: Patient is here for a follow up visit post CAR-T therapy with [facility]. .Overall feels better than last week.Denies fever, chills, nausea, vomiting, diarrhea, mouth sores, rash, dysuria,SOB,chest pain or any signs of active bleeding.

## 2022-06-14 ENCOUNTER — OUTPATIENT (OUTPATIENT)
Dept: OUTPATIENT SERVICES | Facility: HOSPITAL | Age: 57
LOS: 1 days | Discharge: HOME | End: 2022-06-14

## 2022-06-14 DIAGNOSIS — Z98.890 OTHER SPECIFIED POSTPROCEDURAL STATES: Chronic | ICD-10-CM

## 2022-06-14 DIAGNOSIS — Z95.818 PRESENCE OF OTHER CARDIAC IMPLANTS AND GRAFTS: Chronic | ICD-10-CM

## 2022-06-14 DIAGNOSIS — Z94.84 STEM CELLS TRANSPLANT STATUS: Chronic | ICD-10-CM

## 2022-06-14 DIAGNOSIS — C83.10 MANTLE CELL LYMPHOMA, UNSPECIFIED SITE: ICD-10-CM

## 2022-06-21 ENCOUNTER — APPOINTMENT (OUTPATIENT)
Dept: HEMATOLOGY ONCOLOGY | Facility: CLINIC | Age: 57
End: 2022-06-21
Payer: MEDICARE

## 2022-06-21 ENCOUNTER — RESULT REVIEW (OUTPATIENT)
Age: 57
End: 2022-06-21

## 2022-06-21 ENCOUNTER — NON-APPOINTMENT (OUTPATIENT)
Age: 57
End: 2022-06-21

## 2022-06-21 ENCOUNTER — LABORATORY RESULT (OUTPATIENT)
Age: 57
End: 2022-06-21

## 2022-06-21 VITALS
BODY MASS INDEX: 29.67 KG/M2 | RESPIRATION RATE: 16 BRPM | HEART RATE: 70 BPM | DIASTOLIC BLOOD PRESSURE: 86 MMHG | WEIGHT: 212.75 LBS | SYSTOLIC BLOOD PRESSURE: 129 MMHG | TEMPERATURE: 96.5 F | OXYGEN SATURATION: 96 %

## 2022-06-21 LAB
ALBUMIN SERPL ELPH-MCNC: 4.9 G/DL
ALP BLD-CCNC: 77 U/L
ALT SERPL-CCNC: 29 U/L
ANION GAP SERPL CALC-SCNC: 10 MMOL/L
APTT BLD: 24.8 SEC
AST SERPL-CCNC: 27 U/L
BASOPHILS # BLD AUTO: 0.02 K/UL — SIGNIFICANT CHANGE UP (ref 0–0.2)
BASOPHILS NFR BLD AUTO: 0.9 % — SIGNIFICANT CHANGE UP (ref 0–2)
BILIRUB SERPL-MCNC: 0.7 MG/DL
BUN SERPL-MCNC: 19 MG/DL
CALCIUM SERPL-MCNC: 9.6 MG/DL
CHLORIDE SERPL-SCNC: 104 MMOL/L
CK SERPL-CCNC: 163 U/L
CO2 SERPL-SCNC: 26 MMOL/L
CREAT SERPL-MCNC: 1.17 MG/DL
CRP SERPL-MCNC: <3 MG/L
DEPRECATED D DIMER PPP IA-ACNC: <150 NG/ML DDU
EGFR: 73 ML/MIN/1.73M2
EOSINOPHIL # BLD AUTO: 0.21 K/UL — SIGNIFICANT CHANGE UP (ref 0–0.5)
EOSINOPHIL NFR BLD AUTO: 9 % — HIGH (ref 0–6)
FERRITIN SERPL-MCNC: 45 NG/ML
FIBRINOGEN PPP COAG.DERIVED-MCNC: 202 MG/DL
GLUCOSE SERPL-MCNC: 111 MG/DL
HCT VFR BLD CALC: 41 % — SIGNIFICANT CHANGE UP (ref 39–50)
HGB BLD-MCNC: 13.4 G/DL — SIGNIFICANT CHANGE UP (ref 13–17)
IMM GRANULOCYTES NFR BLD AUTO: 0.4 % — SIGNIFICANT CHANGE UP (ref 0–1.5)
INR PPP: 1.03 RATIO
LDH SERPL-CCNC: 216 U/L
LYMPHOCYTES # BLD AUTO: 0.68 K/UL — LOW (ref 1–3.3)
LYMPHOCYTES # BLD AUTO: 29.1 % — SIGNIFICANT CHANGE UP (ref 13–44)
MAGNESIUM SERPL-MCNC: 2.2 MG/DL
MCHC RBC-ENTMCNC: 32.7 G/DL — SIGNIFICANT CHANGE UP (ref 32–36)
MCHC RBC-ENTMCNC: 33.3 PG — SIGNIFICANT CHANGE UP (ref 27–34)
MCV RBC AUTO: 101.7 FL — HIGH (ref 80–100)
MONOCYTES # BLD AUTO: 0.49 K/UL — SIGNIFICANT CHANGE UP (ref 0–0.9)
MONOCYTES NFR BLD AUTO: 20.9 % — HIGH (ref 2–14)
NEUTROPHILS # BLD AUTO: 0.93 K/UL — LOW (ref 1.8–7.4)
NEUTROPHILS NFR BLD AUTO: 39.7 % — LOW (ref 43–77)
NRBC # BLD: 0 /100 WBCS — SIGNIFICANT CHANGE UP (ref 0–0)
NT-PROBNP SERPL-MCNC: 77 PG/ML
PHOSPHATE SERPL-MCNC: 3.1 MG/DL
PLATELET # BLD AUTO: 203 K/UL — SIGNIFICANT CHANGE UP (ref 150–400)
POTASSIUM SERPL-SCNC: 5 MMOL/L
PROT SERPL-MCNC: 6.4 G/DL
PT BLD: 12.2 SEC
RBC # BLD: 4.03 M/UL — LOW (ref 4.2–5.8)
RBC # FLD: 16.6 % — HIGH (ref 10.3–14.5)
SODIUM SERPL-SCNC: 140 MMOL/L
URATE SERPL-MCNC: 4.1 MG/DL
WBC # BLD: 2.34 K/UL — LOW (ref 3.8–10.5)
WBC # FLD AUTO: 2.34 K/UL — LOW (ref 3.8–10.5)

## 2022-06-21 PROCEDURE — 99214 OFFICE O/P EST MOD 30 MIN: CPT

## 2022-06-22 LAB
CMV DNA SPEC QL NAA+PROBE: NOT DETECTED IU/ML
CMVPCR LOG: NOT DETECTED LOG10IU/ML

## 2022-06-22 NOTE — REVIEW OF SYSTEMS
[Muscle Weakness] : muscle weakness [Dysphagia] : no dysphagia [Loss of Hearing] : no loss of hearing [Nosebleeds] : no nosebleeds [Hoarseness] : no hoarseness [Odynophagia] : no odynophagia [Mucosal Pain] : no mucosal pain [Abdominal Pain] : no abdominal pain [Vomiting] : no vomiting [Constipation] : no constipation [Diarrhea] : no diarrhea [Joint Pain] : no joint pain [Joint Stiffness] : no joint stiffness [Muscle Pain] : no muscle pain [Skin Rash] : no skin rash [Skin Wound] : no skin wound [Easy Bleeding] : no tendency for easy bleeding [Easy Bruising] : no tendency for easy bruising [Negative] : Constitutional [FreeTextEntry7] : Acid reflux,Epigastric pain -RESOLVED [de-identified] : Right groin lymphadenopathy decreasing in size

## 2022-06-22 NOTE — ASSESSMENT
[FreeTextEntry1] : 57 y/o gentleman with hx of mantle cell lymphoma initially dx'ed Oct 2016 s/p RCHOP chemotherapy X 4 cycles and RICE X 2 cycles with stem cell collected off of the 2nd RICE..he is now s/p CBV-conditioned autologous stem cell transplant on 4/5/17. Hospital course complicated by a-fib/a-flutter s/p ablation and now stable on Cardizem and Toprol. Also now on Xarelto. Port also removed. He demonstrated engraftment on 4/16 and d/c'ed in stable condition on 5/5.\par \par 1) Mantle Cell lymphoma\par S/P AUTO PBSCT 4/5/17\par Rituxan maintenance complete (Short Rituxan, once a week for 2 weeks). Last Rituxan treatment was  on 1/22/20  (Short Rituxan, once a week for 2 weeks).\par CT scans completed 7/1/21: PENELOPE\par PET CT 10/28/21 Completed. Concern for relapse disease. Lymph node biopsy completed on 11/12/21.  Results reviewed..c/w MCL.... \par Discussed being placed on calquence vs Silas rituxan. Patient information printed and given to patient/spouse. Side effects explained including serious side effect of AFIB. Patient has a known cardiac history. Message left for pt's cardiologist..\par Long term goal would be Car T....tecartus... This is why I would like to avoid Silas..Literature and consents provided for cart..discussed risks, benefits, alternatives, rationale and logistics..3 week hospital stay discussed...tlc placement...lymphodepleting prep, monitoring for CRS and neurotox, prolonged cytopenias and hypogamma also discussed\par \par Continue calquence 100 mg capsule- take 1 capsule twice a day...worsening GI sx on protonix...instructed on how to administer\par \par 3/11/22: Will stop Calquence on 3/14/22 ;1 week prior to CAR-T collection...resume 3/23..stop 4/11 in prep for admit 4/14...\par \par 6/21/22: S/P CAR-T therapy with [facility] on 4/19/22\par \par 2) Heme\par Counts stable. No indication for transfusion today. \par \par Negative assessment  for CRS and neurotox...\par \par F/U PET CT  ordered to be done on March 31; partial response status at time of CAR-T\par repeat at day 30, 100. \par 30 days post PET CT done on 5/25/22.Result as follows:\par -No evidence of FDG avid disease or active lymphoma. Interval resolution of minimal FDG activity associated with bilateral external iliac and bilateral inguinal lymph nodes which have either resolved or significantly decreased in size and not well delineated on CT.\par - Interval resolution of mild FDG avidity in the distal esophagus.\par - Unchanged nonspecific mildly heterogeneous small bowel and pancolonic FDG avidity. Please correlate clinically.\par Repeat PET CT at Day 100\par \par 3) ID\par Continue prophylaxis\par \par 4) GI\par Tums prn for acid reflux\par Pantoprazole 40mg daily\par \par 5) Afib\par Continue medications as prescribed by Cardiologist\par Had an  appointment with cardiologist on 5/23/22.......no new recommendations from cardiologist\par Continue to f/u with cardiologist as per recommendations\par \par 6) Other\par Oxycodone for leg pain prn\par Headaches- tylenol prn\par Dizziness-Advised to make slow position changes-RESOLVED\par Continue medications as prescribed\par Maintain f/u with cardiology and GI \par Maintain f/u with his regular internist \par Well care and cancer screening stressed\par Patient has been advised to contact clinic if he has any concerns or signs of CRS / neurotoxicity\par \par Completed Post transplant vaccines:\par 12 month vaccines completed in April 2018. \par 14 month vaccines completed July 2018. \par 24 month re-vaccinations completed in April 2019. \par Will need to repeat post CAR-T\par \par 7) Plan\par Follow up weekly\par Wallet card and FACT sheet provided again prior to d/c\par Pt advised no driving for 8 weeks post \par Quests addressed..restrictions discussed\par \par I examined patient under Dr. Banks's supervision and Dr. Banks agrees to plan of care as listed above\par \par \par \par \par \par \par

## 2022-06-22 NOTE — PHYSICAL EXAM
[Ambulatory and capable of all self care but unable to carry out any work activities] : Status 2- Ambulatory and capable of all self care but unable to carry out any work activities. Up and about more than 50% of waking hours [Normal] : affect appropriate [de-identified] : healed scar from port removal

## 2022-06-22 NOTE — HISTORY OF PRESENT ILLNESS
[de-identified] : Mr. Chavez is a 53 year old male with a history of MCL, initially diagnosed 10/2016. On 11/15/2016 he was seen by Dr. Bravo at [facility], after noticing a left inguinal mass for which he sought medical attention. CT scans (3/21/2016) revealed left-sided pelvic and left inguinal adenopathy. A 0.7 cm indeterminate lesion in the right iliac bone was also noted. A left inguinal lymph node biopsy was performed 9/24/2016 which revealed morphologic and immunophenotypic findings consistent with mantle cell lymphoma. He is status post 4 cycles of R-CHOP, and two cycles of RICE consolidation. A bone marrow biopsy on 2/10/17 showed normocellular bone marrow with trilineage hematopoiesis and maturation. He was admitted 3/27/17 for an autologous peripheral blood stem cell transplant with CBV  regimen. \par  \par Upon admission, a TLC was placed in IR. Mr. Chavez received IV hydration, nutritional support, pain management, antinausea medication, antidiarrheals, antibiotic, antiviral, PCP, antifungal and GI prophylaxis. Labs were monitored daily. Mr. Chavez received transfusional support and electrolyte repletion as needed. When his ANC dropped below 1000, he was started on prophylactic ciprofloxacin. When he became febrile, his antibiotic coverage was broadened as appropriate. \par  \par On 4/5/17, Mr. Chavez received 314ml of thawed, pooled, washed, mobilized, plasma reduced, HPC apheresis over approximately 1 hour. He tolerated the infusion well with no adverse side effects noted. Engraftment was noted on 4/16/17, and the Zarxio and antibiotics were discontinued. \par  \par Mr. Chavez's transplant course was complicated by recurrent headaches, oral and GI mucositis, dizziness, and chest pain resulting in a RRT on 4/10/17. The chest pain was self limiting. A second RRT was called on 4/16/17, for palpitations and dizziness. Mr. Chavez was found to be in atrial flutter with RVR. He was transferred to telemetry and followed by cardiology. \par A-fib/a-flutter was resistant to single rate agent, and while in arrhythmia, pt would become symptomatic, hypotensive. Ablation was was preformed, however was unsuccessful. Pt continued to have frequent changes in rhythm. As a-fib was usually preceded by PAC, and mediport tip was in r atrium, concern was this may have been involved. Mediport was removed but patient continued to have runs of non conducted APCs. Patient was followed by EP during this admission who ruled out any need for PPM insertion. Patient was started on Cardizem and Toprol which he tolerated well. He did have episodes of bradycardia during his sleep with episodes of Atrial flutter with RVR as well. However, he remained asymptomatic. \par Pt was d/c 5/5/2017 to home in stable condition with instructions to follow up with hematology and EP. [de-identified] : Presenting today for a follow up appointment. He has completed  maintenance Rituxan. Last Rituxan was received in jan... PENELOPE....Patient reports continued fatigue. Recent CT/PET scan PENELOPE.  Patient also offers that he had the shingles and was treated with valtrex..some residual discomfort...He continues to be followed by cardiology; his cardiologist increased the Diltiazem dosage to 180mg q daily, however patient reported feeling increasingly fatigued and returned to 120mg q daily and now notes less fatigue. He is in eval for a cardiac procedure.. His cardiologist was waiting for the rituxan to be completed and covid to pass..He is compliant with all medications. Denies mouth sores, eye dryness, nausea, vomiting, diarrhea. No LE edema, CP or SOB. \par \par On 10/18/21, patient presents for a follow up visit. Overall patient is well. States has generalized pruritus and recently was seen by dermatologist. Has been applying triamcinolone cream with no relief. States recently felt a "lump" of the right groin. Given the patients past medical history patient is very concerned. Denies fever, chills, nausea, vomiting, diarrhea, rash, mouth sores, dysuria or any signs of active bleeding. Denies SOB, chest pain or B/L LE edema. Remains compliant with medications prescribed. \par \par On 11/30//21, patient presents with spouse for a follow up visit. Overall patient feels fatigued. Had a lymph node biopsy on 11/12/21 and results are c/w relapsed MCL.. Denies fever, chills, nausea, vomiting, diarrhea, rash, mouth sores, dysuria or any signs of active bleeding. Denies SOB, chest pain or B/L LE edema. Remains compliant with medications prescribed. Here to discuss further therapy..\par \par On 1/25/22, patient presents for a follow up visit.  On calquence and not  tolerating well. Was in ER with severe epigastric pain...now on protonix...Complains of acid reflux and takes tums prn. Complains of headaches every other day. Denies fever, chills, nausea, vomiting, diarrhea, rash, mouth sores, dysuria or any signs of active bleeding. Denies SOB, chest pain or B/L LE edema. in wheelchair today\par \par 2/18/22: Patient is here for a follow up visit.He states that he still has epigastric pain with Calquence.He takes Pantoprazole as prescribed.Denies fever,chills, nausea, vomiting, diarrhea,rash, mouth sores,SOB,chest pain or any signs of active bleeding.He has an appointment with PCP tomorrow to get a referral for an endoscopy by GI.\par \par 5/10//22:Patient is seen for a follow up visit s/p  CAR-T treatment with [facility]....He is accompanied by his wife...He describes fatigue and weakness...using oxycodone for pain in legs...likely neuropathic.. Denies fever, chills, nausea, vomiting, diarrhea,mouth sores, skin rash,SOB,chest pain or any signs of active bleeding. He states that he had occasional epigastric pain from Calquence which  is improved off drug....He is off calquence and blood thinners at this time...Hospital course was complicated by CRS and neurotox...see d/c summary...\par \par 5/18/22: Patient is here for a follow up visit post CAR-T treatment with [facility].Today is Day +28.He is here accompanied by his wife.He states that he still feels fatigue and weakness.He continues to use Oxycodone for pain in legs and occasional headache.Denies fever, chills, blurring of vision,SOB, chest pain, mouth sores,nausea, vomiting, diarrhea,skin rash, dysuria or any signs of active bleeding. He states that few days ago when he stood up from sitting position,he felt dizziness which resolved on its own after few minutes.\par \par 5/24/22:Today is Day +35 post CAR-T therapy with [facility].He is accompanied by his wife.He states that he continues to have pricking pain on B/L feet and that he continues to take Gabapentin as prescribed. Denies fever, chills, SOB,chest pain, rash, mouth sores,nausea, vomiting, diarrhea,dysuria or any signs of active bleeding.He had a f/u appointment with cardiology yesterday and there are no changes or adjustments to any of the of the medications. He states that he did not have any episodes of dizziness since last week.\par \par 6/7//22: Patient is here for a follow up visit post CAR-T therapy with [facility]. .Overall feels better than last week.Denies fever, chills, nausea, vomiting, diarrhea, mouth sores, rash, dysuria,SOB,chest pain or any signs of active bleeding.\par \par 6/21/22: Today is Day + 63 post CAR-T therapy with [facility].Patient is seen for a follow up visit today.Overall feels well with no acute concerns.Denies fever, chills, SOB,chest pain, rash, mouth sores, nausea, vomiting, diarrhea,dysuria or any signs of active bleeding.

## 2022-06-22 NOTE — REASON FOR VISIT
[Follow-Up Visit] : a follow-up visit for [Lymphoma] : lymphoma [Friend] : friend [FreeTextEntry2] : MCL;H/O Auto PSCT on 4/5/17;S/P CAR-T on 4/19/22

## 2022-07-05 ENCOUNTER — RESULT REVIEW (OUTPATIENT)
Age: 57
End: 2022-07-05

## 2022-07-05 ENCOUNTER — APPOINTMENT (OUTPATIENT)
Dept: HEMATOLOGY ONCOLOGY | Facility: CLINIC | Age: 57
End: 2022-07-05

## 2022-07-05 ENCOUNTER — LABORATORY RESULT (OUTPATIENT)
Age: 57
End: 2022-07-05

## 2022-07-05 VITALS
OXYGEN SATURATION: 95 % | DIASTOLIC BLOOD PRESSURE: 81 MMHG | RESPIRATION RATE: 16 BRPM | BODY MASS INDEX: 29.63 KG/M2 | WEIGHT: 211.64 LBS | HEIGHT: 71 IN | HEART RATE: 70 BPM | TEMPERATURE: 97.2 F | SYSTOLIC BLOOD PRESSURE: 120 MMHG

## 2022-07-05 LAB
BASOPHILS # BLD AUTO: 0 K/UL — SIGNIFICANT CHANGE UP (ref 0–0.2)
BASOPHILS NFR BLD AUTO: 0 % — SIGNIFICANT CHANGE UP (ref 0–2)
EOSINOPHIL # BLD AUTO: 0.27 K/UL — SIGNIFICANT CHANGE UP (ref 0–0.5)
EOSINOPHIL NFR BLD AUTO: 9 % — HIGH (ref 0–6)
HCT VFR BLD CALC: 38.8 % — LOW (ref 39–50)
HGB BLD-MCNC: 13.1 G/DL — SIGNIFICANT CHANGE UP (ref 13–17)
LYMPHOCYTES # BLD AUTO: 0.75 K/UL — LOW (ref 1–3.3)
LYMPHOCYTES # BLD AUTO: 25 % — SIGNIFICANT CHANGE UP (ref 13–44)
MCHC RBC-ENTMCNC: 33.8 G/DL — SIGNIFICANT CHANGE UP (ref 32–36)
MCHC RBC-ENTMCNC: 33.9 PG — SIGNIFICANT CHANGE UP (ref 27–34)
MCV RBC AUTO: 100.5 FL — HIGH (ref 80–100)
METAMYELOCYTES # FLD: 1 % — HIGH (ref 0–0)
MONOCYTES # BLD AUTO: 0.45 K/UL — SIGNIFICANT CHANGE UP (ref 0–0.9)
MONOCYTES NFR BLD AUTO: 15 % — HIGH (ref 2–14)
MYELOCYTES NFR BLD: 2 % — HIGH (ref 0–0)
NEUTROPHILS # BLD AUTO: 1.44 K/UL — LOW (ref 1.8–7.4)
NEUTROPHILS NFR BLD AUTO: 48 % — SIGNIFICANT CHANGE UP (ref 43–77)
NRBC # BLD: 0 /100 — SIGNIFICANT CHANGE UP (ref 0–0)
NRBC # BLD: SIGNIFICANT CHANGE UP /100 WBCS (ref 0–0)
PLAT MORPH BLD: NORMAL — SIGNIFICANT CHANGE UP
PLATELET # BLD AUTO: 183 K/UL — SIGNIFICANT CHANGE UP (ref 150–400)
RBC # BLD: 3.86 M/UL — LOW (ref 4.2–5.8)
RBC # FLD: 15.7 % — HIGH (ref 10.3–14.5)
RBC BLD AUTO: SIGNIFICANT CHANGE UP
WBC # BLD: 2.99 K/UL — LOW (ref 3.8–10.5)
WBC # FLD AUTO: 2.99 K/UL — LOW (ref 3.8–10.5)

## 2022-07-05 PROCEDURE — 99214 OFFICE O/P EST MOD 30 MIN: CPT

## 2022-07-06 NOTE — PHYSICAL EXAM
[Ambulatory and capable of all self care but unable to carry out any work activities] : Status 2- Ambulatory and capable of all self care but unable to carry out any work activities. Up and about more than 50% of waking hours [Normal] : affect appropriate [de-identified] : healed scar from port removal

## 2022-07-06 NOTE — ASSESSMENT
[FreeTextEntry1] : 55 y/o gentleman with hx of mantle cell lymphoma initially dx'ed Oct 2016 s/p RCHOP chemotherapy X 4 cycles and RICE X 2 cycles with stem cell collected off of the 2nd RICE..he is now s/p CBV-conditioned autologous stem cell transplant on 4/5/17. Hospital course complicated by a-fib/a-flutter s/p ablation and now stable on Cardizem and Toprol. Also now on Xarelto. Port also removed. He demonstrated engraftment on 4/16 and d/c'ed in stable condition on 5/5.\par \par 1) Mantle Cell lymphoma\par S/P AUTO PBSCT 4/5/17\par Rituxan maintenance complete (Short Rituxan, once a week for 2 weeks). Last Rituxan treatment was  on 1/22/20  (Short Rituxan, once a week for 2 weeks).\par CT scans completed 7/1/21: PENELOPE\par PET CT 10/28/21 Completed. Concern for relapse disease. Lymph node biopsy completed on 11/12/21.  Results reviewed..c/w MCL.... \par Discussed being placed on calquence vs Silas rituxan. Patient information printed and given to patient/spouse. Side effects explained including serious side effect of AFIB. Patient has a known cardiac history. Message left for pt's cardiologist..\par Long term goal would be Car T....tecartus... This is why I would like to avoid Silas..Literature and consents provided for cart..discussed risks, benefits, alternatives, rationale and logistics..3 week hospital stay discussed...tlc placement...lymphodepleting prep, monitoring for CRS and neurotox, prolonged cytopenias and hypogamma also discussed\par \par Continue calquence 100 mg capsule- take 1 capsule twice a day...worsening GI sx on protonix...instructed on how to administer\par \par 3/11/22: Will stop Calquence on 3/14/22 ;1 week prior to CAR-T collection...resume 3/23..stop 4/11 in prep for admit 4/14...\par \par 7/6/22: S/P CAR-T therapy with [facility] on 4/19/22\par \par 2) Heme\par Counts stable. No indication for transfusion today. \par \par Negative assessment  for CRS and neurotox...\par \par F/U PET CT  partial response status at time of CAR-T;repeat at day 30, 100. \par 30 days post PET CT done on 5/25/22.Result as follows:\par -No evidence of FDG avid disease or active lymphoma. Interval resolution of minimal FDG activity associated with bilateral external iliac and bilateral inguinal lymph nodes which have either resolved or significantly decreased in size and not well delineated on CT.\par - Interval resolution of mild FDG avidity in the distal esophagus.\par - Unchanged nonspecific mildly heterogeneous small bowel and pancolonic FDG avidity. Please correlate clinically\par \par  Repeat PET CT at Day 100 ordered for 7/28/22\par \par 3) ID\par Continue prophylaxis\par \par 4) GI\par Tums prn for acid reflux-RESOLVED\par Pantoprazole 40mg daily\par \par 5) Afib\par Continue medications as prescribed by Cardiologist\par Had an  appointment with cardiologist on 5/23/22.......no new recommendations from cardiologist\par Continue to f/u with cardiologist as per recommendations\par \par 6) Other\par Oxycodone for leg pain prn\par Headaches- tylenol prn\par Dizziness-Advised to make slow position changes-RESOLVED\par Continue medications as prescribed\par Maintain f/u with cardiology and GI \par Maintain f/u with his regular internist \par Well care and cancer screening stressed\par Patient has been advised to contact clinic if he has any concerns or signs of CRS / neurotoxicity\par \par Completed Post transplant vaccines:\par 12 month vaccines completed in April 2018. \par 14 month vaccines completed July 2018. \par 24 month re-vaccinations completed in April 2019. \par Will need to repeat post CAR-T\par \par 7) Plan\par Follow up on 7/18/22 with NP Leonarda\par Repeat PET CT at Day 100\par Wallet card and FACT sheet provided again prior to d/c\par Pt advised no driving for 8 weeks post \par Quests addressed..restrictions discussed\par \par I examined patient under Dr. Banks's supervision and Dr. Banks agrees to plan of care as listed above\par \par \par \par \par \par \par

## 2022-07-06 NOTE — HISTORY OF PRESENT ILLNESS
[de-identified] : Mr. Chavez is a 53 year old male with a history of MCL, initially diagnosed 10/2016. On 11/15/2016 he was seen by Dr. Bravo at [facility], after noticing a left inguinal mass for which he sought medical attention. CT scans (3/21/2016) revealed left-sided pelvic and left inguinal adenopathy. A 0.7 cm indeterminate lesion in the right iliac bone was also noted. A left inguinal lymph node biopsy was performed 9/24/2016 which revealed morphologic and immunophenotypic findings consistent with mantle cell lymphoma. He is status post 4 cycles of R-CHOP, and two cycles of RICE consolidation. A bone marrow biopsy on 2/10/17 showed normocellular bone marrow with trilineage hematopoiesis and maturation. He was admitted 3/27/17 for an autologous peripheral blood stem cell transplant with CBV  regimen. \par  \par Upon admission, a TLC was placed in IR. Mr. Chavez received IV hydration, nutritional support, pain management, antinausea medication, antidiarrheals, antibiotic, antiviral, PCP, antifungal and GI prophylaxis. Labs were monitored daily. Mr. Chavez received transfusional support and electrolyte repletion as needed. When his ANC dropped below 1000, he was started on prophylactic ciprofloxacin. When he became febrile, his antibiotic coverage was broadened as appropriate. \par  \par On 4/5/17, Mr. Chavez received 314ml of thawed, pooled, washed, mobilized, plasma reduced, HPC apheresis over approximately 1 hour. He tolerated the infusion well with no adverse side effects noted. Engraftment was noted on 4/16/17, and the Zarxio and antibiotics were discontinued. \par  \par Mr. Chavez's transplant course was complicated by recurrent headaches, oral and GI mucositis, dizziness, and chest pain resulting in a RRT on 4/10/17. The chest pain was self limiting. A second RRT was called on 4/16/17, for palpitations and dizziness. Mr. Chavez was found to be in atrial flutter with RVR. He was transferred to telemetry and followed by cardiology. \par A-fib/a-flutter was resistant to single rate agent, and while in arrhythmia, pt would become symptomatic, hypotensive. Ablation was was preformed, however was unsuccessful. Pt continued to have frequent changes in rhythm. As a-fib was usually preceded by PAC, and mediport tip was in r atrium, concern was this may have been involved. Mediport was removed but patient continued to have runs of non conducted APCs. Patient was followed by EP during this admission who ruled out any need for PPM insertion. Patient was started on Cardizem and Toprol which he tolerated well. He did have episodes of bradycardia during his sleep with episodes of Atrial flutter with RVR as well. However, he remained asymptomatic. \par Pt was d/c 5/5/2017 to home in stable condition with instructions to follow up with hematology and EP. [de-identified] : Presenting today for a follow up appointment. He has completed  maintenance Rituxan. Last Rituxan was received in jan... PENELOPE....Patient reports continued fatigue. Recent CT/PET scan PENELOPE.  Patient also offers that he had the shingles and was treated with valtrex..some residual discomfort...He continues to be followed by cardiology; his cardiologist increased the Diltiazem dosage to 180mg q daily, however patient reported feeling increasingly fatigued and returned to 120mg q daily and now notes less fatigue. He is in eval for a cardiac procedure.. His cardiologist was waiting for the rituxan to be completed and covid to pass..He is compliant with all medications. Denies mouth sores, eye dryness, nausea, vomiting, diarrhea. No LE edema, CP or SOB. \par \par On 10/18/21, patient presents for a follow up visit. Overall patient is well. States has generalized pruritus and recently was seen by dermatologist. Has been applying triamcinolone cream with no relief. States recently felt a "lump" of the right groin. Given the patients past medical history patient is very concerned. Denies fever, chills, nausea, vomiting, diarrhea, rash, mouth sores, dysuria or any signs of active bleeding. Denies SOB, chest pain or B/L LE edema. Remains compliant with medications prescribed. \par \par On 11/30//21, patient presents with spouse for a follow up visit. Overall patient feels fatigued. Had a lymph node biopsy on 11/12/21 and results are c/w relapsed MCL.. Denies fever, chills, nausea, vomiting, diarrhea, rash, mouth sores, dysuria or any signs of active bleeding. Denies SOB, chest pain or B/L LE edema. Remains compliant with medications prescribed. Here to discuss further therapy..\par \par On 1/25/22, patient presents for a follow up visit.  On calquence and not  tolerating well. Was in ER with severe epigastric pain...now on protonix...Complains of acid reflux and takes tums prn. Complains of headaches every other day. Denies fever, chills, nausea, vomiting, diarrhea, rash, mouth sores, dysuria or any signs of active bleeding. Denies SOB, chest pain or B/L LE edema. in wheelchair today\par \par 2/18/22: Patient is here for a follow up visit.He states that he still has epigastric pain with Calquence.He takes Pantoprazole as prescribed.Denies fever,chills, nausea, vomiting, diarrhea,rash, mouth sores,SOB,chest pain or any signs of active bleeding.He has an appointment with PCP tomorrow to get a referral for an endoscopy by GI.\par \par 5/10//22:Patient is seen for a follow up visit s/p  CAR-T treatment with [facility]....He is accompanied by his wife...He describes fatigue and weakness...using oxycodone for pain in legs...likely neuropathic.. Denies fever, chills, nausea, vomiting, diarrhea,mouth sores, skin rash,SOB,chest pain or any signs of active bleeding. He states that he had occasional epigastric pain from Calquence which  is improved off drug....He is off calquence and blood thinners at this time...Hospital course was complicated by CRS and neurotox...see d/c summary...\par \par 5/18/22: Patient is here for a follow up visit post CAR-T treatment with [facility].Today is Day +28.He is here accompanied by his wife.He states that he still feels fatigue and weakness.He continues to use Oxycodone for pain in legs and occasional headache.Denies fever, chills, blurring of vision,SOB, chest pain, mouth sores,nausea, vomiting, diarrhea,skin rash, dysuria or any signs of active bleeding. He states that few days ago when he stood up from sitting position,he felt dizziness which resolved on its own after few minutes.\par \par 5/24/22:Today is Day +35 post CAR-T therapy with [facility].He is accompanied by his wife.He states that he continues to have pricking pain on B/L feet and that he continues to take Gabapentin as prescribed. Denies fever, chills, SOB,chest pain, rash, mouth sores,nausea, vomiting, diarrhea,dysuria or any signs of active bleeding.He had a f/u appointment with cardiology yesterday and there are no changes or adjustments to any of the of the medications. He states that he did not have any episodes of dizziness since last week.\par \par 6/7//22: Patient is here for a follow up visit post CAR-T therapy with [facility]. .Overall feels better than last week.Denies fever, chills, nausea, vomiting, diarrhea, mouth sores, rash, dysuria,SOB,chest pain or any signs of active bleeding.\par \par 6/21/22: Today is Day + 63 post CAR-T therapy with [facility].Patient is seen for a follow up visit today.Overall feels well with no acute concerns.Denies fever, chills, SOB,chest pain, rash, mouth sores, nausea, vomiting, diarrhea,dysuria or any signs of active bleeding.\par \par 7/6/22: Patient is seen for a follow up visit  post CAR-T therapy.He is accompanied by his spouse. Today is Day + 77.He states that he feels better than past weeks.Denies fever, chills, nausea, vomiting, diarrhea, rash, mouth sores,SOB,chest pain or any signs of active bleeding.

## 2022-07-06 NOTE — REVIEW OF SYSTEMS
[Muscle Weakness] : muscle weakness [Negative] : Allergic/Immunologic [Dysphagia] : no dysphagia [Loss of Hearing] : no loss of hearing [Nosebleeds] : no nosebleeds [Hoarseness] : no hoarseness [Odynophagia] : no odynophagia [Mucosal Pain] : no mucosal pain [Abdominal Pain] : no abdominal pain [Vomiting] : no vomiting [Constipation] : no constipation [Diarrhea] : no diarrhea [Joint Pain] : no joint pain [Joint Stiffness] : no joint stiffness [Muscle Pain] : no muscle pain [Skin Rash] : no skin rash [Skin Wound] : no skin wound [Easy Bleeding] : no tendency for easy bleeding [Easy Bruising] : no tendency for easy bruising [FreeTextEntry7] : Acid reflux,Epigastric pain -RESOLVED [de-identified] : Right groin lymphadenopathy decreasing in size

## 2022-07-08 LAB
ALBUMIN SERPL ELPH-MCNC: 4.7 G/DL
ALP BLD-CCNC: 77 U/L
ALT SERPL-CCNC: 29 U/L
ANION GAP SERPL CALC-SCNC: 10 MMOL/L
APTT BLD: 25.3 SEC
AST SERPL-CCNC: 29 U/L
BILIRUB SERPL-MCNC: 0.4 MG/DL
BUN SERPL-MCNC: 29 MG/DL
CALCIUM SERPL-MCNC: 9.6 MG/DL
CHLORIDE SERPL-SCNC: 107 MMOL/L
CK SERPL-CCNC: 185 U/L
CMV DNA SPEC QL NAA+PROBE: NOT DETECTED IU/ML
CMVPCR LOG: NOT DETECTED LOG10IU/ML
CO2 SERPL-SCNC: 25 MMOL/L
CREAT SERPL-MCNC: 0.99 MG/DL
CRP SERPL-MCNC: <3 MG/L
DEPRECATED D DIMER PPP IA-ACNC: <150 NG/ML DDU
EGFR: 89 ML/MIN/1.73M2
FERRITIN SERPL-MCNC: 37 NG/ML
FIBRINOGEN PPP COAG.DERIVED-MCNC: 209 MG/DL
GLUCOSE SERPL-MCNC: 117 MG/DL
INR PPP: 1 RATIO
LDH SERPL-CCNC: 233 U/L
MAGNESIUM SERPL-MCNC: 2.1 MG/DL
NT-PROBNP SERPL-MCNC: 64 PG/ML
PHOSPHATE SERPL-MCNC: 3.2 MG/DL
POTASSIUM SERPL-SCNC: 5 MMOL/L
PROT SERPL-MCNC: 6.2 G/DL
PT BLD: 11.6 SEC
SODIUM SERPL-SCNC: 142 MMOL/L
URATE SERPL-MCNC: 4 MG/DL

## 2022-07-13 ENCOUNTER — NON-APPOINTMENT (OUTPATIENT)
Age: 57
End: 2022-07-13

## 2022-07-13 ENCOUNTER — APPOINTMENT (OUTPATIENT)
Dept: CARDIOLOGY | Facility: CLINIC | Age: 57
End: 2022-07-13

## 2022-07-13 PROCEDURE — G2066: CPT

## 2022-07-13 PROCEDURE — 93298 REM INTERROG DEV EVAL SCRMS: CPT

## 2022-07-18 ENCOUNTER — LABORATORY RESULT (OUTPATIENT)
Age: 57
End: 2022-07-18

## 2022-07-18 ENCOUNTER — APPOINTMENT (OUTPATIENT)
Dept: HEMATOLOGY ONCOLOGY | Facility: CLINIC | Age: 57
End: 2022-07-18

## 2022-07-18 ENCOUNTER — RESULT REVIEW (OUTPATIENT)
Age: 57
End: 2022-07-18

## 2022-07-18 VITALS
WEIGHT: 210.98 LBS | RESPIRATION RATE: 16 BRPM | SYSTOLIC BLOOD PRESSURE: 119 MMHG | HEART RATE: 74 BPM | TEMPERATURE: 97 F | BODY MASS INDEX: 29.43 KG/M2 | DIASTOLIC BLOOD PRESSURE: 81 MMHG | OXYGEN SATURATION: 98 %

## 2022-07-18 LAB
ALBUMIN SERPL ELPH-MCNC: 4.8 G/DL
ALP BLD-CCNC: 77 U/L
ALT SERPL-CCNC: 31 U/L
ANION GAP SERPL CALC-SCNC: 10 MMOL/L
APTT BLD: 24.6 SEC
AST SERPL-CCNC: 35 U/L
BASOPHILS # BLD AUTO: 0.05 K/UL — SIGNIFICANT CHANGE UP (ref 0–0.2)
BASOPHILS NFR BLD AUTO: 1 % — SIGNIFICANT CHANGE UP (ref 0–2)
BILIRUB SERPL-MCNC: 0.7 MG/DL
BUN SERPL-MCNC: 25 MG/DL
CALCIUM SERPL-MCNC: 9.9 MG/DL
CHLORIDE SERPL-SCNC: 104 MMOL/L
CK SERPL-CCNC: 184 U/L
CO2 SERPL-SCNC: 28 MMOL/L
CREAT SERPL-MCNC: 1.12 MG/DL
CRP SERPL-MCNC: <3 MG/L
DEPRECATED D DIMER PPP IA-ACNC: <150 NG/ML DDU
EGFR: 77 ML/MIN/1.73M2
EOSINOPHIL # BLD AUTO: 0.14 K/UL — SIGNIFICANT CHANGE UP (ref 0–0.5)
EOSINOPHIL NFR BLD AUTO: 3 % — SIGNIFICANT CHANGE UP (ref 0–6)
FERRITIN SERPL-MCNC: 35 NG/ML
FIBRINOGEN PPP COAG.DERIVED-MCNC: 192 MG/DL
GLUCOSE SERPL-MCNC: 102 MG/DL
HCT VFR BLD CALC: 40.6 % — SIGNIFICANT CHANGE UP (ref 39–50)
HGB BLD-MCNC: 13.6 G/DL — SIGNIFICANT CHANGE UP (ref 13–17)
INR PPP: 1.03 RATIO
LDH SERPL-CCNC: 319 U/L
LYMPHOCYTES # BLD AUTO: 0.97 K/UL — LOW (ref 1–3.3)
LYMPHOCYTES # BLD AUTO: 21 % — SIGNIFICANT CHANGE UP (ref 13–44)
MAGNESIUM SERPL-MCNC: 2 MG/DL
MCHC RBC-ENTMCNC: 33.5 G/DL — SIGNIFICANT CHANGE UP (ref 32–36)
MCHC RBC-ENTMCNC: 33.8 PG — SIGNIFICANT CHANGE UP (ref 27–34)
MCV RBC AUTO: 101 FL — HIGH (ref 80–100)
METAMYELOCYTES # FLD: 5 % — HIGH (ref 0–0)
MONOCYTES # BLD AUTO: 0.28 K/UL — SIGNIFICANT CHANGE UP (ref 0–0.9)
MONOCYTES NFR BLD AUTO: 6 % — SIGNIFICANT CHANGE UP (ref 2–14)
MYELOCYTES NFR BLD: 5 % — HIGH (ref 0–0)
NEUTROPHILS # BLD AUTO: 2.74 K/UL — SIGNIFICANT CHANGE UP (ref 1.8–7.4)
NEUTROPHILS NFR BLD AUTO: 59 % — SIGNIFICANT CHANGE UP (ref 43–77)
NRBC # BLD: 0 /100 — SIGNIFICANT CHANGE UP (ref 0–0)
NRBC # BLD: SIGNIFICANT CHANGE UP /100 WBCS (ref 0–0)
NT-PROBNP SERPL-MCNC: 118 PG/ML
PHOSPHATE SERPL-MCNC: 3.8 MG/DL
PLAT MORPH BLD: NORMAL — SIGNIFICANT CHANGE UP
PLATELET # BLD AUTO: 187 K/UL — SIGNIFICANT CHANGE UP (ref 150–400)
POTASSIUM SERPL-SCNC: 5 MMOL/L
PROT SERPL-MCNC: 6.5 G/DL
PT BLD: 12.1 SEC
RBC # BLD: 4.02 M/UL — LOW (ref 4.2–5.8)
RBC # FLD: 14.7 % — HIGH (ref 10.3–14.5)
RBC BLD AUTO: SIGNIFICANT CHANGE UP
SODIUM SERPL-SCNC: 142 MMOL/L
URATE SERPL-MCNC: 3.9 MG/DL
WBC # BLD: 4.64 K/UL — SIGNIFICANT CHANGE UP (ref 3.8–10.5)
WBC # FLD AUTO: 4.64 K/UL — SIGNIFICANT CHANGE UP (ref 3.8–10.5)

## 2022-07-18 PROCEDURE — 99214 OFFICE O/P EST MOD 30 MIN: CPT

## 2022-07-19 LAB
CMV DNA SPEC QL NAA+PROBE: NOT DETECTED IU/ML
CMVPCR LOG: NOT DETECTED LOG10IU/ML

## 2022-07-20 NOTE — HISTORY OF PRESENT ILLNESS
[de-identified] : Mr. Chavez is a 53 year old male with a history of MCL, initially diagnosed 10/2016. On 11/15/2016 he was seen by Dr. Bravo at [facility], after noticing a left inguinal mass for which he sought medical attention. CT scans (3/21/2016) revealed left-sided pelvic and left inguinal adenopathy. A 0.7 cm indeterminate lesion in the right iliac bone was also noted. A left inguinal lymph node biopsy was performed 9/24/2016 which revealed morphologic and immunophenotypic findings consistent with mantle cell lymphoma. He is status post 4 cycles of R-CHOP, and two cycles of RICE consolidation. A bone marrow biopsy on 2/10/17 showed normocellular bone marrow with trilineage hematopoiesis and maturation. He was admitted 3/27/17 for an autologous peripheral blood stem cell transplant with CBV  regimen. \par  \par Upon admission, a TLC was placed in IR. Mr. Chavez received IV hydration, nutritional support, pain management, antinausea medication, antidiarrheals, antibiotic, antiviral, PCP, antifungal and GI prophylaxis. Labs were monitored daily. Mr. Chavez received transfusional support and electrolyte repletion as needed. When his ANC dropped below 1000, he was started on prophylactic ciprofloxacin. When he became febrile, his antibiotic coverage was broadened as appropriate. \par  \par On 4/5/17, Mr. Chavez received 314ml of thawed, pooled, washed, mobilized, plasma reduced, HPC apheresis over approximately 1 hour. He tolerated the infusion well with no adverse side effects noted. Engraftment was noted on 4/16/17, and the Zarxio and antibiotics were discontinued. \par  \par Mr. Chavez's transplant course was complicated by recurrent headaches, oral and GI mucositis, dizziness, and chest pain resulting in a RRT on 4/10/17. The chest pain was self limiting. A second RRT was called on 4/16/17, for palpitations and dizziness. Mr. Chavez was found to be in atrial flutter with RVR. He was transferred to telemetry and followed by cardiology. \par A-fib/a-flutter was resistant to single rate agent, and while in arrhythmia, pt would become symptomatic, hypotensive. Ablation was was preformed, however was unsuccessful. Pt continued to have frequent changes in rhythm. As a-fib was usually preceded by PAC, and mediport tip was in r atrium, concern was this may have been involved. Mediport was removed but patient continued to have runs of non conducted APCs. Patient was followed by EP during this admission who ruled out any need for PPM insertion. Patient was started on Cardizem and Toprol which he tolerated well. He did have episodes of bradycardia during his sleep with episodes of Atrial flutter with RVR as well. However, he remained asymptomatic. \par Pt was d/c 5/5/2017 to home in stable condition with instructions to follow up with hematology and EP. [de-identified] : Presenting today for a follow up appointment. He has completed  maintenance Rituxan. Last Rituxan was received in jan... PENELOPE....Patient reports continued fatigue. Recent CT/PET scan PENELOPE.  Patient also offers that he had the shingles and was treated with valtrex..some residual discomfort...He continues to be followed by cardiology; his cardiologist increased the Diltiazem dosage to 180mg q daily, however patient reported feeling increasingly fatigued and returned to 120mg q daily and now notes less fatigue. He is in eval for a cardiac procedure.. His cardiologist was waiting for the rituxan to be completed and covid to pass..He is compliant with all medications. Denies mouth sores, eye dryness, nausea, vomiting, diarrhea. No LE edema, CP or SOB. \par \par On 10/18/21, patient presents for a follow up visit. Overall patient is well. States has generalized pruritus and recently was seen by dermatologist. Has been applying triamcinolone cream with no relief. States recently felt a "lump" of the right groin. Given the patients past medical history patient is very concerned. Denies fever, chills, nausea, vomiting, diarrhea, rash, mouth sores, dysuria or any signs of active bleeding. Denies SOB, chest pain or B/L LE edema. Remains compliant with medications prescribed. \par \par On 11/30//21, patient presents with spouse for a follow up visit. Overall patient feels fatigued. Had a lymph node biopsy on 11/12/21 and results are c/w relapsed MCL.. Denies fever, chills, nausea, vomiting, diarrhea, rash, mouth sores, dysuria or any signs of active bleeding. Denies SOB, chest pain or B/L LE edema. Remains compliant with medications prescribed. Here to discuss further therapy..\par \par On 1/25/22, patient presents for a follow up visit.  On calquence and not  tolerating well. Was in ER with severe epigastric pain...now on protonix...Complains of acid reflux and takes tums prn. Complains of headaches every other day. Denies fever, chills, nausea, vomiting, diarrhea, rash, mouth sores, dysuria or any signs of active bleeding. Denies SOB, chest pain or B/L LE edema. in wheelchair today\par \par 2/18/22: Patient is here for a follow up visit.He states that he still has epigastric pain with Calquence.He takes Pantoprazole as prescribed.Denies fever,chills, nausea, vomiting, diarrhea,rash, mouth sores,SOB,chest pain or any signs of active bleeding.He has an appointment with PCP tomorrow to get a referral for an endoscopy by GI.\par \par 5/10//22:Patient is seen for a follow up visit s/p  CAR-T treatment with [facility]....He is accompanied by his wife...He describes fatigue and weakness...using oxycodone for pain in legs...likely neuropathic.. Denies fever, chills, nausea, vomiting, diarrhea,mouth sores, skin rash,SOB,chest pain or any signs of active bleeding. He states that he had occasional epigastric pain from Calquence which  is improved off drug....He is off calquence and blood thinners at this time...Hospital course was complicated by CRS and neurotox...see d/c summary...\par \par 5/18/22: Patient is here for a follow up visit post CAR-T treatment with [facility].Today is Day +28.He is here accompanied by his wife.He states that he still feels fatigue and weakness.He continues to use Oxycodone for pain in legs and occasional headache.Denies fever, chills, blurring of vision,SOB, chest pain, mouth sores,nausea, vomiting, diarrhea,skin rash, dysuria or any signs of active bleeding. He states that few days ago when he stood up from sitting position,he felt dizziness which resolved on its own after few minutes.\par \par 5/24/22:Today is Day +35 post CAR-T therapy with [facility].He is accompanied by his wife.He states that he continues to have pricking pain on B/L feet and that he continues to take Gabapentin as prescribed. Denies fever, chills, SOB,chest pain, rash, mouth sores,nausea, vomiting, diarrhea,dysuria or any signs of active bleeding.He had a f/u appointment with cardiology yesterday and there are no changes or adjustments to any of the of the medications. He states that he did not have any episodes of dizziness since last week.\par \par 6/7//22: Patient is here for a follow up visit post CAR-T therapy with [facility]. .Overall feels better than last week.Denies fever, chills, nausea, vomiting, diarrhea, mouth sores, rash, dysuria,SOB,chest pain or any signs of active bleeding.\par \par 6/21/22: Today is Day + 63 post CAR-T therapy with [facility].Patient is seen for a follow up visit today.Overall feels well with no acute concerns.Denies fever, chills, SOB,chest pain, rash, mouth sores, nausea, vomiting, diarrhea,dysuria or any signs of active bleeding.\par \par 7/6/22: Patient is seen for a follow up visit  post CAR-T therapy.He is accompanied by his spouse. Today is Day + 77.He states that he feels better than past weeks.Denies fever, chills, nausea, vomiting, diarrhea, rash, mouth sores,SOB,chest pain or any signs of active bleeding.\par \par On 7/18/22 visit, day + 90 post Car T. Overall patient is fatigued. Complains of a nodule of posterior left knee that is not painful. Denies fever, chills, nausea, diarrhea, rash, mouth sores, dysuria or any signs of active bleeding. Denies SOB, chest pain or B/L LE edema. Remains compliant with medications as prescribed.

## 2022-07-20 NOTE — REVIEW OF SYSTEMS
[Negative] : Allergic/Immunologic [Fever] : no fever [Chills] : no chills [Night Sweats] : no night sweats [Fatigue] : fatigue [Dysphagia] : no dysphagia [Loss of Hearing] : no loss of hearing [Nosebleeds] : no nosebleeds [Hoarseness] : no hoarseness [Odynophagia] : no odynophagia [Mucosal Pain] : no mucosal pain [Abdominal Pain] : no abdominal pain [Vomiting] : no vomiting [Constipation] : no constipation [Diarrhea] : no diarrhea [Joint Pain] : no joint pain [Joint Stiffness] : no joint stiffness [Muscle Pain] : no muscle pain [Muscle Weakness] : no muscle weakness [Skin Rash] : no skin rash [Skin Wound] : no skin wound [Easy Bleeding] : no tendency for easy bleeding [Easy Bruising] : no tendency for easy bruising [FreeTextEntry7] : Acid reflux,Epigastric pain -RESOLVED [FreeTextEntry9] :  nodule of  posterior left knee that is not painful. [de-identified] : Right groin lymphadenopathy RESOLVED

## 2022-07-20 NOTE — PHYSICAL EXAM
[Ambulatory and capable of all self care but unable to carry out any work activities] : Status 2- Ambulatory and capable of all self care but unable to carry out any work activities. Up and about more than 50% of waking hours [Normal] : affect appropriate [de-identified] :  nodule of  posterior left knee.  [de-identified] : healed scar from port removal

## 2022-07-20 NOTE — ASSESSMENT
[FreeTextEntry1] : 57 y/o gentleman with hx of mantle cell lymphoma initially dx'ed Oct 2016 s/p RCHOP chemotherapy X 4 cycles and RICE X 2 cycles with stem cell collected off of the 2nd RICE..he is now s/p CBV-conditioned autologous stem cell transplant on 4/5/17. Hospital course complicated by a-fib/a-flutter s/p ablation and now stable on Cardizem and Toprol. Also now on Xarelto. Port also removed. He demonstrated engraftment on 4/16 and d/c'ed in stable condition on 5/5.\par \par 1) Mantle Cell lymphoma\par S/P AUTO PBSCT 4/5/17\par Rituxan maintenance complete (Short Rituxan, once a week for 2 weeks). Last Rituxan treatment was  on 1/22/20  (Short Rituxan, once a week for 2 weeks).\par CT scans completed 7/1/21: PENELOPE\par PET CT 10/28/21 Completed. Concern for relapse disease. Lymph node biopsy completed on 11/12/21.  Results reviewed..c/w MCL.... \par Discussed being placed on calquence vs Silas rituxan. Patient information printed and given to patient/spouse. Side effects explained including serious side effect of AFIB. Patient has a known cardiac history. Message left for pt's cardiologist..\par Long term goal would be Car T....tecartus... This is why I would like to avoid Silas..Literature and consents provided for cart..discussed risks, benefits, alternatives, rationale and logistics..3 week hospital stay discussed...tlc placement...lymphodepleting prep, monitoring for CRS and neurotox, prolonged cytopenias and hypogamma also discussed\par \par Continue calquence 100 mg capsule- take 1 capsule twice a day...worsening GI sx on protonix...instructed on how to administer\par \par 3/11/22: Will stop Calquence on 3/14/22 ;1 week prior to CAR-T collection...resume 3/23..stop 4/11 in prep for admit 4/14...\par \par 7/6/22: S/P CAR-T therapy with [facility] on 4/19/22\par 7/18/22: Day +90 post Car T. \par \par 2) Heme\par Counts stable. No indication for transfusion today. \par 7/18/22: WBC 4.64  H/H 13.6/40.6    ANC 2.74\par Continue MV and folic acid daily\par \par Negative assessment  for CRS and neurotox...\par \par F/U PET CT  partial response status at time of CAR-T;repeat at day 30, 100. \par 30 days post PET CT done on 5/25/22.Result as follows:\par -No evidence of FDG avid disease or active lymphoma. Interval resolution of minimal FDG activity associated with bilateral external iliac and bilateral inguinal lymph nodes which have either resolved or significantly decreased in size and not well delineated on CT.\par - Interval resolution of mild FDG avidity in the distal esophagus.\par - Unchanged nonspecific mildly heterogeneous small bowel and pancolonic FDG avidity. Please correlate clinically\par \par  Repeat PET CT at Day 100. Scheduled for 8/3/22. \par \par 3) ID\par Continue prophylaxis\par \par 4) GI\par Tums prn for acid reflux-RESOLVED\par Pantoprazole 40mg daily\par \par 5) Afib\par Continue medications as prescribed by Cardiologist\par Had an appointment with cardiologist on 5/23/22.......no new recommendations from cardiologist\par Continue to f/u with cardiologist as per recommendations\par \par 6) Other\par Oxycodone for leg pain prn\par Headaches- tylenol prn\par Dizziness-Advised to make slow position changes-RESOLVED\par Left Posterior Knee nodule- Continue to monitor at this time\par Continue medications as prescribed\par Maintain f/u with cardiology and GI \par Maintain f/u with his regular internist \par Well care and cancer screening stressed\par Patient has been advised to contact clinic if he has any concerns or signs of CRS / neurotoxicity\par \par Completed Post transplant vaccines:\par 12 month vaccines completed in April 2018. \par 14 month vaccines completed July 2018. \par 24 month re-vaccinations completed in April 2019. \par Will need to repeat post CAR-T\par \par 7) Plan\par Follow up with MILEY Johnson in two week's. \par Repeat PET CT at Day 100. Scheduled for 8/3/22.\par Wallet card and FACT sheet provided again prior to d/c\par Pt advised no driving for 8 weeks post \par Quests addressed..restrictions discussed\par \par I examined patient under Dr. Banks's supervision and Dr. Banks agrees to plan of care as listed above\par \par \par \par \par \par \par

## 2022-08-02 ENCOUNTER — RESULT REVIEW (OUTPATIENT)
Age: 57
End: 2022-08-02

## 2022-08-02 ENCOUNTER — APPOINTMENT (OUTPATIENT)
Dept: HEMATOLOGY ONCOLOGY | Facility: CLINIC | Age: 57
End: 2022-08-02

## 2022-08-02 ENCOUNTER — LABORATORY RESULT (OUTPATIENT)
Age: 57
End: 2022-08-02

## 2022-08-02 VITALS
SYSTOLIC BLOOD PRESSURE: 110 MMHG | BODY MASS INDEX: 29.21 KG/M2 | RESPIRATION RATE: 16 BRPM | OXYGEN SATURATION: 99 % | WEIGHT: 209.44 LBS | DIASTOLIC BLOOD PRESSURE: 70 MMHG | HEART RATE: 67 BPM | TEMPERATURE: 96.7 F

## 2022-08-02 LAB
ALBUMIN SERPL ELPH-MCNC: 4.7 G/DL
ALP BLD-CCNC: 78 U/L
ALT SERPL-CCNC: 28 U/L
ANION GAP SERPL CALC-SCNC: 9 MMOL/L
APTT BLD: 25.2 SEC
AST SERPL-CCNC: 37 U/L
BASOPHILS # BLD AUTO: 0 K/UL — SIGNIFICANT CHANGE UP (ref 0–0.2)
BASOPHILS NFR BLD AUTO: 0 % — SIGNIFICANT CHANGE UP (ref 0–2)
BILIRUB SERPL-MCNC: 0.7 MG/DL
BUN SERPL-MCNC: 21 MG/DL
CALCIUM SERPL-MCNC: 9.8 MG/DL
CHLORIDE SERPL-SCNC: 106 MMOL/L
CK SERPL-CCNC: 226 U/L
CO2 SERPL-SCNC: 28 MMOL/L
CREAT SERPL-MCNC: 1.25 MG/DL
CRP SERPL-MCNC: <3 MG/L
EGFR: 67 ML/MIN/1.73M2
EOSINOPHIL # BLD AUTO: 0.24 K/UL — SIGNIFICANT CHANGE UP (ref 0–0.5)
EOSINOPHIL NFR BLD AUTO: 6 % — SIGNIFICANT CHANGE UP (ref 0–6)
FERRITIN SERPL-MCNC: 38 NG/ML
FIBRINOGEN PPP COAG.DERIVED-MCNC: 205 MG/DL
GLUCOSE SERPL-MCNC: 105 MG/DL
HCT VFR BLD CALC: 40.1 % — SIGNIFICANT CHANGE UP (ref 39–50)
HGB BLD-MCNC: 13.3 G/DL — SIGNIFICANT CHANGE UP (ref 13–17)
INR PPP: 1.05 RATIO
LDH SERPL-CCNC: 361 U/L
LYMPHOCYTES # BLD AUTO: 0.48 K/UL — LOW (ref 1–3.3)
LYMPHOCYTES # BLD AUTO: 12 % — LOW (ref 13–44)
MAGNESIUM SERPL-MCNC: 2.2 MG/DL
MCHC RBC-ENTMCNC: 33.2 G/DL — SIGNIFICANT CHANGE UP (ref 32–36)
MCHC RBC-ENTMCNC: 33.4 PG — SIGNIFICANT CHANGE UP (ref 27–34)
MCV RBC AUTO: 100.8 FL — HIGH (ref 80–100)
METAMYELOCYTES # FLD: 7 % — HIGH (ref 0–0)
MONOCYTES # BLD AUTO: 0.2 K/UL — SIGNIFICANT CHANGE UP (ref 0–0.9)
MONOCYTES NFR BLD AUTO: 5 % — SIGNIFICANT CHANGE UP (ref 2–14)
MYELOCYTES NFR BLD: 6 % — HIGH (ref 0–0)
NEUTROPHILS # BLD AUTO: 2.54 K/UL — SIGNIFICANT CHANGE UP (ref 1.8–7.4)
NEUTROPHILS NFR BLD AUTO: 64 % — SIGNIFICANT CHANGE UP (ref 43–77)
NRBC # BLD: 0 /100 — SIGNIFICANT CHANGE UP (ref 0–0)
NRBC # BLD: SIGNIFICANT CHANGE UP /100 WBCS (ref 0–0)
NT-PROBNP SERPL-MCNC: 158 PG/ML
PHOSPHATE SERPL-MCNC: 3.9 MG/DL
PLAT MORPH BLD: NORMAL — SIGNIFICANT CHANGE UP
PLATELET # BLD AUTO: 183 K/UL — SIGNIFICANT CHANGE UP (ref 150–400)
POTASSIUM SERPL-SCNC: 5.1 MMOL/L
PROT SERPL-MCNC: 6.4 G/DL
PT BLD: 12.4 SEC
RBC # BLD: 3.98 M/UL — LOW (ref 4.2–5.8)
RBC # FLD: 14.1 % — SIGNIFICANT CHANGE UP (ref 10.3–14.5)
RBC BLD AUTO: SIGNIFICANT CHANGE UP
SODIUM SERPL-SCNC: 142 MMOL/L
URATE SERPL-MCNC: 4.2 MG/DL
WBC # BLD: 3.97 K/UL — SIGNIFICANT CHANGE UP (ref 3.8–10.5)
WBC # FLD AUTO: 3.97 K/UL — SIGNIFICANT CHANGE UP (ref 3.8–10.5)

## 2022-08-02 PROCEDURE — 99214 OFFICE O/P EST MOD 30 MIN: CPT

## 2022-08-02 NOTE — REVIEW OF SYSTEMS
[Fatigue] : fatigue [Negative] : Allergic/Immunologic [Fever] : no fever [Chills] : no chills [Night Sweats] : no night sweats [Recent Change In Weight] : ~T no recent weight change [Dysphagia] : no dysphagia [Loss of Hearing] : no loss of hearing [Nosebleeds] : no nosebleeds [Hoarseness] : no hoarseness [Odynophagia] : no odynophagia [Mucosal Pain] : no mucosal pain [Abdominal Pain] : no abdominal pain [Vomiting] : no vomiting [Constipation] : no constipation [Diarrhea] : no diarrhea [Joint Pain] : no joint pain [Joint Stiffness] : no joint stiffness [Muscle Pain] : no muscle pain [Muscle Weakness] : no muscle weakness [Skin Rash] : no skin rash [Skin Wound] : no skin wound [Easy Bleeding] : no tendency for easy bleeding [Easy Bruising] : no tendency for easy bruising [FreeTextEntry2] : Occasional fatigue [FreeTextEntry7] : Acid reflux,Epigastric pain -RESOLVED [FreeTextEntry9] : painless nodule of  posterior left knee [de-identified] : Right groin lymphadenopathy RESOLVED

## 2022-08-02 NOTE — ASSESSMENT
[FreeTextEntry1] : 57 y/o gentleman with hx of mantle cell lymphoma initially dx'ed Oct 2016 s/p RCHOP chemotherapy X 4 cycles and RICE X 2 cycles with stem cell collected off of the 2nd RICE..he is now s/p CBV-conditioned autologous stem cell transplant on 4/5/17. Hospital course complicated by a-fib/a-flutter s/p ablation and now stable on Cardizem and Toprol. Also now on Xarelto. Port also removed. He demonstrated engraftment on 4/16 and d/c'ed in stable condition on 5/5.\par \par 1) Mantle Cell lymphoma\par S/P AUTO PBSCT 4/5/17\par Rituxan maintenance complete (Short Rituxan, once a week for 2 weeks). Last Rituxan treatment was  on 1/22/20  (Short Rituxan, once a week for 2 weeks).\par CT scans completed 7/1/21: PENELOPE\par PET CT 10/28/21 Completed. Concern for relapse disease. Lymph node biopsy completed on 11/12/21.  Results reviewed..c/w MCL.... \par Discussed being placed on calquence vs Silas rituxan. Patient information printed and given to patient/spouse. Side effects explained including serious side effect of AFIB. Patient has a known cardiac history. Message left for pt's cardiologist..\par Long term goal would be Car T....tecartus... This is why I would like to avoid Silas..Literature and consents provided for cart..discussed risks, benefits, alternatives, rationale and logistics..3 week hospital stay discussed...tlc placement...lymphodepleting prep, monitoring for CRS and neurotox, prolonged cytopenias and hypogamma also discussed\par \par Continue calquence 100 mg capsule- take 1 capsule twice a day...worsening GI sx on protonix...instructed on how to administer\par \par 3/11/22: Will stop Calquence on 3/14/22 ;1 week prior to CAR-T collection...resume 3/23..stop 4/11 in prep for admit 4/14...\par \par 7/6/22: S/P CAR-T therapy with Tecartus on 4/19/22\par 7/18/22: Day +90 post Car T. \par 8/2/22: Day +105 post CAR-T\par \par 2) Heme\par Counts stable. No indication for transfusion today. \par 8/2/22: WBC 3.97  H/H 13.3/40.1    ANC 2.74\par Continue MV and folic acid daily\par \par Negative assessment  for CRS and neurotox...\par \par F/U PET CT  partial response status at time of CAR-T;repeat at day 30, 100. \par 30 days post PET CT done on 5/25/22.Result as follows:\par -No evidence of FDG avid disease or active lymphoma. Interval resolution of minimal FDG activity associated with bilateral external iliac and bilateral inguinal lymph nodes which have either resolved or significantly decreased in size and not well delineated on CT.\par - Interval resolution of mild FDG avidity in the distal esophagus.\par - Unchanged nonspecific mildly heterogeneous small bowel and pancolonic FDG avidity. Please correlate clinically\par \par  Repeat PET CT at Day 100. Scheduled for 8/3/22\par \par 3) ID\par Continue prophylaxis\par \par 4) GI\par Tums prn for acid reflux-RESOLVED\par Pantoprazole 40mg daily\par \par 5) Afib\par Continue medications as prescribed by Cardiologist\par Had an appointment with cardiologist on 5/23/22.......no new recommendations from cardiologist\par Continue to f/u with cardiologist as per recommendations\par \par 6) Other\par Oxycodone for leg pain prn\par Headaches- tylenol prn\par Dizziness-Advised to make slow position changes-RESOLVED\par Left Posterior Knee nodule- Continue to monitor \par Continue medications as prescribed\par Maintain f/u with cardiology and GI \par Maintain f/u with his regular internist \par Well care and cancer screening stressed\par Patient has been advised to contact clinic if he has any concerns or signs of CRS / neurotoxicity\par \par Completed Post transplant vaccines:\par 12 month vaccines completed in April 2018. \par 14 month vaccines completed July 2018. \par 24 month re-vaccinations completed in April 2019. \par Will need to repeat post CAR-T\par \par 7) Plan\par Follow up with Dr. Banks in two weeks. \par Repeat PET CT at Day 100. Scheduled for 8/3/22\par Wallet card and FACT sheet provided again prior to d/c\par Pt advised no driving for 8 weeks post \par Quests addressed..restrictions discussed\par \par I examined patient under Dr. Banks's supervision and Dr. Banks agrees to plan of care as listed above\par \par \par \par \par \par \par

## 2022-08-02 NOTE — HISTORY OF PRESENT ILLNESS
[de-identified] : Mr. Chavez is a 53 year old male with a history of MCL, initially diagnosed 10/2016. On 11/15/2016 he was seen by Dr. Bravo at [facility], after noticing a left inguinal mass for which he sought medical attention. CT scans (3/21/2016) revealed left-sided pelvic and left inguinal adenopathy. A 0.7 cm indeterminate lesion in the right iliac bone was also noted. A left inguinal lymph node biopsy was performed 9/24/2016 which revealed morphologic and immunophenotypic findings consistent with mantle cell lymphoma. He is status post 4 cycles of R-CHOP, and two cycles of RICE consolidation. A bone marrow biopsy on 2/10/17 showed normocellular bone marrow with trilineage hematopoiesis and maturation. He was admitted 3/27/17 for an autologous peripheral blood stem cell transplant with CBV  regimen. \par  \par Upon admission, a TLC was placed in IR. Mr. Chavez received IV hydration, nutritional support, pain management, antinausea medication, antidiarrheals, antibiotic, antiviral, PCP, antifungal and GI prophylaxis. Labs were monitored daily. Mr. Chavez received transfusional support and electrolyte repletion as needed. When his ANC dropped below 1000, he was started on prophylactic ciprofloxacin. When he became febrile, his antibiotic coverage was broadened as appropriate. \par  \par On 4/5/17, Mr. Chavez received 314ml of thawed, pooled, washed, mobilized, plasma reduced, HPC apheresis over approximately 1 hour. He tolerated the infusion well with no adverse side effects noted. Engraftment was noted on 4/16/17, and the Zarxio and antibiotics were discontinued. \par  \par Mr. Chavez's transplant course was complicated by recurrent headaches, oral and GI mucositis, dizziness, and chest pain resulting in a RRT on 4/10/17. The chest pain was self limiting. A second RRT was called on 4/16/17, for palpitations and dizziness. Mr. Chavez was found to be in atrial flutter with RVR. He was transferred to telemetry and followed by cardiology. \par A-fib/a-flutter was resistant to single rate agent, and while in arrhythmia, pt would become symptomatic, hypotensive. Ablation was was preformed, however was unsuccessful. Pt continued to have frequent changes in rhythm. As a-fib was usually preceded by PAC, and mediport tip was in r atrium, concern was this may have been involved. Mediport was removed but patient continued to have runs of non conducted APCs. Patient was followed by EP during this admission who ruled out any need for PPM insertion. Patient was started on Cardizem and Toprol which he tolerated well. He did have episodes of bradycardia during his sleep with episodes of Atrial flutter with RVR as well. However, he remained asymptomatic. \par Pt was d/c 5/5/2017 to home in stable condition with instructions to follow up with hematology and EP. [de-identified] : Presenting today for a follow up appointment. He has completed  maintenance Rituxan. Last Rituxan was received in jan... PENELOPE....Patient reports continued fatigue. Recent CT/PET scan PENELOPE.  Patient also offers that he had the shingles and was treated with valtrex..some residual discomfort...He continues to be followed by cardiology; his cardiologist increased the Diltiazem dosage to 180mg q daily, however patient reported feeling increasingly fatigued and returned to 120mg q daily and now notes less fatigue. He is in eval for a cardiac procedure.. His cardiologist was waiting for the rituxan to be completed and covid to pass..He is compliant with all medications. Denies mouth sores, eye dryness, nausea, vomiting, diarrhea. No LE edema, CP or SOB. \par \par On 10/18/21, patient presents for a follow up visit. Overall patient is well. States has generalized pruritus and recently was seen by dermatologist. Has been applying triamcinolone cream with no relief. States recently felt a "lump" of the right groin. Given the patients past medical history patient is very concerned. Denies fever, chills, nausea, vomiting, diarrhea, rash, mouth sores, dysuria or any signs of active bleeding. Denies SOB, chest pain or B/L LE edema. Remains compliant with medications prescribed. \par \par On 11/30//21, patient presents with spouse for a follow up visit. Overall patient feels fatigued. Had a lymph node biopsy on 11/12/21 and results are c/w relapsed MCL.. Denies fever, chills, nausea, vomiting, diarrhea, rash, mouth sores, dysuria or any signs of active bleeding. Denies SOB, chest pain or B/L LE edema. Remains compliant with medications prescribed. Here to discuss further therapy..\par \par On 1/25/22, patient presents for a follow up visit.  On calquence and not  tolerating well. Was in ER with severe epigastric pain...now on protonix...Complains of acid reflux and takes tums prn. Complains of headaches every other day. Denies fever, chills, nausea, vomiting, diarrhea, rash, mouth sores, dysuria or any signs of active bleeding. Denies SOB, chest pain or B/L LE edema. in wheelchair today\par \par 2/18/22: Patient is here for a follow up visit.He states that he still has epigastric pain with Calquence.He takes Pantoprazole as prescribed.Denies fever,chills, nausea, vomiting, diarrhea,rash, mouth sores,SOB,chest pain or any signs of active bleeding.He has an appointment with PCP tomorrow to get a referral for an endoscopy by GI.\par \par 5/10//22:Patient is seen for a follow up visit s/p  CAR-T treatment with Tecartus....He is accompanied by his wife...He describes fatigue and weakness...using oxycodone for pain in legs...likely neuropathic.. Denies fever, chills, nausea, vomiting, diarrhea,mouth sores, skin rash,SOB,chest pain or any signs of active bleeding. He states that he had occasional epigastric pain from Calquence which  is improved off drug....He is off calquence and blood thinners at this time...Hospital course was complicated by CRS and neurotox...see d/c summary...\par \par 5/18/22: Patient is here for a follow up visit post CAR-T treatment with Tecartus.Today is Day +28.He is here accompanied by his wife.He states that he still feels fatigue and weakness.He continues to use Oxycodone for pain in legs and occasional headache.Denies fever, chills, blurring of vision,SOB, chest pain, mouth sores,nausea, vomiting, diarrhea,skin rash, dysuria or any signs of active bleeding. He states that few days ago when he stood up from sitting position,he felt dizziness which resolved on its own after few minutes.\par \par 5/24/22:Today is Day +35 post CAR-T therapy with Tecartus.He is accompanied by his wife.He states that he continues to have pricking pain on B/L feet and that he continues to take Gabapentin as prescribed. Denies fever, chills, SOB,chest pain, rash, mouth sores,nausea, vomiting, diarrhea,dysuria or any signs of active bleeding.He had a f/u appointment with cardiology yesterday and there are no changes or adjustments to any of the of the medications. He states that he did not have any episodes of dizziness since last week.\par \par 6/7//22: Patient is here for a follow up visit post CAR-T therapy with Tecartus. .Overall feels better than last week.Denies fever, chills, nausea, vomiting, diarrhea, mouth sores, rash, dysuria,SOB,chest pain or any signs of active bleeding.\par \par 6/21/22: Today is Day + 63 post CAR-T therapy with Tecartus.Patient is seen for a follow up visit today.Overall feels well with no acute concerns.Denies fever, chills, SOB,chest pain, rash, mouth sores, nausea, vomiting, diarrhea,dysuria or any signs of active bleeding.\par \par 7/6/22: Patient is seen for a follow up visit  post CAR-T therapy.He is accompanied by his spouse. Today is Day + 77.He states that he feels better than past weeks.Denies fever, chills, nausea, vomiting, diarrhea, rash, mouth sores,SOB,chest pain or any signs of active bleeding.\par \par On 7/18/22 visit, day + 90 post Car T. Overall patient is fatigued. Complains of a nodule of posterior left knee that is not painful. Denies fever, chills, nausea, diarrhea, rash, mouth sores, dysuria or any signs of active bleeding. Denies SOB, chest pain or B/L LE edema. Remains compliant with medications as prescribed. \par \par 8/2/22: Today is Day + 105 post CAR-T.Overall well with no acute concerns. Denies fever, chills, nausea, vomiting, diarrhea,blurred vision,rash, mouth sores,dysuria,edema or any signs of active bleeding.Remains compliant with medications.He states that he continues to have the painless nodule on posterior aspect of left knee.

## 2022-08-02 NOTE — PHYSICAL EXAM
[Ambulatory and capable of all self care but unable to carry out any work activities] : Status 2- Ambulatory and capable of all self care but unable to carry out any work activities. Up and about more than 50% of waking hours [Normal] : affect appropriate [de-identified] : non tender palpable nodule of  posterior left knee  [de-identified] : healed scar from port removal

## 2022-08-03 ENCOUNTER — APPOINTMENT (OUTPATIENT)
Dept: NUCLEAR MEDICINE | Facility: CLINIC | Age: 57
End: 2022-08-03

## 2022-08-03 ENCOUNTER — OUTPATIENT (OUTPATIENT)
Dept: OUTPATIENT SERVICES | Facility: HOSPITAL | Age: 57
LOS: 1 days | End: 2022-08-03
Payer: COMMERCIAL

## 2022-08-03 DIAGNOSIS — Z98.890 OTHER SPECIFIED POSTPROCEDURAL STATES: Chronic | ICD-10-CM

## 2022-08-03 DIAGNOSIS — Z95.818 PRESENCE OF OTHER CARDIAC IMPLANTS AND GRAFTS: Chronic | ICD-10-CM

## 2022-08-03 DIAGNOSIS — C83.10 MANTLE CELL LYMPHOMA, UNSPECIFIED SITE: ICD-10-CM

## 2022-08-03 DIAGNOSIS — Z92.850 PERSONAL HISTORY OF CHIMERIC ANTIGEN RECEPTOR T-CELL THERAPY: ICD-10-CM

## 2022-08-03 DIAGNOSIS — Z94.84 STEM CELLS TRANSPLANT STATUS: Chronic | ICD-10-CM

## 2022-08-03 LAB
CMV DNA SPEC QL NAA+PROBE: NOT DETECTED IU/ML
CMVPCR LOG: NOT DETECTED LOG10IU/ML
DEPRECATED D DIMER PPP IA-ACNC: <150 NG/ML DDU

## 2022-08-03 PROCEDURE — 78815 PET IMAGE W/CT SKULL-THIGH: CPT | Mod: 26,PS

## 2022-08-03 PROCEDURE — 78815 PET IMAGE W/CT SKULL-THIGH: CPT

## 2022-08-03 PROCEDURE — A9552: CPT

## 2022-08-10 ENCOUNTER — OUTPATIENT (OUTPATIENT)
Dept: OUTPATIENT SERVICES | Facility: HOSPITAL | Age: 57
LOS: 1 days | Discharge: HOME | End: 2022-08-10

## 2022-08-10 DIAGNOSIS — Z95.818 PRESENCE OF OTHER CARDIAC IMPLANTS AND GRAFTS: Chronic | ICD-10-CM

## 2022-08-10 DIAGNOSIS — Z98.890 OTHER SPECIFIED POSTPROCEDURAL STATES: Chronic | ICD-10-CM

## 2022-08-10 DIAGNOSIS — C83.10 MANTLE CELL LYMPHOMA, UNSPECIFIED SITE: ICD-10-CM

## 2022-08-10 DIAGNOSIS — Z94.84 STEM CELLS TRANSPLANT STATUS: Chronic | ICD-10-CM

## 2022-08-16 ENCOUNTER — RESULT REVIEW (OUTPATIENT)
Age: 57
End: 2022-08-16

## 2022-08-16 ENCOUNTER — LABORATORY RESULT (OUTPATIENT)
Age: 57
End: 2022-08-16

## 2022-08-16 ENCOUNTER — APPOINTMENT (OUTPATIENT)
Dept: HEMATOLOGY ONCOLOGY | Facility: CLINIC | Age: 57
End: 2022-08-16

## 2022-08-16 VITALS
TEMPERATURE: 97 F | SYSTOLIC BLOOD PRESSURE: 127 MMHG | WEIGHT: 206.35 LBS | BODY MASS INDEX: 28.78 KG/M2 | OXYGEN SATURATION: 99 % | RESPIRATION RATE: 16 BRPM | DIASTOLIC BLOOD PRESSURE: 85 MMHG | HEART RATE: 63 BPM

## 2022-08-16 LAB
ALBUMIN SERPL ELPH-MCNC: 5.1 G/DL
ALP BLD-CCNC: 80 U/L
ALT SERPL-CCNC: 27 U/L
ANION GAP SERPL CALC-SCNC: 10 MMOL/L
APTT BLD: 24.5 SEC
AST SERPL-CCNC: 36 U/L
BASOPHILS # BLD AUTO: 0.09 K/UL — SIGNIFICANT CHANGE UP (ref 0–0.2)
BASOPHILS NFR BLD AUTO: 2 % — SIGNIFICANT CHANGE UP (ref 0–2)
BILIRUB SERPL-MCNC: 0.9 MG/DL
BUN SERPL-MCNC: 19 MG/DL
CALCIUM SERPL-MCNC: 9.7 MG/DL
CHLORIDE SERPL-SCNC: 104 MMOL/L
CK SERPL-CCNC: 231 U/L
CO2 SERPL-SCNC: 28 MMOL/L
CREAT SERPL-MCNC: 1 MG/DL
CRP SERPL-MCNC: <3 MG/L
DEPRECATED D DIMER PPP IA-ACNC: <150 NG/ML DDU
EGFR: 88 ML/MIN/1.73M2
EOSINOPHIL # BLD AUTO: 0.05 K/UL — SIGNIFICANT CHANGE UP (ref 0–0.5)
EOSINOPHIL NFR BLD AUTO: 1 % — SIGNIFICANT CHANGE UP (ref 0–6)
FERRITIN SERPL-MCNC: 50 NG/ML
FIBRINOGEN PPP COAG.DERIVED-MCNC: 271 MG/DL
GLUCOSE SERPL-MCNC: 115 MG/DL
HCT VFR BLD CALC: 40 % — SIGNIFICANT CHANGE UP (ref 39–50)
HGB BLD-MCNC: 13.5 G/DL — SIGNIFICANT CHANGE UP (ref 13–17)
INR PPP: 1.09 RATIO
LDH SERPL-CCNC: 394 U/L
LYMPHOCYTES # BLD AUTO: 0.68 K/UL — LOW (ref 1–3.3)
LYMPHOCYTES # BLD AUTO: 15 % — SIGNIFICANT CHANGE UP (ref 13–44)
MAGNESIUM SERPL-MCNC: 2.2 MG/DL
MCHC RBC-ENTMCNC: 33.8 G/DL — SIGNIFICANT CHANGE UP (ref 32–36)
MCHC RBC-ENTMCNC: 33.9 PG — SIGNIFICANT CHANGE UP (ref 27–34)
MCV RBC AUTO: 100.5 FL — HIGH (ref 80–100)
METAMYELOCYTES # FLD: 4 % — HIGH (ref 0–0)
MONOCYTES # BLD AUTO: 0.32 K/UL — SIGNIFICANT CHANGE UP (ref 0–0.9)
MONOCYTES NFR BLD AUTO: 7 % — SIGNIFICANT CHANGE UP (ref 2–14)
MYELOCYTES NFR BLD: 4 % — HIGH (ref 0–0)
NEUTROPHILS # BLD AUTO: 3.03 K/UL — SIGNIFICANT CHANGE UP (ref 1.8–7.4)
NEUTROPHILS NFR BLD AUTO: 67 % — SIGNIFICANT CHANGE UP (ref 43–77)
NRBC # BLD: 0 /100 — SIGNIFICANT CHANGE UP (ref 0–0)
NRBC # BLD: SIGNIFICANT CHANGE UP /100 WBCS (ref 0–0)
NT-PROBNP SERPL-MCNC: 75 PG/ML
PHOSPHATE SERPL-MCNC: 2.8 MG/DL
PLAT MORPH BLD: NORMAL — SIGNIFICANT CHANGE UP
PLATELET # BLD AUTO: 198 K/UL — SIGNIFICANT CHANGE UP (ref 150–400)
POTASSIUM SERPL-SCNC: 4.9 MMOL/L
PROT SERPL-MCNC: 6.9 G/DL
PT BLD: 12.6 SEC
RBC # BLD: 3.98 M/UL — LOW (ref 4.2–5.8)
RBC # FLD: 14.2 % — SIGNIFICANT CHANGE UP (ref 10.3–14.5)
RBC BLD AUTO: SIGNIFICANT CHANGE UP
SODIUM SERPL-SCNC: 141 MMOL/L
URATE SERPL-MCNC: 4 MG/DL
WBC # BLD: 4.52 K/UL — SIGNIFICANT CHANGE UP (ref 3.8–10.5)
WBC # FLD AUTO: 4.52 K/UL — SIGNIFICANT CHANGE UP (ref 3.8–10.5)

## 2022-08-16 PROCEDURE — 99215 OFFICE O/P EST HI 40 MIN: CPT

## 2022-08-16 NOTE — HISTORY OF PRESENT ILLNESS
[de-identified] : Mr. Chavez is a 53 year old male with a history of MCL, initially diagnosed 10/2016. On 11/15/2016 he was seen by Dr. Bravo at [facility], after noticing a left inguinal mass for which he sought medical attention. CT scans (3/21/2016) revealed left-sided pelvic and left inguinal adenopathy. A 0.7 cm indeterminate lesion in the right iliac bone was also noted. A left inguinal lymph node biopsy was performed 9/24/2016 which revealed morphologic and immunophenotypic findings consistent with mantle cell lymphoma. He is status post 4 cycles of R-CHOP, and two cycles of RICE consolidation. A bone marrow biopsy on 2/10/17 showed normocellular bone marrow with trilineage hematopoiesis and maturation. He was admitted 3/27/17 for an autologous peripheral blood stem cell transplant with CBV  regimen. \par  \par Upon admission, a TLC was placed in IR. Mr. Chavez received IV hydration, nutritional support, pain management, antinausea medication, antidiarrheals, antibiotic, antiviral, PCP, antifungal and GI prophylaxis. Labs were monitored daily. Mr. Chavez received transfusional support and electrolyte repletion as needed. When his ANC dropped below 1000, he was started on prophylactic ciprofloxacin. When he became febrile, his antibiotic coverage was broadened as appropriate. \par  \par On 4/5/17, Mr. Chavez received 314ml of thawed, pooled, washed, mobilized, plasma reduced, HPC apheresis over approximately 1 hour. He tolerated the infusion well with no adverse side effects noted. Engraftment was noted on 4/16/17, and the Zarxio and antibiotics were discontinued. \par  \par Mr. Chavez's transplant course was complicated by recurrent headaches, oral and GI mucositis, dizziness, and chest pain resulting in a RRT on 4/10/17. The chest pain was self limiting. A second RRT was called on 4/16/17, for palpitations and dizziness. Mr. Chavez was found to be in atrial flutter with RVR. He was transferred to telemetry and followed by cardiology. \par A-fib/a-flutter was resistant to single rate agent, and while in arrhythmia, pt would become symptomatic, hypotensive. Ablation was was preformed, however was unsuccessful. Pt continued to have frequent changes in rhythm. As a-fib was usually preceded by PAC, and mediport tip was in r atrium, concern was this may have been involved. Mediport was removed but patient continued to have runs of non conducted APCs. Patient was followed by EP during this admission who ruled out any need for PPM insertion. Patient was started on Cardizem and Toprol which he tolerated well. He did have episodes of bradycardia during his sleep with episodes of Atrial flutter with RVR as well. However, he remained asymptomatic. \par Pt was d/c 5/5/2017 to home in stable condition with instructions to follow up with hematology and EP. [de-identified] : Presenting today for a follow up appointment. He has completed  maintenance Rituxan. Last Rituxan was received in jan... PENELOPE....Patient reports continued fatigue. Recent CT/PET scan PENELOPE.  Patient also offers that he had the shingles and was treated with valtrex..some residual discomfort...He continues to be followed by cardiology; his cardiologist increased the Diltiazem dosage to 180mg q daily, however patient reported feeling increasingly fatigued and returned to 120mg q daily and now notes less fatigue. He is in eval for a cardiac procedure.. His cardiologist was waiting for the rituxan to be completed and covid to pass..He is compliant with all medications. Denies mouth sores, eye dryness, nausea, vomiting, diarrhea. No LE edema, CP or SOB. \par \par On 10/18/21, patient presents for a follow up visit. Overall patient is well. States has generalized pruritus and recently was seen by dermatologist. Has been applying triamcinolone cream with no relief. States recently felt a "lump" of the right groin. Given the patients past medical history patient is very concerned. Denies fever, chills, nausea, vomiting, diarrhea, rash, mouth sores, dysuria or any signs of active bleeding. Denies SOB, chest pain or B/L LE edema. Remains compliant with medications prescribed. \par \par On 11/30//21, patient presents with spouse for a follow up visit. Overall patient feels fatigued. Had a lymph node biopsy on 11/12/21 and results are c/w relapsed MCL.. Denies fever, chills, nausea, vomiting, diarrhea, rash, mouth sores, dysuria or any signs of active bleeding. Denies SOB, chest pain or B/L LE edema. Remains compliant with medications prescribed. Here to discuss further therapy..\par \par On 1/25/22, patient presents for a follow up visit.  On calquence and not  tolerating well. Was in ER with severe epigastric pain...now on protonix...Complains of acid reflux and takes tums prn. Complains of headaches every other day. Denies fever, chills, nausea, vomiting, diarrhea, rash, mouth sores, dysuria or any signs of active bleeding. Denies SOB, chest pain or B/L LE edema. in wheelchair today\par \par 2/18/22: Patient is here for a follow up visit.He states that he still has epigastric pain with Calquence.He takes Pantoprazole as prescribed.Denies fever,chills, nausea, vomiting, diarrhea,rash, mouth sores,SOB,chest pain or any signs of active bleeding.He has an appointment with PCP tomorrow to get a referral for an endoscopy by GI.\par \par 5/10//22:Patient is seen for a follow up visit s/p  CAR-T treatment with Tecartus....He is accompanied by his wife...He describes fatigue and weakness...using oxycodone for pain in legs...likely neuropathic.. Denies fever, chills, nausea, vomiting, diarrhea,mouth sores, skin rash,SOB,chest pain or any signs of active bleeding. He states that he had occasional epigastric pain from Calquence which  is improved off drug....He is off calquence and blood thinners at this time...Hospital course was complicated by CRS and neurotox...see d/c summary...\par \par 5/18/22: Patient is here for a follow up visit post CAR-T treatment with Tecartus.Today is Day +28.He is here accompanied by his wife.He states that he still feels fatigue and weakness.He continues to use Oxycodone for pain in legs and occasional headache.Denies fever, chills, blurring of vision,SOB, chest pain, mouth sores,nausea, vomiting, diarrhea,skin rash, dysuria or any signs of active bleeding. He states that few days ago when he stood up from sitting position,he felt dizziness which resolved on its own after few minutes.\par \par 5/24/22:Today is Day +35 post CAR-T therapy with Tecartus.He is accompanied by his wife.He states that he continues to have pricking pain on B/L feet and that he continues to take Gabapentin as prescribed. Denies fever, chills, SOB,chest pain, rash, mouth sores,nausea, vomiting, diarrhea,dysuria or any signs of active bleeding.He had a f/u appointment with cardiology yesterday and there are no changes or adjustments to any of the of the medications. He states that he did not have any episodes of dizziness since last week.\par \par 6/7//22: Patient is here for a follow up visit post CAR-T therapy with Tecartus. .Overall feels better than last week.Denies fever, chills, nausea, vomiting, diarrhea, mouth sores, rash, dysuria,SOB,chest pain or any signs of active bleeding.\par \par 6/21/22: Today is Day + 63 post CAR-T therapy with Tecartus.Patient is seen for a follow up visit today.Overall feels well with no acute concerns.Denies fever, chills, SOB,chest pain, rash, mouth sores, nausea, vomiting, diarrhea,dysuria or any signs of active bleeding.\par \par 7/6/22: Patient is seen for a follow up visit  post CAR-T therapy.He is accompanied by his spouse. Today is Day + 77.He states that he feels better than past weeks.Denies fever, chills, nausea, vomiting, diarrhea, rash, mouth sores,SOB,chest pain or any signs of active bleeding.\par \par On 7/18/22 visit, day + 90 post Car T. Overall patient is fatigued. Complains of a nodule of posterior left knee that is not painful. Denies fever, chills, nausea, diarrhea, rash, mouth sores, dysuria or any signs of active bleeding. Denies SOB, chest pain or B/L LE edema. Remains compliant with medications as prescribed. \par \par 8/16//22: Today is Day + 119 post CAR-T.Overall well with no acute concerns. Denies fever, chills, nausea, vomiting, diarrhea,blurred vision,rash, mouth sores,dysuria,edema or any signs of active bleeding.Remains compliant with medications.He states that he continues to have the painless nodule on posterior aspect of left knee.

## 2022-08-16 NOTE — ASSESSMENT
[FreeTextEntry1] : 57 y/o gentleman with hx of mantle cell lymphoma initially dx'ed Oct 2016 s/p RCHOP chemotherapy X 4 cycles and RICE X 2 cycles with stem cell collected off of the 2nd RICE..he is now s/p CBV-conditioned autologous stem cell transplant on 4/5/17. Hospital course complicated by a-fib/a-flutter s/p ablation and now stable on Cardizem and Toprol. Also now on Xarelto. Port also removed. He demonstrated engraftment on 4/16 and d/c'ed in stable condition on 5/5.\par \par 1) Mantle Cell lymphoma\par S/P AUTO PBSCT 4/5/17\par Rituxan maintenance complete (Short Rituxan, once a week for 2 weeks). Last Rituxan treatment was  on 1/22/20  (Short Rituxan, once a week for 2 weeks).\par CT scans completed 7/1/21: PENELOPE\par PET CT 10/28/21 Completed. Concern for relapse disease. Lymph node biopsy completed on 11/12/21.  Results reviewed..c/w MCL.... \par Discussed being placed on calquence vs Silas rituxan. Patient information printed and given to patient/spouse. Side effects explained including serious side effect of AFIB. Patient has a known cardiac history. Message left for pt's cardiologist..\par Long term goal would be Car T....tecartus... This is why I would like to avoid Silas..Literature and consents provided for cart..discussed risks, benefits, alternatives, rationale and logistics..3 week hospital stay discussed...tlc placement...lymphodepleting prep, monitoring for CRS and neurotox, prolonged cytopenias and hypogamma also discussed\par \par Continue calquence 100 mg capsule- take 1 capsule twice a day...worsening GI sx on protonix...instructed on how to administer\par \par 3/11/22: Will stop Calquence on 3/14/22 ;1 week prior to CAR-T collection...resume 3/23..stop 4/11 in prep for admit 4/14...\par \par 7/6/22: S/P CAR-T therapy with T[facility] on 4/19/22\par 7/18/22: Day +90 post Car T. \par 8/2/22: Day +105 post CAR-T\par \par 2) Heme\par Counts stable. No indication for transfusion today. \par 8/2/22: WBC 3.97  H/H 13.3/40.1    ANC 2.74\par Continue MV and folic acid daily\par \par Negative assessment  for CRS and neurotox...8/16\par \par F/U PET CT  partial response status at time of CAR-T;repeat at day 30, 100. \par 30 days post PET CT done on 5/25/22.Result as follows:\par -No evidence of FDG avid disease or active lymphoma. Interval resolution of minimal FDG activity associated with bilateral external iliac and bilateral inguinal lymph nodes which have either resolved or significantly decreased in size and not well delineated on CT.\par - Interval resolution of mild FDG avidity in the distal esophagus.\par - Unchanged nonspecific mildly heterogeneous small bowel and pancolonic FDG avidity. Please correlate clinically\par \par  Repeat PET CT at Day 100. Scheduled for 8/3/22..completed PENELOPE\par \par 3) ID\par Continue prophylaxis\par \par 4) GI\par Tums prn for acid reflux-RESOLVED\par Pantoprazole 40mg daily\par \par 5) Afib\par Continue medications as prescribed by Cardiologist\par Had an appointment with cardiologist on 5/23/22.......no new recommendations from cardiologist\par Continue to f/u with cardiologist as per recommendations\par \par 6) Other\par Oxycodone for leg pain prn\par Headaches- tylenol prn\par Dizziness-Advised to make slow position changes-RESOLVED\par Left Posterior Knee nodule- Continue to monitor \par Continue medications as prescribed\par Maintain f/u with cardiology and GI \par Maintain f/u with his regular internist \par Well care and cancer screening stressed\par Patient has been advised to contact clinic if he has any concerns or signs of CRS / neurotoxicity\par \par Completed Post transplant vaccines:\par 12 month vaccines completed in April 2018. \par 14 month vaccines completed July 2018. \par 24 month re-vaccinations completed in April 2019. \par Will need to repeat post CAR-T...covid as well\par \par 7) Plan\par Follow up with Dr. Banks in two weeks. \par Repeat PET CT at Day 100. Scheduled for 8/3/22...completed..PENELOPE\par Wallet card and FACT sheet provided again prior to d/c\par Pt advised no driving for 8 weeks post \par Quests addressed..restrictions discussed\par to f/u with dentist for gum issues\par \par \par \par \par \par \par \par \par

## 2022-08-16 NOTE — REVIEW OF SYSTEMS
[Fatigue] : fatigue [Negative] : Allergic/Immunologic [Fever] : no fever [Chills] : no chills [Night Sweats] : no night sweats [Recent Change In Weight] : ~T no recent weight change [Dysphagia] : no dysphagia [Loss of Hearing] : no loss of hearing [Nosebleeds] : no nosebleeds [Hoarseness] : no hoarseness [Odynophagia] : no odynophagia [Mucosal Pain] : no mucosal pain [Abdominal Pain] : no abdominal pain [Vomiting] : no vomiting [Constipation] : no constipation [Diarrhea] : no diarrhea [Joint Pain] : no joint pain [Joint Stiffness] : no joint stiffness [Muscle Pain] : no muscle pain [Muscle Weakness] : no muscle weakness [Skin Rash] : no skin rash [Skin Wound] : no skin wound [Easy Bleeding] : no tendency for easy bleeding [Easy Bruising] : no tendency for easy bruising [FreeTextEntry2] : Occasional fatigue [FreeTextEntry7] : Acid reflux,Epigastric pain -RESOLVED [FreeTextEntry9] : painless nodule of  posterior left knee [de-identified] : Right groin lymphadenopathy RESOLVED

## 2022-08-16 NOTE — PHYSICAL EXAM
[Ambulatory and capable of all self care but unable to carry out any work activities] : Status 2- Ambulatory and capable of all self care but unable to carry out any work activities. Up and about more than 50% of waking hours [Normal] : affect appropriate [de-identified] : non tender palpable nodule of  posterior left knee  [de-identified] : healed scar from port removal

## 2022-08-17 ENCOUNTER — APPOINTMENT (OUTPATIENT)
Dept: CARDIOLOGY | Facility: CLINIC | Age: 57
End: 2022-08-17

## 2022-08-17 ENCOUNTER — NON-APPOINTMENT (OUTPATIENT)
Age: 57
End: 2022-08-17

## 2022-08-17 LAB
CMV DNA SPEC QL NAA+PROBE: NOT DETECTED IU/ML
CMVPCR LOG: NOT DETECTED LOG10IU/ML

## 2022-08-17 PROCEDURE — G2066: CPT

## 2022-08-17 PROCEDURE — 93298 REM INTERROG DEV EVAL SCRMS: CPT

## 2022-08-25 NOTE — DIETITIAN INITIAL EVALUATION ADULT - ENTER TO (CAL/KG)
Psychoeducation Group Documentation    PATIENT'S NAME: Tamra Jaimes  MRN:   [id number]  :   2007  ACCT. NUMBER: [id number]  DATE OF SERVICE: 22  START TIME: 10:30 AM  END TIME: 11:30 AM  FACILITATOR(S): Bassem Jean-Baptiste  TOPIC: Child/Adol Psych Education  Number of patients attending the group:  6  Group Length:  1 Hours  Interactive Complexity: Yes, visit entailed Interactive Complexity evidenced by:    Summary of Group / Topics Discussed:    Effective Group Participation: Description and therapeutic purpose: The set of skills and ideas from Effective Group Participation will prepare group members to support a safe and respectful atmosphere for self expression and increase the group member s ability to comprehend presented therapeutic instruction and psychoeducation.  Consensus Building: Description and therapeutic purpose:  Through an informal game or activity to  introduce the group to different meanings of the concept of fairness and of the importance of mutual support and positive regard for group functioning.  The staff will introduce the concepts to the group and lead the group in participating in game play like  Whoonu ,  Cranium ,  [place]an  and  Apples to Apples. .        Group Attendance:  Attended group session    Patient's response to the group topic/interactions:  lethargic and sleeping    Patient appeared to be Actively participating, Attentive and Engaged.         Client specific details:  See above.         35

## 2022-09-12 ENCOUNTER — APPOINTMENT (OUTPATIENT)
Dept: HEMATOLOGY ONCOLOGY | Facility: CLINIC | Age: 57
End: 2022-09-12

## 2022-09-12 ENCOUNTER — RESULT REVIEW (OUTPATIENT)
Age: 57
End: 2022-09-12

## 2022-09-12 ENCOUNTER — LABORATORY RESULT (OUTPATIENT)
Age: 57
End: 2022-09-12

## 2022-09-12 VITALS
WEIGHT: 203.91 LBS | DIASTOLIC BLOOD PRESSURE: 82 MMHG | TEMPERATURE: 97 F | HEART RATE: 59 BPM | OXYGEN SATURATION: 98 % | RESPIRATION RATE: 16 BRPM | BODY MASS INDEX: 28.44 KG/M2 | SYSTOLIC BLOOD PRESSURE: 129 MMHG

## 2022-09-12 LAB
BASOPHILS # BLD AUTO: 0.08 K/UL — SIGNIFICANT CHANGE UP (ref 0–0.2)
BASOPHILS NFR BLD AUTO: 2 % — SIGNIFICANT CHANGE UP (ref 0–2)
EOSINOPHIL # BLD AUTO: 0.17 K/UL — SIGNIFICANT CHANGE UP (ref 0–0.5)
EOSINOPHIL NFR BLD AUTO: 4 % — SIGNIFICANT CHANGE UP (ref 0–6)
HCT VFR BLD CALC: 40.4 % — SIGNIFICANT CHANGE UP (ref 39–50)
HGB BLD-MCNC: 13.4 G/DL — SIGNIFICANT CHANGE UP (ref 13–17)
LYMPHOCYTES # BLD AUTO: 0.8 K/UL — LOW (ref 1–3.3)
LYMPHOCYTES # BLD AUTO: 19 % — SIGNIFICANT CHANGE UP (ref 13–44)
MCHC RBC-ENTMCNC: 33.2 G/DL — SIGNIFICANT CHANGE UP (ref 32–36)
MCHC RBC-ENTMCNC: 33.8 PG — SIGNIFICANT CHANGE UP (ref 27–34)
MCV RBC AUTO: 102 FL — HIGH (ref 80–100)
METAMYELOCYTES # FLD: 3 % — HIGH (ref 0–0)
MONOCYTES # BLD AUTO: 0.3 K/UL — SIGNIFICANT CHANGE UP (ref 0–0.9)
MONOCYTES NFR BLD AUTO: 7 % — SIGNIFICANT CHANGE UP (ref 2–14)
MYELOCYTES NFR BLD: 4 % — HIGH (ref 0–0)
NEUTROPHILS # BLD AUTO: 2.58 K/UL — SIGNIFICANT CHANGE UP (ref 1.8–7.4)
NEUTROPHILS NFR BLD AUTO: 61 % — SIGNIFICANT CHANGE UP (ref 43–77)
NRBC # BLD: 0 /100 — SIGNIFICANT CHANGE UP (ref 0–0)
NRBC # BLD: SIGNIFICANT CHANGE UP /100 WBCS (ref 0–0)
PLAT MORPH BLD: NORMAL — SIGNIFICANT CHANGE UP
PLATELET # BLD AUTO: 227 K/UL — SIGNIFICANT CHANGE UP (ref 150–400)
RBC # BLD: 3.96 M/UL — LOW (ref 4.2–5.8)
RBC # FLD: 14.2 % — SIGNIFICANT CHANGE UP (ref 10.3–14.5)
RBC BLD AUTO: SIGNIFICANT CHANGE UP
WBC # BLD: 4.23 K/UL — SIGNIFICANT CHANGE UP (ref 3.8–10.5)
WBC # FLD AUTO: 4.23 K/UL — SIGNIFICANT CHANGE UP (ref 3.8–10.5)

## 2022-09-12 PROCEDURE — 99214 OFFICE O/P EST MOD 30 MIN: CPT

## 2022-09-13 LAB
ALBUMIN SERPL ELPH-MCNC: 5.1 G/DL
ALP BLD-CCNC: 89 U/L
ALT SERPL-CCNC: 20 U/L
ANION GAP SERPL CALC-SCNC: 10 MMOL/L
APTT BLD: 26.8 SEC
AST SERPL-CCNC: 31 U/L
BILIRUB SERPL-MCNC: 1.1 MG/DL
BUN SERPL-MCNC: 16 MG/DL
CALCIUM SERPL-MCNC: 10.1 MG/DL
CHLORIDE SERPL-SCNC: 105 MMOL/L
CK SERPL-CCNC: 202 U/L
CMV DNA SPEC QL NAA+PROBE: NOT DETECTED IU/ML
CMVPCR LOG: NOT DETECTED LOG10IU/ML
CO2 SERPL-SCNC: 28 MMOL/L
CREAT SERPL-MCNC: 0.94 MG/DL
CRP SERPL-MCNC: <3 MG/L
DEPRECATED D DIMER PPP IA-ACNC: <150 NG/ML DDU
EGFR: 95 ML/MIN/1.73M2
FERRITIN SERPL-MCNC: 73 NG/ML
FIBRINOGEN PPP COAG.DERIVED-MCNC: 380 MG/DL
GLUCOSE SERPL-MCNC: 100 MG/DL
INR PPP: 1.03 RATIO
LDH SERPL-CCNC: 331 U/L
MAGNESIUM SERPL-MCNC: 2.3 MG/DL
NT-PROBNP SERPL-MCNC: 149 PG/ML
PHOSPHATE SERPL-MCNC: 3 MG/DL
POTASSIUM SERPL-SCNC: 5 MMOL/L
PROT SERPL-MCNC: 6.7 G/DL
PT BLD: 12 SEC
SODIUM SERPL-SCNC: 143 MMOL/L
URATE SERPL-MCNC: 5.9 MG/DL

## 2022-09-15 NOTE — REVIEW OF SYSTEMS
[Fatigue] : fatigue [Negative] : Allergic/Immunologic [Fever] : no fever [Chills] : no chills [Night Sweats] : no night sweats [Recent Change In Weight] : ~T no recent weight change [Dysphagia] : no dysphagia [Loss of Hearing] : no loss of hearing [Nosebleeds] : no nosebleeds [Hoarseness] : no hoarseness [Odynophagia] : no odynophagia [Mucosal Pain] : no mucosal pain [Abdominal Pain] : no abdominal pain [Vomiting] : no vomiting [Constipation] : no constipation [Diarrhea] : no diarrhea [Joint Pain] : no joint pain [Joint Stiffness] : no joint stiffness [Muscle Pain] : no muscle pain [Muscle Weakness] : no muscle weakness [Skin Rash] : no skin rash [Skin Wound] : no skin wound [Easy Bleeding] : no tendency for easy bleeding [Easy Bruising] : no tendency for easy bruising [FreeTextEntry2] : Occasional fatigue [FreeTextEntry7] : Acid reflux,Epigastric pain -RESOLVED [FreeTextEntry9] : painless nodule of  posterior left knee [de-identified] : Right groin lymphadenopathy RESOLVED

## 2022-09-15 NOTE — HISTORY OF PRESENT ILLNESS
[de-identified] : Mr. Chavez is a 53 year old male with a history of MCL, initially diagnosed 10/2016. On 11/15/2016 he was seen by Dr. Bravo at [facility], after noticing a left inguinal mass for which he sought medical attention. CT scans (3/21/2016) revealed left-sided pelvic and left inguinal adenopathy. A 0.7 cm indeterminate lesion in the right iliac bone was also noted. A left inguinal lymph node biopsy was performed 9/24/2016 which revealed morphologic and immunophenotypic findings consistent with mantle cell lymphoma. He is status post 4 cycles of R-CHOP, and two cycles of RICE consolidation. A bone marrow biopsy on 2/10/17 showed normocellular bone marrow with trilineage hematopoiesis and maturation. He was admitted 3/27/17 for an autologous peripheral blood stem cell transplant with CBV  regimen. \par  \par Upon admission, a TLC was placed in IR. Mr. Chavez received IV hydration, nutritional support, pain management, antinausea medication, antidiarrheals, antibiotic, antiviral, PCP, antifungal and GI prophylaxis. Labs were monitored daily. Mr. Chavez received transfusional support and electrolyte repletion as needed. When his ANC dropped below 1000, he was started on prophylactic ciprofloxacin. When he became febrile, his antibiotic coverage was broadened as appropriate. \par  \par On 4/5/17, Mr. Chavez received 314ml of thawed, pooled, washed, mobilized, plasma reduced, HPC apheresis over approximately 1 hour. He tolerated the infusion well with no adverse side effects noted. Engraftment was noted on 4/16/17, and the Zarxio and antibiotics were discontinued. \par  \par Mr. Chavez's transplant course was complicated by recurrent headaches, oral and GI mucositis, dizziness, and chest pain resulting in a RRT on 4/10/17. The chest pain was self limiting. A second RRT was called on 4/16/17, for palpitations and dizziness. Mr. Chvaez was found to be in atrial flutter with RVR. He was transferred to telemetry and followed by cardiology. \par A-fib/a-flutter was resistant to single rate agent, and while in arrhythmia, pt would become symptomatic, hypotensive. Ablation was was preformed, however was unsuccessful. Pt continued to have frequent changes in rhythm. As a-fib was usually preceded by PAC, and mediport tip was in r atrium, concern was this may have been involved. Mediport was removed but patient continued to have runs of non conducted APCs. Patient was followed by EP during this admission who ruled out any need for PPM insertion. Patient was started on Cardizem and Toprol which he tolerated well. He did have episodes of bradycardia during his sleep with episodes of Atrial flutter with RVR as well. However, he remained asymptomatic. \par Pt was d/c 5/5/2017 to home in stable condition with instructions to follow up with hematology and EP. [de-identified] : Presenting today for a follow up appointment. He has completed  maintenance Rituxan. Last Rituxan was received in jan... PENELOPE....Patient reports continued fatigue. Recent CT/PET scan PENELOPE.  Patient also offers that he had the shingles and was treated with valtrex..some residual discomfort...He continues to be followed by cardiology; his cardiologist increased the Diltiazem dosage to 180mg q daily, however patient reported feeling increasingly fatigued and returned to 120mg q daily and now notes less fatigue. He is in eval for a cardiac procedure.. His cardiologist was waiting for the rituxan to be completed and covid to pass..He is compliant with all medications. Denies mouth sores, eye dryness, nausea, vomiting, diarrhea. No LE edema, CP or SOB. \par \par On 10/18/21, patient presents for a follow up visit. Overall patient is well. States has generalized pruritus and recently was seen by dermatologist. Has been applying triamcinolone cream with no relief. States recently felt a "lump" of the right groin. Given the patients past medical history patient is very concerned. Denies fever, chills, nausea, vomiting, diarrhea, rash, mouth sores, dysuria or any signs of active bleeding. Denies SOB, chest pain or B/L LE edema. Remains compliant with medications prescribed. \par \par On 11/30//21, patient presents with spouse for a follow up visit. Overall patient feels fatigued. Had a lymph node biopsy on 11/12/21 and results are c/w relapsed MCL.. Denies fever, chills, nausea, vomiting, diarrhea, rash, mouth sores, dysuria or any signs of active bleeding. Denies SOB, chest pain or B/L LE edema. Remains compliant with medications prescribed. Here to discuss further therapy..\par \par On 1/25/22, patient presents for a follow up visit.  On calquence and not  tolerating well. Was in ER with severe epigastric pain...now on protonix...Complains of acid reflux and takes tums prn. Complains of headaches every other day. Denies fever, chills, nausea, vomiting, diarrhea, rash, mouth sores, dysuria or any signs of active bleeding. Denies SOB, chest pain or B/L LE edema. in wheelchair today\par \par 2/18/22: Patient is here for a follow up visit.He states that he still has epigastric pain with Calquence.He takes Pantoprazole as prescribed.Denies fever,chills, nausea, vomiting, diarrhea,rash, mouth sores,SOB,chest pain or any signs of active bleeding.He has an appointment with PCP tomorrow to get a referral for an endoscopy by GI.\par \par 5/10//22:Patient is seen for a follow up visit s/p  CAR-T treatment with Tec[facility]us....He is accompanied by his wife...He describes fatigue and weakness...using oxycodone for pain in legs...likely neuropathic.. Denies fever, chills, nausea, vomiting, diarrhea,mouth sores, skin rash,SOB,chest pain or any signs of active bleeding. He states that he had occasional epigastric pain from Calquence which  is improved off drug....He is off calquence and blood thinners at this time...Hospital course was complicated by CRS and neurotox...see d/c summary...\par \par 5/18/22: Patient is here for a follow up visit post CAR-T treatment with [facility]ec[facility].Today is Day +28.He is here accompanied by his wife.He states that he still feels fatigue and weakness.He continues to use Oxycodone for pain in legs and occasional headache.Denies fever, chills, blurring of vision,SOB, chest pain, mouth sores,nausea, vomiting, diarrhea,skin rash, dysuria or any signs of active bleeding. He states that few days ago when he stood up from sitting position,he felt dizziness which resolved on its own after few minutes.\par \par 5/24/22:Today is Day +35 post CAR-T therapy with [facility]ec[facility].He is accompanied by his wife.He states that he continues to have pricking pain on B/L feet and that he continues to take Gabapentin as prescribed. Denies fever, chills, SOB,chest pain, rash, mouth sores,nausea, vomiting, diarrhea,dysuria or any signs of active bleeding.He had a f/u appointment with cardiology yesterday and there are no changes or adjustments to any of the of the medications. He states that he did not have any episodes of dizziness since last week.\par \par 6/7//22: Patient is here for a follow up visit post CAR-T therapy with Tec[facility]. .Overall feels better than last week.Denies fever, chills, nausea, vomiting, diarrhea, mouth sores, rash, dysuria,SOB,chest pain or any signs of active bleeding.\par \par 6/21/22: Today is Day + 63 post CAR-T therapy with Tec[facility]us.Patient is seen for a follow up visit today.Overall feels well with no acute concerns.Denies fever, chills, SOB,chest pain, rash, mouth sores, nausea, vomiting, diarrhea,dysuria or any signs of active bleeding.\par \par 7/6/22: Patient is seen for a follow up visit  post CAR-T therapy.He is accompanied by his spouse. Today is Day + 77.He states that he feels better than past weeks.Denies fever, chills, nausea, vomiting, diarrhea, rash, mouth sores,SOB,chest pain or any signs of active bleeding.\par \par On 7/18/22 visit, day + 90 post Car T. Overall patient is fatigued. Complains of a nodule of posterior left knee that is not painful. Denies fever, chills, nausea, diarrhea, rash, mouth sores, dysuria or any signs of active bleeding. Denies SOB, chest pain or B/L LE edema. Remains compliant with medications as prescribed. \par \par 8/16//22: Today is Day + 119 post CAR-T.Overall well with no acute concerns. Denies fever, chills, nausea, vomiting, diarrhea,blurred vision,rash, mouth sores,dysuria,edema or any signs of active bleeding.Remains compliant with medications.He states that he continues to have the painless nodule on posterior aspect of left knee.\par \par On 9/12/22 visit, day + 146 post Car- T. Overall well with no acute concerns. Denies fever, chills, nausea, vomiting, diarrhea,blurred vision,rash, mouth sores,dysuria,edema or any signs of active bleeding. Denies SOB, chest pain or B/L LE edema. Remains compliant with medications.

## 2022-09-15 NOTE — REASON FOR VISIT
[Follow-Up Visit] : a follow-up visit for [Lymphoma] : lymphoma [Family Member] : family member [FreeTextEntry2] : MCL;H/O Auto PSCT on 4/5/17;S/P CAR-T on 4/19/22

## 2022-09-15 NOTE — PHYSICAL EXAM
[Ambulatory and capable of all self care but unable to carry out any work activities] : Status 2- Ambulatory and capable of all self care but unable to carry out any work activities. Up and about more than 50% of waking hours [Normal] : affect appropriate [de-identified] : non tender palpable nodule of  posterior left knee  [de-identified] : healed scar from port removal

## 2022-09-15 NOTE — ASSESSMENT
[FreeTextEntry1] : 57 y/o gentleman with hx of mantle cell lymphoma initially dx'ed Oct 2016 s/p RCHOP chemotherapy X 4 cycles and RICE X 2 cycles with stem cell collected off of the 2nd RICE..he is now s/p CBV-conditioned autologous stem cell transplant on 4/5/17. Hospital course complicated by a-fib/a-flutter s/p ablation and now stable on Cardizem and Toprol. Also now on Xarelto. Port also removed. He demonstrated engraftment on 4/16 and d/c'ed in stable condition on 5/5.\par \par 1) Mantle Cell lymphoma\par S/P AUTO PBSCT 4/5/17\par Rituxan maintenance complete (Short Rituxan, once a week for 2 weeks). Last Rituxan treatment was  on 1/22/20  (Short Rituxan, once a week for 2 weeks).\par CT scans completed 7/1/21: PENELOPE\par PET CT 10/28/21 Completed. Concern for relapse disease. Lymph node biopsy completed on 11/12/21.  Results reviewed..c/w MCL.... \par Discussed being placed on calquence vs Silas rituxan. Patient information printed and given to patient/spouse. Side effects explained including serious side effect of AFIB. Patient has a known cardiac history. Message left for pt's cardiologist..\par Long term goal would be Car T....tecartus... This is why I would like to avoid Silas..Literature and consents provided for cart..discussed risks, benefits, alternatives, rationale and logistics..3 week hospital stay discussed...tlc placement...lymphodepleting prep, monitoring for CRS and neurotox, prolonged cytopenias and hypogamma also discussed\par \par Continue calquence 100 mg capsule- take 1 capsule twice a day...worsening GI sx on protonix...instructed on how to administer\par \par 3/11/22: Will stop Calquence on 3/14/22 ;1 week prior to CAR-T collection...resume 3/23..stop 4/11 in prep for admit 4/14...\par \par 7/6/22: S/P CAR-T therapy with Tec[facility] on 4/19/22\par 7/18/22: Day +90 post Car T. \par 8/2/22: Day +105 post CAR-T\par 9/12/22: Day + 146 Post Car T\par \par 2) Heme\par Counts stable. No indication for transfusion today. \par 9/12/22: WBC 4.23  H/H 13.4/40.4    ANC 2.58\par Continue MV and folic acid daily\par \par Negative assessment  for CRS and neurotox.\par \par F/U PET CT  partial response status at time of CAR-T;repeat at day 30, 100. \par 30 days post PET CT done on 5/25/22.Result as follows:\par -No evidence of FDG avid disease or active lymphoma. Interval resolution of minimal FDG activity associated with bilateral external iliac and bilateral inguinal lymph nodes which have either resolved or significantly decreased in size and not well delineated on CT.\par - Interval resolution of mild FDG avidity in the distal esophagus.\par - Unchanged nonspecific mildly heterogeneous small bowel and pancolonic FDG avidity. Please correlate clinically\par \par  Repeat PET CT at Day 100. Completed 8/3/22: PENELOPE\par \par 3) ID\par Continue prophylaxis\par \par 4) GI\par Tums prn for acid reflux-RESOLVED\par Pantoprazole 40mg daily\par \par 5) Afib\par Continue medications as prescribed by Cardiologist\par Had an appointment with cardiologist on 5/23/22.......no new recommendations from cardiologist\par Continue to f/u with cardiologist as per recommendations\par \par 6) Other\par Oxycodone for leg pain prn\par Headaches- tylenol prn\par Dizziness-Advised to make slow position changes-RESOLVED\par Left Posterior Knee nodule- Continue to monitor \par Continue medications as prescribed\par Maintain f/u with cardiology and GI \par Maintain f/u with his regular internist \par Well care and cancer screening stressed\par Patient has been advised to contact clinic if he has any concerns or signs of CRS / neurotoxicity\par \par Completed Post transplant vaccines:\par 12 month vaccines completed in April 2018. \par 14 month vaccines completed July 2018. \par 24 month re-vaccinations completed in April 2019. \par \par Will need to repeat post CAR-T...covid as well\par Received [employer] Vaccine's on 8/17/22 and 9/7/22.\par \par 7) Plan\par Follow up with Dr. Banks in 4 weeks. \par Completed PET CT at Day 100 (8/3/22) : PENELOPE\par Wallet card and FACT sheet provided again prior to d/c\par Pt advised no driving for 8 weeks post \par Quests addressed..restrictions discussed\par \par \par \par \par \par \par \par \par \par

## 2022-09-21 ENCOUNTER — APPOINTMENT (OUTPATIENT)
Dept: CARDIOLOGY | Facility: CLINIC | Age: 57
End: 2022-09-21

## 2022-09-21 ENCOUNTER — NON-APPOINTMENT (OUTPATIENT)
Age: 57
End: 2022-09-21

## 2022-09-21 PROCEDURE — G2066: CPT

## 2022-09-21 PROCEDURE — 93298 REM INTERROG DEV EVAL SCRMS: CPT

## 2022-10-05 ENCOUNTER — OUTPATIENT (OUTPATIENT)
Dept: OUTPATIENT SERVICES | Facility: HOSPITAL | Age: 57
LOS: 1 days | Discharge: HOME | End: 2022-10-05

## 2022-10-05 DIAGNOSIS — Z98.890 OTHER SPECIFIED POSTPROCEDURAL STATES: Chronic | ICD-10-CM

## 2022-10-05 DIAGNOSIS — Z94.84 STEM CELLS TRANSPLANT STATUS: Chronic | ICD-10-CM

## 2022-10-05 DIAGNOSIS — C83.10 MANTLE CELL LYMPHOMA, UNSPECIFIED SITE: ICD-10-CM

## 2022-10-05 DIAGNOSIS — Z95.818 PRESENCE OF OTHER CARDIAC IMPLANTS AND GRAFTS: Chronic | ICD-10-CM

## 2022-10-08 ENCOUNTER — EMERGENCY (EMERGENCY)
Facility: HOSPITAL | Age: 57
LOS: 1 days | Discharge: HOME | End: 2022-10-08
Attending: EMERGENCY MEDICINE
Payer: COMMERCIAL

## 2022-10-08 VITALS
DIASTOLIC BLOOD PRESSURE: 64 MMHG | OXYGEN SATURATION: 97 % | RESPIRATION RATE: 17 BRPM | HEART RATE: 77 BPM | SYSTOLIC BLOOD PRESSURE: 120 MMHG | TEMPERATURE: 99 F

## 2022-10-08 VITALS
RESPIRATION RATE: 18 BRPM | WEIGHT: 199.96 LBS | SYSTOLIC BLOOD PRESSURE: 116 MMHG | TEMPERATURE: 99 F | OXYGEN SATURATION: 97 % | HEIGHT: 71 IN | DIASTOLIC BLOOD PRESSURE: 79 MMHG | HEART RATE: 85 BPM

## 2022-10-08 DIAGNOSIS — Z98.890 OTHER SPECIFIED POSTPROCEDURAL STATES: Chronic | ICD-10-CM

## 2022-10-08 DIAGNOSIS — Z94.84 STEM CELLS TRANSPLANT STATUS: Chronic | ICD-10-CM

## 2022-10-08 DIAGNOSIS — Z95.818 PRESENCE OF OTHER CARDIAC IMPLANTS AND GRAFTS: Chronic | ICD-10-CM

## 2022-10-08 LAB
ALBUMIN SERPL ELPH-MCNC: 4.6 G/DL — SIGNIFICANT CHANGE UP (ref 3.3–5)
ALP SERPL-CCNC: 81 U/L — SIGNIFICANT CHANGE UP (ref 40–120)
ALT FLD-CCNC: 16 U/L — SIGNIFICANT CHANGE UP (ref 10–45)
ANION GAP SERPL CALC-SCNC: 9 MMOL/L — SIGNIFICANT CHANGE UP (ref 5–17)
ANISOCYTOSIS BLD QL: SLIGHT — SIGNIFICANT CHANGE UP
AST SERPL-CCNC: 21 U/L — SIGNIFICANT CHANGE UP (ref 10–40)
BASOPHILS # BLD AUTO: 0 K/UL — SIGNIFICANT CHANGE UP (ref 0–0.2)
BASOPHILS NFR BLD AUTO: 0 % — SIGNIFICANT CHANGE UP (ref 0–2)
BILIRUB SERPL-MCNC: 0.5 MG/DL — SIGNIFICANT CHANGE UP (ref 0.2–1.2)
BUN SERPL-MCNC: 17 MG/DL — SIGNIFICANT CHANGE UP (ref 7–23)
CALCIUM SERPL-MCNC: 9.3 MG/DL — SIGNIFICANT CHANGE UP (ref 8.4–10.5)
CHLORIDE SERPL-SCNC: 99 MMOL/L — SIGNIFICANT CHANGE UP (ref 96–108)
CO2 SERPL-SCNC: 31 MMOL/L — SIGNIFICANT CHANGE UP (ref 22–31)
CREAT SERPL-MCNC: 0.9 MG/DL — SIGNIFICANT CHANGE UP (ref 0.5–1.3)
EGFR: 100 ML/MIN/1.73M2 — SIGNIFICANT CHANGE UP
ELLIPTOCYTES BLD QL SMEAR: SLIGHT — SIGNIFICANT CHANGE UP
EOSINOPHIL # BLD AUTO: 0.02 K/UL — SIGNIFICANT CHANGE UP (ref 0–0.5)
EOSINOPHIL NFR BLD AUTO: 0.9 % — SIGNIFICANT CHANGE UP (ref 0–6)
GLUCOSE SERPL-MCNC: 100 MG/DL — HIGH (ref 70–99)
HCT VFR BLD CALC: 38.3 % — LOW (ref 39–50)
HGB BLD-MCNC: 12.8 G/DL — LOW (ref 13–17)
LYMPHOCYTES # BLD AUTO: 0.75 K/UL — LOW (ref 1–3.3)
LYMPHOCYTES # BLD AUTO: 37.9 % — SIGNIFICANT CHANGE UP (ref 13–44)
MANUAL SMEAR VERIFICATION: SIGNIFICANT CHANGE UP
MCHC RBC-ENTMCNC: 32.7 PG — SIGNIFICANT CHANGE UP (ref 27–34)
MCHC RBC-ENTMCNC: 33.4 GM/DL — SIGNIFICANT CHANGE UP (ref 32–36)
MCV RBC AUTO: 97.7 FL — SIGNIFICANT CHANGE UP (ref 80–100)
METAMYELOCYTES # FLD: 1.7 % — HIGH (ref 0–0)
MONOCYTES # BLD AUTO: 0.51 K/UL — SIGNIFICANT CHANGE UP (ref 0–0.9)
MONOCYTES NFR BLD AUTO: 25.9 % — HIGH (ref 2–14)
NEUTROPHILS # BLD AUTO: 0.67 K/UL — LOW (ref 1.8–7.4)
NEUTROPHILS NFR BLD AUTO: 31 % — LOW (ref 43–77)
NEUTS BAND # BLD: 2.6 % — SIGNIFICANT CHANGE UP (ref 0–8)
PLAT MORPH BLD: NORMAL — SIGNIFICANT CHANGE UP
PLATELET # BLD AUTO: 224 K/UL — SIGNIFICANT CHANGE UP (ref 150–400)
POIKILOCYTOSIS BLD QL AUTO: SLIGHT — SIGNIFICANT CHANGE UP
POTASSIUM SERPL-MCNC: 3.9 MMOL/L — SIGNIFICANT CHANGE UP (ref 3.5–5.3)
POTASSIUM SERPL-SCNC: 3.9 MMOL/L — SIGNIFICANT CHANGE UP (ref 3.5–5.3)
PROT SERPL-MCNC: 7.5 G/DL — SIGNIFICANT CHANGE UP (ref 6–8.3)
RAPID RVP RESULT: DETECTED
RBC # BLD: 3.92 M/UL — LOW (ref 4.2–5.8)
RBC # FLD: 14.1 % — SIGNIFICANT CHANGE UP (ref 10.3–14.5)
RBC BLD AUTO: ABNORMAL
SARS-COV-2 RNA SPEC QL NAA+PROBE: DETECTED
SODIUM SERPL-SCNC: 139 MMOL/L — SIGNIFICANT CHANGE UP (ref 135–145)
TARGETS BLD QL SMEAR: SLIGHT — SIGNIFICANT CHANGE UP
WBC # BLD: 1.98 K/UL — LOW (ref 3.8–10.5)
WBC # FLD AUTO: 1.98 K/UL — LOW (ref 3.8–10.5)

## 2022-10-08 PROCEDURE — 71045 X-RAY EXAM CHEST 1 VIEW: CPT | Mod: 26

## 2022-10-08 PROCEDURE — 99285 EMERGENCY DEPT VISIT HI MDM: CPT | Mod: 25

## 2022-10-08 PROCEDURE — 99284 EMERGENCY DEPT VISIT MOD MDM: CPT

## 2022-10-08 PROCEDURE — 85025 COMPLETE CBC W/AUTO DIFF WBC: CPT

## 2022-10-08 PROCEDURE — 0225U NFCT DS DNA&RNA 21 SARSCOV2: CPT

## 2022-10-08 PROCEDURE — 87040 BLOOD CULTURE FOR BACTERIA: CPT

## 2022-10-08 PROCEDURE — 80053 COMPREHEN METABOLIC PANEL: CPT

## 2022-10-08 PROCEDURE — 71045 X-RAY EXAM CHEST 1 VIEW: CPT

## 2022-10-08 RX ORDER — ACETAMINOPHEN 500 MG
650 TABLET ORAL ONCE
Refills: 0 | Status: COMPLETED | OUTPATIENT
Start: 2022-10-08 | End: 2022-10-08

## 2022-10-08 RX ADMIN — Medication 650 MILLIGRAM(S): at 12:47

## 2022-10-08 NOTE — ED PROVIDER NOTE - CLINICAL SUMMARY MEDICAL DECISION MAKING FREE TEXT BOX
57 yo Male with PMhx mantle cell lymphoma s/p 2 transplants, atrial flutter s/p ablation, presenting with cough, sore throat, fever, and body aches x2 weeks. Ddx includes but not limited to covid vs viral URI, less likely pneumonia (will get chest x-ray), less likely neutropenic fever (+sick contacts, viral symptoms, will get CBC).

## 2022-10-08 NOTE — ED PROVIDER NOTE - PATIENT PORTAL LINK FT
You can access the FollowMyHealth Patient Portal offered by [facility] by registering at the following website: [email or web address]. By joining [facility]’s FollowMyHealth portal, you will also be able to view your health information using other applications (apps) compatible with our system.

## 2022-10-08 NOTE — ED PROVIDER NOTE - OBJECTIVE STATEMENT
55 yo Male with PMhx mantle cell lymphoma s/p 2 transplants, atrial flutter s/p ablation, presenting with cough, sore throat, fever, and body aches x2 weeks. Patient states that he was having symptoms, which was partially resolving, but got worse over the past two days. Patient's wife and daughter have also been having similar symptoms. Patient has had his covid vaccine. Patient was febrile yesterday to 102. 57 yo Male with PMhx mantle cell lymphoma s/p 2 transplants, atrial flutter s/p ablation, presenting with cough, sore throat, fever, and body aches x2 weeks. Patient states that he was having symptoms, which was partially resolving, but got worse over the past two days. Patient's wife and daughter have also been having similar symptoms. Patient has had his covid vaccine. Patient was febrile yesterday to 102. Patient denies CP, SOB, abdominal pain, nausea/vomiting.

## 2022-10-08 NOTE — ED ADULT TRIAGE NOTE - TEMPERATURE IN CELSIUS (DEGREES C)
[facility] [address]  [facility] [address]  [address] [place] [facility] [id number]     JED WHITAKER MD, MPH  ADOLESCENT MEDICINE     [facility] [address]  [address] [place] [facility] [address]     OUTPATIENT EATING DISORDER ASSESSMENT     DATE: 9/22/21     PCP: none (SHANNON Rogers)    Therapist: Judson Tadeo  [facility]  Psychiatrist: Evelyn LOVETT at [facility]     Chief Complaint: Concern for eating disorder    Her psychiatrist and therapist told mom 2 months ago that they are concerned about restriction reported by Claritza  Aug 2-16: [facility], this was focused on depression and anxiety    Weight History:       Highest past weight:  140 lb  How long ago did you first reach this weight?  Dec 2020  Lowest weight as a teen: 95 lb    Psych Hx:   Inpatient psych - Oct 2020 at [place] for 1 month, this was for self-harm, and SI  At that time, psychiatrist told mom that they were concerned about ED  She went from 106 lb 10/20 to 135 lb 4/21; she was started on Zyprexa Oct 2020  March 2021, outpatient psychiatrist Evelyn LOVETT ([facility]) started her on Abilify 5 mg     A. Dieting and Eating Hx     Have you ever restricted your food intake due to concerns about your body size or weight? Yes - started to be concerned about weight March 2021 but did not start restricting food until a few months ago     Please identify foods you historically avoid and foods you eat:   - No specific food but \"I just don't eat\". Used to enjoy shrimp but does not like it anymore.   - Would still enjoy eating fast food, candy, chips \"junk food\" but still actively restricts     History of choking? No  Fear of vomiting? Yes  Has attempted to make herself throw up this month      24 hour food diary:     Breakfast: Mom forces her to eat Yi toast sticks about 5 w/  syrup, sometimes with milk (reduced fat? ). Says that whenever she eats in the AM her stomach hurts. Eats breakfast most days.   Lunch: Typically skips lunch  Sometimes has hot chips or unsweetened apple sauce as snack a couple times per week  Dinner: Whatever mom cooks - cheese quesadilla. Can't specify exact meals since always changing. Will only eat about 1/4 plate of food or however much her mom makes her.   Sometimes handful of chips or couple scoops of apple sauce.     Drinks water, 1-2 fabrizio suns per day.       B. Exercise     On average, how often do you exercise (including going on walks, riding a bicycle, etc.?)  Never - does not have the motivation for that and afraid that if she starts she'll get addicted. Wants to because she wants to lose weight.     If you exercise more than once a day, please count the total number of times that you exercise in a typical week.   0     How often do you weigh yourself?   \"Whenever I get the chance\" - Tries at least once per day but mom does not usually let her, will try if no one is upstairs. Recently weighing herself once per week at therapist's office.      C. Binge Eating     Please remember in answering the following questions that an eating binge only refers to eating an amount of food that others your age and sex regard as unusually large.  It does not include times when you may have eaten a normal quantity of food which you have preferred not to have eaten.     Have you ever had an episode of eating an amount of food that others would regard as unusually large? Used to eat a lot when she was on Zyprexa (2 orders of pancakes and sausage from [employer], 2 packs of poptarts daily)     Do you find yourself eating large amounts of food when you aren’t hungry? no     Do you experience feelings of shame or guilt about how much you eat?  yes     Do you hide your eating behaviors from loved ones?  How little she eats not how much     Do you joseph food or hide  empty food containers? No     Do you struggle to control when and how much you eat? No           D. Purging     Have you ever tried to vomit after eating in order to get rid of food eaten?     Once this month, unsuccessful         E. Laxatives     Have you ever used laxatives to control your weight or \"get rid of food\"?    No        F. Diet Pills     Have you ever taken diet pills?     No     G. Diuretics      Have you ever taken diuretics (water pills) to control your weight?    No     H. Menstrual History     Have you ever had a menstrual period?  yes   How old were you when you first started menstruating?  12yo  Do you have menstrual periods now? yes  How long has it been since your last period? 9/12 (10d ago), gets them monthly         I. Body Image and Anxiety     Do you fear gaining weight? yes   Do you having negative feelings about your body (ie. You feel that your are too big or \"fat\"?) yes      Do you have intrusive or persistent thoughts about your body image or your food intake? yes       PMH:   Past Medical History:   Diagnosis Date   • Generalized anxiety disorder    • Major depressive disorder       PSH: History reviewed. No pertinent surgical history.   Meds:   Current Outpatient Medications   Medication Sig Dispense Refill   • gabapentin (NEURONTIN) 100 MG capsule Take 100 mg by mouth daily.     • ARIPiprazole (Abilify) 5 MG tablet Take 1/2 tab (2.5 mg) PO qhs 15 tablet 0   • guanFACINE (INTUNIV) 2 MG TABLET SR 24 HR daily.     • sertraline (ZOLOFT) 25 MG tablet Take 25 mg by mouth daily.     • albuterol 108 (90 Base) MCG/ACT inhaler Inhale 1-2 puffs into the lungs every 4 hours as needed for Shortness of Breath or Wheezing. 1 each 3     No current facility-administered medications for this visit.      Allergies:   ALLERGIES:   Allergen Reactions   • Nickel RASH   • Seasonal Runny Nose      Family Hx:   Family History   Problem Relation Age of Onset   • Depression Mother    • Hypertension Maternal  37.2 Grandmother    • Schizophrenia Maternal Grandmother    • Bipolar disorder Maternal Grandmother    • Hypertension Maternal Grandfather         Home: Lives at home with mom, step dad, MGM.  She feels safe at home.    Education: Currently attends [employer], [employer].  Grades are currently As and Bs, currently 2 Fs because missed major projects in 2 classes. No history of school expulsion or suspension. Did have to repeat Freshman year for grades and poor mental health (repeating currently, in some sophomore classes). No history of cognitive delays. Sometimes has difficulty concentrating in school. Denies any other behavioral problems at school. No history of bullying.      Activities: [employer] club and creative writing club     Drugs: Denies alcohol, cigarette, marijuana use or illicit drug use    Sex: Attracted to both males and females. No history of sexual activity.     Mood: usually feels very labile, no set mood but can feel both very happy and very sad during the same day. Rarely anxious.   +depression. +suicidal thoughts. Has attempted suicide twice - once she didn't have time to take the pills she was holding, the second time she did get 2 pills (ibuprofen). That's when she went to partial hospitalization. +Self harm in past with cutting, not for past 5 months  Still feels like she has no future sometimes doesn't try for that reason. Does have thoughts of wanting to fall asleep and never wake up but would never try to do it herself.     Review of Systems   Constitutional: Positive for appetite change ( decreased).        +weight loss   HENT: Negative for congestion and sore throat.    Eyes: Positive for visual disturbance (blurry vision with standing up).   Respiratory: Negative for cough, choking, chest tightness and shortness of breath.    Cardiovascular: Negative for chest pain and leg swelling.   Gastrointestinal: Positive for abdominal pain (After being forced to eat breakfast). Negative for constipation  and vomiting.   Genitourinary: Negative for menstrual problem ( monthly menses).   Musculoskeletal: Negative for gait problem and myalgias.   Skin: Negative for rash.   Neurological: Positive for light-headedness ( with standing too quickly).   Psychiatric/Behavioral: Positive for decreased concentration.        Feels thoughts of not wanting to be alive but no active plans. No recent self harm behavior.        Objective    Vitals:    09/22/21 1032   BP: 100/71   Pulse: (!) 112   Temp:      Physical Exam  Vitals reviewed.   Constitutional:       General: She is not in acute distress.     Appearance: She is not ill-appearing or toxic-appearing.   HENT:      Head: Normocephalic.      Right Ear: External ear normal.      Left Ear: External ear normal.      Nose: Nose normal. No congestion.      Mouth/Throat:      Mouth: Mucous membranes are moist.      Pharynx: Oropharynx is clear. No oropharyngeal exudate or posterior oropharyngeal erythema.   Eyes:      Extraocular Movements: Extraocular movements intact.      Conjunctiva/sclera: Conjunctivae normal.      Pupils: Pupils are equal, round, and reactive to light.   Cardiovascular:      Rate and Rhythm: Normal rate and regular rhythm.      Pulses: Normal pulses.      Heart sounds: No murmur heard.     Pulmonary:      Effort: Pulmonary effort is normal. No respiratory distress.      Breath sounds: Normal breath sounds.   Abdominal:      General: There is no distension.      Palpations: Abdomen is soft.      Tenderness: There is no abdominal tenderness. There is no guarding or rebound.   Musculoskeletal:         General: No swelling.      Cervical back: Normal range of motion and neck supple. No rigidity.      Right lower leg: No edema.      Left lower leg: No edema.   Skin:     General: Skin is warm.      Capillary Refill: Capillary refill takes less than 2 seconds.      Findings: No rash.   Neurological:      General: No focal deficit present.      Mental Status: She is  alert and oriented to person, place, and time. Mental status is at baseline.      Cranial Nerves: No cranial nerve deficit.      Sensory: No sensory deficit.      Motor: No weakness.      Coordination: Coordination normal.      Gait: Gait normal.      Deep Tendon Reflexes: Reflexes normal.   Psychiatric:         Mood and Affect: Mood normal.         Behavior: Behavior normal.         Thought Content: Thought content normal.         Judgment: Judgment normal.         Eating disorder, unspecified type  (primary encounter diagnosis)     Orders Placed This Encounter   • CBC with Automated Differential   • Comprehensive Metabolic Panel   • Estradiol   • Free T4   • Lutenizing Hormone   • Magnesium   • Phosphorus   • Thyroid Stimulating Hormone   • Follicle Stimulating Hormone   • Electrocardiogram 12-Lead   • POCT Urine Dip Auto   • gabapentin (NEURONTIN) 100 MG capsule   • ARIPiprazole (Abilify) 5 MG tablet        Over the last 2 weeks, how often have you been bothered by the following problems?        PHQ2 Score: 2  PHQ2 Score Interpretation: No further screening needed  1. Feeling down, depressed, irritable or hopeless?: 1  2. Little interest or pleasure in activity?: 1     PHQ9 Score: 10  PHQ9 Score Interpretation: Moderate Depression  3.Trouble falling, staying asleep or sleeping too much?: 2  4. Poor appetite, weight loss, or overeating?: 3  5. Feeling tired or having little energy?: 3  6. Feeling bad about yourself--or feeling that you are a failure or that you have let yourself or your family down?: 0  7. Trouble concentrating on things like as school work, reading or watching TV?: 0  8. Moving or speaking so slowly that other people could have noticed? Or the opposite - being so fidgety or restless that you were moving around a lot more than usual?: 0  9. Thoughts that you would be better off dead, or of hurting yourself in some way?: 0        Claritza  is a  14 y/o female with a history of weight loss,  BMI of  19.7 concerning for Eating Disorder (NOS). She endorses purposeful restriction, concerns about body image, and fear of weight gain. She also has a hx of MDD with SI and attempt.     Of note, mom reported that Claritza frequently complains of feeling dizzy and lightheaded.  These sensations then subside rapidly, she has never had a syncopal episode. I discussed her orthostatics today with mom. Mom stated that this is common for Claritza, especially after starting medications (Abilify and Neurontin) and that she would like to monitor her symptoms at home and does not want a further evaluation in ED or inpatient. I told her to closely monitor Claritza at home, ensure that she is eating regularly as described below. If her symptoms worsen at home, she must seek immediate medical attention. Mom understood and agreed. Claritza later denied feeling any dizziness on standing. Neuro exam was unremarkable.      I spent 120 min discussing symptoms, diagnosis, and management of ED NOS.     Plan:  1. Advised Mom  to read [employer] book \"How to help your Teen Beat an Eating Disoder\". Encouraged Mom to look at the website for  [place]/ Family Based Treatment method.   2. Mom to continue therapy for Claritza at [facility]. I called her therapist today to discuss her case. Shwetha notified me that this family is planning on [facility] in 1 month  3. Parents to monitor all meals and snacks, prepare all meals and snacks, keeping in mind high caloric/fat content. to make no decisions in types/amount of food. May use Ensure as a drink, or after meal or with snack.   Instead of water, parents should use a higher calorie drink.   4. No physical activity, practices or sports at this time.  5. May resume physical activity when vital signs normal (no bradycardia) menses resume and are regular, weight has been restored, and patient is eating regularly). May use this as an incentive with her.   6. EKG today showed normal sinus rhythm.   7.  F/u 1-2 wks with Dr. Osman   8. If there continues to be weight loss, we will consider inpatient medical stabilization.  9. May decrease Abilify to 2.5 mg.. Most likely her mood symptoms are secondary to her Eating Disorder. Plan is to weight restore and establish regular refeeding per FBT phase 1 protocol. Mom is to complete Auth for Release of Info, so we may communicate with psychiatrist. She may benefit from discontinuing Neurontin as well. Zoloft 25 mg may be continued as this would help with anxiety/ED thoughts.   10. Will connect Patient with FBT resources/providers in [place] at next visit.     I saw and evaluated patient with resident physician. I agree with the history, physical exam, and assessment and plan.  -Snehal Osman MD      I took a detailed history and evaluated this patient. I also provided comprehensive counseling and reviewed the plan with both the patient and caregiver.   Total time spent with patient:    120    minutes.  Greater than 50% of the total time was spent counseling and/or coordinating care.    Snehal Osman MD, MPH

## 2022-10-08 NOTE — ED ADULT NURSE NOTE - OBJECTIVE STATEMENT
patient presented to ED complaining of flu like symptoms. Patient axox4. Blood cultures sent. Spouse at bed side. No distress noted.

## 2022-10-08 NOTE — ED ADULT TRIAGE NOTE - CHIEF COMPLAINT QUOTE
flu like symptoms including fever for 2 weeks without improvement  pt has non-hodgkin's lymphoma, not on chemo or radiation but had transplant this year

## 2022-10-08 NOTE — ED PROVIDER NOTE - NSFOLLOWUPINSTRUCTIONS_ED_ALL_ED_FT
You presented to the emergency room today with cough, fever, sore throat, and body aches. You received a chest x-ray which did not show a pneumonia. You also got blood work, which showed that your white blood cells (your immune cells) are around the same as prior bloodwork. You also received a viral panel, someone will call you about the results. Please follow-up with your PCP about continued management. Please return to the emergency room if you have shortness of breath, chest pain, continued fever.

## 2022-10-08 NOTE — ED POST DISCHARGE NOTE - ADDITIONAL DOCUMENTATION
pts wife called stated he has a 101F fever, is covid positive. I recommended tylenol PRN for fevers, rest, hydration, instructed to call PMD to discuss tx options e.g. paxlovid vs MAB. gave strict followup instructions and return precautions - Emmanuel Bui PA-C

## 2022-10-08 NOTE — ED PROVIDER NOTE - PHYSICAL EXAMINATION
Const: not in acute distress  Eyes: no conjunctival injection  HEENT: Head NCAT, Moist MM. No erythema or exudate of tonsils.  Neck: Trachea midline. No lymphadenopathy.   CVS: +S1/S2, Peripheral pulses 2+ and equal in all extremities.  RESP: Unlabored respiratory effort. Clear to auscultation bilaterally.  GI: Nontender/Nondistended, No CVA tenderness b/l.   MSK: Normocephalic/Atraumatic, No Lower Extremities edema b/l.   Skin: Intact.   Neuro: CNs II-XII grossly intact. Motor & Sensation grossly intact.  Psych: Awake, Alert, & Oriented (AAO) x3.

## 2022-10-08 NOTE — ED PROVIDER NOTE - NS ED ROS FT
CONST: (+) fevers, no chills, no trauma  EYES: no blurry vision   ENT: (+) sore throat, cough  CV: no chest pain, no extremity swelling  RESP: no shortness of breath  ABD: no abdominal pain, no nausea, no vomiting, no diarrhea, no melena  : no dysuria, no hematuria, no frequency  MSK: no back pain, no neck pain, no extremity pain, (+) body aches  NEURO: no headache, no focal weakness, no dizziness  HEME: no bruising  SKIN: no rash

## 2022-10-08 NOTE — ED PROVIDER NOTE - ATTENDING CONTRIBUTION TO CARE
57 yo Male with PMhx mantle cell lymphoma s/p 2 transplants, atrial flutter s/p ablation, presenting with cough, sore throat, fever, and body aches x2 weeks, well appearing, vss, not currently on chemo or on immunosuppression meds, labs, cultures, rvp likely viral everyone at home has similar sts.

## 2022-10-10 DIAGNOSIS — U07.1 COVID-19: ICD-10-CM

## 2022-10-11 ENCOUNTER — APPOINTMENT (OUTPATIENT)
Dept: HEMATOLOGY ONCOLOGY | Facility: CLINIC | Age: 57
End: 2022-10-11

## 2022-10-11 RX ORDER — NIRMATRELVIR AND RITONAVIR 300-100 MG
20 X 150 MG & KIT ORAL
Qty: 1 | Refills: 0 | Status: DISCONTINUED | COMMUNITY
Start: 2022-10-10 | End: 2022-10-11

## 2022-10-13 NOTE — PROVIDER CONTACT NOTE (CRITICAL VALUE NOTIFICATION) - DATE AND TIME:
10-Apr-2017 08:03 10-Apr-2017 08:10 [Alert] : alert [No Acute Distress] : no acute distress [Normocephalic] : normocephalic [EOMI Bilateral] : EOMI bilateral [Clear tympanic membranes with bony landmarks and light reflex present bilaterally] : clear tympanic membranes with bony landmarks and light reflex present bilaterally  [Pink Nasal Mucosa] : pink nasal mucosa [Nonerythematous Oropharynx] : nonerythematous oropharynx [Supple, full passive range of motion] : supple, full passive range of motion [No Palpable Masses] : no palpable masses [Clear to Auscultation Bilaterally] : clear to auscultation bilaterally [Regular Rate and Rhythm] : regular rate and rhythm [Normal S1, S2 audible] : normal S1, S2 audible [No Murmurs] : no murmurs [+2 Femoral Pulses] : +2 femoral pulses [Soft] : soft [NonTender] : non tender [Non Distended] : non distended [Normoactive Bowel Sounds] : normoactive bowel sounds [No Hepatomegaly] : no hepatomegaly [No Splenomegaly] : no splenomegaly [Cornelius: _____] : Cornelius [unfilled] [No Testicular Masses] : no testicular masses [No Abnormal Lymph Nodes Palpated] : no abnormal lymph nodes palpated [Normal Muscle Tone] : normal muscle tone [No Gait Asymmetry] : no gait asymmetry [No pain or deformities with palpation of bone, muscles, joints] : no pain or deformities with palpation of bone, muscles, joints [Straight] : straight [+2 Patella DTR] : +2 patella DTR [Cranial Nerves Grossly Intact] : cranial nerves grossly intact [No Rash or Lesions] : no rash or lesions

## 2022-10-26 ENCOUNTER — APPOINTMENT (OUTPATIENT)
Dept: CARDIOLOGY | Facility: CLINIC | Age: 57
End: 2022-10-26

## 2022-10-26 ENCOUNTER — NON-APPOINTMENT (OUTPATIENT)
Age: 57
End: 2022-10-26

## 2022-10-26 PROCEDURE — 93298 REM INTERROG DEV EVAL SCRMS: CPT

## 2022-10-26 PROCEDURE — G2066: CPT

## 2022-11-03 ENCOUNTER — LABORATORY RESULT (OUTPATIENT)
Age: 57
End: 2022-11-03

## 2022-11-03 ENCOUNTER — RESULT REVIEW (OUTPATIENT)
Age: 57
End: 2022-11-03

## 2022-11-03 ENCOUNTER — APPOINTMENT (OUTPATIENT)
Dept: HEMATOLOGY ONCOLOGY | Facility: CLINIC | Age: 57
End: 2022-11-03

## 2022-11-03 VITALS
BODY MASS INDEX: 29.3 KG/M2 | OXYGEN SATURATION: 98 % | RESPIRATION RATE: 16 BRPM | WEIGHT: 210.1 LBS | TEMPERATURE: 97.3 F | HEART RATE: 70 BPM | DIASTOLIC BLOOD PRESSURE: 84 MMHG | SYSTOLIC BLOOD PRESSURE: 132 MMHG

## 2022-11-03 LAB
APTT BLD: 27.4 SEC
BASOPHILS # BLD AUTO: 0.03 K/UL — SIGNIFICANT CHANGE UP (ref 0–0.2)
BASOPHILS NFR BLD AUTO: 0.7 % — SIGNIFICANT CHANGE UP (ref 0–2)
EOSINOPHIL # BLD AUTO: 0.16 K/UL — SIGNIFICANT CHANGE UP (ref 0–0.5)
EOSINOPHIL NFR BLD AUTO: 3.7 % — SIGNIFICANT CHANGE UP (ref 0–6)
FIBRINOGEN PPP COAG.DERIVED-MCNC: 390 MG/DL
HCT VFR BLD CALC: 38.1 % — LOW (ref 39–50)
HGB BLD-MCNC: 12.6 G/DL — LOW (ref 13–17)
IMM GRANULOCYTES NFR BLD AUTO: 0.7 % — SIGNIFICANT CHANGE UP (ref 0–0.9)
INR PPP: 1 RATIO
LYMPHOCYTES # BLD AUTO: 1.59 K/UL — SIGNIFICANT CHANGE UP (ref 1–3.3)
LYMPHOCYTES # BLD AUTO: 36.3 % — SIGNIFICANT CHANGE UP (ref 13–44)
MCHC RBC-ENTMCNC: 32.9 PG — SIGNIFICANT CHANGE UP (ref 27–34)
MCHC RBC-ENTMCNC: 33.1 G/DL — SIGNIFICANT CHANGE UP (ref 32–36)
MCV RBC AUTO: 99.5 FL — SIGNIFICANT CHANGE UP (ref 80–100)
MONOCYTES # BLD AUTO: 0.55 K/UL — SIGNIFICANT CHANGE UP (ref 0–0.9)
MONOCYTES NFR BLD AUTO: 12.6 % — SIGNIFICANT CHANGE UP (ref 2–14)
NEUTROPHILS # BLD AUTO: 2.02 K/UL — SIGNIFICANT CHANGE UP (ref 1.8–7.4)
NEUTROPHILS NFR BLD AUTO: 46 % — SIGNIFICANT CHANGE UP (ref 43–77)
NRBC # BLD: 0 /100 WBCS — SIGNIFICANT CHANGE UP (ref 0–0)
NT-PROBNP SERPL-MCNC: 121 PG/ML
PLATELET # BLD AUTO: 285 K/UL — SIGNIFICANT CHANGE UP (ref 150–400)
PT BLD: 11.7 SEC
RBC # BLD: 3.83 M/UL — LOW (ref 4.2–5.8)
RBC # FLD: 16 % — HIGH (ref 10.3–14.5)
WBC # BLD: 4.38 K/UL — SIGNIFICANT CHANGE UP (ref 3.8–10.5)
WBC # FLD AUTO: 4.38 K/UL — SIGNIFICANT CHANGE UP (ref 3.8–10.5)

## 2022-11-03 PROCEDURE — 99214 OFFICE O/P EST MOD 30 MIN: CPT

## 2022-11-03 RX ORDER — ALLOPURINOL 300 MG/1
300 TABLET ORAL DAILY
Qty: 30 | Refills: 3 | Status: DISCONTINUED | COMMUNITY
Start: 2022-05-06 | End: 2022-11-03

## 2022-11-03 RX ORDER — ONDANSETRON 8 MG/1
8 TABLET ORAL
Qty: 30 | Refills: 0 | Status: DISCONTINUED | COMMUNITY
Start: 2022-05-06 | End: 2022-11-03

## 2022-11-03 RX ORDER — FLUCONAZOLE 200 MG/1
200 TABLET ORAL
Qty: 60 | Refills: 3 | Status: DISCONTINUED | COMMUNITY
Start: 2022-05-06 | End: 2022-11-03

## 2022-11-03 RX ORDER — LEVETIRACETAM 500 MG/1
500 TABLET, FILM COATED ORAL TWICE DAILY
Qty: 60 | Refills: 1 | Status: DISCONTINUED | COMMUNITY
Start: 2022-05-06 | End: 2022-11-03

## 2022-11-04 LAB
ALBUMIN SERPL ELPH-MCNC: 4.8 G/DL
ALP BLD-CCNC: 85 U/L
ALT SERPL-CCNC: 16 U/L
ANION GAP SERPL CALC-SCNC: 10 MMOL/L
AST SERPL-CCNC: 17 U/L
BILIRUB SERPL-MCNC: 0.4 MG/DL
BUN SERPL-MCNC: 24 MG/DL
CALCIUM SERPL-MCNC: 9.9 MG/DL
CHLORIDE SERPL-SCNC: 104 MMOL/L
CK SERPL-CCNC: 100 U/L
CMV DNA SPEC QL NAA+PROBE: NOT DETECTED IU/ML
CMVPCR LOG: NOT DETECTED LOG10IU/ML
CO2 SERPL-SCNC: 30 MMOL/L
CREAT SERPL-MCNC: 1.02 MG/DL
CRP SERPL-MCNC: <3 MG/L
DEPRECATED D DIMER PPP IA-ACNC: <150 NG/ML DDU
EGFR: 86 ML/MIN/1.73M2
FERRITIN SERPL-MCNC: 72 NG/ML
GLUCOSE SERPL-MCNC: 100 MG/DL
LDH SERPL-CCNC: 167 U/L
MAGNESIUM SERPL-MCNC: 2.1 MG/DL
PHOSPHATE SERPL-MCNC: 3.6 MG/DL
POTASSIUM SERPL-SCNC: 5.1 MMOL/L
PROT SERPL-MCNC: 6.9 G/DL
SODIUM SERPL-SCNC: 143 MMOL/L
URATE SERPL-MCNC: 5.8 MG/DL

## 2022-11-04 NOTE — PHYSICAL EXAM
[Ambulatory and capable of all self care but unable to carry out any work activities] : Status 2- Ambulatory and capable of all self care but unable to carry out any work activities. Up and about more than 50% of waking hours [Normal] : affect appropriate [de-identified] : non tender palpable nodule of  posterior left knee  [de-identified] : healed scar from port removal

## 2022-11-04 NOTE — ASSESSMENT
[FreeTextEntry1] : 57 y/o gentleman with hx of mantle cell lymphoma initially dx'ed Oct 2016 s/p RCHOP chemotherapy X 4 cycles and RICE X 2 cycles with stem cell collected off of the 2nd RICE..he is now s/p CBV-conditioned autologous stem cell transplant on 4/5/17. Hospital course complicated by a-fib/a-flutter s/p ablation and now stable on Cardizem and Toprol. Also now on Xarelto. Port also removed. He demonstrated engraftment on 4/16 and d/c'ed in stable condition on 5/5.\par \par 1) Mantle Cell lymphoma\par S/P AUTO PBSCT 4/5/17\par Rituxan maintenance complete (Short Rituxan, once a week for 2 weeks). Last Rituxan treatment was  on 1/22/20  (Short Rituxan, once a week for 2 weeks).\par CT scans completed 7/1/21: [facility]\par PET CT 10/28/21 Completed. Concern for relapse disease. Lymph node biopsy completed on 11/12/21.  Results reviewed..c/w MCL.... \par Discussed being placed on calquence vs Silas rituxan. Patient information printed and given to patient/spouse. Side effects explained including serious side effect of AFIB. Patient has a known cardiac history. Message left for pt's cardiologist..\par Long term goal would be Car T....tecartus... This is why I would like to avoid Silas..Literature and consents provided for cart..discussed risks, benefits, alternatives, rationale and logistics..3 week hospital stay discussed...tlc placement...lymphodepleting prep, monitoring for CRS and neurotox, prolonged cytopenias and hypogamma also discussed\par \par Continue calquence 100 mg capsule- take 1 capsule twice a day...worsening GI sx on protonix...instructed on how to administer\par \par 3/11/22: Will stop Calquence on 3/14/22 ;1 week prior to CAR-T collection...resume 3/23..stop 4/11 in prep for admit 4/14...\par \par 7/6/22: S/P CAR-T therapy with [facility] on 4/19/22\par 7/18/22: Day +90 post Car T. \par 8/2/22: Day +105 post CAR-T\par 9/12/22: Day + 146 Post Car T\par 11/3/22: Day + 198 post Car T. Ordered Day +180 scan on 11/3/22. \par \par 2) Heme\par Counts stable. No indication for transfusion today. \par 11/3/22: WBC 4.38  H/H 12.6/38.1    ANC 2.02\par Continue MV and folic acid daily\par \par Negative assessment  for CRS and neurotox.\par \par F/U PET CT  partial response status at time of CAR-T;repeat at day 30, 100. \par 30 days post PET CT done on 5/25/22.Result as follows:\par -No evidence of FDG avid disease or active lymphoma. Interval resolution of minimal FDG activity associated with bilateral external iliac and bilateral inguinal lymph nodes which have either resolved or significantly decreased in size and not well delineated on CT.\par - Interval resolution of mild FDG avidity in the distal esophagus.\par - Unchanged nonspecific mildly heterogeneous small bowel and pancolonic FDG avidity. Please correlate clinically\par \par Repeat PET CT at Day 100. Completed 8/3/22: [facility]\par Repeat PET CT ordered on 11/3/22. \par \par 3) ID\par Continue prophylaxis\par \par 4) GI\par Tums prn for acid reflux-RESOLVED\par Pantoprazole 40 mg daily\par \par 5) Afib\par Continue medications as prescribed by Cardiologist\par Had an appointment with cardiologist on 5/23/22.......no new recommendations from cardiologist\par Continue to f/u with cardiologist as per recommendations\par \par 6) Other\par Oxycodone for leg pain prn\par Headaches- tylenol prn\par Dizziness-Advised to make slow position changes-RESOLVED\par Left Posterior Knee nodule- Continue to monitor \par Continue medications as prescribed\par Maintain f/u with cardiology and GI \par Maintain f/u with his regular internist \par Well care and cancer screening stressed\par Patient has been advised to contact clinic if he has any concerns or signs of CRS / neurotoxicity\par \par Completed Post transplant vaccines:\par 12 month vaccines completed in April 2018. \par 14 month vaccines completed July 2018. \par 24 month re-vaccinations completed in April 2019. \par \par Will need to repeat post CAR-T...covid as well\par Received [employer] Vaccine's on 8/17/22 and 9/7/22.\par \par 7) Plan\par Follow up with Dr. Banks in 4 weeks. \par Completed PET CT at Day 100 (8/3/22) : [facility]\par Wallet card and FACT sheet provided again prior to d/c\par Pt advised no driving for 8 weeks post \par Quests addressed..restrictions discussed\par \par \par \par \par \par \par \par \par \par

## 2022-11-04 NOTE — HISTORY OF PRESENT ILLNESS
[de-identified] : Mr. Chavez is a 53 year old male with a history of MCL, initially diagnosed 10/2016. On 11/15/2016 he was seen by Dr. Bravo at [facility], after noticing a left inguinal mass for which he sought medical attention. CT scans (3/21/2016) revealed left-sided pelvic and left inguinal adenopathy. A 0.7 cm indeterminate lesion in the right iliac bone was also noted. A left inguinal lymph node biopsy was performed 9/24/2016 which revealed morphologic and immunophenotypic findings consistent with mantle cell lymphoma. He is status post 4 cycles of R-CHOP, and two cycles of RICE consolidation. A bone marrow biopsy on 2/10/17 showed normocellular bone marrow with trilineage hematopoiesis and maturation. He was admitted 3/27/17 for an autologous peripheral blood stem cell transplant with CBV  regimen. \par  \par Upon admission, a TLC was placed in IR. Mr. Chavez received IV hydration, nutritional support, pain management, antinausea medication, antidiarrheals, antibiotic, antiviral, PCP, antifungal and GI prophylaxis. Labs were monitored daily. Mr. Chavez received transfusional support and electrolyte repletion as needed. When his ANC dropped below 1000, he was started on prophylactic ciprofloxacin. When he became febrile, his antibiotic coverage was broadened as appropriate. \par  \par On 4/5/17, Mr. Chavez received 314ml of thawed, pooled, washed, mobilized, plasma reduced, HPC apheresis over approximately 1 hour. He tolerated the infusion well with no adverse side effects noted. Engraftment was noted on 4/16/17, and the Zarxio and antibiotics were discontinued. \par  \par Mr. Chavez's transplant course was complicated by recurrent headaches, oral and GI mucositis, dizziness, and chest pain resulting in a RRT on 4/10/17. The chest pain was self limiting. A second RRT was called on 4/16/17, for palpitations and dizziness. Mr. Chavez was found to be in atrial flutter with RVR. He was transferred to telemetry and followed by cardiology. \par A-fib/a-flutter was resistant to single rate agent, and while in arrhythmia, pt would become symptomatic, hypotensive. Ablation was was preformed, however was unsuccessful. Pt continued to have frequent changes in rhythm. As a-fib was usually preceded by PAC, and mediport tip was in r atrium, concern was this may have been involved. Mediport was removed but patient continued to have runs of non conducted APCs. Patient was followed by EP during this admission who ruled out any need for PPM insertion. Patient was started on Cardizem and Toprol which he tolerated well. He did have episodes of bradycardia during his sleep with episodes of Atrial flutter with RVR as well. However, he remained asymptomatic. \par Pt was d/c 5/5/2017 to home in stable condition with instructions to follow up with hematology and EP. [de-identified] : Presenting today for a follow up appointment. He has completed  maintenance Rituxan. Last Rituxan was received in jan... PENELOPE....Patient reports continued fatigue. Recent CT/PET scan PENELOPE.  Patient also offers that he had the shingles and was treated with valtrex..some residual discomfort...He continues to be followed by cardiology; his cardiologist increased the Diltiazem dosage to 180mg q daily, however patient reported feeling increasingly fatigued and returned to 120mg q daily and now notes less fatigue. He is in eval for a cardiac procedure.. His cardiologist was waiting for the rituxan to be completed and covid to pass..He is compliant with all medications. Denies mouth sores, eye dryness, nausea, vomiting, diarrhea. No LE edema, CP or SOB. \par \par On 10/18/21, patient presents for a follow up visit. Overall patient is well. States has generalized pruritus and recently was seen by dermatologist. Has been applying triamcinolone cream with no relief. States recently felt a "lump" of the right groin. Given the patients past medical history patient is very concerned. Denies fever, chills, nausea, vomiting, diarrhea, rash, mouth sores, dysuria or any signs of active bleeding. Denies SOB, chest pain or B/L LE edema. Remains compliant with medications prescribed. \par \par On 11/30//21, patient presents with spouse for a follow up visit. Overall patient feels fatigued. Had a lymph node biopsy on 11/12/21 and results are c/w relapsed MCL.. Denies fever, chills, nausea, vomiting, diarrhea, rash, mouth sores, dysuria or any signs of active bleeding. Denies SOB, chest pain or B/L LE edema. Remains compliant with medications prescribed. Here to discuss further therapy..\par \par On 1/25/22, patient presents for a follow up visit.  On calquence and not  tolerating well. Was in ER with severe epigastric pain...now on protonix...Complains of acid reflux and takes tums prn. Complains of headaches every other day. Denies fever, chills, nausea, vomiting, diarrhea, rash, mouth sores, dysuria or any signs of active bleeding. Denies SOB, chest pain or B/L LE edema. in wheelchair today\par \par 2/18/22: Patient is here for a follow up visit.He states that he still has epigastric pain with Calquence.He takes Pantoprazole as prescribed.Denies fever,chills, nausea, vomiting, diarrhea,rash, mouth sores,SOB,chest pain or any signs of active bleeding.He has an appointment with PCP tomorrow to get a referral for an endoscopy by GI.\par \par 5/10//22:Patient is seen for a follow up visit s/p  CAR-T treatment with [facility]....He is accompanied by his wife...He describes fatigue and weakness...using oxycodone for pain in legs...likely neuropathic.. Denies fever, chills, nausea, vomiting, diarrhea,mouth sores, skin rash,SOB,chest pain or any signs of active bleeding. He states that he had occasional epigastric pain from Calquence which  is improved off drug....He is off calquence and blood thinners at this time...Hospital course was complicated by CRS and neurotox...see d/c summary...\par \par 5/18/22: Patient is here for a follow up visit post CAR-T treatment with [facility].Today is Day +28.He is here accompanied by his wife.He states that he still feels fatigue and weakness.He continues to use Oxycodone for pain in legs and occasional headache.Denies fever, chills, blurring of vision,SOB, chest pain, mouth sores,nausea, vomiting, diarrhea,skin rash, dysuria or any signs of active bleeding. He states that few days ago when he stood up from sitting position,he felt dizziness which resolved on its own after few minutes.\par \par 5/24/22:Today is Day +35 post CAR-T therapy with [facility].He is accompanied by his wife.He states that he continues to have pricking pain on B/L feet and that he continues to take Gabapentin as prescribed. Denies fever, chills, SOB,chest pain, rash, mouth sores,nausea, vomiting, diarrhea,dysuria or any signs of active bleeding.He had a f/u appointment with cardiology yesterday and there are no changes or adjustments to any of the of the medications. He states that he did not have any episodes of dizziness since last week.\par \par 6/7//22: Patient is here for a follow up visit post CAR-T therapy with [facility]. .Overall feels better than last week.Denies fever, chills, nausea, vomiting, diarrhea, mouth sores, rash, dysuria,SOB,chest pain or any signs of active bleeding.\par \par 6/21/22: Today is Day + 63 post CAR-T therapy with [facility].Patient is seen for a follow up visit today.Overall feels well with no acute concerns.Denies fever, chills, SOB,chest pain, rash, mouth sores, nausea, vomiting, diarrhea,dysuria or any signs of active bleeding.\par \par 7/6/22: Patient is seen for a follow up visit  post CAR-T therapy.He is accompanied by his spouse. Today is Day + 77.He states that he feels better than past weeks.Denies fever, chills, nausea, vomiting, diarrhea, rash, mouth sores,SOB,chest pain or any signs of active bleeding.\par \par On 7/18/22 visit, day + 90 post Car T. Overall patient is fatigued. Complains of a nodule of posterior left knee that is not painful. Denies fever, chills, nausea, diarrhea, rash, mouth sores, dysuria or any signs of active bleeding. Denies SOB, chest pain or B/L LE edema. Remains compliant with medications as prescribed. \par \par 8/16//22: Today is Day + 119 post CAR-T.Overall well with no acute concerns. Denies fever, chills, nausea, vomiting, diarrhea,blurred vision,rash, mouth sores,dysuria,edema or any signs of active bleeding.Remains compliant with medications.He states that he continues to have the painless nodule on posterior aspect of left knee.\par \par On 9/12/22 visit, day + 146 post Car- T. Overall well with no acute concerns. Denies fever, chills, nausea, vomiting, diarrhea,blurred vision,rash, mouth sores,dysuria,edema or any signs of active bleeding. Denies SOB, chest pain or B/L LE edema. Remains compliant with medications.\par \par On 11/3/22 visit, day + 198 post Car T. Recently recovered from COVID 19. Overall feels much better than he did a few weeks ago. Denies fever, chills, nausea, vomiting, diarrhea, rash, mouth sores, dysuria or any signs of active bleeding. Denies SOB, chest pain or B/L LE edema. Remains compliant with medications as prescribed.

## 2022-11-04 NOTE — REVIEW OF SYSTEMS
[Fatigue] : fatigue [Negative] : Allergic/Immunologic [Fever] : no fever [Chills] : no chills [Night Sweats] : no night sweats [Recent Change In Weight] : ~T no recent weight change [Dysphagia] : no dysphagia [Loss of Hearing] : no loss of hearing [Nosebleeds] : no nosebleeds [Hoarseness] : no hoarseness [Odynophagia] : no odynophagia [Mucosal Pain] : no mucosal pain [Abdominal Pain] : no abdominal pain [Vomiting] : no vomiting [Constipation] : no constipation [Diarrhea] : no diarrhea [Joint Pain] : no joint pain [Joint Stiffness] : no joint stiffness [Muscle Pain] : no muscle pain [Muscle Weakness] : no muscle weakness [Skin Rash] : no skin rash [Skin Wound] : no skin wound [Easy Bleeding] : no tendency for easy bleeding [Easy Bruising] : no tendency for easy bruising [FreeTextEntry2] : Occasional fatigue [FreeTextEntry7] : Acid reflux,Epigastric pain -RESOLVED [FreeTextEntry9] : painless nodule of  posterior left knee [de-identified] : Right groin lymphadenopathy RESOLVED

## 2022-11-07 NOTE — DISCHARGE NOTE ADULT - MEDICATION SUMMARY - MEDICATIONS TO TAKE
Universal Safety Interventions I will START or STAY ON the medications listed below when I get home from the hospital:    tamsulosin 0.4 mg oral capsule  -- 1 cap(s) by mouth once a day (at bedtime)  -- Indication: For BPH    dilTIAZem 120 mg/24 hours oral capsule, extended release  -- 1 cap(s) by mouth once a day  -- Indication: For Atrial flutter with rapid ventricular response    rivaroxaban 20 mg oral tablet  -- 1 tab(s) by mouth once a day  -- Check with your doctor before becoming pregnant.  It is very important that you take or use this exactly as directed.  Do not skip doses or discontinue unless directed by your doctor.  Obtain medical advice before taking any non-prescription drugs as some may affect the action of this medication.  Take with food.    -- Indication: For Atrial flutter with rapid ventricular response    Reglan 10 mg oral tablet  -- 1 tab(s) by mouth 4 times a day (before meals and at bedtime)  -- Indication: For Nausea    fluconazole 200 mg oral tablet  -- 2 tab(s) by mouth once a day  -- Indication: For Prophylaxis    acyclovir 400 mg oral tablet  -- 1 tab(s) by mouth every 8 hours  -- Indication: For Prophylaxis    metoprolol succinate 100 mg oral tablet, extended release  -- 1 tab(s) by mouth once a day  -- Indication: For Atrial flutter with rapid ventricular response    Mepron 750 mg/5 mL oral suspension  -- 5 milliliter(s) by mouth 2 times a day  -- Indication: For Prophylaxis    Multiple Vitamins oral tablet  -- 1 tab(s) by mouth once a day  -- Indication: For Prophylaxis    folic acid 1 mg oral tablet  -- 1 tab(s) by mouth once a day  -- Indication: For Prophylaxis I will START or STAY ON the medications listed below when I get home from the hospital:    tamsulosin 0.4 mg oral capsule  -- 1 cap(s) by mouth once a day (at bedtime)  -- Indication: For BPH    dilTIAZem 120 mg/24 hours oral capsule, extended release  -- 1 cap(s) by mouth once a day  -- Indication: For Atrial flutter with rapid ventricular response    rivaroxaban 20 mg oral tablet  -- 1 tab(s) by mouth once a day  -- Check with your doctor before becoming pregnant.  It is very important that you take or use this exactly as directed.  Do not skip doses or discontinue unless directed by your doctor.  Obtain medical advice before taking any non-prescription drugs as some may affect the action of this medication.  Take with food.    -- Indication: For Atrial flutter with rapid ventricular response    Reglan 10 mg oral tablet  -- 1 tab(s) by mouth 4 times a day (before meals and at bedtime), As Needed -for muscle spasm -for nausea  -- Indication: For Nausea    fluconazole 200 mg oral tablet  -- 2 tab(s) by mouth once a day  -- Indication: For Prophylaxis    acyclovir 400 mg oral tablet  -- 1 tab(s) by mouth every 8 hours  -- Indication: For Prophylaxis    metoprolol succinate 100 mg oral tablet, extended release  -- 1 tab(s) by mouth once a day  -- Indication: For Atrial flutter with rapid ventricular response    Mepron 750 mg/5 mL oral suspension  -- 5 milliliter(s) by mouth 2 times a day  -- Indication: For Prophylaxis    omeprazole 20 mg oral delayed release capsule  -- 1 cap(s) by mouth once a day MDD:1  -- It is very important that you take or use this exactly as directed.  Do not skip doses or discontinue unless directed by your doctor.  Obtain medical advice before taking any non-prescription drugs as some may affect the action of this medication.  Swallow whole.  Do not crush.    -- Indication: For GERD    Multiple Vitamins oral tablet  -- 1 tab(s) by mouth once a day  -- Indication: For Prophylaxis    folic acid 1 mg oral tablet  -- 1 tab(s) by mouth once a day  -- Indication: For Prophylaxis I will START or STAY ON the medications listed below when I get home from the hospital:    oxyCODONE 5 mg oral tablet  -- 1 tab(s) by mouth every 6 hours, As Needed for pain MDD:4 tabs  -- Caution federal law prohibits the transfer of this drug to any person other  than the person for whom it was prescribed.  It is very important that you take or use this exactly as directed.  Do not skip doses or discontinue unless directed by your doctor.  May cause drowsiness.  Alcohol may intensify this effect.  Use care when operating dangerous machinery.  This prescription cannot be refilled.  Using more of this medication than prescribed may cause serious breathing problems.    -- Indication: For Pain    tamsulosin 0.4 mg oral capsule  -- 1 cap(s) by mouth once a day (at bedtime)  -- Indication: For BPH    dilTIAZem 120 mg/24 hours oral capsule, extended release  -- 1 cap(s) by mouth once a day  -- Indication: For Atrial flutter with rapid ventricular response    rivaroxaban 20 mg oral tablet  -- 1 tab(s) by mouth once a day  -- Check with your doctor before becoming pregnant.  It is very important that you take or use this exactly as directed.  Do not skip doses or discontinue unless directed by your doctor.  Obtain medical advice before taking any non-prescription drugs as some may affect the action of this medication.  Take with food.    -- Indication: For Atrial flutter with rapid ventricular response    Reglan 10 mg oral tablet  -- 1 tab(s) by mouth 4 times a day (before meals and at bedtime), As Needed -for muscle spasm -for nausea  -- Indication: For Nausea    fluconazole 200 mg oral tablet  -- 2 tab(s) by mouth once a day  -- Indication: For Prophylaxis    acyclovir 400 mg oral tablet  -- 1 tab(s) by mouth every 8 hours  -- Indication: For Prophylaxis    metoprolol succinate 100 mg oral tablet, extended release  -- 1 tab(s) by mouth once a day  -- Indication: For Atrial flutter with rapid ventricular response    Mepron 750 mg/5 mL oral suspension  -- 5 milliliter(s) by mouth 2 times a day  -- Indication: For Prophylaxis    omeprazole 20 mg oral delayed release capsule  -- 1 cap(s) by mouth once a day MDD:1  -- It is very important that you take or use this exactly as directed.  Do not skip doses or discontinue unless directed by your doctor.  Obtain medical advice before taking any non-prescription drugs as some may affect the action of this medication.  Swallow whole.  Do not crush.    -- Indication: For GERD    Multiple Vitamins oral tablet  -- 1 tab(s) by mouth once a day  -- Indication: For Prophylaxis    folic acid 1 mg oral tablet  -- 1 tab(s) by mouth once a day  -- Indication: For Prophylaxis

## 2022-11-23 ENCOUNTER — APPOINTMENT (OUTPATIENT)
Dept: ELECTROPHYSIOLOGY | Facility: CLINIC | Age: 57
End: 2022-11-23

## 2022-11-23 VITALS
WEIGHT: 212 LBS | HEART RATE: 76 BPM | BODY MASS INDEX: 29.68 KG/M2 | TEMPERATURE: 96.8 F | DIASTOLIC BLOOD PRESSURE: 78 MMHG | SYSTOLIC BLOOD PRESSURE: 118 MMHG | HEIGHT: 71 IN | OXYGEN SATURATION: 98 %

## 2022-11-23 PROCEDURE — 99213 OFFICE O/P EST LOW 20 MIN: CPT

## 2022-11-23 PROCEDURE — 93291 INTERROG DEV EVAL SCRMS IP: CPT

## 2022-11-25 ENCOUNTER — OUTPATIENT (OUTPATIENT)
Dept: OUTPATIENT SERVICES | Facility: HOSPITAL | Age: 57
LOS: 1 days | End: 2022-11-25
Payer: COMMERCIAL

## 2022-11-25 ENCOUNTER — APPOINTMENT (OUTPATIENT)
Dept: NUCLEAR MEDICINE | Facility: IMAGING CENTER | Age: 57
End: 2022-11-25

## 2022-11-25 DIAGNOSIS — Z98.890 OTHER SPECIFIED POSTPROCEDURAL STATES: Chronic | ICD-10-CM

## 2022-11-25 DIAGNOSIS — C83.10 MANTLE CELL LYMPHOMA, UNSPECIFIED SITE: ICD-10-CM

## 2022-11-25 DIAGNOSIS — Z94.84 STEM CELLS TRANSPLANT STATUS: Chronic | ICD-10-CM

## 2022-11-25 DIAGNOSIS — Z95.818 PRESENCE OF OTHER CARDIAC IMPLANTS AND GRAFTS: Chronic | ICD-10-CM

## 2022-11-25 DIAGNOSIS — Z94.84 STEM CELLS TRANSPLANT STATUS: ICD-10-CM

## 2022-11-25 DIAGNOSIS — Z00.8 ENCOUNTER FOR OTHER GENERAL EXAMINATION: ICD-10-CM

## 2022-11-25 DIAGNOSIS — Z92.850 PERSONAL HISTORY OF CHIMERIC ANTIGEN RECEPTOR T-CELL THERAPY: ICD-10-CM

## 2022-11-25 PROCEDURE — A9552: CPT

## 2022-11-25 PROCEDURE — 78815 PET IMAGE W/CT SKULL-THIGH: CPT | Mod: 26,PS

## 2022-11-25 PROCEDURE — 78815 PET IMAGE W/CT SKULL-THIGH: CPT

## 2022-11-27 NOTE — HISTORY OF PRESENT ILLNESS
[FreeTextEntry1] : This is a 57yo male with h/o atrial flutter, s/p ablation, atrial fibrillation, s/p ablation, lymphoma who is being seen in follow-up evaluation for his prior atrial flutter/fibrillation and loop recorder. The patient reports 4 episodes of chest burning radiating down both left and at times right arm. He attempted to activate the loop recorder to record. The patient has described this discomfort in the past. He underwent a cardiac catheterization that revealed non-obstructive CAD. He denies c/o SOB, palpitations, lightheadedness, dizziness, near syncope or syncope. \par \par \par Historical\par Echocardiogram from 4/25/2022 showed normal left ventricular systolic function\par He is status post catheter ablation for atrial fibrillation.  The patient has an implantable loop monitor and this was interrogated today and shows he has not had any recurrence of A. fib.  In follow-up he is feeling well has not had any recurrence of palpitations.  He also denied any symptoms of shortness of breath, dizziness, lightheadedness.\par \par EKG done today showed sinus rhythm.\par Interrogation of implantable loop monitor did not show any A. fib since the procedure.\par The patient has a prior history of mantle cell lymphoma.  He has got a previous cycles of chemotherapy.\par \par The patient has had prior chest discomfort and previously underwent cardiac catheterization which showed nonobstructive coronaries.\par \par He has had previous atrial flutter and underwent catheter ablation for atrial flutter.\par \par He subsequently had atrial fibrillation which is being treated with medical therapy.  We had discussed catheter ablation procedure for atrial fibrillation and had reviewed with his hematologist oncologist the possibility of using anticoagulants especially post procedure.  He was clear to be treated with a NOAC/anticoagulant.\par \par  [id number] score: 0 - No history of diabetes, hypertension, TIA, CVA, vascular disease\par \par Echocardiogram from 4/1/2019 showed left atrial diameter 3.7 cm left atrial volume index 36 cc/m², LVEF 40 to 45%.\par

## 2022-11-27 NOTE — CARDIOLOGY SUMMARY
[de-identified] : [employer] Lin[employer] loop recorder interrogation revealed no events for review.

## 2022-11-27 NOTE — DISCUSSION/SUMMARY
[FreeTextEntry1] : This is a 57yo male with h/o atrial flutter, s/p ablation, atrial fibrillation, s/p ablation, lymphoma who is being seen in follow-up evaluation for his prior atrial flutter/fibrillation and loop recorder. The patient reports 4 episodes of chest burning radiating down both left and at times right arm. He attempted to activate the loop recorder to record. Previous cardiac catheterization revealed non-obstructive CAD. It was d/w the patient that although the etiology of the chest discomfort is unclear it is not cardiac related. The loop recorder was interrogated during this visit and revealed no arrhythmias for review. The patient was educated on the proper way to activate the loop recorder. It was reinforced to the patient that the loop recorder will store any episodes of Afib or Aflutter automatically. Questions were answered and the patient verbalized understanding. He was advised to follow up in 6 months in the office. Remote monitoring will be done monthly. The assessment, findings and plan were d/w Dr. Prescott who was present during the visit.

## 2022-11-29 ENCOUNTER — APPOINTMENT (OUTPATIENT)
Dept: CARDIOLOGY | Facility: CLINIC | Age: 57
End: 2022-11-29

## 2022-11-29 ENCOUNTER — NON-APPOINTMENT (OUTPATIENT)
Age: 57
End: 2022-11-29

## 2022-11-29 VITALS
HEIGHT: 71 IN | DIASTOLIC BLOOD PRESSURE: 68 MMHG | BODY MASS INDEX: 29.68 KG/M2 | SYSTOLIC BLOOD PRESSURE: 104 MMHG | WEIGHT: 212 LBS | OXYGEN SATURATION: 97 % | HEART RATE: 72 BPM | TEMPERATURE: 98.5 F

## 2022-11-29 PROCEDURE — 99213 OFFICE O/P EST LOW 20 MIN: CPT | Mod: 25

## 2022-11-29 PROCEDURE — 93000 ELECTROCARDIOGRAM COMPLETE: CPT

## 2022-11-29 RX ORDER — FOLIC ACID 1 MG/1
1 TABLET ORAL DAILY
Qty: 30 | Refills: 0 | Status: DISCONTINUED | COMMUNITY
Start: 2022-05-06 | End: 2022-11-29

## 2022-11-29 NOTE — ASSESSMENT
[FreeTextEntry1] : ECG performed today at the office revealed NSR 71 bpm. Normal AQRS, QRS and QTc. \par

## 2022-11-29 NOTE — DISCUSSION/SUMMARY
[FreeTextEntry1] : 58 yo man post  A. Flutter ablation under the care of Dr Prescott.Underwent A. Fib ablation with good resolution. No events since thebn. Has been C/O atypical chest pain that radiates to both arms, not effort related. Had a cardiac cath in 4/2018 that revealed normal coronaries. \par Has recurrence of the Mantle cell Lymphoma and will undergo CAR T cell therapy on 4/14/2022 at [facility]. Will undergo chest CT follow up. \par In the meantime will continue same medications\par Follow up in 6 months.

## 2022-11-29 NOTE — REVIEW OF SYSTEMS
[Feeling Fatigued] : feeling fatigued [Nocturia] : nocturia [Negative] : Heme/Lymph [Headache] : no headache [SOB] : no shortness of breath [Dyspnea on exertion] : not dyspnea during exertion [Chest Discomfort] : no chest discomfort [Lower Ext Edema] : no extremity edema [Leg Claudication] : no intermittent leg claudication [Palpitations] : no palpitations [Orthopnea] : no orthopnea [PND] : no PND [Syncope] : no syncope

## 2022-11-29 NOTE — HISTORY OF PRESENT ILLNESS
[FreeTextEntry1] : 55 yo man, , father of two children, Japanese speaking. 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 No radiotherapy. Still on medical treatment with Rituxan, next treatment in 1/2020.\par Was on remission for 4 years. Has recurrence of the disease. Started chemo oral again. Will undergo CART cell transplant on 4/14/2022 at [facility]. \par A. Fibrillation/Flutter that was treated with ablation (Flutter ablation) (2017 and 20221) and now stable on Cardizem and Toprol. Had a ILR implanted in 3/2018, interrogation has shown that he still has AF episodes. His CHADS-VASC is 0 and therefore he is not treated with Xarelto any longer. Treated only with aspirin low dose and CCB. Has been under the care of Dr Prescott and had an ILR implanted that revealed several episodes of A. Fib with a 41% burden. Last ablation in Oct 2021) with Dr. Prescott. No A. Fib since then. Non on OAC. \par In April 2018 underwent a stress test that revealed small anterior wall reversible ischemia. \par Cardiac catheterization in May 2018 that revealed normal coronaries. Last echo was done 3/27/2018 that revealed normal EF (59%). \par On 3//18/2021 he had an echo that revealed a reduction in his global EF to 40-45%, mildly enlarged LA. No change. \par On 3/18/2011 revealed normal LV function 57%. No ischemia. . \par Today at the clinic for routine follow up. C/O general fatigue, generalized muscle pains and chest burning. Occasional bilateral arm pain. Not effort-related. Occasional effort-related SOB, no orthopnea or PND. No change since his last visit in 2019. \par C/O erectile dysfunction. \par Received the COVID 19 vaccine\par Doesn't smoke and does not drink alcohol.\par Denies the use of illicit drugs.

## 2022-11-30 ENCOUNTER — APPOINTMENT (OUTPATIENT)
Dept: CARDIOLOGY | Facility: CLINIC | Age: 57
End: 2022-11-30

## 2022-11-30 ENCOUNTER — NON-APPOINTMENT (OUTPATIENT)
Age: 57
End: 2022-11-30

## 2022-11-30 PROCEDURE — 93298 REM INTERROG DEV EVAL SCRMS: CPT

## 2022-11-30 PROCEDURE — G2066: CPT

## 2022-12-05 ENCOUNTER — OUTPATIENT (OUTPATIENT)
Dept: OUTPATIENT SERVICES | Facility: HOSPITAL | Age: 57
LOS: 1 days | Discharge: HOME | End: 2022-12-05

## 2022-12-05 DIAGNOSIS — C83.10 MANTLE CELL LYMPHOMA, UNSPECIFIED SITE: ICD-10-CM

## 2022-12-05 DIAGNOSIS — Z94.84 STEM CELLS TRANSPLANT STATUS: Chronic | ICD-10-CM

## 2022-12-05 DIAGNOSIS — Z98.890 OTHER SPECIFIED POSTPROCEDURAL STATES: Chronic | ICD-10-CM

## 2022-12-05 DIAGNOSIS — Z95.818 PRESENCE OF OTHER CARDIAC IMPLANTS AND GRAFTS: Chronic | ICD-10-CM

## 2022-12-07 ENCOUNTER — RESULT REVIEW (OUTPATIENT)
Age: 57
End: 2022-12-07

## 2022-12-07 ENCOUNTER — LABORATORY RESULT (OUTPATIENT)
Age: 57
End: 2022-12-07

## 2022-12-07 ENCOUNTER — APPOINTMENT (OUTPATIENT)
Dept: HEMATOLOGY ONCOLOGY | Facility: CLINIC | Age: 57
End: 2022-12-07

## 2022-12-07 VITALS
BODY MASS INDEX: 30.07 KG/M2 | DIASTOLIC BLOOD PRESSURE: 87 MMHG | HEART RATE: 67 BPM | TEMPERATURE: 97.2 F | WEIGHT: 215.61 LBS | RESPIRATION RATE: 16 BRPM | OXYGEN SATURATION: 99 % | SYSTOLIC BLOOD PRESSURE: 137 MMHG

## 2022-12-07 DIAGNOSIS — Z92.850 PERSONAL HISTORY OF CHIMERIC ANTIGEN RECEPTOR T-CELL THERAPY: ICD-10-CM

## 2022-12-07 LAB
ALBUMIN SERPL ELPH-MCNC: 4.8 G/DL
ALP BLD-CCNC: 84 U/L
ALT SERPL-CCNC: 25 U/L
ANION GAP SERPL CALC-SCNC: 10 MMOL/L
APTT BLD: 26.6 SEC
AST SERPL-CCNC: 23 U/L
BASOPHILS # BLD AUTO: 0.02 K/UL — SIGNIFICANT CHANGE UP (ref 0–0.2)
BASOPHILS NFR BLD AUTO: 0.6 % — SIGNIFICANT CHANGE UP (ref 0–2)
BILIRUB SERPL-MCNC: 0.8 MG/DL
BUN SERPL-MCNC: 21 MG/DL
CALCIUM SERPL-MCNC: 9.8 MG/DL
CHLORIDE SERPL-SCNC: 105 MMOL/L
CK SERPL-CCNC: 176 U/L
CO2 SERPL-SCNC: 29 MMOL/L
CREAT SERPL-MCNC: 0.98 MG/DL
CRP SERPL-MCNC: <3 MG/L
DEPRECATED D DIMER PPP IA-ACNC: <150 NG/ML DDU
EGFR: 89 ML/MIN/1.73M2
EOSINOPHIL # BLD AUTO: 0.15 K/UL — SIGNIFICANT CHANGE UP (ref 0–0.5)
EOSINOPHIL NFR BLD AUTO: 4.5 % — SIGNIFICANT CHANGE UP (ref 0–6)
FERRITIN SERPL-MCNC: 54 NG/ML
GLUCOSE SERPL-MCNC: 93 MG/DL
HCT VFR BLD CALC: 40.8 % — SIGNIFICANT CHANGE UP (ref 39–50)
HGB BLD-MCNC: 13.5 G/DL — SIGNIFICANT CHANGE UP (ref 13–17)
IMM GRANULOCYTES NFR BLD AUTO: 0.6 % — SIGNIFICANT CHANGE UP (ref 0–0.9)
INR PPP: 1.06 RATIO
LDH SERPL-CCNC: 185 U/L
LYMPHOCYTES # BLD AUTO: 1.15 K/UL — SIGNIFICANT CHANGE UP (ref 1–3.3)
LYMPHOCYTES # BLD AUTO: 34.6 % — SIGNIFICANT CHANGE UP (ref 13–44)
MAGNESIUM SERPL-MCNC: 2.2 MG/DL
MCHC RBC-ENTMCNC: 33.1 G/DL — SIGNIFICANT CHANGE UP (ref 32–36)
MCHC RBC-ENTMCNC: 33.3 PG — SIGNIFICANT CHANGE UP (ref 27–34)
MCV RBC AUTO: 100.5 FL — HIGH (ref 80–100)
MONOCYTES # BLD AUTO: 0.53 K/UL — SIGNIFICANT CHANGE UP (ref 0–0.9)
MONOCYTES NFR BLD AUTO: 16 % — HIGH (ref 2–14)
NEUTROPHILS # BLD AUTO: 1.45 K/UL — LOW (ref 1.8–7.4)
NEUTROPHILS NFR BLD AUTO: 43.7 % — SIGNIFICANT CHANGE UP (ref 43–77)
NRBC # BLD: 0 /100 WBCS — SIGNIFICANT CHANGE UP (ref 0–0)
NT-PROBNP SERPL-MCNC: 78 PG/ML
PHOSPHATE SERPL-MCNC: 3.4 MG/DL
PLATELET # BLD AUTO: 211 K/UL — SIGNIFICANT CHANGE UP (ref 150–400)
POTASSIUM SERPL-SCNC: 4.9 MMOL/L
PROT SERPL-MCNC: 6.9 G/DL
PT BLD: 12.4 SEC
RBC # BLD: 4.06 M/UL — LOW (ref 4.2–5.8)
RBC # FLD: 16 % — HIGH (ref 10.3–14.5)
SODIUM SERPL-SCNC: 144 MMOL/L
URATE SERPL-MCNC: 6 MG/DL
WBC # BLD: 3.32 K/UL — LOW (ref 3.8–10.5)
WBC # FLD AUTO: 3.32 K/UL — LOW (ref 3.8–10.5)

## 2022-12-07 PROCEDURE — 99215 OFFICE O/P EST HI 40 MIN: CPT

## 2022-12-08 LAB
CMV DNA SPEC QL NAA+PROBE: NOT DETECTED IU/ML
CMVPCR LOG: NOT DETECTED LOG10IU/ML

## 2022-12-12 NOTE — PATIENT PROFILE ADULT. - AS SC BRADEN ACTIVITY
INITIAL [facility] HEM/ONC CONSULTATION      Subjective:       Patient ID: Otto Dawson is a 59 y.o. male.    Chief Complaint: No chief complaint on file.  Factor V Deficiency    Mr. Dawson is a 60yo male who is referred due to a family history of factor V leiden.  He was tested on 10/20/2016 as a homozygous carrier.  He has not had a blood clot in his life time.  He notes several surgeries with no problems but has had lovenox prophylaxis for these procedures.      He has an aunt who is homozygous who did have approximately 3 clots throughout her life and is  but not from a clotting issue.  He also has another sister who is homozygous and had approx 3 blood clots.  He has 2 other family members who are heterozygous and have not had blood clotting issues.              Past Medical History:   Diagnosis Date    Anxiety     Arthritis     Depression     Factor 5 Leiden mutation, heterozygous     Gastritis 2019    Generalized anxiety disorder     History of nuclear stress test     Hyperlipidemia     Hypertension        Past Surgical History:   Procedure Laterality Date    COLONOSCOPY  2018    Dr Don-rtc 5 yrs     HERNIA REPAIR      SINUS SURGERY  2018    nasal septoplasty/balloon sinuplasty- Dr Roman       Social History     Socioeconomic History    Marital status: Single   Tobacco Use    Smoking status: Former     Types: Cigarettes     Quit date:      Years since quittin.9    Smokeless tobacco: Never   Substance and Sexual Activity    Alcohol use: Yes    Drug use: Never       Family History   Problem Relation Age of Onset    Heart disease Father        Review of patient's allergies indicates:   Allergen Reactions    Cefuroxime axetil     Ciprofloxacin Other (See Comments)    [place] Itching    Sulfacetamide sodium-sulfur Other (See Comments)    Tetracycline hcl Other (See Comments)       Current Outpatient Medications:     amLODIPine (NORVASC) 10 MG tablet, Take 1 tablet (10 mg total) by  mouth once daily., Disp: 90 tablet, Rfl: 1    ascorbic acid, vitamin C, (VITAMIN C) 1000 MG tablet, 1 tablet once daily., Disp: , Rfl:     atenoloL (TENORMIN) 50 MG tablet, Take 1 tablet (50 mg total) by mouth 2 (two) times daily., Disp: 180 tablet, Rfl: 1    CALCIUM-MAGNESIUM ORAL, 1 tablet Daily., Disp: , Rfl:     cinnamon bark 500 mg capsule, 1 tablet Daily., Disp: , Rfl:     clotrimazole-betamethasone 1-0.05% (LOTRISONE) cream, Apply topically 2 (two) times daily., Disp: 30 g, Rfl: 2    co-enzyme Q-10 30 mg capsule, Take 30 mg by mouth 3 (three) times daily., Disp: , Rfl:     cranberry 400 mg Cap, 1 capsule once daily., Disp: , Rfl:     garlic 1,000 mg Cap, 1 capsule once daily., Disp: , Rfl:     hydroCHLOROthiazide (HYDRODIURIL) 25 MG tablet, Take 1 tablet (25 mg total) by mouth once daily., Disp: 90 tablet, Rfl: 1    hydrocortisone 2.5 % cream, Apply topically once daily., Disp: 15 g, Rfl: 2    LORazepam (ATIVAN) 0.5 MG tablet, Take 1 tablet (0.5 mg total) by mouth as needed., Disp: 30 tablet, Rfl: 3    losartan (COZAAR) 50 MG tablet, Take 1 tablet (50 mg total) by mouth once daily., Disp: 90 tablet, Rfl: 1    magnesium oxide (MAG-OX) 400 mg (241.3 mg magnesium) tablet, Take 400 mg by mouth once daily., Disp: , Rfl:     mv-mins-folic-lycopene-ginkgo 400-300-120 mcg-mcg-mg Tab, 1 tablet once daily., Disp: , Rfl:     PHYTOSTEROL ORAL, 1 tablet once daily., Disp: , Rfl:     turmeric, bulk, 100 % Powd, 1 tablet once daily., Disp: , Rfl:     vitamin B comp with C no.4 150 mg Tab, 1 tablet once daily., Disp: , Rfl:     vitamin D (VITAMIN D3) 1000 units Tab, Take 1,000 Units by mouth once daily., Disp: , Rfl:     diphenoxylate-atropine 2.5-0.025 mg (LOMOTIL) 2.5-0.025 mg per tablet, Take 1 tablet by mouth 4 (four) times daily as needed for Diarrhea. (Patient not taking: Reported on 12/13/2022), Disp: 30 tablet, Rfl: 0    enoxaparin (LOVENOX) 30 mg/0.3 mL Syrg, Inject 0.3 mLs (30 mg total) into the skin once daily.  "During travel days, Disp: 7 each, Rfl: 0    glucosam reed dip-chondroit-C-Mn 289-977-97-3 mg Cap, 1 tablet once daily., Disp: , Rfl:     minocycline (MINOCIN,DYNACIN) 100 MG capsule, Take 1 capsule (100 mg total) by mouth once daily. (Patient not taking: Reported on 6/7/2022), Disp: 30 capsule, Rfl: 1    omeprazole (PRILOSEC) 40 MG capsule, Take 1 capsule (40 mg total) by mouth once daily. (Patient not taking: Reported on 12/13/2022), Disp: 90 capsule, Rfl: 1    All medications and past history have been reviewed.    Review of Systems   Constitutional:  Negative for activity change, chills, diaphoresis, fatigue, fever and unexpected weight change.   HENT:  Negative for congestion and nosebleeds.    Eyes:  Negative for visual disturbance.   Respiratory:  Negative for chest tightness and shortness of breath.    Cardiovascular:  Negative for chest pain and leg swelling.   Gastrointestinal:  Negative for abdominal pain, nausea and vomiting.   Endocrine: Negative for cold intolerance and heat intolerance.   Genitourinary:  Negative for difficulty urinating and dysuria.   Skin:  Negative for rash.   Neurological:  Negative for dizziness, weakness, light-headedness and headaches.   Hematological:  Does not bruise/bleed easily.   Psychiatric/Behavioral:  Negative for agitation and behavioral problems.      Objective:        /64   Pulse 76   Temp 98.1 °F (36.7 °C)   Resp 18   Ht 6' 1" (1.854 m)   Wt 110.2 kg (243 lb)   BMI 32.06 kg/m²     Physical Exam  Vitals reviewed.   Constitutional:       Appearance: He is well-developed.   HENT:      Head: Normocephalic and atraumatic.      Right Ear: External ear normal.      Left Ear: External ear normal.   Eyes:      General: No scleral icterus.     Conjunctiva/sclera: Conjunctivae normal.      Pupils: Pupils are equal, round, and reactive to light.   Cardiovascular:      Rate and Rhythm: Normal rate and regular rhythm.      Heart sounds: Normal heart sounds. No murmur " heard.    No friction rub. No gallop.   Pulmonary:      Effort: Pulmonary effort is normal. No respiratory distress.      Breath sounds: Normal breath sounds. No rales.   Chest:      Chest wall: No tenderness.   Abdominal:      General: Bowel sounds are normal. There is no distension.      Palpations: Abdomen is soft. There is no mass.      Tenderness: There is no abdominal tenderness. There is no guarding or rebound.   Musculoskeletal:      Cervical back: Normal range of motion and neck supple.   Lymphadenopathy:      Head:      Right side of head: No tonsillar adenopathy.      Left side of head: No tonsillar adenopathy.      Cervical: No cervical adenopathy.      Upper Body:      Right upper body: No supraclavicular adenopathy.      Left upper body: No supraclavicular adenopathy.   Neurological:      Mental Status: He is alert and oriented to person, place, and time.   Psychiatric:         Behavior: Behavior normal.         Thought Content: Thought content normal.         Judgment: Judgment normal.         Lab  No results found for this or any previous visit (from the past 336 hour(s)).  CMP  Sodium   Date Value Ref Range Status   03/11/2022 140 135 - 146 mmol/L Final     Potassium   Date Value Ref Range Status   03/11/2022 3.9 3.5 - 5.3 mmol/L Final     Chloride   Date Value Ref Range Status   03/11/2022 102 98 - 110 mmol/L Final     CO2   Date Value Ref Range Status   03/11/2022 28 20 - 32 mmol/L Final     Glucose   Date Value Ref Range Status   03/11/2022 100 (H) 65 - 99 mg/dL Final     Comment:                   Fasting reference interval     For someone without known diabetes, a glucose value  between 100 and 125 mg/dL is consistent with  prediabetes and should be confirmed with a  follow-up test.          BUN   Date Value Ref Range Status   03/11/2022 23 7 - 25 mg/dL Final     Creatinine   Date Value Ref Range Status   03/11/2022 1.38 (H) 0.70 - 1.33 mg/dL Final     Comment:     For patients >49 years of  age, the reference limit  for Creatinine is approximately 13% higher for people  identified as -American.          Calcium   Date Value Ref Range Status   03/11/2022 9.9 8.6 - 10.3 mg/dL Final     Total Protein   Date Value Ref Range Status   03/11/2022 7.3 6.1 - 8.1 g/dL Final     Albumin   Date Value Ref Range Status   03/11/2022 4.6 3.6 - 5.1 g/dL Final     Total Bilirubin   Date Value Ref Range Status   03/11/2022 0.9 0.2 - 1.2 mg/dL Final     AST   Date Value Ref Range Status   03/11/2022 19 10 - 35 U/L Final     ALT   Date Value Ref Range Status   03/11/2022 18 9 - 46 U/L Final     eGFR if    Date Value Ref Range Status   03/11/2022 64 > OR = 60 mL/min/1.73m2 Final     eGFR if non    Date Value Ref Range Status   03/11/2022 56 (L) > OR = 60 mL/min/1.73m2 Final         Specimen (24h ago, onward)      None                  All lab results and imaging results have been reviewed and discussed with the patient.     Assessment:       1. Homozygous Factor V Leiden mutation      Problem List Items Addressed This Visit       Homozygous Factor V Leiden mutation     Patient is known homozygous FVL and has never had a clotting issues.  I discussed this fact that his risk does increase as he gets older and I would take an 81mg ASA and would use prophylaxis for procedures and long travel and discussed this today.  Will prescribe Lovenox when this is needed.   He has not had an FH of large/severe clotting issues although there is some clotting in the homozygous members of the family.  Will need to be very careful moving into the future and will assist with surgical clearances and see yearly or as needed.  Discussed this today.           Relevant Medications    enoxaparin (LOVENOX) 30 mg/0.3 mL Syrg      Cancer Staging   No matching staging information was found for the patient.      Plan:         Follow up in about 1 year (around 12/13/2023).       The plan was discussed with the  patient and all questions/concerns have been answered to the patient's satisfaction.                 (3) walks occasionally

## 2022-12-19 PROBLEM — Z92.850: Status: ACTIVE | Noted: 2022-03-23

## 2022-12-19 NOTE — REVIEW OF SYSTEMS
[Fatigue] : fatigue [Negative] : Allergic/Immunologic [Fever] : no fever [Chills] : no chills [Night Sweats] : no night sweats [Recent Change In Weight] : ~T no recent weight change [Dysphagia] : no dysphagia [Loss of Hearing] : no loss of hearing [Nosebleeds] : no nosebleeds [Hoarseness] : no hoarseness [Odynophagia] : no odynophagia [Mucosal Pain] : no mucosal pain [Abdominal Pain] : no abdominal pain [Vomiting] : no vomiting [Constipation] : no constipation [Diarrhea] : no diarrhea [Joint Pain] : no joint pain [Joint Stiffness] : no joint stiffness [Muscle Pain] : no muscle pain [Muscle Weakness] : no muscle weakness [Skin Rash] : no skin rash [Skin Wound] : no skin wound [Easy Bleeding] : no tendency for easy bleeding [Easy Bruising] : no tendency for easy bruising [FreeTextEntry2] : Occasional fatigue [FreeTextEntry7] : Acid reflux,Epigastric pain -RESOLVED [FreeTextEntry9] : painless nodule of  posterior left knee [de-identified] : Right groin lymphadenopathy RESOLVED

## 2022-12-19 NOTE — PHYSICAL EXAM
[Ambulatory and capable of all self care but unable to carry out any work activities] : Status 2- Ambulatory and capable of all self care but unable to carry out any work activities. Up and about more than 50% of waking hours [Normal] : affect appropriate [de-identified] : non tender palpable nodule of  posterior left knee  [de-identified] : healed scar from port removal

## 2022-12-19 NOTE — HISTORY OF PRESENT ILLNESS
[de-identified] : Mr. Chavez is a 53 year old male with a history of MCL, initially diagnosed 10/2016. On 11/15/2016 he was seen by Dr. Bravo at [facility], after noticing a left inguinal mass for which he sought medical attention. CT scans (3/21/2016) revealed left-sided pelvic and left inguinal adenopathy. A 0.7 cm indeterminate lesion in the right iliac bone was also noted. A left inguinal lymph node biopsy was performed 9/24/2016 which revealed morphologic and immunophenotypic findings consistent with mantle cell lymphoma. He is status post 4 cycles of R-CHOP, and two cycles of RICE consolidation. A bone marrow biopsy on 2/10/17 showed normocellular bone marrow with trilineage hematopoiesis and maturation. He was admitted 3/27/17 for an autologous peripheral blood stem cell transplant with CBV  regimen. \par  \par Upon admission, a TLC was placed in IR. Mr. Chavez received IV hydration, nutritional support, pain management, antinausea medication, antidiarrheals, antibiotic, antiviral, PCP, antifungal and GI prophylaxis. Labs were monitored daily. Mr. Chavez received transfusional support and electrolyte repletion as needed. When his ANC dropped below 1000, he was started on prophylactic ciprofloxacin. When he became febrile, his antibiotic coverage was broadened as appropriate. \par  \par On 4/5/17, Mr. Chavez received 314ml of thawed, pooled, washed, mobilized, plasma reduced, HPC apheresis over approximately 1 hour. He tolerated the infusion well with no adverse side effects noted. Engraftment was noted on 4/16/17, and the Zarxio and antibiotics were discontinued. \par  \par Mr. Chavez's transplant course was complicated by recurrent headaches, oral and GI mucositis, dizziness, and chest pain resulting in a RRT on 4/10/17. The chest pain was self limiting. A second RRT was called on 4/16/17, for palpitations and dizziness. Mr. Chavez was found to be in atrial flutter with RVR. He was transferred to telemetry and followed by cardiology. \par A-fib/a-flutter was resistant to single rate agent, and while in arrhythmia, pt would become symptomatic, hypotensive. Ablation was was preformed, however was unsuccessful. Pt continued to have frequent changes in rhythm. As a-fib was usually preceded by PAC, and mediport tip was in r atrium, concern was this may have been involved. Mediport was removed but patient continued to have runs of non conducted APCs. Patient was followed by EP during this admission who ruled out any need for PPM insertion. Patient was started on Cardizem and Toprol which he tolerated well. He did have episodes of bradycardia during his sleep with episodes of Atrial flutter with RVR as well. However, he remained asymptomatic. \par Pt was d/c 5/5/2017 to home in stable condition with instructions to follow up with hematology and EP. [de-identified] : Presenting today for a follow up appointment. He has completed  maintenance Rituxan. Last Rituxan was received in jan... PENELOPE....Patient reports continued fatigue. Recent CT/PET scan PENELOPE.  Patient also offers that he had the shingles and was treated with valtrex..some residual discomfort...He continues to be followed by cardiology; his cardiologist increased the Diltiazem dosage to 180mg q daily, however patient reported feeling increasingly fatigued and returned to 120mg q daily and now notes less fatigue. He is in eval for a cardiac procedure.. His cardiologist was waiting for the rituxan to be completed and covid to pass..He is compliant with all medications. Denies mouth sores, eye dryness, nausea, vomiting, diarrhea. No LE edema, CP or SOB. \par \par On 10/18/21, patient presents for a follow up visit. Overall patient is well. States has generalized pruritus and recently was seen by dermatologist. Has been applying triamcinolone cream with no relief. States recently felt a "lump" of the right groin. Given the patients past medical history patient is very concerned. Denies fever, chills, nausea, vomiting, diarrhea, rash, mouth sores, dysuria or any signs of active bleeding. Denies SOB, chest pain or B/L LE edema. Remains compliant with medications prescribed. \par \par On 11/30//21, patient presents with spouse for a follow up visit. Overall patient feels fatigued. Had a lymph node biopsy on 11/12/21 and results are c/w relapsed MCL.. Denies fever, chills, nausea, vomiting, diarrhea, rash, mouth sores, dysuria or any signs of active bleeding. Denies SOB, chest pain or B/L LE edema. Remains compliant with medications prescribed. Here to discuss further therapy..\par \par On 1/25/22, patient presents for a follow up visit.  On calquence and not  tolerating well. Was in ER with severe epigastric pain...now on protonix...Complains of acid reflux and takes tums prn. Complains of headaches every other day. Denies fever, chills, nausea, vomiting, diarrhea, rash, mouth sores, dysuria or any signs of active bleeding. Denies SOB, chest pain or B/L LE edema. in wheelchair today\par \par 2/18/22: Patient is here for a follow up visit.He states that he still has epigastric pain with Calquence.He takes Pantoprazole as prescribed.Denies fever,chills, nausea, vomiting, diarrhea,rash, mouth sores,SOB,chest pain or any signs of active bleeding.He has an appointment with PCP tomorrow to get a referral for an endoscopy by GI.\par \par 5/10//22:Patient is seen for a follow up visit s/p  CAR-T treatment with [facility]....He is accompanied by his wife...He describes fatigue and weakness...using oxycodone for pain in legs...likely neuropathic.. Denies fever, chills, nausea, vomiting, diarrhea,mouth sores, skin rash,SOB,chest pain or any signs of active bleeding. He states that he had occasional epigastric pain from Calquence which  is improved off drug....He is off calquence and blood thinners at this time...Hospital course was complicated by CRS and neurotox...see d/c summary...\par \par 5/18/22: Patient is here for a follow up visit post CAR-T treatment with [facility].Today is Day +28.He is here accompanied by his wife.He states that he still feels fatigue and weakness.He continues to use Oxycodone for pain in legs and occasional headache.Denies fever, chills, blurring of vision,SOB, chest pain, mouth sores,nausea, vomiting, diarrhea,skin rash, dysuria or any signs of active bleeding. He states that few days ago when he stood up from sitting position,he felt dizziness which resolved on its own after few minutes.\par \par 5/24/22:Today is Day +35 post CAR-T therapy with [facility].He is accompanied by his wife.He states that he continues to have pricking pain on B/L feet and that he continues to take Gabapentin as prescribed. Denies fever, chills, SOB,chest pain, rash, mouth sores,nausea, vomiting, diarrhea,dysuria or any signs of active bleeding.He had a f/u appointment with cardiology yesterday and there are no changes or adjustments to any of the of the medications. He states that he did not have any episodes of dizziness since last week.\par \par 6/7//22: Patient is here for a follow up visit post CAR-T therapy with [facility]. .Overall feels better than last week.Denies fever, chills, nausea, vomiting, diarrhea, mouth sores, rash, dysuria,SOB,chest pain or any signs of active bleeding.\par \par 6/21/22: Today is Day + 63 post CAR-T therapy with [facility].Patient is seen for a follow up visit today.Overall feels well with no acute concerns.Denies fever, chills, SOB,chest pain, rash, mouth sores, nausea, vomiting, diarrhea,dysuria or any signs of active bleeding.\par \par 7/6/22: Patient is seen for a follow up visit  post CAR-T therapy.He is accompanied by his spouse. Today is Day + 77.He states that he feels better than past weeks.Denies fever, chills, nausea, vomiting, diarrhea, rash, mouth sores,SOB,chest pain or any signs of active bleeding.\par \par On 7/18/22 visit, day + 90 post Car T. Overall patient is fatigued. Complains of a nodule of posterior left knee that is not painful. Denies fever, chills, nausea, diarrhea, rash, mouth sores, dysuria or any signs of active bleeding. Denies SOB, chest pain or B/L LE edema. Remains compliant with medications as prescribed. \par \par 8/16//22: Today is Day + 119 post CAR-T.Overall well with no acute concerns. Denies fever, chills, nausea, vomiting, diarrhea,blurred vision,rash, mouth sores,dysuria,edema or any signs of active bleeding.Remains compliant with medications.He states that he continues to have the painless nodule on posterior aspect of left knee.\par \par On 9/12/22 visit, day + 146 post Car- T. Overall well with no acute concerns. Denies fever, chills, nausea, vomiting, diarrhea,blurred vision,rash, mouth sores,dysuria,edema or any signs of active bleeding. Denies SOB, chest pain or B/L LE edema. Remains compliant with medications.\par \par On 12/7//22 visit, day + 228 post Car T. Recently recovered from COVID 19. Overall feels much better than he did a few weeks ago. Denies fever, chills, nausea, vomiting, diarrhea, rash, mouth sores, dysuria or any signs of active bleeding. Denies SOB, chest pain or B/L LE edema. Remains compliant with medications as prescribed. Discussed findings on recent scan..non specific..will follow

## 2022-12-19 NOTE — ASSESSMENT
[FreeTextEntry1] : 58 y/o gentleman with hx of mantle cell lymphoma initially dx'ed Oct 2016 s/p RCHOP chemotherapy X 4 cycles and RICE X 2 cycles with stem cell collected off of the 2nd RICE..he is now s/p CBV-conditioned autologous stem cell transplant on 4/5/17. Hospital course complicated by a-fib/a-flutter s/p ablation and now stable on Cardizem and Toprol. Also now on Xarelto. Port also removed. He demonstrated engraftment on 4/16 and d/c'ed in stable condition on 5/5.\par \par 1) Mantle Cell lymphoma\par S/P AUTO PBSCT 4/5/17\par Rituxan maintenance complete (Short Rituxan, once a week for 2 weeks). Last Rituxan treatment was  on 1/22/20  (Short Rituxan, once a week for 2 weeks).\par CT scans completed 7/1/21: PENELOPE\par PET CT 10/28/21 Completed. Concern for relapse disease. Lymph node biopsy completed on 11/12/21.  Results reviewed..c/w MCL.... \par Discussed being placed on calquence vs Silas rituxan. Patient information printed and given to patient/spouse. Side effects explained including serious side effect of AFIB. Patient has a known cardiac history. Message left for pt's cardiologist..\par Long term goal would be Car T....tecartus... This is why I would like to avoid Silas..Literature and consents provided for cart..discussed risks, benefits, alternatives, rationale and logistics..3 week hospital stay discussed...tlc placement...lymphodepleting prep, monitoring for CRS and neurotox, prolonged cytopenias and hypogamma also discussed\par \par s/p calquence 100 mg capsule- take 1 capsule twice a day...worsening GI sx on protonix...instructed on how to administer\par \par 3/11/22: Will stop Calquence on 3/14/22 ;1 week prior to CAR-T collection...resume 3/23..stop 4/11 in prep for admit 4/14...\par \par 7/6/22: S/P CAR-T therapy with [facility] on 4/19/22\par 7/18/22: Day +90 post Car T. \par 8/2/22: Day +105 post CAR-T\par 9/12/22: Day + 146 Post Car T\par 11/3/22: Day + 198 post Car T. Ordered Day +180 scan on 11/3/22. \par \par 2) Heme\par Counts stable. No indication for transfusion today. \par 11/3/22: WBC 4.38  H/H 12.6/38.1    ANC 2.02\par Continue MV and folic acid daily\par \par Negative assessment  for CRS and neurotox.\par \par F/U PET CT  partial response status at time of CAR-T;repeat at day 30, 100. \par 30 days post PET CT done on 5/25/22.Result as follows:\par -No evidence of FDG avid disease or active lymphoma. Interval resolution of minimal FDG activity associated with bilateral external iliac and bilateral inguinal lymph nodes which have either resolved or significantly decreased in size and not well delineated on CT.\par - Interval resolution of mild FDG avidity in the distal esophagus.\par - Unchanged nonspecific mildly heterogeneous small bowel and pancolonic FDG avidity. Please correlate clinically\par \par Repeat PET CT at Day 100. Completed 8/3/22: PENELOPE\par Repeat PET CT  on 11/3/22. nonspecific findings possibly related to covid..will follow...PENELOPE\par \par 3) ID\par Continue prophylaxis\par \par 4) GI\par Tums prn for acid reflux-RESOLVED\par Pantoprazole 40 mg daily\par \par 5) Afib\par Continue medications as prescribed by Cardiologist\par Had an appointment with cardiologist on 5/23/22.......no new recommendations from cardiologist\par Continue to f/u with cardiologist as per recommendations\par \par 6) Other\par Oxycodone for leg pain prn\par Headaches- tylenol prn\par Dizziness-Advised to make slow position changes-RESOLVED\par Left Posterior Knee nodule- Continue to monitor \par Continue medications as prescribed\par Maintain f/u with cardiology and GI \par Maintain f/u with his regular internist \par Well care and cancer screening stressed\par Patient has been advised to contact clinic if he has any concerns or signs of CRS / neurotoxicity\par \par Completed Post transplant vaccines:\par 12 month vaccines completed in April 2018. \par 14 month vaccines completed July 2018. \par 24 month re-vaccinations completed in April 2019. \par \par Will need to repeat post CAR-T...covid as well\par Received [employer] Vaccine's on 8/17/22 and 9/7/22.\par \par 7) Plan\par Follow up with Dr. Banks in 4 weeks. \par Completed PET CT at Day 100 (8/3/22) : PENELOPE\par Wallet card and FACT sheet provided again prior to d/c\par Pt advised no driving for 8 weeks post \par Quests addressed..restrictions discussed\par \par \par \par \par \par \par \par \par \par

## 2023-01-05 ENCOUNTER — APPOINTMENT (OUTPATIENT)
Dept: CARDIOLOGY | Facility: CLINIC | Age: 58
End: 2023-01-05
Payer: MEDICARE

## 2023-01-05 ENCOUNTER — NON-APPOINTMENT (OUTPATIENT)
Age: 58
End: 2023-01-05

## 2023-01-05 PROCEDURE — 93298 REM INTERROG DEV EVAL SCRMS: CPT

## 2023-01-05 PROCEDURE — G2066: CPT

## 2023-01-10 ENCOUNTER — RESULT REVIEW (OUTPATIENT)
Age: 58
End: 2023-01-10

## 2023-01-10 ENCOUNTER — APPOINTMENT (OUTPATIENT)
Dept: HEMATOLOGY ONCOLOGY | Facility: CLINIC | Age: 58
End: 2023-01-10
Payer: MEDICARE

## 2023-01-10 ENCOUNTER — LABORATORY RESULT (OUTPATIENT)
Age: 58
End: 2023-01-10

## 2023-01-10 VITALS
DIASTOLIC BLOOD PRESSURE: 80 MMHG | OXYGEN SATURATION: 99 % | SYSTOLIC BLOOD PRESSURE: 120 MMHG | HEART RATE: 71 BPM | RESPIRATION RATE: 16 BRPM | WEIGHT: 217.57 LBS | BODY MASS INDEX: 30.35 KG/M2 | TEMPERATURE: 97.2 F

## 2023-01-10 LAB
APTT BLD: 25.1 SEC
BASOPHILS # BLD AUTO: 0.02 K/UL — SIGNIFICANT CHANGE UP (ref 0–0.2)
BASOPHILS NFR BLD AUTO: 0.9 % — SIGNIFICANT CHANGE UP (ref 0–2)
EOSINOPHIL # BLD AUTO: 0.06 K/UL — SIGNIFICANT CHANGE UP (ref 0–0.5)
EOSINOPHIL NFR BLD AUTO: 2.8 % — SIGNIFICANT CHANGE UP (ref 0–6)
HCT VFR BLD CALC: 42 % — SIGNIFICANT CHANGE UP (ref 39–50)
HGB BLD-MCNC: 14.1 G/DL — SIGNIFICANT CHANGE UP (ref 13–17)
IMM GRANULOCYTES NFR BLD AUTO: 0.9 % — SIGNIFICANT CHANGE UP (ref 0–0.9)
INR PPP: 0.97 RATIO
LYMPHOCYTES # BLD AUTO: 1.24 K/UL — SIGNIFICANT CHANGE UP (ref 1–3.3)
LYMPHOCYTES # BLD AUTO: 57.1 % — HIGH (ref 13–44)
MCHC RBC-ENTMCNC: 33.1 PG — SIGNIFICANT CHANGE UP (ref 27–34)
MCHC RBC-ENTMCNC: 33.6 G/DL — SIGNIFICANT CHANGE UP (ref 32–36)
MCV RBC AUTO: 98.6 FL — SIGNIFICANT CHANGE UP (ref 80–100)
MONOCYTES # BLD AUTO: 0.46 K/UL — SIGNIFICANT CHANGE UP (ref 0–0.9)
MONOCYTES NFR BLD AUTO: 21.2 % — HIGH (ref 2–14)
NEUTROPHILS # BLD AUTO: 0.37 K/UL — LOW (ref 1.8–7.4)
NEUTROPHILS NFR BLD AUTO: 17.1 % — LOW (ref 43–77)
NRBC # BLD: 0 /100 WBCS — SIGNIFICANT CHANGE UP (ref 0–0)
PLATELET # BLD AUTO: 236 K/UL — SIGNIFICANT CHANGE UP (ref 150–400)
PT BLD: 11.5 SEC
RBC # BLD: 4.26 M/UL — SIGNIFICANT CHANGE UP (ref 4.2–5.8)
RBC # FLD: 13.9 % — SIGNIFICANT CHANGE UP (ref 10.3–14.5)
WBC # BLD: 2.17 K/UL — LOW (ref 3.8–10.5)
WBC # FLD AUTO: 2.17 K/UL — LOW (ref 3.8–10.5)

## 2023-01-10 PROCEDURE — 99215 OFFICE O/P EST HI 40 MIN: CPT

## 2023-01-10 NOTE — ASSESSMENT
[FreeTextEntry1] : 58 y/o gentleman with hx of mantle cell lymphoma initially dx'ed Oct 2016 s/p RCHOP chemotherapy X 4 cycles and RICE X 2 cycles with stem cell collected off of the 2nd RICE..he is now s/p CBV-conditioned autologous stem cell transplant on 4/5/17. Hospital course complicated by a-fib/a-flutter s/p ablation and now stable on Cardizem and Toprol. Also now on Xarelto. Port also removed. He demonstrated engraftment on 4/16 and d/c'ed in stable condition on 5/5.\par \par 1) Mantle Cell lymphoma\par S/P AUTO PBSCT 4/5/17\par Rituxan maintenance complete (Short Rituxan, once a week for 2 weeks). Last Rituxan treatment was  on 1/22/20  (Short Rituxan, once a week for 2 weeks).\par CT scans completed 7/1/21: PENELOPE\par PET CT 10/28/21 Completed. Concern for relapse disease. Lymph node biopsy completed on 11/12/21.  Results reviewed..c/w MCL.... \par Discussed being placed on calquence vs Silas rituxan. Patient information printed and given to patient/spouse. Side effects explained including serious side effect of AFIB. Patient has a known cardiac history. Message left for pt's cardiologist..\par Long term goal would be Car T....tecartus... This is why I would like to avoid Silas..Literature and consents provided for cart..discussed risks, benefits, alternatives, rationale and logistics..3 week hospital stay discussed...tlc placement...lymphodepleting prep, monitoring for CRS and neurotox, prolonged cytopenias and hypogamma also discussed\par \par s/p calquence 100 mg capsule- take 1 capsule twice a day...worsening GI sx on protonix...instructed on how to administer\par \par 3/11/22: Will stop Calquence on 3/14/22 ;1 week prior to CAR-T collection...resume 3/23..stop 4/11 in prep for admit 4/14...\par 1) 1/10/23\par 7/6/22: S/P CAR-T therapy with [facility] on 4/19/22\par 7/18/22: Day +90 post Car T. \par 8/2/22: Day +105 post CAR-T\par 9/12/22: Day + 146 Post Car T\par 11/3/22: Day + 198 post Car T.  +180 scan on 11/3/22. non specific changes...PENELOPE\par \par 2) Heme\par Counts stable. No indication for transfusion today. \par 11/3/22: WBC 4.38  H/H 12.6/38.1    ANC 2.02\par Continue MV and folic acid daily\par \par Negative assessment  for CRS and neurotox.\par \par F/U PET CT  partial response status at time of CAR-T;repeat at day 30, 100. \par 30 days post PET CT done on 5/25/22.Result as follows:\par -No evidence of FDG avid disease or active lymphoma. Interval resolution of minimal FDG activity associated with bilateral external iliac and bilateral inguinal lymph nodes which have either resolved or significantly decreased in size and not well delineated on CT.\par - Interval resolution of mild FDG avidity in the distal esophagus.\par - Unchanged nonspecific mildly heterogeneous small bowel and pancolonic FDG avidity. Please correlate clinically\par \par Repeat PET CT at Day 100. Completed 8/3/22: PENELOPE\par Repeat PET CT  on 11/3/22. nonspecific findings possibly related to covid..will follow...PENELOPE and repeat end feb\par \par 3) ID\par Continue prophylaxis\par \par 4) GI\par Tums prn for acid reflux-RESOLVED\par Pantoprazole 40 mg daily\par \par 5) Afib\par Continue medications as prescribed by Cardiologist\par Had an appointment with cardiologist on 5/23/22.......no new recommendations from cardiologist\par Continue to f/u with cardiologist as per recommendations\par \par 6) Other\par Oxycodone for leg pain prn\par Headaches- tylenol prn\par Dizziness-Advised to make slow position changes-RESOLVED\par Left Posterior Knee nodule- Continue to monitor \par Continue medications as prescribed\par Maintain f/u with cardiology and GI \par Maintain f/u with his regular internist \par Well care and cancer screening stressed\par Patient has been advised to contact clinic if he has any concerns or signs of CRS / neurotoxicity\par \par Completed Post transplant vaccines:\par 12 month vaccines completed in April 2018. \par 14 month vaccines completed July 2018. \par 24 month re-vaccinations completed in April 2019. \par \par Will need to repeat post CAR-T...covid as well\par Received [employer] Vaccine's on 8/17/22 and 9/7/22.\par \par 7) Plan\par Follow up with Dr. Banks in 8 weeks. \par Wallet card and FACT sheet provided again prior to d/c\par Pt advised no driving for 8 weeks post \par Quests addressed..restrictions discussed\par \par \par \par \par \par \par \par \par \par

## 2023-01-10 NOTE — HISTORY OF PRESENT ILLNESS
[de-identified] : Mr. Chavez is a 53 year old male with a history of MCL, initially diagnosed 10/2016. On 11/15/2016 he was seen by Dr. Bravo at [facility], after noticing a left inguinal mass for which he sought medical attention. CT scans (3/21/2016) revealed left-sided pelvic and left inguinal adenopathy. A 0.7 cm indeterminate lesion in the right iliac bone was also noted. A left inguinal lymph node biopsy was performed 9/24/2016 which revealed morphologic and immunophenotypic findings consistent with mantle cell lymphoma. He is status post 4 cycles of R-CHOP, and two cycles of RICE consolidation. A bone marrow biopsy on 2/10/17 showed normocellular bone marrow with trilineage hematopoiesis and maturation. He was admitted 3/27/17 for an autologous peripheral blood stem cell transplant with CBV  regimen. \par  \par Upon admission, a TLC was placed in IR. Mr. Chavez received IV hydration, nutritional support, pain management, antinausea medication, antidiarrheals, antibiotic, antiviral, PCP, antifungal and GI prophylaxis. Labs were monitored daily. Mr. Chavez received transfusional support and electrolyte repletion as needed. When his ANC dropped below 1000, he was started on prophylactic ciprofloxacin. When he became febrile, his antibiotic coverage was broadened as appropriate. \par  \par On 4/5/17, Mr. Chavez received 314ml of thawed, pooled, washed, mobilized, plasma reduced, HPC apheresis over approximately 1 hour. He tolerated the infusion well with no adverse side effects noted. Engraftment was noted on 4/16/17, and the Zarxio and antibiotics were discontinued. \par  \par Mr. Chavez's transplant course was complicated by recurrent headaches, oral and GI mucositis, dizziness, and chest pain resulting in a RRT on 4/10/17. The chest pain was self limiting. A second RRT was called on 4/16/17, for palpitations and dizziness. Mr. Chavez was found to be in atrial flutter with RVR. He was transferred to telemetry and followed by cardiology. \par A-fib/a-flutter was resistant to single rate agent, and while in arrhythmia, pt would become symptomatic, hypotensive. Ablation was was preformed, however was unsuccessful. Pt continued to have frequent changes in rhythm. As a-fib was usually preceded by PAC, and mediport tip was in r atrium, concern was this may have been involved. Mediport was removed but patient continued to have runs of non conducted APCs. Patient was followed by EP during this admission who ruled out any need for PPM insertion. Patient was started on Cardizem and Toprol which he tolerated well. He did have episodes of bradycardia during his sleep with episodes of Atrial flutter with RVR as well. However, he remained asymptomatic. \par Pt was d/c 5/5/2017 to home in stable condition with instructions to follow up with hematology and EP. [de-identified] : Presenting today for a follow up appointment. He has completed  maintenance Rituxan. Last Rituxan was received in jan... PENELOPE....Patient reports continued fatigue. Recent CT/PET scan PENELOPE.  Patient also offers that he had the shingles and was treated with valtrex..some residual discomfort...He continues to be followed by cardiology; his cardiologist increased the Diltiazem dosage to 180mg q daily, however patient reported feeling increasingly fatigued and returned to 120mg q daily and now notes less fatigue. He is in eval for a cardiac procedure.. His cardiologist was waiting for the rituxan to be completed and covid to pass..He is compliant with all medications. Denies mouth sores, eye dryness, nausea, vomiting, diarrhea. No LE edema, CP or SOB. \par \par On 10/18/21, patient presents for a follow up visit. Overall patient is well. States has generalized pruritus and recently was seen by dermatologist. Has been applying triamcinolone cream with no relief. States recently felt a "lump" of the right groin. Given the patients past medical history patient is very concerned. Denies fever, chills, nausea, vomiting, diarrhea, rash, mouth sores, dysuria or any signs of active bleeding. Denies SOB, chest pain or B/L LE edema. Remains compliant with medications prescribed. \par \par On 11/30//21, patient presents with spouse for a follow up visit. Overall patient feels fatigued. Had a lymph node biopsy on 11/12/21 and results are c/w relapsed MCL.. Denies fever, chills, nausea, vomiting, diarrhea, rash, mouth sores, dysuria or any signs of active bleeding. Denies SOB, chest pain or B/L LE edema. Remains compliant with medications prescribed. Here to discuss further therapy..\par \par On 1/25/22, patient presents for a follow up visit.  On calquence and not  tolerating well. Was in ER with severe epigastric pain...now on protonix...Complains of acid reflux and takes tums prn. Complains of headaches every other day. Denies fever, chills, nausea, vomiting, diarrhea, rash, mouth sores, dysuria or any signs of active bleeding. Denies SOB, chest pain or B/L LE edema. in wheelchair today\par \par 2/18/22: Patient is here for a follow up visit.He states that he still has epigastric pain with Calquence.He takes Pantoprazole as prescribed.Denies fever,chills, nausea, vomiting, diarrhea,rash, mouth sores,SOB,chest pain or any signs of active bleeding.He has an appointment with PCP tomorrow to get a referral for an endoscopy by GI.\par \par 5/10//22:Patient is seen for a follow up visit s/p  CAR-T treatment with [facility]....He is accompanied by his wife...He describes fatigue and weakness...using oxycodone for pain in legs...likely neuropathic.. Denies fever, chills, nausea, vomiting, diarrhea,mouth sores, skin rash,SOB,chest pain or any signs of active bleeding. He states that he had occasional epigastric pain from Calquence which  is improved off drug....He is off calquence and blood thinners at this time...Hospital course was complicated by CRS and neurotox...see d/c summary...\par \par 5/18/22: Patient is here for a follow up visit post CAR-T treatment with [facility].Today is Day +28.He is here accompanied by his wife.He states that he still feels fatigue and weakness.He continues to use Oxycodone for pain in legs and occasional headache.Denies fever, chills, blurring of vision,SOB, chest pain, mouth sores,nausea, vomiting, diarrhea,skin rash, dysuria or any signs of active bleeding. He states that few days ago when he stood up from sitting position,he felt dizziness which resolved on its own after few minutes.\par \par 5/24/22:Today is Day +35 post CAR-T therapy with [facility].He is accompanied by his wife.He states that he continues to have pricking pain on B/L feet and that he continues to take Gabapentin as prescribed. Denies fever, chills, SOB,chest pain, rash, mouth sores,nausea, vomiting, diarrhea,dysuria or any signs of active bleeding.He had a f/u appointment with cardiology yesterday and there are no changes or adjustments to any of the of the medications. He states that he did not have any episodes of dizziness since last week.\par \par 6/7//22: Patient is here for a follow up visit post CAR-T therapy with [facility]. .Overall feels better than last week.Denies fever, chills, nausea, vomiting, diarrhea, mouth sores, rash, dysuria,SOB,chest pain or any signs of active bleeding.\par \par 6/21/22: Today is Day + 63 post CAR-T therapy with [facility].Patient is seen for a follow up visit today.Overall feels well with no acute concerns.Denies fever, chills, SOB,chest pain, rash, mouth sores, nausea, vomiting, diarrhea,dysuria or any signs of active bleeding.\par \par 7/6/22: Patient is seen for a follow up visit  post CAR-T therapy.He is accompanied by his spouse. Today is Day + 77.He states that he feels better than past weeks.Denies fever, chills, nausea, vomiting, diarrhea, rash, mouth sores,SOB,chest pain or any signs of active bleeding.\par \par On 7/18/22 visit, day + 90 post Car T. Overall patient is fatigued. Complains of a nodule of posterior left knee that is not painful. Denies fever, chills, nausea, diarrhea, rash, mouth sores, dysuria or any signs of active bleeding. Denies SOB, chest pain or B/L LE edema. Remains compliant with medications as prescribed. \par \par 8/16//22: Today is Day + 119 post CAR-T.Overall well with no acute concerns. Denies fever, chills, nausea, vomiting, diarrhea,blurred vision,rash, mouth sores,dysuria,edema or any signs of active bleeding.Remains compliant with medications.He states that he continues to have the painless nodule on posterior aspect of left knee.\par \par On 9/12/22 visit, day + 146 post Car- T. Overall well with no acute concerns. Denies fever, chills, nausea, vomiting, diarrhea,blurred vision,rash, mouth sores,dysuria,edema or any signs of active bleeding. Denies SOB, chest pain or B/L LE edema. Remains compliant with medications.\par \par On 12/7//22 visit, day + 228 post Car T. Recently recovered from COVID 19. Overall feels much better than he did a few weeks ago. Denies fever, chills, nausea, vomiting, diarrhea, rash, mouth sores, dysuria or any signs of active bleeding. Denies SOB, chest pain or B/L LE edema. Remains compliant with medications as prescribed. Discussed findings on recent scan..non specific..will follow\par \par 1/10/23 as above , looks well, no new issues

## 2023-01-10 NOTE — PHYSICAL EXAM
[Ambulatory and capable of all self care but unable to carry out any work activities] : Status 2- Ambulatory and capable of all self care but unable to carry out any work activities. Up and about more than 50% of waking hours [Normal] : affect appropriate [de-identified] : non tender palpable nodule of  posterior left knee  [de-identified] : healed scar from port removal

## 2023-01-10 NOTE — REVIEW OF SYSTEMS
[Fatigue] : fatigue [Negative] : Allergic/Immunologic [Fever] : no fever [Chills] : no chills [Night Sweats] : no night sweats [Recent Change In Weight] : ~T no recent weight change [Dysphagia] : no dysphagia [Loss of Hearing] : no loss of hearing [Nosebleeds] : no nosebleeds [Hoarseness] : no hoarseness [Odynophagia] : no odynophagia [Mucosal Pain] : no mucosal pain [Abdominal Pain] : no abdominal pain [Vomiting] : no vomiting [Constipation] : no constipation [Diarrhea] : no diarrhea [Joint Pain] : no joint pain [Joint Stiffness] : no joint stiffness [Muscle Pain] : no muscle pain [Muscle Weakness] : no muscle weakness [Skin Rash] : no skin rash [Skin Wound] : no skin wound [Easy Bleeding] : no tendency for easy bleeding [Easy Bruising] : no tendency for easy bruising [FreeTextEntry2] : Occasional fatigue [FreeTextEntry7] : Acid reflux,Epigastric pain -RESOLVED [FreeTextEntry9] : painless nodule of  posterior left knee [de-identified] : Right groin lymphadenopathy RESOLVED

## 2023-01-11 LAB
ALBUMIN SERPL ELPH-MCNC: 5.2 G/DL
ALP BLD-CCNC: 92 U/L
ALT SERPL-CCNC: 18 U/L
ANION GAP SERPL CALC-SCNC: 10 MMOL/L
AST SERPL-CCNC: 19 U/L
BILIRUB SERPL-MCNC: 0.6 MG/DL
BUN SERPL-MCNC: 19 MG/DL
CALCIUM SERPL-MCNC: 9.6 MG/DL
CHLORIDE SERPL-SCNC: 102 MMOL/L
CK SERPL-CCNC: 145 U/L
CMV DNA SPEC QL NAA+PROBE: NOT DETECTED IU/ML
CMVPCR LOG: NOT DETECTED LOG10IU/ML
CO2 SERPL-SCNC: 29 MMOL/L
CREAT SERPL-MCNC: 1.03 MG/DL
CRP SERPL-MCNC: <3 MG/L
DEPRECATED D DIMER PPP IA-ACNC: <150 NG/ML DDU
EGFR: 84 ML/MIN/1.73M2
FERRITIN SERPL-MCNC: 52 NG/ML
GLUCOSE SERPL-MCNC: 99 MG/DL
LDH SERPL-CCNC: 200 U/L
MAGNESIUM SERPL-MCNC: 2.3 MG/DL
NT-PROBNP SERPL-MCNC: 61 PG/ML
PHOSPHATE SERPL-MCNC: 3.2 MG/DL
POTASSIUM SERPL-SCNC: 4.8 MMOL/L
PROT SERPL-MCNC: 6.9 G/DL
SODIUM SERPL-SCNC: 141 MMOL/L
URATE SERPL-MCNC: 5.8 MG/DL

## 2023-01-30 ENCOUNTER — NON-APPOINTMENT (OUTPATIENT)
Age: 58
End: 2023-01-30

## 2023-02-02 ENCOUNTER — NON-APPOINTMENT (OUTPATIENT)
Age: 58
End: 2023-02-02

## 2023-02-06 ENCOUNTER — APPOINTMENT (OUTPATIENT)
Dept: CARDIOLOGY | Facility: CLINIC | Age: 58
End: 2023-02-06
Payer: MEDICARE

## 2023-02-06 ENCOUNTER — NON-APPOINTMENT (OUTPATIENT)
Age: 58
End: 2023-02-06

## 2023-02-06 PROCEDURE — G2066: CPT

## 2023-02-06 PROCEDURE — 93298 REM INTERROG DEV EVAL SCRMS: CPT

## 2023-02-27 ENCOUNTER — OUTPATIENT (OUTPATIENT)
Dept: OUTPATIENT SERVICES | Facility: HOSPITAL | Age: 58
LOS: 1 days | End: 2023-02-27
Payer: MEDICARE

## 2023-02-27 ENCOUNTER — APPOINTMENT (OUTPATIENT)
Dept: NUCLEAR MEDICINE | Facility: IMAGING CENTER | Age: 58
End: 2023-02-27
Payer: MEDICARE

## 2023-02-27 DIAGNOSIS — Z92.850 PERSONAL HISTORY OF CHIMERIC ANTIGEN RECEPTOR T-CELL THERAPY: ICD-10-CM

## 2023-02-27 DIAGNOSIS — Z94.84 STEM CELLS TRANSPLANT STATUS: Chronic | ICD-10-CM

## 2023-02-27 DIAGNOSIS — Z98.890 OTHER SPECIFIED POSTPROCEDURAL STATES: Chronic | ICD-10-CM

## 2023-02-27 DIAGNOSIS — C83.10 MANTLE CELL LYMPHOMA, UNSPECIFIED SITE: ICD-10-CM

## 2023-02-27 DIAGNOSIS — Z95.818 PRESENCE OF OTHER CARDIAC IMPLANTS AND GRAFTS: Chronic | ICD-10-CM

## 2023-02-27 PROCEDURE — 78815 PET IMAGE W/CT SKULL-THIGH: CPT | Mod: 26,PS

## 2023-02-27 PROCEDURE — 78815 PET IMAGE W/CT SKULL-THIGH: CPT

## 2023-02-27 PROCEDURE — A9552: CPT

## 2023-03-07 ENCOUNTER — NON-APPOINTMENT (OUTPATIENT)
Age: 58
End: 2023-03-07

## 2023-03-08 ENCOUNTER — NON-APPOINTMENT (OUTPATIENT)
Age: 58
End: 2023-03-08

## 2023-03-08 ENCOUNTER — APPOINTMENT (OUTPATIENT)
Dept: CARDIOLOGY | Facility: CLINIC | Age: 58
End: 2023-03-08
Payer: MEDICARE

## 2023-03-08 VITALS
HEIGHT: 71 IN | WEIGHT: 220 LBS | SYSTOLIC BLOOD PRESSURE: 137 MMHG | DIASTOLIC BLOOD PRESSURE: 74 MMHG | BODY MASS INDEX: 30.8 KG/M2 | TEMPERATURE: 98.1 F | OXYGEN SATURATION: 95 % | HEART RATE: 68 BPM

## 2023-03-08 PROCEDURE — 93000 ELECTROCARDIOGRAM COMPLETE: CPT

## 2023-03-08 PROCEDURE — 99214 OFFICE O/P EST MOD 30 MIN: CPT | Mod: 25

## 2023-03-11 ENCOUNTER — OUTPATIENT (OUTPATIENT)
Dept: OUTPATIENT SERVICES | Facility: HOSPITAL | Age: 58
LOS: 1 days | Discharge: HOME | End: 2023-03-11

## 2023-03-11 DIAGNOSIS — C83.10 MANTLE CELL LYMPHOMA, UNSPECIFIED SITE: ICD-10-CM

## 2023-03-11 DIAGNOSIS — Z98.890 OTHER SPECIFIED POSTPROCEDURAL STATES: Chronic | ICD-10-CM

## 2023-03-11 DIAGNOSIS — Z94.84 STEM CELLS TRANSPLANT STATUS: Chronic | ICD-10-CM

## 2023-03-11 DIAGNOSIS — Z95.818 PRESENCE OF OTHER CARDIAC IMPLANTS AND GRAFTS: Chronic | ICD-10-CM

## 2023-03-13 ENCOUNTER — APPOINTMENT (OUTPATIENT)
Dept: CARDIOLOGY | Facility: CLINIC | Age: 58
End: 2023-03-13
Payer: MEDICARE

## 2023-03-13 ENCOUNTER — NON-APPOINTMENT (OUTPATIENT)
Age: 58
End: 2023-03-13

## 2023-03-13 PROCEDURE — 93298 REM INTERROG DEV EVAL SCRMS: CPT

## 2023-03-13 PROCEDURE — G2066: CPT

## 2023-03-14 ENCOUNTER — LABORATORY RESULT (OUTPATIENT)
Age: 58
End: 2023-03-14

## 2023-03-14 ENCOUNTER — APPOINTMENT (OUTPATIENT)
Dept: HEMATOLOGY ONCOLOGY | Facility: CLINIC | Age: 58
End: 2023-03-14
Payer: MEDICARE

## 2023-03-14 ENCOUNTER — RESULT REVIEW (OUTPATIENT)
Age: 58
End: 2023-03-14

## 2023-03-14 VITALS
WEIGHT: 219.36 LBS | BODY MASS INDEX: 30.59 KG/M2 | TEMPERATURE: 96.8 F | DIASTOLIC BLOOD PRESSURE: 76 MMHG | OXYGEN SATURATION: 98 % | RESPIRATION RATE: 16 BRPM | SYSTOLIC BLOOD PRESSURE: 120 MMHG | HEART RATE: 75 BPM

## 2023-03-14 DIAGNOSIS — Z86.79 OTHER SPECIFIED POSTPROCEDURAL STATES: ICD-10-CM

## 2023-03-14 DIAGNOSIS — Z98.890 OTHER SPECIFIED POSTPROCEDURAL STATES: ICD-10-CM

## 2023-03-14 LAB
ALBUMIN SERPL ELPH-MCNC: 4.7 G/DL
ALP BLD-CCNC: 85 U/L
ALT SERPL-CCNC: 13 U/L
ANION GAP SERPL CALC-SCNC: 10 MMOL/L
APTT BLD: 25.7 SEC
AST SERPL-CCNC: 16 U/L
BASOPHILS # BLD AUTO: 0.02 K/UL — SIGNIFICANT CHANGE UP (ref 0–0.2)
BASOPHILS NFR BLD AUTO: 0.7 % — SIGNIFICANT CHANGE UP (ref 0–2)
BILIRUB SERPL-MCNC: 0.8 MG/DL
BUN SERPL-MCNC: 20 MG/DL
CALCIUM SERPL-MCNC: 9.4 MG/DL
CHLORIDE SERPL-SCNC: 104 MMOL/L
CK SERPL-CCNC: 141 U/L
CO2 SERPL-SCNC: 27 MMOL/L
CREAT SERPL-MCNC: 1.01 MG/DL
CRP SERPL-MCNC: <3 MG/L
DEPRECATED D DIMER PPP IA-ACNC: <150 NG/ML DDU
EGFR: 86 ML/MIN/1.73M2
EOSINOPHIL # BLD AUTO: 0.11 K/UL — SIGNIFICANT CHANGE UP (ref 0–0.5)
EOSINOPHIL NFR BLD AUTO: 3.7 % — SIGNIFICANT CHANGE UP (ref 0–6)
FERRITIN SERPL-MCNC: 83 NG/ML
GLUCOSE SERPL-MCNC: 117 MG/DL
HCT VFR BLD CALC: 39.7 % — SIGNIFICANT CHANGE UP (ref 39–50)
HGB BLD-MCNC: 13.2 G/DL — SIGNIFICANT CHANGE UP (ref 13–17)
IMM GRANULOCYTES NFR BLD AUTO: 0.3 % — SIGNIFICANT CHANGE UP (ref 0–0.9)
INR PPP: 1.01 RATIO
LDH SERPL-CCNC: 161 U/L
LYMPHOCYTES # BLD AUTO: 1.17 K/UL — SIGNIFICANT CHANGE UP (ref 1–3.3)
LYMPHOCYTES # BLD AUTO: 39.5 % — SIGNIFICANT CHANGE UP (ref 13–44)
MAGNESIUM SERPL-MCNC: 2.2 MG/DL
MCHC RBC-ENTMCNC: 31.8 PG — SIGNIFICANT CHANGE UP (ref 27–34)
MCHC RBC-ENTMCNC: 33.2 G/DL — SIGNIFICANT CHANGE UP (ref 32–36)
MCV RBC AUTO: 95.7 FL — SIGNIFICANT CHANGE UP (ref 80–100)
MONOCYTES # BLD AUTO: 0.32 K/UL — SIGNIFICANT CHANGE UP (ref 0–0.9)
MONOCYTES NFR BLD AUTO: 10.8 % — SIGNIFICANT CHANGE UP (ref 2–14)
NEUTROPHILS # BLD AUTO: 1.33 K/UL — LOW (ref 1.8–7.4)
NEUTROPHILS NFR BLD AUTO: 45 % — SIGNIFICANT CHANGE UP (ref 43–77)
NRBC # BLD: 0 /100 WBCS — SIGNIFICANT CHANGE UP (ref 0–0)
NT-PROBNP SERPL-MCNC: 47 PG/ML
PHOSPHATE SERPL-MCNC: 3.3 MG/DL
PLATELET # BLD AUTO: 197 K/UL — SIGNIFICANT CHANGE UP (ref 150–400)
POTASSIUM SERPL-SCNC: 4.4 MMOL/L
PROT SERPL-MCNC: 6.5 G/DL
PT BLD: 11.9 SEC
RBC # BLD: 4.15 M/UL — LOW (ref 4.2–5.8)
RBC # FLD: 14.1 % — SIGNIFICANT CHANGE UP (ref 10.3–14.5)
SODIUM SERPL-SCNC: 142 MMOL/L
URATE SERPL-MCNC: 6.2 MG/DL
WBC # BLD: 2.96 K/UL — LOW (ref 3.8–10.5)
WBC # FLD AUTO: 2.96 K/UL — LOW (ref 3.8–10.5)

## 2023-03-14 PROCEDURE — 99215 OFFICE O/P EST HI 40 MIN: CPT

## 2023-03-14 NOTE — PHYSICAL EXAM
[Ambulatory and capable of all self care but unable to carry out any work activities] : Status 2- Ambulatory and capable of all self care but unable to carry out any work activities. Up and about more than 50% of waking hours [Normal] : affect appropriate [de-identified] : non tender palpable nodule of  posterior left knee  [de-identified] : healed scar from port removal

## 2023-03-14 NOTE — REVIEW OF SYSTEMS
[Fatigue] : fatigue [Negative] : Allergic/Immunologic [Fever] : no fever [Chills] : no chills [Night Sweats] : no night sweats [Recent Change In Weight] : ~T no recent weight change [Dysphagia] : no dysphagia [Loss of Hearing] : no loss of hearing [Nosebleeds] : no nosebleeds [Hoarseness] : no hoarseness [Odynophagia] : no odynophagia [Mucosal Pain] : no mucosal pain [Abdominal Pain] : no abdominal pain [Vomiting] : no vomiting [Constipation] : no constipation [Diarrhea] : no diarrhea [Joint Pain] : no joint pain [Joint Stiffness] : no joint stiffness [Muscle Pain] : no muscle pain [Muscle Weakness] : no muscle weakness [Skin Rash] : no skin rash [Skin Wound] : no skin wound [Easy Bleeding] : no tendency for easy bleeding [Easy Bruising] : no tendency for easy bruising [FreeTextEntry2] : Occasional fatigue [FreeTextEntry7] : Acid reflux,Epigastric pain -RESOLVED [FreeTextEntry9] : painless nodule of  posterior left knee [de-identified] : Right groin lymphadenopathy RESOLVED

## 2023-03-14 NOTE — ASSESSMENT
[FreeTextEntry1] : 58 y/o gentleman with hx of mantle cell lymphoma initially dx'ed Oct 2016 s/p RCHOP chemotherapy X 4 cycles and RICE X 2 cycles with stem cell collected off of the 2nd RICE..he is now s/p CBV-conditioned autologous stem cell transplant on 4/5/17. Hospital course complicated by a-fib/a-flutter s/p ablation and now stable on Cardizem and Toprol. Also now on Xarelto. Port also removed. He demonstrated engraftment on 4/16 and d/c'ed in stable condition on 5/5.\par \par 1) Mantle Cell lymphoma\par S/P AUTO PBSCT 4/5/17\par Rituxan maintenance complete (Short Rituxan, once a week for 2 weeks). Last Rituxan treatment was  on 1/22/20  (Short Rituxan, once a week for 2 weeks).\par CT scans completed 7/1/21: PENELOPE\par PET CT 10/28/21 Completed. Concern for relapse disease. Lymph node biopsy completed on 11/12/21.  Results reviewed..c/w MCL.... \par Discussed being placed on calquence vs Silas rituxan. Patient information printed and given to patient/spouse. Side effects explained including serious side effect of AFIB. Patient has a known cardiac history. Message left for pt's cardiologist..\par Long term goal would be Car T....tecartus... This is why I would like to avoid Silas..Literature and consents provided for cart..discussed risks, benefits, alternatives, rationale and logistics..3 week hospital stay discussed...tlc placement...lymphodepleting prep, monitoring for CRS and neurotox, prolonged cytopenias and hypogamma also discussed\par \par s/p calquence 100 mg capsule- take 1 capsule twice a day...worsening GI sx on protonix...instructed on how to administer\par \par 3/11/22: Will stop Calquence on 3/14/22 ;1 week prior to CAR-T collection...resume 3/23..stop 4/11 in prep for admit 4/14...\par 1) 1/10/23\par 7/6/22: S/P CAR-T therapy with [facility] on 4/19/22\par 7/18/22: Day +90 post Car T. \par 8/2/22: Day +105 post CAR-T\par 9/12/22: Day + 146 Post Car T\par 11/3/22: Day + 198 post Car T.  +180 scan on 11/3/22. non specific changes...PENELOPE\par 3/14/23...PENELOPE.. nonspecific changes....decrease in LN's\par \par 2) Heme\par Counts stable. No indication for transfusion today. \par Continue MV and folic acid daily\par \par Negative assessment  for CRS and neurotox.\par \par F/U PET CT  partial response status at time of CAR-T;repeat at day 30, 100. \par 30 days post PET CT done on 5/25/22.Result as follows:\par -No evidence of FDG avid disease or active lymphoma. Interval resolution of minimal FDG activity associated with bilateral external iliac and bilateral inguinal lymph nodes which have either resolved or significantly decreased in size and not well delineated on CT.\par - Interval resolution of mild FDG avidity in the distal esophagus.\par - Unchanged nonspecific mildly heterogeneous small bowel and pancolonic FDG avidity. Please correlate clinically\par \par Repeat PET CT at Day 100. Completed 8/3/22: PENELOPE\par Repeat PET CT  on 11/3/22. nonspecific findings possibly related to covid..will follow...PENELOPE and repeat end feb with continued decrease in LN's...will repeat ct's in 4 months...\par \par 3) ID\par Continue prophylaxis with acyclovir..stop mepron....consider shingrix\par \par 4) GI\par Tums prn for acid reflux-RESOLVED\par Pantoprazole 40 mg daily\par \par 5) Afib\par Continue medications as prescribed by Cardiologist\par Has an appointment with cardiologist in may...for stress test\par Continue to f/u with cardiologist as per recommendations\par \par 6) Other\par Oxycodone for leg pain prn\par Headaches- tylenol prn\par Dizziness-Advised to make slow position changes-RESOLVED\par Left Posterior Knee nodule- Continue to monitor \par Continue medications as prescribed\par Maintain f/u with cardiology and GI \par Maintain f/u with his regular internist \par Well care and cancer screening stressed\par Patient has been advised to contact clinic if he has any concerns or signs of CRS / neurotoxicity\par \par Completed Post transplant vaccines:\par 12 month vaccines completed in April 2018. \par 14 month vaccines completed July 2018. \par 24 month re-vaccinations completed in April 2019. \par \par May not need to repeat post CAR-T...will check titers\par Received [employer] Vaccine's on 8/17/22 and 9/7/22.\par \par 7) Plan\par Follow up with Dr. Banks in 8 to 12 weeks. \par Wallet card and FACT sheet provided again prior to d/c\par Pt advised no driving for 8 weeks post \par Quests addressed..restrictions discussed\par \par \par \par \par \par \par \par \par \par

## 2023-03-14 NOTE — HISTORY OF PRESENT ILLNESS
[de-identified] : Mr. Chavez is a 53 year old male with a history of MCL, initially diagnosed 10/2016. On 11/15/2016 he was seen by Dr. Bravo at [facility], after noticing a left inguinal mass for which he sought medical attention. CT scans (3/21/2016) revealed left-sided pelvic and left inguinal adenopathy. A 0.7 cm indeterminate lesion in the right iliac bone was also noted. A left inguinal lymph node biopsy was performed 9/24/2016 which revealed morphologic and immunophenotypic findings consistent with mantle cell lymphoma. He is status post 4 cycles of R-CHOP, and two cycles of RICE consolidation. A bone marrow biopsy on 2/10/17 showed normocellular bone marrow with trilineage hematopoiesis and maturation. He was admitted 3/27/17 for an autologous peripheral blood stem cell transplant with CBV  regimen. \par  \par Upon admission, a TLC was placed in IR. Mr. Chavez received IV hydration, nutritional support, pain management, antinausea medication, antidiarrheals, antibiotic, antiviral, PCP, antifungal and GI prophylaxis. Labs were monitored daily. Mr. Chavez received transfusional support and electrolyte repletion as needed. When his ANC dropped below 1000, he was started on prophylactic ciprofloxacin. When he became febrile, his antibiotic coverage was broadened as appropriate. \par  \par On 4/5/17, Mr. Chavez received 314ml of thawed, pooled, washed, mobilized, plasma reduced, HPC apheresis over approximately 1 hour. He tolerated the infusion well with no adverse side effects noted. Engraftment was noted on 4/16/17, and the Zarxio and antibiotics were discontinued. \par  \par Mr. Chavez's transplant course was complicated by recurrent headaches, oral and GI mucositis, dizziness, and chest pain resulting in a RRT on 4/10/17. The chest pain was self limiting. A second RRT was called on 4/16/17, for palpitations and dizziness. Mr. Chavez was found to be in atrial flutter with RVR. He was transferred to telemetry and followed by cardiology. \par A-fib/a-flutter was resistant to single rate agent, and while in arrhythmia, pt would become symptomatic, hypotensive. Ablation was was preformed, however was unsuccessful. Pt continued to have frequent changes in rhythm. As a-fib was usually preceded by PAC, and mediport tip was in r atrium, concern was this may have been involved. Mediport was removed but patient continued to have runs of non conducted APCs. Patient was followed by EP during this admission who ruled out any need for PPM insertion. Patient was started on Cardizem and Toprol which he tolerated well. He did have episodes of bradycardia during his sleep with episodes of Atrial flutter with RVR as well. However, he remained asymptomatic. \par Pt was d/c 5/5/2017 to home in stable condition with instructions to follow up with hematology and EP. [de-identified] : Presenting today for a follow up appointment. He has completed  maintenance Rituxan. Last Rituxan was received in jan... PENELOPE....Patient reports continued fatigue. Recent CT/PET scan PENELOPE.  Patient also offers that he had the shingles and was treated with valtrex..some residual discomfort...He continues to be followed by cardiology; his cardiologist increased the Diltiazem dosage to 180mg q daily, however patient reported feeling increasingly fatigued and returned to 120mg q daily and now notes less fatigue. He is in eval for a cardiac procedure.. His cardiologist was waiting for the rituxan to be completed and covid to pass..He is compliant with all medications. Denies mouth sores, eye dryness, nausea, vomiting, diarrhea. No LE edema, CP or SOB. \par \par On 10/18/21, patient presents for a follow up visit. Overall patient is well. States has generalized pruritus and recently was seen by dermatologist. Has been applying triamcinolone cream with no relief. States recently felt a "lump" of the right groin. Given the patients past medical history patient is very concerned. Denies fever, chills, nausea, vomiting, diarrhea, rash, mouth sores, dysuria or any signs of active bleeding. Denies SOB, chest pain or B/L LE edema. Remains compliant with medications prescribed. \par \par On 11/30//21, patient presents with spouse for a follow up visit. Overall patient feels fatigued. Had a lymph node biopsy on 11/12/21 and results are c/w relapsed MCL.. Denies fever, chills, nausea, vomiting, diarrhea, rash, mouth sores, dysuria or any signs of active bleeding. Denies SOB, chest pain or B/L LE edema. Remains compliant with medications prescribed. Here to discuss further therapy..\par \par On 1/25/22, patient presents for a follow up visit.  On calquence and not  tolerating well. Was in ER with severe epigastric pain...now on protonix...Complains of acid reflux and takes tums prn. Complains of headaches every other day. Denies fever, chills, nausea, vomiting, diarrhea, rash, mouth sores, dysuria or any signs of active bleeding. Denies SOB, chest pain or B/L LE edema. in wheelchair today\par \par 2/18/22: Patient is here for a follow up visit.He states that he still has epigastric pain with Calquence.He takes Pantoprazole as prescribed.Denies fever,chills, nausea, vomiting, diarrhea,rash, mouth sores,SOB,chest pain or any signs of active bleeding.He has an appointment with PCP tomorrow to get a referral for an endoscopy by GI.\par \par 5/10//22:Patient is seen for a follow up visit s/p  CAR-T treatment with [employer]....He is accompanied by his wife...He describes fatigue and weakness...using oxycodone for pain in legs...likely neuropathic.. Denies fever, chills, nausea, vomiting, diarrhea,mouth sores, skin rash,SOB,chest pain or any signs of active bleeding. He states that he had occasional epigastric pain from Calquence which  is improved off drug....He is off calquence and blood thinners at this time...Hospital course was complicated by CRS and neurotox...see d/c summary...\par \par 5/18/22: Patient is here for a follow up visit post CAR-T treatment with [employer].Today is Day +28.He is here accompanied by his wife.He states that he still feels fatigue and weakness.He continues to use Oxycodone for pain in legs and occasional headache.Denies fever, chills, blurring of vision,SOB, chest pain, mouth sores,nausea, vomiting, diarrhea,skin rash, dysuria or any signs of active bleeding. He states that few days ago when he stood up from sitting position,he felt dizziness which resolved on its own after few minutes.\par \par 5/24/22:Today is Day +35 post CAR-T therapy with [employer].He is accompanied by his wife.He states that he continues to have pricking pain on B/L feet and that he continues to take Gabapentin as prescribed. Denies fever, chills, SOB,chest pain, rash, mouth sores,nausea, vomiting, diarrhea,dysuria or any signs of active bleeding.He had a f/u appointment with cardiology yesterday and there are no changes or adjustments to any of the of the medications. He states that he did not have any episodes of dizziness since last week.\par \par 6/7//22: Patient is here for a follow up visit post CAR-T therapy with [employer]. .Overall feels better than last week.Denies fever, chills, nausea, vomiting, diarrhea, mouth sores, rash, dysuria,SOB,chest pain or any signs of active bleeding.\par \par 6/21/22: Today is Day + 63 post CAR-T therapy with [employer].Patient is seen for a follow up visit today.Overall feels well with no acute concerns.Denies fever, chills, SOB,chest pain, rash, mouth sores, nausea, vomiting, diarrhea,dysuria or any signs of active bleeding.\par \par 7/6/22: Patient is seen for a follow up visit  post CAR-T therapy.He is accompanied by his spouse. Today is Day + 77.He states that he feels better than past weeks.Denies fever, chills, nausea, vomiting, diarrhea, rash, mouth sores,SOB,chest pain or any signs of active bleeding.\par \par On 7/18/22 visit, day + 90 post Car T. Overall patient is fatigued. Complains of a nodule of posterior left knee that is not painful. Denies fever, chills, nausea, diarrhea, rash, mouth sores, dysuria or any signs of active bleeding. Denies SOB, chest pain or B/L LE edema. Remains compliant with medications as prescribed. \par \par 8/16//22: Today is Day + 119 post CAR-T.Overall well with no acute concerns. Denies fever, chills, nausea, vomiting, diarrhea,blurred vision,rash, mouth sores,dysuria,edema or any signs of active bleeding.Remains compliant with medications.He states that he continues to have the painless nodule on posterior aspect of left knee.\par \par On 9/12/22 visit, day + 146 post Car- T. Overall well with no acute concerns. Denies fever, chills, nausea, vomiting, diarrhea,blurred vision,rash, mouth sores,dysuria,edema or any signs of active bleeding. Denies SOB, chest pain or B/L LE edema. Remains compliant with medications.\par \par On 12/7//22 visit, day + 228 post Car T. Recently recovered from COVID 19. Overall feels much better than he did a few weeks ago. Denies fever, chills, nausea, vomiting, diarrhea, rash, mouth sores, dysuria or any signs of active bleeding. Denies SOB, chest pain or B/L LE edema. Remains compliant with medications as prescribed. Discussed findings on recent scan..non specific..will follow\par \par 3/14//23 as above , looks well, no new issues aside from fatigue...followed by cardiology..for stress test..s/p car t 4/19/22

## 2023-03-15 LAB
CMV DNA SPEC QL NAA+PROBE: NOT DETECTED IU/ML
CMVPCR LOG: NOT DETECTED LOG10IU/ML

## 2023-03-21 ENCOUNTER — APPOINTMENT (OUTPATIENT)
Dept: CARDIOLOGY | Facility: CLINIC | Age: 58
End: 2023-03-21
Payer: MEDICARE

## 2023-03-21 PROCEDURE — 93015 CV STRESS TEST SUPVJ I&R: CPT

## 2023-03-21 PROCEDURE — A9500: CPT

## 2023-03-21 PROCEDURE — 78452 HT MUSCLE IMAGE SPECT MULT: CPT

## 2023-04-09 ENCOUNTER — NON-APPOINTMENT (OUTPATIENT)
Age: 58
End: 2023-04-09

## 2023-04-17 ENCOUNTER — APPOINTMENT (OUTPATIENT)
Dept: CARDIOLOGY | Facility: CLINIC | Age: 58
End: 2023-04-17
Payer: MEDICARE

## 2023-04-17 ENCOUNTER — NON-APPOINTMENT (OUTPATIENT)
Age: 58
End: 2023-04-17

## 2023-04-17 PROCEDURE — G2066: CPT

## 2023-04-17 PROCEDURE — 93298 REM INTERROG DEV EVAL SCRMS: CPT

## 2023-04-18 ENCOUNTER — APPOINTMENT (OUTPATIENT)
Dept: UROLOGY | Facility: CLINIC | Age: 58
End: 2023-04-18

## 2023-05-22 ENCOUNTER — NON-APPOINTMENT (OUTPATIENT)
Age: 58
End: 2023-05-22

## 2023-05-22 ENCOUNTER — APPOINTMENT (OUTPATIENT)
Dept: CARDIOLOGY | Facility: CLINIC | Age: 58
End: 2023-05-22
Payer: MEDICARE

## 2023-05-22 PROCEDURE — G2066: CPT

## 2023-05-22 PROCEDURE — 93298 REM INTERROG DEV EVAL SCRMS: CPT

## 2023-05-24 ENCOUNTER — APPOINTMENT (OUTPATIENT)
Dept: ELECTROPHYSIOLOGY | Facility: CLINIC | Age: 58
End: 2023-05-24
Payer: MEDICARE

## 2023-05-24 VITALS
OXYGEN SATURATION: 95 % | HEART RATE: 77 BPM | DIASTOLIC BLOOD PRESSURE: 60 MMHG | WEIGHT: 214 LBS | SYSTOLIC BLOOD PRESSURE: 110 MMHG | BODY MASS INDEX: 29.96 KG/M2 | HEIGHT: 71 IN

## 2023-05-24 PROCEDURE — 99213 OFFICE O/P EST LOW 20 MIN: CPT

## 2023-05-24 RX ORDER — ATOVAQUONE 750 MG/5ML
750 SUSPENSION ORAL
Qty: 300 | Refills: 3 | Status: DISCONTINUED | COMMUNITY
Start: 2022-05-06 | End: 2023-05-24

## 2023-05-29 NOTE — DISCUSSION/SUMMARY
[FreeTextEntry1] : \par \par The patient is doing well has not had any recurrences of atrial fibrillation.  He has also not had any bleeding issues.\par He is currently on carvedilol 3.125 mg twice daily.  Patient is not on anticoagulation.\par His blood pressure has been in the normal range.\par His BMI is 29.\par Overall the patient is doing extremely well and we will continue to monitor him.\par \par Recommend follow-up evaluation in 8 months.  Patient will see his cardiologist as well as primary care physician and heme-onc in follow-up.  All of the above discussed with the patient and he expressed understanding.\par \par

## 2023-05-29 NOTE — PHYSICAL EXAM
[Normal] : moves all extremities, no focal deficits, normal speech [No Acute Distress] : no acute distress [Normal Venous Pressure] : normal venous pressure [Normal S1, S2] : normal S1, S2 [No Murmur] : no murmur [No Rub] : no rub [Good Air Entry] : good air entry [Non Tender] : non-tender [Normal Gait] : normal gait [No Edema] : no edema [No Cyanosis] : no cyanosis [No Clubbing] : no clubbing [No Focal Deficits] : no focal deficits [Alert and Oriented] : alert and oriented

## 2023-05-29 NOTE — HISTORY OF PRESENT ILLNESS
[FreeTextEntry1] : Patient is a 57-year-old man who was seen in follow-up evaluation for his prior atrial arrhythmia.\par \par He was accompanied by his sister.  Patient is currently feeling well and denies any recurrence of chest pain, palpitations, shortness of breath, dizziness or lightheadedness.\par \par He is status post AF ablation and has an implantable loop monitor.\par \par ILR interrogation: No atrial fibrillation no bradycardia or pauses.\par \par He denies c/o SOB, palpitations, lightheadedness, dizziness, near syncope or syncope. \par \par \par Historical:\par \par Previous cardiac catheterization had revealed nonobstructive coronary arteries.\par Echocardiogram from 4/25/2022 showed normal left ventricular systolic function\par He is status post catheter ablation for atrial fibrillation. \par Interrogation of implantable loop monitor did not show any A. fib since the procedure.\par The patient has a prior history of mantle cell lymphoma.  He has got a previous cycles of chemotherapy.\par \par The patient has had prior chest discomfort and previously underwent cardiac catheterization which showed nonobstructive coronaries.\par \par He has had previous atrial flutter and underwent catheter ablation for atrial flutter.\par \par He subsequently had atrial fibrillation which is being treated with medical therapy.  We had discussed catheter ablation procedure for atrial fibrillation and had reviewed with his hematologist oncologist the possibility of using anticoagulants especially post procedure.  He was clear to be treated with a NOAC/anticoagulant.\par \par  [id number] score: 0 - No history of diabetes, hypertension, TIA, CVA, vascular disease\par \par Echocardiogram from 4/1/2019 showed left atrial diameter 3.7 cm left atrial volume index 36 cc/m², LVEF 40 to 45%.\par

## 2023-05-30 ENCOUNTER — RESULT REVIEW (OUTPATIENT)
Age: 58
End: 2023-05-30

## 2023-05-31 ENCOUNTER — APPOINTMENT (OUTPATIENT)
Dept: CARDIOLOGY | Facility: CLINIC | Age: 58
End: 2023-05-31
Payer: MEDICARE

## 2023-05-31 VITALS
SYSTOLIC BLOOD PRESSURE: 111 MMHG | HEART RATE: 68 BPM | WEIGHT: 219 LBS | OXYGEN SATURATION: 99 % | DIASTOLIC BLOOD PRESSURE: 61 MMHG | BODY MASS INDEX: 30.54 KG/M2 | TEMPERATURE: 98.3 F

## 2023-05-31 PROCEDURE — 99214 OFFICE O/P EST MOD 30 MIN: CPT

## 2023-06-03 ENCOUNTER — APPOINTMENT (OUTPATIENT)
Dept: CT IMAGING | Facility: IMAGING CENTER | Age: 58
End: 2023-06-03
Payer: MEDICARE

## 2023-06-03 ENCOUNTER — OUTPATIENT (OUTPATIENT)
Dept: OUTPATIENT SERVICES | Facility: HOSPITAL | Age: 58
LOS: 1 days | End: 2023-06-03
Payer: MEDICARE

## 2023-06-03 DIAGNOSIS — Z94.84 STEM CELLS TRANSPLANT STATUS: ICD-10-CM

## 2023-06-03 DIAGNOSIS — Z98.890 OTHER SPECIFIED POSTPROCEDURAL STATES: Chronic | ICD-10-CM

## 2023-06-03 DIAGNOSIS — C83.10 MANTLE CELL LYMPHOMA, UNSPECIFIED SITE: ICD-10-CM

## 2023-06-03 DIAGNOSIS — Z95.818 PRESENCE OF OTHER CARDIAC IMPLANTS AND GRAFTS: Chronic | ICD-10-CM

## 2023-06-03 DIAGNOSIS — Z94.84 STEM CELLS TRANSPLANT STATUS: Chronic | ICD-10-CM

## 2023-06-03 DIAGNOSIS — Z00.8 ENCOUNTER FOR OTHER GENERAL EXAMINATION: ICD-10-CM

## 2023-06-03 PROCEDURE — 71260 CT THORAX DX C+: CPT

## 2023-06-03 PROCEDURE — 74177 CT ABD & PELVIS W/CONTRAST: CPT | Mod: 26

## 2023-06-03 PROCEDURE — 71260 CT THORAX DX C+: CPT | Mod: 26

## 2023-06-03 PROCEDURE — 74177 CT ABD & PELVIS W/CONTRAST: CPT

## 2023-06-19 ENCOUNTER — OUTPATIENT (OUTPATIENT)
Dept: OUTPATIENT SERVICES | Facility: HOSPITAL | Age: 58
LOS: 1 days | Discharge: HOME | End: 2023-06-19

## 2023-06-19 DIAGNOSIS — Z98.890 OTHER SPECIFIED POSTPROCEDURAL STATES: Chronic | ICD-10-CM

## 2023-06-19 DIAGNOSIS — C83.10 MANTLE CELL LYMPHOMA, UNSPECIFIED SITE: ICD-10-CM

## 2023-06-19 DIAGNOSIS — Z95.818 PRESENCE OF OTHER CARDIAC IMPLANTS AND GRAFTS: Chronic | ICD-10-CM

## 2023-06-19 DIAGNOSIS — Z94.84 STEM CELLS TRANSPLANT STATUS: Chronic | ICD-10-CM

## 2023-06-26 ENCOUNTER — APPOINTMENT (OUTPATIENT)
Dept: CARDIOLOGY | Facility: CLINIC | Age: 58
End: 2023-06-26
Payer: MEDICARE

## 2023-06-26 ENCOUNTER — NON-APPOINTMENT (OUTPATIENT)
Age: 58
End: 2023-06-26

## 2023-06-26 PROCEDURE — G2066: CPT

## 2023-06-26 PROCEDURE — 93298 REM INTERROG DEV EVAL SCRMS: CPT

## 2023-06-28 ENCOUNTER — APPOINTMENT (OUTPATIENT)
Dept: HEMATOLOGY ONCOLOGY | Facility: CLINIC | Age: 58
End: 2023-06-28
Payer: MEDICARE

## 2023-06-28 ENCOUNTER — RESULT REVIEW (OUTPATIENT)
Age: 58
End: 2023-06-28

## 2023-06-28 VITALS
RESPIRATION RATE: 16 BRPM | TEMPERATURE: 96.4 F | DIASTOLIC BLOOD PRESSURE: 87 MMHG | WEIGHT: 220.46 LBS | BODY MASS INDEX: 30.75 KG/M2 | SYSTOLIC BLOOD PRESSURE: 131 MMHG | HEART RATE: 63 BPM | OXYGEN SATURATION: 96 %

## 2023-06-28 LAB
ALBUMIN SERPL ELPH-MCNC: 4.7 G/DL
ALP BLD-CCNC: 86 U/L
ALT SERPL-CCNC: 21 U/L
ANION GAP SERPL CALC-SCNC: 9 MMOL/L
AST SERPL-CCNC: 24 U/L
BASOPHILS # BLD AUTO: 0.01 K/UL — SIGNIFICANT CHANGE UP (ref 0–0.2)
BASOPHILS NFR BLD AUTO: 0.3 % — SIGNIFICANT CHANGE UP (ref 0–2)
BILIRUB SERPL-MCNC: 0.4 MG/DL
BUN SERPL-MCNC: 22 MG/DL
CALCIUM SERPL-MCNC: 9.3 MG/DL
CHLORIDE SERPL-SCNC: 106 MMOL/L
CO2 SERPL-SCNC: 28 MMOL/L
CREAT SERPL-MCNC: 0.92 MG/DL
DEPRECATED KAPPA LC FREE/LAMBDA SER: 2.22 RATIO
EGFR: 96 ML/MIN/1.73M2
EOSINOPHIL # BLD AUTO: 0.17 K/UL — SIGNIFICANT CHANGE UP (ref 0–0.5)
EOSINOPHIL NFR BLD AUTO: 5.5 % — SIGNIFICANT CHANGE UP (ref 0–6)
GLUCOSE SERPL-MCNC: 122 MG/DL
HCT VFR BLD CALC: 41.8 % — SIGNIFICANT CHANGE UP (ref 39–50)
HGB BLD-MCNC: 13.8 G/DL — SIGNIFICANT CHANGE UP (ref 13–17)
IGA SER QL IEP: 59 MG/DL
IGG SER QL IEP: 705 MG/DL
IGM SER QL IEP: 28 MG/DL
IMM GRANULOCYTES NFR BLD AUTO: 0 % — SIGNIFICANT CHANGE UP (ref 0–0.9)
KAPPA LC CSF-MCNC: 0.46 MG/DL
KAPPA LC SERPL-MCNC: 1.02 MG/DL
LDH SERPL-CCNC: 208 U/L
LYMPHOCYTES # BLD AUTO: 1.26 K/UL — SIGNIFICANT CHANGE UP (ref 1–3.3)
LYMPHOCYTES # BLD AUTO: 41 % — SIGNIFICANT CHANGE UP (ref 13–44)
MAGNESIUM SERPL-MCNC: 2.4 MG/DL
MCHC RBC-ENTMCNC: 32.2 PG — SIGNIFICANT CHANGE UP (ref 27–34)
MCHC RBC-ENTMCNC: 33 G/DL — SIGNIFICANT CHANGE UP (ref 32–36)
MCV RBC AUTO: 97.7 FL — SIGNIFICANT CHANGE UP (ref 80–100)
MONOCYTES # BLD AUTO: 0.38 K/UL — SIGNIFICANT CHANGE UP (ref 0–0.9)
MONOCYTES NFR BLD AUTO: 12.4 % — SIGNIFICANT CHANGE UP (ref 2–14)
NEUTROPHILS # BLD AUTO: 1.25 K/UL — LOW (ref 1.8–7.4)
NEUTROPHILS NFR BLD AUTO: 40.8 % — LOW (ref 43–77)
NRBC # BLD: 0 /100 WBCS — SIGNIFICANT CHANGE UP (ref 0–0)
PLATELET # BLD AUTO: 208 K/UL — SIGNIFICANT CHANGE UP (ref 150–400)
POTASSIUM SERPL-SCNC: 5.3 MMOL/L
PROT SERPL-MCNC: 6.6 G/DL
RBC # BLD: 4.28 M/UL — SIGNIFICANT CHANGE UP (ref 4.2–5.8)
RBC # FLD: 13.5 % — SIGNIFICANT CHANGE UP (ref 10.3–14.5)
SODIUM SERPL-SCNC: 142 MMOL/L
WBC # BLD: 3.07 K/UL — LOW (ref 3.8–10.5)
WBC # FLD AUTO: 3.07 K/UL — LOW (ref 3.8–10.5)

## 2023-06-28 PROCEDURE — 99215 OFFICE O/P EST HI 40 MIN: CPT

## 2023-06-28 RX ORDER — MAGNESIUM OXIDE 241.3 MG/1000MG
400 TABLET ORAL
Qty: 60 | Refills: 3 | Status: ACTIVE | COMMUNITY
Start: 2023-06-28 | End: 1900-01-01

## 2023-06-28 NOTE — REVIEW OF SYSTEMS
[Fatigue] : fatigue [Negative] : Allergic/Immunologic [Fever] : no fever [Chills] : no chills [Night Sweats] : no night sweats [Recent Change In Weight] : ~T no recent weight change [Dysphagia] : no dysphagia [Loss of Hearing] : no loss of hearing [Nosebleeds] : no nosebleeds [Hoarseness] : no hoarseness [Odynophagia] : no odynophagia [Mucosal Pain] : no mucosal pain [Abdominal Pain] : no abdominal pain [Vomiting] : no vomiting [Constipation] : no constipation [Joint Pain] : no joint pain [Joint Stiffness] : no joint stiffness [Muscle Pain] : no muscle pain [Muscle Weakness] : no muscle weakness [Skin Rash] : no skin rash [Skin Wound] : no skin wound [Easy Bleeding] : no tendency for easy bleeding [Easy Bruising] : no tendency for easy bruising [FreeTextEntry2] : Occasional fatigue [FreeTextEntry9] : painless nodule of  posterior left knee [FreeTextEntry7] : Acid reflux,Epigastric pain -RESOLVED [de-identified] : Right groin lymphadenopathy RESOLVED

## 2023-06-28 NOTE — PHYSICAL EXAM
[Normal] : affect appropriate [Restricted in physically strenuous activity but ambulatory and able to carry out work of a light or sedentary nature] : Status 1- Restricted in physically strenuous activity but ambulatory and able to carry out work of a light or sedentary nature, e.g., light house work, office work [de-identified] : non tender palpable nodule of  posterior left knee  [de-identified] : healed scar from port removal

## 2023-06-28 NOTE — HISTORY OF PRESENT ILLNESS
[de-identified] : Mr. Chavez is a 53 year old male with a history of MCL, initially diagnosed 10/2016. On 11/15/2016 he was seen by Dr. Bravo at [facility], after noticing a left inguinal mass for which he sought medical attention. CT scans (3/21/2016) revealed left-sided pelvic and left inguinal adenopathy. A 0.7 cm indeterminate lesion in the right iliac bone was also noted. A left inguinal lymph node biopsy was performed 9/24/2016 which revealed morphologic and immunophenotypic findings consistent with mantle cell lymphoma. He is status post 4 cycles of R-CHOP, and two cycles of RICE consolidation. A bone marrow biopsy on 2/10/17 showed normocellular bone marrow with trilineage hematopoiesis and maturation. He was admitted 3/27/17 for an autologous peripheral blood stem cell transplant with CBV  regimen. \par  \par Upon admission, a TLC was placed in IR. Mr. Chavez received IV hydration, nutritional support, pain management, antinausea medication, antidiarrheals, antibiotic, antiviral, PCP, antifungal and GI prophylaxis. Labs were monitored daily. Mr. Chavez received transfusional support and electrolyte repletion as needed. When his ANC dropped below 1000, he was started on prophylactic ciprofloxacin. When he became febrile, his antibiotic coverage was broadened as appropriate. \par  \par On 4/5/17, Mr. Chavez received 314ml of thawed, pooled, washed, mobilized, plasma reduced, HPC apheresis over approximately 1 hour. He tolerated the infusion well with no adverse side effects noted. Engraftment was noted on 4/16/17, and the Zarxio and antibiotics were discontinued. \par  \par Mr. Chavez's transplant course was complicated by recurrent headaches, oral and GI mucositis, dizziness, and chest pain resulting in a RRT on 4/10/17. The chest pain was self limiting. A second RRT was called on 4/16/17, for palpitations and dizziness. Mr. Chavez was found to be in atrial flutter with RVR. He was transferred to telemetry and followed by cardiology. \par A-fib/a-flutter was resistant to single rate agent, and while in arrhythmia, pt would become symptomatic, hypotensive. Ablation was was preformed, however was unsuccessful. Pt continued to have frequent changes in rhythm. As a-fib was usually preceded by PAC, and mediport tip was in r atrium, concern was this may have been involved. Mediport was removed but patient continued to have runs of non conducted APCs. Patient was followed by EP during this admission who ruled out any need for PPM insertion. Patient was started on Cardizem and Toprol which he tolerated well. He did have episodes of bradycardia during his sleep with episodes of Atrial flutter with RVR as well. However, he remained asymptomatic. \par Pt was d/c 5/5/2017 to home in stable condition with instructions to follow up with hematology and EP. [de-identified] : Presenting today for a follow up appointment. He has completed  maintenance Rituxan. Last Rituxan was received in jan... PENELOPE....Patient reports continued fatigue. Recent CT/PET scan PENELOPE.  Patient also offers that he had the shingles and was treated with valtrex..some residual discomfort...He continues to be followed by cardiology; his cardiologist increased the Diltiazem dosage to 180mg q daily, however patient reported feeling increasingly fatigued and returned to 120mg q daily and now notes less fatigue. He is in eval for a cardiac procedure.. His cardiologist was waiting for the rituxan to be completed and covid to pass..He is compliant with all medications. Denies mouth sores, eye dryness, nausea, vomiting, diarrhea. No LE edema, CP or SOB. \par \par On 10/18/21, patient presents for a follow up visit. Overall patient is well. States has generalized pruritus and recently was seen by dermatologist. Has been applying triamcinolone cream with no relief. States recently felt a "lump" of the right groin. Given the patients past medical history patient is very concerned. Denies fever, chills, nausea, vomiting, diarrhea, rash, mouth sores, dysuria or any signs of active bleeding. Denies SOB, chest pain or B/L LE edema. Remains compliant with medications prescribed. \par \par On 11/30//21, patient presents with spouse for a follow up visit. Overall patient feels fatigued. Had a lymph node biopsy on 11/12/21 and results are c/w relapsed MCL.. Denies fever, chills, nausea, vomiting, diarrhea, rash, mouth sores, dysuria or any signs of active bleeding. Denies SOB, chest pain or B/L LE edema. Remains compliant with medications prescribed. Here to discuss further therapy..\par \par On 1/25/22, patient presents for a follow up visit.  On calquence and not  tolerating well. Was in ER with severe epigastric pain...now on protonix...Complains of acid reflux and takes tums prn. Complains of headaches every other day. Denies fever, chills, nausea, vomiting, diarrhea, rash, mouth sores, dysuria or any signs of active bleeding. Denies SOB, chest pain or B/L LE edema. in wheelchair today\par \par 2/18/22: Patient is here for a follow up visit.He states that he still has epigastric pain with Calquence.He takes Pantoprazole as prescribed.Denies fever,chills, nausea, vomiting, diarrhea,rash, mouth sores,SOB,chest pain or any signs of active bleeding.He has an appointment with PCP tomorrow to get a referral for an endoscopy by GI.\par \par 5/10//22:Patient is seen for a follow up visit s/p  CAR-T treatment with [employer]....He is accompanied by his wife...He describes fatigue and weakness...using oxycodone for pain in legs...likely neuropathic.. Denies fever, chills, nausea, vomiting, diarrhea,mouth sores, skin rash,SOB,chest pain or any signs of active bleeding. He states that he had occasional epigastric pain from Calquence which  is improved off drug....He is off calquence and blood thinners at this time...Hospital course was complicated by CRS and neurotox...see d/c summary...\par \par 5/18/22: Patient is here for a follow up visit post CAR-T treatment with [employer].Today is Day +28.He is here accompanied by his wife.He states that he still feels fatigue and weakness.He continues to use Oxycodone for pain in legs and occasional headache.Denies fever, chills, blurring of vision,SOB, chest pain, mouth sores,nausea, vomiting, diarrhea,skin rash, dysuria or any signs of active bleeding. He states that few days ago when he stood up from sitting position,he felt dizziness which resolved on its own after few minutes.\par \par 5/24/22:Today is Day +35 post CAR-T therapy with [employer].He is accompanied by his wife.He states that he continues to have pricking pain on B/L feet and that he continues to take Gabapentin as prescribed. Denies fever, chills, SOB,chest pain, rash, mouth sores,nausea, vomiting, diarrhea,dysuria or any signs of active bleeding.He had a f/u appointment with cardiology yesterday and there are no changes or adjustments to any of the of the medications. He states that he did not have any episodes of dizziness since last week.\par \par 6/7//22: Patient is here for a follow up visit post CAR-T therapy with [employer]. .Overall feels better than last week.Denies fever, chills, nausea, vomiting, diarrhea, mouth sores, rash, dysuria,SOB,chest pain or any signs of active bleeding.\par \par 6/21/22: Today is Day + 63 post CAR-T therapy with [employer].Patient is seen for a follow up visit today.Overall feels well with no acute concerns.Denies fever, chills, SOB,chest pain, rash, mouth sores, nausea, vomiting, diarrhea,dysuria or any signs of active bleeding.\par \par 7/6/22: Patient is seen for a follow up visit  post CAR-T therapy.He is accompanied by his spouse. Today is Day + 77.He states that he feels better than past weeks.Denies fever, chills, nausea, vomiting, diarrhea, rash, mouth sores,SOB,chest pain or any signs of active bleeding.\par \par On 7/18/22 visit, day + 90 post Car T. Overall patient is fatigued. Complains of a nodule of posterior left knee that is not painful. Denies fever, chills, nausea, diarrhea, rash, mouth sores, dysuria or any signs of active bleeding. Denies SOB, chest pain or B/L LE edema. Remains compliant with medications as prescribed. \par \par 8/16//22: Today is Day + 119 post CAR-T.Overall well with no acute concerns. Denies fever, chills, nausea, vomiting, diarrhea,blurred vision,rash, mouth sores,dysuria,edema or any signs of active bleeding.Remains compliant with medications.He states that he continues to have the painless nodule on posterior aspect of left knee.\par \par On 9/12/22 visit, day + 146 post Car- T. Overall well with no acute concerns. Denies fever, chills, nausea, vomiting, diarrhea,blurred vision,rash, mouth sores,dysuria,edema or any signs of active bleeding. Denies SOB, chest pain or B/L LE edema. Remains compliant with medications.\par \par On 12/7//22 visit, day + 228 post Car T. Recently recovered from COVID 19. Overall feels much better than he did a few weeks ago. Denies fever, chills, nausea, vomiting, diarrhea, rash, mouth sores, dysuria or any signs of active bleeding. Denies SOB, chest pain or B/L LE edema. Remains compliant with medications as prescribed. Discussed findings on recent scan..non specific..will follow\par \par 6/28//23 as above , looks well, no new issues aside from fatigue...followed by cardiology..for stress test..s/p car t 4/19/22

## 2023-06-28 NOTE — ASSESSMENT
[FreeTextEntry1] : 56 y/o gentleman with hx of mantle cell lymphoma initially dx'ed Oct 2016 s/p RCHOP chemotherapy X 4 cycles and RICE X 2 cycles with stem cell collected off of the 2nd RICE..he is now s/p CBV-conditioned autologous stem cell transplant on 4/5/17. Hospital course complicated by a-fib/a-flutter s/p ablation and now stable on Cardizem and Toprol. Also now on Xarelto. Port also removed. He demonstrated engraftment on 4/16 and d/c'ed in stable condition on 5/5.\par \par 1) Mantle Cell lymphoma\par S/P AUTO PBSCT 4/5/17\par Rituxan maintenance complete (Short Rituxan, once a week for 2 weeks). Last Rituxan treatment was  on 1/22/20  (Short Rituxan, once a week for 2 weeks).\par CT scans completed 7/1/21: PENELOPE\par PET CT 10/28/21 Completed. Concern for relapse disease. Lymph node biopsy completed on 11/12/21.  Results reviewed..c/w MCL.... \par Discussed being placed on calquence vs Silas rituxan. Patient information printed and given to patient/spouse. Side effects explained including serious side effect of AFIB. Patient has a known cardiac history. Message left for pt's cardiologist..\par Long term goal would be Car T....tecartus... This is why I would like to avoid Silas..Literature and consents provided for cart..discussed risks, benefits, alternatives, rationale and logistics..3 week hospital stay discussed...tlc placement...lymphodepleting prep, monitoring for CRS and neurotox, prolonged cytopenias and hypogamma also discussed\par \par s/p calquence 100 mg capsule- take 1 capsule twice a day...worsening GI sx on protonix...instructed on how to administer\par \par 3/11/22: Will stop Calquence on 3/14/22 ;1 week prior to CAR-T collection...resume 3/23..stop 4/11 in prep for admit 4/14...\par 1) 1/10/23\par 7/6/22: S/P CAR-T therapy with Tecartus on 4/19/22\par 7/18/22: Day +90 post Car T. \par 8/2/22: Day +105 post CAR-T\par 9/12/22: Day + 146 Post Car T\par 11/3/22: Day + 198 post Car T.  +180 scan on 11/3/22. non specific changes...PENELOPE\par 3/14/23...PENELOPE.. nonspecific changes....decrease in LN's\par \par 2) Heme\par Counts stable. No indication for transfusion today. \par Continue MV and folic acid daily\par \par Negative assessment  for CRS and neurotox.\par \par F/U PET CT  partial response status at time of CAR-T;repeat at day 30, 100. \par 30 days post PET CT done on 5/25/22.Result as follows:\par -No evidence of FDG avid disease or active lymphoma. Interval resolution of minimal FDG activity associated with bilateral external iliac and bilateral inguinal lymph nodes which have either resolved or significantly decreased in size and not well delineated on CT.\par - Interval resolution of mild FDG avidity in the distal esophagus.\par - Unchanged nonspecific mildly heterogeneous small bowel and pancolonic FDG avidity. Please correlate clinically\par \par Repeat PET CT at Day 100. Completed 8/3/22: PENELOPE\par Repeat PET CT  on 11/3/22. nonspecific findings possibly related to covid..will follow...PENELOPE and repeat end feb with continued decrease in LN's...recent ct's PENELOPE.....will repeat  in 6 months...\par \par 3) ID\par Continue prophylaxis with acyclovir..stop mepron....consider shingrix\par borderline immunoglobulin levels\par \par 4) GI\par Tums prn for acid reflux-RESOLVED\par Pantoprazole 40 mg daily\par \par 5) Afib\par Continue medications as prescribed by Cardiologist\par Has an appointment with cardiologist in may...for stress test\par Continue to f/u with cardiologist as per recommendations\par \par 6) Other\par Oxycodone for leg pain prn\par Headaches- tylenol prn\par Dizziness-Advised to make slow position changes-RESOLVED\par Left Posterior Knee nodule- Continue to monitor \par Continue medications as prescribed\par Maintain f/u with cardiology and GI \par Maintain f/u with his regular internist \par Well care and cancer screening stressed\par Patient has been advised to contact clinic if he has any concerns or signs of CRS / neurotoxicity\par \par Completed Post transplant vaccines:\par 12 month vaccines completed in April 2018. \par 14 month vaccines completed July 2018. \par 24 month re-vaccinations completed in April 2019. \par \par will not need to repeat post CAR-T...will check titers\par Received [employer] Vaccine's on 8/17/22 and 9/7/22.\par \par 7) Plan\par Follow up with Dr. Banks in  12 weeks. \par Wallet card and FACT sheet provided again prior to d/c\par Quests addressed..restrictions discussed\par \par \par \par \par \par \par \par \par \par

## 2023-07-31 ENCOUNTER — APPOINTMENT (OUTPATIENT)
Dept: CARDIOLOGY | Facility: CLINIC | Age: 58
End: 2023-07-31

## 2023-09-01 ENCOUNTER — APPOINTMENT (OUTPATIENT)
Dept: CARDIOLOGY | Facility: CLINIC | Age: 58
End: 2023-09-01

## 2023-09-20 ENCOUNTER — OUTPATIENT (OUTPATIENT)
Dept: OUTPATIENT SERVICES | Facility: HOSPITAL | Age: 58
LOS: 1 days | Discharge: HOME | End: 2023-09-20

## 2023-09-20 DIAGNOSIS — Z98.890 OTHER SPECIFIED POSTPROCEDURAL STATES: Chronic | ICD-10-CM

## 2023-09-20 DIAGNOSIS — Z94.84 STEM CELLS TRANSPLANT STATUS: Chronic | ICD-10-CM

## 2023-09-20 DIAGNOSIS — C83.10 MANTLE CELL LYMPHOMA, UNSPECIFIED SITE: ICD-10-CM

## 2023-09-20 DIAGNOSIS — Z95.818 PRESENCE OF OTHER CARDIAC IMPLANTS AND GRAFTS: Chronic | ICD-10-CM

## 2023-09-28 ENCOUNTER — APPOINTMENT (OUTPATIENT)
Dept: HEMATOLOGY ONCOLOGY | Facility: CLINIC | Age: 58
End: 2023-09-28
Payer: MEDICARE

## 2023-09-28 ENCOUNTER — RESULT REVIEW (OUTPATIENT)
Age: 58
End: 2023-09-28

## 2023-09-28 VITALS
SYSTOLIC BLOOD PRESSURE: 125 MMHG | WEIGHT: 217.13 LBS | DIASTOLIC BLOOD PRESSURE: 82 MMHG | OXYGEN SATURATION: 100 % | RESPIRATION RATE: 16 BRPM | BODY MASS INDEX: 30.29 KG/M2 | TEMPERATURE: 97.1 F | HEART RATE: 66 BPM

## 2023-09-28 LAB
ALBUMIN SERPL ELPH-MCNC: 4.7 G/DL
ALP BLD-CCNC: 102 U/L
ALT SERPL-CCNC: 19 U/L
ANION GAP SERPL CALC-SCNC: 9 MMOL/L
AST SERPL-CCNC: 19 U/L
BASOPHILS # BLD AUTO: 0.02 K/UL — SIGNIFICANT CHANGE UP (ref 0–0.2)
BASOPHILS NFR BLD AUTO: 0.4 % — SIGNIFICANT CHANGE UP (ref 0–2)
BILIRUB SERPL-MCNC: 0.4 MG/DL
BUN SERPL-MCNC: 24 MG/DL
CALCIUM SERPL-MCNC: 9.4 MG/DL
CHLORIDE SERPL-SCNC: 104 MMOL/L
CO2 SERPL-SCNC: 28 MMOL/L
CREAT SERPL-MCNC: 0.97 MG/DL
DEPRECATED KAPPA LC FREE/LAMBDA SER: 2.33 RATIO
EGFR: 90 ML/MIN/1.73M2
EOSINOPHIL # BLD AUTO: 0.26 K/UL — SIGNIFICANT CHANGE UP (ref 0–0.5)
EOSINOPHIL NFR BLD AUTO: 5.8 % — SIGNIFICANT CHANGE UP (ref 0–6)
GLUCOSE SERPL-MCNC: 117 MG/DL
HCT VFR BLD CALC: 41 % — SIGNIFICANT CHANGE UP (ref 39–50)
HGB BLD-MCNC: 13.6 G/DL — SIGNIFICANT CHANGE UP (ref 13–17)
IGA SER QL IEP: 73 MG/DL
IGG SER QL IEP: 947 MG/DL
IGM SER QL IEP: 45 MG/DL
IMM GRANULOCYTES NFR BLD AUTO: 0.4 % — SIGNIFICANT CHANGE UP (ref 0–0.9)
KAPPA LC CSF-MCNC: 0.76 MG/DL
KAPPA LC SERPL-MCNC: 1.77 MG/DL
LDH SERPL-CCNC: 170 U/L
LYMPHOCYTES # BLD AUTO: 1.75 K/UL — SIGNIFICANT CHANGE UP (ref 1–3.3)
LYMPHOCYTES # BLD AUTO: 38.7 % — SIGNIFICANT CHANGE UP (ref 13–44)
MAGNESIUM SERPL-MCNC: 2.2 MG/DL
MCHC RBC-ENTMCNC: 31.7 PG — SIGNIFICANT CHANGE UP (ref 27–34)
MCHC RBC-ENTMCNC: 33.2 G/DL — SIGNIFICANT CHANGE UP (ref 32–36)
MCV RBC AUTO: 95.6 FL — SIGNIFICANT CHANGE UP (ref 80–100)
MONOCYTES # BLD AUTO: 0.46 K/UL — SIGNIFICANT CHANGE UP (ref 0–0.9)
MONOCYTES NFR BLD AUTO: 10.2 % — SIGNIFICANT CHANGE UP (ref 2–14)
NEUTROPHILS # BLD AUTO: 2.01 K/UL — SIGNIFICANT CHANGE UP (ref 1.8–7.4)
NEUTROPHILS NFR BLD AUTO: 44.5 % — SIGNIFICANT CHANGE UP (ref 43–77)
NRBC # BLD: 0 /100 WBCS — SIGNIFICANT CHANGE UP (ref 0–0)
PLATELET # BLD AUTO: 245 K/UL — SIGNIFICANT CHANGE UP (ref 150–400)
POTASSIUM SERPL-SCNC: 5.3 MMOL/L
PROT SERPL-MCNC: 6.9 G/DL
RBC # BLD: 4.29 M/UL — SIGNIFICANT CHANGE UP (ref 4.2–5.8)
RBC # FLD: 13.2 % — SIGNIFICANT CHANGE UP (ref 10.3–14.5)
SODIUM SERPL-SCNC: 141 MMOL/L
WBC # BLD: 4.52 K/UL — SIGNIFICANT CHANGE UP (ref 3.8–10.5)
WBC # FLD AUTO: 4.52 K/UL — SIGNIFICANT CHANGE UP (ref 3.8–10.5)

## 2023-09-28 PROCEDURE — 99214 OFFICE O/P EST MOD 30 MIN: CPT

## 2023-09-29 ENCOUNTER — APPOINTMENT (OUTPATIENT)
Dept: CARDIOLOGY | Facility: CLINIC | Age: 58
End: 2023-09-29

## 2023-10-31 ENCOUNTER — APPOINTMENT (OUTPATIENT)
Dept: CARDIOLOGY | Facility: CLINIC | Age: 58
End: 2023-10-31

## 2023-11-10 NOTE — REVIEW OF SYSTEMS
[Negative] : Respiratory [Feeling Fatigued] : not feeling fatigued [Blurry Vision] : no blurred vision [SOB] : no shortness of breath [Dyspnea on exertion] : not dyspnea during exertion [Chest Discomfort] : no chest discomfort [Palpitations] : no palpitations [Abdominal Pain] : no abdominal pain [Rash] : no rash [Dizziness] : no dizziness [Easy Bleeding] : no tendency for easy bleeding no

## 2023-11-30 ENCOUNTER — APPOINTMENT (OUTPATIENT)
Dept: CARDIOLOGY | Facility: CLINIC | Age: 58
End: 2023-11-30

## 2023-12-01 ENCOUNTER — APPOINTMENT (OUTPATIENT)
Dept: ELECTROPHYSIOLOGY | Facility: CLINIC | Age: 58
End: 2023-12-01
Payer: MEDICARE

## 2023-12-01 VITALS
HEIGHT: 71 IN | BODY MASS INDEX: 30.52 KG/M2 | DIASTOLIC BLOOD PRESSURE: 60 MMHG | SYSTOLIC BLOOD PRESSURE: 104 MMHG | HEART RATE: 73 BPM | WEIGHT: 218 LBS | OXYGEN SATURATION: 98 %

## 2023-12-01 DIAGNOSIS — Z95.818 PRESENCE OF OTHER CARDIAC IMPLANTS AND GRAFTS: ICD-10-CM

## 2023-12-01 DIAGNOSIS — R00.1 BRADYCARDIA, UNSPECIFIED: ICD-10-CM

## 2023-12-01 PROCEDURE — 99214 OFFICE O/P EST MOD 30 MIN: CPT

## 2023-12-12 ENCOUNTER — APPOINTMENT (OUTPATIENT)
Dept: UROLOGY | Facility: CLINIC | Age: 58
End: 2023-12-12
Payer: MEDICARE

## 2023-12-12 VITALS — SYSTOLIC BLOOD PRESSURE: 120 MMHG | DIASTOLIC BLOOD PRESSURE: 79 MMHG | HEART RATE: 72 BPM

## 2023-12-12 PROCEDURE — 99204 OFFICE O/P NEW MOD 45 MIN: CPT

## 2023-12-12 RX ORDER — CLOTRIMAZOLE AND BETAMETHASONE DIPROPIONATE 10; .5 MG/G; MG/G
1-0.05 CREAM TOPICAL TWICE DAILY
Qty: 1 | Refills: 1 | Status: ACTIVE | COMMUNITY
Start: 2023-12-12 | End: 1900-01-01

## 2023-12-17 PROBLEM — R00.1 BRADYCARDIA: Status: ACTIVE | Noted: 2017-12-10

## 2023-12-17 PROBLEM — Z95.818 STATUS POST PLACEMENT OF IMPLANTABLE LOOP RECORDER: Status: ACTIVE | Noted: 2017-12-19

## 2023-12-17 NOTE — DISCUSSION/SUMMARY
[FreeTextEntry1] :  The patient is doing well has not had any recurrences of atrial fibrillation.  He has also not had any bleeding issues. He is currently on carvedilol 3.125 mg twice daily.  Patient is not on anticoagulation. His blood pressure has been in the normal range. His BMI is 29. Overall the patient is doing extremely well and we will continue to monitor him.  Recommend follow-up evaluation in 8 months.  Patient will see his cardiologist as well as primary care physician and heme-onc in follow-up.  All of the above discussed with the patient and he expressed understanding.

## 2023-12-17 NOTE — PHYSICAL EXAM
[No Acute Distress] : no acute distress [Normal Venous Pressure] : normal venous pressure [Normal S1, S2] : normal S1, S2 [No Murmur] : no murmur [No Rub] : no rub [Good Air Entry] : good air entry [Non Tender] : non-tender [Normal Gait] : normal gait [No Edema] : no edema [No Cyanosis] : no cyanosis [No Clubbing] : no clubbing [Normal] : no rash, no skin lesions [No Focal Deficits] : no focal deficits [Alert and Oriented] : alert and oriented

## 2023-12-17 NOTE — HISTORY OF PRESENT ILLNESS
[FreeTextEntry1] : Patient is a 58-year-old man who was seen in follow-up evaluation for his prior atrial arrhythmia.   Patient is currently feeling well and denies any recurrence of chest pain, palpitations, shortness of breath, dizziness or lightheadedness.  He is status post AF ablation and has an implantable loop monitor.  ILR interrogation: No atrial fibrillation no bradycardia or pauses.  He denies c/o SOB, palpitations, lightheadedness, dizziness, near syncope or syncope.    Historical:  Previous cardiac catheterization had revealed nonobstructive coronary arteries. Echocardiogram from 4/25/2022 showed normal left ventricular systolic function He is status post catheter ablation for atrial fibrillation.  Interrogation of implantable loop monitor did not show any A. fib since the procedure. The patient has a prior history of mantle cell lymphoma.  He has got a previous cycles of chemotherapy.  The patient has had prior chest discomfort and previously underwent cardiac catheterization which showed nonobstructive coronaries.  He has had previous atrial flutter and underwent catheter ablation for atrial flutter.  He subsequently had atrial fibrillation which is being treated with medical therapy.  We had discussed catheter ablation procedure for atrial fibrillation and had reviewed with his hematologist oncologist the possibility of using anticoagulants especially post procedure.  He was clear to be treated with a NOAC/anticoagulant.   [id number] score: 0 - No history of diabetes, hypertension, TIA, CVA, vascular disease  Echocardiogram from 4/1/2019 showed left atrial diameter 3.7 cm left atrial volume index 36 cc/m, LVEF 40 to 45%.

## 2023-12-17 NOTE — REVIEW OF SYSTEMS
[Feeling Fatigued] : not feeling fatigued [SOB] : no shortness of breath [Blurry Vision] : no blurred vision [Dyspnea on exertion] : not dyspnea during exertion [Chest Discomfort] : no chest discomfort [Palpitations] : no palpitations [Abdominal Pain] : no abdominal pain [Rash] : no rash [Dizziness] : no dizziness [Easy Bleeding] : no tendency for easy bleeding [Negative] : Respiratory

## 2023-12-18 LAB
25(OH)D3 SERPL-MCNC: 33.2 NG/ML
ALBUMIN SERPL ELPH-MCNC: 5 G/DL
ALP BLD-CCNC: 90 U/L
ALT SERPL-CCNC: 16 U/L
ANION GAP SERPL CALC-SCNC: 11 MMOL/L
APO A-I SERPL-MCNC: 136 MG/DL
APO A-I/APO B SERPL: 0.7 RATIO
APO B SERPL-MCNC: 96 MG/DL
AST SERPL-CCNC: 21 U/L
BILIRUB SERPL-MCNC: 0.7 MG/DL
BUN SERPL-MCNC: 23 MG/DL
CALCIUM SERPL-MCNC: 9.3 MG/DL
CHLORIDE SERPL-SCNC: 101 MMOL/L
CHOLEST SERPL-MCNC: 193 MG/DL
CO2 SERPL-SCNC: 29 MMOL/L
CREAT SERPL-MCNC: 1.04 MG/DL
CRP SERPL-MCNC: <3 MG/L
EGFR: 83 ML/MIN/1.73M2
ESTIMATED AVERAGE GLUCOSE: 120 MG/DL
ESTRADIOL SERPL-MCNC: 21 PG/ML
GLUCOSE SERPL-MCNC: 100 MG/DL
HBA1C MFR BLD HPLC: 5.8 %
HCT VFR BLD CALC: 44.2 %
HDLC SERPL-MCNC: 47 MG/DL
HGB BLD-MCNC: 14.2 G/DL
LDLC SERPL CALC-MCNC: 129 MG/DL
LH SERPL-ACNC: 15.4 IU/L
MCHC RBC-ENTMCNC: 31 PG
MCHC RBC-ENTMCNC: 32.1 GM/DL
MCV RBC AUTO: 96.5 FL
NONHDLC SERPL-MCNC: 145 MG/DL
PLATELET # BLD AUTO: 235 K/UL
POTASSIUM SERPL-SCNC: 4.8 MMOL/L
PROLACTIN SERPL-MCNC: 15 NG/ML
PROT SERPL-MCNC: 7.5 G/DL
PSA SERPL-MCNC: 0.93 NG/ML
RBC # BLD: 4.58 M/UL
RBC # FLD: 14.6 %
SODIUM SERPL-SCNC: 141 MMOL/L
TESTOST FREE SERPL-MCNC: 9.7 PG/ML
TESTOST SERPL-MCNC: 399 NG/DL
TRIGL SERPL-MCNC: 91 MG/DL
TSH SERPL-ACNC: 1.93 UIU/ML
WBC # FLD AUTO: 5.12 K/UL

## 2023-12-27 ENCOUNTER — APPOINTMENT (OUTPATIENT)
Dept: CARDIOLOGY | Facility: CLINIC | Age: 58
End: 2023-12-27
Payer: MEDICARE

## 2023-12-27 VITALS
HEART RATE: 71 BPM | BODY MASS INDEX: 31.22 KG/M2 | HEIGHT: 71 IN | DIASTOLIC BLOOD PRESSURE: 78 MMHG | OXYGEN SATURATION: 99 % | SYSTOLIC BLOOD PRESSURE: 124 MMHG | WEIGHT: 223 LBS

## 2023-12-27 DIAGNOSIS — Z86.79 OTHER SPECIFIED POSTPROCEDURAL STATES: ICD-10-CM

## 2023-12-27 DIAGNOSIS — R07.89 OTHER CHEST PAIN: ICD-10-CM

## 2023-12-27 DIAGNOSIS — I10 ESSENTIAL (PRIMARY) HYPERTENSION: ICD-10-CM

## 2023-12-27 DIAGNOSIS — Z98.890 OTHER SPECIFIED POSTPROCEDURAL STATES: ICD-10-CM

## 2023-12-27 PROCEDURE — 99213 OFFICE O/P EST LOW 20 MIN: CPT

## 2024-01-05 ENCOUNTER — APPOINTMENT (OUTPATIENT)
Dept: CARDIOLOGY | Facility: CLINIC | Age: 59
End: 2024-01-05
Payer: MEDICARE

## 2024-01-05 ENCOUNTER — NON-APPOINTMENT (OUTPATIENT)
Age: 59
End: 2024-01-05

## 2024-01-05 ENCOUNTER — APPOINTMENT (OUTPATIENT)
Dept: UROLOGY | Facility: CLINIC | Age: 59
End: 2024-01-05
Payer: MEDICARE

## 2024-01-05 VITALS
DIASTOLIC BLOOD PRESSURE: 84 MMHG | BODY MASS INDEX: 30.94 KG/M2 | SYSTOLIC BLOOD PRESSURE: 123 MMHG | HEART RATE: 74 BPM | WEIGHT: 221 LBS | RESPIRATION RATE: 17 BRPM | HEIGHT: 71 IN

## 2024-01-05 PROCEDURE — 99214 OFFICE O/P EST MOD 30 MIN: CPT

## 2024-01-06 PROCEDURE — 93298 REM INTERROG DEV EVAL SCRMS: CPT

## 2024-01-09 ENCOUNTER — APPOINTMENT (OUTPATIENT)
Dept: UROLOGY | Facility: CLINIC | Age: 59
End: 2024-01-09
Payer: MEDICARE

## 2024-01-09 PROCEDURE — 99214 OFFICE O/P EST MOD 30 MIN: CPT | Mod: 95

## 2024-01-09 PROCEDURE — G2211 COMPLEX E/M VISIT ADD ON: CPT

## 2024-01-11 NOTE — PHYSICAL EXAM
[General Appearance - Well Developed] : well developed [General Appearance - Well Nourished] : well nourished [Normal Appearance] : normal appearance [Well Groomed] : well groomed [General Appearance - In No Acute Distress] : no acute distress [] : no respiratory distress [Respiration, Rhythm And Depth] : normal respiratory rhythm and effort [Scrotum] : the scrotum was normal [Exaggerated Use Of Accessory Muscles For Inspiration] : no accessory muscle use [Testes Tenderness] : no tenderness of the testes [Testes Mass (___cm)] : there were no testicular masses [de-identified] : redundant prepuce, phimotic band but able to retract fully

## 2024-01-11 NOTE — HISTORY OF PRESENT ILLNESS
[FreeTextEntry1] : Patient is a 58 year old M with PMHx of lymphoma who presents for balanitis and phimosis.   He noticed reductant foreskin and noticed pruritus/cracking in the past few months, tried topical Clotrimazole-Betamethasone cream, pruritus/cracking resolved afterward but still unable to easily fully retract. He is able to retract the foreskin fully but has significant tightness, often needs to strain to urinate due to the tightness. and also notes discomfort/pain during erections/sexual intercourse.   No family history of  malignancy.  No gross hematuria, dysuria or fever/chills.

## 2024-01-11 NOTE — ASSESSMENT
[FreeTextEntry1] : Patient is a 59 yo M who presents for phimosis and redundant prepuce. He is interested in circumcision. I discussed with pt the circumcision procedure and its risks/benefits/alternatives.  I d/w pt that there will be a scar circumferentially around his shaft after foreskin removed, and there are cosmetic considerations to the scar and differences in shaft and foreskin color.  I also d/w pt that he may experience sexual changes/numbness after circumcision.  Also d/w pt typical postop recovery and need for abstinence after circumcision.

## 2024-01-18 ENCOUNTER — OUTPATIENT (OUTPATIENT)
Dept: OUTPATIENT SERVICES | Facility: HOSPITAL | Age: 59
LOS: 1 days | End: 2024-01-18
Payer: MEDICARE

## 2024-01-18 VITALS
WEIGHT: 220.02 LBS | RESPIRATION RATE: 16 BRPM | HEIGHT: 71 IN | OXYGEN SATURATION: 99 % | TEMPERATURE: 98 F | DIASTOLIC BLOOD PRESSURE: 83 MMHG | SYSTOLIC BLOOD PRESSURE: 129 MMHG | HEART RATE: 85 BPM

## 2024-01-18 DIAGNOSIS — Z94.84 STEM CELLS TRANSPLANT STATUS: Chronic | ICD-10-CM

## 2024-01-18 DIAGNOSIS — N47.1 PHIMOSIS: ICD-10-CM

## 2024-01-18 DIAGNOSIS — Z98.890 OTHER SPECIFIED POSTPROCEDURAL STATES: Chronic | ICD-10-CM

## 2024-01-18 DIAGNOSIS — Z95.818 PRESENCE OF OTHER CARDIAC IMPLANTS AND GRAFTS: Chronic | ICD-10-CM

## 2024-01-18 LAB
ANION GAP SERPL CALC-SCNC: 11 MMOL/L — SIGNIFICANT CHANGE UP (ref 5–17)
BUN SERPL-MCNC: 25 MG/DL — HIGH (ref 7–23)
CALCIUM SERPL-MCNC: 9.6 MG/DL — SIGNIFICANT CHANGE UP (ref 8.4–10.5)
CHLORIDE SERPL-SCNC: 102 MMOL/L — SIGNIFICANT CHANGE UP (ref 96–108)
CO2 SERPL-SCNC: 27 MMOL/L — SIGNIFICANT CHANGE UP (ref 22–31)
CREAT SERPL-MCNC: 1.24 MG/DL — SIGNIFICANT CHANGE UP (ref 0.5–1.3)
EGFR: 67 ML/MIN/1.73M2 — SIGNIFICANT CHANGE UP
GLUCOSE SERPL-MCNC: 99 MG/DL — SIGNIFICANT CHANGE UP (ref 70–99)
HCT VFR BLD CALC: 43 % — SIGNIFICANT CHANGE UP (ref 39–50)
HGB BLD-MCNC: 14.3 G/DL — SIGNIFICANT CHANGE UP (ref 13–17)
MCHC RBC-ENTMCNC: 31.3 PG — SIGNIFICANT CHANGE UP (ref 27–34)
MCHC RBC-ENTMCNC: 33.3 GM/DL — SIGNIFICANT CHANGE UP (ref 32–36)
MCV RBC AUTO: 94.1 FL — SIGNIFICANT CHANGE UP (ref 80–100)
NRBC # BLD: 0 /100 WBCS — SIGNIFICANT CHANGE UP (ref 0–0)
PLATELET # BLD AUTO: 218 K/UL — SIGNIFICANT CHANGE UP (ref 150–400)
POTASSIUM SERPL-MCNC: 4.6 MMOL/L — SIGNIFICANT CHANGE UP (ref 3.5–5.3)
POTASSIUM SERPL-SCNC: 4.6 MMOL/L — SIGNIFICANT CHANGE UP (ref 3.5–5.3)
RBC # BLD: 4.57 M/UL — SIGNIFICANT CHANGE UP (ref 4.2–5.8)
RBC # FLD: 14.1 % — SIGNIFICANT CHANGE UP (ref 10.3–14.5)
SODIUM SERPL-SCNC: 140 MMOL/L — SIGNIFICANT CHANGE UP (ref 135–145)
WBC # BLD: 6.09 K/UL — SIGNIFICANT CHANGE UP (ref 3.8–10.5)
WBC # FLD AUTO: 6.09 K/UL — SIGNIFICANT CHANGE UP (ref 3.8–10.5)

## 2024-01-18 PROCEDURE — G0463: CPT

## 2024-01-18 PROCEDURE — 36415 COLL VENOUS BLD VENIPUNCTURE: CPT

## 2024-01-18 PROCEDURE — 85027 COMPLETE CBC AUTOMATED: CPT

## 2024-01-18 PROCEDURE — 80048 BASIC METABOLIC PNL TOTAL CA: CPT

## 2024-01-18 RX ORDER — SODIUM CHLORIDE 9 MG/ML
1000 INJECTION, SOLUTION INTRAVENOUS
Refills: 0 | Status: DISCONTINUED | OUTPATIENT
Start: 2024-01-31 | End: 2024-02-14

## 2024-01-18 NOTE — H&P PST ADULT - HISTORY OF PRESENT ILLNESS
58 year old male with PMHx of Non-Hodgkins lymphoma (treated with chemo and autologous bone marrow transplant 2017, recurred in 2021 resumed oral chemo, no longer taking), GERD, sinus bradycardia, HTN, atrial flutter/atrial fibrillation (s/p ablation, 2017, 2022, no OAC as ILR reveals no further AF and CHADSVASC of 0), phimosis presents to PST for circumcision on 1/31/24. Patient denies fever, chills, SOB, chest pain.

## 2024-01-18 NOTE — H&P PST ADULT - OTHER CARE PROVIDERS
Dr. Madsen (Cardiology) - pending cardiac evaluation Dr. Banks ([facility]) Dr. Gary Presctot (Electrophysiology)

## 2024-01-18 NOTE — H&P PST ADULT - NSANTHAGERD_ENT_A_CORE
Florence Boswell is a 71 y.o. male    Chief Complaint   Patient presents with    Coronary Artery Disease    Hypertension       Chest pain patient states muscle pain and three bumps on his chest.    SOB No    Dizziness No    Swelling No    Refills atorvastatin    Visit Vitals  /80 (BP 1 Location: Left arm, BP Patient Position: Sitting)   Pulse 68   Ht 6' (1.829 m)   Wt 186 lb (84.4 kg)   SpO2 95%   BMI 25.23 kg/m²       1. Have you been to the ER, urgent care clinic since your last visit? Hospitalized since your last visit? No    2. Have you seen or consulted any other health care providers outside of the [address] [place] [address] since your last visit? Include any pap smears or colon screening.   No Yes

## 2024-01-18 NOTE — H&P PST ADULT - NSICDXPASTMEDICALHX_GEN_ALL_CORE_FT
PAST MEDICAL HISTORY:  Atrial flutter     GERD (gastroesophageal reflux disease)     HTN (hypertension)     Migraine     Non Hodgkin's lymphoma     Paroxysmal atrial fibrillation     Phimosis     Sinus bradycardia

## 2024-01-18 NOTE — H&P PST ADULT - PROBLEM SELECTOR PLAN 1
Planned for circumcision on 1/31/24. Surgical instructions reviewed and provided to patient. CBC and BMP obtained at [facility].    Pending cardiac and medical evaluation.

## 2024-01-22 ENCOUNTER — APPOINTMENT (OUTPATIENT)
Dept: CARDIOLOGY | Facility: CLINIC | Age: 59
End: 2024-01-22

## 2024-01-24 NOTE — ASSESSMENT
[FreeTextEntry1] : EKG performed today at the office revealed a NSR 69 bpm, with normal AQRS, WI, QRS and QTc.

## 2024-01-24 NOTE — HISTORY OF PRESENT ILLNESS
[FreeTextEntry1] : 57 yo man, , father of two children, [place] speaking. 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 No radiotherapy. Still on medical treatment with Rituxan, next treatment in 1/2020. Was on remission for 4 years. Has recurrence of the disease. Started chemo oral again. Will undergo CART cell transplant on 4/14/2021 at [place].  A. Fibrillation/Flutter that was treated with ablation (Flutter ablation) (2017 and 20221) and now stable on Cardizem and Toprol. Had a ILR implanted in 3/2018, interrogation has shown that he still has AF episodes. His CHADS-VASC is 0 and therefore he is not treated with Xarelto any longer. Treated only with aspirin low dose and CCB. Has been under the care of Dr Prescott and had an ILR implanted that revealed several episodes of A. Fib with a 41% burden. Last ablation in Oct 2021) with Dr. Prescott. No A. Fib since then. Not on OAC. Was seen last in 11/2023.   In April 2018 underwent a stress test that revealed small anterior wall reversible ischemia.  Cardiac catheterization in May 2018 that revealed normal coronaries. Last echo was done 3/27/2018 that revealed normal EF (59%).  On 3//18/2021 he had an echo that revealed a reduction in his global EF to 40-45%, mildly enlarged LA. No change.  On 3/18/2011 revealed normal LV function 57%. No ischemia. .  Today at the clinic for routine follow up. C/O general fatigue generalized muscle pains and chest burning. Occasional bilateral arm pain. Not effort related. Occasional effort-related SOB, no orthopnea or PND. No change since his last visit in 2019.  C/O erectile dysfunction. Also has phimosis and has seen an urologist for possible circumcision.  Received the COVID 19 vaccine. Doesn't smoke and does not drink alcohol. Denies the use of illicit drugs.

## 2024-01-24 NOTE — DISCUSSION/SUMMARY
[FreeTextEntry1] : 57 yo man post  A. Flutter ablation under the care of Dr Prescott. Underwent A. Fib ablation with good resolution. No events since then. Has been C/O atypical chest pain that radiates to both arms, not effort related. Had a cardiac cath in 4/2018 that revealed normal coronaries and a stress test in 4/2023.   Will request an echocardiogram, since the last one was done in 2022 to assess LV and valvular function.  C/O erectile dysfunction and phimosis and has seen an urologist and will undergo circumcision. This is a low risk patient undergoing a low risk procedure (cardiac risk <1%). Therefore, there is no need for further testing prior to the procedure. Reviewed all his recent tests.  Follow up in 6 months.

## 2024-01-26 ENCOUNTER — OUTPATIENT (OUTPATIENT)
Dept: OUTPATIENT SERVICES | Facility: HOSPITAL | Age: 59
LOS: 1 days | Discharge: HOME | End: 2024-01-26

## 2024-01-26 DIAGNOSIS — Z98.890 OTHER SPECIFIED POSTPROCEDURAL STATES: Chronic | ICD-10-CM

## 2024-01-26 DIAGNOSIS — Z94.84 STEM CELLS TRANSPLANT STATUS: Chronic | ICD-10-CM

## 2024-01-26 DIAGNOSIS — Z95.818 PRESENCE OF OTHER CARDIAC IMPLANTS AND GRAFTS: Chronic | ICD-10-CM

## 2024-01-26 DIAGNOSIS — C83.10 MANTLE CELL LYMPHOMA, UNSPECIFIED SITE: ICD-10-CM

## 2024-01-26 PROBLEM — I10 ESSENTIAL (PRIMARY) HYPERTENSION: Chronic | Status: ACTIVE | Noted: 2024-01-18

## 2024-01-26 PROBLEM — N47.1 PHIMOSIS: Chronic | Status: ACTIVE | Noted: 2024-01-18

## 2024-01-30 ENCOUNTER — TRANSCRIPTION ENCOUNTER (OUTPATIENT)
Age: 59
End: 2024-01-30

## 2024-01-31 ENCOUNTER — RESULT REVIEW (OUTPATIENT)
Age: 59
End: 2024-01-31

## 2024-01-31 ENCOUNTER — TRANSCRIPTION ENCOUNTER (OUTPATIENT)
Age: 59
End: 2024-01-31

## 2024-01-31 ENCOUNTER — OUTPATIENT (OUTPATIENT)
Dept: OUTPATIENT SERVICES | Facility: HOSPITAL | Age: 59
LOS: 1 days | End: 2024-01-31
Payer: MEDICARE

## 2024-01-31 ENCOUNTER — APPOINTMENT (OUTPATIENT)
Dept: UROLOGY | Facility: HOSPITAL | Age: 59
End: 2024-01-31

## 2024-01-31 VITALS
HEART RATE: 60 BPM | OXYGEN SATURATION: 99 % | RESPIRATION RATE: 17 BRPM | DIASTOLIC BLOOD PRESSURE: 79 MMHG | SYSTOLIC BLOOD PRESSURE: 136 MMHG | TEMPERATURE: 97 F

## 2024-01-31 VITALS
DIASTOLIC BLOOD PRESSURE: 77 MMHG | RESPIRATION RATE: 16 BRPM | WEIGHT: 220.02 LBS | HEIGHT: 71 IN | TEMPERATURE: 98 F | OXYGEN SATURATION: 99 % | HEART RATE: 67 BPM | SYSTOLIC BLOOD PRESSURE: 114 MMHG

## 2024-01-31 DIAGNOSIS — Z94.84 STEM CELLS TRANSPLANT STATUS: Chronic | ICD-10-CM

## 2024-01-31 DIAGNOSIS — Z98.890 OTHER SPECIFIED POSTPROCEDURAL STATES: Chronic | ICD-10-CM

## 2024-01-31 DIAGNOSIS — N47.1 PHIMOSIS: ICD-10-CM

## 2024-01-31 DIAGNOSIS — Z95.818 PRESENCE OF OTHER CARDIAC IMPLANTS AND GRAFTS: Chronic | ICD-10-CM

## 2024-01-31 PROCEDURE — 54161 CIRCUM 28 DAYS OR OLDER: CPT

## 2024-01-31 PROCEDURE — 88304 TISSUE EXAM BY PATHOLOGIST: CPT | Mod: 26

## 2024-01-31 PROCEDURE — 88304 TISSUE EXAM BY PATHOLOGIST: CPT

## 2024-01-31 RX ORDER — CEPHALEXIN 500 MG
1 CAPSULE ORAL
Qty: 9 | Refills: 0
Start: 2024-01-31 | End: 2024-02-02

## 2024-01-31 RX ORDER — LIDOCAINE HCL 20 MG/ML
0.2 VIAL (ML) INJECTION ONCE
Refills: 0 | Status: COMPLETED | OUTPATIENT
Start: 2024-01-31 | End: 2024-01-31

## 2024-01-31 RX ORDER — IBUPROFEN 200 MG
1 TABLET ORAL
Qty: 12 | Refills: 0
Start: 2024-01-31 | End: 2024-02-02

## 2024-01-31 RX ORDER — HYDROMORPHONE HYDROCHLORIDE 2 MG/ML
0.25 INJECTION INTRAMUSCULAR; INTRAVENOUS; SUBCUTANEOUS
Refills: 0 | Status: DISCONTINUED | OUTPATIENT
Start: 2024-01-31 | End: 2024-01-31

## 2024-01-31 RX ADMIN — SODIUM CHLORIDE 100 MILLILITER(S): 9 INJECTION, SOLUTION INTRAVENOUS at 09:37

## 2024-01-31 NOTE — ASU PATIENT PROFILE, ADULT - FALL HARM RISK - UNIVERSAL INTERVENTIONS
Bed in lowest position, wheels locked, appropriate side rails in place/Call bell, personal items and telephone in reach/Instruct patient to call for assistance before getting out of bed or chair/Non-slip footwear when patient is out of bed/[place] to call system/Physically safe environment - no spills, clutter or unnecessary equipment/Purposeful Proactive Rounding/Room/bathroom lighting operational, light cord in reach

## 2024-01-31 NOTE — ASU PATIENT PROFILE, ADULT - LANGUAGE ASSISTANCE NEEDED
Yes-Patient/Caregiver accepts free interpretation services... admitting nurse speaks Maltese admitting nurse speaks Chinese, wife can translate to other staff as per patient

## 2024-01-31 NOTE — ASU DISCHARGE PLAN (ADULT/PEDIATRIC) - CARE PROVIDER_API CALL
Tono Jay  Urology  [address] [place] [id number]  Phone: [phone]  Fax: [phone]  Follow Up Time: 2 weeks

## 2024-01-31 NOTE — ASU PREOP CHECKLIST - BP NONINVASIVE DIASTOLIC (MM HG)
Reviewed superficial incision separation with patient. Questions answered and would care instructions discussed. Post op appointment is next week.    77

## 2024-01-31 NOTE — ASU DISCHARGE PLAN (ADULT/PEDIATRIC) - NURSING INSTRUCTIONS
Next dose of tylenol at/after_6pm__. Do not exceed 4000mg in a 24hour  period. Every 6hours as needed.   Next dose of motrin at/after 6pm

## 2024-02-05 ENCOUNTER — APPOINTMENT (OUTPATIENT)
Age: 59
End: 2024-02-05

## 2024-02-05 LAB — SURGICAL PATHOLOGY STUDY: SIGNIFICANT CHANGE UP

## 2024-02-06 ENCOUNTER — RESULT REVIEW (OUTPATIENT)
Age: 59
End: 2024-02-06

## 2024-02-06 ENCOUNTER — APPOINTMENT (OUTPATIENT)
Dept: HEMATOLOGY ONCOLOGY | Facility: CLINIC | Age: 59
End: 2024-02-06
Payer: MEDICARE

## 2024-02-06 ENCOUNTER — APPOINTMENT (OUTPATIENT)
Dept: HEMATOLOGY ONCOLOGY | Facility: CLINIC | Age: 59
End: 2024-02-06

## 2024-02-06 VITALS
HEART RATE: 66 BPM | WEIGHT: 220.68 LBS | OXYGEN SATURATION: 100 % | RESPIRATION RATE: 16 BRPM | TEMPERATURE: 97.9 F | DIASTOLIC BLOOD PRESSURE: 89 MMHG | BODY MASS INDEX: 30.78 KG/M2 | SYSTOLIC BLOOD PRESSURE: 133 MMHG

## 2024-02-06 DIAGNOSIS — C83.10 MANTLE CELL LYMPHOMA, UNSPECIFIED SITE: ICD-10-CM

## 2024-02-06 DIAGNOSIS — Z94.84 STEM CELLS TRANSPLANT STATUS: ICD-10-CM

## 2024-02-06 DIAGNOSIS — Z92.850 PERSONAL HISTORY OF CHIMERIC ANTIGEN RECEPTOR T-CELL THERAPY: ICD-10-CM

## 2024-02-06 DIAGNOSIS — I48.0 PAROXYSMAL ATRIAL FIBRILLATION: ICD-10-CM

## 2024-02-06 DIAGNOSIS — I48.91 UNSPECIFIED ATRIAL FIBRILLATION: ICD-10-CM

## 2024-02-06 LAB
ALBUMIN SERPL ELPH-MCNC: 4.6 G/DL
ALP BLD-CCNC: 101 U/L
ALT SERPL-CCNC: 19 U/L
ANION GAP SERPL CALC-SCNC: 11 MMOL/L
AST SERPL-CCNC: 16 U/L
BASOPHILS # BLD AUTO: 0.02 K/UL — SIGNIFICANT CHANGE UP (ref 0–0.2)
BASOPHILS NFR BLD AUTO: 0.4 % — SIGNIFICANT CHANGE UP (ref 0–2)
BILIRUB SERPL-MCNC: 0.6 MG/DL
BUN SERPL-MCNC: 22 MG/DL
CALCIUM SERPL-MCNC: 9.5 MG/DL
CHLORIDE SERPL-SCNC: 101 MMOL/L
CO2 SERPL-SCNC: 28 MMOL/L
CREAT SERPL-MCNC: 0.99 MG/DL
EGFR: 88 ML/MIN/1.73M2
EOSINOPHIL # BLD AUTO: 0.17 K/UL — SIGNIFICANT CHANGE UP (ref 0–0.5)
EOSINOPHIL NFR BLD AUTO: 3.4 % — SIGNIFICANT CHANGE UP (ref 0–6)
FIBRINOGEN PPP-MCNC: 288 MG/DL
GLUCOSE SERPL-MCNC: 109 MG/DL
HCT VFR BLD CALC: 43.7 % — SIGNIFICANT CHANGE UP (ref 39–50)
HGB BLD-MCNC: 14.2 G/DL — SIGNIFICANT CHANGE UP (ref 13–17)
IMM GRANULOCYTES NFR BLD AUTO: 0.6 % — SIGNIFICANT CHANGE UP (ref 0–0.9)
LDH SERPL-CCNC: 159 U/L
LYMPHOCYTES # BLD AUTO: 1.61 K/UL — SIGNIFICANT CHANGE UP (ref 1–3.3)
LYMPHOCYTES # BLD AUTO: 32 % — SIGNIFICANT CHANGE UP (ref 13–44)
MAGNESIUM SERPL-MCNC: 2.3 MG/DL
MCHC RBC-ENTMCNC: 31.4 PG — SIGNIFICANT CHANGE UP (ref 27–34)
MCHC RBC-ENTMCNC: 32.5 G/DL — SIGNIFICANT CHANGE UP (ref 32–36)
MCV RBC AUTO: 96.7 FL — SIGNIFICANT CHANGE UP (ref 80–100)
MONOCYTES # BLD AUTO: 0.53 K/UL — SIGNIFICANT CHANGE UP (ref 0–0.9)
MONOCYTES NFR BLD AUTO: 10.5 % — SIGNIFICANT CHANGE UP (ref 2–14)
NEUTROPHILS # BLD AUTO: 2.67 K/UL — SIGNIFICANT CHANGE UP (ref 1.8–7.4)
NEUTROPHILS NFR BLD AUTO: 53.1 % — SIGNIFICANT CHANGE UP (ref 43–77)
NRBC # BLD: 0 /100 WBCS — SIGNIFICANT CHANGE UP (ref 0–0)
PLATELET # BLD AUTO: 229 K/UL — SIGNIFICANT CHANGE UP (ref 150–400)
POTASSIUM SERPL-SCNC: 4.8 MMOL/L
PROT SERPL-MCNC: 6.9 G/DL
RBC # BLD: 4.52 M/UL — SIGNIFICANT CHANGE UP (ref 4.2–5.8)
RBC # FLD: 14.2 % — SIGNIFICANT CHANGE UP (ref 10.3–14.5)
SODIUM SERPL-SCNC: 140 MMOL/L
WBC # BLD: 5.03 K/UL — SIGNIFICANT CHANGE UP (ref 3.8–10.5)
WBC # FLD AUTO: 5.03 K/UL — SIGNIFICANT CHANGE UP (ref 3.8–10.5)

## 2024-02-06 PROCEDURE — 99214 OFFICE O/P EST MOD 30 MIN: CPT

## 2024-02-06 RX ORDER — MULTIVITAMIN
TABLET ORAL DAILY
Qty: 30 | Refills: 3 | Status: ACTIVE | COMMUNITY
Start: 2022-05-06 | End: 1900-01-01

## 2024-02-07 LAB
DEPRECATED KAPPA LC FREE/LAMBDA SER: 1.97 RATIO
IGA SER QL IEP: 45 MG/DL
IGG SER QL IEP: 1144 MG/DL
IGM SER QL IEP: 53 MG/DL
KAPPA LC CSF-MCNC: 0.89 MG/DL
KAPPA LC SERPL-MCNC: 1.75 MG/DL

## 2024-02-09 ENCOUNTER — APPOINTMENT (OUTPATIENT)
Dept: CARDIOLOGY | Facility: CLINIC | Age: 59
End: 2024-02-09

## 2024-02-09 NOTE — H&P ADULT. - POSTERIOR CERVICAL R
Dr. Magana sent me an email stating he spoke with Brigette and she needs an ultrasound bx of a thyroid nodule.  He asked that I put the order in.    Order entered.    I called and provided IR scheduling number:  [phone].    She was appreciative of the call and information.  
normal

## 2024-02-12 PROBLEM — I48.91 ATRIAL FIBRILLATION: Status: ACTIVE | Noted: 2017-05-08

## 2024-02-12 PROBLEM — Z94.84 HISTORY OF STEM CELL TRANSPLANT: Status: ACTIVE | Noted: 2017-05-04

## 2024-02-12 PROBLEM — Z92.850 STATUS POST CHIMERIC ANTIGEN RECEPTOR T-CELL THERAPY: Status: ACTIVE | Noted: 2022-05-16

## 2024-02-12 PROBLEM — I48.0 PAROXYSMAL ATRIAL FIBRILLATION: Status: ACTIVE | Noted: 2017-12-19

## 2024-02-12 NOTE — PHYSICAL EXAM
[Restricted in physically strenuous activity but ambulatory and able to carry out work of a light or sedentary nature] : Status 1- Restricted in physically strenuous activity but ambulatory and able to carry out work of a light or sedentary nature, e.g., light house work, office work [Normal] : affect appropriate [de-identified] : non tender palpable nodule of  posterior left knee  [de-identified] : healed scar from port removal

## 2024-02-12 NOTE — ASSESSMENT
[FreeTextEntry1] : 58 y/o gentleman with hx of mantle cell lymphoma initially dx'ed Oct 2016 s/p RCHOP chemotherapy X 4 cycles and RICE X 2 cycles with stem cell collected off of the 2nd RICE..he is now s/p CBV-conditioned autologous stem cell transplant on 4/5/17. Hospital course complicated by a-fib/a-flutter s/p ablation and now stable on Cardizem and Toprol. Also now on Xarelto. Port also removed. He demonstrated engraftment on 4/16 and d/c'ed in stable condition on 5/5. Rituxan maintenance complete (Short Rituxan, once a week for 2 weeks). Last Rituxan treatment was  on 1/22/20  (Short Rituxan, once a week for 2 weeks). CT scans completed 7/1/21: PENELOPE PET CT 10/28/21 Completed. Concern for relapse disease. Lymph node biopsy completed on 11/12/21.  Results reviewed..c/w MCL....  Discussed being placed on calquence vs Silas rituxan. Patient information printed and given to patient/spouse. Side effects explained including serious side effect of AFIB. Patient has a known cardiac history. Message left for pt's cardiologist.. Long term goal would be Car T....tecartus... This is why I would like to avoid Silas..Literature and consents provided for cart..discussed risks, benefits, alternatives, rationale and logistics..3 week hospital stay discussed...tlc placement...lymphodepleting prep, monitoring for CRS and neurotox, prolonged cytopenias and hypogamma also.. discussed  s/p calquence 100 mg capsule- take 1 capsule twice a day...worsening GI sx on protonix...instructed on how to administer  3/11/22: Will stop Calquence on 3/14/22 ;1 week prior to CAR-T collection...resume 3/23..stop 4/11 in prep for admit 4/14... 1) MCL 7/6/22: S/P CAR-T therapy with Tecartus on 4/19/22 7/18/22: Day +90 post Car T.  8/2/22: Day +105 post CAR-T 9/12/22: Day + 146 Post Car T 11/3/22: Day + 198 post Car T.  +180 scan on 11/3/22. non specific changes...PENELOPE 3/14/23...PENELOPE.. nonspecific changes....decrease in LN's  2) Heme Counts stable. No indication for transfusion today.  Continue MV and folic acid daily  Negative assessment  for CRS and neurotox.  F/U PET CT  partial response status at time of CAR-T;repeat at day 30, 100.  30 days post PET CT done on 5/25/22.Result as follows: -No evidence of FDG avid disease or active lymphoma. Interval resolution of minimal FDG activity associated with bilateral external iliac and bilateral inguinal lymph nodes which have either resolved or significantly decreased in size and not well delineated on CT. - Interval resolution of mild FDG avidity in the distal esophagus. - Unchanged nonspecific mildly heterogeneous small bowel and pancolonic FDG avidity. Please correlate clinically  Repeat PET CT at Day 100. Completed 8/3/22: PENELOPE Repeat PET CT  on 11/3/22. nonspecific findings possibly related to covid..will follow...PENELOPE and repeat end feb with continued decrease in LN's...recent ct's PENELOPE.....will repeat  in 6 months...ordered today  3) ID Continue prophylaxis with acyclovir..stop mepron....consider shingrix borderline immunoglobulin levels  4) GI Tums prn for acid reflux-RESOLVED Pantoprazole 40 mg daily  5) Afib Continue medications as prescribed by Cardiologist Has an appointment with cardiologist in may...for stress test Continue to f/u with cardiologist as per recommendations  6) Other Oxycodone for leg pain prn Headaches- tylenol prn Dizziness-Advised to make slow position changes-RESOLVED Left Posterior Knee nodule- Continue to monitor  Continue medications as prescribed Maintain f/u with cardiology and GI  Maintain f/u with his regular internist - Recommend pt to receive colonoscopy or Cologuard  Well care and cancer screening stressed Patient has been advised to contact clinic if he has any concerns or signs of CRS / neurotoxicity  Completed Post transplant vaccines: 12 month vaccines completed in April 2018.  14 month vaccines completed July 2018.  24 month re-vaccinations completed in April 2019.   will not need to repeat post CAR-T...will check titers Received [employer] Vaccine's on 8/17/22 and 9/7/22.  7) Plan Follow up with Dr. Banks in 6 months.  Repat CT/PET scan every 6 months ...due now Wallet card and FACT sheet provided again prior to d/c Quests addressed..restrictions discussed

## 2024-02-12 NOTE — HISTORY OF PRESENT ILLNESS
[de-identified] : Mr. Chavez is a 53 year old male with a history of MCL, initially diagnosed 10/2016. On 11/15/2016 he was seen by Dr. Bravo at [facility], after noticing a left inguinal mass for which he sought medical attention. CT scans (3/21/2016) revealed left-sided pelvic and left inguinal adenopathy. A 0.7 cm indeterminate lesion in the right iliac bone was also noted. A left inguinal lymph node biopsy was performed 9/24/2016 which revealed morphologic and immunophenotypic findings consistent with mantle cell lymphoma. He is status post 4 cycles of R-CHOP, and two cycles of RICE consolidation. A bone marrow biopsy on 2/10/17 showed normocellular bone marrow with trilineage hematopoiesis and maturation. He was admitted 3/27/17 for an autologous peripheral blood stem cell transplant with CBV  regimen. \par   \par  Upon admission, a TLC was placed in IR. Mr. Chavez received IV hydration, nutritional support, pain management, antinausea medication, antidiarrheals, antibiotic, antiviral, PCP, antifungal and GI prophylaxis. Labs were monitored daily. Mr. Chavez received transfusional support and electrolyte repletion as needed. When his ANC dropped below 1000, he was started on prophylactic ciprofloxacin. When he became febrile, his antibiotic coverage was broadened as appropriate. \par   \par  On 4/5/17, Mr. Chavez received 314ml of thawed, pooled, washed, mobilized, plasma reduced, HPC apheresis over approximately 1 hour. He tolerated the infusion well with no adverse side effects noted. Engraftment was noted on 4/16/17, and the Zarxio and antibiotics were discontinued. \par   \par  Mr. Chavez's transplant course was complicated by recurrent headaches, oral and GI mucositis, dizziness, and chest pain resulting in a RRT on 4/10/17. The chest pain was self limiting. A second RRT was called on 4/16/17, for palpitations and dizziness. Mr. Chavez was found to be in atrial flutter with RVR. He was transferred to telemetry and followed by cardiology. \par  A-fib/a-flutter was resistant to single rate agent, and while in arrhythmia, pt would become symptomatic, hypotensive. Ablation was was preformed, however was unsuccessful. Pt continued to have frequent changes in rhythm. As a-fib was usually preceded by PAC, and mediport tip was in r atrium, concern was this may have been involved. Mediport was removed but patient continued to have runs of non conducted APCs. Patient was followed by EP during this admission who ruled out any need for PPM insertion. Patient was started on Cardizem and Toprol which he tolerated well. He did have episodes of bradycardia during his sleep with episodes of Atrial flutter with RVR as well. However, he remained asymptomatic. \par  Pt was d/c 5/5/2017 to home in stable condition with instructions to follow up with hematology and EP. [de-identified] : Presenting today for a follow up appointment. He has completed  maintenance Rituxan. Last Rituxan was received in jan... PENELOPE....Patient reports continued fatigue. Recent CT/PET scan PENELOPE.  Patient also offers that he had the shingles and was treated with valtrex..some residual discomfort...He continues to be followed by cardiology; his cardiologist increased the Diltiazem dosage to 180mg q daily, however patient reported feeling increasingly fatigued and returned to 120mg q daily and now notes less fatigue. He is in eval for a cardiac procedure.. His cardiologist was waiting for the rituxan to be completed and covid to pass..He is compliant with all medications. Denies mouth sores, eye dryness, nausea, vomiting, diarrhea. No LE edema, CP or SOB.   On 10/18/21, patient presents for a follow up visit. Overall patient is well. States has generalized pruritus and recently was seen by dermatologist. Has been applying triamcinolone cream with no relief. States recently felt a "lump" of the right groin. Given the patients past medical history patient is very concerned. Denies fever, chills, nausea, vomiting, diarrhea, rash, mouth sores, dysuria or any signs of active bleeding. Denies SOB, chest pain or B/L LE edema. Remains compliant with medications prescribed.   On 11/30//21, patient presents with spouse for a follow up visit. Overall patient feels fatigued. Had a lymph node biopsy on 11/12/21 and results are c/w relapsed MCL.. Denies fever, chills, nausea, vomiting, diarrhea, rash, mouth sores, dysuria or any signs of active bleeding. Denies SOB, chest pain or B/L LE edema. Remains compliant with medications prescribed. Here to discuss further therapy..  On 1/25/22, patient presents for a follow up visit.  On calquence and not  tolerating well. Was in ER with severe epigastric pain...now on protonix...Complains of acid reflux and takes tums prn. Complains of headaches every other day. Denies fever, chills, nausea, vomiting, diarrhea, rash, mouth sores, dysuria or any signs of active bleeding. Denies SOB, chest pain or B/L LE edema. in wheelchair today  2/18/22: Patient is here for a follow up visit.He states that he still has epigastric pain with Calquence.He takes Pantoprazole as prescribed.Denies fever,chills, nausea, vomiting, diarrhea,rash, mouth sores,SOB,chest pain or any signs of active bleeding.He has an appointment with PCP tomorrow to get a referral for an endoscopy by GI.  5/10//22:Patient is seen for a follow up visit s/p  CAR-T treatment with [employer]....He is accompanied by his wife...He describes fatigue and weakness...using oxycodone for pain in legs...likely neuropathic.. Denies fever, chills, nausea, vomiting, diarrhea,mouth sores, skin rash,SOB,chest pain or any signs of active bleeding. He states that he had occasional epigastric pain from Calquence which  is improved off drug....He is off calquence and blood thinners at this time...Hospital course was complicated by CRS and neurotox...see d/c summary...  5/18/22: Patient is here for a follow up visit post CAR-T treatment with [employer].Today is Day +28.He is here accompanied by his wife.He states that he still feels fatigue and weakness.He continues to use Oxycodone for pain in legs and occasional headache.Denies fever, chills, blurring of vision,SOB, chest pain, mouth sores,nausea, vomiting, diarrhea,skin rash, dysuria or any signs of active bleeding. He states that few days ago when he stood up from sitting position,he felt dizziness which resolved on its own after few minutes.  5/24/22:Today is Day +35 post CAR-T therapy with [employer].He is accompanied by his wife.He states that he continues to have pricking pain on B/L feet and that he continues to take Gabapentin as prescribed. Denies fever, chills, SOB,chest pain, rash, mouth sores,nausea, vomiting, diarrhea,dysuria or any signs of active bleeding.He had a f/u appointment with cardiology yesterday and there are no changes or adjustments to any of the of the medications. He states that he did not have any episodes of dizziness since last week.  6/7//22: Patient is here for a follow up visit post CAR-T therapy with [employer]. .Overall feels better than last week.Denies fever, chills, nausea, vomiting, diarrhea, mouth sores, rash, dysuria,SOB,chest pain or any signs of active bleeding.  6/21/22: Today is Day + 63 post CAR-T therapy with [employer].Patient is seen for a follow up visit today.Overall feels well with no acute concerns.Denies fever, chills, SOB,chest pain, rash, mouth sores, nausea, vomiting, diarrhea,dysuria or any signs of active bleeding.  7/6/22: Patient is seen for a follow up visit  post CAR-T therapy.He is accompanied by his spouse. Today is Day + 77.He states that he feels better than past weeks.Denies fever, chills, nausea, vomiting, diarrhea, rash, mouth sores,SOB,chest pain or any signs of active bleeding.  On 7/18/22 visit, day + 90 post Car T. Overall patient is fatigued. Complains of a nodule of posterior left knee that is not painful. Denies fever, chills, nausea, diarrhea, rash, mouth sores, dysuria or any signs of active bleeding. Denies SOB, chest pain or B/L LE edema. Remains compliant with medications as prescribed.   8/16//22: Today is Day + 119 post CAR-T.Overall well with no acute concerns. Denies fever, chills, nausea, vomiting, diarrhea,blurred vision,rash, mouth sores,dysuria,edema or any signs of active bleeding.Remains compliant with medications.He states that he continues to have the painless nodule on posterior aspect of left knee.  On 9/12/22 visit, day + 146 post Car- T. Overall well with no acute concerns. Denies fever, chills, nausea, vomiting, diarrhea,blurred vision,rash, mouth sores,dysuria,edema or any signs of active bleeding. Denies SOB, chest pain or B/L LE edema. Remains compliant with medications.  On 12/7//22 visit, day + 228 post Car T. Recently recovered from COVID 19. Overall feels much better than he did a few weeks ago. Denies fever, chills, nausea, vomiting, diarrhea, rash, mouth sores, dysuria or any signs of active bleeding. Denies SOB, chest pain or B/L LE edema. Remains compliant with medications as prescribed. Discussed findings on recent scan..non specific..will follow  9/28//23 as above , looks well, no new issues aside from fatigue...followed by cardiology..for stress test..s/p car t 4/19/22 2/6/24:  Patient is here for a follow up visit. Denies fever, chills, nausea, vomiting, diarrhea, rash, mouth sores or any signs of active bleeding. Denies SOB, chest pain or B/L LE edema. Patient states he is feeling better, complains of neuropathy. Pt states his brother passed away 2 weeks ago from leukemia in [place].

## 2024-02-12 NOTE — ADDENDUM
[FreeTextEntry1] :  Documented by Radha Powell acting as a scribe for Dr. Gerardo Banks on 2/6/24.All medical record entries made by the Scribe were at my, Dr. Gerardo Banks, direction and personally dictated by me on 2/6/24. I have reviewed the chart and agree that the record accurately reflects my personal performance of the history, physical exam, assessment and plan. I have also personally directed, reviewed, and agree with the discharge instructions.

## 2024-02-12 NOTE — REVIEW OF SYSTEMS
[Fatigue] : fatigue [Negative] : Allergic/Immunologic [Fever] : no fever [Chills] : no chills [Night Sweats] : no night sweats [Recent Change In Weight] : ~T no recent weight change [Dysphagia] : no dysphagia [Loss of Hearing] : no loss of hearing [Nosebleeds] : no nosebleeds [Hoarseness] : no hoarseness [Odynophagia] : no odynophagia [Mucosal Pain] : no mucosal pain [Abdominal Pain] : no abdominal pain [Vomiting] : no vomiting [Constipation] : no constipation [Joint Pain] : no joint pain [Joint Stiffness] : no joint stiffness [Muscle Pain] : no muscle pain [Muscle Weakness] : no muscle weakness [Skin Rash] : no skin rash [Skin Wound] : no skin wound [Easy Bleeding] : no tendency for easy bleeding [Easy Bruising] : no tendency for easy bruising [FreeTextEntry2] : Occasional fatigue [FreeTextEntry7] : Acid reflux,Epigastric pain -RESOLVED [FreeTextEntry9] : painless nodule of  posterior left knee [de-identified] : Right groin lymphadenopathy RESOLVED

## 2024-02-16 ENCOUNTER — APPOINTMENT (OUTPATIENT)
Dept: UROLOGY | Facility: CLINIC | Age: 59
End: 2024-02-16
Payer: MEDICARE

## 2024-02-16 VITALS — DIASTOLIC BLOOD PRESSURE: 77 MMHG | HEART RATE: 69 BPM | SYSTOLIC BLOOD PRESSURE: 114 MMHG

## 2024-02-16 PROCEDURE — 99213 OFFICE O/P EST LOW 20 MIN: CPT

## 2024-02-16 NOTE — PHYSICAL EXAM
[General Appearance - Well Developed] : well developed [General Appearance - Well Nourished] : well nourished [Normal Appearance] : normal appearance [Well Groomed] : well groomed [General Appearance - In No Acute Distress] : no acute distress [de-identified] : s/p circ - well healing suture /incision line.  No infection.  Chaperone for exam was offered but declined by pt

## 2024-02-16 NOTE — HISTORY OF PRESENT ILLNESS
[FreeTextEntry1] : Patient is a 58 year old M with PMHx of lymphoma who presents for balanitis and phimosis.   HPI: He noticed reductant foreskin and noticed pruritus/cracking in the past few months, tried topical Clotrimazole-Betamethasone cream, pruritus/cracking resolved afterward but still unable to easily fully retract. He is able to retract the foreskin fully but has significant tightness, often needs to strain to urinate due to the tightness. and also notes discomfort/pain during erections/sexual intercourse.   Interval hx: S/p circ 1/31/24 Overall healing well, but still having sharp pain intermittently.  This is improved from immediately postop.  no fever/chills. Oral Minoxidil Pregnancy And Lactation Text: This medication should only be used when clearly needed if you are pregnant, attempting to become pregnant or breast feeding.

## 2024-03-11 ENCOUNTER — APPOINTMENT (OUTPATIENT)
Dept: NUCLEAR MEDICINE | Facility: CLINIC | Age: 59
End: 2024-03-11
Payer: MEDICARE

## 2024-03-11 ENCOUNTER — OUTPATIENT (OUTPATIENT)
Dept: OUTPATIENT SERVICES | Facility: HOSPITAL | Age: 59
LOS: 1 days | End: 2024-03-11
Payer: MEDICARE

## 2024-03-11 DIAGNOSIS — Z98.890 OTHER SPECIFIED POSTPROCEDURAL STATES: Chronic | ICD-10-CM

## 2024-03-11 DIAGNOSIS — Z94.84 STEM CELLS TRANSPLANT STATUS: Chronic | ICD-10-CM

## 2024-03-11 DIAGNOSIS — Z95.818 PRESENCE OF OTHER CARDIAC IMPLANTS AND GRAFTS: Chronic | ICD-10-CM

## 2024-03-11 DIAGNOSIS — C83.10 MANTLE CELL LYMPHOMA, UNSPECIFIED SITE: ICD-10-CM

## 2024-03-11 PROCEDURE — A9552: CPT

## 2024-03-11 PROCEDURE — 78815 PET IMAGE W/CT SKULL-THIGH: CPT | Mod: 26,PS

## 2024-03-11 PROCEDURE — 78815 PET IMAGE W/CT SKULL-THIGH: CPT

## 2024-03-12 ENCOUNTER — APPOINTMENT (OUTPATIENT)
Dept: CARDIOLOGY | Facility: CLINIC | Age: 59
End: 2024-03-12

## 2024-04-02 ENCOUNTER — APPOINTMENT (OUTPATIENT)
Dept: UROLOGY | Facility: CLINIC | Age: 59
End: 2024-04-02
Payer: MEDICARE

## 2024-04-02 VITALS — DIASTOLIC BLOOD PRESSURE: 82 MMHG | HEART RATE: 67 BPM | SYSTOLIC BLOOD PRESSURE: 127 MMHG

## 2024-04-02 DIAGNOSIS — N52.9 MALE ERECTILE DYSFUNCTION, UNSPECIFIED: ICD-10-CM

## 2024-04-02 DIAGNOSIS — N47.1 PHIMOSIS: ICD-10-CM

## 2024-04-02 PROCEDURE — 99213 OFFICE O/P EST LOW 20 MIN: CPT

## 2024-04-02 RX ORDER — SILDENAFIL 20 MG/1
20 TABLET ORAL
Qty: 30 | Refills: 1 | Status: ACTIVE | COMMUNITY
Start: 2023-12-21 | End: 1900-01-01

## 2024-04-02 RX ORDER — ACYCLOVIR 400 MG/1
400 TABLET ORAL
Qty: 90 | Refills: 3 | Status: ACTIVE | COMMUNITY
Start: 2022-05-06 | End: 1900-01-01

## 2024-04-02 NOTE — HISTORY OF PRESENT ILLNESS
[FreeTextEntry1] : Patient is a 58 year old M with PMHx of lymphoma who presents for balanitis and phimosis.   HPI: He noticed reductant foreskin and noticed pruritus/cracking in the past few months, tried topical Clotrimazole-Betamethasone cream, pruritus/cracking resolved afterward but still unable to easily fully retract. He is able to retract the foreskin fully but has significant tightness, often needs to strain to urinate due to the tightness. and also notes discomfort/pain during erections/sexual intercourse.   Interval hx: S/p circ 1/31/24 Here for f/u.  Reports no further pain and has healed completely.  He has resumed sexual activity but needs refill of viagra.  Was using 1-2 pills of generic 20 mg sildenafil which works well.  Prior labs from Dr Moreno reviewed - PSA, T level wnl.

## 2024-04-02 NOTE — ASSESSMENT
[FreeTextEntry1] : Patient is a 57 yo M who presents for ED and phimosis s/p circumcision.  Doing well post circ Viagra renewed F/u 6-12 mos

## 2024-04-02 NOTE — PHYSICAL EXAM
[Normal Appearance] : normal appearance [General Appearance - Well Developed] : well developed [General Appearance - In No Acute Distress] : no acute distress [de-identified] : Chaperone for exam was offered but declined by pt.  Well healed circ line/phallus

## 2024-04-17 NOTE — DISCUSSION/SUMMARY
Patient's Me. Power of  was updated in the Advance Care Planning window.      Rev. Jaimee Ackerman MDiv. [facility]    [place] [address] [address] [address] Ext.  To communicate with the , please, call the switchboard.        
[FreeTextEntry1] : \par \par ILR was interrogated and there were no episodes of atrial fibrillation.  Recommendations continue current medications and to pursue treatment as prescribed by his hematologist oncologist.  We have had prior discussion and felt if no contraindications to the current treatment medications.\par \par Patient is doing well status post catheter ablation of atrial fibrillation and follow-up as no evidence of complication or recurrent atrial fibrillation.  His examination was unremarkable.  \par His blood pressure is controlled. \par [id number] score: 0 - No history of diabetes, hypertension, TIA, CVA, vascular disease.We will continue him on anticoagulation with Xarelto for now but will discontinue in future if no recurrent AF ( current treatment with chemotherapy may predispose him to recurrent AF).  \par Follow-up remote monitoring and follow-up evaluation in 4 months.\par \par Referring to see cardiac oncology- Dr. Noble. Follow up echo. \par \par \par

## 2024-04-19 ENCOUNTER — APPOINTMENT (OUTPATIENT)
Dept: CARDIOLOGY | Facility: CLINIC | Age: 59
End: 2024-04-19

## 2024-06-03 RX ORDER — PANTOPRAZOLE 40 MG/1
40 TABLET, DELAYED RELEASE ORAL DAILY
Qty: 30 | Refills: 2 | Status: ACTIVE | COMMUNITY
Start: 2022-05-06 | End: 1900-01-01

## 2024-06-03 RX ORDER — GABAPENTIN 300 MG/1
300 CAPSULE ORAL TWICE DAILY
Qty: 60 | Refills: 3 | Status: ACTIVE | COMMUNITY
Start: 2022-05-06 | End: 1900-01-01

## 2024-06-03 RX ORDER — CARVEDILOL 3.12 MG/1
3.12 TABLET, FILM COATED ORAL TWICE DAILY
Qty: 60 | Refills: 3 | Status: ACTIVE | COMMUNITY
Start: 2022-05-06 | End: 1900-01-01

## 2024-06-04 ENCOUNTER — APPOINTMENT (OUTPATIENT)
Dept: CARDIOLOGY | Facility: CLINIC | Age: 59
End: 2024-06-04

## 2024-06-05 ENCOUNTER — APPOINTMENT (OUTPATIENT)
Dept: CARDIOLOGY | Facility: CLINIC | Age: 59
End: 2024-06-05

## 2024-06-05 ENCOUNTER — NON-APPOINTMENT (OUTPATIENT)
Age: 59
End: 2024-06-05

## 2024-07-05 NOTE — H&P ADULT. - NS MD HP IMMUNE DT NO YES
Addended by: ERNESTO JENSEN on: 7/5/2024 02:00 PM     Modules accepted: Orders     no unknown/unsure

## 2024-07-10 NOTE — ED PROVIDER NOTE - DOMESTIC TRAVEL HIGH RISK QUESTION
----- Message from Dee Bradford sent at 7/10/2024  9:29 AM CDT -----  Contact: PATIENT @ [phone]  [email or web address]     Patient is calling for Medical Advice regarding:Patient would like to know if he can have emailed his labs so a mobile phlebotomist can draw labs for him     How long has patient had these symptoms:    Pharmacy name and phone#:    Patient wants a call back or thru [facility]:    Comments:EMAIL: [email or web address]    Please advise patient replies from provider may take up to 48 hours.   No

## 2024-08-01 ENCOUNTER — APPOINTMENT (OUTPATIENT)
Dept: CARDIOLOGY | Facility: CLINIC | Age: 59
End: 2024-08-01

## 2024-08-07 ENCOUNTER — APPOINTMENT (OUTPATIENT)
Dept: ELECTROPHYSIOLOGY | Facility: CLINIC | Age: 59
End: 2024-08-07

## 2024-08-07 PROCEDURE — 99213 OFFICE O/P EST LOW 20 MIN: CPT

## 2024-08-07 NOTE — HISTORY OF PRESENT ILLNESS
[FreeTextEntry1] : Patient is a 58-year-old man who was seen in follow-up evaluation for his prior atrial arrhythmia.  He has been feeling better than ever.   Patient is currently feeling well and denies any recurrence of chest pain, palpitations, shortness of breath, dizziness or lightheadedness.  He is status post AF ablation and has an implantable loop monitor.  ILR interrogation: An episode of tachycardia on July 17 at 8:15 AM.  He felt he was doing exercise then.  Appear to be sinus or atrial tachycardia. He denies c/o SOB, palpitations, lightheadedness, dizziness, near syncope or syncope.    Historical:  Previous cardiac catheterization had revealed nonobstructive coronary arteries. Echocardiogram from 4/25/2022 showed normal left ventricular systolic function He is status post catheter ablation for atrial fibrillation.  Interrogation of implantable loop monitor did not show any A. fib since the procedure. The patient has a prior history of mantle cell lymphoma.  He has got a previous cycles of chemotherapy.  The patient has had prior chest discomfort and previously underwent cardiac catheterization which showed nonobstructive coronaries.  He has had previous atrial flutter and underwent catheter ablation for atrial flutter.  He subsequently had atrial fibrillation which is being treated with medical therapy.  We had discussed catheter ablation procedure for atrial fibrillation and had reviewed with his hematologist oncologist the possibility of using anticoagulants especially post procedure.  He was clear to be treated with a NOAC/anticoagulant.   [id number] score: 0 - No history of diabetes, hypertension, TIA, CVA, vascular disease  Echocardiogram from 4/1/2019 showed left atrial diameter 3.7 cm left atrial volume index 36 cc/m, LVEF 40 to 45%.

## 2024-08-07 NOTE — REVIEW OF SYSTEMS
[Feeling Fatigued] : not feeling fatigued [Blurry Vision] : no blurred vision [SOB] : no shortness of breath [Dyspnea on exertion] : not dyspnea during exertion [Chest Discomfort] : no chest discomfort [Palpitations] : no palpitations [Abdominal Pain] : no abdominal pain [Rash] : no rash [Dizziness] : no dizziness [Easy Bleeding] : no tendency for easy bleeding [Negative] : Respiratory

## 2024-08-07 NOTE — PHYSICAL EXAM
[No Acute Distress] : no acute distress [Normal Venous Pressure] : normal venous pressure [Normal S1, S2] : normal S1, S2 [No Murmur] : no murmur [No Rub] : no rub [Good Air Entry] : good air entry [Non Tender] : non-tender [Normal Gait] : normal gait [No Edema] : no edema [No Cyanosis] : no cyanosis [No Clubbing] : no clubbing [Normal] : moves all extremities, no focal deficits, normal speech [No Focal Deficits] : no focal deficits [Alert and Oriented] : alert and oriented

## 2024-08-07 NOTE — DISCUSSION/SUMMARY
[FreeTextEntry1] : Overall he is doing well and not had recurrences of A-fib.   He is currently on carvedilol 3.125 mg twice daily.  Patient is not on anticoagulation. His blood pressure has been in the normal range. His BMI is 29. Overall the patient is doing extremely well and we will continue to monitor him.  Recommend follow-up evaluation in 8 months.  Patient will see his cardiologist as well as primary care physician and heme-onc in follow-up.  All of the above discussed with the patient and he expressed understanding.

## 2024-08-15 NOTE — PROGRESS NOTE ADULT - NS_MD_PANP_GEN_ALL_CORE
Attending and PA/NP shared services statement (NON-critical care):
83
Attending and PA/NP shared services statement (NON-critical care):
patient refused

## 2024-08-16 ENCOUNTER — OUTPATIENT (OUTPATIENT)
Dept: OUTPATIENT SERVICES | Facility: HOSPITAL | Age: 59
LOS: 1 days | Discharge: HOME | End: 2024-08-16

## 2024-08-16 DIAGNOSIS — Z98.890 OTHER SPECIFIED POSTPROCEDURAL STATES: Chronic | ICD-10-CM

## 2024-08-16 DIAGNOSIS — Z95.818 PRESENCE OF OTHER CARDIAC IMPLANTS AND GRAFTS: Chronic | ICD-10-CM

## 2024-08-16 DIAGNOSIS — Z94.84 STEM CELLS TRANSPLANT STATUS: Chronic | ICD-10-CM

## 2024-08-16 DIAGNOSIS — C83.10 MANTLE CELL LYMPHOMA, UNSPECIFIED SITE: ICD-10-CM

## 2024-08-28 ENCOUNTER — APPOINTMENT (OUTPATIENT)
Dept: HEMATOLOGY ONCOLOGY | Facility: CLINIC | Age: 59
End: 2024-08-28

## 2024-09-10 ENCOUNTER — APPOINTMENT (OUTPATIENT)
Dept: CARDIOLOGY | Facility: CLINIC | Age: 59
End: 2024-09-10
Payer: MEDICARE

## 2024-09-10 ENCOUNTER — NON-APPOINTMENT (OUTPATIENT)
Age: 59
End: 2024-09-10

## 2024-09-10 ENCOUNTER — RESULT REVIEW (OUTPATIENT)
Age: 59
End: 2024-09-10

## 2024-09-10 ENCOUNTER — APPOINTMENT (OUTPATIENT)
Dept: HEMATOLOGY ONCOLOGY | Facility: CLINIC | Age: 59
End: 2024-09-10
Payer: MEDICARE

## 2024-09-10 ENCOUNTER — APPOINTMENT (OUTPATIENT)
Dept: HEMATOLOGY ONCOLOGY | Facility: CLINIC | Age: 59
End: 2024-09-10

## 2024-09-10 VITALS
SYSTOLIC BLOOD PRESSURE: 134 MMHG | WEIGHT: 212.99 LBS | DIASTOLIC BLOOD PRESSURE: 84 MMHG | HEART RATE: 60 BPM | OXYGEN SATURATION: 99 % | RESPIRATION RATE: 16 BRPM | BODY MASS INDEX: 29.71 KG/M2 | TEMPERATURE: 98.3 F

## 2024-09-10 DIAGNOSIS — I10 ESSENTIAL (PRIMARY) HYPERTENSION: ICD-10-CM

## 2024-09-10 DIAGNOSIS — C83.10 MANTLE CELL LYMPHOMA, UNSPECIFIED SITE: ICD-10-CM

## 2024-09-10 DIAGNOSIS — Z92.850 PERSONAL HISTORY OF CHIMERIC ANTIGEN RECEPTOR T-CELL THERAPY: ICD-10-CM

## 2024-09-10 DIAGNOSIS — Z94.84 STEM CELLS TRANSPLANT STATUS: ICD-10-CM

## 2024-09-10 DIAGNOSIS — I48.91 UNSPECIFIED ATRIAL FIBRILLATION: ICD-10-CM

## 2024-09-10 LAB
ALBUMIN SERPL ELPH-MCNC: 4.7 G/DL
ALP BLD-CCNC: 106 U/L
ALT SERPL-CCNC: 18 U/L
ANION GAP SERPL CALC-SCNC: 8 MMOL/L
AST SERPL-CCNC: 32 U/L
BASOPHILS # BLD AUTO: 0.02 K/UL — SIGNIFICANT CHANGE UP (ref 0–0.2)
BASOPHILS NFR BLD AUTO: 0.4 % — SIGNIFICANT CHANGE UP (ref 0–2)
BILIRUB SERPL-MCNC: 0.7 MG/DL
BUN SERPL-MCNC: 20 MG/DL
CALCIUM SERPL-MCNC: 9.6 MG/DL
CHLORIDE SERPL-SCNC: 104 MMOL/L
CO2 SERPL-SCNC: 28 MMOL/L
CREAT SERPL-MCNC: 0.91 MG/DL
CRP SERPL-MCNC: <3 MG/L
EGFR: 97 ML/MIN/1.73M2
EOSINOPHIL # BLD AUTO: 0.12 K/UL — SIGNIFICANT CHANGE UP (ref 0–0.5)
EOSINOPHIL NFR BLD AUTO: 2.6 % — SIGNIFICANT CHANGE UP (ref 0–6)
FERRITIN SERPL-MCNC: 104 NG/ML
FIBRINOGEN PPP-MCNC: 308 MG/DL
GLUCOSE SERPL-MCNC: 112 MG/DL
HCT VFR BLD CALC: 42.9 % — SIGNIFICANT CHANGE UP (ref 39–50)
HGB BLD-MCNC: 14.3 G/DL — SIGNIFICANT CHANGE UP (ref 13–17)
IMM GRANULOCYTES NFR BLD AUTO: 0.2 % — SIGNIFICANT CHANGE UP (ref 0–0.9)
LDH SERPL-CCNC: 194 U/L
LYMPHOCYTES # BLD AUTO: 2.01 K/UL — SIGNIFICANT CHANGE UP (ref 1–3.3)
LYMPHOCYTES # BLD AUTO: 43.3 % — SIGNIFICANT CHANGE UP (ref 13–44)
MAGNESIUM SERPL-MCNC: 2.3 MG/DL
MCHC RBC-ENTMCNC: 32.6 PG — SIGNIFICANT CHANGE UP (ref 27–34)
MCHC RBC-ENTMCNC: 33.3 G/DL — SIGNIFICANT CHANGE UP (ref 32–36)
MCV RBC AUTO: 97.9 FL — SIGNIFICANT CHANGE UP (ref 80–100)
MONOCYTES # BLD AUTO: 0.46 K/UL — SIGNIFICANT CHANGE UP (ref 0–0.9)
MONOCYTES NFR BLD AUTO: 9.9 % — SIGNIFICANT CHANGE UP (ref 2–14)
NEUTROPHILS # BLD AUTO: 2.02 K/UL — SIGNIFICANT CHANGE UP (ref 1.8–7.4)
NEUTROPHILS NFR BLD AUTO: 43.6 % — SIGNIFICANT CHANGE UP (ref 43–77)
NRBC # BLD: 0 /100 WBCS — SIGNIFICANT CHANGE UP (ref 0–0)
NRBC BLD-RTO: 0 /100 WBCS — SIGNIFICANT CHANGE UP (ref 0–0)
PLATELET # BLD AUTO: 197 K/UL — SIGNIFICANT CHANGE UP (ref 150–400)
POTASSIUM SERPL-SCNC: 4.5 MMOL/L
PROT SERPL-MCNC: 7.9 G/DL
RBC # BLD: 4.38 M/UL — SIGNIFICANT CHANGE UP (ref 4.2–5.8)
RBC # FLD: 13.9 % — SIGNIFICANT CHANGE UP (ref 10.3–14.5)
SODIUM SERPL-SCNC: 141 MMOL/L
WBC # BLD: 4.64 K/UL — SIGNIFICANT CHANGE UP (ref 3.8–10.5)
WBC # FLD AUTO: 4.64 K/UL — SIGNIFICANT CHANGE UP (ref 3.8–10.5)

## 2024-09-10 PROCEDURE — 93298 REM INTERROG DEV EVAL SCRMS: CPT

## 2024-09-10 PROCEDURE — 99214 OFFICE O/P EST MOD 30 MIN: CPT

## 2024-09-10 NOTE — HISTORY OF PRESENT ILLNESS
[de-identified] : Mr. Chavez is a 53 year old male with a history of MCL, initially diagnosed 10/2016. On 11/15/2016 he was seen by Dr. Bravo at [facility], after noticing a left inguinal mass for which he sought medical attention. CT scans (3/21/2016) revealed left-sided pelvic and left inguinal adenopathy. A 0.7 cm indeterminate lesion in the right iliac bone was also noted. A left inguinal lymph node biopsy was performed 9/24/2016 which revealed morphologic and immunophenotypic findings consistent with mantle cell lymphoma. He is status post 4 cycles of R-CHOP, and two cycles of RICE consolidation. A bone marrow biopsy on 2/10/17 showed normocellular bone marrow with trilineage hematopoiesis and maturation. He was admitted 3/27/17 for an autologous peripheral blood stem cell transplant with CBV  regimen. \par   \par  Upon admission, a TLC was placed in IR. Mr. Chavez received IV hydration, nutritional support, pain management, antinausea medication, antidiarrheals, antibiotic, antiviral, PCP, antifungal and GI prophylaxis. Labs were monitored daily. Mr. Chavez received transfusional support and electrolyte repletion as needed. When his ANC dropped below 1000, he was started on prophylactic ciprofloxacin. When he became febrile, his antibiotic coverage was broadened as appropriate. \par   \par  On 4/5/17, Mr. Chavez received 314ml of thawed, pooled, washed, mobilized, plasma reduced, HPC apheresis over approximately 1 hour. He tolerated the infusion well with no adverse side effects noted. Engraftment was noted on 4/16/17, and the Zarxio and antibiotics were discontinued. \par   \par  Mr. Chavez's transplant course was complicated by recurrent headaches, oral and GI mucositis, dizziness, and chest pain resulting in a RRT on 4/10/17. The chest pain was self limiting. A second RRT was called on 4/16/17, for palpitations and dizziness. Mr. Chavez was found to be in atrial flutter with RVR. He was transferred to telemetry and followed by cardiology. \par  A-fib/a-flutter was resistant to single rate agent, and while in arrhythmia, pt would become symptomatic, hypotensive. Ablation was was preformed, however was unsuccessful. Pt continued to have frequent changes in rhythm. As a-fib was usually preceded by PAC, and mediport tip was in r atrium, concern was this may have been involved. Mediport was removed but patient continued to have runs of non conducted APCs. Patient was followed by EP during this admission who ruled out any need for PPM insertion. Patient was started on Cardizem and Toprol which he tolerated well. He did have episodes of bradycardia during his sleep with episodes of Atrial flutter with RVR as well. However, he remained asymptomatic. \par  Pt was d/c 5/5/2017 to home in stable condition with instructions to follow up with hematology and EP. [de-identified] : Presenting today for a follow up appointment. He has completed  maintenance Rituxan. Last Rituxan was received in jan... PENELOPE....Patient reports continued fatigue. Recent CT/PET scan PENELOPE.  Patient also offers that he had the shingles and was treated with valtrex..some residual discomfort...He continues to be followed by cardiology; his cardiologist increased the Diltiazem dosage to 180mg q daily, however patient reported feeling increasingly fatigued and returned to 120mg q daily and now notes less fatigue. He is in eval for a cardiac procedure.. His cardiologist was waiting for the rituxan to be completed and covid to pass..He is compliant with all medications. Denies mouth sores, eye dryness, nausea, vomiting, diarrhea. No LE edema, CP or SOB.   On 10/18/21, patient presents for a follow up visit. Overall patient is well. States has generalized pruritus and recently was seen by dermatologist. Has been applying triamcinolone cream with no relief. States recently felt a "lump" of the right groin. Given the patients past medical history patient is very concerned. Denies fever, chills, nausea, vomiting, diarrhea, rash, mouth sores, dysuria or any signs of active bleeding. Denies SOB, chest pain or B/L LE edema. Remains compliant with medications prescribed.   On 11/30//21, patient presents with spouse for a follow up visit. Overall patient feels fatigued. Had a lymph node biopsy on 11/12/21 and results are c/w relapsed MCL.. Denies fever, chills, nausea, vomiting, diarrhea, rash, mouth sores, dysuria or any signs of active bleeding. Denies SOB, chest pain or B/L LE edema. Remains compliant with medications prescribed. Here to discuss further therapy..  On 1/25/22, patient presents for a follow up visit.  On calquence and not  tolerating well. Was in ER with severe epigastric pain...now on protonix...Complains of acid reflux and takes tums prn. Complains of headaches every other day. Denies fever, chills, nausea, vomiting, diarrhea, rash, mouth sores, dysuria or any signs of active bleeding. Denies SOB, chest pain or B/L LE edema. in wheelchair today  2/18/22: Patient is here for a follow up visit.He states that he still has epigastric pain with Calquence.He takes Pantoprazole as prescribed.Denies fever,chills, nausea, vomiting, diarrhea,rash, mouth sores,SOB,chest pain or any signs of active bleeding.He has an appointment with PCP tomorrow to get a referral for an endoscopy by GI.  5/10//22:Patient is seen for a follow up visit s/p  CAR-T treatment with [facility]....He is accompanied by his wife...He describes fatigue and weakness...using oxycodone for pain in legs...likely neuropathic.. Denies fever, chills, nausea, vomiting, diarrhea,mouth sores, skin rash,SOB,chest pain or any signs of active bleeding. He states that he had occasional epigastric pain from Calquence which  is improved off drug....He is off calquence and blood thinners at this time...Hospital course was complicated by CRS and neurotox...see d/c summary...  5/18/22: Patient is here for a follow up visit post CAR-T treatment with [facility].Today is Day +28.He is here accompanied by his wife.He states that he still feels fatigue and weakness.He continues to use Oxycodone for pain in legs and occasional headache.Denies fever, chills, blurring of vision,SOB, chest pain, mouth sores,nausea, vomiting, diarrhea,skin rash, dysuria or any signs of active bleeding. He states that few days ago when he stood up from sitting position,he felt dizziness which resolved on its own after few minutes.  5/24/22:Today is Day +35 post CAR-T therapy with [facility].He is accompanied by his wife.He states that he continues to have pricking pain on B/L feet and that he continues to take Gabapentin as prescribed. Denies fever, chills, SOB,chest pain, rash, mouth sores,nausea, vomiting, diarrhea,dysuria or any signs of active bleeding.He had a f/u appointment with cardiology yesterday and there are no changes or adjustments to any of the of the medications. He states that he did not have any episodes of dizziness since last week.  6/7//22: Patient is here for a follow up visit post CAR-T therapy with [facility]. .Overall feels better than last week.Denies fever, chills, nausea, vomiting, diarrhea, mouth sores, rash, dysuria,SOB,chest pain or any signs of active bleeding.  6/21/22: Today is Day + 63 post CAR-T therapy with Tecartus.Patient is seen for a follow up visit today.Overall feels well with no acute concerns.Denies fever, chills, SOB,chest pain, rash, mouth sores, nausea, vomiting, diarrhea,dysuria or any signs of active bleeding.  7/6/22: Patient is seen for a follow up visit  post CAR-T therapy.He is accompanied by his spouse. Today is Day + 77.He states that he feels better than past weeks.Denies fever, chills, nausea, vomiting, diarrhea, rash, mouth sores,SOB,chest pain or any signs of active bleeding.  On 7/18/22 visit, day + 90 post Car T. Overall patient is fatigued. Complains of a nodule of posterior left knee that is not painful. Denies fever, chills, nausea, diarrhea, rash, mouth sores, dysuria or any signs of active bleeding. Denies SOB, chest pain or B/L LE edema. Remains compliant with medications as prescribed.   8/16//22: Today is Day + 119 post CAR-T.Overall well with no acute concerns. Denies fever, chills, nausea, vomiting, diarrhea,blurred vision,rash, mouth sores,dysuria,edema or any signs of active bleeding.Remains compliant with medications.He states that he continues to have the painless nodule on posterior aspect of left knee.  On 9/12/22 visit, day + 146 post Car- T. Overall well with no acute concerns. Denies fever, chills, nausea, vomiting, diarrhea,blurred vision,rash, mouth sores,dysuria,edema or any signs of active bleeding. Denies SOB, chest pain or B/L LE edema. Remains compliant with medications.  On 12/7//22 visit, day + 228 post Car T. Recently recovered from COVID 19. Overall feels much better than he did a few weeks ago. Denies fever, chills, nausea, vomiting, diarrhea, rash, mouth sores, dysuria or any signs of active bleeding. Denies SOB, chest pain or B/L LE edema. Remains compliant with medications as prescribed. Discussed findings on recent scan..non specific..will follow  9/28//23 as above , looks well, no new issues aside from fatigue...followed by cardiology..for stress test..s/p car t 4/19/22 2/6/24:  Patient is here for a follow up visit. Denies fever, chills, nausea, vomiting, diarrhea, rash, mouth sores or any signs of active bleeding. Denies SOB, chest pain or B/L LE edema. Patient states he is feeling better, complains of neuropathy. Pt states his brother passed away 2 weeks ago from leukemia in [place].   9/10/24:  Patient is here for a follow up visit. Denies fever, chills, nausea, vomiting, diarrhea, rash, mouth sores or any signs of active bleeding. Denies SOB, chest pain or B/L LE edema. Pt complains of loss of balance, joint pain and occasional diarrhea. Pt states he follows his routine screenings and check-ups with his PCP and is up to date on his vaccines. Pt states he has begun to do minimal walking as a way of exercise.

## 2024-09-10 NOTE — ASSESSMENT
The patient is a 6y5m year old Male complaining of throat, sore throat. [FreeTextEntry1] : 58 y/o gentleman with hx of mantle cell lymphoma initially dx'ed Oct 2016 s/p RCHOP chemotherapy X 4 cycles and RICE X 2 cycles with stem cell collected off of the 2nd RICE..he is now s/p CBV-conditioned autologous stem cell transplant on 4/5/17. Hospital course complicated by a-fib/a-flutter s/p ablation and now stable on Cardizem and Toprol. Also now on Xarelto. Port also removed. He demonstrated engraftment on 4/16 and d/c'ed in stable condition on 5/5. Rituxan maintenance complete (Short Rituxan, once a week for 2 weeks). Last Rituxan treatment was  on 1/22/20  (Short Rituxan, once a week for 2 weeks). CT scans completed 7/1/21: PENELOPE PET CT 10/28/21 Completed. Concern for relapse disease. Lymph node biopsy completed on 11/12/21.  Results reviewed..c/w MCL....  Discussed being placed on calquence vs Silas rituxan. Patient information printed and given to patient/spouse. Side effects explained including serious side effect of AFIB. Patient has a known cardiac history. Message left for pt's cardiologist.. Long term goal would be Car T....tecartus... This is why I would like to avoid Silas..Literature and consents provided for cart..discussed risks, benefits, alternatives, rationale and logistics..3 week hospital stay discussed...tlc placement...lymphodepleting prep, monitoring for CRS and neurotox, prolonged cytopenias and hypogamma also.. discussed  s/p calquence 100 mg capsule- take 1 capsule twice a day...worsening GI sx on protonix...instructed on how to administer  3/11/22: Will stop Calquence on 3/14/22 ;1 week prior to CAR-T collection...resume 3/23..stop 4/11 in prep for admit 4/14... 1) MCL 7/6/22: S/P CAR-T therapy with [facility]ec[facility] on 4/19/22 7/18/22: Day +90 post Car T.  8/2/22: Day +105 post CAR-T 9/12/22: Day + 146 Post Car T 11/3/22: Day + 198 post Car T.  +180 scan on 11/3/22. non specific changes...PENELOPE 3/14/23...PENELOPE.. nonspecific changes....decrease in LN's  2) Heme Counts stable. No indication for transfusion today.  Continue MV and folic acid daily  Negative assessment  for CRS and neurotox.  F/U PET CT  partial response status at time of CAR-T;repeat at day 30, 100.  30 days post PET CT done on 5/25/22.Result as follows: -No evidence of FDG avid disease or active lymphoma. Interval resolution of minimal FDG activity associated with bilateral external iliac and bilateral inguinal lymph nodes which have either resolved or significantly decreased in size and not well delineated on CT. - Interval resolution of mild FDG avidity in the distal esophagus. - Unchanged nonspecific mildly heterogeneous small bowel and pancolonic FDG avidity. Please correlate clinically  Repeat PET CT at Day 100. Completed 8/3/22: PENELOPE Repeat PET CT  on 11/3/22. nonspecific findings possibly related to covid..will follow...PENELOPE and repeat end feb with continued decrease in LN's...recent ct's PENELOPE.....will repeat  in 6 months...ordered today 9/10/24: Ordered repeat PET CT   3) ID Continue prophylaxis with acyclovir..stop mepron....consider shingrix borderline immunoglobulin levels  4) GI Tums prn for acid reflux-RESOLVED Pantoprazole 40 mg daily  5) Afib Continue medications as prescribed by Cardiologist Has an appointment with cardiologist f/u...for stress test Continue to f/u with cardiologist as per recommendations  6) Other Oxycodone for leg pain prn Headaches- tylenol prn Dizziness-Advised to make slow position changes-RESOLVED Left Posterior Knee nodule- Continue to monitor  Continue medications as prescribed Maintain f/u with cardiology and GI  Maintain f/u with his regular internist - Recommend pt to receive colonoscopy or Cologuard  Well care and cancer screening stressed Patient has been advised to contact clinic if he has any concerns or signs of CRS / neurotoxicity  Completed Post transplant vaccines: 12 month vaccines completed in April 2018.  14 month vaccines completed July 2018.  24 month re-vaccinations completed in April 2019.   will not need to repeat post CAR-T...will check titers mmr Received Pfizer Vaccine's on 8/17/22 and 9/7/22. 9/10/24: discontinued acyclovir  7) Plan Follow up with Dr. Banks in 6 months.  Repat CT/PET scan every 6 months ...due now [employer] card and FACT sheet provided again prior to d/c Quests addressed..restrictions discussed

## 2024-09-10 NOTE — ADDENDUM
[FreeTextEntry1] : Documented by Ozzie Eugene acting as a scribe for Dr. Gerardo Banks on 9/10/24. All medical record entries made by the Scribe were at my, Dr. Gerardo Banks, direction and personally dictated by me on 9/10/24. I have reviewed the chart and agree that the record accurately reflects my personal performance of the history, physical exam, assessment and plan. I have also personally directed, reviewed, and agree with the discharge instructions.

## 2024-09-10 NOTE — REVIEW OF SYSTEMS
[Fatigue] : fatigue [Negative] : Allergic/Immunologic [Fever] : no fever [Chills] : no chills [Night Sweats] : no night sweats [Recent Change In Weight] : ~T no recent weight change [Dysphagia] : no dysphagia [Loss of Hearing] : no loss of hearing [Nosebleeds] : no nosebleeds [Hoarseness] : no hoarseness [Odynophagia] : no odynophagia [Mucosal Pain] : no mucosal pain [Abdominal Pain] : no abdominal pain [Vomiting] : no vomiting [Constipation] : no constipation [Joint Pain] : no joint pain [Joint Stiffness] : no joint stiffness [Muscle Pain] : no muscle pain [Muscle Weakness] : no muscle weakness [Skin Rash] : no skin rash [Skin Wound] : no skin wound [Easy Bleeding] : no tendency for easy bleeding [Easy Bruising] : no tendency for easy bruising [FreeTextEntry2] : Occasional fatigue [FreeTextEntry7] : Acid reflux,Epigastric pain -RESOLVED [FreeTextEntry9] : painless nodule of  posterior left knee [de-identified] : Right groin lymphadenopathy RESOLVED

## 2024-09-10 NOTE — PHYSICAL EXAM
[Normal] : affect appropriate [Fully active, able to carry on all pre-disease performance without restriction] : Status 0 - Fully active, able to carry on all pre-disease performance without restriction [de-identified] : non tender palpable nodule of  posterior left knee  [de-identified] : healed scar from port removal

## 2024-09-12 LAB
MEV IGG FLD QL IA: >300 AU/ML
MEV IGG+IGM SER-IMP: POSITIVE
MUV AB SER-ACNC: POSITIVE
MUV IGG SER QL IA: 251 AU/ML
RUBV IGG FLD-ACNC: 9.52 INDEX
RUBV IGG SER-IMP: POSITIVE
VZV AB TITR SER: POSITIVE
VZV IGG SER IF-ACNC: 12 S/CO

## 2024-10-11 ENCOUNTER — EMERGENCY (EMERGENCY)
Facility: HOSPITAL | Age: 59
LOS: 1 days | Discharge: HOME | End: 2024-10-11
Attending: EMERGENCY MEDICINE
Payer: MEDICARE

## 2024-10-11 VITALS
RESPIRATION RATE: 16 BRPM | OXYGEN SATURATION: 96 % | TEMPERATURE: 99 F | SYSTOLIC BLOOD PRESSURE: 123 MMHG | HEART RATE: 70 BPM | DIASTOLIC BLOOD PRESSURE: 57 MMHG

## 2024-10-11 VITALS
TEMPERATURE: 101 F | WEIGHT: 216.93 LBS | DIASTOLIC BLOOD PRESSURE: 69 MMHG | RESPIRATION RATE: 20 BRPM | SYSTOLIC BLOOD PRESSURE: 103 MMHG | HEART RATE: 85 BPM | HEIGHT: 71 IN | OXYGEN SATURATION: 95 %

## 2024-10-11 DIAGNOSIS — Z98.890 OTHER SPECIFIED POSTPROCEDURAL STATES: Chronic | ICD-10-CM

## 2024-10-11 DIAGNOSIS — Z94.84 STEM CELLS TRANSPLANT STATUS: Chronic | ICD-10-CM

## 2024-10-11 DIAGNOSIS — Z95.818 PRESENCE OF OTHER CARDIAC IMPLANTS AND GRAFTS: Chronic | ICD-10-CM

## 2024-10-11 LAB
ALBUMIN SERPL ELPH-MCNC: 4.8 G/DL — SIGNIFICANT CHANGE UP (ref 3.3–5)
ALP SERPL-CCNC: 97 U/L — SIGNIFICANT CHANGE UP (ref 40–120)
ALT FLD-CCNC: 21 U/L — SIGNIFICANT CHANGE UP (ref 10–45)
ANION GAP SERPL CALC-SCNC: 13 MMOL/L — SIGNIFICANT CHANGE UP (ref 5–17)
AST SERPL-CCNC: 23 U/L — SIGNIFICANT CHANGE UP (ref 10–40)
BASOPHILS # BLD AUTO: 0 K/UL — SIGNIFICANT CHANGE UP (ref 0–0.2)
BASOPHILS NFR BLD AUTO: 0 % — SIGNIFICANT CHANGE UP (ref 0–2)
BILIRUB SERPL-MCNC: 0.7 MG/DL — SIGNIFICANT CHANGE UP (ref 0.2–1.2)
BUN SERPL-MCNC: 25 MG/DL — HIGH (ref 7–23)
CALCIUM SERPL-MCNC: 9.1 MG/DL — SIGNIFICANT CHANGE UP (ref 8.4–10.5)
CHLORIDE SERPL-SCNC: 99 MMOL/L — SIGNIFICANT CHANGE UP (ref 96–108)
CO2 SERPL-SCNC: 26 MMOL/L — SIGNIFICANT CHANGE UP (ref 22–31)
CREAT SERPL-MCNC: 1.11 MG/DL — SIGNIFICANT CHANGE UP (ref 0.5–1.3)
EGFR: 76 ML/MIN/1.73M2 — SIGNIFICANT CHANGE UP
EOSINOPHIL # BLD AUTO: 0.08 K/UL — SIGNIFICANT CHANGE UP (ref 0–0.5)
EOSINOPHIL NFR BLD AUTO: 0.9 % — SIGNIFICANT CHANGE UP (ref 0–6)
FLUAV AG NPH QL: SIGNIFICANT CHANGE UP
FLUBV AG NPH QL: SIGNIFICANT CHANGE UP
GLUCOSE SERPL-MCNC: 122 MG/DL — HIGH (ref 70–99)
HCT VFR BLD CALC: 42.8 % — SIGNIFICANT CHANGE UP (ref 39–50)
HGB BLD-MCNC: 14.1 G/DL — SIGNIFICANT CHANGE UP (ref 13–17)
HIV 1+2 AB+HIV1 P24 AG SERPL QL IA: SIGNIFICANT CHANGE UP
LIDOCAIN IGE QN: 25 U/L — SIGNIFICANT CHANGE UP (ref 7–60)
LYMPHOCYTES # BLD AUTO: 0.85 K/UL — LOW (ref 1–3.3)
LYMPHOCYTES # BLD AUTO: 9.7 % — LOW (ref 13–44)
MAGNESIUM SERPL-MCNC: 2 MG/DL — SIGNIFICANT CHANGE UP (ref 1.6–2.6)
MANUAL SMEAR VERIFICATION: SIGNIFICANT CHANGE UP
MCHC RBC-ENTMCNC: 31.6 PG — SIGNIFICANT CHANGE UP (ref 27–34)
MCHC RBC-ENTMCNC: 32.9 GM/DL — SIGNIFICANT CHANGE UP (ref 32–36)
MCV RBC AUTO: 96 FL — SIGNIFICANT CHANGE UP (ref 80–100)
MONOCYTES # BLD AUTO: 0.46 K/UL — SIGNIFICANT CHANGE UP (ref 0–0.9)
MONOCYTES NFR BLD AUTO: 5.3 % — SIGNIFICANT CHANGE UP (ref 2–14)
NEUTROPHILS # BLD AUTO: 6.75 K/UL — SIGNIFICANT CHANGE UP (ref 1.8–7.4)
NEUTROPHILS NFR BLD AUTO: 69.9 % — SIGNIFICANT CHANGE UP (ref 43–77)
NEUTS BAND # BLD: 7.1 % — SIGNIFICANT CHANGE UP (ref 0–8)
PLAT MORPH BLD: NORMAL — SIGNIFICANT CHANGE UP
PLATELET # BLD AUTO: 234 K/UL — SIGNIFICANT CHANGE UP (ref 150–400)
POTASSIUM SERPL-MCNC: 3.8 MMOL/L — SIGNIFICANT CHANGE UP (ref 3.5–5.3)
POTASSIUM SERPL-SCNC: 3.8 MMOL/L — SIGNIFICANT CHANGE UP (ref 3.5–5.3)
PROT SERPL-MCNC: 7.8 G/DL — SIGNIFICANT CHANGE UP (ref 6–8.3)
RBC # BLD: 4.46 M/UL — SIGNIFICANT CHANGE UP (ref 4.2–5.8)
RBC # FLD: 14.8 % — HIGH (ref 10.3–14.5)
RBC BLD AUTO: SIGNIFICANT CHANGE UP
RSV RNA NPH QL NAA+NON-PROBE: SIGNIFICANT CHANGE UP
SARS-COV-2 RNA SPEC QL NAA+PROBE: SIGNIFICANT CHANGE UP
SODIUM SERPL-SCNC: 138 MMOL/L — SIGNIFICANT CHANGE UP (ref 135–145)
VARIANT LYMPHS # BLD: 7.1 % — HIGH (ref 0–6)
WBC # BLD: 8.77 K/UL — SIGNIFICANT CHANGE UP (ref 3.8–10.5)
WBC # FLD AUTO: 8.77 K/UL — SIGNIFICANT CHANGE UP (ref 3.8–10.5)

## 2024-10-11 PROCEDURE — 85025 COMPLETE CBC W/AUTO DIFF WBC: CPT

## 2024-10-11 PROCEDURE — 82947 ASSAY GLUCOSE BLOOD QUANT: CPT

## 2024-10-11 PROCEDURE — 99284 EMERGENCY DEPT VISIT MOD MDM: CPT

## 2024-10-11 PROCEDURE — 83690 ASSAY OF LIPASE: CPT

## 2024-10-11 PROCEDURE — 87637 SARSCOV2&INF A&B&RSV AMP PRB: CPT

## 2024-10-11 PROCEDURE — 71046 X-RAY EXAM CHEST 2 VIEWS: CPT | Mod: 26

## 2024-10-11 PROCEDURE — 83735 ASSAY OF MAGNESIUM: CPT

## 2024-10-11 PROCEDURE — 96374 THER/PROPH/DIAG INJ IV PUSH: CPT

## 2024-10-11 PROCEDURE — 82435 ASSAY OF BLOOD CHLORIDE: CPT

## 2024-10-11 PROCEDURE — 83605 ASSAY OF LACTIC ACID: CPT

## 2024-10-11 PROCEDURE — 80074 ACUTE HEPATITIS PANEL: CPT

## 2024-10-11 PROCEDURE — 82330 ASSAY OF CALCIUM: CPT

## 2024-10-11 PROCEDURE — 99284 EMERGENCY DEPT VISIT MOD MDM: CPT | Mod: 25

## 2024-10-11 PROCEDURE — 85014 HEMATOCRIT: CPT

## 2024-10-11 PROCEDURE — 85018 HEMOGLOBIN: CPT

## 2024-10-11 PROCEDURE — 80053 COMPREHEN METABOLIC PANEL: CPT

## 2024-10-11 PROCEDURE — 87389 HIV-1 AG W/HIV-1&-2 AB AG IA: CPT

## 2024-10-11 PROCEDURE — 87040 BLOOD CULTURE FOR BACTERIA: CPT

## 2024-10-11 PROCEDURE — 82803 BLOOD GASES ANY COMBINATION: CPT

## 2024-10-11 PROCEDURE — 71046 X-RAY EXAM CHEST 2 VIEWS: CPT

## 2024-10-11 PROCEDURE — 84132 ASSAY OF SERUM POTASSIUM: CPT

## 2024-10-11 PROCEDURE — 87207 SMEAR SPECIAL STAIN: CPT

## 2024-10-11 PROCEDURE — 84295 ASSAY OF SERUM SODIUM: CPT

## 2024-10-11 RX ORDER — SODIUM CHLORIDE 0.9 % (FLUSH) 0.9 %
1000 SYRINGE (ML) INJECTION ONCE
Refills: 0 | Status: COMPLETED | OUTPATIENT
Start: 2024-10-11 | End: 2024-10-11

## 2024-10-11 RX ORDER — ACETAMINOPHEN 325 MG
1000 TABLET ORAL ONCE
Refills: 0 | Status: COMPLETED | OUTPATIENT
Start: 2024-10-11 | End: 2024-10-11

## 2024-10-11 RX ADMIN — Medication 400 MILLIGRAM(S): at 12:12

## 2024-10-11 RX ADMIN — Medication 1000 MILLILITER(S): at 12:12

## 2024-10-11 NOTE — ED PROVIDER NOTE - OBJECTIVE STATEMENT
58-year-old male with mantle cell lymphoma in remission for 3.5 years, HTN presenting the emergency department for fevers myalgias, nausea and diarrhea.  Patient recently returned back from  2 days ago and developed symptoms yesterday.  Patient reports low-grade fevers, body aches.  Denies any respiratory symptoms including cough, shortness of breath.  Denies chest pain.  No known sick contacts.  Denies rash.  Denies any exotic exposure vomiting [facility].  Patient is not on any immunosuppressive medications or chemotherapy.

## 2024-10-11 NOTE — ED PROVIDER NOTE - NSFOLLOWUPINSTRUCTIONS_ED_ALL_ED_FT
You were seen in the ED today for body aches, fevers, diarrhea.     Your work up included blood work and CXR which were normal. Some tests are still pending but you can follow up and will be notified if any pertinent results are found. Your results are included in your paperwork.    Your symptoms are likely due to viral illness.    Follow up with your primary physician in 1-2 days. If needed call [phone] to find a primary care physician or call  [phone] to schedule an appointment with the general medicine.     You may take 500-1000 mg acetaminophen every 6 hours, as needed for pain.  You may take 600 mg ibuprofen every 8 hours, with food, as needed for pain.     If you experience any of the following please return to the ED:  - Chest pain  - Trouble breathing   - High grade fevers  - Rashes  - Inability to eat or drink  - Abdominal pain  - Blood diarrhea  - Spontaneous bruising of your skin or easy bleeding    1. TAKE ALL MEDICATIONS AS DIRECTED.    2. FOR PAIN OR FEVER YOU CAN TAKE IBUPROFEN (MOTRIN, ADVIL) OR ACETAMINOPHEN (TYLENOL) AS NEEDED, AS DIRECTED ON PACKAGING.  3. FOLLOW UP WITH YOUR PRIMARY DOCTOR WITHIN 5 DAYS AS DIRECTED.  4. IF YOU HAD LABS OR IMAGING DONE, YOU WERE GIVEN COPIES OF ALL LABS AND/OR IMAGING RESULTS FROM YOUR ER VISIT--PLEASE TAKE THEM WITH YOU TO YOUR FOLLOW UP APPOINTMENTS.  5. RETURN TO THE ER FOR ANY WORSENING SYMPTOMS OR CONCERNS.    ----------------------------------------------------------------------------------------------------------------   Viral Illness, Adult  Viruses are tiny germs that can get into a person's body and cause illness. There are many different types of viruses. And they cause many types of illness. Viral illnesses can range from mild to severe. They can affect various parts of the body.    Short-term conditions that are caused by a virus include colds and flu (influenza) and stomach viruses. Long-term conditions that are caused by a virus include herpes, shingles, and human immunodeficiency virus (HIV) infection. A few viruses have been linked to certain cancers.    What are the causes?  Many types of viruses can cause illness. Viruses get into cells in your body, multiply, and cause the infected cells to work differently or die. When these cells die, they release more of the virus. When this happens, you get symptoms of the illness and the virus spreads to other cells. If the virus takes over how the cell works, it can cause the cell to divide and grow out of control. This happens when a virus causes cancer.    Different viruses get into the body in different ways. You can get a virus by:  Swallowing food or water that has come in contact with the virus.  Breathing in droplets that have been coughed or sneezed into the air by an infected person.  Touching a surface that has the virus on it and then touching your eyes, nose, or mouth.  Being bitten by an insect or animal that carries the virus.  Having sexual contact with a person who is infected with the virus.  Being exposed to blood or fluids that contain the virus, either through an open cut or during a transfusion.  If a virus enters your body, your body's disease-fighting system (immune system) will try to fight the virus. You may be at higher risk for a viral illness if your immune system is weak.    What are the signs or symptoms?  Symptoms depend on the type of virus and the location of the cells that it gets into. Symptoms can include:  For cold and flu viruses:  Fever.  Headache.  Sore throat.  Muscle aches.  Stuffy nose (nasal congestion).  Cough.  For stomach (gastrointestinal) viruses:  Fever.  Pain in the abdomen.  Nausea or vomiting.  Diarrhea.  For liver viruses (hepatitis):  Loss of appetite.  Feeling tired.  Skin or the white parts of your eyes turning yellow (jaundice).  For brain and spinal cord viruses:  Fever.  Headache.  Stiff neck.  Nausea and vomiting.  Confusion or being sleepy.  For skin viruses:  Warts.  Itching.  Rash.  For sexually transmitted viruses:  Discharge.  Swelling.  Redness.  Rash.    How is this diagnosed?  This condition may be diagnosed based on one or more of these:  Your symptoms and medical history.  A physical exam.  Tests, such as:  Blood tests.  Tests on a sample of mucus from your lungs (sputum sample).  Tests on a poop (stool) sample.  Tests on a swab of body fluids or a skin sore (lesion).    How is this treated?  Viruses can be hard to treat because they live within cells. Antibiotics do not treat viruses because these medicines do not get inside cells. Treatment for a viral illness may include:  Resting and drinking a lot of fluids.  Medicines to treat symptoms. These can include over-the-counter medicine for pain and fever, medicines for cough or congestion, and medicines for diarrhea.  Antiviral medicines. These medicines are available only for certain types of viruses.  Some viral illnesses can be prevented with vaccinations. A common example is the flu shot.    Follow these instructions at home:    Medicines    Take over-the-counter and prescription medicines only as told by your health care provider.  If you were prescribed an antiviral medicine, take it as told by your provider. Do not stop taking the antiviral even if you start to feel better.  Know when antibiotics are needed and when they are not needed. Antibiotics do not treat viruses. You may get an antibiotic if your provider thinks that you may have, or are at risk for, a bacterial infection and you have a viral infection.  Do not ask for an antibiotic prescription if you have been diagnosed with a viral illness. Antibiotics will not make your illness go away faster.  Taking antibiotics when they are not needed can lead to antibiotic resistance. When this develops, the medicine no longer works against the bacteria that it normally fights.    General instructions    Drink enough fluids to keep your pee (urine) pale yellow.  Rest as much as possible.  Return to your normal activities as told by your provider. Ask your provider what activities are safe for you.    How is this prevented?  A person washing hands with soap and water.  To lower your risk of getting another viral illness:  Wash your hands often with soap and water for at least 20 seconds. If soap and water are not available, use hand .  Avoid touching your nose, eyes, and mouth, especially if you have not washed your hands recently.  If anyone in your household has a viral infection, clean all household surfaces that may have been in contact with the virus. Use soap and hot water. You may also use a commercially prepared, bleach-containing solution.  Stay away from people who are sick with symptoms of a viral infection.  Do not share items such as toothbrushes and water bottles with other people.  Keep your vaccinations up to date. This includes getting a yearly flu shot.  Eat a healthy diet and get plenty of rest.    Contact a health care provider if:  You have symptoms of a viral illness that do not go away.  Your symptoms come back after going away.  Your symptoms get worse.    Get help right away if:  You have trouble breathing.  You have a severe headache or a stiff neck.  You have severe vomiting or pain in your abdomen.  These symptoms may be an emergency. Get help right away. Call 911.  Do not wait to see if the symptoms will go away.  Do not drive yourself to the hospital.

## 2024-10-11 NOTE — ED ADULT NURSE NOTE - OBJECTIVE STATEMENT
58 y.o. male coming in from home via private car for body aches/fever/chills/abdominal pain/diarrhea. pt states that he just returned from the [place] within the last week and last night started having abdominal pain and diarrhea last night. pt states that this AM he started having body aches and chills last night. PMH a fib rate controlled not on AC, non hodgkin's lymphoma 3 years in remission, GERD. A&Ox3, vss, pt endorsing nausea/diarrhea/abdominal pain/right leg cramping/body aches, pt denies CP/SOB/dizziness/V/urinary symptoms, bed in lowest position, call bell in reach and teaching on use completed, verbalized understanding of call bell use.

## 2024-10-11 NOTE — ED ADULT NURSE NOTE - NSFALLUNIVINTERV_ED_ALL_ED
Bed/Stretcher in lowest position, wheels locked, appropriate side rails in place/Call bell, personal items and telephone in reach/Instruct patient to call for assistance before getting out of bed/chair/stretcher/Non-slip footwear applied when patient is off stretcher/[place] to call system/Physically safe environment - no spills, clutter or unnecessary equipment/Purposeful proactive rounding/Room/bathroom lighting operational, light cord in reach

## 2024-10-11 NOTE — ED PROVIDER NOTE - PROGRESS NOTE DETAILS
Frederick Mcdaniel MD PGY2: Patient reassessed, stable. CXR clear. Labs nonactionable. Likely viral. Remainder of labs pending, patient can follow up. Well appearing, no acute distress, no respiratory distress. Lab and imaging results were discussed with the patient. Shared decision making was held between provider and patient with regards to disposition decision. All questions and concerns were addressed. Patient is agreeable to disposition plan.

## 2024-10-11 NOTE — ED PROVIDER NOTE - ATTENDING CONTRIBUTION TO CARE
Pt with wife at bedside with fever, joint pains, loose stools, malaise.  Tolerating PO well, no respiratory distress, no rash, no neck pain/stiffness.    PE: well appearing, nontoxic, no respiratory distress.  Neuro nonfocal.  Skin intact. Psych normal mood.  No meningeal signs    MDM: fever, arthralgia, recent travel to [place], check cbc r/o anemia or leukocytosis, check bmp to r/o metabolic derangement and lyte imbalance, malaria screen, viral screen.     Progress Note 1400: no signs for sepsis, advised pt and wife about initial results and pending results, will call pt if + results, okay for outpatient.

## 2024-10-11 NOTE — ED PROVIDER NOTE - CLINICAL SUMMARY MEDICAL DECISION MAKING FREE TEXT BOX
58-year-old male with history of mantle cell lymphoma in remission for 3.5 years, HTN presenting to the emergency department for x 1 to 2 days of low-grade fevers, myalgias, diarrhea and nausea.  Symptoms in the setting of recent travel to [place] but no known sick contacts.  Patient without overt respiratory symptoms.  Denies urinary symptoms.  No rashes or exotic exposures.  Including not limited to hiking or tick/mosquito borne illnesses.  Patient is currently afebrile low-grade.  Hemodynamically stable.  Well-appearing.  Exam unremarkable.  Plan for screening labs, VBG, blood cultures, flu/COVID, HIV, Legionella, electrolytes, malaria smear.  Low clinical suspicion for exotic infectious disease likely viral syndrome.  IV fluids and Ofirmev.  Likely discharge

## 2024-10-11 NOTE — ED PROVIDER NOTE - PHYSICAL EXAMINATION
GEN: Patient awake and alert. No acute distress, non-toxic.   Head: Normocephalic, atraumatic.  Neck: Nontender, full ROM.   Eyes: PERRLA b/l. EOMI, no scleral icterus, no conjunctival injection. Moist mucous membranes.  CARDIAC: RRR. Normal S1, S2. No murmur, rubs, or gallops. No peripheral edema noted.  PULM: Speaking in full sentences. CTA B/L no wheeze, rales or rhonchi. No signs of respiratory distress, no accessory muscle usage or nasal flaring.  ABD: Soft, nontender, nondistended. No rebound, no involuntary guarding.   : No CVA tenderness, no suprapubic tenderness.  MSK: Moving all extremities spontaneously. Full ROM in extremities. No obvious deformity.  NEURO: A&Ox3, no focal neurological deficits  SKIN: Warm, dry, no rash, no lesions, no open wounds. No urticaria. No jaundice.

## 2024-10-12 LAB
CULTURE RESULTS: SIGNIFICANT CHANGE UP
HAV IGM SER-ACNC: SIGNIFICANT CHANGE UP
HBV CORE IGM SER-ACNC: SIGNIFICANT CHANGE UP
HBV SURFACE AG SER-ACNC: SIGNIFICANT CHANGE UP
HCV AB S/CO SERPL IA: 0.17 S/CO — SIGNIFICANT CHANGE UP (ref 0–0.99)
HCV AB SERPL-IMP: SIGNIFICANT CHANGE UP
SPECIMEN SOURCE: SIGNIFICANT CHANGE UP

## 2024-10-15 ENCOUNTER — APPOINTMENT (OUTPATIENT)
Dept: CARDIOLOGY | Facility: CLINIC | Age: 59
End: 2024-10-15

## 2024-10-18 ENCOUNTER — APPOINTMENT (OUTPATIENT)
Dept: UROLOGY | Facility: CLINIC | Age: 59
End: 2024-10-18
Payer: MEDICARE

## 2024-10-18 VITALS — DIASTOLIC BLOOD PRESSURE: 77 MMHG | SYSTOLIC BLOOD PRESSURE: 114 MMHG | HEART RATE: 66 BPM

## 2024-10-18 DIAGNOSIS — Z12.5 ENCOUNTER FOR SCREENING FOR MALIGNANT NEOPLASM OF PROSTATE: ICD-10-CM

## 2024-10-18 DIAGNOSIS — N52.9 MALE ERECTILE DYSFUNCTION, UNSPECIFIED: ICD-10-CM

## 2024-10-18 PROCEDURE — 99214 OFFICE O/P EST MOD 30 MIN: CPT

## 2024-10-21 ENCOUNTER — APPOINTMENT (OUTPATIENT)
Age: 59
End: 2024-10-21

## 2024-10-21 LAB — PSA SERPL-MCNC: 1.24 NG/ML

## 2024-11-15 NOTE — DISCHARGE NOTE ADULT - CLICK TO LAUNCH ORM
Procedure(s) (LRB):  CORONARY ANGIOGRAM/POSSIBLE PTCA - CV (N/A) DOS 11/19/2024.  Patient to report to [facility].  Pre-procedure interview was completed with Patient/Representative.  Patient /Representative verbalize correct arrival time 0630 per MD office/letter, NPO status and medication use:per MD instruction.  Patient/representative instructed to bring a photo ID, Insurance card/s, and a list  if current medications.  Patient advised that [employer] parking is not available at this time.  Patient verbalized understanding  per [facility] Masking and visitor guidelines reviewed.  Patient verbalize the understanding that they must have a responsible adult to take them home and accompany them overnight after their procedure-Spouse.    Patient/representative aware of they are ill (cough,fever, etc.) to call their MD/Surgeon  Patient /representative verbalize understanding.     .

## 2024-11-19 ENCOUNTER — APPOINTMENT (OUTPATIENT)
Dept: CARDIOLOGY | Facility: CLINIC | Age: 59
End: 2024-11-19

## 2024-11-21 ENCOUNTER — APPOINTMENT (OUTPATIENT)
Dept: CT IMAGING | Facility: IMAGING CENTER | Age: 59
End: 2024-11-21

## 2024-11-21 ENCOUNTER — OUTPATIENT (OUTPATIENT)
Dept: OUTPATIENT SERVICES | Facility: HOSPITAL | Age: 59
LOS: 1 days | End: 2024-11-21
Payer: MEDICARE

## 2024-11-21 DIAGNOSIS — Z98.890 OTHER SPECIFIED POSTPROCEDURAL STATES: Chronic | ICD-10-CM

## 2024-11-21 DIAGNOSIS — Z00.8 ENCOUNTER FOR OTHER GENERAL EXAMINATION: ICD-10-CM

## 2024-11-21 DIAGNOSIS — Z95.818 PRESENCE OF OTHER CARDIAC IMPLANTS AND GRAFTS: Chronic | ICD-10-CM

## 2024-11-21 DIAGNOSIS — C83.10 MANTLE CELL LYMPHOMA, UNSPECIFIED SITE: ICD-10-CM

## 2024-11-21 DIAGNOSIS — Z94.84 STEM CELLS TRANSPLANT STATUS: Chronic | ICD-10-CM

## 2024-11-21 PROCEDURE — 74177 CT ABD & PELVIS W/CONTRAST: CPT | Mod: 26

## 2024-11-21 PROCEDURE — 74177 CT ABD & PELVIS W/CONTRAST: CPT

## 2024-11-21 PROCEDURE — 71260 CT THORAX DX C+: CPT | Mod: 26

## 2024-11-21 PROCEDURE — 71260 CT THORAX DX C+: CPT

## 2024-12-26 ENCOUNTER — APPOINTMENT (OUTPATIENT)
Dept: CARDIOLOGY | Facility: CLINIC | Age: 59
End: 2024-12-26

## 2025-02-05 ENCOUNTER — APPOINTMENT (OUTPATIENT)
Dept: CARDIOLOGY | Facility: CLINIC | Age: 60
End: 2025-02-05

## 2025-02-20 DIAGNOSIS — Z94.84 STEM CELLS TRANSPLANT STATUS: ICD-10-CM

## 2025-02-20 DIAGNOSIS — C83.10 MANTLE CELL LYMPHOMA, UNSPECIFIED SITE: ICD-10-CM

## 2025-03-12 ENCOUNTER — APPOINTMENT (OUTPATIENT)
Dept: CARDIOLOGY | Facility: CLINIC | Age: 60
End: 2025-03-12

## 2025-03-24 ENCOUNTER — OUTPATIENT (OUTPATIENT)
Dept: OUTPATIENT SERVICES | Facility: HOSPITAL | Age: 60
LOS: 1 days | Discharge: HOME | End: 2025-03-24

## 2025-03-24 DIAGNOSIS — Z98.890 OTHER SPECIFIED POSTPROCEDURAL STATES: Chronic | ICD-10-CM

## 2025-03-24 DIAGNOSIS — Z95.818 PRESENCE OF OTHER CARDIAC IMPLANTS AND GRAFTS: Chronic | ICD-10-CM

## 2025-03-24 DIAGNOSIS — Z94.84 STEM CELLS TRANSPLANT STATUS: Chronic | ICD-10-CM

## 2025-03-25 ENCOUNTER — RESULT REVIEW (OUTPATIENT)
Age: 60
End: 2025-03-25

## 2025-03-25 ENCOUNTER — APPOINTMENT (OUTPATIENT)
Dept: HEMATOLOGY ONCOLOGY | Facility: CLINIC | Age: 60
End: 2025-03-25

## 2025-03-25 ENCOUNTER — NON-APPOINTMENT (OUTPATIENT)
Age: 60
End: 2025-03-25

## 2025-03-25 ENCOUNTER — APPOINTMENT (OUTPATIENT)
Dept: HEMATOLOGY ONCOLOGY | Facility: CLINIC | Age: 60
End: 2025-03-25
Payer: MEDICARE

## 2025-03-25 VITALS
OXYGEN SATURATION: 97 % | BODY MASS INDEX: 30.44 KG/M2 | TEMPERATURE: 97.3 F | RESPIRATION RATE: 16 BRPM | DIASTOLIC BLOOD PRESSURE: 72 MMHG | SYSTOLIC BLOOD PRESSURE: 107 MMHG | HEART RATE: 75 BPM | WEIGHT: 218.25 LBS

## 2025-03-25 DIAGNOSIS — I48.91 UNSPECIFIED ATRIAL FIBRILLATION: ICD-10-CM

## 2025-03-25 DIAGNOSIS — Z92.850 PERSONAL HISTORY OF CHIMERIC ANTIGEN RECEPTOR T-CELL THERAPY: ICD-10-CM

## 2025-03-25 DIAGNOSIS — Z94.84 STEM CELLS TRANSPLANT STATUS: ICD-10-CM

## 2025-03-25 DIAGNOSIS — C83.10 MANTLE CELL LYMPHOMA, UNSPECIFIED SITE: ICD-10-CM

## 2025-03-25 LAB
BASOPHILS # BLD AUTO: 0.03 K/UL — SIGNIFICANT CHANGE UP (ref 0–0.2)
BASOPHILS NFR BLD AUTO: 0.5 % — SIGNIFICANT CHANGE UP (ref 0–2)
EOSINOPHIL # BLD AUTO: 0.16 K/UL — SIGNIFICANT CHANGE UP (ref 0–0.5)
EOSINOPHIL NFR BLD AUTO: 2.9 % — SIGNIFICANT CHANGE UP (ref 0–6)
HCT VFR BLD CALC: 41.1 % — SIGNIFICANT CHANGE UP (ref 39–50)
HGB BLD-MCNC: 13.8 G/DL — SIGNIFICANT CHANGE UP (ref 13–17)
IMM GRANULOCYTES NFR BLD AUTO: 0.5 % — SIGNIFICANT CHANGE UP (ref 0–0.9)
LYMPHOCYTES # BLD AUTO: 2.42 K/UL — SIGNIFICANT CHANGE UP (ref 1–3.3)
LYMPHOCYTES # BLD AUTO: 44 % — SIGNIFICANT CHANGE UP (ref 13–44)
MCHC RBC-ENTMCNC: 31.3 PG — SIGNIFICANT CHANGE UP (ref 27–34)
MCHC RBC-ENTMCNC: 33.6 G/DL — SIGNIFICANT CHANGE UP (ref 32–36)
MCV RBC AUTO: 93.2 FL — SIGNIFICANT CHANGE UP (ref 80–100)
MONOCYTES # BLD AUTO: 0.52 K/UL — SIGNIFICANT CHANGE UP (ref 0–0.9)
MONOCYTES NFR BLD AUTO: 9.5 % — SIGNIFICANT CHANGE UP (ref 2–14)
NEUTROPHILS # BLD AUTO: 2.34 K/UL — SIGNIFICANT CHANGE UP (ref 1.8–7.4)
NEUTROPHILS NFR BLD AUTO: 42.6 % — LOW (ref 43–77)
NRBC BLD AUTO-RTO: 0 /100 WBCS — SIGNIFICANT CHANGE UP (ref 0–0)
PLATELET # BLD AUTO: 192 K/UL — SIGNIFICANT CHANGE UP (ref 150–400)
RBC # BLD: 4.41 M/UL — SIGNIFICANT CHANGE UP (ref 4.2–5.8)
RBC # FLD: 13.4 % — SIGNIFICANT CHANGE UP (ref 10.3–14.5)
WBC # BLD: 5.5 K/UL — SIGNIFICANT CHANGE UP (ref 3.8–10.5)
WBC # FLD AUTO: 5.5 K/UL — SIGNIFICANT CHANGE UP (ref 3.8–10.5)

## 2025-03-25 PROCEDURE — 99214 OFFICE O/P EST MOD 30 MIN: CPT

## 2025-05-08 ENCOUNTER — APPOINTMENT (OUTPATIENT)
Dept: NUCLEAR MEDICINE | Facility: IMAGING CENTER | Age: 60
End: 2025-05-08
Payer: MEDICARE

## 2025-05-08 ENCOUNTER — OUTPATIENT (OUTPATIENT)
Dept: OUTPATIENT SERVICES | Facility: HOSPITAL | Age: 60
LOS: 1 days | End: 2025-05-08
Payer: MEDICARE

## 2025-05-08 DIAGNOSIS — Z98.890 OTHER SPECIFIED POSTPROCEDURAL STATES: Chronic | ICD-10-CM

## 2025-05-08 DIAGNOSIS — Z95.818 PRESENCE OF OTHER CARDIAC IMPLANTS AND GRAFTS: Chronic | ICD-10-CM

## 2025-05-08 DIAGNOSIS — Z94.84 STEM CELLS TRANSPLANT STATUS: Chronic | ICD-10-CM

## 2025-05-08 DIAGNOSIS — Z94.84 STEM CELLS TRANSPLANT STATUS: ICD-10-CM

## 2025-05-08 DIAGNOSIS — Z00.8 ENCOUNTER FOR OTHER GENERAL EXAMINATION: ICD-10-CM

## 2025-05-08 PROCEDURE — 78815 PET IMAGE W/CT SKULL-THIGH: CPT | Mod: 26,PS

## 2025-05-08 PROCEDURE — A9552: CPT

## 2025-05-08 PROCEDURE — 78815 PET IMAGE W/CT SKULL-THIGH: CPT

## 2025-06-10 NOTE — DISCHARGE NOTE PROVIDER - NSDCFUSCHEDAPPT_GEN_ALL_CORE_FT
FLOYD DILLON ; 09/30/2021 ; [facility] [address] ; 10/04/2021 ; [facility] [place]
Maria A Narvaez(Attending)
[FreeTextEntry1] : Based on my current evaluation, patient may return to work light duty on 2/12/24. No lifting >5 lbs. Will reassess at patient's MRI follow up appointment.

## 2025-07-22 ENCOUNTER — APPOINTMENT (OUTPATIENT)
Dept: ELECTROPHYSIOLOGY | Facility: CLINIC | Age: 60
End: 2025-07-22
Payer: MEDICARE

## 2025-07-22 VITALS
BODY MASS INDEX: 29.4 KG/M2 | WEIGHT: 210 LBS | OXYGEN SATURATION: 97 % | DIASTOLIC BLOOD PRESSURE: 62 MMHG | HEART RATE: 70 BPM | HEIGHT: 71 IN | SYSTOLIC BLOOD PRESSURE: 106 MMHG

## 2025-07-22 DIAGNOSIS — Z98.890 OTHER SPECIFIED POSTPROCEDURAL STATES: ICD-10-CM

## 2025-07-22 DIAGNOSIS — Z86.79 OTHER SPECIFIED POSTPROCEDURAL STATES: ICD-10-CM

## 2025-07-22 PROCEDURE — 99213 OFFICE O/P EST LOW 20 MIN: CPT

## 2025-07-22 RX ORDER — ACETAMINOPHEN 500 MG
500 TABLET ORAL
Refills: 0 | Status: ACTIVE | COMMUNITY

## 2025-07-22 RX ORDER — CYANOCOBALAMIN (VITAMIN B-12) 1000 MCG
TABLET ORAL
Refills: 0 | Status: ACTIVE | COMMUNITY

## (undated) DEVICE — PREP BETADINE SPONGE STICKS

## (undated) DEVICE — GLV 7.5 PROTEXIS (CREAM) MICRO

## (undated) DEVICE — DRAPE LAPAROTOMY W VELCRO CORD TABS

## (undated) DEVICE — DRSG GAUZE VASELINE PETROLEUM 3 X 9"

## (undated) DEVICE — BLADE SCALPEL SAFETYLOCK #10

## (undated) DEVICE — DRSG COBAN 4"

## (undated) DEVICE — BLADE SCALPEL SAFETYLOCK #15

## (undated) DEVICE — SOL IRR POUR H2O 250ML

## (undated) DEVICE — MARKING PEN W RULER

## (undated) DEVICE — ELCTR BOVIE TIP NEEDLE 2.84"

## (undated) DEVICE — SPECIMEN CONTAINER 100ML

## (undated) DEVICE — VENODYNE/SCD SLEEVE CALF MEDIUM

## (undated) DEVICE — POSITIONER FOAM EGG CRATE ULNAR 2PCS (PINK)

## (undated) DEVICE — DRAPE INSTRUMENT POUCH 6.75" X 11"

## (undated) DEVICE — SUT CHROMIC 4-0 18" P-3

## (undated) DEVICE — SOL IRR POUR NS 0.9% 500ML

## (undated) DEVICE — DRSG GAUZE VASELINE PETROLEUM 1 X 8" CISION

## (undated) DEVICE — WARMING BLANKET UPPER ADULT

## (undated) DEVICE — PACK MINOR